# Patient Record
Sex: MALE | Race: WHITE | NOT HISPANIC OR LATINO | Employment: OTHER | URBAN - METROPOLITAN AREA
[De-identification: names, ages, dates, MRNs, and addresses within clinical notes are randomized per-mention and may not be internally consistent; named-entity substitution may affect disease eponyms.]

---

## 2017-11-28 ENCOUNTER — COMMUNICATION - HEALTHEAST (OUTPATIENT)
Dept: SCHEDULING | Facility: CLINIC | Age: 77
End: 2017-11-28

## 2018-05-11 ENCOUNTER — RECORDS - HEALTHEAST (OUTPATIENT)
Dept: LAB | Facility: CLINIC | Age: 78
End: 2018-05-11

## 2018-05-11 LAB
ALBUMIN SERPL-MCNC: 3.5 G/DL (ref 3.5–5)
ALP SERPL-CCNC: 115 U/L (ref 45–120)
ALT SERPL W P-5'-P-CCNC: 16 U/L (ref 0–45)
ANION GAP SERPL CALCULATED.3IONS-SCNC: 12 MMOL/L (ref 5–18)
AST SERPL W P-5'-P-CCNC: 24 U/L (ref 0–40)
BILIRUB SERPL-MCNC: 1 MG/DL (ref 0–1)
BUN SERPL-MCNC: 18 MG/DL (ref 8–28)
CALCIUM SERPL-MCNC: 9.2 MG/DL (ref 8.5–10.5)
CHLORIDE BLD-SCNC: 108 MMOL/L (ref 98–107)
CO2 SERPL-SCNC: 22 MMOL/L (ref 22–31)
CREAT SERPL-MCNC: 0.85 MG/DL (ref 0.7–1.3)
GFR SERPL CREATININE-BSD FRML MDRD: >60 ML/MIN/1.73M2
GLUCOSE BLD-MCNC: 109 MG/DL (ref 70–125)
POTASSIUM BLD-SCNC: 4.3 MMOL/L (ref 3.5–5)
PROT SERPL-MCNC: 6.5 G/DL (ref 6–8)
SODIUM SERPL-SCNC: 142 MMOL/L (ref 136–145)

## 2018-05-18 ASSESSMENT — MIFFLIN-ST. JEOR: SCORE: 1655.66

## 2018-06-12 ENCOUNTER — ANESTHESIA - HEALTHEAST (OUTPATIENT)
Dept: SURGERY | Facility: HOSPITAL | Age: 78
End: 2018-06-12

## 2018-06-12 ENCOUNTER — SURGERY - HEALTHEAST (OUTPATIENT)
Dept: SURGERY | Facility: HOSPITAL | Age: 78
End: 2018-06-12

## 2019-02-25 ENCOUNTER — RECORDS - HEALTHEAST (OUTPATIENT)
Dept: LAB | Facility: CLINIC | Age: 79
End: 2019-02-25

## 2019-02-26 LAB
CHOLEST SERPL-MCNC: 168 MG/DL
FASTING STATUS PATIENT QL REPORTED: ABNORMAL
HDLC SERPL-MCNC: 64 MG/DL
LDLC SERPL CALC-MCNC: 55 MG/DL
TRIGL SERPL-MCNC: 244 MG/DL
TSH SERPL DL<=0.005 MIU/L-ACNC: 0.94 UIU/ML (ref 0.3–5)

## 2019-08-09 ENCOUNTER — ALLIED HEALTH/NURSE VISIT (OUTPATIENT)
Dept: PHARMACY | Facility: PHYSICIAN GROUP | Age: 79
End: 2019-08-09
Payer: COMMERCIAL

## 2019-08-09 DIAGNOSIS — I63.9 CEREBROVASCULAR ACCIDENT (CVA), UNSPECIFIED MECHANISM (H): Primary | ICD-10-CM

## 2019-08-09 DIAGNOSIS — I10 BENIGN ESSENTIAL HYPERTENSION: ICD-10-CM

## 2019-08-09 DIAGNOSIS — H40.003 GLAUCOMA SUSPECT, BILATERAL: ICD-10-CM

## 2019-08-09 DIAGNOSIS — N40.0 BENIGN PROSTATIC HYPERPLASIA, UNSPECIFIED WHETHER LOWER URINARY TRACT SYMPTOMS PRESENT: ICD-10-CM

## 2019-08-09 DIAGNOSIS — E78.5 HYPERLIPIDEMIA LDL GOAL <70: ICD-10-CM

## 2019-08-09 DIAGNOSIS — E03.9 HYPOTHYROIDISM, UNSPECIFIED TYPE: ICD-10-CM

## 2019-08-09 DIAGNOSIS — F32.A DEPRESSION, UNSPECIFIED DEPRESSION TYPE: ICD-10-CM

## 2019-08-09 PROCEDURE — 99605 MTMS BY PHARM NP 15 MIN: CPT | Performed by: PHARMACIST

## 2019-08-09 PROCEDURE — 99607 MTMS BY PHARM ADDL 15 MIN: CPT | Performed by: PHARMACIST

## 2019-08-09 RX ORDER — LATANOPROST 50 UG/ML
1 SOLUTION/ DROPS OPHTHALMIC AT BEDTIME
COMMUNITY

## 2019-08-09 RX ORDER — LEVOTHYROXINE SODIUM 125 UG/1
125 TABLET ORAL DAILY
COMMUNITY
End: 2021-07-21

## 2019-08-09 RX ORDER — TAMSULOSIN HYDROCHLORIDE 0.4 MG/1
0.4 CAPSULE ORAL DAILY
COMMUNITY
End: 2020-07-20

## 2019-08-09 RX ORDER — SERTRALINE HYDROCHLORIDE 100 MG/1
100 TABLET, FILM COATED ORAL DAILY
COMMUNITY
End: 2021-07-21

## 2019-08-09 RX ORDER — SIMVASTATIN 20 MG
20 TABLET ORAL AT BEDTIME
COMMUNITY
End: 2019-08-15

## 2019-08-09 NOTE — PROGRESS NOTES
SUBJECTIVE/OBJECTIVE:                Dominik Rojas is a 78 year old male called for a transitions of care visit.  He was discharged from [facility] on  for acute ischemic right ANDREI stroke.  Spoke with wife Jovana, verbal permission given from patient.  Referral for MTM came from his hospital discharge.      Chief Complaint: Medication review.      Allergies/ADRs: Reviewed in ECW  Tobacco: History of tobacco dependence - quit    Alcohol: not currently using  Caffeine: no caffeine  PMH: Reviewed in Epic and ECW    Medication Adherence/Access:  no issues reported  Patient uses pill box(es). Keeping close track of the doses, and when he will be stopping aspirin.    HPI:    Patient presented to the ED ischemic stroke and found to have carotid artery stenosis.   Per patient, the only symptoms of a stroke he had was confusion.  Head MRI shows acute to subacute infarction right frontal lobe and moderate to advanced chronic microvascular ischemic change. He does have known vascular disease with a AAA repair in  and history of a left carotid artery aneurysm seen on MRA of the left ICA.  TTE shows normal EF and is otherwise normal. LDL is 71. Right ICA stenosis 75%. Vascular surgery consulted recommend continue antiplatelet therapy and plan for outpatient right   Pt's urine culture was also positive for enterococcus species, and was treated with fosfomycin while admitted.      CVA/HTN:   Patient is currently taking Clopidogrel 75mg daily, and aspirin 325mg daily for antiplatelet therapy. Patient reports no current concerns of bruising or bleeding. Patient does not have a hx of GI bleed.  Per discharge summary, pt is supposed to be on Aspirin 325mg daily for 21 days then stop.  Plan from neurology is ASA and plavix for 3 weeks, then Plavix only.  Patient is also taking Lisinopril 10mg daily. Pt denies orthostasis, headaches.   Patient does not self-monitor BP.      Hyperlipidemia:   Current therapy includes  Simvastatin 20mg once daily.  Pt reports no significant myalgias or other side effects.      BPH:   Currently taking Tamsulosin 0.4mg daily.  Pt reports this therapy is effective.  Pt denies dizziness, headaches.      Depression:    Current medications include: Sertraline 100mg once daily. Pt reports that depression symptoms are well controlled.  PHQ-9 score:    PHQ-9 SCORE 2/25/2019   PHQ-9 Total Score 1       Hypothyroidism:   Patient is taking levothyroxine 125 mcg daily. Patient is having the following symptoms: none.  Has been on a very stable dose.      TSH- 02/25/2019       NAME VALUE REFERENCE RANGE    THYROID STIMULATING HORMONE 0.94        Glaucoma:    Currently taking latanoprost 0.005% 1 drop both eyes daily.  Didn't discuss in detail today.  Pt states he follows up regularly with his eye doctor.      Results for LARRY BREWSTER (MRN [id number]) as of 8/12/2019 13:20   8/6/2019 07:01   AST 27   ALT 22   ALBUMIN 3.4 (L)   Protein, Total 6.8   Cholesterol 140   Alkaline Phosphatase 117   Bilirubin, Total 1.4 (H)   Bilirubin, Direct 0.6 (H)   Triglycerides 72   HDL Cholesterol 55   LDL Calculated 71   Hemoglobin A1c 6.0       Today's Vitals: There were no vitals taken for this visit. - telephone encounter    ASSESSMENT:                 Current medications were reviewed today.      Medication Adherence: excellent, no issues identified      CVA/HTN:   Stable.  Has follow up plan with cardiology.  He would benefit from home monitoring of BP since starting lisinopril to ensure he is on an effective dose.    Hyperlipidemia:   Needs Improvement.   Pt is not on high intensity statin which is indicated based on 2019 ACC/AHA guidelines for lipid management given his recent CVA.  Guidelines indicate a moderate intensity statin is appropriate for him given his age.  With his recent CVA, a higher intensity statin may be beneficial.  Pt is high risk, and he also has right ICA stenosis 75%.  His LDL on 8/6/19 was 71, but  with significant ischemic history he may benefit from more intensive treatment with rosuvastatin 20-40mg or atorvastatin 40-80mg.    BPH:   Stable.    Depression:    Stable.    Hypothyroidism:   Stable. Last TSH is within normal limits.     Glaucoma:    Stable, follows with ophthalmologist.     PLAN:                  Post Discharge Medication Reconciliation Status: discharge medications reconciled and changed, per note/orders (see AVS).    1) Recommend consider changing simvastatin to rosuvastatin 20 mg daily.    2)  Recommended patient monitor home BP to ensure they are not above 140/90 mmHg consistently.  They have a BP monitor at home, but aren't sure how to use it.  If they need help with it, I told them they could bring it to the clinic on Tuesday 8/13 and I could help them with this.      Spoke to Dr. Meza on 8/13/19 after his apt with Dominik.  During his apt they discussed my recommendation to change simvastatin to rosuvastatin, and he agreed with plan.  New rx was sent in that day.    I spent 60 minutes with this patient today. I offer these suggestions for consideration by Dr. Meza. A copy of the visit note was provided to the patient's primary care provider.    Will follow up PRN.  No follow up with MTM need at this time, but let pt know they can call me with questions.    The patient declined a summary of these recommendations as an after visit summary.    Wilma Lang, Jameel  Medication Therapy Management Pharmacist  Pager: [phone]

## 2019-08-12 RX ORDER — LISINOPRIL 10 MG/1
10 TABLET ORAL DAILY
COMMUNITY
End: 2020-07-20

## 2019-08-12 RX ORDER — CLOPIDOGREL BISULFATE 75 MG/1
75 TABLET ORAL DAILY
COMMUNITY
End: 2020-07-20

## 2019-08-12 RX ORDER — CALCIPOTRIENE 50 UG/G
OINTMENT TOPICAL DAILY PRN
COMMUNITY
End: 2020-07-20

## 2019-08-12 ASSESSMENT — PATIENT HEALTH QUESTIONNAIRE - PHQ9: SUM OF ALL RESPONSES TO PHQ QUESTIONS 1-9: 1

## 2019-08-12 NOTE — PATIENT INSTRUCTIONS
Recommendations from today's MTM visit:                                                    MTM (medication therapy management) is a service provided by a clinical pharmacist designed to help you get the most of out of your medicines.   Today we reviewed what your medicines are for, how to know if they are working, that your medicines are safe and how to make your medicine regimen as easy as possible.     1)  Recommend talking to your doctor about changing Simvastatin to a more effective cholesterol medication, like Rosuvastatin.  Your cholesterol numbers are good, but a more potent medication like rosuvastatin helps reduce the risk of stroke and heart attack.  I also asked Dr. Meza about this as well..    2) Recommend monitoring your blood pressure at home once a day or a few times per week.  This will help us determine if the Lisinopril dose you are taking is effective, or if it should be increased.  We also want to make sure your blood pressure isn't going too low.  If you blood pressure is frequently above 140/90 mmHg, you would want to call the clinic to let Dr. Meza know.      It was great to speak with you today.  I value your experience and would be very thankful for your time with providing good feedback on clinic surveys.  Kindly let me know personally if your experience today was not exceptional so that we can continue to improve your care delivery experience.    Next MTM visit: as needed, none scheduled at this time.    To schedule another MTM appointment, please call the clinic directly or you may call the MTM scheduling line at [phone] or toll-free at [phone].     My Clinical Pharmacist's contact information:                                                      It was a pleasure talking with you today!  Please feel free to contact me with any questions or concerns you have.      Wilma Lang, Jameel  Medication Therapy Management Pharmacist  Pager: [phone]

## 2019-08-13 ENCOUNTER — OFFICE VISIT (OUTPATIENT)
Dept: PHARMACY | Facility: PHYSICIAN GROUP | Age: 79
End: 2019-08-13
Payer: COMMERCIAL

## 2019-08-13 ENCOUNTER — RECORDS - HEALTHEAST (OUTPATIENT)
Dept: LAB | Facility: CLINIC | Age: 79
End: 2019-08-13

## 2019-08-13 DIAGNOSIS — I10 BENIGN ESSENTIAL HYPERTENSION: ICD-10-CM

## 2019-08-13 DIAGNOSIS — E78.5 HYPERLIPIDEMIA LDL GOAL <70: Primary | ICD-10-CM

## 2019-08-13 LAB
ANION GAP SERPL CALCULATED.3IONS-SCNC: 11 MMOL/L (ref 5–18)
BUN SERPL-MCNC: 21 MG/DL (ref 8–28)
CALCIUM SERPL-MCNC: 9.8 MG/DL (ref 8.5–10.5)
CHLORIDE BLD-SCNC: 107 MMOL/L (ref 98–107)
CO2 SERPL-SCNC: 24 MMOL/L (ref 22–31)
CREAT SERPL-MCNC: 1.17 MG/DL (ref 0.7–1.3)
GFR SERPL CREATININE-BSD FRML MDRD: 60 ML/MIN/1.73M2
GLUCOSE BLD-MCNC: 101 MG/DL (ref 70–125)
POTASSIUM BLD-SCNC: 4.3 MMOL/L (ref 3.5–5)
SODIUM SERPL-SCNC: 142 MMOL/L (ref 136–145)

## 2019-08-13 PROCEDURE — 99207 ZZC NO CHARGE LOS: CPT | Performed by: PHARMACIST

## 2019-08-15 RX ORDER — ROSUVASTATIN CALCIUM 10 MG/1
10 TABLET, COATED ORAL AT BEDTIME
COMMUNITY

## 2019-08-15 NOTE — PROGRESS NOTES
Clinical Pharmacy Consult:                                                    Dominik Rojas is a 78 year old male coming in for a clinical pharmacist consult.  He was referred to me from his [facility] discharge.  I met with him in clinic after his apt with Dr. Meza.     Reason for Consult: To discuss low sodium diet.  Pt recently had a stroke, and is trying to improve diet.  BP has been elevated as well.    Discussion:   Diet-  Discussed a general goal of under 2000 mg of sodium per day.    We discussed diet, specifically ways to reduce salt in diet and alternative options to think about. Pt typically eats a lot of frozen meals for dinner, soups mostly from cans.  Discussed trying to find lower sodium alternatives for frozen dinners, using the nutritional label to check sodium amount per serving.  Discussed trying to increase vegetables in diet, avoiding canned veggies with lots of salt.  They will look into canned veggies with out added salt, or frozen veggies.  They would like easy options, and don't often cook at home.  Jovana mentioned she plans to look up recipes for some foods that would be easy to cook, such as beef stew.    We also discussed portion sizes of snacks or unhealthy foods.  He said he feels like everything is going to get taken away from him.  We discussed looking at the nutrition label for salt content, and trying to eat smaller portions or measuring out 1-2 servings. He really like crackers and salted pretzels.      BP -   Today they brought in their BP that wasn't working.  Maggie-RN was able to fix it in clinic.  She met with them after me and showed them how to use the BP monitor (omron).  I recommended the try to measure Dominik's BP at home once daily or a few times per week.  Pt was just started on Lisinopril 10mg, and we want to ensure that his BP stays well controlled.  Discussed that lisinopril could be increased if his BP was still elevated.  Advised them to let Dr. Meza know if  home BP are frequently over 140/90 mmHg.      Plan:  1. Decrease salt intake, and try to stay below 2000mg sodium per day.  2. Watch portion sizes of foods that are high in sodium, such as pretzels and crackers.

## 2019-08-29 ENCOUNTER — HOME CARE/HOSPICE - HEALTHEAST (OUTPATIENT)
Dept: HOME HEALTH SERVICES | Facility: HOME HEALTH | Age: 79
End: 2019-08-29

## 2019-09-08 ENCOUNTER — ANESTHESIA - HEALTHEAST (OUTPATIENT)
Dept: SURGERY | Facility: CLINIC | Age: 79
End: 2019-09-08

## 2019-09-09 ENCOUNTER — SURGERY - HEALTHEAST (OUTPATIENT)
Dept: SURGERY | Facility: CLINIC | Age: 79
End: 2019-09-09

## 2019-09-09 ENCOUNTER — RECORDS - HEALTHEAST (OUTPATIENT)
Dept: ADMINISTRATIVE | Facility: OTHER | Age: 79
End: 2019-09-09

## 2019-09-09 ASSESSMENT — MIFFLIN-ST. JEOR
SCORE: 1627.54
SCORE: 1580.37

## 2019-09-10 ASSESSMENT — MIFFLIN-ST. JEOR
SCORE: 1600.33
SCORE: 1624.37

## 2019-09-11 ASSESSMENT — MIFFLIN-ST. JEOR: SCORE: 1630.72

## 2019-09-12 ENCOUNTER — RECORDS - HEALTHEAST (OUTPATIENT)
Dept: ADMINISTRATIVE | Facility: OTHER | Age: 79
End: 2019-09-12

## 2019-09-12 ASSESSMENT — MIFFLIN-ST. JEOR: SCORE: 1510.06

## 2019-09-13 ENCOUNTER — AMBULATORY - HEALTHEAST (OUTPATIENT)
Dept: OTHER | Facility: CLINIC | Age: 79
End: 2019-09-13

## 2019-12-16 ENCOUNTER — RECORDS - HEALTHEAST (OUTPATIENT)
Dept: LAB | Facility: CLINIC | Age: 79
End: 2019-12-16

## 2019-12-16 LAB
ANION GAP SERPL CALCULATED.3IONS-SCNC: 9 MMOL/L (ref 5–18)
BUN SERPL-MCNC: 21 MG/DL (ref 8–28)
CALCIUM SERPL-MCNC: 9.1 MG/DL (ref 8.5–10.5)
CHLORIDE BLD-SCNC: 105 MMOL/L (ref 98–107)
CHOLEST SERPL-MCNC: 163 MG/DL
CO2 SERPL-SCNC: 26 MMOL/L (ref 22–31)
CREAT SERPL-MCNC: 1.06 MG/DL (ref 0.7–1.3)
FASTING STATUS PATIENT QL REPORTED: NORMAL
GFR SERPL CREATININE-BSD FRML MDRD: >60 ML/MIN/1.73M2
GLUCOSE BLD-MCNC: 89 MG/DL (ref 70–125)
HDLC SERPL-MCNC: 54 MG/DL
LDLC SERPL CALC-MCNC: 81 MG/DL
POTASSIUM BLD-SCNC: 4.5 MMOL/L (ref 3.5–5)
SODIUM SERPL-SCNC: 140 MMOL/L (ref 136–145)
TRIGL SERPL-MCNC: 138 MG/DL

## 2020-02-11 ENCOUNTER — RECORDS - HEALTHEAST (OUTPATIENT)
Dept: LAB | Facility: CLINIC | Age: 80
End: 2020-02-11

## 2020-02-11 LAB
ANION GAP SERPL CALCULATED.3IONS-SCNC: 11 MMOL/L (ref 5–18)
BUN SERPL-MCNC: 19 MG/DL (ref 8–28)
CALCIUM SERPL-MCNC: 9 MG/DL (ref 8.5–10.5)
CHLORIDE BLD-SCNC: 106 MMOL/L (ref 98–107)
CO2 SERPL-SCNC: 25 MMOL/L (ref 22–31)
CREAT SERPL-MCNC: 1.02 MG/DL (ref 0.7–1.3)
GFR SERPL CREATININE-BSD FRML MDRD: >60 ML/MIN/1.73M2
GLUCOSE BLD-MCNC: 88 MG/DL (ref 70–125)
POTASSIUM BLD-SCNC: 4.3 MMOL/L (ref 3.5–5)
SODIUM SERPL-SCNC: 142 MMOL/L (ref 136–145)

## 2020-02-13 ASSESSMENT — MIFFLIN-ST. JEOR: SCORE: 1634.35

## 2020-02-18 ENCOUNTER — SURGERY - HEALTHEAST (OUTPATIENT)
Dept: SURGERY | Facility: HOSPITAL | Age: 80
End: 2020-02-18

## 2020-02-18 ENCOUNTER — ANESTHESIA - HEALTHEAST (OUTPATIENT)
Dept: SURGERY | Facility: HOSPITAL | Age: 80
End: 2020-02-18

## 2020-02-18 ASSESSMENT — MIFFLIN-ST. JEOR: SCORE: 1626.18

## 2020-02-19 ASSESSMENT — MIFFLIN-ST. JEOR
SCORE: 1609.4
SCORE: 1614.39

## 2020-02-27 ENCOUNTER — RECORDS - HEALTHEAST (OUTPATIENT)
Dept: LAB | Facility: CLINIC | Age: 80
End: 2020-02-27

## 2020-02-27 LAB
ANION GAP SERPL CALCULATED.3IONS-SCNC: 12 MMOL/L (ref 5–18)
BUN SERPL-MCNC: 22 MG/DL (ref 8–28)
CALCIUM SERPL-MCNC: 9.2 MG/DL (ref 8.5–10.5)
CHLORIDE BLD-SCNC: 104 MMOL/L (ref 98–107)
CO2 SERPL-SCNC: 25 MMOL/L (ref 22–31)
CREAT SERPL-MCNC: 0.88 MG/DL (ref 0.7–1.3)
GFR SERPL CREATININE-BSD FRML MDRD: >60 ML/MIN/1.73M2
GLUCOSE BLD-MCNC: 97 MG/DL (ref 70–125)
POTASSIUM BLD-SCNC: 4.1 MMOL/L (ref 3.5–5)
SODIUM SERPL-SCNC: 141 MMOL/L (ref 136–145)
TSH SERPL DL<=0.005 MIU/L-ACNC: 3.34 UIU/ML (ref 0.3–5)

## 2020-04-30 ENCOUNTER — AMBULATORY - HEALTHEAST (OUTPATIENT)
Dept: SURGERY | Facility: CLINIC | Age: 80
End: 2020-04-30

## 2020-04-30 DIAGNOSIS — Z11.59 ENCOUNTER FOR SCREENING FOR OTHER VIRAL DISEASES: ICD-10-CM

## 2020-05-08 ENCOUNTER — RECORDS - HEALTHEAST (OUTPATIENT)
Dept: LAB | Facility: CLINIC | Age: 80
End: 2020-05-08

## 2020-05-08 LAB
ANION GAP SERPL CALCULATED.3IONS-SCNC: 11 MMOL/L (ref 5–18)
BUN SERPL-MCNC: 23 MG/DL (ref 8–28)
CALCIUM SERPL-MCNC: 8.8 MG/DL (ref 8.5–10.5)
CHLORIDE BLD-SCNC: 107 MMOL/L (ref 98–107)
CO2 SERPL-SCNC: 24 MMOL/L (ref 22–31)
CREAT SERPL-MCNC: 0.85 MG/DL (ref 0.7–1.3)
GFR SERPL CREATININE-BSD FRML MDRD: >60 ML/MIN/1.73M2
GLUCOSE BLD-MCNC: 97 MG/DL (ref 70–125)
HGB BLD-MCNC: 13 G/DL (ref 14–18)
POTASSIUM BLD-SCNC: 4.3 MMOL/L (ref 3.5–5)
SODIUM SERPL-SCNC: 142 MMOL/L (ref 136–145)

## 2020-05-16 ENCOUNTER — OFFICE VISIT - HEALTHEAST (OUTPATIENT)
Dept: FAMILY MEDICINE | Facility: CLINIC | Age: 80
End: 2020-05-16

## 2020-05-16 DIAGNOSIS — Z11.59 ENCOUNTER FOR SCREENING FOR OTHER VIRAL DISEASES: ICD-10-CM

## 2020-05-18 ENCOUNTER — ANESTHESIA - HEALTHEAST (OUTPATIENT)
Dept: SURGERY | Facility: CLINIC | Age: 80
End: 2020-05-18

## 2020-05-19 ENCOUNTER — RECORDS - HEALTHEAST (OUTPATIENT)
Dept: ADMINISTRATIVE | Facility: OTHER | Age: 80
End: 2020-05-19

## 2020-05-19 ENCOUNTER — SURGERY - HEALTHEAST (OUTPATIENT)
Dept: SURGERY | Facility: CLINIC | Age: 80
End: 2020-05-19

## 2020-05-19 ASSESSMENT — MIFFLIN-ST. JEOR: SCORE: 1607.59

## 2020-07-20 ENCOUNTER — OFFICE VISIT (OUTPATIENT)
Dept: NEUROLOGY | Facility: CLINIC | Age: 80
End: 2020-07-20
Payer: COMMERCIAL

## 2020-07-20 VITALS — HEIGHT: 68 IN | BODY MASS INDEX: 33.34 KG/M2 | WEIGHT: 220 LBS

## 2020-07-20 DIAGNOSIS — R41.0 CONFUSION: ICD-10-CM

## 2020-07-20 DIAGNOSIS — Z86.73 HISTORY OF CEREBROVASCULAR ACCIDENT: Primary | ICD-10-CM

## 2020-07-20 PROBLEM — L40.9 PSORIASIS: Status: ACTIVE | Noted: 2020-07-20

## 2020-07-20 PROBLEM — I10 ACCELERATED HYPERTENSION: Status: ACTIVE | Noted: 2020-07-20

## 2020-07-20 PROBLEM — I63.521: Status: ACTIVE | Noted: 2019-08-05

## 2020-07-20 PROBLEM — E78.00 HYPERCHOLESTEREMIA: Status: ACTIVE | Noted: 2020-02-23

## 2020-07-20 PROBLEM — R73.9 HYPERGLYCEMIA: Status: ACTIVE | Noted: 2020-07-20

## 2020-07-20 PROBLEM — R11.10 VOMITING AND DIARRHEA: Status: ACTIVE | Noted: 2017-11-24

## 2020-07-20 PROBLEM — R29.90 STROKE-LIKE SYMPTOMS: Status: ACTIVE | Noted: 2019-08-27

## 2020-07-20 PROBLEM — I72.0 CAROTID ANEURYSM, LEFT (H): Status: ACTIVE | Noted: 2019-09-09

## 2020-07-20 PROBLEM — N40.0 BENIGN PROSTATIC HYPERPLASIA: Status: ACTIVE | Noted: 2020-07-20

## 2020-07-20 PROBLEM — J30.9 ALLERGIC RHINITIS: Status: ACTIVE | Noted: 2020-07-20

## 2020-07-20 PROBLEM — I10 HTN (HYPERTENSION): Status: ACTIVE | Noted: 2019-08-05

## 2020-07-20 PROBLEM — R19.7 VOMITING AND DIARRHEA: Status: ACTIVE | Noted: 2017-11-24

## 2020-07-20 PROBLEM — J47.9 BRONCHIECTASIS WITHOUT ACUTE EXACERBATION (H): Status: ACTIVE | Noted: 2019-09-13

## 2020-07-20 PROBLEM — K76.0 HEPATIC STEATOSIS: Status: ACTIVE | Noted: 2017-11-25

## 2020-07-20 PROBLEM — M19.90 DJD (DEGENERATIVE JOINT DISEASE): Status: ACTIVE | Noted: 2020-07-20

## 2020-07-20 PROBLEM — N43.3 HYDROCELE: Status: ACTIVE | Noted: 2020-07-20

## 2020-07-20 PROBLEM — N39.0 RECURRENT UTI: Status: ACTIVE | Noted: 2020-07-20

## 2020-07-20 PROBLEM — F32.A DEPRESSION: Status: ACTIVE | Noted: 2020-02-23

## 2020-07-20 PROBLEM — E66.9 OBESITY: Status: ACTIVE | Noted: 2020-07-20

## 2020-07-20 PROBLEM — I67.1 ANEURYSM, CAROTID ARTERY, INTERNAL: Status: ACTIVE | Noted: 2017-04-14

## 2020-07-20 PROBLEM — N48.89 PENILE PAIN: Status: ACTIVE | Noted: 2020-02-23

## 2020-07-20 PROBLEM — N39.46 MIXED INCONTINENCE URGE AND STRESS (MALE)(FEMALE): Status: ACTIVE | Noted: 2020-02-24

## 2020-07-20 PROBLEM — R33.9 RETENTION OF URINE: Status: ACTIVE | Noted: 2020-02-24

## 2020-07-20 PROBLEM — E87.20 LACTIC ACIDOSIS: Status: ACTIVE | Noted: 2020-07-20

## 2020-07-20 PROBLEM — M17.9 DJD (DEGENERATIVE JOINT DISEASE) OF KNEE: Status: ACTIVE | Noted: 2020-07-20

## 2020-07-20 PROBLEM — I65.21 ASYMPTOMATIC STENOSIS OF RIGHT CAROTID ARTERY: Status: ACTIVE | Noted: 2019-08-06

## 2020-07-20 PROBLEM — E86.0 DEHYDRATION: Status: ACTIVE | Noted: 2020-07-20

## 2020-07-20 PROBLEM — F10.10 ETOH ABUSE: Status: ACTIVE | Noted: 2020-07-20

## 2020-07-20 PROCEDURE — 99214 OFFICE O/P EST MOD 30 MIN: CPT | Mod: 95 | Performed by: PSYCHIATRY & NEUROLOGY

## 2020-07-20 RX ORDER — NIFEDIPINE 30 MG/1
30 TABLET, EXTENDED RELEASE ORAL DAILY
Status: ON HOLD | COMMUNITY
Start: 2020-02-25 | End: 2021-08-13

## 2020-07-20 RX ORDER — ASPIRIN 81 MG/1
81 TABLET, CHEWABLE ORAL DAILY
Status: ON HOLD | COMMUNITY
End: 2024-06-29

## 2020-07-20 RX ORDER — MULTIPLE VITAMINS W/ MINERALS TAB 9MG-400MCG
1 TAB ORAL DAILY
COMMUNITY

## 2020-07-20 RX ORDER — ACETAMINOPHEN 500 MG
1000 TABLET ORAL PRN
Status: ON HOLD | COMMUNITY
Start: 2020-03-17 | End: 2023-05-07

## 2020-07-20 RX ORDER — OXYBUTYNIN CHLORIDE 5 MG/1
5 TABLET ORAL 3 TIMES DAILY
COMMUNITY
Start: 2020-03-23 | End: 2021-07-21

## 2020-07-20 ASSESSMENT — MIFFLIN-ST. JEOR: SCORE: 1692.41

## 2020-07-20 NOTE — PATIENT INSTRUCTIONS
Patient Education     Confusion  Confusion or delirium is a change in a person s ability to think clearly. There may be trouble recognizing familiar people and places or knowing what day it is. Memory, judgment, and decision-making may also be affected. In severe cases, the person may have limited or no response to being spoken to. Confusion usually appears over a few days and can vary throughout the day. It can last weeks to months.  Confusion is usually a sign of an underlying problem. It may occur suddenly. Or it may develop gradually over time. Causes of confusion include brain injury, medicines, alcohol, withdrawal from certain medicines or illegal drugs, and infection. Heart attack and stroke may cause it. Confusion can also be a sign of dementia or a mental illness.  Treatment will depend on the cause of the problem. If the issue is a medicine, stopping the medicine may help. Thiamine supplement may help with very little risk of side effects. Haloperidol is useful but people with Parkinson disease should not use it. Benzodiazepines are only used in people undergoing alcohol withdrawal.  Home care    Be sure someone is with the confused person at all times. He or she should not be left alone or unsupervised.    Tell the healthcare provider about all medicines that the person takes. These include prescription, over-the-counter, herbs, and supplements.    Dehydration can increase confusion. Ask the healthcare provider how much fluid the person should be drinking. Offer liquids and ensure that they are taken.    Keep all medicines in a secure place under the caregiver s control. To prevent overdose, a confused person should take medicines only under the supervision of a caregiver.    To help a person with confusion:  ? Establish a daily routine. Change can be a source of stress for someone with confusion. Make and keep a time schedule for common tasks such as bathing, dressing, taking medicines, meals, going  for walks, shopping, naps and bed time. Make sure that the person has glasses and hearing aids if needed.  ? Don't use physical restraints.  ? Speak slowly and clearly with a gentle tone of voice. Use short simple words and sentences. Ask one question at a time. Don'tt interrupt, criticize or argue. Be calm and supportive. Use friendly facial expressions. Use pointing and touching to help communicate. If there has been loss of long-term memory, don't ask questions about past events. This would only cause frustration for the person.  ? Use lists, signs, family photos, clocks and calendars as memory aids. Label cabinets and drawers. Try to distract, not confront, the person. When he or she becomes frustrated or upset, redirect attention to eating or some other activity of interest.  ? If this proves to be due to a permanent condition, talk to the healthcare provider or a  about getting a Power of  for healthcare and for financial decisions. It is best to do this while the person can still sign legal documents and make his or her own decisions. Otherwise, a court order will be required.  Follow-up care  Follow up with the person's healthcare provider or as advised for further testing or changes in medical care.  When to seek medical advice  Call the healthcare provider for any of the following:    Frequent falling    Refusal to eat or drink    Increased drowsiness    Nausea or vomiting    Unexplained fever over 100.4  F (38.0  C) or as directed by the healthcare provider  Call 911  Call 911 or emergency services right away if any of the following occur:    Violent behavior or behavior too hard to manage at home    New hallucinations or delusions    complains of severe headache or numbness or weakness of the face, arm, or leg    Slurred speech or trouble speaking, walking, or seeing    Fainting spell, dizziness, or seizure  Date Last Reviewed: 3/1/2018    4751-9721 [employer]. [address] [place] [address]  [place] [address]. All rights reserved. This information is not intended as a substitute for professional medical care. Always follow your healthcare professional's instructions.

## 2020-07-20 NOTE — LETTER
"    7/20/2020         RE: Dominik Rojas  [address]  [facility] [address]        Dear Colleague,    Thank you for referring your patient, Dominik Rojas, to the [facility] [place]. Please see a copy of my visit note below.    [facility]  [place]    Dominik Rojas MRN# [id number]   Age: 79 year old YOB: 1940               Assessment and Plan:   Assessment:   Confusion        Plan:   Orders Placed This Encounter   Procedures     MR Brain w/o Contrast     CBC with platelets differential     Comprehensive metabolic panel     TSH     Vitamin B12     Vitamin B1 whole blood     We will let Dominik and his wife know the results of the above work-up.  If need be, formal neuropsych testing could be considered.  I do wonder about poor sleep hygiene contributing to cognitive inefficiencies.  We will mail a list of sleep hygiene recommendations to Dominik and his wife.             Chief Complaint/HPI:     I spoke to Dominik and his wife with their permission for a telephone visit today.  This is a 79-year-old man who I met about a year ago when he presented to [facility] with stroke.  His wife has concerns.  She says on several occasions, \"I do not know if he is going to come out of it.\"  When I try to ask specifically about that she says that.he had a stroke on August 3, 2019 and on the fifth he saw his regular doctor and she had just a gut feeling, and then he was in the hospital and they discovered the stroke.  He has had 4 surgeries in the last few months including an abdominal aortic aneurysm surgery.  He is up all night long and eating.  She complains that he has complete and total confusion.  He also has a bad knee and they are recommending surgery for that but he has not been enthusiastic about pursuing knee surgery.  The patient himself feels that he is doing fine.  He does still have some coordination difficulties with the left hand related " to the stroke a year ago.  For example, if he tries to unlock the mailbox with his left hand, that he shakes a bit and it can be difficult to get it into the keyhole.  He does fine with the right hand.  His wife has my business card from when I saw him in the hospital a year ago and so she called.            Past Medical History:    has a past medical history of Depressive disorder, Diabetes (H), Glaucoma, History of stroke without residual deficits, Hypertension, and Hypothyroidism.         Past Surgical History:    has a past surgical history that includes Abdomen surgery and lithotripsy.          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              Family History:     Family History   Problem Relation Age of Onset     Emphysema Mother      No Known Problems Father                 Allergies:     Allergies   Allergen Reactions     Diclofenac      Other reaction(s): GI Upset     Loratadine      Other reaction(s): Urinary Retention             Medications:     Current Outpatient Medications:      acetaminophen (TYLENOL) 500 MG tablet, Take 1,000 mg by mouth as needed, Disp: , Rfl:      aspirin (ASA) 81 MG chewable tablet, Take 81 mg by mouth daily, Disp: , Rfl:      latanoprost (XALATAN) 0.005 % ophthalmic solution, Place 1 drop into both eyes daily, Disp: , Rfl:      levothyroxine (SYNTHROID/LEVOTHROID) 125 MCG tablet, Take 125 mcg by mouth daily, Disp: , Rfl:      multivitamin w/minerals (MULTI-VITAMIN) tablet, Take 1 tablet by mouth daily, Disp: , Rfl:      NIFEdipine ER OSMOTIC (PROCARDIA XL) 30 MG 24 hr tablet, Take 30 mg by mouth daily, Disp: , Rfl:      oxybutynin (DITROPAN) 5 MG tablet, Take 5 mg by mouth  3 times daily , Disp: , Rfl:      rosuvastatin (CRESTOR) 10 MG tablet, Take 10 mg by mouth daily, Disp: , Rfl:      sertraline (ZOLOFT) 100 MG tablet, Take 100 mg by mouth daily, Disp: , Rfl:      umeclidinium-vilanterol (ANORO ELLIPTA) 62.5-25 MCG/INH oral inhaler, Inhale 1 puff into the lungs At Bedtime, Disp: , Rfl:            Review of Systems:   Other than the above-mentioned symptoms a complete systems review is negative.            Physical Exam:   Awake and alert with no aphasia no dysarthria  Speech is clear and coherent    Initially he has some difficulty registering 3 out of 3 items as his wife continues talking to him while I try to examine him.  He can name the last 2 presidents without difficulty  Is oriented x3  He can calculate nickels in $1 without difficulty  Recall is 2 out of 3 at about 3 minutes      23 minutes were spent on the phone today.    Bharathi Billy MD         Again, thank you for allowing me to participate in the care of your patient.        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  Reconstruction w/ methylmethacrylate vicryl and pec major flap.     - Pain control  - DVT ppx   - f/u drain outputs  - continue care as per SICU

## 2020-07-20 NOTE — NURSING NOTE
Chief Complaint   Patient presents with     Hospital F/U     follow up after being seen for stroke on 5/19/2020 at [facility] and then urgency room visit on 5/23/2020-patient's wife states that patient is doing ok however he has not been sleeping at all. referral from PCP was sent for sleep study. Has difficulty walking as well due to post poned knee surgery. Confusion as well (difficulty telling time)      Phone visit [phone] patient and wife on the line   Tejas Archer on 7/20/2020 at 10:27 AM

## 2020-07-20 NOTE — PROGRESS NOTES
"[facility] Neurology  [place]    Dominik Rojas MRN# [id number]   Age: 79 year old YOB: 1940               Assessment and Plan:   Assessment:   Confusion        Plan:   Orders Placed This Encounter   Procedures     MR Brain w/o Contrast     CBC with platelets differential     Comprehensive metabolic panel     TSH     Vitamin B12     Vitamin B1 whole blood     We will let Dominik and his wife know the results of the above work-up.  If need be, formal neuropsych testing could be considered.  I do wonder about poor sleep hygiene contributing to cognitive inefficiencies.  We will mail a list of sleep hygiene recommendations to Dominik and his wife.             Chief Complaint/HPI:     I spoke to Dominik and his wife with their permission for a telephone visit today.  This is a 79-year-old man who I met about a year ago when he presented to 1 of the [facility] with stroke.  His wife has concerns.  She says on several occasions, \"I do not know if he is going to come out of it.\"  When I try to ask specifically about that she says that.he had a stroke on August 3, 2019 and on the fifth he saw his regular doctor and she had just a gut feeling, and then he was in the hospital and they discovered the stroke.  He has had 4 surgeries in the last few months including an abdominal aortic aneurysm surgery.  He is up all night long and eating.  She complains that he has complete and total confusion.  He also has a bad knee and they are recommending surgery for that but he has not been enthusiastic about pursuing knee surgery.  The patient himself feels that he is doing fine.  He does still have some coordination difficulties with the left hand related to the stroke a year ago.  For example, if he tries to unlock the mailbox with his left hand, that he shakes a bit and it can be difficult to get it into the keyhole.  He does fine with the right hand.  His wife has my business card from when I saw him in the " hospital a year ago and so she called.            Past Medical History:    has a past medical history of Depressive disorder, Diabetes (H), Glaucoma, History of stroke without residual deficits, Hypertension, and Hypothyroidism.         Past Surgical History:    has a past surgical history that includes Abdomen surgery and lithotripsy.          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              Family History:     Family History   Problem Relation Age of Onset     Emphysema Mother      No Known Problems Father                 Allergies:     Allergies   Allergen Reactions     Diclofenac      Other reaction(s): GI Upset     Loratadine      Other reaction(s): Urinary Retention             Medications:     Current Outpatient Medications:      acetaminophen (TYLENOL) 500 MG tablet, Take 1,000 mg by mouth as needed, Disp: , Rfl:      aspirin (ASA) 81 MG chewable tablet, Take 81 mg by mouth daily, Disp: , Rfl:      latanoprost (XALATAN) 0.005 % ophthalmic solution, Place 1 drop into both eyes daily, Disp: , Rfl:      levothyroxine (SYNTHROID/LEVOTHROID) 125 MCG tablet, Take 125 mcg by mouth daily, Disp: , Rfl:      multivitamin w/minerals (MULTI-VITAMIN) tablet, Take 1 tablet by mouth daily, Disp: , Rfl:      NIFEdipine ER OSMOTIC (PROCARDIA XL) 30 MG 24 hr tablet, Take 30 mg by mouth daily, Disp: , Rfl:      oxybutynin (DITROPAN) 5 MG tablet, Take 5 mg by mouth 3 times daily , Disp: , Rfl:      rosuvastatin (CRESTOR) 10 MG tablet, Take 10 mg by mouth daily, Disp: , Rfl:      sertraline (ZOLOFT) 100 MG tablet, Take 100 mg by mouth daily, Disp: , Rfl:      umeclidinium-vilanterol (ANORO ELLIPTA) 62.5-25 MCG/INH oral  inhaler, Inhale 1 puff into the lungs At Bedtime, Disp: , Rfl:            Review of Systems:   Other than the above-mentioned symptoms a complete systems review is negative.            Physical Exam:   Awake and alert with no aphasia no dysarthria  Speech is clear and coherent    Initially he has some difficulty registering 3 out of 3 items as his wife continues talking to him while I try to examine him.  He can name the last 2 presidents without difficulty  Is oriented x3  He can calculate nickels in $1 without difficulty  Recall is 2 out of 3 at about 3 minutes      23 minutes were spent on the phone today.    Bharathi Billy MD

## 2020-07-21 ENCOUNTER — RECORDS - HEALTHEAST (OUTPATIENT)
Dept: LAB | Facility: HOSPITAL | Age: 80
End: 2020-07-21

## 2020-07-21 LAB
ALBUMIN SERPL-MCNC: 3.9 G/DL (ref 3.5–5)
ALP SERPL-CCNC: 114 U/L (ref 45–120)
ALT SERPL W P-5'-P-CCNC: 17 U/L (ref 0–45)
ANION GAP SERPL CALCULATED.3IONS-SCNC: 11 MMOL/L (ref 5–18)
AST SERPL W P-5'-P-CCNC: 26 U/L (ref 0–40)
BASOPHILS # BLD AUTO: 0.1 THOU/UL (ref 0–0.2)
BASOPHILS NFR BLD AUTO: 1 % (ref 0–2)
BILIRUB SERPL-MCNC: 1.2 MG/DL (ref 0–1)
BUN SERPL-MCNC: 13 MG/DL (ref 8–28)
CALCIUM SERPL-MCNC: 9 MG/DL (ref 8.5–10.5)
CHLORIDE BLD-SCNC: 107 MMOL/L (ref 98–107)
CO2 SERPL-SCNC: 24 MMOL/L (ref 22–31)
CREAT SERPL-MCNC: 0.89 MG/DL (ref 0.7–1.3)
EOSINOPHIL # BLD AUTO: 0.5 THOU/UL (ref 0–0.4)
EOSINOPHIL NFR BLD AUTO: 6 % (ref 0–6)
ERYTHROCYTE [DISTWIDTH] IN BLOOD BY AUTOMATED COUNT: 15.4 % (ref 11–14.5)
GFR SERPL CREATININE-BSD FRML MDRD: >60 ML/MIN/1.73M2
GLUCOSE BLD-MCNC: 100 MG/DL (ref 70–125)
HCT VFR BLD AUTO: 43 % (ref 40–54)
HGB BLD-MCNC: 13.3 G/DL (ref 14–18)
LYMPHOCYTES # BLD AUTO: 2.3 THOU/UL (ref 0.8–4.4)
LYMPHOCYTES NFR BLD AUTO: 25 % (ref 20–40)
MCH RBC QN AUTO: 26.8 PG (ref 27–34)
MCHC RBC AUTO-ENTMCNC: 30.9 G/DL (ref 32–36)
MCV RBC AUTO: 87 FL (ref 80–100)
MONOCYTES # BLD AUTO: 0.7 THOU/UL (ref 0–0.9)
MONOCYTES NFR BLD AUTO: 8 % (ref 2–10)
NEUTROPHILS # BLD AUTO: 5.5 THOU/UL (ref 2–7.7)
NEUTROPHILS NFR BLD AUTO: 61 % (ref 50–70)
PLATELET # BLD AUTO: 266 THOU/UL (ref 140–440)
PMV BLD AUTO: 9 FL (ref 8.5–12.5)
POTASSIUM BLD-SCNC: 4.2 MMOL/L (ref 3.5–5)
PROT SERPL-MCNC: 7.5 G/DL (ref 6–8)
RBC # BLD AUTO: 4.97 MILL/UL (ref 4.4–6.2)
SODIUM SERPL-SCNC: 142 MMOL/L (ref 136–145)
TSH SERPL DL<=0.005 MIU/L-ACNC: 0.3 UIU/ML (ref 0.3–5)
VIT B12 SERPL-MCNC: 476 PG/ML (ref 213–816)
WBC: 9.1 THOU/UL (ref 4–11)

## 2020-07-24 ENCOUNTER — TELEPHONE (OUTPATIENT)
Dept: NEUROLOGY | Facility: CLINIC | Age: 80
End: 2020-07-24

## 2020-07-24 LAB — VIT B1 PYROPHOSHATE BLD-SCNC: 97 NMOL/L (ref 70–180)

## 2020-07-24 NOTE — LETTER
August 3, 2020      Dominik Rojas  [address] [facility] [address]        Dear ,    We are writing to inform you of your test results.    Your recent blood tests, including B12, thiamine and thyroid tests were all fine.  Let us know if you've had the MRI scan done.      If you have any questions or concerns, please call the clinic at the number listed above.       Sincerely,        Bharathi Billy MD

## 2020-07-24 NOTE — TELEPHONE ENCOUNTER
Component      Latest Ref Rng & Units 7/21/2020   WBC      4.0 - 11.0 thou/uL 9.1   RBC      4.40 - 6.20 mill/uL 4.97   Hemoglobin      14.0 - 18.0 g/dL 13.3 (L)   Hematocrit      40.0 - 54.0 % 43.0   MCV      80 - 100 fL 87   MCH      27.0 - 34.0 pg 26.8 (L)   MCHC      32.0 - 36.0 g/dL 30.9 (L)   RDW      11.0 - 14.5 % 15.4 (H)   Platelets      140 - 440 thou/uL 266   MPV      8.5 - 12.5 fL 9.0   Neutrophils %      50 - 70 % 61   Lymphocytes %      20 - 40 % 25   Monocytes %      2 - 10 % 8   Eosinophils %      0 - 6 % 6   Basophils %      0 - 2 % 1   Neutrophils Absolute      2.0 - 7.7 thou/uL 5.5   Lymphocytes Absolute      0.8 - 4.4 thou/uL 2.3   Monocytes Absolute      0.0 - 0.9 thou/uL 0.7   Eosinophils Absolute      0.0 - 0.4 thou/uL 0.5 (H)   Basophils Absolute      0.0 - 0.2 thou/uL 0.1   Sodium      136 - 145 mmol/L 142   Potassium      3.5 - 5.0 mmol/L 4.2   Chloride      98 - 107 mmol/L 107   CO2      22 - 31 mmol/L 24   Anion Gap, Calculation      5 - 18 mmol/L 11   Glucose      70 - 125 mg/dL 100   BUN      8 - 28 mg/dL 13   Creatinine      0.70 - 1.30 mg/dL 0.89   GFR MDRD Af Amer      >60 mL/min/1.73m2 >60   GFR MDRD Non Af Amer      >60 mL/min/1.73m2 >60   Bilirubin, Total      0.0 - 1.0 mg/dL 1.2 (H)   Calcium      8.5 - 10.5 mg/dL 9.0   Protein, Total      6.0 - 8.0 g/dL 7.5   ALBUMIN      3.5 - 5.0 g/dL 3.9   Alkaline Phosphatase      45 - 120 U/L 114   AST      0 - 40 U/L 26   ALT      0 - 45 U/L 17   Vitamin B1, Whole Blood      70 - 180 nmol/L 97   TSH      0.30 - 5.00 uIU/mL 0.30   Vitamin B-12      213 - 816 pg/mL 476     MRI at [facility] still pending   Please advise   Tejas Archer on 7/24/2020 at 12:04 PM

## 2020-08-11 ENCOUNTER — RECORDS - HEALTHEAST (OUTPATIENT)
Dept: ADMINISTRATIVE | Facility: OTHER | Age: 80
End: 2020-08-11

## 2020-08-18 ENCOUNTER — COMMUNICATION - HEALTHEAST (OUTPATIENT)
Dept: SCHEDULING | Facility: CLINIC | Age: 80
End: 2020-08-18

## 2020-08-27 ENCOUNTER — AMBULATORY - HEALTHEAST (OUTPATIENT)
Dept: PHYSICAL MEDICINE AND REHAB | Facility: CLINIC | Age: 80
End: 2020-08-27

## 2020-08-27 ENCOUNTER — RECORDS - HEALTHEAST (OUTPATIENT)
Dept: LAB | Facility: CLINIC | Age: 80
End: 2020-08-27

## 2020-08-27 DIAGNOSIS — M51.369 DEGENERATIVE DISC DISEASE, LUMBAR: ICD-10-CM

## 2020-08-29 LAB — BACTERIA SPEC CULT: ABNORMAL

## 2020-09-08 ENCOUNTER — HOSPITAL ENCOUNTER (OUTPATIENT)
Dept: PHYSICAL MEDICINE AND REHAB | Facility: CLINIC | Age: 80
Discharge: HOME | End: 2020-09-08
Attending: FAMILY MEDICINE

## 2020-09-08 DIAGNOSIS — M48.062 SPINAL STENOSIS OF LUMBAR REGION WITH NEUROGENIC CLAUDICATION: ICD-10-CM

## 2020-09-08 DIAGNOSIS — M47.816 LUMBAR FACET ARTHROPATHY: ICD-10-CM

## 2020-09-08 DIAGNOSIS — M51.369 DEGENERATION OF LUMBAR INTERVERTEBRAL DISC: ICD-10-CM

## 2020-09-08 ASSESSMENT — MIFFLIN-ST. JEOR: SCORE: 1671.54

## 2020-09-16 ENCOUNTER — HOSPITAL ENCOUNTER (OUTPATIENT)
Dept: PHYSICAL MEDICINE AND REHAB | Facility: CLINIC | Age: 80
Discharge: HOME | End: 2020-09-16
Attending: PHYSICIAN ASSISTANT

## 2020-09-16 DIAGNOSIS — M47.816 LUMBAR FACET ARTHROPATHY: ICD-10-CM

## 2020-09-16 ASSESSMENT — MIFFLIN-ST. JEOR: SCORE: 1671.54

## 2020-10-02 ENCOUNTER — HOSPITAL ENCOUNTER (OUTPATIENT)
Dept: PHYSICAL MEDICINE AND REHAB | Facility: CLINIC | Age: 80
Discharge: HOME | End: 2020-10-02
Attending: PHYSICIAN ASSISTANT

## 2020-10-02 DIAGNOSIS — M47.816 LUMBAR FACET ARTHROPATHY: ICD-10-CM

## 2020-10-02 DIAGNOSIS — M48.062 SPINAL STENOSIS OF LUMBAR REGION WITH NEUROGENIC CLAUDICATION: ICD-10-CM

## 2020-10-02 ASSESSMENT — MIFFLIN-ST. JEOR: SCORE: 1671.54

## 2020-10-27 ENCOUNTER — HOSPITAL ENCOUNTER (OUTPATIENT)
Dept: PHYSICAL MEDICINE AND REHAB | Facility: CLINIC | Age: 80
Discharge: HOME | End: 2020-10-27
Attending: PHYSICIAN ASSISTANT

## 2020-10-27 DIAGNOSIS — M47.816 LUMBAR FACET ARTHROPATHY: ICD-10-CM

## 2020-10-30 ENCOUNTER — COMMUNICATION - HEALTHEAST (OUTPATIENT)
Dept: PHYSICAL MEDICINE AND REHAB | Facility: CLINIC | Age: 80
End: 2020-10-30

## 2020-11-04 ENCOUNTER — COMMUNICATION - HEALTHEAST (OUTPATIENT)
Dept: PHYSICAL MEDICINE AND REHAB | Facility: CLINIC | Age: 80
End: 2020-11-04

## 2020-11-06 ENCOUNTER — AMBULATORY - HEALTHEAST (OUTPATIENT)
Dept: NEUROSURGERY | Facility: CLINIC | Age: 80
End: 2020-11-06

## 2020-11-06 ENCOUNTER — HOSPITAL ENCOUNTER (OUTPATIENT)
Dept: PHYSICAL MEDICINE AND REHAB | Facility: CLINIC | Age: 80
Discharge: HOME | End: 2020-11-06
Attending: PHYSICIAN ASSISTANT

## 2020-11-06 DIAGNOSIS — M54.9 BACK PAIN: ICD-10-CM

## 2020-11-06 DIAGNOSIS — M47.816 LUMBAR FACET ARTHROPATHY: ICD-10-CM

## 2020-11-06 DIAGNOSIS — M48.062 SPINAL STENOSIS OF LUMBAR REGION WITH NEUROGENIC CLAUDICATION: ICD-10-CM

## 2020-11-06 ASSESSMENT — MIFFLIN-ST. JEOR: SCORE: 1671.54

## 2020-11-18 ENCOUNTER — OFFICE VISIT - HEALTHEAST (OUTPATIENT)
Dept: NEUROSURGERY | Facility: CLINIC | Age: 80
End: 2020-11-18

## 2020-11-18 ENCOUNTER — HOSPITAL ENCOUNTER (OUTPATIENT)
Dept: RADIOLOGY | Facility: HOSPITAL | Age: 80
Discharge: HOME | End: 2020-11-18
Attending: NEUROLOGICAL SURGERY

## 2020-11-18 DIAGNOSIS — M54.9 BACK PAIN: ICD-10-CM

## 2020-11-18 DIAGNOSIS — E66.01 MORBID OBESITY (H): ICD-10-CM

## 2020-11-18 DIAGNOSIS — M48.062 SPINAL STENOSIS OF LUMBAR REGION WITH NEUROGENIC CLAUDICATION: ICD-10-CM

## 2020-11-18 DIAGNOSIS — G83.4 INCOMPLETE CAUDA EQUINA SYNDROME (H): ICD-10-CM

## 2020-11-18 DIAGNOSIS — F10.21 ALCOHOL DEPENDENCE IN REMISSION (H): ICD-10-CM

## 2020-11-18 ASSESSMENT — MIFFLIN-ST. JEOR: SCORE: 1632.76

## 2020-11-20 ENCOUNTER — COMMUNICATION - HEALTHEAST (OUTPATIENT)
Dept: NEUROSURGERY | Facility: CLINIC | Age: 80
End: 2020-11-20

## 2020-12-04 ENCOUNTER — COMMUNICATION - HEALTHEAST (OUTPATIENT)
Dept: NEUROSURGERY | Facility: CLINIC | Age: 80
End: 2020-12-04

## 2020-12-04 DIAGNOSIS — M54.16 LUMBAR RADICULOPATHY: ICD-10-CM

## 2020-12-04 DIAGNOSIS — M48.062 SPINAL STENOSIS OF LUMBAR REGION WITH NEUROGENIC CLAUDICATION: ICD-10-CM

## 2020-12-14 ENCOUNTER — HOSPITAL ENCOUNTER (OUTPATIENT)
Dept: PHYSICAL MEDICINE AND REHAB | Facility: CLINIC | Age: 80
Discharge: HOME | End: 2020-12-14
Attending: NEUROLOGICAL SURGERY

## 2020-12-14 DIAGNOSIS — M54.16 LUMBAR RADICULOPATHY: ICD-10-CM

## 2020-12-29 ENCOUNTER — OFFICE VISIT - HEALTHEAST (OUTPATIENT)
Dept: NEUROSURGERY | Facility: CLINIC | Age: 80
End: 2020-12-29

## 2020-12-29 DIAGNOSIS — M48.062 SPINAL STENOSIS OF LUMBAR REGION WITH NEUROGENIC CLAUDICATION: ICD-10-CM

## 2020-12-29 ASSESSMENT — MIFFLIN-ST. JEOR: SCORE: 1631.85

## 2021-01-14 ENCOUNTER — COMMUNICATION - HEALTHEAST (OUTPATIENT)
Dept: NEUROSURGERY | Facility: CLINIC | Age: 81
End: 2021-01-14

## 2021-01-14 ENCOUNTER — SURGERY - HEALTHEAST (OUTPATIENT)
Dept: NEUROSURGERY | Facility: CLINIC | Age: 81
End: 2021-01-14

## 2021-01-14 ENCOUNTER — COMMUNICATION - HEALTHEAST (OUTPATIENT)
Dept: FAMILY MEDICINE | Facility: CLINIC | Age: 81
End: 2021-01-14

## 2021-01-14 ENCOUNTER — AMBULATORY - HEALTHEAST (OUTPATIENT)
Dept: NEUROSURGERY | Facility: CLINIC | Age: 81
End: 2021-01-14

## 2021-01-14 ENCOUNTER — RECORDS - HEALTHEAST (OUTPATIENT)
Dept: ADMINISTRATIVE | Facility: OTHER | Age: 81
End: 2021-01-14

## 2021-01-14 ENCOUNTER — RECORDS - HEALTHEAST (OUTPATIENT)
Dept: LAB | Facility: CLINIC | Age: 81
End: 2021-01-14

## 2021-01-14 DIAGNOSIS — Z11.59 ENCOUNTER FOR SCREENING FOR OTHER VIRAL DISEASES: ICD-10-CM

## 2021-01-14 DIAGNOSIS — Z01.818 PRE-OP TESTING: ICD-10-CM

## 2021-01-14 DIAGNOSIS — G83.4 INCOMPLETE CAUDA EQUINA SYNDROME (H): ICD-10-CM

## 2021-01-14 DIAGNOSIS — Z01.818 PRE-OP EXAM: ICD-10-CM

## 2021-01-14 DIAGNOSIS — M48.062 SPINAL STENOSIS, LUMBAR REGION WITH NEUROGENIC CLAUDICATION: ICD-10-CM

## 2021-01-14 LAB
ANION GAP SERPL CALCULATED.3IONS-SCNC: 11 MMOL/L (ref 5–18)
APTT PPP: 33 SECONDS (ref 24–37)
BUN SERPL-MCNC: 22 MG/DL (ref 8–28)
CALCIUM SERPL-MCNC: 9 MG/DL (ref 8.5–10.5)
CHLORIDE BLD-SCNC: 107 MMOL/L (ref 98–107)
CO2 SERPL-SCNC: 27 MMOL/L (ref 22–31)
CREAT SERPL-MCNC: 0.95 MG/DL (ref 0.7–1.3)
GFR SERPL CREATININE-BSD FRML MDRD: >60 ML/MIN/1.73M2
GLUCOSE BLD-MCNC: 86 MG/DL (ref 70–125)
INR PPP: 1.13 (ref 0.9–1.1)
POTASSIUM BLD-SCNC: 4.1 MMOL/L (ref 3.5–5)
SODIUM SERPL-SCNC: 145 MMOL/L (ref 136–145)
TSH SERPL DL<=0.005 MIU/L-ACNC: 4.24 UIU/ML (ref 0.3–5)

## 2021-01-18 ENCOUNTER — AMBULATORY - HEALTHEAST (OUTPATIENT)
Dept: CARDIOLOGY | Facility: CLINIC | Age: 81
End: 2021-01-18

## 2021-01-20 ENCOUNTER — AMBULATORY - HEALTHEAST (OUTPATIENT)
Dept: CARDIOLOGY | Facility: CLINIC | Age: 81
End: 2021-01-20

## 2021-01-28 ENCOUNTER — COMMUNICATION - HEALTHEAST (OUTPATIENT)
Dept: CARDIOLOGY | Facility: CLINIC | Age: 81
End: 2021-01-28

## 2021-01-29 ENCOUNTER — OFFICE VISIT - HEALTHEAST (OUTPATIENT)
Dept: CARDIOLOGY | Facility: CLINIC | Age: 81
End: 2021-01-29

## 2021-01-29 DIAGNOSIS — E78.2 MIXED HYPERLIPIDEMIA: ICD-10-CM

## 2021-01-29 DIAGNOSIS — I63.521 ACUTE ISCHEMIC RIGHT ACA STROKE (H): ICD-10-CM

## 2021-01-29 DIAGNOSIS — I10 ACCELERATED HYPERTENSION: ICD-10-CM

## 2021-01-29 DIAGNOSIS — R06.09 DYSPNEA ON EXERTION: ICD-10-CM

## 2021-01-29 DIAGNOSIS — I10 BENIGN ESSENTIAL HYPERTENSION: ICD-10-CM

## 2021-01-29 ASSESSMENT — MIFFLIN-ST. JEOR: SCORE: 1636.39

## 2021-02-02 LAB
ATRIAL RATE - MUSE: 80 BPM
DIASTOLIC BLOOD PRESSURE - MUSE: NORMAL
INTERPRETATION ECG - MUSE: NORMAL
P AXIS - MUSE: 32 DEGREES
PR INTERVAL - MUSE: 182 MS
QRS DURATION - MUSE: 80 MS
QT - MUSE: 436 MS
QTC - MUSE: 502 MS
R AXIS - MUSE: 1 DEGREES
SYSTOLIC BLOOD PRESSURE - MUSE: NORMAL
T AXIS - MUSE: 43 DEGREES
VENTRICULAR RATE- MUSE: 80 BPM

## 2021-02-04 ENCOUNTER — COMMUNICATION - HEALTHEAST (OUTPATIENT)
Dept: PHYSICAL MEDICINE AND REHAB | Facility: CLINIC | Age: 81
End: 2021-02-04

## 2021-02-05 ENCOUNTER — HOSPITAL ENCOUNTER (OUTPATIENT)
Dept: CARDIOLOGY | Facility: HOSPITAL | Age: 81
Discharge: HOME | End: 2021-02-05
Attending: INTERNAL MEDICINE

## 2021-02-05 ENCOUNTER — HOSPITAL ENCOUNTER (OUTPATIENT)
Dept: PHYSICAL MEDICINE AND REHAB | Facility: CLINIC | Age: 81
Discharge: HOME | End: 2021-02-05
Attending: PHYSICIAN ASSISTANT

## 2021-02-05 ENCOUNTER — HOSPITAL ENCOUNTER (OUTPATIENT)
Dept: NUCLEAR MEDICINE | Facility: HOSPITAL | Age: 81
Discharge: HOME | End: 2021-02-05
Attending: INTERNAL MEDICINE

## 2021-02-05 DIAGNOSIS — I10 BENIGN ESSENTIAL HYPERTENSION: ICD-10-CM

## 2021-02-05 DIAGNOSIS — R06.09 DYSPNEA ON EXERTION: ICD-10-CM

## 2021-02-05 DIAGNOSIS — E78.2 MIXED HYPERLIPIDEMIA: ICD-10-CM

## 2021-02-05 DIAGNOSIS — M48.062 SPINAL STENOSIS OF LUMBAR REGION WITH NEUROGENIC CLAUDICATION: ICD-10-CM

## 2021-02-05 DIAGNOSIS — I10 ACCELERATED HYPERTENSION: ICD-10-CM

## 2021-02-05 LAB
CV STRESS CURRENT BP HE: NORMAL
CV STRESS CURRENT HR HE: 61
CV STRESS CURRENT HR HE: 64
CV STRESS CURRENT HR HE: 69
CV STRESS CURRENT HR HE: 73
CV STRESS CURRENT HR HE: 73
CV STRESS CURRENT HR HE: 74
CV STRESS CURRENT HR HE: 75
CV STRESS CURRENT HR HE: 77
CV STRESS CURRENT HR HE: 83
CV STRESS CURRENT HR HE: 89
CV STRESS DEVIATION TIME HE: NORMAL
CV STRESS ECHO PERCENT HR HE: NORMAL
CV STRESS EXERCISE STAGE HE: NORMAL
CV STRESS FINAL RESTING BP HE: NORMAL
CV STRESS FINAL RESTING HR HE: 74
CV STRESS MAX HR HE: 89
CV STRESS MAX TREADMILL GRADE HE: 0
CV STRESS MAX TREADMILL SPEED HE: 0
CV STRESS PEAK DIA BP HE: NORMAL
CV STRESS PEAK SYS BP HE: NORMAL
CV STRESS PHASE HE: NORMAL
CV STRESS PROTOCOL HE: NORMAL
CV STRESS RESTING PT POSITION HE: NORMAL
CV STRESS ST DEVIATION AMOUNT HE: NORMAL
CV STRESS ST DEVIATION ELEVATION HE: NORMAL
CV STRESS ST EVELATION AMOUNT HE: NORMAL
CV STRESS TEST TYPE HE: NORMAL
CV STRESS TOTAL STAGE TIME MIN 1 HE: NORMAL
NUC STRESS EJECTION FRACTION: 65 %
RATE PRESSURE PRODUCT: NORMAL
STRESS ECHO BASELINE DIASTOLIC HE: 88
STRESS ECHO BASELINE HR: 69 BPM
STRESS ECHO BASELINE SYSTOLIC BP: 129
STRESS ECHO CALCULATED PERCENT HR: 63 %
STRESS ECHO LAST STRESS DIASTOLIC BP: 72
STRESS ECHO LAST STRESS HR: 75
STRESS ECHO LAST STRESS SYSTOLIC BP: 121
STRESS ECHO TARGET HR: 141

## 2021-02-05 ASSESSMENT — MIFFLIN-ST. JEOR: SCORE: 1636.39

## 2021-02-16 ENCOUNTER — COMMUNICATION - HEALTHEAST (OUTPATIENT)
Dept: NEUROSURGERY | Facility: CLINIC | Age: 81
End: 2021-02-16

## 2021-02-16 ENCOUNTER — AMBULATORY - HEALTHEAST (OUTPATIENT)
Dept: SURGERY | Facility: HOSPITAL | Age: 81
End: 2021-02-16

## 2021-02-16 DIAGNOSIS — Z11.59 ENCOUNTER FOR SCREENING FOR OTHER VIRAL DISEASES: ICD-10-CM

## 2021-02-25 ENCOUNTER — RECORDS - HEALTHEAST (OUTPATIENT)
Dept: LAB | Facility: CLINIC | Age: 81
End: 2021-02-25

## 2021-02-26 LAB
ANION GAP SERPL CALCULATED.3IONS-SCNC: 7 MMOL/L (ref 5–18)
BUN SERPL-MCNC: 24 MG/DL (ref 8–28)
CALCIUM SERPL-MCNC: 9 MG/DL (ref 8.5–10.5)
CHLORIDE BLD-SCNC: 104 MMOL/L (ref 98–107)
CO2 SERPL-SCNC: 29 MMOL/L (ref 22–31)
CREAT SERPL-MCNC: 1.01 MG/DL (ref 0.7–1.3)
GFR SERPL CREATININE-BSD FRML MDRD: >60 ML/MIN/1.73M2
GLUCOSE BLD-MCNC: 100 MG/DL (ref 70–125)
POTASSIUM BLD-SCNC: 4.4 MMOL/L (ref 3.5–5)
SODIUM SERPL-SCNC: 140 MMOL/L (ref 136–145)
TSH SERPL DL<=0.005 MIU/L-ACNC: 7.98 UIU/ML (ref 0.3–5)

## 2021-02-27 ENCOUNTER — AMBULATORY - HEALTHEAST (OUTPATIENT)
Dept: FAMILY MEDICINE | Facility: CLINIC | Age: 81
End: 2021-02-27

## 2021-02-27 ENCOUNTER — AMBULATORY - HEALTHEAST (OUTPATIENT)
Dept: LAB | Facility: HOSPITAL | Age: 81
End: 2021-02-27

## 2021-02-27 DIAGNOSIS — Z11.59 ENCOUNTER FOR SCREENING FOR OTHER VIRAL DISEASES: ICD-10-CM

## 2021-02-27 DIAGNOSIS — Z01.818 PRE-OP EXAM: ICD-10-CM

## 2021-02-27 DIAGNOSIS — Z01.818 PRE-OP TESTING: ICD-10-CM

## 2021-02-27 LAB
ANION GAP SERPL CALCULATED.3IONS-SCNC: 8 MMOL/L (ref 5–18)
APTT PPP: 34 SECONDS (ref 24–37)
BUN SERPL-MCNC: 18 MG/DL (ref 8–28)
CALCIUM SERPL-MCNC: 8.7 MG/DL (ref 8.5–10.5)
CHLORIDE BLD-SCNC: 108 MMOL/L (ref 98–107)
CO2 SERPL-SCNC: 25 MMOL/L (ref 22–31)
CREAT SERPL-MCNC: 0.96 MG/DL (ref 0.7–1.3)
ERYTHROCYTE [DISTWIDTH] IN BLOOD BY AUTOMATED COUNT: 14.3 % (ref 11–14.5)
GFR SERPL CREATININE-BSD FRML MDRD: >60 ML/MIN/1.73M2
GLUCOSE BLD-MCNC: 100 MG/DL (ref 70–125)
HCT VFR BLD AUTO: 42.7 % (ref 40–54)
HGB BLD-MCNC: 14 G/DL (ref 14–18)
INR PPP: 1.15 (ref 0.9–1.1)
MCH RBC QN AUTO: 29.7 PG (ref 27–34)
MCHC RBC AUTO-ENTMCNC: 32.8 G/DL (ref 32–36)
MCV RBC AUTO: 91 FL (ref 80–100)
PLATELET # BLD AUTO: 201 THOU/UL (ref 140–440)
PMV BLD AUTO: 8.5 FL (ref 8.5–12.5)
POTASSIUM BLD-SCNC: 4.3 MMOL/L (ref 3.5–5)
RBC # BLD AUTO: 4.71 MILL/UL (ref 4.4–6.2)
SODIUM SERPL-SCNC: 141 MMOL/L (ref 136–145)
WBC: 10.3 THOU/UL (ref 4–11)

## 2021-02-28 ENCOUNTER — COMMUNICATION - HEALTHEAST (OUTPATIENT)
Dept: SCHEDULING | Facility: CLINIC | Age: 81
End: 2021-02-28

## 2021-02-28 LAB
SARS-COV-2 PCR COMMENT: NORMAL
SARS-COV-2 RNA SPEC QL NAA+PROBE: NEGATIVE
SARS-COV-2 VIRUS SPECIMEN SOURCE: NORMAL

## 2021-03-01 ENCOUNTER — ANESTHESIA - HEALTHEAST (OUTPATIENT)
Dept: SURGERY | Facility: HOSPITAL | Age: 81
End: 2021-03-01

## 2021-03-02 ENCOUNTER — COMMUNICATION - HEALTHEAST (OUTPATIENT)
Dept: NEUROSURGERY | Facility: CLINIC | Age: 81
End: 2021-03-02

## 2021-03-03 ENCOUNTER — SURGERY - HEALTHEAST (OUTPATIENT)
Dept: SURGERY | Facility: HOSPITAL | Age: 81
End: 2021-03-03

## 2021-03-03 ASSESSMENT — MIFFLIN-ST. JEOR: SCORE: 1689.69

## 2021-03-04 ENCOUNTER — COMMUNICATION - HEALTHEAST (OUTPATIENT)
Dept: NEUROSURGERY | Facility: CLINIC | Age: 81
End: 2021-03-04

## 2021-03-05 ENCOUNTER — COMMUNICATION - HEALTHEAST (OUTPATIENT)
Dept: NEUROLOGY | Facility: CLINIC | Age: 81
End: 2021-03-05

## 2021-03-15 ENCOUNTER — AMBULATORY - HEALTHEAST (OUTPATIENT)
Dept: MULTI SPECIALTY CLINIC | Facility: CLINIC | Age: 81
End: 2021-03-15

## 2021-03-15 DIAGNOSIS — J44.9 COPD (CHRONIC OBSTRUCTIVE PULMONARY DISEASE) (H): ICD-10-CM

## 2021-03-17 ENCOUNTER — AMBULATORY - HEALTHEAST (OUTPATIENT)
Dept: NEUROSURGERY | Facility: CLINIC | Age: 81
End: 2021-03-17

## 2021-03-17 DIAGNOSIS — M54.9 BACK PAIN: ICD-10-CM

## 2021-04-07 ENCOUNTER — RECORDS - HEALTHEAST (OUTPATIENT)
Dept: LAB | Facility: CLINIC | Age: 81
End: 2021-04-07

## 2021-04-07 LAB — TSH SERPL DL<=0.005 MIU/L-ACNC: 0.94 UIU/ML (ref 0.3–5)

## 2021-04-08 LAB — 25(OH)D3 SERPL-MCNC: 35.8 NG/ML (ref 30–80)

## 2021-04-13 ENCOUNTER — OFFICE VISIT - HEALTHEAST (OUTPATIENT)
Dept: NEUROSURGERY | Facility: CLINIC | Age: 81
End: 2021-04-13

## 2021-04-13 DIAGNOSIS — M48.062 SPINAL STENOSIS, LUMBAR REGION WITH NEUROGENIC CLAUDICATION: ICD-10-CM

## 2021-04-20 ENCOUNTER — OFFICE VISIT - HEALTHEAST (OUTPATIENT)
Dept: PHYSICAL THERAPY | Facility: CLINIC | Age: 81
End: 2021-04-20

## 2021-04-20 DIAGNOSIS — R26.9 ALTERED GAIT: ICD-10-CM

## 2021-04-20 DIAGNOSIS — Z98.890 S/P LUMBAR LAMINECTOMY: ICD-10-CM

## 2021-04-20 DIAGNOSIS — R26.81 UNSTEADINESS ON FEET: ICD-10-CM

## 2021-04-20 DIAGNOSIS — M62.81 GENERALIZED MUSCLE WEAKNESS: ICD-10-CM

## 2021-05-03 ENCOUNTER — RECORDS - HEALTHEAST (OUTPATIENT)
Dept: RADIOLOGY | Facility: CLINIC | Age: 81
End: 2021-05-03

## 2021-05-04 ENCOUNTER — OFFICE VISIT - HEALTHEAST (OUTPATIENT)
Dept: PHYSICAL THERAPY | Facility: CLINIC | Age: 81
End: 2021-05-04

## 2021-05-04 DIAGNOSIS — M62.81 GENERALIZED MUSCLE WEAKNESS: ICD-10-CM

## 2021-05-04 DIAGNOSIS — R26.81 UNSTEADINESS ON FEET: ICD-10-CM

## 2021-05-04 DIAGNOSIS — Z98.890 S/P LUMBAR LAMINECTOMY: ICD-10-CM

## 2021-05-04 DIAGNOSIS — R26.9 ALTERED GAIT: ICD-10-CM

## 2021-05-06 ENCOUNTER — RECORDS - HEALTHEAST (OUTPATIENT)
Dept: ADMINISTRATIVE | Facility: OTHER | Age: 81
End: 2021-05-06

## 2021-05-06 ENCOUNTER — RECORDS - HEALTHEAST (OUTPATIENT)
Dept: PULMONOLOGY | Facility: OTHER | Age: 81
End: 2021-05-06

## 2021-05-06 ENCOUNTER — OFFICE VISIT - HEALTHEAST (OUTPATIENT)
Dept: PULMONOLOGY | Facility: OTHER | Age: 81
End: 2021-05-06

## 2021-05-06 DIAGNOSIS — J43.9 PULMONARY EMPHYSEMA, UNSPECIFIED EMPHYSEMA TYPE (H): ICD-10-CM

## 2021-05-06 DIAGNOSIS — J44.9 CHRONIC OBSTRUCTIVE PULMONARY DISEASE, UNSPECIFIED (H): ICD-10-CM

## 2021-05-06 LAB — HGB BLD-MCNC: 14.4 G/DL

## 2021-05-06 ASSESSMENT — MIFFLIN-ST. JEOR: SCORE: 1667.01

## 2021-05-11 ENCOUNTER — COMMUNICATION - HEALTHEAST (OUTPATIENT)
Dept: NEUROSURGERY | Facility: CLINIC | Age: 81
End: 2021-05-11

## 2021-05-11 ENCOUNTER — OFFICE VISIT - HEALTHEAST (OUTPATIENT)
Dept: PHYSICAL THERAPY | Facility: CLINIC | Age: 81
End: 2021-05-11

## 2021-05-11 DIAGNOSIS — M62.81 GENERALIZED MUSCLE WEAKNESS: ICD-10-CM

## 2021-05-11 DIAGNOSIS — Z98.890 S/P LUMBAR LAMINECTOMY: ICD-10-CM

## 2021-05-11 DIAGNOSIS — R26.9 ALTERED GAIT: ICD-10-CM

## 2021-05-11 DIAGNOSIS — M48.062 SPINAL STENOSIS, LUMBAR REGION WITH NEUROGENIC CLAUDICATION: ICD-10-CM

## 2021-05-11 DIAGNOSIS — R26.81 UNSTEADINESS ON FEET: ICD-10-CM

## 2021-05-18 ENCOUNTER — OFFICE VISIT - HEALTHEAST (OUTPATIENT)
Dept: PHYSICAL THERAPY | Facility: CLINIC | Age: 81
End: 2021-05-18

## 2021-05-18 DIAGNOSIS — R26.9 ALTERED GAIT: ICD-10-CM

## 2021-05-18 DIAGNOSIS — M62.81 GENERALIZED MUSCLE WEAKNESS: ICD-10-CM

## 2021-05-18 DIAGNOSIS — R26.81 UNSTEADINESS ON FEET: ICD-10-CM

## 2021-05-18 DIAGNOSIS — Z98.890 S/P LUMBAR LAMINECTOMY: ICD-10-CM

## 2021-05-24 ENCOUNTER — OFFICE VISIT - HEALTHEAST (OUTPATIENT)
Dept: NEUROSURGERY | Facility: CLINIC | Age: 81
End: 2021-05-24

## 2021-05-24 DIAGNOSIS — M48.061 LUMBAR STENOSIS WITHOUT NEUROGENIC CLAUDICATION: ICD-10-CM

## 2021-05-24 DIAGNOSIS — G83.4 INCOMPLETE CAUDA EQUINA SYNDROME (H): ICD-10-CM

## 2021-05-24 ASSESSMENT — MIFFLIN-ST. JEOR: SCORE: 1667.01

## 2021-05-25 ENCOUNTER — OFFICE VISIT - HEALTHEAST (OUTPATIENT)
Dept: PHYSICAL THERAPY | Facility: CLINIC | Age: 81
End: 2021-05-25

## 2021-05-25 DIAGNOSIS — Z98.890 S/P LUMBAR LAMINECTOMY: ICD-10-CM

## 2021-05-25 DIAGNOSIS — R26.9 ALTERED GAIT: ICD-10-CM

## 2021-05-25 DIAGNOSIS — M62.81 GENERALIZED MUSCLE WEAKNESS: ICD-10-CM

## 2021-05-25 DIAGNOSIS — R26.81 UNSTEADINESS ON FEET: ICD-10-CM

## 2021-05-27 VITALS
WEIGHT: 219 LBS | HEART RATE: 73 BPM | OXYGEN SATURATION: 93 % | DIASTOLIC BLOOD PRESSURE: 70 MMHG | SYSTOLIC BLOOD PRESSURE: 114 MMHG | HEIGHT: 67 IN | BODY MASS INDEX: 34.37 KG/M2

## 2021-05-27 VITALS — DIASTOLIC BLOOD PRESSURE: 84 MMHG | SYSTOLIC BLOOD PRESSURE: 132 MMHG | HEART RATE: 76 BPM | RESPIRATION RATE: 18 BRPM

## 2021-05-27 VITALS — HEART RATE: 76 BPM | SYSTOLIC BLOOD PRESSURE: 130 MMHG | DIASTOLIC BLOOD PRESSURE: 82 MMHG | RESPIRATION RATE: 18 BRPM

## 2021-06-01 ENCOUNTER — OFFICE VISIT - HEALTHEAST (OUTPATIENT)
Dept: PHYSICAL THERAPY | Facility: CLINIC | Age: 81
End: 2021-06-01

## 2021-06-01 VITALS — HEIGHT: 66 IN | BODY MASS INDEX: 35.36 KG/M2 | WEIGHT: 220 LBS

## 2021-06-01 DIAGNOSIS — M62.81 GENERALIZED MUSCLE WEAKNESS: ICD-10-CM

## 2021-06-01 DIAGNOSIS — R26.81 UNSTEADINESS ON FEET: ICD-10-CM

## 2021-06-01 DIAGNOSIS — Z98.890 S/P LUMBAR LAMINECTOMY: ICD-10-CM

## 2021-06-01 DIAGNOSIS — R26.9 ALTERED GAIT: ICD-10-CM

## 2021-06-01 NOTE — ANESTHESIA PREPROCEDURE EVALUATION
Anesthesia Evaluation      Patient summary reviewed   No history of anesthetic complications     Airway   Mallampati: II  Neck ROM: full   Pulmonary - normal exam                          Cardiovascular - normal exam  (+) hypertension, ,     ECG reviewed        Neuro/Psych    (+) CVA ,     Endo/Other    (+) hypothyroidism, obesity,      GI/Hepatic/Renal    (+)   chronic renal disease,           Dental    (+) edentulous                       Anesthesia Plan  Planned anesthetic: general endotracheal    ASA 3   Induction: intravenous   Anesthetic plan and risks discussed with: patient  Anesthesia plan special considerations: antiemetics, dexmedetomidine  Post-op plan: routine recovery

## 2021-06-01 NOTE — ANESTHESIA POSTPROCEDURE EVALUATION
Patient: Dominik Rojas  RIGHT CAROTID ENDARTERECTOMY  Anesthesia type: general    Patient location: PACU  Last vitals:   Vitals Value Taken Time   /78 9/10/2019  8:57 AM   Temp 37  C (98.6  F) 9/10/2019  8:00 AM   Pulse 98 9/10/2019  9:14 AM   Resp 32 9/10/2019  9:14 AM   SpO2 91 % 9/10/2019  9:14 AM   Vitals shown include unvalidated device data.  Post vital signs: stable  Level of consciousness: awake and responds to simple questions  Post-anesthesia pain: pain controlled  Post-anesthesia nausea and vomiting: no  Pulmonary: unassisted, return to baseline  Cardiovascular: stable and blood pressure at baseline  Hydration: adequate  Anesthetic events: no    QCDR Measures:  ASA# 11 - Karly-op Cardiac Arrest: ASA11B - Patient did NOT experience unanticipated cardiac arrest  ASA# 12 - Karly-op Mortality Rate: ASA12B - Patient did NOT die  ASA# 13 - PACU Re-Intubation Rate: ASA13B - Patient did NOT require a new airway mgmt  ASA# 10 - Composite Anes Safety: ASA10A - No serious adverse event    Additional Notes:

## 2021-06-01 NOTE — PROGRESS NOTES
[facility] 1:50 RECIEVED INTAKE AND REVIEWED DOCUMENTATION. 2:12 CONTACTED [facility] AND ENCOURAGED D/C NOTE TO INDICATE THE NEED FOR 02. [facility] RECEPTIVE AND REPORTED THAT CAN BE DONE. 2:14 OFFERED CHOICE AND PT. AGREEABLE TO USING [facility]. EDUCATED IN POC AND AGREEABLE. ALSO DISCUSSED SIMPLY GO MINI AND PROCESS OF PT. NEEDING TO BE IN MORE OF A STABLE STATE- EDUCATED ON PROCESS AND RECPETIVE. 2:30 TICKET UP AND PLACED FOR  TO BE SENT FOR DELIVERY.

## 2021-06-01 NOTE — ANESTHESIA CARE TRANSFER NOTE
Last vitals:   Vitals:    09/09/19 1149   BP: 138/75   Pulse: 80   Resp: 16   Temp:    SpO2: 98%     Patient's level of consciousness is drowsy  Spontaneous respirations: yes  Maintains airway independently: yes  Dentition unchanged: yes  Oropharynx: oropharynx clear of all foreign objects    QCDR Measures:  ASA# 20 - Surgical Safety Checklist: WHO surgical safety checklist completed prior to induction    PQRS# 430 - Adult PONV Prevention: 4558F - Pt received => 2 anti-emetic agents (different classes) preop & intraop  ASA# 8 - Peds PONV Prevention: NA - Not pediatric patient, not GA or 2 or more risk factors NOT present  PQRS# 424 - Karly-op Temp Management: 4559F - At least one body temp DOCUMENTED => 35.5C or 95.9F within required timeframe  PQRS# 426 - PACU Transfer Protocol: - Transfer of care checklist used  ASA# 14 - Acute Post-op Pain: ASA14B - Patient did NOT experience pain >= 7 out of 10

## 2021-06-01 NOTE — ANESTHESIA PROCEDURE NOTES
Arterial Line  Reason for Procedure: hemodynamic monitoring  Patient location during procedure: Pre-op  Start time: 9/9/2019 8:55 AM  End time: 9/9/2019 9:05 AM  Staffing:  Performing  Anesthesiologist: Annika Caceres MD  Sterile Precautions:  sterile barriers used during insertion: cap, mask, sterile gloves, large sheet, and hand hygiene used.  Arterial Line:   Immediately prior to procedure a time out was called to verify the correct patient, procedure, equipment, support staff and site/side marked as required  Laterality: left  Location: radial  Prepped with: ChloroPrep    Needle gauge: 20 G  Number of Attempts: 1  Secured with: tape, transparent dressing and pressure dressing  Flushed with: saline  1% lidocaine local anesthesia used for skin prep.   See MAR for additional medications given.

## 2021-06-03 VITALS — BODY MASS INDEX: 28.94 KG/M2 | HEIGHT: 67 IN | WEIGHT: 184.4 LBS

## 2021-06-04 VITALS — WEIGHT: 206.3 LBS | BODY MASS INDEX: 32.38 KG/M2 | HEIGHT: 67 IN

## 2021-06-04 VITALS — BODY MASS INDEX: 32.31 KG/M2 | WEIGHT: 205.9 LBS | HEIGHT: 67 IN

## 2021-06-04 VITALS — BODY MASS INDEX: 34.53 KG/M2 | WEIGHT: 220 LBS | HEIGHT: 67 IN

## 2021-06-05 VITALS — BODY MASS INDEX: 36.82 KG/M2 | HEIGHT: 65 IN | WEIGHT: 221 LBS

## 2021-06-05 VITALS
WEIGHT: 221 LBS | BODY MASS INDEX: 36.82 KG/M2 | HEIGHT: 65 IN | DIASTOLIC BLOOD PRESSURE: 74 MMHG | HEART RATE: 88 BPM | RESPIRATION RATE: 16 BRPM | SYSTOLIC BLOOD PRESSURE: 152 MMHG

## 2021-06-05 VITALS
HEIGHT: 65 IN | SYSTOLIC BLOOD PRESSURE: 104 MMHG | WEIGHT: 220 LBS | HEART RATE: 82 BPM | OXYGEN SATURATION: 91 % | DIASTOLIC BLOOD PRESSURE: 73 MMHG | BODY MASS INDEX: 36.65 KG/M2

## 2021-06-05 VITALS
DIASTOLIC BLOOD PRESSURE: 41 MMHG | WEIGHT: 220.2 LBS | BODY MASS INDEX: 36.69 KG/M2 | OXYGEN SATURATION: 86 % | HEIGHT: 65 IN | SYSTOLIC BLOOD PRESSURE: 109 MMHG | HEART RATE: 115 BPM

## 2021-06-05 VITALS — BODY MASS INDEX: 35.16 KG/M2 | HEIGHT: 67 IN | WEIGHT: 224 LBS

## 2021-06-05 VITALS — WEIGHT: 220 LBS | BODY MASS INDEX: 34.53 KG/M2 | HEIGHT: 67 IN

## 2021-06-05 VITALS — BODY MASS INDEX: 34.53 KG/M2 | HEIGHT: 67 IN | WEIGHT: 220 LBS

## 2021-06-06 NOTE — ANESTHESIA PREPROCEDURE EVALUATION
Anesthesia Evaluation      Patient summary reviewed   No history of anesthetic complications     Airway   Mallampati: III  Neck ROM: full   Pulmonary - normal exam    breath sounds clear to auscultation    ROS comment: bronchiectasis                         Cardiovascular - normal exam  Exercise tolerance: > or = 4 METS  (+) hypertension, , PVD    ECG reviewed  Rhythm: regular  Rate: normal,         Neuro/Psych    (+) CVA (l hand weakness) ,     Endo/Other    (+) hypothyroidism, obesity,      GI/Hepatic/Renal    (+)   chronic renal disease,           Dental    (+) lower dentures and upper dentures              Left ventricle ejection fraction is normal. The calculated left ventricular ejection fraction is 57%.    Normal right ventricular size and systolic function.    No hemodynamically significant valvular heart abnormalities.  No previous study for comparison.           Anesthesia Plan  Planned anesthetic: general endotracheal  -- GETA  -- Esmolol drawn up and ready for administration  -- PONV prophylaxis with Decadron 10 mg and Zofran 4 mg    ASA 4   Induction: intravenous   Anesthetic plan and risks discussed with: patient and spouse  Anesthesia plan special considerations: antiemetics,   Post-op plan: routine recovery

## 2021-06-06 NOTE — ANESTHESIA POSTPROCEDURE EVALUATION
Patient: Dominik Rojas  Procedure(s):  CYSTOSCOPY WITH TRANSURETHRAL INCISION OF THE PROSTATE WITH QUANTA LASER  Anesthesia type: general    Patient location: PACU  Last vitals:   Vitals Value Taken Time   /80 2/18/2020  3:10 PM   Temp 36.9  C (98.4  F) 2/18/2020  2:40 PM   Pulse 82 2/18/2020  3:19 PM   Resp 17 2/18/2020  3:19 PM   SpO2 93 % 2/18/2020  3:19 PM   Vitals shown include unvalidated device data.  Post vital signs: stable  Level of consciousness: alert and conversant  Post-anesthesia pain: pain controlled  Post-anesthesia nausea and vomiting: no  Pulmonary: room air  Cardiovascular: stable and blood pressure at baseline  Hydration: adequate  Anesthetic events: no    QCDR Measures:  ASA# 11 - Karly-op Cardiac Arrest: ASA11B - Patient did NOT experience unanticipated cardiac arrest  ASA# 12 - Karly-op Mortality Rate: ASA12B - Patient did NOT die  ASA# 13 - PACU Re-Intubation Rate: ASA13B - Patient did NOT require a new airway mgmt  ASA# 10 - Composite Anes Safety: ASA10A - No serious adverse event    Additional Notes:

## 2021-06-06 NOTE — ANESTHESIA CARE TRANSFER NOTE
Last vitals:   Vitals:    02/18/20 1349   BP: 133/58   Pulse: 91   Resp: 25   Temp: 37  C (98.6  F)   SpO2: 99%     Patient's level of consciousness is drowsy  Spontaneous respirations: yes  Maintains airway independently: yes  Dentition unchanged: yes  Oropharynx: oropharynx clear of all foreign objects    QCDR Measures:  ASA# 20 - Surgical Safety Checklist: WHO surgical safety checklist completed prior to induction    PQRS# 430 - Adult PONV Prevention: 4558F - Pt received => 2 anti-emetic agents (different classes) preop & intraop  ASA# 8 - Peds PONV Prevention: NA - Not pediatric patient, not GA or 2 or more risk factors NOT present  PQRS# 424 - Karly-op Temp Management: 4559F - At least one body temp DOCUMENTED => 35.5C or 95.9F within required timeframe  PQRS# 426 - PACU Transfer Protocol: - Transfer of care checklist used  ASA# 14 - Acute Post-op Pain: ASA14B - Patient did NOT experience pain >= 7 out of 10

## 2021-06-08 ENCOUNTER — OFFICE VISIT - HEALTHEAST (OUTPATIENT)
Dept: PHYSICAL THERAPY | Facility: CLINIC | Age: 81
End: 2021-06-08

## 2021-06-08 ENCOUNTER — HOSPITAL ENCOUNTER (OUTPATIENT)
Dept: PHYSICAL MEDICINE AND REHAB | Facility: CLINIC | Age: 81
Discharge: HOME | End: 2021-06-08
Attending: PHYSICIAN ASSISTANT
Payer: COMMERCIAL

## 2021-06-08 DIAGNOSIS — M54.50 CHRONIC BILATERAL LOW BACK PAIN WITHOUT SCIATICA: ICD-10-CM

## 2021-06-08 DIAGNOSIS — Z98.890 S/P LUMBAR LAMINECTOMY: ICD-10-CM

## 2021-06-08 DIAGNOSIS — F41.9 ANXIETY: ICD-10-CM

## 2021-06-08 DIAGNOSIS — R26.9 ALTERED GAIT: ICD-10-CM

## 2021-06-08 DIAGNOSIS — R26.81 UNSTEADINESS ON FEET: ICD-10-CM

## 2021-06-08 DIAGNOSIS — G89.29 CHRONIC BILATERAL LOW BACK PAIN WITHOUT SCIATICA: ICD-10-CM

## 2021-06-08 DIAGNOSIS — Z98.890 HISTORY OF LUMBAR SURGERY: ICD-10-CM

## 2021-06-08 DIAGNOSIS — M62.81 GENERALIZED MUSCLE WEAKNESS: ICD-10-CM

## 2021-06-08 ASSESSMENT — MIFFLIN-ST. JEOR: SCORE: 1667.01

## 2021-06-08 NOTE — ANESTHESIA PROCEDURE NOTES
Arterial Line  Reason for Procedure: hemodynamic monitoring and multiple ABGs  Patient location during procedure: Pre-op  Start time: 5/19/2020 7:05 AM  End time: 5/19/2020 7:10 AM  Staffing:  Performing  Anesthesiologist: Lisandro Casey MD  Sterile Precautions:  sterile barriers used during insertion: cap, mask, sterile gloves, large sheet, and hand hygiene used.  Arterial Line:   Immediately prior to procedure a time out was called to verify the correct patient, procedure, equipment, support staff and site/side marked as required  Laterality: left  Location: radial  Prepped with: ChloroPrep    Needle gauge: 20 G  Number of Attempts: 1  Secured with: tape and transparent dressing  Flushed with: saline  1% lidocaine local anesthesia used for skin prep.   See MAR for additional medications given.

## 2021-06-08 NOTE — ANESTHESIA POSTPROCEDURE EVALUATION
Patient: Dominik Rojas  Procedure(s):  ENDOVASCULAR ANEURYSM REPAIR REVISION (Bilateral)  Anesthesia type: general    Patient location: PACU  Last vitals:   Vitals Value Taken Time   /76 5/19/2020  4:02 PM   Temp 36.7  C (98.1  F) 5/19/2020  4:02 PM   Pulse 92 5/19/2020  4:02 PM   Resp 18 5/19/2020  4:02 PM   SpO2 92 % 5/19/2020  4:02 PM     Post vital signs: stable  Level of consciousness: awake and responds to simple questions  Post-anesthesia pain: pain controlled  Post-anesthesia nausea and vomiting: no  Pulmonary: unassisted, return to baseline  Cardiovascular: stable and blood pressure at baseline  Hydration: adequate  Anesthetic events: no    QCDR Measures:  ASA# 11 - Karly-op Cardiac Arrest: ASA11B - Patient did NOT experience unanticipated cardiac arrest  ASA# 12 - Karly-op Mortality Rate: ASA12B - Patient did NOT die  ASA# 13 - PACU Re-Intubation Rate: ASA13B - Patient did NOT require a new airway mgmt  ASA# 10 - Composite Anes Safety: ASA10A - No serious adverse event    Additional Notes:

## 2021-06-08 NOTE — ANESTHESIA PREPROCEDURE EVALUATION
Anesthesia Evaluation      Patient summary reviewed   No history of anesthetic complications     Airway    Pulmonary    (+) COPD,     ROS comment: bronchiectasis                         Cardiovascular   Exercise tolerance: > or = 4 METS  (+) hypertension, , PVD    ECG reviewed     ROS comment: TTE 5/19  Left ventricle ejection fraction is normal. The calculated left ventricular ejection fraction is 57%.  Normal right ventricular size and systolic function.  No hemodynamically significant valvular heart abnormalities.  No previous study for comparison.     Neuro/Psych    (+) CVA (l hand weakness) , depression, anxiety/panic attacks,     Endo/Other    (+) hypothyroidism, obesity,      GI/Hepatic/Renal    (+)   chronic renal disease,      Other findings: Results for LARRY BREWSTER (MRN [id number]) as of 5/18/2020 19:35    5/8/2020 11:49  Sodium: 142  Potassium: 4.3  Chloride: 107  CO2: 24  Anion Gap, Calculation: 11  BUN: 23  Creatinine: 0.85  GFR MDRD Af Amer: >60  GFR MDRD Non Af Amer: >60  Calcium: 8.8  Glucose: 97  Hemoglobin: 13.0 (L)  Results for LARRY BREWSTER (MRN [id number]) as of 5/18/2020 19:35    5/16/2020 12:49  2019-nCOV: Not Detected        Dental                Left ventricle ejection fraction is normal. The calculated left ventricular ejection fraction is 57%.    Normal right ventricular size and systolic function.    No hemodynamically significant valvular heart abnormalities.  No previous study for comparison.             Anesthesia Plan  Planned anesthetic: general endotracheal  GAETT  2IV  Amira  Antiemetics  remove dentures prior to OR  ASA 4   Induction: intravenous   Anesthetic plan and risks discussed with: patient  Anesthesia plan special considerations: antiemetics, IV therapy two IVs,   Post-op plan: routine recovery

## 2021-06-08 NOTE — ANESTHESIA CARE TRANSFER NOTE
Last vitals:   Vitals:    05/19/20 1149   BP: 134/64   Pulse: 81   Resp: 15   Temp: (!) 35.7  C (96.2  F)   SpO2: 97%     Patient's level of consciousness is drowsy  Spontaneous respirations: yes  Maintains airway independently: yes  Dentition unchanged: yes  Oropharynx: oropharynx clear of all foreign objects    QCDR Measures:  ASA# 20 - Surgical Safety Checklist: WHO surgical safety checklist completed prior to induction    PQRS# 430 - Adult PONV Prevention: 4558F - Pt received => 2 anti-emetic agents (different classes) preop & intraop  ASA# 8 - Peds PONV Prevention: NA - Not pediatric patient, not GA or 2 or more risk factors NOT present  PQRS# 424 - Karly-op Temp Management: 4559F - At least one body temp DOCUMENTED => 35.5C or 95.9F within required timeframe  PQRS# 426 - PACU Transfer Protocol: - Transfer of care checklist used  ASA# 14 - Acute Post-op Pain: ASA14B - Patient did NOT experience pain >= 7 out of 10

## 2021-06-11 NOTE — PATIENT INSTRUCTIONS - HE
Follow-up visit in 2 weeks with Maureen HERRON to discuss injection outcome and determine care plan going forward.     DISCHARGE INSTRUCTIONS    During office hours (8:00 a.m.- 4:00 p.m.) questions or concerns may be answered  by calling [facility] Navigation Nurses at  [phone].  Messages received after hours will be returned the following business day.      In the case of an emergency, please dial 911 or seek assistance at the nearest Emergency Room/Urgent Care facility.     All Patients:    ? You may experience an increase in your symptoms for the first 2 days (It may take anywhere between 2 days- 2 weeks for the steroid to have maximum effect).    ? You may use ice on the injection site, as frequently as 20 minutes each hour if needed.    ? You may take your pain medicine.    ? You may continue taking your regular medication after your injection. If you have had a Medial Branch Block you may resume pain medication once your pain diary is completed.    ? You may shower. No swimming, tub bath or hot tub for 48 hours.  You may remove your bandaid/bandage as soon as you are home.    ? You may resume light activities, as tolerated.    ? Resume your usual diet as tolerated.    ? It is strongly advised that you do not drive for 1-3 hours post injection.    ? If you have had oral sedation:  Do not drive for 8 hours post injection.      ? If you have had IV sedation:  Do not drive for 24 hours post injection.  Do not operate hazardous machinery or make important personal/business decisions for 24 hours.      POSSIBLE STEROID SIDE EFFECTS (If steroid/cortisone was used for your procedure)    -If you experience these symptoms, it should only last for a short period      Swelling of the legs                Skin redness (flushing)       Mouth (oral) irritation     Blood sugar (glucose) levels              Sweats                      Mood changes    Headache    Sleeplessness         POSSIBLE PROCEDURE SIDE EFFECTS  -Call  the Spine Center if you are concerned    Increased Pain             Increased numbness/tingling        Nausea/Vomiting            Bruising/bleeding at site        Redness or swelling                                                Difficulty walking        Weakness             Fever greater than 100.5    *In the event of a severe headache after an epidural steroid injection that is relieved by lying down, please call the [facility] to speak with a clinical staff member*

## 2021-06-11 NOTE — PROGRESS NOTES
Assessment:   Dominik Rojas is a 79 y.o. y.o. male with past medical history significant for hypothyroidism, hypertension, AAA, history of CVA, recurrent UTI, BPH, history of nephrolithiasis, hyperlipidemia, depression who presents today for follow-up regarding chronic left greater than right low back pain, worse over the past 2 months.  MRI lumbar spine showed moderate to severe degenerative change with severe spinal stenosis L2-3.  There is also multilevel facet arthropathy.  Patient reports both mechanical back pain as well as limited walking and standing tolerance.  Patient status post a bilateral L2-3 transforaminal epidural steroid injection September 16, 2020 which did not provide any relief of his pain.       Plan:     A shared decision making plan was used.  The patient's values and choices were respected.  The following represents what was discussed and decided upon by the physician assistant and the patient.      1.  DIAGNOSTIC TESTS:  I reviewed the MRI lumbar spine.  No further diagnostic tests were ordered.    2.  PHYSICAL THERAPY: I had entered a referral for physical therapy September 8, 2020.  I do not think patient could currently tolerate physical therapy due to the severity of his pain.  We will hope to begin physical therapy once pain is under better control.    3.  MEDICATIONS:   -Gabapentin 100 mg was prescribed.  He is given the dosage titration chart.  He may increase his dose up to 300 mg 3 times daily.  Risks reviewed.  -Patient takes oxycodone sparingly.  He states that he is 7-8 tabs remaining.  No additional refills given.  Hopefully we will be able to manage pain with nonopiate pain relievers.  -Patient also uses Tylenol 1000 mg every 6 hours as needed.  -Cyclobenzaprine was not helpful so he stopped taking it.    4.  INTERVENTIONS:    -I recommended a left L3, L4, L5 medial branch block as the next to determine if there is facet mediated pain.  Patient does have reproduction of his  pain with lumbar facet and maneuvers on the left and he describes significant mechanical back pain.  Patient indicated he would like to proceed and an order was placed.  -If he has a positive response to the medial branch blocks, would recommend a left L4-5, L5-S1 facet joint injection.  -If he fails the medial branch blocks, I would recommend an epidural steroid injection, likely at L3-4, although I would review with Dr. Cárdenas to make sure she feels it is technically possible to do the injection at the L3-4 level.  If it is not possible at L3-4, we could try L4-5 interlaminar epidural steroid injection.    5.  PATIENT EDUCATION:   -I discussed with the patient that if he fails to improve with conservative treatment, I would recommend a referral to spine surgery.  Patient is very reluctant to consider spine surgery and would like to exhaust all nonsurgical treatments first.    6.  FOLLOW-UP:    Patient will return to the clinic for his medial branch blocks.  If he has questions or concerns, he should not hesitate to call.        SUBJECTIVE:    Dominik Rojas is a 79 y.o. male who presents today for follow-up regarding chronic bilateral low back pain, worse over the past 2 months.  Patient status post a bilateral  L2-3 transforaminal epidural steroid injection September 16, 2020.  Patient reports the injection provided no relief of his pain, even for short period of time.    Patient complains of left greater than right low back pain.  Patient reports pain is significantly worse on the left.  Pain is located in the lower lumbar region and extends to the upper buttock.  He denies any pain down the legs.  He rates his pain today as a 10 out of 10.  Pain is aggravated with transitioning from seated to standing, getting out of bed in the morning.  Pain is also aggravated with standing.  He estimates he can only stand for 2 minutes.  He could not stand long enough to make an egg for his wife.  Pain is also aggravated  with walking.  He states he can barely walk to the mailbox and back before he feels like he might pass out because of the pain.  Pain is alleviated with lying down and sitting in his recliner.  Patient denies any numbness, tingling, or weakness down the legs.  He denies loss of bowel control.  Does have history of urinary incontinence since his stroke last year.  He states that he feels the urge to urinate and cannot make it to the bathroom in time.  He has seen urology and has had 2 procedures which were not successful.  He is seeking a second opinion from urology.  Denies any recent fevers, chills, or sweats.    I had referred the patient to physical therapy September 8, 2020.  Patient reports he was never called to schedule and he does not feel that he could tolerate physical therapy now due to the severity of his pain..  Patient uses oxycodone sparingly for pain.  This is sometimes helpful.  Patient feels that Tylenol is typically as effective.    Past medical history is reviewed and is unchanged.    Review of Systems:  Positive for loss of bladder control, wetting pants, pain much worse at night.  Negative for numbness/tingling, weakness, loss of bowel control, inability to urinate, headache, trip/stumble/falls, difficulty swallowing, difficulty with hand skills, fevers, unintentional weight loss.     Objective:   CONSTITUTIONAL:  Vital signs as above.  No acute distress.  The patient is well nourished and well groomed.    PSYCHIATRIC:  The patient is awake, alert, oriented to person, place and time.  The patient is answering questions appropriately with clear speech.  Normal affect.  HEENT: Normocephalic, atraumatic.  Sclera clear.    SKIN:  Skin over the face, posterior torso, bilateral upper and lower extremities is clean, dry, intact without rashes.  VASCULAR: No significant lower extremity edema.  MUSCULOSKELETAL:  Gait is severely antalgic, favoring the left.  No significant tenderness over the bilateral  lower lumbar paraspinal muscles.    No significant tenderness palpation left sacroiliac joint.  Lumbar extension severely restricted with reproduction of left low back pain.  Lumbar facet and maneuvers reproduce low back pain on the left.  The patient has 5/5 strength for the bilateral hip flexors, knee flexors/extensors, ankle dorsiflexors/plantar flexors, ankle evertors/invertors.    NEUROLOGICAL:  2+ patellar, achilles reflexes which are symmetric bilaterally.  No ankle clonus bilaterally.  Sensation to light touch is intact in the bilateral L4, L5, and S1 dermatomes.       RESULTS:   I reviewed the MRI lumbar spine from [facility] dated August 16, 2020.  This shows a 7.3 cm infrarenal aortic abdominal aneurysm.  CTA chest abdomen pelvis performed the same day notes this is stable.  There are moderate to severe degenerative changes with severe spinal canal stenosis at L2-3, moderate spinal canal stenosis L3-4, and mild spinal canal stenosis L1-L2.  Patient also has multilevel foraminal stenosis which is mild bilateral L1-L2, mild left L2-3, moderate left L3-4, mild to moderate bilateral L4-5, moderate bilateral L5-S1.  Please see report for further details.

## 2021-06-11 NOTE — PROGRESS NOTES
ASSESSMENT: Dominik Rojas is a 79 y.o. male with past medical history significant for hypothyroidism, hypertension, AAA, history of CVA, recurrent UTI, BPH, history of nephrolithiasis, hyperlipidemia, depression who presents today for new patient evaluation of chronic low back pain, worse over the past 5 weeks.  MRI lumbar spine shows moderate to severe degenerative change with severe spinal stenosis L2-3.  Patient was admitted to the hospital August 16 for this pain and has had 2 ED visits after discharge due to ongoing pain.  Pain may be multifactorial.  I am most concerned about his severe lumbar spinal stenosis at L2-3 as he endorses significant limitation with walking and standing tolerance which has declined significantly in the past month.  However, he also describes a mechanical component to his back pain which may be related to the lumbar facet arthropathy.  Patient was neurologically intact on exam.    MARRY:60  Who 5:2    PLAN:  A shared decision making model was used.  The patient's values and choices were respected.  The following represents what was discussed and decided upon by the physician assistant and the patient.      1.  DIAGNOSTIC TESTS:  Reviewed the MRI lumbar spine.  No further diagnostic tests were ordered.    2.  PHYSICAL THERAPY:  Discussed the importance of core strengthening, ROM, stretching exercises with the patient and how each of these entities is important in decreasing pain.  Explained to the patient that the purpose of physical therapy is to teach the patient a home exercise program.  These exercises need to be performed every day in order to decrease pain and prevent future occurrences of pain.  I entered a referral to physical therapy.    3.  MEDICATIONS: No changes are made to the patient's medications.  -Patient uses oxycodone 5 mg daily.  Pain medications are managed by the patient's primary care provider.  -Patient also uses Tylenol 1000 mg every 6 hours as needed.  -We  could consider adding gabapentin, however, patient complained of lower extremity edema today and imbalance.  Gabapentin can worsen lower extremity edema and increased fall risk so I did not prescribe today.      4.  INTERVENTIONS:    -I offered the patient a bilateral L2-3 transforaminal epidural steroid injection.  Patient indicated he would like to proceed and an order was placed.  -If this does not help, I would recommend a bilateral L4-5 and L5-S1 facet joint injection as the next step.    5.  PATIENT EDUCATION: I told the patient that some people do want any lumbar spine surgery for this problem.  We will exhaust conservative treatments first.  Patient is not a great surgical candidate given comorbidities.    6.  FOLLOW-UP:   I will see the patient back in the clinic for a 2-week post procedure follow-up after his bilateral L2-3 transforaminal epidural steroid injection.  If he has questions or concerns in the meantime, he should not hesitate to call.      SUBJECTIVE:  Dominik Rojas  Is a 79 y.o. male who presents today in consultation at request of Dr. Meza for new patient evaluation of low back pain .  Patient was admitted to the hospital for back pain August 16 through August 17.  He has returned to the ED on August 19 and August 22 due to the ongoing pain.  Patient reports that he has had a bad back since he was in high school.  He states that he has had episodes of back pain occasionally over the years.  About 5 weeks ago, pain became more severe.  He states that 2 weeks ago he had to crawl under a desk to unplug a computer.  About an hour after that task his pain was so severe that he could hardly walk.  That was when he first presented to the emergency department on the 16th, 2020.  Patient reports that his pain has persisted since then.    Patient complains of bilateral back pain.  Pain extends from the mid lumbar region to the lumbosacral junction.  He describes the pain as a dull pain.  He  states that it feels like a intense pressure, as if there is a car sitting on his back.  He denies any pain down the legs.  He denies any numbness or tingling down the legs.  He does feel generally weak but he attributes that more to a lack of activity for the past 6 to 8 months.  Pain is aggravated with standing.  He states that he cannot stand longer than 10 minutes.  Walking also aggravates the pain.  He has trouble making it out to his mailbox and back.  Patient states that before his pain became severe he can be out working in the yard for several hours.  Patient also describes increased back pain with bending and twisting and getting out of bed in the morning.  Pain improves with sitting down, but if he sits too long he feels the need to shift/reposition.  He is having difficulty sleeping.  He reports that this has been since his stroke last year, but the pain also interferes with sleep.  Patient has had some issues with urinary incontinence.  He states this has been since his stroke last year.  He was diagnosed with overactive bladder and follows with urology.  He has also had urinary tract infections.  He is currently on antibiotics for urinary tract infection.  He has several days remaining.  He had one episode of bowel incontinence about 3 to 4 weeks ago when he had a loose stool and could not make it to the bathroom in time.  He has not had any episodes of bowel incontinence since.  He denies any recent fevers, chills, or sweats.  He walks with a cane since his stroke and also due to left knee pain.  He states that he needs left knee replacement surgery.    Patient has not had physical therapy for his lower back.  He does not go to the chiropractor.  He has never had any spine surgeries or spine injections.  Patient uses oxycodone 1 tab daily.  This is helpful.  He also uses Tylenol as needed.  He tried Flexeril but it did not help so he stopped taking it.    Current Outpatient Medications on File Prior  to Visit   Medication Sig Dispense Refill     acetaminophen (TYLENOL) 500 MG tablet Take 1,000 mg by mouth every 6 (six) hours as needed for pain.       albuterol (PROAIR HFA;PROVENTIL HFA;VENTOLIN HFA) 90 mcg/actuation inhaler Inhale 2 puffs every 6 (six) hours as needed for wheezing. 1 Inhaler prn     aspirin 81 mg chewable tablet Chew 1 tablet (81 mg total) daily. 30 tablet 0     cyclobenzaprine (FLEXERIL) 10 MG tablet Take 1 tablet (10 mg total) by mouth 3 (three) times a day as needed for muscle spasms. 20 tablet 0     latanoprost (XALATAN) 0.005 % ophthalmic solution Administer 1 drop to both eyes at bedtime.       levothyroxine (SYNTHROID, LEVOTHROID) 125 MCG tablet Take 125 mcg by mouth Daily at 6:00 am.       NIFEdipine (PROCARDIA XL) 30 MG 24 hr tablet Take 1 tablet (30 mg total) by mouth daily. 30 tablet 0     oxybutynin (DITROPAN) 5 MG tablet Take 5 mg by mouth 3 (three) times a day.        oxyCODONE (ROXICODONE) 5 MG immediate release tablet Take 1 tablet (5 mg total) by mouth every 6 (six) hours as needed for pain. 12 tablet 0     rosuvastatin (CRESTOR) 10 MG tablet Take 10 mg by mouth at bedtime.       sertraline (ZOLOFT) 100 MG tablet Take 150 mg by mouth daily.        umeclidinium-vilanterol (ANORO ELLIPTA) 62.5-25 mcg/actuation inhaler Inhale 1 puff at bedtime.            Allergies   Allergen Reactions     Diclofenac Other (See Comments)     Other reaction(s): GI Upset       Loratadine Other (See Comments)     Other reaction(s): Urinary Retention         Past Medical History:   Diagnosis Date     AAA (abdominal aortic aneurysm) (H)     s/p stenting     Alcohol dependence in remission (H)      Anxiety      Benign prostatic hyperplasia with lower urinary tract symptoms      CVA (cerebral vascular accident) (H) 2019     DDD (degenerative disc disease), lumbar      Depression      DJD (degenerative joint disease) of knee     left     Essential hypertension      Glaucoma      Hyperlipidemia       Hypothyroidism      Kidney stones      Major depression      Nocturia      Obesity      Primary osteoarthritis of left knee      Psoriasis      Seborrheic keratoses      Stenosis of right carotid artery     history of TIA's        Past Surgical History:   Procedure Laterality Date     ABDOMINAL AORTIC ANEURYSM REPAIR  2013    stent     CAROTID ENDARTERECTOMY Right 9/9/2019    Procedure: RIGHT CAROTID ENDARTERECTOMY;  Surgeon: Miguel Muller MD;  Location: [facility];  Service: General     CIRCUMCISION       CYSTOSCOPY       CYSTOSCOPY PROSTATE W/ LASER N/A 6/12/2018    Procedure:  TRANSURETHRAL RESECTION OF THE PROSTATE WITH QUANTA LASER;  Surgeon: Eze Levi MD;  Location: [facility];  Service:      CYSTOSCOPY PROSTATE W/ LASER N/A 2/18/2020    Procedure: CYSTOSCOPY WITH TRANSURETHRAL INCISION OF THE PROSTATE WITH QUANTA LASER;  Surgeon: Eze Levi MD;  Location: [facility];  Service: Urology     CYSTOSCOPY W/ LITHOLAPAXY / [facility] N/A 6/12/2018    Procedure: CYSTOSCOPY AND LITHOLAPAXY;  Surgeon: Eze Levi MD;  Location: [facility];  Service:      IR ABDOMINAL AORTAGRAM  5/19/2020     IR ABDOMINAL AORTIC ANEURYSM ENDOGRAFT  5/19/2020     Family history: None.  Patient reports family is healthy.    Social history: Patient is .  He is retired.  He denies tobacco, alcohol, or illicit drug use.    ROS: Feet/leg swelling, shortness of breath, cough, wheezing, diarrhea, loss of bowel control, pain with urinating, difficulty urinating, loss of bladder control, blood in urine, joint pain, muscle fatigue,  sciatica, imbalance, falls, dizziness, insomnia, excessive tiredness, anxiety, depression.  Specifically negative for, fevers,chills, appetite changes, unexplained weight loss.   Otherwise 13 systems reviewed are negative.  Please see the patient's intake questionnaire from today for details.      OBJECTIVE:  PHYSICAL EXAMINATION:    CONSTITUTIONAL:  Vital  signs as above.  No acute distress.  The patient is well nourished and well groomed.  PSYCHIATRIC:  The patient is awake, alert, oriented to person, place, time and answering questions appropriately with clear speech.    HEENT: Normocephalic, atraumatic.  Sclera clear.  Neck is supple.  SKIN:  Skin over the face, bilateral lower extremities, and posterior torso is clean, dry, intact without rashes.    GAIT:  Gait is antalgic, favoring the left.  He uses a cane for assistance..  The patient is able to rise onto toes and heels bilaterally with support.  STANDING EXAMINATION: Lumbar flexion severely restricted with increased pain.  Lumbar extension severely restricted with increased pain.  Tender to palpation bilateral lower lumbar paraspinous muscles.  MUSCLE STRENGTH:  The patient has 5/5 strength for the bilateral hip flexors, knee flexors/extensors, ankle dorsiflexors/plantar flexors, great toe extensors, ankle evertors/invertors.  NEUROLOGICAL:  2/4 patellar, and achilles reflexes bilaterally.  Negative Babinski's bilaterally.  No ankle clonus bilaterally. Sensation to light touch is intact in the bilateral L4, L5, and S1 dermatomes.  VASCULAR:  1/4 posterior tibialis pulses bilaterally.  Bilateral lower extremities are warm.  There is 1+ pitting edema of the bilateral lower extremities.  ABDOMINAL:  Soft, non-distended, non-tender throughout all quadrants.  No pulsatile mass palpated in the left lower quadrant.    RESULTS: I reviewed the MRI lumbar spine from [facility] dated August 16, 2020.  This shows a 7.3 cm infrarenal aortic abdominal aneurysm.  CTA chest abdomen pelvis performed the same day notes this is stable.  There are moderate to severe degenerative changes with severe spinal canal stenosis at L2-3, moderate spinal canal stenosis L3-4, and mild spinal canal stenosis L1-L2.  Patient also has multilevel foraminal stenosis which is mild bilateral L1-L2, mild left L2-3, moderate left L3-4, mild to  moderate bilateral L4-5, moderate bilateral L5-S1.  Please see report for further details.

## 2021-06-12 NOTE — PATIENT INSTRUCTIONS - HE
Gabapentin 300mg Dosing Chart    DATE  MORNING AFTERNOON BEDTIME    Day 1 0 0 1    Day 2 0 0 1    Day 3 0 0 1    Day 4 1 0 1    Day 5 1 0 1    Day 6 1 0 1    Day 7 1 1 1    Day 8 1 1 1    Day 9 1 1 1    Day 10 1 1 2    Day 11 1 1 2    Day 12 1 1 2    Day 13 2 1 2    Day 14 2 1 2    Day 15 2 1 2    Day 16 2 2 2    Day 17 2 2 2    Day 18 2 2 2    Day 19 2 2 3    Day 20 2 2 3    Day 21 2 2 3    Day 22 3 2 3    Day 23 3 2 3    Day 24 3 2 3    Day 25 3 3 3    Day 26 3 3 3    Day 27 3 3 3     Continue medication, taking 3 capsules three times daily    Please call if you have any questions regarding how to take your medication  Clinic Phone # [phone]

## 2021-06-12 NOTE — PATIENT INSTRUCTIONS - HE
Please complete your pain diary and return the diary to the [facility] at your earliest convenience.  The [facility] will contact you to schedule your next appointment after your pain diary is reviewed by your doctor.  Thank you.    DISCHARGE INSTRUCTIONS    During office hours (8:00 a.m.- 4:00 p.m.) questions or concerns may be answered  by calling [facility] Navigation Nurses at  [phone].  Messages received after hours will be returned the following business day.      In the case of an emergency, please dial 911 or seek assistance at the nearest Emergency Room/Urgent Care facility.     All Patients:  ? You may experience an increase in your symptoms for the first 2 days, once the numbing medication wears off.    ? You may resume your regular medication, no pain medication until you have completed your diary    ? You may shower. No swimming, tub bath or hot tub for 24 hours; remove bandage after 4 hours    ? Continue your activities that can cause you pain to test the blocks.                         ? You should not drive for the next 3 to 5 hours (have someone drive you)           POSSIBLE PROCEDURE SIDE EFFECTS  -Call Spine Center if concerned-    Increased Pain  Increased numbness/tingling     Nausea/Vomiting  Bruising/bleeding at site (hematoma)             Swelling at site (edema) Headache  Difficulty walking  Infection        Fever greater than 100.5

## 2021-06-12 NOTE — PATIENT INSTRUCTIONS - HE
Gabapentin 100mg Dosing Chart    DATE  MORNING AFTERNOON BEDTIME    Day 1 0 0 1    Day 2 0 0 1    Day 3 0 0 1    Day 4 1 0 1    Day 5 1 0 1    Day 6 1 0 1    Day 7 1 1 1    Day 8 1 1 1    Day 9 1 1 1    Day 10 1 1 2    Day 11 1 1 2    Day 12 1 1 2    Day 13 2 1 2    Day 14 2 1 2    Day 15 2 1 2    Day 16 2 2 2    Day 17 2 2 2    Day 18 2 2 2    Day 19 2 2 3    Day 20 2 2 3    Day 21 2 2 3    Day 22 3 2 3    Day 23 3 2 3    Day 24 3 2 3    Day 25 3 3 3    Day 26 3 3 3    Day 27 3 3 3     Continue medication, taking 3 capsules three times daily    Please call if you have any questions regarding how to take your medication  Clinic Phone # [phone]

## 2021-06-12 NOTE — TELEPHONE ENCOUNTER
Attempted to contact patient to clarify pain diary results from the left L3, L4, L5 medial branch block procedure he had done by Dr. Cárdenas on 10/27/20, but he was unreachable at this time.  Message left for the patient encouraging him to call the [facility] to further discuss.

## 2021-06-12 NOTE — PROGRESS NOTES
Assessment:   Dominik Rojas is a 80 y.o. y.o. male with past medical history significant for hypothyroidism, hypertension, AAA, history of CVA, recurrent UTI, BPH, history of nephrolithiasis, hyperlipidemia, depression who presents today for follow-up regarding chronic left greater than right low back pain, worse over the past 3 months.  MRI lumbar spine showed moderate to severe degenerative change with severe spinal stenosis L2-3.  There is also multilevel facet arthropathy.  Patient reports both mechanical back pain as well as limited walking and standing tolerance.  Patient status post a bilateral L2-3 transforaminal epidural steroid injection September 16, 2020 which did not provide any relief of his pain.  He then underwent a left L3, L4, L5 medial branch block October 27, 2020 (initial pain 9/10, immediate postprocedure pain 5/10).  He did not feel this was meaningful relief.  Endorses worsening urinary incontinence.       Plan:     A shared decision making plan was used.  The patient's values and choices were respected.  The following represents what was discussed and decided upon by the physician assistant and the patient.      1.  DIAGNOSTIC TESTS:  I reviewed the MRI lumbar spine.  No further diagnostic tests were ordered.    2.  PHYSICAL THERAPY: I had entered a referral for physical therapy September 8, 2020.  I do not think patient could currently tolerate physical therapy due to the severity of his pain.  We will hope to begin physical therapy once pain is under better control.    3.  MEDICATIONS:   -Patient is taking gabapentin 300 mg 3 times daily.  He does not feel that this is helping his pain at all.  He is going to wean off the medication.  -I refilled the patient's oxycodone 5 mg 1 tab every 8 hours as needed, #30 with no refills.  I checked the [place] prescription monitoring database.  Most recent opiate prescription was August 27, 2020 for 30 tabs of oxycodone 5 mg.  I reviewed the risk  of this medication.  I will not provide this medication long-term.  If he is requiring this medication for several months, I will refer to the pain clinic.  I will not provide telephone refills.  -Patient also uses Tylenol 1000 mg every 6 hours as needed.  -Cyclobenzaprine was not helpful so he stopped taking it.    4.  INTERVENTIONS: No additional interventions were ordered.  He did not have relief with the bilateral L2-3 transforaminal epidural steroid injections.  He did not feel that the medial branch blocks provided meaningful relief of his pain.  He is having progressive pain.  He also notes worsening urinary incontinence.  Treatments with urology have not been successful.  Due to his progressive symptoms failing to improve with conservative treatment, I think it is most appropriate that the patient is evaluated by neurosurgery at this point.      5.  REFERRALS: Entered a referral to neurosurgery.    6.  FOLLOW-UP:    Patient will follow up with me as needed.  We will await recommendations from neurosurgery.  If he has questions or concerns, he should not hesitate to call.        SUBJECTIVE:    Dominik Rojas is a 80 y.o. male who presents today for follow-up regarding chronic bilateral low back pain, worse over the past 3 months.  Patient is status post a left L3, L4, L5 medial branch block October 27, 2020 which provided moderate improvement in his pain (initial pain score 9/10, immediat   post procedure pain score 5/10).  Patient did not feel that this was meaningful relief.    Patient complains of low back pain, now in the midline and left side.  He states he is not having significant right-sided pain at this point.  Pain extends to the upper buttock.  He denies any pain further down the legs.  He rates his pain today as an 8 out of 10.  At its worst it is a 9 out of 10.  At its best it is a 4-10.  Pain is aggravated with walking.  He states that he can only walk about a half of a block before his pain  becomes so severe that he has to sit down.  He notes that now even with sitting he sometimes gets severe pain.  He denies any alleviating factors to the pain.  Patient feels that his pain is getting progressively worse.  He is understandably frustrated that treatments are not helping.  He denies any numbness or tingling down the legs.  He does endorse a tired sensation in his legs with walking.  Patient has chronic loss of bladder control since his stroke last year.  He has seen urology but treatments are not helping.  He feels that the urinary incontinence is worsening.  Denies loss of bowel control.    Patient states that he does not feel like he could tolerate any kind of physical therapy at this time due to his severe pain.  Patient uses oxycodone sparingly for pain.  This is helpful and he request a refill.  He is taking gabapentin 300 g 3 times daily.  He does not think it is helping at all.  Tylenol is sometimes helpful.    Past medical history is reviewed and is unchanged.    Review of Systems:  Positive for loss of bladder control.  Negative for numbness/tingling, weakness, loss of bowel control, inability to urinate, headache, pain much worse at night, trip/stumble/falls, difficulty swallowing, difficulty with hand skills, fevers, unintentional weight loss.     Objective:   CONSTITUTIONAL:  Vital signs as above.  No acute distress.  The patient is well nourished and well groomed.    PSYCHIATRIC:  The patient is awake, alert, oriented to person, place and time.  The patient is answering questions appropriately with clear speech.  Normal affect.  HEENT: Normocephalic, atraumatic.  Sclera clear.    SKIN:  Skin over the face, posterior torso, bilateral upper and lower extremities is clean, dry, intact without rashes.  VASCULAR: No significant lower extremity edema.  MUSCULOSKELETAL:  Gait is severely antalgic, favoring the left.  No significant tenderness over the left lower lumbar paraspinal muscles.    No  significant tenderness palpation left sacroiliac joint.   The patient has 5/5 strength for the bilateral hip flexors, knee flexors/extensors, ankle dorsiflexors/plantar flexors, ankle evertors/invertors.    NEUROLOGICAL:  2+ patellar reflexes which are symmetric bilaterally.  No ankle clonus bilaterally.  Sensation to light touch is intact in the bilateral L4, L5, and S1 dermatomes.       RESULTS:   I reviewed the MRI lumbar spine from [facility] dated August 16, 2020.  This shows a 7.3 cm infrarenal aortic abdominal aneurysm.  CTA chest abdomen pelvis performed the same day notes this is stable.  There are moderate to severe degenerative changes with severe spinal canal stenosis at L2-3, moderate spinal canal stenosis L3-4, and mild spinal canal stenosis L1-L2.  Patient also has multilevel foraminal stenosis which is mild bilateral L1-L2, mild left L2-3, moderate left L3-4, mild to moderate bilateral L4-5, moderate bilateral L5-S1.  Please see report for further details.

## 2021-06-12 NOTE — TELEPHONE ENCOUNTER
"PSP: Maureen HERRON  Last clinic visit:  10/2/20 follow up; 10/27 MBBs   Reason for call: Wondering what his next steps are after injection of last week.   Clinical information:  Pain diary located in chart. States \"It took a couple days for it to kick in. Actually didn't see relief until the next day. Now even sitting is unbearable at times. I have to use a cane to shuffle around the house. Tylenol isn't even helping now. Can she give me something for the pain? She offered me some when I was in there before.\" Taking Gabapentin 300 mg three times a day without side effects. \"I had Oxycodone here from previous health issues. I took what I had and they helped a lot.\"   Advice given to patient: PSP will be updated on his response to injection as well as request for pain medication. Did offer an appointment with PSP to discuss medication management as well as she can further discuss response to MBBS and next injection option. Stated understanding.  Provider to address: DUDLEYI; Patient is schedule to see you this Friday.   "

## 2021-06-13 NOTE — TELEPHONE ENCOUNTER
"Returned call to patient to discuss back pain. Pt reported he had \"pleasant relief\" from the MDP and that this therapy is the only thing that has worked so far in his treatment. However, he is now back to having pain like before. He has resumed oxycodone prn 5 mg tablets, and tylenol \"but it didn't do any good\".     Discussed the option of interlaminar epidural steroid injection per Dr. King's last office note. Pt was in agreement with pursuing that treatment option.     Orders placed for interlaminar EVELYN.     Inessa Campbell RN         "

## 2021-06-13 NOTE — PROGRESS NOTES
NEUROSURGERY CONSULTATION NOTE    11/18/2020       CHIEF COMPLAINT: Low back pain    HPI:    Dominik Rojas is a 80 y.o. male with a PMH sig for stroke (Aug 2019) who is sent to us in consultation by Maureen Bacon for further evaluation of low back pain.      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 It is there constantly. Stays in the lower middle part of his back, notes it is off to the left side. Does not radiate into the buttocks or into the legs. Notes that the more he walks, the more he will get a sensation that he cannot go further- like someone has a hand on him preventing him from moving further. 3 weeks ago he notes that he used a walker, he found he had a much easier time ambulating. In clinic today, he is using a cane which he started using when the back pain began. Feels it helps him stabilize himself with his arms in order to relieve the strain on the back.     Pain on average: 8/10  Pain max: 10/10    Numbness/tingling: None  Weakness: Notes generalized fatigue as he has not been very active since this onset.    Aggravating factors: Prolonged standing and walking makes things worse- makes him feel worn out- like he cant go any further  Relieving factors: Sitting can help relieve his pain if he has been walking and the pain is escalating    Pain management: Oxycodone without benefit, for a while had relief with tylenol but that doesn't help anymore. Underwent bilateral L2-3 TF EVELYN without meaningful benefit. They also performed left L3, L4, L5 MBB without any relief at all.    Past Medical History:   Diagnosis Date     AAA (abdominal aortic aneurysm) (H)     s/p stenting     Alcohol dependence in remission (H)      Anxiety      Benign prostatic hyperplasia with lower urinary tract symptoms      CVA (cerebral vascular accident) (H) 2019     DDD  (degenerative disc disease), lumbar      Depression      DJD (degenerative joint disease) of knee     left     Essential hypertension      Glaucoma      Hyperlipidemia      Hypothyroidism      Kidney stones      Major depression      Nocturia      Obesity      Primary osteoarthritis of left knee      Psoriasis      Seborrheic keratoses      Stenosis of right carotid artery     history of TIA's     Past Surgical History:   Procedure Laterality Date     ABDOMINAL AORTIC ANEURYSM REPAIR  2013    stent     CAROTID ENDARTERECTOMY Right 9/9/2019    Procedure: RIGHT CAROTID ENDARTERECTOMY;  Surgeon: Miguel Muller MD;  Location: [facility];  Service: General     CIRCUMCISION       CYSTOSCOPY       CYSTOSCOPY PROSTATE W/ LASER N/A 6/12/2018    Procedure:  TRANSURETHRAL RESECTION OF THE PROSTATE WITH QUANTA LASER;  Surgeon: Eze Levi MD;  Location: [facility] [place];  Service:      CYSTOSCOPY PROSTATE W/ LASER N/A 2/18/2020    Procedure: CYSTOSCOPY WITH TRANSURETHRAL INCISION OF THE PROSTATE WITH QUANTA LASER;  Surgeon: Eze Levi MD;  Location: [facility] [place];  Service: Urology     CYSTOSCOPY W/ LITHOLAPAXY / EHL N/A 6/12/2018    Procedure: CYSTOSCOPY AND LITHOLAPAXY;  Surgeon: Eze Levi MD;  Location: [facility] [place];  Service:      IR ABDOMINAL AORTAGRAM  5/19/2020     IR ABDOMINAL AORTIC ANEURYSM ENDOGRAFT  5/19/2020     REVIEW OF SYSTEMS:  Feels he cannot hold his urine, cannot get to the bathroom fast enough. Has had repeat urinary tract infections. A full 14 point review of systems was otherwise completed and is negative aside from that mentioned above in the HPI    MEDICATIONS:    Current Outpatient Medications   Medication Sig Dispense Refill     acetaminophen (TYLENOL) 500 MG tablet Take 1,000 mg by mouth every 6 (six) hours as needed for pain.       aspirin 81 mg chewable tablet Chew 1 tablet (81 mg total) daily. 30 tablet 0     gabapentin (NEURONTIN) 100 MG capsule  Take 100 mg by mouth daily. Increase dose as directed by chart.  May increase to 3 tabs 3 times daily (Patient taking differently: Take 100 mg by mouth daily. 3x3x3 as of 2020) 270 capsule 2     latanoprost (XALATAN) 0.005 % ophthalmic solution Administer 1 drop to both eyes at bedtime.       levothyroxine (SYNTHROID, LEVOTHROID) 125 MCG tablet Take 125 mcg by mouth Daily at 6:00 am.       NIFEdipine (PROCARDIA XL) 30 MG 24 hr tablet Take 1 tablet (30 mg total) by mouth daily. 30 tablet 0     oxyCODONE (ROXICODONE) 5 MG immediate release tablet Take 1 tablet (5 mg total) by mouth every 8 (eight) hours as needed for pain. 30 tablet 0     rosuvastatin (CRESTOR) 10 MG tablet Take 10 mg by mouth at bedtime.       sertraline (ZOLOFT) 100 MG tablet Take 150 mg by mouth daily.        umeclidinium-vilanterol (ANORO ELLIPTA) 62.5-25 mcg/actuation inhaler Inhale 1 puff at bedtime.        albuterol (PROAIR HFA;PROVENTIL HFA;VENTOLIN HFA) 90 mcg/actuation inhaler Inhale 2 puffs every 6 (six) hours as needed for wheezing. 1 Inhaler prn     methylPREDNISolone (MEDROL DOSEPACK) 4 mg tablet Follow package directions 21 tablet 0     No current facility-administered medications for this visit.          ALLERGIES/SENSITIVITIES:     Allergies   Allergen Reactions     Diclofenac Other (See Comments)     Other reaction(s): GI Upset       Loratadine Other (See Comments)     Other reaction(s): Urinary Retention         PERTINENT SOCIAL HISTORY:   Social History     Socioeconomic History     Marital status:      Spouse name: None     Number of children: None     Years of education: None     Highest education level: None   Occupational History     None   Social Needs     Financial resource strain: None     Food insecurity     Worry: None     Inability: None     Transportation needs     Medical: None     Non-medical: None   Tobacco Use     Smoking status: Former Smoker     Quit date:      Years since quittin.      "Smokeless tobacco: Never Used   Substance and Sexual Activity     Alcohol use: No     Comment: quit 1974     Drug use: No     Sexual activity: None   Lifestyle     Physical activity     Days per week: None     Minutes per session: None     Stress: None   Relationships     Social connections     Talks on phone: None     Gets together: None     Attends Oriental orthodox service: None     Active member of club or organization: None     Attends meetings of clubs or organizations: None     Relationship status: None     Intimate partner violence     Fear of current or ex partner: None     Emotionally abused: None     Physically abused: None     Forced sexual activity: None   Other Topics Concern     None   Social History Narrative    04/09/17: Patient's wife is in chemo since November 2016 and he is stressed.          FAMILY HISTORY:  Family History   Problem Relation Age of Onset     Emphysema Mother      Cirrhosis Father      No Medical Problems Sister      No Medical Problems Brother      No Medical Problems Maternal Aunt      No Medical Problems Maternal Uncle      No Medical Problems Paternal Aunt      No Medical Problems Paternal Uncle      No Medical Problems Maternal Grandmother      No Medical Problems Maternal Grandfather      No Medical Problems Paternal Grandmother      No Medical Problems Paternal Grandfather      No Medical Problems Other         PHYSICAL EXAM:     Constitution: /41   Pulse (!) 115   Ht 5' 4.5\" (1.638 m)   Wt 220 lb 3.2 oz (99.9 kg)   SpO2 (!) 86%   BMI 37.21 kg/m  .   Awake, alert and in NAD  Eyes: Conjugate gaze. Conjunctiva benign without icterus or injection  Heart: RRR  Lungs: Non-labored respiration without accessory muscle use  Skin: No obvious rash or lesion  Psych: Appropriate mood and affect, alert and oriented x 3  Mental Status:  Speech is fluent.  Recent and remote memory are intact.  Attention span and concentration are normal.     Motor: Normal bulk and tone all muscle " groups of upper and lower extremities.     Right Left  Right Left   Deltoid 5 5 Hip flexion 5 5   Biceps 5 5 Hip extension 5 5   Triceps 5 5 Knee flexion 5 5   Wrist ex 5 5 Knee ex 5 5   Wrist flex 5 5 Dorsiflex 5 5   Finger ex 5 5 Plantar flex 5 5   Hand intrinsic 5 5 EHL 5 5    Full Mild to moderately decreased         Sensory: Sensation intact bilaterally to light touch and temperature throughout. Vibratory sense is intact in the bl great toe.     Coordination:  Gait is WNL.  BANDAR in the UE is WNL with the exception of more appreciable slowing on the left     Reflexes; 2+ supinator, biceps, triceps on the right, 3+ on the left. 2+ patellar and achilles. 2 beats of clonus on the right, 3 on the left. + left babinski    Musculoskeletal: Negative straight leg raise bilaterally. Negative DENISE testing. Negative Sierra finger test    IMAGING: I personally reviewed all radiographic images  MRI lumbar spine 8/16/2020: Patient has multilevel lumbar spondylosis, degenerative lumbar disc disease.  Patient has a congenitally narrowed central canal.  At L1-2, there is a broad-based disc osteophyte complex, bilateral facet arthropathy with facet joint effusions and some ligamentum flavum hypertrophy.  There may be a small synovial cyst on the left all of which results in moderate central canal stenosis.  At L2-3, there is a broad-based paracentral disc bulge, bilateral facet arthropathy with ligamentum flavum hypertrophy all of which results in severe central canal stenosis.  Patient has severe foraminal stenosis on the left at L2-3 and L3-4.  At L3-4, there is a broad-based paracentral disc bulge, bilateral facet arthropathy and ligamentum flavum hypertrophy all of which results in moderate to severe central canal stenosis     Lumbar spine flexion-extension x-rays 11/18/2020: No evidence of dynamic spondylolisthesis.  Patient has multilevel degenerative disc disease and osteophytic spurring at all disc levels.  There is  severe disc height loss noted throughout the lumbar spine.    CONSULTATION ASSESSMENT AND PLAN:    Dominik Rojas is a 80 y.o. male who has multilevel lumbar spondylosis, degenerative lumbar disc disease, facet arthropathy who has lumbar spinal stenosis and symptoms of 4 months of persistent back pain which has a claudicatory component as well as urinary incontinence which is concerning for underlying neurogenic claudication +/- cauda equina syndrome     I reviewed Mr. Rojas's imaging with him and his wife today in clinic, noting his severe spinal stenosis and compression of the cauda equina. I noted that his symptoms are not entirely classic for neurogenic claudication and/or cauda equina syndrome. However, given his urinary symptoms, I believe that the risks of decompressive lumbar laminectomy are worth it. We reviewed the risks, benefits and alternative to a L2-4 decompressive lumbar laminectomy. He would like to try a medrol dose pack first and is considering an interlaminar epidural steroid injection, I noted that the latter of which could be diagnostic as well as therapeutic. However, I noted that if his urinary symptoms persist or certainly if they worsen, he should reconsider options for surgery.     I spent more than 60 minutes in this apt, examining the pt, reviewing the scans, reviewing notes from chart, discussing treatment options with risks and benefits and coordinating care. >50 % clinic time was spent in face to face counseling and coordinating care    Renée King MD      Cc:   Lisandro Meza MD

## 2021-06-13 NOTE — TELEPHONE ENCOUNTER
Called and spoke Dominik's spouse, provided clarifications regarding a Medrol Dosepak prescription, answered all questions to her satisfaction.  Mriiam Blackman RN, CNRN

## 2021-06-13 NOTE — PATIENT INSTRUCTIONS - HE
Please follow up with Dr King two weeks post procedure to evaluate your plan of care.       DISCHARGE INSTRUCTIONS    During office hours (8:00 a.m.- 4:00 p.m.) questions or concerns may be answered  by calling [facility] Navigation Nurses at  [phone].  Messages received after hours will be returned the following business day.      In the case of an emergency, please dial 911 or seek assistance at the nearest Emergency Room/Urgent Care facility.     All Patients:    ? You may experience an increase in your symptoms for the first 2 days (It may take anywhere between 2 days- 2 weeks for the steroid to have maximum effect).    ? You may use ice on the injection site, as frequently as 20 minutes each hour if needed.    ? You may take your pain medicine.    ? You may continue taking your regular medication after your injection. If you have had a Medial Branch Block you may resume pain medication once your pain diary is completed.    ? You may shower. No swimming, tub bath or hot tub for 48 hours.  You may remove your bandaid/bandage as soon as you are home.    ? You may resume light activities, as tolerated.    ? Resume your usual diet as tolerated.    ? It is strongly advised that you do not drive for 1-3 hours post injection.    ? If you have had oral sedation:  Do not drive for 8 hours post injection.      ? If you have had IV sedation:  Do not drive for 24 hours post injection.  Do not operate hazardous machinery or make important personal/business decisions for 24 hours.      POSSIBLE STEROID SIDE EFFECTS (If steroid/cortisone was used for your procedure)    -If you experience these symptoms, it should only last for a short period      Swelling of the legs                Skin redness (flushing)       Mouth (oral) irritation     Blood sugar (glucose) levels              Sweats                      Mood changes    Headache    Sleeplessness    Weakened immune system for up to 14 days, which could increase the  risk of clay the COVID-19 virus and/or experiencing more severe symptoms of the disease, if exposed.    Decreased effectiveness of the flu vaccine if given within 2 weeks of the steroid.         POSSIBLE PROCEDURE SIDE EFFECTS  -Call the Spine Center if you are concerned    Increased Pain             Increased numbness/tingling        Nausea/Vomiting            Bruising/bleeding at site        Redness or swelling                                                Difficulty walking        Weakness             Fever greater than 100.5    *In the event of a severe headache after an epidural steroid injection that is relieved by lying down, please call the [facility] to speak with a clinical staff member*

## 2021-06-13 NOTE — PROGRESS NOTES
Neurosurgery consultation was requested by: GOPAL Cooper   Pain: Low back pain   Radicular Pain is present: Denies radicular pain   Lhermitte sign: no   Motor complaints: Occasional leg weakness in both legs   Sensory complaints: Denies numbness and tingling   Gait and balance issues: yes  Bowel or bladder issues: no   Duration of SX is: few months   The symptoms are worse with: getting up from bed and walking   The symptoms are better with: resting   Injury:none    Severity is:mild - moderate  Patient has tried the following conservative measures:Bilateral L23 TFESI and a Medical branch block Left L3,L4,L5 and neither helped.   SANDRA Mathews    Modified Oswestry Low back Pain Disability Questionnaire    1. PAIN INTENSITY  4- Pain medication provides me with little relief from pain  2. PERSONAL CARE  3- I need help, but I am able to manage most of my personal care  3. LIFTING  3- Pain prevents me from lifting heavy weights off the floor but I can manage light to medium weights if they are conveniently placed  4. WALKING  4- I can walk only with crutches or a cane  5. SITTING  2- Pain prevents me from sitting more than 1 hour.  6. STANDING  4- Pain prevents me from standing for more than 10 minutes.   7. SLEEPING  N/A  8. SOCIAL LIFE  N/A  9. TRAVELING  N/A  10. EMPLOYMENT/HOMEMAKING  N/A    SCORE:_20___ x2=_40/6=67%____

## 2021-06-13 NOTE — TELEPHONE ENCOUNTER
Patient is having increased back pain. He would like a call back to discuss.    Best number to reach him: [phone]

## 2021-06-13 NOTE — PATIENT INSTRUCTIONS - HE
Dr. King offered a L2-4 DECOMPRESSIVE LUMBAR LAMINECTOMY and stated if you would like to proceed with surgery to call to get scheduled.   Otherwise if you would like an injection please call us and we can order a L45 interlaminar EVELYN.

## 2021-06-13 NOTE — TELEPHONE ENCOUNTER
Patients wife Jovana is calling needing clarification on a medication that we prescribed him. Please call back at [phone]

## 2021-06-14 NOTE — TELEPHONE ENCOUNTER

## 2021-06-14 NOTE — PATIENT INSTRUCTIONS - HE
"1. Stop Eliquis  2. Resume aspirin 81 mg daily  3. Resume nifedipine XL 30 mg daily  4. Continue metoprolol succinate 50 mg daily  5. Work to limit the sodium content using the diet information were provided with today.  This may help with blood pressure control, and improve your breathing as well.  6. We will schedule a \"chemical stress test\" to make sure you do not have significant heart artery blockages that would significantly increase your risk of surgery  "

## 2021-06-14 NOTE — PROGRESS NOTES
NEUROSURGERY FOLLOW UP  NOTE    12/29/2020     CONSULTATION ASSESSMENT AND PLAN:    Dominik Rojas is a 80 y.o. male who has multilevel lumbar spondylosis, degenerative lumbar disc disease, facet arthropathy who has lumbar spinal stenosis and symptoms of 4 months of persistent back pain which has a claudicatory component as well as urinary incontinence which is concerning for underlying neurogenic claudication +/- cauda equina syndrome. Since last visit Dominik underwent interlaminar injection at L2-3 that provided one week of total relief.     We discussed imaging again. Dominik is not yet ready to schedule his procedure. We discussed surgery risks and benefits, post-op expectations. Dominik will call us when he is ready to schedule surgery (L2-4 decompressive lumbar laminectomy with Dr. King).         Addendum: Spoke with patient's wife, given phone number for the schedulers. Dominik and his wife will call when they want to schedule.     I spent more than 60 minutes in this apt, examining the pt, reviewing the scans, reviewing notes from chart, discussing treatment options with risks and benefits and coordinating care. >50 % clinic time was spent in face to face counseling and coordinating care    Ene Geller, CNP      CHIEF COMPLAINT: Low back pain    HPI:  From him previous consultation with Dr. King.   Dominik Rojas is a 80 y.o. male with a PMH sig for stroke (Aug 2019) who is sent to us in consultation by Lisandro Meza for further evaluation of low back pain.    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 It is there constantly. Stays in the lower middle part of his back, notes it is off to the left side. Does not radiate into the buttocks or into the legs. Notes that the more he walks, the more he will get a sensation that he cannot go further- like someone  has a hand on him preventing him from moving further. 3 weeks ago he notes that he used a walker, he found he had a much easier time ambulating. In clinic today, he is using a cane which he started using when the back pain began. Feels it helps him stabilize himself with his arms in order to relieve the strain on the back.   Pain on average: 8/10  Pain max: 10/10  Numbness/tingling: None  Weakness: Notes generalized fatigue as he has not been very active since this onset.  Aggravating factors: Prolonged standing and walking makes things worse- makes him feel worn out- like he cant go any further  Relieving factors: Sitting can help relieve his pain if he has been walking and the pain is escalating  Pain management: Oxycodone without benefit, for a while had relief with tylenol but that doesn't help anymore. Underwent bilateral L2-3 TF EVELYN without meaningful benefit. They also performed left L3, L4, L5 MBB without any relief at all.    Since his previous visit: He underwent an L2-3 interlaminer injection which provided 100% relief for one week, pain is back to baseline and actually worsening a bit. He now has anterior thigh numbness. He also had steroids (not medrol dose pack) for a week that also provided relief. Continues to have urinary issues that are really disheartening.     Past Medical History:   Diagnosis Date     AAA (abdominal aortic aneurysm) (H)     s/p stenting     Alcohol dependence in remission (H)      Anxiety      Benign prostatic hyperplasia with lower urinary tract symptoms      CVA (cerebral vascular accident) (H) 2019     DDD (degenerative disc disease), lumbar      Depression      DJD (degenerative joint disease) of knee     left     Essential hypertension      Glaucoma      Hyperlipidemia      Hypothyroidism      Kidney stones      Major depression      Nocturia      Obesity      Primary osteoarthritis of left knee      Psoriasis      Seborrheic keratoses      Stenosis of right carotid artery      history of TIA's     Past Surgical History:   Procedure Laterality Date     ABDOMINAL AORTIC ANEURYSM REPAIR  2013    stent     CAROTID ENDARTERECTOMY Right 9/9/2019    Procedure: RIGHT CAROTID ENDARTERECTOMY;  Surgeon: Miguel Muller MD;  Location: [facility] OR;  Service: General     CIRCUMCISION       CYSTOSCOPY       CYSTOSCOPY PROSTATE W/ LASER N/A 6/12/2018    Procedure:  TRANSURETHRAL RESECTION OF THE PROSTATE WITH QUANTA LASER;  Surgeon: Eze Levi MD;  Location: [facility] OR;  Service:      CYSTOSCOPY PROSTATE W/ LASER N/A 2/18/2020    Procedure: CYSTOSCOPY WITH TRANSURETHRAL INCISION OF THE PROSTATE WITH QUANTA LASER;  Surgeon: Eze Levi MD;  Location: [facility] OR;  Service: Urology     CYSTOSCOPY W/ LITHOLAPAXY / EHL N/A 6/12/2018    Procedure: CYSTOSCOPY AND LITHOLAPAXY;  Surgeon: Eze Levi MD;  Location: [facility];  Service:      IR ABDOMINAL AORTAGRAM  5/19/2020     IR ABDOMINAL AORTIC ANEURYSM ENDOGRAFT  5/19/2020     REVIEW OF SYSTEMS:. A full 14 point review of systems was otherwise completed and is negative aside from that mentioned above in the HPI    MEDICATIONS:    Current Outpatient Medications   Medication Sig Dispense Refill     acetaminophen (TYLENOL) 500 MG tablet Take 1,000 mg by mouth every 6 (six) hours as needed for pain.       albuterol (PROAIR HFA;PROVENTIL HFA;VENTOLIN HFA) 90 mcg/actuation inhaler Inhale 2 puffs every 6 (six) hours as needed for wheezing. 1 Inhaler prn     aspirin 81 mg chewable tablet Chew 1 tablet (81 mg total) daily. 30 tablet 0     gabapentin (NEURONTIN) 100 MG capsule Take 100 mg by mouth daily. Increase dose as directed by chart.  May increase to 3 tabs 3 times daily (Patient taking differently: Take 100 mg by mouth daily. 3x3x3 as of 11/06/2020) 270 capsule 2     latanoprost (XALATAN) 0.005 % ophthalmic solution Administer 1 drop to both eyes at bedtime.       levothyroxine (SYNTHROID, LEVOTHROID)  "125 MCG tablet Take 125 mcg by mouth Daily at 6:00 am.       NIFEdipine (PROCARDIA XL) 30 MG 24 hr tablet Take 1 tablet (30 mg total) by mouth daily. 30 tablet 0     oxyCODONE (ROXICODONE) 5 MG immediate release tablet Take 1 tablet (5 mg total) by mouth every 8 (eight) hours as needed for pain. 30 tablet 0     rosuvastatin (CRESTOR) 10 MG tablet Take 10 mg by mouth at bedtime.       sertraline (ZOLOFT) 100 MG tablet Take 150 mg by mouth daily.        umeclidinium-vilanterol (ANORO ELLIPTA) 62.5-25 mcg/actuation inhaler Inhale 1 puff at bedtime.        No current facility-administered medications for this visit.          ALLERGIES/SENSITIVITIES:     Allergies   Allergen Reactions     Diclofenac Other (See Comments)     Other reaction(s): GI Upset       Loratadine Other (See Comments)     Other reaction(s): Urinary Retention       FH and SH reviewed and unchanged.     PHYSICAL EXAM:     Constitution: /73   Pulse 82   Ht 5' 4.5\" (1.638 m)   Wt 220 lb (99.8 kg)   SpO2 91%   BMI 37.18 kg/m  .   Awake, alert and in NAD  Eyes: Conjugate gaze. Conjunctiva benign without icterus or injection  Heart: RRR  Lungs: Non-labored respiration without accessory muscle use  Skin: No obvious rash or lesion  Psych: Appropriate mood and affect, alert and oriented x 3  Mental Status:  Speech is fluent.  Recent and remote memory are intact.  Attention span and concentration are normal.     Motor: Normal bulk and tone all muscle groups of upper and lower extremities.     Right Left  Right Left   Deltoid 5 5 Hip flexion 5 5   Biceps 5 5 Hip extension 5 5   Triceps 5 5 Knee flexion 5 5   Wrist ex 5 5 Knee ex 5 5   Wrist flex 5 5 Dorsiflex 5 5   Finger ex 5 5 Plantar flex 5 5   Hand intrinsic 5 5 EHL 5 5    Full Mild to moderately decreased         Sensory: Sensation intact bilaterally to light touch and temperature throughout. Vibratory sense is intact in the bl great toe.  Coordination:  Gait is WNL.  BANDAR in the UE is WNL " with the exception of more appreciable slowing on the left  Reflexes; 2+ supinator, biceps, triceps on the right, 3+ on the left. 2+ patellar and achilles. 2 beats of clonus on the right, 3 on the left. + left babinski  Musculoskeletal: Negative straight leg raise bilaterally. Negative DENISE testing. Negative Sierra finger test    IMAGING: I personally reviewed all radiographic images and impressions:   MRI lumbar spine 8/16/2020: Patient has multilevel lumbar spondylosis, degenerative lumbar disc disease.  Patient has a congenitally narrowed central canal.  At L1-2, there is a broad-based disc osteophyte complex, bilateral facet arthropathy with facet joint effusions and some ligamentum flavum hypertrophy.  There may be a small synovial cyst on the left all of which results in moderate central canal stenosis.  At L2-3, there is a broad-based paracentral disc bulge, bilateral facet arthropathy with ligamentum flavum hypertrophy all of which results in severe central canal stenosis.  Patient has severe foraminal stenosis on the left at L2-3 and L3-4.  At L3-4, there is a broad-based paracentral disc bulge, bilateral facet arthropathy and ligamentum flavum hypertrophy all of which results in moderate to severe central canal stenosis   Lumbar spine flexion-extension x-rays 11/18/2020: No evidence of dynamic spondylolisthesis.  Patient has multilevel degenerative disc disease and osteophytic spurring at all disc levels.  There is severe disc height loss noted throughout the lumbar spine.

## 2021-06-14 NOTE — TELEPHONE ENCOUNTER
This message is being sent on behalf of the COVID testing team:   Patient is scheduled 1/17/21 for a pre-procedure COVID test in preparation for his surgery (Bilateral L2-L4 decompressive lumbar laminectomy with medial facetetomies) on 1/21/21 with Dr. Renée King--we do not have an order in place for this.     Order for COVID test pended, please sign if appropriate?   Thank you!

## 2021-06-14 NOTE — PATIENT INSTRUCTIONS - HE
You can try increasing your gabapentin and we can try and treat your symptoms but you have a structural problem that will not go away (lumbar stenosis). Because you have good relief with an injection and steroids, I think surgery will give you good relief. When you are ready for surgery, you can call us. You will likely spend 1-3 days in the hospital, incisional pain will be present for two weeks. By six weeks, we would hope that you would be feeling pretty close to normal.    You can take up to 300mg three times a day of gabapentin.

## 2021-06-14 NOTE — TELEPHONE ENCOUNTER
ORDER FROM: Dr. King    PRE AUTHORIZATION: No PA required; Ok to schedule    METHOD OF PATIENT CONTACT: Spoke with patient over the phone; Best number to reach him: [phone]    PROCEDURE: Bilateral L2-L4 decompressive lumbar laminectomy with medial facetetomies    SURGICAL DATE: 01.21.21 @ 7:15 a.m. *[facility]*    READINESS VISIT: Please call    PCP, CLINIC, PHONE#: Dr. Meza; [facility]; [phone]; Pre-op physical: 01.14.21 @ 1:30 p.m.    COVID-19 PRE-PROCEDURE TEST: 01.17.21 @ 11:30 a.m., [facility]    FILM INFO: Lumbar MRI: 08.16.20 @ [facility] Lumbar flex/ext: 11.18.20 @ [facility]    SURGICAL LETTER: e-mailed 01.14.21

## 2021-06-15 NOTE — ANESTHESIA PREPROCEDURE EVALUATION
Anesthesia Evaluation      Patient summary reviewed   No history of anesthetic complications     Airway   Mallampati: III  Neck ROM: full   Pulmonary     breath sounds clear to auscultation  (+) COPD, sleep apnea on no CPAP, , a smoker  (-) asthma, shortness of breath, recent URI                         Cardiovascular   Exercise tolerance: < 4 METS  (+) hypertension, dysrhythmias, , hypercholesterolemia, PVD    (-) angina  ECG reviewed  Rhythm: regular  Rate: normal,         Neuro/Psych    (+) neuromuscular disease,  CVA , dementia,   (-) no seizures    Endo/Other    (+) hypothyroidism,   (-) no diabetes     GI/Hepatic/Renal    (+)   chronic renal disease,      Other findings: 1/21 negative stress test, EF 65%  S/p right CEA, AAA stent graft      Dental    (+) lower dentures and upper dentures                       Anesthesia Plan  Planned anesthetic: general endotracheal  precedex & magnesium gtt for pain, ketamine 50mg, decadron 10mg, zofran   High likelihood for overnight observation.  TERENCE, no CPAP, chronic opioid use at home   ASA 3   Induction: intravenous   Anesthetic plan and risks discussed with: patient and spouse  Anesthesia plan special considerations: antiemetics,

## 2021-06-15 NOTE — TELEPHONE ENCOUNTER
Called patient, discussed surgery, post-op course, expectations, follow up plan.    Reviewed H&P from 02/23/2021 - cleared for surgery  Labs, EKG - WNL (repeat INR in AM of surgery)    MRI done on 08/16/2020 - in [place]    To OR as planned.     Check in - 0500    Nothing to eat or drink after midnight the night before surgery.     Reviewed with patient: Arrive 2.5 -3 hours prior to scheduled surgery.    Bring all pertinent films to hospital the day of surgery.     Continue to refrain from NSAIDS (Ibuprofen, Aleve, Naprosyn), ASA, Over the counter herbal medications or supplements, anti-coagulants and blood thinners.     Patient confirmed they have help/assistance in place at home upon discharge    Instructions: using a washcloth and a bottle of provided Hibiclens, wash your body, avoiding your face and genitals. Preferably, shower the night before surgery and the morning of surgery using a half a bottle each time for your whole body shower.        Patient was informed that we will provide up to 1 week prescription of pain medication for post-operative pain.      Instructed patient about the following: After your surgery, if you will be staying in-patient, a nursing provider team will be monitoring you closely throughout your stay and communicate your health status to your surgeon and other providers.  You will be seen by Advanced Practice Providers (e.g., nurse practitioners, clinic nurse specialist, and physician assistants) who will check on you regularly to assess the status of your surgery.     Miriam Blackman RN, CNRN

## 2021-06-15 NOTE — TELEPHONE ENCOUNTER
PSP: Maureen HERRON  Last clinic visit:  11/6/20  Reason for call: Pain medication management pre-surgery  Clinical information:  Patient takes 1 Oxycodone tablet daily or every other day while awaiting back surgery. He has to undergo a cardiac stress test before he can have the surgery.   Advice given to patient: Per clinic practice, will need in clinic or video visit with PSP to discuss. Transferred to scheduling to make appointment.

## 2021-06-15 NOTE — TELEPHONE ENCOUNTER
Returned call to pt's wife Jovana. She is reporting that the pt is wanting to discharge prior to getting medical approval from the surgeon or the hospitalist. Jovana is concerned that the pt is not understanding what he has to do due to a history of stroke. Pt has cognitive and memory issues as a side effect of the stroke.     Encouraged Jovana to voice her concerns to the care team at [facility] as they will help make the discharge decisions. Jovana cannot be there as she is working. Pt's daughter may be visiting the hospital this afternoon.     Jovana requested that a message be sent to someone at the hospital so that the pt is not discharged too soon. Message sent to [facility] as an FYI.     Inessa Campbell RN

## 2021-06-15 NOTE — PATIENT INSTRUCTIONS - HE
Oxycodone was prescribed today. Please lock this medication up when you are not taking it. Do not share this medication with other people. Do not increase the dose without permission from your physician. Do not drink alcohol while you take this medication as this can lead to death. Do not take other pain medications without approval from your physician or this can also lead to death. If you need a refill of this medication, you must come in to clinic by appointment. Please call if you have any questions on how to take this medication.

## 2021-06-15 NOTE — TELEPHONE ENCOUNTER
Jovana called on behalf of her , Dominik. Dominik is post op lumbar laminectomy with Dr. King yesterday, 3/3/21. She is concerned about discharge and if Dominik could be kept for another night to make sure he is completely on board with recovery. She was concerned about the drain. Please call Jovana at [phone] with questions.

## 2021-06-15 NOTE — PROGRESS NOTES
Assessment:   Dominik Rojas is a 80 y.o. y.o. male with past medical history significant for hypothyroidism, hypertension, AAA, history of CVA, recurrent UTI, BPH, history of nephrolithiasis, hyperlipidemia, depression who presents today for follow-up regarding chronic bilateral low back pain.  My review of the MRI lumbar spine showed moderate to severe degenerative change with severe spinal stenosis L2-3.  There is also multilevel facet arthropathy.  Plan is for a bilateral L2-L4 decompressive lumbar laminectomy with medial facetectomies with Dr. King.  Surgery is yet to be scheduled because he needs get medical clearance..  He has a stress test today.  He returns today for refill of oxycodone 5 mg.  He takes 1 tab approximately every other day.  He has 3 tabs remaining from the prescription I gave him in November for 30 tabs.  - Patient status post a bilateral L2-3 transforaminal epidural steroid injection September 16, 2020 which did not provide any relief of his pain.  He then failed left L3, L4, L5 medial branch blocks October 27, 2020.        Plan:     A shared decision making plan was used.  The patient's values and choices were respected.  The following represents what was discussed and decided upon by the physician assistant and the patient.      1.  DIAGNOSTIC TESTS:  I reviewed the MRI lumbar spine.  No further diagnostic tests were ordered.    2.  PHYSICAL THERAPY: No physical therapy was ordered.  Patient is awaiting surgery scheduling.    3.  MEDICATIONS:   -I refilled the patient's oxycodone 5 mg 1 tab every 8 hours as needed, #30 with no refills.  I checked the [place] prescription monitoring database.  Most recent opiate prescription was November 6, 2020.  Risks reviewed.  I told the patient I would not provide this medication long-term.  I will continue to manage this medication until he has his lumbar spine surgery.  Postoperative pain management will be with neurosurgery.  If surgery is  delayed, I may refer to the pain clinic.  -Patient also uses Tylenol 1000 mg every 6 hours as needed.  -Cyclobenzaprine was not helpful so he stopped taking it.    4.  INTERVENTIONS: No additional interventions were ordered.     5.  PATIENT EDUCATION: Patient is in agreement the above plan.  All questions were answered.    6.  FOLLOW-UP:    Patient will follow up with me as needed.  If he needs another refill before lumbar spine surgery he will need to do a video or in person visit.  If he has questions or concerns, he should not hesitate to call.        SUBJECTIVE:    Dominik Rojas is a 80 y.o. male who presents today for follow-up regarding chronic bilateral low back pain.  Plan is for bilateral L2-L4 decompressive lumbar laminectomy and medial facetectomies with Dr. King.  Surgery is yet to be scheduled.  He needs medical clearance.  He has a stress test today..  He returns today for refill of oxycodone 5 mg.  He takes 1 tab approximately every other day.  He has 3 tabs remaining.    Patient complains of bilateral low back pain.  Patient states that the pain switches sides.  He says both sides are equally affected.  Pain was very severe a couple of days ago and it spanned across the entire back.  Patient denies any pain down the legs but he has recently noticed numbness and tingling down the anterior thighs to the knees.  He feels weak in his back.  His walking is limited by the pain.  When pain is severe he can only walk across the house.  His standing is limited by the pain.  He cannot stand long enough to hard-boiled eggs for his wife.  He has chronic loss of bladder control which is stable.  He rates his pain today as a 7 out of 10.  Pain is alleviated with rest.  He denies any new symptoms since he was last seen.     Patient uses oxycodone 1 tab approximately every other day.  It is very helpful when he takes it but he tries to use it sparingly.  He is very cautious due to risk for addiction.  He has a  history of alcohol abuse.  He also takes Tylenol as needed.    Past medical history is reviewed and is unchanged.    Review of Systems:  Positive for loss of numbness/tingling, weakness, loss of bladder control, wetting pants, pain much worse at night, trip/double/falls, difficulty with hand skills.  Negative for loss of bowel control, inability to urinate, headache, difficulty swallowing, fevers, unintentional weight loss.     Objective:   CONSTITUTIONAL:  Vital signs as above.  No acute distress.  The patient is well nourished and well groomed.    PSYCHIATRIC:  The patient is awake, alert, oriented to person, place and time.  The patient is answering questions appropriately with clear speech.  Normal affect.  HEENT: Normocephalic, atraumatic.  Sclera clear.    SKIN:  Skin over the face, posterior torso, bilateral upper and lower extremities is clean, dry, intact without rashes.  VASCULAR: No significant lower extremity edema.  MUSCULOSKELETAL:  Gait is severely antalgic, favoring the left.  He ambulates with a cane for assistance.  No significant tenderness over the left lower lumbar paraspinal muscles.   The patient has 5/5 strength for the bilateral hip flexors, knee flexors/extensors, ankle dorsiflexors/plantar flexors, ankle evertors/invertors.    NEUROLOGICAL: Subjective diminished sensation bilateral L3 dermatomes.     RESULTS:   I reviewed the MRI lumbar spine from [facility] dated August 16, 2020.  This shows a 7.3 cm infrarenal aortic abdominal aneurysm.  CTA chest abdomen pelvis performed the same day notes this is stable.  There are moderate to severe degenerative changes with severe spinal canal stenosis at L2-3, moderate spinal canal stenosis L3-4, and mild spinal canal stenosis L1-L2.  Patient also has multilevel foraminal stenosis which is mild bilateral L1-L2, mild left L2-3, moderate left L3-4, mild to moderate bilateral L4-5, moderate bilateral L5-S1.  Please see report for further details.

## 2021-06-15 NOTE — ANESTHESIA POSTPROCEDURE EVALUATION
Patient: Dominik Rojas  Procedure(s):  Bilateral lumbar 2 - Lumbar 4 decompressive lumbar laminectomy with medial facetectomies (Bilateral)  Anesthesia type: general    Patient location: PACU  Last vitals:   Vitals Value Taken Time   BP 99/61 03/03/21 1300   Temp 36.2  C (97.1  F) 03/03/21 1300   Pulse 59 03/03/21 1309   Resp 29 03/03/21 1307   SpO2 92 % 03/03/21 1309   Vitals shown include unvalidated device data.  Post vital signs: stable  Level of consciousness: awake and responds to simple questions  Post-anesthesia pain: pain controlled  Post-anesthesia nausea and vomiting: no  Pulmonary: unassisted, return to baseline  Cardiovascular: stable and blood pressure at baseline  Hydration: adequate  Anesthetic events: no    QCDR Measures:  ASA# 11 - Karly-op Cardiac Arrest: ASA11B - Patient did NOT experience unanticipated cardiac arrest  ASA# 12 - Karly-op Mortality Rate: ASA12B - Patient did NOT die  ASA# 13 - PACU Re-Intubation Rate: ASA13B - Patient did NOT require a new airway mgmt  ASA# 10 - Composite Anes Safety: ASA10A - No serious adverse event    Additional Notes:

## 2021-06-15 NOTE — TELEPHONE ENCOUNTER
ORDER FROM: Dr. King    PRE AUTHORIZATION: No PA required; Ok to schedule    METHOD OF PATIENT CONTACT: Spoke with patient and wife Jovana; Best number to reach them: [phone]    PROCEDURE: Bilateral L2-L4 decompressive lumbar laminectomy with medial facetectomies    SURGICAL DATE: 03.03.21 @ 7:15 a.m. *[facility]*    READINESS VISIT: Please call     PCP, CLINIC, PHONE#: Dr. Meza; [facility]; [phone]; Pre-op physical: 02.23.21 @ 11:45 a.m. Cleared by cardiology on 02.05.21.    FILM INFO: Lumbar MRI: 08.16.20 @ [facility]; XR Lumbar flex/ext: 11.18.20 @ [facility]    SURGICAL LETTER: e-mailed 02.16.21

## 2021-06-15 NOTE — TELEPHONE ENCOUNTER
PATIENT NAME:  Dominik Rojas  YOB: 1940  MRN: [id number]  SURGEON: [facility]  DATE of SURGERY: 3/3/2021  PROCEDURE: L2-4 decompressive lumbar laminectomies with medial facetectomies    FOLLOW-UP:  Staples Out : 2 wks postop   Follow up  Visit: 6wks postop on NP schedule on [facility] clinic day  DIAGNOSTICS:none  DISPOSITION:  Home, declined services    Tereza COREA-C  [facility] Neurosurgery  O. [phone]

## 2021-06-15 NOTE — ANESTHESIA CARE TRANSFER NOTE
Last vitals:   Vitals:    03/03/21 1110   BP: 113/64   Pulse: (!) 58   Resp: 20   Temp: 36.7  C (98.1  F)   SpO2: 98%     Patient's level of consciousness is drowsy  Spontaneous respirations: yes  Maintains airway independently: yes  Dentition unchanged: yes  Oropharynx: oropharynx clear of all foreign objects    QCDR Measures:  ASA# 20 - Surgical Safety Checklist: WHO surgical safety checklist completed prior to induction    PQRS# 430 - Adult PONV Prevention: 4558F - Pt received => 2 anti-emetic agents (different classes) preop & intraop  ASA# 8 - Peds PONV Prevention: NA - Not pediatric patient, not GA or 2 or more risk factors NOT present  PQRS# 424 - Karly-op Temp Management: 4559F - At least one body temp DOCUMENTED => 35.5C or 95.9F within required timeframe  PQRS# 426 - PACU Transfer Protocol: - Transfer of care checklist used  ASA# 14 - Acute Post-op Pain: ASA14B - Patient did NOT experience pain >= 7 out of 10

## 2021-06-16 NOTE — PATIENT INSTRUCTIONS - HE
Ask your primary about taking advil. If it is okay, I recommend taking 600mg every six hours as needed for pain. I will send tramadol for pain. If this is helpful, great - put away the oxycodone. If it is not helpful, you can keep taking oxycodone and let me know. If you are able to take advil, you may not need the tramadol or oxycodone as much.     Reach out to your primary for continued support on your anxiety, this is an adjustment period - it may take some time and changes to see improvement.     I will send you to physical therapy, you need to give this a solid try to see if it can help with your pain and confidence in your movement.     If the hydroxyzine is not helpful - I would stop taking it.      Okay to gradually increase activity as tolerated, if your leg pain or tingling worsens please let us know. In the meantime, what you need most is to get moving to help get your stamina back. We will see you back in 4-6 weeks to check on your progress.

## 2021-06-16 NOTE — PROGRESS NOTES
Neurosurgery Progress Note  04/13/21     A/P: Dominik Rojas is a 80 y.o. male  S/p L2-4 decompressive laminectomy for neurogenic claudication symptoms, urinary urgency possible partial cauda equina with Dr. King on 3/3/2021. Dominik's post-op progress has been limited by anxiety - contributing to activity intolerance. No improvement in pre-op urinary issues, leg strength intact, anterior thigh tingling - this was present pre-op but Dominik cannot say if it is improved or worsening. Baseline left knee issues. He is overall not sure if he has had improvement though on exam he is able to stand up straighter, ambulate a bit better.    Dominik's issues are significantly impacted by his anxiety, he is worried about completing a task and therefore does not attempt the task. His wife is anxious as well about his activity and does not wish for him to do much. I think he will benefit greatly from physical therapy and confidence building.     Plan:  1. Refer to physical therapy, I told Dominik he needs to give this a solid four weeks of effort (tends to quit if not seeing improvements)  2. Switch oxy to tramadol, if no benefit can continue to prescribe oxycodone until he completes physical therapy  3. Continue to work with your primary as you have been for your anxiety, take the medications as your doctor prescribed  4. Okay to take advil if you are able, you may find that you need less oxycodone if you can take advil  5. If no improvement in anterior thigh tingling, left leg pain (though I think the left knee is the issue) can consider lumbar MRI and lumbar flex/ex - follow up in 4-6 weeks.  6. If no benefit from hydroxyzine I would discontinue this    S:  Back pain that limits activity, soreness across the whole low back. Left knee pain, pain is not in the thigh or in the calf, just centered around the knee which he was told needed to be replaced 15 years ago. Anterior thigh tingling that is still present, no improvement in urinary  urgency.     PMH: No interval change  PSH: No interval change    ROS: Baseline urinary issues. A full 14 point review of systems was otherwise completed and is negative aside from that mentioned above    O:  Vitals:    04/13/21 1337   BP: 130/82   Pulse: 76   Resp: 18     General: Awake, alert, NAD  HEENT: AT/NC, EOMI, face symmetric, oral mucosa moist and pink  Heart: RRR  Lungs: Nonlabored respirations without accessory muscle use.  Neuro: Awake, alert, speech is clear and content is appropriate. MAEW x 4 with full strength in b/l UE and LE. Sensation is intact to temperature and LT. Vibratory sense is intact in the bl great toe  Coordination: Gait is slightly antalgic, using cane to stablize.  Psych: Appropriate mood and affect, pleasant, cooperative, alert and oriented x 3  Skin: Incision C/D/I - well healed.    I personally reviewed and visualized the following radiographic images: None new. Pre-op MRI reviewed.     Ene Geller CNP  [facility] Neurosurgery  O: [phone]  P: [phone]

## 2021-06-16 NOTE — PROGRESS NOTES
"Dominik Rojas is status post L2-4 decompressive lumbar laminectomies with medial facetectomies on 03/03/2021 with Dr. King.  Preoperatively presented with 8 months of claudicatory back pain which stays in the lower middle part of his back.  Today he returns in follow up for staples removal. He is accompanied by his SO. He is doing reasonably well - reports much improved back pain, denies pain in his legs. Uses a cane for safety, feel his legs are \"getting stronger\". Denies numbness or tingling in LE. Gait and balance are stable. Takes Oxycodone prn and Robaxin as prescribed.      Surgical wound WNL - CDI, no signs of infection or skin breakdown.  Incision well-healed: good skin approximation, no redness or visible/palpable edema, no tenderness to palpation.  PT. AF, denies fever, chills or sweats.  Pt. reports that the symptoms are improved from pre-op.    Staples - intact removed without difficulty. Wound prepped with Betadine before and after removal.  Surrounding skin has no signs of breakdown.  Verbal instructions regarding incision care are given.  Pt. advised to call us if any s/s of infection noted - all discussed in details.      Miriam Blackman, RN, CNRN    "

## 2021-06-16 NOTE — PATIENT INSTRUCTIONS - HE
A dressing is not required.    Keep the wound clean.    Wash your hands before touching the wound.  Ensure that anyone assisting you in the care of your wound washes her/his hands before touching the wound. Good handwashing can decrease the risk of serious infection.    If you are unable to see your wound, have someone check the wound daily for redness, swelling,or drainage. A small amount of drainage is normal.    You may shower.  Pat the wound dry. Do not rub.    No tub baths until the wound is well healed.  Usually 5-6 weeks.     If you develop redness, swelling, drainage, or temp 101 or greater, call our office at [phone].        * No lifting, pushing or pulling greater than 5-10 pound (this is about a gallon of milk) for the first 6 weeks after surgery .  *No repetitive bending, twisting, or jarring activities for 6 weeks.  *No overhead work  *No aerobic or strenuous activity  *No activities with increased risk of falls  *You may move about your home as tolerated  *You may walk up and down stairs as tolerated  *You may increase your activity slowly over the next 6 weeks    WALKING PROGRAM: As you can tolerate, walk daily-start with 5-10 minutes of continuous walking. This is in addition to the walking that you do as part of your daily activities. Increase the time that you walk by 5 minutes every couple of days. Do not exceed 30-45 minutes of continuous walking until seen in follow-up. Walking is the best exercise after surgery.  **Listen to your body, if you find that you are more painful or fatigued, you may need to proceed more slowly.    **Do not smoke or expose yourself to second hand smoke. Cigarette smoke can delay healing and cause complications.     DRIVING:  We recommend that you do not drive while taking medications for pain or muscle spasms. Always read and follow the advice on your prescription bottle. If you have questions, speak with your pharmacist.  We recommend that you do not drive  while wearing a brace, as it could limit your range of motion.    WORK: If you plan to return to work before you 4-6 weeks appointment, call and discuss with one of the nurses in the neurosurgery office.

## 2021-06-17 NOTE — PROGRESS NOTES
[facility] Rehabilitation Daily Progress     Patient Name: Dominik Rojas  Date: 5/18/2021  Visit #: 4/10  POC Dates: 4/20/21-7/18/21  Referral Diagnosis: Spinal stenosis, lumbar region with neurogenic claudication  Referring provider: Ene Geller CNP  Visit Diagnosis:     ICD-10-CM    1. S/P lumbar laminectomy  Z98.890    2. Altered gait  R26.9    3. Generalized muscle weakness  M62.81    4. Unsteadiness on feet  R26.81        From Evaluation:   Pt is S/p L2-4 decompressive laminectomy for neurogenic claudication symptoms, urinary urgency possible partial cauda equina with Dr. King on 3/3/2021. Dominik's post-op progress has been limited by anxiety - contributing to activity intolerance.  He demonstrates decreased LE strength, decreased flexibility, balance issues , now needs to use a SEC to walk and decreased trunk AROM.  He also has decreased APTA and 1 leg stance scores, poor mechanics with supine<>sit.   Assessment:     Patient returns and is in significantly more pain today.  He over did his activities yesterday gardening and now struggles to walk or move.  He is walking slower, leaning on his SEC more and quality of all movement is decreased.  Due to pain levels, tried a TENS unit today for pain control.  He did feel this was helpful and did decrease his pain levels (no number given).  He walked with the TENS unit and the quality was better, but still guarded.    Edu pt on a TENS unit re benefits and precautions and felt it was helpful enough that he may want one.  Showed him a unit or two that he could look into purchasing but suggested he not use it on his own until he was educated with that unit.      Distracting him, he did 5 minutes at a slower pace on the Nu-step continuously today, which is an improvement.      Due to his increasing pain levels overall and the intensity of his pain today, he did want to see if he could get into neurosurgery sooner than next week.  He is frustrated with his  lack of progress and that is isn't quicker.  He continues to feel depressed about this and is anxious that more surgery will be recommended.  He will benefit from continued encouragement and strengthening exercises to help with stabilization and support.  Patient is appropriate to continue with skilled physical therapy intervention, as indicated by initial plan of care.    Goal Status:  Pt. will demonstrate/verbalize independence in self-management of condition in : 6 weeks  Pt. will be independent with home exercise program in : 6 weeks  Pt. will show improved balance for safer : standing;ambulation    Pt will: Able to stand for 10 minutes, like to prep a meal,  within 10 visits  Pt will: Able to walk to the mailbox and back with or without assistive device with more ease as strength improves within 10 visits      Plan / Patient Education:       Encourage walking and shorter activity times with more activities  Balance exercises  Core strength    Due to increase in pain levels since the start of therapy, brought pt to  so he could see if he could change his follow-up with neurosurgery to this week instead of next week    ? If any pool programs are available at this time.  Might be a better option for pt to help improve his ability for movement while in water.    Subjective:     Pain Rating: 10      I am in a lot of pain.  I did a lot of yard work yesterday so I know I over did it.  I am not sure I can do therapy.  I am always sore and don't think this is working.  I am getting nervous and frustrated that the surgeon will want to do more surgery and I don't want that.  I feel old and feeble-like I can't do anything.      Objective:     Patient Active Problem List   Diagnosis     Vomiting and diarrhea     Hyperglycemia     Lactic acidosis     Dehydration     Hypothyroidism     Diverticulosis     HTN (hypertension)     AAA (abdominal aortic aneurysm) (H)     Acute ischemic right ANDREI stroke (H)      Asymptomatic stenosis of right carotid artery     Acute cerebrovascular accident (CVA) (H)     Stroke-like symptoms     Recurrent UTI     Accelerated hypertension     Carotid aneurysm, left (H)     Bronchiectasis without acute exacerbation (H)     Benign prostatic hyperplasia with lower urinary tract symptoms     Endoleak post (EVAR) endovascular aneurysm repair, initial encounter (H)     Nephrolithiasis     Penile pain     HLD (hyperlipidemia)     Depression     Benign essential hypertension     Urinary retention     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)     Intractable low back pain     Back pain     Low back pain     Alcohol dependence in remission (H)     Obesity (BMI 35.0-39.9) with comorbidity (H)     Spinal stenosis, lumbar region with neurogenic claudication     Incomplete cauda equina syndrome (H)     Lumbar stenosis without neurogenic claudication     Respiratory failure, post-operative (H)     COPD (chronic obstructive pulmonary disease) (H)     Abnormal urinalysis     Metabolic encephalopathy     Generalized anxiety disorder     Normocytic anemia     Hyperbilirubinemia     Hyperammonemia (H)     Abnormal PSA     Allergic rhinitis     Anxiety     Atrial fibrillation (H)     Cholelithiasis     Daytime somnolence     Degeneration of lumbar intervertebral disc     Ocular hypertension, bilateral     Hematuria     Hepatic steatosis     History of carotid endarterectomy     History of endovascular stent graft for abdominal aortic aneurysm (AAA)     History of stroke without residual deficits     Hydrocele     Lower urinary tract symptoms due to benign prostatic hyperplasia     Memory problem     Mild major depression (H)     Osteoarthritis of knee     Other seborrheic keratosis     Overactive bladder     Psoriasis with arthropathy (H)     QT prolongation     Redundant prepuce and phimosis     Vitamin D deficiency       Treatment Today     Exercises:  Exercise #1: sit<>stand   3 reps, 3  chairs-review  Comment #1: supine<>sit  5 reps-review  Exercise #2: stretches:  supine:  SKC (with strap behind knee), LTR, sitting hamstring   hold 30 seconds  Comment #2: Nu-step, 5 minutes  Exercise #3: standing: hip flexion, extension, abduction, marching, toe raises reverse toe raises  x 10-20  Comment #3: sitting hip isometric adduction squeezing pillow  hold 10 seconds X 6 reps      TREATMENT MINUTES COMMENTS   Evaluation     Self-care/ Home management     Manual therapy     Neuromuscular Re-education     Therapeutic Activity     Therapeutic Exercises 25 See flow sheet or above  [id number]  Many questions  Walking  100 feet X 2 with supervision  Discussed walking, shorter bouts more times, encouraged movement throughout the day  Edu where to purchase TENS unit on [employer] or    Gait training     Modality__________________     TENS 10+5 set up, precautions, explanation Pt wore through session except while on Nu-step         Total 40    Blank areas are intentional and mean the treatment did not include these items.       Tatum Lozoya, PT  5/18/2021

## 2021-06-17 NOTE — PROGRESS NOTES
"[facility] Rehabilitation Daily Progress     Patient Name: Dominik Rojas  Date: 5/11/2021  Visit #: 3/10  POC Dates: 4/20/21-7/18/21  Referral Diagnosis: Spinal stenosis, lumbar region with neurogenic claudication  Referring provider: Ene Geller CNP  Visit Diagnosis:     ICD-10-CM    1. S/P lumbar laminectomy  Z98.890    2. Altered gait  R26.9    3. Generalized muscle weakness  M62.81    4. Unsteadiness on feet  R26.81        From Evaluation:   Pt is S/p L2-4 decompressive laminectomy for neurogenic claudication symptoms, urinary urgency possible partial cauda equina with Dr. King on 3/3/2021. Dominik's post-op progress has been limited by anxiety - contributing to activity intolerance.  He demonstrates decreased LE strength, decreased flexibility, balance issues , now needs to use a SEC to walk and decreased trunk AROM.  He also has decreased APTA and 1 leg stance scores, poor mechanics with supine<>sit.   Assessment:     Patient returns and has shown great improvements in his leg strength demonstrated with sit<>stands.  He no longer struggles to stand up as he has consistently been doing these at home.  He continues to have back pain but wonders if it is from his altered gait due to his knee being \"worn out\".  Worked on endurance with walking and the Nu-step and tried to help pt pace himself slower so he could go further walking and a longer time on the Nu-step.  He has a tendency to go quick and he fatigues more quickly.    He felt the standing exercises would be good and will try to do one here and there throughout the day until he does them all.  Feels this will be a better way of completing them versus all at once.  Will also give his knee a break and less chance of it giving out.    He will benefit from continued encouragement and strengthening exercises to help with stabilization and support.  Patient is appropriate to continue with skilled physical therapy intervention, as indicated by initial " plan of care.    Goal Status:  Pt. will demonstrate/verbalize independence in self-management of condition in : 6 weeks  Pt. will be independent with home exercise program in : 6 weeks  Pt. will show improved balance for safer : standing;ambulation    Pt will: Able to stand for 10 minutes, like to prep a meal,  within 10 visits  Pt will: Able to walk to the mailbox and back with or without assistive device with more ease as strength improves within 10 visits      Plan / Patient Education:       Encourage walking and shorter activity times with more activities  Balance exercises  Core strength    Subjective:     Pain Ratin     I have not been doing well since I was here last. My low back hurts worse than when I started  I try not to do tasks that wear me out quickly  I have tried the sit<>stands and think this is getting a little easier  I think doing them in my bed works the best  I get worn out too quickly.      Objective:     Patient Active Problem List   Diagnosis     Vomiting and diarrhea     Hyperglycemia     Lactic acidosis     Dehydration     Hypothyroidism     Diverticulosis     HTN (hypertension)     AAA (abdominal aortic aneurysm) (H)     Acute ischemic right ANDREI stroke (H)     Asymptomatic stenosis of right carotid artery     Acute cerebrovascular accident (CVA) (H)     Stroke-like symptoms     Recurrent UTI     Accelerated hypertension     Carotid aneurysm, left (H)     Bronchiectasis without acute exacerbation (H)     Benign prostatic hyperplasia with lower urinary tract symptoms     Endoleak post (EVAR) endovascular aneurysm repair, initial encounter (H)     Nephrolithiasis     Penile pain     HLD (hyperlipidemia)     Depression     Benign essential hypertension     Urinary retention     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)     Intractable low back pain     Back pain     Low back pain     Alcohol dependence in remission (H)     Obesity (BMI 35.0-39.9) with comorbidity (H)     Spinal  stenosis, lumbar region with neurogenic claudication     Incomplete cauda equina syndrome (H)     Lumbar stenosis without neurogenic claudication     Respiratory failure, post-operative (H)     COPD (chronic obstructive pulmonary disease) (H)     Abnormal urinalysis     Metabolic encephalopathy     Generalized anxiety disorder     Normocytic anemia     Hyperbilirubinemia     Hyperammonemia (H)     Abnormal PSA     Allergic rhinitis     Anxiety     Atrial fibrillation (H)     Cholelithiasis     Daytime somnolence     Degeneration of lumbar intervertebral disc     Ocular hypertension, bilateral     Hematuria     Hepatic steatosis     History of carotid endarterectomy     History of endovascular stent graft for abdominal aortic aneurysm (AAA)     History of stroke without residual deficits     Hydrocele     Lower urinary tract symptoms due to benign prostatic hyperplasia     Memory problem     Mild major depression (H)     Osteoarthritis of knee     Other seborrheic keratosis     Overactive bladder     Psoriasis with arthropathy (H)     QT prolongation     Redundant prepuce and phimosis     Vitamin D deficiency       Treatment Today     Exercises:  Exercise #1: sit<>stand   3 reps, 3 chairs-review  Comment #1: supine<>sit  5 reps-review  Exercise #2: stretches:  supine:  SKC (with strap behind knee), LTR, sitting hamstring   hold 30 seconds  Comment #2: Nu-step, 3 minutes  for warm up   at 20-30 steps per minutes, pt very out of breath  Exercise #3: standing: hip flexion, extension, abduction, marching, toe raises reverse toe raises  x 10-20  Comment #3: sitting hip isometric adduction squeezing pillow  hold 10 seconds X 6 reps      TREATMENT MINUTES COMMENTS   Evaluation     Self-care/ Home management     Manual therapy     Neuromuscular Re-education     Therapeutic Activity     Therapeutic Exercises 40 See flow sheet or above  [id number]  Walking  155 feet X 2 with supervision   Gait training      Modality__________________                Total 40    Blank areas are intentional and mean the treatment did not include these items.       Tatum Lozoya, PT  5/11/2021

## 2021-06-17 NOTE — PATIENT INSTRUCTIONS - HE
COPD is short for chronic obstructive pulmonary disease and is a condition in which air becomes stuck in your lungs.   There are 2 different types of abnormalities that are described. You can have one or the other, OR both.   1. Abnormal pictures--->Emphysema (lung tissue is destroyed which causes the air sacs to stretch out and appears as holes on your CT scan).  2. Abnormal symptoms--->Chronic cough (which is referred to as chronic bronchitis).    The medications used to treat this condition help to relax your airways and help air escape your lungs.  1. Please use your albuterol inhaler 2 puffs up to 6 times a day for rescue. If you are not short of breath, you do not need to use this.

## 2021-06-17 NOTE — TELEPHONE ENCOUNTER
Pt reports that he uses Tramadol once per day, usually at night.    Pt reports his LBP has not greatly improved, however it is easier to get up out of a chair. Pt is still having difficulty walking and sleeping due to pain. His LLE is still painful.    [place] query completed, tramadol last filled 4/16 (30#, 7 day supply) we are the only providers.     Inessa Campbell RN

## 2021-06-17 NOTE — PROGRESS NOTES
Neurosurgery Progress Note  05/24/21     A/P: Dominik Rojas is a 80 y.o. male  S/p L2-4 decompressive laminectomy for neurogenic claudication symptoms, urinary urgency possible partial cauda equina with Dr. King on 3/3/2021.     Last visit was sent for physical therapy where he has made some progress but had worsening back pain after a day in the yard that he feels has been a set back. No leg symptoms described today, intermittent thigh tingling at best, continues with left knee issues.     Good pain relief from tens unit.     Again recommended dominik treat his anxiety and pain as he needs to continue to make progress in therapies. As his claudicating symptoms have resolved, I think he is a good candidate to establish care with spine center for continued management of his low back pain.     Plan:  1. Establish care with spine center for conservative back pain management  2. Sent a refill of oxycodone, stop tramadol as not helpful. I recommended he take pain medication prior activity for the day so he is able to make progress   3. Continue to work with your primary as you have been for your anxiety, take the medications as your doctor prescribed    S:  Anterior thigh tingling barely noticeable today. Continues to have left knee pain. Yesterday, he took a vistaril and he felt overall much happier and much less pain than previous days. Continues to struggle with anxiety and with his wife. Feels discouraged about therapy and does not want to continue.     PMH: No interval change  PSH: No interval change    ROS: Baseline urinary issues. A full 14 point review of systems was otherwise completed and is negative aside from that mentioned above    O:  Vitals:    05/24/21 1313   BP: 117/72   Pulse: 100   Resp: 20   SpO2: 90%     General: Awake, alert, NAD  HEENT: AT/NC, EOMI, face symmetric, oral mucosa moist and pink  Heart: RRR  Lungs: Nonlabored respirations without accessory muscle use.  Neuro: Awake, alert, speech is  clear and content is appropriate. MAEW x 4 with full strength in b/l LE. Sensation is intact to temperature and LT.  Coordination: Gait is slightly antalgic, using cane to stablize.  Psych: Appropriate mood and affect, pleasant, cooperative, alert and oriented x 3  Skin: C/D/I - well healed.    I personally reviewed and visualized the following radiographic images: None galindo Geller CNP  [facility] Neurosurgery  O: [phone]  P: [phone]

## 2021-06-17 NOTE — PROGRESS NOTES
[facility] Rehabilitation Daily Progress     Patient Name: Dominik Rojas  Date: 5/4/2021  Visit #: 2/10  POC Dates: 4/20/21-7/18/21  Referral Diagnosis: Spinal stenosis, lumbar region with neurogenic claudication  Referring provider: Ene Geller CNP  Visit Diagnosis:     ICD-10-CM    1. S/P lumbar laminectomy  Z98.890    2. Altered gait  R26.9    3. Generalized muscle weakness  M62.81    4. Unsteadiness on feet  R26.81        From Evaluation:   Pt is S/p L2-4 decompressive laminectomy for neurogenic claudication symptoms, urinary urgency possible partial cauda equina with Dr. King on 3/3/2021. Dominik's post-op progress has been limited by anxiety - contributing to activity intolerance.  He demonstrates decreased LE strength, decreased flexibility, balance issues , now needs to use a SEC to walk and decreased trunk AROM.  He also has decreased APTA and 1 leg stance scores, poor mechanics with supine<>sit.   Assessment:     Patient returns for their first physical therapy follow-up visit.  He needed a lot of encouragement throughout the session to keep moving, stretching and how he would benefit from stretches.  He is discouraged due to pain levels. Difficult at times re-directlng to perform  the task being reviewed or added versus discussing his pain levels.   He will benefit from continued encouragement and strengthening exercises to help with stabilization and support.  Patient is appropriate to continue with skilled physical therapy intervention, as indicated by initial plan of care.    Goal Status:  Pt. will demonstrate/verbalize independence in self-management of condition in : 6 weeks  Pt. will be independent with home exercise program in : 6 weeks  Pt. will show improved balance for safer : standing;ambulation    Pt will: Able to stand for 10 minutes, like to prep a meal,  within 10 visits  Pt will: Able to walk to the mailbox and back with or without assistive device with more ease as strength  improves within 10 visits      Plan / Patient Education:     review lumbar and LE stretches  Encourage walking and shorter activity times with more activities  2Balance exercises  Core engagement    Subjective:     Pain Ratin    I have tried to do the exercises I got here last visit but my back hurts too much.  Advil doesn't help  I really struggle getting up and down and moving/walking in to the other room.  I get very frustrated.   My knees really hurt too      Objective:     Patient Active Problem List   Diagnosis     Vomiting and diarrhea     Hyperglycemia     Lactic acidosis     Dehydration     Hypothyroidism     Diverticulosis     HTN (hypertension)     AAA (abdominal aortic aneurysm) (H)     Acute ischemic right ANDREI stroke (H)     Asymptomatic stenosis of right carotid artery     Acute cerebrovascular accident (CVA) (H)     Stroke-like symptoms     Recurrent UTI     Accelerated hypertension     Carotid aneurysm, left (H)     Bronchiectasis without acute exacerbation (H)     Benign prostatic hyperplasia with lower urinary tract symptoms     Endoleak post (EVAR) endovascular aneurysm repair, initial encounter (H)     Nephrolithiasis     Penile pain     HLD (hyperlipidemia)     Depression     Benign essential hypertension     Urinary retention     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)     Intractable low back pain     Back pain     Low back pain     Alcohol dependence in remission (H)     Obesity (BMI 35.0-39.9) with comorbidity (H)     Spinal stenosis, lumbar region with neurogenic claudication     Incomplete cauda equina syndrome (H)     Lumbar stenosis without neurogenic claudication     Respiratory failure, post-operative (H)     COPD (chronic obstructive pulmonary disease) (H)     Abnormal urinalysis     Metabolic encephalopathy     Generalized anxiety disorder     Normocytic anemia     Hyperbilirubinemia     Hyperammonemia (H)     Abnormal PSA     Allergic rhinitis     Anxiety     Atrial  fibrillation (H)     Cholelithiasis     Daytime somnolence     Degeneration of lumbar intervertebral disc     Ocular hypertension, bilateral     Hematuria     Hepatic steatosis     History of carotid endarterectomy     History of endovascular stent graft for abdominal aortic aneurysm (AAA)     History of stroke without residual deficits     Hydrocele     Lower urinary tract symptoms due to benign prostatic hyperplasia     Memory problem     Mild major depression (H)     Osteoarthritis of knee     Other seborrheic keratosis     Overactive bladder     Psoriasis with arthropathy (H)     QT prolongation     Redundant prepuce and phimosis     Vitamin D deficiency       Treatment Today     Exercises:  Exercise #1: sit<>stand   3 reps, 3 chairs-review  Comment #1: supine<>sit  5 reps-review  Exercise #2: stretches:  supine:  SKC (with strap behind knee), LTR, sitting hamstring   hold 30 seconds  Comment #2: Nu-step, 2 minutes X 2 for warm up      TREATMENT MINUTES COMMENTS   Evaluation     Self-care/ Home management 10 edu on benefits of movement, shorter distances for walks maybe more frequent, sitting in a more upright chair versus recliner, why supine <>sit properly would benefit his back   Manual therapy     Neuromuscular Re-education     Therapeutic Activity     Therapeutic Exercises 22 See flow sheet or above  [id number]  Review and added stretches   Gait training     Modality__________________                Total 32    Blank areas are intentional and mean the treatment did not include these items.       Tatum Lozoya, PT  5/4/2021

## 2021-06-17 NOTE — PROGRESS NOTES
CCx:Establishment of care for COPD    HPI:Mr. Rojas is a 80 y.o. with a past medical history significant for AAA status post stenting, alcohol dependence in remission, anxiety, atrial fibrillation, BPH, COPD, CVA, degenerative lumbar disc disease, depression, diastolic dysfunction joint disease of the knee, HTN, glaucoma, nephrolithiasis, psoriasis, seborrheic keratosis and right carotid artery stenosis amongst other medical problems who presents to clinic for the aforementioned chief complaint.  He was recently hospitalized in March after undergoing a lumbar decompression surgery and on the day of discharge coronary presented to the emergency room with an acute confusional state.  Was thought that his symptoms are secondary to a combination of his pain medications and muscle relaxants in addition to post anesthetic effect.  He was noted to be hypoxic with saturations in the low 90s and his chest imaging demonstrated some mild emphysema.  It is noted that the patient has previously been prescribed Anoro Ellipta which he used for 2-3 months but discontinued because of a significant cough.  He endorses dyspnea on exertion with minimal activity and admits to the fact that he is minimally active because of his shortness of breath.  He has no nighttime awakenings with shortness of breath but does wake up with back pain and has been using 2 pillows under his head at this is more comfortable for his lower back.  He has developed some lower extremity edema but denies chest pain, chest tightness.  He endorses some upper airway breathing with exertion.  COPD Assessment Test  How often do you cough?: 4  How much phlegm (mucous) do you have in your chest?: 0  How tight does your chest feel?: 4  How breathless are you after climbing a hill or flight of stairs?: 5  How limited are your activities at home?: 5  How confident are you leaving home despite your lung condition?: 5  How soundly do you sleep?: 5  How much energy do  you have?: 5  Total Score: 33    ROS:  Pertinent positives alluded to in the HPI. Remainder of 10 point ROS is negative.     PMH:  1. AAA status post stenting  2. Alcohol dependence in remission  3. Anxiety  4. Atrial fibrillation  5. BPH   6. COPD  7. CVA  8. 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 Depression  10. Diastolic dysfunction  11. HTN  12. Glaucoma  13. Nephrolithiasis  14. Psoriasis  15. Seborrheic keratosis  16. Right carotid artery stenosis status post stenting    Allergies:  Reviewed in epic    Family HX:  Reviewed with the patient    Social Hx:  Marital status: Yes  Number of children: 2  Resides in a house built in 1980, concern for mold.  Occupational history:  at a car dealership   service: No  Pets: 1 cat  Smoking history: 30 pack year history, quit in 1989  Alcohol use: No   Recreational drug use: No  Hobbies: No  Recent Travel: No    Current Meds:  Current Outpatient Medications   Medication Sig Dispense Refill     acetaminophen (TYLENOL) 325 MG tablet Take 650 mg by mouth every 6 (six) hours as needed for pain.       aspirin 81 mg chewable tablet Chew 1 tablet (81 mg total) daily.  0     benzocaine-menthoL (CEPACOL) 15-3.6 mg Take 1 lozenge by mouth every hour as needed.  0     ergocalciferol (ERGOCALCIFEROL) 1,250 mcg (50,000 unit) capsule Take 1 capsule (50,000 Units total) by mouth once a week. 10 capsule 0     hydrOXYzine pamoate (VISTARIL) 50 MG capsule Take 1 capsule (50 mg total) by mouth every 6 (six) hours as needed for anxiety. 30 capsule 0     latanoprost (XALATAN) 0.005 % ophthalmic solution Administer 1 drop to both eyes at bedtime.       oxyCODONE (ROXICODONE) 5 MG immediate release tablet Take 0.5-1 tablets (2.5-5 mg total) by mouth every 4 (four) hours as needed for pain. 50 tablet 0     rosuvastatin (CRESTOR) 10 MG tablet Take 10 mg by mouth at bedtime.       traMADoL (ULTRAM) 50 mg tablet Take 1 tablet (50 mg total) by mouth every 6 (six) hours as needed  "for pain. 30 tablet 0     albuterol (PROAIR HFA;PROVENTIL HFA;VENTOLIN HFA) 90 mcg/actuation inhaler Inhale 2 puffs every 6 (six) hours as needed for wheezing or shortness of breath. 1 each 11     albuterol (PROVENTIL) 2.5 mg /3 mL (0.083 %) nebulizer solution Take 3 mL (2.5 mg total) by nebulization every 6 (six) hours as needed for wheezing. 30 vial 1     multivitamin (ONE A DAY) per tablet Take 1 tablet by mouth daily.       NIFEdipine (PROCARDIA XL) 30 MG 24 hr tablet Take 1 tablet (30 mg total) by mouth daily. 30 tablet 0     polyethylene glycol (MIRALAX) 17 gram packet Take 1 packet (17 g total) by mouth 2 (two) times a day.  0     No current facility-administered medications for this visit.      Labs:  Reviewed from recent hospitalization.    Imaging studies:  CTA Chest Abdomen and Pelvis 8/16/20:  1.  Post aortobiiliac endograft repair.  2.  Stable excluded infrarenal abdominal aortic aneurysm and right common iliac artery aneurysm.  3.  Emphysema.  4.  Cholelithiasis.  5.  Bilateral nonobstructive nephrolithiasis.  6.  Colonic diverticulosis.    PFT's 5/6/21:  FEV1/FVC is 68% and is reduced.  FEV1 is 1.6L (60%) predicted and is reduced.  FVC is 2.35L (66%) predicted and reduced.  There was no improvement in spirometry after a single inhaled dose of bronchodilator.  DLCO is 12.14ml/min/hg (54%) predicted and is reduced when it is corrected for hemoglobin.  Flow volume loops indicate scooping of the expiratory limb.    Impression:  Full Pulmonary Function Test is abnormal.  PFTs are consistent with moderate obstructive disease.  Spirometry is not consistent with reversibility.  Diffusion capacity when corrected for hemoglobin is moderately reduced.    Physical Exam:  /70   Pulse 73   Ht 5' 7\" (1.702 m)   Wt 219 lb (99.3 kg)   SpO2 93%   BMI 34.30 kg/m    Heart Rate: 73  BP: 114/70  SpO2: 93 %  Height: 5' 7\" (1.702 m)  Weight: 219 lb (99.3 kg)  Physical Exam   Constitutional: He is oriented to " person, place, and time. He appears well-developed and well-nourished.   Appears slightly labored.   HENT:   Head: Normocephalic and atraumatic.   Eyes: Pupils are equal, round, and reactive to light.   Neck: Normal range of motion.   Cardiovascular: Normal rate and regular rhythm.   Pulmonary/Chest: Effort normal and breath sounds normal.   Abdominal: Soft.   Musculoskeletal: Normal range of motion.         General: Edema present.   Neurological: He is alert and oriented to person, place, and time.   Skin: Skin is warm.   Psychiatric: He has a normal mood and affect.       Assessment and Plan:Dominik Rojas is a 80 y.o. with a past medical history significant for AAA status post stenting, alcohol dependence in remission, anxiety, atrial fibrillation, BPH, COPD, CVA, degenerative lumbar disc disease, depression, diastolic dysfunction joint disease of the knee, HTN, glaucoma, nephrolithiasis, psoriasis, seborrheic keratosis and right carotid artery stenosis amongst other medical problems who presents to clinic for establishment of care for COPD. His PFT's demonstrate moderate obstructive lung disease with moderate reduction in his diffusion capacity and he was also noted to be hypoxic  At night while in the hospital. For the present we would recommend;    1. Moderate obstructive COPD: Noted on pulmonary function testing and demonstrates some emphysema on his chest imaging.  He has previously trialed on long-acting bronchodilator including Trelegy and Anoro and has had significant coughing from this because of which he has discontinued both of these.    We will reinitiate a bronchodilator namely albuterol as needed for shortness of breath and cough.    May consider reinitiating a long acting inhaler.    Should consider Pulmonary rehab once PT is complete.  2. Overnight hypoxia: Is due to be evaluated by the sleep service.  3. HCM: Uptodate with Covid-19 vaccine.  4. Follow-up: 4 weeks.      Yolanda Tucker  Pulmonary  and Critical Care  [id number]

## 2021-06-17 NOTE — PROGRESS NOTES
[facility] Daily Progress     Patient Name: Dominik Rojas  Date: 5/25/2021  Visit #: 5/10  POC Dates: 4/20/21-7/18/21  Referral Diagnosis: Spinal stenosis, lumbar region with neurogenic claudication  Referring provider: Ene Geller CNP  Visit Diagnosis:     ICD-10-CM    1. S/P lumbar laminectomy  Z98.890    2. Altered gait  R26.9    3. Generalized muscle weakness  M62.81    4. Unsteadiness on feet  R26.81        From Evaluation:   Pt is S/p L2-4 decompressive laminectomy for neurogenic claudication symptoms, urinary urgency possible partial cauda equina with Dr. King on 3/3/2021. Dominik's post-op progress has been limited by anxiety - contributing to activity intolerance.  He demonstrates decreased LE strength, decreased flexibility, balance issues , now needs to use a SEC to walk and decreased trunk AROM.  He also has decreased APTA and 1 leg stance scores, poor mechanics with supine<>sit.   Assessment:     Patient returns and continues to have high pain levels.  He received his TENS unit so spent time edu pt and his wife on how to use the unit, pad placement and precautions.  They had many question.  Eze pt's wife a diagram on pad placement so she felt comfortable with that and the lead wires and felt she could replicate it at home.    Pt told neurosurgery he did not want to continue therapy but he feels he may want to continue as long as he can keep his pain under control.  He will benefit from continued encouragement and strengthening exercises to help with stabilization and support.  Patient is appropriate to continue with skilled physical therapy intervention, as indicated by initial plan of care.    Goal Status:  Pt. will demonstrate/verbalize independence in self-management of condition in : 6 weeks  Pt. will be independent with home exercise program in : 6 weeks  Pt. will show improved balance for safer : standing;ambulation    Pt will: Able to stand for 10 minutes, like to prep a  meal,  within 10 visits  Pt will: Able to walk to the mailbox and back with or without assistive device with more ease as strength improves within 10 visits      Plan / Patient Education:     Encourage walking and shorter activity times with more activities  Balance exercises  Core strength      Subjective:     Pain Rating: 10     I have been in more pain the past few days.  I am very tired of the pain  I saw Ene Geller yesterday.  She told me I am taking the medication wrong.  I am not taking it often enough.   Pt brought in his TENS unit today and wants to make sure he is using it correctly    Objective:     Patient Active Problem List   Diagnosis     Vomiting and diarrhea     Hyperglycemia     Lactic acidosis     Dehydration     Hypothyroidism     Diverticulosis     HTN (hypertension)     AAA (abdominal aortic aneurysm) (H)     Acute ischemic right ANDREI stroke (H)     Asymptomatic stenosis of right carotid artery     Acute cerebrovascular accident (CVA) (H)     Stroke-like symptoms     Recurrent UTI     Accelerated hypertension     Carotid aneurysm, left (H)     Bronchiectasis without acute exacerbation (H)     Benign prostatic hyperplasia with lower urinary tract symptoms     Endoleak post (EVAR) endovascular aneurysm repair, initial encounter (H)     Nephrolithiasis     Penile pain     HLD (hyperlipidemia)     Depression     Benign essential hypertension     Urinary retention     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)     Intractable low back pain     Back pain     Low back pain     Alcohol dependence in remission (H)     Obesity (BMI 35.0-39.9) with comorbidity (H)     Spinal stenosis, lumbar region with neurogenic claudication     Incomplete cauda equina syndrome (H)     Lumbar stenosis without neurogenic claudication     Respiratory failure, post-operative (H)     COPD (chronic obstructive pulmonary disease) (H)     Abnormal urinalysis     Metabolic encephalopathy     Generalized anxiety  disorder     Normocytic anemia     Hyperbilirubinemia     Hyperammonemia (H)     Abnormal PSA     Allergic rhinitis     Anxiety     Atrial fibrillation (H)     Cholelithiasis     Daytime somnolence     Degeneration of lumbar intervertebral disc     Ocular hypertension, bilateral     Hematuria     Hepatic steatosis     History of carotid endarterectomy     History of endovascular stent graft for abdominal aortic aneurysm (AAA)     History of stroke without residual deficits     Hydrocele     Lower urinary tract symptoms due to benign prostatic hyperplasia     Memory problem     Mild major depression (H)     Osteoarthritis of knee     Other seborrheic keratosis     Overactive bladder     Psoriasis with arthropathy (H)     QT prolongation     Redundant prepuce and phimosis     Vitamin D deficiency       Treatment Today     Exercises:  Exercise #1: sit<>stand   3 reps, 3 chairs-review  Comment #1: supine<>sit  5 reps-review  Exercise #2: stretches:  supine:  SKC (with strap behind knee), LTR, sitting hamstring   hold 30 seconds  Comment #2: Nu-step, 5 minutes  Exercise #3: standing: hip flexion, extension, abduction, marching, toe raises reverse toe raises  x 10-20  Comment #3: sitting hip isometric adduction squeezing pillow  hold 10 seconds X 6 reps      TREATMENT MINUTES COMMENTS   Evaluation     Self-care/ Home management 15 edu wife on how to set up unit, briefly on different settings, wear time, pad placement, lead wires, precautions  Discussed her calling if she had questions.   Manual therapy     Neuromuscular Re-education     Therapeutic Activity     Therapeutic Exercises 10 See flow sheet or above  [id number]  Many questions   brief discussion on exercises   Gait training     Modality__________________     TENS 15 Pt wore through session except while on Nu-step         Total 40    Blank areas are intentional and mean the treatment did not include these items.       Tatum Lozoya, PT  5/25/2021

## 2021-06-17 NOTE — TELEPHONE ENCOUNTER
Patient stopped by the  after his PT appointment. He requested a refill tramadol. Preferred pharmacy is [facility] in [place].    Please call patient at [phone] if you have any questions.

## 2021-06-17 NOTE — PATIENT INSTRUCTIONS - HE
You can try taking a half tablet of oxycodone for your pain. I think you should take it in the day time if it does not make you sleepy so that you can make progress in therapy. There is no harm in taking the medication if you are taking it as prescribed.     You can continue to take the green medication for your anxiety. I again recommend you discuss anxiety with your primary doctor.     I think a referral to the spine center so they can continue to offer support for your back pain is also a good idea.     I think a TENS unit is a good idea, a non-pill option to treat your pain.

## 2021-06-18 NOTE — ANESTHESIA POSTPROCEDURE EVALUATION
Patient: Dominik Rojas  CYSTOSCOPY AND LITHOLAPAXY,  TRANSURETHRAL RESECTION OF THE PROSTATE WITH QUANTA LASER  Anesthesia type: general    Patient location: PACU  Last vitals:   Vitals:    06/12/18 1830   BP: (!) 155/104   Pulse: 90   Resp:    Temp:    SpO2: 91%     Post vital signs: stable  Level of consciousness: awake and responds to simple questions  Post-anesthesia pain: pain controlled  Post-anesthesia nausea and vomiting: no  Pulmonary: unassisted, return to baseline  Cardiovascular: stable and blood pressure at baseline  Hydration: adequate  Anesthetic events: no    QCDR Measures:  ASA# 11 - Karly-op Cardiac Arrest: ASA11B - Patient did NOT experience unanticipated cardiac arrest  ASA# 12 - Karly-op Mortality Rate: ASA12B - Patient did NOT die  ASA# 13 - PACU Re-Intubation Rate: ASA13B - Patient did NOT require a new airway mgmt  ASA# 10 - Composite Anes Safety: ASA10A - No serious adverse event    Additional Notes:

## 2021-06-18 NOTE — ANESTHESIA CARE TRANSFER NOTE
Last vitals:   Vitals:    06/12/18 1720   BP: 159/87   Pulse: 85   Resp: 18   Temp: 36.8  C (98.3  F)   SpO2: 97%     Patient's level of consciousness is drowsy  Spontaneous respirations: yes  Maintains airway independently: yes  Dentition unchanged: yes  Oropharynx: oropharynx clear of all foreign objects    QCDR Measures:  ASA# 20 - Surgical Safety Checklist: WHO surgical safety checklist completed prior to induction  PQRS# 430 - Adult PONV Prevention: 4558F - Pt received => 2 anti-emetic agents (different classes) preop & intraop  ASA# 8 - Peds PONV Prevention: NA - Not pediatric patient, not GA or 2 or more risk factors NOT present  PQRS# 424 - Karly-op Temp Management: 4559F - At least one body temp DOCUMENTED => 35.5C or 95.9F within required timeframe  PQRS# 426 - PACU Transfer Protocol: - Transfer of care checklist used  ASA# 14 - Acute Post-op Pain: ASA14B - Patient did NOT experience pain >= 7 out of 10

## 2021-06-18 NOTE — ANESTHESIA PREPROCEDURE EVALUATION
Anesthesia Evaluation      Patient summary reviewed     Airway   Mallampati: III  Neck ROM: limited   Pulmonary - normal exam                          Cardiovascular - normal exam  Rhythm: regular  Rate: normal,         Neuro/Psych    (+) depression, anxiety/panic attacks,     Endo/Other    (+) hypothyroidism, obesity,      GI/Hepatic/Renal            Dental - normal exam                        Anesthesia Plan  Planned anesthetic: general LMA    ASA 3   Induction: intravenous   Anesthetic plan and risks discussed with: patient    Post-op plan: routine recovery

## 2021-06-21 NOTE — LETTER
Letter by Renée King MD at      Author: Renée King MD Service: -- Author Type: --    Filed:  Encounter Date: 1/14/2021 Status: (Other)       Dear Mr. Rojas,    This letter will help in preparation for your upcoming surgery. Please contact us with any additional questions you may have regarding your surgery. Contact information for your surgery scheduler:   Karol Pereyra and Naveed: [phone] ~ Anna Hernandez and Christine: [phone] ~ Noah    You are scheduled for: Lumbar Decompressive Laminectomy  With: Renée King M.D.  Date/Time: Thursday, January 21, 2021 at 7:15 a.m. (time subject to change)  Location: [place] [address]    Check in at the Welcome Desk inside the main doors of the hospital. An escort will take you to the surgery waiting area. A nurse from [facility] (surgery admit unit) at the hospital will call you with your exact arrival time to the hospital - typically one-and-a-half to two-and-a-half hours prior to your scheduled surgery time.     In the event of an emergency surgery case, there may be an adjustment to your start time for surgery.     PREPARING FOR YOUR SURGERY    *Pre-op Physical: 01/14/2021 at 1:30 p.m., with Dr. Meza at [facility].      *Please discuss the necessity of receiving a pneumococcal vaccine prior to surgery at your pre-op physical. Recommended for all patients over the age of 65, or based on certain medical conditions.     *After the pre-op physical is complete, please have your clinic fax the visit note to your surgery scheduler at [phone].    *Pre-op Lab Work: Please have labs drawn, per Dr. King, at your pre-op physical this afternoon. I faxed the lab orders to your clinic.     *Covid-19 Pre-procedure Test: 01/17/2021 at 11:30 a.m., at the [place] location. [address] [place] [address]. Once in the parking lot, follow the signs to curbside testing.      *Readiness Visit: Dr. King's nurse will call you prior to the day of surgery to go over the results of your pre-op physical/lab results/Covid result, along with information you will need for the morning of surgery.     *Ensure that you have completed your pre-op physical, along with any other necessary tests/appointments (listed above), prior to your Readiness Visit.         ADDITIONAL INFORMATION REGARDING YOUR SURGERY    Medications    Bring a list ALL of your medications, including any over-the-counter vitamins and herbal supplements to your Readiness Visit, and on the day of surgery.    DO NOT bring your medications with you the day of surgery.    Please see attached third sheet for more details on medications/vitamins/herbal supplements that should be discontinued prior to your surgery date.     If you are unsure if you should discontinue a medication/ vitamin/herbal supplement, please call our office and discuss with a nurse.    Continue taking your medications/vitamins/herbal supplements unless they are on the attached list.     Failure to follow the instructions regarding medications/vitamins/herbal supplements will result in cancellation of your surgery.    Day BEFORE Surgery    DO NOT shave near your surgical site. This can cause irritation of the skin    Using a washcloth and provided bottle of Hibiclens, shower the night BEFORE surgery, using a half bottle of Hibiclens to wash your body, avoiding face and genitals. The morning OF surgery, shower and use the second half of the bottle to wash your body, avoiding face and genitals. If you are unable to take a shower the morning of surgery, please discuss your options with the nurse at your readiness visit.     NOTHING  to eat after 11:00 p.m. the night prior to your procedure    CLEAR LIQUIDS: May have the following liquids up to two (2) hours before your arrival time at the hospital: water, plain black coffee (no cream or milk), plain black tea or  plain green tea (no cream or milk), Gatorade or Propel Water.    SMOKING: Stop smoking as far before surgery as possible, or as directed by your surgeon. NO tobacco products of any kind (cigarettes, e-cigarettes, chewing tobacco) beginning at 11:00 p.m. the night prior to your procedure.     ALCOHOL: You should stop drinking alcohol beginning at 11:00 p.m. the night prior to your procedure    Contact our office if you have symptoms of illness such as a fever of 101 or greater, chills, cough, sore throat, or if you develop a rash or any open sore    Day OF Surgery    If youve been instructed to take a medication(s) on the morning of surgery, please take with a very small sip of water.    Wear loose & comfortable clothing and flat shoes, Leave jewelry/valuables at home. If you wear contact lenses, remove them at home and wear glasses. Remove any body piercings. Remove nail polish.     Planning for Discharge    Start planning for your care after discharge as soon as you receive this letter.    If you have not made arrangements to have someone take you home and stay with you for the first 48 hours after discharge, your surgery will be cancelled.        PRE-OPERATIVE MEDICATION INSTRUCTIONS  Review this information with your primary care physician prior to discontinuing any of the medications listed below.  Notify your primary care physician that you have been instructed to discontinue these medications.    TEN (10) Days Prior to Surgery, STOP the Following Medications   [place]  Anacin  Aspirin  Excedrin  Pepto Bismol    **Before taking ANY over-the-counter medications, check the label for Aspirin   Non-steroidal   Anti-inflammatory Medications (NSAIDS)    Celebrex  Diclofenac (Cataflam)  Etodolac (Iodine)  Fenoprofen (Nalfon)  Ibuprofen (Advil, Motrin, Nuprin)  Indomethacin (Indocin)  Ketoprofen  Ketorolac (Toradol)  Melaxicam (Mobic)  Naproxen (AnaProx, Aleve, Naprosyn)  Relafen (Nabumetone)   Herbal Supplements  (this is a partial list of herbals to be discontinued)    Danie Blanchard Quabiodun  Ephedra  Feverfew  Fish Oil  Flaxseed Oil  Garlic  Melissa  Gingko  Ginseng  Goldenseal  Imitrex (Sumatriptan)  Kava  Krill Oil  Licorice  Multi Vitamins  [place] Wort  Valerian  Vitamin E  Yohimbe   CHECK WITH YOUR PRESCRIBING DOCTOR BEFORE STOPPING ANY BLOOD THINNERS (approximately 7 days prior to surgery)  (Coumadin, Plavix, Platel, Aggrenox, Effient (Prasugrel), Ticlid), Xarelto, and Pradaxa      ALWAYS CHECK WITH YOUR PRESCRIBING DOCTOR REGARDING THE MEDICATIONS LISTED BELOW; RECOMMENDED STOP TIME IS ALSO LISTED      If you are taking Lovenox, discontinue 24 HOURS prior to surgery    If you are taking weight loss medication, discontinue 7 days prior to surgery    If you are taking Metformin or Simvastatin, check with your primary care physician (or whoever has prescribed you this medication) regarding when to discontinue prior to surgery

## 2021-06-21 NOTE — LETTER
Letter by Renée King MD at      Author: Renée King MD Service: -- Author Type: --    Filed:  Encounter Date: 2/16/2021 Status: (Other)       Dear Mr. Rojas,    This letter will help in preparation for your upcoming surgery. Please contact us with any additional questions you may have regarding your surgery. Contact information for your surgery scheduler:   Karol Pereyra and Naveed: [phone] ~ Anna Hernandez and Christine: [phone] ~ Noah    You are scheduled for: Lumbar Laminectomy  With: Renée King M.D.  Date/Time: Wednesday, March 3, 2021 at 7:15 a.m. (time subject to change)  Location: [place] [address]    Check in at the Welcome Desk inside the main doors of the hospital. An escort will take you to the surgery waiting area. A nurse from [facility] (surgery admit unit) at the hospital will call you with your exact arrival time to the hospital - typically one-and-a-half to two-and-a-half hours prior to your scheduled surgery time.     In the event of an emergency surgery case, there may be an adjustment to your start time for surgery.     PREPARING FOR YOUR SURGERY    *Pre-op Physical: 02/23/2021 at 11:45 a.m., with Dr. Meza at [facility].     *Please discuss the necessity of receiving a pneumococcal vaccine prior to surgery at your pre-op physical. Recommended for all patients over the age of 65, or based on certain medical conditions.     *After the pre-op physical is complete, please have your clinic fax the visit note to your surgery scheduler at [phone].    *Covid-19 Pre-procedure Test: 02/27/2021 at 10:30 a.m., at the [place] location. [address] [place] [address]. Once in the parking lot, follow the signs to curbside testing.     *Pre-op Lab Work: Please have labs drawn at [facility] on 02/27/2021. No appointment necessary. Please arrive after you've completed  your Covid test.     *Readiness Visit: Dr. King's nurse will call you prior to the day of surgery to go over the results of your pre-op physical/lab results/Covid result, along with additional information you will need for the day of surgery.     *Ensure that you have completed your pre-op physical, along with any other necessary tests/appointments (listed above), prior to your Readiness Visit.       ADDITIONAL INFORMATION REGARDING YOUR SURGERY    Medications    Bring a list ALL of your medications, including any over-the-counter vitamins and herbal supplements to your Readiness Visit, and on the day of surgery.    DO NOT bring your medications with you the day of surgery.    Please see attached third sheet for more details on medications/vitamins/herbal supplements that should be discontinued prior to your surgery date.     If you are unsure if you should discontinue a medication/ vitamin/herbal supplement, please call our office and discuss with a nurse.    Continue taking your medications/vitamins/herbal supplements unless they are on the attached list.     Failure to follow the instructions regarding medications/vitamins/herbal supplements will result in cancellation of your surgery.    Day BEFORE Surgery    DO NOT shave near your surgical site. This can cause irritation of the skin    Using a washcloth and provided bottle of Hibiclens, shower the night BEFORE surgery, using a half bottle of Hibiclens to wash your body, avoiding face and genitals. The morning OF surgery, shower and use the second half of the bottle to wash your body, avoiding face and genitals. If you are unable to take a shower the morning of surgery, please discuss your options with the nurse at your readiness visit.     NOTHING  to eat after 11:00 p.m. the night prior to your procedure    CLEAR LIQUIDS: May have the following liquids up to two (2) hours before your arrival time at the hospital: water, plain black coffee (no cream or milk),  plain black tea or plain green tea (no cream or milk), Gatorade or Propel Water.    SMOKING: Stop smoking as far before surgery as possible, or as directed by your surgeon. NO tobacco products of any kind (cigarettes, e-cigarettes, chewing tobacco) beginning at 11:00 p.m. the night prior to your procedure.     ALCOHOL: You should stop drinking alcohol beginning at 11:00 p.m. the night prior to your procedure    Contact our office if you have symptoms of illness such as a fever of 101 or greater, chills, cough, sore throat, or if you develop a rash or any open sore    Day OF Surgery    If youve been instructed to take a medication(s) on the morning of surgery, please take with a very small sip of water.    Wear loose & comfortable clothing and flat shoes, Leave jewelry/valuables at home. If you wear contact lenses, remove them at home and wear glasses. Remove any body piercings. Remove nail polish.     Planning for Discharge    Start planning for your care after discharge as soon as you receive this letter.    If you have not made arrangements to have someone take you home and stay with you for the first 48 hours after discharge, your surgery will be cancelled.        PRE-OPERATIVE MEDICATION INSTRUCTIONS  Review this information with your primary care physician prior to discontinuing any of the medications listed below.  Notify your primary care physician that you have been instructed to discontinue these medications.    TEN (10) Days Prior to Surgery, STOP the Following Medications   [place]  Anacin  Aspirin  Excedrin  Pepto Bismol    **Before taking ANY over-the-counter medications, check the label for Aspirin   Non-steroidal   Anti-inflammatory Medications (NSAIDS)    Celebrex  Diclofenac (Cataflam)  Etodolac (Iodine)  Fenoprofen (Nalfon)  Ibuprofen (Advil, Motrin, Nuprin)  Indomethacin (Indocin)  Ketoprofen  Ketorolac (Toradol)  Melaxicam (Mobic)  Naproxen (AnaProx, Aleve, Naprosyn)  Relafen (Nabumetone)    Herbal Supplements (this is a partial list of herbals to be discontinued)    Danie Junior  Ephedra  Feverfew  Fish Oil  Flaxseed Oil  Garlic  Melissa  Gingko  Ginseng  Goldenseal  Imitrex (Sumatriptan)  Kava  Krill Oil  Licorice  Multi Vitamins  [place] Wort  Valerian  Vitamin E  Yohimbe   CHECK WITH YOUR PRESCRIBING DOCTOR BEFORE STOPPING ANY BLOOD THINNERS (approximately 7 days prior to surgery)  (Coumadin, Plavix, Platel, Aggrenox, Effient (Prasugrel), Ticlid), Xarelto, and Pradaxa      ALWAYS CHECK WITH YOUR PRESCRIBING DOCTOR REGARDING THE MEDICATIONS LISTED BELOW; RECOMMENDED STOP TIME IS ALSO LISTED      If you are taking Lovenox, discontinue 24 HOURS prior to surgery    If you are taking weight loss medication, discontinue 7 days prior to surgery    If you are taking Metformin or Simvastatin, check with your primary care physician (or whoever has prescribed you this medication) regarding when to discontinue prior to surgery

## 2021-06-22 ENCOUNTER — HOSPITAL ENCOUNTER (OUTPATIENT)
Dept: MRI IMAGING | Facility: HOSPITAL | Age: 81
Discharge: HOME | End: 2021-06-22
Attending: PHYSICIAN ASSISTANT
Payer: COMMERCIAL

## 2021-06-22 DIAGNOSIS — Z98.890 HISTORY OF LUMBAR SURGERY: ICD-10-CM

## 2021-06-22 DIAGNOSIS — G89.29 CHRONIC BILATERAL LOW BACK PAIN WITHOUT SCIATICA: ICD-10-CM

## 2021-06-22 DIAGNOSIS — M54.50 CHRONIC BILATERAL LOW BACK PAIN WITHOUT SCIATICA: ICD-10-CM

## 2021-06-22 LAB
CREAT BLD-MCNC: 1 MG/DL (ref 0.7–1.3)
GFR SERPL CREATININE-BSD FRML MDRD: >60 ML/MIN/1.73M2

## 2021-06-23 ENCOUNTER — COMMUNICATION - HEALTHEAST (OUTPATIENT)
Dept: PHYSICAL MEDICINE AND REHAB | Facility: CLINIC | Age: 81
End: 2021-06-23

## 2021-06-23 DIAGNOSIS — S22.080A COMPRESSION FRACTURE OF T12 VERTEBRA, INITIAL ENCOUNTER (H): ICD-10-CM

## 2021-06-25 NOTE — PROGRESS NOTES
[facility] Rehabilitation Daily Progress     Patient Name: Dominik Rojas  Date: 2021  Visit #: 6/10  POC Dates: 21-21  Referral Diagnosis: Spinal stenosis, lumbar region with neurogenic claudication  Referring provider: Ene Geller CNP  Visit Diagnosis:     ICD-10-CM    1. S/P lumbar laminectomy  Z98.890    2. Altered gait  R26.9    3. Generalized muscle weakness  M62.81    4. Unsteadiness on feet  R26.81        From Evaluation:   Pt is S/p L2-4 decompressive laminectomy for neurogenic claudication symptoms, urinary urgency possible partial cauda equina with Dr. King on 3/3/2021. Dominik's post-op progress has been limited by anxiety - contributing to activity intolerance.  He demonstrates decreased LE strength, decreased flexibility, balance issues , now needs to use a SEC to walk and decreased trunk AROM.  He also has decreased APTA and 1 leg stance scores, poor mechanics with supine<>sit.   Assessment:     Patient returns and continues to have high pain levels.  He worked outside again all day yesterday and did not take breaks.  [facility] pt on importance of taking breaks every 30 minutes or so.  Suggested he set a timer on his phone to remind him to take a break and he felt this was a good idea.  Him and his wife were very confused on the TENS set up.  We went through the set up, having his wife place the pads on his back.   She followed the diagram that was made last visit and was still slightly confused.  Reviewed it a couple times and she felt more comfortable that she could do it at home.  The battery was dead on the unit so was not able to turn it on and try to adjust the intensity.  [facility] pt on how to turn it on and off.  He had left the unit on so that is how the battery .  They both felt more confident in how to use it after the review.  He was able to perform the standing exercises in the clinic today and did not have any pain while doing them.  He needed only minor corrections for  technique to gain the max benefit for strengthening.  He will benefit from continued encouragement and strengthening exercises to help with stabilization and support.  Patient is appropriate to continue with skilled physical therapy intervention, as indicated by initial plan of care.    Goal Status:  Pt. will demonstrate/verbalize independence in self-management of condition in : 6 weeks  Pt. will be independent with home exercise program in : 6 weeks  Pt. will show improved balance for safer : standing;ambulation    Pt will: Able to stand for 10 minutes, like to prep a meal,  within 10 visits  Pt will: Able to walk to the mailbox and back with or without assistive device with more ease as strength improves within 10 visits      Plan / Patient Education:     Encourage walking and shorter activity times with more activities  Balance exercises  Core strength      Subjective:     Pain Ratin     I wasn't able to get the unit (TENS) to work.  Think I got it on but the pads slipped and stuck to the tshirt.  I did a lot of yard work again yesterday so I am sore.      Objective:     Patient Active Problem List   Diagnosis     Vomiting and diarrhea     Hyperglycemia     Lactic acidosis     Dehydration     Hypothyroidism     Diverticulosis     HTN (hypertension)     AAA (abdominal aortic aneurysm) (H)     Acute ischemic right ANDREI stroke (H)     Asymptomatic stenosis of right carotid artery     Acute cerebrovascular accident (CVA) (H)     Stroke-like symptoms     Recurrent UTI     Accelerated hypertension     Carotid aneurysm, left (H)     Bronchiectasis without acute exacerbation (H)     Benign prostatic hyperplasia with lower urinary tract symptoms     Endoleak post (EVAR) endovascular aneurysm repair, initial encounter (H)     Nephrolithiasis     Penile pain     HLD (hyperlipidemia)     Depression     Benign essential hypertension     Urinary retention     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)      Intractable low back pain     Back pain     Low back pain     Alcohol dependence in remission (H)     Obesity (BMI 35.0-39.9) with comorbidity (H)     Spinal stenosis, lumbar region with neurogenic claudication     Incomplete cauda equina syndrome (H)     Lumbar stenosis without neurogenic claudication     Respiratory failure, post-operative (H)     COPD (chronic obstructive pulmonary disease) (H)     Abnormal urinalysis     Metabolic encephalopathy     Generalized anxiety disorder     Normocytic anemia     Hyperbilirubinemia     Hyperammonemia (H)     Abnormal PSA     Allergic rhinitis     Anxiety     Atrial fibrillation (H)     Cholelithiasis     Daytime somnolence     Degeneration of lumbar intervertebral disc     Ocular hypertension, bilateral     Hematuria     Hepatic steatosis     History of carotid endarterectomy     History of endovascular stent graft for abdominal aortic aneurysm (AAA)     History of stroke without residual deficits     Hydrocele     Lower urinary tract symptoms due to benign prostatic hyperplasia     Memory problem     Mild major depression (H)     Osteoarthritis of knee     Other seborrheic keratosis     Overactive bladder     Psoriasis with arthropathy (H)     QT prolongation     Redundant prepuce and phimosis     Vitamin D deficiency       Treatment Today     Exercises:  Exercise #1: sit<>stand   3 reps, 3 chairs-review  Comment #1: supine<>sit  5 reps-review  Exercise #2: stretches:  supine:  SKC (with strap behind knee), LTR, sitting hamstring   hold 30 seconds  Comment #2: Nu-step, 5 minutes  Exercise #3: standing: hip flexion, extension, abduction, marching, toe raises reverse toe raises  x 10-20  Comment #3: sitting hip isometric adduction squeezing pillow  hold 10 seconds X 6 reps      TREATMENT MINUTES COMMENTS   Evaluation     Self-care/ Home management 20 edu with review for wife and pt on how to set up unit, pad placement, lead wires, how to turn unit on and off   Manual  therapy     Neuromuscular Re-education     Therapeutic Activity     Therapeutic Exercises 10 See flow sheet or above  [id number]     Gait training     Modality__________________     TENS  Pt wore through session except while on Nu-step         Total 30    Blank areas are intentional and mean the treatment did not include these items.       Tatum Lozoya, PT  6/1/2021

## 2021-06-26 NOTE — TELEPHONE ENCOUNTER
----- Message from Maureen Bacon PA-C sent at 6/23/2021  9:43 AM CDT -----  Please call this patient and let him know that his MRI shows a recent fracture at T12 which explains his ongoing back pain.  I recommend a referral to orthotics for a TLSO brace.  He needs to wear the brace when he is up out of bed.  He does not need to wear it when sleeping or showering.  He needs to limit his lifting to no more than 5 to 10 pounds.  He should avoid bending and twisting.  I also recommend a DEXA scan to evaluate bone density.  I can prescribe calcitonin nasal spray to help with pain.  He should pause physical therapy.  I would like him to follow-up with Dr. King since the fracture does cause some mild narrowing around the nerves at that segment.  We also need to get an upright AP and lateral lumbar spine x-ray to evaluate for any changes in alignment related to the fracture.  I am happy to see the patient back in the clinic to review these results.  Typically compression fractures heal after about 3 months.  Bracing helps with pain.  We typically monitor the  fracture with x-rays every 4 weeks to ensure stability.

## 2021-06-26 NOTE — PATIENT INSTRUCTIONS - HE
[facility] Scheduling    Please call [phone] to schedule your image(s) (select option #1). There are 2 different locations, see below. You can do walk-in visits for xray only images if you want.     [address] [address]      I referral was entered today for behavioral health. Someone will call you to schedule an appointment.  If you have not heard from them within 72 hours, please call [phone].    Please call [phone] and let us know the medication that helped with stress.

## 2021-06-26 NOTE — TELEPHONE ENCOUNTER
Phone call to patient to review results and provider's recommendations. Results given and explained.     All recommendations and treatment plan reviewed in full with patient. He does want to have appointment with [facility] so that his wife can get the information from her as well.     Explained orders will be placed for the recommendations so that he may get the ordered medication, make an appointment for the brace and schedule for the x-rays and DEXA scan. Stated understanding. Contact information provided.      *Patient was scheduled in the opening for 6/24, as next availability was not until nest week.

## 2021-06-26 NOTE — PROGRESS NOTES
Assessment:   Dominik Rojas is a 80 y.o. y.o. male with past medical history significant for hypothyroidism, hypertension, AAA, history of CVA, recurrent UTI, BPH, history of nephrolithiasis, hyperlipidemia, depression who presents today for follow-up regarding chronic bilateral low back pain.patient is status post bilateral L2-L4 decompressive lumbar laminectomy with medial facetectomies with Dr. King March 3, 2021.  Patient reports that preoperative leg pain/paresthesias have resolved, but he states that low back pain feels worse since surgery.  Patient has not had any postoperative imaging of the lumbar spine.  He is neurologically intact.       Plan:     A shared decision making plan was used.  The patient's values and choices were respected.  The following represents what was discussed and decided upon by the physician assistant and the patient.      1.  DIAGNOSTIC TESTS:  I reviewed the preoperative MRI lumbar spine.  I ordered a MRI lumbar spine with and without contrast for further evaluation of ongoing pain.    2.  PHYSICAL THERAPY: Patient is currently in physical therapy.  He has had 6 sessions of far.  Encouraged him to continue with physical therapy and the home exercises.    3.  MEDICATIONS: No changes are made to the patient's medications.  -Patient reports that there was a medication that he was taking before surgery to help with stress/anxiety as well as pain.  He does not recall the name of the medication but he would like to try it again.  He states it was very helpful for managing his anxiety.  I asked the patient to look up the name of the medication.  He is confident his wife would remember the name of the medication.  He will call our nurse line back with the medication information.  If it is appropriate I could refill this medication.  -If that medication is not appropriate to be on long-term we could try Cymbalta 30 mg daily x1 week then 60 mg daily to help with pain/mood.  -Patient has  POLST formed obtained by CIARAN Jeffries. MD signed and placed in patients chart   been taking oxycodone 2.5 to 5 mg every 4 hours as needed.  He does not think this is really helping him.  I would not recommend additional opiates.  -Patient takes Tylenol 1000 mg every 6 hours as needed.  As she tried ibuprofen and it was not helpful.  I would not recommend NSAIDs long-term given comorbidities.    4.  INTERVENTIONS: No interventions were ordered today.  We will await the results of his updated MRI.  We can potentially try medial branch blocks to determine if there is ongoing facet mediated pain.    5.  REFERRALS: I entered a referral to behavioral health.  Patient admits that he has been anxious and stressed.  He feels frustrated by his lack of improvement.  I explained the relationship between stress and pain.    6.  FOLLOW-UP:    A nurse will call the patient with the results of his MRI.  At that time I will likely recommend that the patient return to the clinic so that we can review the results and discuss his options.  If he has questions or concerns in the meantime, he should not hesitate to call..        SUBJECTIVE:    Dominik Rojas is a 80 y.o. male who presents today for follow-up regarding chronic bilateral low back pain.  Patient is status post bilateral L2-L4 decompressive lumbar laminectomy and medial facetectomies with Dr. King March 3, 2021.  Patient reports that since surgery his leg pain and paresthesias have resolved completely, but he continues to have low back pain.  He states that the low back pain is different than it was before surgery.  It is more specific, localized pain.  He feels it is more severe than it was before his surgery.    Patient complains of low back pain at the lumbosacral junction.  Pain is located in the midline but then spreads laterally on both sides.  Both sides are equally affected.  He denies any pain radiating in the buttocks or down the legs.  He rates his pain today as a 7 out of 10.  At its worst it is a 10-10.  At its best it is a 7 out of 10.   Pain is aggravated with stairs, twisting, walking more than a few minutes and standing more than a few minutes.  He has to sit in the shower now because of his back pain.  He has difficulty standing at the counter long enough to cook.  He has to take breaks walking the 100 yards to and from his shed.  He denies any alleviating factors to the pain.  He has chronic urinary incontinence.  This did not improve at all after surgery which is disappointing to him.  He feels weakness in his back.  He denies numbness or tingling.  Denies any recent fevers.  Denies loss of bowel control.     Patient is currently in physical therapy.  He has had 6 sessions so far.  He does not feel he is making progress but admits that he feels impatient about it.  He has a TENS unit but he has not figured out exactly how to use it.  He is taking Tylenol as needed for pain.  This is somewhat helpful.  He tried ibuprofen and it did not help.  He does not feel like oxycodone is helping him.  He takes 2.5 to 5 mg every 4 hours as needed.    Past medical history is reviewed and is pertinent for having lumbar spine surgery.    Review of Systems:  Positive for weakness, loss of bladder control.  Negative for numbness/tingling, loss of bowel control, inability to urinate, headache, pain much worse at night, trip/stumble/falls, difficulty swallowing, difficulty with hand skills, fevers, unintentional weight loss.     Objective:   CONSTITUTIONAL:  Vital signs as above.  No acute distress.  The patient is well nourished and well groomed.  Patient is obese.  PSYCHIATRIC:  The patient is awake, alert, oriented to person, place and time.  The patient is answering questions appropriately with clear speech.  Normal affect.  HEENT: Normocephalic, atraumatic.  Sclera clear.    SKIN:  Skin over the face, posterior torso, bilateral upper and lower extremities is clean, dry, intact without rashes.  VASCULAR: No significant lower extremity edema.  MUSCULOSKELETAL:   Gait is antalgic.  He ambulates with a cane for assistance.  Tender to palpation bilateral lower lumbar paraspinous muscles L5-S1.   The patient has 5/5 strength for the bilateral hip flexors, knee flexors/extensors, ankle dorsiflexors/plantar flexors, ankle evertors/invertors.    NEUROLOGICAL: 1-2+ patellar and Achilles reflexes.  Sensation light touch intact bilateral lower extremities throughout.     RESULTS:   I reviewed the MRI lumbar spine from [facility] dated August 16, 2020 (preoperative).  This shows a 7.3 cm infrarenal aortic abdominal aneurysm.  CTA chest abdomen pelvis performed the same day notes this is stable.  There are moderate to severe degenerative changes with severe spinal canal stenosis at L2-3, moderate spinal canal stenosis L3-4, and mild spinal canal stenosis L1-L2.  Patient also has multilevel foraminal stenosis which is mild bilateral L1-L2, mild left L2-3, moderate left L3-4, mild to moderate bilateral L4-5, moderate bilateral L5-S1.  Please see report for further details.

## 2021-06-26 NOTE — PROGRESS NOTES
Progress Notes by Gay Lozoya at 6/8/2021 11:15 AM     Author: Gay Lozoya Service: -- Author Type: Physical Therapist    Filed: 7/5/2021  6:23 PM Encounter Date: 6/8/2021 Status: Addendum    : Gay Lozoya (Physical Therapist)    Related Notes: Original Note by Gay Lozoya (Physical Therapist) filed at 6/8/2021 12:10 PM       [facility] Discharge Summary  Patient Name: Dominik Rojas  Date: 7/5/2021  Referral Diagnosis: S/P Laminectomy  Referring provider: Ene Geller CNP  Visit Diagnosis:   1. S/P lumbar laminectomy     2. Altered gait     3. Generalized muscle weakness     4. Unsteadiness on feet         Goals:  Pt. will demonstrate/verbalize independence in self-management of condition in : 6 weeks  Pt. will be independent with home exercise program in : 6 weeks  Pt. will show improved balance for safer : standing;ambulation    Pt will: Able to stand for 10 minutes, like to prep a meal,  within 10 visits  Pt will: Able to walk to the mailbox and back with or without assistive device with more ease as strength improves within 10 visits      Patient was seen for 7 visits from 4/20/21 to 6/8/21 with 1 missed appointments.  Patient canceled his last scheduled visit but does continue to see MD for this issue.    Therapy will be discontinued at this time.  The patient will need a new referral to resume.    Thank you for your referral.  Tatum RIOS Devora  7/5/2021  6:21 PM    [facility] Rehabilitation Daily Progress     Patient Name: Dominik Rojas  Date: 6/8/2021  Visit #: 7/10  POC Dates: 4/20/21-7/18/21  Referral Diagnosis: Spinal stenosis, lumbar region with neurogenic claudication  Referring provider: Ene Geller CNP  Visit Diagnosis:     ICD-10-CM    1. S/P lumbar laminectomy  Z98.890    2. Altered gait  R26.9    3. Generalized muscle weakness  M62.81    4. Unsteadiness on feet  R26.81        From Evaluation:   Pt is S/p L2-4 decompressive laminectomy  for neurogenic claudication symptoms, urinary urgency possible partial cauda equina with Dr. King on 3/3/2021. Dominik's post-op progress has been limited by anxiety - contributing to activity intolerance.  He demonstrates decreased LE strength, decreased flexibility, balance issues , now needs to use a SEC to walk and decreased trunk AROM.  He also has decreased APTA and 1 leg stance scores, poor mechanics with supine<>sit.   Assessment:     Patient returns and continues to have high pain levels.  He saw Maureen Bacon PA-C today.  Due to his continued pain levels he is going for a repeat MRI, getting a medication change and will see someone for behavior health.  He and his wife have not tried the TENS unit since he was here last and still feels very confused.  Reviewed how to use the unit and had his wife place the pads and electrodes on twice and had pt turn it on and off to set up twice.  They both felt more comfortable after the review and felt they should be able to put it on by themselves at home.    Had pt wear the TENS through some exercise and walking to see if he noticed any change in the pain levels.  He felt the TENS helped and thinks it will be helpful at home to do more activities with less pain-as long as they continue to remember how to put it on.    He will benefit from continued encouragement and strengthening exercises to help with stabilization and support.  Patient is appropriate to continue with skilled physical therapy intervention, as indicated by initial plan of care.    Goal Status:  Pt. will demonstrate/verbalize independence in self-management of condition in : 6 weeks  Pt. will be independent with home exercise program in : 6 weeks  Pt. will show improved balance for safer : standing;ambulation    Pt will: Able to stand for 10 minutes, like to prep a meal,  within 10 visits  Pt will: Able to walk to the mailbox and back with or without assistive device with more ease as strength improves  within 10 visits      Plan / Patient Education:     Encourage walking and shorter activity times with more activities  Balance exercises  Core strength  ? POC    Subjective:     Pain Ratin     I saw Maureen and she is going to make some medication changes, get a repeat MRI and I may talk to someone for my other issues.  We still don't understand the TENS unit.  (he has not used the TENS since he was here last session and has not attempted to try it)  It has been tough doing anything due to the weather and hard to breathe.    Per his wife-he has been very active with walking and steps.    Objective:     Patient Active Problem List   Diagnosis   ? Vomiting and diarrhea   ? Hyperglycemia   ? Lactic acidosis   ? Dehydration   ? Hypothyroidism   ? Diverticulosis   ? HTN (hypertension)   ? AAA (abdominal aortic aneurysm) (H)   ? Acute ischemic right ANDREI stroke (H)   ? Asymptomatic stenosis of right carotid artery   ? Acute cerebrovascular accident (CVA) (H)   ? Stroke-like symptoms   ? Recurrent UTI   ? Accelerated hypertension   ? Carotid aneurysm, left (H)   ? Bronchiectasis without acute exacerbation (H)   ? Benign prostatic hyperplasia with lower urinary tract symptoms   ? Endoleak post (EVAR) endovascular aneurysm repair, initial encounter (H)   ? Nephrolithiasis   ? Penile pain   ? HLD (hyperlipidemia)   ? Depression   ? Benign essential hypertension   ? Urinary retention   ? 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)   ? Intractable low back pain   ? Back pain   ? Low back pain   ? Alcohol dependence in remission (H)   ? Obesity (BMI 35.0-39.9) with comorbidity (H)   ? Spinal stenosis, lumbar region with neurogenic claudication   ? Incomplete cauda equina syndrome (H)   ? Lumbar stenosis without neurogenic claudication   ? Respiratory failure, post-operative (H)   ? COPD (chronic obstructive pulmonary disease) (H)   ? Abnormal urinalysis   ? Metabolic encephalopathy   ? Generalized anxiety disorder   ?  Normocytic anemia   ? Hyperbilirubinemia   ? Hyperammonemia (H)   ? Abnormal PSA   ? Allergic rhinitis   ? Anxiety   ? Atrial fibrillation (H)   ? Cholelithiasis   ? Daytime somnolence   ? Degeneration of lumbar intervertebral disc   ? Ocular hypertension, bilateral   ? Hematuria   ? Hepatic steatosis   ? History of carotid endarterectomy   ? History of endovascular stent graft for abdominal aortic aneurysm (AAA)   ? History of stroke without residual deficits   ? Hydrocele   ? Lower urinary tract symptoms due to benign prostatic hyperplasia   ? Memory problem   ? Mild major depression (H)   ? Osteoarthritis of knee   ? Other seborrheic keratosis   ? Overactive bladder   ? Psoriasis with arthropathy (H)   ? QT prolongation   ? Redundant prepuce and phimosis   ? Vitamin D deficiency       Treatment Today     Exercises:  Exercise #1: sit<>stand   3 reps, 3 chairs-review  Comment #1: supine<>sit  5 reps-review  Exercise #2: stretches:  supine:  SKC (with strap behind knee), LTR, sitting hamstring   hold 30 seconds  Comment #2: Nu-step, 5 minutes-many breaks and struggled to breathe  Exercise #3: standing: hip flexion, extension, abduction, marching, toe raises reverse toe raises  x 10-20  Comment #3: sitting hip isometric adduction squeezing pillow  hold 10 seconds X 6 reps  Exercise #4: sitting:  hip flexion, knee extension, heel and toe raises  X 10 each (while wearing TENS)    Gait:    Supervision,  100' X 2 with SEC while wearing TENS.  Needed to stop and turn TENS up        Slow, decreased step length    TREATMENT MINUTES COMMENTS   Evaluation     Self-care/ Home management 20 edu with review for wife and pt on how to set up unit, pad placement, lead wires, how to turn unit on and off.  Had her set it up 2X and pt turn the unit on each time   Manual therapy     Neuromuscular Re-education     Therapeutic Activity     Therapeutic Exercises 10 See flow sheet or above  [id number]     Gait training 15 See above, while  wearing TENS   Modality__________________     TENS  Pt wore through session except while on Nu-step         Total 45    Blank areas are intentional and mean the treatment did not include these items.       Tatum Lozoya, PT  6/8/2021

## 2021-06-30 NOTE — PROGRESS NOTES
Progress Notes by Gay Lozoya at 4/20/2021 12:15 PM     Author: Gay Lozoya Service: -- Author Type: Physical Therapist    Filed: 4/20/2021  1:31 PM Encounter Date: 4/20/2021 Status: Attested    : Gay Lozoya (Physical Therapist) Cosigner: Ene Geller CNP at 4/20/2021  1:45 PM    Attestation signed by Ene Geller CNP at 4/20/2021  1:45 PM    Agree with therapist's recommendations                 [facility]  [place]-Pelvic Initial Evaluation        [facility] Certification Request    April 20, 2021      Patient: Dominik Rojas  MR Number: [id number]  YOB: 1940  Date of Visit: 4/20/2021      Dear Ene Geller CNP    Thank you for this referral.   We are seeing Dominik Rojas for Physical Therapy of S/P lumbar decompression laminectomy.    Medicare and/or Medicaid requires physician review and approval of the treatment plan. Please review the plan of care and verify that you agree with the therapy plan of care by co-signing this note.      If you have any questions or concerns, please don't hesitate to call.    Sincerely,      Tatum Lozoya        Physician recommendation:     ___ Follow therapist's recommendation        ___ Modify therapy      *Physician co-signature indicates they certify the need for these services furnished within this plan and while under their care.      Patient Name: Dominik Rojas  Date of evaluation: 4/20/2021  Referral Diagnosis: Spinal stenosis, lumbar region with neurogenic claudication  Referring provider: Ene Geller CNP  Visit Diagnosis:     ICD-10-CM    1. S/P lumbar laminectomy  Z98.890    2. Altered gait  R26.9    3. Generalized muscle weakness  M62.81    4. Unsteadiness on feet  R26.81        Assessment:       Pt is S/p L2-4 decompressive laminectomy for neurogenic claudication symptoms, urinary urgency possible partial cauda equina with Dr. King on 3/3/2021. Dominik's post-op  progress has been limited by anxiety - contributing to activity intolerance.  He demonstrates decreased LE strength, decreased flexibility, balance issues , now needs to use a SEC to walk and decreased trunk AROM.  He also has decreased APTA and 1 leg stance scores, poor mechanics with supine<>sit.  Functional limitations are described as occurring with: getting in and out of a car, sit<>stand, walking, standing, sleeping.  Pt. is appropriate and a good candidate for skilled PT intervention as outlined in the Plan of Care (POC) to improve pain levels and function as well as falls prevention.  Time was spent edu pt on the pain cycle, his loss of strength and mobility and his decline in overall function.  He feels he will try to follow-through with physical therapy as he knows it will help him gain strength, gain better balance and help improve his function.      Goals:  Pt. will demonstrate/verbalize independence in self-management of condition in : 6 weeks  Pt. will be independent with home exercise program in : 6 weeks  Pt. will show improved balance for safer : standing;ambulation    Pt will: Able to stand for 10 minutes, like to prep a meal,  within 10 visits  Pt will: Able to walk to the mailbox and back with or without assistive device with more ease as strength improves within 10 visits      Patient's expectations/goals are realistic.    Barriers to Learning or Achieving Goals:  Patient Active Problem List   Diagnosis   ? Vomiting and diarrhea   ? Hyperglycemia   ? Lactic acidosis   ? Dehydration   ? Hypothyroidism   ? Diverticulosis   ? HTN (hypertension)   ? AAA (abdominal aortic aneurysm) (H)   ? Acute ischemic right ANDREI stroke (H)   ? Asymptomatic stenosis of right carotid artery   ? Acute cerebrovascular accident (CVA) (H)   ? Stroke-like symptoms   ? Recurrent UTI   ? Accelerated hypertension   ? Carotid aneurysm, left (H)   ? Bronchiectasis without acute exacerbation (H)   ? Benign prostatic hyperplasia  with lower urinary tract symptoms   ? Endoleak post (EVAR) endovascular aneurysm repair, initial encounter (H)   ? Nephrolithiasis   ? Penile pain   ? HLD (hyperlipidemia)   ? Depression   ? Benign essential hypertension   ? Urinary retention   ? 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)   ? Intractable low back pain   ? Back pain   ? Low back pain   ? Alcohol dependence in remission (H)   ? Obesity (BMI 35.0-39.9) with comorbidity (H)   ? Spinal stenosis, lumbar region with neurogenic claudication   ? Incomplete cauda equina syndrome (H)   ? Lumbar stenosis without neurogenic claudication   ? Respiratory failure, post-operative (H)   ? COPD (chronic obstructive pulmonary disease) (H)   ? Abnormal urinalysis   ? Metabolic encephalopathy   ? Generalized anxiety disorder   ? Normocytic anemia   ? Hyperbilirubinemia   ? Hyperammonemia (H)       POC, goals and pathology of condition were reviewed with the patient.  Patient is in agreement with the POC.       Plan / Patient Instructions:        Plan of Care:   Authorization / Certification Start Date: 04/20/21  Authorization / Certification End Date: 07/18/21  Authorization / Certification Number of Visits: 10  Communication with: Referral Source  Patient Related Instruction: Nature of Condition;Treatment plan and rationale;Self Care instruction;Basis of treatment;Body mechanics;Posture  Times per Week: 1-2  Number of Weeks: 12  Number of Visits: 10  Therapeutic Exercise: ROM;Stretching;Strengthening  Neuromuscular Reeducation: kinesio tape;posture;balance/proprioception;core  Manual Therapy: soft tissue mobilization;myofascial release;joint mobilization;muscle energy;strain counterstrain  Modalities: TENS      Plan for next visit:   Review sit<>stands and supine<>sits  Intro lumbar and LE stretches  Encourage walking and shorter activity times with more activities  Balance exercises  Core engagement       Subjective:       History of Present Illness:    Dominik terrazas  a 80 y.o. male who presents to therapy today S/P L2-4 decompressive laminectomy for neurogenic claudication symptoms, urinary urgency possible partial cauda equina with Dr. King on 3/3/2021.  He now walks with a SEC and is depressed because of this.  Dominik's post-op progress has been limited by anxiety - contributing to activity intolerance.  He has not felt good in a couple years so reports he won't do anything that will increase his symptoms.  He needs a total knee replacement, but refused 15-20 years ago.  He has back pain across the whole belt line, and tingling in bilateral anterior thighs.  Symptoms are constant and not improving. Sitting in his recliner can help.    Pain Ratin  Pain rating at best: 9  Pain rating at worst: 9  Pain description: dull, pain and tingling    Functional limitations are described as occurring with:   getting in and out of a car, sit<>stand, walking, standing, sleeping,     Patient reports benefit from:  sitting in recliner at times can help         Objective:      Note: Items left blank indicates the item was not performed or not indicated at the time of the evaluation.    Patient Outcome Measures :    Modified Oswestry Low Back Pain Disablity Questionnaire  in %: 78     Scores range from 0-100%, where a score of 0% represents minimal pain and maximal function. The minimal clinically important difference is a score reduction of 12%.  APTA score: 0   but able to perform 10 using both arms    Examination  1. S/P lumbar laminectomy     2. Altered gait     3. Generalized muscle weakness     4. Unsteadiness on feet       Involved side: Bilateral  Posture Observation:      General standing posture is fair, decreased lumbar lordosis.    Lumbar ROM:         Within Normal Limits unless noted  Date: 21     *Indicate scale AROM AROM AROM   Lumbar Flexion Hands to shins     Lumbar Extension 50% decreased      Right Left Right Left Right Left   Lumbar Sidebending         Lumbar Rotation          Thoracic Flexion      Thoracic Extension      Thoracic Sidebending         Thoracic Rotation           Lower Extremity Strength:   Grossly 4+ to 5-/5 throughout  Date: 04/20/21     LE strength/5 Right Left Right Left Right Left   Hip Flexion (L1-3)         Hip Extension (L5-S1)         Hip Abduction (L4-5)         Hip Adduction (L2-3)         Hip External Rotation         Hip Internal Rotation         Knee Extension (L3-4)         Knee Flexion         Ankle Dorsiflexion (L4-5)         Great Toe Extension (L5)         Ankle Plantar flexion (S1)         Abdominals        Sensation           Reflex Testing  Lumbar Dermatomes Right Left UE Reflexes Right Left   Iliac Crest and Groin (L1)   Biceps (C5-6)     Anterior Medial Thigh (L2)   Brachioradialis (C5-6)     Anterior Thigh, Medial Epicondyle Femur (L3)   Triceps (C7-8)     Lateral Thigh, Anterior Knee, Medial Leg/Malleolus (L4)   Hoffmanns test     Lateral Leg, Dorsal Foot (L5)   LE Reflexes     Lateral Foot (S1)   Patellar (L3-4)     Posterior Leg (S2)   Achilles (S1-2)     Other:   Babinski Response       Palpation: pain over bilateral upper lumbar paraspinals  Bridging: pain  1 leg stance:  R=2 seconds, L=1 seconds  Able to toe and heel walk  Supine<>sit:  Painful, lays straight back    Lumbar Special Tests:     Lumbar Special Tests Right Left SI Tests Right  Left   Quadrant test   SI Compression     Straight leg raise - - SI Distraction     Crossover response   POSH Test     Slump - - Sacral Thrust     Sit-up test  FADIR     Trunk extensor endurance test  Resisted Abduction     Prone instability test  Other:     Pubic shotgun  Other:         LE Screen:  moderately tight hamstings and hip IR    Treatment Today     Exercises:  Exercise #1: sit<>stand   3 reps, 3 chairs  Comment #1: supine<>sit  5 reps      TREATMENT MINUTES COMMENTS   Evaluation 20 lumbar   Self-care/ Home management 15 edu on pain cycle   Manual therapy     Neuromuscular Re-education 10  Supine<>sit, edu on how to perform properly   Therapeutic Activity     Therapeutic Exercises 10 See flow sheet or above  [id number]   Gait training     Modality__________________                Total 55    Blank areas are intentional and mean the treatment did not include these items.       PT Evaluation Code: (Please list factors)  Patient History/Comorbidities:   Patient Active Problem List   Diagnosis   ? Vomiting and diarrhea   ? Hyperglycemia   ? Lactic acidosis   ? Dehydration   ? Hypothyroidism   ? Diverticulosis   ? HTN (hypertension)   ? AAA (abdominal aortic aneurysm) (H)   ? Acute ischemic right ANDREI stroke (H)   ? Asymptomatic stenosis of right carotid artery   ? Acute cerebrovascular accident (CVA) (H)   ? Stroke-like symptoms   ? Recurrent UTI   ? Accelerated hypertension   ? Carotid aneurysm, left (H)   ? Bronchiectasis without acute exacerbation (H)   ? Benign prostatic hyperplasia with lower urinary tract symptoms   ? Endoleak post (EVAR) endovascular aneurysm repair, initial encounter (H)   ? Nephrolithiasis   ? Penile pain   ? HLD (hyperlipidemia)   ? Depression   ? Benign essential hypertension   ? Urinary retention   ? 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)   ? Intractable low back pain   ? Back pain   ? Low back pain   ? Alcohol dependence in remission (H)   ? Obesity (BMI 35.0-39.9) with comorbidity (H)   ? Spinal stenosis, lumbar region with neurogenic claudication   ? Incomplete cauda equina syndrome (H)   ? Lumbar stenosis without neurogenic claudication   ? Respiratory failure, post-operative (H)   ? COPD (chronic obstructive pulmonary disease) (H)   ? Abnormal urinalysis   ? Metabolic encephalopathy   ? Generalized anxiety disorder   ? Normocytic anemia   ? Hyperbilirubinemia   ? Hyperammonemia (H)     Examination: see above  Clinical Presentation: stable  Clinical Decision Making: low    Patient History/  Comorbidities Examination  (body structures and functions, activity  limitations, and/or participation restrictions) Clinical Presentation Clinical Decision Making (Complexity)   No documented Comorbidities or personal factors 1-2 Elements Stable and/or uncomplicated Low   1-2 documented comorbidities or personal factor 3 Elements Evolving clinical presentation with changing characteristics Moderate   3-4 documented comorbidities or personal factors 4 or more Unstable and unpredictable High              Tatum Lozoya, PT  4/20/2021  6:56 AM

## 2021-07-06 VITALS
BODY MASS INDEX: 34.37 KG/M2 | WEIGHT: 219 LBS | OXYGEN SATURATION: 90 % | HEIGHT: 67 IN | SYSTOLIC BLOOD PRESSURE: 117 MMHG | RESPIRATION RATE: 20 BRPM | DIASTOLIC BLOOD PRESSURE: 72 MMHG | HEART RATE: 100 BPM

## 2021-07-06 VITALS — WEIGHT: 219 LBS | HEIGHT: 67 IN | BODY MASS INDEX: 34.37 KG/M2

## 2021-07-15 PROBLEM — Z98.890 HISTORY OF CAROTID ENDARTERECTOMY: Status: ACTIVE | Noted: 2021-05-03

## 2021-07-15 PROBLEM — J95.821 RESPIRATORY FAILURE, POST-OPERATIVE (H): Status: ACTIVE | Noted: 2021-03-04

## 2021-07-15 PROBLEM — N40.1 LOWER URINARY TRACT SYMPTOMS DUE TO BENIGN PROSTATIC HYPERPLASIA: Status: ACTIVE | Noted: 2021-05-03

## 2021-07-15 PROBLEM — M54.59 INTRACTABLE LOW BACK PAIN: Status: ACTIVE | Noted: 2020-08-16

## 2021-07-15 PROBLEM — N32.81 OVERACTIVE BLADDER: Status: ACTIVE | Noted: 2021-05-03

## 2021-07-15 PROBLEM — R82.90 ABNORMAL URINALYSIS: Status: ACTIVE | Noted: 2021-03-07

## 2021-07-15 PROBLEM — I48.91 ATRIAL FIBRILLATION (H): Status: ACTIVE | Noted: 2021-05-03

## 2021-07-15 PROBLEM — E55.9 VITAMIN D DEFICIENCY: Status: ACTIVE | Noted: 2021-05-03

## 2021-07-15 PROBLEM — D64.9 NORMOCYTIC ANEMIA: Status: ACTIVE | Noted: 2021-03-07

## 2021-07-15 PROBLEM — R40.0 DAYTIME SOMNOLENCE: Status: ACTIVE | Noted: 2021-05-03

## 2021-07-15 PROBLEM — M51.369 DEGENERATION OF LUMBAR INTERVERTEBRAL DISC: Status: ACTIVE | Noted: 2021-05-03

## 2021-07-15 PROBLEM — M48.062 SPINAL STENOSIS, LUMBAR REGION WITH NEUROGENIC CLAUDICATION: Status: ACTIVE | Noted: 2021-01-15

## 2021-07-15 PROBLEM — Z95.828 HISTORY OF ENDOVASCULAR STENT GRAFT FOR ABDOMINAL AORTIC ANEURYSM (AAA): Status: ACTIVE | Noted: 2021-05-03

## 2021-07-15 PROBLEM — R41.3 MEMORY PROBLEM: Status: ACTIVE | Noted: 2021-05-03

## 2021-07-15 PROBLEM — G83.4: Status: ACTIVE | Noted: 2021-01-15

## 2021-07-15 PROBLEM — E72.20 HYPERAMMONEMIA (H): Status: ACTIVE | Noted: 2021-03-08

## 2021-07-15 PROBLEM — E80.6 HYPERBILIRUBINEMIA: Status: ACTIVE | Noted: 2021-03-08

## 2021-07-15 PROBLEM — F41.9 ANXIETY: Status: ACTIVE | Noted: 2021-05-03

## 2021-07-15 PROBLEM — L82.1 OTHER SEBORRHEIC KERATOSIS: Status: ACTIVE | Noted: 2021-05-03

## 2021-07-20 RX ORDER — POLYETHYLENE GLYCOL 3350 17 G/17G
17 POWDER, FOR SOLUTION ORAL
COMMUNITY
Start: 2021-03-05 | End: 2021-07-21

## 2021-07-20 RX ORDER — HYDROXYZINE PAMOATE 50 MG/1
50 CAPSULE ORAL
COMMUNITY
Start: 2021-03-09 | End: 2021-08-09

## 2021-07-20 RX ORDER — TRAMADOL HYDROCHLORIDE 50 MG/1
50 TABLET ORAL
COMMUNITY
Start: 2021-05-11 | End: 2021-07-21

## 2021-07-20 RX ORDER — ALBUTEROL SULFATE 0.83 MG/ML
2.5 SOLUTION RESPIRATORY (INHALATION) EVERY 6 HOURS PRN
COMMUNITY
Start: 2021-03-06 | End: 2023-04-25

## 2021-07-20 RX ORDER — CALCITONIN SALMON 200 [IU]/.09ML
1 SPRAY, METERED NASAL DAILY
COMMUNITY
Start: 2021-06-23 | End: 2021-08-14

## 2021-07-20 RX ORDER — ERGOCALCIFEROL 1.25 MG/1
50000 CAPSULE ORAL WEEKLY
COMMUNITY
Start: 2021-03-15 | End: 2021-07-21

## 2021-07-20 RX ORDER — ALBUTEROL SULFATE 90 UG/1
2 AEROSOL, METERED RESPIRATORY (INHALATION) EVERY 6 HOURS PRN
COMMUNITY
Start: 2021-05-06 | End: 2023-04-25

## 2021-07-21 ENCOUNTER — OFFICE VISIT (OUTPATIENT)
Dept: PULMONOLOGY | Facility: OTHER | Age: 81
End: 2021-07-21
Payer: COMMERCIAL

## 2021-07-21 VITALS
OXYGEN SATURATION: 94 % | WEIGHT: 228 LBS | DIASTOLIC BLOOD PRESSURE: 64 MMHG | HEART RATE: 87 BPM | SYSTOLIC BLOOD PRESSURE: 112 MMHG | BODY MASS INDEX: 35.71 KG/M2

## 2021-07-21 DIAGNOSIS — J44.9 CHRONIC OBSTRUCTIVE PULMONARY DISEASE, UNSPECIFIED COPD TYPE (H): Primary | ICD-10-CM

## 2021-07-21 PROCEDURE — 99214 OFFICE O/P EST MOD 30 MIN: CPT | Performed by: INTERNAL MEDICINE

## 2021-07-21 RX ORDER — LEVOTHYROXINE SODIUM 150 UG/1
1 TABLET ORAL DAILY
COMMUNITY
End: 2023-04-12

## 2021-07-21 RX ORDER — AMITRIPTYLINE HYDROCHLORIDE 50 MG/1
12.5 TABLET ORAL AT BEDTIME
COMMUNITY
Start: 2021-04-07 | End: 2021-08-14

## 2021-07-21 RX ORDER — ACETAMINOPHEN 160 MG
1 TABLET,DISINTEGRATING ORAL DAILY
COMMUNITY

## 2021-07-21 NOTE — PROGRESS NOTES
Patient instructed in use of nebulizer machine from [facility].  Patient states good understanding of how to use the nebulizer machine.  Nebulizer machine given to patient from [facility].  All paperwork signed and copy scanned to chart. Phone numbers given to patient to call if any questions.    Patient also instructed in use of a spacer device with his albuterol inhaler.  Printed instructions also sent home with patient.   (patient having issues with coughing when using inhalers, will try spacer)

## 2021-07-21 NOTE — PROGRESS NOTES
CCx: Follow up COPD    HPI: Mr. Rojas is a 80 y.o. with a past medical history significant for AAA status post stenting, alcohol dependence in remission, anxiety, atrial fibrillation, BPH, COPD, CVA, degenerative lumbar disc disease, depression, diastolic dysfunction joint disease of the knee, HTN, glaucoma, nephrolithiasis, psoriasis, seborrheic keratosis and right carotid artery stenosis amongst other medical problems who presents to clinic for follow-up of COPD.    Currently, patient is on albuterol inhaler as needed but he notes that he coughs with it. He also coughs with Trelegy and Anoro Ellipta.  He denies any exacerbations requiring abx and steroids since he was last seen.  He did not get his nebulizer because he was worried about cough. He endorses dyspnea on exertion with minimal activity and admits to the fact that he is minimally active because of his shortness of breath.   He continues to have issues with his back even after surgery. He does not walk much because he is limited by back pain. He will be having a back brace due to back issues.     ROS:  Pertinent positives alluded to in the HPI. Remainder of 10 point ROS is negative.     PMH:  1. AAA status post stenting  2. Alcohol dependence in remission  3. Anxiety  4. Atrial fibrillation  5. BPH   6. COPD  7. CVA  8. 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 Depression  10. Diastolic dysfunction  11. HTN  12. Glaucoma  13. Nephrolithiasis  14. Psoriasis  15. Seborrheic keratosis  16. Right carotid artery stenosis status post stenting    Allergies:  Reviewed in epic    Family HX:  Reviewed with the patient    Social Hx:  Marital status: Yes  Number of children: 2  Resides in a house built in 1980, concern for mold.  Occupational history:  at a car dealership   service: No  Pets: 1 cat  Smoking history: 30 pack year history, quit in 1989  Alcohol use: No   Recreational drug use: No  Hobbies: No  Recent Travel: No    Current  Meds:  Current Outpatient Medications   Medication Sig Dispense Refill     acetaminophen (TYLENOL) 500 MG tablet Take 1,000 mg by mouth as needed       amitriptyline (ELAVIL) 50 MG tablet 1 tablet daily       aspirin (ASA) 81 MG chewable tablet Take 81 mg by mouth daily       benzocaine-menthol (CEPACOL) 15-3.6 MG lozenge Take 1 lozenge by mouth       Cholecalciferol (VITAMIN D3) 50 MCG (2000 UT) CAPS 1 tablet daily       hydrOXYzine (VISTARIL) 50 MG capsule Take 50 mg by mouth       latanoprost (XALATAN) 0.005 % ophthalmic solution Place 1 drop into both eyes daily       levothyroxine (SYNTHROID/LEVOTHROID) 150 MCG tablet 1 tablet daily       multivitamin w/minerals (MULTI-VITAMIN) tablet Take 1 tablet by mouth daily       NIFEdipine ER OSMOTIC (PROCARDIA XL) 30 MG 24 hr tablet Take 30 mg by mouth daily       rosuvastatin (CRESTOR) 10 MG tablet Take 10 mg by mouth daily       albuterol (PROAIR HFA/PROVENTIL HFA/VENTOLIN HFA) 108 (90 Base) MCG/ACT inhaler Inhale 2 puffs into the lungs (Patient not taking: Reported on 7/21/2021)       albuterol (PROVENTIL) (2.5 MG/3ML) 0.083% neb solution Inhale 2.5 mg into the lungs (Patient not taking: Reported on 7/21/2021)       calcitonin, salmon, (MIACALCIN) 200 UNIT/ACT nasal spray 1 spray (Patient not taking: Reported on 7/21/2021)       umeclidinium-vilanterol (ANORO ELLIPTA) 62.5-25 MCG/INH oral inhaler Inhale 1 puff into the lungs At Bedtime (Patient not taking: Reported on 7/21/2021)       Labs:  Reviewed from recent hospitalization.    Imaging studies:  CTA Chest Abdomen and Pelvis 8/16/20:  1.  Post aortobiiliac endograft repair.  2.  Stable excluded infrarenal abdominal aortic aneurysm and right common iliac artery aneurysm.  3.  Emphysema.  4.  Cholelithiasis.  5.  Bilateral nonobstructive nephrolithiasis.  6.  Colonic diverticulosis.    PFT's 5/6/21:  FEV1/FVC is 68% and is reduced.  FEV1 is 1.6L (60%) predicted and is reduced.  FVC is 2.35L (66%) predicted and  reduced.  There was no improvement in spirometry after a single inhaled dose of bronchodilator.  DLCO is 12.14ml/min/hg (54%) predicted and is reduced when it is corrected for hemoglobin.  Flow volume loops indicate scooping of the expiratory limb.    Impression:  Full Pulmonary Function Test is abnormal.  PFTs are consistent with moderate obstructive disease.  Spirometry is not consistent with reversibility.  Diffusion capacity when corrected for hemoglobin is moderately reduced.    Physical Exam:    /64   Pulse 87   Wt 103.4 kg (228 lb)   SpO2 94%   BMI 35.71 kg/m        Physical Exam   Constitutional: He is oriented to person, place, and time. He appears well-developed and well-nourished.   HENT:   Head: Normocephalic and atraumatic.   Eyes: Pupils are equal, round, and reactive to light.   Neck: Normal range of motion.   Cardiovascular: Normal rate and regular rhythm.   Pulmonary/Chest: Effort normal and breath sounds normal.   Abdominal: Soft.   Musculoskeletal: Normal range of motion.         General: Edema present.   Neurological: He is alert and oriented to person, place, and time.   Skin: Skin is warm.   Psychiatric: He has a normal mood and affect.       Assessment and Plan:Dominik Rojas is a 80 y.o. with a past medical history significant for AAA status post stenting, alcohol dependence in remission, anxiety, atrial fibrillation, BPH, COPD, CVA, degenerative lumbar disc disease, depression, diastolic dysfunction joint disease of the knee, HTN, glaucoma, nephrolithiasis, psoriasis, seborrheic keratosis and right carotid artery stenosis amongst other medical problems who presents to clinic for establishment of care for COPD. His PFT's demonstrate moderate obstructive lung disease with moderate reduction in his diffusion capacity and he was also noted to be hypoxic  At night while in the hospital. For the present we would recommend;    1. Moderate obstructive COPD: Noted on pulmonary function  testing and demonstrates some emphysema on his chest imaging.  He has previously trialed on long-acting bronchodilator including Trelegy and Anoro and has had significant coughing from this because of which he has discontinued both of these.    Provided space and teaching to use inhalers. Asked to use either nebs or inhaler and let us know next visit if he kept coughing.     Should consider Pulmonary rehab once PT is complete.  2. Overnight hypoxia: Is due to be evaluated by the sleep service. He has not gone there yet.   3. HCM: Uptodate with Covid-19 vaccine.  4. Follow-up: 4 weeks.      Lamar Tovar MD

## 2021-07-26 ENCOUNTER — TELEPHONE (OUTPATIENT)
Dept: PHYSICAL MEDICINE AND REHAB | Facility: CLINIC | Age: 81
End: 2021-07-26

## 2021-07-26 ENCOUNTER — OFFICE VISIT (OUTPATIENT)
Dept: PHYSICAL MEDICINE AND REHAB | Facility: CLINIC | Age: 81
End: 2021-07-26
Payer: COMMERCIAL

## 2021-07-26 ENCOUNTER — HOSPITAL ENCOUNTER (OUTPATIENT)
Dept: RADIOLOGY | Facility: HOSPITAL | Age: 81
Discharge: HOME | End: 2021-07-26
Attending: PHYSICIAN ASSISTANT | Admitting: PHYSICIAN ASSISTANT
Payer: COMMERCIAL

## 2021-07-26 VITALS
HEIGHT: 67 IN | BODY MASS INDEX: 35.79 KG/M2 | OXYGEN SATURATION: 93 % | WEIGHT: 228 LBS | SYSTOLIC BLOOD PRESSURE: 127 MMHG | HEART RATE: 109 BPM | DIASTOLIC BLOOD PRESSURE: 83 MMHG

## 2021-07-26 DIAGNOSIS — S22.080A COMPRESSION FRACTURE OF T12 VERTEBRA, INITIAL ENCOUNTER (H): Primary | ICD-10-CM

## 2021-07-26 DIAGNOSIS — S22.080A COMPRESSION FRACTURE OF T12 VERTEBRA, INITIAL ENCOUNTER (H): ICD-10-CM

## 2021-07-26 DIAGNOSIS — Z98.890 HISTORY OF LUMBAR SURGERY: ICD-10-CM

## 2021-07-26 PROCEDURE — 72100 X-RAY EXAM L-S SPINE 2/3 VWS: CPT

## 2021-07-26 PROCEDURE — 99214 OFFICE O/P EST MOD 30 MIN: CPT | Performed by: PHYSICIAN ASSISTANT

## 2021-07-26 RX ORDER — CYCLOBENZAPRINE HCL 5 MG
5 TABLET ORAL 3 TIMES DAILY PRN
Qty: 30 TABLET | Refills: 1 | Status: ON HOLD | OUTPATIENT
Start: 2021-07-26 | End: 2021-08-13

## 2021-07-26 RX ORDER — OXYCODONE HYDROCHLORIDE 5 MG/1
5 TABLET ORAL 3 TIMES DAILY PRN
Qty: 30 TABLET | Refills: 0 | Status: SHIPPED | OUTPATIENT
Start: 2021-07-26 | End: 2021-08-09

## 2021-07-26 ASSESSMENT — PAIN SCALES - GENERAL: PAINLEVEL: EXTREME PAIN (9)

## 2021-07-26 ASSESSMENT — MIFFLIN-ST. JEOR: SCORE: 1707.83

## 2021-07-26 NOTE — PROGRESS NOTES
Assessment:   Dominik Rojas is a 80 y.o. y.o. male with past medical history significant for hypothyroidism, hypertension, AAA, history of CVA, recurrent UTI, BPH, history of nephrolithiasis, hyperlipidemia, depression, anxiety, atrial fibrillation, COPD who presents today for follow-up regarding chronic bilateral low back pain. Patient is status post bilateral L2-L4 decompressive lumbar laminectomy with medial facetectomies with Dr. King March 3, 2021.  Patient reported resolution of preoperative leg symptoms, but worsening low back pain after surgery.  An MRI lumbar spine June 22, 2021 revealed a recent T12 compression fracture with 50% loss of vertebral body height.  There was no trauma.  Plan will be to treat the fracture conservatively.  Patient does not tolerate his TLSO brace due to COPD.  On exam patient had a sensory deficit bilateral anterior thighs.  Strength is intact.       Plan:     A shared decision making plan was used.  The patient's values and choices were respected.  The following represents what was discussed and decided upon by the physician assistant and the patient.      1.  DIAGNOSTIC TESTS:  I reviewed the MRI lumbar spine.    -I ordered AP and lateral lumbar spine x-rays to monitor the fracture.  Plan will be to check x-rays every 4 weeks to monitor the fracture x3 months.  -I ordered a DEXA scan.    2.  PHYSICAL THERAPY: Patient went to 7 sessions a physical therapy postoperatively.  Physical therapy was put on hold once fracture was discovered.  He may benefit from returning to physical therapy once fracture pain has improved.    3.  MEDICATIONS:  -Patient should use calcitonin nasal spray daily.  He has not tried this yet.  -Cyclobenzaprine 5 mg 3 times daily as needed was prescribed.  -Oxycodone 5 mg 3 times daily as needed, #30 with no refills was prescribed.  I checked the [place] prescription monitoring database.  Most recent opiate prescription was May 29, 2021 for 30 tabs of  oxycodone 5 mg.  I told the patient we will not use this medication long-term.  I will not provide telephone refills.  Risks reviewed.  -Patient will continue Tylenol 1000 mg every 6 hours as needed.  -Patient takes amitriptyline 50 mg daily.  -Patient tried over-the-counter topical pain relieving patches and they were not helpful.    4.  INTERVENTIONS: No interventions were ordered today.      5.  PATIENT EDUCATION:  -Unfortunately, patient is not tolerating his custom TLSO brace due to COPD.  He feels like he cannot breathe when he wears a brace.  He is seeing pulmonology.  I told patient he could try wearing an off-the-shelf lumbar corset style brace for pain management instead.  -Patient admits that he has done some yard work.  I told the patient he cannot be doing any yard work or housework.  He cannot lift more than 5 pounds.  He should not bend or twist at the waist.  -Patient can walk as tolerated.    6.  FOLLOW-UP:   A nurse will call the patient with results of his x-ray.  I will see the patient back in clinic in 2 weeks.  If he has questions or concerns in the meantime, he should not hesitate to call.        SUBJECTIVE:    Dominik Rojas is a 80 y.o. male who presents today for follow-up regarding chronic bilateral low back pain.  Patient is status post bilateral L2-L4 decompressive lumbar laminectomy and medial facetectomies with Dr. King March 3, 2021.  Patient reports that after surgery his leg symptoms improved but he actually had worsening back pain.  There was no trauma.  He was found to have a T12 compression fracture.  He states his pain continues to worsen.    Patient complains of midline low back pain.  Pain is located the mid lumbar region.  Pain does sometimes extend across the low back in a broadband distribution.  He denies any pain radiating in the buttocks or down the legs.  He occasionally feels some numbness and tingling on the bilateral anterior thighs which is noticeable only when he  is applying cream to his knees.  He feels a sensation of weakness in his back.  He has chronic urinary incontinence.  Denies loss of bowel control.  Pain is aggravated with standing and getting up from sitting.  He sometimes feels a spasm-like sensation in his lower back when he switches positions.  He denies any alleviating factors to the pain.  He is having difficulty sleeping because of his pain.     Patient completed 7 sessions of physical therapy June 8, 2021.  He takes Tylenol as needed for pain.  He takes amitriptyline 50 mg at bedtime.  He does not feel that his pain is under control.  She picked up the calcitonin nasal spray but has not yet tried it.  He got a custom TLSO brace.  He states that he cannot wear this because he feels like he cannot breathe when he wears it.  He is following with pulmonology for COPD.    Past medical history is reviewed and is pertinent for seeing pulmonology for COPD.    Review of Systems:  Positive for numbness/tingling, weakness, loss of bladder control, pain worse at night, difficulty with hand skills.  Negative for loss of bowel control, inability to urinate, headache, trip/stumble/falls, difficulty swallowing, fevers, unintentional weight loss.     Objective:   CONSTITUTIONAL:  Vital signs as above.  No acute distress.  The patient is well nourished and well groomed.  Patient is obese.  PSYCHIATRIC:  The patient is awake, alert, oriented to person, place and time.  The patient is answering questions appropriately with clear speech.  Normal affect.  HEENT: Normocephalic, atraumatic.  Sclera clear.    SKIN:  Skin over the face, posterior torso, bilateral upper and lower extremities is clean, dry, intact without rashes.  VASCULAR: 1+ bilateral lower extremity edema.  MUSCULOSKELETAL:  Gait is antalgic.  He ambulates with a cane for assistance.  Tender to palpation midline upper lumbar region.  No significant tenderness palpation bilateral lower lumbar paraspinous muscles.   The patient has 5/5 strength for the bilateral hip flexors, knee flexors/extensors, ankle dorsiflexors/plantar flexors, ankle evertors/invertors.    NEUROLOGICAL: 1+ patellar and Achilles reflexes.  Subjective diminished/altered sensation bilateral distal anterior thighs.  Negative Babinski's bilaterally.  No ankle clonus..     RESULTS:    I reviewed the MRI lumbar spine from [facility] dated June 22, 2021.  This shows a recent fracture involving the inferior endplate of T12 with 50% vertebral body height loss and anterior wedging.  There is retropulsion contributing to mild spinal canal stenosis at T12-L1.  There is also some mild marrow edema in the superior endplate of L1 asymmetric to the right without endplate height loss which may represent edema secondary to subchondral endplate fracture or stress reaction.  There are postoperative changes of wide decompressive laminectomy L1-L2 through L4-L5.  There is no high-grade spinal canal or neuroforaminal stenosis.

## 2021-07-26 NOTE — TELEPHONE ENCOUNTER
Call placed to patient with provider's results and recommendations. Busy signal. Unable to leave message.

## 2021-07-26 NOTE — LETTER
7/26/2021         RE: Dominik Rojas  [address]  [place] [address]        Dear Colleague,    Thank you for referring your patient, Dominik Rojas, to the [facility] [place]. Please see a copy of my visit note below.    Assessment:   Dominik Rojas is a 80 y.o. y.o. male with past medical history significant for hypothyroidism, hypertension, AAA, history of CVA, recurrent UTI, BPH, history of nephrolithiasis, hyperlipidemia, depression, anxiety, atrial fibrillation, COPD who presents today for follow-up regarding chronic bilateral low back pain. Patient is status post bilateral L2-L4 decompressive lumbar laminectomy with medial facetectomies with Dr. King March 3, 2021.  Patient reported resolution of preoperative leg symptoms, but worsening low back pain after surgery.  An MRI lumbar spine June 22, 2021 revealed a recent T12 compression fracture with 50% loss of vertebral body height.  There was no trauma.  Plan will be to treat the fracture conservatively.  Patient does not tolerate his TLSO brace due to COPD.  On exam patient had a sensory deficit bilateral anterior thighs.  Strength is intact.       Plan:     A shared decision making plan was used.  The patient's values and choices were respected.  The following represents what was discussed and decided upon by the physician assistant and the patient.      1.  DIAGNOSTIC TESTS:  I reviewed the MRI lumbar spine.    -I ordered AP and lateral lumbar spine x-rays to monitor the fracture.  Plan will be to check x-rays every 4 weeks to monitor the fracture x3 months.  -I ordered a DEXA scan.    2.  PHYSICAL THERAPY: Patient went to 7 sessions a physical therapy postoperatively.  Physical therapy was put on hold once fracture was discovered.  He may benefit from returning to physical therapy once fracture pain has improved.    3.  MEDICATIONS:  -Patient should use calcitonin nasal spray daily.  He has not tried this  yet.  -Cyclobenzaprine 5 mg 3 times daily as needed was prescribed.  -Oxycodone 5 mg 3 times daily as needed, #30 with no refills was prescribed.  I checked the [place] prescription monitoring database.  Most recent opiate prescription was May 29, 2021 for 30 tabs of oxycodone 5 mg.  I told the patient we will not use this medication long-term.  I will not provide telephone refills.  Risks reviewed.  -Patient will continue Tylenol 1000 mg every 6 hours as needed.  -Patient takes amitriptyline 50 mg daily.  -Patient tried over-the-counter topical pain relieving patches and they were not helpful.    4.  INTERVENTIONS: No interventions were ordered today.      5.  PATIENT EDUCATION:  -Unfortunately, patient is not tolerating his custom TLSO brace due to COPD.  He feels like he cannot breathe when he wears a brace.  He is seeing pulmonology.  I told patient he could try wearing an off-the-shelf lumbar corset style brace for pain management instead.  -Patient admits that he has done some yard work.  I told the patient he cannot be doing any yard work or housework.  He cannot lift more than 5 pounds.  He should not bend or twist at the waist.  -Patient can walk as tolerated.    6.  FOLLOW-UP:   A nurse will call the patient with results of his x-ray.  I will see the patient back in clinic in 2 weeks.  If he has questions or concerns in the meantime, he should not hesitate to call.        SUBJECTIVE:    Dominik Rojas is a 80 y.o. male who presents today for follow-up regarding chronic bilateral low back pain.  Patient is status post bilateral L2-L4 decompressive lumbar laminectomy and medial facetectomies with Dr. King March 3, 2021.  Patient reports that after surgery his leg symptoms improved but he actually had worsening back pain.  There was no trauma.  He was found to have a T12 compression fracture.  He states his pain continues to worsen.    Patient complains of midline low back pain.  Pain is located the mid  lumbar region.  Pain does sometimes extend across the low back in a broadband distribution.  He denies any pain radiating in the buttocks or down the legs.  He occasionally feels some numbness and tingling on the bilateral anterior thighs which is noticeable only when he is applying cream to his knees.  He feels a sensation of weakness in his back.  He has chronic urinary incontinence.  Denies loss of bowel control.  Pain is aggravated with standing and getting up from sitting.  He sometimes feels a spasm-like sensation in his lower back when he switches positions.  He denies any alleviating factors to the pain.  He is having difficulty sleeping because of his pain.     Patient completed 7 sessions of physical therapy June 8, 2021.  He takes Tylenol as needed for pain.  He takes amitriptyline 50 mg at bedtime.  He does not feel that his pain is under control.  She picked up the calcitonin nasal spray but has not yet tried it.  He got a custom TLSO brace.  He states that he cannot wear this because he feels like he cannot breathe when he wears it.  He is following with pulmonology for COPD.    Past medical history is reviewed and is pertinent for seeing pulmonology for COPD.    Review of Systems:  Positive for numbness/tingling, weakness, loss of bladder control, pain worse at night, difficulty with hand skills.  Negative for loss of bowel control, inability to urinate, headache, trip/stumble/falls, difficulty swallowing, fevers, unintentional weight loss.     Objective:   CONSTITUTIONAL:  Vital signs as above.  No acute distress.  The patient is well nourished and well groomed.  Patient is obese.  PSYCHIATRIC:  The patient is awake, alert, oriented to person, place and time.  The patient is answering questions appropriately with clear speech.  Normal affect.  HEENT: Normocephalic, atraumatic.  Sclera clear.    SKIN:  Skin over the face, posterior torso, bilateral upper and lower extremities is clean, dry, intact  without rashes.  VASCULAR: 1+ bilateral lower extremity edema.  MUSCULOSKELETAL:  Gait is antalgic.  He ambulates with a cane for assistance.  Tender to palpation midline upper lumbar region.  No significant tenderness palpation bilateral lower lumbar paraspinous muscles.  The patient has 5/5 strength for the bilateral hip flexors, knee flexors/extensors, ankle dorsiflexors/plantar flexors, ankle evertors/invertors.    NEUROLOGICAL: 1+ patellar and Achilles reflexes.  Subjective diminished/altered sensation bilateral distal anterior thighs.  Negative Babinski's bilaterally.  No ankle clonus..     RESULTS:    I reviewed the MRI lumbar spine from [facility] dated June 22, 2021.  This shows a recent fracture involving the inferior endplate of T12 with 50% vertebral body height loss and anterior wedging.  There is retropulsion contributing to mild spinal canal stenosis at T12-L1.  There is also some mild marrow edema in the superior endplate of L1 asymmetric to the right without endplate height loss which may represent edema secondary to subchondral endplate fracture or stress reaction.  There are postoperative changes of wide decompressive laminectomy L1-L2 through L4-L5.  There is no high-grade spinal canal or neuroforaminal stenosis.            Again, thank you for allowing me to participate in the care of your patient.        Sincerely,        Maureen Bacon PA-C

## 2021-07-26 NOTE — TELEPHONE ENCOUNTER
----- Message from Maureen Bacon PA-C sent at 7/26/2021  3:55 PM CDT -----  Please call patient and let him know the T12 compression fracture is stable.  No new or worsening fracture.

## 2021-07-26 NOTE — PATIENT INSTRUCTIONS
Patient Education     Oxycodone HCl Oral Tablet 5 mg  Uses  For pain.  Instructions  Swallow the medicine without crushing or chewing it.  This medicine may be taken with or without food.  Swallow with a full glass (8 oz) of water unless your doctor gives you different instructions.  Keep the medicine at room temperature. Avoid heat and direct light.  To reduce constipation, eat high fiber foods, drink plenty of water and exercise.  Avoid grapefruit juice while on this medicine.  If you are using this medicine regularly, it is important to take each dose of medicine on time. Keep taking the medicine even if you feel well.  If you forget to take a dose on time, take it as soon as you remember. If it is almost time for the next dose, do not take the missed dose. Return to your normal dosing schedule. Do not take 2 doses of this medicine at one time.  Please tell your doctor and pharmacist about all the medicines you take. Include both prescription and over-the-counter medicines. Also tell them about any vitamins, herbal medicines, or anything else you take for your health.  You may notice parts of this medicine come out in your stool. This is normal and nothing to be concerned about.  Cautions  This medicine contains an opioid. Though it helps many people, this medicine may sometimes cause addiction, especially if it is used for a long time. This risk for addiction may be higher if you have a substance use disorder - such as overuse of or addiction to drugs or alcohol. Speak with your doctor about the benefits and risks of using this medicine.  Ask your doctor or pharmacist if you should have naloxone on hand to treat opioid overdose. Teach your family or household members about the signs of an opioid overdose and how to treat it.  Tell your doctor and pharmacist if you ever had an allergic reaction to a medicine. Symptoms of an allergic reaction can include trouble breathing, skin rash, itching, swelling, or severe  dizziness.  Do not use the medication any more than instructed.  If possible, avoid using with marijuana or other medicines that can cause dizziness or drowsiness. These include allergy/cold products, muscle relaxers, sleep aids, and pain relievers.  Your ability to stay alert or to react quickly may be impaired by this medicine. Do not operate machinery or drive while on this medicine.  Do not drink beverages with alcohol while on this medicine.  Tell the doctor or pharmacist if you are pregnant, planning to be pregnant, or breastfeeding.  This medicine can hurt a new baby in the womb. If you become pregnant while on this medicine, tell your doctor immediately. Your doctor may switch you to a different medicine.  Ask your pharmacist if this medicine can interact with any of your other medicines. Be sure to tell them about all the medicines you take.  Do not start or stop any other medicines without first speaking to your doctor or pharmacist.  Call your doctor right away if you notice slow or shallow breathing.  Do not share this medicine with anyone who has not been prescribed this medicine.  This medicine can cause serious side effects in some patients. Important information from the [place] Food and Drug Administration (FDA) is available from your pharmacist. Please review it carefully with your pharmacist to understand the risks associated with this medicine.  Side Effects  The following is a list of some common side effects from this medicine. Please speak with your doctor about what you should do if you experience these or other side effects.    decreased appetite    constipation    dizziness    drowsiness or sedation    dry mouth    lack of energy and tiredness    itching    nausea    skin irritation such as redness, itching, rash, or burning    stomach upset or abdominal pain    sweating    vomiting  If you have any of the following side effects, you may be getting too much medicine. Please contact your doctor  to let them know about these side effects.    changes in memory, mood, or thinking    difficulty concentrating    confusion    fainting    slow heartbeat    low blood pressure    muscle weakness    cold, moist skin    unusual or unexplained tiredness or weakness    difficulty or discomfort urinating    blurring or changes of vision  Call your doctor or get medical help right away if you notice any of these more serious side effects:    decreased awareness or responsiveness    breathing interruption during sleep    shallow, irregular breathing    chest pain    hallucinations (unusual thoughts, seeing or hearing things that are not real)    pale or blue skin, lips or fingernails    seizures    shortness of breath    weight loss  A few people may have an allergic reactions to this medicine. Symptoms can include difficulty breathing, skin rash, itching, swelling, or severe dizziness. If you notice any of these symptoms, seek medical help quickly.  Extra  Please speak with your doctor, nurse, or pharmacist if you have any questions about this medicine.  [email or web address]  IMPORTANT NOTE: This document tells you briefly how to take your medicine, but it does not tell you all there is to know about it.Your doctor or pharmacist may give you other documents about your medicine. Please talk to them if you have any questions.Always follow their advice. There is a more complete description of this medicine available in English.Scan this code on your smartphone or tablet or use the web address below. You can also ask your pharmacist for a printout. If you have any questions, please ask your pharmacist.     2021 [employer].    Patient Education     Cyclobenzaprine HCl Oral Tablet 5 mg  Uses  This medicine is used for the following purposes:    inflammation    muscle spasms  Instructions  This medicine may be taken with or without food.  Keep the medicine at room temperature. Avoid heat and  direct light.  If you forget to take a dose on time, take it as soon as you remember. If it is almost time for the next dose, do not take the missed dose. Return to your normal dosing schedule. Do not take 2 doses of this medicine at one time.  Please tell your doctor and pharmacist about all the medicines you take. Include both prescription and over-the-counter medicines. Also tell them about any vitamins, herbal medicines, or anything else you take for your health.  Cautions  Tell your doctor and pharmacist if you ever had an allergic reaction to a medicine. Symptoms of an allergic reaction can include trouble breathing, skin rash, itching, swelling, or severe dizziness.  Do not use the medication any more than instructed.  Your ability to stay alert or to react quickly may be impaired by this medicine. Do not drive or operate machinery until you know how this medicine will affect you.  Do not drink beverages with alcohol while on this medicine.  Avoid becoming overheated during exercise or other activities. Try to stay cool in hot weather.  Tell the doctor or pharmacist if you are pregnant, planning to be pregnant, or breastfeeding.  Ask your pharmacist if this medicine can interact with any of your other medicines. Be sure to tell them about all the medicines you take.  Do not start or stop any other medicines without first speaking to your doctor or pharmacist.  Do not share this medicine with anyone who has not been prescribed this medicine.  Side Effects  The following is a list of some common side effects from this medicine. Please speak with your doctor about what you should do if you experience these or other side effects.    constipation    dizziness    drowsiness or sedation    dry mouth    lack of energy and tiredness  A few people may have an allergic reactions to this medicine. Symptoms can include difficulty breathing, skin rash, itching, swelling, or severe dizziness. If you notice any of these  symptoms, seek medical help quickly.  Extra  Please speak with your doctor, nurse, or pharmacist if you have any questions about this medicine.  [email or web address]  IMPORTANT NOTE: This document tells you briefly how to take your medicine, but it does not tell you all there is to know about it.Your doctor or pharmacist may give you other documents about your medicine. Please talk to them if you have any questions.Always follow their advice. There is a more complete description of this medicine available in English.Scan this code on your smartphone or tablet or use the web address below. You can also ask your pharmacist for a printout. If you have any questions, please ask your pharmacist.     2021 [employer].

## 2021-07-26 NOTE — TELEPHONE ENCOUNTER
"PSP:  Maureen Bacon PA-C  Last clinic visit:  6/8/21 OV  Reason for call back pain; wants PSP to call him  Clinical information:  Chart reviewed. Patient had been called 6/23/21 with MRI results and treatment plan. Set up for a follow up with PSP for next day (6/24/21), but patient did not arrive. Reports he continues to have a lot of pain with \"this break in a vertebra. I have a hard time breathing in the brace.\"    Advice given to patient: Explained he should be seen in follow up by PSP as he was last seen 6/8. Should have follow up x-rays to access the fracture, but can do that after seen today as he is to be added on to Am schedule. Stated understanding.       "

## 2021-07-30 NOTE — TELEPHONE ENCOUNTER
Wife returned call. Results given and explained. Discussed continuing with current treatment plan. Stated understanding.

## 2021-08-06 ENCOUNTER — LAB REQUISITION (OUTPATIENT)
Dept: LAB | Facility: CLINIC | Age: 81
End: 2021-08-06

## 2021-08-06 DIAGNOSIS — M79.89 OTHER SPECIFIED SOFT TISSUE DISORDERS: ICD-10-CM

## 2021-08-06 LAB
ANION GAP SERPL CALCULATED.3IONS-SCNC: 12 MMOL/L (ref 5–18)
BUN SERPL-MCNC: 16 MG/DL (ref 8–28)
CALCIUM SERPL-MCNC: 9.7 MG/DL (ref 8.5–10.5)
CHLORIDE BLD-SCNC: 101 MMOL/L (ref 98–107)
CO2 SERPL-SCNC: 30 MMOL/L (ref 22–31)
CREAT SERPL-MCNC: 0.87 MG/DL (ref 0.7–1.3)
GFR SERPL CREATININE-BSD FRML MDRD: 82 ML/MIN/1.73M2
GLUCOSE BLD-MCNC: 90 MG/DL (ref 70–125)
POTASSIUM BLD-SCNC: 4.3 MMOL/L (ref 3.5–5)
SODIUM SERPL-SCNC: 143 MMOL/L (ref 136–145)

## 2021-08-06 PROCEDURE — 80048 BASIC METABOLIC PNL TOTAL CA: CPT | Performed by: FAMILY MEDICINE

## 2021-08-06 PROCEDURE — 36415 COLL VENOUS BLD VENIPUNCTURE: CPT | Performed by: FAMILY MEDICINE

## 2021-08-09 ENCOUNTER — HOSPITAL ENCOUNTER (INPATIENT)
Facility: HOSPITAL | Age: 81
LOS: 4 days | Discharge: HOME | DRG: 202 | End: 2021-08-13
Attending: EMERGENCY MEDICINE | Admitting: INTERNAL MEDICINE
Payer: COMMERCIAL

## 2021-08-09 ENCOUNTER — APPOINTMENT (OUTPATIENT)
Dept: CT IMAGING | Facility: HOSPITAL | Age: 81
DRG: 202 | End: 2021-08-09
Attending: EMERGENCY MEDICINE
Payer: COMMERCIAL

## 2021-08-09 ENCOUNTER — OFFICE VISIT (OUTPATIENT)
Dept: PHYSICAL MEDICINE AND REHAB | Facility: CLINIC | Age: 81
End: 2021-08-09
Payer: COMMERCIAL

## 2021-08-09 ENCOUNTER — APPOINTMENT (OUTPATIENT)
Dept: RADIOLOGY | Facility: HOSPITAL | Age: 81
DRG: 202 | End: 2021-08-09
Payer: COMMERCIAL

## 2021-08-09 VITALS
HEIGHT: 67 IN | OXYGEN SATURATION: 94 % | HEART RATE: 114 BPM | SYSTOLIC BLOOD PRESSURE: 125 MMHG | BODY MASS INDEX: 35.79 KG/M2 | WEIGHT: 228 LBS | DIASTOLIC BLOOD PRESSURE: 74 MMHG

## 2021-08-09 DIAGNOSIS — R09.02 HYPOXIA: ICD-10-CM

## 2021-08-09 DIAGNOSIS — K21.00 GASTROESOPHAGEAL REFLUX DISEASE WITH ESOPHAGITIS WITHOUT HEMORRHAGE: ICD-10-CM

## 2021-08-09 DIAGNOSIS — J96.01 ACUTE RESPIRATORY FAILURE WITH HYPOXIA (H): Primary | ICD-10-CM

## 2021-08-09 DIAGNOSIS — S22.080A COMPRESSION FRACTURE OF T12 VERTEBRA, INITIAL ENCOUNTER (H): Primary | ICD-10-CM

## 2021-08-09 DIAGNOSIS — R60.0 BILATERAL LOWER EXTREMITY EDEMA: ICD-10-CM

## 2021-08-09 DIAGNOSIS — R06.02 SHORTNESS OF BREATH: ICD-10-CM

## 2021-08-09 DIAGNOSIS — I50.9 CONGESTIVE HEART FAILURE, UNSPECIFIED HF CHRONICITY, UNSPECIFIED HEART FAILURE TYPE (H): ICD-10-CM

## 2021-08-09 LAB
ANION GAP SERPL CALCULATED.3IONS-SCNC: 10 MMOL/L (ref 5–18)
BNP SERPL-MCNC: 51 PG/ML (ref 0–84)
BUN SERPL-MCNC: 17 MG/DL (ref 8–28)
CALCIUM SERPL-MCNC: 9.1 MG/DL (ref 8.5–10.5)
CHLORIDE BLD-SCNC: 104 MMOL/L (ref 98–107)
CO2 SERPL-SCNC: 29 MMOL/L (ref 22–31)
CREAT SERPL-MCNC: 0.99 MG/DL (ref 0.7–1.3)
ERYTHROCYTE [DISTWIDTH] IN BLOOD BY AUTOMATED COUNT: 14.7 % (ref 10–15)
GFR SERPL CREATININE-BSD FRML MDRD: 72 ML/MIN/1.73M2
GLUCOSE BLD-MCNC: 103 MG/DL (ref 70–125)
HCT VFR BLD AUTO: 42.8 % (ref 40–53)
HGB BLD-MCNC: 13.6 G/DL (ref 13.3–17.7)
MAGNESIUM SERPL-MCNC: 2 MG/DL (ref 1.8–2.6)
MCH RBC QN AUTO: 28.5 PG (ref 26.5–33)
MCHC RBC AUTO-ENTMCNC: 31.8 G/DL (ref 31.5–36.5)
MCV RBC AUTO: 90 FL (ref 78–100)
PLATELET # BLD AUTO: 246 10E3/UL (ref 150–450)
POTASSIUM BLD-SCNC: 3.7 MMOL/L (ref 3.5–5)
RBC # BLD AUTO: 4.78 10E6/UL (ref 4.4–5.9)
SARS-COV-2 RNA RESP QL NAA+PROBE: NEGATIVE
SODIUM SERPL-SCNC: 143 MMOL/L (ref 136–145)
TROPONIN I SERPL-MCNC: 0.02 NG/ML (ref 0–0.29)
WBC # BLD AUTO: 9.2 10E3/UL (ref 4–11)

## 2021-08-09 PROCEDURE — 71045 X-RAY EXAM CHEST 1 VIEW: CPT

## 2021-08-09 PROCEDURE — 80048 BASIC METABOLIC PNL TOTAL CA: CPT | Performed by: EMERGENCY MEDICINE

## 2021-08-09 PROCEDURE — 99222 1ST HOSP IP/OBS MODERATE 55: CPT | Performed by: INTERNAL MEDICINE

## 2021-08-09 PROCEDURE — 71275 CT ANGIOGRAPHY CHEST: CPT

## 2021-08-09 PROCEDURE — 87635 SARS-COV-2 COVID-19 AMP PRB: CPT | Performed by: EMERGENCY MEDICINE

## 2021-08-09 PROCEDURE — 99285 EMERGENCY DEPT VISIT HI MDM: CPT | Mod: 25

## 2021-08-09 PROCEDURE — 36415 COLL VENOUS BLD VENIPUNCTURE: CPT | Performed by: EMERGENCY MEDICINE

## 2021-08-09 PROCEDURE — 250N000011 HC RX IP 250 OP 636: Performed by: STUDENT IN AN ORGANIZED HEALTH CARE EDUCATION/TRAINING PROGRAM

## 2021-08-09 PROCEDURE — 84484 ASSAY OF TROPONIN QUANT: CPT | Performed by: STUDENT IN AN ORGANIZED HEALTH CARE EDUCATION/TRAINING PROGRAM

## 2021-08-09 PROCEDURE — 83880 ASSAY OF NATRIURETIC PEPTIDE: CPT | Performed by: EMERGENCY MEDICINE

## 2021-08-09 PROCEDURE — 83735 ASSAY OF MAGNESIUM: CPT | Performed by: EMERGENCY MEDICINE

## 2021-08-09 PROCEDURE — 96374 THER/PROPH/DIAG INJ IV PUSH: CPT | Mod: 59

## 2021-08-09 PROCEDURE — 99214 OFFICE O/P EST MOD 30 MIN: CPT | Performed by: PHYSICIAN ASSISTANT

## 2021-08-09 PROCEDURE — 250N000011 HC RX IP 250 OP 636: Performed by: EMERGENCY MEDICINE

## 2021-08-09 PROCEDURE — C9803 HOPD COVID-19 SPEC COLLECT: HCPCS

## 2021-08-09 PROCEDURE — 210N000001 HC R&B IMCU HEART CARE

## 2021-08-09 PROCEDURE — 85014 HEMATOCRIT: CPT | Performed by: EMERGENCY MEDICINE

## 2021-08-09 RX ORDER — FUROSEMIDE 10 MG/ML
60 INJECTION INTRAMUSCULAR; INTRAVENOUS ONCE
Status: COMPLETED | OUTPATIENT
Start: 2021-08-09 | End: 2021-08-09

## 2021-08-09 RX ORDER — ASPIRIN 81 MG/1
81 TABLET, CHEWABLE ORAL DAILY
Status: DISCONTINUED | OUTPATIENT
Start: 2021-08-10 | End: 2021-08-13 | Stop reason: HOSPADM

## 2021-08-09 RX ORDER — FUROSEMIDE 10 MG/ML
60 INJECTION INTRAMUSCULAR; INTRAVENOUS EVERY 8 HOURS
Status: DISCONTINUED | OUTPATIENT
Start: 2021-08-09 | End: 2021-08-09

## 2021-08-09 RX ORDER — IOPAMIDOL 755 MG/ML
100 INJECTION, SOLUTION INTRAVASCULAR ONCE
Status: COMPLETED | OUTPATIENT
Start: 2021-08-09 | End: 2021-08-09

## 2021-08-09 RX ORDER — OXYCODONE HYDROCHLORIDE 5 MG/1
5 TABLET ORAL 3 TIMES DAILY PRN
Qty: 30 TABLET | Refills: 0 | Status: ON HOLD | OUTPATIENT
Start: 2021-08-09 | End: 2021-08-16

## 2021-08-09 RX ORDER — FUROSEMIDE 10 MG/ML
40 INJECTION INTRAMUSCULAR; INTRAVENOUS EVERY 12 HOURS
Status: DISCONTINUED | OUTPATIENT
Start: 2021-08-10 | End: 2021-08-10

## 2021-08-09 RX ORDER — ALBUTEROL SULFATE 90 UG/1
2 AEROSOL, METERED RESPIRATORY (INHALATION) EVERY 6 HOURS PRN
Status: CANCELLED | OUTPATIENT
Start: 2021-08-09

## 2021-08-09 RX ORDER — FUROSEMIDE 20 MG
20 TABLET ORAL DAILY
Status: ON HOLD | COMMUNITY
End: 2021-08-23

## 2021-08-09 RX ADMIN — FUROSEMIDE 60 MG: 10 INJECTION, SOLUTION INTRAVENOUS at 21:03

## 2021-08-09 RX ADMIN — IOPAMIDOL 100 ML: 755 INJECTION, SOLUTION INTRAVENOUS at 22:21

## 2021-08-09 ASSESSMENT — MIFFLIN-ST. JEOR
SCORE: 1707.83
SCORE: 1669.27

## 2021-08-09 ASSESSMENT — ENCOUNTER SYMPTOMS
COUGH: 1
ABDOMINAL DISTENTION: 0
FEVER: 0
ABDOMINAL PAIN: 0
CHEST TIGHTNESS: 0
POLYDIPSIA: 0
COLOR CHANGE: 0
SHORTNESS OF BREATH: 1
BACK PAIN: 1
UNEXPECTED WEIGHT CHANGE: 1
ACTIVITY CHANGE: 1
PALPITATIONS: 0
DIFFICULTY URINATING: 1
FATIGUE: 1
DIAPHORESIS: 0

## 2021-08-09 ASSESSMENT — PAIN SCALES - GENERAL: PAINLEVEL: WORST PAIN (10)

## 2021-08-09 NOTE — ED TRIAGE NOTES
Patient c/o increased swelling in bilateral lower extremities/increased SOB.  Sats 88-92% while in triage, took extra Lasix this morning.  Placed on 2l/m per NC, Sats increased to 95%

## 2021-08-09 NOTE — PROGRESS NOTES
Assessment:   Dominik Rojas is a 80 y.o. y.o. male with past medical history significant for hypothyroidism, hypertension, AAA, history of CVA, recurrent UTI, BPH, history of nephrolithiasis, hyperlipidemia, depression, anxiety, atrial fibrillation, COPD who presents today for follow-up regarding chronic bilateral low back pain. Patient is status post bilateral L2-L4 decompressive lumbar laminectomy with medial facetectomies with Dr. King March 3, 2021.  Patient reported resolution of preoperative leg symptoms, but worsening low back pain after surgery.  An MRI lumbar spine June 22, 2021 revealed a recent T12 compression fracture with 50% loss of vertebral body height.  There was no trauma.  Plan will be to treat the fracture conservatively.  Patient does not tolerate his TLSO brace due to COPD.  Patient continues have significant back pain.  -Patient was found to be hypoxic in the clinic today.  O2 sats were 82% initially and 88% after resting.  Patient states he feels very short of breath over the past 1 week.  He has also had significant lower extremity edema over the past 1 week.       Plan:     A shared decision making plan was used.  The patient's values and choices were respected.  The following represents what was discussed and decided upon by the physician assistant and the patient.      1.  DIAGNOSTIC TESTS:   - I reviewed the MRI lumbar spine.    -I reviewed follow-up lumbar spine x-rays from 2 weeks ago.  -I ordered AP and lateral lumbar spine x-rays to monitor the fracture in another 2 weeks.  Plan will be to check x-rays every 4 weeks to monitor the fracture x3 months.  -Order is in for DEXA scan.  Patient will call to schedule.    2.  PHYSICAL THERAPY: Patient went to 7 sessions a physical therapy postoperatively.  Physical therapy was put on hold once fracture was discovered.  He may benefit from returning to physical therapy once fracture pain has improved.    3.  MEDICATIONS:  -Patient should  continue using calcitonin nasal spray daily.  -Patient can continue using cyclobenzaprine 5 mg 3 times daily as needed.  -I refilled the patient's oxycodone 5 mg 3 times daily as needed, #30 with no refills was prescribed.  I checked the [place] prescription monitoring database.  Most recent opiate prescription was July 28 , 2021 for 30 tabs of oxycodone 5 mg.  I told the patient we will not use this medication long-term.  I will not provide telephone refills.  Risks reviewed.  -Patient will continue Tylenol 1000 mg every 6 hours as needed.  -Patient tried over-the-counter topical pain relieving patches and they were not helpful.    4.  INTERVENTIONS: No interventions were ordered today.      5.  PATIENT EDUCATION:  -I recommended that the patient present to the emergency department for his hypoxia, shortness of breath, lower extremity edema.  Patient's wife will drive him in their personal vehicle immediately to [facility].  -Patient did not tolerate his custom TLSO brace due to COPD.    6.  FOLLOW-UP:   I will see the patient back in the clinic in 2 weeks with repeat AP and lateral lumbar spine x-rays.  If he has questions or concerns in the meantime, he should not hesitate to call.        SUBJECTIVE:    Dominik Rojas is a 80 y.o. male who presents today for follow-up regarding chronic bilateral low back pain.  Patient is status post bilateral L2-L4 decompressive lumbar laminectomy and medial facetectomies with Dr. King March 3, 2021.  Patient reports that after surgery his leg symptoms improved but he actually had worsening back pain.  There was no trauma.  He was found to have a T12 compression fracture.  I last saw the patient July 26, 2021.  Patient denies any change in his pain since he was last seen.  He states he continues have severe low back pain.    Patient complains of centralized low back pain.  He denies any pain radiating the buttocks or down the legs.  He has numbness and tingling in the  bilateral anterior thighs.  He feels generally weak.  He has chronic loss of bladder control which is stable.  He rates his pain today as a 10 out of 10.  Pain is aggravated with certain movements and trying to sleep at night.  He has not been able to sleep in his bed due to the severity of his back pain.  He is sleeping in a recliner chair.  Pain is alleviated with oxycodone.    Patient reports over the past 1 week he has had worsening shortness of breath.  He does have known COPD.  He was also started a nebulizer but has not done so.  He is scheduled to follow-up with pulmonology at the end of the week.  He states he is also had significant worsening leg swelling over the past 1 week.     Patient completed 7 sessions of physical therapy June 8, 2021.  He is taking oxycodone 5 mg up to 3 times daily which is helpful.  He also uses cyclobenzaprine 5 mg 3 times daily as needed.  He takes Tylenol as needed.    Past medical history is reviewed and is unchanged.    Review of Systems:  Positive for numbness/tingling, weakness, loss of bladder control, pain worse at night, difficulty with hand skills.  Negative for loss of bowel control, inability to urinate, headache, trip/stumble/falls, difficulty swallowing, fevers, unintentional weight loss.     Objective:   CONSTITUTIONAL:  Vital signs as above.  Patient appears anxious.  The patient is well nourished and well groomed.  Patient is obese.  Patient was placed on supplemental oxygen at the clinic.  He is short of breath.  PSYCHIATRIC:  The patient is awake, alert, oriented to person, place and time.  The patient is answering questions appropriately with clear speech.  Normal affect.  HEENT: Normocephalic, atraumatic.  Sclera clear.    SKIN:  Skin over the face, posterior torso, bilateral upper and lower extremities is clean, dry, intact without rashes.  VASCULAR: Significant bilateral lower extremity edema.  There is some erythema bilateral lower  extremities.  MUSCULOSKELETAL:  Gait is antalgic.  He ambulates with a cane for assistance.         RESULTS:    I reviewed the MRI lumbar spine from [facility] dated June 22, 2021.  This shows a recent fracture involving the inferior endplate of T12 with 50% vertebral body height loss and anterior wedging.  There is retropulsion contributing to mild spinal canal stenosis at T12-L1.  There is also some mild marrow edema in the superior endplate of L1 asymmetric to the right without endplate height loss which may represent edema secondary to subchondral endplate fracture or stress reaction.  There are postoperative changes of wide decompressive laminectomy L1-L2 through L4-L5.  There is no high-grade spinal canal or neuroforaminal stenosis.      EXAM: XR LUMBAR SPINE 2/3 VIEWS  LOCATION: [facility]  DATE/TIME: 7/26/2021 11:52 AM     INDICATION: T12 compression fracture  COMPARISON: Lumbar spine MRI 2/22 2021  TECHNIQUE: CR Lumbar Spine.                                                                      IMPRESSION:   5 lumbar-type vertebral bodies. Inferior plate T12 compression fracture, unchanged. The vertebral bodies of the lumbar spine otherwise have normal stature and alignment without evidence of compression fracture. Degenerative endplate change and anterior   marginal osteophytes at T12/L1-L4/L5. Multilevel degenerative facet arthropathy, most pronounced at L4/L5 and L5/S1. Multilevel degenerative disc disease of the lumbar spine with disc height loss, most pronounced at L1/L2-L4/L5. Aortobiiliac stent. Soft   tissues unremarkable.

## 2021-08-09 NOTE — LETTER
8/9/2021         RE: Dominik Rojas  [address]  [facility] [address]        Dear Colleague,    Thank you for referring your patient, Dominik Rojas, to the [facility] [place]. Please see a copy of my visit note below.    Assessment:   Dominik Rojas is a 80 y.o. y.o. male with past medical history significant for hypothyroidism, hypertension, AAA, history of CVA, recurrent UTI, BPH, history of nephrolithiasis, hyperlipidemia, depression, anxiety, atrial fibrillation, COPD who presents today for follow-up regarding chronic bilateral low back pain. Patient is status post bilateral L2-L4 decompressive lumbar laminectomy with medial facetectomies with Dr. King March 3, 2021.  Patient reported resolution of preoperative leg symptoms, but worsening low back pain after surgery.  An MRI lumbar spine June 22, 2021 revealed a recent T12 compression fracture with 50% loss of vertebral body height.  There was no trauma.  Plan will be to treat the fracture conservatively.  Patient does not tolerate his TLSO brace due to COPD.  Patient continues have significant back pain.  -Patient was found to be hypoxic in the clinic today.  O2 sats were 82% initially and 88% after resting.  Patient states he feels very short of breath over the past 1 week.  He has also had significant lower extremity edema over the past 1 week.       Plan:     A shared decision making plan was used.  The patient's values and choices were respected.  The following represents what was discussed and decided upon by the physician assistant and the patient.      1.  DIAGNOSTIC TESTS:   - I reviewed the MRI lumbar spine.    -I reviewed follow-up lumbar spine x-rays from 2 weeks ago.  -I ordered AP and lateral lumbar spine x-rays to monitor the fracture in another 2 weeks.  Plan will be to check x-rays every 4 weeks to monitor the fracture x3 months.  -Order is in for DEXA scan.  Patient will call to schedule.    2.  PHYSICAL  THERAPY: Patient went to 7 sessions a physical therapy postoperatively.  Physical therapy was put on hold once fracture was discovered.  He may benefit from returning to physical therapy once fracture pain has improved.    3.  MEDICATIONS:  -Patient should continue using calcitonin nasal spray daily.  -Patient can continue using cyclobenzaprine 5 mg 3 times daily as needed.  -I refilled the patient's oxycodone 5 mg 3 times daily as needed, #30 with no refills was prescribed.  I checked the [place] prescription monitoring database.  Most recent opiate prescription was July 28 , 2021 for 30 tabs of oxycodone 5 mg.  I told the patient we will not use this medication long-term.  I will not provide telephone refills.  Risks reviewed.  -Patient will continue Tylenol 1000 mg every 6 hours as needed.  -Patient tried over-the-counter topical pain relieving patches and they were not helpful.    4.  INTERVENTIONS: No interventions were ordered today.      5.  PATIENT EDUCATION:  -I recommended that the patient present to the emergency department for his hypoxia, shortness of breath, lower extremity edema.  Patient's wife will drive him in their personal vehicle immediately to [facility].  -Patient did not tolerate his custom TLSO brace due to COPD.    6.  FOLLOW-UP:   I will see the patient back in the clinic in 2 weeks with repeat AP and lateral lumbar spine x-rays.  If he has questions or concerns in the meantime, he should not hesitate to call.        SUBJECTIVE:    Dominik Rojas is a 80 y.o. male who presents today for follow-up regarding chronic bilateral low back pain.  Patient is status post bilateral L2-L4 decompressive lumbar laminectomy and medial facetectomies with Dr. King March 3, 2021.  Patient reports that after surgery his leg symptoms improved but he actually had worsening back pain.  There was no trauma.  He was found to have a T12 compression fracture.  I last saw the patient July 26, 2021.  Patient  denies any change in his pain since he was last seen.  He states he continues have severe low back pain.    Patient complains of centralized low back pain.  He denies any pain radiating the buttocks or down the legs.  He has numbness and tingling in the bilateral anterior thighs.  He feels generally weak.  He has chronic loss of bladder control which is stable.  He rates his pain today as a 10 out of 10.  Pain is aggravated with certain movements and trying to sleep at night.  He has not been able to sleep in his bed due to the severity of his back pain.  He is sleeping in a recliner chair.  Pain is alleviated with oxycodone.    Patient reports over the past 1 week he has had worsening shortness of breath.  He does have known COPD.  He was also started a nebulizer but has not done so.  He is scheduled to follow-up with pulmonology at the end of the week.  He states he is also had significant worsening leg swelling over the past 1 week.     Patient completed 7 sessions of physical therapy June 8, 2021.  He is taking oxycodone 5 mg up to 3 times daily which is helpful.  He also uses cyclobenzaprine 5 mg 3 times daily as needed.  He takes Tylenol as needed.    Past medical history is reviewed and is unchanged.    Review of Systems:  Positive for numbness/tingling, weakness, loss of bladder control, pain worse at night, difficulty with hand skills.  Negative for loss of bowel control, inability to urinate, headache, trip/stumble/falls, difficulty swallowing, fevers, unintentional weight loss.     Objective:   CONSTITUTIONAL:  Vital signs as above.  Patient appears anxious.  The patient is well nourished and well groomed.  Patient is obese.  Patient was placed on supplemental oxygen at the clinic.  He is short of breath.  PSYCHIATRIC:  The patient is awake, alert, oriented to person, place and time.  The patient is answering questions appropriately with clear speech.  Normal affect.  HEENT: Normocephalic, atraumatic.   Sclera clear.    SKIN:  Skin over the face, posterior torso, bilateral upper and lower extremities is clean, dry, intact without rashes.  VASCULAR: Significant bilateral lower extremity edema.  There is some erythema bilateral lower extremities.  MUSCULOSKELETAL:  Gait is antalgic.  He ambulates with a cane for assistance.         RESULTS:    I reviewed the MRI lumbar spine from [facility] dated June 22, 2021.  This shows a recent fracture involving the inferior endplate of T12 with 50% vertebral body height loss and anterior wedging.  There is retropulsion contributing to mild spinal canal stenosis at T12-L1.  There is also some mild marrow edema in the superior endplate of L1 asymmetric to the right without endplate height loss which may represent edema secondary to subchondral endplate fracture or stress reaction.  There are postoperative changes of wide decompressive laminectomy L1-L2 through L4-L5.  There is no high-grade spinal canal or neuroforaminal stenosis.      EXAM: XR LUMBAR SPINE 2/3 VIEWS  LOCATION: [facility]  DATE/TIME: 7/26/2021 11:52 AM     INDICATION: T12 compression fracture  COMPARISON: Lumbar spine MRI 2/22 2021  TECHNIQUE: CR Lumbar Spine.                                                                      IMPRESSION:   5 lumbar-type vertebral bodies. Inferior plate T12 compression fracture, unchanged. The vertebral bodies of the lumbar spine otherwise have normal stature and alignment without evidence of compression fracture. Degenerative endplate change and anterior   marginal osteophytes at T12/L1-L4/L5. Multilevel degenerative facet arthropathy, most pronounced at L4/L5 and L5/S1. Multilevel degenerative disc disease of the lumbar spine with disc height loss, most pronounced at L1/L2-L4/L5. Aortobiiliac stent. Soft   tissues unremarkable.      Again, thank you for allowing me to participate in the care of your patient.        Sincerely,        Maureen Bacon,  BALDO

## 2021-08-10 ENCOUNTER — APPOINTMENT (OUTPATIENT)
Dept: OCCUPATIONAL THERAPY | Facility: HOSPITAL | Age: 81
DRG: 202 | End: 2021-08-10
Attending: INTERNAL MEDICINE
Payer: COMMERCIAL

## 2021-08-10 ENCOUNTER — APPOINTMENT (OUTPATIENT)
Dept: CARDIOLOGY | Facility: HOSPITAL | Age: 81
DRG: 202 | End: 2021-08-10
Attending: INTERNAL MEDICINE
Payer: COMMERCIAL

## 2021-08-10 ENCOUNTER — APPOINTMENT (OUTPATIENT)
Dept: ULTRASOUND IMAGING | Facility: HOSPITAL | Age: 81
DRG: 202 | End: 2021-08-10
Attending: INTERNAL MEDICINE
Payer: COMMERCIAL

## 2021-08-10 LAB
ANION GAP SERPL CALCULATED.3IONS-SCNC: 8 MMOL/L (ref 5–18)
BUN SERPL-MCNC: 14 MG/DL (ref 8–28)
CALCIUM SERPL-MCNC: 8.7 MG/DL (ref 8.5–10.5)
CHLORIDE BLD-SCNC: 102 MMOL/L (ref 98–107)
CO2 SERPL-SCNC: 32 MMOL/L (ref 22–31)
CREAT SERPL-MCNC: 0.88 MG/DL (ref 0.7–1.3)
GFR SERPL CREATININE-BSD FRML MDRD: 82 ML/MIN/1.73M2
GLUCOSE BLD-MCNC: 126 MG/DL (ref 70–125)
HOLD SPECIMEN: NORMAL
LVEF ECHO: NORMAL
MAGNESIUM SERPL-MCNC: 2 MG/DL (ref 1.8–2.6)
POTASSIUM BLD-SCNC: 3.6 MMOL/L (ref 3.5–5)
SODIUM SERPL-SCNC: 142 MMOL/L (ref 136–145)
T3 SERPL-MCNC: 92 NG/DL (ref 60–181)
T4 FREE SERPL-MCNC: 1.14 NG/DL (ref 0.7–1.8)
TROPONIN I SERPL-MCNC: 0.02 NG/ML (ref 0–0.29)
TROPONIN I SERPL-MCNC: 0.02 NG/ML (ref 0–0.29)
TSH SERPL DL<=0.005 MIU/L-ACNC: 0.14 UIU/ML (ref 0.3–5)

## 2021-08-10 PROCEDURE — 250N000011 HC RX IP 250 OP 636: Performed by: INTERNAL MEDICINE

## 2021-08-10 PROCEDURE — 99232 SBSQ HOSP IP/OBS MODERATE 35: CPT | Performed by: INTERNAL MEDICINE

## 2021-08-10 PROCEDURE — 36415 COLL VENOUS BLD VENIPUNCTURE: CPT | Performed by: INTERNAL MEDICINE

## 2021-08-10 PROCEDURE — 250N000013 HC RX MED GY IP 250 OP 250 PS 637: Performed by: INTERNAL MEDICINE

## 2021-08-10 PROCEDURE — 84443 ASSAY THYROID STIM HORMONE: CPT | Performed by: INTERNAL MEDICINE

## 2021-08-10 PROCEDURE — 93005 ELECTROCARDIOGRAM TRACING: CPT | Performed by: INTERNAL MEDICINE

## 2021-08-10 PROCEDURE — 93970 EXTREMITY STUDY: CPT

## 2021-08-10 PROCEDURE — 80048 BASIC METABOLIC PNL TOTAL CA: CPT | Performed by: INTERNAL MEDICINE

## 2021-08-10 PROCEDURE — 84480 ASSAY TRIIODOTHYRONINE (T3): CPT | Performed by: INTERNAL MEDICINE

## 2021-08-10 PROCEDURE — 210N000001 HC R&B IMCU HEART CARE

## 2021-08-10 PROCEDURE — 97535 SELF CARE MNGMENT TRAINING: CPT | Mod: GO

## 2021-08-10 PROCEDURE — 255N000002 HC RX 255 OP 636: Performed by: EMERGENCY MEDICINE

## 2021-08-10 PROCEDURE — 97165 OT EVAL LOW COMPLEX 30 MIN: CPT | Mod: GO

## 2021-08-10 PROCEDURE — 84484 ASSAY OF TROPONIN QUANT: CPT | Performed by: INTERNAL MEDICINE

## 2021-08-10 PROCEDURE — C8929 TTE W OR WO FOL WCON,DOPPLER: HCPCS

## 2021-08-10 PROCEDURE — 84439 ASSAY OF FREE THYROXINE: CPT | Performed by: INTERNAL MEDICINE

## 2021-08-10 PROCEDURE — 999N000208 ECHOCARDIOGRAM COMPLETE

## 2021-08-10 PROCEDURE — 83735 ASSAY OF MAGNESIUM: CPT | Performed by: INTERNAL MEDICINE

## 2021-08-10 PROCEDURE — 93306 TTE W/DOPPLER COMPLETE: CPT | Mod: 26 | Performed by: INTERNAL MEDICINE

## 2021-08-10 RX ORDER — NIFEDIPINE 30 MG
30 TABLET, EXTENDED RELEASE ORAL DAILY
Status: DISCONTINUED | OUTPATIENT
Start: 2021-08-10 | End: 2021-08-10

## 2021-08-10 RX ORDER — OXYCODONE HYDROCHLORIDE 5 MG/1
5 TABLET ORAL 3 TIMES DAILY PRN
Status: DISCONTINUED | OUTPATIENT
Start: 2021-08-10 | End: 2021-08-13 | Stop reason: HOSPADM

## 2021-08-10 RX ORDER — OXYCODONE HYDROCHLORIDE 5 MG/1
5 TABLET ORAL 3 TIMES DAILY PRN
Status: DISCONTINUED | OUTPATIENT
Start: 2021-08-10 | End: 2021-08-10

## 2021-08-10 RX ORDER — PANTOPRAZOLE SODIUM 20 MG/1
40 TABLET, DELAYED RELEASE ORAL
Status: DISCONTINUED | OUTPATIENT
Start: 2021-08-10 | End: 2021-08-13 | Stop reason: HOSPADM

## 2021-08-10 RX ORDER — NALOXONE HYDROCHLORIDE 0.4 MG/ML
0.2 INJECTION, SOLUTION INTRAMUSCULAR; INTRAVENOUS; SUBCUTANEOUS
Status: DISCONTINUED | OUTPATIENT
Start: 2021-08-10 | End: 2021-08-13 | Stop reason: HOSPADM

## 2021-08-10 RX ORDER — NALOXONE HYDROCHLORIDE 0.4 MG/ML
0.4 INJECTION, SOLUTION INTRAMUSCULAR; INTRAVENOUS; SUBCUTANEOUS
Status: DISCONTINUED | OUTPATIENT
Start: 2021-08-10 | End: 2021-08-13 | Stop reason: HOSPADM

## 2021-08-10 RX ORDER — LATANOPROST 50 UG/ML
1 SOLUTION/ DROPS OPHTHALMIC AT BEDTIME
Status: DISCONTINUED | OUTPATIENT
Start: 2021-08-10 | End: 2021-08-13 | Stop reason: HOSPADM

## 2021-08-10 RX ORDER — CALCITONIN SALMON 200 [IU]/.09ML
1 SPRAY, METERED NASAL DAILY
Status: DISCONTINUED | OUTPATIENT
Start: 2021-08-10 | End: 2021-08-13 | Stop reason: HOSPADM

## 2021-08-10 RX ORDER — FUROSEMIDE 10 MG/ML
20 INJECTION INTRAMUSCULAR; INTRAVENOUS EVERY 12 HOURS
Status: COMPLETED | OUTPATIENT
Start: 2021-08-10 | End: 2021-08-11

## 2021-08-10 RX ORDER — LOSARTAN POTASSIUM 25 MG/1
25 TABLET ORAL DAILY
Status: DISCONTINUED | OUTPATIENT
Start: 2021-08-10 | End: 2021-08-13 | Stop reason: HOSPADM

## 2021-08-10 RX ORDER — METOPROLOL TARTRATE 25 MG/1
25 TABLET, FILM COATED ORAL 2 TIMES DAILY
Status: DISCONTINUED | OUTPATIENT
Start: 2021-08-10 | End: 2021-08-13 | Stop reason: HOSPADM

## 2021-08-10 RX ORDER — ALBUTEROL SULFATE 0.83 MG/ML
2.5 SOLUTION RESPIRATORY (INHALATION) EVERY 6 HOURS PRN
Status: DISCONTINUED | OUTPATIENT
Start: 2021-08-10 | End: 2021-08-13 | Stop reason: HOSPADM

## 2021-08-10 RX ORDER — CYCLOBENZAPRINE HCL 5 MG
5 TABLET ORAL 3 TIMES DAILY PRN
Status: DISCONTINUED | OUTPATIENT
Start: 2021-08-10 | End: 2021-08-13 | Stop reason: HOSPADM

## 2021-08-10 RX ORDER — ROSUVASTATIN CALCIUM 10 MG/1
10 TABLET, COATED ORAL AT BEDTIME
Status: DISCONTINUED | OUTPATIENT
Start: 2021-08-10 | End: 2021-08-13 | Stop reason: HOSPADM

## 2021-08-10 RX ADMIN — METOPROLOL TARTRATE 25 MG: 25 TABLET, FILM COATED ORAL at 04:22

## 2021-08-10 RX ADMIN — LOSARTAN POTASSIUM 25 MG: 25 TABLET, FILM COATED ORAL at 20:52

## 2021-08-10 RX ADMIN — OXYCODONE HYDROCHLORIDE 5 MG: 5 TABLET ORAL at 17:01

## 2021-08-10 RX ADMIN — ROSUVASTATIN CALCIUM 10 MG: 10 TABLET, FILM COATED ORAL at 20:52

## 2021-08-10 RX ADMIN — ASPIRIN 81 MG: 81 TABLET, CHEWABLE ORAL at 09:58

## 2021-08-10 RX ADMIN — METOPROLOL TARTRATE 25 MG: 25 TABLET, FILM COATED ORAL at 09:58

## 2021-08-10 RX ADMIN — CYCLOBENZAPRINE 5 MG: 5 TABLET, FILM COATED ORAL at 22:23

## 2021-08-10 RX ADMIN — METOPROLOL TARTRATE 25 MG: 25 TABLET, FILM COATED ORAL at 20:53

## 2021-08-10 RX ADMIN — ENOXAPARIN SODIUM 40 MG: 40 INJECTION SUBCUTANEOUS at 10:55

## 2021-08-10 RX ADMIN — FUROSEMIDE 40 MG: 10 INJECTION, SOLUTION INTRAMUSCULAR; INTRAVENOUS at 06:18

## 2021-08-10 RX ADMIN — LATANOPROST 1 DROP: 50 SOLUTION OPHTHALMIC at 20:53

## 2021-08-10 RX ADMIN — OXYCODONE HYDROCHLORIDE 5 MG: 5 TABLET ORAL at 01:45

## 2021-08-10 RX ADMIN — FUROSEMIDE 20 MG: 10 INJECTION, SOLUTION INTRAVENOUS at 18:52

## 2021-08-10 RX ADMIN — PERFLUTREN 3 ML: 6.52 INJECTION, SUSPENSION INTRAVENOUS at 10:45

## 2021-08-10 RX ADMIN — OXYCODONE HYDROCHLORIDE 5 MG: 5 TABLET ORAL at 09:58

## 2021-08-10 RX ADMIN — CALCITONIN SALMON 1 SPRAY: 200 SPRAY, METERED NASAL at 18:53

## 2021-08-10 RX ADMIN — PANTOPRAZOLE SODIUM 40 MG: 20 TABLET, DELAYED RELEASE ORAL at 18:53

## 2021-08-10 ASSESSMENT — MIFFLIN-ST. JEOR: SCORE: 1687.42

## 2021-08-10 ASSESSMENT — ACTIVITIES OF DAILY LIVING (ADL): DEPENDENT_IADLS:: INDEPENDENT

## 2021-08-10 NOTE — ED PROVIDER NOTES
I, Samantha Heller MD have reviewed the documentation, personally taken the patient's history, performed an exam and agree with the physical finds, diagnosis and management plan.  I saw this patient with Jamie Silva, resident [facility].  ED Course as of Aug 09 2233   Mon Aug 09, 2021   1956 I discussed the case with Dr. Shira MD.      2100 Patient is a 80-year-old male that comes in today with bilateral lower extremity edema.  He also complains of some shortness of breath.  He is little bit tachycardic and was found to be hypoxic on arrival.  He is not normally on oxygen but is requiring some oxygen here.  He is a suspected new heart failure.  He does not have a formal diagnosis of this in the past.  He does have some bilateral pitting edema.  Lung sounds are actually fairly clear.  I think with the hypoxia he will end up needing admission to the hospital.  We will check some blood work and chest x-ray and then work on getting him admitted.  I talked to him about transfer somewhere else and he refuses to go anywhere else.  We will plan to just board him here.      2148 Patient's BNP and chest x-ray are not very remarkable.  His heart is little bit enlarged but there is no significant edema.  I think with him being so tachycardic we will plan to get CTA imaging to make sure not missing anything else.  He was already given some Lasix.  We will then get him admitted to the hospital after that.      2205 I spoke with Dr. Cherri Strauss with the hospitalist service.  Patient will come in as a cardiac telemetry inpatient bed.          EKG  Date and time: August 9, 2021 at 2013  Rate: 104 bpm  Rhythm: Sinus tachycardia with premature ventricular complexes  NC interval: 184 ms  QRS interval: 84 ms  QT/QTc: 398/523 ms  ST changes or T wave changes: No acute ST or T wave abnormalities  Change from prior ECG: No significant change from prior  I have independently reviewed and interpreted this EKG.        Samantha Heller,  MD  08/09/21 1929

## 2021-08-10 NOTE — PROGRESS NOTES
A/P    80 year old old male presenting with SOB and leg swelling      Acute mild hypoxic respiratory insufficiency:  sPO2 88% in ED, placed on 2L. 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 TTE showed EF 40-50% but poor study.  BNP normal in setting of morbid obesity and peripheral edema.  - titrate O2 to keep sats > 90%   -pulm consulted.  Defer cpap/bipap to pulm  -start ppi    BiV systolic dysfunction:  TTE showed EF 40-50%. RV systolic dysfunction.  No valve disease. Suboptimal study.  -lasix 20mg q12  -cont metoprolol.      COPD:     PFT's 5/6/21:  FEV1/FVC is 68% and is reduced.  FEV1 is 1.6L (60%) predicted and is reduced.  FVC is 2.35L (66%) predicted and reduced.  There was no improvement in spirometry after a single inhaled dose of bronchodilator.  DLCO is 12.14ml/min/hg (54%) predicted and is reduced when it is corrected for hemoglobin.  Flow volume loops indicate scooping of the expiratory limb.  -Previous trial of Trelegy and Anoro resulted in significant coughing and discontinuation.  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   -check total T3  -PTA synthroid for now     HTN   - IV lasix, metoprolol, stop nifedipine xr given edema issues.  With LV dysfunction change to losartan.    Status post bilateral L2-4 decompressive lumbar laminectomy with medial facetectomies on 3/3/2021. MRI L-spine 6/22/2021 showed recent T12 compression fracture with 50% loss of vertebral height. Fidel brace not tolerated due to COPD per report. Started on calcitonin nasal spray, Flexeril and oxycodone per outpatient provider on 7/26/2021.  Also started on amitriptyline as well.    Morbid obesity: BMI 41     COVID STATUS: Negative Date:8/9      VTE prophylaxis:  Enoxaparin (Lovenox) subcutaneous    discharge 1-2 d          S:  Afebrile. Events overnight noted    O:  Temp: 97.9  F (36.6  C) Temp src: Oral BP: 119/69 Pulse: 72   Resp: 16 SpO2: 92 % O2 Device: None (Room air) Oxygen Delivery: 3 LPM  gen nad  cv rrr, ble edema  Lungs diffusely decreased, nml rate, no whhezes  abd bs+, nttp  Neuro a&o    Lab/imaging reviewed

## 2021-08-10 NOTE — PROGRESS NOTES
[facility]      OUTPATIENT OCCUPATIONAL THERAPY EVALUATION  PLAN OF TREATMENT FOR OUTPATIENT REHABILITATION  (COMPLETE FOR INITIAL CLAIMS ONLY)  Patient's Last Name, First Name, M.I.  YOB: 1940  Dominik Rojas                        Provider's Name  [facility] Medical Record No.  [id number]                               Onset Date:      Start of Care Date:         Type:   Medical Diagnosis:                           Treatment Diagnosis:      Visits from SOC:  1   _________________________________________________________________________________  Plan of Treatment/Functional Goals    Planned Interventions:  See Occupational therapy goals on Care plan in Epic   ;      Goals: See   Goals on Care Plan in [facility] electronic health record.    Therapy Frequency:  Daily  Predicted Duration of Therapy Intervention:  4 days  _________________________________________________________________________________    I CERTIFY THE NEED FOR THESE SERVICES FURNISHED UNDER        THIS PLAN OF TREATMENT AND WHILE UNDER MY CARE     (Physician co-signature of this document indicates review and certification of the therapy plan).                 ,                   Initial Assessment        See   Evaluation dated   in Epic electronic health record.

## 2021-08-10 NOTE — CONSULTS
Care Management Initial Consult    General Information  Assessment completed with: Patient, pt  Type of CM/SW Visit: Initial Assessment    Primary Care Provider verified and updated as needed: Yes   Readmission within the last 30 days: no previous admission in last 30 days   Return Category: Progression of disease  Reason for Consult: discharge planning  Advance Care Planning: Advance Care Planning Reviewed: verified with patient          Communication Assessment  Patient's communication style: spoken language (English or Bilingual)             Cognitive  Cognitive/Neuro/Behavioral: WDL                      Living Environment:   People in home:       Current living Arrangements:        Able to return to prior arrangements:         Family/Social Support:  Care provided by:    Provides care for:                  Description of Support System:           Current Resources:   Patient receiving home care services: No     Community Resources: DME  Equipment currently used at home: shower chair, cane, straight  Supplies currently used at home: None    Employment/Financial:  Employment Status:          Financial Concerns: No concerns identified   Referral to Financial Counselor: No       Lifestyle & Psychosocial Needs:  Social Determinants of Health     Tobacco Use: Medium Risk     Smoking Tobacco Use: Former Smoker     Smokeless Tobacco Use: Never Used   Alcohol Use:      Frequency of Alcohol Consumption:      Average Number of Drinks:      Frequency of Binge Drinking:    Financial Resource Strain:      Difficulty of Paying Living Expenses:    Food Insecurity:      Worried About Running Out of Food in the Last Year:      Ran Out of Food in the Last Year:    Transportation Needs:      Lack of Transportation (Medical):      Lack of Transportation (Non-Medical):    Physical Activity:      Days of Exercise per Week:      Minutes of Exercise per Session:    Stress:      Feeling of Stress :    Social Connections:      Frequency  of Communication with Friends and Family:      Frequency of Social Gatherings with Friends and Family:      Attends Nondenominational Services:      Active Member of Clubs or Organizations:      Attends Club or Organization Meetings:      Marital Status:    Intimate Partner Violence:      Fear of Current or Ex-Partner:      Emotionally Abused:      Physically Abused:      Sexually Abused:    Depression:      PHQ-2 Score:    Housing Stability:      Unable to Pay for Housing in the Last Year:      Number of Places Lived in the Last Year:      Unstable Housing in the Last Year:        Functional Status:  Prior to admission patient needed assistance:   Dependent ADLs:: Ambulation-cane  Dependent IADLs:: Independent  Assesssment of Functional Status: At functional baseline    Mental Health Status:  Mental Health Status: No Current Concerns       Chemical Dependency Status:                Values/Beliefs:  Spiritual, Cultural Beliefs, Nondenominational Practices, Values that affect care:                 Additional Information:  Assessed, lives w/wife and no svcs, wife to transport at discharge, independent and does use shower chair. Declined HCD.      Mauricio Ross RN

## 2021-08-10 NOTE — ED PROVIDER NOTES
Emergency Department Encounter     Evaluation Date & Time:   2021  7:26 PM    CHIEF COMPLAINT:  Leg Swelling and Shortness of Breath      Triage Note:Patient c/o increased swelling in bilateral lower extremities/increased SOB.  Sats 88-92% while in triage, took extra Lasix this morning.  Placed on 2l/m per NC, Sats increased to 95%        Impression and Plan     ED COURSE & MEDICAL DECISION MAKIN:55 PM  Assessed patient.  Pertinent history of COPD, diastolic HF, and A. fib.  Presents with progressive bilateral lower extremity swelling over the last few weeks, also with worsening baseline shortness of breath since yesterday.  No infectious symptoms or concern for recent cardiac event.  Satting in the high 80s on presentation, increased to mid 90s on 2L NC.  Physical exam pertinent for JVD>5cm in hepatojugular reflux, wheezing heard throughout lungs, 2+ pitting edema in bilateral lower extremities. Wells PE score 1.5, low risk. Has been fully covid vaccinated.  Ddx: HF exacerbation vs COPD exacerbation vs pneumonia vs covid vs unlikely PE or CAD event.  Based on history and physical exam, I suspect heart failure exacerbation.  There is some documentation in chart regarding diastolic dysfunction however no out right diagnosis of HFpEF and patient overall unclear regarding hx; last echo from 2021 significant for grade 1 diastolic dysfunction and EF of 55 to 60%.    Ordered BNP, BMP, CBC, CXR, Covid swab, magnesium    8:47 PM    BMP, CBC, mag unremarkable. Covid negative. EKG showing sinus tachy with PVCs, no ST changes. CXR shows mild cardiomegaly without evidence of lung edema, no pleural fluid or pneumothorax.  Ordered 60mg IV lasix; added on troponin.   Dr Heller discussed care with patient; informed him he'll have to be admitted for suspected HF exacerbation    9:51 PM  BNP came back 51 in setting of obesity; Continues to be tachycardic, now to 129. Ordered CT PE to rule out PE.     10:05 PM    Pina signed-out patient with hospitalist who will be resuming care of patient. Patient going back for CT PE now.    At the conclusion of the encounter I discussed the results of all the tests and the disposition. The questions were answered. The patient or family acknowledged understanding and was agreeable with the care plan.    FINAL IMPRESSION:  No diagnosis found.    N/A minutes of critical care time    Reassessments & Consults  ED Course as of Aug 09 2259   Mon Aug 09, 2021   1956 I discussed the case with Dr. Shira MD.      2100 Patient is a 80-year-old male that comes in today with bilateral lower extremity edema.  He also complains of some shortness of breath.  He is little bit tachycardic and was found to be hypoxic on arrival.  He is not normally on oxygen but is requiring some oxygen here.  He is a suspected new heart failure.  He does not have a formal diagnosis of this in the past.  He does have some bilateral pitting edema.  Lung sounds are actually fairly clear.  I think with the hypoxia he will end up needing admission to the hospital.  We will check some blood work and chest x-ray and then work on getting him admitted.  I talked to him about transfer somewhere else and he refuses to go anywhere else.  We will plan to just board him here.      2148 Patient's BNP and chest x-ray are not very remarkable.  His heart is little bit enlarged but there is no significant edema.  I think with him being so tachycardic we will plan to get CTA imaging to make sure not missing anything else.  He was already given some Lasix.  We will then get him admitted to the hospital after that.      2205 I spoke with Dr. Cherri Strauss with the hospitalist service.  Patient will come in as a cardiac telemetry inpatient bed.          MEDICATIONS GIVEN IN THE EMERGENCY DEPARTMENT:  Medications   furosemide (LASIX) injection 60 mg (has no administration in time range)       NEW PRESCRIPTIONS STARTED AT TODAY'S ED VISIT:  New  Prescriptions    No medications on file       HPI     HPI     Dominik Rojas is a 80 year old male with a pertinent history of COPD, a-fib, diastolic HF, CVA, AAA, and recent L2-L4 laminectomy and T12 compression fracture who presents to this ED for evaluation of progressive bilateral LE swelling and worsening SOB.    States that lower extremity swelling has been progressive over the last few weeks but that he did not think much of it until today when he saw the physician assistant at the spine clinic who told him he should get it checked out  at the ED. Also has been endorsing shortness of breath for the last day or 2; says he has baseline cough however no increased sputum production.  Denies sick contacts.  Has albuterol inhaler at home but has only been using daily because it causes him to cough after he uses it.  Not on home oxygen.  Patient unclear regarding possible heart failure history but chart review shows diastolic heart dysfunction. Patient says he is taking Lasix 20 mg daily and that this morning he took an additional 20 mg per his primary care provider's recommendation.  Endorses orthopnea and progres worsening LEDESMA; thinks he may has have had some weight gain.  Unclear whether he is been having PND.  Denies any recent chest pain or chest fluttering sensations.  Additionally endorses history of alcohol abuse but states he is better remission since the 80s and denies any cirrhosis.    REVIEW OF SYSTEMS:  Review of Systems   Constitutional: Positive for activity change, fatigue and unexpected weight change. Negative for diaphoresis and fever.   HENT: Negative for congestion and sneezing.    Respiratory: Positive for cough and shortness of breath. Negative for chest tightness.    Cardiovascular: Positive for leg swelling. Negative for chest pain and palpitations.   Gastrointestinal: Negative for abdominal distention and abdominal pain.   Endocrine: Negative for polydipsia.   Genitourinary: Positive for  difficulty urinating.   Musculoskeletal: Positive for back pain.   Skin: Negative for color change and rash.         Medical History     Past Medical History:   Diagnosis Date     AAA (abdominal aortic aneurysm) (H)      Alcohol dependence in remission (H)      Anxiety      Atrial fibrillation with normal ventricular rate (H)      Benign prostatic hyperplasia with lower urinary tract symptoms      Bronchiectasis without complication (H)      COPD (chronic obstructive pulmonary disease) (H)      CVA (cerebral vascular accident) (H) 2019     DDD (degenerative disc disease), lumbar      Depression      Depressive disorder      Diabetes (H)      Diastolic dysfunction 01/22/2021     DJD (degenerative joint disease) of knee      Essential hypertension      Glaucoma      Glaucoma      History of stroke without residual deficits      Hyperlipidemia      Hypertension      Hypothyroidism      Hypothyroidism      Kidney stones      Major depression      Memory problem      Nocturia      Obesity      Overactive bladder 02/25/2021     Primary osteoarthritis of left knee      Psoriasis      Psoriasis      Seborrheic keratoses      Somnolence, daytime      Stenosis of right carotid artery        Past Surgical History:   Procedure Laterality Date     ABDOMEN SURGERY       ABDOMINAL AORTIC ANEURYSM REPAIR  2013    stent     C HUANG W/O FACETEC FORAMOT/[facility]KC 1/2 VRT SEG, LUMBAR Bilateral 3/3/2021    Procedure: Bilateral lumbar 2 - Lumbar 4 decompressive lumbar laminectomy with medial facetectomies;  Surgeon: Renée King MD;  Location: [facility] [place];  Service: Spine     CAROTID ENDARTERECTOMY Right 9/9/2019    Procedure: RIGHT CAROTID ENDARTERECTOMY;  Surgeon: Miguel Muller MD;  Location: [facility] OR;  Service: General     CIRCUMCISION       CYSTOSCOPY       CYSTOSCOPY PROSTATE W/ LASER N/A 6/12/2018    Procedure:  TRANSURETHRAL RESECTION OF THE PROSTATE WITH QUANTA LASER;  Surgeon: Eze Levi MD;   Location: [facility] OR;  Service:      CYSTOSCOPY PROSTATE W/ LASER N/A 2020    Procedure: CYSTOSCOPY WITH TRANSURETHRAL INCISION OF THE PROSTATE WITH QUANTA LASER;  Surgeon: Eze Levi MD;  Location: [facility] OR;  Service: Urology     CYSTOSCOPY W/ LITHOLAPAXY / [facility] N/A 2018    Procedure: CYSTOSCOPY AND LITHOLAPAXY;  Surgeon: Eze Levi MD;  Location: [facility] OR;  Service:      IR ABDOMINAL AORTAGRAM  2020     IR ABDOMINAL AORTIC ANEURYSM ENDOGRAFT  2020     IR ABDOMINAL AORTOGRAM  2020     IR ABDOMINAL ENDOVASCULAR STENT GRAFT  2020     LITHOTRIPSY       PERCUTANEOUS NEPHROSTOLITHOTOMY Right 03/10/2020       Family History   Problem Relation Age of Onset     Emphysema Mother      No Known Problems Father      Cirrhosis Father      No Known Problems Sister      No Known Problems Brother      No Known Problems Maternal Aunt      No Known Problems Maternal Uncle      No Known Problems Paternal Aunt      No Known Problems Paternal Uncle      No Known Problems Maternal Grandmother      No Known Problems Maternal Grandfather      No Known Problems Paternal Grandmother      No Known Problems Paternal Grandfather      No Known Problems Other        Social History     Tobacco Use     Smoking status: Former Smoker     Packs/day: 0.50     Years: 35.00     Pack years: 17.50     Types: Cigarettes     Quit date: 1990     Years since quittin.6     Smokeless tobacco: Never Used     Tobacco comment: quit    Substance Use Topics     Alcohol use: No     Comment: Alcoholic Drinks/day: quit      Drug use: No       acetaminophen (TYLENOL) 500 MG tablet  albuterol (PROAIR HFA/PROVENTIL HFA/VENTOLIN HFA) 108 (90 Base) MCG/ACT inhaler  albuterol (PROVENTIL) (2.5 MG/3ML) 0.083% neb solution  amitriptyline (ELAVIL) 50 MG tablet  aspirin (ASA) 81 MG chewable tablet  benzocaine-menthol (CEPACOL) 15-3.6 MG lozenge  calcitonin, salmon, (MIACALCIN) 200 UNIT/ACT  "nasal spray  Cholecalciferol (VITAMIN D3) 50 MCG (2000 UT) CAPS  cyclobenzaprine (FLEXERIL) 5 MG tablet  hydrOXYzine (VISTARIL) 50 MG capsule  latanoprost (XALATAN) 0.005 % ophthalmic solution  levothyroxine (SYNTHROID/LEVOTHROID) 150 MCG tablet  multivitamin w/minerals (MULTI-VITAMIN) tablet  NIFEdipine ER OSMOTIC (PROCARDIA XL) 30 MG 24 hr tablet  oxyCODONE (ROXICODONE) 5 MG tablet  rosuvastatin (CRESTOR) 10 MG tablet  umeclidinium-vilanterol (ANORO ELLIPTA) 62.5-25 MCG/INH oral inhaler        Physical Exam     First Vitals:  Patient Vitals for the past 24 hrs:   BP Temp Temp src Pulse Resp SpO2 Height Weight   08/09/21 2015 127/66 -- -- 112 20 -- -- --   08/09/21 1656 129/74 98.5  F (36.9  C) Temporal 104 24 (!) 88 % 1.626 m (5' 4\") 104.3 kg (230 lb)       PHYSICAL EXAM:   General: Awake, alert and oriented. No acute distress.  Lying supine 45 degree comfortably in bed.   HEENT: Conjunctivae are clear, nonicteric.   Neck: Trachea midline.   Respiratory: Able to speak in full sentences.  2L nasal cannula in place.  Mild wheezing heard throughout.  Cardiac: Occasional extra beats.  Minimal systolic murmur.  Radial pulses 2+ bilaterally.  Warm and well-perfused.  JVD>5cm with positive hepatojugular reflux.  Abdominal: Abdomen is obese, soft and non-tender without distention.  No hepatomegaly.  No fluid wave appreciated  Extremities: Lower extremities with 2+ pitting edema up to the knees.   Skin: Skin in warm without rashes.  No jaundice.  Neurological: Moves all extremities well.  Psychiatric: Some tangential thinking. Overall pleasant and appropriate affect.    Results     LAB:  All pertinent labs reviewed and interpreted  Labs Ordered and Resulted from Time of ED Arrival Up to the Time of Departure from the ED   CBC WITH PLATELETS - Normal   BASIC METABOLIC PANEL - Normal   MAGNESIUM - Normal   SARS-COV2 (COVID-19) VIRUS RT-PCR - Normal    Narrative:     Testing was performed using the [facility]  SARS-CoV-2 & " Influenza A/B Assay on the [employer]  [employer]  System.  This test should be ordered for the detection of SARS-COV-2 in individuals who meet SARS-CoV-2 clinical and/or epidemiological criteria. Test performance is unknown in asymptomatic patients.  This test is for in vitro diagnostic use under the FDA EUA for laboratories certified under CLIA to perform moderate and/or high complexity testing. This test has not been FDA cleared or approved.  A negative test does not rule out the presence of PCR inhibitors in the specimen or target RNA in concentration below the limit of detection for the assay. The possibility of a false negative should be considered if the patient's recent exposure or clinical presentation suggests COVID-19.  [facility] are certified under the Clinical Laboratory Improvement Amendments of 1988 (CLIA-88) as qualified to perform moderate and/or high complexity laboratory testing.   B-TYPE NATRIURETIC PEPTIDE ([facility] ONLY)   TROPONIN I   PERIPHERAL IV CATHETER   COVID-19 VIRUS (CORONAVIRUS) BY PCR    Narrative:     The following orders were created for panel order Symptomatic COVID-19 Virus (Coronavirus) by PCR Nasopharyngeal.  Procedure                               Abnormality         Status                     ---------                               -----------         ------                     SARS-COV2 (COVID-19) Vir...[[id number]]  Normal              Final result                 Please view results for these tests on the individual orders.       RADIOLOGY:  XR Chest Port 1 View   Final Result   IMPRESSION:       The initial image excludes the apices and subsequent image was taken with extreme apical lordotic technique.      Mildly enlarged cardiac silhouette likely unchanged given differences in projection. Mediastinal borders are well-defined. No peribronchial fluid cuffs or septal lines to suggest interstitial lung edema.      Angular opacities are present in the medial lower  lobes likely representing foci of atelectasis. The mid and upper lungs are clear.      There is no visible pleural fluid. No pneumothorax.               ECG:  Performed at: 20:13 August 9th, 2021    Impression: Sinus tachycardia with occasional PVCs    Rate: 104  Rhythm: Sinus tachy with PVCs  Axis: Regular  UT Interval: 184  QRS Interval: 84  QTc Interval: 523  ST Changes: None  Comparison: 1/29/21. PVCs now present but overall otherwise unchanged.    I have independently reviewed and interpreted the EKS(s) documented above    PROCEDURES:  Procedures:  None    [facility] System Documentation     CMS Diagnoses: N/A       Jamie Silva MD  Emergency Medicine  [facility] EMERGENCY DEPARTMENT    Precepted with Jamie Cabrera MD  Resident  08/09/21 3702

## 2021-08-10 NOTE — PHARMACY-ADMISSION MEDICATION HISTORY
Pharmacy Note - Admission Medication History    Pertinent Provider Information: None     ______________________________________________________________________    Prior To Admission (PTA) med list completed and updated in EMR.       PTA Med List   Medication Sig Last Dose     acetaminophen (TYLENOL) 500 MG tablet Take 1,000 mg by mouth as needed      albuterol (PROAIR HFA/PROVENTIL HFA/VENTOLIN HFA) 108 (90 Base) MCG/ACT inhaler Inhale 2 puffs into the lungs every 6 hours as needed       albuterol (PROVENTIL) (2.5 MG/3ML) 0.083% neb solution Inhale 2.5 mg into the lungs every 6 hours as needed       amitriptyline (ELAVIL) 50 MG tablet 12.5 mg At Bedtime  8/8/2021 at Unknown time     aspirin (ASA) 81 MG chewable tablet Take 81 mg by mouth daily 8/9/2021 at Unknown time     calcitonin, salmon, (MIACALCIN) 200 UNIT/ACT nasal spray Spray 1 spray into one nostril alternating nostrils daily  8/9/2021 at Unknown time     Cholecalciferol (VITAMIN D3) 50 MCG (2000 UT) CAPS 1 tablet daily 8/9/2021 at Unknown time     cyclobenzaprine (FLEXERIL) 5 MG tablet Take 1 tablet (5 mg) by mouth 3 times daily as needed for muscle spasms 8/9/2021 at Unknown time     furosemide (LASIX) 20 MG tablet Take 20 mg by mouth daily 8/9/2021 at 40mg today     latanoprost (XALATAN) 0.005 % ophthalmic solution Place 1 drop into both eyes At Bedtime  8/8/2021 at Unknown time     levothyroxine (SYNTHROID/LEVOTHROID) 150 MCG tablet 1 tablet daily 8/9/2021 at Unknown time     multivitamin w/minerals (MULTI-VITAMIN) tablet Take 1 tablet by mouth daily 8/9/2021 at Unknown time     NIFEdipine ER OSMOTIC (PROCARDIA XL) 30 MG 24 hr tablet Take 30 mg by mouth daily 8/9/2021 at Unknown time     oxyCODONE (ROXICODONE) 5 MG tablet Take 1 tablet (5 mg) by mouth 3 times daily as needed for severe pain 8/9/2021 at Unknown time     rosuvastatin (CRESTOR) 10 MG tablet Take 10 mg by mouth At Bedtime  8/8/2021 at Unknown time       Information [facility](s[facility]  Method of interview communication: phone    Summary of Changes to PTA Med List  New: Furosemide  Discontinued: Cepacol, hydroxyzine, Anoro  Changed: None    Patient was asked about OTC/herbal products specifically.  PTA med list reflects this.    In the past week, patient estimated taking medication this percent of the time:  greater than 90%.    Allergies were reviewed, assessed, and updated with the patient.      Patient did not bring any medications to the hospital and can't retrieve from home. No multi-dose medications are available for use during hospital stay.     The information provided in this note is only as accurate as the sources available at the time of the update(s).    Thank you for the opportunity to participate in the care of this patient.    Hilary Richard [facility]  8/9/2021 11:26 PM

## 2021-08-10 NOTE — H&P
Admission History and Physical   Dominik Rojas, 1940, [id number]  [facility]  Bilateral lower extremity edema [R60.0]  PCP:Lisandro Meza, [phone]   Admitting provider: Michelle Yuan MD.    Code status:  Full Code          Extended Emergency Contact Information  Primary Emergency Contact: MARVIN ROJAS  Home Phone: [phone]  Relation: Spouse  Secondary Emergency Contact: KWAME KAUR  Home Phone: [phone]  Relation: Daughter       Assessment and Plan  Active Problems:    Benign essential hypertension    Hypothyroidism    Shortness of breath    Hypoxia    Bilateral lower extremity edema    Acute respiratory failure with hypoxia (H)    Dominik Rojas is a 80 year old old male presenting with SOB and leg swelling     Acute respiratory failure sating 88% on RA placed on Supp O2 CT findings Severe central airway narrowing compatible with tracheal bronchomalacia excessive dynamic airway collapse. Follow-up pulmonary consultation recommended.  - titrate O2 to keep sats > 90%   - initially was suspected for CHF and giving IV lasix with edema and orthopnea symptoms will continue and adjust dosing for now until Echo.  - Pulm consult in am with CT findings   - cardiac tele for now     BLE edema   - diuresing as above  -Lymphedema care     H/O hyothyroid  -med rec not completed  - check TSH in AM    HTN   - IV lasix for now   - home meds once verified     COVID STATUS: Negative Date:8/9     VTE prophylaxis:  Enoxaparin (Lovenox) SQ  DIET: Orders Placed This Encounter      Combination Diet Low Saturated Fat Diet; No Caffeine for 24 hours (once tests completed, may have caffeine)    Drains/Lines: none  Weight bearing status: WBAT  Disposition/Barriers to discharge: pending further work-up   Code Status:Full Code    HPI: Dominik Rojas is a 80 year old old maleCOPD, a-fib, diastolic HF, CVA, AAA, and recent L2-L4 laminectomy and T12 compression fracture who presents to this ED  for evaluation of progressive bilateral LE swelling and worsening SOB.     States that lower extremity swelling has been progressive over the last few weeks but that he did not think much of it until today when he saw the physician assistant at the spine clinic who told him he should get it checked out  at the ED. Also has been endorsing shortness of breath for the last day or 2; says he has baseline cough however no increased sputum production.  Denies sick contacts.  Has albuterol inhaler at home but has only been using daily because it causes him to cough after he uses it.  Not on home oxygen.  Patient says he is taking Lasix 20 mg daily and that this morning he took an additional 20 mg per his primary care provider's recommendation.  Endorses orthopnea and progres worsening LEDESMA; thinks he may has have had some weight gain.  Denies any recent chest pain or chest fluttering sensations.  Additionally endorses history of alcohol abuse but states he is better remission since the 80s and denies any cirrhosis.  Not on home Oxygen.     Past Medical History:   Diagnosis Date     AAA (abdominal aortic aneurysm) (H)     s/p stenting     Alcohol dependence in remission (H)      Anxiety      Atrial fibrillation with normal ventricular rate (H)      Benign prostatic hyperplasia with lower urinary tract symptoms      Bronchiectasis without complication (H)     2/27/2020     COPD (chronic obstructive pulmonary disease) (H)      CVA (cerebral vascular accident) (H) 2019     DDD (degenerative disc disease), lumbar      Depression      Depressive disorder      Diabetes (H)      Diastolic dysfunction 01/22/2021     DJD (degenerative joint disease) of knee     left     Essential hypertension      Glaucoma      Glaucoma      History of stroke without residual deficits      Hyperlipidemia      Hypertension      Hypothyroidism      Hypothyroidism      Kidney stones      Major depression      Memory problem      Nocturia      Obesity       Overactive bladder 02/25/2021     Primary osteoarthritis of left knee      Psoriasis      Psoriasis      Seborrheic keratoses      Somnolence, daytime      Stenosis of right carotid artery     history of TIA's     Past Surgical History:   Procedure Laterality Date     ABDOMEN SURGERY       ABDOMINAL AORTIC ANEURYSM REPAIR  2013    stent     C HUANG W/O FACETEC FORAMOT/DSKC 1/2 VRT SEG, LUMBAR Bilateral 3/3/2021    Procedure: Bilateral lumbar 2 - Lumbar 4 decompressive lumbar laminectomy with medial facetectomies;  Surgeon: Renée King MD;  Location: [facility];  Service: Spine     CAROTID ENDARTERECTOMY Right 9/9/2019    Procedure: RIGHT CAROTID ENDARTERECTOMY;  Surgeon: Miguel Muller MD;  Location: [facility] OR;  Service: General     CIRCUMCISION       CYSTOSCOPY       CYSTOSCOPY PROSTATE W/ LASER N/A 6/12/2018    Procedure:  TRANSURETHRAL RESECTION OF THE PROSTATE WITH QUANTA LASER;  Surgeon: Eze Levi MD;  Location: [facility];  Service:      CYSTOSCOPY PROSTATE W/ LASER N/A 2/18/2020    Procedure: CYSTOSCOPY WITH TRANSURETHRAL INCISION OF THE PROSTATE WITH QUANTA LASER;  Surgeon: Eze Levi MD;  Location: [facility] OR;  Service: Urology     CYSTOSCOPY W/ LITHOLAPAXY / EHL N/A 6/12/2018    Procedure: CYSTOSCOPY AND LITHOLAPAXY;  Surgeon: Eze Levi MD;  Location: [facility];  Service:      IR ABDOMINAL AORTAGRAM  5/19/2020     IR ABDOMINAL AORTIC ANEURYSM ENDOGRAFT  5/19/2020     IR ABDOMINAL AORTOGRAM  5/19/2020     IR ABDOMINAL ENDOVASCULAR STENT GRAFT  5/19/2020     LITHOTRIPSY       PERCUTANEOUS NEPHROSTOLITHOTOMY Right 03/10/2020     Family History   Problem Relation Age of Onset     Emphysema Mother      No Known Problems Father      Cirrhosis Father      No Known Problems Sister      No Known Problems Brother      No Known Problems Maternal Aunt      No Known Problems Maternal Uncle      No Known Problems Paternal Aunt      No  Known Problems Paternal Uncle      No Known Problems Maternal Grandmother      No Known Problems Maternal Grandfather      No Known Problems Paternal Grandmother      No Known Problems Paternal Grandfather      No Known Problems Other      Social History     Socioeconomic History     Marital status:      Spouse name: Not on file     Number of children: Not on file     Years of education: Not on file     Highest education level: Not on file   Occupational History     Not on file   Tobacco Use     Smoking status: Former Smoker     Packs/day: 0.50     Years: 35.00     Pack years: 17.50     Types: Cigarettes     Quit date: 1990     Years since quittin.6     Smokeless tobacco: Never Used     Tobacco comment: quit    Substance and Sexual Activity     Alcohol use: No     Comment: Alcoholic Drinks/day: quit      Drug use: No     Sexual activity: Yes     Partners: Female     Birth control/protection: Post-menopausal   Other Topics Concern     Parent/sibling w/ CABG, MI or angioplasty before 65F 55M? No   Social History Narrative     Not on file     Social Determinants of Health     Financial Resource Strain:      Difficulty of Paying Living Expenses:    Food Insecurity:      Worried About Running Out of Food in the Last Year:      Ran Out of Food in the Last Year:    Transportation Needs:      Lack of Transportation (Medical):      Lack of Transportation (Non-Medical):    Physical Activity:      Days of Exercise per Week:      Minutes of Exercise per Session:    Stress:      Feeling of Stress :    Social Connections:      Frequency of Communication with Friends and Family:      Frequency of Social Gatherings with Friends and Family:      Attends Hindu Services:      Active Member of Clubs or Organizations:      Attends Club or Organization Meetings:      Marital Status:    Intimate Partner Violence:      Fear of Current or Ex-Partner:      Emotionally Abused:      Physically Abused:      Sexually  Abused:      Allergies   Allergen Reactions     Diclofenac      Other reaction(s): GI Upset     Loratadine      Other reaction(s): Urinary Retention       PRIOR TO ADMISSION MEDICATIONS   (Not in a hospital admission)       REVIEW OF SYSTEMS:  12 point reviewed pertinent negatives and positives in HPI all others negative     PHYSICAL EXAM  B/P:128/93 T:98.5 P:129 R: 20     Constitutional: awake, alert, cooperative, no apparent distress, and appears stated age  Eyes: extra-ocular muscles intact  ENT: normocepalic, without obvious abnormality, atramatic  Respiratory: No increased work of breathing, good air exchange, clear to auscultation bilaterally, no crackles or wheezing  Cardiovascular: tachycardic reg rhythm no M/R/G  GI: No scars, normal bowel sounds, soft, non-distended, non-tender, no masses palpated, no hepatosplenomegally  Skin: scattered SKs   Musculoskeletal: BLE edema   Neurologic: Awake, alert, oriented to name, place and time.  Cranial nerves II-XII are grossly intact.  Motor is 5 out of 5 bilaterally.  Cerebellar finger to nose, heel to shin intact.  Sensory is intact.  Babinski down going, Romberg negative, and gait is normal.  Neuropsychiatric: General: normal, calm and normal eye contact  PERTINENT LABS and RADIOLOGY   Recent Labs   Lab 08/09/21 2008 08/06/21  1536   WBC 9.2  --    HGB 13.6  --    MCV 90  --      --     143   POTASSIUM 3.7 4.3   CHLORIDE 104 101   CO2 29 30   BUN 17 16   CR 0.99 0.87   ANIONGAP 10 12   CHELI 9.1 9.7    90     Recent Results (from the past 24 hour(s))   XR Chest Port 1 View    Narrative    EXAM: XR CHEST PORT 1 VIEW  LOCATION: [facility]  DATE/TIME: 8/9/2021 7:50 PM    INDICATION: fluid overload  COMPARISON: Portable AP view of the chest 03/07/2021      Impression    IMPRESSION:     The initial image excludes the apices and subsequent image was taken with extreme apical lordotic technique.    Mildly enlarged cardiac  silhouette likely unchanged given differences in projection. Mediastinal borders are well-defined. No peribronchial fluid cuffs or septal lines to suggest interstitial lung edema.    Angular opacities are present in the medial lower lobes likely representing foci of atelectasis. The mid and upper lungs are clear.    There is no visible pleural fluid. No pneumothorax.   CT Chest Pulmonary Embolism w Contrast    Narrative    EXAM: CT CHEST PULMONARY EMBOLISM W CONTRAST  LOCATION: [facility]  DATE/TIME: 8/9/2021 10:19 PM    INDICATION: Hypoxia  COMPARISON: 08/16/2020.  TECHNIQUE: CT chest pulmonary angiogram during arterial phase injection of IV contrast. Multiplanar reformats and MIP reconstructions were performed. Dose reduction techniques were used.   CONTRAST: 100 mL Isovue-370    FINDINGS:  ANGIOGRAM CHEST: No evidence for pulmonary embolism. Pulmonary arteries normal in caliber. Moderate atherosclerotic calcification of the thoracic aorta. No aortic dissection or aneurysm..    HEART: Cardiac chambers within normal limits. No pericardial effusion. Mild coronary artery calcification.    LUNGS AND PLEURA: Mild emphysema. No pulmonary mass, consolidation, or suspicious pulmonary nodule. There is severe central airway narrowing on expiration, including the lower trachea and proximal mainstem bronchi bilaterally. There is moderate mucous   plugging in the basal airways. No pleural effusion or pneumothorax.    MEDIASTINUM: No adenopathy or mass.    LIMITED UPPER ABDOMEN: Numerous calcified stones filling the gallbladder.    MUSCULOSKELETAL: Negative.      Impression    IMPRESSION:  1.  No evidence for pulmonary embolism.   2.  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 Follow-up pulmonary consultation recommended.     EKG:sinus tachycardia with PVS, [id number] Yuan MD  [facility]  Service  [phone]

## 2021-08-10 NOTE — ED NOTES
Pt stated he had to take of his lymphedema wraps. There were making me feel claustrophobic. Pt did state once the wraps were applied that he didn't know about these and if he will be able to keep them on.

## 2021-08-10 NOTE — CONSULTS
[facility] Pulmonary Consult Note    Dominik Rojas,  1940, MRN [id number]    HPI: 80 y.o. with a past medical history significant for AAA status post stenting, alcohol dependence in remission, anxiety, atrial fibrillation, BPH, COPD, CVA, degenerative lumbar disc disease, depression, diastolic dysfunction joint disease of the knee, HTN, glaucoma, nephrolithiasis, psoriasis, seborrheic keratosis and right carotid artery stenosis with increasing leg swelling and dyspnea.     He reports he went to the spine center because he was having significant back pain. They noticed his swollen legs and increased dyspnea and sent him to the ER. He first noticed his legs were getting more swollen on the  at a family gathering. He has a hard time decifering if he is more short of breath because of lack of mobility due to back and leg pain or not. He has a trace cough that is non-productive. No sick contacts, no recent travel. He has albuterol inhaler at home, however he has only used it once. They have a nebulizer machine as well, but do not know how to use it. He denies fevers. He denies coughing or chocking when he eats.     Review of Systems:   A 14 point comprehensive review of systems form was negative except as noted.    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   - pt sees pulmonary as outpt  - no significant wheezing or stridor  - would monitor and follow up in clinic at [facility]     Suzan Bernal DO  Pulmonary and Critical Care Attending  pgr [phone]      Past Medical History:   Diagnosis Date     AAA (abdominal aortic aneurysm) (H)     s/p stenting     Alcohol dependence in remission (H)      Anxiety      Atrial fibrillation with normal ventricular rate (H)       Benign prostatic hyperplasia with lower urinary tract symptoms      Bronchiectasis without complication (H)     2/27/2020     COPD (chronic obstructive pulmonary disease) (H)      CVA (cerebral vascular accident) (H) 2019     DDD (degenerative disc disease), lumbar      Depression      Depressive disorder      Diabetes (H)      Diastolic dysfunction 01/22/2021     DJD (degenerative joint disease) of knee     left     Essential hypertension      Glaucoma      Glaucoma      History of stroke without residual deficits      Hyperlipidemia      Hypertension      Hypothyroidism      Hypothyroidism      Kidney stones      Major depression      Memory problem      Nocturia      Obesity      Overactive bladder 02/25/2021     Primary osteoarthritis of left knee      Psoriasis      Psoriasis      Seborrheic keratoses      Somnolence, daytime      Stenosis of right carotid artery     history of TIA's     Past Surgical History:   Procedure Laterality Date     ABDOMEN SURGERY       ABDOMINAL AORTIC ANEURYSM REPAIR  2013    stent     C HUANG W/O FACETEC FORAMOT/DSKC 1/2 VRT SEG, LUMBAR Bilateral 3/3/2021    Procedure: Bilateral lumbar 2 - Lumbar 4 decompressive lumbar laminectomy with medial facetectomies;  Surgeon: Renée King MD;  Location: [facility];  Service: Spine     CAROTID ENDARTERECTOMY Right 9/9/2019    Procedure: RIGHT CAROTID ENDARTERECTOMY;  Surgeon: Miguel Muller MD;  Location: [facility];  Service: General     CIRCUMCISION       CYSTOSCOPY       CYSTOSCOPY PROSTATE W/ LASER N/A 6/12/2018    Procedure:  TRANSURETHRAL RESECTION OF THE PROSTATE WITH QUANTA LASER;  Surgeon: Eze Levi MD;  Location: [facility];  Service:      CYSTOSCOPY PROSTATE W/ LASER N/A 2/18/2020    Procedure: CYSTOSCOPY WITH TRANSURETHRAL INCISION OF THE PROSTATE WITH QUANTA LASER;  Surgeon: Eze Levi MD;  Location: [facility];  Service: Urology     CYSTOSCOPY W/ LITHOLAPAXY / EHL  "N/A 6/12/2018    Procedure: CYSTOSCOPY AND LITHOLAPAXY;  Surgeon: Eze Levi MD;  Location: [facility] OR;  Service:      IR ABDOMINAL AORTAGRAM  5/19/2020     IR ABDOMINAL AORTIC ANEURYSM ENDOGRAFT  5/19/2020     IR ABDOMINAL AORTOGRAM  5/19/2020     IR ABDOMINAL ENDOVASCULAR STENT GRAFT  5/19/2020     LITHOTRIPSY       PERCUTANEOUS NEPHROSTOLITHOTOMY Right 03/10/2020     Family History   Problem Relation Age of Onset     Emphysema Mother      No Known Problems Father      Cirrhosis Father      No Known Problems Sister      No Known Problems Brother      No Known Problems Maternal Aunt      No Known Problems Maternal Uncle      No Known Problems Paternal Aunt      No Known Problems Paternal Uncle      No Known Problems Maternal Grandmother      No Known Problems Maternal Grandfather      No Known Problems Paternal Grandmother      No Known Problems Paternal Grandfather      No Known Problems Other      he  reports that he quit smoking about 31 years ago. His smoking use included cigarettes. He has a 17.50 pack-year smoking history. He has never used smokeless tobacco. He reports that he does not drink alcohol and does not use drugs.  Allergies   Allergen Reactions     Diclofenac      Other reaction(s): GI Upset     Loratadine      Other reaction(s): Urinary Retention     Meds: See EHR for the updated medication list on 08/10/21.    Physical Exam:  /69 (BP Location: Left arm)   Pulse 72   Temp 97.9  F (36.6  C) (Oral)   Resp 16   Ht 1.626 m (5' 4\")   Wt 106.1 kg (234 lb)   SpO2 92%   BMI 40.17 kg/m    GEN: NAD, sitting up in a chair  MS/ Back: no kyphosis  Mouth: MMM  CVS: regular rhythm, no murmurs, tight edema of LE with erythema  RESP: CTA BL, no wheezing  ABD: Soft, No abdominal pain with palpation   SKIN: Warm extremities   Vasc: Radial pulses intact flavio  NEURO:  CN2-12 grossly intact   PSYCH: Normal affect    Pertinent Studies:    Radiology: Personally reviewed the below " images:    EXAM: CT CHEST PULMONARY EMBOLISM W CONTRAST  LOCATION: [facility]  DATE/TIME: 8/9/2021 10:19 PM     INDICATION: Hypoxia  COMPARISON: 08/16/2020.  TECHNIQUE: CT chest pulmonary angiogram during arterial phase injection of IV contrast. Multiplanar reformats and MIP reconstructions were performed. Dose reduction techniques were used.   CONTRAST: 100 mL Isovue-370     FINDINGS:  ANGIOGRAM CHEST: No evidence for pulmonary embolism. Pulmonary arteries normal in caliber. Moderate atherosclerotic calcification of the thoracic aorta. No aortic dissection or aneurysm..     HEART: Cardiac chambers within normal limits. No pericardial effusion. Mild coronary artery calcification.     LUNGS AND PLEURA: Mild emphysema. No pulmonary mass, consolidation, or suspicious pulmonary nodule. There is severe central airway narrowing on expiration, including the lower trachea and proximal mainstem bronchi bilaterally. There is moderate mucous   plugging in the basal airways. No pleural effusion or pneumothorax.     MEDIASTINUM: No adenopathy or mass.     LIMITED UPPER ABDOMEN: Numerous calcified stones filling the gallbladder.     MUSCULOSKELETAL: Negative.                                                                      IMPRESSION:  1.  No evidence for pulmonary embolism.   2.  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 Follow-up pulmonary consultation recommended.    Suzan Bernal DO  Pulmonary and Critical Care Attending  pgr [phone]

## 2021-08-11 ENCOUNTER — APPOINTMENT (OUTPATIENT)
Dept: OCCUPATIONAL THERAPY | Facility: HOSPITAL | Age: 81
DRG: 202 | End: 2021-08-11
Attending: FAMILY MEDICINE
Payer: COMMERCIAL

## 2021-08-11 ENCOUNTER — APPOINTMENT (OUTPATIENT)
Dept: OCCUPATIONAL THERAPY | Facility: HOSPITAL | Age: 81
DRG: 202 | End: 2021-08-11
Payer: COMMERCIAL

## 2021-08-11 LAB
ALBUMIN SERPL-MCNC: 3.5 G/DL (ref 3.5–5)
ALP SERPL-CCNC: 113 U/L (ref 45–120)
ALT SERPL W P-5'-P-CCNC: 17 U/L (ref 0–45)
ANION GAP SERPL CALCULATED.3IONS-SCNC: 9 MMOL/L (ref 5–18)
AST SERPL W P-5'-P-CCNC: 30 U/L (ref 0–40)
BILIRUB SERPL-MCNC: 1.4 MG/DL (ref 0–1)
BUN SERPL-MCNC: 18 MG/DL (ref 8–28)
CALCIUM SERPL-MCNC: 8.9 MG/DL (ref 8.5–10.5)
CHLORIDE BLD-SCNC: 104 MMOL/L (ref 98–107)
CO2 SERPL-SCNC: 27 MMOL/L (ref 22–31)
CREAT SERPL-MCNC: 0.85 MG/DL (ref 0.7–1.3)
ERYTHROCYTE [DISTWIDTH] IN BLOOD BY AUTOMATED COUNT: 14.6 % (ref 10–15)
GFR SERPL CREATININE-BSD FRML MDRD: 83 ML/MIN/1.73M2
GLUCOSE BLD-MCNC: 87 MG/DL (ref 70–125)
HCT VFR BLD AUTO: 44.8 % (ref 40–53)
HGB BLD-MCNC: 13.7 G/DL (ref 13.3–17.7)
MCH RBC QN AUTO: 28.6 PG (ref 26.5–33)
MCHC RBC AUTO-ENTMCNC: 30.6 G/DL (ref 31.5–36.5)
MCV RBC AUTO: 94 FL (ref 78–100)
PLATELET # BLD AUTO: 217 10E3/UL (ref 150–450)
POTASSIUM BLD-SCNC: 4 MMOL/L (ref 3.5–5)
PROT SERPL-MCNC: 7 G/DL (ref 6–8)
RBC # BLD AUTO: 4.79 10E6/UL (ref 4.4–5.9)
SODIUM SERPL-SCNC: 140 MMOL/L (ref 136–145)
WBC # BLD AUTO: 9.2 10E3/UL (ref 4–11)

## 2021-08-11 PROCEDURE — 97129 THER IVNTJ 1ST 15 MIN: CPT | Mod: GO | Performed by: OCCUPATIONAL THERAPIST

## 2021-08-11 PROCEDURE — 36415 COLL VENOUS BLD VENIPUNCTURE: CPT | Performed by: INTERNAL MEDICINE

## 2021-08-11 PROCEDURE — 97535 SELF CARE MNGMENT TRAINING: CPT | Mod: GO | Performed by: OCCUPATIONAL THERAPIST

## 2021-08-11 PROCEDURE — 250N000013 HC RX MED GY IP 250 OP 250 PS 637: Performed by: INTERNAL MEDICINE

## 2021-08-11 PROCEDURE — 99207 PR CONSULT E&M CHANGED TO INITIAL LEVEL: CPT | Performed by: INTERNAL MEDICINE

## 2021-08-11 PROCEDURE — 210N000001 HC R&B IMCU HEART CARE

## 2021-08-11 PROCEDURE — 250N000013 HC RX MED GY IP 250 OP 250 PS 637: Performed by: FAMILY MEDICINE

## 2021-08-11 PROCEDURE — 250N000011 HC RX IP 250 OP 636: Performed by: INTERNAL MEDICINE

## 2021-08-11 PROCEDURE — 99221 1ST HOSP IP/OBS SF/LOW 40: CPT | Performed by: INTERNAL MEDICINE

## 2021-08-11 PROCEDURE — 97530 THERAPEUTIC ACTIVITIES: CPT | Mod: GO | Performed by: OCCUPATIONAL THERAPIST

## 2021-08-11 PROCEDURE — 82040 ASSAY OF SERUM ALBUMIN: CPT | Performed by: INTERNAL MEDICINE

## 2021-08-11 PROCEDURE — 250N000011 HC RX IP 250 OP 636: Performed by: FAMILY MEDICINE

## 2021-08-11 PROCEDURE — 85027 COMPLETE CBC AUTOMATED: CPT | Performed by: INTERNAL MEDICINE

## 2021-08-11 PROCEDURE — 99233 SBSQ HOSP IP/OBS HIGH 50: CPT | Performed by: FAMILY MEDICINE

## 2021-08-11 RX ORDER — FUROSEMIDE 10 MG/ML
20 INJECTION INTRAMUSCULAR; INTRAVENOUS EVERY 12 HOURS
Status: DISCONTINUED | OUTPATIENT
Start: 2021-08-11 | End: 2021-08-13 | Stop reason: HOSPADM

## 2021-08-11 RX ORDER — FUROSEMIDE 10 MG/ML
20 INJECTION INTRAMUSCULAR; INTRAVENOUS EVERY 12 HOURS
Status: DISCONTINUED | OUTPATIENT
Start: 2021-08-11 | End: 2021-08-11

## 2021-08-11 RX ADMIN — LOSARTAN POTASSIUM 25 MG: 25 TABLET, FILM COATED ORAL at 08:34

## 2021-08-11 RX ADMIN — LATANOPROST 1 DROP: 50 SOLUTION OPHTHALMIC at 20:37

## 2021-08-11 RX ADMIN — FUROSEMIDE 20 MG: 10 INJECTION, SOLUTION INTRAMUSCULAR; INTRAVENOUS at 19:04

## 2021-08-11 RX ADMIN — ROSUVASTATIN CALCIUM 10 MG: 10 TABLET, FILM COATED ORAL at 20:36

## 2021-08-11 RX ADMIN — OXYCODONE HYDROCHLORIDE 5 MG: 5 TABLET ORAL at 00:05

## 2021-08-11 RX ADMIN — LEVOTHYROXINE SODIUM 150 MCG: 0.03 TABLET ORAL at 06:34

## 2021-08-11 RX ADMIN — OXYCODONE HYDROCHLORIDE 5 MG: 5 TABLET ORAL at 08:34

## 2021-08-11 RX ADMIN — CALCITONIN SALMON 1 SPRAY: 200 SPRAY, METERED NASAL at 08:33

## 2021-08-11 RX ADMIN — ENOXAPARIN SODIUM 40 MG: 40 INJECTION SUBCUTANEOUS at 08:36

## 2021-08-11 RX ADMIN — AMITRIPTYLINE HYDROCHLORIDE 12.5 MG: 25 TABLET, FILM COATED ORAL at 20:36

## 2021-08-11 RX ADMIN — ASPIRIN 81 MG: 81 TABLET, CHEWABLE ORAL at 08:35

## 2021-08-11 RX ADMIN — METOPROLOL TARTRATE 25 MG: 25 TABLET, FILM COATED ORAL at 20:36

## 2021-08-11 RX ADMIN — METOPROLOL TARTRATE 25 MG: 25 TABLET, FILM COATED ORAL at 08:34

## 2021-08-11 RX ADMIN — OXYCODONE HYDROCHLORIDE 5 MG: 5 TABLET ORAL at 16:47

## 2021-08-11 RX ADMIN — PANTOPRAZOLE SODIUM 40 MG: 20 TABLET, DELAYED RELEASE ORAL at 06:34

## 2021-08-11 RX ADMIN — FUROSEMIDE 20 MG: 10 INJECTION, SOLUTION INTRAVENOUS at 06:12

## 2021-08-11 RX ADMIN — CYCLOBENZAPRINE 5 MG: 5 TABLET, FILM COATED ORAL at 22:47

## 2021-08-11 ASSESSMENT — MIFFLIN-ST. JEOR: SCORE: 1676.98

## 2021-08-11 NOTE — PLAN OF CARE
Lymphedema Discharge Summary    Reason for therapy discharge:    Patient/family request discontinuation of services.    Progress towards therapy goal(s). See goals on Care Plan in [facility] electronic health record for goal details.  Goals not met.  Barriers to achieving goals:   limited tolerance for therapy.    Therapy recommendation(s):    Continued therapy is recommended.  Rationale/Recommendations:  Pt has not been able to participate in Lymphedema Therapy for outcomes to be produced. Pt would benefit from further Lymphedema Therapy to reduce swelling in BLE and increase independence with mobility and ADLs. Please re-order Lymphedema Therapy if Pt becomes more willing to participate.     Ibeth Gant OTR/L on 8/11/2021

## 2021-08-11 NOTE — PROGRESS NOTES
Care Management Discharge Note    Discharge Date:  8/11/2021 (pending pulmonology clearance for discharge).     Discharge Disposition:  Home.     Discharge Services:  No skilled in-home services anticipated.     Discharge DME:  No new DME from care management standpoint.    Discharge Transportation:  Spouse.    Private pay costs discussed: Not applicable    PAS Confirmation Code:  NA  Patient/family educated on Medicare website which has current facility and service quality ratings:  NA at this time.     Education Provided on the Discharge Plan:  Per team.  Persons Notified of Discharge Plans: patient hoping to discharge to home today.   Patient/Family in Agreement with the Plan:  yes    Handoff Referral Completed: NA at this time (no home care anticipated, no BPA fired for care coordination).     Additional Information:  Patient is  and independent with activities of daily living at baseline.  His goal is to return home at discharge and his wife plans to provide transportation.  No care management needs identified at this time but CM will continue to monitor progression of care, review team recommendations and provide discharge planning assist as needed.      Alia Kennedy RN

## 2021-08-11 NOTE — PROGRESS NOTES
[facility] [place] PROGRESS NOTE      ADMIT DATE: 8/9/2021     FACILITY: [facility]    PCP: Lisandro Meza, [phone]    ASSESSMENT AND PLAN:   80 year old old male presenting with SOB and leg swelling and patient was admitted for management of these symptoms.     Active Problems:    Benign essential hypertension    Hypothyroidism    Shortness of breath    Hypoxia    Bilateral lower extremity edema    Acute respiratory failure with hypoxia (H)      Acute mild hypoxic respiratory insufficiency:    -sPO2 88% in ED, placed on supplemental O2 (not on home oxygen).   -CTA chest PE study showed severe central airway narrowing compatible with tracheal bronchomalacia with excessive dynamic airway collapse and some mucous plugging.   -TTE showed EF 40-50% but poor study.    -BNP normal in setting of morbid obesity and peripheral edema.  -wean FiO2 to keep sats 90-92%  -pulm consulted.-->recommending to c/w diuresis. Patient to follow-up with pulm on 8/28 at clinic concerning for central airway narrowing compatible with tracheal bronchomalacia excessive dynamic airway collapse  -started protonix 40 mg PO QD       BiV systolic dysfunction:    -TTE showed EF 40-50%. RV systolic dysfunction.  No valve disease. Suboptimal study.  -has had about 2.2 L output so far  -c/w lasix 20mg q12  -cont metoprolol 25 mg PO BID         COPD:     PFT's 5/6/21:  FEV1/FVC is 68% and is reduced.  FEV1 is 1.6L (60%) predicted and is reduced.  FVC is 2.35L (66%) predicted and reduced.  There was no improvement in spirometry after a single inhaled dose of bronchodilator.  DLCO is 12.14ml/min/hg (54%) predicted and is reduced when it is corrected for hemoglobin.  Flow volume loops indicate scooping of the expiratory limb.--restrictive pattern (DDx: Asbestosis, berylliosis, hypersensitivity pneumonitis, idiopathic pulmonary fibrosis, Langerhans cell histiocytosis (histiocytosis X), lymphangitic spread of tumor, miliary  tuberculosis, sarcoidosis, silicosis (late))  -Previous trial of Trelegy and Anoro resulted in significant coughing and discontinuation.  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  -sleep study ordered on discharge         BLE edema   -has had about 2.2 L output so far  -b/l doppler US negative for DVT   - c/w IV lasix 20 mg BID   -Lymphedema wraps as tolerated            Hyothyroidism:   -low TSH w/ normal fT4 and normal T3  -c/w home synthroid for now     HTN   -BP stable  - c/w IV lasix 20 mg BID  -c/w losartan which was started because of LV dysfunction    -stopped nifedipine xr given edema issues-->started metoprolol which will c/w       Status post bilateral L2-4 decompressive lumbar laminectomy with medial facetectomies on 3/3/2021. MRI L-spine 6/22/2021 showed recent T12 compression fracture with 50% loss of vertebral height. Fidel brace not tolerated due to COPD per report. Started on calcitonin nasal spray, Flexeril and oxycodone per outpatient provider on 7/26/2021. Also started on amitriptyline as well. Will c/w all these meds during this admission.        Morbid obesity: BMI 41  -PCP to address lifestyle changes with patient             FEN/GI: low fat diet  DVT proph: lovenox  Code status: Full Code      Discharge barriers:  -IV diuresis, wean off of supplemental O2 if possible   -ADOD: 1-2 days       SUBJECTIVE:    Patient is anxious about wearing the lymphedema wrappings. I told patient I restarted his anti-anxiety medication which should help with his anxiety. Patient denies any other complaints right now.     ROS:  12 Points review of systems reviewed and is negative except for what has already been mentioned above    OBJECTIVE:  Patient Vitals for the past 24 hrs:   BP Temp Temp src Pulse Resp SpO2 Height Weight   08/11/21 0718 131/84  "98.3  F (36.8  C) Oral 81 18 96 % -- --   08/11/21 0625 -- -- -- -- -- 95 % -- --   08/11/21 0620 -- -- -- -- -- (!) 84 % -- --   08/11/21 0333 (!) 149/87 98.2  F (36.8  C) Oral 68 18 97 % -- 105.1 kg (231 lb 11.2 oz)   08/10/21 2327 128/84 98  F (36.7  C) Oral 62 18 98 % -- --   08/10/21 1911 125/79 97.4  F (36.3  C) Oral 66 18 90 % -- --   08/10/21 1522 119/69 97.9  F (36.6  C) Oral 72 16 92 % -- --   08/10/21 1325 (!) 144/72 98  F (36.7  C) Oral 91 16 94 % 1.626 m (5' 4\") 106.1 kg (234 lb)   08/10/21 1223 135/70 -- -- 86 14 94 % -- --   08/10/21 1000 137/77 -- -- 104 -- 97 % -- --   08/10/21 0928 -- 98.7  F (37.1  C) Oral -- -- -- -- --          Intake/Output Summary (Last 24 hours) at 8/11/2021 0801  Last data filed at 8/11/2021 0653  Gross per 24 hour   Intake 240 ml   Output 1150 ml   Net -910 ml     GENRL: Alert and oriented X 3. Not in acute distress. Satting at 94% on 3 LPM  HEENT: NC/AT      Neck- supple      Sclera- anicteric      Mucous membrane- moist and pink  CHEST: crackles at b/l bases  HEART: S1S2 regular. No murmurs, rubs or gallops  ABDMN: Soft. Non-tender. No guarding or rigidity. Bowel sounds- active  EXTRM: lymphedema of BLEs present   NEURO:  No involuntary movements  INTGM: please see nursing assessment for full skin assessment  PULSES: 2+ and symmetric all extremities  PSYCH: normal affect, normal speech     DIAGNOSTIC DATA:          Recent Results (from the past 24 hour(s))   Echocardiogram Complete    Collection Time: 08/10/21 10:55 AM   Result Value Ref Range    LVEF  40-50%    Troponin I    Collection Time: 08/10/21  4:27 PM   Result Value Ref Range    Troponin I 0.02 0.00 - 0.29 ng/mL   Extra Green Top (Lithium Heparin) Tube    Collection Time: 08/10/21  4:27 PM   Result Value Ref Range    Hold Specimen JIC    Troponin I    Collection Time: 08/10/21  6:52 PM   Result Value Ref Range    Troponin I 0.02 0.00 - 0.29 ng/mL   Extra Red Top Tube    Collection Time: 08/10/21  6:52 PM   Result " Value Ref Range    Hold Specimen JIC    Extra Purple Top Tube    Collection Time: 08/10/21  6:52 PM   Result Value Ref Range    Hold Specimen JIC    CBC with platelets    Collection Time: 08/11/21  5:52 AM   Result Value Ref Range    WBC Count 9.2 4.0 - 11.0 10e3/uL    RBC Count 4.79 4.40 - 5.90 10e6/uL    Hemoglobin 13.7 13.3 - 17.7 g/dL    Hematocrit 44.8 40.0 - 53.0 %    MCV 94 78 - 100 fL    MCH 28.6 26.5 - 33.0 pg    MCHC 30.6 (L) 31.5 - 36.5 g/dL    RDW 14.6 10.0 - 15.0 %    Platelet Count 217 150 - 450 10e3/uL   Comprehensive metabolic panel    Collection Time: 08/11/21  5:52 AM   Result Value Ref Range    Sodium 140 136 - 145 mmol/L    Potassium 4.0 3.5 - 5.0 mmol/L    Chloride 104 98 - 107 mmol/L    Carbon Dioxide (CO2) 27 22 - 31 mmol/L    Anion Gap 9 5 - 18 mmol/L    Urea Nitrogen 18 8 - 28 mg/dL    Creatinine 0.85 0.70 - 1.30 mg/dL    Calcium 8.9 8.5 - 10.5 mg/dL    Glucose 87 70 - 125 mg/dL    Alkaline Phosphatase 113 45 - 120 U/L    AST 30 0 - 40 U/L    ALT 17 0 - 45 U/L    Protein Total 7.0 6.0 - 8.0 g/dL    Albumin 3.5 3.5 - 5.0 g/dL    Bilirubin Total 1.4 (H) 0.0 - 1.0 mg/dL    GFR Estimate 83 >60 mL/min/1.73m2        Results for orders placed or performed during the hospital encounter of 08/09/21   XR Chest Port 1 View    Impression    IMPRESSION:     The initial image excludes the apices and subsequent image was taken with extreme apical lordotic technique.    Mildly enlarged cardiac silhouette likely unchanged given differences in projection. Mediastinal borders are well-defined. No peribronchial fluid cuffs or septal lines to suggest interstitial lung edema.    Angular opacities are present in the medial lower lobes likely representing foci of atelectasis. The mid and upper lungs are clear.    There is no visible pleural fluid. No pneumothorax.   CT Chest Pulmonary Embolism w Contrast    Impression    IMPRESSION:  1.  No evidence for pulmonary embolism.   2.  Severe central airway narrowing  compatible with tracheal bronchomalacia excessive dynamic airway collapse. Follow-up pulmonary consultation recommended.   US Lower Extremity Venous Duplex Bilateral    Impression    IMPRESSION:  1.  No deep venous thrombosis in the bilateral lower extremities although exam of the right upper leg and left common femoral vein is compromised as patient could not tolerate full compression.  2.  Mild subcutaneous edema distally bilaterally.   Echocardiogram Complete   Result Value Ref Range    LVEF  40-50%           All recent labs reviewed personally  Radiology report reviewed.   Radiology Results: imaging impressions reviewed      The total time spent in preparing this progress note is about 35 minutes, >50% time spent in care co-ordination that includes reviewing labs, images, discussing the plan of care with patient/family, consultants, and .      Lucille Pires MD.   [facility] Medicine Service   [phone]   Pager [phone]

## 2021-08-11 NOTE — PLAN OF CARE
7am to 730pm  Problem: Adult Inpatient Plan of Care  Goal: Plan of Care Review  Outcome: No Change   Pt arrived on floor at 1330 from ER.  Pt went down for US of LE. Results still pending.  Trop neg.  Started on IV Lasix. Continue to monitor I&O.

## 2021-08-11 NOTE — PLAN OF CARE
Lung sounds clear and diminished throughout with oxygen sats low to upper 90's on 3 liters NC. Pt stated he doesn't need the oxygen but on room air oxygen sats 84%. Pt denies dyspnea with activity. States he can already feel that the edema in his BLE has improved. AM weight is down 2 lbs 5 ounces. Pt want discharge today.

## 2021-08-11 NOTE — CONSULTS
PULMONARY MEDICINE CONSULT  8/11/2021    CODE: Full Code      Assessment/Plan:   80 year old man with most pertinent history of COPD, atrial fibrillation, diastolic HF, CVA, anxiety, admitted 8/9/2021 acutely decompensated diastolic heart failure requiring initiation of IV diuresis. Pulmonary service was consulted for evaluation of central airway narrowing w/ concern for tracheobronchomalacia.     Reviewed Mr. Rojas's CT chest.  This is an expiratory study that over exaggerates central airway collapse, and would exaggerate dynamic airway collapse that can be seen in patients with COPD and asthma.  While patient does have exertional dyspnea that is likely multifactorial (diastolic heart failure, deconditioning, COPD), he does not endorse clinical symptoms consistent with symptomatic tracheomalacia.  If future evaluation for TM is desired, he would need dynamic CT chest.  I would defer the decision regarding this evaluation to his primary pulmonologist.    Patient does not have any clinical evidence of an acute COPD exacerbation that would require nebulizers or steroids.  If possible, patient would benefit from inpatient RT instruction on how to use his nebulizer machine at home.  If this is done, it would be helpful if patient's spouse was present.  His clinical presentation is consistent with acutely decompensated diastolic heart failure.    Recommendations:    Goal SpO2 >/= 90-92%, wean off supplemental oxygen as able, avoid hyperoxia    [place] failure [place] [facility] [place]    [place] up w/ Dr. Tucker as previously planned -- visit scheduled for 8/23/21 at 0900-hrs    Thank you for this consult. Pulmonary will sign off at this time.    Charlotte Geronimo MD  Pulmonary and Critical Care     HPI:   HISTORY OF PRESENTING ILLNESS:  Dominik Rojas is a 80 year old man with history of COPD, anxiety, atrial fibrillation, diastolic cardiac dysfunction, HTN, CVA, admitted to [facility] on 8/9/2021 with dyspnea  and progressively worsening peripheral edema consistent with acutely decompensated diastolic heart failure.  Patient was started on IV diuresis and supplemental oxygen on admission.    Pulmonary service was consulted for evaluation of incidentally noted central airway narrowing with concern for tracheobronchomalacia.  Normally patient follows with Dr. Tucker in the [facility] in [place]; he was last seen by her in May, and was most recently seen by Dr. Tovar in July.  He has diagnosis of COPD, but states that he has not been able to tolerate any of the inhalers as they make him cough.  He was provided with a nebulizer machine recently, but has not been shown how to use it; his understanding is that he was going to be seen in clinic to discuss how to use the machine (it seems that he was supposed to start as needed albuterol for his intermittent dyspnea).  Patient attributes majority of his exertional dyspnea to his deconditioning, which is related to his back pain.    At this time patient's main concern is his anxiety.  He feels that he is not being treated for it in the hospital and states that he does not understand why he is not being provided with his PTA anxiety medication.  Unfortunately he cannot remember the name of the medication.  Feels that he is yet to see a doctor, and is frustrated because he does not understand why he needs to remain in the hospital and what is being done to help him.    I provided empathetic bedside conversation with the patient to address his concerns regarding his dyspnea, back pain, and significant anxiety associated with hospitalization.  I used the time to explain in detail why he needed to be admitted and what has been done for him in the last 2 days.  Due to patient's anxiety I had to repeat the after mentioned information several times and I wrote out this information on patient on the white board in his room.    REVIEW OF SYSTEMS: 12-point ROS was negative with exceptions  as detailed in the HPI.    MEDICAL HISTORY:  has a past medical history of AAA (abdominal aortic aneurysm) (H), Alcohol dependence in remission (H), Anxiety, Atrial fibrillation with normal ventricular rate (H), Benign prostatic hyperplasia with lower urinary tract symptoms, Bronchiectasis without complication (H), COPD (chronic obstructive pulmonary disease) (H), CVA (cerebral vascular accident) (H) (2019), DDD (degenerative disc disease), lumbar, Depression, Depressive disorder, Diabetes (H), Diastolic dysfunction (01/22/2021), DJD (degenerative joint disease) of knee, Essential hypertension, Glaucoma, Glaucoma, History of stroke without residual deficits, Hyperlipidemia, Hypertension, Hypothyroidism, Hypothyroidism, Kidney stones, Major depression, Memory problem, Nocturia, Obesity, Overactive bladder (02/25/2021), Primary osteoarthritis of left knee, Psoriasis, Psoriasis, Seborrheic keratoses, Somnolence, daytime, and Stenosis of right carotid artery.  SURGICAL HISTORY:  has a past surgical history that includes Abdomen surgery; lithotripsy; IR Abdominal Endovascular Stent Graft (5/19/2020); IR Abdominal Aortogram (5/19/2020); Abdominal Aortic Aneurysm Repair (2013); Cystoscopy; Cystoscopy W/ Litholapaxy / Ehl (N/A, 6/12/2018); Cystoscopy Prostate W/ Laser (N/A, 6/12/2018); Circumcision; carotid endarterectomy (Right, 9/9/2019); Cystoscopy Prostate W/ Laser (N/A, 2/18/2020); Ir Abdominal Aortic Aneurysm Endograft (5/19/2020); Ir Abdominal Aortagram (5/19/2020); Percutaneous Nephrostolithotomy (Right, 03/10/2020); and HUANG W/O FACETEC FORAMOT/[facility] 1/2 VRT SEG, LUMBAR (Bilateral, 3/3/2021).  SOCIAL HISTORY:  reports that he quit smoking about 31 years ago. His smoking use included cigarettes. He has a 17.50 pack-year smoking history. He has never used smokeless tobacco. He reports that he does not drink alcohol and does not use drugs.  FAMILY HISTORY: family history includes Cirrhosis in his father; Emphysema in  his mother; No Known Problems in his brother, father, maternal aunt, maternal grandfather, maternal grandmother, maternal uncle, paternal aunt, paternal grandfather, paternal grandmother, paternal uncle, sister, and another family member.  MEDICATIONS: personally reviewed, including EMR/Care Everywhere. Pertinent information noted & updated.     ALLERGIES:   Allergies   Allergen Reactions     Diclofenac      Other reaction(s): GI Upset     Loratadine      Other reaction(s): Urinary Retention       Vitals: Temp:  [97.4  F (36.3  C)-98.3  F (36.8  C)] 97.9  F (36.6  C)  Pulse:  [62-81] 67  Resp:  [16-18] 18  BP: (110-149)/(68-87) 110/68  SpO2:  [84 %-98 %] 94 %  Physical Exam:   General: elderly obese  man, sitting up in a chair  HEENT: EOMI, MMM  CV: RRR; extremities well perfused  Pulm: equal b/l breath sounds, no wheezing, no rhonchi, bibasilar inspiratory crackles, no cough; no visible accessory muscle use  Abd: soft, obese, normoactive bowel sounds  Msk: 2+ pitting BLE edema  Derm: no acute lesions/rashes on limited exam  Neuro: awake, alert, attentive; moves extremities w/o focal deficit  Psych: anxious, at times tangential    Labs: personally reviewed & interpreted in EMR.   Imaging: personally reviewed & interpreted, including formal Radiology report in the EMR.  CTA chest, 8/9/21:  ANGIOGRAM CHEST: No evidence for pulmonary embolism. Pulmonary arteries normal in caliber. Moderate atherosclerotic calcification of the thoracic aorta. No aortic dissection or aneurysm..  HEART: Cardiac chambers within normal limits. No pericardial effusion. Mild coronary artery calcification.  LUNGS AND PLEURA: Mild emphysema. No pulmonary mass, consolidation, or suspicious pulmonary nodule. There is severe central airway narrowing on expiration, including the lower trachea and proximal mainstem bronchi bilaterally. There is moderate mucous   plugging in the basal airways. No pleural effusion or  pneumothorax.  MEDIASTINUM: No adenopathy or mass.  LIMITED UPPER ABDOMEN: Numerous calcified stones filling the gallbladder.  MUSCULOSKELETAL: Negative.      PFT, 5/6/21: moderate obstruction w/o BD response, moderately reduced diffusion capacity.        Total time of 110 minutes was devoted to the consult with at least 50% of the time spent in direct patient interaction, education, and coordination of patient's care.    This report was prepared using speech recognition software.  Any typographical errors are unintentional.

## 2021-08-12 ENCOUNTER — APPOINTMENT (OUTPATIENT)
Dept: OCCUPATIONAL THERAPY | Facility: HOSPITAL | Age: 81
DRG: 202 | End: 2021-08-12
Payer: COMMERCIAL

## 2021-08-12 LAB
ANION GAP SERPL CALCULATED.3IONS-SCNC: 10 MMOL/L (ref 5–18)
BUN SERPL-MCNC: 18 MG/DL (ref 8–28)
CALCIUM SERPL-MCNC: 8.9 MG/DL (ref 8.5–10.5)
CHLORIDE BLD-SCNC: 104 MMOL/L (ref 98–107)
CO2 SERPL-SCNC: 28 MMOL/L (ref 22–31)
CREAT SERPL-MCNC: 0.9 MG/DL (ref 0.7–1.3)
GFR SERPL CREATININE-BSD FRML MDRD: 81 ML/MIN/1.73M2
GLUCOSE BLD-MCNC: 119 MG/DL (ref 70–125)
POTASSIUM BLD-SCNC: 3.8 MMOL/L (ref 3.5–5)
SODIUM SERPL-SCNC: 142 MMOL/L (ref 136–145)

## 2021-08-12 PROCEDURE — 250N000013 HC RX MED GY IP 250 OP 250 PS 637: Performed by: FAMILY MEDICINE

## 2021-08-12 PROCEDURE — 250N000011 HC RX IP 250 OP 636: Performed by: FAMILY MEDICINE

## 2021-08-12 PROCEDURE — 99233 SBSQ HOSP IP/OBS HIGH 50: CPT | Performed by: INTERNAL MEDICINE

## 2021-08-12 PROCEDURE — 250N000011 HC RX IP 250 OP 636: Performed by: INTERNAL MEDICINE

## 2021-08-12 PROCEDURE — 80048 BASIC METABOLIC PNL TOTAL CA: CPT | Performed by: INTERNAL MEDICINE

## 2021-08-12 PROCEDURE — 36415 COLL VENOUS BLD VENIPUNCTURE: CPT | Performed by: INTERNAL MEDICINE

## 2021-08-12 PROCEDURE — 250N000013 HC RX MED GY IP 250 OP 250 PS 637: Performed by: INTERNAL MEDICINE

## 2021-08-12 PROCEDURE — 97130 THER IVNTJ EA ADDL 15 MIN: CPT | Mod: GO | Performed by: OCCUPATIONAL THERAPIST

## 2021-08-12 PROCEDURE — 97129 THER IVNTJ 1ST 15 MIN: CPT | Mod: GO | Performed by: OCCUPATIONAL THERAPIST

## 2021-08-12 PROCEDURE — 120N000004 HC R&B MS OVERFLOW

## 2021-08-12 RX ORDER — LORAZEPAM 0.5 MG/1
0.25 TABLET ORAL
Status: COMPLETED | OUTPATIENT
Start: 2021-08-12 | End: 2021-08-12

## 2021-08-12 RX ORDER — LORAZEPAM 0.5 MG/1
0.25 TABLET ORAL 2 TIMES DAILY PRN
Status: DISCONTINUED | OUTPATIENT
Start: 2021-08-12 | End: 2021-08-13 | Stop reason: HOSPADM

## 2021-08-12 RX ADMIN — OXYCODONE HYDROCHLORIDE 5 MG: 5 TABLET ORAL at 00:04

## 2021-08-12 RX ADMIN — LORAZEPAM 0.25 MG: 0.5 TABLET ORAL at 06:27

## 2021-08-12 RX ADMIN — ROSUVASTATIN CALCIUM 10 MG: 10 TABLET, FILM COATED ORAL at 20:01

## 2021-08-12 RX ADMIN — OXYCODONE HYDROCHLORIDE 5 MG: 5 TABLET ORAL at 08:05

## 2021-08-12 RX ADMIN — FUROSEMIDE 20 MG: 10 INJECTION, SOLUTION INTRAMUSCULAR; INTRAVENOUS at 17:02

## 2021-08-12 RX ADMIN — LORAZEPAM 0.25 MG: 0.5 TABLET ORAL at 13:33

## 2021-08-12 RX ADMIN — CYCLOBENZAPRINE 5 MG: 5 TABLET, FILM COATED ORAL at 11:00

## 2021-08-12 RX ADMIN — METOPROLOL TARTRATE 25 MG: 25 TABLET, FILM COATED ORAL at 19:48

## 2021-08-12 RX ADMIN — ENOXAPARIN SODIUM 40 MG: 40 INJECTION SUBCUTANEOUS at 08:05

## 2021-08-12 RX ADMIN — LORAZEPAM 0.25 MG: 0.5 TABLET ORAL at 19:05

## 2021-08-12 RX ADMIN — AMITRIPTYLINE HYDROCHLORIDE 12.5 MG: 25 TABLET, FILM COATED ORAL at 20:01

## 2021-08-12 RX ADMIN — LEVOTHYROXINE SODIUM 150 MCG: 0.03 TABLET ORAL at 06:32

## 2021-08-12 RX ADMIN — LOSARTAN POTASSIUM 25 MG: 25 TABLET, FILM COATED ORAL at 08:05

## 2021-08-12 RX ADMIN — FUROSEMIDE 20 MG: 10 INJECTION, SOLUTION INTRAMUSCULAR; INTRAVENOUS at 06:28

## 2021-08-12 RX ADMIN — ASPIRIN 81 MG: 81 TABLET, CHEWABLE ORAL at 08:05

## 2021-08-12 RX ADMIN — LATANOPROST 1 DROP: 50 SOLUTION OPHTHALMIC at 20:01

## 2021-08-12 RX ADMIN — CALCITONIN SALMON 1 SPRAY: 200 SPRAY, METERED NASAL at 08:05

## 2021-08-12 RX ADMIN — PANTOPRAZOLE SODIUM 40 MG: 20 TABLET, DELAYED RELEASE ORAL at 06:31

## 2021-08-12 RX ADMIN — METOPROLOL TARTRATE 25 MG: 25 TABLET, FILM COATED ORAL at 08:05

## 2021-08-12 ASSESSMENT — MIFFLIN-ST. JEOR: SCORE: 1673.36

## 2021-08-12 NOTE — PROVIDER NOTIFICATION
Text page to Dr. Celis:  Patient complaining of back pain and anxiety. No more pain mediations left to give him for this shift but he says its the anxiety more than the pain that is bothering him. He is requesting a PRN anxiety medication be ordered.

## 2021-08-12 NOTE — PROGRESS NOTES
Care Management Follow Up    Length of Stay (days): 3    Expected Discharge Date: 08/13/2021          Concerns to be Addressed:   IV Lasix every 12 hours.  Patient plan of care discussed at interdisciplinary rounds: Yes    Anticipated Discharge Disposition:  Home.     Anticipated Discharge Services:  No skilled in-home services anticipated.  Anticipated Discharge DME:  Per therapy (if indicated).    Patient/family educated on Medicare website which has current facility and service quality ratings:  NA at this time.  Education Provided on the Discharge Plan:  Per team  Patient/Family in Agreement with the Plan:  yes    Referrals Placed by CM/SW:  None  Private pay costs discussed: Not applicable at this time.     Additional Information:  Patient is  and independent with activities of daily living at baseline.  His goal is to return home at discharge and his wife plans to provide transportation.  No care management needs identified at this time but CM will continue to monitor progression of care, review team recommendations and provide discharge planning assist as needed.     Alia Kennedy RN

## 2021-08-12 NOTE — PLAN OF CARE
Problem: Adult Inpatient Plan of Care  Goal: Optimal Comfort and Wellbeing  Outcome: Improving     He continues to complain of significant back pain, often times aching and then sharp at times. Also tried orthopedic essential oil, not helpful. He requested PRN anxiety medication for breakthrough anxiety. MD ordered PRN ativan, which was effective.

## 2021-08-12 NOTE — PROVIDER NOTIFICATION
Patient having anxiety attack. Patient keeps calling family asking for someone to help. Pt has not slept. Spoke to family and told them I would page out to the hospitalist and leave a note for the day team.     Patients daughter called again 0645 AM, voicing concerns and frustration that he does not sleep. Wants to know that her dad is being taken care here. Voiced to her that I left notes to the doctors to address this. Daughter stated that she will be in around 2PM to visit him. Will pass on to day nurse.

## 2021-08-12 NOTE — PROGRESS NOTES
A/P    Acute mild hypoxic respiratory insufficiency:    -sPO2 88% in ED, placed on supplemental O2 (not on home oxygen).   -CTA chest PE study showed severe central airway narrowing compatible with tracheal bronchomalacia with excessive dynamic airway collapse and some mucous plugging.   -TTE showed EF 40-50% but poor study.  On IV diuretics. Uncertain total net negative fluid balance.  -BNP normal in setting of morbid obesity and peripheral edema.  -wean FiO2 to keep sats 90-92%. Home O2 screen on discharge tomorrow  -pulm consulted.-->recommendED to c/w diuresis. Patient to follow-up with pulm on 8/28 at clinic concerning for central airway narrowing compatible with tracheal bronchomalacia excessive dynamic airway collapse  -protonix 40 mg PO QD     BiV systolic dysfunction:    -TTE showed EF 40-50%. RV systolic dysfunction.  No valve disease. Suboptimal study.  Probable untreated TERENEC contributory.  -IV lasix 20mg q12  -cont metoprolol 25 mg PO BID  -LosARTAN     COPD w/o acute exacerbation:     PFT's 5/6/21:  FEV1/FVC is 68% and is reduced.  FEV1 is 1.6L (60%) predicted and is reduced.  FVC is 2.35L (66%) predicted and reduced.  There was no improvement in spirometry after a single inhaled dose of bronchodilator.  DLCO is 12.14ml/min/hg (54%) predicted and is reduced when it is corrected for hemoglobin.  Flow volume loops indicate scooping of the expiratory limb.--restrictive pattern (DDx: Asbestosis, berylliosis, hypersensitivity pneumonitis, idiopathic pulmonary fibrosis, Langerhans cell histiocytosis (histiocytosis X), lymphangitic spread of tumor, miliary tuberculosis, sarcoidosis, silicosis (late))  -Previous trial of Trelegy and Anoro resulted in significant coughing and discontinuation.  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  -sleep study ordered on discharge     Chronic BLE edema   -b/l doppler US  negative for DVT   - IV lasix 20 mg BID   -Lymphedema wraps if tolerated     Hyothyroidism:   -low TSH w/ normal fT4 and normal total T3  -c/w home synthroid for now     HTN   - stable  - c/w IV lasix 20 mg BID  -c/w losartan which was started because of LV dysfunction    -stopped nifedipine xr given edema issues  -metoprolol      Status post bilateral L2-4 decompressive lumbar laminectomy with medial facetectomies on 3/3/2021. MRI L-spine 6/22/2021 showed recent T12 compression fracture with 50% loss of vertebral height. Brace not tolerated due to COPD per report. Started on calcitonin nasal spray, Flexeril and oxycodone per outpatient provider on 7/26/2021. Also started on amitriptyline as well. Will c/w all these meds during this admission.      Morbid obesity: BMI 41  -PCP to address lifestyle changes with patient     JOJO:  Elavil 12.5mg at bedtime, ativan 0.25mg po bid prn. outpt f/u. pcp to consider ssri vs snri in place of elavil, cognitive behavioral therapy    FEN/GI: low fat diet  DVT proph: lovenox  Code status: Full Code    Discharge tomorrow        S:  Afebrile. Anxious. No cp. Sob and edema better. No n/v/d    O:  Temp: 97.6  F (36.4  C) Temp src: Oral BP: 116/82 Pulse: 80   Resp: 20 SpO2: 92 % O2 Device: None (Room air) Oxygen Delivery: 3 LPM  Physical Examination: General appearance - alert, and in no distress  Eyes - pupils equal and reactive, extraocular eye movements intact, sclera anicteric  Mouth - mucous membranes moist, pharynx normal without lesions  Lungs - clear to auscultation, no wheezes, rales or rhonchi, symmetric air entry  Heart - normal rate.  1+ peripheral edema.  Abdomen - soft, nontender, nondistended, no masses or organomegaly, BS+  Neurological - alert, oriented, normal speech    Lab/imaging reviewed.  Tele reviewed nsr

## 2021-08-12 NOTE — PLAN OF CARE
"Patient is alert and oriented x3-4 fluctuates with time. Pt needs frequent reminders of plan of care and when he last took medications.     He is on 3L with O2 sats at 90-92%. When not wearing O2 he is sating at 85-86%. Explained to patient plan of care for getting his edema under control. He needs reminders of his plan of care.   Problem: Gas Exchange Impaired  Goal: Optimal Gas Exchange  Outcome: Improving     Patient complains of 8-9/10 back pain, medicated with Oxycodone and ice applied to back. Pt stated he forgot if the Ice was helpful.     Patient states he is frustrated because quote \"nobody tells me what exactly my problem is and how we are going to treat it\". I explained to the patient his plan of care for his stay here.   "

## 2021-08-13 ENCOUNTER — APPOINTMENT (OUTPATIENT)
Dept: OCCUPATIONAL THERAPY | Facility: HOSPITAL | Age: 81
DRG: 202 | End: 2021-08-13
Payer: COMMERCIAL

## 2021-08-13 ENCOUNTER — PATIENT OUTREACH (OUTPATIENT)
Dept: CARE COORDINATION | Facility: CLINIC | Age: 81
End: 2021-08-13

## 2021-08-13 VITALS
HEIGHT: 64 IN | OXYGEN SATURATION: 93 % | HEART RATE: 77 BPM | TEMPERATURE: 97.8 F | SYSTOLIC BLOOD PRESSURE: 148 MMHG | WEIGHT: 226.9 LBS | DIASTOLIC BLOOD PRESSURE: 83 MMHG | BODY MASS INDEX: 38.74 KG/M2 | RESPIRATION RATE: 18 BRPM

## 2021-08-13 LAB
CREAT SERPL-MCNC: 0.86 MG/DL (ref 0.7–1.3)
GFR SERPL CREATININE-BSD FRML MDRD: 82 ML/MIN/1.73M2
MAGNESIUM SERPL-MCNC: 2.1 MG/DL (ref 1.8–2.6)
POTASSIUM BLD-SCNC: 3.9 MMOL/L (ref 3.5–5)

## 2021-08-13 PROCEDURE — 83735 ASSAY OF MAGNESIUM: CPT | Performed by: INTERNAL MEDICINE

## 2021-08-13 PROCEDURE — 250N000011 HC RX IP 250 OP 636: Performed by: FAMILY MEDICINE

## 2021-08-13 PROCEDURE — 84132 ASSAY OF SERUM POTASSIUM: CPT | Performed by: INTERNAL MEDICINE

## 2021-08-13 PROCEDURE — 250N000013 HC RX MED GY IP 250 OP 250 PS 637: Performed by: STUDENT IN AN ORGANIZED HEALTH CARE EDUCATION/TRAINING PROGRAM

## 2021-08-13 PROCEDURE — 250N000013 HC RX MED GY IP 250 OP 250 PS 637: Performed by: INTERNAL MEDICINE

## 2021-08-13 PROCEDURE — 250N000011 HC RX IP 250 OP 636: Performed by: INTERNAL MEDICINE

## 2021-08-13 PROCEDURE — 99239 HOSP IP/OBS DSCHRG MGMT >30: CPT | Performed by: INTERNAL MEDICINE

## 2021-08-13 PROCEDURE — 97535 SELF CARE MNGMENT TRAINING: CPT | Mod: GO | Performed by: OCCUPATIONAL THERAPIST

## 2021-08-13 PROCEDURE — 82565 ASSAY OF CREATININE: CPT | Performed by: INTERNAL MEDICINE

## 2021-08-13 PROCEDURE — 36415 COLL VENOUS BLD VENIPUNCTURE: CPT | Performed by: INTERNAL MEDICINE

## 2021-08-13 RX ORDER — BENZONATATE 100 MG/1
100 CAPSULE ORAL 3 TIMES DAILY PRN
Status: DISCONTINUED | OUTPATIENT
Start: 2021-08-13 | End: 2021-08-13 | Stop reason: HOSPADM

## 2021-08-13 RX ORDER — METOPROLOL SUCCINATE 50 MG/1
25 TABLET, EXTENDED RELEASE ORAL DAILY
Qty: 30 TABLET | Refills: 0 | Status: ON HOLD | OUTPATIENT
Start: 2021-08-13 | End: 2021-08-23

## 2021-08-13 RX ORDER — PANTOPRAZOLE SODIUM 40 MG/1
40 TABLET, DELAYED RELEASE ORAL
Qty: 30 TABLET | Refills: 0 | Status: SHIPPED | OUTPATIENT
Start: 2021-08-14

## 2021-08-13 RX ORDER — LOSARTAN POTASSIUM 25 MG/1
25 TABLET ORAL DAILY
Qty: 30 TABLET | Refills: 0 | Status: SHIPPED | OUTPATIENT
Start: 2021-08-14

## 2021-08-13 RX ADMIN — ASPIRIN 81 MG: 81 TABLET, CHEWABLE ORAL at 09:22

## 2021-08-13 RX ADMIN — LEVOTHYROXINE SODIUM 150 MCG: 0.03 TABLET ORAL at 06:51

## 2021-08-13 RX ADMIN — ENOXAPARIN SODIUM 40 MG: 40 INJECTION SUBCUTANEOUS at 09:22

## 2021-08-13 RX ADMIN — METOPROLOL TARTRATE 25 MG: 25 TABLET, FILM COATED ORAL at 09:23

## 2021-08-13 RX ADMIN — BENZONATATE 100 MG: 100 CAPSULE ORAL at 03:26

## 2021-08-13 RX ADMIN — LOSARTAN POTASSIUM 25 MG: 25 TABLET, FILM COATED ORAL at 09:22

## 2021-08-13 RX ADMIN — PANTOPRAZOLE SODIUM 40 MG: 20 TABLET, DELAYED RELEASE ORAL at 06:51

## 2021-08-13 RX ADMIN — CALCITONIN SALMON 1 SPRAY: 200 SPRAY, METERED NASAL at 09:23

## 2021-08-13 RX ADMIN — LORAZEPAM 0.25 MG: 0.5 TABLET ORAL at 09:22

## 2021-08-13 RX ADMIN — OXYCODONE HYDROCHLORIDE 5 MG: 5 TABLET ORAL at 13:33

## 2021-08-13 RX ADMIN — FUROSEMIDE 20 MG: 10 INJECTION, SOLUTION INTRAMUSCULAR; INTRAVENOUS at 06:51

## 2021-08-13 ASSESSMENT — MIFFLIN-ST. JEOR: SCORE: 1655.21

## 2021-08-13 NOTE — PLAN OF CARE
Problem: Risk for Delirium  Goal: Improved Behavioral Control  Outcome: No Change     Patient is alert to self, place, and some what situation.   RN explained to patient multiple times that as he needed more IV lasix, he needed continued monitoring in hospital.  Patient was forgetful, and at end of shift still claimed he didn't know what lasix was.  Patient sat in recliner overnight, slept on and off.  Snacks were given.  Patient had alarm on as he would be impulsive and not call for help when getting up out of bed.      Problem: Risk for Delirium  Goal: Optimal Coping  Outcome: No Change     Patient says he wants to discharge from hospital, staff reminded him why he was in hospital, but sticky note left for MD about patient's request.  Day shift updated.

## 2021-08-13 NOTE — DISCHARGE SUMMARY
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  Chronic bilateral lower extremity edema  Hypothyroidism  Hypertension  Status post bilateral L2-4 decompressive lumbar laminectomy with medial facetectomy's on 3/3/2021  T12 compression fracture  Morbid obesity  Generalized anxiety disorder          Procedures/Imaging:   Echocardiogram Complete    Result Date: 8/10/2021  [id number]^WHITE-TOBIAS^DIANN^[place] [address]  Name: LARRY ROJAS MRN: [id number] : 1940 Study Date: 08/10/2021 10:19 AM Age: 80 yrs Gender: Male Patient Location: [facility] Reason For Study: Heart Failure Ordering Physician: DIANN YUAN Performed By: SAMEER  BSA: 2.1 m2 Height: 64 in Weight: 230 lb BP: 139/94 mmHg ______________________________________________________________________________ Procedure Definity (NDC #36658-181) given intravenously. The study was technically difficult. ______________________________________________________________________________ Interpretation Summary  Tech Difficult study Wall motion Difficult to evaluate, Suspect more prominent hypokinesis in the apical region. Consider cardiac MRI. When compared to echo from 19 the LVEF appears similar. ______________________________________________________________________________ Left Ventricle The left ventricle is normal in size. There is mild concentric left ventricular  hypertrophy. The visual ejection fraction is 40-50%. Wall motion Difficult to evaluate, Suspect more prominent hypokinesis in the apical region.  Right Ventricle The right ventricle is not well visualized. Mildly decreased right ventricular systolic function.  Atria Normal left atrial size. Right atrium not well visualized.  Mitral Valve There is mild mitral annular calcification. The mitral valve leaflets are mildly thickened. There is no mitral regurgitation noted. There is no mitral valve stenosis.  Tricuspid Valve The tricuspid valve is not well visualized.  Aortic Valve The aortic valve is trileaflet. Thickened aortic valve leaflets. No aortic regurgitation is present. No aortic stenosis is present.  Pulmonic Valve The pulmonic valve is not well visualized. There is no pulmonic valvular stenosis.  Vessels IVC diameter and respiratory changes fall into an intermediate range suggesting an RA pressure of 8 mmHg.  Rhythm Sinus rhythm was noted. The patient exhibited frequent PACs.  ______________________________________________________________________________ MMode/2D Measurements & Calculations RVDd: 4.0 cm IVSd: 1.2 cm LVIDd: 4.6 cm LVIDs: 3.3 cm LVPWd: 1.2 cm  FS: 28.0 % LV mass(C)d: 205.9 grams LV mass(C)dI: 99.2 grams/m2 Ao root diam: 3.5 cm LA dimension: 4.0 cm LA/Ao: 1.1 LVOT diam: 2.1 cm LVOT area: 3.6 cm2 LA Volume (BP): 41.2 ml LA Volume Index (BP): 19.8 ml/m2  LA Volume Indexed (AL/bp): 21.2 ml/m2 RWT: 0.53  Doppler Measurements & Calculations MV E max parth: 6.2 cm/sec Ao V2 max: 139.6 cm/sec Ao max P.0 mmHg Ao V2 mean: 83.3 cm/sec Ao mean PG: 3.7 mmHg Ao V2 VTI: 27.6 cm ANASTASIA(I,D): 2.2 cm2 ANASTASIA(V,D): 2.2 cm2 LV V1 max P.9 mmHg LV V1 max: 85.7 cm/sec LV V1 VTI: 16.7 cm SV(LVOT): 59.9 ml SI(LVOT): 28.9 ml/m2 PA acc time: 0.10 sec AV Parth Ratio (DI): 0.61 ANASTASIA Index (cm2/m2): 1.0  ______________________________________________________________________________ Report approved by: Ricardo Schneider  08/10/2021 11:55 AM       US Lower Extremity Venous Duplex Bilateral    Result Date: 8/10/2021  EXAM: US LOWER EXTREMITY VENOUS DUPLEX BILATERAL LOCATION: [facility] DATE/TIME: 8/10/2021 5:49 PM INDICATION: Bilateral leg swelling and pain with erythema COMPARISON: None. TECHNIQUE: Venous Duplex ultrasound of bilateral lower extremities with and without compression, augmentation and duplex. Color flow and spectral Doppler with waveform analysis performed. FINDINGS: Exam includes the common femoral, femoral, popliteal veins as well as segmentally visualized deep calf veins and greater saphenous vein. RIGHT: No definite deep vein thrombosis. No superficial thrombophlebitis. No popliteal cyst. LEFT: No deep vein thrombosis. No superficial thrombophlebitis. No popliteal cyst.     IMPRESSION: 1.  No deep venous thrombosis in the bilateral lower extremities although exam of the right upper leg and left common femoral vein is compromised as patient could not tolerate full compression. 2.  Mild subcutaneous edema distally bilaterally.    XR Chest Port 1 View    Result Date: 8/9/2021  EXAM: XR CHEST PORT 1 VIEW LOCATION: [facility] DATE/TIME: 8/9/2021 7:50 PM INDICATION: fluid overload COMPARISON: Portable AP view of the chest 03/07/2021     IMPRESSION: The initial image excludes the apices and subsequent image was taken with extreme apical lordotic technique. Mildly enlarged cardiac silhouette likely unchanged given differences in projection. Mediastinal borders are well-defined. No peribronchial fluid cuffs or septal lines to suggest interstitial lung edema. Angular opacities are present in the medial lower lobes likely representing foci of atelectasis. The mid and upper lungs are clear. There is no visible pleural fluid. No pneumothorax.    XR Lumbar Spine 2/3 Views    Result Date: 7/26/2021  EXAM: XR LUMBAR SPINE 2/3 VIEWS LOCATION: [facility]  DATE/TIME: 7/26/2021 11:52 AM INDICATION: T12 compression fracture COMPARISON: Lumbar spine MRI 2/22 2021 TECHNIQUE: CR Lumbar Spine.     IMPRESSION: 5 lumbar-type vertebral bodies. Inferior plate T12 compression fracture, unchanged. The vertebral bodies of the lumbar spine otherwise have normal stature and alignment without evidence of compression fracture. Degenerative endplate change and anterior marginal osteophytes at T12/L1-L4/L5. Multilevel degenerative facet arthropathy, most pronounced at L4/L5 and L5/S1. Multilevel degenerative disc disease of the lumbar spine with disc height loss, most pronounced at L1/L2-L4/L5. Aortobiiliac stent. Soft tissues unremarkable.    CT Chest Pulmonary Embolism w Contrast    Result Date: 8/9/2021  EXAM: CT CHEST PULMONARY EMBOLISM W CONTRAST LOCATION: [facility] DATE/TIME: 8/9/2021 10:19 PM INDICATION: Hypoxia COMPARISON: 08/16/2020. TECHNIQUE: CT chest pulmonary angiogram during arterial phase injection of IV contrast. Multiplanar reformats and MIP reconstructions were performed. Dose reduction techniques were used. CONTRAST: 100 mL Isovue-370 FINDINGS: ANGIOGRAM CHEST: No evidence for pulmonary embolism. Pulmonary arteries normal in caliber. Moderate atherosclerotic calcification of the thoracic aorta. No aortic dissection or aneurysm.. HEART: Cardiac chambers within normal limits. No pericardial effusion. Mild coronary artery calcification. LUNGS AND PLEURA: Mild emphysema. No pulmonary mass, consolidation, or suspicious pulmonary nodule. There is severe central airway narrowing on expiration, including the lower trachea and proximal mainstem bronchi bilaterally. There is moderate mucous plugging in the basal airways. No pleural effusion or pneumothorax. MEDIASTINUM: No adenopathy or mass. LIMITED UPPER ABDOMEN: Numerous calcified stones filling the gallbladder. MUSCULOSKELETAL: Negative.     IMPRESSION: 1.  No evidence for pulmonary embolism. 2.   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 Follow-up pulmonary consultation recommended.           Allergies:      Allergies   Allergen Reactions     Diclofenac      Other reaction(s): GI Upset     Loratadine      Other reaction(s): Urinary Retention                Discharge Medications:      Crystal Dominik E   Home Medication Instructions DUSTY:[id number]    Printed on:08/13/21 1162   Medication Information                      acetaminophen (TYLENOL) 500 MG tablet  Take 1,000 mg by mouth as needed             albuterol (PROAIR HFA/PROVENTIL HFA/VENTOLIN HFA) 108 (90 Base) MCG/ACT inhaler  Inhale 2 puffs into the lungs every 6 hours as needed              albuterol (PROVENTIL) (2.5 MG/3ML) 0.083% neb solution  Inhale 2.5 mg into the lungs every 6 hours as needed              amitriptyline (ELAVIL) 50 MG tablet  12.5 mg At Bedtime              aspirin (ASA) 81 MG chewable tablet  Take 81 mg by mouth daily             calcitonin, salmon, (MIACALCIN) 200 UNIT/ACT nasal spray  Spray 1 spray into one nostril alternating nostrils daily              Cholecalciferol (VITAMIN D3) 50 MCG (2000 UT) CAPS  1 tablet daily             furosemide (LASIX) 20 MG tablet  Take 20 mg by mouth daily             latanoprost (XALATAN) 0.005 % ophthalmic solution  Place 1 drop into both eyes At Bedtime              levothyroxine (SYNTHROID/LEVOTHROID) 150 MCG tablet  1 tablet daily             losartan (COZAAR) 25 MG tablet  Take 1 tablet (25 mg) by mouth daily             metoprolol succinate ER (TOPROL-XL) 50 MG 24 hr tablet  Take 0.5 tablets (25 mg) by mouth daily             multivitamin w/minerals (MULTI-VITAMIN) tablet  Take 1 tablet by mouth daily             oxyCODONE (ROXICODONE) 5 MG tablet  Take 1 tablet (5 mg) by mouth 3 times daily as needed for severe pain             pantoprazole (PROTONIX) 40 MG EC tablet  Take 1 tablet (40 mg) by mouth every morning (before  breakfast)             rosuvastatin (CRESTOR) 10 MG tablet  Take 10 mg by mouth At Bedtime                        Consultations:   Pulmonology             Hospital Course:   Acute mild hypoxic respiratory insufficiency:    -sPO2 88% in ED, placed on supplemental O2 (not on home oxygen).   -CTA chest PE study showed severe central airway narrowing compatible with tracheal bronchomalacia with excessive dynamic airway collapse and some mucous plugging.   -TTE showed EF 40-50% but poor study.  On IV diuretics. Uncertain total net negative fluid balance.  -BNP normal in setting of morbid obesity and peripheral edema.  -wean FiO2 to keep sats 90-92%. Home O2 screen on discharge tomorrow  -pulm consulted.-->recommendED to c/w diuresis. Patient to follow-up with pulm on 8/28 at clinic concerning for central airway narrowing compatible with tracheal bronchomalacia excessive dynamic airway collapse  -protonix 40 mg PO every day  -Outpatient pulmonology follow-up as advised/ordered     BiV systolic dysfunction:    -TTE showed EF 40-50%. RV systolic dysfunction.  No valve disease. Suboptimal study.  Probable untreated TERENCE contributory.  -IV lasix 20mg q12 changed to oral Lasix on discharge.  -Metoprolol  -LosARTAN     COPD w/o acute exacerbation:     PFT's 5/6/21:  FEV1/FVC is 68% and is reduced.  FEV1 is 1.6L (60%) predicted and is reduced.  FVC is 2.35L (66%) predicted and reduced.  There was no improvement in spirometry after a single inhaled dose of bronchodilator.  DLCO is 12.14ml/min/hg (54%) predicted and is reduced when it is corrected for hemoglobin.  Flow volume loops indicate scooping of the expiratory limb.--restrictive pattern (DDx: Asbestosis, berylliosis, hypersensitivity pneumonitis, idiopathic pulmonary fibrosis, Langerhans cell histiocytosis (histiocytosis X), lymphangitic spread of tumor, miliary tuberculosis, sarcoidosis, silicosis (late))  -Previous trial of Trelegy and  Anoro resulted in significant  "coughing and discontinuation.  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  -sleep study ordered on discharge     Chronic BLE edema   -b/l doppler US negative for DVT   - IV lasix 20 mg BID changed to oral Lasix on discharge  -Lymphedema wraps if tolerated     Hyothyroidism:   -low TSH w/ normal fT4 and normal total T3  -c/w home synthroid for now     HTN   - stable  -c/w losartan which was started because of LV dysfunction    -stopped nifedipine xr given edema issues  -metoprolol      Status post bilateral L2-4 decompressive lumbar laminectomy with medial facetectomies on 3/3/2021. MRI L-spine 6/22/2021 showed recent T12 compression fracture with 50% loss of vertebral height. Brace not tolerated due to COPD per report. Started on calcitonin nasal spray, Flexeril and oxycodone per outpatient provider on 7/26/2021. Also started on amitriptyline as well. Will c/w all these meds during this admission.      Morbid obesity: BMI 41  -PCP to address lifestyle changes with patient     JOJO:  Elavil 12.5mg at bedtime, ativan 0.25mg po bid prn. outpt f/u. pcp to consider ssri vs snri in place of elavil, cognitive behavioral therapy             Pending Tests at Discharge:   none         Discharge Instructions and Follow-Up:   Discharge diet: Per discharge orders   Discharge activity: Per discharge orders   Discharge follow-up: Per discharge orders           Discharge Disposition:   home      Vital signs:  Temp: 97.8  F (36.6  C) Temp src: Oral BP: (!) 148/83 Pulse: 77   Resp: 18 SpO2: 93 % O2 Device: None (Room air) Oxygen Delivery: 3 LPM Height: 162.6 cm (5' 4\") Weight: 102.9 kg (226 lb 14.4 oz)  Estimated body mass index is 38.95 kg/m  as calculated from the following:    Height as of this encounter: 1.626 m (5' 4\").    Weight as of this encounter: 102.9 kg (226 lb 14.4 oz).    General appearance - alert, and in no " distress  Eyes - pupils equal and reactive, extraocular eye movements intact, sclera anicteric  Mouth - mucous membranes moist, pharynx normal without lesions  Lungs - clear to auscultation, no wheezes, rales or rhonchi, symmetric air entry  Heart - normal rate.  1+ peripheral edema.  Abdomen - soft, nontender, nondistended, no masses or organomegaly, BS+  Neurological - alert, oriented, normal speech    Total unit/floor time 31minutes.  Time consisted of examination of patient, reviewing the record, lab results, imaging results, completing documentation.  Coordination of care 20minutes discussing  with nursing, care management teams, and Physicians involved directly in the care of this patient.  Counseling time 11minutes consisted of discharge planning.        Miguel Celis, , DO

## 2021-08-13 NOTE — PROGRESS NOTES
Care Management Follow Up    Length of Stay (days): 4    Expected Discharge Date: 08/13/2021 or 8/14/21          Concerns to be Addressed:   IV Lasix every 12 hours.  Patient plan of care discussed at interdisciplinary rounds: Yes    Anticipated Discharge Disposition:  Home.     Anticipated Discharge Services:  No skilled in-home services anticipated. Patient does not want home care or TCU.   Anticipated Discharge DME:  Per therapy (if indicated).    Patient/family educated on Medicare website which has current facility and service quality ratings:  NA at this time.  Education Provided on the Discharge Plan:  Per team  Patient/Family in Agreement with the Plan:  yes    Referrals Placed by CM/SW:  None  Private pay costs discussed: NA     Additional Information:  Patient is  and independent with activities of daily living at baseline.  His goal is to return home at discharge and his wife plans to provide transportation.  No care management needs identified at this time but CM will continue to monitor progression of care, review team recommendations and provide discharge planning assist as needed.   10:52 AM:  Writer met with patient to discuss discharge planning goals and preferences. He is adamant that he will return home and does not want home care. He also is adamant that he will not go to TCU.  He agreed that writer could call his wife Jovana. Writer spoke with Jovana who agrees that patient will come home at discharge. She states that he has had home care in the past but neither of them felt that it was helpful.   Patient has a history of a lumbar spine fracture which has made his mobility difficult. He has had difficulty sleeping due to the pain. Jovana is interested in patient stopping oxycodone as she feels that makes his emotions and behavior worse. She would be interested in medical mariana which she will review with patient's primary care provider.     Alia Kennedy RN     Care Management  Discharge Note    Discharge Date: 08/13/2021       Discharge Disposition:  Home.    Discharge Services:  Clinic care coordination.     Discharge DME:  Per therapy (if indicated).    Discharge Transportation: Spouse.    Private pay costs discussed: Not applicable    PAS Confirmation Code:  NA  Patient/family educated on Medicare website which has current facility and service quality ratings:  NA (declines home care or TCU).    Education Provided on the Discharge Plan:  yes  Persons Notified of Discharge Plans: Nursing, spouse.  Patient/Family in Agreement with the Plan:  yes    Handoff Referral Completed: Yes    Additional Information:  Discharging to home today. Patient declined home care and TCU but agreed to clinic care coordination phone follow up.    Alia Kennedy RN

## 2021-08-13 NOTE — PROGRESS NOTES
Clinic Care Coordination Contact  Care Team Conversations    CC GALLO performed chart review and noted the clinic is aware of the hospital stay and has an appointment scheduled with the pt. Will wait for new referral.     Kathy Ordonez OCTAVIO  Clinic Care Cordinator  [facility]   [phone]

## 2021-08-13 NOTE — PLAN OF CARE
Problem: Gas Exchange Impaired  Goal: Optimal Gas Exchange  Outcome: Improving   Patient denied short of breath on room air sating low 90's.  Problem: Anxiety  Goal: Anxiety Reduction or Resolution  Outcome: Improving   Patient has been very anxious staff in room constantly to check.  Patient wanted to go home he stated he feels he is confined here. Plan of care explained to pt. Patient spoke with wife via phone.  ativan was given per PRN order. Pt in bed sleeping at present time.  Problem: Adult Inpatient Plan of Care  Goal: Absence of Hospital-Acquired Illness or Injury  Intervention: Identify and Manage Fall Risk  Recent Flowsheet Documentation  Taken 8/12/2021 1603 by Alphonse Arellano, RN  Safety Promotion/Fall Prevention:    activity supervised    assistive device/personal items within reach  Intervention: Prevent Skin Injury  Recent Flowsheet Documentation  Taken 8/12/2021 1603 by Alphonse Arellano, RN  Body Position: position changed independently

## 2021-08-13 NOTE — PLAN OF CARE
Occupational Therapy Discharge Summary    Reason for therapy discharge:    Discharged to home.    Progress towards therapy goal(s). See goals on Care Plan in [facility] electronic health record for goal details.  Goals partially met.  Barriers to achieving goals:   limited tolerance for therapy and discharge from facility.    Therapy recommendation(s):    Continued therapy is recommended.  Rationale/Recommendations:  TCU vs home with HH OT d/t decreased balance, strength and cognition.

## 2021-08-13 NOTE — DISCHARGE INSTRUCTIONS
The nurse for Dr. Meza's office will be reaching out to you once you get home to ensure that you are doing well.

## 2021-08-14 ENCOUNTER — APPOINTMENT (OUTPATIENT)
Dept: OCCUPATIONAL THERAPY | Facility: HOSPITAL | Age: 81
DRG: 896 | End: 2021-08-14
Attending: PAIN MEDICINE
Payer: COMMERCIAL

## 2021-08-14 ENCOUNTER — HOSPITAL ENCOUNTER (INPATIENT)
Facility: HOSPITAL | Age: 81
LOS: 5 days | Discharge: HOME | DRG: 896 | End: 2021-08-23
Attending: EMERGENCY MEDICINE | Admitting: INTERNAL MEDICINE
Payer: COMMERCIAL

## 2021-08-14 ENCOUNTER — APPOINTMENT (OUTPATIENT)
Dept: RADIOLOGY | Facility: HOSPITAL | Age: 81
DRG: 896 | End: 2021-08-14
Attending: EMERGENCY MEDICINE
Payer: COMMERCIAL

## 2021-08-14 DIAGNOSIS — J44.9 CHRONIC OBSTRUCTIVE PULMONARY DISEASE, UNSPECIFIED COPD TYPE (H): ICD-10-CM

## 2021-08-14 DIAGNOSIS — F10.982 ALCOHOL-INDUCED INSOMNIA (H): ICD-10-CM

## 2021-08-14 DIAGNOSIS — F11.90 OPIOID USE DISORDER: Primary | ICD-10-CM

## 2021-08-14 DIAGNOSIS — R06.02 SHORTNESS OF BREATH: ICD-10-CM

## 2021-08-14 DIAGNOSIS — R09.02 HYPOXIA: ICD-10-CM

## 2021-08-14 DIAGNOSIS — R06.00 DYSPNEA, UNSPECIFIED TYPE: ICD-10-CM

## 2021-08-14 DIAGNOSIS — E66.01 CLASS 2 SEVERE OBESITY WITH SERIOUS COMORBIDITY AND BODY MASS INDEX (BMI) OF 37.0 TO 37.9 IN ADULT, UNSPECIFIED OBESITY TYPE (H): ICD-10-CM

## 2021-08-14 DIAGNOSIS — M25.562 ACUTE PAIN OF LEFT KNEE: ICD-10-CM

## 2021-08-14 DIAGNOSIS — I10 ESSENTIAL HYPERTENSION: ICD-10-CM

## 2021-08-14 DIAGNOSIS — J47.9 BRONCHIECTASIS WITHOUT ACUTE EXACERBATION (H): ICD-10-CM

## 2021-08-14 DIAGNOSIS — M19.91 PRIMARY OSTEOARTHRITIS, UNSPECIFIED SITE: ICD-10-CM

## 2021-08-14 DIAGNOSIS — I50.22 CHRONIC SYSTOLIC CONGESTIVE HEART FAILURE (H): ICD-10-CM

## 2021-08-14 DIAGNOSIS — E66.812 CLASS 2 SEVERE OBESITY WITH SERIOUS COMORBIDITY AND BODY MASS INDEX (BMI) OF 37.0 TO 37.9 IN ADULT, UNSPECIFIED OBESITY TYPE (H): ICD-10-CM

## 2021-08-14 PROBLEM — J98.09 BRONCHOMALACIA: Status: ACTIVE | Noted: 2021-08-14

## 2021-08-14 LAB
AMPHETAMINES UR QL SCN: ABNORMAL
ANION GAP SERPL CALCULATED.3IONS-SCNC: 8 MMOL/L (ref 5–18)
BARBITURATES UR QL: ABNORMAL
BENZODIAZ UR QL: ABNORMAL
BNP SERPL-MCNC: 61 PG/ML (ref 0–84)
BUN SERPL-MCNC: 17 MG/DL (ref 8–28)
CALCIUM SERPL-MCNC: 9.6 MG/DL (ref 8.5–10.5)
CANNABINOIDS UR QL SCN: ABNORMAL
CHLORIDE BLD-SCNC: 105 MMOL/L (ref 98–107)
CO2 SERPL-SCNC: 28 MMOL/L (ref 22–31)
COCAINE UR QL: ABNORMAL
CREAT SERPL-MCNC: 0.86 MG/DL (ref 0.7–1.3)
CREAT UR-MCNC: 81 MG/DL
ERYTHROCYTE [DISTWIDTH] IN BLOOD BY AUTOMATED COUNT: 14.6 % (ref 10–15)
ETHANOL SERPL-MCNC: <10 MG/DL
GFR SERPL CREATININE-BSD FRML MDRD: 82 ML/MIN/1.73M2
GLUCOSE BLD-MCNC: 111 MG/DL (ref 70–125)
HCT VFR BLD AUTO: 42.9 % (ref 40–53)
HGB BLD-MCNC: 13.9 G/DL (ref 13.3–17.7)
MCH RBC QN AUTO: 28.8 PG (ref 26.5–33)
MCHC RBC AUTO-ENTMCNC: 32.4 G/DL (ref 31.5–36.5)
MCV RBC AUTO: 89 FL (ref 78–100)
OPIATES UR QL SCN: ABNORMAL
OXYCODONE UR QL: ABNORMAL
PCP UR QL SCN: ABNORMAL
PLATELET # BLD AUTO: 232 10E3/UL (ref 150–450)
POTASSIUM BLD-SCNC: 4.2 MMOL/L (ref 3.5–5)
RBC # BLD AUTO: 4.82 10E6/UL (ref 4.4–5.9)
SARS-COV-2 RNA RESP QL NAA+PROBE: NEGATIVE
SODIUM SERPL-SCNC: 141 MMOL/L (ref 136–145)
TROPONIN I SERPL-MCNC: 0.01 NG/ML (ref 0–0.29)
WBC # BLD AUTO: 9.5 10E3/UL (ref 4–11)

## 2021-08-14 PROCEDURE — 36415 COLL VENOUS BLD VENIPUNCTURE: CPT | Performed by: EMERGENCY MEDICINE

## 2021-08-14 PROCEDURE — 99285 EMERGENCY DEPT VISIT HI MDM: CPT | Mod: 25

## 2021-08-14 PROCEDURE — 80048 BASIC METABOLIC PNL TOTAL CA: CPT | Performed by: EMERGENCY MEDICINE

## 2021-08-14 PROCEDURE — 36415 COLL VENOUS BLD VENIPUNCTURE: CPT | Performed by: PAIN MEDICINE

## 2021-08-14 PROCEDURE — 250N000011 HC RX IP 250 OP 636: Performed by: EMERGENCY MEDICINE

## 2021-08-14 PROCEDURE — 94640 AIRWAY INHALATION TREATMENT: CPT

## 2021-08-14 PROCEDURE — 83880 ASSAY OF NATRIURETIC PEPTIDE: CPT | Performed by: EMERGENCY MEDICINE

## 2021-08-14 PROCEDURE — 250N000013 HC RX MED GY IP 250 OP 250 PS 637: Performed by: INTERNAL MEDICINE

## 2021-08-14 PROCEDURE — 96374 THER/PROPH/DIAG INJ IV PUSH: CPT

## 2021-08-14 PROCEDURE — 82077 ASSAY SPEC XCP UR&BREATH IA: CPT | Performed by: PAIN MEDICINE

## 2021-08-14 PROCEDURE — 250N000013 HC RX MED GY IP 250 OP 250 PS 637: Performed by: PAIN MEDICINE

## 2021-08-14 PROCEDURE — 97535 SELF CARE MNGMENT TRAINING: CPT | Mod: GO

## 2021-08-14 PROCEDURE — 97129 THER IVNTJ 1ST 15 MIN: CPT | Mod: GO

## 2021-08-14 PROCEDURE — 71045 X-RAY EXAM CHEST 1 VIEW: CPT

## 2021-08-14 PROCEDURE — G0378 HOSPITAL OBSERVATION PER HR: HCPCS

## 2021-08-14 PROCEDURE — 99220 PR INITIAL OBSERVATION CARE,LEVEL III: CPT | Performed by: INTERNAL MEDICINE

## 2021-08-14 PROCEDURE — 87635 SARS-COV-2 COVID-19 AMP PRB: CPT | Performed by: EMERGENCY MEDICINE

## 2021-08-14 PROCEDURE — 250N000009 HC RX 250: Performed by: EMERGENCY MEDICINE

## 2021-08-14 PROCEDURE — 84484 ASSAY OF TROPONIN QUANT: CPT | Performed by: EMERGENCY MEDICINE

## 2021-08-14 PROCEDURE — 93005 ELECTROCARDIOGRAM TRACING: CPT | Performed by: EMERGENCY MEDICINE

## 2021-08-14 PROCEDURE — 80307 DRUG TEST PRSMV CHEM ANLYZR: CPT | Performed by: PAIN MEDICINE

## 2021-08-14 PROCEDURE — 99223 1ST HOSP IP/OBS HIGH 75: CPT | Performed by: PAIN MEDICINE

## 2021-08-14 PROCEDURE — C9803 HOPD COVID-19 SPEC COLLECT: HCPCS

## 2021-08-14 PROCEDURE — 250N000013 HC RX MED GY IP 250 OP 250 PS 637: Performed by: NURSE PRACTITIONER

## 2021-08-14 PROCEDURE — 99204 OFFICE O/P NEW MOD 45 MIN: CPT | Performed by: NURSE PRACTITIONER

## 2021-08-14 PROCEDURE — 85027 COMPLETE CBC AUTOMATED: CPT | Performed by: EMERGENCY MEDICINE

## 2021-08-14 PROCEDURE — 99207 PR CONSULT E&M CHANGED TO INITIAL LEVEL: CPT | Performed by: PAIN MEDICINE

## 2021-08-14 PROCEDURE — 97166 OT EVAL MOD COMPLEX 45 MIN: CPT | Mod: GO

## 2021-08-14 PROCEDURE — 250N000013 HC RX MED GY IP 250 OP 250 PS 637: Performed by: EMERGENCY MEDICINE

## 2021-08-14 RX ORDER — POLYETHYLENE GLYCOL 3350 17 G/17G
17 POWDER, FOR SOLUTION ORAL DAILY PRN
Status: DISCONTINUED | OUTPATIENT
Start: 2021-08-14 | End: 2021-08-23 | Stop reason: HOSPADM

## 2021-08-14 RX ORDER — ACETAMINOPHEN 650 MG/1
650 SUPPOSITORY RECTAL EVERY 6 HOURS PRN
Status: DISCONTINUED | OUTPATIENT
Start: 2021-08-14 | End: 2021-08-15

## 2021-08-14 RX ORDER — PANTOPRAZOLE SODIUM 20 MG/1
40 TABLET, DELAYED RELEASE ORAL
Status: DISCONTINUED | OUTPATIENT
Start: 2021-08-14 | End: 2021-08-23 | Stop reason: HOSPADM

## 2021-08-14 RX ORDER — OXYCODONE HYDROCHLORIDE 5 MG/1
5 TABLET ORAL EVERY 6 HOURS PRN
Status: DISCONTINUED | OUTPATIENT
Start: 2021-08-14 | End: 2021-08-14

## 2021-08-14 RX ORDER — ACETAMINOPHEN 325 MG/1
650 TABLET ORAL EVERY 6 HOURS PRN
Status: DISCONTINUED | OUTPATIENT
Start: 2021-08-14 | End: 2021-08-15

## 2021-08-14 RX ORDER — LATANOPROST 50 UG/ML
1 SOLUTION/ DROPS OPHTHALMIC AT BEDTIME
Status: DISCONTINUED | OUTPATIENT
Start: 2021-08-14 | End: 2021-08-23 | Stop reason: HOSPADM

## 2021-08-14 RX ORDER — METOPROLOL SUCCINATE 25 MG/1
25 TABLET, EXTENDED RELEASE ORAL DAILY
Status: DISCONTINUED | OUTPATIENT
Start: 2021-08-14 | End: 2021-08-23 | Stop reason: HOSPADM

## 2021-08-14 RX ORDER — LIDOCAINE 50 MG/G
OINTMENT TOPICAL 3 TIMES DAILY
Status: DISCONTINUED | OUTPATIENT
Start: 2021-08-14 | End: 2021-08-23 | Stop reason: HOSPADM

## 2021-08-14 RX ORDER — EMOLLIENT BASE
CREAM (GRAM) TOPICAL 3 TIMES DAILY
Status: DISCONTINUED | OUTPATIENT
Start: 2021-08-14 | End: 2021-08-23 | Stop reason: HOSPADM

## 2021-08-14 RX ORDER — LORAZEPAM 0.5 MG/1
0.5 TABLET ORAL EVERY 4 HOURS PRN
Status: DISCONTINUED | OUTPATIENT
Start: 2021-08-14 | End: 2021-08-14

## 2021-08-14 RX ORDER — METHYLPREDNISOLONE SODIUM SUCCINATE 125 MG/2ML
125 INJECTION, POWDER, LYOPHILIZED, FOR SOLUTION INTRAMUSCULAR; INTRAVENOUS ONCE
Status: COMPLETED | OUTPATIENT
Start: 2021-08-14 | End: 2021-08-14

## 2021-08-14 RX ORDER — ONDANSETRON 2 MG/ML
4 INJECTION INTRAMUSCULAR; INTRAVENOUS EVERY 6 HOURS PRN
Status: DISCONTINUED | OUTPATIENT
Start: 2021-08-14 | End: 2021-08-15

## 2021-08-14 RX ORDER — ALBUTEROL SULFATE 5 MG/ML
2.5 SOLUTION RESPIRATORY (INHALATION) EVERY 6 HOURS PRN
Status: DISCONTINUED | OUTPATIENT
Start: 2021-08-14 | End: 2021-08-23 | Stop reason: HOSPADM

## 2021-08-14 RX ORDER — HYDRALAZINE HYDROCHLORIDE 20 MG/ML
10 INJECTION INTRAMUSCULAR; INTRAVENOUS EVERY 4 HOURS PRN
Status: DISCONTINUED | OUTPATIENT
Start: 2021-08-14 | End: 2021-08-23 | Stop reason: HOSPADM

## 2021-08-14 RX ORDER — IPRATROPIUM BROMIDE AND ALBUTEROL SULFATE 2.5; .5 MG/3ML; MG/3ML
3 SOLUTION RESPIRATORY (INHALATION)
Status: DISCONTINUED | OUTPATIENT
Start: 2021-08-14 | End: 2021-08-15

## 2021-08-14 RX ORDER — ASPIRIN 81 MG/1
81 TABLET, CHEWABLE ORAL DAILY
Status: DISCONTINUED | OUTPATIENT
Start: 2021-08-14 | End: 2021-08-23 | Stop reason: HOSPADM

## 2021-08-14 RX ORDER — IPRATROPIUM BROMIDE AND ALBUTEROL SULFATE 2.5; .5 MG/3ML; MG/3ML
3 SOLUTION RESPIRATORY (INHALATION) ONCE
Status: COMPLETED | OUTPATIENT
Start: 2021-08-14 | End: 2021-08-14

## 2021-08-14 RX ORDER — CALCITONIN SALMON 200 [IU]/.09ML
1 SPRAY, METERED NASAL DAILY
Status: DISCONTINUED | OUTPATIENT
Start: 2021-08-14 | End: 2021-08-14

## 2021-08-14 RX ORDER — CALCITONIN SALMON 200 [IU]/.09ML
1 SPRAY, METERED NASAL DAILY
COMMUNITY
End: 2021-10-26

## 2021-08-14 RX ORDER — BUPRENORPHINE 2 MG/1
2 TABLET SUBLINGUAL DAILY PRN
Status: DISCONTINUED | OUTPATIENT
Start: 2021-08-14 | End: 2021-08-15

## 2021-08-14 RX ORDER — LOSARTAN POTASSIUM 25 MG/1
25 TABLET ORAL DAILY
Status: DISCONTINUED | OUTPATIENT
Start: 2021-08-14 | End: 2021-08-23 | Stop reason: HOSPADM

## 2021-08-14 RX ORDER — LORAZEPAM 0.5 MG/1
0.5 TABLET ORAL ONCE
Status: COMPLETED | OUTPATIENT
Start: 2021-08-14 | End: 2021-08-14

## 2021-08-14 RX ORDER — ASPIRIN 81 MG/1
162 TABLET, CHEWABLE ORAL ONCE
Status: COMPLETED | OUTPATIENT
Start: 2021-08-14 | End: 2021-08-14

## 2021-08-14 RX ORDER — ACETAMINOPHEN 325 MG/1
975 TABLET ORAL EVERY 6 HOURS PRN
Status: DISCONTINUED | OUTPATIENT
Start: 2021-08-14 | End: 2021-08-23 | Stop reason: HOSPADM

## 2021-08-14 RX ORDER — VITAMIN B COMPLEX
50 TABLET ORAL DAILY
Status: DISCONTINUED | OUTPATIENT
Start: 2021-08-14 | End: 2021-08-23 | Stop reason: HOSPADM

## 2021-08-14 RX ORDER — GABAPENTIN 100 MG/1
100 CAPSULE ORAL 3 TIMES DAILY
Status: DISCONTINUED | OUTPATIENT
Start: 2021-08-14 | End: 2021-08-17

## 2021-08-14 RX ORDER — PREDNISONE 20 MG/1
40 TABLET ORAL DAILY
Status: DISCONTINUED | OUTPATIENT
Start: 2021-08-14 | End: 2021-08-14

## 2021-08-14 RX ORDER — CALCIUM CARBONATE 500 MG/1
1000 TABLET, CHEWABLE ORAL 4 TIMES DAILY PRN
Status: DISCONTINUED | OUTPATIENT
Start: 2021-08-14 | End: 2021-08-23 | Stop reason: HOSPADM

## 2021-08-14 RX ORDER — ROSUVASTATIN CALCIUM 10 MG/1
10 TABLET, COATED ORAL AT BEDTIME
Status: DISCONTINUED | OUTPATIENT
Start: 2021-08-14 | End: 2021-08-23 | Stop reason: HOSPADM

## 2021-08-14 RX ORDER — RAMELTEON 8 MG/1
8 TABLET ORAL AT BEDTIME
Status: DISCONTINUED | OUTPATIENT
Start: 2021-08-14 | End: 2021-08-17

## 2021-08-14 RX ORDER — LORAZEPAM 0.5 MG/1
.5-1 TABLET ORAL EVERY 4 HOURS PRN
Status: DISCONTINUED | OUTPATIENT
Start: 2021-08-14 | End: 2021-08-14

## 2021-08-14 RX ORDER — FUROSEMIDE 20 MG
20 TABLET ORAL
Status: DISCONTINUED | OUTPATIENT
Start: 2021-08-14 | End: 2021-08-23 | Stop reason: HOSPADM

## 2021-08-14 RX ORDER — OXYCODONE HYDROCHLORIDE 5 MG/1
5 TABLET ORAL EVERY 12 HOURS PRN
Status: DISCONTINUED | OUTPATIENT
Start: 2021-08-14 | End: 2021-08-14

## 2021-08-14 RX ORDER — HYDROXYZINE HYDROCHLORIDE 25 MG/1
25 TABLET, FILM COATED ORAL EVERY 4 HOURS PRN
Status: DISCONTINUED | OUTPATIENT
Start: 2021-08-14 | End: 2021-08-17

## 2021-08-14 RX ORDER — LANOLIN ALCOHOL/MO/W.PET/CERES
3 CREAM (GRAM) TOPICAL
Status: DISCONTINUED | OUTPATIENT
Start: 2021-08-14 | End: 2021-08-23 | Stop reason: HOSPADM

## 2021-08-14 RX ORDER — ONDANSETRON 4 MG/1
4 TABLET, ORALLY DISINTEGRATING ORAL EVERY 6 HOURS PRN
Status: DISCONTINUED | OUTPATIENT
Start: 2021-08-14 | End: 2021-08-16

## 2021-08-14 RX ADMIN — LORAZEPAM 0.5 MG: 0.5 TABLET ORAL at 11:31

## 2021-08-14 RX ADMIN — ASPIRIN 162 MG: 81 TABLET, CHEWABLE ORAL at 02:18

## 2021-08-14 RX ADMIN — LORAZEPAM 0.5 MG: 0.5 TABLET ORAL at 02:18

## 2021-08-14 RX ADMIN — ASPIRIN 81 MG: 81 TABLET, CHEWABLE ORAL at 10:46

## 2021-08-14 RX ADMIN — OXYCODONE HYDROCHLORIDE 5 MG: 5 TABLET ORAL at 07:49

## 2021-08-14 RX ADMIN — ROSUVASTATIN CALCIUM 10 MG: 10 TABLET, FILM COATED ORAL at 21:06

## 2021-08-14 RX ADMIN — GABAPENTIN 100 MG: 100 CAPSULE ORAL at 10:49

## 2021-08-14 RX ADMIN — GABAPENTIN 100 MG: 100 CAPSULE ORAL at 21:06

## 2021-08-14 RX ADMIN — GABAPENTIN 100 MG: 100 CAPSULE ORAL at 16:02

## 2021-08-14 RX ADMIN — IPRATROPIUM BROMIDE AND ALBUTEROL SULFATE 3 ML: .5; 3 SOLUTION RESPIRATORY (INHALATION) at 02:18

## 2021-08-14 RX ADMIN — DICLOFENAC SODIUM 4 G: 10 GEL TOPICAL at 17:05

## 2021-08-14 RX ADMIN — RAMELTEON 8 MG: 8 TABLET, FILM COATED ORAL at 21:06

## 2021-08-14 RX ADMIN — FUROSEMIDE 20 MG: 20 TABLET ORAL at 16:01

## 2021-08-14 RX ADMIN — Medication 50 MCG: at 12:11

## 2021-08-14 RX ADMIN — METOPROLOL SUCCINATE 25 MG: 25 TABLET, EXTENDED RELEASE ORAL at 10:43

## 2021-08-14 RX ADMIN — PANTOPRAZOLE SODIUM 40 MG: 20 TABLET, DELAYED RELEASE ORAL at 10:46

## 2021-08-14 RX ADMIN — LEVOTHYROXINE SODIUM 150 MCG: 0.03 TABLET ORAL at 10:42

## 2021-08-14 RX ADMIN — FUROSEMIDE 20 MG: 20 TABLET ORAL at 10:44

## 2021-08-14 RX ADMIN — LOSARTAN POTASSIUM 25 MG: 25 TABLET, FILM COATED ORAL at 11:31

## 2021-08-14 RX ADMIN — METHYLPREDNISOLONE SODIUM SUCCINATE 125 MG: 125 INJECTION, POWDER, FOR SOLUTION INTRAMUSCULAR; INTRAVENOUS at 02:17

## 2021-08-14 RX ADMIN — HYDROXYZINE HYDROCHLORIDE 25 MG: 25 TABLET, FILM COATED ORAL at 21:06

## 2021-08-14 ASSESSMENT — MIFFLIN-ST. JEOR
SCORE: 1660.2
SCORE: 1623.91

## 2021-08-14 ASSESSMENT — ACTIVITIES OF DAILY LIVING (ADL): DEPENDENT_IADLS:: LAUNDRY;TRANSPORTATION

## 2021-08-14 NOTE — CONSULTS
Care Management Initial Consult    General Information  Assessment completed with: Patient, Family,  (patient and daughter)  Type of CM/SW Visit: Initial Assessment    Primary Care Provider verified and updated as needed: Yes   Readmission within the last 30 days: previous discharge plan unsuccessful      Reason for Consult: discharge planning  Advance Care Planning:            Communication Assessment  Patient's communication style: spoken language (English or Bilingual)             Cognitive  Cognitive/Neuro/Behavioral: WDL                      Living Environment:   People in home: spouse   (Jovana)  Current living Arrangements: house      Able to return to prior arrangements:         Family/Social Support:  Care provided by: self  Provides care for: no one  Marital Status:   Wife, Children          Description of Support System:           Current Resources:   Patient receiving home care services: No     Community Resources: None  Equipment currently used at home: cane, straight, other (see comments)  Supplies currently used at home:      Employment/Financial:  Employment Status:          Financial Concerns:             Lifestyle & Psychosocial Needs:  Social Determinants of Health     Tobacco Use: Medium Risk     Smoking Tobacco Use: Former Smoker     Smokeless Tobacco Use: Never Used   Alcohol Use:      Frequency of Alcohol Consumption:      Average Number of Drinks:      Frequency of Binge Drinking:    Financial Resource Strain:      Difficulty of Paying Living Expenses:    Food Insecurity:      Worried About Running Out of Food in the Last Year:      Ran Out of Food in the Last Year:    Transportation Needs:      Lack of Transportation (Medical):      Lack of Transportation (Non-Medical):    Physical Activity:      Days of Exercise per Week:      Minutes of Exercise per Session:    Stress:      Feeling of Stress :    Social Connections:      Frequency of Communication with Friends and Family:       Frequency of Social Gatherings with Friends and Family:      Attends [employer] Services:      Active Member of Clubs or Organizations:      Attends Club or Organization Meetings:      Marital Status:    Intimate Partner Violence:      Fear of Current or Ex-Partner:      Emotionally Abused:      Physically Abused:      Sexually Abused:    Depression:      PHQ-2 Score:    Housing Stability:      Unable to Pay for Housing in the Last Year:      Number of Places Lived in the Last Year:      Unstable Housing in the Last Year:        Functional Status:  Prior to admission patient needed assistance:   Dependent ADLs:: Ambulation-cane  Dependent IADLs:: Laundry, Transportation  Assesssment of Functional Status: Needs assistive devices (DME)    Mental Health Status:  Mental Health Status: Current Concern  Mental Health Management: Medication    Chemical Dependency Status:  Chemical Dependency Status: No Current Concerns             Values/Beliefs:  Spiritual, Cultural Beliefs, Roman [employer] Practices, Values that affect care:                 Additional Information:  AIDET completed. Writer met with Pt and Pts daughter Chana: [phone]. Pt lives with spouse Jovana in a private residence. He was recently admitted 8/9- 8/12/2021. Per notes, patient had refused home care and TCU options. Discussed briefly today and patient too anxious, telling writer he was  Unhappy with a PT experience previously. Patient may benefit more from OT, nurse visits. The pain team was in discussing options with patient and he will stay in hospital while starting a new medication. Patient coming off oxycodone and they suspect some withdrawal symptoms. Patient anxious, frustrated with waiting. Allowed patient to voice concerns and let him know we would follow him and assist if needed at discharge depending on progression of care. Daughter present.     Patient uses a cane and has shower chair at home. He does not drive and said uses metro  mobility.    Marie Faulkner, RN, CM, REILLY

## 2021-08-14 NOTE — CONSULTS
INITIAL PSYCHIATRIC CONSULTATION  This note was created with the help of Dragon dictation system. Grammatical and typing errors are not intentional.                REASON FOR REQUEST: Anxiety      ASSESSMENT/RECOMMENDATIONS/PLAN :   Anxiety reaction in the setting of medical condition, chronic pain.  Mood disorder due to medical condition, in the setting of acute and chronic pain.  Adjustment disorder with depression and anxiety likely in the context of multiple psychosocial stressors, medical condition.  Sleep difficulties.    Recommendations:  Occupational Therapy evaluation: Please obtain slums and ACLS.  Discontinue amitriptyline 12.5 mg.  Rozerem 8 mg at bedtime.  Gabapentin 100 mg 3 times a day.  Judicious use of quetiapine or any other QT prolonging medications.   and referral to [facility] for psychiatry and psychology follow-up outpatient basis.  Case discussed with the Dr. Jaffe, discussed home care to include PT, OT to do cognitive assessment, social service to assess his living environment and RN for assessment and management medication.    MENTAL STATUS EXAMINATION:   General Appearance: Not in acute distress, resting comfortably in bed.    Behavior: Good eye contact, no bizarre ideations  Speech: Slow, perseverative.  Thought Process: Circumstantial, in regards to his pain medications and anxiety.  Thought content: No evidence of hallucinations, delusions or paranoia.    Thought Formation: Associations are connected  Judgment: Fair  Insight : Fair  Attention : Distracted, at times patient is quite fidgety.  Memory: Sufficient  Fund Of Knowledge: Average  Affect: Neutral  Mood: Congruent  Suicidality: Absent  Homicidal ideation: Absent  Alert : Awake  Suicidal ideation: Absent  Homicidal ideation: Absent  Orientation: X 3-4  Comprehension: Sufficient pertaining to current medical needs  Generative thought content: Adequate.   Difficulty with cognitive set  "shifting.  Language: Intact  Gait and Ambulation: Gait and ambulation at baseline.    Musculoskeletal: No tonal abnormalities    BP (!) 145/90   Pulse (!) 144   Temp 97.9  F (36.6  C) (Oral)   Resp 24   Ht 1.626 m (5' 4\")   Wt 99.8 kg (220 lb)   SpO2 93%   BMI 37.76 kg/m          HISTORY OF PRESENT ILLNESS:   Presenting history to include: Per HMS/Specialists:   HPI: Dominik Rojas is a 80 year old old maleCOPD, a-fib, diastolic HF, CVA, AAA, and recent L2-L4 laminectomy and T12 compression fracture who presents to this ED for evaluation of progressive bilateral LE swelling and worsening SOB.States that lower extremity swelling has been progressive over the last few weeks but that he did not think much of it until today when he saw the physician assistant at the spine clinic who told him he should get it checked out  at the ED. Also has been endorsing shortness of breath for the last day or 2; says he has baseline cough however no increased sputum production.  Denies sick contacts.  Has albuterol inhaler at home but has only been using daily because it causes him to cough after he uses it.  Not on home oxygen.  Patient says he is taking Lasix 20 mg daily and that this morning he took an additional 20 mg per his primary care provider's recommendation.  Endorses orthopnea and progres worsening LEDESMA; thinks he may has have had some weight gain.  Denies any recent chest pain or chest fluttering sensations.Additionally endorses history of alcohol abuse but states he is better remission since the 80s and denies any cirrhosis.  Not on home Oxygen.     Upon assessment, patient was noted to be seated upright in chair, mildly anxious and nervous.  He did not feel the need to be admitted to hospital, requested to be discharged to home.  He endorsed of feeling anxious and attributing his behaviors \"restlessness, nervousness, pain\" to anxiety.  \"It all starts with anxiety when I get anxious I do not think straight\".  He " "did not report of any hallucinations, delusions or paranoia.  Did not report of any babey or abbey.  Denied any thoughts of self-harm or suicidality.  Patient was seen with the CAT Brock, and patient's daughter in attendance.  Patient's wife was on cell phone providing collateral information.  She expressed concerns for patient's increasing need for pain medications and poorly managed pain as well as anxiety.  Patient's daughter was concerned about anxiety and depression.  Patient has a longstanding history of opioid dependence, iatrogenic in nature.  Distant history of alcohol use.  Patient has attain sobriety on his own for more than 10 years.  No history of psychiatric inpatient stabilization.  No history of mental health, chemical dependency commitment.  Reviewed home medications, no indication of patient taking any SSRIs or any other psychotropics.  Patient was started on amitriptyline by his primary provider to 12.5 mg at bedtime.  Unclear how effective or beneficial this medication has been so far.  Patient is unable to provide much information in regards to effectiveness amitriptyline.  Review of Systems:As per HPI. Remainders of 12 point review of systems negative.  Psychiatric ROS:  Change of Interest/Anhedonia:  No  Appetite/Weight Changes: No  Concentration Changes:No  Impaired Energy:No  Impaired Sleep:No  Anxiety/Panic:Yes  Tearfulness:No  Depression:No  Psychosis: No  Irritability:No  SI/VI/HI: No,No,No  Abbey: No            PFSH reviewed  and not pertinent to chief complaint/reason for visit  BP (!) 145/90   Pulse (!) 144   Temp 97.9  F (36.6  C) (Oral)   Resp 24   Ht 1.626 m (5' 4\")   Wt 99.8 kg (220 lb)   SpO2 93%   BMI 37.76 kg/m    No results found for: AMPHET, PCP, BARBIT, OXYCODONE, THC, ETOH  @24HOURRESULTS@  Recent Results (from the past 72 hour(s))   Basic metabolic panel    Collection Time: 08/12/21  1:46 PM   Result Value Ref Range    Sodium 142 136 - 145 mmol/L    Potassium 3.8 " 3.5 - 5.0 mmol/L    Chloride 104 98 - 107 mmol/L    Carbon Dioxide (CO2) 28 22 - 31 mmol/L    Anion Gap 10 5 - 18 mmol/L    Urea Nitrogen 18 8 - 28 mg/dL    Creatinine 0.90 0.70 - 1.30 mg/dL    Calcium 8.9 8.5 - 10.5 mg/dL    Glucose 119 70 - 125 mg/dL    GFR Estimate 81 >60 mL/min/1.73m2   Creatinine    Collection Time: 08/13/21  5:03 AM   Result Value Ref Range    Creatinine 0.86 0.70 - 1.30 mg/dL    GFR Estimate 82 >60 mL/min/1.73m2   Potassium    Collection Time: 08/13/21  5:03 AM   Result Value Ref Range    Potassium 3.9 3.5 - 5.0 mmol/L   Magnesium    Collection Time: 08/13/21  5:03 AM   Result Value Ref Range    Magnesium 2.1 1.8 - 2.6 mg/dL   CBC (+ platelets, no diff)    Collection Time: 08/14/21  2:16 AM   Result Value Ref Range    WBC Count 9.5 4.0 - 11.0 10e3/uL    RBC Count 4.82 4.40 - 5.90 10e6/uL    Hemoglobin 13.9 13.3 - 17.7 g/dL    Hematocrit 42.9 40.0 - 53.0 %    MCV 89 78 - 100 fL    MCH 28.8 26.5 - 33.0 pg    MCHC 32.4 31.5 - 36.5 g/dL    RDW 14.6 10.0 - 15.0 %    Platelet Count 232 150 - 450 10e3/uL   Basic metabolic panel    Collection Time: 08/14/21  2:16 AM   Result Value Ref Range    Sodium 141 136 - 145 mmol/L    Potassium 4.2 3.5 - 5.0 mmol/L    Chloride 105 98 - 107 mmol/L    Carbon Dioxide (CO2) 28 22 - 31 mmol/L    Anion Gap 8 5 - 18 mmol/L    Urea Nitrogen 17 8 - 28 mg/dL    Creatinine 0.86 0.70 - 1.30 mg/dL    Calcium 9.6 8.5 - 10.5 mg/dL    Glucose 111 70 - 125 mg/dL    GFR Estimate 82 >60 mL/min/1.73m2   B-Type Natriuretic Peptide ([facility] Only)    Collection Time: 08/14/21  2:16 AM   Result Value Ref Range    BNP 61 0 - 84 pg/mL   Troponin I (now)    Collection Time: 08/14/21  2:16 AM   Result Value Ref Range    Troponin I 0.01 0.00 - 0.29 ng/mL   Asymptomatic COVID-19 Virus (Coronavirus) by PCR Nasopharyngeal    Collection Time: 08/14/21  2:16 AM    Specimen: Nasopharyngeal; Swab   Result Value Ref Range    SARS CoV2 PCR Negative Negative       PMH:   Past Medical History:    Diagnosis Date     AAA (abdominal aortic aneurysm) (H)     s/p stenting     Alcohol dependence in remission (H)      Anxiety      Atrial fibrillation with normal ventricular rate (H)      Benign prostatic hyperplasia with lower urinary tract symptoms      Bronchiectasis without complication (H)     2/27/2020     COPD (chronic obstructive pulmonary disease) (H)      CVA (cerebral vascular accident) (H) 2019     DDD (degenerative disc disease), lumbar      Depression      Depressive disorder      Diabetes (H)      Diastolic dysfunction 01/22/2021     DJD (degenerative joint disease) of knee     left     Essential hypertension      Glaucoma      Glaucoma      History of stroke without residual deficits      Hyperlipidemia      Hypertension      Hypothyroidism      Hypothyroidism      Kidney stones      Major depression      Memory problem      Nocturia      Obesity      Overactive bladder 02/25/2021     Primary osteoarthritis of left knee      Psoriasis      Psoriasis      Seborrheic keratoses      Somnolence, daytime      Stenosis of right carotid artery     history of TIA's           Current Medications:Scheduled Meds:    aspirin  81 mg Oral Daily     calcitonin (salmon)  1 spray Alternating Nostrils Daily     cholecalciferol  50 mcg Oral Daily     furosemide  20 mg Oral BID     gabapentin  100 mg Oral TID     [Held by provider] ipratropium - albuterol 0.5 mg/2.5 mg/3 mL  3 mL Nebulization 4x daily     latanoprost  1 drop Both Eyes At Bedtime     levothyroxine  150 mcg Oral Daily     losartan  25 mg Oral Daily     metoprolol succinate ER  25 mg Oral Daily     pantoprazole  40 mg Oral QAM AC     ramelteon  8 mg Oral At Bedtime     rosuvastatin  10 mg Oral At Bedtime     Continuous Infusions:  PRN Meds:.acetaminophen, albuterol, hydrALAZINE, LORazepam, oxyCODONE                Family History: PERSONALLY REVIEWED.  Family History   Problem Relation Age of Onset     Emphysema Mother      No Known Problems Father       Cirrhosis Father      No Known Problems Sister      No Known Problems Brother      No Known Problems Maternal Aunt      No Known Problems Maternal Uncle      No Known Problems Paternal Aunt      No Known Problems Paternal Uncle      No Known Problems Maternal Grandmother      No Known Problems Maternal Grandfather      No Known Problems Paternal Grandmother      No Known Problems Paternal Grandfather      No Known Problems Other      Pertinent Family hx not pertinent to Chief Complaint or reason for visit.     Social History:  PERSONALLY REVIEWED.  Social History     Socioeconomic History     Marital status:      Spouse name: Not on file     Number of children: Not on file     Years of education: Not on file     Highest education level: Not on file   Occupational History     Not on file   Tobacco Use     Smoking status: Former Smoker     Packs/day: 0.50     Years: 35.00     Pack years: 17.50     Types: Cigarettes     Quit date: 1990     Years since quittin.6     Smokeless tobacco: Never Used     Tobacco comment: quit    Substance and Sexual Activity     Alcohol use: No     Comment: Alcoholic Drinks/day: quit      Drug use: No     Sexual activity: Yes     Partners: Female     Birth control/protection: Post-menopausal   Other Topics Concern     Parent/sibling w/ CABG, MI or angioplasty before 65F 55M? No   Social History Narrative     Not on file     Social Determinants of Health     Financial Resource Strain:      Difficulty of Paying Living Expenses:    Food Insecurity:      Worried About Running Out of Food in the Last Year:      Ran Out of Food in the Last Year:    Transportation Needs:      Lack of Transportation (Medical):      Lack of Transportation (Non-Medical):    Physical Activity:      Days of Exercise per Week:      Minutes of Exercise per Session:    Stress:      Feeling of Stress :    Social Connections:      Frequency of Communication with Friends and Family:      Frequency of  Social Gatherings with Friends and Family:      Attends Ten[employer] Services:      Active Member of Clubs or Organizations:      Attends Club or Organization Meetings:      Marital Status:    Intimate Partner Violence:      Fear of Current or Ex-Partner:      Emotionally Abused:      Physically Abused:      Sexually Abused:     not pertinent to Chief Complaint or reason for visit.             Allergies as of 06/01/2014 Reviewed     Review of Systems:As per HPI. Remainders of 12 point review of systems negative.    Review of Pertinent Laboratory:      PERSONALLY REVIEWED.    Physical Exam: Temp:  [97.9  F (36.6  C)-98.8  F (37.1  C)] 97.9  F (36.6  C)  Pulse:  [] 144  Resp:  [24] 24  BP: (145-181)/() 145/90  SpO2:  [90 %-97 %] 93 %   Vitals: reviewed in chart     Physical exam as per medical team: reviewed in chart      diagnoses, risk and benefits of medications discussed with staff. Care coordination with care management team.   Thank you for this consultation.       Ivelisse Juárez; NP  [facility]        This note was created with the help of Dragon dictation system. Grammatical and typing errors are not intentional.

## 2021-08-14 NOTE — PROGRESS NOTES
[facility]      OUTPATIENT OCCUPATIONAL THERAPY  EVALUATION  PLAN OF TREATMENT FOR OUTPATIENT REHABILITATION  (COMPLETE FOR INITIAL CLAIMS ONLY)  Patient's Last Name, First Name, M.I.  YOB: 1940  Dominik Rojas                          Provider's Name  [facility] Medical Record No.  [id number]                               Onset Date:  08/14/21   Start of Care Date:  08/14/21     Type:     ___PT   _X_OT   ___SLP Medical Diagnosis:  Bronchomalacia                         OT Diagnosis:  Bronchomalacia, increased fatigue d/t CHF/COPD    Visits from SOC:  1   _________________________________________________________________________________  Plan of Treatment/Functional Goals    Planned Interventions: ADL retraining, IADL retraining, balance training, bed mobility training, cognition, strengthening, ROM   Goals: See Occupational Therapy Goals on Care Plan in [employer] electronic health record.    Therapy Frequency: Daily  Predicted Duration of Therapy Intervention: 3-5 days   _________________________________________________________________________________    I CERTIFY THE NEED FOR THESE SERVICES FURNISHED UNDER        THIS PLAN OF TREATMENT AND WHILE UNDER MY CARE     (Physician co-signature of this document indicates review and certification of the therapy plan).                Certification date from: 08/14/21, Certification date to: 08/21/21    Referring Physician: Alannah Anand DO             Initial Assessment        See Occupational Therapy evaluation dated 08/14/21 in Epic electronic health record.

## 2021-08-14 NOTE — H&P
[facility] Admission History and Physical    Dominik Wilsont  [address]  [place] 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      Dyspnea: Suspect related to severe anxiety and underlying bronchomalacia.  No evidence of ACS.  Chest x-ray shows improvement from previous.  Patient had recent hospitalization with extensive work-up including negative CTA of the chest as well as TTE showing EF of 40 to 50%.  Previous PFTs show restrictive pattern  Currently not hypoxic  Previous trial of Trelegy Ellipta resulted in significant coughing and discontinuation.  COPD/Restrictive lung disease exacerbation not supported  --Address anxiety as below, continue as needed albuterol  --Patient has already planned outpatient pulmonary clinic follow-up  --Increase home Lasix to 20 mg twice daily while inpatient  --Continue home PPI      Anxiety: Patient with increased anxiety over craving oxycodone for chronic low back pain  --Greatly appreciate psychiatry involvement.  They recommend stopping amitriptyline, start gabapentin 100 mg 3 times daily and start Rozerem 8 mg nightly.  If patient discharges home will need outpatient OT eval for cognition and .  --Oral oxycodone okay for pain for now, pain team consulted  --Ativan as needed  --Of note recent thyroid studies checked and are  acceptable  -- need to consider outpatient initiation of ssri vs snri, cognitive behavioral therapy and avoid elavil       Chronic pain: With likely opioid abuse disorder  --Pain team consulted      Chronic systolic CHF with chronic bilateral lower extremity edema: Patient with known chronic biventricular CHF and chronic lower extremity edema.  Work-up on previous hospitalization shows lower extremities negative for DVT, TTE shows EF of 40 to 50%  --Increase home diuretic slightly to 20 mg twice daily to avoid any net positive volume balance, continue home metoprolol and losartan        Status post bilateral L2-4 decompressive lumbar laminectomy with medial facetectomies on 3/3/2021. MRI L-spine 6/22/2021 showed recent T12 compression fracture with 50% loss of vertebral height. Brace not tolerated due to COPD per report.   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  Patient endorses increasing anxiety over craving oxycodone for back pain.  He states when his anxiety ramps up he becomes subjectively more short of breath.  He believers to the point about his wife is exhausted trying to care for him at home for the past several decades.  He is perseverating on distant historical details of social issues.  Remainder of ROS negative. Denies any other exacerbating or improving factors.        ALLERGIES/SENSITIVITIES:   Allergies   Allergen Reactions      Diclofenac      Other reaction(s): GI Upset     Loratadine      Other reaction(s): Urinary Retention       (Not in a hospital admission)      Past Medical History:   Diagnosis Date     AAA (abdominal aortic aneurysm) (H)     s/p stenting     Alcohol dependence in remission (H)      Anxiety      Atrial fibrillation with normal ventricular rate (H)      Benign prostatic hyperplasia with lower urinary tract symptoms      Bronchiectasis without complication (H)     2/27/2020     COPD (chronic obstructive pulmonary disease) (H)      CVA (cerebral vascular accident) (H) 2019     DDD (degenerative disc disease), lumbar      Depression      Depressive disorder      Diabetes (H)      Diastolic dysfunction 01/22/2021     DJD (degenerative joint disease) of knee     left     Essential hypertension      Glaucoma      Glaucoma      History of stroke without residual deficits      Hyperlipidemia      Hypertension      Hypothyroidism      Hypothyroidism      Kidney stones      Major depression      Memory problem      Nocturia      Obesity      Overactive bladder 02/25/2021     Primary osteoarthritis of left knee      Psoriasis      Psoriasis      Seborrheic keratoses      Somnolence, daytime      Stenosis of right carotid artery     history of TIA's       Past Surgical History:   Procedure Laterality Date     ABDOMEN SURGERY       ABDOMINAL AORTIC ANEURYSM REPAIR  2013    stent     C HUANG W/O FACETEC FORAMOT/DSKC 1/2 VRT SEG, LUMBAR Bilateral 3/3/2021    Procedure: Bilateral lumbar 2 - Lumbar 4 decompressive lumbar laminectomy with medial facetectomies;  Surgeon: Renée King MD;  Location: [facility] OR;  Service: Spine     CAROTID ENDARTERECTOMY Right 9/9/2019    Procedure: RIGHT CAROTID ENDARTERECTOMY;  Surgeon: Miguel Muller MD;  Location: [facility] OR;  Service: General     CIRCUMCISION       CYSTOSCOPY       CYSTOSCOPY PROSTATE W/ LASER N/A 6/12/2018    Procedure:  TRANSURETHRAL RESECTION OF THE  PROSTATE WITH QUANTA LASER;  Surgeon: Eze Levi MD;  Location: [facility];  Service:      CYSTOSCOPY PROSTATE W/ LASER N/A 2/18/2020    Procedure: CYSTOSCOPY WITH TRANSURETHRAL INCISION OF THE PROSTATE WITH QUANTA LASER;  Surgeon: Eze Levi MD;  Location: [facility];  Service: Urology     CYSTOSCOPY W/ LITHOLAPAXY / E[facility] N/A 6/12/2018    Procedure: CYSTOSCOPY AND LITHOLAPAXY;  Surgeon: Eze Levi MD;  Location: [facility];  Service:      IR ABDOMINAL AORTAGRAM  5/19/2020     IR ABDOMINAL AORTIC ANEURYSM ENDOGRAFT  5/19/2020     IR ABDOMINAL AORTOGRAM  5/19/2020     IR ABDOMINAL ENDOVASCULAR STENT GRAFT  5/19/2020     LITHOTRIPSY       PERCUTANEOUS NEPHROSTOLITHOTOMY Right 03/10/2020       REVIEW OF SYSTEMS:   Constitutional: no fever, chills, or sweats  Eyes: No visual disturbance or irritation  ENT: No nose or throat congestion or pain  Respiratory: No wheezes, cough, or pain with breathing  Cardiovascular: No chest pain or palpitations  Gastrointestinal: No nausea, vomiting, diarrhea, dyspepsia, or pain  Genitourinary: No urgency, frequency, or dysuria  Integument/breast: No rash, pruritis, or lesion  Hematologic/lymphatic: No bleeding or unusual bruising  Musculoskeletal: Chronic low back pain as above  Neurological: No headache, arm or leg numbness or weakness, dizziness, or gait disturbance  Psychiatric: No hallucinations  Endocrine: No unusual fatigue, appetite disturbance, sleep disturbance, or unusual weight loss or gain  Allergic/Immunologic: No hives, allergic swelling or wheeze or rhinitis  All other systems on reveiw are negative.    Social History     Socioeconomic History     Marital status:      Spouse name: Not on file     Number of children: Not on file     Years of education: Not on file     Highest education level: Not on file   Occupational History     Not on file   Tobacco Use     Smoking status: Former Smoker     Packs/day: 0.50      Years: 35.00     Pack years: 17.50     Types: Cigarettes     Quit date: 1990     Years since quittin.6     Smokeless tobacco: Never Used     Tobacco comment: quit    Substance and Sexual Activity     Alcohol use: No     Comment: Alcoholic Drinks/day: quit      Drug use: No     Sexual activity: Yes     Partners: Female     Birth control/protection: Post-menopausal   Other Topics Concern     Parent/sibling w/ CABG, MI or angioplasty before 65F 55M? No   Social History Narrative     Not on file     Social Determinants of Health     Financial Resource Strain:      Difficulty of Paying Living Expenses:    Food Insecurity:      Worried About Running Out of Food in the Last Year:      Ran Out of Food in the Last Year:    Transportation Needs:      Lack of Transportation (Medical):      Lack of Transportation (Non-Medical):    Physical Activity:      Days of Exercise per Week:      Minutes of Exercise per Session:    Stress:      Feeling of Stress :    Social Connections:      Frequency of Communication with Friends and Family:      Frequency of Social Gatherings with Friends and Family:      Attends Islam Services:      Active Member of Clubs or Organizations:      Attends Club or Organization Meetings:      Marital Status:    Intimate Partner Violence:      Fear of Current or Ex-Partner:      Emotionally Abused:      Physically Abused:      Sexually Abused:           Family History   Problem Relation Age of Onset     Emphysema Mother      No Known Problems Father      Cirrhosis Father      No Known Problems Sister      No Known Problems Brother      No Known Problems Maternal Aunt      No Known Problems Maternal Uncle      No Known Problems Paternal Aunt      No Known Problems Paternal Uncle      No Known Problems Maternal Grandmother      No Known Problems Maternal Grandfather      No Known Problems Paternal Grandmother      No Known Problems Paternal Grandfather      No Known Problems Other   "      PHYSICAL EXAM:  General Appearance: In no acute distress  BP (!) 181/90   Pulse 103   Temp 98.8  F (37.1  C) (Oral)   Resp 24   Ht 1.626 m (5' 4\")   Wt 99.8 kg (220 lb)   SpO2 92%   BMI 37.76 kg/m    EYES: Clear, without inflammation   HEENT: Without congestion or inflammation  RESPIRATORY: Respirations nonlabored  CARDIOVASCULAR: Trace le edema bilat.  ABDOMEN: soft and non-tender, obese  RECTAL: deferred  GENITOURINARY: deferred  NEUROLOGIC: No focal arm or leg  weakness, speech is clear  PSYCHIATRIC: Oriented X 3, tearful, anxious    Labs Reviewed:   Recent Results (from the past 24 hour(s))   CBC (+ platelets, no diff)    Collection Time: 08/14/21  2:16 AM   Result Value Ref Range    WBC Count 9.5 4.0 - 11.0 10e3/uL    RBC Count 4.82 4.40 - 5.90 10e6/uL    Hemoglobin 13.9 13.3 - 17.7 g/dL    Hematocrit 42.9 40.0 - 53.0 %    MCV 89 78 - 100 fL    MCH 28.8 26.5 - 33.0 pg    MCHC 32.4 31.5 - 36.5 g/dL    RDW 14.6 10.0 - 15.0 %    Platelet Count 232 150 - 450 10e3/uL   Basic metabolic panel    Collection Time: 08/14/21  2:16 AM   Result Value Ref Range    Sodium 141 136 - 145 mmol/L    Potassium 4.2 3.5 - 5.0 mmol/L    Chloride 105 98 - 107 mmol/L    Carbon Dioxide (CO2) 28 22 - 31 mmol/L    Anion Gap 8 5 - 18 mmol/L    Urea Nitrogen 17 8 - 28 mg/dL    Creatinine 0.86 0.70 - 1.30 mg/dL    Calcium 9.6 8.5 - 10.5 mg/dL    Glucose 111 70 - 125 mg/dL    GFR Estimate 82 >60 mL/min/1.73m2   B-Type Natriuretic Peptide ([facility] Only)    Collection Time: 08/14/21  2:16 AM   Result Value Ref Range    BNP 61 0 - 84 pg/mL   Troponin I (now)    Collection Time: 08/14/21  2:16 AM   Result Value Ref Range    Troponin I 0.01 0.00 - 0.29 ng/mL   Asymptomatic COVID-19 Virus (Coronavirus) by PCR Nasopharyngeal    Collection Time: 08/14/21  2:16 AM    Specimen: Nasopharyngeal; Swab   Result Value Ref Range    SARS CoV2 PCR Negative Negative               "

## 2021-08-14 NOTE — ED NOTES
Daughter, Chana Montero, would like to be updated when necessary regarding her father Dominik Orange. Her cell # is, [phone].

## 2021-08-14 NOTE — PROGRESS NOTES
Occupational Therapy      08/14/21 1440   Quick Adds   Type of Visit Initial Occupational Therapy Evaluation   Living Environment   People in home spouse  (Jovana  )   Current Living Arrangements house   Home Accessibility stairs to enter home;stairs within home  (Rambler style home )   Number of Stairs, Main Entrance 5   Stair Railings, Main Entrance railings on both sides of stairs   Number of Stairs, Within Home, Primary greater than 10 stairs  (Pt does not use stairs regularily )   Stair Railings, Within Home, Primary railings safe and in good condition   Transportation Anticipated car, drives self;family or friend will provide   Living Environment Comments bed/bath/kitchen one level, laundry in basement spouse does laundry. Pt was dressing self,  Pt has tub/shower unit w/ shower chair, w/ GB. Standard toilet w/ vanity near to push up from.  Spouse cooks/cleans pt able to assist, pt is a licensed  but d/t meds wasnt driving. Pt spouse sets up medications for week, pt discussed they do finances.  Spouse grocery shops.    Self-Care   Usual Activity Tolerance moderate   Current Activity Tolerance fair   Regular Exercise No   Equipment Currently Used at Home cane, straight;walker, standard   Disability/Function   Hearing Difficulty or Deaf no   Difficulty Eating/Swallowing no   Walking or Climbing Stairs Difficulty no   Dressing/Bathing Difficulty no   Toileting issues no   Fall history within last six months yes   General Information   Onset of Illness/Injury or Date of Surgery 08/14/21   Referring Physician Alannah Anand DO    Patient/Family Therapy Goal Statement (OT) Pt wishes to return home and feel healthier, daughter present and discussed she wants him to be safer as well.    Existing Precautions/Restrictions no known precautions/restrictions   Limitations/Impairments safety/cognitive   Left Upper Extremity (Weight-bearing Status) full weight-bearing (FWB)   Right Upper Extremity  (Weight-bearing Status) full weight-bearing (FWB)   Cognitive Status Examination   Orientation Status person;place   Affect/Mental Status (Cognitive) WNL   Follows Commands WFL   Visual Perception   Visual Impairment/Limitations WFL   Integumentary/Edema   Integumentary/Edema other (describe)   Integumentary/Edema Comments Edema BLE - moderate    Edema General Information   Affected Body Part(s) Left LE;Right LE   Edema Precautions Cardiac Edema/CHF   Range of Motion Comprehensive   General Range of Motion bilateral upper extremity ROM WNL;no range of motion deficits identified   Strength Comprehensive (MMT)   General Manual Muscle Testing (MMT) Assessment no strength deficits identified   Coordination   Upper Extremity Coordination No deficits were identified   Transfers   Transfers sit-stand transfer;toilet transfer   Sit-Stand Transfer   Sit-Stand Randall (Transfers) supervision;verbal cues  (SBA- sup for sit<>Stand transfer from recliner)   Assistive Device (Sit-Stand Transfers) cane, straight   Sit/Stand Transfer Comments sit<>stand from recliner w/ SBA-sup w/ vc for safe hand placement    Toilet Transfer   Type (Toilet Transfer) sit-stand;stand-sit   Randall Level (Toilet Transfer) verbal cues;supervision;contact guard   Assistive Device (Toilet Transfer) cane, straight   Toilet Transfer Comments sit<>stand w/ standard toilet CGA-SBA    Activities of Daily Living   BADL Assessment lower body dressing;toileting   Lower Body Dressing Assessment   Randall Level (Lower Body Dressing) minimum assist (75% patient effort);supervision;verbal cues   Position (Lower Body Dressing) supported sitting   Comment (Lower Body Dressing) Pt able to kick off socks while sitting upright in recliner, pt required Min.A to niurka, OT educated pt on use of SA/reacher for further assist w/ LB dressing   Toileting   Randall Level (Toileting) contact guard assist;supervision;verbal cues   Position (Toileting)  unsupported sitting   Comment (Toileting) Sit<>stand toilet transfer CGA-SBA to ensure balance while pulling down/up pants no LOB noted     Clinical Impression   Criteria for Skilled Therapeutic Interventions Met (OT) yes;skilled treatment is necessary   OT Diagnosis Bronchomalacia, increased fatigue d/t CHF/COPD    OT Problem List-Impairments impacting ADL activity tolerance impaired;balance;cognition;fear & anxiety;problems related to   Assessment of Occupational Performance 3-5 Performance Deficits   Identified Performance Deficits Decreased balance, endurance and cognition required for safety with ADL    Planned Therapy Interventions (OT) ADL retraining;IADL retraining;balance training;bed mobility training;cognition;strengthening;ROM   Clinical Decision Making Complexity (OT) moderate complexity   Therapy Frequency (OT) Daily   Predicted Duration of Therapy 3-5 days    Anticipated Equipment Needs Upon Discharge (OT) reacher  (Sock aid )   Risk & Benefits of therapy have been explained evaluation/treatment results reviewed;current/potential barriers reviewed   OT Discharge Planning    OT Discharge Recommendation (DC Rec) Transitional Care Facility;Home with assist   OT Rationale for DC Rec monitor overall cognition for safety for d/c    Therapy Certification   Start of Care Date 08/14/21   Certification date from 08/14/21   Certification date to 08/21/21   Medical Diagnosis Bronchomalacia    Total Evaluation Time (Minutes)   Total Evaluation Time (Minutes) 15

## 2021-08-14 NOTE — PLAN OF CARE
Patient eager to discharge and was calling family to get them to come get him even before the MD rounded and stated that he could go home.   Needed to discuss discharge with  patient's daughter and his spouse.   Patient made several attempts to go before wife arrived.  Patient does not take control of his his medications or health choices. Leaves that to wife and then gets upset when wife advocates for following doctors recommendations.  Discharge instructions reviewed with both patient and his wife.  Patient discharged home and transported to home by wife.      Skye Fisher RN

## 2021-08-14 NOTE — PHARMACY-ADMISSION MEDICATION HISTORY
Pharmacy Note - Admission Medication History    Pertinent Provider Information:     Patient and family would like to start Suboxone and consolidate medications to lessen pill burden.      ______________________________________________________________________    Prior To Admission (PTA) med list completed and updated in EMR.       PTA Med List   Medication Sig Note Last Dose     acetaminophen (TYLENOL) 500 MG tablet Take 1,000 mg by mouth as needed       albuterol (PROAIR HFA/PROVENTIL HFA/VENTOLIN HFA) 108 (90 Base) MCG/ACT inhaler Inhale 2 puffs into the lungs every 6 hours as needed        albuterol (PROVENTIL) (2.5 MG/3ML) 0.083% neb solution Inhale 2.5 mg into the lungs every 6 hours as needed        amitriptyline (ELAVIL) 25 MG tablet Take 12.5 mg by mouth At Bedtime  8/13/2021 at Unknown time     aspirin (ASA) 81 MG chewable tablet Take 81 mg by mouth daily  8/13/2021 at Unknown time     calcitonin, salmon, (MIACALCIN) 200 UNIT/ACT nasal spray Spray 1 spray into one nostril alternating nostrils daily Alternate nostril each day.  8/13/2021 at Unknown time     Cholecalciferol (VITAMIN D3) 50 MCG (2000 UT) CAPS Take 1 tablet by mouth daily        furosemide (LASIX) 20 MG tablet Take 20 mg by mouth daily  8/13/2021 at Unknown time     latanoprost (XALATAN) 0.005 % ophthalmic solution Place 1 drop into both eyes At Bedtime   8/13/2021 at Unknown time     levothyroxine (SYNTHROID/LEVOTHROID) 150 MCG tablet Take 1 tablet by mouth daily   8/13/2021 at Unknown time     losartan (COZAAR) 25 MG tablet Take 1 tablet (25 mg) by mouth daily 8/14/2021: Not yet started       metoprolol succinate ER (TOPROL-XL) 50 MG 24 hr tablet Take 0.5 tablets (25 mg) by mouth daily 8/14/2021: Not yet started      multivitamin w/minerals (MULTI-VITAMIN) tablet Take 1 tablet by mouth daily       oxyCODONE (ROXICODONE) 5 MG tablet Take 1 tablet (5 mg) by mouth 3 times daily as needed for severe pain  8/13/2021 at 2130     pantoprazole  (PROTONIX) 40 MG EC tablet Take 1 tablet (40 mg) by mouth every morning (before breakfast) 8/14/2021: Not yet started      rosuvastatin (CRESTOR) 10 MG tablet Take 10 mg by mouth At Bedtime   8/13/2021 at Unknown time       Information source(s): Patient, Family member, [facility] records and [facility]  Method of interview communication: in-person    Summary of Changes to PTA Med List  New: none  Discontinued: none  Changed: none    Patient was asked about OTC/herbal products specifically.  PTA med list reflects this.    In the past week, patient estimated taking medication this percent of the time:  greater than 90%.    Allergies were reviewed, assessed, and updated with the patient.      Patient did not bring any medications to the hospital and can't retrieve from home. No multi-dose medications are available for use during hospital stay.     The information provided in this note is only as accurate as the sources available at the time of the update(s).    Thank you for the opportunity to participate in the care of this patient.    Brenda Jovel  8/14/2021 11:36 AM

## 2021-08-14 NOTE — ED TRIAGE NOTES
"Patient had back surgery several months ago, prescribed oxycodone, wife states he \"craves oxycodone and is anxious\". Patient reports anxiety. The main problem is described as pain control, and wife worried about husbands oxycodone use/abuse. He denies pain righ now, but \"feels furious\". Patient recently seen for LE edema.  "

## 2021-08-14 NOTE — ED PROVIDER NOTES
"EMERGENCY DEPARTMENT ENCOUNTER      FINAL IMPRESSION    1. Chronic obstructive pulmonary disease, unspecified COPD type (H)    2. Dyspnea, unspecified type        MEDICAL DECISION MAKING    80-year-old gentleman with extensive medical history including A. fib, COPD, CHF, AAA, alcohol dependence in remission, depression, recent hospitalization for fluid overload who presents to the emergency department today with unclear initial complaints of \"anxiousness\".  Patient also appears profoundly dyspneic especially with ambulation and vitals to triage with tachycardia 106 and hypoxia 91% relatively speaking.  No fevers noted.  Patient significant other concerned about mental health state and overuse of opiates.  Patient however does report anxiousness and depressed state but denies SI or HI to this provider.  Patient is very irritable with regards to interactions with his significant other.  Patient feels that he was forced to be evaluated today.     EKG and arrival is noted without acute findings.     Chest x-ray no proved compared to prior with bibasilar atelectasis versus infiltrate and patient without symptoms of acute infectious process did not treat presumptively..     Lab studies noted reassuring with negative troponin negative BNP.  Suspect this is COPD and bronchiectasis.      Patient.  Given aspirin, Ativan, Solu-Medrol, nebulizers with some improvement.  He persists with significant labored respirations thusly with his high risk and chronic illness will hospitalize.    There are no beds available and she will require transfer versus boarding.     Pt was updated with all results and discussed plan and amenable as above. Pt discussed with hospitalist.     Ultimately given the significant labored respirations especially with ambulation did recommend hospitalization.  Due to contagious pathogen concern full protective equipment was utilized through the duration of the interaction including N95  mask, eye shield, " hair cover, gown and gloves.     MEDICATIONS GIVEN IN THE EMERGENCY:  Medications   albuterol (PROVENTIL) neb solution 2.5 mg (has no administration in time range)   aspirin (ASA) chewable tablet 162 mg (162 mg Oral Given 8/14/21 0218)   LORazepam (ATIVAN) tablet 0.5 mg (0.5 mg Oral Given 8/14/21 0218)   ipratropium - albuterol 0.5 mg/2.5 mg/3 mL (DUONEB) neb solution 3 mL (3 mLs Nebulization Given 8/14/21 0218)   methylPREDNISolone sodium succinate (solu-MEDROL) injection 125 mg (125 mg Intravenous Given 8/14/21 0217)       NEW PRESCRIPTIONS STARTED AT TODAY'S ER VISIT     Review of your medicines      UNREVIEWED medicines. Ask your doctor about these medicines      Dose / Directions   acetaminophen 500 MG tablet  Commonly known as: TYLENOL      Dose: 1,000 mg  Take 1,000 mg by mouth as needed  Refills: 0     * albuterol (2.5 MG/3ML) 0.083% neb solution  Commonly known as: PROVENTIL      Dose: 2.5 mg  Inhale 2.5 mg into the lungs every 6 hours as needed  Refills: 0     * albuterol 108 (90 Base) MCG/ACT inhaler  Commonly known as: PROAIR HFA/PROVENTIL HFA/VENTOLIN HFA      Dose: 2 puff  Inhale 2 puffs into the lungs every 6 hours as needed  Refills: 0     amitriptyline 50 MG tablet  Commonly known as: ELAVIL      Dose: 12.5 mg  12.5 mg At Bedtime  Refills: 0     aspirin 81 MG chewable tablet  Commonly known as: ASA      Dose: 81 mg  Take 81 mg by mouth daily  Refills: 0     calcitonin (salmon) 200 UNIT/ACT nasal spray  Commonly known as: MIACALCIN      Dose: 1 spray  Spray 1 spray into one nostril alternating nostrils daily  Refills: 0     furosemide 20 MG tablet  Commonly known as: LASIX      Dose: 20 mg  Take 20 mg by mouth daily  Refills: 0     latanoprost 0.005 % ophthalmic solution  Commonly known as: XALATAN      Dose: 1 drop  Place 1 drop into both eyes At Bedtime  Refills: 0     levothyroxine 150 MCG tablet  Commonly known as: SYNTHROID/LEVOTHROID      Dose: 1 tablet  1 tablet daily  Refills: 0     losartan  25 MG tablet  Commonly known as: COZAAR  Used for: Congestive heart failure, unspecified HF chronicity, unspecified heart failure type (H)      Dose: 25 mg  Take 1 tablet (25 mg) by mouth daily  Quantity: 30 tablet  Refills: 0     metoprolol succinate ER 50 MG 24 hr tablet  Commonly known as: TOPROL-XL  Used for: Congestive heart failure, unspecified HF chronicity, unspecified heart failure type (H)      Dose: 25 mg  Take 0.5 tablets (25 mg) by mouth daily  Quantity: 30 tablet  Refills: 0     Multi-vitamin tablet      Dose: 1 tablet  Take 1 tablet by mouth daily  Refills: 0     oxyCODONE 5 MG tablet  Commonly known as: ROXICODONE  Used for: Compression fracture of T12 vertebra, initial encounter (H)      Dose: 5 mg  Take 1 tablet (5 mg) by mouth 3 times daily as needed for severe pain  Quantity: 30 tablet  Refills: 0     pantoprazole 40 MG EC tablet  Commonly known as: PROTONIX  Used for: Gastroesophageal reflux disease with esophagitis without hemorrhage      Dose: 40 mg  Take 1 tablet (40 mg) by mouth every morning (before breakfast)  Quantity: 30 tablet  Refills: 0     rosuvastatin 10 MG tablet  Commonly known as: CRESTOR      Dose: 10 mg  Take 10 mg by mouth At Bedtime  Refills: 0     vitamin D3 50 MCG (2000 UT) Caps      Dose: 1 tablet  1 tablet daily  Refills: 0         * This list has 2 medication(s) that are the same as other medications prescribed for you. Read the directions carefully, and ask your doctor or other care provider to review them with you.                CHIEF COMPLAINT    Chief Complaint   Patient presents with     Anxiety       HPI    Dominik Rojas is a 80 year old male with pertinent past medical history for CVA, hypertension, heart disease, COPD, atrial fibrillation, alcohol abuse, and AAA who presents to this Emergency Department for evaluation of anxiety. Yesterday patient returned home from admission to the hospital. Since then, he endorses anxiety and shortness of breath. He has  "bilateral lower extremity edema that he reports is improving. He states that he would like to resolve his anxiety and return home. At home, his wife helps with administering the patients medications and she is concerned his is becoming addicted to opioids. He had a stroke 2 years ago and has since had to consult urology and a spine clinic. He is fully vaccinated for COVID-19. Patient denies chest pain, abdominal pain, nausea, vomiting, diarrhea, fever, cough, suicidal ideations, thoughts of self harm, or any other additional symptoms at this time.      This document serves as a record of services performed by Dr. Renaldo Oconnell. It was created on his behalf by Jimbo Raymond, trained medical scribe. The creation of this record is based on the scribes personal observations and the providers statements to him/her. This document has been checked and approved by the attending provider.    RELEVANT HISTORY, MEDICATIONS, & ALLERGIES   Past medical history, surgical history, family history, medications, and allergies reviewed and pertinent noted in HPI. See end of note for comprehensive list.    REVIEW OF SYSTEMS    Constitutional:  No fever or chills, no weakness  ENT: No sore throat. No congestion  Eyes: No vision changes  Respiratory: Endorses shortness of breath, no wheeze, no cough  Cardiovascular:  No chest pain, no palpitations, no syncope.  GI:  No abdominal pain, no nausea, no vomiting, no diarrhea.   : No dysuria, No hematuria, No frequency.  Musculoskeletal:  No new muscle/joint pain, no swelling, no loss of function.   Neurologic:  No headache, no focal weakness , no sensory changes  Psych: Endorses anxiety, no thoughts of self-harm, no suicidal ideations.    All other systems reviewed and are negative.    PHYSICAL EXAM    VITALS SIGNS: BP (!) 147/84   Pulse 95   Temp 98.8  F (37.1  C) (Oral)   Resp 24   Ht 1.626 m (5' 4\")   Wt 99.8 kg (220 lb)   SpO2 97%   BMI 37.76 kg/m    Constitutional:  Awake, Alert, " Cooperative.  HENT: Normocephalic, Atraumatic, Bilateral external ears normal, Oropharynx moist, Nose normal.   Neck: Normal range of motion, No tenderness, Supple, No meningismus, No stridor.   Eyes: PERRL, EOMI, Conjunctiva normal, No discharge.   Respiratory: Decreased breath sounds, diffuse wheezing, coarse bases, No chest tenderness.   Cardiovascular: Tachycardic, Normal rhythm, No murmurs, No rubs, No gallops. 2+ peripheral edema.   GI: Soft, No tenderness, No guarding, No CVA tenderness.   Musculoskeletal: Neurovascularly intact distally, No edema, No tenderness  Integument: Warm, Dry, No erythema, No rash.   Neurologic: Alert & oriented x 3, Normal motor function, Normal sensory function, No focal deficits noted.   Psych: No active suicidal ideations.     LABS  Labs Ordered and Resulted from Time of ED Arrival Up to the Time of Departure from the ED   CBC WITH PLATELETS - Normal   BASIC METABOLIC PANEL - Normal   B-TYPE NATRIURETIC PEPTIDE ( EAST ONLY) - Normal   TROPONIN I - Normal   COVID-19 VIRUS (CORONAVIRUS) BY PCR - Normal    Narrative:     Testing was performed using the [facility]  SARS-CoV-2 & Influenza A/B Assay on the [employer]  [employer]  System.  This test should be ordered for the detection of SARS-COV-2 in individuals who meet SARS-CoV-2 clinical and/or epidemiological criteria. Test performance is unknown in asymptomatic patients.  This test is for in vitro diagnostic use under the FDA EUA for laboratories certified under CLIA to perform moderate and/or high complexity testing. This test has not been FDA cleared or approved.  A negative test does not rule out the presence of PCR inhibitors in the specimen or target RNA in concentration below the limit of detection for the assay. The possibility of a false negative should be considered if the patient's recent exposure or clinical presentation suggests COVID-19.  [facility] are certified under the Clinical Laboratory Improvement  Amendments of 1988 (CLIA-88) as qualified to perform moderate and/or high complexity laboratory testing.         EKG    Sinus tachycardia rate 105, cures 86, QTc 496 per nonspecific findings no active acute ischemic evidence.  Compared EKG from August 10 tachycardia persists.    RADIOLOGY    Please see official radiology report.  XR Chest Port 1 View   Final Result   IMPRESSION: Cardiac enlargement and shallow inspiration. Bibasilar atelectasis or infiltrate decreased compared to prior. Atherosclerotic aorta. Surgical clips right neck.            All laboratories, imaging and EKG's were personally reviewed and interpreted by myself prior to disposition decision.     PROCEDURES    None    Comprehensive outline of EPIC chart Hx  PAST MEDICAL HISTORY    Past Medical History:   Diagnosis Date     AAA (abdominal aortic aneurysm) (H)     s/p stenting     Alcohol dependence in remission (H)      Anxiety      Atrial fibrillation with normal ventricular rate (H)      Benign prostatic hyperplasia with lower urinary tract symptoms      Bronchiectasis without complication (H)     2/27/2020     COPD (chronic obstructive pulmonary disease) (H)      CVA (cerebral vascular accident) (H) 2019     DDD (degenerative disc disease), lumbar      Depression      Depressive disorder      Diabetes (H)      Diastolic dysfunction 01/22/2021     DJD (degenerative joint disease) of knee     left     Essential hypertension      Glaucoma      Glaucoma      History of stroke without residual deficits      Hyperlipidemia      Hypertension      Hypothyroidism      Hypothyroidism      Kidney stones      Major depression      Memory problem      Nocturia      Obesity      Overactive bladder 02/25/2021     Primary osteoarthritis of left knee      Psoriasis      Psoriasis      Seborrheic keratoses      Somnolence, daytime      Stenosis of right carotid artery     history of TIA's     Past Surgical History:   Procedure Laterality Date     ABDOMEN SURGERY        ABDOMINAL AORTIC ANEURYSM REPAIR  2013    stent     C HUANG W/O FACETEC FORAMOT/DSKC 1/2 VRT SEG, LUMBAR Bilateral 3/3/2021    Procedure: Bilateral lumbar 2 - Lumbar 4 decompressive lumbar laminectomy with medial facetectomies;  Surgeon: Renée King MD;  Location: [facility] Main OR;  Service: Spine     CAROTID ENDARTERECTOMY Right 9/9/2019    Procedure: RIGHT CAROTID ENDARTERECTOMY;  Surgeon: Miguel Muller MD;  Location: [facility] Main OR;  Service: General     CIRCUMCISION       CYSTOSCOPY       CYSTOSCOPY PROSTATE W/ LASER N/A 6/12/2018    Procedure:  TRANSURETHRAL RESECTION OF THE PROSTATE WITH QUANTA LASER;  Surgeon: Eze Levi MD;  Location: [facility] Main OR;  Service:      CYSTOSCOPY PROSTATE W/ LASER N/A 2/18/2020    Procedure: CYSTOSCOPY WITH TRANSURETHRAL INCISION OF THE PROSTATE WITH QUANTA LASER;  Surgeon: Eze Levi MD;  Location: [facility] Main OR;  Service: Urology     CYSTOSCOPY W/ LITHOLAPAXY / EHL N/A 6/12/2018    Procedure: CYSTOSCOPY AND LITHOLAPAXY;  Surgeon: Eze Levi MD;  Location: [facility] Main OR;  Service:      IR ABDOMINAL AORTAGRAM  5/19/2020     IR ABDOMINAL AORTIC ANEURYSM ENDOGRAFT  5/19/2020     IR ABDOMINAL AORTOGRAM  5/19/2020     IR ABDOMINAL ENDOVASCULAR STENT GRAFT  5/19/2020     LITHOTRIPSY       PERCUTANEOUS NEPHROSTOLITHOTOMY Right 03/10/2020       CURRENT MEDICATIONS      Current Facility-Administered Medications:      albuterol (PROVENTIL) neb solution 2.5 mg, 2.5 mg, Nebulization, Q6H PRN, Renaldo Oconnell MD    Current Outpatient Medications:      acetaminophen (TYLENOL) 500 MG tablet, Take 1,000 mg by mouth as needed, Disp: , Rfl:      albuterol (PROAIR HFA/PROVENTIL HFA/VENTOLIN HFA) 108 (90 Base) MCG/ACT inhaler, Inhale 2 puffs into the lungs every 6 hours as needed , Disp: , Rfl:      albuterol (PROVENTIL) (2.5 MG/3ML) 0.083% neb solution, Inhale 2.5 mg into the lungs every 6 hours as needed , Disp: , Rfl:       amitriptyline (ELAVIL) 50 MG tablet, 12.5 mg At Bedtime , Disp: , Rfl:      aspirin (ASA) 81 MG chewable tablet, Take 81 mg by mouth daily, Disp: , Rfl:      calcitonin, salmon, (MIACALCIN) 200 UNIT/ACT nasal spray, Spray 1 spray into one nostril alternating nostrils daily , Disp: , Rfl:      Cholecalciferol (VITAMIN D3) 50 MCG (2000 UT) CAPS, 1 tablet daily, Disp: , Rfl:      furosemide (LASIX) 20 MG tablet, Take 20 mg by mouth daily, Disp: , Rfl:      latanoprost (XALATAN) 0.005 % ophthalmic solution, Place 1 drop into both eyes At Bedtime , Disp: , Rfl:      levothyroxine (SYNTHROID/LEVOTHROID) 150 MCG tablet, 1 tablet daily, Disp: , Rfl:      losartan (COZAAR) 25 MG tablet, Take 1 tablet (25 mg) by mouth daily, Disp: 30 tablet, Rfl: 0     metoprolol succinate ER (TOPROL-XL) 50 MG 24 hr tablet, Take 0.5 tablets (25 mg) by mouth daily, Disp: 30 tablet, Rfl: 0     multivitamin w/minerals (MULTI-VITAMIN) tablet, Take 1 tablet by mouth daily, Disp: , Rfl:      oxyCODONE (ROXICODONE) 5 MG tablet, Take 1 tablet (5 mg) by mouth 3 times daily as needed for severe pain, Disp: 30 tablet, Rfl: 0     pantoprazole (PROTONIX) 40 MG EC tablet, Take 1 tablet (40 mg) by mouth every morning (before breakfast), Disp: 30 tablet, Rfl: 0     rosuvastatin (CRESTOR) 10 MG tablet, Take 10 mg by mouth At Bedtime , Disp: , Rfl:     ALLERGIES    Allergies   Allergen Reactions     Diclofenac      Other reaction(s): GI Upset     Loratadine      Other reaction(s): Urinary Retention       FAMILY HISTORY    Family History   Problem Relation Age of Onset     Emphysema Mother      No Known Problems Father      Cirrhosis Father      No Known Problems Sister      No Known Problems Brother      No Known Problems Maternal Aunt      No Known Problems Maternal Uncle      No Known Problems Paternal Aunt      No Known Problems Paternal Uncle      No Known Problems Maternal Grandmother      No Known Problems Maternal Grandfather      No Known Problems  Paternal Grandmother      No Known Problems Paternal Grandfather      No Known Problems Other        SOCIAL HISTORY    Social History     Socioeconomic History     Marital status:      Spouse name: Not on file     Number of children: Not on file     Years of education: Not on file     Highest education level: Not on file   Occupational History     Not on file   Tobacco Use     Smoking status: Former Smoker     Packs/day: 0.50     Years: 35.00     Pack years: 17.50     Types: Cigarettes     Quit date: 1990     Years since quittin.6     Smokeless tobacco: Never Used     Tobacco comment: quit    Substance and Sexual Activity     Alcohol use: No     Comment: Alcoholic Drinks/day: quit      Drug use: No     Sexual activity: Yes     Partners: Female     Birth control/protection: Post-menopausal   Other Topics Concern     Parent/sibling w/ CABG, MI or angioplasty before 65F 55M? No   Social History Narrative     Not on file     Social Determinants of Health     Financial Resource Strain:      Difficulty of Paying Living Expenses:    Food Insecurity:      Worried About Running Out of Food in the Last Year:      Ran Out of Food in the Last Year:    Transportation Needs:      Lack of Transportation (Medical):      Lack of Transportation (Non-Medical):    Physical Activity:      Days of Exercise per Week:      Minutes of Exercise per Session:    Stress:      Feeling of Stress :    Social Connections:      Frequency of Communication with Friends and Family:      Frequency of Social Gatherings with Friends and Family:      Attends Mosque Services:      Active Member of Clubs or Organizations:      Attends Club or Organization Meetings:      Marital Status:    Intimate Partner Violence:      Fear of Current or Ex-Partner:      Emotionally Abused:      Physically Abused:      Sexually Abused:        This document serves as a record of services performed by Dr. Renaldo Oconnell. It was created on his behalf  by Jimbo Raymond, trained medical scribe. The creation of this record is based on the scribes personal observations and the providers statements to him/her.     I Dr. Renaldo Oconnell, personally performed the services described in this documentation as scribed by the above named individual in my presence, and it is both accurate and complete.      Renaldo Oconnell MD  08/14/21 [id number]

## 2021-08-14 NOTE — CONSULTS
"[facility] ACUTE PAIN SERVICE CONSULTATION ([facility], [facility], [facility])     Date of Admission:  8/14/2021  Date of Consult (When I saw the patient): 08/14/21  Physician requesting consult: Dr. Jaffe    Reason for consult: chornic pain, anxiety     Assessment/Plan:     Dominik Rojas is a 80 year old male who was admitted on 8/14/2021. I was asked to see the patient for chornic pain, anxiety. Admitted for dyspnea. History of A. fib, COPD, CHF, AAA, alcohol dependence in remission, depression, depression, anxiety. Patient significant other concerned about mental health state and overuse of opiates, arguing with family about wanting more oxycodone at home. Patient is status post bilateral L2-L4 decompressive lumbar laminectomy with medial facetectomies with Dr. King March 3, 2021.  Patient reported resolution of preoperative leg symptoms, but worsening low back pain after surgery. New compression fracture at T12 found 6/22/2021. Rates chronic pain 9/10 in the low back and knee. Anxiety contributing. The patient does not smoke and heavy etoh chemical dependency history.     Addendum: I received a message from the PharmD that the patient would like to start suboxone. Discussed again with the patient who confirms this and states \"I thought it was all ready to go.\" He did seem confused about which medications he is on and which ones we are starting. Discussed with nurse who confirms that the patient, care manager, and wife have asked to start suboxone. Will discontinue oxycodone and can start Subutex 2mg after 10pm tonight (document COWS score >10 prior to initiation of buprenorphine). It is not safe to mix buprenorphine and benzodiazepines (the class of drugs that includes Valium, Klonopin, Xanax, Ativan, etc.). Added vistaril for comfort and agree with rozerem for sleep.  Continue to assess for sweating, resting tachycardia, anxiety, runny nose, lacrimation (tearing eyes), yawning, nausea, " possible vomiting, loose stools, generalized muscle and joint aches, dilated pupils, gooseflesh skin. Would suggest escalation as follows (can be done outpatient):   day 1: subutex 2mg at HS PRN   day 2 subutex 2mg at bid   day 3 - subutex 2mg TID   day 4 - transition to suboxone 2/0.5 mg films: 2 mg (2/0.5 film) TID   Day 5: 4 mg BID (4/1 mg film)     PLAN:   1) Chronic low back pain in the setting of opioid use disorder. He typically receives about 30 tablets of opioids per month.  In April he was on tramadol, and may and then July he did receive oxycodone from spine. Would suggest rotation to suboxone, pt does not want to be on any more medications. Will hold starting suboxone for now, gave information for walk in Clinic on Monday. Consider chem dep consult. Add lidocaine to low back, PT, weight loss. I agree with gabapentin.   2)Multimodal Medication Therapy  Topical:  Lidocaine ointment 5% and Diclofenac ointment (to the knee)  Muscle Relaxants: none   Adjuvants:  Tyenol 1G TID, Vistaril 25-50mg Q6h PRN and Gabapentin per Mental Health NP   Antidepressants/anxiolytics: per mental health NP   Opioids:  Oxycodonetaper bid for 1 day, then daily for 1 day, then stop  3)Non-medication interventions- PT, acupuncture, integrative services, weight loss   4)Constipation Prophylaxis- none in anticipation of diarrhea with opioid taper   5) Follow up   -Opioid prescriber has been spine  -Discharge Recommendations - We recommend prescribing the following at the time of discharge: suboxone if desired, weight loss clinic, PT, home care, and addiction med follow up       [facility] [place] [id number], [address]   [place], [place], [address]   Phone: [phone]   Fax: [phone]   Open Mon, Wed, Fridays   9:00am-4:00pm   Walk in hours: 9am-3pm   Open Tues and Thursdays   Walk-in only 1:30-4pm     Another option would be [facility]      History of Present Illness (HPI):       Dominik ROSA  "Crystal is a 80 year old old male who presented with dyspnea.  He has chronic pain in the low back and had surgery in March.  The surgery resolved his lower extremity pain although he has been left with persistent low back pain.  He also has chronic knee pain.  He rates his pain high 9/10 although is up walking around looking for his dentures.  I met with the patient, his daughter and his wife.  We talked about his ongoing pain and use of oxycodone.  Both the patient and his wife report many stressors at home.  Apparently last night the patient took an oxycodone and then was arguing and begging his wife for more about an hour or 2 later.  He has been using them more frequently (oxycodone) and has been unsuccessful in cutting back or quitting.  Is spending a lot of time arguing with his wife about using oxycodone.  He acknowledges cravings.  He is not fulfilling any roles with friendships at home.  His wife states, \"he has no friends\".  He states he feels like a failure and often reminisces throughout the conversation about how he was able to cut his major alcohol addiction.  He tells me that throughout the conversation many times that the anxiety is a major component for him lately.  Both the patient and his wife acknowledge concerns of addiction but the patient also has a hard time acknowledging that the pain has improved.  They are very frustrated with the total number of medications that he is taking.  We went through each medication.  We talked about how lifestyle including weight loss and adhering to dietary restrictions will improve blood pressure as well as cholesterol.  We talked about adding Suboxone.  We talked about how it works to treat addiction and pain.  However, the patient and his family are very reluctant to start new medications given that he already takes more than 10 medications.  Throughout the conversation we talked about many topics including pain, weight loss, mobility, anxiety, meds, " social support, follow-up options, and other hurdles in care.  The patient and his wife have a hard time getting around.  We talked about using ride share with you care.  We talked about doing E visits rather than in person visits.  Talked about the interplay between sleep and lack of sleep pain.  The patient's wife tells me he is up all night eating..  The wife asked me to check the urine screen for alcohol and she wonders if he is still drinking.  The patient assures that he is not drinking.  She states she even checked the garage to see if he is hiding alcohol there.  He brings up multiple details about his care including his inability to understand how to administer nebulizers at home.  His daughter jumps in and says that she has a son with asthma and knows that albuterol can make anxiety worse.    Add - Discussed with patients the risks and benefits of buprenorphine.. The patient has elected to start buprenorphine and would like to do so soon. Gave referral information.     [place] - pulled from system on 08/14/21. Last refill this month. This indicated ongoing  opioid use. >2* total number of prescribing providers noted.     Discussed multimodal interventions, interventions other than systemic pharmacologic treatments for chronic pain including: behavioralapproaches such as: Cognitive behavioral therapy, Biofeedback, and Relaxation therapy. Psychotherapy and individual or group counseling, Aerobic exercise, Acupuncture, Physical and occupational therapy, Chiropractics and osteopathic manipulation, Ultrasonic stimulation, Electrical neuromodulation, Transcutaneouselectrical nerve stimulation (TENS), Spinal cord stimulation, Thermal applications (heat/cold),or Interventional approaches such as Ablative techniques, Botulinum toxin injections, Nerveblocks, Trigger point injections, or Epidural steroid injections.   Medical cannabis has been approved for chronic intractable pain in [place]. The implementation date August  2016.     We talked about medical cannabis. The state [facility] has approved medical cannabis for Qualifying Conditions. A person would need to be Certified as having a Qualifying Condition. Vicente daughter has a referral to [facility]. I recommend against this given concern for addiction currently. Although this could be explored with another expert.     They do not check blood pressures at home. They have switched PCPs recently as well.       07/26/2021  2   07/26/2021  Oxycodone Hcl 5 MG Tablet  30.00    05/24/2021  2   05/24/2021  Oxycodone Hcl 5 MG Tablet  30.00    05/11/2021  2   05/11/2021  Tramadol Hcl 50 MG Tablet  30.00    04/13/2021  3   04/13/2021  Tramadol Hcl 50 MG Tablet  30.00    04/07/2021  2   04/07/2021  Oxycodone Hcl 5 MG Tablet  14.00    03/05/2021  2   03/05/2021  Oxycodone Hcl 5 MG Tablet  50.00        Medical History  [unfilled]  [unfilled]  Patient Active Problem List    Diagnosis Date Noted     Dyspnea, unspecified type 08/14/2021     Priority: Medium     Bronchomalacia 08/14/2021     Priority: Medium     Chronic systolic congestive heart failure (H) 08/14/2021     Priority: Medium     Shortness of breath 08/09/2021     Priority: Medium     Hypoxia 08/09/2021     Priority: Medium     Bilateral lower extremity edema 08/09/2021     Priority: Medium     Acute respiratory failure with hypoxia (H) 08/09/2021     Priority: Medium     Anxiety 05/03/2021     Priority: Medium     Atrial fibrillation (H) 05/03/2021     Priority: Medium     Daytime somnolence 05/03/2021     Priority: Medium     Degeneration of lumbar intervertebral disc 05/03/2021     Priority: Medium     History of carotid endarterectomy 05/03/2021     Priority: Medium     History of endovascular stent graft for abdominal aortic aneurysm (AAA) 05/03/2021     Priority: Medium     Lower urinary tract symptoms due to benign prostatic hyperplasia 05/03/2021     Priority: Medium     Memory problem 05/03/2021     Priority: Medium      Other seborrheic keratosis 05/03/2021     Priority: Medium     Overactive bladder 05/03/2021     Priority: Medium     Vitamin D deficiency 05/03/2021     Priority: Medium     Hyperammonemia (H) 03/08/2021     Priority: Medium     Hyperbilirubinemia 03/08/2021     Priority: Medium     Abnormal urinalysis 03/07/2021     Priority: Medium     Generalized anxiety disorder 03/07/2021     Priority: Medium     Metabolic encephalopathy 03/07/2021     Priority: Medium     Normocytic anemia 03/07/2021     Priority: Medium     COPD (chronic obstructive pulmonary disease) (H) 03/04/2021     Priority: Medium     Respiratory failure, post-operative (H) 03/04/2021     Priority: Medium     Incomplete cauda equina syndrome (H) 01/15/2021     Priority: Medium     Formatting of this note might be different from the original.  Added automatically from request for surgery [id number]       Spinal stenosis, lumbar region with neurogenic claudication 01/15/2021     Priority: Medium     Formatting of this note might be different from the original.  Added automatically from request for surgery [id number]       Intractable low back pain 08/16/2020     Priority: Medium     Accelerated hypertension 07/20/2020     Priority: Medium     Allergic rhinitis 07/20/2020     Priority: Medium     CURRENT MEDICATION(S)   2005       Benign prostatic hyperplasia 07/20/2020     Priority: Medium     CURRENT MEDICATION(S) flomax  CURB-65 Criteria  Confusion (defined as an AMT of 8 or less)?: (not recorded)  Urea greater than 7 mmol/l (Blood Urea Nitrogen > 19)?: (not recorded)  Respiratory rate of 30 breaths per minute or greater?: (not recorded)  Blood pressure less than 90 mmHg systolic or 60 mmHg or less diastolic?: (not recorded)  Age 65 or older?: (not recorded)  Total Score: (not recorded)  % Risk of Death: (not recorded)  Saw dr norman  1-15 felt prostate benign  Asymmetrical.       Dehydration 07/20/2020     Priority: Medium     DJD (degenerative joint disease)  of knee 07/20/2020     Priority: Medium     MILD; NSIADS DID NOT HELP KNEE PAIN;  Medial compartment collapse on x ray 5-12;    glucosamine did not help worse 2011 sent to dr corrigan 6-11; 3 injections 7-11 doing well 9-11; reinjected 5-12;   Better but still 6/10 and re-injected 9-12;; does not want a knee replacement 8-13   Same 10-14; mild to moderate 2016;  L: knee steroid inj. 3-17 + 8-17; helps a lot. For 3-6 mo.       DJD (degenerative joint disease) 07/20/2020     Priority: Medium     LEFT THUMB       Endoleak post (EVAR) endovascular aneurysm repair, initial encounter (H) 07/20/2020     Priority: Medium     ETOH abuse 07/20/2020     Priority: Medium     NONE SINCE 1989       History of cerebrovascular accident 07/20/2020     Priority: Medium     Hydrocele 07/20/2020     Priority: Medium     RIGHT hydrocele  Large 2016 saw Angel 6-16 no Sx no Rx 8 cm;  saw Amita 7-17 re wanting  surgery       Hyperglycemia 07/20/2020     Priority: Medium     Lactic acidosis 07/20/2020     Priority: Medium     Obesity 07/20/2020     Priority: Medium     Psoriasis 07/20/2020     Priority: Medium     2007 scalp uses kenolog cream  Mild 2011;  11/15 - seb derm? Trial of ketoconazole shampoo.    ELBOWS AND FEET worse 7-13 arms legs and chest  Uses topical steroids;       Recurrent UTI 07/20/2020     Priority: Medium     Benign essential hypertension 02/24/2020     Priority: Medium     Retention of urine 02/24/2020     Priority: Medium     Bladder stone 15 mm   11-12; removed 12-12;       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) 02/24/2020     Priority: Medium     Depression 02/23/2020     Priority: Medium     Hypercholesteremia 02/23/2020     Priority: Medium     CURRENT MEDICATION(S) simvastatin 20 mg 11-14    LDL  135  9-10; AHA risk 22%   10-14;       Penile pain 02/23/2020     Priority: Medium     Bronchiectasis without acute exacerbation (H) 09/13/2019     Priority: Medium     Carotid aneurysm, left (H) 09/09/2019      Priority: Medium     Stroke-like symptoms 08/27/2019     Priority: Medium     Asymptomatic stenosis of right carotid artery 08/06/2019     Priority: Medium     Acute ischemic right ANDREI stroke (H) 08/05/2019     Priority: Medium     HTN (hypertension) 08/05/2019     Priority: Medium     Formatting of this note might be different from the original.  CURRENT MEDICATION(S) none  HX white coat  170/92  9-11 on no med ;  145 sys;    Patient was in for BP check today. Vitals today /92 P-88. As we discussed patient was discharged.  1-8-14  Formatting of this note might be different from the original.  CURRENT MEDICATION(S) none  HX white coat  170/92  9-11 on no med ;  145 sys;    Patient was in for BP check today. Vitals today /92 P-88. As we discussed patient was discharged.  1-8-14       Hepatic steatosis 11/25/2017     Priority: Medium     Vomiting and diarrhea 11/24/2017     Priority: Medium     Obesity, Class II, BMI 35-39.9, with comorbidity 06/23/2017     Priority: Medium     Aneurysm, carotid artery, internal 04/14/2017     Priority: Medium     [facility] ER 4-9-17 dizziness stephanie with head movement. MRA aneurism of left  ICA  Was told to see a neurosurgeon but he does not want to 4-17;       Ocular hypertension, bilateral 07/02/2014     Priority: Medium     CURRENT MEDICATION(S) drops       QT prolongation 11/07/2013     Priority: Medium     QTc= 502  M sec  12-12;       Cholelithiasis 02/06/2013     Priority: Medium     Cholelithiasis, asymptomatic (CT  2-13)       Redundant prepuce and phimosis 12/03/2012     Priority: Medium     AAA (abdominal aortic aneurysm) (H) 11/15/2012     Priority: Medium     AAA (abdominal aortic aneurysm) CT 11-12  6.4 cm. Saw Dr Muller 1-13;  S/P endovascular repair 3-13;  s/p stenting       Calculus of kidney 11/15/2012     Priority: Medium     11-12  13 mm L renal pelvis  With L sided pain and mild hydro;  4 mm right renal pelvis stone; Bladder stone 15 mm   11-12   Left  sided stone could not be  Removed due to AAA 12-12; AAA has been repaired;   Removed surgically 11-13; more stones 2016 he refused surgery; no Sx; stones seen on right on CT 11-17; (NOSx)       Diverticulosis 11/15/2012     Priority: Medium     CT  11-12;  CT  11-12;       Colon cancer screening 09/24/2012     Priority: Medium     ACP does not have'  Gave today 9-11  ACP does not want one;  Kids and wife know;       Hematuria 02/26/2012     Priority: Medium     Hematuria;  25-30 RBCs 2-12 Sx UTI UC neg. Rx cipro for 3 days.   300 rbcS  11-12; note 4-20-17: Hematuria has resolved and is probably from bladder stones. I cannot completely rule out bladder cancer, but the patient does not want to see urologist;       Abnormal PSA 09/23/2010     Priority: Medium     PSA 2007  2.6     2009  4.62      9-10  4.25   4.0  3-11; asymmetric prostate exam  Sent to dr norman 12-14       Routine general medical examination at a [facility] care facility 09/11/2009     Priority: Medium     MEDICARE WELLNESS VISIT  DONE 12/15/2017;  DUE FOR SAME  IN ONE YEAR;       Special screening for malignant neoplasm of prostate 09/11/2009     Priority: Medium     4.25  2007;  2.6  2009  4.6  9-10;  4.00 3-11; PSA is the same as a few years ago so no action is needed.(3-1-11) PSA 3.9  9-11 stable; The natural history of prostate cancer and ongoing controversy regarding screening and potential treatment outcomes of prostate cancer has been discussed with the patient. The meaning of a false positive PSA and a false negative PSA has been discussed. He indicates understanding of the limitations of this screening test and wishes NOT to proceed with screening PSA testing. 9-12;       Depression, major, in remission (H) 03/20/2006     Priority: Medium     CURRENT MEDICATION(S) Sertraline 100 mg a day since 1989 ;   PHQ 9 = 3  9-11; = 1   5-12; = 2   9-12;  = 1   10-13  = 1   4-14;       Hypothyroidism 03/20/2006     Priority: Medium     CURRENT MEDICATION(S)   L thyroxine 125  Mcg   11/24/15 - TSH low. Decrease to 125mcg   Dx 2006;  TSH  1.1  9-10; TSH 0.97  9-11; = 0.37  (9-12); = 1.4   10-13; TSH = 0.80  12-16; TSH = 1.67  12-17;       Psoriatic arthropathy (H) 03/20/2006     Priority: Medium     2005 L knee; 2006 NSIADS DID NOT HELP KNEE PAIN;     L knee responded to injection 2011;          Surgical History  He  has a past surgical history that includes Abdomen surgery; lithotripsy; IR Abdominal Endovascular Stent Graft (5/19/2020); IR Abdominal Aortogram (5/19/2020); Abdominal Aortic Aneurysm Repair (2013); Cystoscopy; Cystoscopy W/ Litholapaxy / Ehl (N/A, 6/12/2018); Cystoscopy Prostate W/ Laser (N/A, 6/12/2018); Circumcision; carotid endarterectomy (Right, 9/9/2019); Cystoscopy Prostate W/ Laser (N/A, 2/18/2020); Ir Abdominal Aortic Aneurysm Endograft (5/19/2020); Ir Abdominal Aortagram (5/19/2020); Percutaneous Nephrostolithotomy (Right, 03/10/2020); and HUANG W/O FACETEC FORAMOT/[facility] 1/2 VRT SEG, LUMBAR (Bilateral, 3/3/2021).     Past Surgical History:   Procedure Laterality Date     ABDOMEN SURGERY       ABDOMINAL AORTIC ANEURYSM REPAIR  2013    stent     C HUANG W/O FACETEC FORAMOT/[facility] 1/2 VRT SEG, LUMBAR Bilateral 3/3/2021    Procedure: Bilateral lumbar 2 - Lumbar 4 decompressive lumbar laminectomy with medial facetectomies;  Surgeon: Renée King MD;  Location: [facility];  Service: Spine     CAROTID ENDARTERECTOMY Right 9/9/2019    Procedure: RIGHT CAROTID ENDARTERECTOMY;  Surgeon: Miguel Muller MD;  Location: [facility] OR;  Service: General     CIRCUMCISION       CYSTOSCOPY       CYSTOSCOPY PROSTATE W/ LASER N/A 6/12/2018    Procedure:  TRANSURETHRAL RESECTION OF THE PROSTATE WITH QUANTA LASER;  Surgeon: Eze Levi MD;  Location: [facility] OR;  Service:      CYSTOSCOPY PROSTATE W/ LASER N/A 2/18/2020    Procedure: CYSTOSCOPY WITH TRANSURETHRAL INCISION OF THE PROSTATE WITH QUANTA LASER;  Surgeon: Eze Levi MD;   Location: [facility];  Service: Urology     CYSTOSCOPY W/ LITHOLAPAXY / EHL N/A 2018    Procedure: CYSTOSCOPY AND LITHOLAPAXY;  Surgeon: Eze Levi MD;  Location: [facility];  Service:      IR ABDOMINAL AORTAGRAM  2020     IR ABDOMINAL AORTIC ANEURYSM ENDOGRAFT  2020     IR ABDOMINAL AORTOGRAM  2020     IR ABDOMINAL ENDOVASCULAR STENT GRAFT  2020     LITHOTRIPSY       PERCUTANEOUS NEPHROSTOLITHOTOMY Right 03/10/2020       Allergies  Allergies   Allergen Reactions     Diclofenac      Other reaction(s): GI Upset     Loratadine      Other reaction(s): Urinary Retention       Prior to Admission Medications   (Not in a hospital admission)      Social History  Reviewed, and he  reports that he quit smoking about 31 years ago. His smoking use included cigarettes. He has a 17.50 pack-year smoking history. He has never used smokeless tobacco. He reports that he does not drink alcohol and does not use drugs.  Social History     Tobacco Use     Smoking status: Former Smoker     Packs/day: 0.50     Years: 35.00     Pack years: 17.50     Types: Cigarettes     Quit date: 1990     Years since quittin.6     Smokeless tobacco: Never Used     Tobacco comment: quit    Substance Use Topics     Alcohol use: No     Comment: Alcoholic Drinks/day: quit        Family History  Reviewed care everywhere to find family history  Nothing relevant to pain consult    Reviewed, and family history includes Cirrhosis in his father; Emphysema in his mother; No Known Problems in his brother, father, maternal aunt, maternal grandfather, maternal grandmother, maternal uncle, paternal aunt, paternal grandfather, paternal grandmother, paternal uncle, sister, and another family member.    Review of Systems  Complete ROS reviewed, unless noted  , all other systems reviewed (with patient) and all others found to be negative.         Objective:     Physical Exam:  [unfilled]  BP (!)  "145/90   Pulse 113   Temp 97.9  F (36.6  C) (Oral)   Resp 24   Ht 1.626 m (5' 4\")   Wt 99.8 kg (220 lb)   SpO2 93%   BMI 37.76 kg/m    Weight:   @THISENCWEIGHTS(1)@  Weight change:   Body mass index is 37.76 kg/m .      General Appearance:  Alert, cooperative, mild distress, appears stated age   Patient is anxious, standing looking for his teeth    Head:  Normocephalic, without obvious abnormality, atraumatic   Eyes:  PERRL, conjunctiva/corneas clear, EOM's intact   ENT/Throat: No teeth    Lymph/Neck: Supple, symmetrical, trachea midline, no adenopathy, thyroid: not enlarged, symmetric    Lungs:   Clear to auscultation bilaterally, respirations unlabored   Chest Wall:  No tenderness or deformity   Cardiovascular/Heart:  Regular rate and rhythm, S1, S2 normal,no murmur, rub or gallop.     Abdomen:   Soft, non-tender, bowel sounds active all four quadrants,  no masses, no organomegaly   Musculoskeletal: Extremities red and edema noted   Skin: Skin color pale, lesions not noted   Neurologic: Alert and oriented X 3, Moves all 4 extremities        Psych: Tangential   Grooming is poor        Imaging Reviewed Personally By Myself    Echocardiogram Complete    Result Date: 8/10/2021  [id number]^WHITE-TOBIAS^[place] [address]  Name: LARRY BREWSTER MRN: [id number] : 1940 Study Date: 08/10/2021 10:19 AM Age: 80 yrs Gender: Male Patient [facility]: [facility] Reason For Study: Heart Failure Ordering Physician: DIANN LOPEZ Performed By: SAMEER  BSA: 2.1 m2 Height: 64 in Weight: 230 lb BP: 139/94 mmHg ______________________________________________________________________________ Procedure Definity (NDC #09842-172) given intravenously. The study was technically difficult. ______________________________________________________________________________ Interpretation Summary  Tech Difficult study Wall motion Difficult to evaluate, Suspect more " prominent hypokinesis in the apical region. Consider cardiac MRI. When compared to echo from 19 the LVEF appears similar. ______________________________________________________________________________ Left Ventricle The left ventricle is normal in size. There is mild concentric left ventricular hypertrophy. The visual ejection fraction is 40-50%. Wall motion Difficult to evaluate, Suspect more prominent hypokinesis in the apical region.  Right Ventricle The right ventricle is not well visualized. Mildly decreased right ventricular systolic function.  Atria Normal left atrial size. Right atrium not well visualized.  Mitral Valve There is mild mitral annular calcification. The mitral valve leaflets are mildly thickened. There is no mitral regurgitation noted. There is no mitral valve stenosis.  Tricuspid Valve The tricuspid valve is not well visualized.  Aortic Valve The aortic valve is trileaflet. Thickened aortic valve leaflets. No aortic regurgitation is present. No aortic stenosis is present.  Pulmonic Valve The pulmonic valve is not well visualized. There is no pulmonic valvular stenosis.  Vessels IVC diameter and respiratory changes fall into an intermediate range suggesting an RA pressure of 8 mmHg.  Rhythm Sinus rhythm was noted. The patient exhibited frequent PACs.  ______________________________________________________________________________ MMode/2D Measurements & Calculations RVDd: 4.0 cm IVSd: 1.2 cm LVIDd: 4.6 cm LVIDs: 3.3 cm LVPWd: 1.2 cm  FS: 28.0 % LV mass(C)d: 205.9 grams LV mass(C)dI: 99.2 grams/m2 Ao root diam: 3.5 cm LA dimension: 4.0 cm LA/Ao: 1.1 LVOT diam: 2.1 cm LVOT area: 3.6 cm2 LA Volume (BP): 41.2 ml LA Volume Index (BP): 19.8 ml/m2  LA Volume Indexed (AL/bp): 21.2 ml/m2 RWT: 0.53  Doppler Measurements & Calculations MV E max rell: 6.2 cm/sec Ao V2 max: 139.6 cm/sec Ao max P.0 mmHg Ao V2 mean: 83.3 cm/sec Ao mean PG: 3.7 mmHg Ao V2 VTI: 27.6 cm ANASTASIA(I,D): 2.2 cm2 ANASTASIA(V,D): 2.2 cm2  LV V1 max P.9 mmHg LV V1 max: 85.7 cm/sec LV V1 VTI: 16.7 cm SV(LVOT): 59.9 ml SI(LVOT): 28.9 ml/m2 PA acc time: 0.10 sec AV Parth Ratio (DI): 0.61 ANASTASIA Index (cm2/m2): 1.0  ______________________________________________________________________________ Report approved by: Ricardo Schneider 08/10/2021 11:55 AM       US Lower Extremity Venous Duplex Bilateral    Result Date: 8/10/2021  EXAM: US LOWER EXTREMITY VENOUS DUPLEX BILATERAL LOCATION: [facility] DATE/TIME: 8/10/2021 5:49 PM INDICATION: Bilateral leg swelling and pain with erythema COMPARISON: None. TECHNIQUE: Venous Duplex ultrasound of bilateral lower extremities with and without compression, augmentation and duplex. Color flow and spectral Doppler with waveform analysis performed. FINDINGS: Exam includes the common femoral, femoral, popliteal veins as well as segmentally visualized deep calf veins and greater saphenous vein. RIGHT: No definite deep vein thrombosis. No superficial thrombophlebitis. No popliteal cyst. LEFT: No deep vein thrombosis. No superficial thrombophlebitis. No popliteal cyst.     IMPRESSION: 1.  No deep venous thrombosis in the bilateral lower extremities although exam of the right upper leg and left common femoral vein is compromised as patient could not tolerate full compression. 2.  Mild subcutaneous edema distally bilaterally.    XR Chest Port 1 View    Result Date: 2021  EXAM: XR CHEST PORT 1 VIEW LOCATION: [facility] DATE/TIME: 2021 2:58 AM INDICATION: sob COMPARISON: 2021     IMPRESSION: Cardiac enlargement and shallow inspiration. Bibasilar atelectasis or infiltrate decreased compared to prior. Atherosclerotic aorta. Surgical clips right neck.    XR Chest Port 1 View    Result Date: 2021  EXAM: XR CHEST PORT 1 VIEW LOCATION: [facility] DATE/TIME: 2021 7:50 PM INDICATION: fluid overload COMPARISON: Portable AP view of the  chest 03/07/2021     IMPRESSION: The initial image excludes the apices and subsequent image was taken with extreme apical lordotic technique. Mildly enlarged cardiac silhouette likely unchanged given differences in projection. Mediastinal borders are well-defined. No peribronchial fluid cuffs or septal lines to suggest interstitial lung edema. Angular opacities are present in the medial lower lobes likely representing foci of atelectasis. The mid and upper lungs are clear. There is no visible pleural fluid. No pneumothorax.    XR Lumbar Spine 2/3 Views    Result Date: 7/26/2021  EXAM: XR LUMBAR SPINE 2/3 VIEWS LOCATION: [facility] DATE/TIME: 7/26/2021 11:52 AM INDICATION: T12 compression fracture COMPARISON: Lumbar spine MRI 2/22 2021 TECHNIQUE: CR Lumbar Spine.     IMPRESSION: 5 lumbar-type vertebral bodies. Inferior plate T12 compression fracture, unchanged. The vertebral bodies of the lumbar spine otherwise have normal stature and alignment without evidence of compression fracture. Degenerative endplate change and anterior marginal osteophytes at T12/L1-L4/L5. Multilevel degenerative facet arthropathy, most pronounced at L4/L5 and L5/S1. Multilevel degenerative disc disease of the lumbar spine with disc height loss, most pronounced at L1/L2-L4/L5. Aortobiiliac stent. Soft tissues unremarkable.    CT Chest Pulmonary Embolism w Contrast    Result Date: 8/9/2021  EXAM: CT CHEST PULMONARY EMBOLISM W CONTRAST LOCATION: [facility] DATE/TIME: 8/9/2021 10:19 PM INDICATION: Hypoxia COMPARISON: 08/16/2020. TECHNIQUE: CT chest pulmonary angiogram during arterial phase injection of IV contrast. Multiplanar reformats and MIP reconstructions were performed. Dose reduction techniques were used. CONTRAST: 100 mL Isovue-370 FINDINGS: ANGIOGRAM CHEST: No evidence for pulmonary embolism. Pulmonary arteries normal in caliber. Moderate atherosclerotic calcification of the thoracic  aorta. No aortic dissection or aneurysm.. HEART: Cardiac chambers within normal limits. No pericardial effusion. Mild coronary artery calcification. LUNGS AND PLEURA: Mild emphysema. No pulmonary mass, consolidation, or suspicious pulmonary nodule. There is severe central airway narrowing on expiration, including the lower trachea and proximal mainstem bronchi bilaterally. There is moderate mucous plugging in the basal airways. No pleural effusion or pneumothorax. MEDIASTINUM: No adenopathy or mass. LIMITED UPPER ABDOMEN: Numerous calcified stones filling the gallbladder. MUSCULOSKELETAL: Negative.     IMPRESSION: 1.  No evidence for pulmonary embolism. 2.  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 Follow-up pulmonary consultation recommended.      Labs Reviewed Personally By Myself  Results for orders placed or performed during the hospital encounter of 08/14/21   XR Chest Port 1 View     Status: None    Narrative    EXAM: XR CHEST PORT 1 VIEW  LOCATION: [facility]  DATE/TIME: 8/14/2021 2:58 AM    INDICATION: sob  COMPARISON: 08/09/2021      Impression    IMPRESSION: Cardiac enlargement and shallow inspiration. Bibasilar atelectasis or infiltrate decreased compared to prior. Atherosclerotic aorta. Surgical clips right neck.   CBC (+ platelets, no diff)     Status: Normal   Result Value Ref Range    WBC Count 9.5 4.0 - 11.0 10e3/uL    RBC Count 4.82 4.40 - 5.90 10e6/uL    Hemoglobin 13.9 13.3 - 17.7 g/dL    Hematocrit 42.9 40.0 - 53.0 %    MCV 89 78 - 100 fL    MCH 28.8 26.5 - 33.0 pg    MCHC 32.4 31.5 - 36.5 g/dL    RDW 14.6 10.0 - 15.0 %    Platelet Count 232 150 - 450 10e3/uL   Basic metabolic panel     Status: Normal   Result Value Ref Range    Sodium 141 136 - 145 mmol/L    Potassium 4.2 3.5 - 5.0 mmol/L    Chloride 105 98 - 107 mmol/L    Carbon Dioxide (CO2) 28 22 - 31 mmol/L    Anion Gap 8 5 - 18 mmol/L    Urea Nitrogen  17 8 - 28 mg/dL    Creatinine 0.86 0.70 - 1.30 mg/dL    Calcium 9.6 8.5 - 10.5 mg/dL    Glucose 111 70 - 125 mg/dL    GFR Estimate 82 >60 mL/min/1.73m2   B-Type Natriuretic Peptide ( [place] Only)     Status: Normal   Result Value Ref Range    BNP 61 0 - 84 pg/mL   Troponin I (now)     Status: Normal   Result Value Ref Range    Troponin I 0.01 0.00 - 0.29 ng/mL   Asymptomatic COVID-19 Virus (Coronavirus) by PCR Nasopharyngeal     Status: Normal    Specimen: Nasopharyngeal; Swab   Result Value Ref Range    SARS CoV2 PCR Negative Negative    Narrative    Testing was performed using the [employer]  SARS-CoV-2 & Influenza A/B Assay on the [employer]  [employer]  System.  This test should be ordered for the detection of SARS-COV-2 in individuals who meet SARS-CoV-2 clinical and/or epidemiological criteria. Test performance is unknown in asymptomatic patients.  This test is for in vitro diagnostic use under the FDA EUA for laboratories certified under CLIA to perform moderate and/or high complexity testing. This test has not been FDA cleared or approved.  A negative test does not rule out the presence of PCR inhibitors in the specimen or target RNA in concentration below the limit of detection for the assay. The possibility of a false negative should be considered if the patient's recent exposure or clinical presentation suggests COVID-19.  [facility] are certified under the Clinical Laboratory Improvement Amendments of 1988 (CLIA-88) as qualified to perform moderate and/or high complexity laboratory testing.           Total time spent 120 minutes with greater than 50% in consultation, education and coordination of care.     Also discussed with RN, MD Dr. Jaffe, and ED pharmacist.     Thank you for this consultation.          Claribel Rocha APRN, CNS-BC, CNP, ACHPN  Acute Care Pain Management Program Hour 7a-1700  [facility] ([facility], Joes, Noel)   Page via [employer]- Click HERE to page Claribel or call  [phone]

## 2021-08-15 ENCOUNTER — APPOINTMENT (OUTPATIENT)
Dept: OCCUPATIONAL THERAPY | Facility: HOSPITAL | Age: 81
DRG: 896 | End: 2021-08-15
Payer: COMMERCIAL

## 2021-08-15 ENCOUNTER — APPOINTMENT (OUTPATIENT)
Dept: PHYSICAL THERAPY | Facility: HOSPITAL | Age: 81
DRG: 896 | End: 2021-08-15
Attending: PAIN MEDICINE
Payer: COMMERCIAL

## 2021-08-15 PROCEDURE — 97530 THERAPEUTIC ACTIVITIES: CPT | Mod: GO

## 2021-08-15 PROCEDURE — 99233 SBSQ HOSP IP/OBS HIGH 50: CPT | Performed by: PAIN MEDICINE

## 2021-08-15 PROCEDURE — 97110 THERAPEUTIC EXERCISES: CPT | Mod: GO

## 2021-08-15 PROCEDURE — 93005 ELECTROCARDIOGRAM TRACING: CPT | Performed by: PAIN MEDICINE

## 2021-08-15 PROCEDURE — 250N000009 HC RX 250: Performed by: PAIN MEDICINE

## 2021-08-15 PROCEDURE — 97161 PT EVAL LOW COMPLEX 20 MIN: CPT | Mod: GP

## 2021-08-15 PROCEDURE — 250N000013 HC RX MED GY IP 250 OP 250 PS 637: Performed by: INTERNAL MEDICINE

## 2021-08-15 PROCEDURE — 99232 SBSQ HOSP IP/OBS MODERATE 35: CPT | Performed by: INTERNAL MEDICINE

## 2021-08-15 PROCEDURE — 93005 ELECTROCARDIOGRAM TRACING: CPT

## 2021-08-15 PROCEDURE — 250N000013 HC RX MED GY IP 250 OP 250 PS 637: Performed by: PAIN MEDICINE

## 2021-08-15 PROCEDURE — 97116 GAIT TRAINING THERAPY: CPT | Mod: GP

## 2021-08-15 PROCEDURE — G0378 HOSPITAL OBSERVATION PER HR: HCPCS

## 2021-08-15 PROCEDURE — 99223 1ST HOSP IP/OBS HIGH 75: CPT | Mod: 95 | Performed by: FAMILY MEDICINE

## 2021-08-15 PROCEDURE — 99225 PR SUBSEQUENT OBSERVATION CARE,LEVEL II: CPT | Performed by: NURSE PRACTITIONER

## 2021-08-15 PROCEDURE — 93010 ELECTROCARDIOGRAM REPORT: CPT | Performed by: INTERNAL MEDICINE

## 2021-08-15 PROCEDURE — 250N000013 HC RX MED GY IP 250 OP 250 PS 637: Performed by: NURSE PRACTITIONER

## 2021-08-15 RX ORDER — CLONIDINE HYDROCHLORIDE 0.1 MG/1
0.1 TABLET ORAL EVERY 6 HOURS PRN
Status: DISCONTINUED | OUTPATIENT
Start: 2021-08-15 | End: 2021-08-16

## 2021-08-15 RX ORDER — LOPERAMIDE HCL 2 MG
2 CAPSULE ORAL EVERY 4 HOURS PRN
Status: DISCONTINUED | OUTPATIENT
Start: 2021-08-15 | End: 2021-08-23 | Stop reason: HOSPADM

## 2021-08-15 RX ORDER — BUPRENORPHINE 2 MG/1
2 TABLET SUBLINGUAL
Status: DISCONTINUED | OUTPATIENT
Start: 2021-08-15 | End: 2021-08-16

## 2021-08-15 RX ORDER — ONDANSETRON 4 MG/1
4 TABLET, ORALLY DISINTEGRATING ORAL EVERY 6 HOURS PRN
Status: DISCONTINUED | OUTPATIENT
Start: 2021-08-15 | End: 2021-08-23 | Stop reason: HOSPADM

## 2021-08-15 RX ADMIN — MELATONIN TAB 3 MG 3 MG: 3 TAB at 01:22

## 2021-08-15 RX ADMIN — Medication 1 G: at 09:01

## 2021-08-15 RX ADMIN — MELATONIN TAB 3 MG 3 MG: 3 TAB at 21:30

## 2021-08-15 RX ADMIN — GABAPENTIN 100 MG: 100 CAPSULE ORAL at 08:55

## 2021-08-15 RX ADMIN — LEVOTHYROXINE SODIUM 150 MCG: 0.03 TABLET ORAL at 08:55

## 2021-08-15 RX ADMIN — ACETAMINOPHEN 650 MG: 325 TABLET ORAL at 01:22

## 2021-08-15 RX ADMIN — RAMELTEON 8 MG: 8 TABLET, FILM COATED ORAL at 21:30

## 2021-08-15 RX ADMIN — GABAPENTIN 100 MG: 100 CAPSULE ORAL at 13:43

## 2021-08-15 RX ADMIN — DICLOFENAC SODIUM 4 G: 10 GEL TOPICAL at 09:00

## 2021-08-15 RX ADMIN — ROSUVASTATIN CALCIUM 10 MG: 10 TABLET, FILM COATED ORAL at 21:30

## 2021-08-15 RX ADMIN — LOSARTAN POTASSIUM 25 MG: 25 TABLET, FILM COATED ORAL at 08:55

## 2021-08-15 RX ADMIN — LIDOCAINE 1 G: 50 OINTMENT TOPICAL at 19:34

## 2021-08-15 RX ADMIN — ASPIRIN 81 MG: 81 TABLET, CHEWABLE ORAL at 08:55

## 2021-08-15 RX ADMIN — GABAPENTIN 100 MG: 100 CAPSULE ORAL at 19:30

## 2021-08-15 RX ADMIN — HYDROXYZINE HYDROCHLORIDE 25 MG: 25 TABLET, FILM COATED ORAL at 16:05

## 2021-08-15 RX ADMIN — Medication 1 G: at 13:46

## 2021-08-15 RX ADMIN — DICLOFENAC SODIUM 4 G: 10 GEL TOPICAL at 19:32

## 2021-08-15 RX ADMIN — DICLOFENAC SODIUM 4 G: 10 GEL TOPICAL at 16:06

## 2021-08-15 RX ADMIN — FUROSEMIDE 20 MG: 20 TABLET ORAL at 16:05

## 2021-08-15 RX ADMIN — ACETAMINOPHEN 975 MG: 325 TABLET ORAL at 19:30

## 2021-08-15 RX ADMIN — FUROSEMIDE 20 MG: 20 TABLET ORAL at 08:55

## 2021-08-15 RX ADMIN — LIDOCAINE 1 G: 50 OINTMENT TOPICAL at 08:58

## 2021-08-15 RX ADMIN — HYDROXYZINE HYDROCHLORIDE 25 MG: 25 TABLET, FILM COATED ORAL at 21:30

## 2021-08-15 RX ADMIN — Medication 50 MCG: at 08:55

## 2021-08-15 RX ADMIN — LIDOCAINE 1 G: 50 OINTMENT TOPICAL at 13:45

## 2021-08-15 RX ADMIN — Medication 1 G: at 19:34

## 2021-08-15 RX ADMIN — METOPROLOL SUCCINATE 25 MG: 25 TABLET, EXTENDED RELEASE ORAL at 08:55

## 2021-08-15 RX ADMIN — PANTOPRAZOLE SODIUM 40 MG: 20 TABLET, DELAYED RELEASE ORAL at 06:45

## 2021-08-15 RX ADMIN — DICLOFENAC SODIUM 4 G: 10 GEL TOPICAL at 13:44

## 2021-08-15 ASSESSMENT — MIFFLIN-ST. JEOR: SCORE: 1658.39

## 2021-08-15 NOTE — PROGRESS NOTES
[facility]      OUTPATIENT PHYSICAL THERAPY EVALUATION  PLAN OF TREATMENT FOR OUTPATIENT REHABILITATION  (COMPLETE FOR INITIAL CLAIMS ONLY)  Patient's Last Name, First Name, M.I.  YOB: 1940  Dominik Rojas                        Provider's Name  [facility] Medical Record No.  [id number]                               Onset Date:  08/14/21   Start of Care Date:  08/15/21      Type:     _X_PT   ___OT   ___SLP Medical Diagnosis:  Bronchomalacia                        PT Diagnosis:  impaired functional mobility, abnormal gait   Visits from SOC:  1   _________________________________________________________________________________  Plan of Treatment/Functional Goals    Planned Interventions: balance training, bed mobility training, gait training, home exercise program, strengthening, transfer training     Goals: See Physical Therapy Goals on Care Plan in [employer] electronic health record.    Therapy Frequency: Daily  Predicted Duration of Therapy Intervention: 7 days  _________________________________________________________________________________    I CERTIFY THE NEED FOR THESE SERVICES FURNISHED UNDER        THIS PLAN OF TREATMENT AND WHILE UNDER MY CARE     (Physician co-signature of this document indicates review and certification of the therapy plan).                Certification date from: 08/15/21, Certification date to: 08/22/21    Referring Physician: Ayaan Jaffe,             Initial Assessment        See Physical Therapy evaluation dated 08/15/21 in Epic electronic health record.

## 2021-08-15 NOTE — PROGRESS NOTES
"PRIMARY DIAGNOSIS: \"GENERIC\" NURSING  OUTPATIENT/OBSERVATION GOALS TO BE MET BEFORE DISCHARGE:  1. ADLs back to baseline: Yes    2. Activity and level of assistance: Ambulating independently.    3. Pain status: Improved-controlled with oral pain medications.    4. Return to near baseline physical activity: Yes     Discharge Planner Nurse   Safe discharge environment identified: Yes  Barriers to discharge: Yes       Entered by: Josefina Farias 08/15/2021 5:22 AM     Please review provider order for any additional goals.   Nurse to notify provider when observation goals have been met and patient is ready for discharge.  "

## 2021-08-15 NOTE — PROGRESS NOTES
"PRIMARY DIAGNOSIS: \"GENERIC\" NURSING  OUTPATIENT/OBSERVATION GOALS TO BE MET BEFORE DISCHARGE:  1. ADLs back to baseline: Yes    2. Activity and level of assistance: Ambulating independently.    3. Pain status: Pain free.    4. Return to near baseline physical activity: Yes     Discharge Planner Nurse   Safe discharge environment identified: Yes  Barriers to discharge: No       Entered by: Inessa Heller 08/14/2021 7:56 PM     Please review provider order for any additional goals.   Nurse to notify provider when observation goals have been met and patient is ready for discharge.    "

## 2021-08-15 NOTE — CONSULTS
"Addiction Medicine Inpatient Consultation      Dominik Brewster,  1940, MRN [id number]    [facility]  Chronic obstructive pulmonary disease, unspecified COPD type (H) [J44.9]  Dyspnea, unspecified type [R06.00]  Opioid use disorder (H) [F11.99]  Class 2 severe obesity with serious comorbidity and body mass index (BMI) of 37.0 to 37.9 in adult, unspecified obesity type (H) [E66.01, Z68.37]    PCP: Lisandro Meza, [phone]   Code status:  Full Code       Extended Emergency Contact Information  Primary Emergency Contact: MARVIN BREWSTER  Home Phone: [phone]  Relation: Spouse  Secondary Emergency Contact: KWAME KAUR  Home Phone: [phone]  Relation: Daughter       Date of Admission: 21  Date of Consult: 8/15/2021 but completed on 2021.    Tele-Visit Details  Type of service:  Video Visit  Total time: 28min  Time Service Ended (time completely finished with pt): 1317  Originating Location (pt. Location): patient's room  Distant Location (provider location): [facility]  Reason for Televisit: COVID 19  Mode of Communication:  Video Conference via [employer]  Physician has received verbal consent for a video visit from the patient? Yes    Reason for Consultation: \"Assist with Suboxone\"         ASSESSMENT and RECOMMENDATIONS:     Opioid use disorder of unclear severity although, suspect a moderate or severe use disorder-unable to completely assess today since patient is feeling quite unwell with dizziness and severe anxiety.  Unfortunately the buprenorphine dose (2 mg) that was administered this morning was given orally rather than sublingually.  He was also hypertensive and subsequently given hydralazine.  It is possible that the hypertension, anxiety, restlessness and \"dizziness\" is secondary to opioid withdrawal.  Discussed the patient's care with his RN and plan to give buprenorphine 2 mg sublingually now, followed by a set of vitals in 30-60 " "minutes.  Because of his advanced age, I do recommend that he be monitored in the hospital least until tomorrow while starting buprenorphine, especially given his hypertension and dizziness today.  Will hold on giving additional doses of buprenorphine this evening to avoid problems with too high of a starting dose.    Subjective \"dizziness\" of unclear etiology although suspect anxiety as per patient's report and nursing assessment-hold gabapentin and hydroxyzine while starting buprenorphine to avoid oversedation given his advanced age.    Severe alcohol use disorder in sustained remission        SUBJECTIVE    Admission Status:  Voluntary    Code Status:  Full code    HPI:    Dominik Rojas is a 80 year old old male with COPD, A. Fib, JOJO, systolic CHF, hypertension, hx of alcohol use disorder in remission, and chronic back pain was admitted from [facility] ED on 8/14/21.  He has been taking oxycodone 5 mg tid regularly for at least the past year, except in April-May when he was tried on tramadol.   Last filled 8/9/21 #30 for 10 days. Per previous note, wife and family have been concerned about him taking too many opatie pain pills.       Patient,  and wife are hoping to get him started on Suboxone.   The patient has a hx of alcohol use disorder, severe, but has been sober since 1974.    Difficult to obtain a full history from patient because he is currently feeling quite unwell with dizziness, anxiety.  He said that it started about an hour ago after urination and he has been feeling alarmingly unwell since then.  He is lying down with a washcloth on his head because nursing asked him to not be up in the room but to be lying down because of the dizziness.  He denies any falls.  No other associated symptoms heart from restlessness and irritability.  He becomes sleepy, closing his eyes several times during the interview which he attributes to \"grogginess.\"    ETOH:  Hx of alcohol use disorder.   Abstinent " since 1974 after going to a 30-day treatment program followed by [facility]    Opioids:  Type and method of use: oxycodone     Last use: got 5 mg at 0800 on 8/14/21  Amount/frequency:   5 mg tid - often seeking more  IV Drug use:  No  Previous methadone therapy:  No  Previous buprenorphine therapy:  No  Previous naltrexone therapy: No     Stimulants/Cocaine/Amphetamines:  Denies    Benzodiazepines:  Has been receiving prn lorazepam since being in ED. this is been discontinued     Cannabis:  Denies    Tobacco:  Quit 30+ yrs ago    Treatment Hx: Alcohol treatment in 1974    Unable to fully assess the DSM-V criteria given patient's current symptoms  DSM 5 Criteria for Substance Use Disorder     Opiates    Tolerance         X    Withdrawal     Larger/longer         X    Cut down/control        X    Time        unknown    Interfering with activities        unknown    Use despite harm unknown    Role obligations         unknown    Hazardous situations unknown    Cravings         X    Social or Interpersonal problems         X            Past Medical History:    - Lumbar laminectomy and facetomies L2 - L 4 - Mar. 2021  - COPD with hx of acute respiratory failure  -  Atrial fibrillation  - bronchomalacia  - hx of R CVA 8/2019 and carotid endarterectomy  = hx of graft for AAA  -  BPH  - Spinal stenosis  - Accelerated hypertension   - DJD - knee spine  - Elevated ocular pressure      Psychiatric History:  Depression  Generalized anxiety disorder    Family History:   Family History   Problem Relation Age of Onset     Emphysema Mother      No Known Problems Father      Cirrhosis Father      No Known Problems Sister      No Known Problems Brother      No Known Problems Maternal Aunt      No Known Problems Maternal Uncle      No Known Problems Paternal Aunt      No Known Problems Paternal Uncle      No Known Problems Maternal Grandmother      No Known Problems Maternal Grandfather      No Known Problems Paternal Grandmother      No Known  Problems Paternal Grandfather      No Known Problems Other        Social History:  Lives with his wife  Quit tobacco    PTA Meds:  Medications Prior to Admission   Medication Sig Dispense Refill Last Dose     acetaminophen (TYLENOL) 500 MG tablet Take 1,000 mg by mouth as needed        albuterol (PROAIR HFA/PROVENTIL HFA/VENTOLIN HFA) 108 (90 Base) MCG/ACT inhaler Inhale 2 puffs into the lungs every 6 hours as needed         albuterol (PROVENTIL) (2.5 MG/3ML) 0.083% neb solution Inhale 2.5 mg into the lungs every 6 hours as needed         amitriptyline (ELAVIL) 25 MG tablet Take 12.5 mg by mouth At Bedtime   8/13/2021 at Unknown time     aspirin (ASA) 81 MG chewable tablet Take 81 mg by mouth daily   8/13/2021 at Unknown time     calcitonin, salmon, (MIACALCIN) 200 UNIT/ACT nasal spray Spray 1 spray into one nostril alternating nostrils daily Alternate nostril each day.   8/13/2021 at Unknown time     Cholecalciferol (VITAMIN D3) 50 MCG (2000 UT) CAPS Take 1 tablet by mouth daily         furosemide (LASIX) 20 MG tablet Take 20 mg by mouth daily   8/13/2021 at Unknown time     latanoprost (XALATAN) 0.005 % ophthalmic solution Place 1 drop into both eyes At Bedtime    8/13/2021 at Unknown time     levothyroxine (SYNTHROID/LEVOTHROID) 150 MCG tablet Take 1 tablet by mouth daily    8/13/2021 at Unknown time     losartan (COZAAR) 25 MG tablet Take 1 tablet (25 mg) by mouth daily 30 tablet 0      metoprolol succinate ER (TOPROL-XL) 50 MG 24 hr tablet Take 0.5 tablets (25 mg) by mouth daily 30 tablet 0      multivitamin w/minerals (MULTI-VITAMIN) tablet Take 1 tablet by mouth daily        oxyCODONE (ROXICODONE) 5 MG tablet Take 1 tablet (5 mg) by mouth 3 times daily as needed for severe pain 30 tablet 0 8/13/2021 at 2130     pantoprazole (PROTONIX) 40 MG EC tablet Take 1 tablet (40 mg) by mouth every morning (before breakfast) 30 tablet 0      rosuvastatin (CRESTOR) 10 MG tablet Take 10 mg by mouth At Bedtime    8/13/2021  "at Unknown time               Allergies:  Allergies   Allergen Reactions     Diclofenac      Other reaction(s): GI Upset     Loratadine      Other reaction(s): Urinary Retention       [place] Prescription Monitoring Program:  Oxycodone prescriptions noted    Review of Systems:    Constitutional               No fever   Vision and Hearing:     Within normal limits   Respiratory:              No cough or shortness or breath   Cardiovascular   No chest pain at rest or with exertion.      Gastrointestinal    No nausea, vomiting, diarrhea, or constipation.    Urologic   Denies dysuria or change in frequency although had onset of dizziness during urination  Neurologic   Denies headache, tremor, hx of seizure or focal weakness.     Psychiatric   Denies suicidal ideation, plan or intent.    Rheumatologic   No arthralgias or joint swelling  Hematologic   Denies epistaxis or easy bruising.   Dermatalogic   No piloerection or diaphoresis.  No rash or itching.  Describes restlessness            Physical Exam:  BP (!) 166/92 (BP Location: Right arm)   Pulse 66   Temp 97.9  F (36.6  C) (Oral)   Resp 20   Ht 1.626 m (5' 4\")   Wt 103.2 kg (227 lb 9.6 oz)   SpO2 94%   BMI 39.07 kg/m      Standard physical exam not performed today since this encounter is via video during the COVID-19 pandemic and the video exam is limited by quality and limited exposure on camera.  The exams performed by previous providers are personally reviewed in the chart.    General appearance   Gen: awake, alert, and oriented. Appropriately dressed. Lying down with a washcloth on his forehead.  Overall appears unwell and anxious.  Pulmonary   No respiratory distress. No cough noted.  Neurologic   Oriented to person, place, time and situation. No tremor  Dermatologic   No obvious rash on exposed skin, no diaphoresis, no jaundice noted    Behavior/Demeanor: cooperative and pleasant with good eye contact but occasionally closes his eyes and appears to be " falling asleep.  Psychomotor: No abnormal movements however patient appears mildly restless in between periods of closing his eyes  Speech: regular rate and rhythm  Mood: Anxious  Affect: Anxious  Thought Process/Associations: unremarkable   Thought Content: devoid of  suicidal ideation  Perception: devoid of hallucinations  Insight: Good  Judgment: Good  Attention/Concentration: grossly normal  Language: intact and no problems  Fund of Knowledge: normal    Memory: intact      Cognitive functions grossly appear as described, but were not formally tested.        Inessa Mercedes MD

## 2021-08-15 NOTE — PROGRESS NOTES
"PRIMARY DIAGNOSIS: \"GENERIC\" NURSING  OUTPATIENT/OBSERVATION GOALS TO BE MET BEFORE DISCHARGE:  1. ADLs back to baseline: Yes    2. Activity and level of assistance: Ambulating independently.    3. Pain status: Improved-controlled with oral pain medications.    4. Return to near baseline physical activity: Yes     Discharge Planner Nurse   Safe discharge environment identified: Yes  Barriers to discharge: Yes       Entered by: Josefina Farias 08/15/2021 5:25 AM     Please review provider order for any additional goals.   Nurse to notify provider when observation goals have been met and patient is ready for discharge.  "

## 2021-08-15 NOTE — PROGRESS NOTES
"Psychiatric Progress Note  Anxiety reaction in the setting of medical condition, chronic pain.  Mood disorder due to medical condition, in the setting of acute and chronic pain.  Adjustment disorder with depression and anxiety likely in the context of multiple psychosocial stressors, medical condition.  Sleep difficulties.     Recommendations:  Occupational Therapy evaluation: Please obtain slums and ACLS.at home  Discontinue amitriptyline 12.5 mg.  Rozerem 8 mg at bedtime.  Gabapentin 100 mg 3 times a day.  Spoke with Dr Blanchard from addiction medicine regarding his case. She will be addressing his chronic opioid use, iatrogenic in nature.  Stable to discharge from psychiatric standpoint.    SUBJECTIVE:  Pleasant, cooperative, sad that his wife wants him to stay in hospital, responds well to support of the unit.  Reporting no hallucinations, psychosis or delusions.  Anxiety is \"okay\" no thoughts of dying.    MENTAL STATUS EXAMINATION:   Speech is clear.  Thought processes clear, organized.  Thought content does not show hallucinations, delusions or paranoia.  No evangelina or evangelina.  Judgment, insight, memory, attention, comprehension, fund of knowledge are adequate.  Gait and ambulation with assist of a cane, psychomotor slow.  Affect is neutral mood congruent.  Awake, orientation x3-4.  Easily redirectable to topic.  Vital signs in last 24 hours  Temp:  [97.5  F (36.4  C)-98.3  F (36.8  C)] 97.9  F (36.6  C)  Pulse:  [] 66  Resp:  [20-24] 20  BP: (127-166)/(76-92) 166/92  SpO2:  [90 %-94 %] 94 %              "

## 2021-08-15 NOTE — PROGRESS NOTES
"PRIMARY DIAGNOSIS: \"GENERIC\" NURSING  OUTPATIENT/OBSERVATION GOALS TO BE MET BEFORE DISCHARGE:  1. ADLs back to baseline: Yes    2. Activity and level of assistance: Up with standby assistance.    3. Pain status: Improved with use of alternative comfort measures i.e.: positioning and distraction using TV, phone, therapy, breakfast.    4. Return to near baseline physical activity: Yes     Discharge Planner Nurse   Safe discharge environment identified: Yes  Barriers to discharge: No       Entered by: Inessa Heller 08/15/2021 5:35 PM     Please review provider order for any additional goals.   Nurse to notify provider when observation goals have been met and patient is ready for discharge.    "

## 2021-08-15 NOTE — PROGRESS NOTES
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, bronchomalacia, COPD, chronic systolic CHF, hypertension, hypothyroidism, anxiety, obesity and chronic back pain presents with dyspnea   Pain Assessment   Patient Currently in Pain   (knee pain limits inc mobility)   Range of Motion (ROM)   ROM Quick Adds ROM WFL   Strength   Manual Muscle Testing Quick Adds Strength WFL   Strength Comments generalized weakness but functional   Bed Mobility   Comment (Bed Mobility) up in chair before and after PT   Transfers   Transfers sit-stand transfer   Sit-Stand Transfer   Sit-Stand [place] (Transfers) supervision   Assistive Device (Sit-Stand Transfers) cane, straight   Sit/Stand Transfer Comments cues for safety   Gait/Stairs (Locomotion)   [place] Level (Gait) contact guard;supervision   Assistive Device (Gait) cane, straight   Distance in Feet (Required for LE Total Joints) 175'   Pattern (Gait) step-through   Deviations/Abnormal Patterns (Gait) antalgic;base of support, wide;gloria decreased;weight shifting decreased   Comment (Gait/Stairs) declines trial of stairs today. Wide BABAK, inc pain LLE d/t bone on bone knee. Limited mobility d/t SOB and knee pain   Balance   Balance other (describe)   Balance Comments one minor misstep, able to self correct   Clinical Impression   Criteria for Skilled Therapeutic Intervention yes, treatment indicated   PT Diagnosis (PT)  impaired functional mobility, abnormal gait   Influenced by the following impairments decreased endurance, weakness, pain   Functional limitations due to impairments bed mobility, transfers, ambulation, stairs   Clinical Presentation Stable/Uncomplicated   Clinical Presentation Rationale pt presents as medically diagnosed   Clinical Decision Making (Complexity) low complexity   Therapy Frequency (PT) Daily   Predicted Duration of Therapy Intervention (days/wks) 7 days   Planned Therapy Interventions (PT) balance training;bed mobility training;gait training;home exercise program;strengthening;transfer training   Anticipated Equipment Needs at Discharge (PT) cane, straight   Risk & Benefits of therapy have been explained evaluation/treatment results reviewed;participants included;patient   PT Discharge Planning    PT Discharge Recommendation (DC Rec) home with assist;home with home care physical therapy   PT Rationale for DC Rec needing inc work for endurance/strengthening   Therapy Certification   Start of care date 08/15/21   Certification date from 08/15/21   Certification date to 08/22/21   Medical Diagnosis Bronchomalacia   Total Evaluation Time   Total Evaluation Time (Minutes) 8

## 2021-08-15 NOTE — PROGRESS NOTES
"PRIMARY DIAGNOSIS: \"GENERIC\" NURSING  OUTPATIENT/OBSERVATION GOALS TO BE MET BEFORE DISCHARGE:  1. ADLs back to baseline: Yes    2. Activity and level of assistance: Up with standby assistance.    3. Pain status: Improved with use of alternative comfort measures i.e.: distraction using TV, phone, dinner.    4. Return to near baseline physical activity: No     Discharge Planner Nurse   Safe discharge environment identified: No  Barriers to discharge: Yes       Entered by: Inessa Heller 08/15/2021 5:41 PM     Please review provider order for any additional goals.   Nurse to notify provider when observation goals have been met and patient is ready for discharge.    "

## 2021-08-15 NOTE — PROGRESS NOTES
"PRIMARY DIAGNOSIS: \"GENERIC\" NURSING  OUTPATIENT/OBSERVATION GOALS TO BE MET BEFORE DISCHARGE:  1. ADLs back to baseline: Yes    2. Activity and level of assistance: Up with standby assistance.    3. Pain status: Improved with use of alternative comfort measures i.e.: distraction using TV, lunch, phone, and therapy.    4. Return to near baseline physical activity: No     Discharge Planner Nurse   Safe discharge environment identified: No  Barriers to discharge: Yes per therapy recommendation.       Entered by: Inessa Heller 08/15/2021 5:39 PM     Please review provider order for any additional goals.   Nurse to notify provider when observation goals have been met and patient is ready for discharge.    "

## 2021-08-15 NOTE — PROGRESS NOTES
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    It seems the main reason for his hospitalization was family concerns for opiate addiction as well as severe anxiety.    Disposition: OT recommending TCU placement, PT feels patient is safe to go home.  Patient tells me he and his family are hopeful for TCU placement pending insurance authorization and care management assessment.  I have asked care manager to look into TCU placement for the patient, plan TCU versus home with home care 8/16/2021.        Dyspnea: Suspect related to severe anxiety and underlying bronchomalacia.  No evidence of ACS.  Chest x-ray shows improvement from previous.  Patient had recent hospitalization with extensive work-up including negative CTA of the chest as well as TTE showing EF of 40 to 50%.  Previous PFTs show restrictive pattern  Currently not hypoxic  Previous trial of Trelegy Ellipta resulted in significant coughing and discontinuation.  COPD/Restrictive lung disease exacerbation not supported  --Respiratory status remained stable, continue as needed albuterol  --Patient has already planned outpatient pulmonary clinic follow-up  --Increase home Lasix to 20 mg twice daily while inpatient given ongoing lower extremity edema, check GFR in the a.m.  --Continue home PPI        Anxiety: Patient with increased anxiety over craving oxycodone for chronic low back pain  --Greatly appreciate psychiatry  involvement.  They recommended stopping amitriptyline, start gabapentin 100 mg 3 times daily and start Rozerem 8 mg nightly.  If patient discharges home will need outpatient OT eval for cognition and .  --Plan discharge home with gabapentin and Rozerem  --Of note recent thyroid studies checked and are acceptable  -- need to consider outpatient initiation of ssri vs snri, cognitive behavioral therapy and avoid elavil         Chronic pain: With likely opioid abuse disorder  --Pain team consulted, greatly appreciate their recommendations, please see pain team note for details        Chronic systolic CHF with chronic bilateral lower extremity edema: Patient with known chronic biventricular CHF and chronic lower extremity edema.  Work-up on previous hospitalization shows lower extremities negative for DVT, TTE shows EF of 40 to 50%  --Increase home diuretic slightly to 20 mg twice daily  given ongoing lower extremity edema, continue home metoprolol and losartan           Status post bilateral L2-4 decompressive lumbar laminectomy with medial facetectomies on 3/3/2021. MRI L-spine 6/22/2021 showed recent T12 compression fracture with 50% loss of vertebral height. Brace not tolerated due to COPD per report.   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              -------------------------------------------------------------------------------------------------------------  SUBJECTIVE: NAD. Denies any nausea, vomiting, abdominal pain, chest pain, SOB, new swelling, fevers, chills, confusion or headache.     Exam:  BP (!) 166/92 (BP  "Location: Right arm)   Pulse 66   Temp 97.9  F (36.6  C) (Oral)   Resp 20   Ht 1.626 m (5' 4\")   Wt 103.2 kg (227 lb 9.6 oz)   SpO2 94%   BMI 39.07 kg/m    General: NAD  RESPIRATORY: Breathing nonlabored  CARDIOVASCULAR: No le edema bilat.   ABDOMEN: Obese.  NEUROLOGIC: Motor grossly intact, speech clear  PSYCHIATRIC: Oriented X 3, without confusion or delirium, behavior appropriate, affect normal    Diagnostics Reviewed:      Recent Results (from the past 24 hour(s))   Drugs of Abuse 1 Panel, Urine ([facility] Only)    Collection Time: 08/14/21  4:09 PM   Result Value Ref Range    Amphetamines Urine Screen Negative Screen Negative    Benzodiazepines Urine Screen Negative Screen Negative    Opiates Urine Screen Negative Screen Negative    PCP Urine Screen Negative Screen Negative    Cannabinoids Urine Screen Negative Screen Negative    Barbiturates Urine Screen Negative Screen Negative    Cocaine Urine Screen Negative Screen Negative    Oxycodone Urine Screen Positive (A) Screen Negative    Creatinine Urine mg/dL 81 mg/dL   ECG 12-LEAD WITH MUSE ([facility])    Collection Time: 08/15/21  9:37 AM   Result Value Ref Range    Systolic Blood Pressure  mmHg    Diastolic Blood Pressure  mmHg    Ventricular Rate 80 BPM    Atrial Rate 80 BPM    MN Interval 196 ms    QRS Duration 92 ms     ms    QTc 498 ms    P Axis 27 degrees    R AXIS 18 degrees    T Axis 37 degrees    Interpretation ECG       Sinus rhythm with sinus arrhythmia  Low voltage QRS  Prolonged QT  Abnormal ECG  When compared with ECG of 14-AUG-2021 02:15,  No significant change was found         "

## 2021-08-15 NOTE — PROGRESS NOTES
[facility] ACUTE PAIN SERVICE    ([facility], [facility], [facility])  Daily PAIN Progress Note    Assessment/Plan:  Dominik Rojas is a 80 year old male who was admitted on 8/14/2021.  I was asked to see the patient for chronic pain and anxiety. Admitted for dyspnea. History of A. fib, COPD, CHF, AAA, alcohol dependence in remission, depression, depression, anxiety. Patient significant other concerned about mental health state and overuse of opiates, arguing with family about wanting more oxycodone at home. Patient is status post bilateral L2-L4 decompressive lumbar laminectomy with medial facetectomies with Dr. King March 3, 2021.  Patient reported resolution of preoperative leg symptoms, but worsening low back pain after surgery. New compression fracture at T12 found 6/22/2021. Pain is minimal. Has not tried subutex.     PLAN:   1) Chronic low back pain in the setting of opioid use disorder. Consult addiction medicine (this can be done outpatient).   2)Multimodal Medication Therapy  Topical:  Lidocaine ointment 5% and Diclofenac ointment (to the knee)  Muscle Relaxants: none   Adjuvants:  Tyenol 1G TID, Vistaril 25-50mg Q6h PRN and Gabapentin per Mental Health NP   Antidepressants/anxiolytics: per mental health NP   Opioids: HOLD on starting suboxone - qtc today is 498  3)Non-medication interventions- PT, acupuncture, integrative services, weight loss   4)Constipation Prophylaxis- none in anticipation of diarrhea with opioid taper   5) Follow up   -Opioid prescriber has been spine  -Discharge Recommendations - We recommend prescribing the following at the time of discharge: suboxone if desired, weight loss clinic, PT, home care, and addiction med follow up (they prefer chem dep in [place])       Prefers [place] [facility]: [phone]  [facility] outpatient - [facility] s: Monday, Tuesday, Wednesday, and Friday, 9:00 a.m. - noon  [facility]: Monday, Tuesday,  "Wednesday, and Friday, 9:30 a.m. - 12:30 p.m.  The program is facilitated by dual-licensed counselors and has three phases of treatment (schedule can vary for each patient).  Phase 1 - 20 sessions, Monday-Wednesday, Friday, 9:00 a.m. - noon  Phase 2 - Eight sessions, two days per week, 9:00 a.m. - noon  Phase 3 - Recovery maintenance program, one day per week, 12 weeks        [facility]   [address], [address]   [place], [place], [address]   Phone: [phone]   Fax: [phone]   Open Mon, Wed, Fridays   9:00am-4:00pm   Walk in hours: 9am-3pm   Open Tues and Thursdays   Walk-in only 1:30-4pm         Subjective:  Met with patient and called wife.  Initiated phone call with wife at 7:50 AM.  Left patient's room 9 AM.  Minimal pain today  Reinforced need for pain and pain psychology treatment   We talked about transition onto Suboxone as well as treatment of pain.  We talked about Tylenol and lidocaine and other comfort measures.  The patient and his wife again verbalized frustration with the patient being on multiple medications.  This concern was brought up several times yesterday as well.  Again we went through every medication and why the patient is on them.  I encouraged healthy lifestyle and weight loss in order to wean off of some of these medications.  The patient's wife is frustrated that every time he comes to the hospital he is put on more medications.  We talked about treating the conditions for which he is coming in for.  She states she has a hard time dealing with his behaviors at home and his aggressive seeking for more opioids.  At 1 point the patient denies that he is seeking more opioids and the wife clarifies that he in fact is aggressively seeking more oxycodone.  She has a prescription for oxycodone at home.  We talked about how to dispose of this.  I suggested she bring it to her local pharmacy which likely has a drug takeback program.  She states, \"they will not " "take it back\".  I asked how she knows this and she states she is not quite sure but does not think they will take the oxycodone back.  We talked about other ways of disposing it.  She remains very reluctant to dispose of the oxycodone which she just picked up.  Discussed with nurse Inessa.     1000-10:25- called wife to discuss qtc and future recheck. We discussed again all medications, healthy lifestyle, DASH diet, and follow up plans. Reinforced conversation from this am and yesterday.     Principal Problem:    Bronchomalacia  Active Problems:    Accelerated hypertension    COPD (chronic obstructive pulmonary disease) (H)    Generalized anxiety disorder    Dyspnea, unspecified type    Chronic systolic congestive heart failure (H)     LOS: 0 days       aspirin  81 mg Oral Daily     diclofenac  4 g Topical 4x Daily     emollient   Topical TID     furosemide  20 mg Oral BID     gabapentin  100 mg Oral TID     [Held by provider] ipratropium - albuterol 0.5 mg/2.5 mg/3 mL  3 mL Nebulization 4x daily     latanoprost  1 drop Both Eyes At Bedtime     levothyroxine  150 mcg Oral Daily     lidocaine   Topical TID     losartan  25 mg Oral Daily     metoprolol succinate ER  25 mg Oral Daily     pantoprazole  40 mg Oral QAM AC     ramelteon  8 mg Oral At Bedtime     rosuvastatin  10 mg Oral At Bedtime     Vitamin D3  50 mcg Oral Daily       Objective:  Vital signs in last 24 hours:  Temp:  [97.5  F (36.4  C)-98.3  F (36.8  C)] 97.9  F (36.6  C)  Pulse:  [] 66  Resp:  [20-24] 20  BP: (127-166)/(76-92) 166/92  SpO2:  [90 %-94 %] 94 %  Weight:   Weight:   @THISENCWEIGHTS(1)@  Weight change: 3.629 kg (8 lb)  Body mass index is 39.07 kg/m .    Intake/Output last 3 shifts:  I/O last 3 completed shifts:  In: 240 [P.O.:240]  Out: 150 [Urine:150]  Intake/Output this shift:  No intake/output data recorded.    Review of Systems:   As per subjective, all others negative.    Physical Exam:    General Appearance:  Alert, cooperative, no " distress, appears stated age   Sitting up in the chair    Head:  Normocephalic, without obvious abnormality, atraumatic   Eyes:  PERRL, conjunctiva/corneas clear, EOM's intact   Nose: Nares normal, septum midline, mucosa normal, no drainage   Throat:  teeth are dentures    Neck: Supple, symmetrical, trachea midline, no adenopathy, thyroid: not enlarged, symmetric    Back:   Symmetric, no curvature, ROM normal   Lungs:     respirations unlabored   Chest Wall:  No tenderness or deformity   Heart:  No sob today    Abdomen:   Soft, but distended    Extremities: Extremities red  With edema    Skin: Skin color pale   Neurologic: Alert and oriented X 3, Moves all 4 extremities    Lab Results:  Personally Reviewed.   Recent Labs   Lab 08/14/21  0216 08/11/21  0552 08/09/21 2008   WBC 9.5 9.2 9.2   HGB 13.9 13.7 13.6   HCT 42.9 44.8 42.8    217 246     Recent Labs   Lab 08/14/21  0216 08/12/21  1346 08/11/21  0552    142 140   CO2 28 28 27   BUN 17 18 18   ALBUMIN  --   --  3.5   ALKPHOS  --   --  113   ALT  --   --  17   AST  --   --  30     No results for input(s): INR in the last 168 hours.      Total unit/floor time 35  minutes, time consisted of the following, examination of patient, reviewing the record and lab results, and completing documentation.         Claribel KULKARNI, CNS-BC, CNP, ACHPN  Acute Care Pain Management Program Hour 7a-1700  [facility] ([facility])   Page via [facility]- Click HERE to page Claribel or call [phone]

## 2021-08-16 ENCOUNTER — APPOINTMENT (OUTPATIENT)
Dept: PHYSICAL THERAPY | Facility: HOSPITAL | Age: 81
DRG: 896 | End: 2021-08-16
Payer: COMMERCIAL

## 2021-08-16 ENCOUNTER — APPOINTMENT (OUTPATIENT)
Dept: OCCUPATIONAL THERAPY | Facility: HOSPITAL | Age: 81
DRG: 896 | End: 2021-08-16
Payer: COMMERCIAL

## 2021-08-16 ENCOUNTER — APPOINTMENT (OUTPATIENT)
Dept: RADIOLOGY | Facility: HOSPITAL | Age: 81
DRG: 896 | End: 2021-08-16
Attending: FAMILY MEDICINE
Payer: COMMERCIAL

## 2021-08-16 PROBLEM — F10.21 ALCOHOL USE DISORDER, SEVERE, IN SUSTAINED REMISSION, DEPENDENCE (H): Status: ACTIVE | Noted: 2021-08-16

## 2021-08-16 LAB
ANION GAP SERPL CALCULATED.3IONS-SCNC: 8 MMOL/L (ref 5–18)
ATRIAL RATE - MUSE: 80 BPM
BASE EXCESS BLDA CALC-SCNC: 5 MMOL/L
BUN SERPL-MCNC: 21 MG/DL (ref 8–28)
CALCIUM SERPL-MCNC: 9.5 MG/DL (ref 8.5–10.5)
CHLORIDE BLD-SCNC: 106 MMOL/L (ref 98–107)
CO2 SERPL-SCNC: 27 MMOL/L (ref 22–31)
COHGB MFR BLD: 97.8 % (ref 95–96)
CREAT SERPL-MCNC: 0.88 MG/DL (ref 0.7–1.3)
DIASTOLIC BLOOD PRESSURE - MUSE: NORMAL MMHG
FLOW: 4 LPM
GFR SERPL CREATININE-BSD FRML MDRD: 82 ML/MIN/1.73M2
GLUCOSE BLD-MCNC: 108 MG/DL (ref 70–125)
HCO3 BLD-SCNC: 28 MMOL/L (ref 23–29)
HOLD SPECIMEN: NORMAL
INTERPRETATION ECG - MUSE: NORMAL
OXYHGB MFR BLD: 97.1 % (ref 95–96)
P AXIS - MUSE: 27 DEGREES
PCO2 BLD: 52 MM HG (ref 35–45)
PH BLD: 7.38 [PH] (ref 7.37–7.44)
PO2 BLD: 94 MM HG (ref 75–85)
POTASSIUM BLD-SCNC: 4.1 MMOL/L (ref 3.5–5)
PR INTERVAL - MUSE: 196 MS
QRS DURATION - MUSE: 92 MS
QT - MUSE: 432 MS
QTC - MUSE: 498 MS
R AXIS - MUSE: 18 DEGREES
SODIUM SERPL-SCNC: 141 MMOL/L (ref 136–145)
SYSTOLIC BLOOD PRESSURE - MUSE: NORMAL MMHG
T AXIS - MUSE: 37 DEGREES
TEMPERATURE: 37 DEGREES C
TROPONIN I SERPL-MCNC: 0.01 NG/ML (ref 0–0.29)
TROPONIN I SERPL-MCNC: 0.02 NG/ML (ref 0–0.29)
VENTRICULAR RATE- MUSE: 80 BPM

## 2021-08-16 PROCEDURE — 93010 ELECTROCARDIOGRAM REPORT: CPT | Performed by: GENERAL ACUTE CARE HOSPITAL

## 2021-08-16 PROCEDURE — 250N000013 HC RX MED GY IP 250 OP 250 PS 637: Performed by: INTERNAL MEDICINE

## 2021-08-16 PROCEDURE — 250N000013 HC RX MED GY IP 250 OP 250 PS 637: Performed by: PAIN MEDICINE

## 2021-08-16 PROCEDURE — 96372 THER/PROPH/DIAG INJ SC/IM: CPT | Performed by: FAMILY MEDICINE

## 2021-08-16 PROCEDURE — 96376 TX/PRO/DX INJ SAME DRUG ADON: CPT

## 2021-08-16 PROCEDURE — 93005 ELECTROCARDIOGRAM TRACING: CPT | Performed by: FAMILY MEDICINE

## 2021-08-16 PROCEDURE — 97530 THERAPEUTIC ACTIVITIES: CPT | Mod: GO

## 2021-08-16 PROCEDURE — 99233 SBSQ HOSP IP/OBS HIGH 50: CPT | Performed by: FAMILY MEDICINE

## 2021-08-16 PROCEDURE — 36415 COLL VENOUS BLD VENIPUNCTURE: CPT | Performed by: INTERNAL MEDICINE

## 2021-08-16 PROCEDURE — 96375 TX/PRO/DX INJ NEW DRUG ADDON: CPT

## 2021-08-16 PROCEDURE — 97116 GAIT TRAINING THERAPY: CPT | Mod: GP

## 2021-08-16 PROCEDURE — 99232 SBSQ HOSP IP/OBS MODERATE 35: CPT | Performed by: PAIN MEDICINE

## 2021-08-16 PROCEDURE — 250N000013 HC RX MED GY IP 250 OP 250 PS 637: Performed by: FAMILY MEDICINE

## 2021-08-16 PROCEDURE — 250N000011 HC RX IP 250 OP 636: Performed by: INTERNAL MEDICINE

## 2021-08-16 PROCEDURE — 250N000013 HC RX MED GY IP 250 OP 250 PS 637: Performed by: NURSE PRACTITIONER

## 2021-08-16 PROCEDURE — 84484 ASSAY OF TROPONIN QUANT: CPT | Performed by: FAMILY MEDICINE

## 2021-08-16 PROCEDURE — 999N000157 HC STATISTIC RCP TIME EA 10 MIN

## 2021-08-16 PROCEDURE — 80048 BASIC METABOLIC PNL TOTAL CA: CPT | Performed by: INTERNAL MEDICINE

## 2021-08-16 PROCEDURE — 97530 THERAPEUTIC ACTIVITIES: CPT | Mod: GP

## 2021-08-16 PROCEDURE — 82805 BLOOD GASES W/O2 SATURATION: CPT | Performed by: FAMILY MEDICINE

## 2021-08-16 PROCEDURE — 71045 X-RAY EXAM CHEST 1 VIEW: CPT

## 2021-08-16 PROCEDURE — 36600 WITHDRAWAL OF ARTERIAL BLOOD: CPT

## 2021-08-16 PROCEDURE — 93005 ELECTROCARDIOGRAM TRACING: CPT

## 2021-08-16 PROCEDURE — 250N000011 HC RX IP 250 OP 636: Performed by: FAMILY MEDICINE

## 2021-08-16 PROCEDURE — 93005 ELECTROCARDIOGRAM TRACING: CPT | Performed by: STUDENT IN AN ORGANIZED HEALTH CARE EDUCATION/TRAINING PROGRAM

## 2021-08-16 PROCEDURE — G0378 HOSPITAL OBSERVATION PER HR: HCPCS

## 2021-08-16 PROCEDURE — 36415 COLL VENOUS BLD VENIPUNCTURE: CPT | Performed by: FAMILY MEDICINE

## 2021-08-16 RX ORDER — BUPRENORPHINE 2 MG/1
2 TABLET SUBLINGUAL DAILY
Status: DISCONTINUED | OUTPATIENT
Start: 2021-08-17 | End: 2021-08-17

## 2021-08-16 RX ORDER — BUPRENORPHINE 2 MG/1
2 TABLET SUBLINGUAL 2 TIMES DAILY
Status: DISCONTINUED | OUTPATIENT
Start: 2021-08-16 | End: 2021-08-16

## 2021-08-16 RX ORDER — NALOXONE HYDROCHLORIDE 0.4 MG/ML
0.4 INJECTION, SOLUTION INTRAMUSCULAR; INTRAVENOUS; SUBCUTANEOUS
Status: DISCONTINUED | OUTPATIENT
Start: 2021-08-16 | End: 2021-08-23 | Stop reason: HOSPADM

## 2021-08-16 RX ORDER — BUPRENORPHINE 2 MG/1
2 TABLET SUBLINGUAL ONCE
Status: COMPLETED | OUTPATIENT
Start: 2021-08-16 | End: 2021-08-16

## 2021-08-16 RX ORDER — NALOXONE HYDROCHLORIDE 0.4 MG/ML
0.2 INJECTION, SOLUTION INTRAMUSCULAR; INTRAVENOUS; SUBCUTANEOUS
Status: DISCONTINUED | OUTPATIENT
Start: 2021-08-16 | End: 2021-08-23 | Stop reason: HOSPADM

## 2021-08-16 RX ORDER — BUPRENORPHINE 2 MG/1
2 TABLET SUBLINGUAL 3 TIMES DAILY
Status: DISCONTINUED | OUTPATIENT
Start: 2021-08-16 | End: 2021-08-16

## 2021-08-16 RX ORDER — AMLODIPINE BESYLATE 2.5 MG/1
2.5 TABLET ORAL DAILY
Status: DISCONTINUED | OUTPATIENT
Start: 2021-08-16 | End: 2021-08-23 | Stop reason: HOSPADM

## 2021-08-16 RX ADMIN — Medication 50 MCG: at 09:43

## 2021-08-16 RX ADMIN — HYDRALAZINE HYDROCHLORIDE 10 MG: 20 INJECTION INTRAMUSCULAR; INTRAVENOUS at 11:59

## 2021-08-16 RX ADMIN — FUROSEMIDE 20 MG: 20 TABLET ORAL at 09:44

## 2021-08-16 RX ADMIN — DICLOFENAC SODIUM 4 G: 10 GEL TOPICAL at 12:03

## 2021-08-16 RX ADMIN — RAMELTEON 8 MG: 8 TABLET, FILM COATED ORAL at 21:26

## 2021-08-16 RX ADMIN — DICLOFENAC SODIUM 4 G: 10 GEL TOPICAL at 17:25

## 2021-08-16 RX ADMIN — LEVOTHYROXINE SODIUM 150 MCG: 0.03 TABLET ORAL at 09:46

## 2021-08-16 RX ADMIN — GABAPENTIN 100 MG: 100 CAPSULE ORAL at 09:43

## 2021-08-16 RX ADMIN — FUROSEMIDE 20 MG: 20 TABLET ORAL at 17:24

## 2021-08-16 RX ADMIN — PANTOPRAZOLE SODIUM 40 MG: 20 TABLET, DELAYED RELEASE ORAL at 06:30

## 2021-08-16 RX ADMIN — METOPROLOL SUCCINATE 25 MG: 25 TABLET, EXTENDED RELEASE ORAL at 09:44

## 2021-08-16 RX ADMIN — LIDOCAINE: 50 OINTMENT TOPICAL at 09:46

## 2021-08-16 RX ADMIN — NALOXONE HYDROCHLORIDE 0.2 MG: 0.4 INJECTION, SOLUTION INTRAMUSCULAR; INTRAVENOUS; SUBCUTANEOUS at 16:59

## 2021-08-16 RX ADMIN — Medication: at 09:45

## 2021-08-16 RX ADMIN — LOSARTAN POTASSIUM 25 MG: 25 TABLET, FILM COATED ORAL at 09:46

## 2021-08-16 RX ADMIN — Medication: at 13:09

## 2021-08-16 RX ADMIN — Medication: at 21:32

## 2021-08-16 RX ADMIN — ENOXAPARIN SODIUM 40 MG: 100 INJECTION SUBCUTANEOUS at 21:26

## 2021-08-16 RX ADMIN — BUPRENORPHINE 2 MG: 2 TABLET SUBLINGUAL at 13:20

## 2021-08-16 RX ADMIN — BUPRENORPHINE 2 MG: 2 TABLET SUBLINGUAL at 09:43

## 2021-08-16 RX ADMIN — HYDROXYZINE HYDROCHLORIDE 25 MG: 25 TABLET, FILM COATED ORAL at 06:30

## 2021-08-16 RX ADMIN — LIDOCAINE: 50 OINTMENT TOPICAL at 21:32

## 2021-08-16 RX ADMIN — ASPIRIN 81 MG: 81 TABLET, CHEWABLE ORAL at 09:43

## 2021-08-16 RX ADMIN — NALOXONE HYDROCHLORIDE 0.2 MG: 0.4 INJECTION, SOLUTION INTRAMUSCULAR; INTRAVENOUS; SUBCUTANEOUS at 18:34

## 2021-08-16 RX ADMIN — LIDOCAINE: 50 OINTMENT TOPICAL at 13:20

## 2021-08-16 RX ADMIN — AMLODIPINE BESYLATE 2.5 MG: 2.5 TABLET ORAL at 17:30

## 2021-08-16 RX ADMIN — ROSUVASTATIN CALCIUM 10 MG: 10 TABLET, FILM COATED ORAL at 21:26

## 2021-08-16 RX ADMIN — DICLOFENAC SODIUM 4 G: 10 GEL TOPICAL at 21:33

## 2021-08-16 RX ADMIN — DICLOFENAC SODIUM 4 G: 10 GEL TOPICAL at 09:45

## 2021-08-16 ASSESSMENT — MIFFLIN-ST. JEOR: SCORE: 1646.59

## 2021-08-16 NOTE — PROGRESS NOTES
"PRIMARY DIAGNOSIS: \"GENERIC\" NURSING  OUTPATIENT/OBSERVATION GOALS TO BE MET BEFORE DISCHARGE:  1. ADLs back to baseline: Yes    2. Activity and level of assistance: Up with standby assistance.    3. Pain status: Improved-controlled with oral pain medications.    4. Return to near baseline physical activity: No     Discharge Planner Nurse   Safe discharge environment identified: No  Barriers to discharge: Yes       Entered by: Josefina Farias 08/16/2021 4:17 AM     Please review provider order for any additional goals.   Nurse to notify provider when observation goals have been met and patient is ready for discharge.  "

## 2021-08-16 NOTE — PROGRESS NOTES
"PRIMARY DIAGNOSIS: \"GENERIC\" NURSING  OUTPATIENT/OBSERVATION GOALS TO BE MET BEFORE DISCHARGE:  1. ADLs back to baseline: Yes    2. Activity and level of assistance: Up with standby assistance.    3. Pain status: Improved-controlled with oral pain medications.    4. Return to near baseline physical activity: No     Discharge Planner Nurse   Safe discharge environment identified: No  Barriers to discharge: Yes       Entered by: Josefina Farias 08/16/2021 4:19 AM     Please review provider order for any additional goals.   Nurse to notify provider when observation goals have been met and patient is ready for discharge.  "

## 2021-08-16 NOTE — PROGRESS NOTES
Care Management Follow Up    Length of Stay (days): 0    Expected Discharge Date: 08/17/2021          Concerns to be Addressed:   Transitioning from Oxycodone to Subaxone: Needs monitoring of neuro status, pain levels and oxymetry.   Patient plan of care discussed at interdisciplinary rounds: Yes    Anticipated Discharge Disposition:  Home.      Anticipated Discharge Services:  Consider home nursing and OT.  Anticipated Discharge DME:  Per therapy (if indicated).    Patient/family educated on Medicare website which has current facility and service quality ratings:  Will review options with patient today.  Education Provided on the Discharge Plan:  Per team.  Patient/Family in Agreement with the Plan:  yes    Referrals Placed by CM/SW:  To be determined.   Private pay costs discussed: Not applicable at this time.     Additional Information:  Patient is  and largely independent at baseline. He was recently discharged to home and declined to have a home care referral. He also was adamant about not going to TCU. Writer will re-approach patient today regarding possible home care.   1:21 PM:  Met with patient at the bedside.  He is still adamant that he will not consider TCU and equally adamant that he does not want home care. He also declined to let writer speak with his wife or daughter regarding the discharge plan.     Alia Kennedy RN

## 2021-08-16 NOTE — PROGRESS NOTES
[facility] ACUTE PAIN SERVICE    ([facility], [facility], [facility])  Daily PAIN Progress Note    Assessment/Plan:  Dominik Rojas is a 80 year old male who was admitted on 8/14/2021.  I was asked to see the patient for chronic pain and anxiety. Admitted for dyspnea. History of A. fib, COPD, CHF, AAA, alcohol dependence in remission, depression, depression, anxiety. Patient significant other concerned about mental health state and substance use disorder. Pain controlled on tylenol, lidocaine, and diclofenac. Appreciate chem dep physician consult.     PLAN:   1) Chronic low back pain in the setting of opioid use disorder.    2)Multimodal Medication Therapy  Topical:  Lidocaine ointment 5% and Diclofenac ointment (to the knee)  Muscle Relaxants: none   Adjuvants:  Tyenol 1G TID, Vistaril 25-mg Q6h PRN and Gabapentin per Mental Health NP (agree with 100mg)   Antidepressants/anxiolytics: per mental health NP   Opioids: buprenorphine as per chem dep   3)Non-medication interventions- PT, acupuncture, integrative services, weight loss   4)Constipation Prophylaxis- none in anticipation of diarrhea with opioid taper   5) Follow up   -Opioid prescriber has been spine  -Discharge Recommendations - We recommend prescribing the following at the time of discharge: suboxone if desired, weight loss clinic, PT, home care, and addiction med follow up (they prefer chem dep in [place]) - and diclofenac       Prefers [place] [facility]: [phone]  [facility]: Monday, Tuesday, Wednesday, and Friday, 9:00 a.m. - noon  [facility]: Monday, Tuesday, Wednesday, and Friday, 9:30 a.m. - 12:30 p.m.  The program is facilitated by dual-licensed counselors and has three phases of treatment (schedule can vary for each patient).  Phase 1 - 20 sessions, Monday-Wednesday, Friday, 9:00 a.m. - noon  Phase 2 - Eight sessions, two days per week, 9:00 a.m. - noon  Phase 3 - Recovery  maintenance program, one day per week, 12 weeks        [facility]   [address], [address]   [place], [place], [address]   Phone: [phone]   Fax: [phone]   Open Mon, Wed, Fridays   9:00am-4:00pm   Walk in hours: 9am-3pm   Open Tues and Thursdays   Walk-in only 1:30-4pm         Subjective:  Pain is controlled. Diclofenac has been helpful   Some improvement in anxiety report   Met with pt who is eager to go home  He would like to do video visits for physical therapy at home  He cannot recall who he has met with during his stay here.   Lost his dentures during my visit and cannot focus     Principal Problem:    Bronchomalacia  Active Problems:    Accelerated hypertension    COPD (chronic obstructive pulmonary disease) (H)    Generalized anxiety disorder    Dyspnea, unspecified type    Chronic systolic congestive heart failure (H)     LOS: 0 days       aspirin  81 mg Oral Daily     buprenorphine  2 mg Sublingual TID     diclofenac  4 g Topical 4x Daily     emollient   Topical TID     furosemide  20 mg Oral BID     gabapentin  100 mg Oral TID     latanoprost  1 drop Both Eyes At Bedtime     levothyroxine  150 mcg Oral Daily     lidocaine   Topical TID     losartan  25 mg Oral Daily     metoprolol succinate ER  25 mg Oral Daily     pantoprazole  40 mg Oral QAM AC     ramelteon  8 mg Oral At Bedtime     rosuvastatin  10 mg Oral At Bedtime     Vitamin D3  50 mcg Oral Daily       Objective:  Vital signs in last 24 hours:  Temp:  [97.9  F (36.6  C)-98.4  F (36.9  C)] 97.9  F (36.6  C)  Pulse:  [73-75] 73  Resp:  [20] 20  BP: (133-150)/(72-91) 133/89  SpO2:  [92 %-95 %] 93 %  Weight:   Weight:   @THISENCWEIGHTS(1)@  Weight change: -1.361 kg (-3 lb)  Body mass index is 38.62 kg/m .    Intake/Output last 3 shifts:  No intake/output data recorded.  Intake/Output this shift:  No intake/output data recorded.    Review of Systems:   As per subjective, all others negative.    Physical  Exam:    General Appearance:  Alert, cooperative, no distress, appears stated age   Sitting up in the chair    Head:  Normocephalic, without obvious abnormality, atraumatic   Eyes:  PERRL, conjunctiva/corneas clear, EOM's intact   Nose: Nares normal, septum midline, mucosa normal, no drainage   Throat:  teeth are dentures    Neck: Supple, symmetrical, trachea midline, no adenopathy, thyroid: not enlarged, symmetric    Back:   Symmetric, no curvature, ROM normal   Lungs:     respirations unlabored   Chest Wall:  No tenderness or deformity   Heart:  No sob today    Abdomen:   Soft, but distended    Extremities: Extremities red  With edema    Skin: Skin color pale   Neurologic: Alert and oriented X 3, Moves all 4 extremities    Lab Results:  Personally Reviewed.   Recent Labs   Lab 08/14/21  0216 08/11/21  0552 08/09/21 2008   WBC 9.5 9.2 9.2   HGB 13.9 13.7 13.6   HCT 42.9 44.8 42.8    217 246     Recent Labs   Lab 08/16/21  0424 08/14/21  0216 08/12/21  1346 08/11/21  0552    141 142 140   CO2 27 28 28 27   BUN 21 17 18 18   ALBUMIN  --   --   --  3.5   ALKPHOS  --   --   --  113   ALT  --   --   --  17   AST  --   --   --  30     No results for input(s): INR in the last 168 hours.      Total unit/floor time 25  minutes, time consisted of the following, examination of patient, reviewing the record and lab results, and completing documentation.         Claribel Rocha APRN, CNS-BC, CNP, ACHPN  Acute Care Pain Management Program Hour 7a-1700  [employer] ([place], [place], [place])   Page via [employer]- Click HERE to page Claribel or call [phone]

## 2021-08-16 NOTE — PLAN OF CARE
Patient reports moderate anxiety related to hospitalization and separation from wife. Would like assistance with finding long term care for both he and his wife.  Feels like he is being held hostage here in the hospital. Explained the need to monitor him because he just started a new medication, Subutex. Will continue to provide therapeutic cares.

## 2021-08-16 NOTE — PROGRESS NOTES
"PRIMARY DIAGNOSIS: \"GENERIC\" NURSING  OUTPATIENT/OBSERVATION GOALS TO BE MET BEFORE DISCHARGE:  1. ADLs back to baseline: Yes    2. Activity and level of assistance: Up with standby assistance.    3. Pain status: Improved-controlled with oral pain medications.    4. Return to near baseline physical activity: No     Discharge Planner Nurse   Safe discharge environment identified: No  Barriers to discharge: Yes       Entered by: Inessa Heller 08/15/2021 8:19 PM     Please review provider order for any additional goals.   Nurse to notify provider when observation goals have been met and patient is ready for discharge.    "

## 2021-08-16 NOTE — PROGRESS NOTES
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    It seems the main reason for his hospitalization was family concerns for opiate addiction as well as severe anxiety.       Principal Problem:    Bronchomalacia  Active Problems:    Accelerated hypertension    COPD (chronic obstructive pulmonary disease) (H)    Generalized anxiety disorder    Dyspnea, unspecified type    Chronic systolic congestive heart failure (H)      Dyspnea:   -Suspect related to severe anxiety and underlying bronchomalacia.  No evidence of ACS.  Chest x-ray shows improvement from previous.  Patient had recent hospitalization with extensive work-up including negative CTA of the chest as well as TTE showing EF of 40 to 50%.  -Previous PFTs show restrictive pattern  -Currently not hypoxic  -Previous trial of Trelegy Ellipta resulted in significant coughing and discontinuation.  -COPD/Restrictive lung disease exacerbation not supported  --Respiratory status remained stable, continue as needed albuterol  --Patient has already planned outpatient pulmonary clinic follow-up  --c/w home Lasix to 20 mg twice daily   --Continue home PPI        Anxiety:   -Patient with increased anxiety over craving oxycodone for chronic low back pain  --Greatly appreciate psychiatry involvement.  They recommended stopping amitriptyline, start gabapentin 100 mg 3 times daily and start Rozerem 8 mg nightly.  If patient discharges home will need outpatient OT eval for cognition and .  --Plan discharge with Rozerem. Gabapentin and hydroxyzine started being on hold on  8/16 by addiction medicine to avoid oversedation given his advanced age while on suboxone.   --Of note recent thyroid studies checked and are acceptable  -- need to consider outpatient initiation of ssri vs snri, cognitive behavioral therapy and avoid elavil         Chronic pain: With likely opioid abuse disorder  --Pain team consulted, greatly appreciate their recommendations, please see pain team note for details-->recommending on discharge: suboxone, weight loss clinic, PT, home care, and addiction med follow up (they prefer [facility] dep in [place]) - and diclofenac            Chronic systolic CHF with chronic bilateral lower extremity edema:   -Patient with known chronic biventricular CHF and chronic lower extremity edema.  Work-up on previous hospitalization shows lower extremities negative for DVT, TTE shows EF of 40 to 50%  -BP elevated but could be because of anxiety   --c/w home lasix 20 mg BID  - continue home metoprolol and losartan           Status post bilateral L2-4 decompressive lumbar laminectomy with medial facetectomies on 3/3/2021. MRI L-spine 6/22/2021 showed recent T12 compression fracture with 50% loss of vertebral height. Brace not tolerated due to COPD per report.   -- continue calcitonin nasal spray  -pain regimen per pain team        Hypothyroidism: Continue home Synthroid for now. Obtain TSH.            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 Did not  phone. Left message.     Discharge barriers:  -monitor patient on suboxone  -ADOD: tomorrow      SUBJECTIVE:    Patient wants to go home today. He is unhappy that he has to stay another day to monitor him on suboxone. Patient denies any other complaints today except for that.      ROS:  12 Points review of systems reviewed and is negative except for what has already been mentioned above    OBJECTIVE:  Patient Vitals for the past 24 hrs:   BP Temp Temp src Pulse Resp SpO2 Weight   08/16/21 0332 133/89 97.9  F (36.6  C) Oral 73 20 93 % 102.1 kg (225 lb)   08/15/21 2344 (!) 150/91 98.4  F (36.9  C) Oral 75 20 92 % --   08/15/21 1517 (!) 144/72 98  F (36.7  C) Oral 74 20 95 % --      No intake or output data in the 24 hours ending 08/16/21 0746  GENRL: Alert and oriented X 3. Not in acute distress. Satting at 94% on room air.   HEENT: NC/AT      Neck- supple      Sclera- anicteric      Mucous membrane- moist and pink  CHEST: Clear to auscultation bilaterally  HEART: S1S2 regular. No murmurs, rubs or gallops  ABDMN: Soft. Non-tender.No guarding or rigidity. Bowel sounds- active  NEURO:  No involuntary movements  INTGM: please see nursing assessment for full skin assessment  PULSES: 2+ and symmetric all extremities  PSYCH: normal affect, normal speech     DIAGNOSTIC DATA:          Recent Results (from the past 24 hour(s))   ECG 12-LEAD WITH MUSE (LHE)    Collection Time: 08/15/21  9:37 AM   Result Value Ref Range    Systolic Blood Pressure  mmHg    Diastolic Blood Pressure  mmHg    Ventricular Rate 80 BPM    Atrial Rate 80 BPM    WI Interval 196 ms    QRS Duration 92 ms     ms    QTc 498 ms    P Axis 27 degrees    R AXIS 18 degrees    T Axis 37 degrees    Interpretation ECG       Sinus rhythm with sinus arrhythmia  Low voltage QRS  Prolonged QT  Abnormal ECG  When compared with ECG of 14-AUG-2021 02:15,  No significant change was found     Basic metabolic panel    Collection Time: 08/16/21  4:24 AM   Result Value Ref Range    Sodium 141 136 - 145 mmol/L    Potassium 4.1 3.5 - 5.0 mmol/L    Chloride 106 98 - 107 mmol/L    Carbon Dioxide (CO2) 27 22 - 31 mmol/L    Anion Gap 8 5 - 18 mmol/L    Urea Nitrogen 21 8 - 28 mg/dL    Creatinine 0.88 0.70 - 1.30 mg/dL    Calcium 9.5 8.5 - 10.5 mg/dL     Glucose 108 70 - 125 mg/dL    GFR Estimate 82 >60 mL/min/1.73m2        Results for orders placed or performed during the hospital encounter of 08/14/21   XR Chest Port 1 View    Impression    IMPRESSION: Cardiac enlargement and shallow inspiration. Bibasilar atelectasis or infiltrate decreased compared to prior. Atherosclerotic aorta. Surgical clips right neck.          All recent labs reviewed personally  Radiology report reviewed.       The total time spent in preparing this progress note is about 35 minutes, >50% time spent in care co-ordination that includes reviewing labs, images, discussing the plan of care with patient/family, consultants, and .      Lucille Pires MD.   [facility] Medicine Service   [phone]   Pager [phone]

## 2021-08-16 NOTE — SIGNIFICANT EVENT
Significant Event Note    Time of event: 4:51 PM August 16, 2021    Description of event:  Was paged by bedside nurse that patient's RR was 10 breaths/min, patient was sleepy, and was now requiring 4 L O2 via NC. Went to see patient, patient woke up and sat up for writer. While talking to writer, he kept falling asleep. He denied any symptoms. Both pupils were constricted. Rest of physical exam was unremarkable. Last dose of suboxone was around 1330 pm today.     Plan:  1.) give dose of narcan 0.2 mg  2.) hold suboxone  3.) CXR, EKG, ABG, and troponin     Discussed with: bedside nurse, called wife Jovana to update but did not  phone so left message    Lucille Pires MD

## 2021-08-17 ENCOUNTER — APPOINTMENT (OUTPATIENT)
Dept: PHYSICAL THERAPY | Facility: HOSPITAL | Age: 81
DRG: 896 | End: 2021-08-17
Payer: COMMERCIAL

## 2021-08-17 ENCOUNTER — TELEPHONE (OUTPATIENT)
Dept: PHYSICAL MEDICINE AND REHAB | Facility: CLINIC | Age: 81
End: 2021-08-17

## 2021-08-17 LAB
ALBUMIN SERPL-MCNC: 4.1 G/DL (ref 3.5–5)
ALP SERPL-CCNC: 113 U/L (ref 45–120)
ALT SERPL W P-5'-P-CCNC: 30 U/L (ref 0–45)
ANION GAP SERPL CALCULATED.3IONS-SCNC: 14 MMOL/L (ref 5–18)
AST SERPL W P-5'-P-CCNC: 42 U/L (ref 0–40)
ATRIAL RATE - MUSE: 137 BPM
ATRIAL RATE - MUSE: 53 BPM
BILIRUB SERPL-MCNC: 1.2 MG/DL (ref 0–1)
BUN SERPL-MCNC: 24 MG/DL (ref 8–28)
CALCIUM SERPL-MCNC: 10 MG/DL (ref 8.5–10.5)
CHLORIDE BLD-SCNC: 103 MMOL/L (ref 98–107)
CO2 SERPL-SCNC: 24 MMOL/L (ref 22–31)
CREAT SERPL-MCNC: 1.16 MG/DL (ref 0.7–1.3)
DIASTOLIC BLOOD PRESSURE - MUSE: NORMAL MMHG
DIASTOLIC BLOOD PRESSURE - MUSE: NORMAL MMHG
ERYTHROCYTE [DISTWIDTH] IN BLOOD BY AUTOMATED COUNT: 14.5 % (ref 10–15)
GFR SERPL CREATININE-BSD FRML MDRD: 60 ML/MIN/1.73M2
GLUCOSE BLD-MCNC: 106 MG/DL (ref 70–125)
HCT VFR BLD AUTO: 46.9 % (ref 40–53)
HGB BLD-MCNC: 14.8 G/DL (ref 13.3–17.7)
INTERPRETATION ECG - MUSE: NORMAL
INTERPRETATION ECG - MUSE: NORMAL
MCH RBC QN AUTO: 28.4 PG (ref 26.5–33)
MCHC RBC AUTO-ENTMCNC: 31.6 G/DL (ref 31.5–36.5)
MCV RBC AUTO: 90 FL (ref 78–100)
P AXIS - MUSE: 42 DEGREES
P AXIS - MUSE: 8 DEGREES
PLATELET # BLD AUTO: 290 10E3/UL (ref 150–450)
POTASSIUM BLD-SCNC: 4.3 MMOL/L (ref 3.5–5)
PR INTERVAL - MUSE: 138 MS
PR INTERVAL - MUSE: 154 MS
PROT SERPL-MCNC: 7.8 G/DL (ref 6–8)
QRS DURATION - MUSE: 80 MS
QRS DURATION - MUSE: 86 MS
QT - MUSE: 324 MS
QT - MUSE: 390 MS
QTC - MUSE: 489 MS
QTC - MUSE: 505 MS
R AXIS - MUSE: 2 DEGREES
R AXIS - MUSE: 7 DEGREES
RBC # BLD AUTO: 5.22 10E6/UL (ref 4.4–5.9)
SODIUM SERPL-SCNC: 141 MMOL/L (ref 136–145)
SYSTOLIC BLOOD PRESSURE - MUSE: NORMAL MMHG
SYSTOLIC BLOOD PRESSURE - MUSE: NORMAL MMHG
T AXIS - MUSE: 31 DEGREES
T AXIS - MUSE: 35 DEGREES
TROPONIN I SERPL-MCNC: 0.02 NG/ML (ref 0–0.29)
TSH SERPL DL<=0.005 MIU/L-ACNC: 1.02 UIU/ML (ref 0.3–5)
VENTRICULAR RATE- MUSE: 101 BPM
VENTRICULAR RATE- MUSE: 137 BPM
WBC # BLD AUTO: 11.5 10E3/UL (ref 4–11)

## 2021-08-17 PROCEDURE — 96372 THER/PROPH/DIAG INJ SC/IM: CPT | Performed by: NURSE PRACTITIONER

## 2021-08-17 PROCEDURE — 99233 SBSQ HOSP IP/OBS HIGH 50: CPT | Performed by: FAMILY MEDICINE

## 2021-08-17 PROCEDURE — 85027 COMPLETE CBC AUTOMATED: CPT | Performed by: FAMILY MEDICINE

## 2021-08-17 PROCEDURE — 84443 ASSAY THYROID STIM HORMONE: CPT | Performed by: FAMILY MEDICINE

## 2021-08-17 PROCEDURE — 80051 ELECTROLYTE PANEL: CPT | Performed by: FAMILY MEDICINE

## 2021-08-17 PROCEDURE — 99232 SBSQ HOSP IP/OBS MODERATE 35: CPT | Mod: 95 | Performed by: FAMILY MEDICINE

## 2021-08-17 PROCEDURE — 36415 COLL VENOUS BLD VENIPUNCTURE: CPT | Performed by: FAMILY MEDICINE

## 2021-08-17 PROCEDURE — 250N000011 HC RX IP 250 OP 636: Performed by: INTERNAL MEDICINE

## 2021-08-17 PROCEDURE — 80053 COMPREHEN METABOLIC PANEL: CPT | Performed by: FAMILY MEDICINE

## 2021-08-17 PROCEDURE — 93005 ELECTROCARDIOGRAM TRACING: CPT

## 2021-08-17 PROCEDURE — 99233 SBSQ HOSP IP/OBS HIGH 50: CPT | Performed by: NURSE PRACTITIONER

## 2021-08-17 PROCEDURE — 250N000011 HC RX IP 250 OP 636: Performed by: NURSE PRACTITIONER

## 2021-08-17 PROCEDURE — 250N000013 HC RX MED GY IP 250 OP 250 PS 637: Performed by: NURSE PRACTITIONER

## 2021-08-17 PROCEDURE — 84484 ASSAY OF TROPONIN QUANT: CPT | Performed by: FAMILY MEDICINE

## 2021-08-17 PROCEDURE — 250N000013 HC RX MED GY IP 250 OP 250 PS 637: Performed by: FAMILY MEDICINE

## 2021-08-17 PROCEDURE — G0378 HOSPITAL OBSERVATION PER HR: HCPCS

## 2021-08-17 PROCEDURE — 96372 THER/PROPH/DIAG INJ SC/IM: CPT | Performed by: FAMILY MEDICINE

## 2021-08-17 PROCEDURE — 93005 ELECTROCARDIOGRAM TRACING: CPT | Performed by: FAMILY MEDICINE

## 2021-08-17 PROCEDURE — 250N000009 HC RX 250: Performed by: EMERGENCY MEDICINE

## 2021-08-17 PROCEDURE — 97116 GAIT TRAINING THERAPY: CPT | Mod: GP

## 2021-08-17 PROCEDURE — 250N000013 HC RX MED GY IP 250 OP 250 PS 637: Performed by: INTERNAL MEDICINE

## 2021-08-17 PROCEDURE — 93010 ELECTROCARDIOGRAM REPORT: CPT | Performed by: INTERNAL MEDICINE

## 2021-08-17 PROCEDURE — 250N000011 HC RX IP 250 OP 636: Performed by: FAMILY MEDICINE

## 2021-08-17 PROCEDURE — 999N000250 HC STATISTIC ECG ASSIST

## 2021-08-17 RX ORDER — OLANZAPINE 2.5 MG/1
2.5 TABLET, FILM COATED ORAL 3 TIMES DAILY
Status: DISCONTINUED | OUTPATIENT
Start: 2021-08-17 | End: 2021-08-19

## 2021-08-17 RX ORDER — OLANZAPINE 10 MG/2ML
2.5 INJECTION, POWDER, FOR SOLUTION INTRAMUSCULAR
Status: COMPLETED | OUTPATIENT
Start: 2021-08-17 | End: 2021-08-17

## 2021-08-17 RX ORDER — OLANZAPINE 10 MG/2ML
5 INJECTION, POWDER, FOR SOLUTION INTRAMUSCULAR 3 TIMES DAILY PRN
Status: DISCONTINUED | OUTPATIENT
Start: 2021-08-17 | End: 2021-08-18

## 2021-08-17 RX ORDER — OLANZAPINE 5 MG/1
5 TABLET, ORALLY DISINTEGRATING ORAL 3 TIMES DAILY PRN
Status: DISCONTINUED | OUTPATIENT
Start: 2021-08-17 | End: 2021-08-18

## 2021-08-17 RX ORDER — TRAZODONE HYDROCHLORIDE 50 MG/1
50 TABLET, FILM COATED ORAL AT BEDTIME
Status: DISCONTINUED | OUTPATIENT
Start: 2021-08-17 | End: 2021-08-23 | Stop reason: HOSPADM

## 2021-08-17 RX ADMIN — FUROSEMIDE 20 MG: 20 TABLET ORAL at 08:26

## 2021-08-17 RX ADMIN — DICLOFENAC SODIUM 4 G: 10 GEL TOPICAL at 12:06

## 2021-08-17 RX ADMIN — Medication: at 08:36

## 2021-08-17 RX ADMIN — ALBUTEROL SULFATE 2.5 MG: 2.5 SOLUTION RESPIRATORY (INHALATION) at 05:47

## 2021-08-17 RX ADMIN — METOPROLOL SUCCINATE 25 MG: 25 TABLET, EXTENDED RELEASE ORAL at 08:26

## 2021-08-17 RX ADMIN — LIDOCAINE: 50 OINTMENT TOPICAL at 08:36

## 2021-08-17 RX ADMIN — ASPIRIN 81 MG: 81 TABLET, CHEWABLE ORAL at 08:31

## 2021-08-17 RX ADMIN — OLANZAPINE 5 MG: 10 INJECTION, POWDER, LYOPHILIZED, FOR SOLUTION INTRAMUSCULAR at 14:46

## 2021-08-17 RX ADMIN — PANTOPRAZOLE SODIUM 40 MG: 20 TABLET, DELAYED RELEASE ORAL at 08:31

## 2021-08-17 RX ADMIN — OLANZAPINE 2.5 MG: 2.5 TABLET ORAL at 12:17

## 2021-08-17 RX ADMIN — LATANOPROST 1 DROP: 50 SOLUTION OPHTHALMIC at 21:37

## 2021-08-17 RX ADMIN — DICLOFENAC SODIUM 4 G: 10 GEL TOPICAL at 21:37

## 2021-08-17 RX ADMIN — DICLOFENAC SODIUM 4 G: 10 GEL TOPICAL at 16:38

## 2021-08-17 RX ADMIN — LIDOCAINE: 50 OINTMENT TOPICAL at 13:35

## 2021-08-17 RX ADMIN — MELATONIN TAB 3 MG 3 MG: 3 TAB at 00:17

## 2021-08-17 RX ADMIN — LIDOCAINE: 50 OINTMENT TOPICAL at 21:37

## 2021-08-17 RX ADMIN — ONDANSETRON 4 MG: 4 TABLET, ORALLY DISINTEGRATING ORAL at 00:53

## 2021-08-17 RX ADMIN — AMLODIPINE BESYLATE 2.5 MG: 2.5 TABLET ORAL at 08:26

## 2021-08-17 RX ADMIN — OLANZAPINE 2.5 MG: 10 INJECTION, POWDER, LYOPHILIZED, FOR SOLUTION INTRAMUSCULAR at 08:11

## 2021-08-17 RX ADMIN — ENOXAPARIN SODIUM 40 MG: 100 INJECTION SUBCUTANEOUS at 21:36

## 2021-08-17 RX ADMIN — DICLOFENAC SODIUM 4 G: 10 GEL TOPICAL at 08:36

## 2021-08-17 RX ADMIN — Medication: at 13:35

## 2021-08-17 RX ADMIN — Medication 50 MCG: at 08:36

## 2021-08-17 RX ADMIN — FUROSEMIDE 20 MG: 20 TABLET ORAL at 16:36

## 2021-08-17 RX ADMIN — ROSUVASTATIN CALCIUM 10 MG: 10 TABLET, FILM COATED ORAL at 21:36

## 2021-08-17 RX ADMIN — LEVOTHYROXINE SODIUM 150 MCG: 0.03 TABLET ORAL at 08:31

## 2021-08-17 RX ADMIN — Medication: at 21:37

## 2021-08-17 RX ADMIN — OLANZAPINE 2.5 MG: 2.5 TABLET ORAL at 10:06

## 2021-08-17 RX ADMIN — LOSARTAN POTASSIUM 25 MG: 25 TABLET, FILM COATED ORAL at 08:26

## 2021-08-17 NOTE — TELEPHONE ENCOUNTER
I called patient's wife who wanted to give me an update on patient's status.  He is currently admitted to the hospital.  She is concerned about cost of TLSO brace.  Patient did not tolerate the custom TLSO due to COPD so she does not want to have to pay for it.  She has been in contact with orthotics and they recommended she call me too.  I will try to contact orthotics and discuss this situation.

## 2021-08-17 NOTE — PROGRESS NOTES
"PRIMARY DIAGNOSIS: \"GENERIC\" NURSING  OUTPATIENT/OBSERVATION GOALS TO BE MET BEFORE DISCHARGE:  1. ADLs back to baseline: No    2. Activity and level of assistance: Up with standby assistance. Pt ambulates with a cane, which was taken out of his room d/t agitation. Pt had used the cane to break the window when he was agitated. Pt ambulated to the restroom with standby assistance.    3. Pain status: Pt denies pain, says he has a history of back pain and usually sit in a recliner at home. Pt up in the chair, did not want to be reclined.     4. Return to near baseline physical activity: No, pt is weak and unsteady on his feet. Needs assistance to ambulate.     Discharge Planner Nurse   Safe discharge environment identified: No  Barriers to discharge: Yes, pt is restless and agitated, was not redirectable. He requires a 1:1 sitter at bedside.       Entered by: Fiordaliza Rockwell 08/17/2021 3:56 AM     Please review provider order for any additional goals.   Nurse to notify provider when observation goals have been met and patient is ready for discharge.  "

## 2021-08-17 NOTE — SIGNIFICANT EVENT
Pt was very agitated this morning and again this afternoon.pt walked out of room and wanted to leave.Md was notified. Security was called.  Pt received IM Zyprexa x2 in this shift per Md order. Pt stayed clam and cooperative after receiving the shot. Pt's v/s stable. Continue to monitor pt.

## 2021-08-17 NOTE — PROGRESS NOTES
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    It seems the main reason for his hospitalization was family concerns for opiate addiction as well as severe anxiety.       Principal Problem:    Bronchomalacia  Active Problems:    Accelerated hypertension    COPD (chronic obstructive pulmonary disease) (H)    Generalized anxiety disorder    Dyspnea, unspecified type    Chronic systolic congestive heart failure (H)    Opioid use disorder, severe, dependence (H)    Alcohol use disorder, severe, in sustained remission, dependence (H)    suboxone use/opioid overdose-->AMS/Metabolic Encephalopathy  -patient did not tolerate suboxone after taking it yesterday 8/16. Please refer to significant event notes from 8/16. He started becoming confused and agitated. He tried to leave the hospital early this morning at 6 am and HO was called.   -discussed case with psych who recommended giving dose of IM zyprexa and starting patient on PO scheduled zyprexa which was done.   -psych and addiction medicine following and appreciate recommendations  -holding suboxone at this time.       Dyspnea:   -Suspect related to severe anxiety and underlying bronchomalacia.  No evidence of ACS.  Chest x-ray shows improvement from previous.  Patient had recent hospitalization with extensive work-up including negative CTA of the chest as well as TTE showing EF of 40 to 50%.  -Previous PFTs show restrictive pattern  -Previous trial of Trelegy Ellipta resulted in significant coughing and discontinuation.  -COPD/Restrictive lung disease exacerbation not  supported  -patient requiring 4 L O2 via NC this morning. Not sure if this due still to side effect from suboxone use from yesterday 8/16.   --continue as needed albuterol  --c/w home Lasix to 20 mg twice daily   --Continue home PPI  -wean off of supplemental O2 as tolerated   -if patient still on 4 L O2 via NC tomorrow 8/18, will order CXR  --Patient has already planned outpatient pulmonary clinic follow-up          Anxiety:   -Patient with increased anxiety over craving oxycodone for chronic low back pain  --Greatly appreciate psychiatry involvement.  They recommended stopping amitriptyline, start gabapentin 100 mg 3 times daily and start Rozerem 8 mg nightly.  If patient discharges home will need outpatient OT eval for cognition and .  --Plan discharge with Rozerem. Gabapentin and hydroxyzine started being on hold on 8/16 by addiction medicine to avoid oversedation given his advanced age while on suboxone.   --Of note recent thyroid studies checked and are acceptable  -- need to consider outpatient initiation of ssri vs snri, cognitive behavioral therapy and avoid elavil         Chronic pain: With likely opioid abuse disorder  --Pain team consulted, greatly appreciate their recommendations, please see pain team note for details-->recommending on discharge:  weight loss clinic, PT, home care, and addiction med follow up (they prefer chem dep in [place]) - and diclofenac. Suboxone on hold because patient did not tolerate suboxone after taking it yesterday 8/16.            Chronic systolic CHF with chronic bilateral lower extremity edema:   -Patient with known chronic biventricular CHF and chronic lower extremity edema.  Work-up on previous hospitalization shows lower extremities negative for DVT, TTE shows EF of 40 to 50%  -BP elevated but could be because of anxiety   --c/w home lasix 20 mg BID  - continue home metoprolol and losartan  -monitor daily weights   -if patient still on 4 L O2 via NC  tomorrow 8/18, will order CXR and consider increasing lasix dosage         Status post bilateral L2-4 decompressive lumbar laminectomy with medial facetectomies on 3/3/2021. MRI L-spine 6/22/2021 showed recent T12 compression fracture with 50% loss of vertebral height. Brace not tolerated due to COPD per report.   -- continue calcitonin nasal spray  -pain regimen per pain team        Hypothyroidism: TSH 8/17/2021 WNL. Continue home Synthroid for now.            Morbid obesity: BMI 41  -- outpatient management           Probable TERENCE: Sleep study already ordered for outpatient after discharge      Hx of DM  - obtain HgbA1c      FEN/GI: low salt, low fat, diabetic diet  DVT proph: SCDs, ambulation  Code status: Full Code      Updated wife Jovana on current plan.     Discharge barriers:  -AMS, psych and addiction medicine following patient  -ADOD: 1-2       SUBJECTIVE:    Patient wants to go home today. Patient denies any complaints at this time.     ROS:  12 Points review of systems reviewed and is negative except for what has already been mentioned above    OBJECTIVE:  Patient Vitals for the past 24 hrs:   BP Temp Temp src Pulse Resp SpO2   08/17/21 0751 (!) 181/101 98.2  F (36.8  C) Oral 98 18 94 %   08/17/21 0318 (!) 150/98 98.4  F (36.9  C) Oral 97 10 90 %   08/16/21 2335 (!) 148/105 98.7  F (37.1  C) Oral 114 12 92 %   08/16/21 2200 -- -- -- -- -- 93 %   08/16/21 1830 (!) 141/90 -- -- 98 -- --   08/16/21 1800 (!) 146/112 -- -- 102 8 --   08/16/21 1757 -- -- -- -- -- 93 %   08/16/21 1730 (!) 138/106 -- -- 90 10 --   08/16/21 1715 (!) 138/106 -- -- 105 8 --   08/16/21 1541 (!) 189/101 -- -- 88 10 93 %   08/16/21 1423 -- -- -- -- -- (!) 88 %   08/16/21 1243 136/74 -- -- -- -- --   08/16/21 1122 (!) 177/91 98.2  F (36.8  C) Oral 88 22 90 %   08/16/21 0900 115/75 -- -- -- -- --   08/16/21 0759 119/78 97.5  F (36.4  C) Oral 97 18 94 %      No intake or output data in the 24 hours ending 08/16/21 0746  GENRL: alert to  person only. Not in acute distress. Satting at 94% on 4 LPM via NC.    HEENT: NC/AT      Neck- supple      Sclera- anicteric      Mucous membrane- moist and pink  CHEST: Clear to auscultation bilaterally  HEART: S1S2 regular. No murmurs, rubs or gallops  ABDMN: Soft. Non-tender.No guarding or rigidity. Bowel sounds- active  NEURO:  No involuntary movements  INTGM: please see nursing assessment for full skin assessment  PULSES: 2+ and symmetric all extremities  PSYCH: normal affect, normal speech     DIAGNOSTIC DATA:          Recent Results (from the past 24 hour(s))   ECG 12-LEAD WITH MUSE ([facility])    Collection Time: 08/16/21  4:58 PM   Result Value Ref Range    Systolic Blood Pressure  mmHg    Diastolic Blood Pressure  mmHg    Ventricular Rate 101 BPM    Atrial Rate 53 BPM    IA Interval 154 ms    QRS Duration 80 ms     ms    QTc 505 ms    P Axis 42 degrees    R AXIS 7 degrees    T Axis 35 degrees    Interpretation ECG       Sinus bradycardia with occasional Premature ventricular complexes and Fusion complexes  Low voltage QRS  Cannot rule out Inferior infarct , age undetermined  Cannot rule out Anterior infarct , age undetermined  Abnormal ECG  When compared with ECG of 15-AUG-2021 09:37,  Fusion complexes are now Present  Premature ventricular complexes are now Present     Blood gas arterial    Collection Time: 08/16/21  5:18 PM   Result Value Ref Range    pH Arterial 7.38 7.37 - 7.44    pCO2 Arterial 52 (H) 35 - 45 mm Hg    pO2 Arterial 94 (H) 75 - 85 mm Hg    Bicarbonate Arterial 28 23 - 29 mmol/L    O2 Sat, Arterial 97.8 (H) 95.0 - 96.0 %    Oxyhemoglobin 97.1 (H) 95.0 - 96.0 %    Base Excess/Deficit (+/-) 5.0   mmol/L    Flow 4.0 LPM    Sample Stabilized Temperature 37.0 degrees C   Troponin I    Collection Time: 08/16/21  6:34 PM   Result Value Ref Range    Troponin I 0.01 0.00 - 0.29 ng/mL   Troponin I    Collection Time: 08/16/21 10:37 PM   Result Value Ref Range    Troponin I 0.02 0.00 - 0.29 ng/mL    ECG 12-LEAD WITH MUSE ([facility])    Collection Time: 08/16/21 11:32 PM   Result Value Ref Range    Systolic Blood Pressure  mmHg    Diastolic Blood Pressure  mmHg    Ventricular Rate 137 BPM    Atrial Rate 137 BPM    AK Interval 138 ms    QRS Duration 86 ms     ms    QTc 489 ms    P Axis 8 degrees    R AXIS 2 degrees    T Axis 31 degrees    Interpretation ECG       Sinus tachycardia with occasional Premature ventricular complexes and Fusion complexes  Cannot rule out Anterior infarct (cited on or before 16-AUG-2021)  Abnormal ECG  When compared with ECG of 16-AUG-2021 16:58,  No significant change was found     Troponin I    Collection Time: 08/17/21  2:06 AM   Result Value Ref Range    Troponin I 0.02 0.00 - 0.29 ng/mL   TSH with free T4 reflex    Collection Time: 08/17/21  4:47 AM   Result Value Ref Range    TSH 1.02 0.30 - 5.00 uIU/mL   Comprehensive metabolic panel    Collection Time: 08/17/21  4:47 AM   Result Value Ref Range    Sodium 141 136 - 145 mmol/L    Potassium 4.3 3.5 - 5.0 mmol/L    Chloride 103 98 - 107 mmol/L    Carbon Dioxide (CO2) 24 22 - 31 mmol/L    Anion Gap 14 5 - 18 mmol/L    Urea Nitrogen 24 8 - 28 mg/dL    Creatinine 1.16 0.70 - 1.30 mg/dL    Calcium 10.0 8.5 - 10.5 mg/dL    Glucose 106 70 - 125 mg/dL    Alkaline Phosphatase 113 45 - 120 U/L    AST 42 (H) 0 - 40 U/L    ALT 30 0 - 45 U/L    Protein Total 7.8 6.0 - 8.0 g/dL    Albumin 4.1 3.5 - 5.0 g/dL    Bilirubin Total 1.2 (H) 0.0 - 1.0 mg/dL    GFR Estimate 60 (L) >60 mL/min/1.73m2   CBC with platelets    Collection Time: 08/17/21  4:47 AM   Result Value Ref Range    WBC Count 11.5 (H) 4.0 - 11.0 10e3/uL    RBC Count 5.22 4.40 - 5.90 10e6/uL    Hemoglobin 14.8 13.3 - 17.7 g/dL    Hematocrit 46.9 40.0 - 53.0 %    MCV 90 78 - 100 fL    MCH 28.4 26.5 - 33.0 pg    MCHC 31.6 31.5 - 36.5 g/dL    RDW 14.5 10.0 - 15.0 %    Platelet Count 290 150 - 450 10e3/uL        Results for orders placed or performed during the hospital encounter of  08/14/21   XR Chest Port 1 View    Impression    IMPRESSION: Cardiac enlargement and shallow inspiration. Bibasilar atelectasis or infiltrate decreased compared to prior. Atherosclerotic aorta. Surgical clips right neck.          All recent labs reviewed personally  Radiology report reviewed.       The total time spent in preparing this progress note is about 35 minutes, >50% time spent in care co-ordination that includes reviewing labs, images, discussing the plan of care with patient/family, consultants, and .      Lucille Pires MD.   [facility] Medicine Service   [phone]   Pager [phone]

## 2021-08-17 NOTE — SIGNIFICANT EVENT
Significant Event Note    Time of event: 6:03 AM August 17, 2021    Description of event:  HO called to a code green. Patient reportedly tried to leave the unit. Is admitted for dyspnea and opioid withdrawal. On my interview patient is back in his room in his chair. He repeatedly asks what is going on and for me to be honest with him. Sounds like he hasn't slept all night d/t his anxiety. I explained to him that he is withdrawing from opioids and not thinking straight. He agrees to that. Also agrees that the withdrawing brain can get easily paranoid, which he is experiencing. I would like to help him sleep, but he is refusing any medications to help him with that. He states there must be some other reason why he is here, and that we are conspiring against him.    He is oriented to place and time, but doesn't know the president or why he's here. He is definitely experiencing delusional and paranoid thoughts at this time. Thus, does not have the capacity to make his own medical decisions.    I am hesitant to force IM zyprexa on him, as he is not combative and seems redirectable, and I also don't want to sedate him more, as he was having a difficult time protecting his airway when I saw him last night (and per nursing report throughout the night).    Plan:  I paged his primary, Dr. Pires, to come to the bedside to help with the situation.    Discussed with: bedside nurse    Raj Aceves MD

## 2021-08-17 NOTE — PROGRESS NOTES
[facility] ACUTE PAIN SERVICE    ([facility], [facility], [facility])  Daily PAIN Chart Check     Assessment/Plan:  Dominik Rojas is a 80 year old male who was admitted on 8/14/2021.  I was asked to see the patient for chronic pain and anxiety. Admitted for dyspnea. History of A. fib, COPD, CHF, AAA, alcohol dependence in remission, depression, depression, anxiety. Patient significant other concerned about mental health state and substance use disorder. Pain controlled on tylenol, lidocaine, and diclofenac. Appreciate chem dep physician consult.     Suboxone started by Addiction medicine . Multiple significant events overnight for patient, noted and reviewed. Discontinued today per Dr. Mercedes note.     PLAN:   1) Chronic low back pain in the setting of opioid use disorder.    2)Multimodal Medication Therapy  Topical:  Lidocaine ointment 5% and Diclofenac ointment (to the knee)  Muscle Relaxants: none   Adjuvants:  Tyenol 1G TID, Vistaril 25-mg Q6h PRN and Gabapentin per Mental Health NP (agree with 100mg)   Antidepressants/anxiolytics: per mental health NP   Opioids: buprenorphine as per chem dep : discontinued today.   3)Non-medication interventions- PT, acupuncture, integrative services, weight loss   4)Constipation Prophylaxis- none in anticipation of diarrhea with opioid taper   5) Follow up   -Opioid prescriber has been spine  -Discharge Recommendations - We recommend prescribing the following at the time of discharge: suboxone if desired, weight loss clinic, PT, home care, and addiction med follow up (they prefer chem dep in [place]) - and diclofenac       Prefers [place] [facility]: [phone]  [facility] outpatient - [facility]: Monday, Tuesday, Wednesday, and Friday, 9:00 a.m. - noon  [facility]: Monday, Tuesday, Wednesday, and Friday, 9:30 a.m. - 12:30 p.m.  The program is facilitated by dual-licensed counselors and has three phases of treatment  (schedule can vary for each patient).  Phase 1 - 20 sessions, Monday-Wednesday, Friday, 9:00 a.m. - noon  Phase 2 - Eight sessions, two days per week, 9:00 a.m. - noon  Phase 3 - Recovery maintenance program, one day per week, 12 weeks        [facility]   [facility] [address], [address]   [place], [place], [address]   Phone: [phone]   Fax: [phone]   Open Mon, Wed, Fridays   9:00am-4:00pm   Walk in hours: 9am-3pm   Open Tues and Thursdays   Walk-in only 1:30-4pm         Reviewed Addiction medicine and Psych consult and progress notes. Agree with discontinuing buprenorphine as well as any opioid agonists. Will continue topical adjunctives. PMT will see patient in person again if necessary. Appropriate for chart check today per charting.     Suki Luna PharmD  Acute Care Pain Management Program Hour 7a-1700  [facility] ([facility] [place] [place])   Page via [employer]-  call [phone]

## 2021-08-17 NOTE — PROGRESS NOTES
"Psychiatric Progress Note  Mild intermittent metabolic encephalopathy likely exacerbated in the context of sleep dysregulation, hospitalization, opioid withdrawal.  Sleep deprivation likely contributing to above.  Anxiety reaction in the setting of medical condition, chronic pain.  Mood disorder due to medical condition, in the setting of acute and chronic pain.  Adjustment disorder with depression and anxiety likely in the context of multiple psychosocial stressors, medical condition.    Recommendations:  Discontinue Rozerem.  Not effective  Olanzapine 2.5 mg 3 times a day x2 days: To target anxiety, nervousness and panic symptoms.  Olanzapine 5 mg 3 times a day as needed for agitation and psychosis.  Trazodone 50-75 mg for sleep.  Patient has not slept in several nights.  Addiction medicine following as well   Case discussed with bedside staff.      SUBJECTIVE:  Seated in chair, redirectable.  Responded well to one-time dose of Zyprexa.  He knows that he is in hospital however wants to go home.  He believes that his wife is abandoning him.  He had called his wife earlier and had told her that he was kept hostage in the hospital.  I reminded patient that it was his wife who wanted him admitted to hospital due to concerns for excessive opioid use for his pain.  His wife and daughter had mentioned few days ago that the patient had at least 17 vials of medications at home and no one knew which one was doing what.  He did not report of any visual or auditory hallucinations, psychosis or thoughts of dying.  He is eager to go home.  He has no recollection of him trying to break the glass window with his cane in his hospital room 417.  He was fearful of that he was held hostage \"here against his wishes\".  Staff reported that patient did not sleep for the last 2 nights.  Multiple code greens  had been called.  See below nursing notes:    Pt has not had any sleep all night. Pt c/o feeling anxious and cannot sleep. Pt asked " "what he needs to do and what is going on. Told him that he needs to stay in the hospital for oxygen need. Pt disagrees and says something else is going on. Pt became agitated and attempted to leave into the hallway, pulled off the nasal cannula and pulse oximeter. Pt was not redirectable, says he wants to leave because we are playing games with him. Yasmin Chand called at 0625. Pt agreed to return to his room. House officer at bedside, talked to pt and explained why he needs to be in the hospital. Pt is more calm but unable to understand situation, need constant reminder to convince him to stay in his room. Will continue to monitor for safety.    Pt was very agitated and became combative. He used his cane to try and break the window in his room in an attempt to \"get out.\" Cane was removed from room and explained that he cannot use it to break the window. Pt was restless and anxious, was not able to sleep all night. Pt was given melatonin with no effect. He says his \"nerves\" are getting to him and he is fighting it. He refused to lay down and try to rest. Reassurance provided and held his hand. Pt eventually became more calm and redirectable.       MENTAL STATUS EXAMINATION:   Patient is seated in recliner chair, not in acute distress.  Speech is slow, responding to short direct questions in yes or no.  No recollection of conversation with me from the ER.  Thought content shows intermittent suspiciousness, confabulation.  No acute visual or auditory hallucinations noted.  No paranoia.  Judgment, insight, memory, attention, comprehension, fund of knowledge are depressed.  Language is intact.  Gait and ambulation with assist of cane and 1.  Affect is neutral mood congruent.  Awake, orientation x2-3.  Vital signs in last 24 hours  Temp:  [98.2  F (36.8  C)-98.7  F (37.1  C)] 98.2  F (36.8  C)  Pulse:  [] 98  Resp:  [8-22] 18  BP: (115-189)/() 181/101  SpO2:  [88 %-94 %] 94 %              "

## 2021-08-17 NOTE — PLAN OF CARE
"  Problem: Thought Process Alteration  Goal: Optimal Thought Clarity  Outcome: No Change  Pt was very agitated and became combative. He used his cane to try and break the window in his room in an attempt to \"get out.\" Cane was removed from room and explained that he cannot use it to break the window. Pt was restless and anxious, was not able to sleep all night. Pt was given melatonin with no effect. He says his \"nerves\" are getting to him and he is fighting it. He refused to lay down and try to rest. Reassurance provided and held his hand. Pt eventually became more calm and redirectable.    Problem: Adult Inpatient Plan of Care  Goal: Readiness for Transition of Care  Outcome: No Change  Pt on oxygen at 4 L via nasal cannula. Pt's oxygen desats below 90% when on room air. Pt needs constant reminder to keep oxygen on. Respiration rates fluctuate between 10-12 breaths per minute. Pt c/o shortness of breath, PRN albuterol neb given with improvement.   "

## 2021-08-17 NOTE — PLAN OF CARE
PRIMARY DIAGNOSIS: ACUTE PAIN  OUTPATIENT/OBSERVATION GOALS TO BE MET BEFORE DISCHARGE:  1. Pain Status: Pain free.    2. Return to near baseline physical activity: Yes    3. Cleared for discharge by consultants (if involved): No    Discharge Planner Nurse   Safe discharge environment identified: Yes  Barriers to discharge: Yes       Entered by: Parish Mcdowell 08/17/2021 10:02 AM     Please review provider order for any additional goals.   Nurse to notify provider when observation goals have been met and patient is ready for discharge.

## 2021-08-17 NOTE — PROGRESS NOTES
"Care Management Follow Up    Length of Stay (days): 0    Expected Discharge Date: 08/18/2021          Concerns to be Addressed:   Transitioning from Oxycodone to Subaxone: Needs monitoring of neuro status, pain levels and oxymetry. 1:1 sitter for subaxone transition resulting in confusion.  Patient plan of care discussed at interdisciplinary rounds: Yes     Follow up from rounds/notes:   Per MD order, \"Please send a referral to [place] mental health clinic for psychotherapy and management of depression and anxiety  outpatient basis.\" Will update GALLO BAKER.     Anticipated Discharge Disposition:  Home.      Anticipated Discharge Services:  Consider home nursing and OT.  Anticipated Discharge DME:  Per therapy (if indicated).    Patient/family educated on Medicare website which has current facility and service quality ratings:  Will review options with patient today.  Education Provided on the Discharge Plan:  Per team.  Patient/Family in Agreement with the Plan:  yes    Referrals Placed by SAMUEL/GALLO:  To be determined.   Private pay costs discussed: Not applicable at this time.     Additional Information:  Patient is  and largely independent at baseline. He was recently discharged to home and declined to have a home care referral. He also was adamant about not going to TCU. On 8/17/21, writer met with patient at the bedside.  He was still adamant that he will not consider TCU and equally adamant that he does not want home care. He also declined to let writer speak with his wife or daughter regarding the discharge plan at the time. Writer will continue to follow.   12:37 PM:  Spoke with GALLO CM will review with addiction medicine MD. She will follow up with patient tomorrow (if alert and oriented) regarding the referral to a mental health clinic for continued psychotherapy on an outpatient basis.   3:01 PM:  Note that a code green was called due to patient attempt to elope. Security at the bedside.     Alia Kennedy RN      "

## 2021-08-17 NOTE — SIGNIFICANT EVENT
Significant Event Note    Time of event: 7:57    Description of event:  Called by nurse with concern that patient is confused and lethargic. Patient is admitted for dyspnea and opioid use disorder, followed by the pain team. Is now receiving suboxone, no opioids on board. Patient had multiple episodes earlier in the day where he could not maintain alertness and had respiratory rate of 10. He was given two doses of narcan 0.2 mg.     Upon visiting the patient, he appears drowsy but is able to engage in conversation. Pupils are constricted, respiratory rate is normal, but patient is able to protect is airway. Lungs are clear to ausculation.     Plan:  #Confusion  Patient is in for opioid management, is managed by the pain team who has determined that suboxone will be his treatment going forward. Patient has constricted pupils and had low respiratory rate before we saw him, but is oxygenating well, had appropriate respiratory rate upon our examination, and was able to protect his airway. Patient stayed awake for our conversation and was appropriate. No need for narcan at this time in an attempt to avoid withdrawal.     Discussed with: bedside nurse    Sabra Castelan MD

## 2021-08-17 NOTE — PLAN OF CARE
PRIMARY DIAGNOSIS: ACUTE PAIN  OUTPATIENT/OBSERVATION GOALS TO BE MET BEFORE DISCHARGE:  1. Pain Status: Pain free.    2. Return to near baseline physical activity: Yes    3. Cleared for discharge by consultants (if involved): No    Discharge Planner Nurse   Safe discharge environment identified: Yes  Barriers to discharge: Yes       Entered by: Parish Mcdowell 08/17/2021 12:22 PM     Please review provider order for any additional goals.   Nurse to notify provider when observation goals have been met and patient is ready for discharge.

## 2021-08-17 NOTE — PROGRESS NOTES
Addiction Medicine Progress Note  8/17/2021    Tele-Visit Details     Type of service:  Video Visit  Video Start Time (time video started):  1158  Video End Time (time video stopped):  1212  Originating Location (pt. Location): [facility] patient's room  Distant Location (provider location): writer onsite at [facility]   Reason for Televisit: COVID 19   Mode of Communication:  Video Conference via polycom  Physician has received verbal consent for a video visit from the patient? Yes      Assessment/Plan    Principal Problem:    Bronchomalacia  Active Problems:    Accelerated hypertension    COPD (chronic obstructive pulmonary disease) (H)    Generalized anxiety disorder    Dyspnea, unspecified type    Chronic systolic congestive heart failure (H)    Opioid use disorder, severe, dependence (H)    Alcohol use disorder, severe, in sustained remission, dependence (H)    Opioid use disorder of unclear severity although, suspect a moderate or severe use disorder- Patient did not tolerate 2mg of buprenorphine yesterday and received Narcan.  He additionally has delirium type symptoms with ongoing confusion. He has had a number of sedating medications added in a short amount of time including numerous doses of hydroxyzine, lorazepam, gabapentin which likely contributed to his unresponsiveness.  On exam today, he does not appear to be in opioid withdrawal and he denies cravings.     Recommendations:  1. Discontinue buprenorphine  2. Continue avoiding sedating medications and anticholinergics including benzodiazepines and hydroxyzine.  3. Could consider scheduling melatonin at 6-8 PM rather than as needed to avoid administration in the middle of the night.  4. Addiction medicine will continue to follow patient       Severe alcohol use disorder in sustained remission        Subjective    Chart reviewed as there were numerous significant events since patient was last seen.  He did receive several doses of Narcan. Is  "now on O2 via NC. He complains of low back pain but, states that it is not as severe as when he takes oxycodone at home.  The interview is difficult today as he continues to be mildly confused although he is able to say that it is 2021 and that he is at [facility].  When asked how he is doing he states \"you got about half the page.\"  Then he states \"I did not know if anything was going on.\"    Anxiety: \"Manageable\" and he thinks he is doing better than he was yesterday.    ROS:    No shakes or chills  No nausea or diarrhea  No generalized body aches    Objective    Vital signs in last 24 hours  Temp:  [98.2  F (36.8  C)-98.7  F (37.1  C)] 98.2  F (36.8  C)  Pulse:  [] 98  Resp:  [8-18] 18  BP: (136-189)/() 181/101  SpO2:  [88 %-94 %] 94 %        Physical Exam    Notes of previous providers and nursing staff reviewed for the purpose of the this telehealth visit.    Gen: Awake and alert. In no acute distress. Pleasant and cooperative but is confused  Neuro: speech is normal in rate and volume without slurred speech.  No tremor  Eyes: EOMI. There is no scleral icterus.  Skin: no jaundice, diaphoresis, goose bumps  Respiratory: Nasal cannula present, occasional cough but no acute shortness of breath and he is speaking in full sentences.    Psychiatric Mental Status Examination:  Orientation: person, place and year but not full date.  He is slow with some responses and several times closes his eyes midsentence and stop speaking.  When asked about this he states that he has some kind of eye problem.  Appearance: The patient appears stated age, appropriately dressed.   Reliability:  Poor  Behavior: makes good eye contact, cooperative and engaged in the interview  Suspicious  Speech: spontaneous and coherent, with a normal rate, rhythm and tone.  Associations: Tangential and unable to answer all questions  Language:There are no difficulties with expressive or receptive language as observed throughout " "the interview.    Mood: Described as \" fine\"    Affect:  Appears less anxious   Judgement: Poor vision  Insight: Poor  Thought process: Illogical  Thought content: evidence of delusions or paranoia.    Fund of knowledge: Average, intact.   Attention / Concentration: Able to remain focused during the interview with minimal distractibility or need for redirection.  Short Term Memory: Unable to assess  Long Term Memory: Intact  Cognitive Function: Intact      Pertinent Labs   Lab Results: I have personally reviewed the labs.        Inessa Mercedes MD  Addiction Medicine            "

## 2021-08-17 NOTE — PLAN OF CARE
Problem: Thought Process Alteration  Goal: Optimal Thought Clarity  Outcome: Declining     Upon assessment at 1530 patient extremely sleepy. Pupils 1mm, sluggish. RR 10, responsive to shaking his arm not to voice. O2 at 70-80's. Placed Nasal Cannula on 4L sating at 89 to 93%. /101 and was too soon to give PRN IV Hydralazine 10 mg. Text paged Dr Pires 1600. She ordered Narcan IV 0.2 mg, Chest Xray, EKG.  PRN Subutex given at 1320 by day nurse. Reduced Nasal Cannula to 3L.    1659 gave Naloxone IV 0.2 mg, patient woke up slightly for 1 minute, then back to sleep.    1834 gave Naloxone IV 0.2 mg, patient woke up again slightly for 1 minute, then back to sleep.    Patient became upset about 2100, thinks we are keeping him against his will, wants to go home. Restless, confused and became combative around 220. Used his cane to hit the window. RN who is watching him 1:1 took his cane away for safety. He has become restless, angry, confused, nauseated. Patient not on telemetry but HR changed around 2200 ranging from 130-150's. 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     COW score  1545 score 6  2200 score 13

## 2021-08-17 NOTE — SIGNIFICANT EVENT
Pt has not had any sleep all night. Pt c/o feeling anxious and cannot sleep. Pt asked what he needs to do and what is going on. Told him that he needs to stay in the hospital for oxygen need. Pt disagrees and says something else is going on. Pt became agitated and attempted to leave into the hallway, pulled off the nasal cannula and pulse oximeter. Pt was not redirectable, says he wants to leave because we are playing games with him. Yasmin Chand called at 0625. Pt agreed to return to his room. House officer at bedside, talked to pt and explained why he needs to be in the hospital. Pt is more calm but unable to understand situation, need constant reminder to convince him to stay in his room. Will continue to monitor for safety.

## 2021-08-17 NOTE — PROGRESS NOTES
"PRIMARY DIAGNOSIS: \"GENERIC\" NURSING  OUTPATIENT/OBSERVATION GOALS TO BE MET BEFORE DISCHARGE:  1. ADLs back to baseline: No    2. Activity and level of assistance: Up with standby assistance.    3. Pain status: Pt c/o back discomfort, did not rate it. Pt has PRN tylenol, which he declined treatment. Pt says moving helps and has been moving from chair to bed to decrease discomfort.    4. Return to near baseline physical activity: No, pt is unsteady and ambulates with a cane.     Discharge Planner Nurse   Safe discharge environment identified: No, pt has a 1:1 sitter at bedside. He was agitated and restless. Has not sleep all night, says his anxiety is making it hard for him to sleep. PRN melatonin given with no effect.   Barriers to discharge: Yes, pt continues to require oxygen at 4 L via nasal cannula.       Entered by: Fiordaliza Rockwell 08/17/2021 3:43 AM     Please review provider order for any additional goals.   Nurse to notify provider when observation goals have been met and patient is ready for discharge.  "

## 2021-08-18 ENCOUNTER — APPOINTMENT (OUTPATIENT)
Dept: RADIOLOGY | Facility: HOSPITAL | Age: 81
DRG: 896 | End: 2021-08-18
Attending: FAMILY MEDICINE
Payer: COMMERCIAL

## 2021-08-18 ENCOUNTER — APPOINTMENT (OUTPATIENT)
Dept: PHYSICAL THERAPY | Facility: HOSPITAL | Age: 81
DRG: 896 | End: 2021-08-18
Payer: COMMERCIAL

## 2021-08-18 PROBLEM — M25.562 ACUTE PAIN OF LEFT KNEE: Status: ACTIVE | Noted: 2021-08-18

## 2021-08-18 PROBLEM — F11.90 OPIOID USE DISORDER: Status: ACTIVE | Noted: 2021-08-18

## 2021-08-18 LAB
ALBUMIN SERPL-MCNC: 3.9 G/DL (ref 3.5–5)
ALP SERPL-CCNC: 125 U/L (ref 45–120)
ALT SERPL W P-5'-P-CCNC: 26 U/L (ref 0–45)
ANION GAP SERPL CALCULATED.3IONS-SCNC: 14 MMOL/L (ref 5–18)
AST SERPL W P-5'-P-CCNC: 46 U/L (ref 0–40)
ATRIAL RATE - MUSE: 110 BPM
ATRIAL RATE - MUSE: 50 BPM
BILIRUB SERPL-MCNC: 1.5 MG/DL (ref 0–1)
BUN SERPL-MCNC: 30 MG/DL (ref 8–28)
CALCIUM SERPL-MCNC: 9.4 MG/DL (ref 8.5–10.5)
CHLORIDE BLD-SCNC: 103 MMOL/L (ref 98–107)
CO2 SERPL-SCNC: 23 MMOL/L (ref 22–31)
CREAT SERPL-MCNC: 1.41 MG/DL (ref 0.7–1.3)
DIASTOLIC BLOOD PRESSURE - MUSE: 68 MMHG
DIASTOLIC BLOOD PRESSURE - MUSE: NORMAL MMHG
ERYTHROCYTE [DISTWIDTH] IN BLOOD BY AUTOMATED COUNT: 14.6 % (ref 10–15)
GFR SERPL CREATININE-BSD FRML MDRD: 47 ML/MIN/1.73M2
GLUCOSE BLD-MCNC: 94 MG/DL (ref 70–125)
HBA1C MFR BLD: 5.8 %
HCT VFR BLD AUTO: 47.5 % (ref 40–53)
HGB BLD-MCNC: 14.6 G/DL (ref 13.3–17.7)
INTERPRETATION ECG - MUSE: NORMAL
INTERPRETATION ECG - MUSE: NORMAL
MCH RBC QN AUTO: 28.7 PG (ref 26.5–33)
MCHC RBC AUTO-ENTMCNC: 30.7 G/DL (ref 31.5–36.5)
MCV RBC AUTO: 93 FL (ref 78–100)
P AXIS - MUSE: 34 DEGREES
P AXIS - MUSE: 38 DEGREES
PLATELET # BLD AUTO: 258 10E3/UL (ref 150–450)
POTASSIUM BLD-SCNC: 4.8 MMOL/L (ref 3.5–5)
PR INTERVAL - MUSE: 184 MS
PR INTERVAL - MUSE: 190 MS
PROCALCITONIN SERPL-MCNC: 0.03 NG/ML (ref 0–0.49)
PROT SERPL-MCNC: 7.7 G/DL (ref 6–8)
QRS DURATION - MUSE: 84 MS
QRS DURATION - MUSE: 86 MS
QT - MUSE: 372 MS
QT - MUSE: 546 MS
QTC - MUSE: 497 MS
QTC - MUSE: 503 MS
R AXIS - MUSE: 13 DEGREES
R AXIS - MUSE: 35 DEGREES
RBC # BLD AUTO: 5.09 10E6/UL (ref 4.4–5.9)
SODIUM SERPL-SCNC: 140 MMOL/L (ref 136–145)
SYSTOLIC BLOOD PRESSURE - MUSE: 137 MMHG
SYSTOLIC BLOOD PRESSURE - MUSE: NORMAL MMHG
T AXIS - MUSE: 38 DEGREES
T AXIS - MUSE: 60 DEGREES
VENTRICULAR RATE- MUSE: 110 BPM
VENTRICULAR RATE- MUSE: 50 BPM
WBC # BLD AUTO: 12.1 10E3/UL (ref 4–11)

## 2021-08-18 PROCEDURE — 80053 COMPREHEN METABOLIC PANEL: CPT | Performed by: FAMILY MEDICINE

## 2021-08-18 PROCEDURE — 85027 COMPLETE CBC AUTOMATED: CPT | Performed by: FAMILY MEDICINE

## 2021-08-18 PROCEDURE — 250N000013 HC RX MED GY IP 250 OP 250 PS 637: Performed by: PAIN MEDICINE

## 2021-08-18 PROCEDURE — 99233 SBSQ HOSP IP/OBS HIGH 50: CPT | Performed by: FAMILY MEDICINE

## 2021-08-18 PROCEDURE — 120N000004 HC R&B MS OVERFLOW

## 2021-08-18 PROCEDURE — 250N000013 HC RX MED GY IP 250 OP 250 PS 637: Performed by: FAMILY MEDICINE

## 2021-08-18 PROCEDURE — 96372 THER/PROPH/DIAG INJ SC/IM: CPT | Performed by: NURSE PRACTITIONER

## 2021-08-18 PROCEDURE — 97116 GAIT TRAINING THERAPY: CPT | Mod: GP

## 2021-08-18 PROCEDURE — 250N000013 HC RX MED GY IP 250 OP 250 PS 637: Performed by: INTERNAL MEDICINE

## 2021-08-18 PROCEDURE — 250N000009 HC RX 250: Performed by: PAIN MEDICINE

## 2021-08-18 PROCEDURE — 250N000011 HC RX IP 250 OP 636: Performed by: FAMILY MEDICINE

## 2021-08-18 PROCEDURE — 83036 HEMOGLOBIN GLYCOSYLATED A1C: CPT | Performed by: FAMILY MEDICINE

## 2021-08-18 PROCEDURE — 84145 PROCALCITONIN (PCT): CPT | Performed by: FAMILY MEDICINE

## 2021-08-18 PROCEDURE — 250N000013 HC RX MED GY IP 250 OP 250 PS 637: Performed by: NURSE PRACTITIONER

## 2021-08-18 PROCEDURE — 250N000011 HC RX IP 250 OP 636: Performed by: NURSE PRACTITIONER

## 2021-08-18 PROCEDURE — 71045 X-RAY EXAM CHEST 1 VIEW: CPT

## 2021-08-18 PROCEDURE — 36415 COLL VENOUS BLD VENIPUNCTURE: CPT | Performed by: FAMILY MEDICINE

## 2021-08-18 PROCEDURE — G0378 HOSPITAL OBSERVATION PER HR: HCPCS

## 2021-08-18 PROCEDURE — 97530 THERAPEUTIC ACTIVITIES: CPT | Mod: GP

## 2021-08-18 RX ORDER — OLANZAPINE 10 MG/2ML
10 INJECTION, POWDER, FOR SOLUTION INTRAMUSCULAR 3 TIMES DAILY PRN
Status: DISCONTINUED | OUTPATIENT
Start: 2021-08-18 | End: 2021-08-23 | Stop reason: HOSPADM

## 2021-08-18 RX ORDER — FUROSEMIDE 10 MG/ML
10 INJECTION INTRAMUSCULAR; INTRAVENOUS ONCE
Status: COMPLETED | OUTPATIENT
Start: 2021-08-18 | End: 2021-08-18

## 2021-08-18 RX ORDER — OLANZAPINE 2.5 MG/1
2.5 TABLET, FILM COATED ORAL 2 TIMES DAILY
Status: DISCONTINUED | OUTPATIENT
Start: 2021-08-18 | End: 2021-08-19

## 2021-08-18 RX ORDER — AMOXICILLIN 250 MG
2 CAPSULE ORAL DAILY PRN
Status: DISCONTINUED | OUTPATIENT
Start: 2021-08-18 | End: 2021-08-23 | Stop reason: HOSPADM

## 2021-08-18 RX ORDER — OLANZAPINE 5 MG/1
10 TABLET, ORALLY DISINTEGRATING ORAL 3 TIMES DAILY PRN
Status: DISCONTINUED | OUTPATIENT
Start: 2021-08-18 | End: 2021-08-23 | Stop reason: HOSPADM

## 2021-08-18 RX ADMIN — Medication 75 MG: at 21:07

## 2021-08-18 RX ADMIN — LOSARTAN POTASSIUM 25 MG: 25 TABLET, FILM COATED ORAL at 08:53

## 2021-08-18 RX ADMIN — PANTOPRAZOLE SODIUM 40 MG: 20 TABLET, DELAYED RELEASE ORAL at 08:49

## 2021-08-18 RX ADMIN — ENOXAPARIN SODIUM 40 MG: 100 INJECTION SUBCUTANEOUS at 20:54

## 2021-08-18 RX ADMIN — OLANZAPINE 2.5 MG: 2.5 TABLET ORAL at 12:42

## 2021-08-18 RX ADMIN — Medication: at 20:35

## 2021-08-18 RX ADMIN — ROSUVASTATIN CALCIUM 10 MG: 10 TABLET, FILM COATED ORAL at 20:51

## 2021-08-18 RX ADMIN — OLANZAPINE 5 MG: 10 INJECTION, POWDER, LYOPHILIZED, FOR SOLUTION INTRAMUSCULAR at 03:23

## 2021-08-18 RX ADMIN — OLANZAPINE 5 MG: 10 INJECTION, POWDER, LYOPHILIZED, FOR SOLUTION INTRAMUSCULAR at 16:47

## 2021-08-18 RX ADMIN — METOPROLOL SUCCINATE 25 MG: 25 TABLET, EXTENDED RELEASE ORAL at 08:53

## 2021-08-18 RX ADMIN — OLANZAPINE 2.5 MG: 2.5 TABLET ORAL at 20:57

## 2021-08-18 RX ADMIN — LEVOTHYROXINE SODIUM 150 MCG: 0.03 TABLET ORAL at 08:50

## 2021-08-18 RX ADMIN — AMLODIPINE BESYLATE 2.5 MG: 2.5 TABLET ORAL at 08:50

## 2021-08-18 RX ADMIN — LIDOCAINE: 50 OINTMENT TOPICAL at 20:35

## 2021-08-18 RX ADMIN — ASPIRIN 81 MG: 81 TABLET, CHEWABLE ORAL at 08:54

## 2021-08-18 RX ADMIN — FUROSEMIDE 20 MG: 20 TABLET ORAL at 08:54

## 2021-08-18 RX ADMIN — FUROSEMIDE 10 MG: 10 INJECTION, SOLUTION INTRAMUSCULAR; INTRAVENOUS at 18:00

## 2021-08-18 RX ADMIN — DICLOFENAC SODIUM 4 G: 10 GEL TOPICAL at 21:09

## 2021-08-18 NOTE — PLAN OF CARE
"PRIMARY DIAGNOSIS: \"GENERIC\" NURSING  OUTPATIENT/OBSERVATION GOALS TO BE MET BEFORE DISCHARGE:  1. ADLs back to baseline: No    2. Activity and level of assistance: Up with maximum assistance. Consider SW and/or PT evaluation.     3. Pain status: Improved with use of alternative comfort measures i.e.: positioning- patient noded No when asked if was in pain.     4. Return to near baseline physical activity: No     Discharge Planner Nurse   Safe discharge environment identified: No  Barriers to discharge: Yes       Entered by: Zita Lugo 08/18/2021 2:36 AM   Pt drowsy but able to arouse to voice and follows commands. Alert and oriented to self. No signs of withdrawal. On 4L oxygen through nasal canula, needs reminders to keep nasal canula on. 1:1 sitter present at bedside for safety.   "

## 2021-08-18 NOTE — PLAN OF CARE
"  Problem: Anxiety  Goal: Anxiety Reduction or Resolution  Outcome: No Change  Intervention: Promote Anxiety Reduction  Recent Flowsheet Documentation  Taken 8/17/2021 1600 by Omayra Champion RN  Family/Support System Care:   caregiver stress acknowledged   support provided     Problem: Adult Inpatient Plan of Care  Goal: Optimal Comfort and Wellbeing  Outcome: Improving  Intervention: Provide Person-Centered Care  Recent Flowsheet Documentation  Taken 8/17/2021 1600 by Omayra Champion RN  Trust Relationship/Rapport:   care explained   choices provided   emotional support provided   empathic listening provided   questions answered   questions encouraged   reassurance provided   thoughts/feelings acknowledged    Patient continues to be hyperactive, shift movements quick and impulsive. Napping throughout the day. Daughter visited and told me he doesn't sleep much at all ever because he cant stay asleep. RR 8 and 10, bradypnea. Upon giving patient medications he asked if I brought him beer. Also said, \"let the cat in, I want him in the house.\" Hallucinations continue.      "

## 2021-08-18 NOTE — PLAN OF CARE
"PRIMARY DIAGNOSIS: \"GENERIC\" NURSING  OUTPATIENT/OBSERVATION GOALS TO BE MET BEFORE DISCHARGE:  1. ADLs back to baseline: No    2. Activity and level of assistance: Up with maximum assistance. Consider SW and/or PT evaluation.     3. Pain status: Improved with use of alternative comfort measures i.e.: distraction using lidocaine ointments, gels and lotions    4. Return to near baseline physical activity: No     Discharge Planner Nurse   Safe discharge environment identified: Yes  Barriers to discharge: Yes, needs to be done withdrawing, mental status back to baseline (currently disoriented to time, place situation), has hallucinations (thought he saw his cat), sedated.        Entered by: Omayra Champion 08/17/2021 10:59 PM     Please review provider order for any additional goals.   Nurse to notify provider when observation goals have been met and patient is ready for discharge.    Did NOT give his trazodone PO 50 mg or 75 mg and scheduled olanzapine PO 2.5 mg r/t sedated, sleeping.  "

## 2021-08-18 NOTE — PROGRESS NOTES
Will not see today:  PAIN MANAGEMENT SERVICE CHART CHECK    This patient's chart has been reviewed by the Pain Service. Chart reviewed.  Patient with encephalopathy has 1:1 support.  Addiction Medicine following, buprenorphine has been stopped.  Agree with plan to avoid sedating medications and opioids at this time.    Pain Service will continue to follow along and see patient as needed.      Thank you!    Rosario KULKARNI, CNS-BC, DNP  Acute Care Pain Management Program  [facility] ([place], [place], [place])   With questions call [phone]  Preference if for Kaden Tolbert  Click HERE to page [place]

## 2021-08-18 NOTE — PROGRESS NOTES
Care Management Follow Up    Length of Stay (days): 0    Expected Discharge Date: 08/20/2021          Concerns to be Addressed:   Transitioning from Oxycodone to Subaxone: Needs monitoring of neuro status, pain levels and oxymetry. IM Zyprexa. 1:1 sitter for subaxone transition resulting in confusion.  Patient plan of care discussed at interdisciplinary rounds: Yes     Anticipated Discharge Disposition:  Home.      Anticipated Discharge Services:  Consider home nursing and OT.  Anticipated Discharge DME:  Per therapy (if indicated).    Patient/family educated on Medicare website which has current facility and service quality ratings:  Will review options with patient today.  Education Provided on the Discharge Plan:  Per team.  Patient/Family in Agreement with the Plan:  yes    Referrals Placed by SAMUEL/GALLO:  To be determined.   Private pay costs discussed: Not applicable at this time.     Additional Information:  Patient is  and largely independent at baseline. He was recently discharged to home and declined to have a home care referral. He also was adamant about not going to TCU. On 8/17/21, writer met with patient at the bedside.  He was still adamant that he will not consider TCU and equally adamant that he does not want home care. He also had declined to let writer speak with his wife or daughter regarding the discharge plan at the time. Writer will continue to follow. Spoke with GALLO CM will review with addiction medicine MD. She will follow up with patient, when he is alert and oriented, regarding the referral to a mental health clinic for continued psychotherapy on an outpatient basis.       Alia Kennedy RN
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
## 2021-08-18 NOTE — PLAN OF CARE
Rec'd phone call from pt's wife, Jovana, asking for an update.  She did not want to talk with Dominik, stated that would only make him want to come home and agitate him.  Jovana indicated she was working today, from 11a - 8pm.
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
## 2021-08-18 NOTE — PROGRESS NOTES
"Pt's daughter, Chana visited briefly.  She voiced frustration that she is the only coming to see her father.  Chana indicated she wanted a new medication list when pt was discharged.  She said that pt has had a very poor quality of life for the last couple years, multiple surgeries, and a stroke.  Pt states that his baseline is nodding on and off all day, sitting in his recliner, interacting with his cat, Khai.   Chana indicated that during the night, he often is awake all night, does often \"freak out\" with her mom, becomes belligerent and demanding.  Chana states the episode that precipitated this admission was that the pt was having back pain, her mom gave him and oxycodone around 9:30pm, and then a few hours later he wanted another.  When she refused, he became \"very scary\", Chana reported her mother feels burnt out, feels \"afraid of him during his episodes\".    Chana said she understands her mother needs a break, but Chana expects her mother to pick him up when it's time to discharge.  Chana said she also has to work, but wants to hopefully be available at the time of discharge.    "
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
## 2021-08-18 NOTE — PLAN OF CARE
"PRIMARY DIAGNOSIS: \"GENERIC\" NURSING  OUTPATIENT/OBSERVATION GOALS TO BE MET BEFORE DISCHARGE:  ADLs back to baseline: No    Activity and level of assistance: Up with standby assistance.    Pain status: Improved with use of alternative comfort measures i.e.: positioning, topicals.     Return to near baseline physical activity: No     Discharge Planner Nurse   Safe discharge environment identified: No  Barriers to discharge: medically unstable. Confusion, not directable. Needing 1:1 sitter.        Entered by: Zita Lugo 08/18/2021 7:51 AM   Patient woke up around 4 am, confused, wanting to leave room. Reminded that he is in the hospital, attempted to reorient patient but unsuccessful.  Began to get agitated, Patient refused to take po zyprexa, IM given. Pt more calm afterwards. 1:1 sitter present at bedside.   "

## 2021-08-19 LAB
ALBUMIN SERPL-MCNC: 3.5 G/DL (ref 3.5–5)
ALP SERPL-CCNC: 113 U/L (ref 45–120)
ALT SERPL W P-5'-P-CCNC: 27 U/L (ref 0–45)
AMMONIA PLAS-SCNC: 37 UMOL/L (ref 11–35)
ANION GAP SERPL CALCULATED.3IONS-SCNC: 9 MMOL/L (ref 5–18)
AST SERPL W P-5'-P-CCNC: 41 U/L (ref 0–40)
ATRIAL RATE - MUSE: 110 BPM
BILIRUB SERPL-MCNC: 1.8 MG/DL (ref 0–1)
BUN SERPL-MCNC: 26 MG/DL (ref 8–28)
CALCIUM SERPL-MCNC: 9 MG/DL (ref 8.5–10.5)
CHLORIDE BLD-SCNC: 105 MMOL/L (ref 98–107)
CO2 SERPL-SCNC: 30 MMOL/L (ref 22–31)
CREAT SERPL-MCNC: 1.02 MG/DL (ref 0.7–1.3)
DIASTOLIC BLOOD PRESSURE - MUSE: NORMAL MMHG
ERYTHROCYTE [DISTWIDTH] IN BLOOD BY AUTOMATED COUNT: 14.2 % (ref 10–15)
GFR SERPL CREATININE-BSD FRML MDRD: 70 ML/MIN/1.73M2
GLUCOSE BLD-MCNC: 90 MG/DL (ref 70–125)
HCT VFR BLD AUTO: 43.3 % (ref 40–53)
HGB BLD-MCNC: 13.8 G/DL (ref 13.3–17.7)
INTERPRETATION ECG - MUSE: NORMAL
MAGNESIUM SERPL-MCNC: 1.9 MG/DL (ref 1.8–2.6)
MCH RBC QN AUTO: 29.1 PG (ref 26.5–33)
MCHC RBC AUTO-ENTMCNC: 31.9 G/DL (ref 31.5–36.5)
MCV RBC AUTO: 91 FL (ref 78–100)
P AXIS - MUSE: 34 DEGREES
PLATELET # BLD AUTO: 216 10E3/UL (ref 150–450)
POTASSIUM BLD-SCNC: 4.2 MMOL/L (ref 3.5–5)
PR INTERVAL - MUSE: 174 MS
PROT SERPL-MCNC: 6.5 G/DL (ref 6–8)
QRS DURATION - MUSE: 86 MS
QT - MUSE: 376 MS
QTC - MUSE: 496 MS
R AXIS - MUSE: 26 DEGREES
RBC # BLD AUTO: 4.74 10E6/UL (ref 4.4–5.9)
SODIUM SERPL-SCNC: 144 MMOL/L (ref 136–145)
SYSTOLIC BLOOD PRESSURE - MUSE: NORMAL MMHG
T AXIS - MUSE: 15 DEGREES
VENTRICULAR RATE- MUSE: 105 BPM
VIT B12 SERPL-MCNC: 981 PG/ML (ref 213–816)
WBC # BLD AUTO: 11.1 10E3/UL (ref 4–11)

## 2021-08-19 PROCEDURE — 36415 COLL VENOUS BLD VENIPUNCTURE: CPT | Performed by: FAMILY MEDICINE

## 2021-08-19 PROCEDURE — 250N000011 HC RX IP 250 OP 636: Performed by: FAMILY MEDICINE

## 2021-08-19 PROCEDURE — 94762 N-INVAS EAR/PLS OXIMTRY CONT: CPT

## 2021-08-19 PROCEDURE — 250N000013 HC RX MED GY IP 250 OP 250 PS 637: Performed by: NURSE PRACTITIONER

## 2021-08-19 PROCEDURE — 250N000013 HC RX MED GY IP 250 OP 250 PS 637: Performed by: FAMILY MEDICINE

## 2021-08-19 PROCEDURE — 83735 ASSAY OF MAGNESIUM: CPT | Performed by: STUDENT IN AN ORGANIZED HEALTH CARE EDUCATION/TRAINING PROGRAM

## 2021-08-19 PROCEDURE — 250N000013 HC RX MED GY IP 250 OP 250 PS 637: Performed by: INTERNAL MEDICINE

## 2021-08-19 PROCEDURE — 82607 VITAMIN B-12: CPT | Performed by: NURSE PRACTITIONER

## 2021-08-19 PROCEDURE — 82140 ASSAY OF AMMONIA: CPT | Performed by: FAMILY MEDICINE

## 2021-08-19 PROCEDURE — 82040 ASSAY OF SERUM ALBUMIN: CPT | Performed by: FAMILY MEDICINE

## 2021-08-19 PROCEDURE — 120N000001 HC R&B MED SURG/OB

## 2021-08-19 PROCEDURE — 99232 SBSQ HOSP IP/OBS MODERATE 35: CPT | Performed by: NURSE PRACTITIONER

## 2021-08-19 PROCEDURE — 250N000011 HC RX IP 250 OP 636: Performed by: STUDENT IN AN ORGANIZED HEALTH CARE EDUCATION/TRAINING PROGRAM

## 2021-08-19 PROCEDURE — 99233 SBSQ HOSP IP/OBS HIGH 50: CPT | Performed by: FAMILY MEDICINE

## 2021-08-19 PROCEDURE — 85014 HEMATOCRIT: CPT | Performed by: FAMILY MEDICINE

## 2021-08-19 RX ORDER — DIVALPROEX SODIUM 125 MG/1
125 TABLET, DELAYED RELEASE ORAL EVERY 8 HOURS SCHEDULED
Status: DISCONTINUED | OUTPATIENT
Start: 2021-08-19 | End: 2021-08-20

## 2021-08-19 RX ORDER — DIVALPROEX SODIUM 250 MG/1
250 TABLET, DELAYED RELEASE ORAL EVERY 8 HOURS SCHEDULED
Status: DISCONTINUED | OUTPATIENT
Start: 2021-08-19 | End: 2021-08-19

## 2021-08-19 RX ORDER — OLANZAPINE 10 MG/2ML
5 INJECTION, POWDER, FOR SOLUTION INTRAMUSCULAR 3 TIMES DAILY
Status: DISCONTINUED | OUTPATIENT
Start: 2021-08-19 | End: 2021-08-19

## 2021-08-19 RX ORDER — OLANZAPINE 10 MG/2ML
2.5 INJECTION, POWDER, FOR SOLUTION INTRAMUSCULAR 2 TIMES DAILY
Status: DISCONTINUED | OUTPATIENT
Start: 2021-08-19 | End: 2021-08-19

## 2021-08-19 RX ADMIN — DICLOFENAC SODIUM 4 G: 10 GEL TOPICAL at 15:47

## 2021-08-19 RX ADMIN — Medication: at 20:42

## 2021-08-19 RX ADMIN — Medication 50 MCG: at 08:47

## 2021-08-19 RX ADMIN — OLANZAPINE 2.5 MG: 10 INJECTION, POWDER, LYOPHILIZED, FOR SOLUTION INTRAMUSCULAR at 08:47

## 2021-08-19 RX ADMIN — LIDOCAINE: 50 OINTMENT TOPICAL at 08:47

## 2021-08-19 RX ADMIN — FUROSEMIDE 20 MG: 20 TABLET ORAL at 08:47

## 2021-08-19 RX ADMIN — DICLOFENAC SODIUM 4 G: 10 GEL TOPICAL at 08:47

## 2021-08-19 RX ADMIN — DIVALPROEX SODIUM 125 MG: 125 TABLET, DELAYED RELEASE ORAL at 15:47

## 2021-08-19 RX ADMIN — OLANZAPINE 10 MG: 10 INJECTION, POWDER, LYOPHILIZED, FOR SOLUTION INTRAMUSCULAR at 00:25

## 2021-08-19 RX ADMIN — DICLOFENAC SODIUM 4 G: 10 GEL TOPICAL at 12:07

## 2021-08-19 RX ADMIN — ASPIRIN 81 MG: 81 TABLET, CHEWABLE ORAL at 08:46

## 2021-08-19 RX ADMIN — LOSARTAN POTASSIUM 25 MG: 25 TABLET, FILM COATED ORAL at 08:47

## 2021-08-19 RX ADMIN — Medication: at 15:26

## 2021-08-19 RX ADMIN — METOPROLOL SUCCINATE 25 MG: 25 TABLET, EXTENDED RELEASE ORAL at 08:47

## 2021-08-19 RX ADMIN — AMLODIPINE BESYLATE 2.5 MG: 2.5 TABLET ORAL at 08:46

## 2021-08-19 RX ADMIN — DICLOFENAC SODIUM 4 G: 10 GEL TOPICAL at 20:42

## 2021-08-19 RX ADMIN — PANTOPRAZOLE SODIUM 40 MG: 20 TABLET, DELAYED RELEASE ORAL at 06:45

## 2021-08-19 RX ADMIN — LEVOTHYROXINE SODIUM 150 MCG: 0.03 TABLET ORAL at 08:46

## 2021-08-19 RX ADMIN — ENOXAPARIN SODIUM 40 MG: 100 INJECTION SUBCUTANEOUS at 21:14

## 2021-08-19 RX ADMIN — Medication 75 MG: at 21:13

## 2021-08-19 RX ADMIN — DIVALPROEX SODIUM 125 MG: 125 TABLET, DELAYED RELEASE ORAL at 21:14

## 2021-08-19 RX ADMIN — LIDOCAINE: 50 OINTMENT TOPICAL at 15:26

## 2021-08-19 RX ADMIN — Medication: at 08:47

## 2021-08-19 RX ADMIN — LIDOCAINE: 50 OINTMENT TOPICAL at 20:42

## 2021-08-19 RX ADMIN — FUROSEMIDE 20 MG: 20 TABLET ORAL at 15:47

## 2021-08-19 RX ADMIN — LATANOPROST 1 DROP: 50 SOLUTION OPHTHALMIC at 22:03

## 2021-08-19 NOTE — PROGRESS NOTES
Will not see today:  PAIN MANAGEMENT SERVICE CHART CHECK    This patient's chart has been reviewed by the Pain Service. Patient continues to be agitated at times, encephalopathic.  Holding on opioids.  Pain Service with no additional recommendations at this time.  We will continue to follow along and assist with ongoing pain issues as needed.      Thank you!    Rosario KULKARNI, CNS-BC, DNP  Acute Care Pain Management Program  [facility] ([place], [place], [place])   With questions call [phone]  Preference if for [facility] Reji Tolbert  Click HERE to page Gail
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
## 2021-08-19 NOTE — SIGNIFICANT EVENT
Significant Event Note    Time of event: 8:15 AM August 19, 2021    Description of event:  Face-to-face evaluation performed. 4 point violent restraints still required.       Lucille Pires MD

## 2021-08-19 NOTE — PROGRESS NOTES
Care Management Follow Up    Length of Stay (days): 1    Expected Discharge Date: 08/20/2021          Concerns to be Addressed:   Transitioning from Oxycodone to Subaxone: Needs monitoring of neuro status, pain levels and oxymetry. IM Zyprexa. 1:1 sitter for subaxone transition resulting in confusion. Now in 4-point restraints.   Patient plan of care discussed at interdisciplinary rounds: Yes     Anticipated Discharge Disposition:  Home.      Anticipated Discharge Services:  TCU recommended but patient did not want this (his wife had agreed). Consider home nursing, PT and OT (pending mobility).  Anticipated Discharge DME:  Per therapy (if indicated).    Patient/family educated on Medicare website which has current facility and service quality ratings:  Will review options with patient today.  Education Provided on the Discharge Plan:  Per team.  Patient/Family in Agreement with the Plan:  yes    Referrals Placed by CM/GALLO:  To be determined.   Private pay costs discussed: Not applicable at this time.     Additional Information:  Patient is  and largely independent at baseline. He was recently discharged to home and declined to have a home care referral. He also was adamant about not going to TCU. On 8/17/21, writer met with patient at the bedside.  He was still adamant that he will not consider TCU and equally adamant that he does not want home care. He also had declined to let writer speak with his wife or daughter regarding the discharge plan at the time. Writer will continue to follow. Spoke with GALLO CM will review with addiction medicine MD. She will follow up with patient, when he is alert and oriented, regarding the referral to a mental health clinic for continued psychotherapy on an outpatient basis. Therapy is now recommending TCU or long term care but he has not yet cleared mentally. CM will continue to monitor progression of care, review team recommendations and provide discharge planning assist as  indicated.    12:37 PM:  Reviewed case with nurse practitioner for psychiatry. Per her recommendation, care management should hold off on making any referrals to transitional care or long term care until patient clears mentally. Patient will need time to complete a transition from oxycodone to subaxone.  Care management will continue to follow along.     Alia Kennedy RN

## 2021-08-19 NOTE — PROGRESS NOTES
Psychiatric Progress Note  Mild intermittent metabolic encephalopathy likely exacerbated in the context of sleep dysregulation, hospitalization, opioid withdrawal.  Sleep deprivation likely contributing to above.  Anxiety reaction in the setting of medical condition, chronic pain.  Mood disorder due to medical condition, in the setting of acute and chronic pain.  Adjustment disorder with depression and anxiety likely in the context of multiple psychosocial stressors, medical condition.    Recommendations:  Discontinue Rozerem.  Not effective.  Judicious use of medications especially QTC prolonging medications.  Discontinue Olanzapine 5 mg 3 times a day IM.  Prolonged QTC.  Trazodone 50-75 mg at bedtime.  Depakote 125 mg sprinkles 3 times a day.  Monitor LFT.  Monitor ammonia level.  Vitamin B12 lab.       SUBJECTIVE:  Patient was resting this morning.  Arousable.  He knew that he was in hospital.  Staff reported that patient had not been cooperative, agitated, needing restraints, attempting to strike at staff.  He continues to have fluctuating levels of consciousness as well as paranoia.  He continues to become more disoriented and agitated as well as paranoid as the day progresses indicative of sundowning symptoms.  He has difficulty retaining information, often does not understand his current condition nor can articulate the consequences of refusal of care.  I had seen Mr. Rojas in the ER when he first arrived, his mentation was quite different from how he has been at this time.  Hospital induced delirium along with medications likely contributing to his mentation changes..    MENTAL STATUS EXAMINATION:   Not in acute distress.  Speech is slow.  Thought process with increased latency.  Thought content does not show active visual or auditory hallucinations however it cannot be excluded.  Paranoia present.  He is redirectable this morning.  Judgment, insight, memory, attention, comprehension, fund of knowledge  "are grossly depressed.  Patient has no recollection of event precipitating this hospitalization.  He has no recollection of how long he has been in hospital.  Gait and ambulation with him minimal assist.  Psychomotor appropriate this morning.  Patient has had psychomotor agitation and aggression at times.  Affect is neutral mood congruent.  Awake, orientation x1-2.  Patient requires redirection to task and topic at times due to his fidgetiness, distraction.    /76 (BP Location: Left arm)   Pulse (!) 125   Temp 100.4  F (38  C) (Oral)   Resp 14   Ht 1.626 m (5' 4\")   Wt 102.1 kg (225 lb)   SpO2 95%   BMI 38.62 kg/m          "

## 2021-08-19 NOTE — PROGRESS NOTES
ADDICTION MEDICINE BRIEF FOLLOW UP NOTE    Chart reviewed. Patient had one dose of SL buprenorphine. It is possible that this contributed to the new hypoxia but, it is not likely the full picture given he is not opioid naive. Did not appear to be opiate withdrawal on last exam. Continue avoiding narcotics and ensure that pcp is aware of this recommendation.    Addiction medicine will sign off at this time.  If there are any furtherconcerns or questions, please do not hesitate to contact the addiction medicine team through [facility].      Inessa Mercedes MD  Addiction Medicine

## 2021-08-19 NOTE — PLAN OF CARE
Problem: Anxiety  Goal: Anxiety Reduction or Resolution  Outcome: No Change     Problem: Thought Process Alteration  Goal: Optimal Thought Clarity  Outcome: No Change     Problem: Violence Risk or Actual  Goal: Anger and Impulse Control  Outcome: No Change     Problem: Cognitive Impairment  Goal: Optimal Functional [place]  Outcome: No Change     Problem: Risk for Delirium  Goal: Optimal Coping  Outcome: No Change  Goal: Improved Behavioral Control  Outcome: No Change  Goal: Improved Attention and Thought Clarity  Outcome: Improving  Goal: Improved Sleep  Outcome: Improving     Problem: Restraint/Seclusion for Violent Self-Destructive Behavior  Goal: Prevent/manage potential problems during restraint/seclusion  Description: Maintain safety of patient and others during period of restraint/seclusion.  Promote psychological and physical wellbeing.  Prevent injury to skin and involved body parts.  Promote nutrition, hydration, and elimination.  Outcome: No Change  Goal: Prevent future episodes of restraint or seclusion  Description: Identify nonphysical alternatives to restraint or seclusion.  Identify additional de-escalation supportive measures to use as alternatives to restraint or seclusion.  Outcome: No Change  Although demonstrates efforts of appropriate behavior and willingness to follow instructions, including taking HS scheduled medications, continues to struggle with his restraints by kicking and grabbing.  This needs multiple prompts to stop the aggressive actions.    Remains in ST with a rate of 100-110's.  Otherwise VSS.    On supplemental O2 at 6 L NC.  Providing humidity to supplemental O2 and a bedside fan to increase comfort and reduce stress.  Maintaining 1:1 attendant care at this time, with locked restraints.
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
## 2021-08-19 NOTE — PROGRESS NOTES
Patient was transferred form P 3 this afternoon. He is alert and confused and yelling at times. Patient is on lock restrains. Patient lungs are diminished:- he is on oxygen 5 LPM nasal canula. Patient is on 1:1 sitter. Continue to monitor.
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
## 2021-08-19 NOTE — SIGNIFICANT EVENT
Significant Event Note    Time of event: 3:58 PM August 19, 2021    Description of event:  Face-to-face evaluation performed. 4 point violent restraints still required.       Lucille Pires MD

## 2021-08-19 NOTE — PROGRESS NOTES
Bilateral wrist restraints removed at 1615, patient resting comfortably in bed. 1:1 sitter continues. MD updated.  Alia Tobias RN
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
## 2021-08-19 NOTE — PLAN OF CARE
Problem: Violence Risk or Actual  Goal: Anger and Impulse Control  Outcome: No Change    Pt in bed with 4 limb restraints.  Pt very restless and  removing his gown and trying to remove telemetry box.  Keeps asking staff to help him.  Pt is told to  relax and try to sleep.  Offered food and drink.  Pt spit out the water or he would blow into the straw.

## 2021-08-19 NOTE — PLAN OF CARE
Problem: Anxiety  Goal: Anxiety Reduction or Resolution  8/19/2021 0647 by Vipul Garrison RN  Outcome: No Change  8/18/2021 2233 by Vipul Garrison RN  Outcome: No Change  Intervention: Promote Anxiety Reduction  Recent Flowsheet Documentation  Taken 8/19/2021 0430 by Vipul Garrison RN  Complementary Therapy:   aromatherapy utilized   essential oils utilized  Taken 8/19/2021 0415 by Vipul Garrison RN  Complementary Therapy:   aromatherapy utilized   essential oils utilized  Taken 8/19/2021 0400 by Vipul Garrison RN  Complementary Therapy:   aromatherapy utilized   essential oils utilized  Taken 8/19/2021 0000 by Vipul Garrison RN  Complementary Therapy:   aromatherapy utilized   essential oils utilized     Problem: Thought Process Alteration  Goal: Optimal Thought Clarity  8/19/2021 0647 by Vipul Garrison RN  Outcome: No Change  8/18/2021 2233 by Vipul Garrison RN  Outcome: No Change     Problem: Cognitive Impairment  Goal: Optimal Functional [place]  8/19/2021 0647 by Vipul Garrison RN  Outcome: No Change  8/18/2021 2233 by Vipul Garrison RN  Outcome: No Change     Problem: Risk for Delirium  Goal: Optimal Coping  8/19/2021 0647 by Vipul Garrison RN  Outcome: No Change  8/18/2021 2233 by Vipul Garrison RN  Outcome: No Change  Goal: Improved Behavioral Control  8/19/2021 0647 by Vipul Garrison RN  Outcome: No Change  8/18/2021 2233 by Vipul Garrison RN  Outcome: No Change  Intervention: Prevent and Manage Agitation  Recent Flowsheet Documentation  Taken 8/19/2021 0430 by Vipul Garrison RN  Complementary Therapy:   aromatherapy utilized   essential oils utilized  Taken 8/19/2021 0415 by Vipul Garrison RN  Complementary Therapy:   aromatherapy utilized   essential oils utilized  Taken 8/19/2021 0400 by Vipul Garrison RN  Complementary Therapy:   aromatherapy  utilized   essential oils utilized  Taken 8/19/2021 0000 by Vipul Garrison RN  Complementary Therapy:   aromatherapy utilized   essential oils utilized  Goal: Improved Attention and Thought Clarity  8/19/2021 0647 by Vipul Garrison RN  Outcome: No Change  8/18/2021 2233 by Vipul Garrison RN  Outcome: Improving  Goal: Improved Sleep  8/19/2021 0647 by Vipul Garrison RN  Outcome: Improving  8/18/2021 2233 by Vipul Garrison RN  Outcome: Improving     Problem: Risk for Delirium  Goal: Improved Behavioral Control  8/19/2021 0647 by Vipul Garrison RN  Outcome: No Change  8/18/2021 2233 by Vipul Garrison RN  Outcome: No Change  Intervention: Prevent and Manage Agitation  Recent Flowsheet Documentation  Taken 8/19/2021 0430 by Vipul Garrison RN  Complementary Therapy:   aromatherapy utilized   essential oils utilized  Taken 8/19/2021 0415 by Vipul Garrison RN  Complementary Therapy:   aromatherapy utilized   essential oils utilized  Taken 8/19/2021 0400 by Vipul Garrison RN  Complementary Therapy:   aromatherapy utilized   essential oils utilized  Taken 8/19/2021 0000 by Vipul Garrison RN  Complementary Therapy:   aromatherapy utilized   essential oils utilized     Problem: Risk for Delirium  Goal: Improved Behavioral Control  8/19/2021 0647 by Vipul Garrison RN  Outcome: No Change  8/18/2021 2233 by Vipul Garrison RN  Outcome: No Change  Intervention: Prevent and Manage Agitation  Recent Flowsheet Documentation  Taken 8/19/2021 0430 by Vipul Garrison RN  Complementary Therapy:   aromatherapy utilized   essential oils utilized  Taken 8/19/2021 0415 by Vipul Garrison RN  Complementary Therapy:   aromatherapy utilized   essential oils utilized  Taken 8/19/2021 0400 by Vipul Garrison RN  Complementary Therapy:   aromatherapy utilized   essential oils utilized  Taken 8/19/2021 0000 by  "Vipul Garrison RN  Complementary Therapy:   aromatherapy utilized   essential oils utilized     Problem: Risk for Delirium  Goal: Improved Sleep  8/19/2021 0647 by Vipul Garrison RN  Outcome: Improving  8/18/2021 2233 by Vipul Garrison RN  Outcome: Improving     Problem: Risk for Delirium  Goal: Improved Attention and Thought Clarity  8/19/2021 0647 by Vipul Garrison RN  Outcome: No Change  8/18/2021 2233 by Vipul Garrison RN  Outcome: Improving     Problem: Restraint/Seclusion for Violent Self-Destructive Behavior  Goal: Prevent/manage potential problems during restraint/seclusion  Description: Maintain safety of patient and others during period of restraint/seclusion.  Promote psychological and physical wellbeing.  Prevent injury to skin and involved body parts.  Promote nutrition, hydration, and elimination.  8/19/2021 0647 by Vipul Garrison RN  Outcome: No Change  8/18/2021 2233 by Vipul Garrison RN  Outcome: No Change  Goal: Prevent future episodes of restraint or seclusion  Description: Identify nonphysical alternatives to restraint or seclusion.  Identify additional de-escalation supportive measures to use as alternatives to restraint or seclusion.  8/19/2021 0647 by Vipul Garrison RN  Outcome: No Change  8/18/2021 2233 by Vipul Garrison RN  Outcome: No Change  Intermittent kicking and yelling during duration of NOC.  Demonstrated ability to follow instruction on a \"limited basis\" and lower extremity locked restraints taken off.  However, continue to pull and grab at staff and near by objects.  Intermittently makes aggressive movements with his upper extremities.  Thus, upper locked restraints maintained.  See note regarding restrain removal and IV Zyprexa admin for more information.  Mildly hypertensive, see flowsheet.  Good UOP, (see I/O).  Has male external urethral catheter in place with diaper.  Continue with 1:1 attendant " care due to unsafe and unpredictable behavior/actions.

## 2021-08-19 NOTE — SIGNIFICANT EVENT
Significant Event Note    Time of event: 11:59 AM August 19, 2021    Description of event:  Face-to-face evaluation performed. 4 point violent restraints still required.       Lucille Pires MD

## 2021-08-19 NOTE — PROGRESS NOTES
"Pt removed his male external catheter.  He then urinated on the bed, prompting a change of bed linen.  The pt agreed to cooperate with bed changing by not resisting nursing staff if one arm restraint would be removed.  He did not put up resistance.  Because of this good show of cooperation, the X2 lower locked restraints were removed.    About 5 minutes later, the pt began kicking his leg on the side of the bed, yelling to get him \"out of here.\"  Despite repeated instructions to stop, the pt refused to cooperate.  A 10 mg IV Zyprexa was then administered.  Approximately 10 minutes post med admin, the pt was asleep.  The leg restraints remained off.      "

## 2021-08-20 LAB
ALBUMIN SERPL-MCNC: 3.3 G/DL (ref 3.5–5)
ALP SERPL-CCNC: 110 U/L (ref 45–120)
ALT SERPL W P-5'-P-CCNC: 25 U/L (ref 0–45)
AMMONIA PLAS-SCNC: 27 UMOL/L (ref 11–35)
ANION GAP SERPL CALCULATED.3IONS-SCNC: 10 MMOL/L (ref 5–18)
AST SERPL W P-5'-P-CCNC: 47 U/L (ref 0–40)
BILIRUB SERPL-MCNC: 1.9 MG/DL (ref 0–1)
BUN SERPL-MCNC: 21 MG/DL (ref 8–28)
CALCIUM SERPL-MCNC: 8.9 MG/DL (ref 8.5–10.5)
CHLORIDE BLD-SCNC: 102 MMOL/L (ref 98–107)
CO2 SERPL-SCNC: 32 MMOL/L (ref 22–31)
CREAT SERPL-MCNC: 0.96 MG/DL (ref 0.7–1.3)
ERYTHROCYTE [DISTWIDTH] IN BLOOD BY AUTOMATED COUNT: 13.9 % (ref 10–15)
GFR SERPL CREATININE-BSD FRML MDRD: 75 ML/MIN/1.73M2
GLUCOSE BLD-MCNC: 86 MG/DL (ref 70–125)
HCT VFR BLD AUTO: 46.4 % (ref 40–53)
HGB BLD-MCNC: 14.6 G/DL (ref 13.3–17.7)
MCH RBC QN AUTO: 28.7 PG (ref 26.5–33)
MCHC RBC AUTO-ENTMCNC: 31.5 G/DL (ref 31.5–36.5)
MCV RBC AUTO: 91 FL (ref 78–100)
PLATELET # BLD AUTO: 225 10E3/UL (ref 150–450)
POTASSIUM BLD-SCNC: 4.2 MMOL/L (ref 3.5–5)
PROT SERPL-MCNC: 6.5 G/DL (ref 6–8)
RBC # BLD AUTO: 5.08 10E6/UL (ref 4.4–5.9)
SODIUM SERPL-SCNC: 144 MMOL/L (ref 136–145)
WBC # BLD AUTO: 9.7 10E3/UL (ref 4–11)

## 2021-08-20 PROCEDURE — 250N000013 HC RX MED GY IP 250 OP 250 PS 637: Performed by: NURSE PRACTITIONER

## 2021-08-20 PROCEDURE — 250N000011 HC RX IP 250 OP 636: Performed by: FAMILY MEDICINE

## 2021-08-20 PROCEDURE — 120N000001 HC R&B MED SURG/OB

## 2021-08-20 PROCEDURE — 80053 COMPREHEN METABOLIC PANEL: CPT | Performed by: FAMILY MEDICINE

## 2021-08-20 PROCEDURE — 36415 COLL VENOUS BLD VENIPUNCTURE: CPT | Performed by: FAMILY MEDICINE

## 2021-08-20 PROCEDURE — 99232 SBSQ HOSP IP/OBS MODERATE 35: CPT | Performed by: NURSE PRACTITIONER

## 2021-08-20 PROCEDURE — 250N000013 HC RX MED GY IP 250 OP 250 PS 637: Performed by: FAMILY MEDICINE

## 2021-08-20 PROCEDURE — 85014 HEMATOCRIT: CPT | Performed by: FAMILY MEDICINE

## 2021-08-20 PROCEDURE — 250N000013 HC RX MED GY IP 250 OP 250 PS 637: Performed by: INTERNAL MEDICINE

## 2021-08-20 PROCEDURE — 999N000157 HC STATISTIC RCP TIME EA 10 MIN

## 2021-08-20 PROCEDURE — 95801 SLP STDY UNATND W/ANAL: CPT

## 2021-08-20 PROCEDURE — 250N000013 HC RX MED GY IP 250 OP 250 PS 637: Performed by: STUDENT IN AN ORGANIZED HEALTH CARE EDUCATION/TRAINING PROGRAM

## 2021-08-20 PROCEDURE — 82140 ASSAY OF AMMONIA: CPT | Performed by: FAMILY MEDICINE

## 2021-08-20 PROCEDURE — 99233 SBSQ HOSP IP/OBS HIGH 50: CPT | Performed by: FAMILY MEDICINE

## 2021-08-20 RX ADMIN — AMLODIPINE BESYLATE 2.5 MG: 2.5 TABLET ORAL at 09:45

## 2021-08-20 RX ADMIN — LIDOCAINE: 50 OINTMENT TOPICAL at 20:21

## 2021-08-20 RX ADMIN — Medication 75 MG: at 21:11

## 2021-08-20 RX ADMIN — LATANOPROST 1 DROP: 50 SOLUTION OPHTHALMIC at 21:11

## 2021-08-20 RX ADMIN — LIDOCAINE: 50 OINTMENT TOPICAL at 09:51

## 2021-08-20 RX ADMIN — METOPROLOL SUCCINATE 25 MG: 25 TABLET, EXTENDED RELEASE ORAL at 09:45

## 2021-08-20 RX ADMIN — Medication 50 MCG: at 09:45

## 2021-08-20 RX ADMIN — ENOXAPARIN SODIUM 40 MG: 100 INJECTION SUBCUTANEOUS at 20:20

## 2021-08-20 RX ADMIN — ASPIRIN 81 MG: 81 TABLET, CHEWABLE ORAL at 09:46

## 2021-08-20 RX ADMIN — DICLOFENAC SODIUM 4 G: 10 GEL TOPICAL at 09:47

## 2021-08-20 RX ADMIN — ACETAMINOPHEN 975 MG: 325 TABLET ORAL at 18:37

## 2021-08-20 RX ADMIN — DICLOFENAC SODIUM 4 G: 10 GEL TOPICAL at 20:18

## 2021-08-20 RX ADMIN — Medication: at 09:51

## 2021-08-20 RX ADMIN — Medication: at 20:19

## 2021-08-20 RX ADMIN — LEVOTHYROXINE SODIUM 150 MCG: 0.03 TABLET ORAL at 09:46

## 2021-08-20 RX ADMIN — OLANZAPINE 10 MG: 5 TABLET, ORALLY DISINTEGRATING ORAL at 02:21

## 2021-08-20 RX ADMIN — LOSARTAN POTASSIUM 25 MG: 25 TABLET, FILM COATED ORAL at 09:46

## 2021-08-20 RX ADMIN — FUROSEMIDE 20 MG: 20 TABLET ORAL at 09:45

## 2021-08-20 RX ADMIN — DICLOFENAC SODIUM 4 G: 10 GEL TOPICAL at 16:26

## 2021-08-20 RX ADMIN — LIDOCAINE: 50 OINTMENT TOPICAL at 14:03

## 2021-08-20 RX ADMIN — OLANZAPINE 10 MG: 5 TABLET, ORALLY DISINTEGRATING ORAL at 09:47

## 2021-08-20 RX ADMIN — PANTOPRAZOLE SODIUM 40 MG: 20 TABLET, DELAYED RELEASE ORAL at 09:44

## 2021-08-20 RX ADMIN — ROSUVASTATIN CALCIUM 10 MG: 10 TABLET, FILM COATED ORAL at 21:11

## 2021-08-20 RX ADMIN — FUROSEMIDE 20 MG: 20 TABLET ORAL at 16:28

## 2021-08-20 RX ADMIN — Medication: at 14:03

## 2021-08-20 RX ADMIN — DICLOFENAC SODIUM 4 G: 10 GEL TOPICAL at 11:41

## 2021-08-20 ASSESSMENT — MIFFLIN-ST. JEOR: SCORE: 1601.23

## 2021-08-20 NOTE — PLAN OF CARE
Patient is drowsy and sometimes fidgety in bed, occasionally shifts his weight in bed and at one point removed his gown. Bed alarm on for safety. On sleep study tonight. On 5 L supplemental Oxygen, O2 sats 94%. Lung sounds diminished. No complaints of pain so far this shift.    Problem: Pain Chronic (Persistent)  Goal: Acceptable Pain Control and Functional Ability  Outcome: Improving     Problem: Cognitive Impairment  Goal: Optimal Functional [place]  Outcome: Improving     Problem: Restraint/Seclusion for Violent Self-Destructive Behavior  Goal: Prevent future episodes of restraint or seclusion  Description: Identify nonphysical alternatives to restraint or seclusion.  Identify additional de-escalation supportive measures to use as alternatives to restraint or seclusion.  Outcome: Improving     Problem: Adult Inpatient Plan of Care  Goal: Absence of Hospital-Acquired Illness or Injury  Intervention: Identify and Manage Fall Risk  Recent Flowsheet Documentation  Taken 8/19/2021 8250 by Maldonado Krueger, RN  Safety Promotion/Fall Prevention:   activity supervised   bed alarm on

## 2021-08-20 NOTE — PROGRESS NOTES
Patient set up on overnight pulse oximetry study on 5L NC per order for TERENCE screening. Sats mid 90s. Will continue to monitor.    Miriam Hwang, RT

## 2021-08-20 NOTE — PROGRESS NOTES
Will not see today:  PAIN MANAGEMENT SERVICE CHART CHECK    This patient's chart has been reviewed by the Pain Service. Patient continues to be on 1:1 due to encephalopathy.  He has minimal complaints of pain.  Pain Service with no additional recommendations at this time.  We will continue to follow chart peripherally and see patient as needed.      Thank you!    Rosario KULKARNI, CNS-BC, DNP  Acute Care Pain Management Program  [facility] ([place], [place], [place])   With questions call [phone]  Preference if for [facility] Reji Tolbert  Click HERE to page [place]

## 2021-08-20 NOTE — PLAN OF CARE
Problem: Adult Inpatient Plan of Care  Goal: Plan of Care Review  Outcome: Improving  Flowsheets (Taken 8/20/2021 1040)  Plan of Care Reviewed With: patient  Goal: Patient-Specific Goal (Individualized)  Outcome: Improving  Goal: Absence of Hospital-Acquired Illness or Injury  Outcome: Improving  Intervention: Identify and Manage Fall Risk  Recent Flowsheet Documentation  Taken 8/20/2021 0800 by Parish Mcdowell RN  Safety Promotion/Fall Prevention:    activity supervised    bed alarm on    chair alarm on  Goal: Optimal Comfort and Wellbeing  Outcome: Improving  Goal: Readiness for Transition of Care  Outcome: Improving     Problem: Anxiety  Goal: Anxiety Reduction or Resolution  Outcome: Improving     Problem: Thought Process Alteration  Goal: Optimal Thought Clarity  Outcome: Improving     Problem: Violence Risk or Actual  Goal: Anger and Impulse Control  Outcome: Improving     Problem: Pain Chronic (Persistent)  Goal: Acceptable Pain Control and Functional Ability  Outcome: Improving     Problem: Cognitive Impairment  Goal: Optimal Functional [place]  Outcome: Improving     Problem: Risk for Delirium  Goal: Optimal Coping  Outcome: Improving  Goal: Improved Behavioral Control  Outcome: Improving  Goal: Improved Attention and Thought Clarity  Outcome: Improving  Goal: Improved Sleep  Outcome: Improving     Problem: Restraint/Seclusion for Violent Self-Destructive Behavior  Goal: Prevent/manage potential problems during restraint/seclusion  Description: Maintain safety of patient and others during period of restraint/seclusion.  Promote psychological and physical wellbeing.  Prevent injury to skin and involved body parts.  Promote nutrition, hydration, and elimination.  Outcome: Improving  Goal: Prevent future episodes of restraint or seclusion  Description: Identify nonphysical alternatives to restraint or seclusion.  Identify additional de-escalation supportive measures to use as alternatives to restraint  or seclusion.  Outcome: Improving     Problem: Seclusion/Restraint, Behavioral  Goal: Seclusion/Behavioral Restraint Goal: Discontinuation Criteria Achieved  Outcome: Improving   pt's v/s stable.pt denies pain.pt is alert to self. Pt was agitated and pulling at the tubes and heart monitor this morning. Pt was trying to get out of the bed. Md was notified. Prn med given for anxiety. patient was put on 1:1 sitter at around 1030 am per Md's order.continue to monitor pt.
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
## 2021-08-20 NOTE — PROGRESS NOTES
"Psychiatric Progress Note  Mild intermittent metabolic encephalopathy likely exacerbated in the context of sleep dysregulation, hospitalization, opioid withdrawal.  Sleep deprivation likely contributing to above.  Anxiety reaction in the setting of medical condition, chronic pain.  Mood disorder due to medical condition, in the setting of acute and chronic pain.  Adjustment disorder with depression and anxiety likely in the context of multiple psychosocial stressors, medical condition.    Recommendations:  Discontinue Rozerem.  Not effective.  Judicious use of medications especially QTC prolonging medications.  Discontinue Olanzapine 5 mg 3 times a day IM.  Prolonged QTC.  Trazodone 50-75 mg at bedtime.  Discontinue Depakote 125 mg sprinkles 3 times a day.  Monitor LFT.  Monitor ammonia level.  Vitamin B12  981  Ammonia 37       SUBJECTIVE:  Patient was sleeping, arousable, open his eyes, said hi and went back to sleep.  No evidence of psychosis or hallucinations.  Staff reported of no behavioral agitation or behavioral dysregulation since transferring from P 3 to P1.  He is not a one-to-one supervision right now.  Labs reviewed.  Ammonia is slightly elevated, will discontinue Depakote.  MENTAL STATUS EXAMINATION:   Resting comfortably, arousable.  Orientation x1.  Speech is slow.  Thought process with increased latency.  Thought content does not show active visual or auditory hallucinations however it cannot be excluded.  No paranoia paranoia present.  He is redirectable this morning.  Judgment, insight, memory, attention, comprehension, fund of knowledge are grossly depressed.   Gait and ambulation with assist.    Affect is neutral mood congruent.   Patient requires redirection to task and topic at times due to his fidgetiness, distraction.  /85 (BP Location: Left arm)   Pulse 93   Temp 98.3  F (36.8  C) (Oral)   Resp 20   Ht 1.626 m (5' 4\")   Wt 102.1 kg (225 lb)   SpO2 93%   BMI 38.62 kg/m          "

## 2021-08-20 NOTE — PLAN OF CARE
Problem: Adult Inpatient Plan of Care  Goal: Absence of Hospital-Acquired Illness or Injury  Outcome: Improving  Intervention: Identify and Manage Fall Risk  Recent Flowsheet Documentation  Taken 8/19/2021 2000 by Paczkowski, Alia J, RN  Safety Promotion/Fall Prevention:   activity supervised   bedside attendant  Taken 8/19/2021 1600 by Paczkowski, Alia J, RN  Safety Promotion/Fall Prevention:   activity supervised   bedside attendant     Problem: Anxiety  Goal: Anxiety Reduction or Resolution  Outcome: Improving     Problem: Violence Risk or Actual  Goal: Anger and Impulse Control  Outcome: Improving     Problem: Seclusion/Restraint, Behavioral  Goal: Seclusion/Behavioral Restraint Goal: Discontinuation Criteria Achieved  Outcome: Improving  Intervention: Protect Dignity, Rights and Personal Wellbeing  Recent Flowsheet Documentation  Taken 8/19/2021 2000 by Alia Tobias RN  Trust Relationship/Rapport: care explained  Taken 8/19/2021 1600 by Alia Tobias RN  Trust Relationship/Rapport: care explained   Patient removed from restraints at 1615 today and 1:1 removed from room at 1900 tonight and patient sleeping and resting comfortably. Patient does thrash in bed to change position, however pleasant and calm when awoken. Patient tolerating diet, denies pain. IV-SL. VSS. Oximetry study in progress tonight and patient on 02 at 5L. Daughter Chana called and updated.    Alia Tobias RN

## 2021-08-21 ENCOUNTER — APPOINTMENT (OUTPATIENT)
Dept: ULTRASOUND IMAGING | Facility: HOSPITAL | Age: 81
DRG: 896 | End: 2021-08-21
Attending: FAMILY MEDICINE
Payer: COMMERCIAL

## 2021-08-21 LAB
ALBUMIN SERPL-MCNC: 3.1 G/DL (ref 3.5–5)
ALP SERPL-CCNC: 105 U/L (ref 45–120)
ALT SERPL W P-5'-P-CCNC: 26 U/L (ref 0–45)
AMMONIA PLAS-SCNC: 39 UMOL/L (ref 11–35)
ANION GAP SERPL CALCULATED.3IONS-SCNC: 10 MMOL/L (ref 5–18)
AST SERPL W P-5'-P-CCNC: 39 U/L (ref 0–40)
BILIRUB SERPL-MCNC: 1.6 MG/DL (ref 0–1)
BUN SERPL-MCNC: 28 MG/DL (ref 8–28)
CALCIUM SERPL-MCNC: 9.2 MG/DL (ref 8.5–10.5)
CHLORIDE BLD-SCNC: 103 MMOL/L (ref 98–107)
CO2 SERPL-SCNC: 28 MMOL/L (ref 22–31)
CREAT SERPL-MCNC: 1.17 MG/DL (ref 0.7–1.3)
ERYTHROCYTE [DISTWIDTH] IN BLOOD BY AUTOMATED COUNT: 14 % (ref 10–15)
GFR SERPL CREATININE-BSD FRML MDRD: 59 ML/MIN/1.73M2
GLUCOSE BLD-MCNC: 163 MG/DL (ref 70–125)
HCT VFR BLD AUTO: 45.2 % (ref 40–53)
HGB BLD-MCNC: 14.3 G/DL (ref 13.3–17.7)
MCH RBC QN AUTO: 28.6 PG (ref 26.5–33)
MCHC RBC AUTO-ENTMCNC: 31.6 G/DL (ref 31.5–36.5)
MCV RBC AUTO: 90 FL (ref 78–100)
PLATELET # BLD AUTO: 231 10E3/UL (ref 150–450)
POTASSIUM BLD-SCNC: 4.2 MMOL/L (ref 3.5–5)
PROT SERPL-MCNC: 6.4 G/DL (ref 6–8)
RBC # BLD AUTO: 5 10E6/UL (ref 4.4–5.9)
SARS-COV-2 RNA RESP QL NAA+PROBE: NEGATIVE
SODIUM SERPL-SCNC: 141 MMOL/L (ref 136–145)
WBC # BLD AUTO: 9.3 10E3/UL (ref 4–11)

## 2021-08-21 PROCEDURE — 85027 COMPLETE CBC AUTOMATED: CPT | Performed by: FAMILY MEDICINE

## 2021-08-21 PROCEDURE — 76882 US LMTD JT/FCL EVL NVASC XTR: CPT | Mod: RT

## 2021-08-21 PROCEDURE — 80053 COMPREHEN METABOLIC PANEL: CPT | Performed by: FAMILY MEDICINE

## 2021-08-21 PROCEDURE — 120N000001 HC R&B MED SURG/OB

## 2021-08-21 PROCEDURE — 99233 SBSQ HOSP IP/OBS HIGH 50: CPT | Performed by: FAMILY MEDICINE

## 2021-08-21 PROCEDURE — 36415 COLL VENOUS BLD VENIPUNCTURE: CPT | Performed by: FAMILY MEDICINE

## 2021-08-21 PROCEDURE — 250N000013 HC RX MED GY IP 250 OP 250 PS 637: Performed by: INTERNAL MEDICINE

## 2021-08-21 PROCEDURE — 250N000013 HC RX MED GY IP 250 OP 250 PS 637: Performed by: NURSE PRACTITIONER

## 2021-08-21 PROCEDURE — 250N000013 HC RX MED GY IP 250 OP 250 PS 637: Performed by: FAMILY MEDICINE

## 2021-08-21 PROCEDURE — 82140 ASSAY OF AMMONIA: CPT | Performed by: FAMILY MEDICINE

## 2021-08-21 PROCEDURE — 250N000011 HC RX IP 250 OP 636: Performed by: FAMILY MEDICINE

## 2021-08-21 PROCEDURE — 87635 SARS-COV-2 COVID-19 AMP PRB: CPT | Performed by: FAMILY MEDICINE

## 2021-08-21 RX ORDER — HYDROCORTISONE VALERATE CREAM 2 MG/G
CREAM TOPICAL 2 TIMES DAILY
Status: DISCONTINUED | OUTPATIENT
Start: 2021-08-21 | End: 2021-08-21 | Stop reason: CLARIF

## 2021-08-21 RX ORDER — TRIAMCINOLONE ACETONIDE 1 MG/G
CREAM TOPICAL 2 TIMES DAILY
Status: DISCONTINUED | OUTPATIENT
Start: 2021-08-21 | End: 2021-08-23 | Stop reason: HOSPADM

## 2021-08-21 RX ADMIN — LIDOCAINE: 50 OINTMENT TOPICAL at 20:35

## 2021-08-21 RX ADMIN — DICLOFENAC SODIUM 4 G: 10 GEL TOPICAL at 11:33

## 2021-08-21 RX ADMIN — AMLODIPINE BESYLATE 2.5 MG: 2.5 TABLET ORAL at 08:00

## 2021-08-21 RX ADMIN — LIDOCAINE: 50 OINTMENT TOPICAL at 07:54

## 2021-08-21 RX ADMIN — ROSUVASTATIN CALCIUM 10 MG: 10 TABLET, FILM COATED ORAL at 20:34

## 2021-08-21 RX ADMIN — LOSARTAN POTASSIUM 25 MG: 25 TABLET, FILM COATED ORAL at 07:55

## 2021-08-21 RX ADMIN — FUROSEMIDE 20 MG: 20 TABLET ORAL at 16:23

## 2021-08-21 RX ADMIN — DICLOFENAC SODIUM 4 G: 10 GEL TOPICAL at 07:48

## 2021-08-21 RX ADMIN — LEVOTHYROXINE SODIUM 150 MCG: 0.03 TABLET ORAL at 07:55

## 2021-08-21 RX ADMIN — Medication 50 MCG: at 07:55

## 2021-08-21 RX ADMIN — ENOXAPARIN SODIUM 40 MG: 100 INJECTION SUBCUTANEOUS at 20:34

## 2021-08-21 RX ADMIN — PANTOPRAZOLE SODIUM 40 MG: 20 TABLET, DELAYED RELEASE ORAL at 06:51

## 2021-08-21 RX ADMIN — Medication: at 13:20

## 2021-08-21 RX ADMIN — FUROSEMIDE 20 MG: 20 TABLET ORAL at 07:54

## 2021-08-21 RX ADMIN — Medication: at 20:37

## 2021-08-21 RX ADMIN — TRIAMCINOLONE ACETONIDE: 1 CREAM TOPICAL at 20:35

## 2021-08-21 RX ADMIN — DICLOFENAC SODIUM 4 G: 10 GEL TOPICAL at 20:35

## 2021-08-21 RX ADMIN — Medication 75 MG: at 20:33

## 2021-08-21 RX ADMIN — LIDOCAINE: 50 OINTMENT TOPICAL at 13:20

## 2021-08-21 RX ADMIN — METOPROLOL SUCCINATE 25 MG: 25 TABLET, EXTENDED RELEASE ORAL at 07:55

## 2021-08-21 RX ADMIN — ASPIRIN 81 MG: 81 TABLET, CHEWABLE ORAL at 07:54

## 2021-08-21 RX ADMIN — Medication: at 07:48

## 2021-08-21 RX ADMIN — DICLOFENAC SODIUM 4 G: 10 GEL TOPICAL at 16:23

## 2021-08-21 ASSESSMENT — MIFFLIN-ST. JEOR: SCORE: 1590.35
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
## 2021-08-21 NOTE — PLAN OF CARE
Problem: Adult Inpatient Plan of Care  Goal: Plan of Care Review  Outcome: Improving  Pt remains on 1:1 for safety.     Calm, cooperative, redirectable. Alert, Disoriented to situation, orientation fluctuates but overall carrying on normal conversation and asking appropriate questions. He occasionally asks repetative questions and seems anxious regarding plan going forward. He talks a lot about his wife, and cat. He would really like a visit from his wife.     Up with assist of 1 and cane. Gait slightly unsteady.     VSS    Ate 100% of breakfast: Thai toast, breakfast potatoes and banana.     Voiding in urinal and bathroom. Had BM this AM.    Remains on 2L O2 with frequent loose cough. Lungs diminished with crackles at bases. Sats only 82% upon return from bathroom to bed, remained on O2 during this period. Was able to quickly return to 92%. At rest sats 92-94%.     Covid swab sent, negative.

## 2021-08-21 NOTE — PLAN OF CARE
Problem: Adult Inpatient Plan of Care  Goal: Plan of Care Review  Outcome: Improving   Pt continues on 1:1 for safety.  No restraints needed this shift. Pt is redirectable and asking appropriate questions.  He slept most of evening shift but is easy to arouse.  Zyprexa PRN was also not needed.    Currently at 2 L NC.     Wife and daughter both called and updated.

## 2021-08-21 NOTE — PLAN OF CARE
Problem: Thought Process Alteration  Goal: Optimal Thought Clarity  Outcome: No Change     Problem: Cognitive Impairment  Goal: Optimal Functional [place]  Outcome: No Change     Problem: Anxiety  Goal: Anxiety Reduction or Resolution  Outcome: Improving     Problem: Violence Risk or Actual  Goal: Anger and Impulse Control  Outcome: Improving     Problem: Pain Chronic (Persistent)  Goal: Acceptable Pain Control and Functional Ability  Outcome: Improving     Problem: Risk for Delirium  Goal: Improved Behavioral Control  Outcome: Improving  Goal: Improved Attention and Thought Clarity  Outcome: Improving  Goal: Improved Sleep  Outcome: Improving     Problem: Restraint/Seclusion for Violent Self-Destructive Behavior  Goal: Prevent/manage potential problems during restraint/seclusion  Description: Maintain safety of patient and others during period of restraint/seclusion.  Promote psychological and physical wellbeing.  Prevent injury to skin and involved body parts.  Promote nutrition, hydration, and elimination.  Outcome: Improving  Goal: Prevent future episodes of restraint or seclusion  Description: Identify nonphysical alternatives to restraint or seclusion.  Identify additional de-escalation supportive measures to use as alternatives to restraint or seclusion.  Outcome: Improving     Problem: Seclusion/Restraint, Behavioral  Goal: Seclusion/Behavioral Restraint Goal: Discontinuation Criteria Achieved  Outcome: Improving     Vitally stable, remains on 2L O2 via nasal cannula, crackles RLL, wet cough, unable to bring up phlegm.  Oriented to self only, continues on 1:1 for safety.  Denies pain.  Frequent orientation of situation needed.

## 2021-08-21 NOTE — PLAN OF CARE
Problem: Adult Inpatient Plan of Care  Goal: Plan of Care Review  8/21/2021 1611 by Tasha Chand RN  Outcome: Improving  Flowsheets  Taken 8/21/2021 1611 by Tasha Chand RN  Plan of Care Reviewed With: patient  Taken 8/16/2021 1027 by Lisa Shea RN  Progress: improving   Pt less confused today. Orientation and confusion still fluctuates but much improved. Bed alarm on and call light in reach. Remains off 1:1 since 1330.    Up with SBA and cane to bathroom.    Remains on 2L O2 sats 91-94% Lungs diminished with crackles in bases. Frequent loose cough. Sats down to mid/upper 80s after activity with O2 on as well as at rest on RA.     Right arm very bruised/red/edematous, has increased since this AM. MD aware and ordered ultrasound.

## 2021-08-21 NOTE — PLAN OF CARE
After multiple requests to see his family, I helped him to call his wife who did not answer. We then called his daughter Chana who stated her mom was at work. Pt was very tearful after conversation and so thankful that he was able to speak with his daughter.

## 2021-08-21 NOTE — PROGRESS NOTES
Will not see today:  PAIN MANAGEMENT SERVICE CHART CHECK  This patient's chart has been reviewed by the Pain Service. It has been determined that no change is necessary to the pain management regimen at this time. The chart will be reviewed regularly and the patient will be seen if necessary. If you would like the patient to be seen, please contact the service at [phone] and ask to have the patient seen.    Thank you!   Suki Luna, PharmD  [facility] or call [phone] if needed.

## 2021-08-21 NOTE — PLAN OF CARE
Came off of 1:1 at 1330 per MD request. Bed alarm set, call light next to pt. Explained to pt that he needs to call for help if he wants to get out of bed.

## 2021-08-22 ENCOUNTER — APPOINTMENT (OUTPATIENT)
Dept: OCCUPATIONAL THERAPY | Facility: HOSPITAL | Age: 81
DRG: 896 | End: 2021-08-22
Payer: COMMERCIAL

## 2021-08-22 ENCOUNTER — APPOINTMENT (OUTPATIENT)
Dept: ULTRASOUND IMAGING | Facility: HOSPITAL | Age: 81
DRG: 896 | End: 2021-08-22
Attending: FAMILY MEDICINE
Payer: COMMERCIAL

## 2021-08-22 ENCOUNTER — APPOINTMENT (OUTPATIENT)
Dept: PHYSICAL THERAPY | Facility: HOSPITAL | Age: 81
DRG: 896 | End: 2021-08-22
Payer: COMMERCIAL

## 2021-08-22 LAB
ALBUMIN SERPL-MCNC: 3.3 G/DL (ref 3.5–5)
ALP SERPL-CCNC: 113 U/L (ref 45–120)
ALT SERPL W P-5'-P-CCNC: 26 U/L (ref 0–45)
AMMONIA PLAS-SCNC: 41 UMOL/L (ref 11–35)
ANION GAP SERPL CALCULATED.3IONS-SCNC: 9 MMOL/L (ref 5–18)
AST SERPL W P-5'-P-CCNC: 39 U/L (ref 0–40)
BILIRUB SERPL-MCNC: 1.8 MG/DL (ref 0–1)
BUN SERPL-MCNC: 25 MG/DL (ref 8–28)
CALCIUM SERPL-MCNC: 9.1 MG/DL (ref 8.5–10.5)
CHLORIDE BLD-SCNC: 102 MMOL/L (ref 98–107)
CO2 SERPL-SCNC: 30 MMOL/L (ref 22–31)
CREAT SERPL-MCNC: 1.02 MG/DL (ref 0.7–1.3)
ERYTHROCYTE [DISTWIDTH] IN BLOOD BY AUTOMATED COUNT: 14 % (ref 10–15)
GFR SERPL CREATININE-BSD FRML MDRD: 70 ML/MIN/1.73M2
GLUCOSE BLD-MCNC: 122 MG/DL (ref 70–125)
HCT VFR BLD AUTO: 47.2 % (ref 40–53)
HGB BLD-MCNC: 14.9 G/DL (ref 13.3–17.7)
MCH RBC QN AUTO: 28.6 PG (ref 26.5–33)
MCHC RBC AUTO-ENTMCNC: 31.6 G/DL (ref 31.5–36.5)
MCV RBC AUTO: 91 FL (ref 78–100)
PLATELET # BLD AUTO: 245 10E3/UL (ref 150–450)
POTASSIUM BLD-SCNC: 3.7 MMOL/L (ref 3.5–5)
PROT SERPL-MCNC: 6.7 G/DL (ref 6–8)
RBC # BLD AUTO: 5.21 10E6/UL (ref 4.4–5.9)
SODIUM SERPL-SCNC: 141 MMOL/L (ref 136–145)
TROPONIN I SERPL-MCNC: 0.02 NG/ML (ref 0–0.29)
WBC # BLD AUTO: 10.6 10E3/UL (ref 4–11)

## 2021-08-22 PROCEDURE — 93010 ELECTROCARDIOGRAM REPORT: CPT | Performed by: INTERNAL MEDICINE

## 2021-08-22 PROCEDURE — 97116 GAIT TRAINING THERAPY: CPT | Mod: GP

## 2021-08-22 PROCEDURE — 82040 ASSAY OF SERUM ALBUMIN: CPT | Performed by: FAMILY MEDICINE

## 2021-08-22 PROCEDURE — 99233 SBSQ HOSP IP/OBS HIGH 50: CPT | Performed by: FAMILY MEDICINE

## 2021-08-22 PROCEDURE — 97110 THERAPEUTIC EXERCISES: CPT | Mod: GP

## 2021-08-22 PROCEDURE — 97535 SELF CARE MNGMENT TRAINING: CPT | Mod: GO

## 2021-08-22 PROCEDURE — 93971 EXTREMITY STUDY: CPT | Mod: RT

## 2021-08-22 PROCEDURE — 85027 COMPLETE CBC AUTOMATED: CPT | Performed by: FAMILY MEDICINE

## 2021-08-22 PROCEDURE — 250N000013 HC RX MED GY IP 250 OP 250 PS 637: Performed by: NURSE PRACTITIONER

## 2021-08-22 PROCEDURE — 36415 COLL VENOUS BLD VENIPUNCTURE: CPT | Performed by: FAMILY MEDICINE

## 2021-08-22 PROCEDURE — 250N000013 HC RX MED GY IP 250 OP 250 PS 637: Performed by: INTERNAL MEDICINE

## 2021-08-22 PROCEDURE — 250N000013 HC RX MED GY IP 250 OP 250 PS 637: Performed by: STUDENT IN AN ORGANIZED HEALTH CARE EDUCATION/TRAINING PROGRAM

## 2021-08-22 PROCEDURE — 82140 ASSAY OF AMMONIA: CPT | Performed by: FAMILY MEDICINE

## 2021-08-22 PROCEDURE — 93005 ELECTROCARDIOGRAM TRACING: CPT

## 2021-08-22 PROCEDURE — 250N000011 HC RX IP 250 OP 636: Performed by: FAMILY MEDICINE

## 2021-08-22 PROCEDURE — 84484 ASSAY OF TROPONIN QUANT: CPT | Performed by: FAMILY MEDICINE

## 2021-08-22 PROCEDURE — 82247 BILIRUBIN TOTAL: CPT | Performed by: FAMILY MEDICINE

## 2021-08-22 PROCEDURE — 999N000157 HC STATISTIC RCP TIME EA 10 MIN

## 2021-08-22 PROCEDURE — 120N000001 HC R&B MED SURG/OB

## 2021-08-22 PROCEDURE — 250N000013 HC RX MED GY IP 250 OP 250 PS 637: Performed by: FAMILY MEDICINE

## 2021-08-22 RX ADMIN — METOPROLOL SUCCINATE 25 MG: 25 TABLET, EXTENDED RELEASE ORAL at 08:16

## 2021-08-22 RX ADMIN — DICLOFENAC SODIUM 4 G: 10 GEL TOPICAL at 11:43

## 2021-08-22 RX ADMIN — Medication 50 MCG: at 08:24

## 2021-08-22 RX ADMIN — TRIAMCINOLONE ACETONIDE: 1 CREAM TOPICAL at 21:04

## 2021-08-22 RX ADMIN — LOSARTAN POTASSIUM 25 MG: 25 TABLET, FILM COATED ORAL at 08:15

## 2021-08-22 RX ADMIN — LIDOCAINE: 50 OINTMENT TOPICAL at 14:20

## 2021-08-22 RX ADMIN — DICLOFENAC SODIUM 4 G: 10 GEL TOPICAL at 16:12

## 2021-08-22 RX ADMIN — LEVOTHYROXINE SODIUM 150 MCG: 0.03 TABLET ORAL at 08:14

## 2021-08-22 RX ADMIN — Medication 75 MG: at 21:01

## 2021-08-22 RX ADMIN — ENOXAPARIN SODIUM 40 MG: 100 INJECTION SUBCUTANEOUS at 21:00

## 2021-08-22 RX ADMIN — ACETAMINOPHEN 975 MG: 325 TABLET ORAL at 08:25

## 2021-08-22 RX ADMIN — ASPIRIN 81 MG: 81 TABLET, CHEWABLE ORAL at 08:12

## 2021-08-22 RX ADMIN — LIDOCAINE 1 G: 50 OINTMENT TOPICAL at 21:00

## 2021-08-22 RX ADMIN — Medication: at 08:19

## 2021-08-22 RX ADMIN — ROSUVASTATIN CALCIUM 10 MG: 10 TABLET, FILM COATED ORAL at 21:01

## 2021-08-22 RX ADMIN — DICLOFENAC SODIUM 4 G: 10 GEL TOPICAL at 21:00

## 2021-08-22 RX ADMIN — FUROSEMIDE 20 MG: 20 TABLET ORAL at 16:12

## 2021-08-22 RX ADMIN — Medication: at 14:20

## 2021-08-22 RX ADMIN — TRIAMCINOLONE ACETONIDE: 1 CREAM TOPICAL at 08:20

## 2021-08-22 RX ADMIN — FUROSEMIDE 20 MG: 20 TABLET ORAL at 08:13

## 2021-08-22 RX ADMIN — PANTOPRAZOLE SODIUM 40 MG: 20 TABLET, DELAYED RELEASE ORAL at 06:59

## 2021-08-22 RX ADMIN — LIDOCAINE 1 INCH: 50 OINTMENT TOPICAL at 08:19

## 2021-08-22 RX ADMIN — DICLOFENAC SODIUM 4 G: 10 GEL TOPICAL at 08:20

## 2021-08-22 RX ADMIN — Medication 1 G: at 21:00

## 2021-08-22 RX ADMIN — AMLODIPINE BESYLATE 2.5 MG: 2.5 TABLET ORAL at 08:12

## 2021-08-22 RX ADMIN — OLANZAPINE 10 MG: 5 TABLET, ORALLY DISINTEGRATING ORAL at 01:30

## 2021-08-22 ASSESSMENT — MIFFLIN-ST. JEOR: SCORE: 1608.04

## 2021-08-22 NOTE — PLAN OF CARE
Problem: Adult Inpatient Plan of Care  Goal: Optimal Comfort and Wellbeing  Outcome: No Change  Intervention: Provide Person-Centered Care  Recent Flowsheet Documentation  Taken 8/22/2021 1145 by Janet Lloyd RN  Trust Relationship/Rapport:   care explained   choices provided   emotional support provided   empathic listening provided   questions answered   questions encouraged   reassurance provided   thoughts/feelings acknowledged  Taken 8/22/2021 0800 by Janet Lloyd RN  Trust Relationship/Rapport:   care explained   choices provided   emotional support provided   empathic listening provided   questions answered   questions encouraged   reassurance provided   thoughts/feelings acknowledged   Patient rating right arm pain #9, Tylenol given with good relief.      Problem: Adult Inpatient Plan of Care  Goal: Readiness for Transition of Care  Outcome: No Change   US done of RUE, saline lock removed.  Home O2 eval ordered.  Plan is for patient to return home at time of discharge.    Janet Lloyd RN

## 2021-08-22 NOTE — PLAN OF CARE
Problem: Anxiety  Goal: Anxiety Reduction or Resolution  Outcome: No Change     Problem: Cognitive Impairment  Goal: Optimal Functional [place]  Outcome: No Change       Continues on 2-3L O2, unable to wean overnight.  Pt visibly anxious, restless, irritable, PRN given for agitation with good result.  Alert and oriented x4 this morning, apologizes for irritability and frustration.
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
## 2021-08-22 NOTE — PROGRESS NOTES
Will not see today:  PAIN MANAGEMENT SERVICE CHART CHECK    This patient's chart has been reviewed by the Pain Service. Chart reviewed patient continues with some agitation although appears to be improving slowly.  Currently off 1:1 and appears to be more redirectable.    Minimal complaints of pain.     Agree with non pharmacological measures for pain management, agree with Addiction medicine and buprenorphine stopped.  Psychiatry assisting with mood and encephalopathy.    Pain Service with no additional recommendations at this time.    We will sign off.  Please re consult if we can be of additional assistance.     Thank you!    Rosario Tolbert APRN, CNS-BC, DNP  Acute Care Pain Management Program  [facility] ([place], [place], [place])   With questions call [phone]  Preference if for [facility]  Click HERE to page Gail

## 2021-08-23 ENCOUNTER — DOCUMENTATION ONLY (OUTPATIENT)
Dept: MEDSURG UNIT | Facility: HOSPITAL | Age: 81
End: 2021-08-23

## 2021-08-23 ENCOUNTER — APPOINTMENT (OUTPATIENT)
Dept: PHYSICAL THERAPY | Facility: HOSPITAL | Age: 81
DRG: 896 | End: 2021-08-23
Payer: COMMERCIAL

## 2021-08-23 ENCOUNTER — MEDICAL CORRESPONDENCE (OUTPATIENT)
Dept: HEALTH INFORMATION MANAGEMENT | Facility: CLINIC | Age: 81
End: 2021-08-23

## 2021-08-23 VITALS
OXYGEN SATURATION: 94 % | RESPIRATION RATE: 18 BRPM | HEART RATE: 78 BPM | BODY MASS INDEX: 36.96 KG/M2 | SYSTOLIC BLOOD PRESSURE: 127 MMHG | DIASTOLIC BLOOD PRESSURE: 74 MMHG | WEIGHT: 216.5 LBS | TEMPERATURE: 97.8 F | HEIGHT: 64 IN

## 2021-08-23 LAB
ALBUMIN SERPL-MCNC: 3 G/DL (ref 3.5–5)
ALP SERPL-CCNC: 100 U/L (ref 45–120)
ALT SERPL W P-5'-P-CCNC: 24 U/L (ref 0–45)
AMMONIA PLAS-SCNC: 51 UMOL/L (ref 11–35)
ANION GAP SERPL CALCULATED.3IONS-SCNC: 10 MMOL/L (ref 5–18)
AST SERPL W P-5'-P-CCNC: 35 U/L (ref 0–40)
ATRIAL RATE - MUSE: 79 BPM
BILIRUB SERPL-MCNC: 1.6 MG/DL (ref 0–1)
BUN SERPL-MCNC: 23 MG/DL (ref 8–28)
CALCIUM SERPL-MCNC: 9.1 MG/DL (ref 8.5–10.5)
CHLORIDE BLD-SCNC: 102 MMOL/L (ref 98–107)
CO2 SERPL-SCNC: 28 MMOL/L (ref 22–31)
CREAT SERPL-MCNC: 0.9 MG/DL (ref 0.7–1.3)
DIASTOLIC BLOOD PRESSURE - MUSE: NORMAL MMHG
ERYTHROCYTE [DISTWIDTH] IN BLOOD BY AUTOMATED COUNT: 14.1 % (ref 10–15)
GFR SERPL CREATININE-BSD FRML MDRD: 81 ML/MIN/1.73M2
GLUCOSE BLD-MCNC: 159 MG/DL (ref 70–125)
HCT VFR BLD AUTO: 43.8 % (ref 40–53)
HGB BLD-MCNC: 13.9 G/DL (ref 13.3–17.7)
INTERPRETATION ECG - MUSE: NORMAL
MCH RBC QN AUTO: 28.5 PG (ref 26.5–33)
MCHC RBC AUTO-ENTMCNC: 31.7 G/DL (ref 31.5–36.5)
MCV RBC AUTO: 90 FL (ref 78–100)
P AXIS - MUSE: NORMAL DEGREES
PLATELET # BLD AUTO: 215 10E3/UL (ref 150–450)
POTASSIUM BLD-SCNC: 3.7 MMOL/L (ref 3.5–5)
PR INTERVAL - MUSE: 184 MS
PROT SERPL-MCNC: 6.2 G/DL (ref 6–8)
QRS DURATION - MUSE: 88 MS
QT - MUSE: 408 MS
QTC - MUSE: 467 MS
R AXIS - MUSE: 0 DEGREES
RBC # BLD AUTO: 4.88 10E6/UL (ref 4.4–5.9)
SODIUM SERPL-SCNC: 140 MMOL/L (ref 136–145)
SYSTOLIC BLOOD PRESSURE - MUSE: NORMAL MMHG
T AXIS - MUSE: 56 DEGREES
VENTRICULAR RATE- MUSE: 79 BPM
WBC # BLD AUTO: 8.9 10E3/UL (ref 4–11)

## 2021-08-23 PROCEDURE — 85027 COMPLETE CBC AUTOMATED: CPT | Performed by: FAMILY MEDICINE

## 2021-08-23 PROCEDURE — 97116 GAIT TRAINING THERAPY: CPT | Mod: GP

## 2021-08-23 PROCEDURE — 36415 COLL VENOUS BLD VENIPUNCTURE: CPT | Performed by: FAMILY MEDICINE

## 2021-08-23 PROCEDURE — 82040 ASSAY OF SERUM ALBUMIN: CPT | Performed by: FAMILY MEDICINE

## 2021-08-23 PROCEDURE — 999N000157 HC STATISTIC RCP TIME EA 10 MIN

## 2021-08-23 PROCEDURE — 99239 HOSP IP/OBS DSCHRG MGMT >30: CPT | Mod: GC | Performed by: INTERNAL MEDICINE

## 2021-08-23 PROCEDURE — 82140 ASSAY OF AMMONIA: CPT | Performed by: FAMILY MEDICINE

## 2021-08-23 PROCEDURE — 250N000013 HC RX MED GY IP 250 OP 250 PS 637: Performed by: FAMILY MEDICINE

## 2021-08-23 PROCEDURE — 250N000013 HC RX MED GY IP 250 OP 250 PS 637: Performed by: INTERNAL MEDICINE

## 2021-08-23 RX ORDER — LIDOCAINE 50 MG/G
OINTMENT TOPICAL 3 TIMES DAILY
Qty: 150 G | Refills: 0 | Status: SHIPPED | OUTPATIENT
Start: 2021-08-23 | End: 2021-08-23

## 2021-08-23 RX ORDER — AMLODIPINE BESYLATE 2.5 MG/1
2.5 TABLET ORAL DAILY
Qty: 30 TABLET | Refills: 0 | Status: SHIPPED | OUTPATIENT
Start: 2021-08-24 | End: 2023-04-12

## 2021-08-23 RX ORDER — LIDOCAINE 50 MG/G
OINTMENT TOPICAL 3 TIMES DAILY
Qty: 150 G | Refills: 0 | Status: ON HOLD | OUTPATIENT
Start: 2021-08-23 | End: 2023-04-16

## 2021-08-23 RX ORDER — FUROSEMIDE 20 MG
20 TABLET ORAL
Qty: 60 TABLET | Refills: 0 | Status: SHIPPED | OUTPATIENT
Start: 2021-08-23 | End: 2023-04-12

## 2021-08-23 RX ORDER — METOPROLOL SUCCINATE 25 MG/1
25 TABLET, EXTENDED RELEASE ORAL DAILY
Qty: 30 TABLET | Refills: 0 | Status: SHIPPED | OUTPATIENT
Start: 2021-08-24 | End: 2021-10-01

## 2021-08-23 RX ORDER — TRAZODONE HYDROCHLORIDE 50 MG/1
50 TABLET, FILM COATED ORAL AT BEDTIME
Qty: 30 TABLET | Refills: 0 | Status: SHIPPED | OUTPATIENT
Start: 2021-08-23 | End: 2021-09-07

## 2021-08-23 RX ADMIN — DICLOFENAC SODIUM 4 G: 10 GEL TOPICAL at 16:55

## 2021-08-23 RX ADMIN — DICLOFENAC SODIUM 4 G: 10 GEL TOPICAL at 08:54

## 2021-08-23 RX ADMIN — ASPIRIN 81 MG: 81 TABLET, CHEWABLE ORAL at 08:48

## 2021-08-23 RX ADMIN — PANTOPRAZOLE SODIUM 40 MG: 20 TABLET, DELAYED RELEASE ORAL at 06:47

## 2021-08-23 RX ADMIN — LIDOCAINE: 50 OINTMENT TOPICAL at 08:54

## 2021-08-23 RX ADMIN — AMLODIPINE BESYLATE 2.5 MG: 2.5 TABLET ORAL at 08:48

## 2021-08-23 RX ADMIN — LOSARTAN POTASSIUM 25 MG: 25 TABLET, FILM COATED ORAL at 08:48

## 2021-08-23 RX ADMIN — DICLOFENAC SODIUM 4 G: 10 GEL TOPICAL at 12:37

## 2021-08-23 RX ADMIN — Medication: at 08:54

## 2021-08-23 RX ADMIN — METOPROLOL SUCCINATE 25 MG: 25 TABLET, EXTENDED RELEASE ORAL at 08:48

## 2021-08-23 RX ADMIN — FUROSEMIDE 20 MG: 20 TABLET ORAL at 08:48

## 2021-08-23 RX ADMIN — Medication 50 MCG: at 08:48

## 2021-08-23 RX ADMIN — TRIAMCINOLONE ACETONIDE: 1 CREAM TOPICAL at 08:54

## 2021-08-23 RX ADMIN — LEVOTHYROXINE SODIUM 150 MCG: 0.03 TABLET ORAL at 08:48

## 2021-08-23 RX ADMIN — FUROSEMIDE 20 MG: 20 TABLET ORAL at 16:52

## 2021-08-23 NOTE — PLAN OF CARE
Occupational Therapy Discharge Summary    Reason for therapy discharge:    Discharged to home.    Progress towards therapy goal(s). See goals on Care Plan in [facility] electronic health record for goal details.  Goals partially met.  Barriers to achieving goals:   discharge from facility.    Therapy recommendation(s):    Pt would benefit from home therapy

## 2021-08-23 NOTE — PROGRESS NOTES
Received oxygen intake, reviewed chart; patient has had oxygen with us twice in the past, most currently march and April of 2021.  All documents are in patients epic account, he qualifies under medicare guidelines.  - I left voicemail on patients cell phone instructing him to contact [facility] when gets home from the hospital so that we may bring out the rest of the equipment. Oxygen will be delivered to bedside by 12:45pm.

## 2021-08-23 NOTE — PLAN OF CARE
Discharge instructions reviewed with patient and spouse. Questions addressed. Filled prescriptions for lidocaine, amlodipine, metoprolol, lasix, and trazadone given to the patient. Patient still waiting for voltaren gel. MD notified for new prescription. Home oxygen delivered to patient's room. Patient to leave with wife at time of discharge.

## 2021-08-23 NOTE — PLAN OF CARE
Denies pain. Tele discontinued, PIV out. EKG done and Home O2 eval ordered before discharge home. Able to wean down to 0.5 L O2 during day but when asleep pt needed 2 L. Wife will  tomorrow.

## 2021-08-23 NOTE — DISCHARGE INSTRUCTIONS
[facility]: [phone]  [facility]   Bowman s: Monday, Tuesday, Wednesday, and Friday, 9:00 a.m. - noon  Jay s: Monday, Tuesday, Wednesday, and Friday, 9:30 a.m. - 12:30 p.m.  The program is facilitated by dual-licensed counselors and has three phases of treatment (schedule can vary for each patient).  Phase 1 - 20 sessions, Monday-Wednesday, Friday, 9:00 a.m. - noon  Phase 2 - Eight sessions, two days per week, 9:00 a.m. - noon  Phase 3 - Recovery maintenance program, one day per week, 12 weeks    Oxygen Provider:  Arranged through [employer], contact number [phone].  If you have any questions or concerns please call the oxygen company directly.      Counseling appointment-teleheath:    [facility]  Shannon Lino  8/27/21 9:30 AM

## 2021-08-23 NOTE — PLAN OF CARE
Physical Therapy Discharge Summary    Reason for therapy discharge:    Discharged to home.    Progress towards therapy goal(s). See goals on Care Plan in [facility] electronic health record for goal details.  Goals partially met.  Barriers to achieving goals:   discharge from facility.    Therapy recommendation(s):    Continued therapy is recommended.  Rationale/Recommendations:  Pt is progressing towards goals and needs assist/supervision for safety. Recommend TCU or home with assist and home PT.

## 2021-08-23 NOTE — PROGRESS NOTES
I certify that this patient, Dominik Rojas has been under my care (or a nurse practitioner or physican's assistant working with me). This is the face-to-face encounter for oxygen medical necessity.      Dominik Rojas is now in a chronic stable state and continues to require supplemental oxygen. Patient has continued oxygen desaturation due to Chronic Heart Failure I50  COPD J44.9.    Alternative treatment(s) tried or considered and deemed clinically infective for treatment of Chronic Heart Failure I50  COPD J44.9 include nebulizers, inhalers and pulmonary toileting.  If portability is ordered, is the patient mobile within the home? yes    **Patients who qualify for home O2 coverage under the CMS guidelines require ABG tests or O2 sat readings obtained closest to, but no earlier than 2 days prior to the discharge, as evidence of the need for home oxygen therapy. Testing must be performed while patient is in the chronic stable state. See notes for O2 sats.**    Ivonne Partida MD   Hospital Medicine Service   [phone]   Pager [phone]   [email or web address]

## 2021-08-23 NOTE — CONSULTS
Care Management Discharge Note    Discharge Date: 08/23/2021       Discharge Disposition: Home    Discharge Services: Mental Health Resources    Discharge DME: Oxygen    Discharge Transportation: family or friend will provide    Private pay costs discussed: Not applicable    PAS Confirmation Code:  N/A    Patient/family educated on Medicare website which has current facility and service quality ratings:  N/A    Education Provided on the Discharge Plan:  Yes    Persons Notified of Discharge Plans: patient and wife Jovana      Patient/Family in Agreement with the Plan: yes    Handoff Referral Completed: yes    Additional Information: GALLO spoke with pt and wife to inquire as to what days and times worked best for them in regards to SW setting up mental health counseling appointment for pt.  GALLO was informed that Mondays, Tuesday mornings, and Friday mornings worked best.  GALLO spoke with [facility] and set up mental health counseling telehealth appointment for pt on 8/27/21 at 9:30 AM with Shannon Lino.  They will talk with pt and wife regarding scheduling any additional appointments, including psychiatry if indicated.  GALLO met with pt and wife Jovana and provided them with written information about scheduled appointment for pt this Friday.  Pt and wife thanked GALLO.  GALLO noted that there was a referral to addiction medicine in discharge orders.  GALLO spoke with Dr Marguerite Brewster in addiction medicine to inquire if this was still needed for pt upon discharge.  Dr Brewster confirmed that referral was not needed, as pt had been tried on suboxone and had negative reaction to it and they had signed off last week regarding pt.  GALLO text paged Dr Partida and asked her to take this referral out of the orders.  Pt discharging with home care services of RN PT OT through [facility], as well as home oxygen.  Wife to transport.      MISAEL Fall, CORRINE 08/23/21 5:24 PM

## 2021-08-23 NOTE — DISCHARGE SUMMARY
[facility]  Hospitalist Discharge Summary      Date of Admission:  8/14/2021  Date of Discharge:  8/23/2021  5:15 PM  Discharging Provider: Ivonne Partida MD      Discharge Diagnoses     Toxic metabolic acute encephalopathy    Accelerated hypertension    Obesity, Body mass index is 37.16 kg/m .    COPD, stable    Bronchomalacia    Generalized anxiety disorder  Acute respiratory failure with hypoxia    Bronchomalacia    Chronic systolic biventricular congestive heart failure  Chronic pain syndrome    Opioid use disorder, severe, dependence    Alcohol use disorder, severe, in sustained remission, dependence     Acute pain of left knee  Hypothyroidism  Morbid obesity, Body mass index is 37.16 kg/m .   Probable obstructive sleep apnea    Follow-ups Needed After Discharge   Follow-up Appointments     Follow-up and recommended labs and tests       Follow up with primary care provider, Lisandro Meza, within 7 days   for hospital follow- up.  The following labs/tests are recommended: CBC,   BMP, Mg.         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  Patient endorses increasing anxiety over craving oxycodone for back pain.  He states when his anxiety ramps up he becomes subjectively more short of breath.     Acute Respiratory Failure with hypoxia.  Multifactorial due to anxiety, bronchomalacia, congestive heart failure, restrictive lung disease and probable obstructive sleep apnea.  No COPD/restrictive lung disease exacerbation during  this hospitalization.  Patient was evaluated by RT for home O2, discharged on supplemental oxygen. Treatment:  2-5 LPM NC hospitalization and needing to go home on O2.    Generalized anxiety disorder, with anxiety over craving oxycodone for chronic low back pain.  Patient seen by psychiatry, recommended stopping amitriptyline, starting gabapentin and Rozerem.  Gabapentin and hydroxyzine were placed on hold by addiction medicine, to avoid oversedation, given patient's advanced age and therapy with Suboxone.    Pain syndrome, with likely.  Use disorder.  Patient was seen by pain team, recommending on discharge:  weight loss clinic, PT, home care, and addiction med follow up (they prefer [facility] in [place]) - and diclofenac. Suboxone on hold because patient did not tolerate suboxone after taking it on 8/16.     Suboxone use/opioid overdose. Patient did not tolerate suboxone after taking it on 8/16.    Status post bilateral L2-4 decompressive lumbar laminectomy with medial facetectomies on 3/3/2021. MRI L-spine 6/22/2021 showed recent T12 compression fracture with 50% loss of vertebral height. Brace not tolerated due to COPD per report.  Continue calcitonin nasal spray and pain regimen per pain team.     Hypothyroidism: TSH 8/17/2021 WNL. Continued home Synthroid.    Morbid obesity: BMI 41; outpatient management.    Obstructive sleep apnea.  Sleep study already ordered for outpatient after discharge.    History of diabetes mellitus.  Not on any medications at home.  A1c 5.8 on August 18.     Right upper extremity hematoma.  Unclear onset.  Hemoglobin and platelets within normal limits.  Tissue ultrasound of right upper extremity-showed nonocclusive DVT near IV site.  Doppler ultrasound negative for other DVTs.  Treated with RUE elevation.    Consultations This Hospital Stay   PAIN MANAGEMENT ADULT IP CONSULT  PSYCHIATRY IP CONSULT  SOCIAL WORK IP CONSULT  SOCIAL WORK IP CONSULT  PHYSICAL THERAPY ADULT IP  CONSULT  SOCIAL WORK IP CONSULT  OCCUPATIONAL THERAPY ADULT IP CONSULT  PHYSICAL THERAPY ADULT IP CONSULT  OCCUPATIONAL THERAPY ADULT IP CONSULT  ADDICTION MEDICINE INPATIENT CONSULT  PSYCHIATRY IP CONSULT    Code Status   Full Code    Time Spent on this Encounter   I, Ivonne Partida MD, personally saw the patient today and spent greater than 30 minutes discharging this patient.       Ivonne Partida MD  [address] [address]  Phone: [phone]  Fax: [phone]  ______________________________________________________________________    Physical Exam   Vital Signs: Temp: 97.8  F (36.6  C) Temp src: Oral BP: 127/74 Pulse: 78   Resp: 18 SpO2: 94 % O2 Device: Nasal cannula Oxygen Delivery: 5 LPM (5 LPM ACTIVITY)  Weight: 216 lbs 8 oz    GENRL: alert to person, place, and time. Not in acute distress. Satting at 92% on 2 LPM via NC.    HEENT: NC/AT                 Neck- supple                 Sclera- anicteric                 Mucous membrane- moist and pink  CHEST: lungs CTA b/l  HEART: S1S2 regular. No murmurs, rubs or gallops  ABDMN: Soft. Non-tender.No guarding or rigidity. Bowel sounds- active  NEURO:  No involuntary movements  INTGM: please see nursing assessment for full skin assessment. Facial rash present that is improving.   PULSES: 2+ and symmetric all extremities  PSYCH: normal affect, normal speech        Primary Care Physician   Lisandro Meza    Discharge Orders      Comprehensive Weight Management      SLEEP EVALUATION & MANAGEMENT REFERRAL - ADULT -      Medication Therapy Management Referral      Reason for your hospital stay    Acute encephalopathy     Follow-up and recommended labs and tests     Follow up with primary care provider, Lisandro Meza, within 7 days for hospital follow- up.  The following labs/tests are recommended: CBC, BMP, Mg.     Activity    Your activity upon discharge: activity as tolerated, ambulate with  cane/walker, as recommended by physical therapy     MD face to face encounter    Documentation of Face to Face and Certification for Home Health Services    I certify that patient: Dominik Rojas is under my care and that I, or a nurse practitioner or physician's assistant working with me, had a face-to-face encounter that meets the physician face-to-face encounter requirements with this patient on: August 23, 2021.    This encounter with the patient was in whole, or in part, for the following medical condition, which is the primary reason for home health care: physical deconditioning, chronic pain, unsteady gait, new oxygen, needs education on using nebulizing machine.    I certify that, based on my findings, the following services are medically necessary home health services: Nursing, Occupational Therapy, and Physical Therapy.    My clinical findings support the need for the above services because: Nurse is needed: To provide assessment and oversight required in the home to assure adherence to the medical plan due to: cognitive impairement.., Occupational Therapy Services are needed to assess and treat cognitive ability and address ADL safety due to impairment in cognition and mobility., and Physical Therapy Services are needed to assess and treat the following functional impairments:unsteady gait, new oxygen    Further, I certify that my clinical findings support that this patient is homebound (i.e. absences from home require considerable and taxing effort and are for medical reasons or [employer] services or infrequently or of short duration when for other reasons) because: Leaving home is medically contraindicated for the following reason(s): Dyspnea on exertion that makes it so they cannot leave their home for needed services without clinical deterioration...    Based on the above findings. I certify that this patient is confined to the home and needs intermittent skilled nursing care, physical therapy and/or  speech therapy.  The patient is under my care, and I have initiated the establishment of the plan of care.  This patient will be followed by a physician who will periodically review the plan of care.  Physician/Provider to provide follow up care: Lisandro Meza    Attending hospital physician (the Medicare certified [facility] provider): Ivonne Partida MD  Physician Signature: See electronic signature associated with these discharge orders.  Date: 8/23/2021     Home Oxygen Order for DME - ONLY FOR DME    REMINDER: Complete and sign the discharge summary or copy and paste this information into a progress note    Oxygen Documentation:   I certify that this patient, Dominik Rojas has been under my care (or a nurse practitioner or physican's assistant working with me). This is the face-to-face encounter for oxygen medical necessity.      Dominik Rojas is now in a chronic stable state and continues to require supplemental oxygen. Patient has continued oxygen desaturation due to COPD J44.9.    Alternative treatment(s) tried or considered and deemed clinically infective for treatment of COPD J44.9 include inhalers.  If portability is ordered, is the patient mobile within the home? yes    Patients who qualify for home O2 coverage under the CMS guidelines require ABG tests or O2 sat readings obtained closest to, but no earlier than 2 days prior to the discharge, as evidence of the need for home oxygen therapy. Testing must be performed while patient is in the chronic stable state. See notes for O2 sats        HOME OXYGEN     Diet    Follow this diet upon discharge: Orders Placed This Encounter      Combination Diet Consistent Carb 75 Grams CHO per Meal Diet; 2 gm NA Diet; Low Fat Diet       Significant Results and Procedures   Results for orders placed or performed during the hospital encounter of 08/14/21   XR Chest Port 1 View    Narrative    EXAM: XR CHEST PORT 1 VIEW  LOCATION: [facility]  HOSPITAL  DATE/TIME: 8/14/2021 2:58 AM    INDICATION: sob  COMPARISON: 08/09/2021      Impression    IMPRESSION: Cardiac enlargement and shallow inspiration. Bibasilar atelectasis or infiltrate decreased compared to prior. Atherosclerotic aorta. Surgical clips right neck.   XR Chest Port 1 View    Narrative    EXAM: XR CHEST PORT 1 VIEW  LOCATION: [facility]  DATE/TIME: 8/16/2021 4:52 PM    INDICATION: Suddenly worsening hypoxia  COMPARISON: 08/14/2021      Impression    IMPRESSION: Poorer ventilatory effort with increasing compressive and congestive changes. Increasing left basilar atelectasis versus infiltrate. Remainder stable.   XR Chest Port 1 View    Narrative    EXAM: XR CHEST PORT 1 VIEW  LOCATION: [facility]  DATE/TIME: 8/18/2021 8:53 AM    INDICATION: Hypoxia.  COMPARISON: 8/16/2021      Impression    IMPRESSION: Heart size and vascularity are normal. Shallow inspiration with bibasilar subsegmental atelectasis. No focal consolidation, pneumothorax nor pleural effusion.   US Upper Extremity Non Vascular Right    Narrative    EXAM: US UPPER EXTREMITY NON VASCULAR RIGHT  LOCATION: [facility]  DATE/TIME: 8/21/2021 4:54 PM    INDICATION: Worsening bruising in RUE, pain  COMPARISON: None.    TECHNIQUE: Routine.    FINDINGS: Nonocclusive superficial thrombus in cephalic vein at site of patient's IV. Diffuse subcutaneous edema. No focal fluid collection.      Impression    IMPRESSION:  1.  Superficial nonocclusive thrombus within cephalic vein. Full DVT study not performed.   US Upper Extremity Venous Duplex Right    Narrative    EXAM: US UPPER EXTREMITY VENOUS DUPLEX RIGHT  LOCATION: [facility]  DATE/TIME: 8/22/2021 10:26 AM    INDICATION: please perform Full DVT study. non-vascular imaging showed superficial non-occlusive thrombus but full DVT study not performed.  COMPARISON: Ultrasound 08/21/2021 at 1641 hours    TECHNIQUE: Venous Duplex ultrasound of the right upper extremity with (when possible) and without compression, augmentation, and duplex. Color flow and spectral Doppler with waveform analysis performed.    FINDINGS: Ultrasound includes evaluation of the internal jugular vein, innominate vein, subclavian vein, axillary vein, and brachial vein. The superficial cephalic and basilic veins were also evaluated where seen.     RIGHT: No deep venous thrombosis. There is a short segment nonocclusive thrombus in the right cephalic vein near the IV access site..      Impression    IMPRESSION:  1.  No DVT.   2.  Short segment nonocclusive thrombus in the right cephalic vein near IV access site.       Discharge Medications   Current Discharge Medication List      START taking these medications    Details   amLODIPine (NORVASC) 2.5 MG tablet Take 1 tablet (2.5 mg) by mouth daily  Qty: 30 tablet, Refills: 0    Associated Diagnoses: Essential hypertension      diclofenac (VOLTAREN) 1 % topical gel Apply 4 g topically 4 times daily  Qty: 50 g, Refills: 3    Associated Diagnoses: Acute pain of left knee      lidocaine (XYLOCAINE) 5 % external ointment Apply topically 3 times daily  Qty: 150 g, Refills: 0    Associated Diagnoses: Primary osteoarthritis, unspecified site      traZODone (DESYREL) 50 MG tablet Take 1 tablet (50 mg) by mouth At Bedtime  Qty: 30 tablet, Refills: 0    Associated Diagnoses: Alcohol-induced insomnia (H)         CONTINUE these medications which have CHANGED    Details   furosemide (LASIX) 20 MG tablet Take 1 tablet (20 mg) by mouth 2 times daily  Qty: 60 tablet, Refills: 0    Associated Diagnoses: Essential hypertension      metoprolol succinate ER (TOPROL-XL) 25 MG 24 hr tablet Take 1 tablet (25 mg) by mouth daily  Qty: 30 tablet, Refills: 0    Associated Diagnoses: Essential hypertension         CONTINUE these medications which have NOT CHANGED    Details   acetaminophen (TYLENOL) 500 MG tablet Take 1,000  mg by mouth as needed      albuterol (PROAIR HFA/PROVENTIL HFA/VENTOLIN HFA) 108 (90 Base) MCG/ACT inhaler Inhale 2 puffs into the lungs every 6 hours as needed       albuterol (PROVENTIL) (2.5 MG/3ML) 0.083% neb solution Inhale 2.5 mg into the lungs every 6 hours as needed       aspirin (ASA) 81 MG chewable tablet Take 81 mg by mouth daily      calcitonin, salmon, (MIACALCIN) 200 UNIT/ACT nasal spray Spray 1 spray into one nostril alternating nostrils daily Alternate nostril each day.      Cholecalciferol (VITAMIN D3) 50 MCG (2000 UT) CAPS Take 1 tablet by mouth daily       latanoprost (XALATAN) 0.005 % ophthalmic solution Place 1 drop into both eyes At Bedtime       levothyroxine (SYNTHROID/LEVOTHROID) 150 MCG tablet Take 1 tablet by mouth daily       losartan (COZAAR) 25 MG tablet Take 1 tablet (25 mg) by mouth daily  Qty: 30 tablet, Refills: 0    Associated Diagnoses: Congestive heart failure, unspecified HF chronicity, unspecified heart failure type (H)      multivitamin w/minerals (MULTI-VITAMIN) tablet Take 1 tablet by mouth daily      pantoprazole (PROTONIX) 40 MG EC tablet Take 1 tablet (40 mg) by mouth every morning (before breakfast)  Qty: 30 tablet, Refills: 0    Associated Diagnoses: Gastroesophageal reflux disease with esophagitis without hemorrhage      rosuvastatin (CRESTOR) 10 MG tablet Take 10 mg by mouth At Bedtime          STOP taking these medications       amitriptyline (ELAVIL) 25 MG tablet Comments:   Reason for Stopping:         oxyCODONE (ROXICODONE) 5 MG tablet Comments:   Reason for Stopping:         sertraline (ZOLOFT) 100 MG tablet Comments:   Reason for Stopping:             Allergies   Allergies   Allergen Reactions     Diclofenac      Other reaction(s): GI Upset     Loratadine      Other reaction(s): Urinary Retention

## 2021-08-24 ENCOUNTER — PATIENT OUTREACH (OUTPATIENT)
Dept: CARE COORDINATION | Facility: CLINIC | Age: 81
End: 2021-08-24

## 2021-08-24 DIAGNOSIS — Z71.89 OTHER SPECIFIED COUNSELING: ICD-10-CM

## 2021-08-24 NOTE — PROGRESS NOTES
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    ASSESSMENT           Discharge Assessment  How are you doing now that you are home?: \"Doing good/better\"  How are your symptoms? (Red Flag symptoms escalate to triage hotline per guidelines): Improved  Do you feel your condition is stable enough to be safe at home until your provider visit?: Yes  Does the patient have their discharge instructions? : Yes  Does the patient have questions regarding their discharge instructions? : No  Were you started on any new medications or were there changes to any of your previous medications? : Yes  Does the patient have all of their medications?: Yes  Do you have questions regarding any of your medications? : No  Do you have all of your needed medical supplies or equipment (DME)?  (i.e. oxygen tank, CPAP, cane, etc.): Yes  Discharge follow-up appointment scheduled within 14 calendar days? : No  Is patient agreeable to assistance with scheduling? : No (The patient will work with their daughter to schedule an appointment)    Post-op ([facility] Only)  If the patient had a surgery or procedure, do they have any questions for a nurse?: No         PLAN                                                      Outpatient Plan:    Follow-up and recommended labs and tests      Follow up with primary care provider, Lisandro Meza, within 7 days   for hospital follow- up.  The following labs/tests are recommended: CBC,   BMP, Mg.     No future " appointments.      For any urgent concerns, please contact our 24 hour nurse triage line: [phone] ([phone])         ELOY Spencer  [phone]  [facility]

## 2021-08-24 NOTE — PROGRESS NOTES
Clinic Care Coordination Contact    Patient and his wife, Jovana, had additional questions regarding his right arm where PIVs had been attempted.  RN made outreach call to help to answer any questions and provide education within scope of practice.    Patient noted the hospital team made several IV attempts in his right forearm and it's very bruised today.  Per Jovana, hospital team had performed ultrasound of area prior to discharge to rule out acute concern including DVT.  They were told the bruising and soreness was just from the attempted IV sites and it would heal.  Patient denies any redness, warmth or increased swelling to extremity.  He also denies any loss of sensation or movement.  Primary concern is his arm feels very sore.  We discussed trying alternating of cold and warm therapy (cold compress and then warm wash cloth) throughout the day to provide symptom relief.  Patient and his wife verbalized understanding and appreciated this information.  RN also reviewed importance of monitoring for any worsening of symptoms and to contact his PCP clinic or nurse advisor line.      Jovana and patient had told [facility] they had questions about medications as well, but now report they found the answer within the AVS and have no further questions.    Lore Echevarria, MILAGRON, RN   [facility]  - Clinic Care Coordinator

## 2021-08-30 ENCOUNTER — LAB REQUISITION (OUTPATIENT)
Dept: LAB | Facility: CLINIC | Age: 81
End: 2021-08-30

## 2021-08-30 DIAGNOSIS — I10 ESSENTIAL (PRIMARY) HYPERTENSION: ICD-10-CM

## 2021-08-30 LAB
ANION GAP SERPL CALCULATED.3IONS-SCNC: 11 MMOL/L (ref 5–18)
BUN SERPL-MCNC: 16 MG/DL (ref 8–28)
CALCIUM SERPL-MCNC: 9 MG/DL (ref 8.5–10.5)
CHLORIDE BLD-SCNC: 105 MMOL/L (ref 98–107)
CO2 SERPL-SCNC: 26 MMOL/L (ref 22–31)
CREAT SERPL-MCNC: 0.98 MG/DL (ref 0.7–1.3)
GFR SERPL CREATININE-BSD FRML MDRD: 73 ML/MIN/1.73M2
GLUCOSE BLD-MCNC: 124 MG/DL (ref 70–125)
MAGNESIUM SERPL-MCNC: 1.9 MG/DL (ref 1.8–2.6)
POTASSIUM BLD-SCNC: 4.3 MMOL/L (ref 3.5–5)
SODIUM SERPL-SCNC: 142 MMOL/L (ref 136–145)

## 2021-08-30 PROCEDURE — 80048 BASIC METABOLIC PNL TOTAL CA: CPT | Performed by: PHYSICIAN ASSISTANT

## 2021-08-30 PROCEDURE — 83735 ASSAY OF MAGNESIUM: CPT | Performed by: PHYSICIAN ASSISTANT

## 2021-09-07 ENCOUNTER — OFFICE VISIT (OUTPATIENT)
Dept: PHARMACY | Facility: PHYSICIAN GROUP | Age: 81
End: 2021-09-07
Payer: COMMERCIAL

## 2021-09-07 VITALS — BODY MASS INDEX: 36.77 KG/M2 | WEIGHT: 214.2 LBS

## 2021-09-07 DIAGNOSIS — T14.8XXA CLOSED FRACTURE OF BONE: ICD-10-CM

## 2021-09-07 DIAGNOSIS — E03.9 HYPOTHYROIDISM, UNSPECIFIED TYPE: ICD-10-CM

## 2021-09-07 DIAGNOSIS — E78.5 HYPERLIPIDEMIA LDL GOAL <70: ICD-10-CM

## 2021-09-07 DIAGNOSIS — J45.909 ASTHMA, UNSPECIFIED ASTHMA SEVERITY, UNSPECIFIED WHETHER COMPLICATED, UNSPECIFIED WHETHER PERSISTENT: ICD-10-CM

## 2021-09-07 DIAGNOSIS — G89.29 OTHER CHRONIC PAIN: ICD-10-CM

## 2021-09-07 DIAGNOSIS — I10 BENIGN ESSENTIAL HYPERTENSION: ICD-10-CM

## 2021-09-07 DIAGNOSIS — G47.00 INSOMNIA, UNSPECIFIED TYPE: ICD-10-CM

## 2021-09-07 DIAGNOSIS — H40.003 GLAUCOMA SUSPECT, BILATERAL: ICD-10-CM

## 2021-09-07 DIAGNOSIS — Z78.9 TAKES DIETARY SUPPLEMENTS: ICD-10-CM

## 2021-09-07 DIAGNOSIS — F32.A DEPRESSION, UNSPECIFIED DEPRESSION TYPE: Primary | ICD-10-CM

## 2021-09-07 DIAGNOSIS — F41.9 ANXIETY: ICD-10-CM

## 2021-09-07 DIAGNOSIS — K21.00 GASTROESOPHAGEAL REFLUX DISEASE WITH ESOPHAGITIS, UNSPECIFIED WHETHER HEMORRHAGE: ICD-10-CM

## 2021-09-07 PROCEDURE — 99605 MTMS BY PHARM NP 15 MIN: CPT | Performed by: PHARMACIST

## 2021-09-07 PROCEDURE — 99607 MTMS BY PHARM ADDL 15 MIN: CPT | Performed by: PHARMACIST

## 2021-09-07 RX ORDER — OLANZAPINE 2.5 MG/1
5 TABLET, FILM COATED ORAL AT BEDTIME
COMMUNITY
End: 2021-09-14 | Stop reason: DRUGHIGH

## 2021-09-07 NOTE — PROGRESS NOTES
Medication Therapy Management (MTM) Encounter    ASSESSMENT:                            Medication Adherence/Access: No issues identified    Depression/Insomnia/Anxiety: Patient may benefit from trying a higher dose of olanzapine 5 mg at bedtime.  Per TE from Misael, she also suggested this.  We discussed trying to increase the dose and they would like to try this.  It likely would be beneficial to also restart sertraline for anxiety as it seems to have helped previously.    Hypertension: Stable. Patient is meeting blood pressure goal of < 140/90mmHg.    Hyperlipidemia: Patient is not on high intensity statin which is indicated based on 2019 ACC/AHA guidelines for lipid management.  Due for recheck of lipid panel.  Could recheck his labs and consider increasing to rosuvastatin 20 mg.  Will defer to a later follow up, and to cardiology team    GERD: Stable. Current treatment is effective. Chronic PPI use places patient at an increased risk of C. Diff, hypomagnesemia and lower bone mineral density, these risks were not reviewed with the patient today due to other main concerns.     Hypothyroidism: Stable. Last TSH is within normal limits.     Asthma: His shortness of breath seems to be triggered by his anxiety.  Discussed correct way to use the nebulizer albuterol, and how to use an HFA inhaler with a spacer.  He could try using this when he feels short of breath, but it could exacerbate his anxiety.    Chronic pain:  Would benefit form continuing to use lidocaine cream applied to his back as needed.  Could consider gabapentin for back pain.  May also help with anxiety.  Would be best to focus on psych meds though and not add in right now.  There is possible risk of worsening anxiety, mood changes with gabapentin.    Glaucoma: Stable.    Bone fracture healing:  Stable.      Supplements/OTCs: Stable.    PLAN:                            1.  Increase olanzapine to 5 mg once daily at bedtime.  2.  You can use the Albuterol  nebulizer machine or the Albuterol handheld inhaler with the spacer when you feel shortness of breath.    3.  Recommend restarting sertraline 25 mg once daily.    Follow-up: Return in about 1 week (around 9/14/2021) for MTM Follow Up. - Patient to call and schedule follow up appointment with me the week of 9/14.  Can call [phone] to schedule phone follow up      SUBJECTIVE/OBJECTIVE:                          Dominik Rojas is a 80 year old male coming in for a follow-up visit. He was referred to me from Misael Moe. Patient was accompanied by by his wife. Today's visit is a follow-up MTM visit from 8/19/2019     Reason for visit: Comprehensive medication review for 2021.  Patient was referred for help with anxiety.  He is also having concerns with  Insomnia and the sleeping medication not working well.    Allergies/ADRs: Reviewed in chart  Past Medical History: Reviewed in chart  Tobacco: He reports that he quit smoking about 31 years ago. His smoking use included cigarettes. He has a 17.50 pack-year smoking history. He has never used smokeless tobacco.  Alcohol: not currently using      Medication Adherence/Access: no issues reported, his wife helps him with setting up his medications.    Depression/Insomnia/Anxiety: Current medications include: Olanzapine 2.5 mg daily at bedtime. Patient reports trouble staying asleep and trouble falling asleep.   Pt is getting a lot of anxiety at night and in the evening.  He feels more shortness of breath, and like his breathing gets more rapid.  Ever since he was in the hospital and they stopped several of his meds he gets very anxious, confused, agitated in the evening.  He often doesn't remember what he is doing.  They are wondering if he is having 'sundowning'.  They are also worried he may be starting to get alzheimer's or a form of dementia.  He was on oxycodone previously but they tapered him off this.  He also tried suboxone for opioid withdrawal but didn't  tolerate.  Then when in the hospital his sertraline was stopped along with trazodone. They stoped the trazodone and then he started olanzapine.  I reviewed his hospital noted and couldn't find any specific reason it was DC'd  Hx on Sertraline, per chart says not effective.  Pt thinks it was effective and thinks he was less anxious when on it.    CHF/Hypertension: Current medications include Losartan 25 mg daily, furosemide 20 mg twice daily, Amlodipine 2.5 mg daily, and Metoprolol ER 25 mg daily.  Isn't wearing the compression socks, they are too tight and uncomfortable.  He has the lower pressure 15-20 mmHg ones.  He is also taking aspirin 81 mg daily for secondary prevention.  Patient does self-monitor blood pressure.  Didn't have the BP log with them.  Patient reports no current medication side effects.  BP Readings from Last 3 Encounters:   09/09/21 129/85   08/23/21 127/74   08/13/21 (!) 148/83     Hyperlipidemia: Current therapy includes Rosuvastatin 10 mg daily.  Patient reports no significant myalgias or other side effects.    Recent Labs   Lab Test 12/16/19  1203 08/06/19  0701   CHOL 163 140   HDL 54 55   LDL 81 71   TRIG 138 72       GERD: Current medications include: Protonix (pantoprazole) 20 mg once daily. Pt reports no current symptoms.   The patient does notice symptoms if they miss a dose.  Patient has not tried a trial off of therapy and is not interested in doing so. Patient feels that current regimen is effective.    Hypothyroidism: Patient is taking levothyroxine 150 mcg daily. Patient is having the following symptoms: none.   TSH   Date Value Ref Range Status   08/17/2021 1.02 0.30 - 5.00 uIU/mL Final     Asthma: Current medications: Short-Acting Bronchodilator: Albuterol MDI, Nebs and pt reports using the nebulizer a couple times per week.  Primarily feeling short of breath at night and when feeling more anxious   Discussed HFA and nebulizer technique.  Triggers include: emotions.  Patient  reports the following symptoms: increased shortness of breath associated with anxiety      Chronic pain:  Current medications include: Using Diclofenac gel and Lidocaine PRN. Finds the lidocaine very helpful, but the patches didn't work very well.  They don't stay on.  In the hospital they psychiatrist recommended gabapentin for pain.  They had stopped the amitriptyline.  He is no longer on opioids since the surgery.  Is still having back pain.    Galucoma: Taking Latanoprost 0.005% drops both eyes once daily.  Denies issues.      Bone fracture healing:  Takes calcitonin 200 unit nasal spray once daily (alternates nares).  Denies issues.    Supplements/OTCs: Taking Vitamin D 2000   units daily and a multivitamin daily.  Denies issues.      Today's Vitals: Wt 214 lb 3.2 oz (97.2 kg)   BMI 36.77 kg/m    ----------------      I spent 60 minutes with this patient today (an extra 15 minutes was spent creating the Medication Action Plan). All changes were made via verbal approval with Lisandro Meza MD. A copy of the visit note was provided to the patient's primary care provider.    The patient was mailed a summary of these recommendations.     Wilma Lang, PharmD  Medication Therapy Management Pharmacist  Pager: [phone]     Medication Therapy Recommendations  Anxiety    Current Medication: OLANZapine (ZYPREXA) 2.5 MG tablet   Rationale: Dose too low - Dosage too low - Effectiveness   Recommendation: Increase Dose   Status: Accepted per Provider          Current Medication: OLANZapine (ZYPREXA) 2.5 MG tablet   Rationale: Synergistic therapy - Needs additional medication therapy - Indication   Recommendation: Start Medication   Status: Contact Provider - Awaiting Response   Note: sertraline         Asthma, unspecified asthma severity, unspecified whether complicated, unspecified whether persistent    Current Medication: albuterol (PROAIR HFA/PROVENTIL HFA/VENTOLIN HFA) 108 (90 Base) MCG/ACT inhaler    Rationale: Does not understand instructions - Adherence - Adherence   Recommendation: Provide Education   Status: Patient Agreed - Adherence/Education                  Post Discharge Medication Reconciliation Status: discharge medications reconciled and changed, per note/orders.

## 2021-09-07 NOTE — LETTER
"        Date: 2021    Dominik Rojas  [address] [facility] [address]    Dear Mr. Rojas,    Thank you for talking with me on 2021 about your health and medications. Medicare s MTM (Medication Therapy Management) program helps you understand your medications and use them safely.      This letter includes an action plan (Medication Action Plan) and medication list (Personal Medication List). The action plan has steps you should take to help you get the best results from your medications. The medication list will help you keep track of your medications and how to use them the right way.       Have your action plan and medication list with you when you talk with your doctors, pharmacists, and other healthcare providers in your care team.     Ask your doctors, pharmacists, and other healthcare providers to update the action plan and medication list at every visit.     Take your medication list with you if you go to the hospital or emergency room.     Give a copy of the action plan and medication list to your family or caregivers.     If you want to talk about this letter or any of the papers with it, please call   [phone].We look forward to working with you, your doctors, and other healthcare providers to help you stay healthy through the [facility] MTM program.    Sincerely,  Wilma Lang RP    Enclosed: Medication Action Plan and Personal Medication List    MEDICATION ACTION PLAN FOR Dominik Rojas,  1940     This action plan will help you get the best results from your medications if you:   1. Read \"What we talked about.\"   2. Take the steps listed in the \"What I need to do\" boxes.   3. Fill in \"What I did and when I did it.\"   4. Fill in \"My follow-up plan\" and \"Questions I want to ask.\"     Have this action plan with you when you talk with your doctors, pharmacists, and other healthcare providers in your care team. Share this with your family or caregivers too.  DATE PREPARED: " 2021  What we talked about: Options to help with anxiety and insomnia.                                                  What I need to do:  Increase olanzapine to 5 mg once daily at bedtime.       What I did and when I did it:                                              What we talked about: How to use your albuterol inhaler and the nebulizer solution.                                                  What I need to do: Please review the instructions I mailed you for how to use these inhalers when you are at home.        What I did and when I did it:                                               What we talked about: Possibly restarting one of your past medications to help with your anxiety.                                                  What I need to do: I will talk to Grete about possibly restarting the Sertraline       What I did and when I did it:                                                   My follow-up plan:                 Questions I want to ask:              If you have any questions about your action plan, call Wilma Lang RPH, Phone: [phone] , Monday-Friday 8-4:30pm.           PERSONAL MEDICATION LIST FOR Dominik oRjas, CANDELAR[place] 1940     This medication list was made for you after we talked. We also used information from your doctor's chart.      Use blank rows to add new medications. Then fill in the dates you started using them.    Cross out medications when you no longer use them. Then write the date and why you stopped using them.    Ask your doctors, pharmacists, and other healthcare providers to update this list at every visit. Keep this list up-to-date with:       Prescription medications    Over the counter drugs     Herbals    Vitamins    Minerals      If you go to the hospital or emergency room, take this list with you. Share this with your family or caregivers too.     DATE PREPARED: 2021  Allergies or side effects: Diclofenac and Loratadine     Medication:   ACETAMINOPHEN 500 MG PO TABS      How I use it:  Take 1,000 mg by mouth as needed      Why I use it:  Pain    Prescriber:  Misael Moe      Date I started using it:       Date I stopped using it:         Why I stopped using it:            Medication:  ALBUTEROL SULFATE (2.5 MG/3ML) 0.083% IN NEBU      How I use it:  Inhale 2.5 mg into the lungs every 6 hours as needed       Why I use it:  Shortness of breath    Prescriber:  Misael Moe      Date I started using it:       Date I stopped using it:         Why I stopped using it:            Medication:  ALBUTEROL SULFATE  (90 BASE) MCG/ACT IN AERS      How I use it:  Inhale 2 puffs into the lungs every 6 hours as needed       Why I use it:  Shortness of breath    Prescriber:  Misael Moe      Date I started using it:       Date I stopped using it:         Why I stopped using it:            Medication:  AMLODIPINE BESYLATE 2.5 MG PO TABS      How I use it:  Take 1 tablet (2.5 mg) by mouth daily      Why I use it: Essential hypertension    Prescriber:  Ivonne Partida MD      Date I started using it:       Date I stopped using it:         Why I stopped using it:            Medication:  ASPIRIN 81 MG PO CHEW      How I use it:  Take 81 mg by mouth daily      Why I use it:  To prevent heart attack and stroke    Prescriber:  Misael Moe      Date I started using it:       Date I stopped using it:         Why I stopped using it:            Medication:  CALCITONIN (SALMON) 200 UNIT/ACT NA SOLN      How I use it:  Spray 1 spray into one nostril alternating nostrils daily Alternate nostril each day.      Why I use it:  Bone fracture healing    Prescriber:  Misael Moe      Date I started using it:       Date I stopped using it:         Why I stopped using it:            Medication:  DICLOFENAC SODIUM 1 % EX GEL      How I use it:  Apply 4 g topically 4 times daily      Why I use it: Acute pain of left knee    Prescriber:  Ivonne Partida MD      Date I  started using it:       Date I stopped using it:         Why I stopped using it:            Medication:  FUROSEMIDE 20 MG PO TABS      How I use it:  Take 1 tablet (20 mg) by mouth 2 times daily      Why I use it: Essential hypertension    Prescriber:  Ivonne Partida MD      Date I started using it:       Date I stopped using it:         Why I stopped using it:            Medication:  LATANOPROST 0.005 % OP SOLN      How I use it:  Place 1 drop into both eyes At Bedtime       Why I use it:  Glaucoma    Prescriber:  Misael Moe      Date I started using it:       Date I stopped using it:         Why I stopped using it:            Medication:  LEVOTHYROXINE SODIUM 150 MCG PO TABS      How I use it:  Take 1 tablet by mouth daily       Why I use it:  Low thyroid    Prescriber:  Misael Moe      Date I started using it:       Date I stopped using it:         Why I stopped using it:            Medication:  LIDOCAINE 5 % EX OINT      How I use it:  Apply topically 3 times daily      Why I use it: Primary osteoarthritis, unspecified site    Prescriber:  Ivonne Partida MD      Date I started using it:       Date I stopped using it:         Why I stopped using it:            Medication:  LOSARTAN POTASSIUM 25 MG PO TABS      How I use it:  Take 1 tablet (25 mg) by mouth daily      Why I use it: Congestive heart failure, unspecified HF chronicity, unspecified heart failure type (H)    Prescriber:  Miguel Celis,       Date I started using it:       Date I stopped using it:         Why I stopped using it:            Medication:  METOPROLOL SUCCINATE ER 25 MG PO TB24      How I use it:  Take 1 tablet (25 mg) by mouth daily      Why I use it: Essential hypertension    Prescriber:  Ivonne Partida MD      Date I started using it:       Date I stopped using it:         Why I stopped using it:            Medication:  OLANZAPINE 2.5 MG PO TABS      How I use it:  Take 5 mg by mouth At Bedtime      Why I use it:   Anxiety, Insomnia    Prescriber:  Misael Moe      Date I started using it:       Date I stopped using it:         Why I stopped using it:            Medication:  PANTOPRAZOLE SODIUM 40 MG PO TBEC      How I use it:  Take 1 tablet (40 mg) by mouth every morning (before breakfast)      Why I use it: Gastroesophageal reflux disease with esophagitis without hemorrhage    Prescriber:  Miguel Celis, DO      Date I started using it:       Date I stopped using it:         Why I stopped using it:            Medication:  ROSUVASTATIN CALCIUM 10 MG PO TABS      How I use it:  Take 10 mg by mouth At Bedtime       Why I use it:  High cholesterol    Prescriber:  Misael Moe      Date I started using it:       Date I stopped using it:         Why I stopped using it:            Medication:  THERA M PLUS PO TABS      How I use it:  Take 1 tablet by mouth daily      Why I use it:  General health    Prescriber:  Patient Reported      Date I started using it:       Date I stopped using it:         Why I stopped using it:            Medication:  VITAMIN D3 50 MCG (2000 UT) PO CAPS      How I use it:  Take 1 tablet by mouth daily       Why I use it:  General health    Prescriber:  Patient Reported      Date I started using it:       Date I stopped using it:         Why I stopped using it:            Medication:         How I use it:         Why I use it:      Prescriber:         Date I started using it:       Date I stopped using it:         Why I stopped using it:            Medication:         How I use it:         Why I use it:      Prescriber:         Date I started using it:       Date I stopped using it:         Why I stopped using it:            Medication:         How I use it:         Why I use it:      Prescriber:         Date I started using it:       Date I stopped using it:         Why I stopped using it:              Other Information:     If you have any questions about your medication list, call Wilma  Rickey JUAN, Phone: [phone] , Monday-Friday 8-4:30pm.    According to the Paperwork Reduction Act of 1995, no persons are required to respond to a collection of information unless it displays a valid OMB control number. The valid OMB number for this information collection is 4512-5904. The time required to complete this information collection is estimated to average 40 minutes per response, including the time to review instructions, searching existing data resources, gather the data needed, and complete and review the information collection. If you have any comments concerning the accuracy of the time estimate(s) or suggestions for improving this form, please write to: CMS, Attn: SEBASTIÁN Reports Clearance Officer, [address] [place] [id number].

## 2021-09-09 ENCOUNTER — HOSPITAL ENCOUNTER (EMERGENCY)
Facility: HOSPITAL | Age: 81
Discharge: HOME | End: 2021-09-09
Attending: EMERGENCY MEDICINE | Admitting: EMERGENCY MEDICINE
Payer: COMMERCIAL

## 2021-09-09 ENCOUNTER — APPOINTMENT (OUTPATIENT)
Dept: CT IMAGING | Facility: HOSPITAL | Age: 81
End: 2021-09-09
Attending: EMERGENCY MEDICINE
Payer: COMMERCIAL

## 2021-09-09 VITALS
BODY MASS INDEX: 36.54 KG/M2 | HEART RATE: 86 BPM | SYSTOLIC BLOOD PRESSURE: 129 MMHG | HEIGHT: 64 IN | DIASTOLIC BLOOD PRESSURE: 85 MMHG | WEIGHT: 214 LBS | OXYGEN SATURATION: 92 % | TEMPERATURE: 97.6 F | RESPIRATION RATE: 20 BRPM

## 2021-09-09 DIAGNOSIS — G89.29 CHRONIC MIDLINE THORACIC BACK PAIN: ICD-10-CM

## 2021-09-09 DIAGNOSIS — N30.01 ACUTE CYSTITIS WITH HEMATURIA: ICD-10-CM

## 2021-09-09 DIAGNOSIS — M54.6 CHRONIC MIDLINE THORACIC BACK PAIN: ICD-10-CM

## 2021-09-09 LAB
ALBUMIN UR-MCNC: 20 MG/DL
ANION GAP SERPL CALCULATED.3IONS-SCNC: 9 MMOL/L (ref 5–18)
APPEARANCE UR: ABNORMAL
BACTERIA #/AREA URNS HPF: ABNORMAL /HPF
BASOPHILS # BLD AUTO: 0.1 10E3/UL (ref 0–0.2)
BASOPHILS NFR BLD AUTO: 1 %
BILIRUB UR QL STRIP: NEGATIVE
BUN SERPL-MCNC: 23 MG/DL (ref 8–28)
C REACTIVE PROTEIN LHE: 0.4 MG/DL (ref 0–0.8)
CALCIUM SERPL-MCNC: 9.5 MG/DL (ref 8.5–10.5)
CHLORIDE BLD-SCNC: 107 MMOL/L (ref 98–107)
CO2 SERPL-SCNC: 26 MMOL/L (ref 22–31)
COLOR UR AUTO: YELLOW
CREAT SERPL-MCNC: 0.95 MG/DL (ref 0.7–1.3)
EOSINOPHIL # BLD AUTO: 0.6 10E3/UL (ref 0–0.7)
EOSINOPHIL NFR BLD AUTO: 7 %
ERYTHROCYTE [DISTWIDTH] IN BLOOD BY AUTOMATED COUNT: 14.6 % (ref 10–15)
GFR SERPL CREATININE-BSD FRML MDRD: 76 ML/MIN/1.73M2
GLUCOSE BLD-MCNC: 89 MG/DL (ref 70–125)
GLUCOSE UR STRIP-MCNC: NEGATIVE MG/DL
HCT VFR BLD AUTO: 43.1 % (ref 40–53)
HGB BLD-MCNC: 13.6 G/DL (ref 13.3–17.7)
HGB UR QL STRIP: ABNORMAL
HOLD SPECIMEN: NORMAL
IMM GRANULOCYTES # BLD: 0 10E3/UL
IMM GRANULOCYTES NFR BLD: 0 %
KETONES UR STRIP-MCNC: NEGATIVE MG/DL
LEUKOCYTE ESTERASE UR QL STRIP: ABNORMAL
LYMPHOCYTES # BLD AUTO: 3 10E3/UL (ref 0.8–5.3)
LYMPHOCYTES NFR BLD AUTO: 33 %
MCH RBC QN AUTO: 29 PG (ref 26.5–33)
MCHC RBC AUTO-ENTMCNC: 31.6 G/DL (ref 31.5–36.5)
MCV RBC AUTO: 92 FL (ref 78–100)
MONOCYTES # BLD AUTO: 0.9 10E3/UL (ref 0–1.3)
MONOCYTES NFR BLD AUTO: 9 %
NEUTROPHILS # BLD AUTO: 4.5 10E3/UL (ref 1.6–8.3)
NEUTROPHILS NFR BLD AUTO: 50 %
NITRATE UR QL: NEGATIVE
NRBC # BLD AUTO: 0 10E3/UL
NRBC BLD AUTO-RTO: 0 /100
PH UR STRIP: 6 [PH] (ref 5–7)
PLATELET # BLD AUTO: 246 10E3/UL (ref 150–450)
POTASSIUM BLD-SCNC: 4 MMOL/L (ref 3.5–5)
RBC # BLD AUTO: 4.69 10E6/UL (ref 4.4–5.9)
RBC URINE: >182 /HPF
SODIUM SERPL-SCNC: 142 MMOL/L (ref 136–145)
SP GR UR STRIP: 1.02 (ref 1–1.03)
UROBILINOGEN UR STRIP-MCNC: <2 MG/DL
WBC # BLD AUTO: 9.1 10E3/UL (ref 4–11)
WBC URINE: 138 /HPF

## 2021-09-09 PROCEDURE — 96374 THER/PROPH/DIAG INJ IV PUSH: CPT

## 2021-09-09 PROCEDURE — 99285 EMERGENCY DEPT VISIT HI MDM: CPT | Mod: 25

## 2021-09-09 PROCEDURE — 81001 URINALYSIS AUTO W/SCOPE: CPT | Performed by: EMERGENCY MEDICINE

## 2021-09-09 PROCEDURE — 85025 COMPLETE CBC W/AUTO DIFF WBC: CPT | Performed by: EMERGENCY MEDICINE

## 2021-09-09 PROCEDURE — 86141 C-REACTIVE PROTEIN HS: CPT | Performed by: EMERGENCY MEDICINE

## 2021-09-09 PROCEDURE — 74176 CT ABD & PELVIS W/O CONTRAST: CPT

## 2021-09-09 PROCEDURE — 80048 BASIC METABOLIC PNL TOTAL CA: CPT | Performed by: EMERGENCY MEDICINE

## 2021-09-09 PROCEDURE — 36415 COLL VENOUS BLD VENIPUNCTURE: CPT | Performed by: EMERGENCY MEDICINE

## 2021-09-09 PROCEDURE — 250N000011 HC RX IP 250 OP 636: Performed by: EMERGENCY MEDICINE

## 2021-09-09 PROCEDURE — 87086 URINE CULTURE/COLONY COUNT: CPT | Performed by: EMERGENCY MEDICINE

## 2021-09-09 RX ORDER — KETOROLAC TROMETHAMINE 15 MG/ML
15 INJECTION, SOLUTION INTRAMUSCULAR; INTRAVENOUS ONCE
Status: COMPLETED | OUTPATIENT
Start: 2021-09-09 | End: 2021-09-09

## 2021-09-09 RX ORDER — CEPHALEXIN 500 MG/1
500 CAPSULE ORAL 3 TIMES DAILY
Qty: 21 CAPSULE | Refills: 0 | Status: SHIPPED | OUTPATIENT
Start: 2021-09-09 | End: 2021-09-21

## 2021-09-09 RX ADMIN — KETOROLAC TROMETHAMINE 15 MG: 15 INJECTION, SOLUTION INTRAMUSCULAR; INTRAVENOUS at 10:13

## 2021-09-09 ASSESSMENT — MIFFLIN-ST. JEOR: SCORE: 1596.7

## 2021-09-09 NOTE — ED TRIAGE NOTES
"Elderly male presents to ED with mid back pain for the past 1 week.  Patient has had back issues in the past.  Took tylenol before bed with no relief.  Patient's spouse states he is \"real funny at night\".  "

## 2021-09-09 NOTE — PATIENT INSTRUCTIONS
Recommendations from today's MTM visit:                                                    MTM (medication therapy management) is a service provided by a clinical pharmacist designed to help you get the most of out of your medicines.   Today we reviewed what your medicines are for, how to know if they are working, that your medicines are safe and how to make your medicine regimen as easy as possible.      1.  Increase olanzapine to 5 mg once daily at bedtime.  2.  You can use the Albuterol nebulizer machine or the Albuterol handheld inhaler with the spacer when you feel shortness of breath.    3.  Recommend restarting sertraline 25 mg once daily.    Follow-up: Return in about 1 week (around 9/14/2021) for MTM Follow Up. - Please call and schedule a follow up appointment with me the week of 9/14. You can call [phone] to schedule phone follow up      It was great to speak with you today.  I value your experience and would be very thankful for your time with providing feedback on our clinic survey. You may receive a survey via email or text message in the next few days.     To schedule another MTM appointment, please call the clinic directly or you may call the MTM scheduling line at [phone] or toll-free at [phone].     My Clinical Pharmacist's contact information:                                                      Please feel free to contact me with any questions or concerns you have.      Wilma Lang, PharmD  Medication Therapy Management Pharmacist  Pager: [phone]       Medication List          Accurate as of September 7, 2021 11:59 PM. If you have any questions, ask your nurse or doctor.            CONTINUE taking these medications      Morning Afternoon Evening Bedtime   acetaminophen 500 MG tablet  Also known as: TYLENOL  Take 1,000 mg by mouth as needed            * albuterol (2.5 MG/3ML) 0.083% neb solution  Also known as: PROVENTIL  Inhale 2.5 mg into the lungs every 6 hours as  needed            * albuterol 108 (90 Base) MCG/ACT inhaler  Also known as: PROAIR HFA/PROVENTIL HFA/VENTOLIN HFA  Inhale 2 puffs into the lungs every 6 hours as needed            amLODIPine 2.5 MG tablet  Also known as: NORVASC  Take 1 tablet (2.5 mg) by mouth daily              aspirin 81 MG chewable tablet  Also known as: ASA  Take 81 mg by mouth daily              calcitonin (salmon) 200 UNIT/ACT nasal spray  Also known as: MIACALCIN  Spray 1 spray into one nostril alternating nostrils daily Alternate nostril each day.              diclofenac 1 % topical gel  Also known as: VOLTAREN  Apply 4 g topically 4 times daily            furosemide 20 MG tablet  Also known as: LASIX  Take 1 tablet (20 mg) by mouth 2 times daily                latanoprost 0.005 % ophthalmic solution  Also known as: XALATAN  Place 1 drop into both eyes At Bedtime              levothyroxine 150 MCG tablet  Also known as: SYNTHROID/LEVOTHROID  Take 1 tablet by mouth daily              lidocaine 5 % external ointment  Also known as: XYLOCAINE  Apply topically 3 times daily  Notes to patient: For back pain            losartan 25 MG tablet  Also known as: COZAAR  Take 1 tablet (25 mg) by mouth daily              metoprolol succinate ER 25 MG 24 hr tablet  Also known as: TOPROL-XL  Take 1 tablet (25 mg) by mouth daily              Multi-vitamin tablet  Take 1 tablet by mouth daily              OLANZapine 2.5 MG tablet  Also known as: zyPREXA  Take 5 mg by mouth At Bedtime           2 tablets     pantoprazole 40 MG EC tablet  Also known as: PROTONIX  Take 1 tablet (40 mg) by mouth every morning (before breakfast)              rosuvastatin 10 MG tablet  Also known as: CRESTOR  Take 10 mg by mouth At Bedtime              vitamin D3 50 MCG (2000 UT) Caps  Take 1 tablet by mouth daily                  * This list has 2 medication(s) that are the same as other medications prescribed for you. Read the directions carefully, and ask your doctor or other  care provider to review them with you.

## 2021-09-09 NOTE — ED NOTES
"Pt present with c/o mid back pain.  States he had surgery in March for this and the pain has been worse since then with last night being the worst. Supposed to wear a brace but states it is too hard to breathe with it on with COPD.   Wife states pt acts \"funny\" at night and is not himself.  Has worked with clinic pharmacist to change his meds.   "

## 2021-09-09 NOTE — ED PROVIDER NOTES
"EMERGENCY DEPARTMENT ENCOUNTER      NAME: Dominik Rojas  AGE: 80 year old male  YOB: 1940  MRN: [id number]  EVALUATION DATE & TIME: 9/9/2021  7:44 AM    PCP: Lisandro Meza    ED PROVIDER: Antoine Garcia D.O.      Chief Complaint   Patient presents with     Back Pain       HPI    Patient information was obtained from: Patient and patient's wife    Use of : N/A     Dominik Rojas is a 80 year old male with a PMH of COPD, chronic systolic CHF, hypertension, hypothyroidism, anxiety, and chronic back pain who presents to this ED for evaluation of back pain.    Per chart review,  Patient was seen in the ED 8/14/21 for evaluation of anxiety and shortness of breath. Chest x-ray showed bibasilar atelectasis versus infiltrate. Patient was given Aspirin, Ativan, Solu-Medrol, and nebulizers with improvement.  Patient admitted with dyspnea. Patient reported increased anxiety due to \"craving oxycodone.\" Psychiatry evaluated patient and recommended stopping amitriptyline, starting Gabapentin and Rozerem. Gabapentin and hydroxyzine were placed on hold by addication medicine. Suboxone held due to patient not tolerating it. Patient seen by pain team who recommended upon discharge 8/23/21: weight loss clinic, PT, home care, addiction follow up, and diclofenac. Patient is status post bilateral L2-4 decompressive lumbar laminectomy with medial facetectomies on 3/3/2021. MRI L-spine 6/22/2021 showed recent T12 compression fracture with 50% loss of vertebral height. Brace not tolerated due to COPD. Of note, patient saw a pharmacist for medication review 9/7/21.     Patient reports \"terrible\" back pain that began last night and is worse than he has ever experienced before. He has tried Tylenol which was working, but no longer provides relief. Patient has also tried Lidocaine patches and gel without significant relief. He denies any urinary or bowel incontinence. Of note, following the patient's back " "surgery in March he has not tried further back injections. He did not tolerate physical therapy or a back brace.    Per patient's wife,  The patient has been waking up in the middle of the night for the past 3 nights stating \"I need help.\" He has not been sleeping much during the night either. Patient's wife is not sure if this sleep disturbance is due to back pain or cognitive issues. She is concerned for possible dementia and \"Sundowner's syndrome.\" She would like resources to get the patient evaluated for dementia.       REVIEW OF SYSTEMS  Constitutional:  Denies fever, chills  Respiratory: No SOB, wheeze or cough  Cardiovascular:  No CP, palpitations  GI:  Denies abdominal pain, nausea, vomiting, diarrhea, or bowel incontinence.  : Denies dysuria, hematuria, or urinary incontinence  Musculoskeletal:  Denies any new muscle/joint pain, swelling or loss of function. Endorses back pain (chronic, increased).  Skin:  Denies rash, pallor  Neurologic:  Denies headache, focal weakness or sensory changes. Endorses confusion (per wife).    All other systems negative unless noted in HPI.    PAST MEDICAL HISTORY:  Past Medical History:   Diagnosis Date     AAA (abdominal aortic aneurysm) (H)     s/p stenting     Alcohol dependence in remission (H)      Alcohol use disorder, severe, in sustained remission, dependence (H) 8/16/2021     Anxiety      Atrial fibrillation with normal ventricular rate (H)      Benign prostatic hyperplasia with lower urinary tract symptoms      Bronchiectasis without complication (H)     2/27/2020     COPD (chronic obstructive pulmonary disease) (H)      CVA (cerebral vascular accident) (H) 2019     DDD (degenerative disc disease), lumbar      Depression      Depressive disorder      Diabetes (H)      Diastolic dysfunction 01/22/2021     DJD (degenerative joint disease) of knee     left     Essential hypertension      Glaucoma      Glaucoma      History of stroke without residual deficits      " Hyperlipidemia      Hypertension      Hypothyroidism      Hypothyroidism      Kidney stones      Major depression      Memory problem      Nocturia      Obesity      Opioid use disorder, severe, dependence (H) 8/16/2021     Overactive bladder 02/25/2021     Primary osteoarthritis of left knee      Psoriasis      Psoriasis      Seborrheic keratoses      Somnolence, daytime      Stenosis of right carotid artery     history of TIA's       PAST SURGICAL HISTORY:  Past Surgical History:   Procedure Laterality Date     ABDOMEN SURGERY       ABDOMINAL AORTIC ANEURYSM REPAIR  2013    stent     C HUANG W/O FACETEC FORAMOT/DSKC 1/2 VRT SEG, LUMBAR Bilateral 3/3/2021    Procedure: Bilateral lumbar 2 - Lumbar 4 decompressive lumbar laminectomy with medial facetectomies;  Surgeon: Renée King MD;  Location: [facility];  Service: Spine     CAROTID ENDARTERECTOMY Right 9/9/2019    Procedure: RIGHT CAROTID ENDARTERECTOMY;  Surgeon: Miguel Muller MD;  Location: [facility];  Service: General     CIRCUMCISION       CYSTOSCOPY       CYSTOSCOPY PROSTATE W/ LASER N/A 6/12/2018    Procedure:  TRANSURETHRAL RESECTION OF THE PROSTATE WITH QUANTA LASER;  Surgeon: Eze Levi MD;  Location: [facility] OR;  Service:      CYSTOSCOPY PROSTATE W/ LASER N/A 2/18/2020    Procedure: CYSTOSCOPY WITH TRANSURETHRAL INCISION OF THE PROSTATE WITH QUANTA LASER;  Surgeon: Eze Levi MD;  Location: [facility] OR;  Service: Urology     CYSTOSCOPY W/ LITHOLAPAXY / EHL N/A 6/12/2018    Procedure: CYSTOSCOPY AND LITHOLAPAXY;  Surgeon: Eze Levi MD;  Location: [facility];  Service:      IR ABDOMINAL AORTAGRAM  5/19/2020     IR ABDOMINAL AORTIC ANEURYSM ENDOGRAFT  5/19/2020     IR ABDOMINAL AORTOGRAM  5/19/2020     IR ABDOMINAL ENDOVASCULAR STENT GRAFT  5/19/2020     LITHOTRIPSY       PERCUTANEOUS NEPHROSTOLITHOTOMY Right 03/10/2020         CURRENT MEDICATIONS:    No current  facility-administered medications for this encounter.     Current Outpatient Medications   Medication     cephALEXin (KEFLEX) 500 MG capsule     acetaminophen (TYLENOL) 500 MG tablet     albuterol (PROAIR HFA/PROVENTIL HFA/VENTOLIN HFA) 108 (90 Base) MCG/ACT inhaler     albuterol (PROVENTIL) (2.5 MG/3ML) 0.083% neb solution     amLODIPine (NORVASC) 2.5 MG tablet     aspirin (ASA) 81 MG chewable tablet     calcitonin, salmon, (MIACALCIN) 200 UNIT/ACT nasal spray     Cholecalciferol (VITAMIN D3) 50 MCG (2000 UT) CAPS     diclofenac (VOLTAREN) 1 % topical gel     furosemide (LASIX) 20 MG tablet     latanoprost (XALATAN) 0.005 % ophthalmic solution     levothyroxine (SYNTHROID/LEVOTHROID) 150 MCG tablet     lidocaine (XYLOCAINE) 5 % external ointment     losartan (COZAAR) 25 MG tablet     metoprolol succinate ER (TOPROL-XL) 25 MG 24 hr tablet     multivitamin w/minerals (MULTI-VITAMIN) tablet     OLANZapine (ZYPREXA) 2.5 MG tablet     pantoprazole (PROTONIX) 40 MG EC tablet     rosuvastatin (CRESTOR) 10 MG tablet         ALLERGIES:  Allergies   Allergen Reactions     Diclofenac      Other reaction(s): GI Upset     Loratadine      Other reaction(s): Urinary Retention       FAMILY HISTORY:  Family History   Problem Relation Age of Onset     Emphysema Mother      No Known Problems Father      Cirrhosis Father      No Known Problems Sister      No Known Problems Brother      No Known Problems Maternal Aunt      No Known Problems Maternal Uncle      No Known Problems Paternal Aunt      No Known Problems Paternal Uncle      No Known Problems Maternal Grandmother      No Known Problems Maternal Grandfather      No Known Problems Paternal Grandmother      No Known Problems Paternal Grandfather      No Known Problems Other        SOCIAL HISTORY:  Social History     Socioeconomic History     Marital status:      Spouse name: Not on file     Number of children: Not on file     Years of education: Not on file     Highest  "education level: Not on file   Occupational History     Not on file   Tobacco Use     Smoking status: Former Smoker     Packs/day: 0.50     Years: 35.00     Pack years: 17.50     Types: Cigarettes     Quit date: 1990     Years since quittin.7     Smokeless tobacco: Never Used     Tobacco comment: quit    Substance and Sexual Activity     Alcohol use: No     Comment: Alcoholic Drinks/day: quit      Drug use: No     Sexual activity: Yes     Partners: Female     Birth control/protection: Post-menopausal   Other Topics Concern     Parent/sibling w/ CABG, MI or angioplasty before 65F 55M? No   Social History Narrative     Not on file     Social Determinants of Health     Financial Resource Strain:      Difficulty of Paying Living Expenses:    Food Insecurity:      Worried About Running Out of Food in the Last Year:      Ran Out of Food in the Last Year:    Transportation Needs:      Lack of Transportation (Medical):      Lack of Transportation (Non-Medical):    Physical Activity:      Days of Exercise per Week:      Minutes of Exercise per Session:    Stress:      Feeling of Stress :    Social Connections:      Frequency of Communication with Friends and Family:      Frequency of Social Gatherings with Friends and Family:      Attends Restorationist Services:      Active Member of Clubs or Organizations:      Attends Club or Organization Meetings:      Marital Status:    Intimate Partner Violence:      Fear of Current or Ex-Partner:      Emotionally Abused:      Physically Abused:      Sexually Abused:        VITALS:  Patient Vitals for the past 24 hrs:   BP Temp Temp src Pulse Resp SpO2 Height Weight   21 1004 129/85 -- -- -- 20 92 % -- --   21 0453 137/84 97.6  F (36.4  C) Temporal 86 20 91 % 1.626 m (5' 4\") 97.1 kg (214 lb)       PHYSICAL EXAM    VITAL SIGNS: /85   Pulse 86   Temp 97.6  F (36.4  C) (Temporal)   Resp 20   Ht 1.626 m (5' 4\")   Wt 97.1 kg (214 lb)   SpO2 92%   BMI " 36.73 kg/m      General Appearance: Well-appearing, well-nourished, no acute distress  Head:  Normocephalic, without obvious abnormality, atraumatic  Eyes:  PERRL, conjunctiva/corneas clear, EOM's intact,  ENT:  Lips, mucosa, and tongue normal, membranes are moist without pallor  Neck:  Normal ROM, symmetrical, trachea midline    Cardio:  Regular rate and rhythm, no murmur, rub or gallop, 2+ pulses symmetric in all extremities  Pulm:  Clear to auscultation bilaterally, respirations unlabored,  Back:  ROM normal, no CVA tenderness, no paraspinal tenderness. Midline spinal tenderness over T12 vertebrae.  Abdomen:  Soft, non-tender, no rebound or guarding.  Musculoskeletal: Full ROM, no edema, no cyanosis, good ROM of major joints  Integument:  Warm, Dry, No erythema, No rash.    Neurologic:  Alert & oriented.  No focal deficits appreciated.  Ambulatory.  Psychiatric:  Affect normal, Judgment normal, Mood normal.      LABS  Results for orders placed or performed during the hospital encounter of 09/09/21 (from the past 24 hour(s))   [place] Draw    Narrative    The following orders were created for panel order [place] Draw.  Procedure                               Abnormality         Status                     ---------                               -----------         ------                     Extra Blue Top Tube[[id number]]                              Final result               Extra Red Top Tube[[id number]]                               Final result               Extra Green Top (Lithium...[[id number]]                      Final result               Extra Purple Top Tube[[id number]]                            Final result                 Please view results for these tests on the individual orders.   Extra Blue Top Tube   Result Value Ref Range    Hold Specimen JIC    Extra Red Top Tube   Result Value Ref Range    Hold Specimen JIC    Extra Green Top (Lithium Heparin) Tube   Result Value Ref Range    Hold Specimen JIC     Extra Purple Top Tube   Result Value Ref Range    Hold Specimen [facility]    CBC with platelets + differential    Narrative    The following orders were created for panel order CBC with platelets + differential.  Procedure                               Abnormality         Status                     ---------                               -----------         ------                     CBC with platelets and d...[[id number]]                      Final result                 Please view results for these tests on the individual orders.   Basic metabolic panel   Result Value Ref Range    Sodium 142 136 - 145 mmol/L    Potassium 4.0 3.5 - 5.0 mmol/L    Chloride 107 98 - 107 mmol/L    Carbon Dioxide (CO2) 26 22 - 31 mmol/L    Anion Gap 9 5 - 18 mmol/L    Urea Nitrogen 23 8 - 28 mg/dL    Creatinine 0.95 0.70 - 1.30 mg/dL    Calcium 9.5 8.5 - 10.5 mg/dL    Glucose 89 70 - 125 mg/dL    GFR Estimate 76 >60 mL/min/1.73m2   CRP inflammation   Result Value Ref Range    CRP 0.4 0.0-<0.8 mg/dL   CBC with platelets and differential   Result Value Ref Range    WBC Count 9.1 4.0 - 11.0 10e3/uL    RBC Count 4.69 4.40 - 5.90 10e6/uL    Hemoglobin 13.6 13.3 - 17.7 g/dL    Hematocrit 43.1 40.0 - 53.0 %    MCV 92 78 - 100 fL    MCH 29.0 26.5 - 33.0 pg    MCHC 31.6 31.5 - 36.5 g/dL    RDW 14.6 10.0 - 15.0 %    Platelet Count 246 150 - 450 10e3/uL    % Neutrophils 50 %    % Lymphocytes 33 %    % Monocytes 9 %    % Eosinophils 7 %    % Basophils 1 %    % Immature Granulocytes 0 %    NRBCs per 100 WBC 0 <1 /100    Absolute Neutrophils 4.5 1.6 - 8.3 10e3/uL    Absolute Lymphocytes 3.0 0.8 - 5.3 10e3/uL    Absolute Monocytes 0.9 0.0 - 1.3 10e3/uL    Absolute Eosinophils 0.6 0.0 - 0.7 10e3/uL    Absolute Basophils 0.1 0.0 - 0.2 10e3/uL    Absolute Immature Granulocytes 0.0 <=0.0 10e3/uL    Absolute NRBCs 0.0 10e3/uL   UA with Microscopic reflex to Culture    Specimen: Urine, Midstream   Result Value Ref Range    Color Urine Yellow Colorless, Straw,  Light Yellow, Yellow    Appearance Urine Turbid (A) Clear    Glucose Urine Negative Negative mg/dL    Bilirubin Urine Negative Negative    Ketones Urine Negative Negative mg/dL    Specific Gravity Urine 1.020 1.001 - 1.030    Blood Urine >1.0 mg/dL (A) Negative    pH Urine 6.0 5.0 - 7.0    Protein Albumin Urine 20  (A) Negative mg/dL    Urobilinogen Urine <2.0 <2.0 mg/dL    Nitrite Urine Negative Negative    Leukocyte Esterase Urine 500 Juan/uL (A) Negative    Bacteria Urine Few (A) None Seen /HPF    RBC Urine >182 (H) <=2 /HPF    WBC Urine 138 (H) <=5 /HPF    Narrative    Urine Culture ordered based on laboratory criteria   CT Abdomen Pelvis w/o Contrast    Narrative    EXAM: CT ABDOMEN PELVIS W/O CONTRAST  LOCATION: [facility]  DATE/TIME: 9/9/2021 9:44 AM    INDICATION: Flank pain, kidney stone suspected  COMPARISON: CT chest, abdomen, and pelvis from 8/16/2020.  TECHNIQUE: CT scan of the abdomen and pelvis was performed without IV contrast. Multiplanar reformats were obtained. Dose reduction techniques were used.  CONTRAST: None.    FINDINGS:   LOWER CHEST: Mild emphysema. Minimal bronchiectasis. Mild bibasilar atelectasis/scarring. Coronary atherosclerosis.    HEPATOBILIARY: Hepatic steatosis. Cholelithiasis.    PANCREAS: Normal.    SPLEEN: Normal.    ADRENAL GLANDS: Normal.    KIDNEYS/BLADDER: Bilateral nonobstructing nephrolithiasis with the largest in the left lower pole measuring 6 x 4 mm. No hydronephrosis. Diffuse circumferential wall thickening of the bladder likely related to chronic bladder outlet obstruction from   prostatomegaly.    BOWEL: Diverticulosis of the colon. No acute inflammatory change. No obstruction. Normal appendix.    LYMPH NODES: Normal.    VASCULATURE: Aortobiiliac endovascular stent. The infrarenal abdominal aortic aneurysm measures 7 x 7.3 cm, similar to prior exam. No surrounding fat stranding or periaortic fluid. Extensive atherosclerosis.    PELVIC ORGANS:  Prostatomegaly.    MUSCULOSKELETAL: Degenerative changes of the spine. Similar chronic appearing T12 compression deformity. Surgical changes of the bilateral inguinal fossa.      Impression    IMPRESSION:   1.  Bilateral nonobstructing renal calculi measuring up to 6 mm. No hydronephrosis.  2.  Similar infrarenal abdominal aortic aneurysm and aortobiiliac endovascular stent. These are incompletely evaluated without contrast.  3.  Cholelithiasis.  4.  Hepatic steatosis.  5.  Emphysema.           RADIOLOGY  CT Abdomen Pelvis w/o Contrast   Final Result   IMPRESSION:    1.  Bilateral nonobstructing renal calculi measuring up to 6 mm. No hydronephrosis.   2.  Similar infrarenal abdominal aortic aneurysm and aortobiiliac endovascular stent. These are incompletely evaluated without contrast.   3.  Cholelithiasis.   4.  Hepatic steatosis.   5.  Emphysema.                FINAL IMPRESSION:  1. Chronic midline thoracic back pain    2. Acute cystitis with hematuria          ED COURSE & MEDICAL DECISION MAKIN:58 AM I met with the patient to gather history and to perform my initial exam. I discussed the plan for care while in the Emergency Department. I was wearing PPE including gloves and N95 mask.  10:10 AM I rechecked on the patient and updated him and his wife on lab and imaging results. We discussed the plan for discharge and the patient is agreeable. Reviewed supportive cares, symptomatic treatment, outpatient follow up, and reasons to return to the Emergency Department. Patient to be discharged by ED RN.     Pertinent Labs & Imaging studies reviewed. (See chart for details)  80 year old male presents to the Emergency Department for evaluation of chronic mid back pain.  The patient's wife does mention also that he has been having some dementia symptoms that she is concerned about, but has not addressed with his primary care at this point.  Patient's pain is at the location of his previous back surgery, and is  remained unchanged with distal symptoms and negative CRP.  I do not believe MRI imaging is indicated.  UA did show some red blood cells, and although I am most concerned for acute cystitis I did decide to perform a CT scan and ensure there is no kidney stones.  CT for kidney stone was negative.  Additionally there is no other obvious pathology on the CT scan.  Labs otherwise unremarkable and this patient.  I did give him Toradol for the pain.  Patient is refusing narcotic medication at this time.  I do not see indication for additional intervention or evaluation in the emergency department this time, but I have referred him to urology for the hematuria, and recommended to the patient supposed to call his back surgeon and his primary care for further evaluation about the back pain and the concern for the possible dementia.  Patient's wife and the patient were comfortable with this plan.  Return precautions were discussed.    At the conclusion of the encounter I discussed the results of all of the tests and the disposition. The questions were answered. The patient or family acknowledged understanding and was agreeable with the care plan.      MEDICATIONS GIVEN IN THE EMERGENCY:  Medications   ketorolac (TORADOL) injection 15 mg (15 mg Intravenous Given 9/9/21 1013)       NEW PRESCRIPTIONS STARTED AT TODAY'S ER VISIT  New Prescriptions    CEPHALEXIN (KEFLEX) 500 MG CAPSULE    Take 1 capsule (500 mg) by mouth 3 times daily for 7 days        I, Gregoria Mon, am serving as a scribe to document services personally performed by Dr. Antoine Garcia, based on my observation and the provider's statements to me. IAntoine DO attest that Gregoria Mon is acting in a scribe capacity, has observed my performance of the services and has documented them in accordance with my direction.      Antoine Garcia D.O.  Emergency Medicine  [facility] EMERGENCY DEPARTMENT  [facility], [place]  [id number]  [phone]  Dept: [phone]       Antoine Garcia,   09/09/21 1247

## 2021-09-11 LAB — BACTERIA UR CULT: ABNORMAL

## 2021-09-13 ENCOUNTER — TELEPHONE (OUTPATIENT)
Dept: PHYSICAL MEDICINE AND REHAB | Facility: CLINIC | Age: 81
End: 2021-09-13

## 2021-09-13 NOTE — TELEPHONE ENCOUNTER
Ok to use hold spot in afternoon.  Please make slot currently available in AM a hold spot instead.

## 2021-09-13 NOTE — TELEPHONE ENCOUNTER
PSP:  Maureen HERRON  Last clinic visit:  8/9/2021  Reason for call: Appointment  Clinical information:  Call received from pt's wife, Jovana, inquiring if Maureen would have availability to do an appointment this week. Maureen has one available appointment left tomorrow AM. This does not work as pt has an appointment during this time with a pharmacist to discuss his medications.   She inquires if Maureen would be able to see the patient tomorrow afternoon to check in.   Advice given to patient: Informed pt that will check with Maureen and will call her back.   Provider to address: Ok to put patient in your hold spot tomorrow afternoon? Please advise

## 2021-09-13 NOTE — TELEPHONE ENCOUNTER
Call placed to Jovana with provider's response. Appt made for tomorrow afternoon. Informed schedulers that AM slot should be held.

## 2021-09-14 ENCOUNTER — OFFICE VISIT (OUTPATIENT)
Dept: PHYSICAL MEDICINE AND REHAB | Facility: CLINIC | Age: 81
End: 2021-09-14
Payer: COMMERCIAL

## 2021-09-14 ENCOUNTER — VIRTUAL VISIT (OUTPATIENT)
Dept: PHARMACY | Facility: PHYSICIAN GROUP | Age: 81
End: 2021-09-14
Payer: COMMERCIAL

## 2021-09-14 VITALS
DIASTOLIC BLOOD PRESSURE: 70 MMHG | BODY MASS INDEX: 35.58 KG/M2 | HEIGHT: 64 IN | OXYGEN SATURATION: 92 % | WEIGHT: 208.4 LBS | SYSTOLIC BLOOD PRESSURE: 136 MMHG | HEART RATE: 93 BPM

## 2021-09-14 DIAGNOSIS — R31.9 URINARY TRACT INFECTION WITH HEMATURIA, SITE UNSPECIFIED: ICD-10-CM

## 2021-09-14 DIAGNOSIS — G89.29 CHRONIC BILATERAL LOW BACK PAIN WITHOUT SCIATICA: ICD-10-CM

## 2021-09-14 DIAGNOSIS — F32.A DEPRESSION, UNSPECIFIED DEPRESSION TYPE: Primary | ICD-10-CM

## 2021-09-14 DIAGNOSIS — M54.50 CHRONIC BILATERAL LOW BACK PAIN WITHOUT SCIATICA: ICD-10-CM

## 2021-09-14 DIAGNOSIS — J45.909 ASTHMA, UNSPECIFIED ASTHMA SEVERITY, UNSPECIFIED WHETHER COMPLICATED, UNSPECIFIED WHETHER PERSISTENT: ICD-10-CM

## 2021-09-14 DIAGNOSIS — G89.29 OTHER CHRONIC PAIN: ICD-10-CM

## 2021-09-14 DIAGNOSIS — G47.00 INSOMNIA, UNSPECIFIED TYPE: ICD-10-CM

## 2021-09-14 DIAGNOSIS — M47.816 LUMBAR FACET ARTHROPATHY: Primary | ICD-10-CM

## 2021-09-14 DIAGNOSIS — F41.9 ANXIETY: ICD-10-CM

## 2021-09-14 DIAGNOSIS — N39.0 URINARY TRACT INFECTION WITH HEMATURIA, SITE UNSPECIFIED: ICD-10-CM

## 2021-09-14 PROCEDURE — 99606 MTMS BY PHARM EST 15 MIN: CPT | Performed by: PHARMACIST

## 2021-09-14 PROCEDURE — 99607 MTMS BY PHARM ADDL 15 MIN: CPT | Performed by: PHARMACIST

## 2021-09-14 PROCEDURE — 99214 OFFICE O/P EST MOD 30 MIN: CPT | Performed by: PHYSICIAN ASSISTANT

## 2021-09-14 RX ORDER — OLANZAPINE 5 MG/1
7.5 TABLET ORAL AT BEDTIME
COMMUNITY
Start: 2021-09-14 | End: 2024-09-12

## 2021-09-14 ASSESSMENT — MIFFLIN-ST. JEOR: SCORE: 1571.3

## 2021-09-14 ASSESSMENT — PAIN SCALES - GENERAL: PAINLEVEL: WORST PAIN (10)

## 2021-09-14 NOTE — PROGRESS NOTES
Medication Therapy Management (MTM) Encounter    ASSESSMENT:                            Medication Adherence/Access: No issues identified    UTI:  Plan in place, completing antibiotic course.  UTI could be contributing to confusion, although this was happening for several weeks prior to UTI so not likely main cause.    Asthma:  Continue using albuterol HFA or the nebulizer solution as needed when feeling shortness of breath or difficulty breathing.    Depression/Insomnia/Anxiety: Patient may benefit from trying a higher dose of olanzapine 5 mg at bedtime. Olanzapine can contribute to QT prolongation.  After titrating up to 10 mg at bedtime we canrecheck EKG after 2-4 weeks of being on that dose.  Not a common side effect on lower doses, most often seen with high dose injections of olanzapine.  His last QTc was slightly prolonged.  Discussed monitoring for hypotension, uncontrollable muscle movements, or sedation the next morning.  I initially was thinking restarting sertraline would be possible option, but after reviewing further back in his medical charts confirmed that it wasn't effective after 1 month on the 200 mg dose. An SNRI may be a good option to try for his anxiety and depression.  It may also help with chronic back pain.  Could consider starting 20-30 mg daily AM and titrate up if tolerated.  Will discuss with Misael.    Spoke with Misael and she would like to do a psychiatry referral.       Chronic pain:  Would benefit form continuing to use lidocaine cream applied to his back as needed.  Could consider gabapentin for back pain.  May also help with anxiety.  There is possible risk of worsening anxiety, mood changes with gabapentin.  Is seeing provider today at the spine clinic.  Will wait an review their notes.  Don't want to make multiple medication changes at one time.    PLAN:                            Will discuss with Misael Moe and let patient know:  1. Recommend increasing olanzapine to 7.5 mg  at bedtime (1 and 1/2 tablets at bedtime).    2.  Will set up referral to psychiatry to discuss possible anxiety/depression Medications.       Follow-up: Return in 1 week (on 9/21/2021) for MTM Follow Up - telephone follow up.    SUBJECTIVE/OBJECTIVE:                          Dominik Rojas is a 80 year old male called for a transitions of care visit. He was discharged from [facility] ED on 9/9 for cystitis with hematuria and increased back pain. Patient was accompanied by hiw wife Jovana.    Reason for visit: Follow up on anxiety and insomnia since increasing the dose of olanzapine.    Allergies/ADRs: Reviewed in chart  Past Medical History: Reviewed in chart  Tobacco: He reports that he quit smoking about 31 years ago. His smoking use included cigarettes. He has a 17.50 pack-year smoking history. He has never used smokeless tobacco.  Alcohol: not currently using        Medication Adherence/Access: no issues reported, his wife helps him with setting up his medications.  She follows the medication table print out from [employer] to help with organizing his medications.    UTI:  Taking Cephalexin 500 mg three times daily, will be on this for 7 days total for UTI.  He was in the ED on 9/9/21.  They went to ED due to hematuria, back pain.  He does have chronic back pain, but it was increased at the time.  Also having confusion, but about the same as prior to UTI.      Asthma: Current medications: Short-Acting Bronchodilator: Albuterol MDI, Nebs and pt reports using the nebulizer a couple times per week.  Primarily feeling short of breath at night and when feeling more anxious.  He previously wasn't using the albuterol at night when he was feeling out of breath.  We discussed trying it when he feels short of breath at night, and he tried this a few times the last week.  Pt reports it did help with shortness of breath.     Discussed HFA and nebulizer technique.  Triggers include: emotions.  Patient reports the following  symptoms: increased shortness of breath associated with anxiety    Depression/Insomnia/Anxiety: Current medications include: Olanzapine 5 mg daily at bedtime.  Last week we increased the Olanzapine dose 2.5 mg but hasn't seen any improvement in his anxiety or sleep.  Pt was on sertraline up to 200 mg and still had significant anxiety.  He had been on the higher dose for at least a month and it didn't seem to help.   At this time his back pain was also much worse, and the heightened pain seemed to affect anxiety as well.  After tapering off sertraline they started amitriptyline, and titrated up to 50 mg daily.   Was on this for sufficient amount of time but had little effect.  Pt is getting a lot of anxiety at night and in the evening.  He feels more shortness of breath, and like his breathing gets more rapid.  Ever since he was in the hospital and they stopped several of his meds he gets very anxious, confused, agitated in the evening.  He often doesn't remember what he is doing.  They are wondering if he is having 'sundowning'.  They are also worried he may be starting to get alzheimer's or a form of dementia.  We sent a referral for this yesterday to [facility].  They should be reaching out to him in the next few days for further assessment of his memory/confusion.  Past medications:  Hx on Sertraline, not effective.  Was on 200 mg for 4 weeks and little effect on anxiety.  Amitriptyline 50 mg, not effective for anxiety,     Chronic pain:  Current medications include: Using Diclofenac gel and Lidocaine PRN. Finds the lidocaine very helpful, but the patches didn't work very well/didn't stay on.  He was on oxycodone previously but they tapered him off this.  He also tried suboxone for opioid withdrawal but didn't tolerate.    In the hospital they psychiatrist recommended gabapentin for pain. They had stopped the amitriptyline.  He is no longer on opioids since the surgery.  Is still having  back pain.  He was also on suboxone but this was stopped due to side effects.  He is seeing provider at the Spine clinic later today.    Today's Vitals: There were no vitals taken for this visit. - phone appointment  ----------------  Post Discharge Medication Reconciliation Status: discharge medications reconciled and changed, per note/orders.    I spent 45minutes with this patient today. I offer these suggestions for consideration by Misael Moe PA-C. A copy of the visit note was provided to the patient's primary care provider.    The patient was mailed a summary of these recommendations.     Wilma Lang, PharmD  Medication Therapy Management Pharmacist  Pager: [phone]      Telemedicine Visit Details  Type of service:  Telephone visit  Start Time: 9:45 AM  End Time: 10:30 AM  Originating Location (patient location): [place]  Distant Location (provider location):  [facility]     Medication Therapy Recommendations  Anxiety    Current Medication: OLANZapine (ZYPREXA) 2.5 MG tablet   Rationale: Synergistic therapy - Needs additional medication therapy - Indication   Recommendation: Start Medication   Status: No Longer Relevant   Note: sertraline          Current Medication: OLANZapine (ZYPREXA) 2.5 MG tablet   Rationale: Synergistic therapy - Needs additional medication therapy - Indication   Status: Declined per Provider   Note: duloxetine  - Discussed with Misael and we will do psychiatry referral          Current Medication: OLANZapine (ZYPREXA) 2.5 MG tablet   Rationale: Dose too low - Dosage too low - Effectiveness   Recommendation: Increase Dose   Status: Accepted per Provider

## 2021-09-14 NOTE — LETTER
9/14/2021         RE: Dominik Rojas  [address]  [place] [address]        Dear Colleague,    Thank you for referring your patient, Dominik Rojas, to the [facility] [place]. Please see a copy of my visit note below.    Assessment:   Dominik Rojas is a 80 y.o. y.o. male with past medical history significant for hypothyroidism, hypertension, AAA, history of CVA, recurrent UTI, BPH, history of nephrolithiasis, hyperlipidemia, depression, anxiety, atrial fibrillation, COPD, history of EtOH abuse, opioid use disorder, chronic systolic CHF who presents today for follow-up regarding chronic bilateral low back pain. Patient is status post bilateral L2-L4 decompressive lumbar laminectomy with medial facetectomies with Dr. King March 3, 2021.  Patient reported worsening low back pain after surgery.  An MRI lumbar spine June 22, 2021 revealed a recent T12 compression fracture with 50% loss of vertebral body height.  There was no trauma. Patient did not tolerate TLSO brace due to COPD.  Patient was admitted to the hospital August 14 through August 23 with anxiety of shortness of breath.  He also reported craving oxycodone. He was seen by the pain team/addiction medicine while he was admitted. They recommended weight loss clinic, PT, home care, addiction follow-up, diclofenac gel. Patient did not tolerate Suboxone. Gabapentin and hydroxyzine were held by addiction medicine to minimize oversedation.  -Patient was seen at the ED September 9, 2021. A CT abdomen pelvis shows a stable T12 compression fracture. No new or worsening compression fracture.  -At this point I think he may be more symptomatic from lower lumbar facet arthropathy.  He was tender to palpation over the bilateral L4-5 and L5-S1 facets.  He likely secondary myofascial pain.  He does not describe any radicular symptoms.  He was neurologically intact.         Plan:     A shared decision making plan was used.  The  patient's values and choices were respected.  The following represents what was discussed and decided upon by the physician assistant and the patient.      1.  DIAGNOSTIC TESTS:   - I reviewed the MRI lumbar spine.  -I reviewed the lumbar spine x-rays from July.  -I reviewed the recent CT abdomen and pelvis from September 9, 2021.  -No further diagnostic tests were ordered.    2.  PHYSICAL THERAPY:   -Patient needs to begin physical therapy.  He is going to pool therapy at [facility] in [place].  -I also entered a referral for the patient to work with Rosey Wood with occupational therapy for chronic pain.    3.  MEDICATIONS:  -I did not make any changes to the patient's medications.  -According to his pharmacist note from this morning they are going to consider starting duloxetine 30 mg daily.  I think this is a great choice.  -We could consider adding low-dose gabapentin, but I do not want to make multiple medication changes at once.  -Would not recommend opiates in this patient with opioid use disorder. He is currently working with addiction medicine for opioid use disorder.  -Patient can continue calcitonin nasal spray.  At this point I think his fracture has healed.    4.  INTERVENTIONS:   -I offer the patient bilateral L3, L4, L5 medial branch blocks targeting the bilateral L4-5 and L5-S1 facets  The work-up toward radiofrequency ablation.  Patient indicated he would like to proceed and an order was placed.    5.  PATIENT EDUCATION:  -Patient is in agreement the above plan. All questions were answered.    6.  FOLLOW-UP:   Patient will return to the clinic for his bilateral L3, L4, L5 medial branch blocks.  If he has questions or concerns in the meantime, he should not hesitate to call.        SUBJECTIVE:    Dominik Rojas is a 80 y.o. male who presents today for follow-up regarding chronic bilateral low back pain. Patient has a T12 compression fracture. He did not tolerate his TLSO brace due to COPD.  Patient was admitted to the hospital August 14 due to anxiety and hypoxia. He also reported craving oxycodone. Patient was seen by pain medicine while he was admitted to the hospital and they recommended diclofenac gel, PT, weight loss, and working with addiction medicine. He was again in the emergency department September 9, 2021 for ongoing back pain. A CT abdomen pelvis did not show any acute pathology causing pain. There is no new or worsening fracture.  Patient reports that his pain continues to get worse and worse, especially over the past 2 weeks.    Patient complains of bilateral low back pain.  Pain spans across low back in a broadband distribution at the belt line.  Both sides are equally affected.  He denies any pain radiating into the buttocks or down the legs.  He rates his pain today is a 10 of the 10.  Increase activity makes his pain worse.  He denies any alleviating factors to the pain.  He denies any new symptoms since he was last seen.  He denies any numbness or tingling down the legs.  He feels generally weak.  He has chronic urinary incontinence.  Denies bowel incontinence.       Patient completed 7 sessions of physical therapy June 8, 2021.  He is taking Tylenol as needed for pain.  He has been using calcitonin nasal spray.  He also applies Tiger balm.  Medications are not helpful for pain.      Review of Systems:  Positive for weakness, loss of bladder control, pain much worse at night, trips.  Negative for numbness/tingling, loss of bowel control, inability to urinate, headache, difficulty swallowing, difficulty with hand skills, fevers, unintentional weight loss.     Objective:   CONSTITUTIONAL:  Vital signs as above.  Patient appears to be in pain.  The patient is well nourished and well groomed.  Patient is obese.    PSYCHIATRIC:  The patient is awake, alert, oriented to person, place and time.  The patient is answering questions appropriately with clear speech.  Normal affect.  HEENT:  Normocephalic, atraumatic.  Sclera clear.    SKIN:  Skin over the face, posterior torso, bilateral upper and lower extremities is clean, dry, intact without rashes.  VASCULAR: Significant bilateral lower extremity edema.  There is some erythema bilateral lower extremities.  MUSCULOSKELETAL:  Gait is wide-based and unsteady.  Uses a cane for assistance.  Tender to palpation bilateral L4-5 and L5-S1 facets.  Lumbar facet loading maneuvers increased low back pain bilaterally.  5/5 strength bilateral hip flexors, knee flexors/extensors, ankle dorsi/plantar flexors.  NEUROLOGIC: Sensation intact to light touch bilateral lower extremities throughout.       RESULTS:    I reviewed the MRI lumbar spine from [facility] dated June 22, 2021.  This shows a recent fracture involving the inferior endplate of T12 with 50% vertebral body height loss and anterior wedging.  There is retropulsion contributing to mild spinal canal stenosis at T12-L1.  There is also some mild marrow edema in the superior endplate of L1 asymmetric to the right without endplate height loss which may represent edema secondary to subchondral endplate fracture or stress reaction.  There are postoperative changes of wide decompressive laminectomy L1-L2 through L4-L5.  There is no high-grade spinal canal or neuroforaminal stenosis.      EXAM: XR LUMBAR SPINE 2/3 VIEWS  LOCATION: [facility]  DATE/TIME: 7/26/2021 11:52 AM     INDICATION: T12 compression fracture  COMPARISON: Lumbar spine MRI 2/22 2021  TECHNIQUE: CR Lumbar Spine.                                                                      IMPRESSION:   5 lumbar-type vertebral bodies. Inferior plate T12 compression fracture, unchanged. The vertebral bodies of the lumbar spine otherwise have normal stature and alignment without evidence of compression fracture. Degenerative endplate change and anterior   marginal osteophytes at T12/L1-L4/L5. Multilevel degenerative facet  arthropathy, most pronounced at L4/L5 and L5/S1. Multilevel degenerative disc disease of the lumbar spine with disc height loss, most pronounced at L1/L2-L4/L5. Aortobiiliac stent. Soft   tissues unremarkable.    I reviewed the CT abdomen and pelvis from [facility] dated September 9, 2021. This shows chronic T12 compression deformity which is stable.      Again, thank you for allowing me to participate in the care of your patient.        Sincerely,        Maureen Bacon PA-C

## 2021-09-14 NOTE — PROGRESS NOTES
Assessment:   Dominik Rojas is a 80 y.o. y.o. male with past medical history significant for hypothyroidism, hypertension, AAA, history of CVA, recurrent UTI, BPH, history of nephrolithiasis, hyperlipidemia, depression, anxiety, atrial fibrillation, COPD, history of EtOH abuse, opioid use disorder, chronic systolic CHF who presents today for follow-up regarding chronic bilateral low back pain. Patient is status post bilateral L2-L4 decompressive lumbar laminectomy with medial facetectomies with Dr. King March 3, 2021.  Patient reported worsening low back pain after surgery.  An MRI lumbar spine June 22, 2021 revealed a recent T12 compression fracture with 50% loss of vertebral body height.  There was no trauma. Patient did not tolerate TLSO brace due to COPD.  Patient was admitted to the hospital August 14 through August 23 with anxiety of shortness of breath.  He also reported craving oxycodone. He was seen by the pain team/addiction medicine while he was admitted. They recommended weight loss clinic, PT, home care, addiction follow-up, diclofenac gel. Patient did not tolerate Suboxone. Gabapentin and hydroxyzine were held by addiction medicine to minimize oversedation.  -Patient was seen at the ED September 9, 2021. A CT abdomen pelvis shows a stable T12 compression fracture. No new or worsening compression fracture.  -At this point I think he may be more symptomatic from lower lumbar facet arthropathy.  He was tender to palpation over the bilateral L4-5 and L5-S1 facets.  He likely secondary myofascial pain.  He does not describe any radicular symptoms.  He was neurologically intact.         Plan:     A shared decision making plan was used.  The patient's values and choices were respected.  The following represents what was discussed and decided upon by the physician assistant and the patient.      1.  DIAGNOSTIC TESTS:   - I reviewed the MRI lumbar spine.  -I reviewed the lumbar spine x-rays from July.  -I  reviewed the recent CT abdomen and pelvis from September 9, 2021.  -No further diagnostic tests were ordered.    2.  PHYSICAL THERAPY:   -Patient needs to begin physical therapy.  He is going to pool therapy at [facility] in [place].  -I also entered a referral for the patient to work with Rosey Wood with occupational therapy for chronic pain.    3.  MEDICATIONS:  -I did not make any changes to the patient's medications.  -According to his pharmacist note from this morning they are going to consider starting duloxetine 30 mg daily.  I think this is a great choice.  -We could consider adding low-dose gabapentin, but I do not want to make multiple medication changes at once.  -Would not recommend opiates in this patient with opioid use disorder. He is currently working with addiction medicine for opioid use disorder.  -Patient can continue calcitonin nasal spray.  At this point I think his fracture has healed.    4.  INTERVENTIONS:   -I offer the patient bilateral L3, L4, L5 medial branch blocks targeting the bilateral L4-5 and L5-S1 facets  The work-up toward radiofrequency ablation.  Patient indicated he would like to proceed and an order was placed.    5.  PATIENT EDUCATION:  -Patient is in agreement the above plan. All questions were answered.    6.  FOLLOW-UP:   Patient will return to the clinic for his bilateral L3, L4, L5 medial branch blocks.  If he has questions or concerns in the meantime, he should not hesitate to call.        SUBJECTIVE:    Dominik Rojas is a 80 y.o. male who presents today for follow-up regarding chronic bilateral low back pain. Patient has a T12 compression fracture. He did not tolerate his TLSO brace due to COPD. Patient was admitted to the hospital August 14 due to anxiety and hypoxia. He also reported craving oxycodone. Patient was seen by pain medicine while he was admitted to the hospital and they recommended diclofenac gel, PT, weight loss, and working with addiction  medicine. He was again in the emergency department September 9, 2021 for ongoing back pain. A CT abdomen pelvis did not show any acute pathology causing pain. There is no new or worsening fracture.  Patient reports that his pain continues to get worse and worse, especially over the past 2 weeks.    Patient complains of bilateral low back pain.  Pain spans across low back in a broadband distribution at the belt line.  Both sides are equally affected.  He denies any pain radiating into the buttocks or down the legs.  He rates his pain today is a 10 of the 10.  Increase activity makes his pain worse.  He denies any alleviating factors to the pain.  He denies any new symptoms since he was last seen.  He denies any numbness or tingling down the legs.  He feels generally weak.  He has chronic urinary incontinence.  Denies bowel incontinence.       Patient completed 7 sessions of physical therapy June 8, 2021.  He is taking Tylenol as needed for pain.  He has been using calcitonin nasal spray.  He also applies Tiger balm.  Medications are not helpful for pain.      Review of Systems:  Positive for weakness, loss of bladder control, pain much worse at night, trips.  Negative for numbness/tingling, loss of bowel control, inability to urinate, headache, difficulty swallowing, difficulty with hand skills, fevers, unintentional weight loss.     Objective:   CONSTITUTIONAL:  Vital signs as above.  Patient appears to be in pain.  The patient is well nourished and well groomed.  Patient is obese.    PSYCHIATRIC:  The patient is awake, alert, oriented to person, place and time.  The patient is answering questions appropriately with clear speech.  Normal affect.  HEENT: Normocephalic, atraumatic.  Sclera clear.    SKIN:  Skin over the face, posterior torso, bilateral upper and lower extremities is clean, dry, intact without rashes.  VASCULAR: Significant bilateral lower extremity edema.  There is some erythema bilateral lower  extremities.  MUSCULOSKELETAL:  Gait is wide-based and unsteady.  Uses a cane for assistance.  Tender to palpation bilateral L4-5 and L5-S1 facets.  Lumbar facet loading maneuvers increased low back pain bilaterally.  5/5 strength bilateral hip flexors, knee flexors/extensors, ankle dorsi/plantar flexors.  NEUROLOGIC: Sensation intact to light touch bilateral lower extremities throughout.       RESULTS:    I reviewed the MRI lumbar spine from [facility] dated June 22, 2021.  This shows a recent fracture involving the inferior endplate of T12 with 50% vertebral body height loss and anterior wedging.  There is retropulsion contributing to mild spinal canal stenosis at T12-L1.  There is also some mild marrow edema in the superior endplate of L1 asymmetric to the right without endplate height loss which may represent edema secondary to subchondral endplate fracture or stress reaction.  There are postoperative changes of wide decompressive laminectomy L1-L2 through L4-L5.  There is no high-grade spinal canal or neuroforaminal stenosis.      EXAM: XR LUMBAR SPINE 2/3 VIEWS  LOCATION: [facility]  DATE/TIME: 7/26/2021 11:52 AM     INDICATION: T12 compression fracture  COMPARISON: Lumbar spine MRI 2/22 2021  TECHNIQUE: CR Lumbar Spine.                                                                      IMPRESSION:   5 lumbar-type vertebral bodies. Inferior plate T12 compression fracture, unchanged. The vertebral bodies of the lumbar spine otherwise have normal stature and alignment without evidence of compression fracture. Degenerative endplate change and anterior   marginal osteophytes at T12/L1-L4/L5. Multilevel degenerative facet arthropathy, most pronounced at L4/L5 and L5/S1. Multilevel degenerative disc disease of the lumbar spine with disc height loss, most pronounced at L1/L2-L4/L5. Aortobiiliac stent. Soft   tissues unremarkable.    I reviewed the CT abdomen and pelvis from [facility] dated  September 9, 2021. This shows chronic T12 compression deformity which is stable.

## 2021-09-14 NOTE — PATIENT INSTRUCTIONS
Recommendations from today's MTM visit:                                                    MTM (medication therapy management) is a service provided by a clinical pharmacist designed to help you get the most of out of your medicines.   Today we reviewed what your medicines are for, how to know if they are working, that your medicines are safe and how to make your medicine regimen as easy as possible.      1.  Increase olanzapine to 7.5 mg at bedtime.  2.  I spoke with Grete about the anxiety and depression medications, and we would like to get you set up with a psychiatrist to discuss possible options.  We will get a referral sent out for you.       Follow-up: Return in 1 week (on 9/21/2021) for MTM Follow Up - telephone follow up.    It was great to speak with you today.  I value your experience and would be very thankful for your time with providing feedback on our clinic survey. You may receive a survey via email or text message in the next few days.     To schedule another MTM appointment, please call the clinic directly or you may call the MTM scheduling line at [phone] or toll-free at [phone].     My Clinical Pharmacist's contact information:                                                      Please feel free to contact me with any questions or concerns you have.      Wilma Lang PharmD  Medication Therapy Management Pharmacist  Pager: [phone]       Medication List          Accurate as of September 14, 2021  4:52 PM. If you have any questions, ask your nurse or doctor.            CHANGE how you take these medications        Morning Afternoon Evening Bedtime    OLANZapine 5 MG tablet  Also known as: zyPREXA  Take 1.5 tablets (7.5 mg) by mouth At Bedtime  What changed: Another medication with the same name was removed. Continue taking this medication, and follow the directions you see here.  Changed by: Wilma Lang RPH           1 and 1/2 tablets        CONTINUE taking these  medications        Morning Afternoon Evening Bedtime    acetaminophen 500 MG tablet  Also known as: TYLENOL  Take 1,000 mg by mouth as needed             * albuterol (2.5 MG/3ML) 0.083% neb solution  Also known as: PROVENTIL  Inhale 2.5 mg into the lungs every 6 hours as needed             * albuterol 108 (90 Base) MCG/ACT inhaler  Also known as: PROAIR HFA/PROVENTIL HFA/VENTOLIN HFA  Inhale 2 puffs into the lungs every 6 hours as needed             amLODIPine 2.5 MG tablet  Also known as: NORVASC  Take 1 tablet (2.5 mg) by mouth daily               aspirin 81 MG chewable tablet  Also known as: ASA  Take 81 mg by mouth daily               calcitonin (salmon) 200 UNIT/ACT nasal spray  Also known as: MIACALCIN  Spray 1 spray into one nostril alternating nostrils daily Alternate nostril each day.             cephALEXin 500 MG capsule  Also known as: KEFLEX  Take 1 capsule (500 mg) by mouth 3 times daily for 7 days                   diclofenac 1 % topical gel  Also known as: VOLTAREN  Apply 4 g topically 4 times daily             furosemide 20 MG tablet  Also known as: LASIX  Take 1 tablet (20 mg) by mouth 2 times daily                 latanoprost 0.005 % ophthalmic solution  Also known as: XALATAN  Place 1 drop into both eyes At Bedtime             levothyroxine 150 MCG tablet  Also known as: SYNTHROID/LEVOTHROID  Take 1 tablet by mouth daily               lidocaine 5 % external ointment  Also known as: XYLOCAINE  Apply topically 3 times daily             losartan 25 MG tablet  Also known as: COZAAR  Take 1 tablet (25 mg) by mouth daily               metoprolol succinate ER 25 MG 24 hr tablet  Also known as: TOPROL-XL  Take 1 tablet (25 mg) by mouth daily               Multi-vitamin tablet  Take 1 tablet by mouth daily               pantoprazole 40 MG EC tablet  Also known as: PROTONIX  Take 1 tablet (40 mg) by mouth every morning (before breakfast)               rosuvastatin 10 MG tablet  Also known as: CRESTOR  Take  10 mg by mouth At Bedtime               vitamin D3 50 MCG (2000 UT) Caps  Take 1 tablet by mouth daily                  * This list has 2 medication(s) that are the same as other medications prescribed for you. Read the directions carefully, and ask your doctor or other care provider to review them with you.

## 2021-09-14 NOTE — PATIENT INSTRUCTIONS
Medial Branch Block (MBB) TEST    This is a TEST injection to find out what is causing your pain.  Specifically, this test is trying to identify whichjoint in your back or neck is causing pain.   This injection will NOT provide any long term pain relief because it is just a TEST.  Steroid is NOT used for this injection.   Steroid is what gives longterm pain relief.  Since there is no steroid used, there is no long term pain relief.    Because we want to determine what is causing your pain, we need you to do a few things on the day of your Medial Branch Block TEST:    1.  Do not take any pain medications on the day of your Medial Branch Block/Test.  2. Try to do activities that make our pain increase.  We want you tohave a high level of pain on the day of the test.  This makes it easier to know the results of the test.  Other information:    - You may eatand drink on the day of the test.  - You should take your other medications (just not pain medications) at the times you usually take them  - You will be asked to fill out a pain diary for 6 hours after thetest.    AFTER THE TEST:    1. Please do not take any pain medications for 6 hours after the TEST.  After the pain diary is filled out, youmay resume taking your pain medications.  2. Please return the pain diary to the [facility] the next day or as soon as you are able.  3. You will be contacted with what will happen next after the physicianreviews your pain diary.  a. You may be asked to come in for a follow-up appointment to discuss other treatment options  b. It may be recommended that you have an injection to help treatyour pain.  c. You may need to come in for a second set of Medial Branch Blocks (TESTS).        Please call the spine center at [phone] if you have any questions.

## 2021-09-21 ENCOUNTER — VIRTUAL VISIT (OUTPATIENT)
Dept: PHARMACY | Facility: PHYSICIAN GROUP | Age: 81
End: 2021-09-21
Payer: COMMERCIAL

## 2021-09-21 DIAGNOSIS — F41.9 ANXIETY: ICD-10-CM

## 2021-09-21 DIAGNOSIS — F32.A DEPRESSION, UNSPECIFIED DEPRESSION TYPE: Primary | ICD-10-CM

## 2021-09-21 DIAGNOSIS — G89.29 OTHER CHRONIC PAIN: ICD-10-CM

## 2021-09-21 DIAGNOSIS — G47.00 INSOMNIA, UNSPECIFIED TYPE: ICD-10-CM

## 2021-09-21 PROCEDURE — 99606 MTMS BY PHARM EST 15 MIN: CPT | Performed by: PHARMACIST

## 2021-09-21 NOTE — PATIENT INSTRUCTIONS
Recommendations from today's MTM visit:                                                    MTM (medication therapy management) is a service provided by a clinical pharmacist designed to help you get the most of out of your medicines.   Today we reviewed what your medicines are for, how to know if they are working, that your medicines are safe and how to make your medicine regimen as easy as possible.      1.  Continue current medications.  2.  Please call [facility] in [facility] to schedule Neuropsychological testing.  Their phone number is [phone].    Follow-up: Return in 6 days (on 9/27/2021) for MTM Follow Up.   - Telephone follow up.  Please have both Dominik and Jovana present at this phone appointment.     It was great to speak with you today.  I value your experience and would be very thankful for your time with providing feedback on our clinic survey. You may receive a survey via email or text message in the next few days.     To schedule another MTM appointment, please call the clinic directly or you may call the MTM scheduling line at [phone] or toll-free at [phone].     My Clinical Pharmacist's contact information:                                                      Please feel free to contact me with any questions or concerns you have.      Wilma Lang, Jameel  Medication Therapy Management Pharmacist  Pager: [phone]

## 2021-09-21 NOTE — Clinical Note
Michael Reddy,    This is Wilma, the pharmacist that works with Dominik's PCP.  We were going to have him get set up with psychiatry to manage meds but he declined the referral.  We are going to try duloxetine for anxiety/depression and hopefully it will help with pain to.  I'll keep you update how it's going, I'm hoping we can titrate the dose up.    Thanks,  Wilma Lang, PharmD  Medication Therapy Management Pharmacist  Pager: [phone]

## 2021-09-21 NOTE — PROGRESS NOTES
Medication Therapy Management (MTM) Encounter    ASSESSMENT:                            Medication Adherence/Access: No issues identified    Depression/Insomnia/Anxiety: Wouldn't recommend further increases in olanzapine due to not helping much.  Trying an alternative for anxiety would likely be best.  Discussed again that PCP would like patient to set up care with a psychiatrist.  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  Not a common side effect on lower doses, most often seen with high dose injections of olanzapine.  His last QTc was slightly prolonged.  Discussed monitoring for hypotension, uncontrollable muscle movements, or sedation the next morning.  Encouraged patient to call the [facility] memory clinic to schedule appointment for memory testing.  He was mailed information about this, will mail him info again w/ their phone number.    After my appointment with them I discussed starting duloxetine with Roderick patient would prefer not to set up care with a psychiatrist.  Misael was okay with trying duloxetine and also would like him to consider a second seeing a psychiatrist as well.    Chronic pain:  Will discuss trying duloxetine for back pain and for anxiety with PCP.  Patient didn't want to set up care with psychiatry, but will discuss with him again next week (he had left during phone apt and was just speaking with Jovana about this)    PLAN:                            1.  Start duloxetine 30 mg once daily in the morning  2.  Please call [facility] in [facility] to schedule Neuropsychological testing.  Their phone number is [phone].    Follow-up: Return in 6 days (on 9/27/2021).      SUBJECTIVE/OBJECTIVE:                          Dominik Rojas is a 80 year old male called for a follow-up visit. He was referred to me from Misael Moe. Patient was accompanied by Jovana. Today's visit is a follow-up MTM visit from 9/14/2021     Reason for visit:  Follow up on anxiety and insomnia since dose increase of olanzapine.    Allergies/ADRs: Reviewed in chart  Past Medical History: Reviewed in chart  Tobacco: He reports that he quit smoking about 31 years ago. His smoking use included cigarettes. He has a 17.50 pack-year smoking history. He has never used smokeless tobacco.  Alcohol: not currently using       Medication Adherence/Access: no issues reported, his wife helps him with setting up his medications.  She follows the medication table print out from [place] to help with organizing his medications.     Depression/Insomnia/Anxiety: Current medications include: Olanzapine 7.5 mg daily at bedtime.  Last week we increased the Olanzapine dose by 2.5 mg but hasn't seen any improvement in his anxiety or sleep still.  Misael wanted him to set up care with psychiatrist for medication management.  I asked for help setting up a referral.  Looking at referral, it looks like our care coordindator discussed psychotherapy with him and not psychiatry.  Will ask her to help with psychiatry referral.   Pt had left during this part of conversation with them, but Jovana said he doesn't want to see a psychiatrist and doesn't think he needs one.  Will discuss with him next Monday at follow up.  Pt is getting a lot of anxiety at night and in the evening.  He feels more shortness of breath, and like his breathing gets more rapid.  Ever since he was in the hospital and they stopped several of his meds he gets very anxious, confused, agitated in the evening.  He often doesn't remember what he is doing.  They are wondering if he is having 'sundowning'.  They are also worried he may be starting to get alzheimer's or a form of dementia.  We sent a referral for this yesterday to [facility].  They should be reaching out to him in the next few days for further assessment of his memory/confusion.  Past medications:  Hx on Sertraline, not effective.  Was on 200 mg for  4 weeks and little effect on anxiety.  Amitriptyline 50 mg, not effective for anxiety,      Chronic pain:  Current medications include: Using Diclofenac gel and Lidocaine PRN. Finds the lidocaine very helpful, but the patches didn't work very well/didn't stay on.  He was on oxycodone previously but they tapered him off this.  He also tried suboxone for opioid withdrawal but didn't tolerate.    In the hospital they psychiatrist recommended gabapentin for pain, but this was not started due to concern for confusion/sedation. They had stopped the amitriptyline as well due in not-effective.  He is no longer on opioids since the surgery, had opioid dependence.  Is still having back pain.  He was also on suboxone but this was stopped due to side effects.  At his apt with spine provider they also discussed duloxetine and Maureen Bacon's note mentioned duloxetine would be a good option to try.  They may also consider gabapentin, but didn't want to make too many medication changes at one time.        Today's Vitals: There were no vitals taken for this visit.  ----------------      I spent 45 minutes with this patient today. I offer these suggestions for consideration by Misael Moe. A copy of the visit note was provided to the patient's primary care provider.    The patient was mailed a summary of these recommendations.     Wilma Lang PharmD  Medication Therapy Management Pharmacist  Pager: [phone]      Telemedicine Visit Details  Type of service:  Telephone visit  Start Time: 10:15 AM  End Time: 11:00 AM  Originating Location (patient location): Home  Distant Location (provider location):  [facility]     Medication Therapy Recommendations  No medication therapy recommendations to display

## 2021-09-23 RX ORDER — DULOXETIN HYDROCHLORIDE 30 MG/1
60 CAPSULE, DELAYED RELEASE ORAL EVERY MORNING
COMMUNITY
Start: 2021-09-23 | End: 2023-04-25

## 2021-09-29 ENCOUNTER — ANCILLARY PROCEDURE (OUTPATIENT)
Dept: PHYSICAL MEDICINE AND REHAB | Facility: CLINIC | Age: 81
End: 2021-09-29
Attending: PHYSICIAN ASSISTANT
Payer: COMMERCIAL

## 2021-09-29 VITALS
HEART RATE: 74 BPM | OXYGEN SATURATION: 93 % | TEMPERATURE: 98.6 F | DIASTOLIC BLOOD PRESSURE: 70 MMHG | SYSTOLIC BLOOD PRESSURE: 112 MMHG | RESPIRATION RATE: 18 BRPM

## 2021-09-29 DIAGNOSIS — M47.816 LUMBAR FACET ARTHROPATHY: ICD-10-CM

## 2021-09-29 PROCEDURE — 64493 INJ PARAVERT F JNT L/S 1 LEV: CPT | Mod: 50 | Performed by: PHYSICAL MEDICINE & REHABILITATION

## 2021-09-29 PROCEDURE — 64494 INJ PARAVERT F JNT L/S 2 LEV: CPT | Mod: 50 | Performed by: PHYSICAL MEDICINE & REHABILITATION

## 2021-09-29 RX ORDER — BUPIVACAINE HYDROCHLORIDE 7.5 MG/ML
INJECTION, SOLUTION EPIDURAL; RETROBULBAR
Status: COMPLETED | OUTPATIENT
Start: 2021-09-29 | End: 2021-09-29

## 2021-09-29 RX ADMIN — BUPIVACAINE HYDROCHLORIDE 3 ML: 7.5 INJECTION, SOLUTION EPIDURAL; RETROBULBAR at 09:43

## 2021-09-29 ASSESSMENT — PAIN SCALES - GENERAL
PAINLEVEL: EXTREME PAIN (8)
PAINLEVEL: EXTREME PAIN (9)

## 2021-09-29 NOTE — PATIENT INSTRUCTIONS
Please complete your pain diary and return the diary to the [facility] at your earliest convenience.  The [facility] will contact you to schedule your next appointment after your pain diary is reviewed by your doctor.  Thank you.    DISCHARGE INSTRUCTIONS    During office hours (8:00 a.m.- 4:00 p.m.) questions or concerns may be answered  by calling [facility] Navigation Nurses at  [phone].  Messages received after hours will be returned the following business day.      In the case of an emergency, please dial 911 or seek assistance at the nearest Emergency Room/Urgent Care facility.     All Patients:  ? You may experience an increase in your symptoms for the first 2 days, once the numbing medication wears off.    ? You may resume your regular medication, no pain medication until you have completed your diary    ? You may shower. No swimming, tub bath or hot tub for 24 hours; remove bandage after 4 hours    ? Continue your activities that can cause you pain to test the blocks.                         ? You should not drive for the next 3 to 5 hours (have someone drive you)           POSSIBLE PROCEDURE SIDE EFFECTS  -Call Spine Center if concerned-    Increased Pain  Increased numbness/tingling     Nausea/Vomiting  Bruising/bleeding at site (hematoma)             Swelling at site (edema) Headache  Difficulty walking  Infection        Fever greater than 100.5

## 2021-10-01 ENCOUNTER — VIRTUAL VISIT (OUTPATIENT)
Dept: PHARMACY | Facility: PHYSICIAN GROUP | Age: 81
End: 2021-10-01
Payer: COMMERCIAL

## 2021-10-01 DIAGNOSIS — F41.9 ANXIETY: ICD-10-CM

## 2021-10-01 DIAGNOSIS — F32.A DEPRESSION, UNSPECIFIED DEPRESSION TYPE: Primary | ICD-10-CM

## 2021-10-01 DIAGNOSIS — G47.00 INSOMNIA, UNSPECIFIED TYPE: ICD-10-CM

## 2021-10-01 DIAGNOSIS — I10 ESSENTIAL HYPERTENSION: ICD-10-CM

## 2021-10-01 PROCEDURE — 99607 MTMS BY PHARM ADDL 15 MIN: CPT | Performed by: PHARMACIST

## 2021-10-01 PROCEDURE — 99606 MTMS BY PHARM EST 15 MIN: CPT | Performed by: PHARMACIST

## 2021-10-01 RX ORDER — METOPROLOL SUCCINATE 25 MG/1
12.5 TABLET, EXTENDED RELEASE ORAL DAILY
COMMUNITY
Start: 2021-10-01 | End: 2024-08-01

## 2021-10-01 NOTE — PROGRESS NOTES
Medication Therapy Management (MTM) Encounter    ASSESSMENT:                            Medication Adherence/Access: No issues identified    Depression/Insomnia/Anxiety: Duloxetine seems to be helping with anxiety and mood.  Olanzapine may also be helping some because his insomnia/anxiety starting improving a little before he started the duloxetine.  Can hold off psychiatry referral for now, and see how the duloxetine works.  Can consider increasing the dose of needed.    Would be beneficial to recheck his EKG since starting olanzapine.  Is on a low dose but given age and hx of QT prolongation would recommend rechecking.     PLAN:                            1.  Continue current medications.    2.  Please call [facility] to schedule the neuropsychiatric testing for memory.  Their phone number is [phone]  3.  Recommend we recheck EKG at next clinic apt due to his age and history of QT prolongation.    Follow-up: Return in 25 days (on 10/26/2021).      SUBJECTIVE/OBJECTIVE:                          Dominik Rojas is a 80 year old male called for a follow-up visit. He was referred to me from Misael Moe. Patient was accompanied by his wife Jovana. Today's visit is a follow-up MTM visit from 9/21/21     Reason for visit: Follow up on chronic pain, anxiety, and confusion.    Allergies/ADRs: Reviewed in chart  Past Medical History: Reviewed in chart  Tobacco: He reports that he quit smoking about 31 years ago. His smoking use included cigarettes. He has a 17.50 pack-year smoking history. He has never used smokeless tobacco.  Alcohol: not currently using     Medication Adherence/Access: no issues reported, his wife helps him with setting up his medications.  She follows the medication table print out from [place] to help with organizing his medications.  He would like a refill of the lidocaine 5% ointment.  Med rec:  Pt is actually taking metoprolol ER 12.5 mg (1/2 tab of 25  mg)     Depression/Insomnia/Anxiety: Current medications include: Duloxetine 30 mg in the morning (started 5-6 days), and  Olanzapine 7.5 mg daily at bedtime.    He has been sleep much better since starting the duloxetine in the last week or so.  Said even a few days before being on the duloxetine started to have better sleep, so thinks the olanzapine may be helping with insomnia.  His anxiety has been a bit better, hasn't been as anxious/agitated.  They have been trying to get out more often, go on walks or just running errands.  Has been noticing a significant improvement in anxiety.  He hasn't been very agitated/frustrated at night, has been able to fall asleep OK and staying asleep for at least 7-8 hours.    Patient doesn't want to do the psychiatry referral, would like to see how the duloxetine works.  Recommend he still do the neuropsych testing.  He does plan to do pool therapy for his back pain.  Past medications:  Hx on Sertraline, not effective.  Was on 200 mg for 4 weeks and little effect on anxiety.  Amitriptyline 50 mg, not effective for anxiety,     Today's Vitals: There were no vitals taken for this visit. - phone apt  ----------------      I spent 20 minutes with this patient today. All changes were made via collaborative practice agreement with Misael Moe PA-C. A copy of the visit note was provided to the patient's primary care provider.    The patient was mailed a summary of these recommendations.     Ines FrancisD  Medication Therapy Management Pharmacist  Pager: [phone]      Telemedicine Visit Details  Type of service:  Telephone visit  Start Time: 10:30 AM  End Time: 10:50 AM  Originating Location (patient location): Home  Distant Location (provider location):  [facility]     Medication Therapy Recommendations  No medication therapy recommendations to display

## 2021-10-01 NOTE — PATIENT INSTRUCTIONS
Recommendations from today's MTM visit:                                                    MTM (medication therapy management) is a service provided by a clinical pharmacist designed to help you get the most of out of your medicines.   Today we reviewed what your medicines are for, how to know if they are working, that your medicines are safe and how to make your medicine regimen as easy as possible.      1.  Continue current medications.    2.  Please call [facility] to schedule the neuropsychiatric testing for memory.  Their phone number is [phone]  3.  Recommend we recheck EKG at next clinic apt due to his age and history of QT prolongation.      Follow-up: Return in 25 days (on 10/26/2021).  Appointment with Misael Moe at 9:00 AM, and then will see Wilma after.    It was great to speak with you today.  I value your experience and would be very thankful for your time with providing feedback on our clinic survey. You may receive a survey via email or text message in the next few days.     To schedule another MTM appointment, please call the clinic directly or you may call the MTM scheduling line at [phone] or toll-free at [phone].     My Clinical Pharmacist's contact information:                                                      Please feel free to contact me with any questions or concerns you have.      Wilma Lang, Jameel  Medication Therapy Management Pharmacist  Pager: [phone]

## 2021-10-06 ENCOUNTER — TELEPHONE (OUTPATIENT)
Dept: PHYSICAL MEDICINE AND REHAB | Facility: CLINIC | Age: 81
End: 2021-10-06

## 2021-10-06 NOTE — TELEPHONE ENCOUNTER
Attempted to contact patient, but he was unreachable at this time.  A message was left for the patient encouraging him to continue with pool physical therapy and follow-up with GOPAL Cooper if he was interested in further discussing care plan recommendations.  The phone number to the [facility] scheduling team was left as part of the message.

## 2021-10-20 NOTE — PROGRESS NOTES
Medication Therapy Management (MTM) Encounter    ASSESSMENT:                            Medication Adherence/Access: No issues identified     Depression/Insomnia/Anxiety: May be able to try gradually tapering off olanzapine, it didn't seem to be very effective initially.  Discussed with patient and he would like to continue taking Olanzapine and Duloxetine.  Still having some anxiety/depression symptoms but more manageable.  Discussed non-pharmacological treatments like psychotherapy and he is willing to consider this.  ECG was rechecked today.  Had follow up with Misael in 3 months.    PLAN:                            1. Given information again for [facility] to call and schedule appointment for therapy.  2.  Continue current medications.    Follow-up: Return in about 6 months (around 4/26/2022) for MTM Follow Up.      SUBJECTIVE/OBJECTIVE:                          Dominik Rojas is a 81 year old male coming in for a follow-up visit. He was referred to me from Misael Moe. Patient was accompanied by his wife Jovana. Today's visit is a follow-up MTM visit from 10/1/21     Reason for visit: Follow up on anxiety and depression.    Allergies/ADRs: Reviewed in chart  Past Medical History: Reviewed in chart  Tobacco: He reports that he quit smoking about 31 years ago. His smoking use included cigarettes. He has a 17.50 pack-year smoking history. He has never used smokeless tobacco.  Alcohol: not currently using     Medication Adherence/Access: : no issues reported, his wife helps him with setting up his medications.  She follows the medication table print out from [place] to help with organizing his medications.    Depression/Insomnia/Anxiety: Current medications include: Duloxetine 30 mg in the morning, and Olanzapine 7.5 mg daily at bedtime.  Pt reports his anxiety and sleep have been significantly better.  He feels he is able to fall asleep, and his anxiety isn't overwhelming him anymore.  He reports getting  "frustrated easily, some difficulty getting along with others.  He feels he is too critical of himself, and feels frustrated that he can't do things he used to be able to. Misael met with him before me and they did an ECG, which was normal.  Discussed possibly seeing a psychotherapist, and he will think about it.  He thinks he would like to try it and would prefer phone visits.  He doesn't want to try decreasing the olanzapine.  He is feeling very good on his current medications and would like to keep everything the same.  Past medications:  Hx on Sertraline, not effective.  Was on 200 mg for 4 weeks and little effect on anxiety.  Amitriptyline 50 mg, not effective for anxiety    Today's Vitals: /70   Pulse 68   Ht 5' 7\" (1.702 m)   Wt 215 lb 11.2 oz (97.8 kg)   BMI 33.78 kg/m    ----------------      I spent 60 minutes with this patient today. I offer these suggestions for consideration by Misael Moe. A copy of the visit note was provided to the patient's primary care provider.    The patient was mailed a summary of these recommendations.     Wilma Lang PharmD  Medication Therapy Management Pharmacist  Pager: [phone]       Medication Therapy Recommendations  No medication therapy recommendations to display       "

## 2021-10-26 ENCOUNTER — OFFICE VISIT (OUTPATIENT)
Dept: PHARMACY | Facility: PHYSICIAN GROUP | Age: 81
End: 2021-10-26
Payer: COMMERCIAL

## 2021-10-26 ENCOUNTER — LAB REQUISITION (OUTPATIENT)
Dept: LAB | Facility: CLINIC | Age: 81
End: 2021-10-26

## 2021-10-26 VITALS
DIASTOLIC BLOOD PRESSURE: 70 MMHG | HEART RATE: 68 BPM | WEIGHT: 215.7 LBS | HEIGHT: 67 IN | SYSTOLIC BLOOD PRESSURE: 130 MMHG | BODY MASS INDEX: 33.85 KG/M2

## 2021-10-26 DIAGNOSIS — F41.9 ANXIETY: ICD-10-CM

## 2021-10-26 DIAGNOSIS — F32.A DEPRESSION, UNSPECIFIED DEPRESSION TYPE: Primary | ICD-10-CM

## 2021-10-26 DIAGNOSIS — E78.2 MIXED HYPERLIPIDEMIA: ICD-10-CM

## 2021-10-26 DIAGNOSIS — G47.00 INSOMNIA, UNSPECIFIED TYPE: ICD-10-CM

## 2021-10-26 LAB
ALBUMIN SERPL-MCNC: 3.5 G/DL (ref 3.5–5)
ALP SERPL-CCNC: 104 U/L (ref 45–120)
ALT SERPL W P-5'-P-CCNC: 11 U/L (ref 0–45)
ANION GAP SERPL CALCULATED.3IONS-SCNC: 9 MMOL/L (ref 5–18)
AST SERPL W P-5'-P-CCNC: 21 U/L (ref 0–40)
BILIRUB SERPL-MCNC: 1.1 MG/DL (ref 0–1)
BUN SERPL-MCNC: 22 MG/DL (ref 8–28)
CALCIUM SERPL-MCNC: 9.2 MG/DL (ref 8.5–10.5)
CHLORIDE BLD-SCNC: 106 MMOL/L (ref 98–107)
CHOLEST SERPL-MCNC: 124 MG/DL
CO2 SERPL-SCNC: 25 MMOL/L (ref 22–31)
CREAT SERPL-MCNC: 0.89 MG/DL (ref 0.7–1.3)
GFR SERPL CREATININE-BSD FRML MDRD: 81 ML/MIN/1.73M2
GLUCOSE BLD-MCNC: 107 MG/DL (ref 70–125)
HDLC SERPL-MCNC: 47 MG/DL
LDLC SERPL CALC-MCNC: 62 MG/DL
POTASSIUM BLD-SCNC: 4.4 MMOL/L (ref 3.5–5)
PROT SERPL-MCNC: 6.1 G/DL (ref 6–8)
SODIUM SERPL-SCNC: 140 MMOL/L (ref 136–145)
TRIGL SERPL-MCNC: 75 MG/DL

## 2021-10-26 PROCEDURE — 82040 ASSAY OF SERUM ALBUMIN: CPT | Performed by: PHYSICIAN ASSISTANT

## 2021-10-26 PROCEDURE — 80061 LIPID PANEL: CPT | Performed by: PHYSICIAN ASSISTANT

## 2021-10-26 PROCEDURE — 99606 MTMS BY PHARM EST 15 MIN: CPT | Performed by: PHARMACIST

## 2021-10-26 ASSESSMENT — MIFFLIN-ST. JEOR: SCORE: 1647.04

## 2021-10-26 NOTE — PATIENT INSTRUCTIONS
Recommendations from today's MTM visit:                                                    MTM (medication therapy management) is a service provided by a clinical pharmacist designed to help you get the most of out of your medicines.   Today we reviewed what your medicines are for, how to know if they are working, that your medicines are safe and how to make your medicine regimen as easy as possible.      I recommend calling [facility] to schedule an appointment for therapy.  Their phone number is [phone].  Working with a a therapist can be very effective for anxiety and depression.      Please call to schedule an appointment sooner if you have concerns with your medications you'd like to discuss!    Follow-up: Return in about 6 months (around 4/26/2022) for MTM Follow Up.    It was great to speak with you today.  I value your experience and would be very thankful for your time with providing feedback on our clinic survey. You may receive a survey via email or text message in the next few days.     To schedule another MTM appointment, please call the clinic directly or you may call the MTM scheduling line at [phone] or toll-free at [phone].     My Clinical Pharmacist's contact information:                                                      Please feel free to contact me with any questions or concerns you have.      Wilma Lang, Jameel  Medication Therapy Management Pharmacist  Pager: [phone]           Medication List          Accurate as of October 26, 2021 12:45 PM. If you have any questions, ask your nurse or doctor.            CONTINUE taking these medications      Morning Afternoon Evening Bedtime   acetaminophen 500 MG tablet  Also known as: TYLENOL  Take 1,000 mg by mouth as needed            * albuterol (2.5 MG/3ML) 0.083% neb solution  Also known as: PROVENTIL  Inhale 2.5 mg into the lungs every 6 hours as needed            * albuterol 108 (90 Base) MCG/ACT inhaler  Also known  as: PROAIR HFA/PROVENTIL HFA/VENTOLIN HFA  Inhale 2 puffs into the lungs every 6 hours as needed            amLODIPine 2.5 MG tablet  Also known as: NORVASC  Take 1 tablet (2.5 mg) by mouth daily              aspirin 81 MG chewable tablet  Also known as: ASA  Take 81 mg by mouth daily              diclofenac 1 % topical gel  Also known as: VOLTAREN  Apply 4 g topically 4 times daily            DULoxetine 30 MG capsule  Also known as: CYMBALTA  Take 1 capsule (30 mg) by mouth every morning              furosemide 20 MG tablet  Also known as: LASIX  Take 1 tablet (20 mg) by mouth 2 times daily                latanoprost 0.005 % ophthalmic solution  Also known as: XALATAN  Place 1 drop into both eyes At Bedtime              levothyroxine 150 MCG tablet  Also known as: SYNTHROID/LEVOTHROID  Take 1 tablet by mouth daily 30 minutes before breakfast              lidocaine 5 % external ointment  Also known as: XYLOCAINE  Apply topically 3 times daily            losartan 25 MG tablet  Also known as: COZAAR  Take 1 tablet (25 mg) by mouth daily              metoprolol succinate ER 25 MG 24 hr tablet  Also known as: TOPROL-XL  Take 0.5 tablets (12.5 mg) by mouth daily        1/2 tablet        Multi-vitamin tablet  Take 1 tablet by mouth daily              OLANZapine 5 MG tablet  Also known as: zyPREXA  Take 1.5 tablets (7.5 mg) by mouth At Bedtime           1 and 1/2 tablets     pantoprazole 40 MG EC tablet  Also known as: PROTONIX  Take 1 tablet (40 mg) by mouth every morning (before breakfast)        30 minutes before breakfast        rosuvastatin 10 MG tablet  Also known as: CRESTOR  Take 10 mg by mouth At Bedtime              vitamin D3 50 MCG (2000 UT) Caps  Take 1 tablet by mouth daily                  * This list has 2 medication(s) that are the same as other medications prescribed for you. Read the directions carefully, and ask your doctor or other care provider to review them with you.

## 2022-02-17 PROBLEM — T82.330A LEAKAGE OF AORTIC (BIFURCATION) GRAFT (REPLACEMENT), INITIAL ENCOUNTER (H): Status: ACTIVE | Noted: 2020-07-20

## 2022-03-02 ENCOUNTER — LAB REQUISITION (OUTPATIENT)
Dept: LAB | Facility: CLINIC | Age: 82
End: 2022-03-02

## 2022-03-02 DIAGNOSIS — R22.30 LOCALIZED SWELLING, MASS AND LUMP, UNSPECIFIED UPPER LIMB: ICD-10-CM

## 2022-03-02 LAB
ALBUMIN SERPL-MCNC: 3.6 G/DL (ref 3.5–5)
ALP SERPL-CCNC: 123 U/L (ref 45–120)
ALT SERPL W P-5'-P-CCNC: <9 U/L (ref 0–45)
ANION GAP SERPL CALCULATED.3IONS-SCNC: 11 MMOL/L (ref 5–18)
AST SERPL W P-5'-P-CCNC: 21 U/L (ref 0–40)
BILIRUB SERPL-MCNC: 0.9 MG/DL (ref 0–1)
BUN SERPL-MCNC: 16 MG/DL (ref 8–28)
CALCIUM SERPL-MCNC: 9.2 MG/DL (ref 8.5–10.5)
CHLORIDE BLD-SCNC: 105 MMOL/L (ref 98–107)
CO2 SERPL-SCNC: 27 MMOL/L (ref 22–31)
CREAT SERPL-MCNC: 0.94 MG/DL (ref 0.7–1.3)
GFR SERPL CREATININE-BSD FRML MDRD: 82 ML/MIN/1.73M2
GLUCOSE BLD-MCNC: 95 MG/DL (ref 70–125)
POTASSIUM BLD-SCNC: 4.5 MMOL/L (ref 3.5–5)
PROT SERPL-MCNC: 6.6 G/DL (ref 6–8)
SODIUM SERPL-SCNC: 143 MMOL/L (ref 136–145)
URATE SERPL-MCNC: 5.4 MG/DL (ref 3–8)

## 2022-03-02 PROCEDURE — 84550 ASSAY OF BLOOD/URIC ACID: CPT | Performed by: PHYSICIAN ASSISTANT

## 2022-03-02 PROCEDURE — 80053 COMPREHEN METABOLIC PANEL: CPT | Performed by: PHYSICIAN ASSISTANT

## 2022-04-26 ENCOUNTER — LAB REQUISITION (OUTPATIENT)
Dept: LAB | Facility: CLINIC | Age: 82
End: 2022-04-26

## 2022-04-26 DIAGNOSIS — E03.9 HYPOTHYROIDISM, UNSPECIFIED: ICD-10-CM

## 2022-04-26 DIAGNOSIS — E55.9 VITAMIN D DEFICIENCY, UNSPECIFIED: ICD-10-CM

## 2022-04-26 LAB
T4 FREE SERPL-MCNC: 1.37 NG/DL (ref 0.7–1.8)
TSH SERPL DL<=0.005 MIU/L-ACNC: 0.01 UIU/ML (ref 0.3–5)

## 2022-04-26 PROCEDURE — 84439 ASSAY OF FREE THYROXINE: CPT | Performed by: PHYSICIAN ASSISTANT

## 2022-04-26 PROCEDURE — 84443 ASSAY THYROID STIM HORMONE: CPT | Performed by: PHYSICIAN ASSISTANT

## 2022-04-26 PROCEDURE — 82306 VITAMIN D 25 HYDROXY: CPT | Performed by: PHYSICIAN ASSISTANT

## 2022-04-27 LAB — DEPRECATED CALCIDIOL+CALCIFEROL SERPL-MC: 56 UG/L (ref 20–75)

## 2022-06-06 ENCOUNTER — LAB REQUISITION (OUTPATIENT)
Dept: LAB | Facility: CLINIC | Age: 82
End: 2022-06-06

## 2022-06-06 LAB — TSH SERPL DL<=0.005 MIU/L-ACNC: 0.01 UIU/ML (ref 0.3–5)

## 2022-06-06 PROCEDURE — 84443 ASSAY THYROID STIM HORMONE: CPT | Performed by: PHYSICIAN ASSISTANT

## 2022-06-06 PROCEDURE — 84439 ASSAY OF FREE THYROXINE: CPT | Performed by: PHYSICIAN ASSISTANT

## 2022-06-07 LAB — T4 FREE SERPL-MCNC: 1.34 NG/DL (ref 0.7–1.8)

## 2022-06-22 ENCOUNTER — TRANSCRIBE ORDERS (OUTPATIENT)
Dept: VASCULAR SURGERY | Facility: CLINIC | Age: 82
End: 2022-06-22

## 2022-06-22 DIAGNOSIS — I71.40 ABDOMINAL AORTIC ANEURYSM, WITHOUT RUPTURE: Primary | ICD-10-CM

## 2022-06-23 DIAGNOSIS — I71.40 ABDOMINAL AORTIC ANEURYSM, WITHOUT RUPTURE: Primary | ICD-10-CM

## 2022-07-12 NOTE — PROGRESS NOTES
Addiction Medicine    Chart was thoroughly reviewed.     79 yo with chronic back pain on oxycodone 5 mg daily.   ? Of misuse, cravings, concerns from family.   Also has significant respiratory probems and was hypoxic on admission.      Last oxycodone was morning of 8/14.   Patient wants to start Suboxone.   Risks and benefits were reviewed with patient by pain team.      Ordered prn Suboxone based on COWS, but scale was not high enough to receive any.   Therefore, will start scheduled buprenorphine today at 2 mg bid.   Continuous Oximetry also ordered to monitor for any respiratory compromise.       Full consult to follow.      Karen Brantley MD  Addiction Medicine Service  [facility]   Page me (click here for Marguerite Brantley)            Not applicable

## 2022-07-13 PROBLEM — H35.363 MACULAR DRUSEN, BILATERAL: Status: ACTIVE | Noted: 2021-10-25

## 2022-07-13 PROBLEM — H25.9 AGE-RELATED CATARACT OF BOTH EYES: Status: ACTIVE | Noted: 2021-10-25

## 2022-07-25 ENCOUNTER — HOSPITAL ENCOUNTER (OUTPATIENT)
Dept: CT IMAGING | Facility: HOSPITAL | Age: 82
Discharge: HOME | End: 2022-07-25
Attending: SURGERY
Payer: COMMERCIAL

## 2022-07-25 ENCOUNTER — OFFICE VISIT (OUTPATIENT)
Dept: VASCULAR SURGERY | Facility: CLINIC | Age: 82
End: 2022-07-25
Attending: PHYSICIAN ASSISTANT
Payer: COMMERCIAL

## 2022-07-25 VITALS — DIASTOLIC BLOOD PRESSURE: 66 MMHG | HEART RATE: 100 BPM | SYSTOLIC BLOOD PRESSURE: 104 MMHG

## 2022-07-25 DIAGNOSIS — I71.40 ABDOMINAL AORTIC ANEURYSM, WITHOUT RUPTURE: ICD-10-CM

## 2022-07-25 LAB
CREAT BLD-MCNC: 1.1 MG/DL (ref 0.7–1.3)
GFR SERPL CREATININE-BSD FRML MDRD: >60 ML/MIN/1.73M2

## 2022-07-25 PROCEDURE — 74174 CTA ABD&PLVS W/CONTRAST: CPT

## 2022-07-25 PROCEDURE — 99204 OFFICE O/P NEW MOD 45 MIN: CPT | Performed by: SURGERY

## 2022-07-25 PROCEDURE — G0463 HOSPITAL OUTPT CLINIC VISIT: HCPCS | Mod: 25

## 2022-07-25 PROCEDURE — 255N000002 HC RX 255 OP 636: Performed by: SURGERY

## 2022-07-25 PROCEDURE — 82565 ASSAY OF CREATININE: CPT

## 2022-07-25 RX ORDER — SOLIFENACIN SUCCINATE 10 MG/1
10 TABLET, FILM COATED ORAL DAILY
Status: ON HOLD | COMMUNITY
Start: 2022-07-06 | End: 2023-04-16

## 2022-07-25 RX ORDER — LEVOTHYROXINE SODIUM 125 UG/1
88 TABLET ORAL
COMMUNITY
Start: 2022-07-08 | End: 2023-04-12

## 2022-07-25 RX ADMIN — IOHEXOL 100 ML: 350 INJECTION, SOLUTION INTRAVENOUS at 14:21

## 2022-07-25 ASSESSMENT — PAIN SCALES - GENERAL: PAINLEVEL: NO PAIN (0)

## 2022-07-25 NOTE — PROGRESS NOTES
VASCULAR SURGERY CLINIC CONSULTATION    VASCULAR SURGEON: Kindra Pack MD, RPVI     LOCATION:  [facility]     Dominik Rojas   Medical Record #:  [id number]  YOB: 1940  Age:  81 year old     Date of Service: 7/25/2022    PRIMARY CARE PROVIDER: Misael Moe      Reason for visit: History of infrarenal abdominal arctic aneurysm with repair    IMPRESSION: 81-year-old male who comes to vascular surgery clinic for follow-up.  Patient has a history of infrarenal abdominal arctic aneurysm repair with bilateral iliac branch devices for bilateral common iliac artery aneurysms.  This operation was done at [facility] several years ago.  Patient mentions that he had a history of type II endoleak which was treated with coiling validation.  His vascular surgeon had retired and he is here to establish new care.  Patient currently denies any symptoms.  CT scan did show right lower lobe pleural-based nodule mass, concern for possible malignancy.  Patient would probably benefit from either PET scan or CT-guided biopsy.  We will inform his primary care physician.    RECOMMENDATION/RISKS: CT scan is reviewed by me.  There is a moderate ulcer present in the proximal descending aorta.  An endograft is in good position with small type II endoleak.  The aneurysmal sac is measured 7 cm which is relatively unchanged compared to CT scan performed in 2021.  Based on size of aneurysmal sac and the quality of the endoleak I would not recommend any interventions.  Follow-up in 6 months with repeat CT scan.    HPI:  Dominik Rojas is a 81 year old male who was seen today in consultation for infrarenal abdominal aortic aneurysm that had been repaired in the past.  Patient is currently asymptomatic.  Denies any complaints    REVIEW OF SYSTEMS:    A 12 point ROS was reviewed and is negative    PHH:    Past Medical History:   Diagnosis Date     AAA (abdominal aortic aneurysm) (H)     s/p stenting      Alcohol dependence in remission (H)      Alcohol use disorder, severe, in sustained remission, dependence (H) 8/16/2021     Anxiety      Atrial fibrillation with normal ventricular rate (H)      Benign prostatic hyperplasia with lower urinary tract symptoms      Bronchiectasis without complication (H)     2/27/2020     COPD (chronic obstructive pulmonary disease) (H)      CVA (cerebral vascular accident) (H) 2019     DDD (degenerative disc disease), lumbar      Depression      Depressive disorder      Diabetes (H)      Diastolic dysfunction 01/22/2021     DJD (degenerative joint disease) of knee     left     Essential hypertension      Glaucoma      Glaucoma      History of stroke without residual deficits      Hyperlipidemia      Hypertension      Hypothyroidism      Hypothyroidism      Kidney stones      Major depression      Memory problem      Nocturia      Obesity      Opioid use disorder, severe, dependence (H) 8/16/2021     Overactive bladder 02/25/2021     Primary osteoarthritis of left knee      Psoriasis      Psoriasis      Seborrheic keratoses      Somnolence, daytime      Stenosis of right carotid artery     history of TIA's          Past Surgical History:   Procedure Laterality Date     ABDOMEN SURGERY       ABDOMINAL AORTIC ANEURYSM REPAIR  2013    stent     CAROTID ENDARTERECTOMY Right 9/9/2019    Procedure: RIGHT CAROTID ENDARTERECTOMY;  Surgeon: Miguel Muller MD;  Location: [facility];  Service: General     CIRCUMCISION       CYSTOSCOPY       CYSTOSCOPY PROSTATE W/ LASER N/A 6/12/2018    Procedure:  TRANSURETHRAL RESECTION OF THE PROSTATE WITH QUANTA LASER;  Surgeon: Eze Levi MD;  Location: [facility];  Service:      CYSTOSCOPY PROSTATE W/ LASER N/A 2/18/2020    Procedure: CYSTOSCOPY WITH TRANSURETHRAL INCISION OF THE PROSTATE WITH QUANTA LASER;  Surgeon: Eze Levi MD;  Location: [facility];  Service: Urology     CYSTOSCOPY W/ LITHOLAPAXY / EHL N/A  6/12/2018    Procedure: CYSTOSCOPY AND LITHOLAPAXY;  Surgeon: Eze Levi MD;  Location: [facility] OR;  Service:      IR ABDOMINAL AORTAGRAM  5/19/2020     IR ABDOMINAL AORTIC ANEURYSM ENDOGRAFT  5/19/2020     IR ABDOMINAL AORTOGRAM  5/19/2020     IR ABDOMINAL ENDOVASCULAR STENT GRAFT  5/19/2020     LITHOTRIPSY       PERCUTANEOUS NEPHROSTOLITHOTOMY Right 03/10/2020     ZZC HUANG W/O FACETEC FORAMOT/DSKC 1/2 VRT SEG, LUMBAR Bilateral 3/3/2021    Procedure: Bilateral lumbar 2 - Lumbar 4 decompressive lumbar laminectomy with medial facetectomies;  Surgeon: Renée King MD;  Location: [facility] OR;  Service: Spine       ALLERGIES:  Diclofenac, Loratadine, and Sertraline    MEDS:    Current Outpatient Medications:      acetaminophen (TYLENOL) 500 MG tablet, Take 1,000 mg by mouth as needed, Disp: , Rfl:      albuterol (PROAIR HFA/PROVENTIL HFA/VENTOLIN HFA) 108 (90 Base) MCG/ACT inhaler, Inhale 2 puffs into the lungs every 6 hours as needed , Disp: , Rfl:      albuterol (PROVENTIL) (2.5 MG/3ML) 0.083% neb solution, Inhale 2.5 mg into the lungs every 6 hours as needed , Disp: , Rfl:      aspirin (ASA) 81 MG chewable tablet, Take 81 mg by mouth daily, Disp: , Rfl:      Cholecalciferol (VITAMIN D3) 50 MCG (2000 UT) CAPS, Take 1 tablet by mouth daily , Disp: , Rfl:      diclofenac (VOLTAREN) 1 % topical gel, Apply 4 g topically 4 times daily, Disp: 50 g, Rfl: 3     levothyroxine (SYNTHROID/LEVOTHROID) 125 MCG tablet, TAKE 1 TABLET BY MOUTH EVERY DAY IN THE MORNING ON EMPTY STOMACH FOR 30 DAYS, Disp: , Rfl:      losartan (COZAAR) 25 MG tablet, Take 1 tablet (25 mg) by mouth daily, Disp: 30 tablet, Rfl: 0     metoprolol succinate ER (TOPROL-XL) 25 MG 24 hr tablet, Take 0.5 tablets (12.5 mg) by mouth daily, Disp: , Rfl:      multivitamin w/minerals (THERA-VIT-M) tablet, Take 1 tablet by mouth daily, Disp: , Rfl:      pantoprazole (PROTONIX) 40 MG EC tablet, Take 1 tablet (40 mg) by mouth every morning  (before breakfast), Disp: 30 tablet, Rfl: 0     rosuvastatin (CRESTOR) 10 MG tablet, Take 10 mg by mouth At Bedtime , Disp: , Rfl:      solifenacin (VESICARE) 10 MG tablet, Take 10 mg by mouth daily, Disp: , Rfl:      amLODIPine (NORVASC) 2.5 MG tablet, Take 1 tablet (2.5 mg) by mouth daily, Disp: 30 tablet, Rfl: 0     DULoxetine (CYMBALTA) 30 MG capsule, Take 1 capsule (30 mg) by mouth every morning (Patient not taking: Reported on 7/25/2022), Disp: , Rfl:      furosemide (LASIX) 20 MG tablet, Take 1 tablet (20 mg) by mouth 2 times daily (Patient not taking: Reported on 7/25/2022), Disp: 60 tablet, Rfl: 0     latanoprost (XALATAN) 0.005 % ophthalmic solution, Place 1 drop into both eyes At Bedtime  (Patient not taking: Reported on 7/25/2022), Disp: , Rfl:      levothyroxine (SYNTHROID/LEVOTHROID) 150 MCG tablet, Take 1 tablet by mouth daily 30 minutes before breakfast (Patient not taking: Reported on 7/25/2022), Disp: , Rfl:      lidocaine (XYLOCAINE) 5 % external ointment, Apply topically 3 times daily (Patient not taking: Reported on 7/25/2022), Disp: 150 g, Rfl: 0     OLANZapine (ZYPREXA) 5 MG tablet, Take 1.5 tablets (7.5 mg) by mouth At Bedtime (Patient not taking: Reported on 7/25/2022), Disp: , Rfl:   No current facility-administered medications for this visit.    SOCIAL HABITS:    History   Smoking Status     Former Smoker     Packs/day: 0.50     Years: 35.00     Types: Cigarettes     Quit date: 1/1/1990   Smokeless Tobacco     Never Used     Comment: quit 1987     Social History    Substance and Sexual Activity      Alcohol use: No        Comment: Alcoholic Drinks/day: quit 1974      History   Drug Use No       FAMILY HISTORY:    Family History   Problem Relation Age of Onset     Emphysema Mother      No Known Problems Father      Cirrhosis Father      No Known Problems Sister      No Known Problems Brother      No Known Problems Maternal Aunt      No Known Problems Maternal Uncle      No Known Problems  Paternal Aunt      No Known Problems Paternal Uncle      No Known Problems Maternal Grandmother      No Known Problems Maternal Grandfather      No Known Problems Paternal Grandmother      No Known Problems Paternal Grandfather      No Known Problems Other        PE:  /66   Pulse 100   Wt Readings from Last 1 Encounters:   10/26/21 97.8 kg (215 lb 11.2 oz)     There is no height or weight on file to calculate BMI.    EXAM:  GENERAL: This is a well-developed 81 year old male who appears his stated age  EYES: Grossly normal.  MOUTH: Buccal mucosa normal   CARDIAC: Normal   CHEST/LUNG: Clear to auscultation bilaterally  GASTROINTESINAL soft nontender nondistended  MUSCULOSKELETAL: Grossly normal and both lower extremities are intact.  HEME/LYMPH: No lymphedema  NEUROLOGIC: Focally intact, Alert and oriented x 3.   PSYCH: appropriate affect  INTEGUMENT: No open lesions or ulcers            DIAGNOSTIC STUDIES:     Images:  No results found.    I personally reviewed the images and my interpretation is stable appearance of infrarenal abdominal risk aneurysm with endograft in place.    LABS:      Sodium   Date Value Ref Range Status   03/02/2022 143 136 - 145 mmol/L Final   10/26/2021 140 136 - 145 mmol/L Final   09/09/2021 142 136 - 145 mmol/L Final     Urea Nitrogen   Date Value Ref Range Status   03/02/2022 16 8 - 28 mg/dL Final   10/26/2021 22 8 - 28 mg/dL Final   09/09/2021 23 8 - 28 mg/dL Final     Hemoglobin   Date Value Ref Range Status   09/09/2021 13.6 13.3 - 17.7 g/dL Final   08/23/2021 13.9 13.3 - 17.7 g/dL Final   08/22/2021 14.9 13.3 - 17.7 g/dL Final     Platelet Count   Date Value Ref Range Status   09/09/2021 246 150 - 450 10e3/uL Final   08/23/2021 215 150 - 450 10e3/uL Final   08/22/2021 245 150 - 450 10e3/uL Final     BNP   Date Value Ref Range Status   08/14/2021 61 0 - 84 pg/mL Final   08/09/2021 51 0 - 84 pg/mL Final   03/07/2021 50 0 - 84 pg/mL Final     INR   Date Value Ref Range Status    02/27/2021 1.15 (H) 0.90 - 1.10 Final   01/14/2021 1.13 (H) 0.90 - 1.10 Final   08/27/2019 1.14 (H) 0.90 - 1.10 Final       40 minutes spent on the day of encounter doing chart review, history and exam, documentation, and further activities as noted.         Kindra Pack MD, [facility]  VASCULAR SURGERY    written material

## 2022-07-25 NOTE — NURSING NOTE
[facility]        Patient is here for a consult to discuss Abdominal aortic aneurysm (AAA).    Pt is currently taking Aspirin and Statin.    /66   Pulse 100     The provider has been notified that the patient has no concerns.     Questions patient would like addressed today are: N/A.    Refills are needed: No    Has homecare services and agency name:  Elana Mckee LPN

## 2022-07-25 NOTE — PATIENT INSTRUCTIONS
Understanding Abdominal Aortic Aneurysm  You may have been told that you have an aneurysm . This is when a weakened part of a blood vessel expands like a balloon. An aneurysm in the main blood vessel in your stomach area is called an abdominal aortic aneurysm (AAA).    What is AAA?  Front view of abdominal aorta with aneurysms. Dotted line shows normal width of aorta.  An aneurysm happens when a weakened part of the aorta wall stretches and expands.  The aorta is the large artery that carries blood from the heart to the rest of the body. With AAA, part of the aorta weakens and expands. If an aneurysm gets large enough, it may burst. This is very serious, and usually fatal.        How is an aneurysm found?  AAA usually causes no symptoms. It's often found when tests (such as an X-ray, MRI, or CT scan) are done for an unrelated problem. Or your healthcare provider may find it while feeling your stomach during a routine exam.      Who develops AAA?  These things increase your chances of having AAA:    AAA runs in your family  Your age. AAA is more likely as you get older.  Men are more likely than women to have AAA  Smoking  High blood pressure  High cholesterol level. This is a buildup of fat and other materials in the blood.  Injury, such as a car accident    What can be done?  Surgery can be done to remove an aneurysm. Your healthcare provider will weigh the chances that the aneurysm will burst against the risks of treatment. Because a small and slow growing aneurysm is not likely to burst, it may be watched for a while. When it reaches a certain size, you may have surgery to replace that section of your aorta.        0600-4761 [employer]. [address] [place] [address], [place] [address]. All rights reserved. This information is not intended as a substitute for professional medical care. Always follow your healthcare professional's instructions.

## 2022-07-26 ENCOUNTER — LAB REQUISITION (OUTPATIENT)
Dept: LAB | Facility: CLINIC | Age: 82
End: 2022-07-26

## 2022-07-26 DIAGNOSIS — E03.9 HYPOTHYROIDISM, UNSPECIFIED: ICD-10-CM

## 2022-07-26 DIAGNOSIS — I10 ESSENTIAL (PRIMARY) HYPERTENSION: ICD-10-CM

## 2022-07-26 LAB
ANION GAP SERPL CALCULATED.3IONS-SCNC: 12 MMOL/L (ref 7–15)
BUN SERPL-MCNC: 22.5 MG/DL (ref 8–23)
CALCIUM SERPL-MCNC: 9.2 MG/DL (ref 8.8–10.2)
CHLORIDE SERPL-SCNC: 103 MMOL/L (ref 98–107)
CREAT SERPL-MCNC: 0.98 MG/DL (ref 0.67–1.17)
DEPRECATED HCO3 PLAS-SCNC: 25 MMOL/L (ref 22–29)
GFR SERPL CREATININE-BSD FRML MDRD: 78 ML/MIN/1.73M2
GLUCOSE SERPL-MCNC: 121 MG/DL (ref 70–99)
POTASSIUM SERPL-SCNC: 4.4 MMOL/L (ref 3.4–5.3)
SODIUM SERPL-SCNC: 140 MMOL/L (ref 136–145)
T4 FREE SERPL-MCNC: 1.39 NG/DL (ref 0.9–1.7)
TSH SERPL DL<=0.005 MIU/L-ACNC: 0.04 UIU/ML (ref 0.3–4.2)

## 2022-07-26 PROCEDURE — 84439 ASSAY OF FREE THYROXINE: CPT | Performed by: PHYSICIAN ASSISTANT

## 2022-07-26 PROCEDURE — 80048 BASIC METABOLIC PNL TOTAL CA: CPT | Performed by: PHYSICIAN ASSISTANT

## 2022-07-26 PROCEDURE — 84443 ASSAY THYROID STIM HORMONE: CPT | Performed by: PHYSICIAN ASSISTANT

## 2022-08-01 ENCOUNTER — TELEPHONE (OUTPATIENT)
Dept: VASCULAR SURGERY | Facility: CLINIC | Age: 82
End: 2022-08-01

## 2022-08-01 NOTE — TELEPHONE ENCOUNTER
Patient and wife calling in to discuss further about patient's CT results and what to do next. Dr. Pack had relayed CTA results to patient on Friday that he had a lung mass found. They are very anxious and wondering what needs to be done next. Writer explained that we have forwarded the CTA results to his primary and they should be reaching out to him to provide next steps. Writer did forward the result to pcp last Friday. Also called pcp's office [place] and left message for her to reach out to patient.

## 2022-09-30 ENCOUNTER — HOSPITAL ENCOUNTER (OUTPATIENT)
Dept: PET IMAGING | Facility: HOSPITAL | Age: 82
Discharge: HOME | End: 2022-09-30
Attending: PHYSICIAN ASSISTANT | Admitting: PHYSICIAN ASSISTANT
Payer: COMMERCIAL

## 2022-09-30 DIAGNOSIS — R91.8 LUNG MASS: ICD-10-CM

## 2022-09-30 LAB — GLUCOSE BLDC GLUCOMTR-MCNC: 104 MG/DL (ref 70–99)

## 2022-09-30 PROCEDURE — A9552 F18 FDG: HCPCS | Performed by: PHYSICIAN ASSISTANT

## 2022-09-30 PROCEDURE — 78816 PET IMAGE W/CT FULL BODY: CPT | Mod: PI

## 2022-09-30 PROCEDURE — 82962 GLUCOSE BLOOD TEST: CPT | Mod: GZ

## 2022-09-30 PROCEDURE — 343N000001 HC RX 343: Performed by: PHYSICIAN ASSISTANT

## 2022-09-30 RX ADMIN — FLUDEOXYGLUCOSE F-18 12.81 MCI.: 500 INJECTION, SOLUTION INTRAVENOUS at 08:35

## 2022-10-04 ENCOUNTER — MEDICAL CORRESPONDENCE (OUTPATIENT)
Dept: HEALTH INFORMATION MANAGEMENT | Facility: CLINIC | Age: 82
End: 2022-10-04

## 2022-10-05 NOTE — CONSULTS
Outpatient IR Referral  10/05/22     Referring Provider: Misael Moe PA-C  Procedure Requested:  Right lung nodule biopsy   Recommendations:  To be discussed at pulmonary nodule conference 10/7/2022 to discuss IR biopsy vs consideration of wedge resection.      Patient is a 81 year old with history of 17.50 pack year smoking history now with  CT on 7/25/2022 with 18 x 13 mm right lower lobe pleural based nodular mass and on PET CT 9/30/2022 with 1.3 x 1.8 cm FDG avid right lower lobe pulmonary nodule.  High risk for PTX.  Discussed with Dr. Christian Hernandez IR attending.      Significant PMH:  Past Medical History:   Diagnosis Date     AAA (abdominal aortic aneurysm) (H)     s/p stenting     Alcohol dependence in remission (H)      Alcohol use disorder, severe, in sustained remission, dependence (H) 8/16/2021     Anxiety      Atrial fibrillation with normal ventricular rate (H)      Benign prostatic hyperplasia with lower urinary tract symptoms      Bronchiectasis without complication (H)     2/27/2020     COPD (chronic obstructive pulmonary disease) (H)      CVA (cerebral vascular accident) (H) 2019     DDD (degenerative disc disease), lumbar      Depression      Depressive disorder      Diabetes (H)      Diastolic dysfunction 01/22/2021     DJD (degenerative joint disease) of knee     left     Essential hypertension      Glaucoma      Glaucoma      History of stroke without residual deficits      Hyperlipidemia      Hypertension      Hypothyroidism      Hypothyroidism      Kidney stones      Major depression      Memory problem      Nocturia      Obesity      Opioid use disorder, severe, dependence (H) 8/16/2021     Overactive bladder 02/25/2021     Primary osteoarthritis of left knee      Psoriasis      Psoriasis      Seborrheic keratoses      Somnolence, daytime      Stenosis of right carotid artery     history of TIA's     Pertinent Medications Reviewed:  Baby ASA    Primary team Misael Moe PA-C  made aware of IR recommendations via [employer] and call to [facility].    Armida Taveras PA-C  Interventional Radiology   IR on-call pager: [phone]

## 2022-10-14 ENCOUNTER — DOCUMENTATION ONLY (OUTPATIENT)
Dept: RADIOLOGY | Facility: CLINIC | Age: 82
End: 2022-10-14

## 2022-10-14 DIAGNOSIS — R91.1 LUNG NODULE: Primary | ICD-10-CM

## 2022-10-14 NOTE — PROGRESS NOTES
Patient Dominik Rojas was reviewed at pulmonary nodule conference on 10/14/2022.  Patient was referred to interventional radiology initially for biopsy evaluation due to a PET avid right lower lobe nodule.  IR recommended review at pulmonary nodule conference due to the size and nature of the nodule and high risk for IR biopsy complications including pneumothorax.    After review with multidisciplinary team the thoracic surgery team agreed to see the patient to discuss surgical options.    Called patient and his wife Jovana to discuss the plan moving forward from pulmonary nodule conference.  They will expect a phone call from the thoracic surgery coordinators to schedule a clinic appointment.  Interventional radiology will defer management to thoracic surgery team moving forward.      Noman Mccallum PA-C  Interventional Radiology  10/14/2022

## 2022-10-25 ENCOUNTER — LAB REQUISITION (OUTPATIENT)
Dept: LAB | Facility: CLINIC | Age: 82
End: 2022-10-25

## 2022-10-25 DIAGNOSIS — E03.9 HYPOTHYROIDISM, UNSPECIFIED: ICD-10-CM

## 2022-10-25 DIAGNOSIS — I10 ESSENTIAL (PRIMARY) HYPERTENSION: ICD-10-CM

## 2022-10-25 LAB
ANION GAP SERPL CALCULATED.3IONS-SCNC: 13 MMOL/L (ref 7–15)
BUN SERPL-MCNC: 28.1 MG/DL (ref 8–23)
CALCIUM SERPL-MCNC: 9.2 MG/DL (ref 8.8–10.2)
CHLORIDE SERPL-SCNC: 104 MMOL/L (ref 98–107)
CREAT SERPL-MCNC: 1.06 MG/DL (ref 0.67–1.17)
DEPRECATED HCO3 PLAS-SCNC: 23 MMOL/L (ref 22–29)
GFR SERPL CREATININE-BSD FRML MDRD: 71 ML/MIN/1.73M2
GLUCOSE SERPL-MCNC: 110 MG/DL (ref 70–99)
POTASSIUM SERPL-SCNC: 4.3 MMOL/L (ref 3.4–5.3)
SODIUM SERPL-SCNC: 140 MMOL/L (ref 136–145)
TSH SERPL DL<=0.005 MIU/L-ACNC: 0.39 UIU/ML (ref 0.3–4.2)

## 2022-10-25 PROCEDURE — 80048 BASIC METABOLIC PNL TOTAL CA: CPT | Performed by: PHYSICIAN ASSISTANT

## 2022-10-25 PROCEDURE — 84443 ASSAY THYROID STIM HORMONE: CPT | Performed by: PHYSICIAN ASSISTANT

## 2023-01-12 ENCOUNTER — HOSPITAL ENCOUNTER (OUTPATIENT)
Dept: CT IMAGING | Facility: HOSPITAL | Age: 83
Discharge: HOME | End: 2023-01-12
Attending: SURGERY
Payer: COMMERCIAL

## 2023-01-12 ENCOUNTER — OFFICE VISIT (OUTPATIENT)
Dept: VASCULAR SURGERY | Facility: CLINIC | Age: 83
End: 2023-01-12
Attending: SURGERY
Payer: COMMERCIAL

## 2023-01-12 VITALS
DIASTOLIC BLOOD PRESSURE: 62 MMHG | RESPIRATION RATE: 12 BRPM | OXYGEN SATURATION: 90 % | HEART RATE: 87 BPM | BODY MASS INDEX: 33.05 KG/M2 | WEIGHT: 211 LBS | SYSTOLIC BLOOD PRESSURE: 110 MMHG

## 2023-01-12 DIAGNOSIS — I71.40 ABDOMINAL AORTIC ANEURYSM WITHOUT RUPTURE (H): ICD-10-CM

## 2023-01-12 DIAGNOSIS — I65.29 STENOSIS OF CAROTID ARTERY, UNSPECIFIED LATERALITY: ICD-10-CM

## 2023-01-12 DIAGNOSIS — I71.43 INFRARENAL ABDOMINAL AORTIC ANEURYSM (AAA) WITHOUT RUPTURE (H): Primary | ICD-10-CM

## 2023-01-12 LAB
CREAT BLD-MCNC: 1.2 MG/DL (ref 0.7–1.3)
GFR SERPL CREATININE-BSD FRML MDRD: >60 ML/MIN/1.73M2

## 2023-01-12 PROCEDURE — 82565 ASSAY OF CREATININE: CPT

## 2023-01-12 PROCEDURE — 250N000011 HC RX IP 250 OP 636: Performed by: SURGERY

## 2023-01-12 PROCEDURE — 74174 CTA ABD&PLVS W/CONTRAST: CPT

## 2023-01-12 PROCEDURE — G0463 HOSPITAL OUTPT CLINIC VISIT: HCPCS | Mod: 25 | Performed by: SURGERY

## 2023-01-12 PROCEDURE — 99213 OFFICE O/P EST LOW 20 MIN: CPT | Performed by: SURGERY

## 2023-01-12 RX ORDER — IOPAMIDOL 755 MG/ML
75 INJECTION, SOLUTION INTRAVASCULAR ONCE
Status: COMPLETED | OUTPATIENT
Start: 2023-01-12 | End: 2023-01-12

## 2023-01-12 RX ORDER — ONABOTULINUMTOXINA 100 [USP'U]/1
INJECTION, POWDER, LYOPHILIZED, FOR SOLUTION INTRADERMAL; INTRAMUSCULAR
COMMUNITY
End: 2023-03-08

## 2023-01-12 RX ORDER — CEPHALEXIN 500 MG/1
CAPSULE ORAL
COMMUNITY
End: 2023-03-08

## 2023-01-12 RX ORDER — OXYCODONE AND ACETAMINOPHEN 5; 325 MG/1; MG/1
TABLET ORAL
COMMUNITY
End: 2023-03-08

## 2023-01-12 RX ORDER — TRAMADOL HYDROCHLORIDE 50 MG/1
TABLET ORAL
COMMUNITY
End: 2023-03-08

## 2023-01-12 RX ORDER — CYCLOBENZAPRINE HCL 5 MG
TABLET ORAL
COMMUNITY
End: 2023-03-08

## 2023-01-12 RX ORDER — OXYCODONE HYDROCHLORIDE 5 MG/1
TABLET ORAL
COMMUNITY
End: 2023-03-08

## 2023-01-12 RX ORDER — NIFEDIPINE 30 MG
TABLET, EXTENDED RELEASE ORAL
Status: ON HOLD | COMMUNITY
End: 2023-04-16

## 2023-01-12 RX ORDER — CALCITONIN SALMON 200 [IU]/.09ML
SPRAY, METERED NASAL
Status: ON HOLD | COMMUNITY
End: 2023-04-16

## 2023-01-12 RX ADMIN — IOPAMIDOL 75 ML: 755 INJECTION, SOLUTION INTRAVENOUS at 10:20

## 2023-01-12 ASSESSMENT — PAIN SCALES - GENERAL: PAINLEVEL: NO PAIN (0)

## 2023-01-12 NOTE — PROGRESS NOTES
VASCULAR SURGERY PROGRESS NOTE   VASCULAR SURGEON: Kindra Pack MD, RPVI     LOCATION:  [facility]     Dominik Rojas   Medical Record #:  [id number]  YOB: 1940  Age:  82 year old     Date of Service: 1/12/2023    PRIMARY CARE PROVIDER: Misael Moe      Reason for visit: Follow-up    IMPRESSION: 82-year-old male who comes to vascular surgery clinic for follow-up.  Most recent CT scan did show stable position of the endograft.  The aneurysmal sac is relatively unchanged.    RECOMMENDATION/RISKS: Patient is to see pulmonology for evaluation of the lung mass.  Follow-up in 1 year with repeat CT scan.  At the same time we also will order carotid ultrasound for surveillance    HPI:  Dominik Rojas is a 82 year old male who was seen today for follow-up.  Patient doing well in all regards anxiety complaints    REVIEW OF SYSTEMS:    A 12 point ROS was reviewed and is negative     PHH:    Past Medical History:   Diagnosis Date     AAA (abdominal aortic aneurysm)     s/p stenting     Alcohol dependence in remission (H)      Alcohol use disorder, severe, in sustained remission, dependence (H) 8/16/2021     Anxiety      Atrial fibrillation with normal ventricular rate (H)      Benign prostatic hyperplasia with lower urinary tract symptoms      Bronchiectasis without complication (H)     2/27/2020     COPD (chronic obstructive pulmonary disease) (H)      CVA (cerebral vascular accident) (H) 2019     DDD (degenerative disc disease), lumbar      Depression      Depressive disorder      Diabetes (H)      Diastolic dysfunction 01/22/2021     DJD (degenerative joint disease) of knee     left     Essential hypertension      Glaucoma      Glaucoma      History of stroke without residual deficits      Hyperlipidemia      Hypertension      Hypothyroidism      Hypothyroidism      Kidney stones      Major depression      Memory problem      Nocturia      Obesity      Opioid use disorder,  severe, dependence (H) 8/16/2021     Overactive bladder 02/25/2021     Primary osteoarthritis of left knee      Psoriasis      Psoriasis      Seborrheic keratoses      Somnolence, daytime      Stenosis of right carotid artery     history of TIA's          Past Surgical History:   Procedure Laterality Date     ABDOMEN SURGERY       ABDOMINAL AORTIC ANEURYSM REPAIR  2013    stent     CAROTID ENDARTERECTOMY Right 9/9/2019    Procedure: RIGHT CAROTID ENDARTERECTOMY;  Surgeon: iMguel Muller MD;  Location: [facility];  Service: General     CIRCUMCISION       CYSTOSCOPY       CYSTOSCOPY PROSTATE W/ LASER N/A 6/12/2018    Procedure:  TRANSURETHRAL RESECTION OF THE PROSTATE WITH QUANTA LASER;  Surgeon: Eze Levi MD;  [place] [facility] [place]:      CYSTOSCOPY PROSTATE W/ LASER N/A 2/18/2020    Procedure: CYSTOSCOPY WITH TRANSURETHRAL INCISION OF THE PROSTATE WITH QUANTA LASER;  Surgeon: Eze Levi MD;  [place] [facility] [place]  Service: [place]rology     CYSTOSCOPY W/ LITHOLAPAXY / EHL N/A 6/12/2018    Procedure: CYSTOSCOPY AND LITHOLAPAXY;  Surgeon: Eze Levi MD;  [place] [facility] [place]:      IR ABDOMINAL AORTAGRAM  5/19/2020     IR ABDOMINAL AORTIC ANEURYSM ENDOGRAFT  5/19/2020     IR ABDOMINAL AORTOGRAM  5/19/2020     IR ABDOMINAL ENDOVASCULAR STENT GRAFT  5/19/2020     LITHOTRIPSY       PERCUTANEOUS NEPHROSTOLITHOTOMY Right 03/10/2020     ZZC HUANG W/O FACETEC FORAMOT/DSKC 1/2 VRT SEG, LUMBAR Bilateral 3/3/2021    Procedure: Bilateral lumbar 2 - Lumbar 4 decompressive lumbar laminectomy with medial facetectomies;  Surgeon: Renée King MD;  [place] [facility] [place]  Service: Spine       ALLERGIES:  Diclofenac, Loratadine, and Sertraline    MEDS:    Current Outpatient Medications:      acetaminophen (TYLENOL) 500 MG tablet, Take 1,000 mg by mouth as needed, Disp: , Rfl:      albuterol (PROAIR HFA/PROVENTIL HFA/VENTOLIN HFA) 108 (90 Base) MCG/ACT  inhaler, Inhale 2 puffs into the lungs every 6 hours as needed , Disp: , Rfl:      albuterol (PROVENTIL) (2.5 MG/3ML) 0.083% neb solution, Inhale 2.5 mg into the lungs every 6 hours as needed , Disp: , Rfl:      aspirin (ASA) 81 MG chewable tablet, Take 81 mg by mouth daily, Disp: , Rfl:      botulinum toxin type A (BOTOX) 100 units injection, Botox 100 unit injection  Take 100 units by injection route., Disp: , Rfl:      calcitonin, salmon, (MIACALCIN) 200 UNIT/ACT nasal spray, calcitonin (salmon) 200 unit/actuation nasal spray, Disp: , Rfl:      Cholecalciferol (VITAMIN D3) 50 MCG (2000 UT) CAPS, Take 1 tablet by mouth daily , Disp: , Rfl:      cyclobenzaprine (FLEXERIL) 5 MG tablet, cyclobenzaprine 5 mg tablet  TAKE 1 TABLET BY MOUTH 3 TIMES DAILY AS NEEDED FOR MUSCLE SPASMS, Disp: , Rfl:      diclofenac (VOLTAREN) 1 % topical gel, Apply 4 g topically 4 times daily, Disp: 50 g, Rfl: 3     DULoxetine (CYMBALTA) 30 MG capsule, Take 30 mg by mouth every morning, Disp: , Rfl:      latanoprost (XALATAN) 0.005 % ophthalmic solution, Place 1 drop into both eyes At Bedtime, Disp: , Rfl:      levothyroxine (SYNTHROID/LEVOTHROID) 125 MCG tablet, 100 mcg, Disp: , Rfl:      levothyroxine (SYNTHROID/LEVOTHROID) 150 MCG tablet, Take 1 tablet by mouth daily 30 minutes before breakfast, Disp: , Rfl:      lidocaine (XYLOCAINE) 5 % external ointment, Apply topically 3 times daily, Disp: 150 g, Rfl: 0     losartan (COZAAR) 25 MG tablet, Take 1 tablet (25 mg) by mouth daily, Disp: 30 tablet, Rfl: 0     metoprolol succinate ER (TOPROL-XL) 25 MG 24 hr tablet, Take 0.5 tablets (12.5 mg) by mouth daily, Disp: , Rfl:      multivitamin w/minerals (THERA-VIT-M) tablet, Take 1 tablet by mouth daily, Disp: , Rfl:      NIFEdipine ER (ADALAT CC) 30 MG 24 hr tablet, nifedipine ER 30 mg tablet,extended release  TAKE 1 TABLET ON AN EMPTY STOMACH ONCE A DAY, Disp: , Rfl:      OLANZapine (ZYPREXA) 5 MG tablet, Take 7.5 mg by mouth At Bedtime, Disp:  , Rfl:      oxyCODONE (ROXICODONE) 5 MG tablet, oxycodone 5 mg tablet  TAKE 1 TABLET (5 MG) BY MOUTH 3 TIMES DAILY AS NEEDED FOR SEVERE PAIN, Disp: , Rfl:      oxyCODONE-acetaminophen (PERCOCET) 5-325 MG tablet, oxycodone-acetaminophen 5 mg-325 mg tablet, Disp: , Rfl:      pantoprazole (PROTONIX) 40 MG EC tablet, Take 1 tablet (40 mg) by mouth every morning (before breakfast), Disp: 30 tablet, Rfl: 0     rosuvastatin (CRESTOR) 10 MG tablet, Take 10 mg by mouth At Bedtime , Disp: , Rfl:      solifenacin (VESICARE) 10 MG tablet, Take 10 mg by mouth daily, Disp: , Rfl:      traMADol (ULTRAM) 50 MG tablet, tramadol 50 mg tablet  TAKE 1 TABLET BY MOUTH EVERY 6 HOURS AS NEEDED FOR PAIN, Disp: , Rfl:      amLODIPine (NORVASC) 2.5 MG tablet, Take 1 tablet (2.5 mg) by mouth daily, Disp: 30 tablet, Rfl: 0     cephALEXin (KEFLEX) 500 MG capsule, cephalexin 500 mg capsule  TAKE 1 CAPSULE EVERY 8 HOURS BY MOUTH FOR 3 DAYS. (Patient not taking: Reported on 1/12/2023), Disp: , Rfl:      furosemide (LASIX) 20 MG tablet, Take 1 tablet (20 mg) by mouth 2 times daily (Patient not taking: Reported on 7/25/2022), Disp: 60 tablet, Rfl: 0  No current facility-administered medications for this visit.    SOCIAL HABITS:    History   Smoking Status     Former     Packs/day: 0.50     Years: 35.00     Types: Cigarettes     Quit date: 1/1/1990   Smokeless Tobacco     Never     Social History    Substance and Sexual Activity      Alcohol use: No        Comment: Alcoholic Drinks/day: quit 1974      History   Drug Use No       FAMILY HISTORY:    Family History   Problem Relation Age of Onset     Emphysema Mother      No Known Problems Father      Cirrhosis Father      No Known Problems Sister      No Known Problems Brother      No Known Problems Maternal Aunt      No Known Problems Maternal Uncle      No Known Problems Paternal Aunt      No Known Problems Paternal Uncle      No Known Problems Maternal Grandmother      No Known Problems Maternal  Grandfather      No Known Problems Paternal Grandmother      No Known Problems Paternal Grandfather      No Known Problems Other        PE:  /62   Pulse 87   Resp 12   Wt 211 lb (95.7 kg)   SpO2 90%   BMI 33.05 kg/m    Wt Readings from Last 1 Encounters:   01/12/23 211 lb (95.7 kg)     Body mass index is 33.05 kg/m .    EXAM:  GENERAL: This is a well-developed 82 year old male who appears his stated age  EYES: Grossly normal.  MOUTH: Buccal mucosa normal   CARDIAC: Normal   CHEST/LUNG: Clear to auscultation bilaterally  GASTROINTESINAL soft nontender nondistended  MUSCULOSKELETAL: Grossly normal and both lower extremities are intact.  HEME/LYMPH: No lymphedema  NEUROLOGIC: Focally intact, Alert and oriented x 3.   PSYCH: appropriate affect  INTEGUMENT: No open lesions or ulcers            DIAGNOSTIC STUDIES:     Images:  No results found.        LABS:      Sodium   Date Value Ref Range Status   10/25/2022 140 136 - 145 mmol/L Final   07/26/2022 140 136 - 145 mmol/L Final   03/02/2022 143 136 - 145 mmol/L Final     Urea Nitrogen   Date Value Ref Range Status   10/25/2022 28.1 (H) 8.0 - 23.0 mg/dL Final   07/26/2022 22.5 8.0 - 23.0 mg/dL Final   03/02/2022 16 8 - 28 mg/dL Final   10/26/2021 22 8 - 28 mg/dL Final   09/09/2021 23 8 - 28 mg/dL Final     Hemoglobin   Date Value Ref Range Status   09/09/2021 13.6 13.3 - 17.7 g/dL Final   08/23/2021 13.9 13.3 - 17.7 g/dL Final   08/22/2021 14.9 13.3 - 17.7 g/dL Final     Platelet Count   Date Value Ref Range Status   09/09/2021 246 150 - 450 10e3/uL Final   08/23/2021 215 150 - 450 10e3/uL Final   08/22/2021 245 150 - 450 10e3/uL Final     BNP   Date Value Ref Range Status   08/14/2021 61 0 - 84 pg/mL Final   08/09/2021 51 0 - 84 pg/mL Final   03/07/2021 50 0 - 84 pg/mL Final     INR   Date Value Ref Range Status   02/27/2021 1.15 (H) 0.90 - 1.10 Final   01/14/2021 1.13 (H) 0.90 - 1.10 Final   08/27/2019 1.14 (H) 0.90 - 1.10 Final       30 minutes spent on the  day of encounter doing chart review, history and exam, documentation, and further activities as noted.         Kindra Pack MD, [facility]  VASCULAR SURGERY

## 2023-01-12 NOTE — NURSING NOTE
[facility]        Patient is here for a 6 month follow up  to discuss Abdominal aortic aneurysm (AAA).    Pt is currently taking Aspirin, Statin and Antibiotics.    /62   Pulse 87   Resp 12   Wt 211 lb (95.7 kg)   SpO2 90%   BMI 33.05 kg/m      The provider has been notified that the patient has no concerns.     Questions patient would like addressed today are: N/A.    Refills are needed: No    Has homecare services and agency name:  Elana Dill MA

## 2023-01-23 ENCOUNTER — LAB REQUISITION (OUTPATIENT)
Dept: LAB | Facility: CLINIC | Age: 83
End: 2023-01-23

## 2023-01-23 DIAGNOSIS — E78.2 MIXED HYPERLIPIDEMIA: ICD-10-CM

## 2023-01-23 DIAGNOSIS — E03.9 HYPOTHYROIDISM, UNSPECIFIED: ICD-10-CM

## 2023-01-23 DIAGNOSIS — R73.09 OTHER ABNORMAL GLUCOSE: ICD-10-CM

## 2023-01-23 LAB
CHOLEST SERPL-MCNC: 140 MG/DL
HBA1C MFR BLD: 5.7 %
HDLC SERPL-MCNC: 51 MG/DL
LDLC SERPL CALC-MCNC: 71 MG/DL
NONHDLC SERPL-MCNC: 89 MG/DL
T4 FREE SERPL-MCNC: 1.32 NG/DL (ref 0.9–1.7)
TRIGL SERPL-MCNC: 90 MG/DL
TSH SERPL DL<=0.005 MIU/L-ACNC: 0.29 UIU/ML (ref 0.3–4.2)

## 2023-01-23 PROCEDURE — 80061 LIPID PANEL: CPT | Performed by: PHYSICIAN ASSISTANT

## 2023-01-23 PROCEDURE — 84439 ASSAY OF FREE THYROXINE: CPT | Performed by: PHYSICIAN ASSISTANT

## 2023-01-23 PROCEDURE — 83036 HEMOGLOBIN GLYCOSYLATED A1C: CPT | Performed by: PHYSICIAN ASSISTANT

## 2023-01-23 PROCEDURE — 84443 ASSAY THYROID STIM HORMONE: CPT | Performed by: PHYSICIAN ASSISTANT

## 2023-02-06 ENCOUNTER — OFFICE VISIT (OUTPATIENT)
Dept: PULMONOLOGY | Facility: CLINIC | Age: 83
End: 2023-02-06
Payer: COMMERCIAL

## 2023-02-06 VITALS — HEART RATE: 91 BPM | SYSTOLIC BLOOD PRESSURE: 132 MMHG | DIASTOLIC BLOOD PRESSURE: 72 MMHG | OXYGEN SATURATION: 93 %

## 2023-02-06 DIAGNOSIS — J41.0 SIMPLE CHRONIC BRONCHITIS (H): ICD-10-CM

## 2023-02-06 DIAGNOSIS — R91.1 LUNG NODULE: Primary | ICD-10-CM

## 2023-02-06 PROCEDURE — 99214 OFFICE O/P EST MOD 30 MIN: CPT | Performed by: INTERNAL MEDICINE

## 2023-02-06 NOTE — H&P (VIEW-ONLY)
LUNG NODULE & INTERVENTIONAL PULMONARY CLINIC  CLINICS & SURGERY [facility]ENTER, [facility]     Dominik Rojas MRN# [id number]   Age: 82 year old YOB: 1940       Requesting Physician: No referring provider defined for this encounter.       Assessment and Plan:    1. Established solitary pulmonary lung nodule(s). Given the characteristics on current/previous imaging and risk factors; I would classify this to be High (>65%) risk for cancer.  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  No evidence for metastases on PET scan.  Previous evaluation was delayed due to difficulties establishing communication for referral.    IR at the [facility] had said that this was not possible to biopsy.  I think that CT-guided biopsy would be very reasonable and low risk and I will refer to my colleagues at [facility].    2.  Chronic bronchitis.  Not having problems with exacerbations.  Continue with as needed medications only.             History:     Dominik Rojas is a 82 year old male with sig h/o for diagnosis of chronic bronchitis who is here for evaluation/followup of lung nodule(s).  Not having any exacerbations or new pulmonary symptoms.  He had a nodule diagnosed and evaluated by PET in the fall and is here for follow-up of that.  No pain.      - My interpretation of the images relevant for this visit includes: PET avid nodule RLL, stable at 5 months              Past Medical History:      Past Medical History:   Diagnosis Date     AAA (abdominal aortic aneurysm)     s/p stenting     Alcohol dependence in remission (H)      Alcohol use disorder, severe, in sustained remission, dependence (H) 8/16/2021     Anxiety      Atrial fibrillation with normal ventricular rate (H)      Benign prostatic hyperplasia with lower urinary tract symptoms      Bronchiectasis without complication (H)     2/27/2020     COPD (chronic obstructive pulmonary disease) (H)      CVA  (cerebral vascular accident) (H) 2019     DDD (degenerative disc disease), lumbar      Depression      Depressive disorder      Diabetes (H)      Diastolic dysfunction 01/22/2021     DJD (degenerative joint disease) of knee     left     Essential hypertension      Glaucoma      Glaucoma      History of stroke without residual deficits      Hyperlipidemia      Hypertension      Hypothyroidism      Hypothyroidism      Kidney stones      Major depression      Memory problem      Nocturia      Obesity      Opioid use disorder, severe, dependence (H) 8/16/2021     Overactive bladder 02/25/2021     Primary osteoarthritis of left knee      Psoriasis      Psoriasis      Seborrheic keratoses      Somnolence, daytime      Stenosis of right carotid artery     history of TIA's           Past Surgical History:      Past Surgical History:   Procedure Laterality Date     ABDOMEN SURGERY       ABDOMINAL AORTIC ANEURYSM REPAIR  2013    stent     CAROTID ENDARTERECTOMY Right 9/9/2019    Procedure: RIGHT CAROTID ENDARTERECTOMY;  Surgeon: Miguel Muller MD;  [place] [facility];  Service: General     CIRCUMCISION       CYSTOSCOPY       CYSTOSCOPY PROSTATE W/ LASER N/A 6/12/2018    Procedure:  TRANSURETHRAL RESECTION OF THE PROSTATE WITH QUANTA LASER;  Surgeon: Eze Levi MD;  [place] [facility] [place];  Service:      CYSTOSCOPY PROSTATE W/ LASER N/A 2/18/2020    Procedure: CYSTOSCOPY WITH TRANSURETHRAL INCISION OF THE PROSTATE WITH QUANTA LASER;  Surgeon: Eze Levi MD;  Location: [facility] [place];  Service: Urology     CYSTOSCOPY W/ LITHOLAPAXY / EHL N/A 6/12/2018    Procedure: CYSTOSCOPY AND LITHOLAPAXY;  Surgeon: Eze Levi MD;  Location: [facility] [place];  Service:      IR ABDOMINAL AORTAGRAM  5/19/2020     IR ABDOMINAL AORTIC ANEURYSM ENDOGRAFT  5/19/2020     IR ABDOMINAL AORTOGRAM  5/19/2020     IR ABDOMINAL ENDOVASCULAR STENT GRAFT  5/19/2020     LITHOTRIPSY       PERCUTANEOUS  NEPHROSTOLITHOTOMY Right 03/10/2020     ZZC HUANG W/O FACETEC FORAMOT/DSKC  VRT SEG, LUMBAR Bilateral 3/3/2021    Procedure: Bilateral lumbar 2 - Lumbar 4 decompressive lumbar laminectomy with medial facetectomies;  Surgeon: Renée King MD;  Location: [facility] [place];  Service: Spine          Social History:     Social History     Tobacco Use     Smoking status: Former     Packs/day: 0.50     Years: 35.00     Pack years: 17.50     Types: Cigarettes     Quit date: 1990     Years since quittin.1     Smokeless tobacco: Never     Tobacco comments:     quit    Substance Use Topics     Alcohol use: No     Comment: Alcoholic Drinks/day: quit           Family History:     Family History   Problem Relation Age of Onset     Emphysema Mother      No Known Problems Father      Cirrhosis Father      No Known Problems Sister      No Known Problems Brother      No Known Problems Maternal Aunt      No Known Problems Maternal Uncle      No Known Problems Paternal Aunt      No Known Problems Paternal Uncle      No Known Problems Maternal Grandmother      No Known Problems Maternal Grandfather      No Known Problems Paternal Grandmother      No Known Problems Paternal Grandfather      No Known Problems Other            Allergies:      Allergies   Allergen Reactions     Diclofenac      Other reaction(s): GI Upset     Loratadine      Other reaction(s): Urinary Retention     Sertraline Unknown     Other reaction(s): ineffective          Medications:     Current Outpatient Medications   Medication Sig     acetaminophen (TYLENOL) 500 MG tablet Take 1,000 mg by mouth as needed     albuterol (PROAIR HFA/PROVENTIL HFA/VENTOLIN HFA) 108 (90 Base) MCG/ACT inhaler Inhale 2 puffs into the lungs every 6 hours as needed      albuterol (PROVENTIL) (2.5 MG/3ML) 0.083% neb solution Inhale 2.5 mg into the lungs every 6 hours as needed      aspirin (ASA) 81 MG chewable tablet Take 81 mg by mouth daily     botulinum toxin type  A (BOTOX) 100 units injection Botox 100 unit injection   Take 100 units by injection route.     calcitonin, salmon, (MIACALCIN) 200 UNIT/ACT nasal spray calcitonin (salmon) 200 unit/actuation nasal spray     cephALEXin (KEFLEX) 500 MG capsule      Cholecalciferol (VITAMIN D3) 50 MCG (2000 UT) CAPS Take 1 tablet by mouth daily      cyclobenzaprine (FLEXERIL) 5 MG tablet cyclobenzaprine 5 mg tablet   TAKE 1 TABLET BY MOUTH 3 TIMES DAILY AS NEEDED FOR MUSCLE SPASMS     diclofenac (VOLTAREN) 1 % topical gel Apply 4 g topically 4 times daily     DULoxetine (CYMBALTA) 30 MG capsule Take 30 mg by mouth every morning     latanoprost (XALATAN) 0.005 % ophthalmic solution Place 1 drop into both eyes At Bedtime     levothyroxine (SYNTHROID/LEVOTHROID) 125 MCG tablet 100 mcg     levothyroxine (SYNTHROID/LEVOTHROID) 150 MCG tablet Take 1 tablet by mouth daily 30 minutes before breakfast     lidocaine (XYLOCAINE) 5 % external ointment Apply topically 3 times daily     losartan (COZAAR) 25 MG tablet Take 1 tablet (25 mg) by mouth daily     metoprolol succinate ER (TOPROL-XL) 25 MG 24 hr tablet Take 0.5 tablets (12.5 mg) by mouth daily     multivitamin w/minerals (THERA-VIT-M) tablet Take 1 tablet by mouth daily     NIFEdipine ER (ADALAT CC) 30 MG 24 hr tablet nifedipine ER 30 mg tablet,extended release   TAKE 1 TABLET ON AN EMPTY STOMACH ONCE A DAY     OLANZapine (ZYPREXA) 5 MG tablet Take 7.5 mg by mouth At Bedtime     oxyCODONE (ROXICODONE) 5 MG tablet oxycodone 5 mg tablet   TAKE 1 TABLET (5 MG) BY MOUTH 3 TIMES DAILY AS NEEDED FOR SEVERE PAIN     oxyCODONE-acetaminophen (PERCOCET) 5-325 MG tablet oxycodone-acetaminophen 5 mg-325 mg tablet     pantoprazole (PROTONIX) 40 MG EC tablet Take 1 tablet (40 mg) by mouth every morning (before breakfast)     rosuvastatin (CRESTOR) 10 MG tablet Take 10 mg by mouth At Bedtime      solifenacin (VESICARE) 10 MG tablet Take 10 mg by mouth daily     traMADol (ULTRAM) 50 MG tablet tramadol 50  mg tablet   TAKE 1 TABLET BY MOUTH EVERY 6 HOURS AS NEEDED FOR PAIN     amLODIPine (NORVASC) 2.5 MG tablet Take 1 tablet (2.5 mg) by mouth daily     furosemide (LASIX) 20 MG tablet Take 1 tablet (20 mg) by mouth 2 times daily (Patient not taking: Reported on 7/25/2022)     No current facility-administered medications for this visit.          Review of Systems:     See HPI         Physical Exam:   /72 (BP Location: Right arm)   Pulse 91   SpO2 93%     Constitutional - looks well, in no apparent distress, transported in clinic in a wheelchair  Eyes - no redness or discharge  Respiratory -breathing appears comfortable. No wheeze or rhonchi.   Cardiac -- Normal rate, rhythm.   Skin - No appreciable discoloration or lesions (very limited exam)  Neurological - No apparent tremors. Speech fluent and articlate  Psychiatric - no signs of delirium or anxiety          Current Laboratory Data:   All laboratory and imaging data reviewed.    No results found for this or any previous visit (from the past 24 hour(s)).

## 2023-02-22 NOTE — PROGRESS NOTES
Preoperative History & Physical    CC: pelvic pain, suspected endometriosis    HPI: Cori Byrd is a 55 year old No obstetric history on file. who presents for pelvic pain, suspected endometriosis    ROS: As above. Denies nausea, vomiting, chest pain, shortness of breath, headache, vision symptoms, changes in bowel or bladder, or unintended changes in weight.     Past Medical History:   Diagnosis Date   • Anxiety    • Chronic pain    • Constipation    • Fibromyalgia    • Inflammatory bowel disease    • PONV (postoperative nausea and vomiting)        Past Surgical History:   Procedure Laterality Date   • Appendectomy     • Bunionectomy Left    • Foot surgery      removal of cysts on heels   • Ganglion cyst excision     • Hysterectomy     • Nasal septum surgery         Social History     Socioeconomic History   • Marital status: /Civil Union     Spouse name: Not on file   • Number of children: Not on file   • Years of education: Not on file   • Highest education level: Not on file   Occupational History   • Not on file   Tobacco Use   • Smoking status: Former     Packs/day: 1.00     Years: 15.00     Pack years: 15.00     Types: Cigarettes     Quit date: 10/28/2013     Years since quittin.3   • Smokeless tobacco: Never   Vaping Use   • Vaping Use: Not on file   Substance and Sexual Activity   • Alcohol use: Yes     Alcohol/week: 1.0 - 2.0 standard drink     Types: 1 - 2 Glasses of wine per week   • Drug use: Never   • Sexual activity: Not on file   Other Topics Concern   • Not on file   Social History Narrative   • Not on file     Social Determinants of Health     Financial Resource Strain: Not on file   Food Insecurity: Not on file   Transportation Needs: Not on file   Physical Activity: Not on file   Stress: Not on file   Social Connections: Not on file   Intimate Partner Violence: Not At Risk   • Social Determinants: Intimate Partner Violence Past Fear: No   • Social Determinants: Intimate Partner  Progress Notes by Nicanor Lugo MD at 1/29/2021  2:10 PM     Author: Nicanor Lugo MD Service: -- Author Type: Physician    Filed: 1/29/2021  3:17 PM Encounter Date: 1/29/2021 Status: Signed    : Nicanor Lugo MD (Physician)         CARDIOLOGY CLINIC CONSULT NOTE     Assessment/Plan:   1.  Possible atrial fibrillation.  With no documentation of atrial fibrillation, would discontinue Eliquis, resume aspirin 81 mg daily.  2.  Hypertension.  Inadequate control on metoprolol succinate alone.  Advised to resume nifedipine XL 30 mg daily as this was apparently previously controlling his blood pressure.  This could be contributing to his heart failure with preserved ejection fraction symptoms.  3.  Preoperative risk.  With his diffuse atherosclerotic disease, limited activities, dyspnea on exertion, I would favor pharmacologic nuclear stress testing prior to clearing him for proposed spinal surgery.  We will schedule this test for preoperative clearance.  4.  Probable obstructive sleep apnea    Follow-up for abnormal findings.     History of Present Illness:     It is my pleasure to see Dominik Rojas at the [facility] clinic for evaluation of possible atrial fibrillation.  He presents today accompanied by his wife of 56 years, who frequently presents complementary information.    Dominik Rojas is a 80 y.o. male with a past medical history of atherosclerotic cardiovascular disease including ischemic right NADREI stroke, abdominal aortic aneurysm status post stent graft repair, right carotid endarterectomy, but no history of symptomatic coronary artery disease.  In recent months he has severe low back pain and was seen in preparation for spinal surgery.  His preop ECG 1/14/2021 was reported to show atrial fibrillation.  He was initiated on Eliquis at that time, instead of his maintenance aspirin therapy.  Upon personal review this ECG appears to show sinus rhythm with PACs.  In  Violence Current Fear: No       No family history on file.    No current facility-administered medications for this encounter.     Current Outpatient Medications   Medication Sig Dispense Refill   • cyclobenzaprine (FLEXERIL) 5 MG tablet      • Linzess 290 MCG      • Multiple Vitamins-Minerals (MULTIVITAMIN ADULTS PO) Take 1 tablet by mouth daily.     • linaCLOtide (LINZESS PO) Take 1 tablet by mouth daily.     • cyclobenzaprine (FLEXERIL) 5 MG tablet Take 5 mg by mouth at bedtime.     • gabapentin (NEURONTIN) 100 MG capsule Take 100 mg by mouth at bedtime.         ALLERGIES:   Allergen Reactions   • Bee Sting ANAPHYLAXIS   • Adhesive   (Environmental) RASH   • Latex   (Environmental) RASH   • Sulfa Antibiotics RASH       Physical Exam:  There were no vitals taken for this visit.    General: Well-developed, well-nourished female in NAD  HEENT: Normocephalic, atraumatic  Heart: RRR  Lungs: unlabored respirations CTAB  Abdomen: Soft, NT, ND  Pelvic: deferred to OR  Extremities: No clubbing, cyanosis, or edema    ASSESSMENT: 55 year old female with pelvic pain, suspected endometriosis    PLAN:   -LAPAROSCOPY, POSSIBLE EXCISION OF ENDOMETRIOSIS, POSSIBLE LYSIS OF ADHESIONS        The risks, benefits, and alternatives of surgery were discussed with the patient by the attending, including the risk of bleeding, infection, injury to bowel, bladder, or other pelvic organs, risk of anesthesia, risk of DVT or PE. The patient has expressed understanding and wishes to proceed with surgery.    Anita Beltre PGY-5 [employer] Fellow  Obstetrics and Gynecology      addition, his nifedipine was discontinued and replaced with metoprolol succinate 50 mg daily.    He has no previous history of atrial fibrillation.  He has no awareness of palpitations.  He has had no chest pains.  His activities have been quite limited at home secondary to his back pain for the last 6 months but he does occasionally climb the basement steps; he is short of breath doing so.  His sleep is poor, frequently interrupted, and he has been told he snores.  A sleep study has been postponed until his back is less painful.  His weight is up a few pounds over the last year.  His blood pressure has been trending higher.  He also reports severe knee pain limits his activity, he hopes to have that operated on in the near future as well.    Past Medical History:     Patient Active Problem List   Diagnosis   ? Vomiting and diarrhea   ? Hyperglycemia   ? Lactic acidosis   ? Dehydration   ? Hypothyroidism   ? Diverticulosis   ? HTN (hypertension)   ? AAA (abdominal aortic aneurysm) (H)   ? Acute ischemic right ANDREI stroke (H)   ? Asymptomatic stenosis of right carotid artery   ? Acute cerebrovascular accident (CVA) (H)   ? Stroke-like symptoms   ? Recurrent UTI   ? Accelerated hypertension   ? Carotid aneurysm, left (H)   ? Bronchiectasis without acute exacerbation (H)   ? Benign prostatic hyperplasia with lower urinary tract symptoms   ? Endoleak post (EVAR) endovascular aneurysm repair, initial encounter (H)   ? Nephrolithiasis   ? Penile pain   ? HLD (hyperlipidemia)   ? Depression   ? Benign essential hypertension   ? Urinary retention   ? 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)   ? Intractable low back pain   ? Back pain   ? Low back pain   ? Alcohol dependence in remission (H)   ? Obesity (BMI 35.0-39.9) with comorbidity (H)   ? Spinal stenosis, lumbar region with neurogenic claudication   ? Incomplete cauda equina syndrome (H)       Past Surgical History:     Past Surgical History:   Procedure  Laterality Date   ? ABDOMINAL AORTIC ANEURYSM REPAIR  2013    stent   ? CAROTID ENDARTERECTOMY Right 9/9/2019    Procedure: RIGHT CAROTID ENDARTERECTOMY;  Surgeon: Miguel Muller MD;  Location: [facility];  Service: General   ? CIRCUMCISION     ? CYSTOSCOPY     ? CYSTOSCOPY PROSTATE W/ LASER N/A 6/12/2018    Procedure:  TRANSURETHRAL RESECTION OF THE PROSTATE WITH QUANTA LASER;  Surgeon: Eze Levi MD;  Location: [facility] OR;  Service:    ? CYSTOSCOPY PROSTATE W/ LASER N/A 2/18/2020    Procedure: CYSTOSCOPY WITH TRANSURETHRAL INCISION OF THE PROSTATE WITH QUANTA LASER;  Surgeon: Eze Levi MD;  Location: [facility] OR;  Service: Urology   ? CYSTOSCOPY W/ LITHOLAPAXY / [facility] N/A 6/12/2018    Procedure: CYSTOSCOPY AND LITHOLAPAXY;  Surgeon: Eze Levi MD;  Location: [facility];  Service:    ? IR ABDOMINAL AORTAGRAM  5/19/2020   ? IR ABDOMINAL AORTIC ANEURYSM ENDOGRAFT  5/19/2020       Family History:     Family History   Problem Relation Age of Onset   ? Emphysema Mother    ? Cirrhosis Father    ? No Medical Problems Sister    ? No Medical Problems Brother    ? No Medical Problems Maternal Aunt    ? No Medical Problems Maternal Uncle    ? No Medical Problems Paternal Aunt    ? No Medical Problems Paternal Uncle    ? No Medical Problems Maternal Grandmother    ? No Medical Problems Maternal Grandfather    ? No Medical Problems Paternal Grandmother    ? No Medical Problems Paternal Grandfather    ? No Medical Problems Other      Family history reviewed and is not pertinent to the chief complaint or presenting problem    Social History:    reports that he quit smoking about 31 years ago. He has never used smokeless tobacco. He reports that he does not drink alcohol or use drugs.    Exercise: Very limited secondary to back pain    Sleep: Poorly restorative with snoring.  Sleep apnea evaluation pending control of back pain    Meds:     Current Outpatient Medications    Medication Sig Dispense Refill   ? acetaminophen (TYLENOL) 500 MG tablet Take 1,000 mg by mouth every 6 (six) hours as needed for pain.     ? ELIQUIS 5 mg Tab tablet Take 5 mg by mouth 2 (two) times a day.     ? latanoprost (XALATAN) 0.005 % ophthalmic solution Administer 1 drop to both eyes at bedtime.     ? levothyroxine (SYNTHROID, LEVOTHROID) 125 MCG tablet Take 125 mcg by mouth Daily at 6:00 am.     ? metoprolol succinate (TOPROL-XL) 50 MG 24 hr tablet Take 50 mg by mouth daily.     ? multivitamin (ONE A DAY) per tablet Take 1 tablet by mouth daily.     ? oxyCODONE (ROXICODONE) 5 MG immediate release tablet Take 1 tablet (5 mg total) by mouth every 8 (eight) hours as needed for pain. 30 tablet 0   ? rosuvastatin (CRESTOR) 10 MG tablet Take 10 mg by mouth at bedtime.     ? sertraline (ZOLOFT) 100 MG tablet Take 150 mg by mouth daily.      ? umeclidinium-vilanterol (ANORO ELLIPTA) 62.5-25 mcg/actuation inhaler Inhale 1 puff at bedtime.      ? albuterol (PROAIR HFA;PROVENTIL HFA;VENTOLIN HFA) 90 mcg/actuation inhaler Inhale 2 puffs every 6 (six) hours as needed for wheezing. 1 Inhaler prn   ? aspirin 81 mg chewable tablet Chew 1 tablet (81 mg total) daily. 30 tablet 0   ? gabapentin (NEURONTIN) 100 MG capsule Take 100 mg by mouth daily. Increase dose as directed by chart.  May increase to 3 tabs 3 times daily (Patient taking differently: Take 100 mg by mouth daily. 3x3x3 as of 11/06/2020) 270 capsule 2   ? NIFEdipine (PROCARDIA XL) 30 MG 24 hr tablet Take 1 tablet (30 mg total) by mouth daily. 30 tablet 0     No current facility-administered medications for this visit.        Allergies:   Diclofenac and Loratadine    Review of Systems:     General: WNL  Eyes: WNL  Ears/Nose/Throat: WNL  Lungs: Cough, Shortness of Breath, Snoring, Wheezing  Heart: Irregular Heartbeat  Stomach: Diarrhea  Bladder: Frequent Urination at Night  Muscle/Joints: WNL  Skin: WNL  Nervous System: Falls  Mental Health: Confusion, Anxiety,  "Depression     Blood: WNL        Objective:      Physical Exam  221 lb (100.2 kg)  5' 4.5\" (1.638 m)  Body mass index is 37.35 kg/m .  /74 (Patient Site: Left Arm, Patient Position: Sitting, Cuff Size: Adult Large)   Pulse 88   Resp 16   Ht 5' 4.5\" (1.638 m)   Wt 221 lb (100.2 kg)   BMI 37.35 kg/m    Repeat blood pressure 150/84      General Appearance : Awake, Alert, No acute distress  HEENT: No Scleral icterus; the mucous membranes were pink and moist.  Conjunctivae bilaterally injected  Neck:  No cervical bruits, jugular venous distention, or thyromegaly .  Well-healed right carotid endarterectomy scar  Chest: The spine was straight. Chest wall symmetric  Lungs: Respirations unlabored. bibasilar crackles, the lungs are otherwise clear to auscultation.  Diffuse wheezing   Cardiovascular: Nonpalpable point of maximal impulse.  Auscultation reveals regular first and second heart sounds with no murmurs, rubs, or gallops.  Carotid, radial, and dorsalis pedal pulses are intact and symmetric.  Abdomen: Obese.  No organomegaly, masses, bruits, or tenderness. Bowels sounds are present  Extremities: Trace pretibial and pedal edema  Skin: No xanthelasma. Warm, Dry.  Flaky  Musculoskeletal: No tenderness.  Neurologic: Alert and oriented ×3. Speech is fluent.      EKG (personally reviewed):  Faxed tracing from [facility] 1/14/2021: Poor quality but appears to show sinus rhythm with PACs, cannot exclude septal infarct, cannot exclude inferior infarct.  No atrial fibrillation detected  ECG today: Sinus rhythm with sinus arrhythmia at 80 bpm, prolonged QTc at 502 ms.    Cardiac Imaging Studies:  Echocardiogram at [facility] 1/18/2021: Reported to show normal left ventricular function, ejection fraction 55 to 60%, and no significant valvular abnormalities.  Images not available for review    Lab Review   Lab Results   Component Value Date     01/14/2021    K 4.1 01/14/2021     01/14/2021    CO2 27 01/14/2021    BUN " 22 01/14/2021    CREATININE 0.95 01/14/2021    CALCIUM 9.0 01/14/2021     Lab Results   Component Value Date    WBC 8.7 08/22/2020    HGB 13.2 (L) 08/22/2020    HCT 42.6 08/22/2020    MCV 85 08/22/2020     08/22/2020     Lab Results   Component Value Date    CHOL 163 12/16/2019    TRIG 138 12/16/2019    HDL 54 12/16/2019    LDLCALC 81 12/16/2019     Lab Results   Component Value Date    TROPONINI 0.01 08/16/2020     Lab Results   Component Value Date    BNP 14 04/09/2017     Lab Results   Component Value Date    TSH 4.24 01/14/2021       Nicanor Lugo MD [facility]      This note created using Dragon voice recognition software. Sound alike errors may have escaped editing.

## 2023-02-28 ENCOUNTER — TELEPHONE (OUTPATIENT)
Dept: INTERVENTIONAL RADIOLOGY/VASCULAR | Facility: HOSPITAL | Age: 83
End: 2023-02-28

## 2023-02-28 NOTE — TELEPHONE ENCOUNTER
INTERVENTIONAL RADIOLOGY INSTRUCTIONS     You are scheduled for an upcoming procedure in the   Interventional Radiology Department at [facility].     Date:  Wednesday March 8     Procedure: lung biopsy     Address: [facility] [address] [place], [place] [address]     Free parking is available for patients & visitors in Lot A or B.  Lot A is closest to the main entrance of hospital.    Check in at main entrance WELCOME DESK.        Check into the Radiology Department at: 09:30 am    Do not eat any food or drink any fluids after: 01:30 am         Two visitors may accompany you to your procedure.      You will need to have someone available to drive you home.      We recommend that you have a responsible adult stay with you for 24 hours after you get home.      Please bring list of current medications to your appointment.      Please take all of your medications as prescribed with a small sip of water, unless you are contacted by a nurse from our department to hold them:      PLEASE HOLD ASPIRIN FOR 5 DAYS BEFORE YOUR PROCEDURE.      You are required to have a documented up to date pre-operative exam    (H&P) by your primary physician in the 30 day period prior to your scheduled procedure.    Please contact your primary care team for this appointment.            If you have any questions, please call the IR nurses at [phone].     Thank you!    Gale BURRELL  Interventional Radiology Intake Nurse Coordinator  [phone]

## 2023-03-01 NOTE — TELEPHONE ENCOUNTER
Detailed Voicemail message left with request for patient to contact Interventional Radiology Department and acknowledge understanding of pre-procedure instructions that were sent in anticipation of procedure scheduled 3/8/2023.      IR contact number provided in [facility].      -  Patient has a documented H&P note from pulmonology provider from 2/6/2023.  This was done exactly 30 days prior to his scheduled appointment in IR.    Gale BURRELL  Interventional Radiology RN   [phone]

## 2023-03-03 NOTE — PROVIDER NOTIFICATION
HOME OXYGEN EVALUATION    PATIENT WILL NEED HOME OXYGEN 5LPM WITH ACTIVITY AND RA AT REST     08/23/21 1012 08/23/21 1014 08/23/21 1016   Oxygen Therapy   SpO2 92 % (!) 89 % (!) 87 %   O2 Device None (Room air)  (ROOM AIR REST) None (Room air)  (ROOM AIR ACTIVITY) None (Room air)  (ROOM AIR ACTIVITY)   Oxygen Delivery  --   --   --       08/23/21 1018   Oxygen Therapy   SpO2 90 %   O2 Device Nasal cannula   Oxygen Delivery 5 LPM  (5 LPM ACTIVITY)     
HOME OXYGEN EVAULATION    PATIENT QUALIFIES FOR HOME OXYGEN 5LPM WITH ACTIVITY       08/23/21 1012 08/23/21 1014 08/23/21 1016   Oxygen Therapy   SpO2 92 % (!) 89 % (!) 87 %   O2 Device None (Room air)  (ROOM AIR REST) None (Room air)  (ROOM AIR ACTIVITY) None (Room air)  (ROOM AIR ACTIVITY)   Oxygen Delivery  --   --   --       08/23/21 1017 08/23/21 1018   Oxygen Therapy   SpO2 (!) 88 % 90 %   O2 Device Nasal cannula  (4lpm NASAL CANNULA) Nasal cannula   Oxygen Delivery 4 LPM 5 LPM  (5 LPM ACTIVITY)     
Walk in

## 2023-03-06 NOTE — TELEPHONE ENCOUNTER
Writer has spoken with Jovana (Dominik's spouse) regarding planned procedure with IR via telephone.      Jovana acknowledges understanding of pre-procedure instructions.    She confirms that last ASA was taken    on 3/5/2023.    I have provided Jovana with IR number ([phone]) for questions or concerns.    A documented H&P exam is noted in patient EMR with the date 2/6/2023.    Gale BURRELL  Interventional Radiology RN   [phone]

## 2023-03-08 ENCOUNTER — HOSPITAL ENCOUNTER (OUTPATIENT)
Dept: CT IMAGING | Facility: HOSPITAL | Age: 83
Discharge: HOME | End: 2023-03-08
Attending: INTERNAL MEDICINE
Payer: COMMERCIAL

## 2023-03-08 ENCOUNTER — HOSPITAL ENCOUNTER (OUTPATIENT)
Dept: RADIOLOGY | Facility: HOSPITAL | Age: 83
Discharge: HOME | End: 2023-03-08
Attending: RADIOLOGY
Payer: COMMERCIAL

## 2023-03-08 VITALS
HEIGHT: 67 IN | HEART RATE: 90 BPM | WEIGHT: 211 LBS | TEMPERATURE: 98.5 F | DIASTOLIC BLOOD PRESSURE: 64 MMHG | BODY MASS INDEX: 33.12 KG/M2 | OXYGEN SATURATION: 92 % | RESPIRATION RATE: 18 BRPM | SYSTOLIC BLOOD PRESSURE: 121 MMHG

## 2023-03-08 DIAGNOSIS — R91.1 LUNG NODULE: ICD-10-CM

## 2023-03-08 DIAGNOSIS — R91.8 PULMONARY NODULES: Primary | ICD-10-CM

## 2023-03-08 LAB
HGB BLD-MCNC: 15 G/DL (ref 13.3–17.7)
INR PPP: 1.16 (ref 0.85–1.15)
PLATELET # BLD AUTO: 212 10E3/UL (ref 150–450)

## 2023-03-08 PROCEDURE — 85049 AUTOMATED PLATELET COUNT: CPT | Performed by: RADIOLOGY

## 2023-03-08 PROCEDURE — 272N000431 CT LUNG MEDIASTINUM BIOPSY

## 2023-03-08 PROCEDURE — 85018 HEMOGLOBIN: CPT | Performed by: RADIOLOGY

## 2023-03-08 PROCEDURE — 999N000065 XR CHEST 1 VIEW

## 2023-03-08 PROCEDURE — 272N000221 CT LUNG MEDIASTINUM BIOPSY

## 2023-03-08 PROCEDURE — 36415 COLL VENOUS BLD VENIPUNCTURE: CPT | Performed by: RADIOLOGY

## 2023-03-08 PROCEDURE — 88305 TISSUE EXAM BY PATHOLOGIST: CPT | Mod: TC | Performed by: INTERNAL MEDICINE

## 2023-03-08 PROCEDURE — 85610 PROTHROMBIN TIME: CPT | Performed by: RADIOLOGY

## 2023-03-08 RX ORDER — FLUMAZENIL 0.1 MG/ML
0.2 INJECTION, SOLUTION INTRAVENOUS
Status: DISCONTINUED | OUTPATIENT
Start: 2023-03-08 | End: 2023-03-09 | Stop reason: HOSPADM

## 2023-03-08 RX ORDER — NALOXONE HYDROCHLORIDE 0.4 MG/ML
0.2 INJECTION, SOLUTION INTRAMUSCULAR; INTRAVENOUS; SUBCUTANEOUS
Status: DISCONTINUED | OUTPATIENT
Start: 2023-03-08 | End: 2023-03-09 | Stop reason: HOSPADM

## 2023-03-08 RX ORDER — FENTANYL CITRATE 50 UG/ML
25-50 INJECTION, SOLUTION INTRAMUSCULAR; INTRAVENOUS EVERY 5 MIN PRN
Status: DISCONTINUED | OUTPATIENT
Start: 2023-03-08 | End: 2023-03-09 | Stop reason: HOSPADM

## 2023-03-08 RX ORDER — ACETAMINOPHEN 325 MG/1
650 TABLET ORAL EVERY 4 HOURS PRN
Status: DISCONTINUED | OUTPATIENT
Start: 2023-03-08 | End: 2023-03-09 | Stop reason: HOSPADM

## 2023-03-08 RX ORDER — NALOXONE HYDROCHLORIDE 0.4 MG/ML
0.4 INJECTION, SOLUTION INTRAMUSCULAR; INTRAVENOUS; SUBCUTANEOUS
Status: DISCONTINUED | OUTPATIENT
Start: 2023-03-08 | End: 2023-03-09 | Stop reason: HOSPADM

## 2023-03-08 NOTE — IP AVS SNAPSHOT
[facility] [place]  [address] [address]  Phone: [phone]  Fax: [phone]                                    After Visit Summary   3/8/2023    Dominik Rojas   MRN: [id number]           After Visit Summary Signature Page    I have received my discharge instructions, and my questions have been answered. I have discussed any challenges I see with this plan with the nurse or doctor.    ..........................................................................................................................................  Patient/Patient Representative Signature      ..........................................................................................................................................  Patient Representative Print Name and Relationship to Patient    ..................................................               ................................................  Date                                   Time    ..........................................................................................................................................  Reviewed by Signature/Title    ...................................................              ..............................................  Date                                               Time          22EPIC Rev 08/18

## 2023-03-08 NOTE — PRE-PROCEDURE
GENERAL PRE-PROCEDURE:   Procedure:  CT guided right lung nodule biopsy. Moderate sedation as needed. Chest tube only if needed.   Date/Time:  3/8/2023 10:34 AM    Written consent obtained?: Yes    Risks and benefits: Risks, benefits and alternatives were discussed    Consent given by:  Patient  Patient states understanding of procedure being performed: Yes    Patient's understanding of procedure matches consent: Yes    Procedure consent matches procedure scheduled: Yes    Expected level of sedation:  Moderate  Appropriately NPO:  Yes  ASA Class:  3  Mallampati  :  Grade 2- soft palate, base of uvula, tonsillar pillars, and portion of posterior pharyngeal wall visible  Lungs:  Lungs clear with good breath sounds bilaterally  Heart:  Normal heart sounds and rate  History & Physical reviewed:  History and physical reviewed and no updates needed  Statement of review:  I have reviewed the lab findings, diagnostic data, medications, and the plan for sedation

## 2023-03-08 NOTE — INTERVAL H&P NOTE
"I have reviewed the surgical (or preoperative) H&P that is linked to this encounter, and examined the patient. There are no significant changes    Clinical Conditions Present on Arrival:  Clinically Significant Risk Factors Present on Admission                  # Coagulation Defect: INR = 1.16 (Ref range: 0.85 - 1.15) and/or PTT = N/A, will monitor for bleeding   # Obesity: Estimated body mass index is 33.05 kg/m  as calculated from the following:    Height as of this encounter: 1.702 m (5' 7\").    Weight as of this encounter: 95.7 kg (211 lb).       "

## 2023-03-08 NOTE — DISCHARGE INSTRUCTIONS
LUNG BIOPSY    1. You are required to have someone accompany you home. Do not drive or operate machinery today as the medication may cause sleepiness.    2. Rest today and avoid strenuous activity or heavy lifting for 48 hours. Over-activity may produce dizziness and or nausea.    3. You should follow your normal diet. Drink plenty of fluids. No alcoholic beverages for 24 hours. *(Alcohol may interact with the medications you received today)    4. Leave bandage on today, you may remove tomorrow.    5. You may shower tomorrow. Do not soak in a bath tub, hot tub, or swim until the site is completely healed and the skin glue is off. Keep the site clean and dry.    6. Watch your biopsy site for signs of infection, increase pain, redness, swelling, or any drainage and or fever or chills.    7. If you experience sudden weakness, dizziness, shortness of breath, a temperature above 100.0 degree F for more than 24 hours call your doctor or go to the emergency room.        Discharge Instructions Needle Biopsy: Lung    You had a needle biopsy of one of your lungs. In this procedure, a hollow needle is used to take one or more samples of your lung tissue. The tissue is then examined under a microscope. There are several different types of needle biopsies. Two types are:   Fine needle aspiration. A small amount of tissue is withdrawn (aspirated) using a very fine needle.  Core biopsy. A larger tissue sample is removed for examination.  A biopsy needle is inserted through your skin into your chest and lung. This is called a transthoracic approach, which means across or through the chest (thorax). Scans are done at the same time so that your provider can find the area where he or she would like to sample tissue. Needle biopsies don't require cuts or incisions into the body like open biopsies.     Your healthcare provider will use the results of your biopsy to help diagnose your condition.     Home care  The site of the biopsy may  feel numb for a while if you got numbing medicine.  You might have a little soreness after the needle biopsy.  Follow your healthcare provider's instructions about removing bandages and showering or bathing.  You may be sleepy after the biopsy if you got medicine to help you relax (sedation). Don't drive until the next day or as instructed by your healthcare provider.  Don't do heavy lifting, a lot of stair climbing, vigorous exercise, or take part in sports the day of your biopsy. You can get back to your regular activities as instructed by your healthcare provider.     Follow-up care  Follow up with your healthcare provider, or as advised. Be sure you make an appointment with your healthcare provider to discuss the biopsy results.     When to call your healthcare provider  Call your healthcare provider right away if any of these occur:   Infection. You might have redness, pain, swelling, or drainage at the site of your biopsies.  Bleeding. You might also have bleeding at the site of your biopsies.  Coughing up blood. This may only be a small amount.    Call 911  Call 911 right away if any of these occur:   Collapsed lung (pneumothorax). This means that air from your lungs leaks out into the spaces between your lungs and chest wall. It can lead to feeling short of breath, pain with breathing, trouble breathing, and a collapsed lung.  Fast pulse  Sharp pains in your chest or shoulder  Fingernails, lips, or skin turn blue, purple, or gray  Trouble walking or talking  Feeling faint or dizzy    Linda last reviewed this educational content on 10/1/2019    [id number] [employer]. All rights reserved. This information is not intended as a substitute for professional medical care. Always follow your healthcare professional's instructions.

## 2023-03-14 LAB
PATH REPORT.COMMENTS IMP SPEC: ABNORMAL
PATH REPORT.COMMENTS IMP SPEC: YES
PATH REPORT.FINAL DX SPEC: ABNORMAL
PATH REPORT.GROSS SPEC: ABNORMAL
PATH REPORT.MICROSCOPIC SPEC OTHER STN: ABNORMAL
PATH REPORT.RELEVANT HX SPEC: ABNORMAL
PHOTO IMAGE: ABNORMAL

## 2023-03-14 PROCEDURE — 88342 IMHCHEM/IMCYTCHM 1ST ANTB: CPT | Mod: 26 | Performed by: PATHOLOGY

## 2023-03-14 PROCEDURE — 88305 TISSUE EXAM BY PATHOLOGIST: CPT | Mod: 26 | Performed by: PATHOLOGY

## 2023-03-14 PROCEDURE — 88333 PATH CONSLTJ SURG CYTO XM 1: CPT | Mod: 26 | Performed by: PATHOLOGY

## 2023-03-14 PROCEDURE — 88341 IMHCHEM/IMCYTCHM EA ADD ANTB: CPT | Mod: 26 | Performed by: PATHOLOGY

## 2023-03-16 DIAGNOSIS — C34.31 MALIGNANT NEOPLASM OF LOWER LOBE OF RIGHT LUNG (H): Primary | ICD-10-CM

## 2023-03-16 NOTE — PROGRESS NOTES
I told Dominik and his wife his diagnosis.    Very slow growing nodule - present since 2021 but increasing in size.    They will meet with rad onc to discuss risks/benefits.    Fidel Sun MD

## 2023-03-17 ENCOUNTER — PATIENT OUTREACH (OUTPATIENT)
Dept: ONCOLOGY | Facility: CLINIC | Age: 83
End: 2023-03-17

## 2023-03-17 NOTE — PROGRESS NOTES
Labor Progress Note  Patient seen, fetal heart rate and contraction pattern evaluated. Pt feeling some back pains. VSS, pitocin @ 2  Fetal Heart Rate: moderate variability  Accelerations: yes  Decelerations: none  Uterine Activity: Irregular  Cervical Exam: deferred  Membranes:  Intact    Assessment/Plan:  IOL for post-dates. Cont pitocin.   GBS neg  LGA - EFW 9lb New Patient Oncology Nurse Navigator Note     Referring provider: Dr. Fidel Sun    Referring Clinic/Organization: [facility]     Referred to: Radiation Oncology    Requested provider (if applicable): First available - did not specify     Referral Received: 03/17/23       Evaluation for :   Diagnosis   C34.31 (ICD-10-CM) - Malignant neoplasm of lower lobe of right lung (H)     Clinical History (per Nurse review of records provided):      09/30/2022 PET (bookmarked) showed:   IMPRESSION:  1. A 1.3 x 1.8 cm FDG avid right lower lobe pulmonary nodule is suspicious for malignancy such as primary lung cancer. This is amenable to percutaneous CT-guided biopsy.  2. No evidence of metastasis.  3. Migration of a left renal stone and single left renal current hydronephrosis.    02/06/2023 OV with Dr. Sun (referring provider) bookmarked    03/08/2023 Surgical Pathology (bookmarked) showed:   Final Diagnosis   RIGHT LUNG NODULE, CT-GUIDED CORE BIOPSIES:        1.  POSITIVE FOR MALIGNANT CELLS IN THE IMPRINT SMEARS; NON-SMALL CELL CARCINOMA, CONSISTENT              WITH ADENOCARCINOMA        2.  RARE ATYPICAL CELLS PRESENT IN CORE BIOPSIES; NOT DIAGNOSTIC OF MALIGNANCY (SEE COMMENT)     Clinical Assessment / Barriers to Care (Per Nurse):      Records Location: [facility]     Writer called pt to discuss the referral and spoke with his spouse, Jovana. She confirmed they are ready to schedule with Rad onc and accepted an appt with Dr. Bales at [facility] on 3/21. Clinic address and phone number given to pt as well as NPS phone number. All questions answered.     VINCE Castro, MILAGRON, RN, LAc, OCN  [facility] Oncology Nurse Navigator  [phone] / [phone]

## 2023-03-20 NOTE — PROGRESS NOTES
MEDICAL RECORDS REQUEST   Radiation Oncology  [address] [place] [address]  PHONE: [phone]  Fax: [phone]        FUTURE VISIT INFORMATION                                                   Dominik Rojas, : 1940 scheduled for future visit at [facility] Radiation Oncology    APPOINTMENT INFORMATION:    Date: 3/21/2023     Provider:  Dr. Bales    Reason for Visit/Diagnosis: Malignant neoplasm of lower lobe of right lung     REFERRAL INFORMATION:  Referring provider:      RECORDS REQUESTED FOR VISIT                                                     Action    Action Taken 3/20/2023 8:48AM KEB     I called pt Dominik- his wife answer Jovana. No hx of rad onc. I pulled HE scans into PACS    I called [facility] [phone] - they will push chest imaging to PACS    3/20/2023 3:55pm YESSY   I resolved imaging from [facility] in PACS     LUNG/CHEST WALL    CHEST CT AND REPORTS yes- imaging is in PACS   PRE & POST-OP SCANS AND REPORT yes   PET/CT AND REPORT yes   PULMONARY FUNCTION TEST yes 2021 PFT    ANY RECENT LABS yes   SURGICAL REPORT yes     3/8/2023   RIGHT LUNG NODULE, CT-GUIDED CORE BIOPSIES:        1.  POSITIVE FOR MALIGNANT CELLS IN THE IMPRINT SMEARS; NON-SMALL CELL CARCINOMA, CONSISTENT              WITH ADENOCARCINOMA        2.  RARE ATYPICAL CELLS PRESENT IN CORE BIOPSIES; NOT DIAGNOSTIC OF MALIGNANCY (SEE COMMENT)   PATHOLOGY REPORT yes   PULMONONOLOGIST CONSULT NOTE yes   *Send all radiation Prior radiation records for both [facility] and [facility] Radiation Oncology patients to [facility] with  ATTN: NANCY/Reji Dosi/Physics

## 2023-03-21 ENCOUNTER — OFFICE VISIT (OUTPATIENT)
Dept: RADIATION ONCOLOGY | Facility: HOSPITAL | Age: 83
End: 2023-03-21
Attending: INTERNAL MEDICINE
Payer: COMMERCIAL

## 2023-03-21 ENCOUNTER — PRE VISIT (OUTPATIENT)
Dept: RADIATION ONCOLOGY | Facility: HOSPITAL | Age: 83
End: 2023-03-21

## 2023-03-21 VITALS
SYSTOLIC BLOOD PRESSURE: 115 MMHG | RESPIRATION RATE: 18 BRPM | OXYGEN SATURATION: 89 % | DIASTOLIC BLOOD PRESSURE: 60 MMHG | HEART RATE: 105 BPM

## 2023-03-21 DIAGNOSIS — C34.31 MALIGNANT NEOPLASM OF LOWER LOBE OF RIGHT LUNG (H): ICD-10-CM

## 2023-03-21 PROCEDURE — 99205 OFFICE O/P NEW HI 60 MIN: CPT | Mod: 25 | Performed by: RADIOLOGY

## 2023-03-21 PROCEDURE — G0463 HOSPITAL OUTPT CLINIC VISIT: HCPCS | Mod: 25 | Performed by: RADIOLOGY

## 2023-03-21 PROCEDURE — 77470 SPECIAL RADIATION TREATMENT: CPT | Performed by: RADIOLOGY

## 2023-03-21 PROCEDURE — 77470 SPECIAL RADIATION TREATMENT: CPT | Mod: 26 | Performed by: RADIOLOGY

## 2023-03-21 PROCEDURE — 77263 THER RADIOLOGY TX PLNG CPLX: CPT | Performed by: RADIOLOGY

## 2023-03-21 RX ORDER — LEVOTHYROXINE SODIUM 88 UG/1
88 TABLET ORAL
Status: ON HOLD | COMMUNITY
Start: 2023-02-26

## 2023-03-21 ASSESSMENT — PAIN SCALES - GENERAL: PAINLEVEL: MODERATE PAIN (4)

## 2023-03-21 NOTE — PROGRESS NOTES
[facility] Radiation Oncology Consult Note     Patient: Dominik Rojas  MRN: [id number]  Date of Service: 03/21/2023          Fidel Sun MD  [address] [place] [id number]       Dear Dr. Sun:    Thank you very much for referring this patient for consideration of radiotherapy. As you know Mr. Rojas is a 82 year old male with a diagnosis of adenocarcinoma of right lower lobe lung, clinical stage T1 N0 M0.  Patient is not a good candidate for surgery given his advanced age and medical status.  He is referred to radiation oncology for evaluation and consideration of possible definitive stereotactic radiosurgery.    HISTORY OF PRESENT ILLNESS:   Mr. Rojas is a 82 year old male who was a former smoker half to 1 pack a day for 35 years and quit smoking since 1990.  Patient has a multiple underlying medical condition including COPD, coronary artery disease, stroke, and depression.  Patient has been followed by pulmonology for lung nodule since 2021.  CT scan on 7/25/2022 showed an increased 18 x 13 mm right lower lobe pleural-based nodule suspicious for malignancy.  PET scan on 9/30/2022 also reviewed 1.3 x 1.8 cm FDG avid right lower lobe pulmonary nodules highly suspicious for malignancy.  There is no evidence of lymph nodes and systemic metastasis.  Patient underwent CT-guided needle biopsy on 3/8/2023 with pathology confirmed non-small cell carcinoma consistent with adenocarcinoma.  You have discussed with the patient about various treatment options.  Patient is not a good candidate for surgery given his advanced age and medical status.  He is referred to radiation oncology for evaluation and consideration of possible stereotactic radiosurgery.    CHEMOTHERAPY HISTORY: Concurrent Chemotherapy: No    RADIATION THERAPY HISTORY: Prior Radiation: No    IMPLANTED CARDIAC DEVICE: none     Current Outpatient Medications   Medication Sig Dispense Refill     acetaminophen (TYLENOL) 500 MG tablet  Take 1,000 mg by mouth as needed       aspirin (ASA) 81 MG chewable tablet Take 81 mg by mouth daily       Cholecalciferol (VITAMIN D3) 50 MCG (2000 UT) CAPS Take 1 tablet by mouth daily        DULoxetine (CYMBALTA) 30 MG capsule Take 60 mg by mouth every morning       latanoprost (XALATAN) 0.005 % ophthalmic solution Place 1 drop into both eyes At Bedtime       levothyroxine (SYNTHROID/LEVOTHROID) 88 MCG tablet TAKE 1 TABLET BY MOUTH EVERY DAY IN THE MORNING ON AN EMPTY STOMACH FOR 30 DAYS       losartan (COZAAR) 25 MG tablet Take 1 tablet (25 mg) by mouth daily 30 tablet 0     metoprolol succinate ER (TOPROL-XL) 25 MG 24 hr tablet Take 0.5 tablets (12.5 mg) by mouth daily       multivitamin w/minerals (THERA-VIT-M) tablet Take 1 tablet by mouth daily       OLANZapine (ZYPREXA) 5 MG tablet Take 7.5 mg by mouth At Bedtime       pantoprazole (PROTONIX) 40 MG EC tablet Take 1 tablet (40 mg) by mouth every morning (before breakfast) 30 tablet 0     rosuvastatin (CRESTOR) 10 MG tablet Take 10 mg by mouth At Bedtime        albuterol (PROAIR HFA/PROVENTIL HFA/VENTOLIN HFA) 108 (90 Base) MCG/ACT inhaler Inhale 2 puffs into the lungs every 6 hours as needed  (Patient not taking: Reported on 3/21/2023)       albuterol (PROVENTIL) (2.5 MG/3ML) 0.083% neb solution Inhale 2.5 mg into the lungs every 6 hours as needed  (Patient not taking: Reported on 3/21/2023)       amLODIPine (NORVASC) 2.5 MG tablet Take 1 tablet (2.5 mg) by mouth daily 30 tablet 0     calcitonin, salmon, (MIACALCIN) 200 UNIT/ACT nasal spray calcitonin (salmon) 200 unit/actuation nasal spray (Patient not taking: Reported on 3/21/2023)       diclofenac (VOLTAREN) 1 % topical gel Apply 4 g topically 4 times daily (Patient not taking: Reported on 3/21/2023) 50 g 3     furosemide (LASIX) 20 MG tablet Take 1 tablet (20 mg) by mouth 2 times daily 60 tablet 0     levothyroxine (SYNTHROID/LEVOTHROID) 125 MCG tablet 88 mcg (Patient not taking: Reported on 3/21/2023)        levothyroxine (SYNTHROID/LEVOTHROID) 150 MCG tablet Take 1 tablet by mouth daily 30 minutes before breakfast (Patient not taking: Reported on 3/21/2023)       lidocaine (XYLOCAINE) 5 % external ointment Apply topically 3 times daily (Patient not taking: Reported on 3/21/2023) 150 g 0     NIFEdipine ER (ADALAT CC) 30 MG 24 hr tablet nifedipine ER 30 mg tablet,extended release   TAKE 1 TABLET ON AN EMPTY STOMACH ONCE A DAY (Patient not taking: Reported on 3/21/2023)       solifenacin (VESICARE) 10 MG tablet Take 10 mg by mouth daily (Patient not taking: Reported on 3/21/2023)       Past Medical History:   Diagnosis Date     AAA (abdominal aortic aneurysm)     s/p stenting     Alcohol dependence in remission (H)      Alcohol use disorder, severe, in sustained remission, dependence (H) 8/16/2021     Anxiety      Atrial fibrillation with normal ventricular rate (H)      Benign prostatic hyperplasia with lower urinary tract symptoms      Bronchiectasis without complication (H)     2/27/2020     COPD (chronic obstructive pulmonary disease) (H)      CVA (cerebral vascular accident) (H) 2019     DDD (degenerative disc disease), lumbar      Depression      Depressive disorder      Diabetes (H)      Diastolic dysfunction 01/22/2021     DJD (degenerative joint disease) of knee     left     Essential hypertension      Glaucoma      Glaucoma      History of stroke without residual deficits      Hyperlipidemia      Hypertension      Hypothyroidism      Hypothyroidism      Kidney stones      Major depression      Memory problem      Nocturia      Obesity      Opioid use disorder, severe, dependence (H) 8/16/2021     Overactive bladder 02/25/2021     Primary osteoarthritis of left knee      Psoriasis      Psoriasis      Seborrheic keratoses      Somnolence, daytime      Stenosis of right carotid artery     history of TIA's     Past Surgical History:   Procedure Laterality Date     ABDOMEN SURGERY       ABDOMINAL AORTIC ANEURYSM  REPAIR  2013    stent     CAROTID ENDARTERECTOMY Right 9/9/2019    Procedure: RIGHT CAROTID ENDARTERECTOMY;  Surgeon: Miguel Muller MD;  Location: [facility] OR;  Service: General     CIRCUMCISION       CYSTOSCOPY       CYSTOSCOPY PROSTATE W/ LASER N/A 6/12/2018    Procedure:  TRANSURETHRAL RESECTION OF THE PROSTATE WITH QUANTA LASER;  Surgeon: Eze Levi MD;  Location: [facility] OR;  Service:      CYSTOSCOPY PROSTATE W/ LASER N/A 2/18/2020    Procedure: CYSTOSCOPY WITH TRANSURETHRAL INCISION OF THE PROSTATE WITH QUANTA LASER;  Surgeon: Eze Levi MD;  Location: [facility] OR;  Service: Urology     CYSTOSCOPY W/ LITHOLAPAXY / EHL N/A 6/12/2018    Procedure: CYSTOSCOPY AND LITHOLAPAXY;  Surgeon: Eze Levi MD;  Location: [facility];  Service:      IR ABDOMINAL AORTAGRAM  5/19/2020     IR ABDOMINAL AORTIC ANEURYSM ENDOGRAFT  5/19/2020     IR ABDOMINAL AORTOGRAM  5/19/2020     IR ABDOMINAL ENDOVASCULAR STENT GRAFT  5/19/2020     LITHOTRIPSY       PERCUTANEOUS NEPHROSTOLITHOTOMY Right 03/10/2020     ZZC HUANG W/O FACETEC FORAMOT/DSKC 1/2 VRT SEG, LUMBAR Bilateral 3/3/2021    Procedure: Bilateral lumbar 2 - Lumbar 4 decompressive lumbar laminectomy with medial facetectomies;  Surgeon: Renée King MD;  Location: [facility];  Service: Spine     Diclofenac, Loratadine, and Sertraline  Family History   Problem Relation Age of Onset     Emphysema Mother      No Known Problems Father      Cirrhosis Father      No Known Problems Sister      No Known Problems Brother      No Known Problems Maternal Aunt      No Known Problems Maternal Uncle      No Known Problems Paternal Aunt      No Known Problems Paternal Uncle      No Known Problems Maternal Grandmother      No Known Problems Maternal Grandfather      No Known Problems Paternal Grandmother      No Known Problems Paternal Grandfather      No Known Problems Other      Social History     Socioeconomic History      Marital status:      Spouse name: Not on file     Number of children: Not on file     Years of education: Not on file     Highest education level: Not on file   Occupational History     Not on file   Tobacco Use     Smoking status: Former     Packs/day: 0.50     Years: 35.00     Pack years: 17.50     Types: Cigarettes     Quit date: 1990     Years since quittin.2     Smokeless tobacco: Never     Tobacco comments:     quit    Substance and Sexual Activity     Alcohol use: No     Comment: Alcoholic Drinks/day: quit      Drug use: No     Sexual activity: Yes     Partners: Female     Birth control/protection: Post-menopausal   Other Topics Concern     Parent/sibling w/ CABG, MI or angioplasty before 65F 55M? No   Social History Narrative     Not on file     Social Determinants of Health     Financial Resource Strain: Not on file   Food Insecurity: Not on file   Transportation Needs: Not on file   Physical Activity: Not on file   Stress: Not on file   Social Connections: Not on file   Intimate Partner Violence: Not on file   Housing Stability: Not on file        REVIEW OF SYMPTOMS:  A full 14-point review of systems was performed. Pertinent findings are noted in the HPI.    General  Constitutional  Constitutional (WDL): Exceptions to WDL  Fatigue: Fatigue relieved by rest  EENT  Eye Disorders  Eye Disorder (WDL): All eye disorder elements are within defined limits  Ear Disorders  Ear Disorder (WDL): All ear disorder elements are within defined limits  Respiratory  Respiratory  Respiratory (WDL): Exceptions to WDL  Cough: Mild symptoms OR nonprescription intervention indicated  Dyspnea: Shortness of breath with minimal exertion OR limiting instrumental ADL  Hypoxia: Decreased oxygen saturation with exercise (e.g., pulse oximeter less than 88%) OR intermittent supplemental oxygen  Cardiovascular  Cardiovascular  Cardiovascular (WDL): Exceptions to WDL (tachycardic today)  Edema: Yes  Edema  Limbs: 5 - 10% inter-limb discrepancy in volume or circumference at point of greatest visible difference OR swelling or obscuration of anatomic architecture on close inspection (takes furosemide)  Gastrointestinal  Gastrointestinal  Gastrointestinal (WDL): All gastrointestinal elements are within defined limits  Musculoskeletal  Musculoskeletal and Connective Tissue Disorders  Musculoskeletal & Connective (WDL): Exceptions to WDL  Arthralgia: Moderate pain OR limiting instrumental ADL  Generalized Muscle Weakness: Symptomatic OR perceived by patient but not evident on physical exam  Integumentary  Integumentary  Integumentary (WDL): All integumentary elements are within defined limits  Neurological  Neurosensory  Neurosensory (WDL): Exceptions to WDL  Ataxia: Asymptomatic OR clinical or diagnostic observations only OR intervention not indicated (d/t knee and back pain per pt, uses cane, and w/c in clinic)  Genitourinary/Reproductive  Genitourinary  Genitourinary (WDL): All genitourinary elements are within defined limits  Lymphatic  Lymph System Disorders  Lymph (WDL): All lymph elements are within defined limits  Pain  Pain Score: Moderate Pain (4)  AUA Assessment                                                              Accompanied by  Accompanied By: spouse;family (spouse and daughter)    ECOG Status: 2    Imaging: Reviewed    Pathology: Reviewed    Objective:      PHYSICAL EXAMINATION:    /60   Pulse 105   Resp 18   SpO2 (!) 89%     Gen: Alert, in NAD  Eyes: PERRL, EOMI, sclera anicteric  Neck: Supple, full ROM, no LAD  Pulm: No wheezing, stridor or respiratory distress  CV: Well-perfused, no cyanosis, no pedal edema  Back: No step-offs or pain to palpation along the thoracolumbar spine  Rectal: Deferred  : Deferred  Musculoskeletal: Normal muscle bulk and tone  Skin: Normal color and turgor  Neurologic: A/Ox3, CN II-XII intact, normal gait and station  Psychiatric: Appropriate mood and  affect    Intent of Therapy: Curative  Side effects that may occur during or within weeks after Radiation Therapy      Fatigue and general weakness     Darkening, irritation, itchiness, redness, dryness and peeling of the skin of the chest    Loss of chest and armpit hair    Painful swallowing limiting solid and liquid intake and causing dehydration    Nausea, vomiting and decrease in appetite    Side effects that may occur months or years after Radiation Therapy       Development of another tumor or cancer    Lung inflammation or fibrosis causing cough, fever, and shortness of breath     Fracture of ribs    Permanent narrowing or obstruction of the esophagus    Fluid compressing the lung (pleural effusion)    Coronary artery blockage causing angina pain or a heart attack    Thickening, telangiectasias (development of spider like blood vessels in the skin) and ulceration of the skin of the chest    Poor healing after a trauma or surgery in the irradiated areas    Nerve damage resulting in loss of strength or sensation    The risks, benefits and alternatives to radiation therapy were outlined with the patient. All questions were answered and a consent was signed.     Impression     Adenocarcinoma of right lower lobe lung, clinical stage T1 N0 M0.  Patient is not a good candidate for surgery given his advanced age and medical status.    Assessment & Plan:     I have personally reviewed his upcoming medical record today.  I have also reviewed his most recent radiology study including PET CT scan.  This is an 82-year-old gentleman with a new diagnosis of early stage non-small cell lung cancer involving the right lower lobe lung.  The possible treatment options including surgery, systemic therapy, and radiation therapy has been discussed with the patient in detail and at great lengths.  The possible risks and side effects of radiation therapy has also been explained to the patient.  Questions are answered to patient's  satisfaction.  Patient is not good candidate for surgery given his advanced age and medical status.  I agree the patient is a good candidate for stereotactic radiosurgery.  I believe the patient can be potentially benefited by radiation therapy for local control and possibly better survival.  Therefore radiation therapy is offered to the patient and he is willing to proceed being aware of potential risks and side effect involved.  The possibility of placement of fiducials for radiation therapy has also been discussed with the patient.  Patient is not excited about another procedure at this time.  Given the location of the lung cancer, I think it is okay to proceed with SBRT without markers placement.  The patient is therefore scheduled to return to radiation oncology later on this week for simulation.  I plan to give him radiation therapy to a total dose of 5000 cGy in 5 treatments using SBRT.    Again, thank you very much for the referral and allowing me to participate in the care of this patient.  If you have any questions or concerns about this consultation, please do not hesitate to call.  I spent approximately 60 minutes today with the patient and 80% time was used for counseling.      Sincerely,          Rachael Bales MD, PhD  Department of Radiation Oncology   [facility] Radiation Oncology  Tel: [phone]  Page: [phone]    [facility]  [address] [place] [address]     [address] [place] [address]    CC:  Patient Care Team:  Misael Moe PA-C as PCP - General (Physician Assistant)  Wilma Lang RPH as Pharmacist (Pharmacist Ambulatory Care)  Kindra Pack MD as Assigned Heart and Vascular Provider  Wilma Lang RPH as Assigned MTM Pharmacist  Fidel Sun MD as Assigned Pulmonology Provider  Rachael Bales MD as MD (Radiation Oncology)

## 2023-03-21 NOTE — LETTER
3/21/2023         RE: Dominik Rojas  [address] [facility] [address]        Dear Colleague,    Thank you for referring your patient, Dominik Rojas, to the [facility] RADIATION ONCOLOGY [place]. Please see a copy of my visit note below.    [facility] Radiation Oncology Consult Note     Patient: Dominik Rojas  MRN: [id number]  Date of Service: 03/21/2023          Fidel Sun MD  [address] [place] [id number]       Dear Dr. Sun:    Thank you very much for referring this patient for consideration of radiotherapy. As you know Mr. Rojas is a 82 year old male with a diagnosis of adenocarcinoma of right lower lobe lung, clinical stage T1 N0 M0.  Patient is now a good candidate for surgery given his advanced age and medical status.  He is referred to radiation oncology for evaluation and consideration of possible definitive stereotactic radiosurgery.    HISTORY OF PRESENT ILLNESS:   Mr. Rojas is a 82 year old male who was a former smoker half to 1 pack a day for 35 years and quit smoking since 1990.  Patient has a multiple underlying medical condition including COPD, coronary artery disease, stroke, and depression.  Patient has been followed by pulmonology for lung nodule since 2021.  CT scan on 7/25/2022 showed an increased 18 x 13 mm right lower lobe pleural-based nodule suspicious for malignancy.  PET scan on 9/30/2022 also reviewed 1.3 x 1.8 cm FDG avid right lower lobe pulmonary nodules highly suspicious for malignancy.  There is no evidence of lymph nodes and systemic metastasis.  Patient underwent CT-guided needle biopsy on 3/8/2023 with pathology confirmed non-small cell carcinoma consistent with adenocarcinoma.  You have discussed with the patient about various treatment options.  Patient is not a good candidate for surgery given his advanced age and medical status.  He is referred to radiation oncology for evaluation and consideration of possible  stereotactic radiosurgery.    CHEMOTHERAPY HISTORY: Concurrent Chemotherapy: No    RADIATION THERAPY HISTORY: Prior Radiation: No    IMPLANTED CARDIAC DEVICE: none     Current Outpatient Medications   Medication Sig Dispense Refill     acetaminophen (TYLENOL) 500 MG tablet Take 1,000 mg by mouth as needed       aspirin (ASA) 81 MG chewable tablet Take 81 mg by mouth daily       Cholecalciferol (VITAMIN D3) 50 MCG (2000 UT) CAPS Take 1 tablet by mouth daily        DULoxetine (CYMBALTA) 30 MG capsule Take 60 mg by mouth every morning       latanoprost (XALATAN) 0.005 % ophthalmic solution Place 1 drop into both eyes At Bedtime       levothyroxine (SYNTHROID/LEVOTHROID) 88 MCG tablet TAKE 1 TABLET BY MOUTH EVERY DAY IN THE MORNING ON AN EMPTY STOMACH FOR 30 DAYS       losartan (COZAAR) 25 MG tablet Take 1 tablet (25 mg) by mouth daily 30 tablet 0     metoprolol succinate ER (TOPROL-XL) 25 MG 24 hr tablet Take 0.5 tablets (12.5 mg) by mouth daily       multivitamin w/minerals (THERA-VIT-M) tablet Take 1 tablet by mouth daily       OLANZapine (ZYPREXA) 5 MG tablet Take 7.5 mg by mouth At Bedtime       pantoprazole (PROTONIX) 40 MG EC tablet Take 1 tablet (40 mg) by mouth every morning (before breakfast) 30 tablet 0     rosuvastatin (CRESTOR) 10 MG tablet Take 10 mg by mouth At Bedtime        albuterol (PROAIR HFA/PROVENTIL HFA/VENTOLIN HFA) 108 (90 Base) MCG/ACT inhaler Inhale 2 puffs into the lungs every 6 hours as needed  (Patient not taking: Reported on 3/21/2023)       albuterol (PROVENTIL) (2.5 MG/3ML) 0.083% neb solution Inhale 2.5 mg into the lungs every 6 hours as needed  (Patient not taking: Reported on 3/21/2023)       amLODIPine (NORVASC) 2.5 MG tablet Take 1 tablet (2.5 mg) by mouth daily 30 tablet 0     calcitonin, salmon, (MIACALCIN) 200 UNIT/ACT nasal spray calcitonin (salmon) 200 unit/actuation nasal spray (Patient not taking: Reported on 3/21/2023)       diclofenac (VOLTAREN) 1 % topical gel Apply 4 g  topically 4 times daily (Patient not taking: Reported on 3/21/2023) 50 g 3     furosemide (LASIX) 20 MG tablet Take 1 tablet (20 mg) by mouth 2 times daily 60 tablet 0     levothyroxine (SYNTHROID/LEVOTHROID) 125 MCG tablet 88 mcg (Patient not taking: Reported on 3/21/2023)       levothyroxine (SYNTHROID/LEVOTHROID) 150 MCG tablet Take 1 tablet by mouth daily 30 minutes before breakfast (Patient not taking: Reported on 3/21/2023)       lidocaine (XYLOCAINE) 5 % external ointment Apply topically 3 times daily (Patient not taking: Reported on 3/21/2023) 150 g 0     NIFEdipine ER (ADALAT CC) 30 MG 24 hr tablet nifedipine ER 30 mg tablet,extended release   TAKE 1 TABLET ON AN EMPTY STOMACH ONCE A DAY (Patient not taking: Reported on 3/21/2023)       solifenacin (VESICARE) 10 MG tablet Take 10 mg by mouth daily (Patient not taking: Reported on 3/21/2023)       Past Medical History:   Diagnosis Date     AAA (abdominal aortic aneurysm)     s/p stenting     Alcohol dependence in remission (H)      Alcohol use disorder, severe, in sustained remission, dependence (H) 8/16/2021     Anxiety      Atrial fibrillation with normal ventricular rate (H)      Benign prostatic hyperplasia with lower urinary tract symptoms      Bronchiectasis without complication (H)     2/27/2020     COPD (chronic obstructive pulmonary disease) (H)      CVA (cerebral vascular accident) (H) 2019     DDD (degenerative disc disease), lumbar      Depression      Depressive disorder      Diabetes (H)      Diastolic dysfunction 01/22/2021     DJD (degenerative joint disease) of knee     left     Essential hypertension      Glaucoma      Glaucoma      History of stroke without residual deficits      Hyperlipidemia      Hypertension      Hypothyroidism      Hypothyroidism      Kidney stones      Major depression      Memory problem      Nocturia      Obesity      Opioid use disorder, severe, dependence (H) 8/16/2021     Overactive bladder 02/25/2021      Primary osteoarthritis of left knee      Psoriasis      Psoriasis      Seborrheic keratoses      Somnolence, daytime      Stenosis of right carotid artery     history of TIA's     Past Surgical History:   Procedure Laterality Date     ABDOMEN SURGERY       ABDOMINAL AORTIC ANEURYSM REPAIR  2013    stent     CAROTID ENDARTERECTOMY Right 9/9/2019    Procedure: RIGHT CAROTID ENDARTERECTOMY;  Surgeon: Miguel Muller MD;  Location: [facility];  Service: General     CIRCUMCISION       CYSTOSCOPY       CYSTOSCOPY PROSTATE W/ LASER N/A 6/12/2018    Procedure:  TRANSURETHRAL RESECTION OF THE PROSTATE WITH QUANTA LASER;  Surgeon: Eze Levi MD;  Location: [facility];  Service:      CYSTOSCOPY PROSTATE W/ LASER N/A 2/18/2020    Procedure: CYSTOSCOPY WITH TRANSURETHRAL INCISION OF THE PROSTATE WITH QUANTA LASER;  Surgeon: Eze Levi MD;  Location: [facility];  Service: Urology     CYSTOSCOPY W/ LITHOLAPAXY / EHL N/A 6/12/2018    Procedure: CYSTOSCOPY AND LITHOLAPAXY;  Surgeon: Eze Levi MD;  Location: [facility];  Service:      IR ABDOMINAL AORTAGRAM  5/19/2020     IR ABDOMINAL AORTIC ANEURYSM ENDOGRAFT  5/19/2020     IR ABDOMINAL AORTOGRAM  5/19/2020     IR ABDOMINAL ENDOVASCULAR STENT GRAFT  5/19/2020     LITHOTRIPSY       PERCUTANEOUS NEPHROSTOLITHOTOMY Right 03/10/2020     ZZC HUANG W/O FACETEC FORAMOT/DSKC 1/2 VRT SEG, LUMBAR Bilateral 3/3/2021    Procedure: Bilateral lumbar 2 - Lumbar 4 decompressive lumbar laminectomy with medial facetectomies;  Surgeon: Renée King MD;  Location: [facility];  Service: Spine     Diclofenac, Loratadine, and Sertraline  Family History   Problem Relation Age of Onset     Emphysema Mother      No Known Problems Father      Cirrhosis Father      No Known Problems Sister      No Known Problems Brother      No Known Problems Maternal Aunt      No Known Problems Maternal Uncle      No Known Problems Paternal Aunt      No  Known Problems Paternal Uncle      No Known Problems Maternal Grandmother      No Known Problems Maternal Grandfather      No Known Problems Paternal Grandmother      No Known Problems Paternal Grandfather      No Known Problems Other      Social History     Socioeconomic History     Marital status:      Spouse name: Not on file     Number of children: Not on file     Years of education: Not on file     Highest education level: Not on file   Occupational History     Not on file   Tobacco Use     Smoking status: Former     Packs/day: 0.50     Years: 35.00     Pack years: 17.50     Types: Cigarettes     Quit date: 1990     Years since quittin.2     Smokeless tobacco: Never     Tobacco comments:     quit    Substance and Sexual Activity     Alcohol use: No     Comment: Alcoholic Drinks/day: quit      Drug use: No     Sexual activity: Yes     Partners: Female     Birth control/protection: Post-menopausal   Other Topics Concern     Parent/sibling w/ CABG, MI or angioplasty before 65F 55M? No   Social History Narrative     Not on file     Social Determinants of Health     Financial Resource Strain: Not on file   Food Insecurity: Not on file   Transportation Needs: Not on file   Physical Activity: Not on file   Stress: Not on file   Social Connections: Not on file   Intimate Partner Violence: Not on file   Housing Stability: Not on file        REVIEW OF SYMPTOMS:  A full 14-point review of systems was performed. Pertinent findings are noted in the HPI.    General  Constitutional  Constitutional (WDL): Exceptions to WDL  Fatigue: Fatigue relieved by rest  EENT  Eye Disorders  Eye Disorder (WDL): All eye disorder elements are within defined limits  Ear Disorders  Ear Disorder (WDL): All ear disorder elements are within defined limits  Respiratory  Respiratory  Respiratory (WDL): Exceptions to WDL  Cough: Mild symptoms OR nonprescription intervention indicated  Dyspnea: Shortness of breath with minimal  exertion OR limiting instrumental ADL  Hypoxia: Decreased oxygen saturation with exercise (e.g., pulse oximeter less than 88%) OR intermittent supplemental oxygen  Cardiovascular  Cardiovascular  Cardiovascular (WDL): Exceptions to WDL (tachycardic today)  Edema: Yes  Edema Limbs: 5 - 10% inter-limb discrepancy in volume or circumference at point of greatest visible difference OR swelling or obscuration of anatomic architecture on close inspection (takes furosemide)  Gastrointestinal  Gastrointestinal  Gastrointestinal (WDL): All gastrointestinal elements are within defined limits  Musculoskeletal  Musculoskeletal and Connective Tissue Disorders  Musculoskeletal & Connective (WDL): Exceptions to WDL  Arthralgia: Moderate pain OR limiting instrumental ADL  Generalized Muscle Weakness: Symptomatic OR perceived by patient but not evident on physical exam  Integumentary  Integumentary  Integumentary (WDL): All integumentary elements are within defined limits  Neurological  Neurosensory  Neurosensory (WDL): Exceptions to WDL  Ataxia: Asymptomatic OR clinical or diagnostic observations only OR intervention not indicated (d/t knee and back pain per pt, uses cane, and w/c in clinic)  Genitourinary/Reproductive  Genitourinary  Genitourinary (WDL): All genitourinary elements are within defined limits  Lymphatic  Lymph System Disorders  Lymph (WDL): All lymph elements are within defined limits  Pain  Pain Score: Moderate Pain (4)  AUA Assessment                                                              Accompanied by  Accompanied By: spouse;family (spouse and daughter)    ECOG Status: 2    Imaging: Reviewed    Pathology: Reviewed    Objective:      PHYSICAL EXAMINATION:    /60   Pulse 105   Resp 18   SpO2 (!) 89%     Gen: Alert, in NAD  Eyes: PERRL, EOMI, sclera anicteric  Neck: Supple, full ROM, no LAD  Pulm: No wheezing, stridor or respiratory distress  CV: Well-perfused, no cyanosis, no pedal edema  Back: No  step-offs or pain to palpation along the thoracolumbar spine  Rectal: Deferred  : Deferred  Musculoskeletal: Normal muscle bulk and tone  Skin: Normal color and turgor  Neurologic: A/Ox3, CN II-XII intact, normal gait and station  Psychiatric: Appropriate mood and affect    Intent of Therapy: Curative  Side effects that may occur during or within weeks after Radiation Therapy      Fatigue and general weakness     Darkening, irritation, itchiness, redness, dryness and peeling of the skin of the chest    Loss of chest and armpit hair    Painful swallowing limiting solid and liquid intake and causing dehydration    Nausea, vomiting and decrease in appetite    Side effects that may occur months or years after Radiation Therapy       Development of another tumor or cancer    Lung inflammation or fibrosis causing cough, fever, and shortness of breath     Fracture of ribs    Permanent narrowing or obstruction of the esophagus    Fluid compressing the lung (pleural effusion)    Coronary artery blockage causing angina pain or a heart attack    Thickening, telangiectasias (development of spider like blood vessels in the skin) and ulceration of the skin of the chest    Poor healing after a trauma or surgery in the irradiated areas    Nerve damage resulting in loss of strength or sensation    The risks, benefits and alternatives to radiation therapy were outlined with the patient. All questions were answered and a consent was signed.     Impression     Adenocarcinoma of right lower lobe lung, clinical stage T1 N0 M0.  Patient is now a good candidate for surgery given his advanced age and medical status.    Assessment & Plan:     I have personally reviewed his upcoming medical record today.  I have also reviewed his most recent radiology study including PET CT scan.  This is an 82-year-old gentleman with a new diagnosis of early stage non-small cell lung cancer involving the right lower lobe lung.  The possible treatment  options including surgery, systemic therapy, and radiation therapy has been discussed with the patient in detail and at great lengths.  The possible risks and side effects of radiation therapy has also been explained to the patient.  Questions are answered to patient's satisfaction.  Patient is not good candidate for surgery given his advanced age and medical status.  I agree the patient is a good candidate for stereotactic radiosurgery.  I believe the patient can be potentially benefited by radiation therapy for local control and possibly better survival.  Therefore radiation therapy is offered to the patient and he is willing to proceed being aware of potential risks and side effect involved.  The possibility of placement of fiducials for radiation therapy has also been discussed with the patient.  Patient is not excited about another procedure at this time.  Given the location of the lung cancer, I think it is okay to proceed with SBRT without markers placement.  The patient is therefore scheduled to return to radiation oncology later on this week for simulation.  I plan to give him radiation therapy to a total dose of 5000 cGy in 5 treatments using SBRT.    Again, thank you very much for the referral and allowing me to participate in the care of this patient.  If you have any questions or concerns about this consultation, please do not hesitate to call.  I spent approximately 60 minutes today with the patient and 80% time was used for counseling.      Sincerely,          Rachael Bales MD, PhD  Department of Radiation Oncology   [facility] Radiation Oncology  Tel: [phone]  Page: [phone]    [facility]  [address] [place] [address]     [place] [id number] [place] [address]    CC:  Patient Care Team:  Misael Moe PA-C as PCP - General (Physician Assistant)  Wilma Lang RPH as Pharmacist (Pharmacist Ambulatory Care)  Kindra Pack MD as  "Assigned Heart and Vascular Provider  Wilma Lang RPH as Assigned MTM Pharmacist  Fidel Sun MD as Assigned Pulmonology Provider  Rachael Bales MD as MD (Radiation Oncology)        Oncology Rooming Note    March 21, 2023 11:49 AM   Dominik Rojas is a 82 year old male who presents for:    Chief Complaint   Patient presents with     Oncology Clinic Visit     Lung Cancer     Consult with [facility] Onc     Initial Vitals: /60   Pulse 105   Resp 18   SpO2 (!) 89%  Estimated body mass index is 33.05 kg/m  as calculated from the following:    Height as of 3/8/23: 1.702 m (5' 7\").    Weight as of 3/8/23: 95.7 kg (211 lb). There is no height or weight on file to calculate BSA.  Moderate Pain (4) Comment: Data Unavailable   No LMP for male patient.  Allergies reviewed: Yes  Medications reviewed: Yes, several PCP prescribed meds that pt doesn't take (left on list)    Medications: Medication refills not needed today.  Pharmacy name entered into [employer]:    [id number] - [address]  [facility] [place], [place] - [address] [place] [address]    Clinical concerns: SOB with any activity. States he's in a w/c today d/t joint pain, not SOB.   No prior RT  No ICD  Dr. Bales was notified.    Radiation Therapy Patient Education    Person involved with teaching: Patient, Wife and Daughter    Patient educational needs for self management of treatment-related side effects assessment completed.  EPIC Patient Ed tab contains Patient Learning Assessment    Education Materials Given  Radiation Therapy and You, Understanding Radiation Therapy, Radiation Therapy Team, Radiation Therapy Treatment, Radiation Therapy: Managing Short-Term Side Effects, Skin Care During Radiation Therapy, Radiation Therapy: Your Daily Life, Radiosurgery Pathway, Radiation Therapy to the Chest Guidelines, Welcome Letter and Insurance PA Information    Educational Topics Discussed  Side effects expected " and Activity    Response To Teaching  Verbalizes understanding    GYN Only  Vaginal Dilator-given and educated: N/A    Referrals sent: None    Chemotherapy?  No          Alia Oliver, DIONI                  Again, thank you for allowing me to participate in the care of your patient.        Sincerely,        Rachael Bales MD

## 2023-03-21 NOTE — PROGRESS NOTES
"Oncology Rooming Note    March 21, 2023 11:49 AM   Dominik Rojas is a 82 year old male who presents for:    Chief Complaint   Patient presents with     Oncology Clinic Visit     Lung Cancer     Consult with [facility] Chaves Onc     Initial Vitals: /60   Pulse 105   Resp 18   SpO2 (!) 89%  Estimated body mass index is 33.05 kg/m  as calculated from the following:    Height as of 3/8/23: 1.702 m (5' 7\").    Weight as of 3/8/23: 95.7 kg (211 lb). There is no height or weight on file to calculate BSA.  Moderate Pain (4) Comment: Data Unavailable   No LMP for male patient.  Allergies reviewed: Yes  Medications reviewed: Yes, several PCP prescribed meds that pt doesn't take (left on list)    Medications: Medication refills not needed today.  Pharmacy name entered into [employer]:    [id number] - [address]  [facility] [place], [place] - [address] [place] [address]    Clinical concerns: SOB with any activity. States he's in a w/c today d/t joint pain, not SOB.   No prior RT  No ICD  Dr. Bales was notified.    Radiation Therapy Patient Education    Person involved with teaching: Patient, Wife and Daughter    Patient educational needs for self management of treatment-related side effects assessment completed.  [facility] Patient Ed tab contains Patient Learning Assessment    Education Materials Given  Radiation Therapy and You, Understanding Radiation Therapy, Radiation Therapy Team, Radiation Therapy Treatment, Radiation Therapy: Managing Short-Term Side Effects, Skin Care During Radiation Therapy, Radiation Therapy: Your Daily Life, Radiosurgery Pathway, Radiation Therapy to the Chest Guidelines, Welcome Letter and Insurance PA Information    Educational Topics Discussed  Side effects expected and Activity    Response To Teaching  Verbalizes understanding    GYN Only  Vaginal Dilator-given and educated: N/A    Referrals sent: None    Chemotherapy?  No          Alia Oliver, " RN

## 2023-03-23 ENCOUNTER — ALLIED HEALTH/NURSE VISIT (OUTPATIENT)
Dept: RADIATION ONCOLOGY | Facility: HOSPITAL | Age: 83
End: 2023-03-23
Attending: INTERNAL MEDICINE
Payer: COMMERCIAL

## 2023-03-23 PROCEDURE — 77334 RADIATION TREATMENT AID(S): CPT | Mod: 26 | Performed by: RADIOLOGY

## 2023-03-23 PROCEDURE — 77334 RADIATION TREATMENT AID(S): CPT | Performed by: RADIOLOGY

## 2023-03-23 NOTE — PROCEDURES
SBRT Special Treatment Procedure Note  Date of Service: 3/23/2023    Diagnosis:  Adenocarcinoma of right lower lobe lung, clinical stage T1 N0 M0.  Patient is not a good candidate for surgery given his advanced age and medical status.    Medical Indication:  To escalate the radiotherapy dose to a curative dose (5000 cGy) using stereotactic body radiotherapy technique and to spare surrounding lung and normal tissues from excessive toxicity.  SBRT planning was completed today to prepare for lung cancer treatment.  SBRT planning is chosen to avoid the critical structures, which cannot be done adequately with conventional planning due to the dose constraints of the normal surrounding critical organs.  In addition, the treatment will be high dose per fraction with complete course to be done in 5 sessions. The patient previously had CT scan acquisition for planning and special treatment aids used include.  The patient was simulated in a supine position. After the contouring of the GTV, ITV (MIPS 90%) and MIPS Averages, a clinical tumor volume (CTV), expanded volume of planned treatment volume (PTV) was carried out on the treatment planning system.  Critical structures outlined included both right and left lung, brachial plexus, spinal cord, esophagus, and airway. Extra efforts were required to immobilize the patient using the custom designed stereotactic frame as well as planning.  Extra efforts during the planning process included contouring of critical structures, GTV, ITV, CTV, PTV and evaluating the DVH and coverage of the PTV.    The patient is going to have SBRT technique for his lung cancer. I have explained to the patient this is a very complicated process which will require an extensive amount of time for planning, delivering and monitoring the patient. The patient understands well and wished to proceed.       Rachael Bales MD, PhD  Department of Radiation Oncology   [facility] Radiation Oncology  Tel:  [phone]  Page: [phone]    [facility]  [address] [place] [address]     [place] [id number] [facility]   [place] [address]

## 2023-03-23 NOTE — PROCEDURES
SIMULATION NOTE:     DIAGNOSIS: Adenocarcinoma of right lower lobe lung, clinical stage T1 N0 M0.  Patient is not a good candidate for surgery given his advanced age and medical status.    INDICATION: After discussion with patient about various treatment options, the patient elected to proceed with definitive radiation therapy using SBRT technique for his lung cancer. The patient is here for simulation.     CONSENT: The possible risks and side effects of radiation therapy have been discussed with the patient in detail and at a great length. The patient's questions are answered to patient's satisfaction. Written consent was obtained.     SIMULATION: Patient is in supine position with SBRT frame and VacLok to help keep the same position during the radiation therapy. Tentative isocenter is set in the center of thoracic region. We will acquire 4D scan to help us better locate target and design radiation therapy field. We are also going to use the respiratory gating technique to help us to better locate the movement of the tumor.     BLOCKS: Custom blocks will be drawn to minimize radiation to normal tissues and to protect normal organs including, but not limited to, spinal cord, brachial plexus, lungs, bronchial tree, esophagus, liver, heart/large vessels, spleen, stomach, small bowel, diaphragm, bone and soft tissue.     DOSAGE: I plan to give him radiation therapy at 1000 cGy each fraction to a total of 5000 cGy in 5 fractions over 2 weeks. I will consider using SBRT/IGRT technique to help us to better locate the target and to protect normal tissue.       Rachael Bales MD, PhD  Department of Radiation Oncology   [facility] Radiation Oncology  Tel: [phone]  Page: [phone]    [facility]  [address] [place] [address]     [address]  [place] [address]

## 2023-03-23 NOTE — PROCEDURES
Clinical Treatment Planning Note    The complex radiotherapy planning will be completed for his lung cancer. The patient had a planning CT earlier today for planning. The treatment aids were used for planning, including headrest and SBRT Board to help keep the same position during the daily radiation therapy. The therapy planning is necessary to reduce radiotherapy dose to the normal critical organs which are not possible with simple treatment. In addition, dose to the target and the critical structures requires three-dimensional analysis of the isodose distribution. The planning will be done to reduce dose to the spinal cord, lungs, bronchial tree, esophagus, liver, heart/large vessels, spleen, stomach, small bowel, diaphragm, bone and soft tissue.     I will contour the clinical tumor volume  CTV , with expanded volume of planning treatment volume  PTV  on the treatment planning system. The critical structures will be outlined, including spinal cord, brachial plexus, lungs, bronchial tree, esophagus, liver, heart/large vessels, spleen, stomach, small bowel, diaphragm, bone and soft tissue.     Treatment planning will be done on the computer treatment planning system. The multiple fields will be used to achieve optimal coverage of the target volume. Dose distribution to the above critical structures will be reviewed. Isodose distribution along with the X, Y, Z plan will also be reviewed. Custom blocking will be used to shield normal structures. The beam s eye views will be reviewed and the digital reconstructed image will be reviewed on the planning software. The patient will receive 5000 cGy in 5 treatments targeted to the lung tumors using 6 MV photons with SBRT/IMRT/IGRT.      Rachael Bales MD, PhD  Department of Radiation Oncology   [facility]  Tel: [phone]  Page: [phone]    [address] [place], [place] [id number] [place] [address]  [place] [address]

## 2023-04-04 ENCOUNTER — APPOINTMENT (OUTPATIENT)
Dept: RADIATION ONCOLOGY | Facility: HOSPITAL | Age: 83
End: 2023-04-04
Attending: RADIOLOGY
Payer: COMMERCIAL

## 2023-04-04 PROCEDURE — 77300 RADIATION THERAPY DOSE PLAN: CPT | Mod: 26 | Performed by: RADIOLOGY

## 2023-04-04 PROCEDURE — 77338 DESIGN MLC DEVICE FOR IMRT: CPT | Performed by: RADIOLOGY

## 2023-04-04 PROCEDURE — 77293 RESPIRATOR MOTION MGMT SIMUL: CPT | Performed by: RADIOLOGY

## 2023-04-04 PROCEDURE — 77338 DESIGN MLC DEVICE FOR IMRT: CPT | Mod: 26 | Performed by: RADIOLOGY

## 2023-04-04 PROCEDURE — 77301 RADIOTHERAPY DOSE PLAN IMRT: CPT | Performed by: RADIOLOGY

## 2023-04-04 PROCEDURE — 77301 RADIOTHERAPY DOSE PLAN IMRT: CPT | Mod: 26 | Performed by: RADIOLOGY

## 2023-04-04 PROCEDURE — 77293 RESPIRATOR MOTION MGMT SIMUL: CPT | Mod: 26 | Performed by: RADIOLOGY

## 2023-04-04 PROCEDURE — 77300 RADIATION THERAPY DOSE PLAN: CPT | Performed by: RADIOLOGY

## 2023-04-10 ENCOUNTER — APPOINTMENT (OUTPATIENT)
Dept: RADIATION ONCOLOGY | Facility: HOSPITAL | Age: 83
End: 2023-04-10
Attending: RADIOLOGY
Payer: COMMERCIAL

## 2023-04-10 PROCEDURE — 77373 STRTCTC BDY RAD THER TX DLVR: CPT | Performed by: RADIOLOGY

## 2023-04-10 PROCEDURE — 77280 THER RAD SIMULAJ FIELD SMPL: CPT | Mod: 26 | Performed by: RADIOLOGY

## 2023-04-11 ENCOUNTER — APPOINTMENT (OUTPATIENT)
Dept: RADIATION ONCOLOGY | Facility: HOSPITAL | Age: 83
End: 2023-04-11
Attending: RADIOLOGY
Payer: COMMERCIAL

## 2023-04-11 PROCEDURE — 77370 RADIATION PHYSICS CONSULT: CPT | Performed by: RADIOLOGY

## 2023-04-12 ENCOUNTER — APPOINTMENT (OUTPATIENT)
Dept: CT IMAGING | Facility: HOSPITAL | Age: 83
DRG: 193 | End: 2023-04-12
Attending: EMERGENCY MEDICINE
Payer: COMMERCIAL

## 2023-04-12 ENCOUNTER — APPOINTMENT (OUTPATIENT)
Dept: RADIOLOGY | Facility: HOSPITAL | Age: 83
DRG: 193 | End: 2023-04-12
Attending: EMERGENCY MEDICINE
Payer: COMMERCIAL

## 2023-04-12 ENCOUNTER — APPOINTMENT (OUTPATIENT)
Dept: RADIATION ONCOLOGY | Facility: HOSPITAL | Age: 83
End: 2023-04-12
Attending: RADIOLOGY
Payer: COMMERCIAL

## 2023-04-12 ENCOUNTER — HOSPITAL ENCOUNTER (INPATIENT)
Facility: HOSPITAL | Age: 83
LOS: 4 days | Discharge: HOME | DRG: 193 | End: 2023-04-16
Attending: EMERGENCY MEDICINE | Admitting: INTERNAL MEDICINE
Payer: COMMERCIAL

## 2023-04-12 DIAGNOSIS — J44.0 CHRONIC OBSTRUCTIVE PULMONARY DISEASE WITH ACUTE LOWER RESPIRATORY INFECTION (H): ICD-10-CM

## 2023-04-12 DIAGNOSIS — J18.9 COMMUNITY ACQUIRED PNEUMONIA, UNSPECIFIED LATERALITY: ICD-10-CM

## 2023-04-12 DIAGNOSIS — J47.9 BRONCHIECTASIS WITHOUT ACUTE EXACERBATION (H): ICD-10-CM

## 2023-04-12 DIAGNOSIS — R09.02 HYPOXIA: ICD-10-CM

## 2023-04-12 DIAGNOSIS — I50.22 CHRONIC SYSTOLIC CONGESTIVE HEART FAILURE (H): Primary | ICD-10-CM

## 2023-04-12 DIAGNOSIS — C34.31 MALIGNANT NEOPLASM OF LOWER LOBE OF RIGHT LUNG (H): ICD-10-CM

## 2023-04-12 DIAGNOSIS — J18.9 PNEUMONIA OF RIGHT LOWER LOBE DUE TO INFECTIOUS ORGANISM: ICD-10-CM

## 2023-04-12 PROBLEM — Z86.73 HISTORY OF CEREBROVASCULAR ACCIDENT: Status: ACTIVE | Noted: 2020-07-20

## 2023-04-12 PROBLEM — F41.1 GENERALIZED ANXIETY DISORDER: Status: ACTIVE | Noted: 2021-03-07

## 2023-04-12 PROBLEM — J44.9 COPD (CHRONIC OBSTRUCTIVE PULMONARY DISEASE) (H): Status: ACTIVE | Noted: 2021-03-04

## 2023-04-12 PROBLEM — I10 BENIGN ESSENTIAL HYPERTENSION: Status: ACTIVE | Noted: 2020-02-24

## 2023-04-12 PROBLEM — J96.01 ACUTE RESPIRATORY FAILURE WITH HYPOXIA (H): Status: ACTIVE | Noted: 2021-08-09

## 2023-04-12 PROBLEM — F11.20 OPIOID USE DISORDER, SEVERE, DEPENDENCE (H): Status: ACTIVE | Noted: 2021-08-16

## 2023-04-12 LAB
ANION GAP SERPL CALCULATED.3IONS-SCNC: 10 MMOL/L (ref 7–15)
BASOPHILS # BLD AUTO: 0 10E3/UL (ref 0–0.2)
BASOPHILS NFR BLD AUTO: 0 %
BUN SERPL-MCNC: 27.4 MG/DL (ref 8–23)
CALCIUM SERPL-MCNC: 9.1 MG/DL (ref 8.8–10.2)
CHLORIDE SERPL-SCNC: 102 MMOL/L (ref 98–107)
CREAT SERPL-MCNC: 1.11 MG/DL (ref 0.67–1.17)
DEPRECATED HCO3 PLAS-SCNC: 25 MMOL/L (ref 22–29)
EOSINOPHIL # BLD AUTO: 0.1 10E3/UL (ref 0–0.7)
EOSINOPHIL NFR BLD AUTO: 0 %
ERYTHROCYTE [DISTWIDTH] IN BLOOD BY AUTOMATED COUNT: 13.2 % (ref 10–15)
FLUAV RNA SPEC QL NAA+PROBE: NEGATIVE
FLUBV RNA RESP QL NAA+PROBE: NEGATIVE
GFR SERPL CREATININE-BSD FRML MDRD: 67 ML/MIN/1.73M2
GLUCOSE SERPL-MCNC: 116 MG/DL (ref 70–99)
HCT VFR BLD AUTO: 42.1 % (ref 40–53)
HGB BLD-MCNC: 14.1 G/DL (ref 13.3–17.7)
IMM GRANULOCYTES # BLD: 0 10E3/UL
IMM GRANULOCYTES NFR BLD: 0 %
LACTATE SERPL-SCNC: 1.5 MMOL/L (ref 0.7–2)
LYMPHOCYTES # BLD AUTO: 0.6 10E3/UL (ref 0.8–5.3)
LYMPHOCYTES NFR BLD AUTO: 4 %
MCH RBC QN AUTO: 30.7 PG (ref 26.5–33)
MCHC RBC AUTO-ENTMCNC: 33.5 G/DL (ref 31.5–36.5)
MCV RBC AUTO: 92 FL (ref 78–100)
MONOCYTES # BLD AUTO: 0.9 10E3/UL (ref 0–1.3)
MONOCYTES NFR BLD AUTO: 6 %
NEUTROPHILS # BLD AUTO: 12.3 10E3/UL (ref 1.6–8.3)
NEUTROPHILS NFR BLD AUTO: 90 %
NRBC # BLD AUTO: 0 10E3/UL
NRBC BLD AUTO-RTO: 0 /100
PLATELET # BLD AUTO: 186 10E3/UL (ref 150–450)
POTASSIUM SERPL-SCNC: 4.1 MMOL/L (ref 3.4–5.3)
PROCALCITONIN SERPL IA-MCNC: 0.8 NG/ML
RBC # BLD AUTO: 4.59 10E6/UL (ref 4.4–5.9)
RSV RNA SPEC NAA+PROBE: NEGATIVE
SARS-COV-2 RNA RESP QL NAA+PROBE: NEGATIVE
SODIUM SERPL-SCNC: 137 MMOL/L (ref 136–145)
WBC # BLD AUTO: 13.8 10E3/UL (ref 4–11)

## 2023-04-12 PROCEDURE — 85025 COMPLETE CBC W/AUTO DIFF WBC: CPT | Performed by: EMERGENCY MEDICINE

## 2023-04-12 PROCEDURE — 84145 PROCALCITONIN (PCT): CPT | Performed by: INTERNAL MEDICINE

## 2023-04-12 PROCEDURE — 120N000001 HC R&B MED SURG/OB

## 2023-04-12 PROCEDURE — 250N000011 HC RX IP 250 OP 636: Performed by: EMERGENCY MEDICINE

## 2023-04-12 PROCEDURE — 96365 THER/PROPH/DIAG IV INF INIT: CPT | Mod: 59

## 2023-04-12 PROCEDURE — 71275 CT ANGIOGRAPHY CHEST: CPT

## 2023-04-12 PROCEDURE — 82310 ASSAY OF CALCIUM: CPT | Performed by: EMERGENCY MEDICINE

## 2023-04-12 PROCEDURE — 36415 COLL VENOUS BLD VENIPUNCTURE: CPT | Performed by: EMERGENCY MEDICINE

## 2023-04-12 PROCEDURE — 87040 BLOOD CULTURE FOR BACTERIA: CPT | Performed by: EMERGENCY MEDICINE

## 2023-04-12 PROCEDURE — 84145 PROCALCITONIN (PCT): CPT | Performed by: EMERGENCY MEDICINE

## 2023-04-12 PROCEDURE — 93005 ELECTROCARDIOGRAM TRACING: CPT | Performed by: STUDENT IN AN ORGANIZED HEALTH CARE EDUCATION/TRAINING PROGRAM

## 2023-04-12 PROCEDURE — 82805 BLOOD GASES W/O2 SATURATION: CPT | Performed by: INTERNAL MEDICINE

## 2023-04-12 PROCEDURE — 258N000003 HC RX IP 258 OP 636: Performed by: EMERGENCY MEDICINE

## 2023-04-12 PROCEDURE — 83605 ASSAY OF LACTIC ACID: CPT | Performed by: EMERGENCY MEDICINE

## 2023-04-12 PROCEDURE — 77373 STRTCTC BDY RAD THER TX DLVR: CPT | Performed by: STUDENT IN AN ORGANIZED HEALTH CARE EDUCATION/TRAINING PROGRAM

## 2023-04-12 PROCEDURE — 87637 SARSCOV2&INF A&B&RSV AMP PRB: CPT | Performed by: EMERGENCY MEDICINE

## 2023-04-12 PROCEDURE — 250N000013 HC RX MED GY IP 250 OP 250 PS 637: Performed by: EMERGENCY MEDICINE

## 2023-04-12 PROCEDURE — 99285 EMERGENCY DEPT VISIT HI MDM: CPT | Mod: 25,CS

## 2023-04-12 PROCEDURE — 83880 ASSAY OF NATRIURETIC PEPTIDE: CPT | Performed by: INTERNAL MEDICINE

## 2023-04-12 PROCEDURE — 99223 1ST HOSP IP/OBS HIGH 75: CPT | Mod: AI | Performed by: INTERNAL MEDICINE

## 2023-04-12 PROCEDURE — 71045 X-RAY EXAM CHEST 1 VIEW: CPT

## 2023-04-12 PROCEDURE — C9803 HOPD COVID-19 SPEC COLLECT: HCPCS

## 2023-04-12 PROCEDURE — 36415 COLL VENOUS BLD VENIPUNCTURE: CPT | Performed by: INTERNAL MEDICINE

## 2023-04-12 RX ORDER — AMLODIPINE BESYLATE 2.5 MG/1
2.5 TABLET ORAL DAILY
Status: DISCONTINUED | OUTPATIENT
Start: 2023-04-13 | End: 2023-04-16 | Stop reason: HOSPADM

## 2023-04-12 RX ORDER — ASPIRIN 81 MG/1
81 TABLET, CHEWABLE ORAL DAILY
Status: DISCONTINUED | OUTPATIENT
Start: 2023-04-13 | End: 2023-04-16 | Stop reason: HOSPADM

## 2023-04-12 RX ORDER — ONDANSETRON 4 MG/1
4 TABLET, ORALLY DISINTEGRATING ORAL EVERY 6 HOURS PRN
Status: DISCONTINUED | OUTPATIENT
Start: 2023-04-12 | End: 2023-04-16 | Stop reason: HOSPADM

## 2023-04-12 RX ORDER — DOXYCYCLINE 100 MG/10ML
100 INJECTION, POWDER, LYOPHILIZED, FOR SOLUTION INTRAVENOUS EVERY 12 HOURS
Status: DISCONTINUED | OUTPATIENT
Start: 2023-04-12 | End: 2023-04-14

## 2023-04-12 RX ORDER — DOCUSATE SODIUM 100 MG/1
100 CAPSULE, LIQUID FILLED ORAL 2 TIMES DAILY
Status: DISCONTINUED | OUTPATIENT
Start: 2023-04-12 | End: 2023-04-16 | Stop reason: HOSPADM

## 2023-04-12 RX ORDER — LATANOPROST 50 UG/ML
1 SOLUTION/ DROPS OPHTHALMIC AT BEDTIME
Status: DISCONTINUED | OUTPATIENT
Start: 2023-04-12 | End: 2023-04-16 | Stop reason: HOSPADM

## 2023-04-12 RX ORDER — BISACODYL 10 MG
10 SUPPOSITORY, RECTAL RECTAL DAILY PRN
Status: DISCONTINUED | OUTPATIENT
Start: 2023-04-12 | End: 2023-04-16 | Stop reason: HOSPADM

## 2023-04-12 RX ORDER — IOPAMIDOL 755 MG/ML
90 INJECTION, SOLUTION INTRAVASCULAR ONCE
Status: COMPLETED | OUTPATIENT
Start: 2023-04-12 | End: 2023-04-12

## 2023-04-12 RX ORDER — DULOXETIN HYDROCHLORIDE 60 MG/1
60 CAPSULE, DELAYED RELEASE ORAL EVERY MORNING
Status: DISCONTINUED | OUTPATIENT
Start: 2023-04-13 | End: 2023-04-16 | Stop reason: HOSPADM

## 2023-04-12 RX ORDER — HALOPERIDOL 5 MG/ML
2 INJECTION INTRAMUSCULAR EVERY 6 HOURS PRN
Status: DISCONTINUED | OUTPATIENT
Start: 2023-04-12 | End: 2023-04-16 | Stop reason: HOSPADM

## 2023-04-12 RX ORDER — ONDANSETRON 2 MG/ML
4 INJECTION INTRAMUSCULAR; INTRAVENOUS EVERY 6 HOURS PRN
Status: DISCONTINUED | OUTPATIENT
Start: 2023-04-12 | End: 2023-04-16 | Stop reason: HOSPADM

## 2023-04-12 RX ORDER — AMOXICILLIN 250 MG
2 CAPSULE ORAL 2 TIMES DAILY PRN
Status: DISCONTINUED | OUTPATIENT
Start: 2023-04-12 | End: 2023-04-16 | Stop reason: HOSPADM

## 2023-04-12 RX ORDER — PIPERACILLIN SODIUM, TAZOBACTAM SODIUM 3; .375 G/15ML; G/15ML
3.38 INJECTION, POWDER, LYOPHILIZED, FOR SOLUTION INTRAVENOUS EVERY 8 HOURS
Status: DISCONTINUED | OUTPATIENT
Start: 2023-04-13 | End: 2023-04-16 | Stop reason: HOSPADM

## 2023-04-12 RX ORDER — ROSUVASTATIN CALCIUM 10 MG/1
10 TABLET, COATED ORAL AT BEDTIME
Status: DISCONTINUED | OUTPATIENT
Start: 2023-04-12 | End: 2023-04-16 | Stop reason: HOSPADM

## 2023-04-12 RX ORDER — LEVALBUTEROL INHALATION SOLUTION 0.31 MG/3ML
0.31 SOLUTION RESPIRATORY (INHALATION) EVERY 6 HOURS PRN
Status: DISCONTINUED | OUTPATIENT
Start: 2023-04-12 | End: 2023-04-16 | Stop reason: HOSPADM

## 2023-04-12 RX ORDER — PANTOPRAZOLE SODIUM 40 MG/1
40 TABLET, DELAYED RELEASE ORAL DAILY
Status: DISCONTINUED | OUTPATIENT
Start: 2023-04-13 | End: 2023-04-16 | Stop reason: HOSPADM

## 2023-04-12 RX ORDER — ACETAMINOPHEN 650 MG/1
650 SUPPOSITORY RECTAL EVERY 6 HOURS PRN
Status: DISCONTINUED | OUTPATIENT
Start: 2023-04-12 | End: 2023-04-16 | Stop reason: HOSPADM

## 2023-04-12 RX ORDER — AMLODIPINE BESYLATE 2.5 MG/1
2.5 TABLET ORAL DAILY
Status: ON HOLD | COMMUNITY
End: 2024-07-01

## 2023-04-12 RX ORDER — ACETAMINOPHEN 325 MG/1
650 TABLET ORAL EVERY 6 HOURS PRN
Status: DISCONTINUED | OUTPATIENT
Start: 2023-04-12 | End: 2023-04-16 | Stop reason: HOSPADM

## 2023-04-12 RX ORDER — MULTIPLE VITAMINS W/ MINERALS TAB 9MG-400MCG
1 TAB ORAL DAILY
Status: DISCONTINUED | OUTPATIENT
Start: 2023-04-13 | End: 2023-04-16 | Stop reason: HOSPADM

## 2023-04-12 RX ORDER — ACETAMINOPHEN 325 MG/1
975 TABLET ORAL ONCE
Status: COMPLETED | OUTPATIENT
Start: 2023-04-12 | End: 2023-04-12

## 2023-04-12 RX ORDER — LORAZEPAM 2 MG/ML
0.5 INJECTION INTRAMUSCULAR ONCE
Status: COMPLETED | OUTPATIENT
Start: 2023-04-12 | End: 2023-04-12

## 2023-04-12 RX ORDER — OLANZAPINE 2.5 MG/1
7.5 TABLET, FILM COATED ORAL AT BEDTIME
Status: DISCONTINUED | OUTPATIENT
Start: 2023-04-12 | End: 2023-04-16 | Stop reason: HOSPADM

## 2023-04-12 RX ORDER — QUETIAPINE FUMARATE 25 MG/1
25 TABLET, FILM COATED ORAL 2 TIMES DAILY
Status: DISCONTINUED | OUTPATIENT
Start: 2023-04-12 | End: 2023-04-16 | Stop reason: HOSPADM

## 2023-04-12 RX ORDER — FUROSEMIDE 20 MG
20 TABLET ORAL DAILY
Status: ON HOLD | COMMUNITY
End: 2024-07-01

## 2023-04-12 RX ORDER — LIDOCAINE 40 MG/G
CREAM TOPICAL
Status: DISCONTINUED | OUTPATIENT
Start: 2023-04-12 | End: 2023-04-16 | Stop reason: HOSPADM

## 2023-04-12 RX ORDER — ENOXAPARIN SODIUM 100 MG/ML
40 INJECTION SUBCUTANEOUS EVERY 24 HOURS
Status: DISCONTINUED | OUTPATIENT
Start: 2023-04-12 | End: 2023-04-16 | Stop reason: HOSPADM

## 2023-04-12 RX ORDER — TOLTERODINE 2 MG/1
4 CAPSULE, EXTENDED RELEASE ORAL DAILY
Status: DISCONTINUED | OUTPATIENT
Start: 2023-04-13 | End: 2023-04-16 | Stop reason: HOSPADM

## 2023-04-12 RX ORDER — AMOXICILLIN 250 MG
1 CAPSULE ORAL 2 TIMES DAILY PRN
Status: DISCONTINUED | OUTPATIENT
Start: 2023-04-12 | End: 2023-04-16 | Stop reason: HOSPADM

## 2023-04-12 RX ORDER — LEVOTHYROXINE SODIUM 88 UG/1
88 TABLET ORAL
Status: DISCONTINUED | OUTPATIENT
Start: 2023-04-13 | End: 2023-04-16 | Stop reason: HOSPADM

## 2023-04-12 RX ORDER — LOSARTAN POTASSIUM 25 MG/1
25 TABLET ORAL DAILY
Status: DISCONTINUED | OUTPATIENT
Start: 2023-04-13 | End: 2023-04-16 | Stop reason: HOSPADM

## 2023-04-12 RX ORDER — PIPERACILLIN SODIUM, TAZOBACTAM SODIUM 3; .375 G/15ML; G/15ML
3.38 INJECTION, POWDER, LYOPHILIZED, FOR SOLUTION INTRAVENOUS ONCE
Status: COMPLETED | OUTPATIENT
Start: 2023-04-12 | End: 2023-04-12

## 2023-04-12 RX ADMIN — SODIUM CHLORIDE 1000 ML: 9 INJECTION, SOLUTION INTRAVENOUS at 18:07

## 2023-04-12 RX ADMIN — LATANOPROST 1 DROP: 50 SOLUTION/ DROPS OPHTHALMIC at 23:48

## 2023-04-12 RX ADMIN — PIPERACILLIN AND TAZOBACTAM 3.38 G: 3; .375 INJECTION, POWDER, LYOPHILIZED, FOR SOLUTION INTRAVENOUS at 18:08

## 2023-04-12 RX ADMIN — ACETAMINOPHEN 975 MG: 325 TABLET ORAL at 21:12

## 2023-04-12 RX ADMIN — LORAZEPAM 0.5 MG: 2 INJECTION INTRAMUSCULAR; INTRAVENOUS at 20:57

## 2023-04-12 RX ADMIN — IOPAMIDOL 90 ML: 755 INJECTION, SOLUTION INTRAVENOUS at 21:35

## 2023-04-12 ASSESSMENT — ACTIVITIES OF DAILY LIVING (ADL)
ADLS_ACUITY_SCORE: 35

## 2023-04-12 NOTE — ED NOTES
Bed: JNED-  Expected date: 4/12/23  Expected time:   Means of arrival: Ambulance  Comments:  Fever, weakness

## 2023-04-12 NOTE — H&P
"[facility]    History and Physical - Hospitalist Service       Date of Admission:  4/12/2023    Assessment & Plan    Summary: 82 year old male with multiple medical problems including right lower lobe lung cancer currently receiving radiation presents with progressive shortness of breath and fever.    Principal Problem:    Fever and shortness of breath with possible pneumonia of right lower lobe related to cancer and XRT: No clear infiltrate seen on chest CT. blood cultures ordered, sputum Gram stain ordered.  Procalcitonin elevated, white count elevated.  Ordered urinalysis.  Will get abdominal pelvic CT as well.    Acute metabolic encephalopathy: due to fever, infection, previous hx CVA. Scheduled seroquel, prn haldol    Acute respiratory failure with hypoxia (H): oxygen, nebs, abx  Active Problems:    Benign essential hypertension    History of cerebrovascular accident    Hypothyroidism    Ocular hypertension, bilateral    QT prolongation    COPD (chronic obstructive pulmonary disease) (H)    Generalized anxiety disorder    Malignant neoplasm of lower lobe of right lung (H)       Diet: Combination Diet Regular Diet Adult  DVT Prophylaxis: Enoxaparin (Lovenox) SQ  Cabral Catheter: Not present  Lines: None     Cardiac Monitoring: None  Code Status: Full Code    Clinically Significant Risk Factors Present on Admission                  # Hypertension: home medication list includes antihypertensive(s)      # Obesity: Estimated body mass index is 32.89 kg/m  as calculated from the following:    Height as of this encounter: 1.702 m (5' 7\").    Weight as of this encounter: 95.3 kg (210 lb).           Disposition Plan      Expected Discharge Date: 04/14/2023                  Tyler Cleray MD  Hospitalist Service  [facility]  Securely message with [employer] (more info)  Text page via [employer] Paging/Directory "     ______________________________________________________________________    Chief Complaint   Shortness of breath and weakness    History is obtained from the patient, electronic health record, emergency department physician and patient's spouse    History of Present Illness   82 year old male with a pertinent history of  poor short-term memory, atrial fibrillation, CVA, COPD, alcohol dependence, hyperlipidemia, and hypertension who presents to this ED via EMS for evaluation of shortness of breath and generalized weakness.     Per EMS, patient was 86% on room air. He was put on 3 L with oxygen in the low 90's. Patient presented with a fever of 102.8 F. Wife concerned for infection.     Past Medical History    Past Medical History:   Diagnosis Date     AAA (abdominal aortic aneurysm) (H)     s/p stenting     Alcohol dependence in remission (H)      Alcohol use disorder, severe, in sustained remission, dependence (H) 8/16/2021     Anxiety      Atrial fibrillation with normal ventricular rate (H)      Benign prostatic hyperplasia with lower urinary tract symptoms      Bronchiectasis without complication (H)     2/27/2020     COPD (chronic obstructive pulmonary disease) (H)      CVA (cerebral vascular accident) (H) 2019     DDD (degenerative disc disease), lumbar      Depression      Depressive disorder      Diabetes (H)      Diastolic dysfunction 01/22/2021     DJD (degenerative joint disease) of knee     left     Essential hypertension      Glaucoma      Glaucoma      History of stroke without residual deficits      Hyperlipidemia      Hypertension      Hypothyroidism      Hypothyroidism      Kidney stones      Major depression      Memory problem      Nocturia      Obesity      Opioid use disorder, severe, dependence (H) 8/16/2021     Overactive bladder 02/25/2021     Primary osteoarthritis of left knee      Psoriasis      Psoriasis      Seborrheic keratoses      Somnolence, daytime      Stenosis of right carotid  artery     history of TIA's       Past Surgical History   Past Surgical History:   Procedure Laterality Date     ABDOMEN SURGERY       ABDOMINAL AORTIC ANEURYSM REPAIR  2013    stent     CAROTID ENDARTERECTOMY Right 9/9/2019    Procedure: RIGHT CAROTID ENDARTERECTOMY;  Surgeon: Miguel Muller MD;  Location: [facility];  Service: General     CIRCUMCISION       CYSTOSCOPY       CYSTOSCOPY PROSTATE W/ LASER N/A 6/12/2018    Procedure:  TRANSURETHRAL RESECTION OF THE PROSTATE WITH QUANTA LASER;  Surgeon: Eze Levi MD;  Location: [facility] OR;  Service:      CYSTOSCOPY PROSTATE W/ LASER N/A 2/18/2020    Procedure: CYSTOSCOPY WITH TRANSURETHRAL INCISION OF THE PROSTATE WITH QUANTA LASER;  Surgeon: Eze Levi MD;  Location: [facility] OR;  Service: Urology     CYSTOSCOPY W/ LITHOLAPAXY / EHL N/A 6/12/2018    Procedure: CYSTOSCOPY AND LITHOLAPAXY;  Surgeon: Eze Levi MD;  Location: [facility];  Service:      IR ABDOMINAL AORTAGRAM  5/19/2020     IR ABDOMINAL AORTIC ANEURYSM ENDOGRAFT  5/19/2020     IR ABDOMINAL AORTOGRAM  5/19/2020     IR ABDOMINAL ENDOVASCULAR STENT GRAFT  5/19/2020     LITHOTRIPSY       PERCUTANEOUS NEPHROSTOLITHOTOMY Right 03/10/2020     ZZC HUANG W/O FACETEC FORAMOT/DSKC 1/2 VRT SEG, LUMBAR Bilateral 3/3/2021    Procedure: Bilateral lumbar 2 - Lumbar 4 decompressive lumbar laminectomy with medial facetectomies;  Surgeon: Renée King MD;  Location: [facility];  Service: Spine       Prior to Admission Medications   Prior to Admission Medications   Prescriptions Last Dose Informant Patient Reported? Taking?   Cholecalciferol (VITAMIN D3) 50 MCG (2000 UT) CAPS 4/12/2023  Yes Yes   Sig: Take 1 tablet by mouth daily    DULoxetine (CYMBALTA) 30 MG capsule 4/12/2023  Yes Yes   Sig: Take 60 mg by mouth every morning   NIFEdipine ER (ADALAT CC) 30 MG 24 hr tablet   Yes No   Sig: nifedipine ER 30 mg tablet,extended release   TAKE 1 TABLET ON AN  EMPTY STOMACH ONCE A DAY   Patient not taking: Reported on 3/21/2023   OLANZapine (ZYPREXA) 5 MG tablet 4/11/2023  Yes Yes   Sig: Take 7.5 mg by mouth At Bedtime   acetaminophen (TYLENOL) 500 MG tablet   Yes Yes   Sig: Take 1,000 mg by mouth as needed   albuterol (PROAIR HFA/PROVENTIL HFA/VENTOLIN HFA) 108 (90 Base) MCG/ACT inhaler   Yes No   Sig: Inhale 2 puffs into the lungs every 6 hours as needed    Patient not taking: Reported on 3/21/2023   albuterol (PROVENTIL) (2.5 MG/3ML) 0.083% neb solution   Yes No   Sig: Inhale 2.5 mg into the lungs every 6 hours as needed    Patient not taking: Reported on 3/21/2023   amLODIPine (NORVASC) 2.5 MG tablet 4/12/2023  Yes Yes   Sig: Take 2.5 mg by mouth daily   aspirin (ASA) 81 MG chewable tablet 4/12/2023  Yes Yes   Sig: Take 81 mg by mouth daily   calcitonin, salmon, (MIACALCIN) 200 UNIT/ACT nasal spray   Yes No   Sig: calcitonin (salmon) 200 unit/actuation nasal spray   Patient not taking: Reported on 3/21/2023   diclofenac (VOLTAREN) 1 % topical gel   No No   Sig: Apply 4 g topically 4 times daily   Patient not taking: Reported on 3/21/2023   furosemide (LASIX) 20 MG tablet 4/12/2023  Yes Yes   Sig: Take 20 mg by mouth daily   latanoprost (XALATAN) 0.005 % ophthalmic solution 4/11/2023  Yes Yes   Sig: Place 1 drop into both eyes At Bedtime   levothyroxine (SYNTHROID/LEVOTHROID) 88 MCG tablet 4/12/2023  Yes Yes   Sig: TAKE 1 TABLET BY MOUTH EVERY DAY IN THE MORNING ON AN EMPTY STOMACH FOR 30 DAYS   lidocaine (XYLOCAINE) 5 % external ointment   No No   Sig: Apply topically 3 times daily   Patient not taking: Reported on 3/21/2023   losartan (COZAAR) 25 MG tablet 4/12/2023  No Yes   Sig: Take 1 tablet (25 mg) by mouth daily   metoprolol succinate ER (TOPROL-XL) 25 MG 24 hr tablet 4/12/2023  Yes Yes   Sig: Take 0.5 tablets (12.5 mg) by mouth daily   multivitamin w/minerals (THERA-VIT-M) tablet 4/12/2023  Yes Yes   Sig: Take 1 tablet by mouth daily   pantoprazole (PROTONIX)  40 MG EC tablet 4/12/2023  No Yes   Sig: Take 1 tablet (40 mg) by mouth every morning (before breakfast)   rosuvastatin (CRESTOR) 10 MG tablet 4/11/2023  Yes Yes   Sig: Take 10 mg by mouth At Bedtime    solifenacin (VESICARE) 10 MG tablet   Yes No   Sig: Take 10 mg by mouth daily   Patient not taking: Reported on 3/21/2023      Facility-Administered Medications: None        Review of Systems    Review of systems not obtained due to patient factors - confusion     Physical Exam   Vital Signs: Temp: 98.6  F (37  C) Temp src: Oral BP: 103/59 Pulse: 86   Resp: 28 SpO2: 95 % O2 Device: Nasal cannula Oxygen Delivery: 4 LPM  Weight: 210 lbs 0 oz    General Appearance:  Alert, confused, no distress, appears stated age   Head:  Normocephalic, without obvious abnormality, atraumatic   Eyes:  PERRL, conjunctiva/corneas clear, EOM's intact   Nose: Nares normal, septum midline, mucosa normal, no drainage   Throat: Lips, mucosa, and tongue normal;teeth and gums normal   Lungs:   Clear to auscultation bilaterally, respirations unlabored   Heart:  Regular rate and rhythm, S1, S2 normal, no murmur, rub or gallop   Abdomen:   Soft, non-tender, bowel sounds active all four quadrants,  no masses, no organomegaly   Extremities: Extremities normal, atraumatic, no cyanosis or edema   Skin: Skin color, texture, turgor normal, no rashes or lesions   Neurologic: Normal       Medical Decision Making       75 MINUTES SPENT BY ME on the date of service doing chart review, history, exam, documentation & further activities per the note.      Data     I have personally reviewed the following data over the past 24 hrs:    13.8 (H)  \   14.1   / 186     137 102 27.4 (H) /  116 (H)   4.1 25 1.11 \       Procal: 3.56 (H) CRP: N/A Lactic Acid: 1.5         Imaging results reviewed over the past 24 hrs:   Recent Results (from the past 24 hour(s))   XR Chest Port 1 View    Narrative    EXAM: XR CHEST PORT 1 VIEW  LOCATION: [facility]  HOSPITAL  DATE/TIME: 4/12/2023 6:08 PM CDT    INDICATION: Hypoxia, fever, weakness  COMPARISON: 03/08/2023      Impression    IMPRESSION: Very shallow inspiration with some atelectasis in the lung bases. Heart size within normal limits and stable. No significant new findings.   CT Chest Pulmonary Embolism w Contrast    Narrative    EXAM: CT CHEST PULMONARY EMBOLISM W CONTRAST  LOCATION: [facility]  DATE/TIME: 4/12/2023 9:35 PM CDT    INDICATION: Cancer patient with hypoxia and cough, hypoventilation on x ray which otherwise looked clear  COMPARISON: 01/12/2023, 09/30/2022  TECHNIQUE: CT chest pulmonary angiogram during arterial phase injection of IV contrast. Multiplanar reformats and MIP reconstructions were performed. Dose reduction techniques were used.   CONTRAST: isovue 370 90ml    FINDINGS:  ANGIOGRAM CHEST: Pulmonary arteries are normal caliber and negative for pulmonary emboli. Moderate calcified plaque in the thoracic aorta. No evidence of dissection.    LUNGS AND PLEURA: A posterior right lower lobe mass measuring 2.6 x 1.5 cm corresponding with the patient's known neoplasm measures mildly increased in size from 01/12/2023 when it measured 2.0 x 1.1 cm. However, motion artifact on today's exam may be   some mildly accentuating the size of the mass. Moderate interstitial opacities in both lungs likely due to scarring or fibrosis. Mild emphysematous changes in the upper lungs.    MEDIASTINUM/AXILLAE: Normal heart size. No pericardial effusions. A few prominent right paratracheal nodes measuring up to 1.0 cm in diameter are unchanged and were not hypermetabolic on the PET exam of 09/30/2022.    CORONARY ARTERY CALCIFICATION: Moderate.    UPPER ABDOMEN: No adrenal nodules. Cholelithiasis.    MUSCULOSKELETAL: Degenerative hypertrophic changes in the thoracic spine.      Impression    IMPRESSION:  1.  No evidence of acute pulmonary embolism or other acute pathology in the chest.  2.  A  2.6 x 1.5 cm mass in the posterior right lower lobe corresponding with the patient's known neoplasm measures mildly increased in size from 01/12/2023. However, motion artifact on today's exam may be mildly accentuating the size of the mass.  3.  Mild emphysematous changes in the upper lungs.  4.  Moderate interstitial opacities in both lungs likely due to scarring or fibrosis. This is not significant changed. No significant new infiltrates.

## 2023-04-12 NOTE — ED TRIAGE NOTES
Pt here via [facility] from home for c/o shortness of breath and generalized weakness after having the 2nd dose of radiation for his lung cancer. When EMS arrived, pt was 86% on room air, pt put on 3L, o2 low 90s. Pt had a fever 102.8F, wife was concern for infection. Pt is somewhat confused when arrived.      Triage Assessment     Row Name 04/12/23 [id number]       Triage Assessment (Adult)    Airway WDL WDL       Respiratory WDL    Respiratory WDL X       Cardiac WDL    Cardiac WDL X

## 2023-04-12 NOTE — ED NOTES
Pt is a/o x3 to person, place, situation, not to time, wife at bedside, pt is frequently reminded that he is unable to drink fluids until results are in. Tachycardiac but VSS and no fever, pt on 5L n/c satting low 90s, lung sounds diminished.

## 2023-04-12 NOTE — ED PROVIDER NOTES
EMERGENCY DEPARTMENT ENCOUNTER      NAME: Dominik Rojas  AGE: 82 year old male  YOB: 1940  MRN: [id number]  EVALUATION DATE & TIME: 2023  4:31 PM    PCP: Misael Moe    ED PROVIDER: Mehdi Carr M.D.      Chief Complaint   Patient presents with     Shortness of Breath     Generalized Weakness         FINAL IMPRESSION:  1. Community acquired pneumonia, unspecified laterality    2. Hypoxia          ED COURSE & MEDICAL DECISION MAKIN year old male presents to the Emergency Department for evaluation of weakness and shortness of breath.  Patient is mildly tachycardic and hypoxic requiring 4 L of supplemental oxygen when he arrives to the emergency department.  He has a history of a lung mass and is undergoing radiation.  He was reportedly febrile at home.  He underwent lab evaluation as below.  This was notable for elevated procalcitonin and white blood cell count.  2 blood cultures were obtained.  Patient was given an IV fluid bolus however his lactate is reassuringly within normal limits.  Chest x-ray here shows shallow inspiration but within this limitation there was no new severe pulmonary infiltrates.  He was started on Zosyn for broad coverage of community-acquired pneumonia in this patient with frequent contact with healthcare system.  He tested negative for COVID and influenza.  Since he has a history of cancer and with an otherwise clear chest x-ray, I am going to complete his work-up with a CT pulmonary angiogram as he is admitted for hypoxia and pneumonia.  Discussed this plan with the patient and his wife who are in agreement.  Discussed case with hospitalist.    4:53 PM I met with the patient to gather history and to perform my initial exam. I discussed the plan for care while in the Emergency Department. PPE (gloves and N95 mask) was worn during patient encounters.   7:09 PM I spoke with Dr. Cleary, hospitalist.    9:09 PM called back to room to reevaluate the patient  to now is a bit more confused and tachycardic.  He is having rigors on exam, recheck of temperature revealed it is now 103.  Ordered an additional dose of Tylenol for him.  He is requiring a bit more oxygen at this time he is on an oxy mask however he seems to be shallow breathing with the rigors so we will try antipyretics first prior to any additional interventions.  Confirmed plan with ED RN.          Medical Decision Making    History:    Supplemental history from: EMS    External Record(s) reviewed: Documented in chart, if applicable.    Work Up:    Chart documentation includes differential considered and any EKGs or imaging independently interpreted by provider, where specified.    In additional to work up documented, I considered the following work up: Documented in chart, if applicable.    External consultation:    Discussion of management with another provider: Documented in chart, if applicable    Complicating factors:    Care impacted by chronic illness: N/A, Chronic Lung Disease and Hyperlipidemia    Care affected by social determinants of health: N/A    Disposition considerations: Admit.            MEDICATIONS GIVEN IN THE EMERGENCY:  Medications   0.9% sodium chloride BOLUS (1,000 mLs Intravenous $New Bag 4/12/23 1807)   piperacillin-tazobactam (ZOSYN) 3.375 g vial to attach to  mL bag (3.375 g Intravenous $New Bag 4/12/23 1808)       NEW PRESCRIPTIONS STARTED AT TODAY'S ER VISIT  New Prescriptions    No medications on file          =================================================================    HPI    Patient information was obtained from: Patient & EMS    Use of : N/A        Dominik Rojas is a 82 year old male with a pertinent history of lung mass undergoing radiation therapymemory problem, atrial fibrillation, CVA, COPD, alcohol dependence, hyperlipidemia, and hypertension who presents to this ED via EMS for evaluation of shortness of breath and generalized weakness.    Per  EMS, patient was 86% on room air. He was put on 3 L with oxygen in the low 90's. Patient presented with a fever of 102.8 F. Wife concerned for infection and notes that patient seems confused.    Per patient, he has had ~3 days of progressively worsening shortness of breath, cough, and weakness. He called EMS today because his symptoms have been progressively worsening. Denies chest pain and leg swelling.    REVIEW OF SYSTEMS   All systems reviewed and negative except as noted in HPI.    PAST MEDICAL HISTORY:  Past Medical History:   Diagnosis Date     AAA (abdominal aortic aneurysm) (H)     s/p stenting     Alcohol dependence in remission (H)      Alcohol use disorder, severe, in sustained remission, dependence (H) 8/16/2021     Anxiety      Atrial fibrillation with normal ventricular rate (H)      Benign prostatic hyperplasia with lower urinary tract symptoms      Bronchiectasis without complication (H)     2/27/2020     COPD (chronic obstructive pulmonary disease) (H)      CVA (cerebral vascular accident) (H) 2019     DDD (degenerative disc disease), lumbar      Depression      Depressive disorder      Diabetes (H)      Diastolic dysfunction 01/22/2021     DJD (degenerative joint disease) of knee     left     Essential hypertension      Glaucoma      Glaucoma      History of stroke without residual deficits      Hyperlipidemia      Hypertension      Hypothyroidism      Hypothyroidism      Kidney stones      Major depression      Memory problem      Nocturia      Obesity      Opioid use disorder, severe, dependence (H) 8/16/2021     Overactive bladder 02/25/2021     Primary osteoarthritis of left knee      Psoriasis      Psoriasis      Seborrheic keratoses      Somnolence, daytime      Stenosis of right carotid artery     history of TIA's       PAST SURGICAL HISTORY:  Past Surgical History:   Procedure Laterality Date     ABDOMEN SURGERY       ABDOMINAL AORTIC ANEURYSM REPAIR  2013    stent     CAROTID  ENDARTERECTOMY Right 9/9/2019    Procedure: RIGHT CAROTID ENDARTERECTOMY;  Surgeon: Miguel Muller MD;  Location: [facility] OR;  Service: General     CIRCUMCISION       CYSTOSCOPY       CYSTOSCOPY PROSTATE W/ LASER N/A 6/12/2018    Procedure:  TRANSURETHRAL RESECTION OF THE PROSTATE WITH QUANTA LASER;  Surgeon: Eze Levi MD;  Location: [facility];  Service:      CYSTOSCOPY PROSTATE W/ LASER N/A 2/18/2020    Procedure: CYSTOSCOPY WITH TRANSURETHRAL INCISION OF THE PROSTATE WITH QUANTA LASER;  Surgeon: Eze Levi MD;  Location: [facility] OR;  Service: Urology     CYSTOSCOPY W/ LITHOLAPAXY / EHL N/A 6/12/2018    Procedure: CYSTOSCOPY AND LITHOLAPAXY;  Surgeon: Eze Levi MD;  Location: [facility];  Service:      IR ABDOMINAL AORTAGRAM  5/19/2020     IR ABDOMINAL AORTIC ANEURYSM ENDOGRAFT  5/19/2020     IR ABDOMINAL AORTOGRAM  5/19/2020     IR ABDOMINAL ENDOVASCULAR STENT GRAFT  5/19/2020     LITHOTRIPSY       PERCUTANEOUS NEPHROSTOLITHOTOMY Right 03/10/2020     ZZC HUANG W/O FACETEC FORAMOT/DSKC 1/2 VRT SEG, LUMBAR Bilateral 3/3/2021    Procedure: Bilateral lumbar 2 - Lumbar 4 decompressive lumbar laminectomy with medial facetectomies;  Surgeon: Renée King MD;  Location: [facility];  Service: Spine           CURRENT MEDICATIONS:    No current facility-administered medications for this encounter.     Current Outpatient Medications   Medication     acetaminophen (TYLENOL) 500 MG tablet     amLODIPine (NORVASC) 2.5 MG tablet     aspirin (ASA) 81 MG chewable tablet     Cholecalciferol (VITAMIN D3) 50 MCG (2000 UT) CAPS     DULoxetine (CYMBALTA) 30 MG capsule     furosemide (LASIX) 20 MG tablet     latanoprost (XALATAN) 0.005 % ophthalmic solution     levothyroxine (SYNTHROID/LEVOTHROID) 88 MCG tablet     losartan (COZAAR) 25 MG tablet     metoprolol succinate ER (TOPROL-XL) 25 MG 24 hr tablet     multivitamin w/minerals (THERA-VIT-M) tablet      OLANZapine (ZYPREXA) 5 MG tablet     pantoprazole (PROTONIX) 40 MG EC tablet     rosuvastatin (CRESTOR) 10 MG tablet     albuterol (PROAIR HFA/PROVENTIL HFA/VENTOLIN HFA) 108 (90 Base) MCG/ACT inhaler     albuterol (PROVENTIL) (2.5 MG/3ML) 0.083% neb solution     calcitonin, salmon, (MIACALCIN) 200 UNIT/ACT nasal spray     diclofenac (VOLTAREN) 1 % topical gel     lidocaine (XYLOCAINE) 5 % external ointment     NIFEdipine ER (ADALAT CC) 30 MG 24 hr tablet     solifenacin (VESICARE) 10 MG tablet         ALLERGIES:  Allergies   Allergen Reactions     Diclofenac      Other reaction(s): GI Upset     Loratadine      Other reaction(s): Urinary Retention     Sertraline Unknown     Other reaction(s): ineffective       FAMILY HISTORY:  Family History   Problem Relation Age of Onset     Emphysema Mother      No Known Problems Father      Cirrhosis Father      No Known Problems Sister      No Known Problems Brother      No Known Problems Maternal Aunt      No Known Problems Maternal Uncle      No Known Problems Paternal Aunt      No Known Problems Paternal Uncle      No Known Problems Maternal Grandmother      No Known Problems Maternal Grandfather      No Known Problems Paternal Grandmother      No Known Problems Paternal Grandfather      No Known Problems Other        SOCIAL HISTORY:   Social History     Socioeconomic History     Marital status:    Tobacco Use     Smoking status: Former     Packs/day: 0.50     Years: 35.00     Pack years: 17.50     Types: Cigarettes     Quit date: 1990     Years since quittin.2     Smokeless tobacco: Never     Tobacco comments:     quit    Substance and Sexual Activity     Alcohol use: No     Comment: Alcoholic Drinks/day: quit      Drug use: No     Sexual activity: Yes     Partners: Female     Birth control/protection: Post-menopausal   Other Topics Concern     Parent/sibling w/ CABG, MI or angioplasty before 65F 55M? No       VITALS:  BP 94/59   Pulse 113   Temp  "98.3  F (36.8  C) (Oral)   Resp 15   Ht 1.702 m (5' 7\")   Wt 95.3 kg (210 lb)   SpO2 92%   BMI 32.89 kg/m      PHYSICAL EXAM    Constitutional: Chronically ill-appearing elderly male patient, sitting up in bed, no acute distress  HENT: Normocephalic, Atraumatic. Neck Supple.  Eyes: EOMI, Conjunctiva normal.  Respiratory: Minimally tachypneic.  Able to speak full sentences on nasal cannula oxygen.  Mild end expiratory wheezing in all lung fields.  No other abnormal lung sounds appreciated.  Cardiovascular: Normal heart rate, Regular rhythm. No peripheral edema.  Abdomen: Soft, nontender  Musculoskeletal: Good range of motion in all major joints. No major deformities noted.  Integument: Warm, Dry.  Neurologic: Alert & awake, Normal motor function, Normal sensory function, No focal deficits noted.   Psychiatric: Cooperative. Affect appropriate.     LAB:  All pertinent labs reviewed and interpreted.  Labs Ordered and Resulted from Time of ED Arrival to Time of ED Departure   BASIC METABOLIC PANEL - Abnormal       Result Value    Sodium 137      Potassium 4.1      Chloride 102      Carbon Dioxide (CO2) 25      Anion Gap 10      Urea Nitrogen 27.4 (*)     Creatinine 1.11      Calcium 9.1      Glucose 116 (*)     GFR Estimate 67     PROCALCITONIN - Abnormal    Procalcitonin 0.80 (*)    CBC WITH PLATELETS AND DIFFERENTIAL - Abnormal    WBC Count 13.8 (*)     RBC Count 4.59      Hemoglobin 14.1      Hematocrit 42.1      MCV 92      MCH 30.7      MCHC 33.5      RDW 13.2      Platelet Count 186      % Neutrophils 90      % Lymphocytes 4      % Monocytes 6      % Eosinophils 0      % Basophils 0      % Immature Granulocytes 0      NRBCs per 100 WBC 0      Absolute Neutrophils 12.3 (*)     Absolute Lymphocytes 0.6 (*)     Absolute Monocytes 0.9      Absolute Eosinophils 0.1      Absolute Basophils 0.0      Absolute Immature Granulocytes 0.0      Absolute NRBCs 0.0     INFLUENZA A/B, RSV, & SARS-COV2 PCR - Normal    " Influenza A PCR Negative      Influenza B PCR Negative      RSV PCR Negative      SARS CoV2 PCR Negative     LACTIC ACID WHOLE BLOOD - Normal    Lactic Acid 1.5     BLOOD CULTURE   BLOOD CULTURE       RADIOLOGY:  Reviewed all pertinent imaging. Please see official radiology report.  XR Chest Port 1 View   Final Result   IMPRESSION: Very shallow inspiration with some atelectasis in the lung bases. Heart size within normal limits and stable. No significant new findings.      CT Chest Pulmonary Embolism w Contrast    (Results Pending)       EKG:    Performed at: 1709    Impression: sinus tachycardia, low voltage QRS    Rate: 117  Rhythm: sinus tachycardia  Axis: 14  LA Interval: 118  QRS Interval: 82  QTc Interval: 502  ST Changes: none  Comparison: When compared with August 22, 2021 EKG, no significant change    I have independently reviewed and interpreted the EKG(s) documented above.          I, Tea Lara, am serving as a scribe to document services personally performed by Dr. Mehdi Carr, based on my observation and the provider's statements to me. I, Mehdi Carr MD attest that Tea Lara is acting in a scribe capacity, has observed my performance of the services and has documented them in accordance with my direction.    Mehdi Carr M.D.  Emergency Medicine  [facility] EMERGENCY DEPARTMENT  [facility] [facility] [id number] [phone]  Dept: [phone]     Mehdi Carr MD  04/12/23 2023       Mehdi Carr MD  04/12/23 2110

## 2023-04-13 ENCOUNTER — TELEPHONE (OUTPATIENT)
Dept: RADIATION ONCOLOGY | Facility: HOSPITAL | Age: 83
End: 2023-04-13

## 2023-04-13 ENCOUNTER — APPOINTMENT (OUTPATIENT)
Dept: CT IMAGING | Facility: HOSPITAL | Age: 83
DRG: 193 | End: 2023-04-13
Attending: INTERNAL MEDICINE
Payer: COMMERCIAL

## 2023-04-13 PROBLEM — G93.41 METABOLIC ENCEPHALOPATHY: Status: ACTIVE | Noted: 2021-03-07

## 2023-04-13 LAB
ALBUMIN UR-MCNC: NEGATIVE MG/DL
ANION GAP SERPL CALCULATED.3IONS-SCNC: 9 MMOL/L (ref 7–15)
APPEARANCE UR: CLEAR
BACTERIA #/AREA URNS HPF: ABNORMAL /HPF
BASE EXCESS BLDV CALC-SCNC: 1.4 MMOL/L
BASOPHILS # BLD AUTO: 0 10E3/UL (ref 0–0.2)
BASOPHILS NFR BLD AUTO: 0 %
BILIRUB UR QL STRIP: NEGATIVE
BUN SERPL-MCNC: 25.5 MG/DL (ref 8–23)
CALCIUM SERPL-MCNC: 8.5 MG/DL (ref 8.8–10.2)
CHLORIDE SERPL-SCNC: 102 MMOL/L (ref 98–107)
COLOR UR AUTO: ABNORMAL
CREAT SERPL-MCNC: 1.19 MG/DL (ref 0.67–1.17)
DEPRECATED HCO3 PLAS-SCNC: 25 MMOL/L (ref 22–29)
EOSINOPHIL # BLD AUTO: 0 10E3/UL (ref 0–0.7)
EOSINOPHIL NFR BLD AUTO: 0 %
ERYTHROCYTE [DISTWIDTH] IN BLOOD BY AUTOMATED COUNT: 13.6 % (ref 10–15)
GFR SERPL CREATININE-BSD FRML MDRD: 61 ML/MIN/1.73M2
GLUCOSE SERPL-MCNC: 131 MG/DL (ref 70–99)
GLUCOSE UR STRIP-MCNC: NEGATIVE MG/DL
HCO3 BLDV-SCNC: 26 MMOL/L (ref 24–30)
HCT VFR BLD AUTO: 39.9 % (ref 40–53)
HGB BLD-MCNC: 12.9 G/DL (ref 13.3–17.7)
HGB UR QL STRIP: ABNORMAL
IMM GRANULOCYTES # BLD: 0 10E3/UL
IMM GRANULOCYTES NFR BLD: 0 %
KETONES UR STRIP-MCNC: NEGATIVE MG/DL
LACTATE SERPL-SCNC: 1.1 MMOL/L (ref 0.7–2)
LEUKOCYTE ESTERASE UR QL STRIP: ABNORMAL
LYMPHOCYTES # BLD AUTO: 1.4 10E3/UL (ref 0.8–5.3)
LYMPHOCYTES NFR BLD AUTO: 14 %
MCH RBC QN AUTO: 30.1 PG (ref 26.5–33)
MCHC RBC AUTO-ENTMCNC: 32.3 G/DL (ref 31.5–36.5)
MCV RBC AUTO: 93 FL (ref 78–100)
MONOCYTES # BLD AUTO: 0.5 10E3/UL (ref 0–1.3)
MONOCYTES NFR BLD AUTO: 5 %
MUCOUS THREADS #/AREA URNS LPF: PRESENT /LPF
NEUTROPHILS # BLD AUTO: 8.5 10E3/UL (ref 1.6–8.3)
NEUTROPHILS NFR BLD AUTO: 81 %
NITRATE UR QL: NEGATIVE
NRBC # BLD AUTO: 0 10E3/UL
NRBC BLD AUTO-RTO: 0 /100
NT-PROBNP SERPL-MCNC: 154 PG/ML (ref 0–1800)
OXYHGB MFR BLDV: 96.5 % (ref 70–75)
PCO2 BLDV: 40 MM HG (ref 35–50)
PH BLDV: 7.42 [PH] (ref 7.35–7.45)
PH UR STRIP: 6 [PH] (ref 5–7)
PLATELET # BLD AUTO: 152 10E3/UL (ref 150–450)
PO2 BLDV: 89 MM HG (ref 25–47)
POTASSIUM SERPL-SCNC: 3.7 MMOL/L (ref 3.4–5.3)
PROCALCITONIN SERPL IA-MCNC: 3.56 NG/ML
RBC # BLD AUTO: 4.28 10E6/UL (ref 4.4–5.9)
RBC URINE: 89 /HPF
S PNEUM AG SPEC QL: NEGATIVE
SAO2 % BLDV: 97.8 % (ref 70–75)
SODIUM SERPL-SCNC: 136 MMOL/L (ref 136–145)
SP GR UR STRIP: 1.03 (ref 1–1.03)
UROBILINOGEN UR STRIP-MCNC: <2 MG/DL
WBC # BLD AUTO: 10.6 10E3/UL (ref 4–11)
WBC URINE: 76 /HPF

## 2023-04-13 PROCEDURE — 99232 SBSQ HOSP IP/OBS MODERATE 35: CPT | Performed by: INTERNAL MEDICINE

## 2023-04-13 PROCEDURE — 120N000001 HC R&B MED SURG/OB

## 2023-04-13 PROCEDURE — 87899 AGENT NOS ASSAY W/OPTIC: CPT | Performed by: INTERNAL MEDICINE

## 2023-04-13 PROCEDURE — 250N000013 HC RX MED GY IP 250 OP 250 PS 637: Performed by: INTERNAL MEDICINE

## 2023-04-13 PROCEDURE — 83605 ASSAY OF LACTIC ACID: CPT | Performed by: INTERNAL MEDICINE

## 2023-04-13 PROCEDURE — 85018 HEMOGLOBIN: CPT | Performed by: INTERNAL MEDICINE

## 2023-04-13 PROCEDURE — 250N000011 HC RX IP 250 OP 636: Performed by: INTERNAL MEDICINE

## 2023-04-13 PROCEDURE — 81001 URINALYSIS AUTO W/SCOPE: CPT | Performed by: INTERNAL MEDICINE

## 2023-04-13 PROCEDURE — 80048 BASIC METABOLIC PNL TOTAL CA: CPT | Performed by: INTERNAL MEDICINE

## 2023-04-13 PROCEDURE — 93005 ELECTROCARDIOGRAM TRACING: CPT | Performed by: INTERNAL MEDICINE

## 2023-04-13 PROCEDURE — 87086 URINE CULTURE/COLONY COUNT: CPT | Performed by: INTERNAL MEDICINE

## 2023-04-13 PROCEDURE — 36415 COLL VENOUS BLD VENIPUNCTURE: CPT | Performed by: INTERNAL MEDICINE

## 2023-04-13 PROCEDURE — 74176 CT ABD & PELVIS W/O CONTRAST: CPT

## 2023-04-13 RX ADMIN — OLANZAPINE 7.5 MG: 5 TABLET, FILM COATED ORAL at 21:40

## 2023-04-13 RX ADMIN — QUETIAPINE FUMARATE 25 MG: 25 TABLET ORAL at 00:30

## 2023-04-13 RX ADMIN — AMLODIPINE BESYLATE 2.5 MG: 2.5 TABLET ORAL at 08:06

## 2023-04-13 RX ADMIN — ROSUVASTATIN CALCIUM 10 MG: 10 TABLET, FILM COATED ORAL at 00:30

## 2023-04-13 RX ADMIN — ASPIRIN 81 MG 81 MG: 81 TABLET ORAL at 08:06

## 2023-04-13 RX ADMIN — ENOXAPARIN SODIUM 40 MG: 40 INJECTION SUBCUTANEOUS at 22:39

## 2023-04-13 RX ADMIN — LOSARTAN POTASSIUM 25 MG: 25 TABLET, FILM COATED ORAL at 08:05

## 2023-04-13 RX ADMIN — Medication 1 TABLET: at 08:05

## 2023-04-13 RX ADMIN — ROSUVASTATIN CALCIUM 10 MG: 10 TABLET, FILM COATED ORAL at 21:40

## 2023-04-13 RX ADMIN — DOCUSATE SODIUM 100 MG: 100 CAPSULE, LIQUID FILLED ORAL at 00:30

## 2023-04-13 RX ADMIN — PIPERACILLIN AND TAZOBACTAM 3.38 G: 3; .375 INJECTION, POWDER, LYOPHILIZED, FOR SOLUTION INTRAVENOUS at 02:21

## 2023-04-13 RX ADMIN — OLANZAPINE 7.5 MG: 5 TABLET, FILM COATED ORAL at 00:30

## 2023-04-13 RX ADMIN — LEVOTHYROXINE SODIUM 88 MCG: 88 TABLET ORAL at 08:13

## 2023-04-13 RX ADMIN — PIPERACILLIN AND TAZOBACTAM 3.38 G: 3; .375 INJECTION, POWDER, LYOPHILIZED, FOR SOLUTION INTRAVENOUS at 16:56

## 2023-04-13 RX ADMIN — QUETIAPINE FUMARATE 25 MG: 25 TABLET ORAL at 21:40

## 2023-04-13 RX ADMIN — DULOXETINE HYDROCHLORIDE 60 MG: 60 CAPSULE, DELAYED RELEASE PELLETS ORAL at 08:13

## 2023-04-13 RX ADMIN — ENOXAPARIN SODIUM 40 MG: 40 INJECTION SUBCUTANEOUS at 00:31

## 2023-04-13 RX ADMIN — DOXYCYCLINE 100 MG: 100 INJECTION, POWDER, LYOPHILIZED, FOR SOLUTION INTRAVENOUS at 22:37

## 2023-04-13 RX ADMIN — TOLTERODINE 4 MG: 2 CAPSULE, EXTENDED RELEASE ORAL at 08:13

## 2023-04-13 RX ADMIN — DOXYCYCLINE 100 MG: 100 INJECTION, POWDER, LYOPHILIZED, FOR SOLUTION INTRAVENOUS at 00:30

## 2023-04-13 RX ADMIN — PIPERACILLIN AND TAZOBACTAM 3.38 G: 3; .375 INJECTION, POWDER, LYOPHILIZED, FOR SOLUTION INTRAVENOUS at 08:13

## 2023-04-13 RX ADMIN — DOXYCYCLINE 100 MG: 100 INJECTION, POWDER, LYOPHILIZED, FOR SOLUTION INTRAVENOUS at 12:48

## 2023-04-13 RX ADMIN — DOCUSATE SODIUM 100 MG: 100 CAPSULE, LIQUID FILLED ORAL at 08:05

## 2023-04-13 RX ADMIN — PANTOPRAZOLE SODIUM 40 MG: 40 TABLET, DELAYED RELEASE ORAL at 08:05

## 2023-04-13 RX ADMIN — PIPERACILLIN AND TAZOBACTAM 3.38 G: 3; .375 INJECTION, POWDER, LYOPHILIZED, FOR SOLUTION INTRAVENOUS at 23:50

## 2023-04-13 RX ADMIN — QUETIAPINE FUMARATE 25 MG: 25 TABLET ORAL at 08:05

## 2023-04-13 RX ADMIN — METOPROLOL SUCCINATE 12.5 MG: 25 TABLET, EXTENDED RELEASE ORAL at 08:13

## 2023-04-13 ASSESSMENT — ACTIVITIES OF DAILY LIVING (ADL)
ADLS_ACUITY_SCORE: 35
ADLS_ACUITY_SCORE: 39
ADLS_ACUITY_SCORE: 39
DEPENDENT_IADLS:: TRANSPORTATION
ADLS_ACUITY_SCORE: 39
ADLS_ACUITY_SCORE: 35
ADLS_ACUITY_SCORE: 39
ADLS_ACUITY_SCORE: 42
ADLS_ACUITY_SCORE: 42

## 2023-04-13 NOTE — CONSULTS
Care Management Initial Consult    General Information  Assessment completed with: Family, wife  Type of CM/SW Visit: Initial Assessment    Primary Care Provider verified and updated as needed: Yes   Readmission within the last 30 days:        Reason for Consult: discharge planning  Advance Care Planning:            Communication Assessment  Patient's communication style: spoken language (English or Bilingual)             Cognitive  Cognitive/Neuro/Behavioral: .WDL except, all  Level of Consciousness: alert  Arousal Level: opens eyes spontaneously  Orientation: disoriented to, time  Mood/Behavior: calm, cooperative, behavior appropriate to situation  Best Language: 0 - No aphasia  Speech: clear    Living Environment:   People in home: spouse  Jovana  Current living Arrangements: house      Able to return to prior arrangements: yes       Family/Social Support:  Care provided by: self  Provides care for: no one  Marital Status:   Wife, Children  Jovana       Description of Support System: Supportive, Involved         Current Resources:   Patient receiving home care services: No     Community Resources: None  Equipment currently used at home:    Supplies currently used at home:      Employment/Financial:  Employment Status: retired        Financial Concerns:             Lifestyle & Psychosocial Needs:  Social Determinants of Health     Tobacco Use: Medium Risk (4/12/2023)    Patient History      Smoking Tobacco Use: Former      Smokeless Tobacco Use: Never      Passive Exposure: Not on file   Alcohol Use: Not on file   Financial Resource Strain: Not on file   Food Insecurity: Not on file   Transportation Needs: Not on file   Physical Activity: Not on file   Stress: Not on file   Social Connections: Not on file   Intimate Partner Violence: Not on file   Depression: Not on file   Housing Stability: Not on file       Functional Status:  Prior to admission patient needed assistance:   Dependent ADLs::  Ambulation-cane  Dependent IADLs:: Transportation       Mental Health Status:          Chemical Dependency Status:                Values/Beliefs:  Spiritual, Cultural Beliefs, Caodaism Practices, Values that affect care:                 Additional Information:  Spoke to wife via telephone to complete assessment. Says they live together in a house and patient is mostly independent at baseline. Had a stroke in 2019 so is a little shaky per wife. Wife does not let patient drive. Says he is very stubborn. Uses a cane for ambulation and does not have any home care services. 2 daughters live near by. Wife still works 10-12 hours per week at [employer] in [place]. Wife notes she is planning on going to work today but then is off for a few days . Does not have a cell phone but providers can leave a message with updates on the home phone, or can call [employer] in case of emergency. Wife says patient will likely refuse any services offered or recommended upon discharge     CM will follow     Alia Barry RN

## 2023-04-13 NOTE — TELEPHONE ENCOUNTER
Wife calling to inform us that pt is inpatient for infection and pt concerned because he has RT tx tomorrow. Informed wife that we are appreciative of call and will inform Dr. Bales and look into chart. Informed we may be able to tx inpatient but maybe not, it will depend on several things and may not know until tomorrow morning. Informed that if we don't treat we can tx after discharge. Wife appreciative of conversation.

## 2023-04-13 NOTE — ED NOTES
Non-violent restraints applied. Patient non-redirectable at all. Pulling lines, climbing out of bed. Cannot redirect. Awaiting provider.

## 2023-04-13 NOTE — PROGRESS NOTES
Daily Progress Note        CODE STATUS:  Full Code    04/13/23  Assessment/Plan:  82 year old male with multiple medical problems including right lower lobe lung cancer currently receiving radiation presents with progressive shortness of breath and fever.     Fever  Possible pneumonia of right lower lobe related to cancer and XRT:   -- No clear infiltrate seen on chest CT. Blood cultures ordered, sputum Gram stain ordered.  Procalcitonin elevated, white count elevated.   -- Started on IV Zosyn and IV Doxy on admission. Ok to continue these antibiotics until the source of infection is clear  -- Given no clear infiltrates on the CT chest, the fever is likely from UTI    UTI:  -- Abnormal U/A noted. Culture pending  -- Cont with IV antibiotics    Acute metabolic encephalopathy:   -- Due to fever, infection, previous hx CVA. Scheduled seroquel, prn haldol  -- Needed 1:1 and soft wrist restraints last night. Off restraints since 2am, and off 1:1 since 6 am    Acute respiratory failure with hypoxia (H): oxygen, nebs, abx    Lung cancer, RLL  -- Clinical stage T1N0M0 per chart review  -- Not a good candidate for surgery given age and medical status  -- Follows up with Dr Sun from pulm, and Dr Bales from radiation oncology.   -- Plan per Dr Bales was to give him radiation therapy to a total dose of 5000 cGy in 5 treatments using SBRT.  -- Consult radiation oncology per family's request    Active Problems:    Benign essential hypertension    History of cerebrovascular accident    Hypothyroidism    Ocular hypertension, bilateral    QT prolongation    COPD (chronic obstructive pulmonary disease) (H)    Generalized anxiety disorder    Malignant neoplasm of lower lobe of right lung (H)           Diet: Combination Diet Regular Diet Adult  DVT Prophylaxis: Enoxaparin (Lovenox) SQ  Cabral Catheter: Not present  Lines: None     Cardiac Monitoring: None  Code Status: Full Code      Disposition; 1-2 days  Barrier to discharge; IV  antibiotics, U/C report      Subjective:  Interval History: Patient seen and examined this morning. Notes, labs, imaging reports personally reviewed. Patient new to me today. Patient doing better now. Apparently was confused needing 1:1 and wrist restraints overnight. Had a fever of 103F last night.    Review of Systems:   As mentioned in subjective.    Patient Active Problem List   Diagnosis     AAA (abdominal aortic aneurysm) (H)     Abnormal PSA     Accelerated hypertension     Colon cancer screening     Routine general medical examination at a health care facility     Special screening for malignant neoplasm of prostate     Acute ischemic right ANDREI stroke (H)     Allergic rhinitis     Asymptomatic stenosis of right carotid artery     Benign essential hypertension     Benign prostatic hyperplasia     Retention of urine     Bronchiectasis without acute exacerbation (H)     Calculus of kidney     Aneurysm, carotid artery, internal     Carotid aneurysm, left (H)     Cholelithiasis     Dehydration     Depression     Depression, major, in remission (H)     Diverticulosis     DJD (degenerative joint disease) of knee     DJD (degenerative joint disease)     Endoleak post (EVAR) endovascular aneurysm repair, initial encounter     ETOH abuse     Hematuria     Hepatic steatosis     History of cerebrovascular accident     HTN (hypertension)     Hydrocele     Hypercholesteremia     Hyperglycemia     Hypothyroidism     Lactic acidosis     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)     Obesity     Obesity, Class II, BMI 35-39.9, with comorbidity     Ocular hypertension, bilateral     Penile pain     Psoriasis     Psoriatic arthropathy (H)     QT prolongation     Recurrent UTI     Redundant prepuce and phimosis     Stroke-like symptoms     Vomiting and diarrhea     Abnormal urinalysis     Anxiety     Atrial fibrillation (H)     COPD (chronic obstructive pulmonary disease) (H)     Daytime somnolence     Degeneration of  lumbar intervertebral disc     Generalized anxiety disorder     History of carotid endarterectomy     History of endovascular stent graft for abdominal aortic aneurysm (AAA)     Hyperammonemia (H)     Hyperbilirubinemia     Incomplete cauda equina syndrome (H)     Intractable low back pain     Lower urinary tract symptoms due to benign prostatic hyperplasia     Memory problem     Metabolic encephalopathy     Normocytic anemia     Other seborrheic keratosis     Overactive bladder     Respiratory failure, post-operative (H)     Spinal stenosis, lumbar region with neurogenic claudication     Vitamin D deficiency     Shortness of breath     Hypoxia     Bilateral lower extremity edema     Acute respiratory failure with hypoxia (H)     Dyspnea, unspecified type     Bronchomalacia     Chronic systolic congestive heart failure (H)     Opioid use disorder, severe, dependence (H)     Alcohol use disorder, severe, in sustained remission, dependence (H)     Acute pain of left knee     Opioid use disorder     Altered mental status     Macular drusen, bilateral     Age-related cataract of both eyes     Malignant neoplasm of lower lobe of right lung (H)     Pneumonia of right lower lobe due to infectious organism       Scheduled Meds:    amLODIPine  2.5 mg Oral Daily     aspirin  81 mg Oral Daily     docusate sodium  100 mg Oral BID     doxycycline  100 mg Intravenous Q12H     DULoxetine  60 mg Oral QAM     enoxaparin ANTICOAGULANT  40 mg Subcutaneous Q24H     latanoprost  1 drop Both Eyes At Bedtime     levothyroxine  88 mcg Oral QAM AC     losartan  25 mg Oral Daily     metoprolol succinate ER  12.5 mg Oral Daily     multivitamin w/minerals  1 tablet Oral Daily     OLANZapine  7.5 mg Oral At Bedtime     pantoprazole  40 mg Oral Daily     piperacillin-tazobactam  3.375 g Intravenous Q8H     QUEtiapine  25 mg Oral BID     rosuvastatin  10 mg Oral At Bedtime     sodium chloride (PF)  3 mL Intracatheter Q8H     tolterodine ER  4 mg  Oral Daily     Continuous Infusions:  PRN Meds:.acetaminophen **OR** acetaminophen, bisacodyl, haloperidol lactate, levalbuterol, lidocaine 4%, lidocaine (buffered or not buffered), melatonin, ondansetron **OR** ondansetron, senna-docusate **OR** senna-docusate, sodium chloride (PF)    Objective:  Vital signs in last 24 hours:  Temp:  [98.1  F (36.7  C)-103  F (39.4  C)] 98.1  F (36.7  C)  Pulse:  [] 74  Resp:  [15-28] 16  BP: ()/(59-67) 110/67  SpO2:  [91 %-96 %] 93 %        Intake/Output Summary (Last 24 hours) at 4/13/2023 1510  Last data filed at 4/13/2023 0645  Gross per 24 hour   Intake 1018 ml   Output 500 ml   Net 518 ml       Physical Exam:    General: Not in obvious distress.  HEENT: NC, AT   Chest: Clear to auscultation bilaterally  Heart: S1S2 normal, regular. No M/R/G  Abdomen: Soft. NT, ND. Bowel sounds- active.  Extremities: No legs swelling  Neuro: Awake, grossly non-focal      Lab Results:(I have personally reviewed the results)    Recent Results (from the past 24 hour(s))   Basic metabolic panel    Collection Time: 04/12/23  5:38 PM   Result Value Ref Range    Sodium 137 136 - 145 mmol/L    Potassium 4.1 3.4 - 5.3 mmol/L    Chloride 102 98 - 107 mmol/L    Carbon Dioxide (CO2) 25 22 - 29 mmol/L    Anion Gap 10 7 - 15 mmol/L    Urea Nitrogen 27.4 (H) 8.0 - 23.0 mg/dL    Creatinine 1.11 0.67 - 1.17 mg/dL    Calcium 9.1 8.8 - 10.2 mg/dL    Glucose 116 (H) 70 - 99 mg/dL    GFR Estimate 67 >60 mL/min/1.73m2   Procalcitonin    Collection Time: 04/12/23  5:38 PM   Result Value Ref Range    Procalcitonin 0.80 (H) <0.05 ng/mL   Symptomatic Influenza A/B, RSV, & SARS-CoV2 PCR (COVID-19) Nose    Collection Time: 04/12/23  5:38 PM    Specimen: Nose; Swab   Result Value Ref Range    Influenza A PCR Negative Negative    Influenza B PCR Negative Negative    RSV PCR Negative Negative    SARS CoV2 PCR Negative Negative   Lactic acid whole blood    Collection Time: 04/12/23  5:38 PM   Result Value Ref  Range    Lactic Acid 1.5 0.7 - 2.0 mmol/L   Blood Culture Peripheral Blood    Collection Time: 04/12/23  5:38 PM    Specimen: Peripheral Blood   Result Value Ref Range    Culture No growth after 12 hours    CBC with platelets and differential    Collection Time: 04/12/23  5:38 PM   Result Value Ref Range    WBC Count 13.8 (H) 4.0 - 11.0 10e3/uL    RBC Count 4.59 4.40 - 5.90 10e6/uL    Hemoglobin 14.1 13.3 - 17.7 g/dL    Hematocrit 42.1 40.0 - 53.0 %    MCV 92 78 - 100 fL    MCH 30.7 26.5 - 33.0 pg    MCHC 33.5 31.5 - 36.5 g/dL    RDW 13.2 10.0 - 15.0 %    Platelet Count 186 150 - 450 10e3/uL    % Neutrophils 90 %    % Lymphocytes 4 %    % Monocytes 6 %    % Eosinophils 0 %    % Basophils 0 %    % Immature Granulocytes 0 %    NRBCs per 100 WBC 0 <1 /100    Absolute Neutrophils 12.3 (H) 1.6 - 8.3 10e3/uL    Absolute Lymphocytes 0.6 (L) 0.8 - 5.3 10e3/uL    Absolute Monocytes 0.9 0.0 - 1.3 10e3/uL    Absolute Eosinophils 0.1 0.0 - 0.7 10e3/uL    Absolute Basophils 0.0 0.0 - 0.2 10e3/uL    Absolute Immature Granulocytes 0.0 <=0.4 10e3/uL    Absolute NRBCs 0.0 10e3/uL   Blood Culture Hand, Left    Collection Time: 04/12/23  5:52 PM    Specimen: Hand, Left; Blood   Result Value Ref Range    Culture No growth after 12 hours    Procalcitonin    Collection Time: 04/12/23 11:42 PM   Result Value Ref Range    Procalcitonin 3.56 (H) <0.05 ng/mL   Nt probnp inpatient    Collection Time: 04/12/23 11:42 PM   Result Value Ref Range    N terminal Pro BNP Inpatient 154 0 - 1,800 pg/mL   Blood gas venous    Collection Time: 04/12/23 11:43 PM   Result Value Ref Range    pH Venous 7.42 7.35 - 7.45    pCO2 Venous 40 35 - 50 mm Hg    pO2 Venous 89 (H) 25 - 47 mm Hg    Bicarbonate Venous 26 24 - 30 mmol/L    Base Excess/Deficit (+/-) 1.4   mmol/L    Oxyhemoglobin Venous 96.5 (H) 70.0 - 75.0 %    O2 Sat, Venous 97.8 (H) 70.0 - 75.0 %   Strep pneumo Agn Ur greater or equal to 13yrs or CSF any age    Collection Time: 04/13/23  6:36 AM     Specimen: Urine, Clean Catch   Result Value Ref Range    Streptococcus pneumoniae antigen Negative Negative   UA reflex to Microscopic and Culture    Collection Time: 04/13/23  6:36 AM    Specimen: Urine, Clean Catch   Result Value Ref Range    Color Urine Light Yellow Colorless, Straw, Light Yellow, Yellow    Appearance Urine Clear Clear    Glucose Urine Negative Negative mg/dL    Bilirubin Urine Negative Negative    Ketones Urine Negative Negative mg/dL    Specific Gravity Urine 1.033 (H) 1.001 - 1.030    Blood Urine >1.0 mg/dL (A) Negative    pH Urine 6.0 5.0 - 7.0    Protein Albumin Urine Negative Negative mg/dL    Urobilinogen Urine <2.0 <2.0 mg/dL    Nitrite Urine Negative Negative    Leukocyte Esterase Urine 500 Juan/uL (A) Negative    Bacteria Urine Few (A) None Seen /HPF    Mucus Urine Present (A) None Seen /LPF    RBC Urine 89 (H) <=2 /HPF    WBC Urine 76 (H) <=5 /HPF   Basic metabolic panel    Collection Time: 04/13/23  8:44 AM   Result Value Ref Range    Sodium 136 136 - 145 mmol/L    Potassium 3.7 3.4 - 5.3 mmol/L    Chloride 102 98 - 107 mmol/L    Carbon Dioxide (CO2) 25 22 - 29 mmol/L    Anion Gap 9 7 - 15 mmol/L    Urea Nitrogen 25.5 (H) 8.0 - 23.0 mg/dL    Creatinine 1.19 (H) 0.67 - 1.17 mg/dL    Calcium 8.5 (L) 8.8 - 10.2 mg/dL    Glucose 131 (H) 70 - 99 mg/dL    GFR Estimate 61 >60 mL/min/1.73m2   CBC with platelets and differential    Collection Time: 04/13/23  8:44 AM   Result Value Ref Range    WBC Count 10.6 4.0 - 11.0 10e3/uL    RBC Count 4.28 (L) 4.40 - 5.90 10e6/uL    Hemoglobin 12.9 (L) 13.3 - 17.7 g/dL    Hematocrit 39.9 (L) 40.0 - 53.0 %    MCV 93 78 - 100 fL    MCH 30.1 26.5 - 33.0 pg    MCHC 32.3 31.5 - 36.5 g/dL    RDW 13.6 10.0 - 15.0 %    Platelet Count 152 150 - 450 10e3/uL    % Neutrophils 81 %    % Lymphocytes 14 %    % Monocytes 5 %    % Eosinophils 0 %    % Basophils 0 %    % Immature Granulocytes 0 %    NRBCs per 100 WBC 0 <1 /100    Absolute Neutrophils 8.5 (H) 1.6 - 8.3  10e3/uL    Absolute Lymphocytes 1.4 0.8 - 5.3 10e3/uL    Absolute Monocytes 0.5 0.0 - 1.3 10e3/uL    Absolute Eosinophils 0.0 0.0 - 0.7 10e3/uL    Absolute Basophils 0.0 0.0 - 0.2 10e3/uL    Absolute Immature Granulocytes 0.0 <=0.4 10e3/uL    Absolute NRBCs 0.0 10e3/uL   Lactic Acid STAT    Collection Time: 04/13/23 10:28 AM   Result Value Ref Range    Lactic Acid 1.1 0.7 - 2.0 mmol/L       All laboratory and imaging data in the past 24 hours reviewed  Serum Glucose range:   Recent Labs   Lab 04/13/23  0844 04/12/23  1738   * 116*     ABG: No lab results found in last 7 days.  CBC:   Recent Labs   Lab 04/13/23  0844 04/12/23  1738   WBC 10.6 13.8*   HGB 12.9* 14.1   HCT 39.9* 42.1   MCV 93 92    186   NEUTROPHIL 81 90   LYMPH 14 4   MONOCYTE 5 6   EOSINOPHIL 0 0     Chemistry:   Recent Labs   Lab 04/13/23  0844 04/12/23  1738    137   POTASSIUM 3.7 4.1   CHLORIDE 102 102   CO2 25 25   BUN 25.5* 27.4*   CR 1.19* 1.11   GFRESTIMATED 61 67   CHELI 8.5* 9.1     Coags:  No results for input(s): INR, PROTIME, PTT in the last 168 hours.    Invalid input(s): APTT  Cardiac Markers:  No results for input(s): CKTOTAL, TROPONINI in the last 168 hours.       CT Abdomen Pelvis w/o Contrast    Result Date: 4/13/2023  EXAM: CT ABDOMEN PELVIS W/O CONTRAST LOCATION: [facility] DATE/TIME: 4/13/2023 1:34 PM CDT INDICATION: Fever, high white count. Source of infection unclear. Lung cancer on chemotherapy. COMPARISON: CTA chest 04/12/2023, CTA CAP 01/12/2023 and older studies. TECHNIQUE: CT scan of the abdomen and pelvis was performed without IV contrast. Multiplanar reformats were obtained. Dose reduction techniques were used. CONTRAST: None. FINDINGS: LOWER CHEST: Partial visualization of the pleural-based approximately 2 cm right lower lobe nodule with trace adjacent effusion. No new pulmonary disease. Extensive coronary artery calcifications. HEPATOBILIARY: Multiple gallstones filling the  gallbladder again noted. No surrounding inflammatory changes. No liver lesions. PANCREAS: Normal. SPLEEN: Normal. ADRENAL GLANDS: Normal. KIDNEYS/BLADDER: Contrast in both collecting systems and bladder from recent exam. No perinephric inflammatory changes. BOWEL: No inflammatory changes. Redundant colon with a moderate stool burden. Moderate distal colonic diverticulosis. Bowel is of normal caliber. LYMPH NODES: Normal. 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 Stable sac size. No inflammatory changes in the retroperitoneum. PELVIC ORGANS: Prostate is enlarged. MUSCULOSKELETAL: Postsurgical changes in both groins with clips and scarring in the subcutaneous fat. No fluid collections. Chronic vertebral body compression fracture of T12 with at least 50% loss of vertebral body height. Multilevel degenerative changes in the discs. Postsurgical changes from prior mid lumbar laminectomies. Old healed right posterior 11th rib fracture.     IMPRESSION: 1.  No evidence of occult infection in the abdomen or pelvis. 2.  Cholelithiasis with a gallbladder full of stones again noted. No CT findings of cholecystitis. 3.  Colonic diverticulosis. 4.  Known right lower lobe lung cancer is partially imaged. 5.  Other noncritical findings as noted above.     CT Chest Pulmonary Embolism w Contrast    Result Date: 4/12/2023  EXAM: CT CHEST PULMONARY EMBOLISM W CONTRAST LOCATION: [facility] DATE/TIME: 4/12/2023 9:35 PM CDT INDICATION: Cancer patient with hypoxia and cough, hypoventilation on x ray which otherwise looked clear COMPARISON: 01/12/2023, 09/30/2022 TECHNIQUE: CT chest pulmonary angiogram during arterial phase injection of IV contrast. Multiplanar reformats and MIP reconstructions were performed. Dose reduction techniques were used. CONTRAST: isovue 370 90ml FINDINGS: ANGIOGRAM CHEST: Pulmonary arteries  are normal caliber and negative for pulmonary emboli. Moderate calcified plaque in the thoracic aorta. No evidence of dissection. LUNGS AND PLEURA: A posterior right lower lobe mass measuring 2.6 x 1.5 cm corresponding with the patient's known neoplasm measures mildly increased in size from 01/12/2023 when it measured 2.0 x 1.1 cm. However, motion artifact on today's exam may be some mildly accentuating the size of the mass. Moderate interstitial opacities in both lungs likely due to scarring or fibrosis. Mild emphysematous changes in the upper lungs. MEDIASTINUM/AXILLAE: Normal heart size. No pericardial effusions. A few prominent right paratracheal nodes measuring up to 1.0 cm in diameter are unchanged and were not hypermetabolic on the PET exam of 09/30/2022. CORONARY ARTERY CALCIFICATION: Moderate. UPPER ABDOMEN: No adrenal nodules. Cholelithiasis. MUSCULOSKELETAL: Degenerative hypertrophic changes in the thoracic spine.     IMPRESSION: 1.  No evidence of acute pulmonary embolism or other acute pathology in the chest. 2.  A 2.6 x 1.5 cm mass in the posterior right lower lobe corresponding with the patient's known neoplasm measures mildly increased in size from 01/12/2023. However, motion artifact on today's exam may be mildly accentuating the size of the mass. 3.  Mild emphysematous changes in the upper lungs. 4.  Moderate interstitial opacities in both lungs likely due to scarring or fibrosis. This is not significant changed. No significant new infiltrates.    XR Chest Port 1 View    Result Date: 4/12/2023  EXAM: XR CHEST PORT 1 VIEW LOCATION: [facility] DATE/TIME: 4/12/2023 6:08 PM CDT INDICATION: Hypoxia, fever, weakness COMPARISON: 03/08/2023     IMPRESSION: Very shallow inspiration with some atelectasis in the lung bases. Heart size within normal limits and stable. No significant new findings.      Latest radiology report personally reviewed.    Note created using dragon voice  recognition software so sounds alike errors may have escaped editing.      04/13/2023   Girish Flores MD  Hospitalist, [facility]  Pager: [phone]

## 2023-04-13 NOTE — PHARMACY-ADMISSION MEDICATION HISTORY
Pharmacist Admission Medication History    Admission medication history is complete. The information provided in this note is only as accurate as the sources available at the time of the update.    Medication reconciliation/reorder completed by provider prior to medication history? No    Information Source(s): Family member and CareEverywhere/SureScripts via in-person    Pertinent Information: None    Changes made to PTA medication list:    Added: None    Deleted: Old levothyroxine doses. Marked as not taking albuterol hfa, albuterol nebs, calcitonin nasal spray, diclofenac gel, lidocaine ointment, nifedipine, solifenacin    Changed: Furosemide BID to every day     Medication Affordability:  Not including over the counter (OTC) medications, was there a time in the past 12 months when you did not take your medications as prescribed because of cost?: No    Allergies reviewed with patient and updates made in EHR: yes    Medication History Completed By: Hilary Richard [facility] 4/12/2023 7:34 PM    PTA Med List   Medication Sig Last Dose     acetaminophen (TYLENOL) 500 MG tablet Take 1,000 mg by mouth as needed      amLODIPine (NORVASC) 2.5 MG tablet Take 2.5 mg by mouth daily 4/12/2023     aspirin (ASA) 81 MG chewable tablet Take 81 mg by mouth daily 4/12/2023     Cholecalciferol (VITAMIN D3) 50 MCG (2000 UT) CAPS Take 1 tablet by mouth daily  4/12/2023     DULoxetine (CYMBALTA) 30 MG capsule Take 60 mg by mouth every morning 4/12/2023     furosemide (LASIX) 20 MG tablet Take 20 mg by mouth daily 4/12/2023     latanoprost (XALATAN) 0.005 % ophthalmic solution Place 1 drop into both eyes At Bedtime 4/11/2023     levothyroxine (SYNTHROID/LEVOTHROID) 88 MCG tablet TAKE 1 TABLET BY MOUTH EVERY DAY IN THE MORNING ON AN EMPTY STOMACH FOR 30 DAYS 4/12/2023     losartan (COZAAR) 25 MG tablet Take 1 tablet (25 mg) by mouth daily 4/12/2023     metoprolol succinate ER (TOPROL-XL) 25 MG 24 hr tablet Take 0.5 tablets (12.5 mg) by mouth  daily 4/12/2023     multivitamin w/minerals (THERA-VIT-M) tablet Take 1 tablet by mouth daily 4/12/2023     OLANZapine (ZYPREXA) 5 MG tablet Take 7.5 mg by mouth At Bedtime 4/11/2023     pantoprazole (PROTONIX) 40 MG EC tablet Take 1 tablet (40 mg) by mouth every morning (before breakfast) 4/12/2023     rosuvastatin (CRESTOR) 10 MG tablet Take 10 mg by mouth At Bedtime  4/11/2023

## 2023-04-13 NOTE — PLAN OF CARE
Pt has been up in the recliner chair majority of the day. Up with A1 and cane. Pt shuffles while walking, stating this is his baseline. Pt A&O, pleasant and calling appropriately. Pt weaned down to 1L O2. Occasionally tolerating RA, but then dips down into the high 80s, continuing to work on weaning.     NAYELY GARCIA RN

## 2023-04-14 LAB
ANION GAP SERPL CALCULATED.3IONS-SCNC: 7 MMOL/L (ref 7–15)
ATRIAL RATE - MUSE: 117 BPM
ATRIAL RATE - MUSE: 72 BPM
BACTERIA UR CULT: NORMAL
BUN SERPL-MCNC: 25.4 MG/DL (ref 8–23)
CALCIUM SERPL-MCNC: 8.5 MG/DL (ref 8.8–10.2)
CHLORIDE SERPL-SCNC: 107 MMOL/L (ref 98–107)
CREAT SERPL-MCNC: 1.16 MG/DL (ref 0.67–1.17)
DEPRECATED HCO3 PLAS-SCNC: 26 MMOL/L (ref 22–29)
DIASTOLIC BLOOD PRESSURE - MUSE: NORMAL MMHG
DIASTOLIC BLOOD PRESSURE - MUSE: NORMAL MMHG
ERYTHROCYTE [DISTWIDTH] IN BLOOD BY AUTOMATED COUNT: 13.7 % (ref 10–15)
GFR SERPL CREATININE-BSD FRML MDRD: 63 ML/MIN/1.73M2
GLUCOSE SERPL-MCNC: 83 MG/DL (ref 70–99)
HCT VFR BLD AUTO: 41.3 % (ref 40–53)
HGB BLD-MCNC: 13.2 G/DL (ref 13.3–17.7)
INTERPRETATION ECG - MUSE: NORMAL
INTERPRETATION ECG - MUSE: NORMAL
MCH RBC QN AUTO: 29.9 PG (ref 26.5–33)
MCHC RBC AUTO-ENTMCNC: 32 G/DL (ref 31.5–36.5)
MCV RBC AUTO: 94 FL (ref 78–100)
P AXIS - MUSE: 56 DEGREES
P AXIS - MUSE: NORMAL DEGREES
PLATELET # BLD AUTO: 148 10E3/UL (ref 150–450)
POTASSIUM SERPL-SCNC: 3.9 MMOL/L (ref 3.4–5.3)
PR INTERVAL - MUSE: 118 MS
PR INTERVAL - MUSE: 196 MS
QRS DURATION - MUSE: 82 MS
QRS DURATION - MUSE: 86 MS
QT - MUSE: 360 MS
QT - MUSE: 490 MS
QTC - MUSE: 502 MS
QTC - MUSE: 536 MS
R AXIS - MUSE: 14 DEGREES
R AXIS - MUSE: 48 DEGREES
RBC # BLD AUTO: 4.41 10E6/UL (ref 4.4–5.9)
SODIUM SERPL-SCNC: 140 MMOL/L (ref 136–145)
SYSTOLIC BLOOD PRESSURE - MUSE: NORMAL MMHG
SYSTOLIC BLOOD PRESSURE - MUSE: NORMAL MMHG
T AXIS - MUSE: 52 DEGREES
T AXIS - MUSE: 64 DEGREES
VENTRICULAR RATE- MUSE: 117 BPM
VENTRICULAR RATE- MUSE: 72 BPM
WBC # BLD AUTO: 7.1 10E3/UL (ref 4–11)

## 2023-04-14 PROCEDURE — 82310 ASSAY OF CALCIUM: CPT | Performed by: INTERNAL MEDICINE

## 2023-04-14 PROCEDURE — 250N000013 HC RX MED GY IP 250 OP 250 PS 637: Performed by: INTERNAL MEDICINE

## 2023-04-14 PROCEDURE — 99232 SBSQ HOSP IP/OBS MODERATE 35: CPT | Performed by: INTERNAL MEDICINE

## 2023-04-14 PROCEDURE — 120N000001 HC R&B MED SURG/OB

## 2023-04-14 PROCEDURE — 250N000011 HC RX IP 250 OP 636: Performed by: INTERNAL MEDICINE

## 2023-04-14 PROCEDURE — 85027 COMPLETE CBC AUTOMATED: CPT | Performed by: INTERNAL MEDICINE

## 2023-04-14 PROCEDURE — 36415 COLL VENOUS BLD VENIPUNCTURE: CPT | Performed by: INTERNAL MEDICINE

## 2023-04-14 RX ORDER — DOXYCYCLINE 100 MG/1
100 CAPSULE ORAL EVERY 12 HOURS SCHEDULED
Status: DISCONTINUED | OUTPATIENT
Start: 2023-04-14 | End: 2023-04-16 | Stop reason: HOSPADM

## 2023-04-14 RX ADMIN — LEVOTHYROXINE SODIUM 88 MCG: 88 TABLET ORAL at 09:08

## 2023-04-14 RX ADMIN — PANTOPRAZOLE SODIUM 40 MG: 40 TABLET, DELAYED RELEASE ORAL at 09:21

## 2023-04-14 RX ADMIN — ASPIRIN 81 MG 81 MG: 81 TABLET ORAL at 09:06

## 2023-04-14 RX ADMIN — ROSUVASTATIN CALCIUM 10 MG: 10 TABLET, FILM COATED ORAL at 21:21

## 2023-04-14 RX ADMIN — OLANZAPINE 7.5 MG: 5 TABLET, FILM COATED ORAL at 21:20

## 2023-04-14 RX ADMIN — QUETIAPINE FUMARATE 25 MG: 25 TABLET ORAL at 09:32

## 2023-04-14 RX ADMIN — DOCUSATE SODIUM 100 MG: 100 CAPSULE, LIQUID FILLED ORAL at 19:55

## 2023-04-14 RX ADMIN — PIPERACILLIN AND TAZOBACTAM 3.38 G: 3; .375 INJECTION, POWDER, LYOPHILIZED, FOR SOLUTION INTRAVENOUS at 15:58

## 2023-04-14 RX ADMIN — Medication 1 TABLET: at 09:06

## 2023-04-14 RX ADMIN — PIPERACILLIN AND TAZOBACTAM 3.38 G: 3; .375 INJECTION, POWDER, LYOPHILIZED, FOR SOLUTION INTRAVENOUS at 09:02

## 2023-04-14 RX ADMIN — DOXYCYCLINE HYCLATE 100 MG: 100 CAPSULE ORAL at 19:55

## 2023-04-14 RX ADMIN — DOXYCYCLINE HYCLATE 100 MG: 100 CAPSULE ORAL at 12:40

## 2023-04-14 RX ADMIN — TOLTERODINE 4 MG: 2 CAPSULE, EXTENDED RELEASE ORAL at 09:07

## 2023-04-14 RX ADMIN — QUETIAPINE FUMARATE 25 MG: 25 TABLET ORAL at 19:55

## 2023-04-14 RX ADMIN — DULOXETINE HYDROCHLORIDE 60 MG: 60 CAPSULE, DELAYED RELEASE PELLETS ORAL at 09:06

## 2023-04-14 RX ADMIN — ENOXAPARIN SODIUM 40 MG: 40 INJECTION SUBCUTANEOUS at 21:20

## 2023-04-14 ASSESSMENT — ACTIVITIES OF DAILY LIVING (ADL)
ADLS_ACUITY_SCORE: 39
CONCENTRATING,_REMEMBERING_OR_MAKING_DECISIONS_DIFFICULTY: NO
TRANSFERRING: 0-->ASSISTANCE NEEDED (DEVELOPMENTALLY APPROPRIATE)
BATHING: 1-->ASSISTANCE NEEDED
ADLS_ACUITY_SCORE: 44
TOILETING_ISSUES: YES
NUMBER_OF_TIMES_PATIENT_HAS_FALLEN_WITHIN_LAST_SIX_MONTHS: 1
FALL_HISTORY_WITHIN_LAST_SIX_MONTHS: YES
WEAR_GLASSES_OR_BLIND: NO
TOILETING: 0-->INDEPENDENT
ADLS_ACUITY_SCORE: 34
DRESSING/BATHING: BATHING DIFFICULTY, ASSISTANCE 1 PERSON
CHANGE_IN_FUNCTIONAL_STATUS_SINCE_ONSET_OF_CURRENT_ILLNESS/INJURY: YES
DIFFICULTY_EATING/SWALLOWING: NO
EQUIPMENT_CURRENTLY_USED_AT_HOME: CANE, STRAIGHT
HEARING_DIFFICULTY_OR_DEAF: NO
ADLS_ACUITY_SCORE: 39
TRANSFERRING: 1-->ASSISTANCE (EQUIPMENT/PERSON) NEEDED
TOILETING_ISSUES: YES
CONCENTRATING,_REMEMBERING_OR_MAKING_DECISIONS_DIFFICULTY: NO
ADLS_ACUITY_SCORE: 39
ADLS_ACUITY_SCORE: 30
ADLS_ACUITY_SCORE: 44
WALKING_OR_CLIMBING_STAIRS: TRANSFERRING DIFFICULTY, REQUIRES EQUIPMENT
ADLS_ACUITY_SCORE: 39
TOILETING_ASSISTANCE: TOILETING DIFFICULTY, ASSISTANCE 1 PERSON
DRESS: 1-->ASSISTANCE (EQUIPMENT/PERSON) NEEDED
DRESSING/BATHING_DIFFICULTY: YES
DRESS: 0-->ASSISTANCE NEEDED (DEVELOPMENTALLY APPROPRIATE)
WEAR_GLASSES_OR_BLIND: YES
ADLS_ACUITY_SCORE: 44
TOILETING: 1-->ASSISTANCE (EQUIPMENT/PERSON) NEEDED
ADLS_ACUITY_SCORE: 39
DRESSING/BATHING_DIFFICULTY: YES
DIFFICULTY_COMMUNICATING: NO
ADLS_ACUITY_SCORE: 39
WALKING_OR_CLIMBING_STAIRS: STAIR CLIMBING DIFFICULTY, REQUIRES EQUIPMENT
DIFFICULTY_EATING/SWALLOWING: NO
WALKING_OR_CLIMBING_STAIRS_DIFFICULTY: YES
ADLS_ACUITY_SCORE: 39
WALKING_OR_CLIMBING_STAIRS_DIFFICULTY: YES
DOING_ERRANDS_INDEPENDENTLY_DIFFICULTY: YES

## 2023-04-14 NOTE — PLAN OF CARE
Goal Outcome Evaluation:    Problem: Plan of Care - These are the overarching goals to be used throughout the patient stay.    Goal: Optimal Comfort and Wellbeing  Outcome: Progressing  Pt A&Ox4, intermittent forgetfulness while being able to be reoriented. Pt able to sleep uninterrupted most of shift w/ even unlabored respirations, pt denies chest pain and SOB, VSS, remained on 1L NC NOC. IV abx as ordered, pt remained safe and comfortable NOC.

## 2023-04-14 NOTE — PROGRESS NOTES
Care Management Follow Up    Length of Stay (days): 2    Expected Discharge Date: 04/14/2023 or 4/15/2023    Concerns to be Addressed:   IV zosyn;   Patient plan of care discussed at interdisciplinary rounds: Yes    Anticipated Discharge Disposition:  Home     Anticipated Discharge Services:  None anticipated  Anticipated Discharge DME:  To be determined.     Patient/family educated on Medicare website which has current facility and service quality ratings:  NA  Education Provided on the Discharge Plan:   Per team  Patient/Family in Agreement with the Plan:   Yes    Referrals Placed by CM/SW:   None  Private pay costs discussed: Not applicable     Additional Information:  Patient lives with his wife Jovana in [place]. He is largely independent with activities of daily living and has no in-home services. Anticipated goal is home with wife to provide transport     Alia Kennedy RN

## 2023-04-14 NOTE — PLAN OF CARE
"Goal Outcome Evaluation:  82 year old male admitted from er at 1300. Pt is alert and oriented x4-sl forgetful. He is admitted with PNA and possible UTI-awaiting cultures. Lungs sounds diminished. Occ nonproductive cough. Pt states he has frequency,\"when I have to go I have to go.\"He gets up with 1 ,using cane and transfer belt.                        "

## 2023-04-15 ENCOUNTER — APPOINTMENT (OUTPATIENT)
Dept: PHYSICAL THERAPY | Facility: HOSPITAL | Age: 83
DRG: 193 | End: 2023-04-15
Attending: INTERNAL MEDICINE
Payer: COMMERCIAL

## 2023-04-15 ENCOUNTER — APPOINTMENT (OUTPATIENT)
Dept: OCCUPATIONAL THERAPY | Facility: HOSPITAL | Age: 83
DRG: 193 | End: 2023-04-15
Attending: INTERNAL MEDICINE
Payer: COMMERCIAL

## 2023-04-15 LAB
ANION GAP SERPL CALCULATED.3IONS-SCNC: 8 MMOL/L (ref 7–15)
BUN SERPL-MCNC: 19.9 MG/DL (ref 8–23)
CALCIUM SERPL-MCNC: 8.6 MG/DL (ref 8.8–10.2)
CHLORIDE SERPL-SCNC: 108 MMOL/L (ref 98–107)
CREAT SERPL-MCNC: 1.06 MG/DL (ref 0.67–1.17)
DEPRECATED HCO3 PLAS-SCNC: 27 MMOL/L (ref 22–29)
GFR SERPL CREATININE-BSD FRML MDRD: 71 ML/MIN/1.73M2
GLUCOSE SERPL-MCNC: 95 MG/DL (ref 70–99)
HOLD SPECIMEN: NORMAL
PLATELET # BLD AUTO: 174 10E3/UL (ref 150–450)
POTASSIUM SERPL-SCNC: 3.9 MMOL/L (ref 3.4–5.3)
SODIUM SERPL-SCNC: 143 MMOL/L (ref 136–145)

## 2023-04-15 PROCEDURE — 80048 BASIC METABOLIC PNL TOTAL CA: CPT | Performed by: HOSPITALIST

## 2023-04-15 PROCEDURE — 85049 AUTOMATED PLATELET COUNT: CPT | Performed by: INTERNAL MEDICINE

## 2023-04-15 PROCEDURE — 36415 COLL VENOUS BLD VENIPUNCTURE: CPT | Performed by: INTERNAL MEDICINE

## 2023-04-15 PROCEDURE — 250N000012 HC RX MED GY IP 250 OP 636 PS 637: Performed by: HOSPITALIST

## 2023-04-15 PROCEDURE — 250N000013 HC RX MED GY IP 250 OP 250 PS 637: Performed by: INTERNAL MEDICINE

## 2023-04-15 PROCEDURE — 94640 AIRWAY INHALATION TREATMENT: CPT

## 2023-04-15 PROCEDURE — 97165 OT EVAL LOW COMPLEX 30 MIN: CPT | Mod: GO

## 2023-04-15 PROCEDURE — 97535 SELF CARE MNGMENT TRAINING: CPT | Mod: GO

## 2023-04-15 PROCEDURE — 120N000001 HC R&B MED SURG/OB

## 2023-04-15 PROCEDURE — 97116 GAIT TRAINING THERAPY: CPT | Mod: GP

## 2023-04-15 PROCEDURE — 250N000011 HC RX IP 250 OP 636: Performed by: INTERNAL MEDICINE

## 2023-04-15 PROCEDURE — 999N000157 HC STATISTIC RCP TIME EA 10 MIN

## 2023-04-15 PROCEDURE — 250N000009 HC RX 250: Performed by: HOSPITALIST

## 2023-04-15 PROCEDURE — 97110 THERAPEUTIC EXERCISES: CPT | Mod: GP

## 2023-04-15 PROCEDURE — 97162 PT EVAL MOD COMPLEX 30 MIN: CPT | Mod: GP

## 2023-04-15 PROCEDURE — 97530 THERAPEUTIC ACTIVITIES: CPT | Mod: GO

## 2023-04-15 PROCEDURE — 99232 SBSQ HOSP IP/OBS MODERATE 35: CPT | Performed by: HOSPITALIST

## 2023-04-15 RX ORDER — LEVALBUTEROL INHALATION SOLUTION 1.25 MG/3ML
1.25 SOLUTION RESPIRATORY (INHALATION)
Status: DISCONTINUED | OUTPATIENT
Start: 2023-04-15 | End: 2023-04-15

## 2023-04-15 RX ORDER — LEVALBUTEROL INHALATION SOLUTION 1.25 MG/3ML
1.25 SOLUTION RESPIRATORY (INHALATION) EVERY 4 HOURS PRN
Status: DISCONTINUED | OUTPATIENT
Start: 2023-04-15 | End: 2023-04-16 | Stop reason: HOSPADM

## 2023-04-15 RX ORDER — IPRATROPIUM BROMIDE AND ALBUTEROL SULFATE 2.5; .5 MG/3ML; MG/3ML
3 SOLUTION RESPIRATORY (INHALATION)
Status: DISCONTINUED | OUTPATIENT
Start: 2023-04-15 | End: 2023-04-15

## 2023-04-15 RX ORDER — LEVALBUTEROL INHALATION SOLUTION 1.25 MG/3ML
1.25 SOLUTION RESPIRATORY (INHALATION) EVERY 4 HOURS PRN
Status: DISCONTINUED | OUTPATIENT
Start: 2023-04-15 | End: 2023-04-15

## 2023-04-15 RX ORDER — PREDNISONE 20 MG/1
20 TABLET ORAL DAILY
Status: DISCONTINUED | OUTPATIENT
Start: 2023-04-15 | End: 2023-04-16 | Stop reason: HOSPADM

## 2023-04-15 RX ADMIN — PIPERACILLIN AND TAZOBACTAM 3.38 G: 3; .375 INJECTION, POWDER, LYOPHILIZED, FOR SOLUTION INTRAVENOUS at 08:01

## 2023-04-15 RX ADMIN — METOPROLOL SUCCINATE 12.5 MG: 25 TABLET, EXTENDED RELEASE ORAL at 08:02

## 2023-04-15 RX ADMIN — DOCUSATE SODIUM 100 MG: 100 CAPSULE, LIQUID FILLED ORAL at 19:51

## 2023-04-15 RX ADMIN — PREDNISONE 20 MG: 20 TABLET ORAL at 10:46

## 2023-04-15 RX ADMIN — ACETAMINOPHEN 650 MG: 325 TABLET ORAL at 15:57

## 2023-04-15 RX ADMIN — DOCUSATE SODIUM 100 MG: 100 CAPSULE, LIQUID FILLED ORAL at 08:02

## 2023-04-15 RX ADMIN — LEVOTHYROXINE SODIUM 88 MCG: 88 TABLET ORAL at 08:02

## 2023-04-15 RX ADMIN — PIPERACILLIN AND TAZOBACTAM 3.38 G: 3; .375 INJECTION, POWDER, LYOPHILIZED, FOR SOLUTION INTRAVENOUS at 00:21

## 2023-04-15 RX ADMIN — DOXYCYCLINE HYCLATE 100 MG: 100 CAPSULE ORAL at 08:02

## 2023-04-15 RX ADMIN — LOSARTAN POTASSIUM 25 MG: 25 TABLET, FILM COATED ORAL at 08:02

## 2023-04-15 RX ADMIN — DULOXETINE HYDROCHLORIDE 60 MG: 60 CAPSULE, DELAYED RELEASE PELLETS ORAL at 08:02

## 2023-04-15 RX ADMIN — Medication 1 TABLET: at 08:02

## 2023-04-15 RX ADMIN — AMLODIPINE BESYLATE 2.5 MG: 2.5 TABLET ORAL at 08:03

## 2023-04-15 RX ADMIN — PANTOPRAZOLE SODIUM 40 MG: 40 TABLET, DELAYED RELEASE ORAL at 08:02

## 2023-04-15 RX ADMIN — OLANZAPINE 7.5 MG: 5 TABLET, FILM COATED ORAL at 21:41

## 2023-04-15 RX ADMIN — ROSUVASTATIN CALCIUM 10 MG: 10 TABLET, FILM COATED ORAL at 21:41

## 2023-04-15 RX ADMIN — LEVALBUTEROL 1.25 MG: 1.25 SOLUTION RESPIRATORY (INHALATION) at 12:35

## 2023-04-15 RX ADMIN — QUETIAPINE FUMARATE 25 MG: 25 TABLET ORAL at 08:03

## 2023-04-15 RX ADMIN — DOXYCYCLINE HYCLATE 100 MG: 100 CAPSULE ORAL at 19:51

## 2023-04-15 RX ADMIN — IPRATROPIUM BROMIDE 0.5 MG: 0.5 SOLUTION RESPIRATORY (INHALATION) at 12:35

## 2023-04-15 RX ADMIN — TOLTERODINE 4 MG: 2 CAPSULE, EXTENDED RELEASE ORAL at 08:02

## 2023-04-15 RX ADMIN — QUETIAPINE FUMARATE 25 MG: 25 TABLET ORAL at 19:51

## 2023-04-15 RX ADMIN — PIPERACILLIN AND TAZOBACTAM 3.38 G: 3; .375 INJECTION, POWDER, LYOPHILIZED, FOR SOLUTION INTRAVENOUS at 15:49

## 2023-04-15 RX ADMIN — ENOXAPARIN SODIUM 40 MG: 40 INJECTION SUBCUTANEOUS at 21:41

## 2023-04-15 RX ADMIN — ASPIRIN 81 MG 81 MG: 81 TABLET ORAL at 08:03

## 2023-04-15 ASSESSMENT — ACTIVITIES OF DAILY LIVING (ADL)
ADLS_ACUITY_SCORE: 44

## 2023-04-15 NOTE — PLAN OF CARE
Problem: Plan of Care - These are the overarching goals to be used throughout the patient stay.    Goal: Plan of Care Review  Description: The Plan of Care Review/Shift note should be completed every shift.  The Outcome Evaluation is a brief statement about your assessment that the patient is improving, declining, or no change.  This information will be displayed automatically on your shift note.  Outcome: Progressing   Goal Outcome Evaluation:  Plan of care reviewed with pt-and he was updated as new orders came in. Pt is oriented but some of the treatments are not understood very well by him ie: did not understand why PT/OT were ordered. Told him they were making sure he was strong enough to go home with his wife.Tolerating regular dietand had BM today. O2 sats seem to be up and down. Predinisone po and nebs started.

## 2023-04-15 NOTE — PROGRESS NOTES
[facility]    Medicine Progress Note - Hospitalist Service    Date of Admission:  4/12/2023    Assessment & Plan    Principal Problem:    Acute respiratory failure with hypoxia (H)  Active Problems:    Benign essential hypertension    History of cerebrovascular accident    Hypothyroidism    Ocular hypertension, bilateral    QT prolongation    COPD (chronic obstructive pulmonary disease) (H)    Generalized anxiety disorder    Hypoxia    Opioid use disorder, severe, dependence (H)    Malignant neoplasm of lower lobe of right lung (H)    Pneumonia of right lower lobe due to infectious organism      82-year-old male with past medical history of tobacco use, COPD, CAD, CVA, depression, RLL adenocarcinoma T1 N0 M0 currently undergoing radiation therapy who presented for evaluation of shortness of breath, generalized weakness and confusion, found to be febrile up to 103, tachycardic and hypoxic requiring up to 6 LPM O2.  Labs significant for WBC 13.8, procalcitonin 3.56.  CT chest on 4/12 with known RLL mass and moderate interstitial opacities in both lungs.  Also has possible UTI, needing oxygen and has COPD exacerbation as well as noted on CT.  Started on IV Zosyn/doxycycline for possible pneumonia    1.  Suspected postobstructive pneumonia associated with right LL mass.   Continue Zosyn and doxycycline for now.  That would also treat any UTI-cultures at this time has been negative from urine and previously had Enterococcus.  Still needing supplemental oxygen- borderline sats.     2.  Acute respiratory failure with hypoxia secondary to above, also suspect component of COPD contributing.  Wean off supplemental O2 to keep O2 sats above 94%  Overall continue efforts to wean oxygen oxygen needs and keep sats above 90%, added steroids and nebs.  Wheezing,  reports being improving gradually.   Likely need home oxygen evaluation, will also add nocturnal oximetry today.    3.  Acute metabolic  "encephalopathy.  Required restraints while in the ER.  Likely secondary to infection.  Seems to be improving    4.  RLL adenocarcinoma T1 N0 M0.  Not a good candidate for surgery.  Received 3 out of 5 radiation treatment.  Will resume treatment on Tuesday    5.  Mild AMAURI.  Likely in the setting of infection, poor oral intake.  Improved.  Continue to hold diuretics for now    6.  Essential hypertension.  Continue PTA meds with holding parameters    7.  Generalized weakness.  Secondary to acute illness.  PT/OT eval       Diet: Combination Diet Regular Diet Adult; 2 gm NA Diet    DVT Prophylaxis: Enoxaparin (Lovenox) SQ  Cabral Catheter: Not present  Lines: None     Cardiac Monitoring: None  Code Status: Full Code      Clinically Significant Risk Factors                        # Obesity: Estimated body mass index is 32.89 kg/m  as calculated from the following:    Height as of this encounter: 1.702 m (5' 7\").    Weight as of this encounter: 95.3 kg (210 lb)., PRESENT ON ADMISSION         Disposition Plan      Expected Discharge Date: 04/17/2023      Destination: home with family  Discharge Comments: Or 4/15/23          Liam Garcia MD  Hospitalist Service  [facility]  Securely message with [employer] (more info)  Text page via [employer] Paging/Directory   ______________________________________________________________________    Interval History   Overall continue efforts to wean oxygen oxygen needs and keep sats above 90%, added steroids and nebs.  Wheezing,  reports being improving gradually.   Short of breath as well  Desats quickly    Physical Exam   Vital Signs: Temp: 98  F (36.7  C) Temp src: Oral BP: 119/72 Pulse: 117   Resp: 17 SpO2: 94 % O2 Device: Nasal cannula Oxygen Delivery: 2 LPM  Weight: 210 lbs 0 oz    Alert awake vitals okay needing supplemental oxygen and desats with ambulation  Vision Baseline  Neck supple  Oral mucosa moist  bilateral air entry heard, rhonchi and wheezing  S1-S2 " normal  Abdomen is soft no tenderness  Extremities are fully mobile and there is no visible swelling noted  No skin cyanosis  Neurologically- no new Gross deficits from baseline-Moving all 4 extremities  Psych-mood okay and appropriate to circumstances      Medical Decision Making       40 MINUTES SPENT BY ME on the date of service doing chart review, history, exam, documentation & further activities per the note.      Data     I have personally reviewed the following data over the past 24 hrs:    N/A  \   N/A   / 174     143 108 (H) 19.9 /  95   3.9 27 1.06 \       Imaging results reviewed over the past 24 hrs:   No results found for this or any previous visit (from the past 24 hour(s)).

## 2023-04-15 NOTE — PLAN OF CARE
Patient is A&Ox4. He is forgetful. Patient is able to ambulate to bathroom with assist of 1 and cane. He takes pills whole with water. Patient denies pain.  Problem: Plan of Care - These are the overarching goals to be used throughout the patient stay.    Goal: Plan of Care Review  Description: The Plan of Care Review/Shift note should be completed every shift.  The Outcome Evaluation is a brief statement about your assessment that the patient is improving, declining, or no change.  This information will be displayed automatically on your shift note.  Outcome: Progressing     Problem: Heart Failure Comorbidity  Goal: Maintenance of Heart Failure Symptom Control  Outcome: Progressing  Intervention: Maintain Heart Failure Management  Recent Flowsheet Documentation  Taken 4/14/2023 1800 by Lisette Luna RN  Medication Review/Management: high-risk medications identified     Problem: Mobility Impairment  Goal: Optimal Mobility  Outcome: Progressing  Intervention: Optimize Mobility  Recent Flowsheet Documentation  Taken 4/14/2023 1800 by Lisette Luna RN  Assistive Device Utilized: cane  Activity Management: activity adjusted per tolerance  Positioning/Transfer Devices:   pillows   in use     Problem: Gas Exchange Impaired  Goal: Optimal Gas Exchange  Outcome: Progressing  Intervention: Optimize Oxygenation and Ventilation  Recent Flowsheet Documentation  Taken 4/14/2023 1800 by Lisette Luna RN  Head of Bed (HOB) Positioning: HOB at 20-30 degrees     Problem: Plan of Care - These are the overarching goals to be used throughout the patient stay.    Goal: Absence of Hospital-Acquired Illness or Injury  Intervention: Identify and Manage Fall Risk  Recent Flowsheet Documentation  Taken 4/14/2023 1800 by Lisette Luna RN  Safety Promotion/Fall Prevention: mobility aid in reach  Intervention: Prevent Skin Injury  Recent Flowsheet Documentation  Taken 4/14/2023 1800 by Lisette Luna RN  Body Position: position changed  independently  Goal: Readiness for Transition of Care  Intervention: Mutually Develop Transition Plan  Recent Flowsheet Documentation  Taken 4/14/2023 1700 by Lisette Luna RN  Equipment Currently Used at Home: cane, straight     Problem: Risk for Delirium  Goal: Improved Behavioral Control  Intervention: Minimize Safety Risk  Recent Flowsheet Documentation  Taken 4/14/2023 1800 by Lisette Luna RN  Enhanced Safety Measures: room near unit station  Goal: Improved Attention and Thought Clarity  Intervention: Maximize Cognitive Function  Recent Flowsheet Documentation  Taken 4/14/2023 1800 by Lisette Luna RN  Reorientation Measures: clock in view     Problem: Restraint, Nonviolent  Goal: Absence of Harm or Injury  Intervention: Protect Skin and Joint Integrity  Recent Flowsheet Documentation  Taken 4/14/2023 1800 by Lisette Luna RN  Body Position: position changed independently     Problem: Pneumonia  Goal: Effective Oxygenation and Ventilation  Intervention: Promote Airway Secretion Clearance  Recent Flowsheet Documentation  Taken 4/14/2023 1800 by Lisette Luna RN  Cough And Deep Breathing: done independently per patient  Intervention: Optimize Oxygenation and Ventilation  Recent Flowsheet Documentation  Taken 4/14/2023 1800 by Lisette Luna RN  Head of Bed (HOB) Positioning: HOB at 20-30 degrees     Problem: Gas Exchange Impaired  Goal: Optimal Gas Exchange  Intervention: Optimize Oxygenation and Ventilation  Recent Flowsheet Documentation  Taken 4/14/2023 1800 by Lisette Luna RN  Head of Bed (HOB) Positioning: HOB at 20-30 degrees     Problem: Fall Injury Risk  Goal: Absence of Fall and Fall-Related Injury  Intervention: Identify and Manage Contributors  Recent Flowsheet Documentation  Taken 4/14/2023 1800 by Lisette Luna RN  Medication Review/Management: high-risk medications identified  Intervention: Promote Injury-Free Environment  Recent Flowsheet Documentation  Taken 4/14/2023 1800 by  Lisette Luna, RN  Safety Promotion/Fall Prevention: mobility aid in reach   Goal Outcome Evaluation:

## 2023-04-15 NOTE — PROGRESS NOTES
"[facility]    Medicine Progress Note - Hospitalist Service    Date of Admission:  4/12/2023    Assessment & Plan   82-year-old male with past medical history of tobacco use, COPD, CAD, CVA, depression, RLL adenocarcinoma T1 N0 M0 currently undergoing radiation therapy who presented for evaluation of shortness of breath, generalized weakness and confusion, found to be febrile up to 103, tachycardic and hypoxic requiring up to 6 LPM O2.  Labs significant for WBC 13.8, procalcitonin 3.56.  CT chest on 4/12 with known RLL mass and moderate interstitial opacities in both lungs.  Started on IV Zosyn/doxycycline for possible pneumonia    1.  Suspected postobstructive pneumonia associated with right LL mass.   Continue Zosyn and doxycycline for now    2.  Acute respiratory failure with hypoxia secondary to above.  Wean off supplemental O2 to keep O2 sats above 94%    3.  Acute metabolic encephalopathy.  Required restraints while in the ER.  Likely secondary to infection.  Seems to be improving    4.  RLL adenocarcinoma T1 N0 M0.  Not a good candidate for surgery.  Received 3 out of 5 radiation treatment.  Will resume treatment on Tuesday    5.  Mild AMAURI.  Likely in the setting of infection, poor oral intake.  Improved.  Continue to hold diuretics for now    6.  Essential hypertension.  Continue PTA meds with holding parameters    7.  Generalized weakness.  Secondary to acute illness.  PT/OT eval     Diet: Combination Diet Regular Diet Adult    DVT Prophylaxis: Pneumatic Compression Devices  Cabral Catheter: Not present  Lines: None     Cardiac Monitoring: None  Code Status: Full Code      Clinically Significant Risk Factors                        # Obesity: Estimated body mass index is 32.89 kg/m  as calculated from the following:    Height as of this encounter: 1.702 m (5' 7\").    Weight as of this encounter: 95.3 kg (210 lb)., PRESENT ON ADMISSION         Disposition Plan      Expected Discharge " Date: 04/15/2023      Destination: home with family  Discharge Comments: Or 4/15/23          Ledy Franklin MD  Hospitalist Service  [facility]  Securely message with [employer] (more info)  Text page via [employer] Paging/Directory   ______________________________________________________________________    Interval History   Patient reports feeling better  Denies having any pain except for his bone-on-bone knee arthritis  Denies feeling short of breath.  Only occasional cough  Has good oral intake  No dysuria.  Has urinary urgency    Physical Exam   Vital Signs: Temp: 98.7  F (37.1  C) Temp src: Oral BP: 105/67 Pulse: 80   Resp: 16 SpO2: 91 % O2 Device: None (Room air) Oxygen Delivery: 1 LPM  Weight: 210 lbs 0 oz    Gen: Obese male in NAD, sitting in a recliner  CV: RRR  Lungs: Clear  Abdomen: Soft and nontender  Extremities: No edema  Neuro: Awake, alert and appropriate       Medical Decision Making       40 MINUTES SPENT BY ME on the date of service doing chart review, history, exam, documentation & further activities per the note.  MANAGEMENT DISCUSSED with the following over the past 24 hours: Patient and nursing staff       Data     I have personally reviewed the following data over the past 24 hrs:    7.1  \   13.2 (L)   / 148 (L)     140 107 25.4 (H) /  83   3.9 26 1.16 \       Imaging results reviewed over the past 24 hrs:   No results found for this or any previous visit (from the past 24 hour(s)).

## 2023-04-15 NOTE — TREATMENT PLAN
[facility]AT Treatment Plan    Patient Score: 7  Patient Acuity: 4    Clinical Indication for Therapy: COPD    Therapy Ordered: PRN Xopenex/Atrovent    Assessment Summary: Pt is on 2L NC, SpO2 90%. Breath sounds are diminished bilaterally. Pt states feeling no difference post-treatment, and states having taken them in the past after surgery and having problems with coughing d/t nebs. Breath sounds unchanged post-treatment. Encouraged deep breathing.     Megha Davidson, RT  4/15/2023

## 2023-04-15 NOTE — PLAN OF CARE
Problem: Plan of Care - These are the overarching goals to be used throughout the patient stay.    Goal: Plan of Care Review  Description: The Plan of Care Review/Shift note should be completed every shift.  The Outcome Evaluation is a brief statement about your assessment that the patient is improving, declining, or no change.  This information will be displayed automatically on your shift note.  Outcome: Progressing     Problem: Gas Exchange Impaired  Goal: Optimal Gas Exchange  Outcome: Progressing  Intervention: Optimize Oxygenation and Ventilation  Recent Flowsheet Documentation  Taken 4/15/2023 0032 by Blanca Quiros, RN  Head of Bed (HOB) Positioning: HOB at 20-30 degrees   Goal Outcome Evaluation:               Pt alert and oriented x 4,denied pain,calm and cooperative with cares,no c/o SOB. Will continue to monitor.

## 2023-04-15 NOTE — PROGRESS NOTES
"   04/15/23 1000   Appointment Info   Signing Clinician's Name / Credentials (PT) Farrah Crawford PT   Living Environment   People in Home spouse   Current Living Arrangements house   Home Accessibility stairs to enter home   Number of Stairs, Main Entrance 3   Transportation Anticipated family or friend will provide   Living Environment Comments one level rambler, can stay on main level   Self-Care   Current Activity Tolerance moderate   Regular Exercise Yes   Activity/Exercise Type other (see comments)  (has a \"pedal machine\"  he uses sveral times a day)   Equipment Currently Used at Home cane, straight   Activity/Exercise/Self-Care Comment pt states he is indep in all self cares, has a walker but prefers the cane, wife is able to assist as needed.   General Information   Onset of Illness/Injury or Date of Surgery 04/12/23   Referring Physician DESIRAE Franklin   Patient/Family Therapy Goals Statement (PT) retrun home   Pertinent History of Current Problem (include personal factors and/or comorbidities that impact the POC) 82-year-old male with past medical history of tobacco use, COPD, CAD, CVA, depression, RLL adenocarcinoma T1 N0 M0 currently undergoing radiation therapy who presented for evaluation of shortness of breath, generalized weakness and confusion, found to be febrile up to 103, tachycardic and hypoxic requiring up to 6 LPM O2.  Labs significant for WBC 13.8, procalcitonin 3.56.  CT chest on 4/12 with known RLL mass and moderate interstitial opacities in both lungs.  Started on IV Zosyn/doxycycline for possible pneumonia   Existing Precautions/Restrictions fall   Cognition   Affect/Mental Status (Cognition) WFL   Orientation Status (Cognition) oriented x 4   Follows Commands (Cognition) WFL   Pain Assessment   Patient Currently in Pain Yes, see Vital Sign flowsheet   Range of Motion (ROM)   ROM Comment LE ROM WFL, mild limits L knee 2/2 pain   Strength (Manual Muscle Testing)   Strength Comments LE strength " 4/5, fatigues easily   Bed Mobility   Bed Mobility supine-sit   Supine-Sit [place] (Bed Mobility) modified independence   Assistive Device (Bed Mobility) bed rails   Transfers   Transfers sit-stand transfer   Sit-Stand Transfer   Sit-Stand [place] (Transfers) contact guard   Assistive Device (Sit-Stand Transfers) cane, straight   Comment, (Sit-Stand Transfer) initial balance is good.   Gait/Stairs (Locomotion)   [place] Level (Gait) contact guard   Assistive Device (Gait) cane, straight   Distance in Feet 25   Distance in Feet (Gait) 125   Pattern (Gait) step-to   Deviations/Abnormal Patterns (Gait) antalgic   Comment, (Gait/Stairs) slower pace, signif limp LLE, improves with time up walking, no LOB   Clinical Impression   PT Diagnosis (PT) impaired functional mobility   Influenced by the following impairments pain, fatigue, SOB   Functional limitations due to impairments gait   Clinical Presentation (PT Evaluation Complexity) Evolving/Changing   Clinical Presentation Rationale presents as diagnosed   Clinical Decision Making (Complexity) moderate complexity   Planned Therapy Interventions (PT) transfer training;gait training;stair training;strengthening;ROM (range of motion)   Anticipated Equipment Needs at Discharge (PT) cane, straight   Risk & Benefits of therapy have been explained care plan/treatment goals reviewed;patient   PT Total Evaluation Time   PT Eval, Moderate Complexity Minutes (32676) 10   Physical Therapy Goals   PT Frequency Daily   PT Predicted Duration/Target Date for Goal Attainment 04/19/23   PT Goals Bed Mobility;Transfers;Gait;Stairs   PT: Bed Mobility Independent;Rolling;Supine to/from sit   PT: Transfers Modified independent;Sit to/from stand;Bed to/from chair;Assistive device   PT: Gait Supervision/stand-by assist;Straight cane;150 feet   PT: Stairs Supervision/stand-by assist;2 stairs;Assistive device  (one rail)   Interventions   Interventions Quick Adds Gait  Training;Therapeutic Procedure   Therapeutic Procedure/Exercise   Ther. Procedure: strength, endurance, ROM, flexibillity Minutes (04547) 12   Treatment Detail/Skilled Intervention instructed in and performed BLE ex in sitting.  10-15 reps each with rest breaks inbetween.   Gait Training   Gait Training Minutes (23133) 12   Symptoms Noted During/After Treatment (Gait Training) shortness of breath   Treatment Detail/Skilled Intervention slower pace but steady, 2 short standing rests along the way.  Pt did not have O2 on at start of session, SATS with walking 88%.  O2 reapplied once back in room.  At end of session pt was in recliner, chair alarm on, call light in reach.   [place] Level (Gait Training) contact guard   Physical Assistance Level (Gait Training) supervision;1 person assist   Weight Bearing (Gait Training) full weight-bearing   Assistive Device (Gait Training) straight cane   Gait Analysis Deviations decreased gloria;decreased step length   Impairments (Gait Analysis/Training) pain  (L knee)   PT Discharge Planning   PT Plan bed mobility, transfers and gt with his SE cane, LE strengthening ex   PT Discharge Recommendation (DC Rec) home with assist;home with home care physical therapy   PT Rationale for DC Rec home PT for  strengthening and maximizing independence with functional mobility   PT Brief overview of current status transfers and gt with SE cane, 125'   Total Session Time   Timed Code Treatment Minutes 24   Total Session Time (sum of timed and untimed services) 34

## 2023-04-15 NOTE — PROGRESS NOTES
"Occupational Therapy     04/15/23 1330   Appointment Info   Signing Clinician's Name / Credentials (OT) Hilary Mary OTR/L   Living Environment   People in Home spouse   Current Living Arrangements house   Home Accessibility stairs to enter home   Number of Stairs, Main Entrance 3   Living Environment Comments tub/shower combo w/shower chair-doesn't tend to use as he states it takes up \"too much room\" in the shower.   Self-Care   Usual Activity Tolerance good   Current Activity Tolerance moderate   Regular Exercise Yes   Equipment Currently Used at Home cane, straight  (has a walker but doesn't use it.)   Activity/Exercise/Self-Care Comment Prior to admit, independent w/ADLs; shared IADLs as able-pt reports L knee arthritis (needs to be replaced) that limits activity tolerance. Spouse avail to assist PRN.   General Information   Onset of Illness/Injury or Date of Surgery 04/12/23   Referring Physician Ledy Franklin   Patient/Family Therapy Goal Statement (OT) none stated   Existing Precautions/Restrictions fall   General Observations and Info Per chart:  \"82-year-old male with past medical history of tobacco use, COPD, CAD, CVA, depression, RLL adenocarcinoma T1 N0 M0 currently undergoing radiation therapy who presented for evaluation of shortness of breath, generalized weakness and confusion, found to be febrile up to 103, tachycardic and hypoxic requiring up to 6 LPM O2.\"   Cognitive Status Examination   Orientation Status orientation to person, place and time   Affect/Mental Status (Cognitive)   (seemed a little forgetful @ times; will monitor)   Follows Commands WNL   Sensory   Sensory Quick Adds sensation intact   Pain Assessment   Patient Currently in Pain Yes, see Vital Sign flowsheet  (L knee pain w/standing & amb-agreeable to therapy)   Posture   Posture not impaired   Range of Motion Comprehensive   Comment, General Range of Motion slightly decreased L shldr flexion endrange-functional; otherwise B " "UE ROM WFL   Strength Comprehensive (MMT)   Comment, General Manual Muscle Testing (MMT) Assessment demo functional UE strength for basic ADLs.   Coordination   Upper Extremity Coordination No deficits were identified   Bed Mobility   Comment (Bed Mobility) mod I   Transfers   Transfer Comments SBA   Balance   Balance Comments pt reports L knee pain is \"bad\" this afternoon-trialed walker to minimize pain; pt agreeable to further activity, but wanting to use his cane.   Activities of Daily Living   BADL Assessment/Intervention lower body dressing   Lower Body Dressing Assessment/Training   Comment, (Lower Body Dressing) min A L sock-unable to demo figure four method d/t knee pain & SOB w/bending.   Clinical Impression   Criteria for Skilled Therapeutic Interventions Met (OT) Yes, treatment indicated   OT Diagnosis decreased ADLs   OT Problem List-Impairments impacting ADL activity tolerance impaired;balance;mobility;strength   Assessment of Occupational Performance 1-3 Performance Deficits   Identified Performance Deficits decreased activity tolerance, LB drsg   Planned Therapy Interventions (OT) ADL retraining;strengthening;progressive activity/exercise;home program guidelines   Clinical Decision Making Complexity (OT) low complexity   Anticipated Equipment Needs Upon Discharge (OT)   (recommend using shower chair; possible LE AE for don/doff sock)   Risk & Benefits of therapy have been explained evaluation/treatment results reviewed;care plan/treatment goals reviewed;risks/benefits reviewed;current/potential barriers reviewed;participants voiced agreement with care plan;participants included;patient   OT Total Evaluation Time   OT Eval, Low Complexity Minutes (53384) 12   OT Goals   Therapy Frequency (OT) Daily   OT Predicted Duration/Target Date for Goal Attainment 04/22/23   OT Goals Hygiene/Grooming;Lower Body Dressing;Transfers;OT Goal 1   OT: Hygiene/Grooming supervision/stand-by assist   OT: Lower Body " Dressing Modified independent   OT: Transfer Modified independent   OT: Goal 1 Pt to demo > 5 minutes standing w/mod I in prep safe completion of ADLs or demo EC/WS in order to accomplish tasks.   Interventions   Interventions Quick Adds Therapeutic Activity   Self-Care/Home Management   Self-Care/Home Mgmt/ADL, Compensatory, Meal Prep Minutes (39173) 10   Symptoms Noted During/After Treatment (Meal Preparation/Planning Training) fatigue   Treatment Detail/Skilled Intervention Pt having difficulty reaching his feet on L foot-incr knee pain (chronic per pt report). SBA bed, chair & toilet trfs-rare cue for safety/use of armrests. Pt slightly unsteady w/standing @ sink-holding onto sink but then stating he needed to sit; briefly intro EC/WS in order to safely complete ADLs.   Therapeutic Activities   Therapeutic Activity Minutes (15374) 8   Symptoms noted during/after treatment increased pain   Treatment Detail/Skilled Intervention Pt agreeable to therapy/activity but did report chronic L knee pain increased this afternoon. Initially trialed walker to minimize pain, but pt slightly unsteady w/it & wanting to try his cane. Pt performed better w/his cane w/o unsteadiness. Static standing 1-2 minute durations. Pt demo SBA/CGA room & hallway ambulation ~100 ft w/SEC. Pt not wanting to wear O2-does desat w/o it. With 2 L 93% & HR in 90's.   OT Discharge Planning   OT Plan GH/standing mike or EC/WS tech, reinforce use of shower chair, trfs, LB drsg ? reacher or sock aid? Monitor cogn.   OT Discharge Recommendation (DC Rec) home with assist   OT Rationale for DC Rec Pt demo SBA to min A basic ADLs, trfs & functional mob w/cane. Recommend A w/ADLs-appears spouse can assist as needed.   OT Brief overview of current status mod I bed mob, SBA trfs, min A socks   Total Session Time   Timed Code Treatment Minutes 18   Total Session Time (sum of timed and untimed services) 30

## 2023-04-16 ENCOUNTER — APPOINTMENT (OUTPATIENT)
Dept: PHYSICAL THERAPY | Facility: HOSPITAL | Age: 83
DRG: 193 | End: 2023-04-16
Payer: COMMERCIAL

## 2023-04-16 VITALS
BODY MASS INDEX: 32.96 KG/M2 | SYSTOLIC BLOOD PRESSURE: 164 MMHG | DIASTOLIC BLOOD PRESSURE: 89 MMHG | HEART RATE: 90 BPM | HEIGHT: 67 IN | WEIGHT: 210 LBS | OXYGEN SATURATION: 91 % | TEMPERATURE: 98.1 F | RESPIRATION RATE: 18 BRPM

## 2023-04-16 PROCEDURE — 99239 HOSP IP/OBS DSCHRG MGMT >30: CPT | Performed by: HOSPITALIST

## 2023-04-16 PROCEDURE — 250N000011 HC RX IP 250 OP 636: Performed by: INTERNAL MEDICINE

## 2023-04-16 PROCEDURE — 999N000157 HC STATISTIC RCP TIME EA 10 MIN

## 2023-04-16 PROCEDURE — 94762 N-INVAS EAR/PLS OXIMTRY CONT: CPT

## 2023-04-16 PROCEDURE — 250N000012 HC RX MED GY IP 250 OP 636 PS 637: Performed by: HOSPITALIST

## 2023-04-16 PROCEDURE — 250N000013 HC RX MED GY IP 250 OP 250 PS 637: Performed by: INTERNAL MEDICINE

## 2023-04-16 PROCEDURE — 97116 GAIT TRAINING THERAPY: CPT | Mod: GP

## 2023-04-16 RX ORDER — CEPHALEXIN 500 MG/1
500 CAPSULE ORAL 2 TIMES DAILY
Qty: 6 CAPSULE | Refills: 0 | Status: SHIPPED | OUTPATIENT
Start: 2023-04-16 | End: 2023-04-19

## 2023-04-16 RX ORDER — PREDNISONE 10 MG/1
10 TABLET ORAL DAILY
Qty: 3 TABLET | Refills: 0 | Status: SHIPPED | OUTPATIENT
Start: 2023-04-17 | End: 2023-04-20

## 2023-04-16 RX ADMIN — DOXYCYCLINE HYCLATE 100 MG: 100 CAPSULE ORAL at 08:21

## 2023-04-16 RX ADMIN — LEVOTHYROXINE SODIUM 88 MCG: 88 TABLET ORAL at 08:26

## 2023-04-16 RX ADMIN — ACETAMINOPHEN 650 MG: 325 TABLET ORAL at 01:46

## 2023-04-16 RX ADMIN — PIPERACILLIN AND TAZOBACTAM 3.38 G: 3; .375 INJECTION, POWDER, LYOPHILIZED, FOR SOLUTION INTRAVENOUS at 08:19

## 2023-04-16 RX ADMIN — DULOXETINE HYDROCHLORIDE 60 MG: 60 CAPSULE, DELAYED RELEASE PELLETS ORAL at 08:21

## 2023-04-16 RX ADMIN — ASPIRIN 81 MG 81 MG: 81 TABLET ORAL at 08:21

## 2023-04-16 RX ADMIN — QUETIAPINE FUMARATE 25 MG: 25 TABLET ORAL at 11:13

## 2023-04-16 RX ADMIN — AMLODIPINE BESYLATE 2.5 MG: 2.5 TABLET ORAL at 08:22

## 2023-04-16 RX ADMIN — Medication 1 TABLET: at 08:22

## 2023-04-16 RX ADMIN — TOLTERODINE 4 MG: 2 CAPSULE, EXTENDED RELEASE ORAL at 08:21

## 2023-04-16 RX ADMIN — PANTOPRAZOLE SODIUM 40 MG: 40 TABLET, DELAYED RELEASE ORAL at 08:26

## 2023-04-16 RX ADMIN — PIPERACILLIN AND TAZOBACTAM 3.38 G: 3; .375 INJECTION, POWDER, LYOPHILIZED, FOR SOLUTION INTRAVENOUS at 00:54

## 2023-04-16 RX ADMIN — METOPROLOL SUCCINATE 12.5 MG: 25 TABLET, EXTENDED RELEASE ORAL at 08:22

## 2023-04-16 RX ADMIN — LOSARTAN POTASSIUM 25 MG: 25 TABLET, FILM COATED ORAL at 08:21

## 2023-04-16 RX ADMIN — PREDNISONE 20 MG: 20 TABLET ORAL at 08:22

## 2023-04-16 ASSESSMENT — ACTIVITIES OF DAILY LIVING (ADL)
ADLS_ACUITY_SCORE: 44

## 2023-04-16 NOTE — PLAN OF CARE
Patient is A&O. He is forgetful at times. Patient denies pain. He is able to walk to the bathroom with assist of 1 and walker. Patient remains on 2L O2 via NC. Overnight Pulse Oximetry study started this evening.  Problem: Mobility Impairment  Goal: Optimal Mobility  Outcome: Progressing  Intervention: Optimize Mobility  Recent Flowsheet Documentation  Taken 4/15/2023 1900 by Lisette Luna RN  Assistive Device Utilized:    gait belt    walker  Activity Management: ambulated to bathroom  Positioning/Transfer Devices:    pillows    in use     Problem: Gas Exchange Impaired  Goal: Optimal Gas Exchange  Outcome: Progressing  Intervention: Optimize Oxygenation and Ventilation  Recent Flowsheet Documentation  Taken 4/15/2023 1900 by Lisette Luna RN  Head of Bed (HOB) Positioning: HOB at 20-30 degrees     Problem: Plan of Care - These are the overarching goals to be used throughout the patient stay.    Goal: Absence of Hospital-Acquired Illness or Injury  Intervention: Identify and Manage Fall Risk  Recent Flowsheet Documentation  Taken 4/15/2023 1900 by Lisette Luna RN  Safety Promotion/Fall Prevention: mobility aid in reach  Intervention: Prevent Skin Injury  Recent Flowsheet Documentation  Taken 4/15/2023 1900 by Lisette Luna RN  Body Position: position changed independently  Goal: Optimal Comfort and Wellbeing  Intervention: Monitor Pain and Promote Comfort  Recent Flowsheet Documentation  Taken 4/15/2023 1900 by Lisette Luna RN  Pain Management Interventions: declines     Problem: Risk for Delirium  Goal: Improved Behavioral Control  Intervention: Minimize Safety Risk  Recent Flowsheet Documentation  Taken 4/15/2023 1900 by Lisette Luna RN  Enhanced Safety Measures: room near unit station  Goal: Improved Attention and Thought Clarity  Intervention: Maximize Cognitive Function  Recent Flowsheet Documentation  Taken 4/15/2023 1845 by Lisette Luna RN  Reorientation Measures: clock in view     Problem:  Restraint, Nonviolent  Goal: Absence of Harm or Injury  Intervention: Protect Skin and Joint Integrity  Recent Flowsheet Documentation  Taken 4/15/2023 1900 by Lisette Luna RN  Body Position: position changed independently     Problem: Pneumonia  Goal: Effective Oxygenation and Ventilation  Intervention: Promote Airway Secretion Clearance  Recent Flowsheet Documentation  Taken 4/15/2023 1900 by Lisette Luna RN  Cough And Deep Breathing: done independently per patient  Intervention: Optimize Oxygenation and Ventilation  Recent Flowsheet Documentation  Taken 4/15/2023 1900 by Lisette Luna RN  Head of Bed ([facility]) Positioning: [facility] at 20-30 degrees     Problem: Gas Exchange Impaired  Goal: Optimal Gas Exchange  Intervention: Optimize Oxygenation and Ventilation  Recent Flowsheet Documentation  Taken 4/15/2023 1900 by Lisette Luna RN  Head of Bed ([facility]) Positioning: [facility] at 20-30 degrees     Problem: Fall Injury Risk  Goal: Absence of Fall and Fall-Related Injury  Intervention: Identify and Manage Contributors  Recent Flowsheet Documentation  Taken 4/15/2023 1900 by Lisette Luna RN  Medication Review/Management: high-risk medications identified  Intervention: Promote Injury-Free Environment  Recent Flowsheet Documentation  Taken 4/15/2023 1900 by Lisette Luna RN  Safety Promotion/Fall Prevention: mobility aid in reach     Problem: Heart Failure Comorbidity  Goal: Maintenance of Heart Failure Symptom Control  Intervention: Maintain Heart Failure Management  Recent Flowsheet Documentation  Taken 4/15/2023 1900 by Lisette Luna RN  Medication Review/Management: high-risk medications identified   Goal Outcome Evaluation:

## 2023-04-16 NOTE — CARE PLAN
I certify that this patient, Dominik Rojas has been under my care (or a nurse practitioner or physican's assistant working with me). This is the face-to-face encounter for oxygen medical necessity.      At the time of this encounter supplemental oxygen is reasonable and necessary and is expected to improve the patient's condition in a home setting.       Patient has continued oxygen desaturation due to COPD J44.9  Lung cancer.    If portability is ordered, is the patient mobile within the home? yes

## 2023-04-16 NOTE — PROGRESS NOTES
Pt had sleep study performed overnight from 2050 until 0525. Pt remained on 2L NC throughout the night. Sats low to mid 90's on observation when awake and asleep.

## 2023-04-16 NOTE — DISCHARGE SUMMARY
[facility]  Hospitalist Discharge Summary      Date of Admission:  4/12/2023  Date of Discharge:  4/16/2023  Discharging Provider: Liam Garcia MD  Discharge Service: Hospitalist Service       Discharge Diagnoses   Principal Problem:    Acute respiratory failure with hypoxia (H)  Active Problems:    Benign essential hypertension    History of cerebrovascular accident    Hypothyroidism    Ocular hypertension, bilateral    QT prolongation    COPD (chronic obstructive pulmonary disease) (H)    Generalized anxiety disorder    Hypoxia    Opioid use disorder, severe, dependence (H)    Malignant neoplasm of lower lobe of right lung (H)    Pneumonia of right lower lobe due to infectious organism    82-year-old male with past medical history of tobacco use, COPD, CAD, CVA, depression, RLL adenocarcinoma T1 N0 M0 currently undergoing radiation therapy who presented for evaluation of shortness of breath, generalized weakness and confusion, found to be febrile up to 103, tachycardic and hypoxic requiring up to 6 LPM O2.  Labs significant for WBC 13.8, procalcitonin 3.56.  CT chest on 4/12 with known RLL mass and moderate interstitial opacities in both lungs.  Also has possible UTI, needing oxygen and has COPD exacerbation as well as noted on CT.  Started on IV Zosyn/doxycycline for possible pneumonia.  Now is improved and will discharge on oral tapering prednisone and Keflex.  He has been noted to have wheezing and BNP is normal with stable heart function.  With lung cancer-Had 3 out of 5 radiation treatments - will resume on Tuesday per radiation oncology.  Patient has borderline saturations and although resting he recuperates and ambulation noted to be desatting.  Nocturnal oximetry also 2 L suggest borderline saturations.  Have requested home oxygen at night and with activity given his advanced lung condition with carcinoma COPD.    - Home oxygen for night and activity for COPD and lung cancer-  "encouraged inhaler use for wheezing and SOB for COPD lung cancer. PCP follow up.      1.  Suspected postobstructive pneumonia associated with right LL mass.     Treated with Zosyn and doxycycline for now.  That would also treat any UTI-cultures at this time has been negative from urine and previously had Enterococcus.  Still needing supplemental oxygen- borderline sats-although not needing any resting room air anymore.     2.  Acute respiratory failure with hypoxia secondary to above, also COPD contributing.    Weaned off supplemental O2 to keep O2 sats above 94%- keep sats above 90%, added steroids and nebs.  Wheezing,  reports being improving gradually.   Likely need home oxygen evaluation, will also add nocturnal oximetry today.     3.  Acute metabolic encephalopathy.  Required restraints while in the ER.  Likely secondary to infection.  Seems to be improving     4.  RLL adenocarcinoma T1 N0 M0.  Not a good candidate for surgery.  Received 3 out of 5 radiation treatment.  Will resume treatment on Tuesday     5.  Mild AMAURI.  Likely in the setting of infection, poor oral intake.  Improved.  Continue to hold diuretics for now     6.  Essential hypertension.  Continue PTA meds with holding parameters     7.  Generalized weakness.  Secondary to acute illness.  PT/OT eval                        # Obesity: Estimated body mass index is 32.89 kg/m  as calculated from the following:    Height as of this encounter: 1.702 m (5' 7\").    Weight as of this encounter: 95.3 kg (210 lb)., PRESENT ON ADMISSION      Follow-ups Needed After Discharge   Follow-up Appointments     Follow-up and recommended labs and tests       Follow up with primary care provider, Misael Moe, within 7 days for   hospital follow- up.  No follow up labs or test are needed.             Unresulted Labs Ordered in the Past 30 Days of this Admission     Date and Time Order Name Status Description    4/12/2023  4:59 PM Blood Culture Peripheral Blood " Preliminary     4/12/2023  4:59 PM Blood Culture Hand, Left Preliminary       These results will be followed up by PCP    Discharge Disposition   Discharged to home  Condition at discharge: Stable      Consultations This Hospital Stay   CARE MANAGEMENT / SOCIAL WORK IP CONSULT  RADIATION ONCOLOGY IP CONSULT  PHYSICAL THERAPY ADULT IP CONSULT  OCCUPATIONAL THERAPY ADULT IP CONSULT    Code Status   Full Code    Time Spent on this Encounter   I, Liam Garcia MD, personally saw the patient today and spent greater than 30 minutes discharging this patient.       Liam Garcia MD  [address] [address]  Phone: [phone]  Fax: [phone]  ______________________________________________________________________    Physical Exam   Vital Signs: Temp: 98.1  F (36.7  C) Temp src: Oral BP: (!) 164/89 Pulse: 90   Resp: 18 SpO2: 91 % O2 Device: Nasal cannula Oxygen Delivery: 2 LPM  Weight: 210 lbs 0 oz  Lungs are diminished but improving irrigation       Primary Care Physician   Misael Moe    Discharge Orders      Medication Therapy Management Referral      Reason for your hospital stay    Pneumobia, COPD     Follow-up and recommended labs and tests     Follow up with primary care provider, Misael Moe, within 7 days for hospital follow- up.  No follow up labs or test are needed.     Activity    Your activity upon discharge: activity as tolerated     Home Oxygen Order for DME - ONLY FOR DME    I, the undersigned, certify that the above prescribed supplies are medically necessary for this patient and is both reasonable and necessary in reference to accepted standards of medical and necessary in reference to accepted standards of medical practice in the treatment of this patient's condition and is not prescribed as a convenience.      Diet    Follow this diet upon discharge: Orders Placed This Encounter      Combination Diet Regular Diet Adult; 2 gm NA Diet        Significant Results and Procedures   Most Recent 3 CBC's:  Recent Labs   Lab Test 04/15/23  0642 04/14/23  0711 04/13/23  0844 04/12/23  1738   WBC  --  7.1 10.6 13.8*   HGB  --  13.2* 12.9* 14.1   MCV  --  94 93 92    148* 152 186     Most Recent 3 BMP's:  Recent Labs   Lab Test 04/15/23  0642 04/14/23  0711 04/13/23  0844    140 136   POTASSIUM 3.9 3.9 3.7   CHLORIDE 108* 107 102   CO2 27 26 25   BUN 19.9 25.4* 25.5*   CR 1.06 1.16 1.19*   ANIONGAP 8 7 9   CHELI 8.6* 8.5* 8.5*   GLC 95 83 131*     Most Recent 2 LFT's:  Recent Labs   Lab Test 03/02/22  1137 10/26/21  0957   AST 21 21   ALT <9 11   ALKPHOS 123* 104   BILITOTAL 0.9 1.1*     Most Recent 3 INR's:  Recent Labs   Lab Test 03/08/23  0943 02/27/21  1040 01/14/21  1501   INR 1.16* 1.15* 1.13*     Most Recent 3 BNP's:  Recent Labs   Lab Test 04/12/23  2342   NTBNPI 154     Most Recent D-dimer:  Recent Labs   Lab Test 09/12/19  0926   DD 3.90*     Most Recent ABG:  Recent Labs   Lab Test 08/16/21  1718   PH 7.38   PO2 94*   PCO2 52*   HCO3 28   MARLIN 5.0   ,   Results for orders placed or performed during the hospital encounter of 04/12/23   XR Chest Port 1 View    Narrative    EXAM: XR CHEST PORT 1 VIEW  LOCATION: [facility]  DATE/TIME: 4/12/2023 6:08 PM CDT    INDICATION: Hypoxia, fever, weakness  COMPARISON: 03/08/2023      Impression    IMPRESSION: Very shallow inspiration with some atelectasis in the lung bases. Heart size within normal limits and stable. No significant new findings.   CT Chest Pulmonary Embolism w Contrast    Narrative    EXAM: CT CHEST PULMONARY EMBOLISM W CONTRAST  LOCATION: [facility]  DATE/TIME: 4/12/2023 9:35 PM CDT    INDICATION: Cancer patient with hypoxia and cough, hypoventilation on x ray which otherwise looked clear  COMPARISON: 01/12/2023, 09/30/2022  TECHNIQUE: CT chest pulmonary angiogram during arterial phase injection of IV contrast. Multiplanar reformats  and MIP reconstructions were performed. Dose reduction techniques were used.   CONTRAST: isovue 370 90ml    FINDINGS:  ANGIOGRAM CHEST: Pulmonary arteries are normal caliber and negative for pulmonary emboli. Moderate calcified plaque in the thoracic aorta. No evidence of dissection.    LUNGS AND PLEURA: A posterior right lower lobe mass measuring 2.6 x 1.5 cm corresponding with the patient's known neoplasm measures mildly increased in size from 01/12/2023 when it measured 2.0 x 1.1 cm. However, motion artifact on today's exam may be   some mildly accentuating the size of the mass. Moderate interstitial opacities in both lungs likely due to scarring or fibrosis. Mild emphysematous changes in the upper lungs.    MEDIASTINUM/AXILLAE: Normal heart size. No pericardial effusions. A few prominent right paratracheal nodes measuring up to 1.0 cm in diameter are unchanged and were not hypermetabolic on the PET exam of 09/30/2022.    CORONARY ARTERY CALCIFICATION: Moderate.    UPPER ABDOMEN: No adrenal nodules. Cholelithiasis.    MUSCULOSKELETAL: Degenerative hypertrophic changes in the thoracic spine.      Impression    IMPRESSION:  1.  No evidence of acute pulmonary embolism or other acute pathology in the chest.  2.  A 2.6 x 1.5 cm mass in the posterior right lower lobe corresponding with the patient's known neoplasm measures mildly increased in size from 01/12/2023. However, motion artifact on today's exam may be mildly accentuating the size of the mass.  3.  Mild emphysematous changes in the upper lungs.  4.  Moderate interstitial opacities in both lungs likely due to scarring or fibrosis. This is not significant changed. No significant new infiltrates.   CT Abdomen Pelvis w/o Contrast    Narrative    EXAM: CT ABDOMEN PELVIS W/O CONTRAST  LOCATION: [facility]  DATE/TIME: 4/13/2023 1:34 PM CDT    INDICATION: Fever, high white count. Source of infection unclear. Lung cancer on  chemotherapy.  COMPARISON: CTA chest 04/12/2023, CTA CAP 01/12/2023 and older studies.  TECHNIQUE: CT scan of the abdomen and pelvis was performed without IV contrast. Multiplanar reformats were obtained. Dose reduction techniques were used.  CONTRAST: None.    FINDINGS:   LOWER CHEST: Partial visualization of the pleural-based approximately 2 cm right lower lobe nodule with trace adjacent effusion. No new pulmonary disease. Extensive coronary artery calcifications.    HEPATOBILIARY: Multiple gallstones filling the gallbladder again noted. No surrounding inflammatory changes. No liver lesions.    PANCREAS: Normal.    SPLEEN: Normal.    ADRENAL GLANDS: Normal.    KIDNEYS/BLADDER: Contrast in both collecting systems and bladder from recent exam. No perinephric inflammatory changes.    BOWEL: No inflammatory changes. Redundant colon with a moderate stool burden. Moderate distal colonic diverticulosis. Bowel is of normal caliber.    LYMPH NODES: Normal.    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 Stable sac size. No inflammatory changes in the retroperitoneum.    PELVIC ORGANS: Prostate is enlarged.    MUSCULOSKELETAL: Postsurgical changes in both groins with clips and scarring in the subcutaneous fat. No fluid collections. Chronic vertebral body compression fracture of T12 with at least 50% loss of vertebral body height. Multilevel degenerative   changes in the discs. Postsurgical changes from prior mid lumbar laminectomies. Old healed right posterior 11th rib fracture.      Impression    IMPRESSION:   1.  No evidence of occult infection in the abdomen or pelvis.  2.  Cholelithiasis with a gallbladder full of stones again noted. No CT findings of cholecystitis.  3.  Colonic diverticulosis.  4.  Known right lower lobe lung cancer is partially imaged.  5.  Other noncritical findings as noted above.           Discharge  Medications   Current Discharge Medication List      START taking these medications    Details   cephALEXin (KEFLEX) 500 MG capsule Take 1 capsule (500 mg) by mouth 2 times daily for 3 days  Qty: 6 capsule, Refills: 0    Associated Diagnoses: Community acquired pneumonia, unspecified laterality; Chronic obstructive pulmonary disease with acute lower respiratory infection (H); Pneumonia of right lower lobe due to infectious organism      predniSONE (DELTASONE) 10 MG tablet Take 1 tablet (10 mg) by mouth daily for 3 days  Qty: 3 tablet, Refills: 0    Associated Diagnoses: Bronchiectasis without acute exacerbation (H); Pneumonia of right lower lobe due to infectious organism         CONTINUE these medications which have NOT CHANGED    Details   acetaminophen (TYLENOL) 500 MG tablet Take 1,000 mg by mouth as needed      amLODIPine (NORVASC) 2.5 MG tablet Take 2.5 mg by mouth daily      aspirin (ASA) 81 MG chewable tablet Take 81 mg by mouth daily      Cholecalciferol (VITAMIN D3) 50 MCG (2000 UT) CAPS Take 1 tablet by mouth daily       DULoxetine (CYMBALTA) 30 MG capsule Take 60 mg by mouth every morning    Associated Diagnoses: Depression, unspecified depression type; Anxiety      furosemide (LASIX) 20 MG tablet Take 20 mg by mouth daily      latanoprost (XALATAN) 0.005 % ophthalmic solution Place 1 drop into both eyes At Bedtime      levothyroxine (SYNTHROID/LEVOTHROID) 88 MCG tablet TAKE 1 TABLET BY MOUTH EVERY DAY IN THE MORNING ON AN EMPTY STOMACH FOR 30 DAYS      losartan (COZAAR) 25 MG tablet Take 1 tablet (25 mg) by mouth daily  Qty: 30 tablet, Refills: 0    Associated Diagnoses: Congestive heart failure, unspecified HF chronicity, unspecified heart failure type (H)      metoprolol succinate ER (TOPROL-XL) 25 MG 24 hr tablet Take 0.5 tablets (12.5 mg) by mouth daily    Associated Diagnoses: Essential hypertension      multivitamin w/minerals (THERA-VIT-M) tablet Take 1 tablet by mouth daily      OLANZapine  (ZYPREXA) 5 MG tablet Take 7.5 mg by mouth At Bedtime      pantoprazole (PROTONIX) 40 MG EC tablet Take 1 tablet (40 mg) by mouth every morning (before breakfast)  Qty: 30 tablet, Refills: 0    Associated Diagnoses: Gastroesophageal reflux disease with esophagitis without hemorrhage      rosuvastatin (CRESTOR) 10 MG tablet Take 10 mg by mouth At Bedtime       albuterol (PROAIR HFA/PROVENTIL HFA/VENTOLIN HFA) 108 (90 Base) MCG/ACT inhaler Inhale 2 puffs into the lungs every 6 hours as needed       albuterol (PROVENTIL) (2.5 MG/3ML) 0.083% neb solution Inhale 2.5 mg into the lungs every 6 hours as needed          STOP taking these medications       calcitonin, salmon, (MIACALCIN) 200 UNIT/ACT nasal spray Comments:   Reason for Stopping:         diclofenac (VOLTAREN) 1 % topical gel Comments:   Reason for Stopping:         lidocaine (XYLOCAINE) 5 % external ointment Comments:   Reason for Stopping:         NIFEdipine ER (ADALAT CC) 30 MG 24 hr tablet Comments:   Reason for Stopping:         solifenacin (VESICARE) 10 MG tablet Comments:   Reason for Stopping:             Allergies   Allergies   Allergen Reactions     Diclofenac      Other reaction(s): GI Upset     Loratadine      Other reaction(s): Urinary Retention     Sertraline Unknown     Other reaction(s): ineffective

## 2023-04-16 NOTE — PLAN OF CARE
Goal Outcome Evaluation:  Patient received all discharge information. Oxygen was delivered and instructions were read. Patient acknowledged understanding. Wife at bedside to get discharge information also.  Patient went down to entrance with w/c and belongings. Wife picked him up at entrance.

## 2023-04-16 NOTE — PROGRESS NOTES
[facility]    Medicine Progress Note - Hospitalist Service    Date of Admission:  4/12/2023    Assessment & Plan    Principal Problem:    Acute respiratory failure with hypoxia (H)  Active Problems:    Benign essential hypertension    History of cerebrovascular accident    Hypothyroidism    Ocular hypertension, bilateral    QT prolongation    COPD (chronic obstructive pulmonary disease) (H)    Generalized anxiety disorder    Hypoxia    Opioid use disorder, severe, dependence (H)    Malignant neoplasm of lower lobe of right lung (H)    Pneumonia of right lower lobe due to infectious organism      82-year-old male with past medical history of tobacco use, COPD, CAD, CVA, depression, RLL adenocarcinoma T1 N0 M0 currently undergoing radiation therapy who presented for evaluation of shortness of breath, generalized weakness and confusion, found to be febrile up to 103, tachycardic and hypoxic requiring up to 6 LPM O2.  Labs significant for WBC 13.8, procalcitonin 3.56.  CT chest on 4/12 with known RLL mass and moderate interstitial opacities in both lungs.  Also has possible UTI, needing oxygen and has COPD exacerbation as well as noted on CT.  Started on IV Zosyn/doxycycline for possible pneumonia  -Plan to discontinue today on PO prednisone and keflex  - Home oxygen for night and activity- encouraged inhaler use for wheezing and SOB for COPD lung cancer. PCP follow up.   See discharge summary.    1.  Suspected postobstructive pneumonia associated with right LL mass.   Continue Zosyn and doxycycline for now.  That would also treat any UTI-cultures at this time has been negative from urine and previously had Enterococcus.  Still needing supplemental oxygen- borderline sats.     2.  Acute respiratory failure with hypoxia secondary to above, also suspect component of COPD contributing.  Wean off supplemental O2 to keep O2 sats above 94%  Overall continue efforts to wean oxygen oxygen needs and keep  "sats above 90%, added steroids and nebs.  Wheezing,  reports being improving gradually.   Likely need home oxygen evaluation, will also add nocturnal oximetry today.    3.  Acute metabolic encephalopathy.  Required restraints while in the ER.  Likely secondary to infection.  Seems to be improving    4.  RLL adenocarcinoma T1 N0 M0.  Not a good candidate for surgery.  Received 3 out of 5 radiation treatment.  Will resume treatment on Tuesday    5.  Mild AMAURI.  Likely in the setting of infection, poor oral intake.  Improved.  Continue to hold diuretics for now    6.  Essential hypertension.  Continue PTA meds with holding parameters    7.  Generalized weakness.  Secondary to acute illness.  PT/OT eval       Diet: Combination Diet Regular Diet Adult; 2 gm NA Diet  Diet    DVT Prophylaxis: Enoxaparin (Lovenox) SQ  Cabral Catheter: Not present  Lines: None     Cardiac Monitoring: None  Code Status: Full Code      Clinically Significant Risk Factors                        # Obesity: Estimated body mass index is 32.89 kg/m  as calculated from the following:    Height as of this encounter: 1.702 m (5' 7\").    Weight as of this encounter: 95.3 kg (210 lb)., PRESENT ON ADMISSION         Disposition Plan      Expected Discharge Date: 04/16/2023,  3:00 PM    Destination: home with family  Discharge Comments: Or 4/15/23          Liam Garcia MD  Hospitalist Service  [facility]  Securely message with [employer] (more info)  Text page via [employer] Paging/Directory   ______________________________________________________________________    Interval History   Overall continue efforts to wean oxygen oxygen needs and keep sats above 90%, added steroids and nebs.  Wheezing,  reports being improving gradually.   Short of breath as well  Desats quickly    Physical Exam   Vital Signs: Temp: 98.1  F (36.7  C) Temp src: Oral BP: (!) 164/89 Pulse: 90   Resp: 18 SpO2: 91 % O2 Device: Nasal cannula Oxygen Delivery: 2 " LPM  Weight: 210 lbs 0 oz    Alert awake vitals okay needing supplemental oxygen and desats with ambulation  Vision Baseline  Neck supple  Oral mucosa moist  bilateral air entry heard, rhonchi and wheezing  S1-S2 normal  Abdomen is soft no tenderness  Extremities are fully mobile and there is no visible swelling noted  No skin cyanosis  Neurologically- no new Gross deficits from baseline-Moving all 4 extremities  Psych-mood okay and appropriate to circumstances      Medical Decision Making       40 MINUTES SPENT BY ME on the date of service doing chart review, history, exam, documentation & further activities per the note.      Data         Imaging results reviewed over the past 24 hrs:   No results found for this or any previous visit (from the past 24 hour(s)).

## 2023-04-16 NOTE — PLAN OF CARE
Physical Therapy Discharge Summary    Reason for therapy discharge:    Discharged to home.    Progress towards therapy goal(s). See goals on Care Plan in [facility] electronic health record for goal details.  Goals partially met.  Barriers to achieving goals:   discharge from facility.    Therapy recommendation(s):    Recommended home PT to maximize safety and functional mobility

## 2023-04-16 NOTE — PLAN OF CARE
Problem: Gas Exchange Impaired  Goal: Optimal Gas Exchange  Intervention: Optimize Oxygenation and Ventilation  Recent Flowsheet Documentation  Taken 4/16/2023 0100 by Anna Grijalva RN  Head of Bed ([facility]) Positioning: HOB at 20-30 degrees     Problem: Pneumonia  Goal: Effective Oxygenation and Ventilation  Intervention: Optimize Oxygenation and Ventilation  Recent Flowsheet Documentation  Taken 4/16/2023 0100 by Anna Grijalva RN  Head of Bed ([facility]) Positioning: HOB at 20-30 degrees     Problem: Mobility Impairment  Goal: Optimal Mobility  Outcome: Progressing  Intervention: Optimize Mobility  Recent Flowsheet Documentation  Taken 4/16/2023 0100 by Anna Grijalva RN  Positioning/Transfer Devices: pillows   Goal Outcome Evaluation:    Patient alert, oriented x 4. Pleasant and co-operative. Complained of left hip pain rating 9/10, aching, sharp, and constant in nature. Administered Tylenol prn, reported relief and was noted sleeping. Needed stand by assistance to ambulate to the bathroom. Noted with some dyspnea with ambulation. Will continue to monitor the patient.

## 2023-04-16 NOTE — PROGRESS NOTES
Patient has been assessed for Home Oxygen needs. Oxygen readings:    *Pulse oximetry (SpO2) =88% on room air at rest while awake.    *SpO2 improved to 94% on 2lliters/minute at rest.    *SpO2 = 85% on room air during activity/with exercise.    *SpO2 improved to 93% on 2liters/minute during activity/with exercise.

## 2023-04-16 NOTE — PROGRESS NOTES
Patient has been assessed for Home Oxygen needs.     Pulse oximetry (SpO2) and Oxygen flow readings:    SpO2 = 89% on room air at rest while awake.    SpO2 improved to  % on   liters/minute at rest.    SpO2 = 92% on room air during activity/with exercise.    *SpO2 improved to  % on   liters/minute during activity/with exercise.

## 2023-04-16 NOTE — PLAN OF CARE
Problem: Plan of Care - These are the overarching goals to be used throughout the patient stay.    Goal: Plan of Care Review  Description: The Plan of Care Review/Shift note should be completed every shift.  The Outcome Evaluation is a brief statement about your assessment that the patient is improving, declining, or no change.  This information will be displayed automatically on your shift note.  Outcome: Adequate for Care Transition   Goal Outcome Evaluation:  Reviewed plan of care with pt.Patient up with 1 assist and walker-sats vary greatly on RA-from 89 at rest to 92 when walking. Oximetry at night indicated pt needed 2L O2 when sleeping.pt tolerating regular diet-had bm today. Will arrange O2 thru [facility] for home use.Redid O2 home eval  As pt seemed so winded -changed finger probe and numbers do indicate pt needsO2 with activity and at night.

## 2023-04-17 LAB
BACTERIA BLD CULT: NO GROWTH
BACTERIA BLD CULT: NO GROWTH

## 2023-04-17 NOTE — PLAN OF CARE
Occupational Therapy Discharge Summary    Reason for therapy discharge:    Discharged to home.    Progress towards therapy goal(s). See goals on Care Plan in [facility] electronic health record for goal details.  Goals partially met.  Barriers to achieving goals:   discharge from facility.    Therapy recommendation(s):    No further therapy is recommended.    Goal Outcome Evaluation:                   Mary Reyes, OTR/DARREL  4/17/2023

## 2023-04-18 ENCOUNTER — APPOINTMENT (OUTPATIENT)
Dept: RADIATION ONCOLOGY | Facility: HOSPITAL | Age: 83
End: 2023-04-18
Attending: RADIOLOGY
Payer: COMMERCIAL

## 2023-04-18 PROCEDURE — 77373 STRTCTC BDY RAD THER TX DLVR: CPT | Performed by: RADIOLOGY

## 2023-04-19 ENCOUNTER — APPOINTMENT (OUTPATIENT)
Dept: RADIATION ONCOLOGY | Facility: HOSPITAL | Age: 83
End: 2023-04-19
Attending: RADIOLOGY
Payer: COMMERCIAL

## 2023-04-19 PROCEDURE — 77373 STRTCTC BDY RAD THER TX DLVR: CPT | Performed by: RADIOLOGY

## 2023-04-20 ENCOUNTER — LAB REQUISITION (OUTPATIENT)
Dept: LAB | Facility: CLINIC | Age: 83
End: 2023-04-20

## 2023-04-20 DIAGNOSIS — I10 ESSENTIAL (PRIMARY) HYPERTENSION: ICD-10-CM

## 2023-04-20 DIAGNOSIS — E03.9 HYPOTHYROIDISM, UNSPECIFIED: ICD-10-CM

## 2023-04-20 PROCEDURE — 80048 BASIC METABOLIC PNL TOTAL CA: CPT | Performed by: PHYSICIAN ASSISTANT

## 2023-04-20 PROCEDURE — 84443 ASSAY THYROID STIM HORMONE: CPT | Performed by: PHYSICIAN ASSISTANT

## 2023-04-21 ENCOUNTER — OFFICE VISIT (OUTPATIENT)
Dept: RADIATION ONCOLOGY | Facility: HOSPITAL | Age: 83
End: 2023-04-21
Attending: RADIOLOGY
Payer: COMMERCIAL

## 2023-04-21 VITALS — HEART RATE: 86 BPM | SYSTOLIC BLOOD PRESSURE: 107 MMHG | DIASTOLIC BLOOD PRESSURE: 68 MMHG | RESPIRATION RATE: 18 BRPM

## 2023-04-21 DIAGNOSIS — C34.31 MALIGNANT NEOPLASM OF LOWER LOBE OF RIGHT LUNG (H): Primary | ICD-10-CM

## 2023-04-21 LAB
ANION GAP SERPL CALCULATED.3IONS-SCNC: 13 MMOL/L (ref 7–15)
BUN SERPL-MCNC: 28.1 MG/DL (ref 8–23)
CALCIUM SERPL-MCNC: 9.7 MG/DL (ref 8.8–10.2)
CHLORIDE SERPL-SCNC: 103 MMOL/L (ref 98–107)
CREAT SERPL-MCNC: 1.37 MG/DL (ref 0.67–1.17)
DEPRECATED HCO3 PLAS-SCNC: 26 MMOL/L (ref 22–29)
GFR SERPL CREATININE-BSD FRML MDRD: 52 ML/MIN/1.73M2
GLUCOSE SERPL-MCNC: 106 MG/DL (ref 70–99)
POTASSIUM SERPL-SCNC: 4.5 MMOL/L (ref 3.4–5.3)
SODIUM SERPL-SCNC: 142 MMOL/L (ref 136–145)
TSH SERPL DL<=0.005 MIU/L-ACNC: 2.02 UIU/ML (ref 0.3–4.2)

## 2023-04-21 PROCEDURE — 77373 STRTCTC BDY RAD THER TX DLVR: CPT | Performed by: RADIOLOGY

## 2023-04-21 PROCEDURE — 77435 SBRT MANAGEMENT: CPT | Performed by: RADIOLOGY

## 2023-04-21 PROCEDURE — 77336 RADIATION PHYSICS CONSULT: CPT | Performed by: RADIOLOGY

## 2023-04-21 ASSESSMENT — PAIN SCALES - GENERAL: PAINLEVEL: NO PAIN (0)

## 2023-04-21 NOTE — PROGRESS NOTES
SBRT/SRS Treatment Summary    Patient: Dominik Rojas   MRN: [id number]  : 1940  Care Provider: Rachael Bales MD    Date of Service: 2023      Fidel Sun MD  [address] [place] [id number]           Dear Dr. Sun:     Your patient Mr. Dominik Rojas completed his radiation therapy on 2023. As you know Mr. Rojas is a 82 year old male with a diagnosis of adenocarcinoma of right lower lobe lung, clinical stage T1 N0 M0.  Patient is not a good candidate for surgery given his advanced age and medical status.  The patient received stereotactic radiosurgery with a total dose of 5000 cGy in 5 treatments given from 4/10/2023 - 2023.  He tolerated radiation therapy very well with minimal side effect.  The patient is scheduled to return to radiation oncology in 2-month for routine office follow-up and CT chest.    Again, thank you very much for the referral and allowing me to participate in the care of this patient.  If you have any questions or concerns about this patient, please do not hesitate to call.          Sincerely,    Rachael Bales MD, PhD  Department of Radiation Oncology   [facility] Radiation Oncology  Tel: [phone]  Page: [phone]    [facility]  [address] [place] [address]     [address] [place] [address]    CC:  Patient Care Team:  Misael Moe PA-C as PCP - General (Physician Assistant)  Wilma Jansen RPH as Pharmacist (Pharmacist Ambulatory Care)  Kindra Pack MD as Assigned Heart and Vascular Provider  Fidel Sun MD as Assigned Pulmonology Provider  Rachael Bales MD as MD (Radiation Oncology)  Wilma Jansen RPH as Assigned MTM Pharmacist  Rachael Bales MD as Assigned Cancer Care Provider

## 2023-04-21 NOTE — PROGRESS NOTES
RADIATION ONCOLOGY WEEKLY TREATMENT VISIT NOTE      Assessment / Impression     Malignant neoplasm of lower lobe of right lung (H) [C34.31]    Tolerating radiation therapy well.  All questions and concerns addressed.    Plan:     Follow-up with radiation oncology in 2 months.    Subjective:      HPI: Dominik Rojas is a 82 year old male with  Malignant neoplasm of lower lobe of right lung (H) [C34.31]    The following portions of the patient's history were reviewed and updated as appropriate: allergies, current medications, past family history, past medical history, past social history, past surgical history and problem list.    Assessment                  Body Site:  Thoracic Site: R lung  Stereotactic Radiosurgery: Yes  Stereotactic Radiosurgery date: 23  Concurrent Therapy: No  Today's Dose: 5000  Total Dose for Thoracic: 5000  Today's Fraction/Total Fraction Thoracic: 5/5  Voice Chances/Stridor/Larynx: 0: Normal  Pharynx and Esphogaus: 0: No change over baseline  Hemoptysis: 0: None  Mucus Color: 2: Yellow  Mucus Quantity: 2: 1 tsp of mucus  Dyspnea: 3: Dyspnea at normal level of activity                                       Sexuality Alteration                    Emotional Alteration    Copin: Effective  Comfort Alteration   KPS: 60% Requires assistance, but can meet most meeds with assistance  Fatigue (ONS scale): 8: Extreme Fatigue  Pain Location: denies   Nutrition Alteration   Anorexia: 0: None  Nausea: 0: None  Vomitin: None  Pharynx and Esphogaus: 0: No change over baseline  Skin Alteration      AUA Assessment                                           Accompanied by       Objective:     Exam: Examination reviewed no significant changes.    Vitals:    23 1539   BP: 107/68   Pulse: 86   Resp: 18   PainSc: No Pain (0)       Wt Readings from Last 8 Encounters:   23 95.3 kg (210 lb)   23 95.7 kg (211 lb)   23 95.7 kg (211 lb)   10/26/21 97.8 kg (215 lb 11.2  oz)   09/14/21 94.5 kg (208 lb 6.4 oz)   09/09/21 97.1 kg (214 lb)   09/07/21 97.2 kg (214 lb 3.2 oz)   08/22/21 98.2 kg (216 lb 8 oz)       General: Alert and oriented, in no acute distress  Dominik has no Erythema.  Aria chart and setup information reviewed    Rachael Bales MD

## 2023-04-21 NOTE — LETTER
2023         RE: Dominik Rojas  [address]  [facility] [address]        Dear Colleague,    Thank you for referring your patient, Dominik Rojas, to the [facility] RADIATION ONCOLOGY [place]. Please see a copy of my visit note below.    SBRT/SRS Treatment Summary    Patient: Dominik Rojas   MRN: [id number]  : 1940  Care Provider: Rachael Bales MD    Date of Service: 2023      Fidel Sun MD  [address] [place] [id number]           Dear Dr. Sun:     Your patient Mr. Dominik Rojas completed his radiation therapy on 2023. As you know Mr. Rojas is a 82 year old male with a diagnosis of adenocarcinoma of right lower lobe lung, clinical stage T1 N0 M0.  Patient is not a good candidate for surgery given his advanced age and medical status.  The patient received stereotactic radiosurgery with a total dose of 5000 cGy in 5 treatments given from 4/10/2023 - 2023.  He tolerated radiation therapy very well with minimal side effect.  The patient is scheduled to return to radiation oncology in 2-month for routine office follow-up and CT chest.    Again, thank you very much for the referral and allowing me to participate in the care of this patient.  If you have any questions or concerns about this patient, please do not hesitate to call.          Sincerely,    Rachael Bales MD, PhD  Department of Radiation Oncology   [facility] Radiation Oncology  Tel: [phone]  Page: [phone]    [facility]  [address] [place] [address]     [address] [place] [address]    CC:  Patient Care Team:  Misael Moe PA-C as PCP - General (Physician Assistant)  Wilma Jansen RPH as Pharmacist (Pharmacist Ambulatory Care)  Kindra Pack MD as Assigned Heart and Vascular Provider  Fidel Sun MD as Assigned Pulmonology Provider  Rachael Bales MD as MD (Radiation Oncology)  Wilma Jansen RPH as Assigned  MTM Pharmacist  Rachael Baels MD as Assigned Cancer Care Provider      RADIATION ONCOLOGY WEEKLY TREATMENT VISIT NOTE      Assessment / Impression     Malignant neoplasm of lower lobe of right lung (H) [C34.31]    Tolerating radiation therapy well.  All questions and concerns addressed.    Plan:     Follow-up with radiation oncology in 2 months.    Subjective:      HPI: Dominik Rojas is a 82 year old male with  Malignant neoplasm of lower lobe of right lung (H) [C34.31]    The following portions of the patient's history were reviewed and updated as appropriate: allergies, current medications, past family history, past medical history, past social history, past surgical history and problem list.    Assessment                  Body Site:  Thoracic Site: R lung  Stereotactic Radiosurgery: Yes  Stereotactic Radiosurgery date: 23  Concurrent Therapy: No  Today's Dose: 5000  Total Dose for Thoracic: 5000  Today's Fraction/Total Fraction Thoracic: 5/5  Voice Chances/Stridor/Larynx: 0: Normal  Pharynx and Esphogaus: 0: No change over baseline  Hemoptysis: 0: None  Mucus Color: 2: Yellow  Mucus Quantity: 2: 1 tsp of mucus  Dyspnea: 3: Dyspnea at normal level of activity                                       Sexuality Alteration                    Emotional Alteration    Copin: Effective  Comfort Alteration   KPS: 60% Requires assistance, but can meet most meeds with assistance  Fatigue (ONS scale): 8: Extreme Fatigue  Pain Location: denies   Nutrition Alteration   Anorexia: 0: None  Nausea: 0: None  Vomitin: None  Pharynx and Esphogaus: 0: No change over baseline  Skin Alteration      AUA Assessment                                           Accompanied by       Objective:     Exam: Examination reviewed no significant changes.    Vitals:    23 1539   BP: 107/68   Pulse: 86   Resp: 18   PainSc: No Pain (0)       Wt Readings from Last 8 Encounters:   23 95.3 kg (210 lb)   23 95.7 kg  (211 lb)   01/12/23 95.7 kg (211 lb)   10/26/21 97.8 kg (215 lb 11.2 oz)   09/14/21 94.5 kg (208 lb 6.4 oz)   09/09/21 97.1 kg (214 lb)   09/07/21 97.2 kg (214 lb 3.2 oz)   08/22/21 98.2 kg (216 lb 8 oz)       General: Alert and oriented, in no acute distress  Dominik has no Erythema.  Aria chart and setup information reviewed    Rachael Bales MD      Again, thank you for allowing me to participate in the care of your patient.        Sincerely,        Rachael Bales MD

## 2023-04-21 NOTE — PROGRESS NOTES
Pt here for their final radiation treatment. DC instructions given verbally and in writing, pt and spouse verbalized their understanding. Encouraged pt to make 2 month f/u apt on their way out today.

## 2023-04-24 ENCOUNTER — APPOINTMENT (OUTPATIENT)
Dept: RADIOLOGY | Facility: HOSPITAL | Age: 83
DRG: 871 | End: 2023-04-24
Attending: EMERGENCY MEDICINE
Payer: COMMERCIAL

## 2023-04-24 ENCOUNTER — HOSPITAL ENCOUNTER (INPATIENT)
Facility: HOSPITAL | Age: 83
LOS: 12 days | Discharge: HOME | DRG: 871 | End: 2023-05-07
Attending: EMERGENCY MEDICINE | Admitting: INTERNAL MEDICINE
Payer: COMMERCIAL

## 2023-04-24 DIAGNOSIS — J18.9 PNEUMONIA OF RIGHT LOWER LOBE DUE TO INFECTIOUS ORGANISM: ICD-10-CM

## 2023-04-24 DIAGNOSIS — J44.1 COPD EXACERBATION (H): ICD-10-CM

## 2023-04-24 DIAGNOSIS — I50.22 CHRONIC SYSTOLIC CONGESTIVE HEART FAILURE (H): ICD-10-CM

## 2023-04-24 DIAGNOSIS — J18.9 HCAP (HEALTHCARE-ASSOCIATED PNEUMONIA): ICD-10-CM

## 2023-04-24 DIAGNOSIS — F03.93 DEMENTIA WITH MOOD DISTURBANCE, UNSPECIFIED DEMENTIA SEVERITY, UNSPECIFIED DEMENTIA TYPE (H): Primary | ICD-10-CM

## 2023-04-24 LAB
ALBUMIN SERPL BCG-MCNC: 3.9 G/DL (ref 3.5–5.2)
ALP SERPL-CCNC: 131 U/L (ref 40–129)
ALT SERPL W P-5'-P-CCNC: 23 U/L (ref 10–50)
ANION GAP SERPL CALCULATED.3IONS-SCNC: 13 MMOL/L (ref 7–15)
AST SERPL W P-5'-P-CCNC: 42 U/L (ref 10–50)
BASE EXCESS BLDV CALC-SCNC: 4.7 MMOL/L
BASOPHILS # BLD AUTO: 0.1 10E3/UL (ref 0–0.2)
BASOPHILS NFR BLD AUTO: 0 %
BILIRUB SERPL-MCNC: 1.2 MG/DL
BUN SERPL-MCNC: 21.8 MG/DL (ref 8–23)
CALCIUM SERPL-MCNC: 9.2 MG/DL (ref 8.8–10.2)
CHLORIDE SERPL-SCNC: 97 MMOL/L (ref 98–107)
CREAT SERPL-MCNC: 1.14 MG/DL (ref 0.67–1.17)
DEPRECATED HCO3 PLAS-SCNC: 24 MMOL/L (ref 22–29)
EOSINOPHIL # BLD AUTO: 0.2 10E3/UL (ref 0–0.7)
EOSINOPHIL NFR BLD AUTO: 1 %
ERYTHROCYTE [DISTWIDTH] IN BLOOD BY AUTOMATED COUNT: 13.7 % (ref 10–15)
FLUAV RNA SPEC QL NAA+PROBE: NEGATIVE
FLUBV RNA RESP QL NAA+PROBE: NEGATIVE
GFR SERPL CREATININE-BSD FRML MDRD: 65 ML/MIN/1.73M2
GLUCOSE SERPL-MCNC: 119 MG/DL (ref 70–99)
HCO3 BLDV-SCNC: 29 MMOL/L (ref 24–30)
HCT VFR BLD AUTO: 47.5 % (ref 40–53)
HGB BLD-MCNC: 15.6 G/DL (ref 13.3–17.7)
HOLD SPECIMEN: NORMAL
IMM GRANULOCYTES # BLD: 0.1 10E3/UL
IMM GRANULOCYTES NFR BLD: 0 %
INR PPP: 1.16 (ref 0.85–1.15)
LACTATE SERPL-SCNC: 2.1 MMOL/L (ref 0.7–2)
LYMPHOCYTES # BLD AUTO: 0.9 10E3/UL (ref 0.8–5.3)
LYMPHOCYTES NFR BLD AUTO: 5 %
MAGNESIUM SERPL-MCNC: 1.9 MG/DL (ref 1.7–2.3)
MCH RBC QN AUTO: 30.2 PG (ref 26.5–33)
MCHC RBC AUTO-ENTMCNC: 32.8 G/DL (ref 31.5–36.5)
MCV RBC AUTO: 92 FL (ref 78–100)
MONOCYTES # BLD AUTO: 0.9 10E3/UL (ref 0–1.3)
MONOCYTES NFR BLD AUTO: 5 %
NEUTROPHILS # BLD AUTO: 15.1 10E3/UL (ref 1.6–8.3)
NEUTROPHILS NFR BLD AUTO: 89 %
NRBC # BLD AUTO: 0 10E3/UL
NRBC BLD AUTO-RTO: 0 /100
OXYHGB MFR BLDV: 65.4 % (ref 70–75)
PCO2 BLDV: 42 MM HG (ref 35–50)
PH BLDV: 7.45 [PH] (ref 7.35–7.45)
PLATELET # BLD AUTO: 250 10E3/UL (ref 150–450)
PO2 BLDV: 34 MM HG (ref 25–47)
POTASSIUM SERPL-SCNC: 5.5 MMOL/L (ref 3.4–5.3)
PROT SERPL-MCNC: 7.2 G/DL (ref 6.4–8.3)
RBC # BLD AUTO: 5.16 10E6/UL (ref 4.4–5.9)
RSV RNA SPEC NAA+PROBE: NEGATIVE
SAO2 % BLDV: 66.6 % (ref 70–75)
SARS-COV-2 RNA RESP QL NAA+PROBE: NEGATIVE
SODIUM SERPL-SCNC: 134 MMOL/L (ref 136–145)
TROPONIN T SERPL HS-MCNC: 18 NG/L
WBC # BLD AUTO: 17.3 10E3/UL (ref 4–11)

## 2023-04-24 PROCEDURE — 96365 THER/PROPH/DIAG IV INF INIT: CPT

## 2023-04-24 PROCEDURE — 250N000011 HC RX IP 250 OP 636: Performed by: EMERGENCY MEDICINE

## 2023-04-24 PROCEDURE — 250N000009 HC RX 250: Performed by: EMERGENCY MEDICINE

## 2023-04-24 PROCEDURE — 94660 CPAP INITIATION&MGMT: CPT

## 2023-04-24 PROCEDURE — 85014 HEMATOCRIT: CPT | Performed by: EMERGENCY MEDICINE

## 2023-04-24 PROCEDURE — 87040 BLOOD CULTURE FOR BACTERIA: CPT | Performed by: EMERGENCY MEDICINE

## 2023-04-24 PROCEDURE — 96375 TX/PRO/DX INJ NEW DRUG ADDON: CPT

## 2023-04-24 PROCEDURE — 83735 ASSAY OF MAGNESIUM: CPT | Performed by: EMERGENCY MEDICINE

## 2023-04-24 PROCEDURE — 93005 ELECTROCARDIOGRAM TRACING: CPT | Performed by: EMERGENCY MEDICINE

## 2023-04-24 PROCEDURE — 250N000013 HC RX MED GY IP 250 OP 250 PS 637: Performed by: EMERGENCY MEDICINE

## 2023-04-24 PROCEDURE — 94640 AIRWAY INHALATION TREATMENT: CPT

## 2023-04-24 PROCEDURE — 84484 ASSAY OF TROPONIN QUANT: CPT | Performed by: EMERGENCY MEDICINE

## 2023-04-24 PROCEDURE — 999N000157 HC STATISTIC RCP TIME EA 10 MIN

## 2023-04-24 PROCEDURE — C9803 HOPD COVID-19 SPEC COLLECT: HCPCS

## 2023-04-24 PROCEDURE — 258N000003 HC RX IP 258 OP 636: Performed by: EMERGENCY MEDICINE

## 2023-04-24 PROCEDURE — 36415 COLL VENOUS BLD VENIPUNCTURE: CPT | Performed by: EMERGENCY MEDICINE

## 2023-04-24 PROCEDURE — 83605 ASSAY OF LACTIC ACID: CPT | Performed by: EMERGENCY MEDICINE

## 2023-04-24 PROCEDURE — 99291 CRITICAL CARE FIRST HOUR: CPT | Mod: CS,25

## 2023-04-24 PROCEDURE — 87637 SARSCOV2&INF A&B&RSV AMP PRB: CPT | Performed by: EMERGENCY MEDICINE

## 2023-04-24 PROCEDURE — 71045 X-RAY EXAM CHEST 1 VIEW: CPT

## 2023-04-24 PROCEDURE — 80053 COMPREHEN METABOLIC PANEL: CPT | Performed by: EMERGENCY MEDICINE

## 2023-04-24 PROCEDURE — 85610 PROTHROMBIN TIME: CPT | Performed by: EMERGENCY MEDICINE

## 2023-04-24 PROCEDURE — 82805 BLOOD GASES W/O2 SATURATION: CPT | Performed by: EMERGENCY MEDICINE

## 2023-04-24 RX ORDER — LORAZEPAM 2 MG/ML
1 INJECTION INTRAMUSCULAR ONCE
Status: COMPLETED | OUTPATIENT
Start: 2023-04-24 | End: 2023-04-24

## 2023-04-24 RX ORDER — ACETAMINOPHEN 325 MG/1
975 TABLET ORAL ONCE
Status: COMPLETED | OUTPATIENT
Start: 2023-04-24 | End: 2023-04-24

## 2023-04-24 RX ORDER — PIPERACILLIN SODIUM, TAZOBACTAM SODIUM 3; .375 G/15ML; G/15ML
3.38 INJECTION, POWDER, LYOPHILIZED, FOR SOLUTION INTRAVENOUS ONCE
Status: COMPLETED | OUTPATIENT
Start: 2023-04-24 | End: 2023-04-24

## 2023-04-24 RX ORDER — METHYLPREDNISOLONE SODIUM SUCCINATE 125 MG/2ML
125 INJECTION, POWDER, LYOPHILIZED, FOR SOLUTION INTRAMUSCULAR; INTRAVENOUS ONCE
Status: COMPLETED | OUTPATIENT
Start: 2023-04-24 | End: 2023-04-24

## 2023-04-24 RX ORDER — MORPHINE SULFATE 4 MG/ML
4 INJECTION, SOLUTION INTRAMUSCULAR; INTRAVENOUS ONCE
Status: COMPLETED | OUTPATIENT
Start: 2023-04-24 | End: 2023-04-24

## 2023-04-24 RX ORDER — LEVALBUTEROL INHALATION SOLUTION 1.25 MG/3ML
1.25 SOLUTION RESPIRATORY (INHALATION) ONCE
Status: COMPLETED | OUTPATIENT
Start: 2023-04-24 | End: 2023-04-24

## 2023-04-24 RX ORDER — CEFAZOLIN SODIUM 1 G/50ML
1750 SOLUTION INTRAVENOUS ONCE
Status: COMPLETED | OUTPATIENT
Start: 2023-04-24 | End: 2023-04-25

## 2023-04-24 RX ADMIN — PIPERACILLIN AND TAZOBACTAM 3.38 G: 3; .375 INJECTION, POWDER, LYOPHILIZED, FOR SOLUTION INTRAVENOUS at 22:22

## 2023-04-24 RX ADMIN — LORAZEPAM 1 MG: 2 INJECTION INTRAMUSCULAR; INTRAVENOUS at 22:40

## 2023-04-24 RX ADMIN — VANCOMYCIN HYDROCHLORIDE 1750 MG: 5 INJECTION, POWDER, LYOPHILIZED, FOR SOLUTION INTRAVENOUS at 22:41

## 2023-04-24 RX ADMIN — MORPHINE SULFATE 4 MG: 4 INJECTION, SOLUTION INTRAMUSCULAR; INTRAVENOUS at 22:08

## 2023-04-24 RX ADMIN — LEVALBUTEROL HYDROCHLORIDE 1.25 MG: 1.25 SOLUTION RESPIRATORY (INHALATION) at 21:45

## 2023-04-24 RX ADMIN — ACETAMINOPHEN 975 MG: 325 TABLET ORAL at 23:00

## 2023-04-24 RX ADMIN — METHYLPREDNISOLONE SODIUM SUCCINATE 125 MG: 125 INJECTION, POWDER, FOR SOLUTION INTRAMUSCULAR; INTRAVENOUS at 21:49

## 2023-04-24 ASSESSMENT — ENCOUNTER SYMPTOMS
COUGH: 1
SHORTNESS OF BREATH: 1
FATIGUE: 1

## 2023-04-24 ASSESSMENT — ACTIVITIES OF DAILY LIVING (ADL): ADLS_ACUITY_SCORE: 35

## 2023-04-25 PROBLEM — J18.9 HCAP (HEALTHCARE-ASSOCIATED PNEUMONIA): Status: ACTIVE | Noted: 2023-04-25

## 2023-04-25 PROBLEM — J44.1 COPD EXACERBATION (H): Status: ACTIVE | Noted: 2023-04-25

## 2023-04-25 LAB
ALBUMIN SERPL BCG-MCNC: 3.3 G/DL (ref 3.5–5.2)
ALP SERPL-CCNC: 110 U/L (ref 40–129)
ALT SERPL W P-5'-P-CCNC: 18 U/L (ref 10–50)
ANION GAP SERPL CALCULATED.3IONS-SCNC: 15 MMOL/L (ref 7–15)
AST SERPL W P-5'-P-CCNC: 30 U/L (ref 10–50)
BASOPHILS # BLD AUTO: 0 10E3/UL (ref 0–0.2)
BASOPHILS NFR BLD AUTO: 0 %
BILIRUB SERPL-MCNC: 1.4 MG/DL
BUN SERPL-MCNC: 24.6 MG/DL (ref 8–23)
CALCIUM SERPL-MCNC: 8.9 MG/DL (ref 8.8–10.2)
CHLORIDE SERPL-SCNC: 101 MMOL/L (ref 98–107)
CREAT SERPL-MCNC: 1.11 MG/DL (ref 0.67–1.17)
DEPRECATED HCO3 PLAS-SCNC: 18 MMOL/L (ref 22–29)
EOSINOPHIL # BLD AUTO: 0 10E3/UL (ref 0–0.7)
EOSINOPHIL NFR BLD AUTO: 0 %
ERYTHROCYTE [DISTWIDTH] IN BLOOD BY AUTOMATED COUNT: 13.6 % (ref 10–15)
GFR SERPL CREATININE-BSD FRML MDRD: 67 ML/MIN/1.73M2
GLUCOSE SERPL-MCNC: 143 MG/DL (ref 70–99)
HCT VFR BLD AUTO: 45.3 % (ref 40–53)
HGB BLD-MCNC: 14.5 G/DL (ref 13.3–17.7)
IMM GRANULOCYTES # BLD: 0.1 10E3/UL
IMM GRANULOCYTES NFR BLD: 1 %
LACTATE SERPL-SCNC: 1.4 MMOL/L (ref 0.7–2)
LYMPHOCYTES # BLD AUTO: 0.8 10E3/UL (ref 0.8–5.3)
LYMPHOCYTES NFR BLD AUTO: 6 %
MCH RBC QN AUTO: 30 PG (ref 26.5–33)
MCHC RBC AUTO-ENTMCNC: 32 G/DL (ref 31.5–36.5)
MCV RBC AUTO: 94 FL (ref 78–100)
MONOCYTES # BLD AUTO: 0.1 10E3/UL (ref 0–1.3)
MONOCYTES NFR BLD AUTO: 1 %
MRSA DNA SPEC QL NAA+PROBE: NEGATIVE
NEUTROPHILS # BLD AUTO: 13.9 10E3/UL (ref 1.6–8.3)
NEUTROPHILS NFR BLD AUTO: 92 %
NRBC # BLD AUTO: 0 10E3/UL
NRBC BLD AUTO-RTO: 0 /100
PLATELET # BLD AUTO: 188 10E3/UL (ref 150–450)
POTASSIUM SERPL-SCNC: 5 MMOL/L (ref 3.4–5.3)
PROT SERPL-MCNC: 6.5 G/DL (ref 6.4–8.3)
RBC # BLD AUTO: 4.83 10E6/UL (ref 4.4–5.9)
SA TARGET DNA: NEGATIVE
SODIUM SERPL-SCNC: 134 MMOL/L (ref 136–145)
WBC # BLD AUTO: 15 10E3/UL (ref 4–11)

## 2023-04-25 PROCEDURE — 94640 AIRWAY INHALATION TREATMENT: CPT

## 2023-04-25 PROCEDURE — 85025 COMPLETE CBC W/AUTO DIFF WBC: CPT | Performed by: INTERNAL MEDICINE

## 2023-04-25 PROCEDURE — 999N000215 HC STATISTIC HFNC ADULT NON-CPAP

## 2023-04-25 PROCEDURE — 999N000157 HC STATISTIC RCP TIME EA 10 MIN

## 2023-04-25 PROCEDURE — 36415 COLL VENOUS BLD VENIPUNCTURE: CPT | Performed by: EMERGENCY MEDICINE

## 2023-04-25 PROCEDURE — 250N000011 HC RX IP 250 OP 636

## 2023-04-25 PROCEDURE — 87641 MR-STAPH DNA AMP PROBE: CPT | Performed by: INTERNAL MEDICINE

## 2023-04-25 PROCEDURE — 83605 ASSAY OF LACTIC ACID: CPT | Performed by: EMERGENCY MEDICINE

## 2023-04-25 PROCEDURE — 36415 COLL VENOUS BLD VENIPUNCTURE: CPT | Performed by: INTERNAL MEDICINE

## 2023-04-25 PROCEDURE — 99233 SBSQ HOSP IP/OBS HIGH 50: CPT | Performed by: INTERNAL MEDICINE

## 2023-04-25 PROCEDURE — G0463 HOSPITAL OUTPT CLINIC VISIT: HCPCS

## 2023-04-25 PROCEDURE — 250N000013 HC RX MED GY IP 250 OP 250 PS 637: Performed by: INTERNAL MEDICINE

## 2023-04-25 PROCEDURE — 250N000011 HC RX IP 250 OP 636: Performed by: INTERNAL MEDICINE

## 2023-04-25 PROCEDURE — 250N000009 HC RX 250: Performed by: INTERNAL MEDICINE

## 2023-04-25 PROCEDURE — 5A0955A ASSISTANCE WITH RESPIRATORY VENTILATION, GREATER THAN 96 CONSECUTIVE HOURS, HIGH NASAL FLOW/VELOCITY: ICD-10-PCS | Performed by: INTERNAL MEDICINE

## 2023-04-25 PROCEDURE — 258N000003 HC RX IP 258 OP 636: Performed by: INTERNAL MEDICINE

## 2023-04-25 PROCEDURE — 80053 COMPREHEN METABOLIC PANEL: CPT | Performed by: INTERNAL MEDICINE

## 2023-04-25 PROCEDURE — 94640 AIRWAY INHALATION TREATMENT: CPT | Mod: 76

## 2023-04-25 PROCEDURE — 120N000001 HC R&B MED SURG/OB

## 2023-04-25 PROCEDURE — 99223 1ST HOSP IP/OBS HIGH 75: CPT | Performed by: INTERNAL MEDICINE

## 2023-04-25 RX ORDER — ONDANSETRON 2 MG/ML
4 INJECTION INTRAMUSCULAR; INTRAVENOUS EVERY 6 HOURS PRN
Status: DISCONTINUED | OUTPATIENT
Start: 2023-04-25 | End: 2023-05-07 | Stop reason: HOSPADM

## 2023-04-25 RX ORDER — LATANOPROST 50 UG/ML
1 SOLUTION/ DROPS OPHTHALMIC AT BEDTIME
Status: DISCONTINUED | OUTPATIENT
Start: 2023-04-25 | End: 2023-05-07 | Stop reason: HOSPADM

## 2023-04-25 RX ORDER — PROCHLORPERAZINE 25 MG
12.5 SUPPOSITORY, RECTAL RECTAL EVERY 12 HOURS PRN
Status: DISCONTINUED | OUTPATIENT
Start: 2023-04-25 | End: 2023-05-07 | Stop reason: HOSPADM

## 2023-04-25 RX ORDER — ONDANSETRON 4 MG/1
4 TABLET, ORALLY DISINTEGRATING ORAL EVERY 6 HOURS PRN
Status: DISCONTINUED | OUTPATIENT
Start: 2023-04-25 | End: 2023-05-07 | Stop reason: HOSPADM

## 2023-04-25 RX ORDER — DULOXETIN HYDROCHLORIDE 60 MG/1
60 CAPSULE, DELAYED RELEASE ORAL EVERY MORNING
Status: ON HOLD | COMMUNITY

## 2023-04-25 RX ORDER — IPRATROPIUM BROMIDE AND ALBUTEROL SULFATE 2.5; .5 MG/3ML; MG/3ML
3 SOLUTION RESPIRATORY (INHALATION)
Status: DISCONTINUED | OUTPATIENT
Start: 2023-04-25 | End: 2023-04-25

## 2023-04-25 RX ORDER — BUDESONIDE 0.5 MG/2ML
0.5 INHALANT ORAL 2 TIMES DAILY
Status: DISCONTINUED | OUTPATIENT
Start: 2023-04-25 | End: 2023-05-07 | Stop reason: HOSPADM

## 2023-04-25 RX ORDER — MULTIPLE VITAMINS W/ MINERALS TAB 9MG-400MCG
1 TAB ORAL DAILY
Status: DISCONTINUED | OUTPATIENT
Start: 2023-04-26 | End: 2023-05-07 | Stop reason: HOSPADM

## 2023-04-25 RX ORDER — ENOXAPARIN SODIUM 100 MG/ML
40 INJECTION SUBCUTANEOUS EVERY 24 HOURS
Status: DISCONTINUED | OUTPATIENT
Start: 2023-04-25 | End: 2023-05-07 | Stop reason: HOSPADM

## 2023-04-25 RX ORDER — PIPERACILLIN SODIUM, TAZOBACTAM SODIUM 3; .375 G/15ML; G/15ML
3.38 INJECTION, POWDER, LYOPHILIZED, FOR SOLUTION INTRAVENOUS EVERY 8 HOURS
Status: DISCONTINUED | OUTPATIENT
Start: 2023-04-25 | End: 2023-05-01

## 2023-04-25 RX ORDER — PROCHLORPERAZINE MALEATE 5 MG
5 TABLET ORAL EVERY 6 HOURS PRN
Status: DISCONTINUED | OUTPATIENT
Start: 2023-04-25 | End: 2023-05-07 | Stop reason: HOSPADM

## 2023-04-25 RX ORDER — HALOPERIDOL 5 MG/ML
2 INJECTION INTRAMUSCULAR EVERY 6 HOURS PRN
Status: DISCONTINUED | OUTPATIENT
Start: 2023-04-26 | End: 2023-04-26

## 2023-04-25 RX ORDER — LEVALBUTEROL INHALATION SOLUTION 1.25 MG/3ML
1.25 SOLUTION RESPIRATORY (INHALATION) EVERY 4 HOURS PRN
Status: DISCONTINUED | OUTPATIENT
Start: 2023-04-25 | End: 2023-05-03

## 2023-04-25 RX ORDER — LOSARTAN POTASSIUM 25 MG/1
25 TABLET ORAL DAILY
Status: DISCONTINUED | OUTPATIENT
Start: 2023-04-26 | End: 2023-05-07 | Stop reason: HOSPADM

## 2023-04-25 RX ORDER — PANTOPRAZOLE SODIUM 40 MG/1
40 TABLET, DELAYED RELEASE ORAL
Status: DISCONTINUED | OUTPATIENT
Start: 2023-04-26 | End: 2023-04-26

## 2023-04-25 RX ORDER — SODIUM CHLORIDE 9 MG/ML
INJECTION, SOLUTION INTRAVENOUS CONTINUOUS
Status: DISCONTINUED | OUTPATIENT
Start: 2023-04-25 | End: 2023-04-27

## 2023-04-25 RX ORDER — OLANZAPINE 2.5 MG/1
5 TABLET, FILM COATED ORAL DAILY PRN
Status: DISCONTINUED | OUTPATIENT
Start: 2023-04-25 | End: 2023-04-26

## 2023-04-25 RX ORDER — BUSPIRONE HYDROCHLORIDE 5 MG/1
5 TABLET ORAL 2 TIMES DAILY
Status: ON HOLD | COMMUNITY

## 2023-04-25 RX ORDER — ASPIRIN 81 MG/1
81 TABLET, CHEWABLE ORAL DAILY
Status: DISCONTINUED | OUTPATIENT
Start: 2023-04-25 | End: 2023-05-07 | Stop reason: HOSPADM

## 2023-04-25 RX ORDER — LIDOCAINE 40 MG/G
CREAM TOPICAL
Status: DISCONTINUED | OUTPATIENT
Start: 2023-04-25 | End: 2023-05-07 | Stop reason: HOSPADM

## 2023-04-25 RX ORDER — OLANZAPINE 2.5 MG/1
7.5 TABLET, FILM COATED ORAL AT BEDTIME
Status: DISCONTINUED | OUTPATIENT
Start: 2023-04-25 | End: 2023-04-27

## 2023-04-25 RX ORDER — LEVOTHYROXINE SODIUM 88 UG/1
88 TABLET ORAL
Status: DISCONTINUED | OUTPATIENT
Start: 2023-04-25 | End: 2023-05-07 | Stop reason: HOSPADM

## 2023-04-25 RX ORDER — ACETAMINOPHEN 325 MG/1
975 TABLET ORAL EVERY 8 HOURS
Status: DISCONTINUED | OUTPATIENT
Start: 2023-04-25 | End: 2023-05-07 | Stop reason: HOSPADM

## 2023-04-25 RX ORDER — ROSUVASTATIN CALCIUM 10 MG/1
10 TABLET, COATED ORAL AT BEDTIME
Status: DISCONTINUED | OUTPATIENT
Start: 2023-04-25 | End: 2023-05-07 | Stop reason: HOSPADM

## 2023-04-25 RX ORDER — HALOPERIDOL 5 MG/ML
1 INJECTION INTRAMUSCULAR
Status: COMPLETED | OUTPATIENT
Start: 2023-04-25 | End: 2023-04-25

## 2023-04-25 RX ORDER — METHYLPREDNISOLONE SODIUM SUCCINATE 40 MG/ML
40 INJECTION, POWDER, LYOPHILIZED, FOR SOLUTION INTRAMUSCULAR; INTRAVENOUS EVERY 8 HOURS
Status: DISCONTINUED | OUTPATIENT
Start: 2023-04-25 | End: 2023-04-27

## 2023-04-25 RX ORDER — VANCOMYCIN HYDROCHLORIDE 1 G/200ML
1000 INJECTION, SOLUTION INTRAVENOUS EVERY 12 HOURS
Status: DISCONTINUED | OUTPATIENT
Start: 2023-04-25 | End: 2023-04-25 | Stop reason: DRUGHIGH

## 2023-04-25 RX ORDER — LEVALBUTEROL INHALATION SOLUTION 1.25 MG/3ML
1.25 SOLUTION RESPIRATORY (INHALATION) 4 TIMES DAILY
Status: DISCONTINUED | OUTPATIENT
Start: 2023-04-25 | End: 2023-04-28

## 2023-04-25 RX ORDER — OLANZAPINE 10 MG/2ML
5 INJECTION, POWDER, FOR SOLUTION INTRAMUSCULAR
Status: COMPLETED | OUTPATIENT
Start: 2023-04-25 | End: 2023-04-25

## 2023-04-25 RX ORDER — CEFAZOLIN SODIUM 1 G/50ML
1250 SOLUTION INTRAVENOUS EVERY 24 HOURS
Status: DISCONTINUED | OUTPATIENT
Start: 2023-04-25 | End: 2023-04-27

## 2023-04-25 RX ORDER — DULOXETIN HYDROCHLORIDE 60 MG/1
60 CAPSULE, DELAYED RELEASE ORAL DAILY
Status: DISCONTINUED | OUTPATIENT
Start: 2023-04-25 | End: 2023-05-07 | Stop reason: HOSPADM

## 2023-04-25 RX ORDER — BUSPIRONE HYDROCHLORIDE 5 MG/1
5 TABLET ORAL 2 TIMES DAILY
Status: DISCONTINUED | OUTPATIENT
Start: 2023-04-25 | End: 2023-05-07 | Stop reason: HOSPADM

## 2023-04-25 RX ORDER — DOCUSATE SODIUM 100 MG/1
100 CAPSULE, LIQUID FILLED ORAL 2 TIMES DAILY
Status: DISCONTINUED | OUTPATIENT
Start: 2023-04-25 | End: 2023-05-07 | Stop reason: HOSPADM

## 2023-04-25 RX ORDER — OLANZAPINE 10 MG/2ML
5 INJECTION, POWDER, FOR SOLUTION INTRAMUSCULAR ONCE
Status: COMPLETED | OUTPATIENT
Start: 2023-04-25 | End: 2023-04-25

## 2023-04-25 RX ORDER — AMLODIPINE BESYLATE 2.5 MG/1
2.5 TABLET ORAL DAILY
Status: DISCONTINUED | OUTPATIENT
Start: 2023-04-26 | End: 2023-05-07 | Stop reason: HOSPADM

## 2023-04-25 RX ORDER — QUETIAPINE FUMARATE 25 MG/1
25 TABLET, FILM COATED ORAL ONCE
Status: DISCONTINUED | OUTPATIENT
Start: 2023-04-25 | End: 2023-04-25

## 2023-04-25 RX ORDER — FUROSEMIDE 20 MG
20 TABLET ORAL DAILY
Status: DISCONTINUED | OUTPATIENT
Start: 2023-04-26 | End: 2023-05-07 | Stop reason: HOSPADM

## 2023-04-25 RX ORDER — HALOPERIDOL 5 MG/ML
2 INJECTION INTRAMUSCULAR
Status: DISCONTINUED | OUTPATIENT
Start: 2023-04-25 | End: 2023-04-25

## 2023-04-25 RX ADMIN — LEVALBUTEROL HYDROCHLORIDE 1.25 MG: 1.25 SOLUTION RESPIRATORY (INHALATION) at 15:28

## 2023-04-25 RX ADMIN — SODIUM CHLORIDE: 9 INJECTION, SOLUTION INTRAVENOUS at 02:12

## 2023-04-25 RX ADMIN — IPRATROPIUM BROMIDE 0.5 MG: 0.5 SOLUTION RESPIRATORY (INHALATION) at 11:17

## 2023-04-25 RX ADMIN — BUDESONIDE 0.5 MG: 0.5 INHALANT RESPIRATORY (INHALATION) at 07:30

## 2023-04-25 RX ADMIN — BUDESONIDE 0.5 MG: 0.5 INHALANT RESPIRATORY (INHALATION) at 20:40

## 2023-04-25 RX ADMIN — SODIUM CHLORIDE: 9 INJECTION, SOLUTION INTRAVENOUS at 22:54

## 2023-04-25 RX ADMIN — DULOXETINE HYDROCHLORIDE 60 MG: 60 CAPSULE, DELAYED RELEASE PELLETS ORAL at 14:57

## 2023-04-25 RX ADMIN — METOPROLOL SUCCINATE 12.5 MG: 25 TABLET, EXTENDED RELEASE ORAL at 14:53

## 2023-04-25 RX ADMIN — PIPERACILLIN AND TAZOBACTAM 3.38 G: 3; .375 INJECTION, POWDER, LYOPHILIZED, FOR SOLUTION INTRAVENOUS at 21:27

## 2023-04-25 RX ADMIN — ENOXAPARIN SODIUM 40 MG: 40 INJECTION SUBCUTANEOUS at 08:38

## 2023-04-25 RX ADMIN — LEVALBUTEROL HYDROCHLORIDE 1.25 MG: 1.25 SOLUTION RESPIRATORY (INHALATION) at 11:17

## 2023-04-25 RX ADMIN — ASPIRIN 81 MG: 81 TABLET, CHEWABLE ORAL at 12:52

## 2023-04-25 RX ADMIN — HALOPERIDOL LACTATE 1 MG: 5 INJECTION, SOLUTION INTRAMUSCULAR at 19:56

## 2023-04-25 RX ADMIN — METHYLPREDNISOLONE SODIUM SUCCINATE 40 MG: 40 INJECTION, POWDER, FOR SOLUTION INTRAMUSCULAR; INTRAVENOUS at 21:42

## 2023-04-25 RX ADMIN — LEVALBUTEROL HYDROCHLORIDE 1.25 MG: 1.25 SOLUTION RESPIRATORY (INHALATION) at 20:39

## 2023-04-25 RX ADMIN — IPRATROPIUM BROMIDE 0.5 MG: 0.5 SOLUTION RESPIRATORY (INHALATION) at 20:39

## 2023-04-25 RX ADMIN — METHYLPREDNISOLONE SODIUM SUCCINATE 40 MG: 40 INJECTION, POWDER, FOR SOLUTION INTRAMUSCULAR; INTRAVENOUS at 13:00

## 2023-04-25 RX ADMIN — ACETAMINOPHEN 975 MG: 325 TABLET ORAL at 08:37

## 2023-04-25 RX ADMIN — LEVOTHYROXINE SODIUM 88 MCG: 88 TABLET ORAL at 14:44

## 2023-04-25 RX ADMIN — PIPERACILLIN AND TAZOBACTAM 3.38 G: 3; .375 INJECTION, POWDER, LYOPHILIZED, FOR SOLUTION INTRAVENOUS at 12:52

## 2023-04-25 RX ADMIN — VANCOMYCIN HYDROCHLORIDE 1250 MG: 5 INJECTION, POWDER, LYOPHILIZED, FOR SOLUTION INTRAVENOUS at 10:33

## 2023-04-25 RX ADMIN — IPRATROPIUM BROMIDE 0.5 MG: 0.5 SOLUTION RESPIRATORY (INHALATION) at 15:29

## 2023-04-25 RX ADMIN — SODIUM CHLORIDE: 9 INJECTION, SOLUTION INTRAVENOUS at 12:36

## 2023-04-25 RX ADMIN — IPRATROPIUM BROMIDE 0.5 MG: 0.5 SOLUTION RESPIRATORY (INHALATION) at 07:29

## 2023-04-25 RX ADMIN — FAMOTIDINE 20 MG: 10 INJECTION, SOLUTION INTRAVENOUS at 02:10

## 2023-04-25 RX ADMIN — OLANZAPINE 5 MG: 10 INJECTION, POWDER, FOR SOLUTION INTRAMUSCULAR at 21:26

## 2023-04-25 RX ADMIN — LEVALBUTEROL HYDROCHLORIDE 1.25 MG: 1.25 SOLUTION RESPIRATORY (INHALATION) at 07:29

## 2023-04-25 RX ADMIN — FAMOTIDINE 20 MG: 10 INJECTION, SOLUTION INTRAVENOUS at 13:00

## 2023-04-25 RX ADMIN — METHYLPREDNISOLONE SODIUM SUCCINATE 40 MG: 40 INJECTION, POWDER, FOR SOLUTION INTRAMUSCULAR; INTRAVENOUS at 05:37

## 2023-04-25 RX ADMIN — OLANZAPINE 5 MG: 10 INJECTION, POWDER, FOR SOLUTION INTRAMUSCULAR at 18:40

## 2023-04-25 RX ADMIN — PIPERACILLIN AND TAZOBACTAM 3.38 G: 3; .375 INJECTION, POWDER, LYOPHILIZED, FOR SOLUTION INTRAVENOUS at 05:37

## 2023-04-25 ASSESSMENT — ACTIVITIES OF DAILY LIVING (ADL)
ADLS_ACUITY_SCORE: 39
ADLS_ACUITY_SCORE: 35
ADLS_ACUITY_SCORE: 35
DRESS: 1-->ASSISTANCE (EQUIPMENT/PERSON) NEEDED
TOILETING: 1-->ASSISTANCE (EQUIPMENT/PERSON) NEEDED
BATHING: 1-->ASSISTANCE NEEDED
TOILETING_ASSISTANCE: TOILETING DIFFICULTY, ASSISTANCE 1 PERSON
DRESSING/BATHING_DIFFICULTY: YES
ADLS_ACUITY_SCORE: 39
WEAR_GLASSES_OR_BLIND: NO
ADLS_ACUITY_SCORE: 35
NUMBER_OF_TIMES_PATIENT_HAS_FALLEN_WITHIN_LAST_SIX_MONTHS: 1
ADLS_ACUITY_SCORE: 43
HEARING_DIFFICULTY_OR_DEAF: NO
ADLS_ACUITY_SCORE: 43
ADLS_ACUITY_SCORE: 37
TOILETING: 1-->ASSISTANCE (EQUIPMENT/PERSON) NEEDED (NOT DEVELOPMENTALLY APPROPRIATE)
ADLS_ACUITY_SCORE: 39
DEPENDENT_IADLS:: CLEANING;COOKING;LAUNDRY;SHOPPING;MEAL PREPARATION;MEDICATION MANAGEMENT;MONEY MANAGEMENT;TRANSPORTATION
TRANSFERRING: 1-->ASSISTANCE (EQUIPMENT/PERSON) NEEDED
EQUIPMENT_CURRENTLY_USED_AT_HOME: CANE, STRAIGHT
CHANGE_IN_FUNCTIONAL_STATUS_SINCE_ONSET_OF_CURRENT_ILLNESS/INJURY: YES
ADLS_ACUITY_SCORE: 35
DIFFICULTY_COMMUNICATING: NO
DRESS: 1-->ASSISTANCE (EQUIPMENT/PERSON) NEEDED (NOT DEVELOPMENTALLY APPROPRIATE)
FALL_HISTORY_WITHIN_LAST_SIX_MONTHS: YES
CONCENTRATING,_REMEMBERING_OR_MAKING_DECISIONS_DIFFICULTY: YES
TOILETING_ISSUES: YES
ADLS_ACUITY_SCORE: 35
WALKING_OR_CLIMBING_STAIRS_DIFFICULTY: YES
WALKING_OR_CLIMBING_STAIRS: TRANSFERRING DIFFICULTY, ASSISTANCE 1 PERSON
ADLS_ACUITY_SCORE: 45
DIFFICULTY_EATING/SWALLOWING: NO
DRESSING/BATHING: BATHING DIFFICULTY, ASSISTANCE 1 PERSON
TRANSFERRING: 1-->ASSISTANCE (EQUIPMENT/PERSON) NEEDED (NOT DEVELOPMENTALLY APPROPRIATE)
DOING_ERRANDS_INDEPENDENTLY_DIFFICULTY: YES
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
## 2023-04-25 NOTE — PROGRESS NOTES
[facility]    Medicine Progress Note - Hospitalist Service    Date of Admission:  4/24/2023    Assessment & Plan    Dominik Rojas is an 82 year old male with medical history of COPD not on home oxygen, lung cancer, vascular dementia, hypothyroidism, hypertension, recent pneumonia requiring hospitalization admitted on 4/24/2023 with worsening shortness of breath at rest, fever, hypoxia and increased confusion requiring restraints in the ED.  ED work-up showed leukocytosis, lactic acidosis, mild hyperkalemia, hypoxemia needing high flow nasal oxygen.    Acute hypoxic respiratory failure  Likely COPD exacerbation  -Chest x-ray 4/24: Right lower lobe mass, hypoventilated, no consolidation or effusion, emphysematous changes  -Blood cultures 4/24:   -Sputum culture 4/25:  -Continue supplementary oxygen to maintain SPO2 greater than 90%  -Xopenex 4 times daily, Atrovent 4 times daily, Pulmicort 2 times daily  -Methylprednisolone 40 mg every 8 hours  -Continue Zosyn every 8 hours and vancomycin pharmacy to dose    Adenocarcinoma of the right lower lobe  Immunocompromised  -Stage T1 N0 M0  -Has completed radiation therapy on 4/21/2023, not a candidate for surgery due to age and medical comorbidities  -Follows with Dr. Bales through [facility] oncology    Mild hyperkalemia, asymptomatic-resolved  -Will continue to monitor, avoid potassium sparing agents    Metabolic encephalopathy  Mood disorder  -Required restraints while in the emergency department, has been off restraints since 4/24  -Continue PTA olanzapine 7.5 mg at bedtime  -Continue PTA BuSpar 5 mg twice daily  -Continue PTA Cymbalta 60 mg daily  -Olanzapine 5 mg as needed daily for agitation and aggression  -Will monitor    Essential hypertension  -Blood pressures are softer today so will restart home blood pressure medications tomorrow except will restart metoprolol 12.5 mg today with holding parameters  -Will start losartan 25 mg daily,  "furosemide 20 mg, and amlodipine 2.5 mg tomorrow    Hyperlipidemia  -Continue PTA rosuvastatin 10 mg at bedtime     Diet: Combination Diet Clear Liquid    DVT Prophylaxis: Enoxaparin (Lovenox) SQ  Cabral Catheter: Not present  Lines: None     Cardiac Monitoring: ACTIVE order. Indication: sepsis, hypoxia  Code Status: Full Code      Clinically Significant Risk Factors Present on Admission        # Hyperkalemia: Highest K = 5.5 mmol/L in last 2 days, will monitor as appropriate       # Hypoalbuminemia: Lowest albumin = 3.3 g/dL at 4/25/2023  6:57 AM, will monitor as appropriate  # Coagulation Defect: INR = 1.16 (Ref range: 0.85 - 1.15) and/or PTT = N/A, will monitor for bleeding    # Hypertension: home medication list includes antihypertensive(s)   # Non-Invasive mechanical ventilation: current O2 Device: High Flow Nasal Cannula (HFNC)  # Acute hypoxic respiratory failure: continue supplemental O2 as needed     # Obesity: Estimated body mass index is 30.38 kg/m  as calculated from the following:    Height as of this encounter: 1.702 m (5' 7\").    Weight as of this encounter: 88 kg (194 lb).           Disposition Plan      Expected Discharge Date: 04/29/2023                  Arline Wise MD  Hospitalist Service  [facility]  Securely message with WishLink (more info)  Text page via [employer] Paging/Directory   ______________________________________________________________________    Interval History   Mr. Rojas is doing well today.  He is feeling better but having back pain from the cot in the ED.  He is saturating well on high flow nasal cannula.  His mentation has improved and he is able to have a conversation at the time of exam.  He had been in restraints overnight which he is no longer.  He is medications for mood were restarted.  There is an additional dose of olanzapine available if needed for agitation and aggression.    Physical Exam   Vital Signs: Temp: 99.3  F (37.4  C) Temp src: " Oral BP: (!) 120/95 Pulse: 89   Resp: 15 SpO2: 96 % O2 Device: High Flow Nasal Cannula (HFNC) Oxygen Delivery: 50 LPM  Weight: 194 lbs 0 oz    General Appearance: Awake, alert, in no acute distress  Respiratory: Crackles and expiratory wheeze bilaterally  Cardiovascular: Regular rate, no murmur noted  GI: soft, nontender, non distended, normal bowel sounds  Skin: no jaundice, no rash      Medical Decision Making       MANAGEMENT DISCUSSED with the following over the past 24 hours: Prior hospitalist   NOTE(S)/MEDICAL RECORDS REVIEWED over the past 24 hours: Prior hospitalist H&P, ED physician, nursing  Respiratory, pharmacy, case management    Data     I have personally reviewed the following data over the past 24 hrs:    15.0 (H)  \   14.5   / 188     134 (L) 101 24.6 (H) /  143 (H)   5.0 18 (L) 1.11 \       ALT: 18 AST: 30 AP: 110 TBILI: 1.4 (H)   ALB: 3.3 (L) TOT PROTEIN: 6.5 LIPASE: N/A       Trop: 18 BNP: N/A       Procal: N/A CRP: N/A Lactic Acid: 1.4       INR:  1.16 (H) PTT:  N/A   D-dimer:  N/A Fibrinogen:  N/A       Imaging results reviewed over the past 24 hrs:   Recent Results (from the past 24 hour(s))   XR Chest Port 1 View    Narrative    EXAM: XR CHEST PORT 1 VIEW  LOCATION: [facility]  DATE/TIME: 4/24/2023 9:55 PM CDT    INDICATION: sob hypoxia  COMPARISON: CT chest 04/12/2023      Impression    IMPRESSION: Known right lower lobe mass. Hypoventilatory lungs with bibasilar atelectasis. No focal pulmonary consolidation or effusion. Underlying emphysematous changes. Normal heart size without pulmonary vascular congestion. No pneumothorax.   Atherosclerotic calcifications of the aortic arch. No acute osseous abnormality.

## 2023-04-25 NOTE — ED NOTES
"[facility] ED Handoff Report    ED Chief Complaint:     ED Diagnosis:  (J44.1) COPD exacerbation (H)  Comment:   Plan:     (J18.9) HCAP (healthcare-associated pneumonia)  Comment:   Plan:        PMH:    Past Medical History:   Diagnosis Date    AAA (abdominal aortic aneurysm) (H)     s/p stenting    Alcohol dependence in remission (H)     Alcohol use disorder, severe, in sustained remission, dependence (H) 8/16/2021    Anxiety     Atrial fibrillation with normal ventricular rate (H)     Benign prostatic hyperplasia with lower urinary tract symptoms     Bronchiectasis without complication (H)     2/27/2020    COPD (chronic obstructive pulmonary disease) (H)     CVA (cerebral vascular accident) (H) 2019    DDD (degenerative disc disease), lumbar     Depression     Depressive disorder     Diabetes (H)     Diastolic dysfunction 01/22/2021    DJD (degenerative joint disease) of knee     left    Essential hypertension     Glaucoma     Glaucoma     History of stroke without residual deficits     Hyperlipidemia     Hypertension     Hypothyroidism     Hypothyroidism     Kidney stones     Major depression     Memory problem     Nocturia     Obesity     Opioid use disorder, severe, dependence (H) 8/16/2021    Overactive bladder 02/25/2021    Primary osteoarthritis of left knee     Psoriasis     Psoriasis     Seborrheic keratoses     Somnolence, daytime     Stenosis of right carotid artery     history of TIA's        Code Status:  Full Code     Falls Risk: Yes Band: Applied    Current Living Situation/Residence: lives with a significant other     Elimination Status: Continent: has purwick in place     Activity Level: SBA w/ walker    Patients Preferred Language:  English     Needed: No    Vital Signs:  /79   Pulse 94   Temp 97.9  F (36.6  C) (Oral)   Resp 23   Ht 1.702 m (5' 7\")   Wt 88 kg (194 lb)   SpO2 96%   BMI 30.38 kg/m       Cardiac Rhythm: normal sinus     Pain Score: 0/10    Is the " Patient Confused:  No    Last Food or Drink: 04/25/23 at 1500    Focused Assessment: Pt alert and oriented dyspnea and hypoxia w/ exertion. On hi-flow    Tests Performed: Done: Labs and Imaging    Treatments Provided:  Hi Flow, See MAR    Family Dynamics/Concerns: no    no    Plan of Care Communicated to Family: Yes    Who Was Updated about Plan of Care: wife    Belongings Checklist Done and Signed by Patient: Yes    Medications sent with patient:     Covid: symptomatic, negative    Additional Information: pt states he just started the cymbalta today was just prescribed yesterday. Wife did not answer but pt spoke w/ her at approx 1400.    RN: anette ULLOA RN 4/25/2023 3:05 PM

## 2023-04-25 NOTE — TREATMENT PLAN
RCAT Treatment Plan    Patient Score: 11  Patient Acuity: 4    Clinical Indication for Therapy: COPD    Therapy Ordered: Atrovent and Xopenex QID. Pulmicort BID    Assessment Summary: Patient has a hx of COPD, lung cancer. Patient currently on HFNC 50L 55%. BS diminished. Continue with therapy ordered by MD. RT will re-eval in 72 hrs.     RT Bob  4/25/2023

## 2023-04-25 NOTE — PHARMACY-VANCOMYCIN DOSING SERVICE
Pharmacy Vancomycin Initial Note  Date of Service 2023  Patient's  1940  82 year old, male    Indication: Sepsis    Current estimated CrCl = Estimated Creatinine Clearance: 44.8 mL/min (A) (based on SCr of 1.37 mg/dL (H)).    Creatinine for last 3 days  No results found for requested labs within last 3 days.    Recent Vancomycin Level(s) for last 3 days  No results found for requested labs within last 3 days.      Vancomycin IV Administrations (past 72 hours)      No vancomycin orders with administrations in past 72 hours.                Nephrotoxins and other renal medications (From now, onward)    Start     Dose/Rate Route Frequency Ordered Stop    23 2230  piperacillin-tazobactam (ZOSYN) 3.375 g vial to attach to  mL bag         3.375 g  over 30 Minutes Intravenous ONCE 23 2204            Contrast Orders - past 72 hours (72h ago, onward)    None            Plan:  1. Start vancomycin  1750 mg IV once.   2. Please re-consult pharmacy if therapy to continue upon admssion.    Winter Conley, [facility]

## 2023-04-25 NOTE — ED TRIAGE NOTES
Pt reports shortness of breath that started this afternoon. He denies any chest pain or any other symptoms. He was found to be febrile, hypoxia at 85%, and slightly tachycardic in triage. Pt was placed on 6 LPM of oxygen by nasal cannula and oxygen saturation improved to 88-89%. Pt has history of COPD and lung cancer, for which he is undergoing radiation. He was also diagnosed with pneumonia earlier this month. Pt is now on 15 LPM by NRB and has oxygen saturation of 91%.      Triage Assessment       Row Name 04/24/23 [id number]       Triage Assessment (Adult)    Airway WDL WDL       Respiratory WDL    Respiratory WDL rhythm/pattern    Rhythm/Pattern, Respiratory shortness of breath       Skin Circulation/Temperature WDL    Skin Circulation/Temperature WDL WDL       Cardiac WDL    Cardiac WDL WDL       Peripheral/Neurovascular WDL    Peripheral Neurovascular WDL WDL       Cognitive/Neuro/Behavioral WDL    Cognitive/Neuro/Behavioral WDL WDL                  
Medical Management

## 2023-04-25 NOTE — ED NOTES
Pt becoming restless on BIPAP, attempting to take off mask and pulling at cords. MD updated. RT placed pt on oxymask. Pt currently on 15L via oxymask at 89%, provider updated.

## 2023-04-25 NOTE — PHARMACY-ADMISSION MEDICATION HISTORY
Pharmacist Admission Medication History    Admission medication history is complete. The information provided in this note is only as accurate as the sources available at the time of the update.    Medication reconciliation/reorder completed by provider prior to medication history? No    Information Source(s): Family member and CareEverywhere/SureScripts via phone    Pertinent Information: Completed med rec on the phone with patient's spouse who reported that the patient took his medications yesterday morning including his evening doses PTA to the ED.     Usual morning routine: Levothyroxine on an empty stomach first thing in the morning, followed by pantoprazole 1 hour later. Other morning medications after breakfast.     Changes made to PTA medication list:    Added: Buspirone    Deleted: Albuterol    Changed: None    Medication Affordability:  Not including over the counter (OTC) medications, was there a time in the past 12 months when you did not take your medications as prescribed because of cost?: No    Allergies reviewed with patient and updates made in EHR: yes    Medication History Completed By: FAREED BLAKE RPH 4/25/2023 11:07 AM    Prior to Admission medications    Medication Sig Last Dose Taking? Auth Provider Long Term End Date   acetaminophen (TYLENOL) 500 MG tablet Take 1,000 mg by mouth as needed Unknown at prn Yes Reported, Patient     amLODIPine (NORVASC) 2.5 MG tablet Take 2.5 mg by mouth daily 4/24/2023 at am Yes Unknown, Entered By History No    aspirin (ASA) 81 MG chewable tablet Take 81 mg by mouth daily 4/24/2023 at am Yes Reported, Patient     busPIRone (BUSPAR) 5 MG tablet Take 5 mg by mouth 2 times daily 4/24/2023 at am Yes Unknown, Entered By History Yes    Cholecalciferol (VITAMIN D3) 50 MCG (2000 UT) CAPS Take 1 tablet by mouth daily  4/24/2023 at am Yes Reported, Patient     DULoxetine (CYMBALTA) 60 MG capsule Take 60 mg by mouth daily 4/25/2023 at am Yes Unknown, Entered By History  No    furosemide (LASIX) 20 MG tablet Take 20 mg by mouth daily 4/24/2023 at am Yes Unknown, Entered By History No    latanoprost (XALATAN) 0.005 % ophthalmic solution Place 1 drop into both eyes At Bedtime Past Week at pm Yes Reported, Patient Yes    levothyroxine (SYNTHROID/LEVOTHROID) 88 MCG tablet TAKE 1 TABLET BY MOUTH EVERY DAY IN THE MORNING ON AN EMPTY STOMACH FOR 30 DAYS 4/24/2023 at am Yes Reported, Patient Yes    losartan (COZAAR) 25 MG tablet Take 1 tablet (25 mg) by mouth daily 4/24/2023 at am Yes Miguel Celis,  Yes    metoprolol succinate ER (TOPROL-XL) 25 MG 24 hr tablet Take 0.5 tablets (12.5 mg) by mouth daily 4/24/2023 at am Yes Misael Moe PA-C Yes    multivitamin w/minerals (THERA-VIT-M) tablet Take 1 tablet by mouth daily 4/24/2023 at am Yes Reported, Patient     OLANZapine (ZYPREXA) 5 MG tablet Take 7.5 mg by mouth At Bedtime 4/24/2023 at took in AM4/24/23 PTA Yes Misael Moe PA-C Yes    pantoprazole (PROTONIX) 40 MG EC tablet Take 1 tablet (40 mg) by mouth every morning (before breakfast) 4/24/2023 at am Yes Miguel Celis DO     rosuvastatin (CRESTOR) 10 MG tablet Take 10 mg by mouth At Bedtime  4/24/2023 at took in AM PTA 4/25/23 Yes Reported, Patient Yes

## 2023-04-25 NOTE — PROGRESS NOTES
RESPIRATORY CARE NOTE     Patient Name: Dominik Rojas  Today's Date: 4/25/2023       Pt continues to receive xopenex and atrovent. BS are coarse. Pt is on 50% 45lpm of oxygen via high flow NC, SpO2 is 96%. Post treatment there is no change.  RT encouraged deep breathing and coughing techniques .  RT will continue to monitor and assess.

## 2023-04-25 NOTE — CONSULTS
Care Management Initial Consult    General Information  Assessment completed with: Spouse or significant other, Jovana  Type of CM/SW Visit: Initial Assessment    Primary Care Provider verified and updated as needed: Yes   Readmission within the last 30 days: unable to assess      Reason for Consult: discharge planning  Advance Care Planning:            Communication Assessment  Patient's communication style: spoken language (English or Bilingual)             Cognitive  Cognitive/Neuro/Behavioral: WDL                      Living Environment:   People in home: spouse     Current living Arrangements: house      Able to return to prior arrangements: yes       Family/Social Support:  Care provided by: self, spouse/significant other  Provides care for: no one, unable/limited ability to care for self  Marital Status:   Wife, Children          Description of Support System: Supportive, Involved         Current Resources:   Patient receiving home care services: No     Community Resources: None  Equipment currently used at home:    Supplies currently used at home: None    Employment/Financial:  Employment Status: retired        Financial Concerns: No concerns identified   Referral to Financial Worker: No    Lifestyle & Psychosocial Needs:  Social Determinants of Health     Tobacco Use: Medium Risk (4/12/2023)    Patient History      Smoking Tobacco Use: Former      Smokeless Tobacco Use: Never      Passive Exposure: Not on file   Alcohol Use: Not on file   Financial Resource Strain: Not on file   Food Insecurity: Not on file   Transportation Needs: Not on file   Physical Activity: Not on file   Stress: Not on file   Social Connections: Not on file   Intimate Partner Violence: Not on file   Depression: Not on file   Housing Stability: Not on file       Functional Status:  Prior to admission patient needed assistance:   Dependent ADLs:: Ambulation-cane  Dependent IADLs:: Cleaning, Cooking, Laundry, Shopping, Meal  Preparation, Medication Management, Money Management, Transportation       Mental Health Status:  Mental Health Status: No Current Concerns       Chemical Dependency Status:  Chemical Dependency Status: No Current Concerns             Values/Beliefs:  Spiritual, Cultural Beliefs, Advent Practices, Values that affect care: no               Additional Information:  SW reviewed chart.  Per chart review, Pt recently hospitalized 4/12/2023-4/16/2023.     GALLO called Pt's spouse, Jovana, to complete initial assessment.  GALLO introduced self and CM role to Jovana.  Pt and spouse live in a house.  Pt's two daughters live near by and provide support as needed.  Jovana reports Pt is independent with most ADLs, uses a cane, and is dependent with all IADLs.  Jovana shares Pt lives with back pain and knee pain.  Jovana states Pt recently completed five radiation treatments for lung cancer.  Jovana will transport at discharge.     PT evaluation pending.      CM following care progression to assist with discharge planning and follow up on team recommendations.  Jovana appreciates updates from the team.      DIRK Palacios

## 2023-04-25 NOTE — PHARMACY-VANCOMYCIN DOSING SERVICE
"Pharmacy Vancomycin Initial Note  Date of Service 2023  Patient's  1940  82 year old, male    Indication: Sepsis    Current estimated CrCl = Estimated Creatinine Clearance: 53.8 mL/min (based on SCr of 1.14 mg/dL).    Creatinine for last 3 days  2023:  9:40 PM Creatinine 1.14 mg/dL    Recent Vancomycin Level(s) for last 3 days  No results found for requested labs within last 3 days.      Vancomycin IV Administrations (past 72 hours)                   vancomycin (VANCOCIN) 1,750 mg in sodium chloride 0.9 % 500 mL intermittent infusion (mg) 1,750 mg Given 23 2241                Nephrotoxins and other renal medications (From now, onward)    Start     Dose/Rate Route Frequency Ordered Stop    23 0500  piperacillin-tazobactam (ZOSYN) 3.375 g vial to attach to  mL bag        Note to Pharmacy: For [facility], [facility] and [facility]: For Zosyn-naive patients, use the \"Zosyn initial dose + extended infusion\" order panel.    3.375 g  over 240 Minutes Intravenous EVERY 8 HOURS 23 0147      23 0200  vancomycin (VANCOCIN) 1000 mg in dextrose 5% 200 mL PREMIX         1,000 mg  200 mL/hr over 1 Hours Intravenous EVERY 12 HOURS 23 0147            Contrast Orders - past 72 hours (72h ago, onward)    None          [employer] Prediction of Planned Initial Vancomycin Regimen  Loading dose: 1750 mg IV on 23 at 22:41  Regimen: 1250 mg IV every 24 hours.  Start time: 10:00 on 2023  Exposure target: AUC24 (range)400-600 mg/L.hr   AUC24,ss: 461 mg/L.hr  Probability of AUC24 > 400: 66 %  Ctrough,ss: 13.9 mg/L  Probability of Ctrough,ss > 20: 20 %  Probability of nephrotoxicity (Lodise NICOLE ): 9 %          Plan:  1. Start vancomycin  1250 mg IV q24h. This regimen is timed to start at 10:00, ~12 hours after the loading dose. This timing will minimize sub-therapeutic intervals.  2. Vancomycin monitoring method: AUC  3. Vancomycin therapeutic monitoring goal: 400-600 mg*h/L  4. Pharmacy will " check vancomycin levels as appropriate in 1-3 Days.    5. Serum creatinine levels will be ordered a minimum of twice weekly.      Janet Jimenez RPH

## 2023-04-25 NOTE — ED NOTES
"Up to chair for most of the day. Requesting to eat \"real food\" paged Dr. Wise about advancing diet, waiting for response. Wife updated on room change.     Report given to DIONI Childers.  "

## 2023-04-25 NOTE — H&P
"[facility]    History and Physical - Hospitalist Service       Date of Admission:  4/24/2023    Assessment & Plan      Dominik Rojas is an 82 year old male with medical history of COPD not on home oxygen, lung cancer, vascular dementia, hypothyroidism, hypertension, recent pneumonia requiring hospitalization admitted on 4/24/2023 with worsening shortness of breath at rest, fever, hypoxia and increased confusion requiring restraints in the ED.  ED work-up showed leukocytosis, lactic acidosis, mild hyperkalemia, hypoxemia needing high flow nasal oxygen.    #COPD exacerbation with lower respiratory infection:  - Bronchodilator treatments.  - Systemic and inhaled steroids.    #Healthcare associated pneumonia with sepsis:  - Blood cultures.  - Broad-spectrum IV antibiotics.    #Acute hypoxic respiratory failure:  - Ventilatory support as needed to keep saturations over 92%.    #Mild hyperkalemia: Asymptomatic.  - Avoid potassium sparing agents.    #Metabolic encephalopathy:  - Supportive care.       Diet: Combination Diet Clear Liquid    DVT Prophylaxis: Enoxaparin (Lovenox) SQ  Cabral Catheter: Not present  Lines: None     Cardiac Monitoring: ACTIVE order. Indication: sepsis, hypoxia  Code Status: Full Code      Clinically Significant Risk Factors Present on Admission        # Hyperkalemia: Highest K = 5.5 mmol/L in last 2 days, will monitor as appropriate        # Coagulation Defect: INR = 1.16 (Ref range: 0.85 - 1.15) and/or PTT = N/A, will monitor for bleeding    # Hypertension: home medication list includes antihypertensive(s)   # Non-Invasive mechanical ventilation: current O2 Device: High Flow Nasal Cannula (HFNC)  # Acute hypoxic respiratory failure: continue supplemental O2 as needed     # Obesity: Estimated body mass index is 30.38 kg/m  as calculated from the following:    Height as of this encounter: 1.702 m (5' 7\").    Weight as of this encounter: 88 kg (194 lb).       "     Disposition Plan Home with home health services     Expected Discharge Date: 04/29/2023                  Dannielle Levy MD  Hospitalist Service  [facility]  Securely message with [employer] (more info)  Text page via [employer] Paging/Directory     ______________________________________________________________________    Chief Complaint   Shortness of breath for 1 day    History is obtained from the patient, electronic health record and emergency department physician    History of Present Illness   Dominik Rojas is an 82 year old male who presented to the ED with worsening shortness of breath at rest, wheezing and increased confusion.  Noted to be very hypoxic and put on BiPAP but became more restless and so was switched to high flow nasal oxygen.  No vomiting, diarrhea, or chest pain.  Full history is unobtainable because of patient's baseline dementia    Past Medical History    Past Medical History:   Diagnosis Date     AAA (abdominal aortic aneurysm) (H)     s/p stenting     Alcohol dependence in remission (H)      Alcohol use disorder, severe, in sustained remission, dependence (H) 8/16/2021     Anxiety      Atrial fibrillation with normal ventricular rate (H)      Benign prostatic hyperplasia with lower urinary tract symptoms      Bronchiectasis without complication (H)     2/27/2020     COPD (chronic obstructive pulmonary disease) (H)      CVA (cerebral vascular accident) (H) 2019     DDD (degenerative disc disease), lumbar      Depression      Depressive disorder      Diabetes (H)      Diastolic dysfunction 01/22/2021     DJD (degenerative joint disease) of knee     left     Essential hypertension      Glaucoma      Glaucoma      History of stroke without residual deficits      Hyperlipidemia      Hypertension      Hypothyroidism      Hypothyroidism      Kidney stones      Major depression      Memory problem      Nocturia      Obesity      Opioid use disorder, severe, dependence (H)  8/16/2021     Overactive bladder 02/25/2021     Primary osteoarthritis of left knee      Psoriasis      Psoriasis      Seborrheic keratoses      Somnolence, daytime      Stenosis of right carotid artery     history of TIA's       Past Surgical History   Past Surgical History:   Procedure Laterality Date     ABDOMEN SURGERY       ABDOMINAL AORTIC ANEURYSM REPAIR  2013    stent     CAROTID ENDARTERECTOMY Right 9/9/2019    Procedure: RIGHT CAROTID ENDARTERECTOMY;  Surgeon: Miguel Muller MD;  Location: [facility];  Service: General     CIRCUMCISION       CYSTOSCOPY       CYSTOSCOPY PROSTATE W/ LASER N/A 6/12/2018    Procedure:  TRANSURETHRAL RESECTION OF THE PROSTATE WITH QUANTA LASER;  Surgeon: Eze Levi MD;  Location: [facility];  Service:      CYSTOSCOPY PROSTATE W/ LASER N/A 2/18/2020    Procedure: CYSTOSCOPY WITH TRANSURETHRAL INCISION OF THE PROSTATE WITH QUANTA LASER;  Surgeon: Eze Levi MD;  Location: [facility];  Service: Urology     CYSTOSCOPY W/ LITHOLAPAXY / EHL N/A 6/12/2018    Procedure: CYSTOSCOPY AND LITHOLAPAXY;  Surgeon: Eze Levi MD;  Location: [facility];  Service:      IR ABDOMINAL AORTAGRAM  5/19/2020     IR ABDOMINAL AORTIC ANEURYSM ENDOGRAFT  5/19/2020     IR ABDOMINAL AORTOGRAM  5/19/2020     IR ABDOMINAL ENDOVASCULAR STENT GRAFT  5/19/2020     LITHOTRIPSY       PERCUTANEOUS NEPHROSTOLITHOTOMY Right 03/10/2020     ZZC HUANG W/O FACETEC FORAMOT/[facility] 1/2 VRT SEG, LUMBAR Bilateral 3/3/2021    Procedure: Bilateral lumbar 2 - Lumbar 4 decompressive lumbar laminectomy with medial facetectomies;  Surgeon: Renée King MD;  Location: [facility];  Service: Spine       Prior to Admission Medications   Prior to Admission Medications   Prescriptions Last Dose Informant Patient Reported? Taking?   Cholecalciferol (VITAMIN D3) 50 MCG (2000 UT) CAPS   Yes No   Sig: Take 1 tablet by mouth daily    DULoxetine (CYMBALTA) 30 MG capsule    Yes No   Sig: Take 60 mg by mouth every morning   OLANZapine (ZYPREXA) 5 MG tablet   Yes No   Sig: Take 7.5 mg by mouth At Bedtime   acetaminophen (TYLENOL) 500 MG tablet   Yes No   Sig: Take 1,000 mg by mouth as needed   albuterol (PROAIR HFA/PROVENTIL HFA/VENTOLIN HFA) 108 (90 Base) MCG/ACT inhaler   Yes No   Sig: Inhale 2 puffs into the lungs every 6 hours as needed    albuterol (PROVENTIL) (2.5 MG/3ML) 0.083% neb solution   Yes No   Sig: Inhale 2.5 mg into the lungs every 6 hours as needed    amLODIPine (NORVASC) 2.5 MG tablet   Yes No   Sig: Take 2.5 mg by mouth daily   aspirin (ASA) 81 MG chewable tablet   Yes No   Sig: Take 81 mg by mouth daily   furosemide (LASIX) 20 MG tablet   Yes No   Sig: Take 20 mg by mouth daily   latanoprost (XALATAN) 0.005 % ophthalmic solution   Yes No   Sig: Place 1 drop into both eyes At Bedtime   levothyroxine (SYNTHROID/LEVOTHROID) 88 MCG tablet   Yes No   Sig: TAKE 1 TABLET BY MOUTH EVERY DAY IN THE MORNING ON AN EMPTY STOMACH FOR 30 DAYS   losartan (COZAAR) 25 MG tablet   No No   Sig: Take 1 tablet (25 mg) by mouth daily   metoprolol succinate ER (TOPROL-XL) 25 MG 24 hr tablet   Yes No   Sig: Take 0.5 tablets (12.5 mg) by mouth daily   multivitamin w/minerals (THERA-VIT-M) tablet   Yes No   Sig: Take 1 tablet by mouth daily   pantoprazole (PROTONIX) 40 MG EC tablet   No No   Sig: Take 1 tablet (40 mg) by mouth every morning (before breakfast)   rosuvastatin (CRESTOR) 10 MG tablet   Yes No   Sig: Take 10 mg by mouth At Bedtime       Facility-Administered Medications: None        Review of Systems    Review of systems not obtained due to patient factors - mental status     Physical Exam   Vital Signs: Temp: 99.3  F (37.4  C) Temp src: Oral BP: 108/76 Pulse: 110   Resp: 16 SpO2: 92 % O2 Device: High Flow Nasal Cannula (HFNC) Oxygen Delivery: 45 LPM  Weight: 194 lbs 0 oz    Constitutional: awake, fatigued and severe distress  Eyes: Lids and lashes normal, pupils equal, round and  reactive to light, extra ocular muscles intact, sclera clear, conjunctiva normal  ENT: On high flow nasal oxygen, normocepalic, without obvious abnormality, atraumatic  Hematologic / Lymphatic: no cervical lymphadenopathy and no supraclavicular lymphadenopathy  Respiratory: tachypneic, Moderate respiratory distress, decreased air exchange and rhonchi diffuse, wheeze diffuse, diminished breath sounds right base and left base  Cardiovascular: Normal apical impulse, regular rate and rhythm, normal S1 and S2, no S3 or S4, and no murmur noted  GI: normal bowel sounds, soft, non-distended, non-tender, no masses palpated and bruits absent  Skin: no bruising or bleeding, normal skin color, texture, turgor and no redness, warmth, or swelling  Musculoskeletal: There is no redness, warmth, or swelling of the joints.  Full range of motion noted.  Motor strength is 5 out of 5 all extremities bilaterally.  Tone is normal.  Neurologic: Mental Status Exam:  Level of Alertness:   somnolent  Orientation:   person, place  Memory:   abnormal -impaired short-term memory  Attention/Concentration:  abnormal -distracted  Language:  normal  Cranial Nerves:  cranial nerves II-XII are grossly intact  Motor Exam:  moves all extremities well and symmetrically  Sensory:  Sensory intact    Medical Decision Making     77 MINUTES SPENT BY ME on the date of service doing chart review, history, exam, documentation & further activities per the note.      Data     I have personally reviewed the following data over the past 24 hrs:    17.3 (H)  \   15.6   / 250     134 (L) 97 (L) 21.8 /  119 (H)   5.5 (H) 24 1.14 \       ALT: 23 AST: 42 AP: 131 (H) TBILI: 1.2   ALB: 3.9 TOT PROTEIN: 7.2 LIPASE: N/A       Trop: 18 BNP: N/A       Procal: N/A CRP: N/A Lactic Acid: 2.1 (H)       INR:  1.16 (H) PTT:  N/A   D-dimer:  N/A Fibrinogen:  N/A       Imaging results reviewed over the past 24 hrs:   Recent Results (from the past 24 hour(s))   XR Chest Port 1 View     Narrative    EXAM: XR CHEST PORT 1 VIEW  LOCATION: [facility]  DATE/TIME: 4/24/2023 9:55 PM CDT    INDICATION: sob hypoxia  COMPARISON: CT chest 04/12/2023      Impression    IMPRESSION: Known right lower lobe mass. Hypoventilatory lungs with bibasilar atelectasis. No focal pulmonary consolidation or effusion. Underlying emphysematous changes. Normal heart size without pulmonary vascular congestion. No pneumothorax.   Atherosclerotic calcifications of the aortic arch. No acute osseous abnormality.

## 2023-04-25 NOTE — ED NOTES
Provider paged due to pt increasing agitation, pt attempting to climb out of bed and putting legs over side rail to climb out of bed.

## 2023-04-25 NOTE — ED PROVIDER NOTES
EMERGENCY DEPARTMENT ENCOUNTER      NAME: Dominik Rojas  AGE: 82 year old male  YOB: 1940  MRN: [id number]  EVALUATION DATE & TIME: 4/24/2023  9:27 PM    PCP: Misael Moe    ED PROVIDER: Salvador Fairchild MD      Chief Complaint   Patient presents with     Shortness of Breath     FINAL IMPRESSION:  1. COPD exacerbation (H)    2. HCAP (healthcare-associated pneumonia)      ED COURSE & MEDICAL DECISION MAKING:    Pertinent Labs & Imaging studies reviewed. (See chart for details)  82 year old male presents to the Emergency Department for evaluation of shortness of breath and hypoxia.  Patient with past medical history of COPD.  Non-O2 dependent.  Also recent hospital admission for known lung cancer and pneumonia.  Treated with antibiotics which she has recently completed.  Wife reporting initial few days after hospital stay patient was doing well then after completion of the antibiotics had steady progressive escalation of symptoms with severe escalation today ultimately prompting repeat trip to the emergency department.  On initial assessment patient was an extremis he was tachypneic he was diffusely wheezing he was requiring 15 L OxyMask to maintain O2 saturations.  Initially we tried the patient on noninvasive ventilation but due to agitation and pulling at the mask we ultimately had to discontinue that and he was transitioned to high flow nasal cannula which she seemed to tolerate much better.  Received some pain medications and anxiolytic to help calm him down while we manage his respiratory failure.  Ultimately I did put the patient in soft restraints as he was difficult to redirect the wife reporting that this is not uncommon for him.  Work-up in the emergency department notable for negative viral studies.  Potassium mildly elevated at 5.5 patient and developed a leukocytosis of 17 and had normalized prior to his discharge from the hospital.  Lactic acid mildly elevated 2.1.  Cultures  obtained antibiotics administered steroids administered nebulization treatment administered chest x-ray demonstrating his known lung mass.  Overall patient did trend in a positive direction in the emergency department I think his symptoms are multifactorial given his now recurrent fever his new leukocytosis and his previous treatment for pneumonia I would favor that this is a recurrence of the pneumonia likely related to his lung mass.  We will continue with antibiotics.  There is also a COPD component with patient needing steroids and serial nebulization treatments.  Overall plan of care given his acute respiratory failure is for admission to the hospital for further care and management Case discussed with hospitalist service.       9:31 PM I met with the patient to gather history and to perform my initial exam. We discussed plans for the ED course, including diagnostic testing and treatment.   10:04 PM Nurse reports the patient is becoming agitated.  12:19 AM I spoke with the hospitalist, Dr. Levy, who accepts the patient for admission.    At the conclusion of the encounter I discussed the results of all of the tests and the disposition. The questions were answered. The patient or family acknowledged understanding and was agreeable with the care plan.     Medical Decision Making    History:    Supplemental history from: Family Member/Significant Other    External Record(s) reviewed: Documented in chart, if applicable.    Work Up:    Chart documentation includes differential considered and any EKGs or imaging independently interpreted by provider, where specified.    In additional to work up documented, I considered the following work up: Documented in chart, if applicable.    External consultation:    Discussion of management with another provider: Documented in chart, if applicable    Complicating factors:    Care impacted by chronic illness: N/A    Care affected by social determinants of health:  N/A    Disposition considerations: Admit.      MEDICATIONS GIVEN IN THE EMERGENCY:  Medications   methylPREDNISolone sodium succinate (solu-MEDROL) injection 125 mg (125 mg Intravenous $Given 4/24/23 2149)   levalbuterol (XOPENEX) neb solution 1.25 mg (1.25 mg Nebulization $Given 4/24/23 2145)   morphine (PF) injection 4 mg (4 mg Intravenous $Given 4/24/23 2208)   piperacillin-tazobactam (ZOSYN) 3.375 g vial to attach to  mL bag (0 g Intravenous Stopped 4/24/23 2319)   vancomycin (VANCOCIN) 1,750 mg in sodium chloride 0.9 % 500 mL intermittent infusion (1,750 mg Intravenous $Given 4/24/23 2241)   LORazepam (ATIVAN) injection 1 mg (1 mg Intravenous $Given 4/24/23 2240)   acetaminophen (TYLENOL) tablet 975 mg (975 mg Oral $Given 4/24/23 2300)       NEW PRESCRIPTIONS STARTED AT TODAY'S ER VISIT  New Prescriptions    No medications on file          =================================================================    HPI    Patient information was obtained from: patient and wife    Use of : N/A        Dominik Rojas is a 82 year old male with a pertinent history of chronic obstructive pulmonary disease, respiratory failure, malignant neoplasm of lower lobe of right lung, pneumonia of right lower lobe, bronchiectasis without acute exacerbation, chronic systolic congestive heart failure, atrial fibrillation, CVA, AAA, opioid use disorder, alcohol dependence (in remission) who presents to this ED via walk in with spouse for evaluation of shortness of breath.     Per chart review, patient presented to [facility] on 4/12/2023 and discharged 4/16/2023 for evaluation of shortness of breath, generalized weakness, and confusion. Patient was found to be febrile up to 103 F, tachycardic, and hypoxic requiring up to 6 LPM O2. Labs significant for WBC 13.8, Procalcitonin 3.56. CT chest on 4/12 showed a known RLL mass and moderate interstitial opacities in both lungs. Also had a possible UTI. Started on IV  Zosyn/doxycycline for possible pneumonia. Improved and discharged on oral tapering prednisone and Keflex. Noted to have wheezing. BNP normal with stable heart function. Requested home O2 at night and with activity given advanced lung condition with carcinoma chronic obstructive pulmonary disease.     Patient presents with shortness of breath that has been present for a little while, but was worse this afternoon. Patient is not routinely on supplemental oxygen at home, but he does have an O2 machine at home. He has not been using it, but tried using it today, with no significant shortness of breath relief. Patient also reports of a cough and fatigue. He just finished an antibiotic for pneumonia. Wife also reports he was recently diagnosed with a UTI.     Wife reports that a few months ago, spots were found on the patient's lungs. A CT biopsy was done and found cancer cells. Patient underwent radiation and finished his last round on 4/21/2023. Patient denies chest pain.     Patient does not report of any other medical concerns or complaints at this time.     REVIEW OF SYSTEMS   Review of Systems   Constitutional: Positive for fatigue.   Respiratory: Positive for cough and shortness of breath.    Cardiovascular: Negative for chest pain.   All other systems reviewed and are negative.    PAST MEDICAL HISTORY:  Past Medical History:   Diagnosis Date     AAA (abdominal aortic aneurysm) (H)     s/p stenting     Alcohol dependence in remission (H)      Alcohol use disorder, severe, in sustained remission, dependence (H) 8/16/2021     Anxiety      Atrial fibrillation with normal ventricular rate (H)      Benign prostatic hyperplasia with lower urinary tract symptoms      Bronchiectasis without complication (H)     2/27/2020     COPD (chronic obstructive pulmonary disease) (H)      CVA (cerebral vascular accident) (H) 2019     DDD (degenerative disc disease), lumbar      Depression      Depressive disorder      Diabetes (H)       Diastolic dysfunction 01/22/2021     DJD (degenerative joint disease) of knee     left     Essential hypertension      Glaucoma      Glaucoma      History of stroke without residual deficits      Hyperlipidemia      Hypertension      Hypothyroidism      Hypothyroidism      Kidney stones      Major depression      Memory problem      Nocturia      Obesity      Opioid use disorder, severe, dependence (H) 8/16/2021     Overactive bladder 02/25/2021     Primary osteoarthritis of left knee      Psoriasis      Psoriasis      Seborrheic keratoses      Somnolence, daytime      Stenosis of right carotid artery     history of TIA's       PAST SURGICAL HISTORY:  Past Surgical History:   Procedure Laterality Date     ABDOMEN SURGERY       ABDOMINAL AORTIC ANEURYSM REPAIR  2013    stent     CAROTID ENDARTERECTOMY Right 9/9/2019    Procedure: RIGHT CAROTID ENDARTERECTOMY;  Surgeon: Miguel Muller MD;  Location: [facility];  Service: General     CIRCUMCISION       CYSTOSCOPY       CYSTOSCOPY PROSTATE W/ LASER N/A 6/12/2018    Procedure:  TRANSURETHRAL RESECTION OF THE PROSTATE WITH QUANTA LASER;  Surgeon: Eze Levi MD;  Location: [facility];  Service:      CYSTOSCOPY PROSTATE W/ LASER N/A 2/18/2020    Procedure: CYSTOSCOPY WITH TRANSURETHRAL INCISION OF THE PROSTATE WITH QUANTA LASER;  Surgeon: Eze Levi MD;  Location: [facility];  Service: Urology     CYSTOSCOPY W/ LITHOLAPAXY / EHL N/A 6/12/2018    Procedure: CYSTOSCOPY AND LITHOLAPAXY;  Surgeon: Eze Levi MD;  Location: [facility];  Service:      IR ABDOMINAL AORTAGRAM  5/19/2020     IR ABDOMINAL AORTIC ANEURYSM ENDOGRAFT  5/19/2020     IR ABDOMINAL AORTOGRAM  5/19/2020     IR ABDOMINAL ENDOVASCULAR STENT GRAFT  5/19/2020     LITHOTRIPSY       PERCUTANEOUS NEPHROSTOLITHOTOMY Right 03/10/2020     ZZC HUANG W/O FACETEC FORAMOT/DSKC 1/2 VRT SEG, LUMBAR Bilateral 3/3/2021    Procedure: Bilateral lumbar 2 - Lumbar 4  decompressive lumbar laminectomy with medial facetectomies;  Surgeon: Renée King MD;  [facility];  Service: Spine           CURRENT MEDICATIONS:    acetaminophen (TYLENOL) 500 MG tablet  albuterol (PROAIR HFA/PROVENTIL HFA/VENTOLIN HFA) 108 (90 Base) MCG/ACT inhaler  albuterol (PROVENTIL) (2.5 MG/3ML) 0.083% neb solution  amLODIPine (NORVASC) 2.5 MG tablet  aspirin (ASA) 81 MG chewable tablet  Cholecalciferol (VITAMIN D3) 50 MCG ( UT) CAPS  DULoxetine (CYMBALTA) 30 MG capsule  furosemide (LASIX) 20 MG tablet  latanoprost (XALATAN) 0.005 % ophthalmic solution  levothyroxine (SYNTHROID/LEVOTHROID) 88 MCG tablet  losartan (COZAAR) 25 MG tablet  metoprolol succinate ER (TOPROL-XL) 25 MG 24 hr tablet  multivitamin w/minerals (THERA-VIT-M) tablet  OLANZapine (ZYPREXA) 5 MG tablet  pantoprazole (PROTONIX) 40 MG EC tablet  rosuvastatin (CRESTOR) 10 MG tablet        ALLERGIES:  Allergies   Allergen Reactions     Diclofenac      Other reaction(s): GI Upset     Loratadine      Other reaction(s): Urinary Retention     Sertraline Unknown     Other reaction(s): ineffective       FAMILY HISTORY:  Family History   Problem Relation Age of Onset     Emphysema Mother      No Known Problems Father      Cirrhosis Father      No Known Problems Sister      No Known Problems Brother      No Known Problems Maternal Aunt      No Known Problems Maternal Uncle      No Known Problems Paternal Aunt      No Known Problems Paternal Uncle      No Known Problems Maternal Grandmother      No Known Problems Maternal Grandfather      No Known Problems Paternal Grandmother      No Known Problems Paternal Grandfather      No Known Problems Other        SOCIAL HISTORY:   Social History     Socioeconomic History     Marital status:    Tobacco Use     Smoking status: Former     Packs/day: 0.50     Years: 35.00     Pack years: 17.50     Types: Cigarettes     Quit date: 1990     Years since quittin.3     Smokeless  "tobacco: Never     Tobacco comments:     quit 1987   Substance and Sexual Activity     Alcohol use: No     Comment: Alcoholic Drinks/day: quit 1974     Drug use: No     Sexual activity: Yes     Partners: Female     Birth control/protection: Post-menopausal   Other Topics Concern     Parent/sibling w/ CABG, MI or angioplasty before 65F 55M? No       VITALS:  /62   Pulse 114   Temp 99.3  F (37.4  C) (Oral)   Resp 18   Ht 1.702 m (5' 7\")   Wt 88 kg (194 lb)   SpO2 95%   BMI 30.38 kg/m      PHYSICAL EXAM    Constitutional: Well developed, Well nourished, NAD  HENT: Normocephalic, Atraumatic, Bilateral external ears normal, Oropharynx normal, mucous membranes moist, Nose normal. Neck-  Normal range of motion, No tenderness, Supple, No stridor.   Eyes: PERRL, EOMI, Conjunctiva normal, No discharge.   Respiratory: Normal breath sounds, No respiratory distress, No wheezing, Speaks full sentences easily. No cough.   Cardiovascular: Normal heart rate, Regular rhythm, No murmurs Chest wall nontender.    GI:  Soft, No tenderness, No masses, No flank tenderness. No rebound or guarding.  : No cva tenderness    Musculoskeletal: 2+ DP pulses. No edema. No cyanosis. Good range of motion in all major joints. No tenderness to palpation. No tenderness of the CTLS spine.   Integument: Warm, Dry, No erythema, No rash. No petechiae.   Neurologic: Alert & oriented x 3, Normal motor function, Normal sensory function, No focal deficits noted.   Psychiatric: Affect normal, Judgment normal, Mood normal. Cooperative.        LAB:  All pertinent labs reviewed and interpreted.  Results for orders placed or performed during the hospital encounter of 04/24/23   XR Chest Port 1 View    Impression    IMPRESSION: Known right lower lobe mass. Hypoventilatory lungs with bibasilar atelectasis. No focal pulmonary consolidation or effusion. Underlying emphysematous changes. Normal heart size without pulmonary vascular congestion. No " pneumothorax.   Atherosclerotic calcifications of the aortic arch. No acute osseous abnormality.   Result Value Ref Range    INR 1.16 (H) 0.85 - 1.15   Comprehensive metabolic panel   Result Value Ref Range    Sodium 134 (L) 136 - 145 mmol/L    Potassium 5.5 (H) 3.4 - 5.3 mmol/L    Chloride 97 (L) 98 - 107 mmol/L    Carbon Dioxide (CO2) 24 22 - 29 mmol/L    Anion Gap 13 7 - 15 mmol/L    Urea Nitrogen 21.8 8.0 - 23.0 mg/dL    Creatinine 1.14 0.67 - 1.17 mg/dL    Calcium 9.2 8.8 - 10.2 mg/dL    Glucose 119 (H) 70 - 99 mg/dL    Alkaline Phosphatase 131 (H) 40 - 129 U/L    AST 42 10 - 50 U/L    ALT 23 10 - 50 U/L    Protein Total 7.2 6.4 - 8.3 g/dL    Albumin 3.9 3.5 - 5.2 g/dL    Bilirubin Total 1.2 <=1.2 mg/dL    GFR Estimate 65 >60 mL/min/1.73m2   Lactic acid whole blood   Result Value Ref Range    Lactic Acid 2.1 (H) 0.7 - 2.0 mmol/L   Result Value Ref Range    Troponin T, High Sensitivity 18 <=22 ng/L   Result Value Ref Range    Magnesium 1.9 1.7 - 2.3 mg/dL   Blood gas venous   Result Value Ref Range    pH Venous 7.45 7.35 - 7.45    pCO2 Venous 42 35 - 50 mm Hg    pO2 Venous 34 25 - 47 mm Hg    Bicarbonate Venous 29 24 - 30 mmol/L    Base Excess/Deficit (+/-) 4.7   mmol/L    Oxyhemoglobin Venous 65.4 (L) 70.0 - 75.0 %    O2 Sat, Venous 66.6 (L) 70.0 - 75.0 %   Symptomatic Influenza A/B, RSV, & SARS-CoV2 PCR (COVID-19) Nasopharyngeal    Specimen: Nasopharyngeal; Swab   Result Value Ref Range    Influenza A PCR Negative Negative    Influenza B PCR Negative Negative    RSV PCR Negative Negative    SARS CoV2 PCR Negative Negative   CBC with platelets and differential   Result Value Ref Range    WBC Count 17.3 (H) 4.0 - 11.0 10e3/uL    RBC Count 5.16 4.40 - 5.90 10e6/uL    Hemoglobin 15.6 13.3 - 17.7 g/dL    Hematocrit 47.5 40.0 - 53.0 %    MCV 92 78 - 100 fL    MCH 30.2 26.5 - 33.0 pg    MCHC 32.8 31.5 - 36.5 g/dL    RDW 13.7 10.0 - 15.0 %    Platelet Count 250 150 - 450 10e3/uL    % Neutrophils 89 %    % Lymphocytes  5 %    % Monocytes 5 %    % Eosinophils 1 %    % Basophils 0 %    % Immature Granulocytes 0 %    NRBCs per 100 WBC 0 <1 /100    Absolute Neutrophils 15.1 (H) 1.6 - 8.3 10e3/uL    Absolute Lymphocytes 0.9 0.8 - 5.3 10e3/uL    Absolute Monocytes 0.9 0.0 - 1.3 10e3/uL    Absolute Eosinophils 0.2 0.0 - 0.7 10e3/uL    Absolute Basophils 0.1 0.0 - 0.2 10e3/uL    Absolute Immature Granulocytes 0.1 <=0.4 10e3/uL    Absolute NRBCs 0.0 10e3/uL   Extra Green Top (Lithium Heparin) Tube   Result Value Ref Range    Hold Specimen JIC    Extra Green Top (Lithium Heparin) ON ICE   Result Value Ref Range    Hold Specimen JIC    Extra Red Top Tube   Result Value Ref Range    Hold Specimen JIC        RADIOLOGY:  Reviewed all pertinent imaging. Please see official radiology report.  XR Chest Port 1 View   Final Result   IMPRESSION: Known right lower lobe mass. Hypoventilatory lungs with bibasilar atelectasis. No focal pulmonary consolidation or effusion. Underlying emphysematous changes. Normal heart size without pulmonary vascular congestion. No pneumothorax.    Atherosclerotic calcifications of the aortic arch. No acute osseous abnormality.          EKG:    Performed at: 24 April, 2023 2143    Impression: Sinus tachycardia. Low voltage QRS. Cannot rule out anterior infarct (cited on or before 13 April, 2023). Abnormal ECG.    Rate: 112 BPM  Rhythm: Sinus tachycardia.  Axis: 58  LA Interval: 170 ms  QRS Interval: 76 ms  QTc Interval: 483 ms  ST Changes: Nonischemic appearing  Comparison: When compared with ECG of 13 April, 2023 0654, premature atrial complexes are no longer present. Vent. Rate has increased by 40 BPM.    I have independently reviewed and interpreted the EKG(s) documented above.    PROCEDURES:     Critical Care     Performed by: Dr. Fairchild  Total critical care time: 40 minutes  Critical care was necessary to treat or prevent imminent or life-threatening deterioration of the following conditions: respiratory failure,  sepsis  Critical care was time spent personally by me on the following activities: development of treatment plan with patient or surrogate, discussions with consultants, examination of patient, evaluation of patient's response to treatment, obtaining history from patient or surrogate, ordering and performing treatments and interventions, ordering and review of laboratory studies, ordering and review of radiographic studies, re-evaluation of patient's condition and monitoring for potential decompensation.  Critical care time was exclusive of separately billable procedures and treating other patients.      I, Semaj Mcmanus, am serving as a scribe to document services personally performed by Salvador Fairchild MD, based on my observation and the provider's statements to me. I, Salvador Fairchild MD, attest that Semaj Mcmanus is acting in a scribe capacity, has observed my performance of the services and has documented them in accordance with my direction.    Salvador Fairchild MD  [facility] EMERGENCY DEPARTMENT  [address] [place] [id number] [phone]     Salvador Fairchild MD  04/25/23 0109

## 2023-04-25 NOTE — PROGRESS NOTES
RT PROGRESS NOTE  CURRENT HIGH FLOW SETTINGS:  LITER FLOW: 45 LPM        FIO2:   50%  PATIENT PARAMETERS:  SAT: 96%  BS: diminished    Respiratory Medications: Atrovent/ Xopenex QID and Pulmicort BID.      NOTE / SHIFT SUMMARY:     Patient remains on HFNC. Patient receive schedule nebulizer. BS diminished with increased aeration post neb. Patient tolerated treatment well. Patient alert and orientated. Continue to titrate FiO2 as patient tolerates.           Sherif Ward, RT

## 2023-04-26 LAB
ATRIAL RATE - MUSE: 95 BPM
DIASTOLIC BLOOD PRESSURE - MUSE: NORMAL MMHG
HOLD SPECIMEN: NORMAL
INTERPRETATION ECG - MUSE: NORMAL
MAGNESIUM SERPL-MCNC: 2.1 MG/DL (ref 1.7–2.3)
P AXIS - MUSE: 94 DEGREES
POTASSIUM SERPL-SCNC: 4.8 MMOL/L (ref 3.4–5.3)
PR INTERVAL - MUSE: 194 MS
QRS DURATION - MUSE: 84 MS
QT - MUSE: 400 MS
QTC - MUSE: 502 MS
R AXIS - MUSE: 32 DEGREES
SYSTOLIC BLOOD PRESSURE - MUSE: NORMAL MMHG
T AXIS - MUSE: 39 DEGREES
VENTRICULAR RATE- MUSE: 95 BPM

## 2023-04-26 PROCEDURE — 94640 AIRWAY INHALATION TREATMENT: CPT

## 2023-04-26 PROCEDURE — 258N000003 HC RX IP 258 OP 636: Performed by: INTERNAL MEDICINE

## 2023-04-26 PROCEDURE — 999N000157 HC STATISTIC RCP TIME EA 10 MIN

## 2023-04-26 PROCEDURE — 99418 PROLNG IP/OBS E/M EA 15 MIN: CPT | Performed by: INTERNAL MEDICINE

## 2023-04-26 PROCEDURE — 84132 ASSAY OF SERUM POTASSIUM: CPT | Performed by: INTERNAL MEDICINE

## 2023-04-26 PROCEDURE — 93005 ELECTROCARDIOGRAM TRACING: CPT

## 2023-04-26 PROCEDURE — 250N000013 HC RX MED GY IP 250 OP 250 PS 637: Performed by: INTERNAL MEDICINE

## 2023-04-26 PROCEDURE — 250N000011 HC RX IP 250 OP 636: Performed by: STUDENT IN AN ORGANIZED HEALTH CARE EDUCATION/TRAINING PROGRAM

## 2023-04-26 PROCEDURE — 94640 AIRWAY INHALATION TREATMENT: CPT | Mod: 76

## 2023-04-26 PROCEDURE — 250N000011 HC RX IP 250 OP 636: Performed by: INTERNAL MEDICINE

## 2023-04-26 PROCEDURE — 93010 ELECTROCARDIOGRAM REPORT: CPT | Performed by: INTERNAL MEDICINE

## 2023-04-26 PROCEDURE — 83735 ASSAY OF MAGNESIUM: CPT | Performed by: INTERNAL MEDICINE

## 2023-04-26 PROCEDURE — 250N000011 HC RX IP 250 OP 636

## 2023-04-26 PROCEDURE — 250N000009 HC RX 250: Performed by: INTERNAL MEDICINE

## 2023-04-26 PROCEDURE — 36415 COLL VENOUS BLD VENIPUNCTURE: CPT | Performed by: INTERNAL MEDICINE

## 2023-04-26 PROCEDURE — 99233 SBSQ HOSP IP/OBS HIGH 50: CPT | Performed by: INTERNAL MEDICINE

## 2023-04-26 PROCEDURE — 120N000001 HC R&B MED SURG/OB

## 2023-04-26 RX ORDER — QUETIAPINE FUMARATE 25 MG/1
25 TABLET, FILM COATED ORAL ONCE
Status: COMPLETED | OUTPATIENT
Start: 2023-04-26 | End: 2023-04-26

## 2023-04-26 RX ORDER — QUETIAPINE FUMARATE 25 MG/1
25 TABLET, FILM COATED ORAL 2 TIMES DAILY PRN
Status: DISCONTINUED | OUTPATIENT
Start: 2023-04-26 | End: 2023-05-07 | Stop reason: HOSPADM

## 2023-04-26 RX ORDER — OLANZAPINE 10 MG/2ML
5 INJECTION, POWDER, FOR SOLUTION INTRAMUSCULAR
Status: COMPLETED | OUTPATIENT
Start: 2023-04-26 | End: 2023-04-26

## 2023-04-26 RX ORDER — OLANZAPINE 2.5 MG/1
10 TABLET, FILM COATED ORAL DAILY PRN
Status: DISCONTINUED | OUTPATIENT
Start: 2023-04-26 | End: 2023-04-26

## 2023-04-26 RX ORDER — OLANZAPINE 2.5 MG/1
7.5 TABLET, FILM COATED ORAL 2 TIMES DAILY PRN
Status: DISCONTINUED | OUTPATIENT
Start: 2023-04-26 | End: 2023-04-29

## 2023-04-26 RX ORDER — OLANZAPINE 10 MG/2ML
2.5 INJECTION, POWDER, FOR SOLUTION INTRAMUSCULAR
Status: DISCONTINUED | OUTPATIENT
Start: 2023-04-26 | End: 2023-04-26

## 2023-04-26 RX ORDER — ZIPRASIDONE MESYLATE 20 MG/ML
20 INJECTION, POWDER, LYOPHILIZED, FOR SOLUTION INTRAMUSCULAR ONCE
Status: COMPLETED | OUTPATIENT
Start: 2023-04-26 | End: 2023-04-26

## 2023-04-26 RX ORDER — HALOPERIDOL 5 MG/ML
5 INJECTION INTRAMUSCULAR EVERY 6 HOURS PRN
Status: DISCONTINUED | OUTPATIENT
Start: 2023-04-26 | End: 2023-04-26

## 2023-04-26 RX ORDER — LORAZEPAM 2 MG/ML
0.5 INJECTION INTRAMUSCULAR ONCE
Status: COMPLETED | OUTPATIENT
Start: 2023-04-26 | End: 2023-04-26

## 2023-04-26 RX ADMIN — VANCOMYCIN HYDROCHLORIDE 1250 MG: 5 INJECTION, POWDER, LYOPHILIZED, FOR SOLUTION INTRAVENOUS at 11:15

## 2023-04-26 RX ADMIN — PIPERACILLIN AND TAZOBACTAM 3.38 G: 3; .375 INJECTION, POWDER, LYOPHILIZED, FOR SOLUTION INTRAVENOUS at 21:18

## 2023-04-26 RX ADMIN — ONDANSETRON 4 MG: 2 INJECTION INTRAMUSCULAR; INTRAVENOUS at 18:38

## 2023-04-26 RX ADMIN — ENOXAPARIN SODIUM 40 MG: 40 INJECTION SUBCUTANEOUS at 09:11

## 2023-04-26 RX ADMIN — OLANZAPINE 5 MG: 10 INJECTION, POWDER, FOR SOLUTION INTRAMUSCULAR at 21:14

## 2023-04-26 RX ADMIN — FUROSEMIDE 20 MG: 20 TABLET ORAL at 09:13

## 2023-04-26 RX ADMIN — IPRATROPIUM BROMIDE 0.5 MG: 0.5 SOLUTION RESPIRATORY (INHALATION) at 19:56

## 2023-04-26 RX ADMIN — BUDESONIDE 0.5 MG: 0.5 INHALANT RESPIRATORY (INHALATION) at 08:09

## 2023-04-26 RX ADMIN — FAMOTIDINE 20 MG: 10 INJECTION, SOLUTION INTRAVENOUS at 01:00

## 2023-04-26 RX ADMIN — HALOPERIDOL LACTATE 2 MG: 5 INJECTION, SOLUTION INTRAMUSCULAR at 06:13

## 2023-04-26 RX ADMIN — LEVALBUTEROL HYDROCHLORIDE 1.25 MG: 1.25 SOLUTION RESPIRATORY (INHALATION) at 19:56

## 2023-04-26 RX ADMIN — METHYLPREDNISOLONE SODIUM SUCCINATE 40 MG: 40 INJECTION, POWDER, FOR SOLUTION INTRAMUSCULAR; INTRAVENOUS at 23:41

## 2023-04-26 RX ADMIN — PIPERACILLIN AND TAZOBACTAM 3.38 G: 3; .375 INJECTION, POWDER, LYOPHILIZED, FOR SOLUTION INTRAVENOUS at 14:16

## 2023-04-26 RX ADMIN — OLANZAPINE 5 MG: 2.5 TABLET, FILM COATED ORAL at 01:40

## 2023-04-26 RX ADMIN — METHYLPREDNISOLONE SODIUM SUCCINATE 40 MG: 40 INJECTION, POWDER, FOR SOLUTION INTRAMUSCULAR; INTRAVENOUS at 14:15

## 2023-04-26 RX ADMIN — OLANZAPINE 7.5 MG: 2.5 TABLET, FILM COATED ORAL at 12:26

## 2023-04-26 RX ADMIN — FAMOTIDINE 20 MG: 10 INJECTION, SOLUTION INTRAVENOUS at 14:11

## 2023-04-26 RX ADMIN — IPRATROPIUM BROMIDE 0.5 MG: 0.5 SOLUTION RESPIRATORY (INHALATION) at 15:32

## 2023-04-26 RX ADMIN — QUETIAPINE FUMARATE 25 MG: 25 TABLET ORAL at 14:19

## 2023-04-26 RX ADMIN — METHYLPREDNISOLONE SODIUM SUCCINATE 40 MG: 40 INJECTION, POWDER, FOR SOLUTION INTRAMUSCULAR; INTRAVENOUS at 05:14

## 2023-04-26 RX ADMIN — ZIPRASIDONE MESYLATE 20 MG: 20 INJECTION, POWDER, LYOPHILIZED, FOR SOLUTION INTRAMUSCULAR at 23:39

## 2023-04-26 RX ADMIN — DULOXETINE HYDROCHLORIDE 60 MG: 60 CAPSULE, DELAYED RELEASE PELLETS ORAL at 09:13

## 2023-04-26 RX ADMIN — LORAZEPAM 0.5 MG: 2 INJECTION INTRAMUSCULAR; INTRAVENOUS at 06:57

## 2023-04-26 RX ADMIN — LEVOTHYROXINE SODIUM 88 MCG: 88 TABLET ORAL at 05:17

## 2023-04-26 RX ADMIN — BUDESONIDE 0.5 MG: 0.5 INHALANT RESPIRATORY (INHALATION) at 19:57

## 2023-04-26 RX ADMIN — ASPIRIN 81 MG: 81 TABLET, CHEWABLE ORAL at 09:13

## 2023-04-26 RX ADMIN — LEVALBUTEROL HYDROCHLORIDE 1.25 MG: 1.25 SOLUTION RESPIRATORY (INHALATION) at 08:10

## 2023-04-26 RX ADMIN — IPRATROPIUM BROMIDE 0.5 MG: 0.5 SOLUTION RESPIRATORY (INHALATION) at 08:09

## 2023-04-26 RX ADMIN — PIPERACILLIN AND TAZOBACTAM 3.38 G: 3; .375 INJECTION, POWDER, LYOPHILIZED, FOR SOLUTION INTRAVENOUS at 04:30

## 2023-04-26 RX ADMIN — AMLODIPINE BESYLATE 2.5 MG: 2.5 TABLET ORAL at 09:14

## 2023-04-26 RX ADMIN — IPRATROPIUM BROMIDE 0.5 MG: 0.5 SOLUTION RESPIRATORY (INHALATION) at 11:17

## 2023-04-26 RX ADMIN — QUETIAPINE FUMARATE 25 MG: 25 TABLET ORAL at 11:03

## 2023-04-26 RX ADMIN — HALOPERIDOL LACTATE 2 MG: 5 INJECTION, SOLUTION INTRAMUSCULAR at 00:13

## 2023-04-26 RX ADMIN — ACETAMINOPHEN 975 MG: 325 TABLET ORAL at 05:50

## 2023-04-26 RX ADMIN — METOPROLOL SUCCINATE 12.5 MG: 25 TABLET, EXTENDED RELEASE ORAL at 09:13

## 2023-04-26 RX ADMIN — LEVALBUTEROL HYDROCHLORIDE 1.25 MG: 1.25 SOLUTION RESPIRATORY (INHALATION) at 11:17

## 2023-04-26 RX ADMIN — BUSPIRONE HYDROCHLORIDE 5 MG: 5 TABLET ORAL at 09:13

## 2023-04-26 RX ADMIN — LOSARTAN POTASSIUM 25 MG: 25 TABLET, FILM COATED ORAL at 09:14

## 2023-04-26 RX ADMIN — LEVALBUTEROL HYDROCHLORIDE 1.25 MG: 1.25 SOLUTION RESPIRATORY (INHALATION) at 15:31

## 2023-04-26 ASSESSMENT — ACTIVITIES OF DAILY LIVING (ADL)
ADLS_ACUITY_SCORE: 53
ADLS_ACUITY_SCORE: 57
ADLS_ACUITY_SCORE: 53
ADLS_ACUITY_SCORE: 53
ADLS_ACUITY_SCORE: 45
ADLS_ACUITY_SCORE: 57
ADLS_ACUITY_SCORE: 45
ADLS_ACUITY_SCORE: 45
ADLS_ACUITY_SCORE: 53
ADLS_ACUITY_SCORE: 57
ADLS_ACUITY_SCORE: 53
ADLS_ACUITY_SCORE: 57

## 2023-04-26 NOTE — PLAN OF CARE
Problem: Restraint, Nonviolent  Goal: Absence of Harm or Injury  Outcome: Progressing     Problem: Pneumonia  Goal: Resolution of Infection Signs and Symptoms  Outcome: Progressing     Problem: Pneumonia  Goal: Effective Oxygenation and Ventilation  Outcome: Progressing     Problem: Sepsis/Septic Shock  Goal: Optimal Coping  Outcome: Progressing     Problem: COPD (Chronic Obstructive Pulmonary Disease) Comorbidity  Goal: Maintenance of COPD Symptom Control  Outcome: Progressing     Problem: Hypertension Comorbidity  Goal: Blood Pressure in Desired Range  Outcome: Progressing     Problem: Pain Chronic (Persistent) (Comorbidity Management)  Goal: Acceptable Pain Control and Functional Ability  Outcome: Progressing   Goal Outcome Evaluation:      Vitally stable on high flow oxygen, 40% Fi02 40 lpm, sats in low to mid 90s. Slept intermittently. Restless even when sleeping. Agitated when medications to manage behavior wears off.  Haldol given x2 for yelling and not redirectable. 4 point restraints on due to pulling lines and attempting to get out of bed. Iv fluids and iv antibiotics running. NSR on cardiac monitor. 1:1 sitter at bedside for patient safety.

## 2023-04-26 NOTE — PROGRESS NOTES
After second zyprexa dose, patient has significantly calmed down in room. Restraints remain on. Patient arouses to voice and remains confused but not as combative and willing to put gown on and new pads under him. Tele monitor on, oxygen high flow in nares. Is still pulling at items that are in his line of sight but is redirectable.  Will continue to closely monitor patient for patient and staff safety.

## 2023-04-26 NOTE — PROGRESS NOTES
Code green called at 1825 after patient became extremely agitated and began pulling off oxygen, heart monitor and was refusing to stay in bed while becoming combative. Patient was placed back in bed by staff and four point soft restraints applied to patient's wrists and ankles. IM zyprexa given. Minimal relief from medication as patient continued to yell and fight staff in bed. Pulling at all monitor lines, external catheter, oxygen, kicking foot of bed and hitting bed rails.1:1 ordered to sit at bedside. Patient developed large hematoma on the top of his right hand. MD made aware and assessed at bedside, pressure dressing applied, no xray at this time.  Will continue to closely monitor patient safety and hand hematoma. Will give PRN medication when appropriate.

## 2023-04-26 NOTE — PROGRESS NOTES
[facility]    Medicine Progress Note - Hospitalist Service    Date of Admission:  4/24/2023    Assessment & Plan    Dominik Rojas is an 82 year old male with medical history of COPD not on home oxygen, lung cancer, vascular dementia, hypothyroidism, hypertension, recent pneumonia requiring hospitalization admitted on 4/24/2023 with worsening shortness of breath at rest, fever, hypoxia and increased confusion requiring restraints in the ED.  ED work-up showed leukocytosis, lactic acidosis, mild hyperkalemia, hypoxemia needing high flow nasal oxygen.    Acute hypoxic respiratory failure  Likely COPD exacerbation  -Chest x-ray 4/24: Right lower lobe mass, hypoventilated, no consolidation or effusion, emphysematous changes  -Blood cultures 4/24: NGTD  -Sputum culture 4/25: NGTD  -Continue supplementary oxygen to maintain SPO2 greater than 90%  -Xopenex 4 times daily, Atrovent 4 times daily, Pulmicort 2 times daily  -Methylprednisolone 40 mg every 8 hours  -Continue Zosyn every 8 hours and vancomycin pharmacy to dose    Adenocarcinoma of the right lower lobe  Immunocompromised  -Stage T1 N0 M0  -Has completed radiation therapy on 4/21/2023, not a candidate for surgery due to age and medical comorbidities  -Follows with Dr. Bales through [facility] oncology    Mild hyperkalemia, asymptomatic-resolved  -Will continue to monitor, avoid potassium sparing agents    Acute Metabolic encephalopathy  Mood disorder  -Required restraints while in the emergency department, has been off restraints since 4/24  -Continue PTA olanzapine 7.5 mg at bedtime  -Continue PTA BuSpar 5 mg twice daily  -Continue PTA Cymbalta 60 mg daily  -Olanzapine 7.5 mg as needed daily for agitation and aggression  -Seroquel 25 mg twice daily prn  -Psychiatry consulted  -Will monitor    Essential hypertension  -Blood pressures are softer today so will restart home blood pressure medications tomorrow except will restart metoprolol 12.5  "mg today with holding parameters  -Will start losartan 25 mg daily, furosemide 20 mg, and amlodipine 2.5 mg tomorrow    Hyperlipidemia  -Continue PTA rosuvastatin 10 mg at bedtime     Diet: Regular Diet Adult    DVT Prophylaxis: Enoxaparin (Lovenox) SQ  Cabral Catheter: Not present  Lines: None     Cardiac Monitoring: ACTIVE order. Indication: sepsis, hypoxia  Code Status: Full Code      Clinically Significant Risk Factors        # Hyperkalemia: Highest K = 5.5 mmol/L in last 2 days, will monitor as appropriate       # Hypoalbuminemia: Lowest albumin = 3.3 g/dL at 4/25/2023  6:57 AM, will monitor as appropriate           # Obesity: Estimated body mass index is 30.38 kg/m  as calculated from the following:    Height as of this encounter: 1.702 m (5' 7\").    Weight as of this encounter: 88 kg (194 lb)., PRESENT ON ADMISSION         Disposition Plan      Expected Discharge Date: 04/29/2023    Discharge Delays: Oxygen Needs - Arrange Home O2  Destination: home with help/services  Discharge Comments: restraints  HFNC          Arline Wise MD  Hospitalist Service  [facility]  Securely message with [employer] (more info)  Text page via [employer] Paging/Directory   ______________________________________________________________________    Interval History   Mr. Rojas was areas throughout the day today.  He required multiple medications including his regular BuSpar, Haldol, olanzapine, and Seroquel.  At some point overnight had been given a dose of lorazepam.  Will avoid further use of benzos as they are contraindicated in the setting of delirium.  Likely exacerbated the agitation today as he was delirious yesterday but not nearly as agitated.  Also consulted psych.  Attempted to update wife who did not answer phone.  Left her message to contact the hospital.  Have not heard back.  He is requiring four-point restraints with a one-to-one sitter.  He does have an injury to the right hand from struggling " against restraints with bruising and possibly a hematoma.  Will be difficult to assess if there is any injury until we can get his behaviors better under control.      Physical Exam   Vital Signs: Temp: 97.8  F (36.6  C) Temp src: Oral BP: 126/78 Pulse: 92   Resp: 20 SpO2: 97 % O2 Device: High Flow Nasal Cannula (HFNC) Oxygen Delivery: 40 LPM  Weight: 194 lbs 0 oz    General Appearance: Moderate distress, confused paranoid  Respiratory: no no aberrant sounds but difficult to assess as he was talking during exam  Cardiovascular: Tachycardia  GI: soft, nontender, non distended, normal bowel sounds  Skin: no jaundice, no rash, bruising along the posterior aspect of the right hand and wrist      Medical Decision Making       70 MINUTES SPENT BY ME on the date of service doing chart review, history, exam, documentation & further activities per the note.      Data     I have personally reviewed the following data over the past 24 hrs:    N/A  \   N/A   / N/A     N/A N/A N/A /  N/A   4.8 N/A N/A \       Imaging results reviewed over the past 24 hrs:   No results found for this or any previous visit (from the past 24 hour(s)).

## 2023-04-26 NOTE — PROVIDER NOTIFICATION
Paged house officer, pt yelling constantly, prn haldol given with no effect. Pt not redirectable at this time. x1 dose of iv ativan ordered.

## 2023-04-26 NOTE — CONSULTS
"Triage and Transition - Consult and Liaison   [phone]  April 26, 2023      Dominik Rojas  1940    Plan:     Continue care coordination with care team.     Maintain current transition plan.     Delirium Precautions  ? Up during the day with lights on, blinds open   ? Lights off at night, avoid interruptions during the night as much as possible   ? Family visits, mementos from home (family photos, favorite blanket, ect) can be helpful   ? Encourage use of sensory aids (hearing aid, eyeglasses)   ? Frequent reorientation   ? Avoid opioids, benzodiazepines, anticholinergics if possible ?   ? Continue to ensure proper nutrition, fluid and electrolyte balance. Monitor for infections, hypoxia, metabolic derangements, or other causes of delirium     DEMENTIA INTERVENTIONS        Non-pharmacological interventions include but are not limited to:   a. Lavender   b. Warm Blankets and/or weighted blankets   c. Activities of interest currently or in the past   d. Calming music   e. 5,4,3,2,1            Dementia Basics  Use a consistent positive physical approach  - gesture & greet by name   - offer your hand & make eye contact   - approach slowly within visual range   - shake hands & maintain hand-under-hand   - move to the side of the patient  - get to eye level & respect intimate space   - wait for acknowledgement     How you help      Sight or Visual cues     Verbal or Auditory cues     Touch or Tactile cues     USE VISUAL combined VERBAL (gesture/point)   -  It s about time for     -  Let s go this way     -  Here are your socks       DON T ask questions you DON T want to hear the answer to  ( Do you want to  ,  Are you ready to  , \"I need you to  )     Acknowledge the response/reaction to your info      USE THEIR WORDS (with a ? OR in agreement)     LIMIT your words - Keep it SIMPLE     WAIT!!!!      ID common interest     Say something nice about the person or their place     Share something about yourself and " encourage the person to share back     Follow their lead - listen actively     Use some of their words to keep the flow going     Remember its the FIRST TIME!    Do s     Go with the FLOW     Use SUPPORTIVE communication techniques   - Use objects and the environment   - Give examples   - Use gestures and pointing   - Acknowledge & accept emotions   - Use empathy & Validation   - Use familiar phrases or known interests   - Respect  values  and  beliefs  - avoid the negative     DON Ts     Try to CONTROL the FLOW   - Give up reality orientation and BIG lies   - Do not correct errors   - Offer info if asked, monitoring the emotional state     Try to STOP the FLOW   - Don t reject topics   - Don t try to distract UNTIL you are well connected   - Keep VISUAL cues positive       A Positive Physical Approach for Someone with Dementia   1. Knock on door or table - to get attention if the person is not looking at you & get permission to enter or approach     2. Open palm near face and smile - look friendly and give the person a visual cue make eye contact     3. Call the person by name OR at least say  Hi!      4. Move your hand out from an open hand near face to a greeting handshake position     5. Approach the person from the front - notice their reaction to your outstretched hand - start approaching or let the person come to you, if s/he likes to be in control     6. Move slowly - one step/second, stand tall, don t crouch down or lean in as you move toward the person     7. Move toward the right side of the person and offer your hand - give the person time to look at your hand and reach for it, if s/he is doing something else - offer, don t force     8. Stand to the side of the person at arm s length - respect personal space & be supportive not confrontational     9. Shake hands with the person - make eye contact while shaking     10. Slide your hand from a  shake  position to hand-under-hand position - for safety,      connection, and function     11. Give your name & greet -  I m (name). It s good to see you!      12. Get to the person s level to talk - sit, squat, or kneel if the person is seated and stand beside the person if s/he is standing     13. NOW, deliver your message        Approaching When The Person is DISTRESSED!     TWO CHANGES -   1. Look concerned not too happy, if the person is upset     2. Let the person move toward you, keeping your body turned  sideways(supportive - not confrontational)     3. After greeting  try one of two options    a.  Sounds like you are (give an emotion or feeling that seems to be true)???    b. Repeat the person s words to you  If s/he said,  Where s my mom?  you   would say  You re looking for your mom (pause)  tell me about your mom     If the person said  I want to go home! , you would say  You want to go home (pause)  Tell me about your home  .     BASIC CARD CUES - WITH Dementia     -Knock - Announce self     -Greet & Smile     -Move Slowly - Hand offered in  handshake  position     -Move from the front to the side     -Greet with a handshake & your name        -Slide into hand-under-hand hold         -Get to the person s level     -Be friendly -make a  nice  comment or smile         -Give your message  simple, short, friendly     First -     ALWAYS use the positive physical approach!     Then -     Pay attention to the THREE ways you communicate     1 .  How you speak   - Tone of voice (friendly not bossy or critical)   - Pitch of voice (deep is better)   - Speed of speech ( slow and easy not pressured or fast)     2 .  What you say   THREE basic reasons to talk to someone   1. To get the person to DO something (5   approaches to try)   1. give a short, direct message about what is happening   2. give simple choices about what the person can do   3. ask the person to help you do something   4. ask if the person will give it a try   5.  break down the task - give it one step at  "a time     ** only ask  Are you ready to   If you are willing to come back later **     2.  Just to have a friendly interaction - to talk to the person   1.  go slow - Go with Flow   2.  acknowledge emotions - \"sounds like , seems like , I can see you are \"   ?  3. use familiar words or phrases (what the person uses)   4. know who the person has been as a person what s/he values   ?  5. use familiar objects, pictures, actions to help & direct   ?   6.be prepared to have the same conversation over & over   ?   7. look interested & friendly   8.be prepared for some emotional outbursts   9.DON'T argue  - BUT don't let the person get into dangerous situations     **REMEMBER - the person is doing the BEST that s/he can**    3. Deal with the person's distress or frustration/anger   1.Try to figure out what the person really NEEDS or WANTS (\"It sounds like \" \"It looks like \" \"It seems like \" \"You're feeling \")   2.Use empathy not forced reality or lying   3.Once the person is listening and responding to you THEN -   4.Redirect his attention and actions to something that is OK OR   5.Distract him with other things or activities you know he likes & values     **Always BE CAREFUL about personal space and touch with the person especially when s/he is distressed or being forceful **    4.  How you respond to the person   1.use positive, friendly approval or praise (short, specific and sincere)   2.offer your thanks and appreciation for his/her efforts   3.laugh with him/her & appreciate attempts at humor & friendliness   4.  shake hands to start and end an interaction    5.  use touch - hugging, hand holding, comforting only IF the person wants it     If what you are doing is NOT working -       STOP!       BACK OFF - give the person some space and time       Decide on what to do differently       Try Again!       Key Points About 'Who' the person Is .   - preferred name   - introvert or extrovert   - a planner or a doer   - a " follower or a leader   - a 'detail' or a 'big picture' person   - work history - favorite and most hated jobs or parts of jobs   - family relationships and history - feelings about   -various family members   - social history - memberships and relationships to friends and groups   - leisure background - favorite activities & beliefs about fun, games, & free time   - previous daily routines and schedules   - personal care habits and preferences   - [employer]al and spiritual needs and beliefs   - values and interests   - favorite topics, foods, places   - favorite music and songs - dislike of music or songs   - hot buttons & stressors   - behavior under stress   - what things help with stress?   - handedness   - level of cognitive impairment   - types of help that are useful       Communication - When Words Don t Work Anymore      Keys to Success:   -Watch movements & actions   -Watch facial expressions and eye movements   -Listen for changes in volume, frequency, and intensity of sounds or words   -Investigate & Check it out   -Meet the need     It s all about Meeting Needs    -Physical needs   -Emotional needs   -Probable Needs:   -Physical     *Tired   *In pain or uncomfortable   *Thirsty or Hungry   *Need to pee or have a BM or already did & need help   *Too hot or too cold     -Emotional   *Afraid   *Lonely   *Bored   *Angry   *Excited     What Can You Do?   -Figure it out Go thru the list   -Meet the need  Offer help that matches need   -Use visual cues more than verbal cues   -Use touch only after  permission  is given   -Connect - Visually, Verbally, Tactilely   -Protect Yourself & the Person - use Hand Under Hand & Supportive Stance techniques   -Reflect - copy expression/tone, repeat some key words, move with the person   -Engage - LISTEN with your head, your heart, and your body  -Respond - try to meet the unmet needs, offer comfort and connection     ** IF IT DOESN T seem to be working - STOP, BACK OFF - and  "then TRY AGAIN - changing something in your efforts (visually, verbally, or through touch/physical contact)**     Types of Help - Using Your Senses   1. Visual   -Written Information - Schedules and Notes   -Key Word Signs - locators & identifiers   -Objects in View - familiar items to stimulate task performance   -Gestures - pointing and movements   -Demonstration - provide someone to imitate     2.  Auditory  -Talking and Telling - give information, ask questions, provide choices   -Breaking it Down - Step-by-Step Task Instructions   -Using Simple Words and Phrases - Verbal Cues   -Name Calling - Auditory Attention   -Positive Feedback - praise, \"yes\", encouragement     3.Tactile - Touch   -Greeting & Comforting - handshakes, hugs, 'hand-holding'   -Touch for Attention during tasks  - Tactile Guidance - lead through 'once' to get the feel   -Hand-Under-Hand Guidance - palm to palm contact   -Hand-Under-Hand Assistance - physical help   -Dependent Care - doing for & to the person       Psychiatric medication provider to comment on 4/26/23.     Presenting problem, including what brought patient to hospital: Dominik Rojas is followed related to concerns for agitated delirium with concerns for safety for patient self and others.    Reason for consult: Requested by  to determine pharmacological and non-pharmacological interventions.    Reason for inability to complete assessment with patient:  Increased agitation and minimal orientation and is note to be an inaccurate historian.     Historical information: Per Case Management's note on 4/25/23 Mr. Rojas is currently residing with his spouse, Jovana. Pt's two daughters live near by and provide support as needed.  Jovana reports Pt is independent with most ADLs, uses a cane, and is dependent with all IADLs.  Jovana shares Pt lives with back pain and knee pain.  Jovana states Pt recently completed five radiation treatments for lung cancer.      Per " chart review Mr. Rojas has a mental health history of depression and anxiety from adulthood.      He is also noted to have struggled with ETOH use.    He has a history of needing medication for behavior intervention of verbal and physical aggression towards others.    Medications:   Current Facility-Administered Medications   Medication     acetaminophen (TYLENOL) tablet 975 mg     amLODIPine (NORVASC) tablet 2.5 mg     aspirin (ASA) chewable tablet 81 mg     budesonide (PULMICORT) neb solution 0.5 mg     busPIRone (BUSPAR) tablet 5 mg     docusate sodium (COLACE) capsule 100 mg     DULoxetine (CYMBALTA) DR capsule 60 mg     enoxaparin ANTICOAGULANT (LOVENOX) injection 40 mg     famotidine (PEPCID) injection 20 mg     furosemide (LASIX) tablet 20 mg     ipratropium (ATROVENT) 0.02 % neb solution 0.5 mg     latanoprost (XALATAN) 0.005 % ophthalmic solution 1 drop     levalbuterol (XOPENEX) neb solution 1.25 mg     levalbuterol (XOPENEX) neb solution 1.25 mg     levothyroxine (SYNTHROID/LEVOTHROID) tablet 88 mcg     lidocaine (LMX4) cream     lidocaine 1 % 0.1-1 mL     losartan (COZAAR) tablet 25 mg     melatonin tablet 1 mg     methylPREDNISolone sodium succinate (solu-MEDROL) injection 40 mg     metoprolol succinate ER (TOPROL-XL) 24 hr half-tab 12.5 mg     multivitamin w/minerals (THERA-VIT-M) tablet 1 tablet     OLANZapine (zyPREXA) tablet 7.5 mg     OLANZapine (zyPREXA) tablet 7.5 mg     ondansetron (ZOFRAN ODT) ODT tab 4 mg    Or     ondansetron (ZOFRAN) injection 4 mg     piperacillin-tazobactam (ZOSYN) 3.375 g vial to attach to  mL bag     prochlorperazine (COMPAZINE) injection 5 mg    Or     prochlorperazine (COMPAZINE) tablet 5 mg    Or     prochlorperazine (COMPAZINE) suppository 12.5 mg     QUEtiapine (SEROquel) tablet 25 mg     rosuvastatin (CRESTOR) tablet 10 mg     sodium chloride (PF) 0.9% PF flush 3 mL     sodium chloride (PF) 0.9% PF flush 3 mL     sodium chloride 0.9% infusion     sodium  phosphate (FLEET ENEMA) 1 enema     vancomycin (VANCOCIN) 1,250 mg in sodium chloride 0.9 % 250 mL intermittent infusion     Medications Prior to Admission   Medication Sig Dispense Refill Last Dose     acetaminophen (TYLENOL) 500 MG tablet Take 1,000 mg by mouth as needed   Unknown at prn     amLODIPine (NORVASC) 2.5 MG tablet Take 2.5 mg by mouth daily   4/24/2023 at am     aspirin (ASA) 81 MG chewable tablet Take 81 mg by mouth daily   4/24/2023 at am     busPIRone (BUSPAR) 5 MG tablet Take 5 mg by mouth 2 times daily   4/24/2023 at am     Cholecalciferol (VITAMIN D3) 50 MCG (2000 UT) CAPS Take 1 tablet by mouth daily    4/24/2023 at am     DULoxetine (CYMBALTA) 60 MG capsule Take 60 mg by mouth daily   4/25/2023 at am     furosemide (LASIX) 20 MG tablet Take 20 mg by mouth daily   4/24/2023 at am     latanoprost (XALATAN) 0.005 % ophthalmic solution Place 1 drop into both eyes At Bedtime   Past Week at pm     levothyroxine (SYNTHROID/LEVOTHROID) 88 MCG tablet TAKE 1 TABLET BY MOUTH EVERY DAY IN THE MORNING ON AN EMPTY STOMACH FOR 30 DAYS   4/24/2023 at am     losartan (COZAAR) 25 MG tablet Take 1 tablet (25 mg) by mouth daily 30 tablet 0 4/24/2023 at am     metoprolol succinate ER (TOPROL-XL) 25 MG 24 hr tablet Take 0.5 tablets (12.5 mg) by mouth daily   4/24/2023 at am     multivitamin w/minerals (THERA-VIT-M) tablet Take 1 tablet by mouth daily   4/24/2023 at am     OLANZapine (ZYPREXA) 5 MG tablet Take 7.5 mg by mouth At Bedtime   4/24/2023 at took in AM4/24/23 PTA     pantoprazole (PROTONIX) 40 MG EC tablet Take 1 tablet (40 mg) by mouth every morning (before breakfast) 30 tablet 0 4/24/2023 at am     rosuvastatin (CRESTOR) 10 MG tablet Take 10 mg by mouth At Bedtime    4/24/2023 at took in AM PTA 4/25/23       Collateral information:   Reviewed chart and coordinated with psychiatric medication provider to comment on 4/27/23.      CHINEDU ZAYAS MSW, LICSW  Triage and Transition - Consult and Liaison    [phone]

## 2023-04-26 NOTE — PROGRESS NOTES
RESPIRATORY CARE NOTE:    Pt remains on HHFNC on 40L 40%. Pt given Pulmicortx1, Atrovent and Xopenex x3. BS are diminished, pt spO2 96% and . RT following.    Esthela Dorado, RT  4/26/2023

## 2023-04-26 NOTE — PROGRESS NOTES
RT PROGRESS NOTE  CURRENT HIGH FLOW SETTINGS:  LITER FLOW: 40 LPM        FIO2:   40%  PATIENT PARAMETERS:  SAT: 96%      NOTE / SHIFT SUMMARY:   Patient remains on HFNC 40 L 40%. No respiratory distress noted at this time . RT will follow.      IZAIAH LÓPEZ, RT

## 2023-04-26 NOTE — PLAN OF CARE
PT remains on 1:1 and 4 point soft restrains for safety. Pt confused, alert to self only. Yelling out, pulling at everything throughout shift. MD aware and meds reviewed. Given Seroquel x2 and olanzapine x1 on shift. Not effective. Pt has been given appropriate skin breaks and checks from restraints. Right hand/wrist red, bruised and edema prior to shift. Elevated on pillow, ice packs placed, moving limb and CMS intact. MD aware. Tele sinus tach. Right AC IV bleeding from Pt pulling but still working. Assessed and reinforced. Attempted 2 times to place new PIV with staff holding Pt but Pt too restless and fighting staff and unable to place new IV. MD aware.

## 2023-04-27 ENCOUNTER — APPOINTMENT (OUTPATIENT)
Dept: CT IMAGING | Facility: HOSPITAL | Age: 83
DRG: 871 | End: 2023-04-27
Payer: COMMERCIAL

## 2023-04-27 LAB
ANION GAP SERPL CALCULATED.3IONS-SCNC: 10 MMOL/L (ref 7–15)
ANION GAP SERPL CALCULATED.3IONS-SCNC: 11 MMOL/L (ref 7–15)
ATRIAL RATE - MUSE: 98 BPM
BASE EXCESS BLDV CALC-SCNC: 0.2 MMOL/L
BUN SERPL-MCNC: 26.4 MG/DL (ref 8–23)
BUN SERPL-MCNC: 28.5 MG/DL (ref 8–23)
CALCIUM SERPL-MCNC: 8.5 MG/DL (ref 8.8–10.2)
CALCIUM SERPL-MCNC: 8.6 MG/DL (ref 8.8–10.2)
CHLORIDE SERPL-SCNC: 107 MMOL/L (ref 98–107)
CHLORIDE SERPL-SCNC: 109 MMOL/L (ref 98–107)
CK SERPL-CCNC: 3670 U/L (ref 39–308)
CK SERPL-CCNC: 4320 U/L (ref 39–308)
CREAT SERPL-MCNC: 0.94 MG/DL (ref 0.67–1.17)
CREAT SERPL-MCNC: 0.94 MG/DL (ref 0.67–1.17)
CREAT SERPL-MCNC: 1.01 MG/DL (ref 0.67–1.17)
D DIMER PPP FEU-MCNC: 3.24 UG/ML FEU (ref 0–0.5)
DEPRECATED HCO3 PLAS-SCNC: 22 MMOL/L (ref 22–29)
DEPRECATED HCO3 PLAS-SCNC: 24 MMOL/L (ref 22–29)
DIASTOLIC BLOOD PRESSURE - MUSE: NORMAL MMHG
ERYTHROCYTE [DISTWIDTH] IN BLOOD BY AUTOMATED COUNT: 14.4 % (ref 10–15)
GFR SERPL CREATININE-BSD FRML MDRD: 75 ML/MIN/1.73M2
GFR SERPL CREATININE-BSD FRML MDRD: 81 ML/MIN/1.73M2
GFR SERPL CREATININE-BSD FRML MDRD: 81 ML/MIN/1.73M2
GLUCOSE SERPL-MCNC: 115 MG/DL (ref 70–99)
GLUCOSE SERPL-MCNC: 124 MG/DL (ref 70–99)
HCO3 BLDV-SCNC: 26 MMOL/L (ref 24–30)
HCT VFR BLD AUTO: 39.5 % (ref 40–53)
HGB BLD-MCNC: 13 G/DL (ref 13.3–17.7)
HOLD SPECIMEN: NORMAL
INTERPRETATION ECG - MUSE: NORMAL
LACTATE SERPL-SCNC: 2.1 MMOL/L (ref 0.7–2)
MAGNESIUM SERPL-MCNC: 2.2 MG/DL (ref 1.7–2.3)
MAGNESIUM SERPL-MCNC: 2.2 MG/DL (ref 1.7–2.3)
MCH RBC QN AUTO: 30.5 PG (ref 26.5–33)
MCHC RBC AUTO-ENTMCNC: 32.9 G/DL (ref 31.5–36.5)
MCV RBC AUTO: 93 FL (ref 78–100)
OXYHGB MFR BLDV: 89.5 % (ref 70–75)
P AXIS - MUSE: 29 DEGREES
PCO2 BLDV: 44 MM HG (ref 35–50)
PH BLDV: 7.37 [PH] (ref 7.35–7.45)
PLATELET # BLD AUTO: 187 10E3/UL (ref 150–450)
PO2 BLDV: 58 MM HG (ref 25–47)
POTASSIUM SERPL-SCNC: 4.3 MMOL/L (ref 3.4–5.3)
POTASSIUM SERPL-SCNC: 4.3 MMOL/L (ref 3.4–5.3)
PR INTERVAL - MUSE: 168 MS
QRS DURATION - MUSE: 86 MS
QT - MUSE: 396 MS
QTC - MUSE: 505 MS
R AXIS - MUSE: 34 DEGREES
RBC # BLD AUTO: 4.26 10E6/UL (ref 4.4–5.9)
SAO2 % BLDV: 90.3 % (ref 70–75)
SODIUM SERPL-SCNC: 140 MMOL/L (ref 136–145)
SODIUM SERPL-SCNC: 143 MMOL/L (ref 136–145)
SYSTOLIC BLOOD PRESSURE - MUSE: NORMAL MMHG
T AXIS - MUSE: 31 DEGREES
VANCOMYCIN SERPL-MCNC: 7.4 UG/ML
VENTRICULAR RATE- MUSE: 98 BPM
WBC # BLD AUTO: 18.1 10E3/UL (ref 4–11)

## 2023-04-27 PROCEDURE — 250N000011 HC RX IP 250 OP 636: Performed by: INTERNAL MEDICINE

## 2023-04-27 PROCEDURE — 250N000009 HC RX 250: Performed by: INTERNAL MEDICINE

## 2023-04-27 PROCEDURE — 83735 ASSAY OF MAGNESIUM: CPT | Performed by: INTERNAL MEDICINE

## 2023-04-27 PROCEDURE — 82550 ASSAY OF CK (CPK): CPT

## 2023-04-27 PROCEDURE — 250N000013 HC RX MED GY IP 250 OP 250 PS 637: Performed by: INTERNAL MEDICINE

## 2023-04-27 PROCEDURE — 36415 COLL VENOUS BLD VENIPUNCTURE: CPT | Performed by: INTERNAL MEDICINE

## 2023-04-27 PROCEDURE — 80048 BASIC METABOLIC PNL TOTAL CA: CPT | Performed by: INTERNAL MEDICINE

## 2023-04-27 PROCEDURE — 258N000003 HC RX IP 258 OP 636

## 2023-04-27 PROCEDURE — 71275 CT ANGIOGRAPHY CHEST: CPT

## 2023-04-27 PROCEDURE — 250N000013 HC RX MED GY IP 250 OP 250 PS 637: Performed by: NURSE PRACTITIONER

## 2023-04-27 PROCEDURE — 85014 HEMATOCRIT: CPT | Performed by: INTERNAL MEDICINE

## 2023-04-27 PROCEDURE — 999N000215 HC STATISTIC HFNC ADULT NON-CPAP

## 2023-04-27 PROCEDURE — 70450 CT HEAD/BRAIN W/O DYE: CPT

## 2023-04-27 PROCEDURE — 87899 AGENT NOS ASSAY W/OPTIC: CPT | Performed by: INTERNAL MEDICINE

## 2023-04-27 PROCEDURE — 36415 COLL VENOUS BLD VENIPUNCTURE: CPT

## 2023-04-27 PROCEDURE — 83735 ASSAY OF MAGNESIUM: CPT

## 2023-04-27 PROCEDURE — 83605 ASSAY OF LACTIC ACID: CPT

## 2023-04-27 PROCEDURE — 94640 AIRWAY INHALATION TREATMENT: CPT

## 2023-04-27 PROCEDURE — 80202 ASSAY OF VANCOMYCIN: CPT | Performed by: INTERNAL MEDICINE

## 2023-04-27 PROCEDURE — 999N000157 HC STATISTIC RCP TIME EA 10 MIN

## 2023-04-27 PROCEDURE — 272N000054 HC CANNULA HIGH FLOW, ADULT

## 2023-04-27 PROCEDURE — 85379 FIBRIN DEGRADATION QUANT: CPT

## 2023-04-27 PROCEDURE — 99232 SBSQ HOSP IP/OBS MODERATE 35: CPT | Performed by: INTERNAL MEDICINE

## 2023-04-27 PROCEDURE — 250N000011 HC RX IP 250 OP 636: Performed by: HOSPITALIST

## 2023-04-27 PROCEDURE — 120N000001 HC R&B MED SURG/OB

## 2023-04-27 PROCEDURE — 94640 AIRWAY INHALATION TREATMENT: CPT | Mod: 76

## 2023-04-27 PROCEDURE — 82565 ASSAY OF CREATININE: CPT | Performed by: INTERNAL MEDICINE

## 2023-04-27 PROCEDURE — 82805 BLOOD GASES W/O2 SATURATION: CPT

## 2023-04-27 PROCEDURE — 99223 1ST HOSP IP/OBS HIGH 75: CPT | Performed by: NURSE PRACTITIONER

## 2023-04-27 PROCEDURE — 258N000003 HC RX IP 258 OP 636: Performed by: INTERNAL MEDICINE

## 2023-04-27 PROCEDURE — 80048 BASIC METABOLIC PNL TOTAL CA: CPT

## 2023-04-27 RX ORDER — IOPAMIDOL 755 MG/ML
90 INJECTION, SOLUTION INTRAVASCULAR ONCE
Status: COMPLETED | OUTPATIENT
Start: 2023-04-27 | End: 2023-04-27

## 2023-04-27 RX ORDER — QUETIAPINE FUMARATE 25 MG/1
25 TABLET, FILM COATED ORAL AT BEDTIME
Status: DISCONTINUED | OUTPATIENT
Start: 2023-04-27 | End: 2023-04-27

## 2023-04-27 RX ORDER — VANCOMYCIN HYDROCHLORIDE 1 G/200ML
1000 INJECTION, SOLUTION INTRAVENOUS EVERY 12 HOURS
Status: DISCONTINUED | OUTPATIENT
Start: 2023-04-27 | End: 2023-04-27

## 2023-04-27 RX ORDER — OLANZAPINE 10 MG/2ML
5 INJECTION, POWDER, FOR SOLUTION INTRAMUSCULAR EVERY 4 HOURS PRN
Status: COMPLETED | OUTPATIENT
Start: 2023-04-27 | End: 2023-04-28

## 2023-04-27 RX ORDER — METHYLPREDNISOLONE SODIUM SUCCINATE 40 MG/ML
40 INJECTION, POWDER, LYOPHILIZED, FOR SOLUTION INTRAMUSCULAR; INTRAVENOUS EVERY 12 HOURS
Status: DISCONTINUED | OUTPATIENT
Start: 2023-04-27 | End: 2023-04-28

## 2023-04-27 RX ADMIN — ACETAMINOPHEN 975 MG: 325 TABLET ORAL at 22:50

## 2023-04-27 RX ADMIN — IPRATROPIUM BROMIDE 0.5 MG: 0.5 SOLUTION RESPIRATORY (INHALATION) at 11:30

## 2023-04-27 RX ADMIN — LEVALBUTEROL HYDROCHLORIDE 1.25 MG: 1.25 SOLUTION RESPIRATORY (INHALATION) at 15:47

## 2023-04-27 RX ADMIN — DOCUSATE SODIUM 100 MG: 100 CAPSULE, LIQUID FILLED ORAL at 20:35

## 2023-04-27 RX ADMIN — ROSUVASTATIN CALCIUM 10 MG: 10 TABLET, FILM COATED ORAL at 22:50

## 2023-04-27 RX ADMIN — METHYLPREDNISOLONE SODIUM SUCCINATE 40 MG: 40 INJECTION INTRAMUSCULAR; INTRAVENOUS at 18:58

## 2023-04-27 RX ADMIN — LEVALBUTEROL HYDROCHLORIDE 1.25 MG: 1.25 SOLUTION RESPIRATORY (INHALATION) at 21:57

## 2023-04-27 RX ADMIN — PIPERACILLIN AND TAZOBACTAM 3.38 G: 3; .375 INJECTION, POWDER, LYOPHILIZED, FOR SOLUTION INTRAVENOUS at 12:17

## 2023-04-27 RX ADMIN — LEVOTHYROXINE SODIUM 88 MCG: 88 TABLET ORAL at 12:16

## 2023-04-27 RX ADMIN — QUETIAPINE FUMARATE 25 MG: 25 TABLET ORAL at 17:13

## 2023-04-27 RX ADMIN — SODIUM CHLORIDE: 9 INJECTION, SOLUTION INTRAVENOUS at 06:05

## 2023-04-27 RX ADMIN — FUROSEMIDE 20 MG: 20 TABLET ORAL at 08:29

## 2023-04-27 RX ADMIN — ENOXAPARIN SODIUM 40 MG: 40 INJECTION SUBCUTANEOUS at 08:38

## 2023-04-27 RX ADMIN — IPRATROPIUM BROMIDE 0.5 MG: 0.5 SOLUTION RESPIRATORY (INHALATION) at 21:57

## 2023-04-27 RX ADMIN — SODIUM CHLORIDE 500 ML: 9 INJECTION, SOLUTION INTRAVENOUS at 02:43

## 2023-04-27 RX ADMIN — OLANZAPINE 5 MG: 10 INJECTION, POWDER, LYOPHILIZED, FOR SOLUTION INTRAMUSCULAR at 14:36

## 2023-04-27 RX ADMIN — OLANZAPINE 5 MG: 10 INJECTION, POWDER, LYOPHILIZED, FOR SOLUTION INTRAMUSCULAR at 18:55

## 2023-04-27 RX ADMIN — METHYLPREDNISOLONE SODIUM SUCCINATE 40 MG: 40 INJECTION, POWDER, FOR SOLUTION INTRAMUSCULAR; INTRAVENOUS at 07:03

## 2023-04-27 RX ADMIN — FAMOTIDINE 20 MG: 10 INJECTION, SOLUTION INTRAVENOUS at 13:28

## 2023-04-27 RX ADMIN — ACETAMINOPHEN 975 MG: 325 TABLET ORAL at 08:28

## 2023-04-27 RX ADMIN — OLANZAPINE 5 MG: 10 INJECTION, POWDER, LYOPHILIZED, FOR SOLUTION INTRAMUSCULAR at 06:30

## 2023-04-27 RX ADMIN — LEVALBUTEROL HYDROCHLORIDE 1.25 MG: 1.25 SOLUTION RESPIRATORY (INHALATION) at 07:39

## 2023-04-27 RX ADMIN — IPRATROPIUM BROMIDE 0.5 MG: 0.5 SOLUTION RESPIRATORY (INHALATION) at 15:47

## 2023-04-27 RX ADMIN — DOCUSATE SODIUM 100 MG: 100 CAPSULE, LIQUID FILLED ORAL at 08:29

## 2023-04-27 RX ADMIN — BUSPIRONE HYDROCHLORIDE 5 MG: 5 TABLET ORAL at 08:28

## 2023-04-27 RX ADMIN — AMLODIPINE BESYLATE 2.5 MG: 2.5 TABLET ORAL at 08:29

## 2023-04-27 RX ADMIN — FAMOTIDINE 20 MG: 10 INJECTION, SOLUTION INTRAVENOUS at 02:34

## 2023-04-27 RX ADMIN — IPRATROPIUM BROMIDE 0.5 MG: 0.5 SOLUTION RESPIRATORY (INHALATION) at 07:39

## 2023-04-27 RX ADMIN — BUDESONIDE 0.5 MG: 0.5 INHALANT RESPIRATORY (INHALATION) at 22:01

## 2023-04-27 RX ADMIN — ASPIRIN 81 MG: 81 TABLET, CHEWABLE ORAL at 08:28

## 2023-04-27 RX ADMIN — Medication 1 TABLET: at 08:29

## 2023-04-27 RX ADMIN — METOPROLOL SUCCINATE 12.5 MG: 25 TABLET, EXTENDED RELEASE ORAL at 08:28

## 2023-04-27 RX ADMIN — DULOXETINE HYDROCHLORIDE 60 MG: 60 CAPSULE, DELAYED RELEASE PELLETS ORAL at 08:29

## 2023-04-27 RX ADMIN — LATANOPROST 1 DROP: 50 SOLUTION OPHTHALMIC at 22:50

## 2023-04-27 RX ADMIN — VANCOMYCIN HYDROCHLORIDE 1000 MG: 1 INJECTION, SOLUTION INTRAVENOUS at 10:30

## 2023-04-27 RX ADMIN — QUETIAPINE FUMARATE 25 MG: 25 TABLET ORAL at 08:29

## 2023-04-27 RX ADMIN — QUETIAPINE FUMARATE 12.5 MG: 25 TABLET ORAL at 10:34

## 2023-04-27 RX ADMIN — LOSARTAN POTASSIUM 25 MG: 25 TABLET, FILM COATED ORAL at 08:29

## 2023-04-27 RX ADMIN — BUDESONIDE 0.5 MG: 0.5 INHALANT RESPIRATORY (INHALATION) at 07:39

## 2023-04-27 RX ADMIN — IOPAMIDOL 90 ML: 755 INJECTION, SOLUTION INTRAVENOUS at 06:39

## 2023-04-27 RX ADMIN — OLANZAPINE 5 MG: 10 INJECTION, POWDER, LYOPHILIZED, FOR SOLUTION INTRAMUSCULAR at 02:35

## 2023-04-27 RX ADMIN — PIPERACILLIN AND TAZOBACTAM 3.38 G: 3; .375 INJECTION, POWDER, LYOPHILIZED, FOR SOLUTION INTRAVENOUS at 20:41

## 2023-04-27 RX ADMIN — BUSPIRONE HYDROCHLORIDE 5 MG: 5 TABLET ORAL at 20:35

## 2023-04-27 RX ADMIN — PIPERACILLIN AND TAZOBACTAM 3.38 G: 3; .375 INJECTION, POWDER, LYOPHILIZED, FOR SOLUTION INTRAVENOUS at 05:57

## 2023-04-27 RX ADMIN — LEVALBUTEROL HYDROCHLORIDE 1.25 MG: 1.25 SOLUTION RESPIRATORY (INHALATION) at 11:30

## 2023-04-27 ASSESSMENT — ACTIVITIES OF DAILY LIVING (ADL)
ADLS_ACUITY_SCORE: 53
ADLS_ACUITY_SCORE: 59
ADLS_ACUITY_SCORE: 63
ADLS_ACUITY_SCORE: 59
ADLS_ACUITY_SCORE: 59
ADLS_ACUITY_SCORE: 63
ADLS_ACUITY_SCORE: 63
ADLS_ACUITY_SCORE: 53

## 2023-04-27 NOTE — PROGRESS NOTES
Patient received nebs as schedule last evening, BS diminished, tolerating HFNC on bellow settings:      04/27/23 0530   Oxygen Therapy   SpO2 96 %   O2 Device High Flow Nasal Cannula (HFNC)   FiO2 (%) 45 %   Oxygen Delivery 40 LPM

## 2023-04-27 NOTE — SIGNIFICANT EVENT
Significant Event Note    Time of event: 10:40 PM April 26, 2023    Description of event:  Paged regarding patients ongoing agitation.   He is in four-point restraints, yelling out, thrashing in bed.    Briefly, patient here with AHRF 2/2 COPD exacerbation, on vanc/zosyn and steroids. He has been stable on 40L HF, other vitals wnl.    Per day note, attributing behaviors to metabolic encephalopathy and mood disorder. At this point, no other etiology has been identified in work-up. Psychiatry consulted, recommended delirium precautions. Patient has received regimen of olanzapine 7.5mg at bedtime, and as needed. Also on Seroquel 25mg BID.    Earlier tonight was given 5mg Zyprexa, no change in behavior.    QTc is prolonged, mag, k, and calcium have been normal. Patient is on telemetry. Repeat EKG showed minimal change in QTc following antipsychotic administrations.    Plan:  - continue delirium precautions; 1:1  - one time 20mg Geodon  - continue four point soft restraints  - telemetry, morning electrolyte labs      Discussed with: hospitalist, bedside nurse    Angélica Arevalo MD    1:00 AM  Went to check on patient, HR does intermittently climb to 150's, but quickly returns to 90's. This occurs when he is agitated--looks narrow and regular.   Patient now intermittently calling out, but rests mostly. He is more redirectable when up.  Will order labs the following labs for ruling out other causes of encephalopathy/monitoring-  - BMP, mag, lactic acid, CK, d-dimer  - will increase frequency of zyprexa    Discussed with hospitalist and nurse    2:13 AM  CK elevated to 4K, d-dimer elevated, lactic acid 2.1  Electrolytes VBG look good.  Will put in for a bolus now, recheck CK in morning, and routine CT PE run

## 2023-04-27 NOTE — PLAN OF CARE
Pt confused continued in 1:1 with 4 point soft restraints for safety. Pt received PRN Seroquel 1420. Pt calm and resting after until around 1700. Ate 25% meal and cooperative for until around 1830. Pt yelling out trying to pull lines and grabbing staff hands and not letting go. Inconsolable and not redirectable. No PRNs available as has use up doses. Called house ans asked to give scheduled olanzapine early. Ok to give dose early.

## 2023-04-27 NOTE — PROVIDER NOTIFICATION
Paged house officer regarding plan of care for patient. Patient is extremely agitated, yelling and combative. In 4-point restraints. IM zyprexa ordered and given. No change in behavior. Patient will not take PO medication. Attempted to give scheduled medications twice but patient spit them out.    Discussed with Dr. Arevalo to continue delirium precautions and give IM Geodon one time. EKG done prior to giving medication due to patient's history of prolonged QT and risk with medication. House officer aware of EKG results, still OK to give IM medication. Will continue to monitor and update.

## 2023-04-27 NOTE — PHARMACY-ADMISSION MEDICATION HISTORY
Pharmacy Vancomycin Note  Date of Service 2023  Patient's  1940   82 year old, male    Indication: Sepsis  Day of Therapy: 4  Current vancomycin regimen:  1250 mg IV q24h  Current vancomycin monitoring method: AUC  Current vancomycin therapeutic monitoring goal: 400-600 mg*h/L    Insight[employer] Prediction of Current Vancomycin Regimen    Regimen: 1250 mg IV every 24 hours.  Exposure target: AUC24 (range)400-600 mg/L.hr   AUC24,ss: 289 mg/L.hr  Probability of AUC24 > 400: 5 %  Ctrough,ss: 6.5 mg/L  Probability of Ctrough,ss > 20: 0 %  Probability of nephrotoxicity (Lodise NICOLE ): 4 %      Current estimated CrCl = Estimated Creatinine Clearance: 65.3 mL/min (based on SCr of 0.94 mg/dL).    Creatinine for last 3 days  2023:  9:40 PM Creatinine 1.14 mg/dL  2023:  6:57 AM Creatinine 1.11 mg/dL  2023:  1:20 AM Creatinine 1.01 mg/dL;  5:33 AM Creatinine 0.94 mg/dL;  5:33 AM Creatinine 0.94 mg/dL    Recent Vancomycin Levels (past 3 days)  2023:  5:33 AM Vancomycin 7.4 ug/mL    Vancomycin IV Administrations (past 72 hours)                   vancomycin (VANCOCIN) 1,250 mg in sodium chloride 0.9 % 250 mL intermittent infusion (mg) 1,250 mg New Bag 23 1115     1,250 mg New Bag 23 1033    vancomycin (VANCOCIN) 1,750 mg in sodium chloride 0.9 % 500 mL intermittent infusion (mg) 1,750 mg Given 23 2241                Nephrotoxins and other renal medications (From now, onward)    Start     Dose/Rate Route Frequency Ordered Stop    23 0800  furosemide (LASIX) tablet 20 mg        Note to Pharmacy: PTA Sig:Take 20 mg by mouth daily      20 mg Oral DAILY 23 1224      23 1000  vancomycin (VANCOCIN) 1,250 mg in sodium chloride 0.9 % 250 mL intermittent infusion         1,250 mg  over 90 Minutes Intravenous EVERY 24 HOURS 23 0201      23 0500  piperacillin-tazobactam (ZOSYN) 3.375 g vial to attach to  mL bag        Note to Pharmacy: For ROSIE CUELLO and  "[facility]: For Zosyn-naive patients, use the \"Zosyn initial dose + extended infusion\" order panel.    3.375 g  over 240 Minutes Intravenous EVERY 8 HOURS 04/25/23 0147               Contrast Orders - past 72 hours (72h ago, onward)    Start     Dose/Rate Route Frequency Stop    04/27/23 0600  iopamidol (ISOVUE-370) solution 90 mL         90 mL Intravenous ONCE 04/27/23 0639          Interpretation of levels and current regimen:  Vancomycin level is reflective of AUC less than 400    Has serum creatinine changed greater than 50% in last 72 hours: No    Urine output:  unable to determine    Renal Function: Stable    InsightRX Prediction of Planned New Vancomycin Regimen    Regimen: 1000 mg IV every 12 hours.  Start time: 09:00 on 04/27/2023  Exposure target: AUC24 (range)400-600 mg/L.hr   AUC24,ss: 456 mg/L.hr  Probability of AUC24 > 400: 76 %  Ctrough,ss: 13.7 mg/L  Probability of Ctrough,ss > 20: 6 %  Probability of nephrotoxicity (Lodise NICOLE 2009): 9 %      Plan:  1. Increase Dose to 1000 mg IV q12h  2. Vancomycin monitoring method: AUC  3. Vancomycin therapeutic monitoring goal: 400-600 mg*h/L  4. Pharmacy will check vancomycin levels as appropriate in 1-3 Days.  5. Serum creatinine levels will be ordered daily for the first week of therapy and at least twice weekly for subsequent weeks.    Kishore Cagle, [facility]    "

## 2023-04-27 NOTE — PLAN OF CARE
Intermittent confusion through out day. Had short periods where pt was able to have a conversation, new he was at the hospital but did not understand why he was here and would quickly feel staff were against him.  Restless and agitated pulling at lines, trying to get out of restraints and bed this morning. Gave prn Seroquel x1. Reassessed and Pt still agitated and unable to redirect or distract. gave scheduled dose Seroquel. Redirectable and able to distract until 1440 when pt had increased agitation and screaming out, Unconsolable, pulling and trying to get up. Gave IM Zyprexa 1440. Pt continues to yell out. Restraints in place, attempting distraction and calming methods. Dr. Roth notified.

## 2023-04-27 NOTE — PROGRESS NOTES
RESPIRATORY CARE NOTE     Patient was on 40lpm 45% and titrated to 30lpm and 40% in the afternoon and had an Sp02 of 95%. They received Atrovent/Xopenex x3.   Breath sounds prior to treatment where diminished and post treatment no change.    Skin assessed and high flow canula changed.     RT will continue to assess and monitor cardiopulmonary status.     Winter Mejia, RT

## 2023-04-27 NOTE — PROVIDER NOTIFICATION
Dr. Arevalo and Dr. Redding at bedside. New orders placed. Checking labs. IM Zyprexa ordered q4h. Will continue to monitor.

## 2023-04-27 NOTE — PROVIDER NOTIFICATION
Patient's heart rate went to the 160s. House officer notified. Patient dropped down to 115 shortly after. Continuing to monitor.

## 2023-04-27 NOTE — PROGRESS NOTES
[facility]    Medicine Progress Note - Hospitalist Service    Date of Admission:  4/24/2023    Assessment & Plan    Dominik Rojas is an 82 year old male with medical history of COPD not on home oxygen, lung cancer, vascular dementia, hypothyroidism, hypertension, recent pneumonia requiring hospitalization admitted on 4/24/2023 with worsening shortness of breath at rest, fever, hypoxia and increased confusion requiring restraints in the ED.  ED work-up showed leukocytosis, lactic acidosis, mild hyperkalemia, hypoxemia needing high flow nasal oxygen.    Acute hypoxic respiratory failure  Acute COPD exacerbation  Secondary bacterial pneumonia  -CT chest with IV contrast: Negative for PE but showed interstitial opacities in the dependent portions of bilateral lower lobes are nonspecific for atelectasis versus mild aspiration.  -Blood and sputum culture ordered, blood cultures pending.  Unable to obtain sputum yet.  -Continue supplementary oxygen to maintain SPO2 greater than 89 to 92%  -Xopenex 4 times daily, Atrovent 4 times daily, Pulmicort 2 times daily  -Decreased methylprednisolone 40 mg BID with plan to transition to oral prednisone tomorrow  -Continue Zosyn every 8 hours.  MRSA negative and will discontinue vancomycin  -Consider consulting speech for dysphagia eval when less agitated    Adenocarcinoma of the right lower lobe  Immunocompromised  -Stage T1 N0 M0  -Has completed radiation therapy on 4/21/2023, not a candidate for surgery due to age and medical comorbidities  -Follows with Dr. Bales through [facility] oncology    Mild hyperkalemia, asymptomatic-resolved  -Will continue to monitor, avoid potassium sparing agents    Metabolic encephalopathy  Mood disorder  -Required restraints on 4/27 due to agitation and interfering with care  -Continue PTA olanzapine 7.5 mg at bedtime  -Continue PTA BuSpar 5 mg twice daily  -Continue PTA Cymbalta 60 mg daily  -Olanzapine 5 mg as needed daily  "for agitation and aggression  -Will monitor    Essential hypertension, stable  -continue losartan 25 mg daily, furosemide 20 mg, and amlodipine 2.5 mg daily    Hyperlipidemia  -Continue PTA rosuvastatin 10 mg at bedtime     Diet: Regular Diet Adult    DVT Prophylaxis: Enoxaparin (Lovenox) SQ  Cabral Catheter: Not present  Lines: None     Cardiac Monitoring: ACTIVE order. Indication: restraints  Code Status: Full Code      Clinically Significant Risk Factors              # Hypoalbuminemia: Lowest albumin = 3.3 g/dL at 4/25/2023  6:57 AM, will monitor as appropriate            # Obesity: Estimated body mass index is 30.38 kg/m  as calculated from the following:    Height as of this encounter: 1.702 m (5' 7\").    Weight as of this encounter: 88 kg (194 lb)., PRESENT ON ADMISSION         Disposition Plan      Expected Discharge Date: 04/29/2023    Discharge Delays: Oxygen Needs - Arrange Home O2  1:1 Sitter still ordered - MD to assess  Destination: home with help/services  Discharge Comments: restraints  HFNC          Melody Roth MD  Hospitalist Service  [facility]  Securely message with [employer] (more info)  Text page via [employer] Paging/Directory   ______________________________________________________________________    Interval History   No acute events overnight.  He remains in four-point restraints.  He remains confused, agitated and interferes with care.  Confusion slightly improved and is ANO x3 today.  Has moments of clarity but mostly confused.  Sitter one-to-one at bedside    Removed his upper extremity restraints this morning, and around noon time patient became extremely agitated and started pulling lines and attempting to get out of bed.  He was placed back on four-point soft restraints.     Physical Exam   Vital Signs: Temp: 98.6  F (37  C) Temp src: Axillary BP: (!) 145/94 Pulse: 99   Resp: 20 SpO2: 96 % O2 Device: High Flow Nasal Cannula (HFNC) Oxygen Delivery: 40 LPM  Weight: 194 lbs " 0 oz    General Appearance: Awake, alert, in no acute distress  Respiratory: Crackles and expiratory wheeze bilaterally  Cardiovascular: Regular rate, no murmur noted  GI: soft, nontender, non distended, normal bowel sounds  Skin: no jaundice, no rash.  On four-point restraints      Medical Decision Making       MANAGEMENT DISCUSSED with the following over the past 24 hours: Prior hospitalist   NOTE(S)/MEDICAL RECORDS REVIEWED over the past 24 hours: Prior hospitalist H&P, ED physician, nursing  Respiratory, pharmacy, case management    Data     I have personally reviewed the following data over the past 24 hrs:    18.1 (H)  \   13.0 (L)   / 187     143 109 (H) 26.4 (H) /  115 (H)   4.3 24 0.94; 0.94 \       Procal: N/A CRP: N/A Lactic Acid: 2.1 (H)       INR:  N/A PTT:  N/A   D-dimer:  3.24 (H) Fibrinogen:  N/A       Imaging results reviewed over the past 24 hrs:   Recent Results (from the past 24 hour(s))   CT Head w/o Contrast    Narrative    EXAM: CT HEAD W/O CONTRAST  LOCATION: [facility]  DATE/TIME: 4/27/2023 6:38 AM CDT    INDICATION: Encephalopathy  COMPARISON: CT head 03/07/2021  TECHNIQUE: Routine CT Head without IV contrast. Multiplanar reformats. Dose reduction techniques were used.    FINDINGS:  INTRACRANIAL CONTENTS: Images are degraded by patient motion. Some repeat imaging was performed. Within these limits no evidence for acute intracranial hemorrhage, extra-axial collection, or mass effect. No CT evidence of acute infarct. Small focus of   chronic cortical encephalomalacia and adjacent gliosis within the right middle frontal gyrus. Moderate to advanced presumed chronic small vessel ischemic changes. Mild to moderate generalized volume loss. No hydrocephalus.     VISUALIZED ORBITS/SINUSES/MASTOIDS: No intraorbital abnormality. No paranasal sinus mucosal disease. No middle ear or mastoid effusion.    BONES/SOFT TISSUES: No acute abnormality.      Impression    IMPRESSION:  1.   No CT evidence for acute intracranial process.  2.  Brain atrophy and presumed chronic microvascular ischemic changes similar to the previous exam.   CT Chest Pulmonary Embolism w Contrast    Narrative    EXAM: CT CHEST PULMONARY EMBOLISM W CONTRAST  LOCATION: [facility]  DATE/TIME: 4/27/2023 6:39 AM CDT    INDICATION: Elevated d-dimer, hypoxia, delirium.  Comparison: 04/12/2023  TECHNIQUE: CT chest pulmonary angiogram during arterial phase injection of IV contrast. Multiplanar reformats and MIP reconstructions were performed. Dose reduction techniques were used.   CONTRAST: 90 mL Isovue-370    FINDINGS:  ANGIOGRAM CHEST: Pulmonary arteries are normal caliber and negative for pulmonary emboli. Thoracic aorta is negative for dissection. No CT evidence of right heart strain.    LUNGS AND PLEURA: A 2.4 x 1.2 cm nodule is again seen in the posterior right lower lobe on series 9 image 165, unchanged. Mild upper lobe predominant emphysema again seen, unchanged. Scattered atelectasis seen in the bilateral lung. Mild interstitial   opacities in the dependent portions of bilateral lower lobes are obscured by motion. No confluent consolidation or pleural effusion. No pneumothorax.    MEDIASTINUM/AXILLAE: Stable mild cardiomegaly. No intrathoracic, axillary or supraclavicular lymphadenopathy.    CORONARY ARTERY CALCIFICATION: Mild atherosclerotic calcification in the left anterior descending and right coronary arteries.    UPPER ABDOMEN: Multiple stones seen within the gallbladder without acute cholecystitis. Endoluminal stent graft within the abdominal aorta is partially imaged.    MUSCULOSKELETAL: Severe compression deformity of T12 vertebral body and multilevel mild and moderate degenerative disc space narrowing again seen, unchanged. No suspicious osseous lesions or acute fractures.        Impression    IMPRESSION:    1.  No pulmonary embolism or confluent consolidations.    2.  Interstitial  opacities in the dependent portions of bilateral lower lobes are nonspecific for atelectasis versus mild aspiration. Clinical correlation is recommended.    3.  2.4 cm nodule in the posterior right lower lobe, unchanged, in keeping with known malignancy.    4.  Redemonstration of mild upper lobe predominant emphysema, mild cardiomegaly and cholelithiasis.

## 2023-04-27 NOTE — PROGRESS NOTES
Care Management Follow Up    Length of Stay (days): 2    Expected Discharge Date: 04/29/2023     Concerns to be Addressed:     Discharge planning  Patient plan of care discussed at interdisciplinary rounds: Yes    Anticipated Discharge Disposition:  TBD - unsafe to return home at this point     Anticipated Discharge Services:  TBD  Anticipated Discharge DME:  n/a    Patient/family educated on Medicare website which has current facility and service quality ratings: n/a    Education Provided on the Discharge Plan:  yes  Patient/Family in Agreement with the Plan:  yes    Referrals Placed by CM/SW: n/a   Private pay costs discussed: Not applicable    Additional Information:  SW spoke with daughter on phone to introduce self and role of care management. Pt from home with spouse; no services in place as pt was reported to be mostly indpt at baseline with cane. Daughter stated that pt has increasing declined since stroke 4 years ago. Daughter stated that she is concerned about pt returning home for her mother's safety but also stated that finances are a concern for pt's spouse. Pt is not able to participate in assessment with SW at this time. SW left message wife and waiting for call to be returned. Discharge disposition unknown at this time; care management following for recommendations.   3:04 PM    SIMONA Flor  4/27/2023

## 2023-04-27 NOTE — CONSULTS
"  INITIAL PSYCHIATRIC CONSULTATION                  REASON FOR REQUEST: delirium      ASSESSMENT/RECOMMENDATIONS/PLAN :   Metabolic encephalopathy exacerbated in the context of hospital environment, medical condition.  Restlessness, agitation: Needing physical restraints  Cognitive and behavioral changes due to above.  Sleep difficulties due to above.    Recommendations:    Patient continues to exhibit symptoms suggestive of delirium thus would recommend close monitoring. Avoid antipsychotics or other QTc prolonging medication. QTc 505.   Judicious use of sedating medications.  Reassure, redirect and reorient frequently. Supportive care for delirium.   Efforts or sleep regulation.  Melatonin 5-10 mg at bedtime.  Consider Depakote sprinkles 250 mg 3 times a day as needed for behavioral agitation, Monitor ammonia level and liver function test.      MENTAL STATUS EXAMINATION:   General Appearance: Not in acute distress.  Resting in bed.  Patient is in four-point restraints.  Aware of hospital, does not know the name of the hospital.  Speech: Devoid of content.  Thought Process: Increased latency.  Thought content: No evidence of acute psychosis, hallucinations or delusions.  No paranoia noted.  Confabulation present  Thought Formation: No loosening of associations  Judgment: Depressed  Insight : Depressed  Attention : Distracted  Memory: Depressed  Fund Of Knowledge: Depressed  Affect: Neutral  Mood: Congruent  Alert : Awake  Orientation: X 1-2  Comprehension: Depressed  Generative thought content: Slow  Language: Intact  Gait and Ambulation: Not tested.  Patient is in restraints  Musculoskeletal: Psychomotor slow  Vital Signs: /80 (BP Location: Right arm)   Pulse 94   Temp 97.8  F (36.6  C) (Oral)   Resp 18   Ht 1.702 m (5' 7\")   Wt 88 kg (194 lb)   SpO2 95%   BMI 30.38 kg/m        HISTORY OF PRESENT ILLNESS:   Presenting history to include: Per [facility] [place]/Specialists:   Dominik Rojas is an 82 year old male " "who presented to the ED with worsening shortness of breath at rest, wheezing and increased confusion.  Noted to be very hypoxic and put on BiPAP but became more restless and so was switched to high flow nasal oxygen.  No vomiting, diarrhea, or chest pain.  Full history is unobtainable because of patient's baseline dementia     Upon assessment patient was noted to be laying in bed in four-point restraints.  He did not know what those restraints were from or how it came about.  He had no recollection of events from this morning, none from last night.  Could not tell me how long he had been in the hospital.  He did not know that he was in a hospital but not able to name [facility].  Denied any visual or auditory hallucinations.  He started to fall asleep in mid sentence but easily woke up to voice.  He wanted to his restraints taken off.  Patient had received Seroquel prior to my visit, he was calm and redirectable at bedtime.  Per nursing notes patient had had significant agitation, restlessness, required multiple staff members to keep him safe in bed.    Patient is known to me from previous consultation and follow-up.  He has a history of opioid dependence iatrogenic in nature.  Distant history of alcohol use.  He has been sober on his own for more than 10 years.  He has suffered from anxiety and depression all his life  Review of Systems:As per HPI. Remainders of 12 point review of systems negative.  Psychiatric ROS:  Patient does not provide any meaningful information, falls asleep in midsentence.      Total time:  80 minutes spent on chart and medication and labs review  pre-charting, face to face assessment, counseling and/or coordination of care.     PFSH reviewed  and not pertinent to chief complaint/reason for visit  BP (!) 145/94 (BP Location: Right arm)   Pulse 99   Temp 98.6  F (37  C) (Axillary)   Resp 20   Ht 1.702 m (5' 7\")   Wt 88 kg (194 lb)   SpO2 96%   BMI 30.38 kg/m    Alcohol, Blood (mg/dL) "   Date Value   08/14/2021 <10     @24HOURRESULTS@  Recent Results (from the past 72 hour(s))   INR    Collection Time: 04/24/23  9:40 PM   Result Value Ref Range    INR 1.16 (H) 0.85 - 1.15   Comprehensive metabolic panel    Collection Time: 04/24/23  9:40 PM   Result Value Ref Range    Sodium 134 (L) 136 - 145 mmol/L    Potassium 5.5 (H) 3.4 - 5.3 mmol/L    Chloride 97 (L) 98 - 107 mmol/L    Carbon Dioxide (CO2) 24 22 - 29 mmol/L    Anion Gap 13 7 - 15 mmol/L    Urea Nitrogen 21.8 8.0 - 23.0 mg/dL    Creatinine 1.14 0.67 - 1.17 mg/dL    Calcium 9.2 8.8 - 10.2 mg/dL    Glucose 119 (H) 70 - 99 mg/dL    Alkaline Phosphatase 131 (H) 40 - 129 U/L    AST 42 10 - 50 U/L    ALT 23 10 - 50 U/L    Protein Total 7.2 6.4 - 8.3 g/dL    Albumin 3.9 3.5 - 5.2 g/dL    Bilirubin Total 1.2 <=1.2 mg/dL    GFR Estimate 65 >60 mL/min/1.73m2   Lactic acid whole blood    Collection Time: 04/24/23  9:40 PM   Result Value Ref Range    Lactic Acid 2.1 (H) 0.7 - 2.0 mmol/L   Troponin T, High Sensitivity    Collection Time: 04/24/23  9:40 PM   Result Value Ref Range    Troponin T, High Sensitivity 18 <=22 ng/L   Magnesium    Collection Time: 04/24/23  9:40 PM   Result Value Ref Range    Magnesium 1.9 1.7 - 2.3 mg/dL   Blood gas venous    Collection Time: 04/24/23  9:40 PM   Result Value Ref Range    pH Venous 7.45 7.35 - 7.45    pCO2 Venous 42 35 - 50 mm Hg    pO2 Venous 34 25 - 47 mm Hg    Bicarbonate Venous 29 24 - 30 mmol/L    Base Excess/Deficit (+/-) 4.7   mmol/L    Oxyhemoglobin Venous 65.4 (L) 70.0 - 75.0 %    O2 Sat, Venous 66.6 (L) 70.0 - 75.0 %   CBC with platelets and differential    Collection Time: 04/24/23  9:40 PM   Result Value Ref Range    WBC Count 17.3 (H) 4.0 - 11.0 10e3/uL    RBC Count 5.16 4.40 - 5.90 10e6/uL    Hemoglobin 15.6 13.3 - 17.7 g/dL    Hematocrit 47.5 40.0 - 53.0 %    MCV 92 78 - 100 fL    MCH 30.2 26.5 - 33.0 pg    MCHC 32.8 31.5 - 36.5 g/dL    RDW 13.7 10.0 - 15.0 %    Platelet Count 250 150 - 450 10e3/uL     % Neutrophils 89 %    % Lymphocytes 5 %    % Monocytes 5 %    % Eosinophils 1 %    % Basophils 0 %    % Immature Granulocytes 0 %    NRBCs per 100 WBC 0 <1 /100    Absolute Neutrophils 15.1 (H) 1.6 - 8.3 10e3/uL    Absolute Lymphocytes 0.9 0.8 - 5.3 10e3/uL    Absolute Monocytes 0.9 0.0 - 1.3 10e3/uL    Absolute Eosinophils 0.2 0.0 - 0.7 10e3/uL    Absolute Basophils 0.1 0.0 - 0.2 10e3/uL    Absolute Immature Granulocytes 0.1 <=0.4 10e3/uL    Absolute NRBCs 0.0 10e3/uL   Blood Culture Peripheral Blood    Collection Time: 04/24/23  9:44 PM    Specimen: Peripheral Blood   Result Value Ref Range    Culture No growth after 1 day    Blood Culture Peripheral Blood    Collection Time: 04/24/23  9:47 PM    Specimen: Peripheral Blood   Result Value Ref Range    Culture No growth after 1 day    Extra Green Top (Lithium Heparin) Tube    Collection Time: 04/24/23  9:59 PM   Result Value Ref Range    Hold Specimen JIC    Symptomatic Influenza A/B, RSV, & SARS-CoV2 PCR (COVID-19) Nasopharyngeal    Collection Time: 04/24/23 10:00 PM    Specimen: Nasopharyngeal; Swab   Result Value Ref Range    Influenza A PCR Negative Negative    Influenza B PCR Negative Negative    RSV PCR Negative Negative    SARS CoV2 PCR Negative Negative   Extra Green Top (Lithium Heparin) ON ICE    Collection Time: 04/24/23 10:00 PM   Result Value Ref Range    Hold Specimen JIC    Extra Red Top Tube    Collection Time: 04/24/23 10:00 PM   Result Value Ref Range    Hold Specimen JIC    Lactic acid whole blood    Collection Time: 04/25/23  1:45 AM   Result Value Ref Range    Lactic Acid 1.4 0.7 - 2.0 mmol/L   Comprehensive metabolic panel    Collection Time: 04/25/23  6:57 AM   Result Value Ref Range    Sodium 134 (L) 136 - 145 mmol/L    Potassium 5.0 3.4 - 5.3 mmol/L    Chloride 101 98 - 107 mmol/L    Carbon Dioxide (CO2) 18 (L) 22 - 29 mmol/L    Anion Gap 15 7 - 15 mmol/L    Urea Nitrogen 24.6 (H) 8.0 - 23.0 mg/dL    Creatinine 1.11 0.67 - 1.17 mg/dL     Calcium 8.9 8.8 - 10.2 mg/dL    Glucose 143 (H) 70 - 99 mg/dL    Alkaline Phosphatase 110 40 - 129 U/L    AST 30 10 - 50 U/L    ALT 18 10 - 50 U/L    Protein Total 6.5 6.4 - 8.3 g/dL    Albumin 3.3 (L) 3.5 - 5.2 g/dL    Bilirubin Total 1.4 (H) <=1.2 mg/dL    GFR Estimate 67 >60 mL/min/1.73m2   CBC with platelets and differential    Collection Time: 04/25/23  6:57 AM   Result Value Ref Range    WBC Count 15.0 (H) 4.0 - 11.0 10e3/uL    RBC Count 4.83 4.40 - 5.90 10e6/uL    Hemoglobin 14.5 13.3 - 17.7 g/dL    Hematocrit 45.3 40.0 - 53.0 %    MCV 94 78 - 100 fL    MCH 30.0 26.5 - 33.0 pg    MCHC 32.0 31.5 - 36.5 g/dL    RDW 13.6 10.0 - 15.0 %    Platelet Count 188 150 - 450 10e3/uL    % Neutrophils 92 %    % Lymphocytes 6 %    % Monocytes 1 %    % Eosinophils 0 %    % Basophils 0 %    % Immature Granulocytes 1 %    NRBCs per 100 WBC 0 <1 /100    Absolute Neutrophils 13.9 (H) 1.6 - 8.3 10e3/uL    Absolute Lymphocytes 0.8 0.8 - 5.3 10e3/uL    Absolute Monocytes 0.1 0.0 - 1.3 10e3/uL    Absolute Eosinophils 0.0 0.0 - 0.7 10e3/uL    Absolute Basophils 0.0 0.0 - 0.2 10e3/uL    Absolute Immature Granulocytes 0.1 <=0.4 10e3/uL    Absolute NRBCs 0.0 10e3/uL   MRSA MSSA PCR, Nasal Swab    Collection Time: 04/25/23 10:50 AM    Specimen: Nares, Bilateral; Swab   Result Value Ref Range    MRSA Target DNA Negative Negative    SA Target DNA Negative    ECG 12-LEAD WITH MUSE (LHE)    Collection Time: 04/26/23  1:35 AM   Result Value Ref Range    Systolic Blood Pressure  mmHg    Diastolic Blood Pressure  mmHg    Ventricular Rate 95 BPM    Atrial Rate 95 BPM    UT Interval 194 ms    QRS Duration 84 ms     ms    QTc 502 ms    P Axis 94 degrees    R AXIS 32 degrees    T Axis 39 degrees    Interpretation ECG       Sinus rhythm with Premature atrial complexes  Low voltage QRS  Prolonged QT  Abnormal ECG  When compared with ECG of 24-APR-2023 21:43,  Premature atrial complexes are now Present  Nonspecific T wave abnormality now evident  in Inferior leads  Confirmed by INGRID FARRAR MD LOC:JN ([id number]) on 4/26/2023 2:40:08 PM     Potassium    Collection Time: 04/26/23  9:18 AM   Result Value Ref Range    Potassium 4.8 3.4 - 5.3 mmol/L   Magnesium    Collection Time: 04/26/23  9:18 AM   Result Value Ref Range    Magnesium 2.1 1.7 - 2.3 mg/dL   Extra Purple Top Tube    Collection Time: 04/26/23  9:33 AM   Result Value Ref Range    Hold Specimen JIC    ECG 12-LEAD WITH MUSE (LHE)    Collection Time: 04/26/23 11:14 PM   Result Value Ref Range    Systolic Blood Pressure  mmHg    Diastolic Blood Pressure  mmHg    Ventricular Rate 98 BPM    Atrial Rate 98 BPM    MO Interval 168 ms    QRS Duration 86 ms     ms    QTc 505 ms    P Axis 29 degrees    R AXIS 34 degrees    T Axis 31 degrees    Interpretation ECG       Sinus rhythm  Low voltage QRS  Prolonged QT  Abnormal ECG  When compared with ECG of 26-APR-2023 01:35,  Premature atrial complexes are no longer Present  Confirmed by PARAMJIT BEST MD LOC:JN ([id number]) on 4/27/2023 7:50:50 AM     CK total    Collection Time: 04/27/23  1:20 AM   Result Value Ref Range    CK 4,320 (HH) 39 - 308 U/L   Basic metabolic panel    Collection Time: 04/27/23  1:20 AM   Result Value Ref Range    Sodium 140 136 - 145 mmol/L    Potassium 4.3 3.4 - 5.3 mmol/L    Chloride 107 98 - 107 mmol/L    Carbon Dioxide (CO2) 22 22 - 29 mmol/L    Anion Gap 11 7 - 15 mmol/L    Urea Nitrogen 28.5 (H) 8.0 - 23.0 mg/dL    Creatinine 1.01 0.67 - 1.17 mg/dL    Calcium 8.6 (L) 8.8 - 10.2 mg/dL    Glucose 124 (H) 70 - 99 mg/dL    GFR Estimate 75 >60 mL/min/1.73m2   Magnesium    Collection Time: 04/27/23  1:20 AM   Result Value Ref Range    Magnesium 2.2 1.7 - 2.3 mg/dL   Blood gas venous    Collection Time: 04/27/23  1:20 AM   Result Value Ref Range    pH Venous 7.37 7.35 - 7.45    pCO2 Venous 44 35 - 50 mm Hg    pO2 Venous 58 (H) 25 - 47 mm Hg    Bicarbonate Venous 26 24 - 30 mmol/L    Base Excess/Deficit (+/-) 0.2   mmol/L    Oxyhemoglobin  Venous 89.5 (H) 70.0 - 75.0 %    O2 Sat, Venous 90.3 (H) 70.0 - 75.0 %   Lactic acid whole blood    Collection Time: 04/27/23  1:20 AM   Result Value Ref Range    Lactic Acid 2.1 (H) 0.7 - 2.0 mmol/L   D dimer quantitative    Collection Time: 04/27/23  1:20 AM   Result Value Ref Range    D-Dimer Quantitative 3.24 (H) 0.00 - 0.50 ug/mL FEU   Extra Purple Top Tube    Collection Time: 04/27/23  1:20 AM   Result Value Ref Range    Hold Specimen JIC    Creatinine    Collection Time: 04/27/23  5:33 AM   Result Value Ref Range    Creatinine 0.94 0.67 - 1.17 mg/dL    GFR Estimate 81 >60 mL/min/1.73m2   Magnesium    Collection Time: 04/27/23  5:33 AM   Result Value Ref Range    Magnesium 2.2 1.7 - 2.3 mg/dL   Vancomycin level    Collection Time: 04/27/23  5:33 AM   Result Value Ref Range    Vancomycin 7.4   ug/mL   Basic metabolic panel    Collection Time: 04/27/23  5:33 AM   Result Value Ref Range    Sodium 143 136 - 145 mmol/L    Potassium 4.3 3.4 - 5.3 mmol/L    Chloride 109 (H) 98 - 107 mmol/L    Carbon Dioxide (CO2) 24 22 - 29 mmol/L    Anion Gap 10 7 - 15 mmol/L    Urea Nitrogen 26.4 (H) 8.0 - 23.0 mg/dL    Creatinine 0.94 0.67 - 1.17 mg/dL    Calcium 8.5 (L) 8.8 - 10.2 mg/dL    Glucose 115 (H) 70 - 99 mg/dL    GFR Estimate 81 >60 mL/min/1.73m2   CBC with platelets    Collection Time: 04/27/23  5:33 AM   Result Value Ref Range    WBC Count 18.1 (H) 4.0 - 11.0 10e3/uL    RBC Count 4.26 (L) 4.40 - 5.90 10e6/uL    Hemoglobin 13.0 (L) 13.3 - 17.7 g/dL    Hematocrit 39.5 (L) 40.0 - 53.0 %    MCV 93 78 - 100 fL    MCH 30.5 26.5 - 33.0 pg    MCHC 32.9 31.5 - 36.5 g/dL    RDW 14.4 10.0 - 15.0 %    Platelet Count 187 150 - 450 10e3/uL   CK total    Collection Time: 04/27/23  5:33 AM   Result Value Ref Range    CK 3,670 () 39 - 308 U/L       PMH:   Past Medical History:   Diagnosis Date     AAA (abdominal aortic aneurysm) (H)     s/p stenting     Alcohol dependence in remission (H)      Alcohol use disorder, severe, in  sustained remission, dependence (H) 8/16/2021     Anxiety      Atrial fibrillation with normal ventricular rate (H)      Benign prostatic hyperplasia with lower urinary tract symptoms      Bronchiectasis without complication (H)     2/27/2020     COPD (chronic obstructive pulmonary disease) (H)      CVA (cerebral vascular accident) (H) 2019     DDD (degenerative disc disease), lumbar      Depression      Depressive disorder      Diabetes (H)      Diastolic dysfunction 01/22/2021     DJD (degenerative joint disease) of knee     left     Essential hypertension      Glaucoma      Glaucoma      History of stroke without residual deficits      Hyperlipidemia      Hypertension      Hypothyroidism      Hypothyroidism      Kidney stones      Major depression      Memory problem      Nocturia      Obesity      Opioid use disorder, severe, dependence (H) 8/16/2021     Overactive bladder 02/25/2021     Primary osteoarthritis of left knee      Psoriasis      Psoriasis      Seborrheic keratoses      Somnolence, daytime      Stenosis of right carotid artery     history of TIA's           Current Medications:Scheduled Meds:    acetaminophen  975 mg Oral Q8H     amLODIPine  2.5 mg Oral Daily     aspirin  81 mg Oral Daily     budesonide  0.5 mg Nebulization BID     busPIRone  5 mg Oral BID     docusate sodium  100 mg Oral BID     DULoxetine  60 mg Oral Daily     enoxaparin ANTICOAGULANT  40 mg Subcutaneous Q24H     famotidine  20 mg Intravenous Q12H     furosemide  20 mg Oral Daily     ipratropium  0.5 mg Nebulization 4x daily     latanoprost  1 drop Both Eyes At Bedtime     levalbuterol  1.25 mg Nebulization 4x Daily     levothyroxine  88 mcg Oral QAM AC     losartan  25 mg Oral Daily     methylPREDNISolone  40 mg Intravenous Q8H     metoprolol succinate ER  12.5 mg Oral Daily     multivitamin w/minerals  1 tablet Oral Daily     OLANZapine  7.5 mg Oral At Bedtime     piperacillin-tazobactam  3.375 g Intravenous Q8H     rosuvastatin   10 mg Oral At Bedtime     sodium chloride (PF)  3 mL Intracatheter Q8H     vancomycin  1,000 mg Intravenous Q12H     Continuous Infusions:    sodium chloride 100 mL/hr at 23 0605     PRN Meds:.levalbuterol, lidocaine 4%, lidocaine (buffered or not buffered), melatonin, OLANZapine, OLANZapine, ondansetron **OR** ondansetron, prochlorperazine **OR** prochlorperazine **OR** prochlorperazine, QUEtiapine, sodium chloride (PF), sodium phosphate                Family History: PERSONALLY REVIEWED.  Family History   Problem Relation Age of Onset     Emphysema Mother      No Known Problems Father      Cirrhosis Father      No Known Problems Sister      No Known Problems Brother      No Known Problems Maternal Aunt      No Known Problems Maternal Uncle      No Known Problems Paternal Aunt      No Known Problems Paternal Uncle      No Known Problems Maternal Grandmother      No Known Problems Maternal Grandfather      No Known Problems Paternal Grandmother      No Known Problems Paternal Grandfather      No Known Problems Other      Pertinent Family hx not pertinent to Chief Complaint or reason for visit.     Social History:  PERSONALLY REVIEWED.  Social History     Socioeconomic History     Marital status:      Spouse name: Not on file     Number of children: Not on file     Years of education: Not on file     Highest education level: Not on file   Occupational History     Not on file   Tobacco Use     Smoking status: Former     Packs/day: 0.50     Years: 35.00     Pack years: 17.50     Types: Cigarettes     Quit date: 1990     Years since quittin.3     Smokeless tobacco: Never     Tobacco comments:     quit    Vaping Use     Vaping status: Not on file   Substance and Sexual Activity     Alcohol use: No     Comment: Alcoholic Drinks/day: quit      Drug use: No     Sexual activity: Yes     Partners: Female     Birth control/protection: Post-menopausal   Other Topics Concern     Parent/sibling w/  CABG, MI or angioplasty before 65F 55M? No   Social History Narrative     Not on file     Social Determinants of Health     Financial Resource Strain: Not on file   Food Insecurity: Not on file   Transportation Needs: Not on file   Physical Activity: Not on file   Stress: Not on file   Social Connections: Not on file   Intimate Partner Violence: Not on file   Housing Stability: Not on file    not pertinent to Chief Complaint or reason for visit.             Allergies as of 06/01/2014 Reviewed     Review of Systems:As per HPI. Remainders of 12 point review of systems negative.    Review of Pertinent Laboratory:      PERSONALLY REVIEWED.    Physical Exam: Temp:  [97.8  F (36.6  C)-98.7  F (37.1  C)] 98.6  F (37  C)  Pulse:  [] 99  Resp:  [20] 20  BP: (109-154)/(67-94) 145/94  FiO2 (%):  [40 %-45 %] 45 %  SpO2:  [95 %-97 %] 96 %   Vitals: reviewed in chart     Physical exam as per medical team: reviewed in chart      diagnoses, risk and benefits of medications discussed with staff. Care coordination with care management team.   Thank you for this consultation.       Ivelisse Juárez; NP  [facility]        This note was created with the help of Dragon dictation system. Grammatical and typing errors are not intentional.

## 2023-04-28 LAB
ANION GAP SERPL CALCULATED.3IONS-SCNC: 11 MMOL/L (ref 7–15)
BASOPHILS # BLD AUTO: 0 10E3/UL (ref 0–0.2)
BASOPHILS NFR BLD AUTO: 0 %
BUN SERPL-MCNC: 22 MG/DL (ref 8–23)
CALCIUM SERPL-MCNC: 8.8 MG/DL (ref 8.8–10.2)
CHLORIDE SERPL-SCNC: 106 MMOL/L (ref 98–107)
CREAT SERPL-MCNC: 1.01 MG/DL (ref 0.67–1.17)
DEPRECATED HCO3 PLAS-SCNC: 24 MMOL/L (ref 22–29)
EOSINOPHIL # BLD AUTO: 0.1 10E3/UL (ref 0–0.7)
EOSINOPHIL NFR BLD AUTO: 0 %
ERYTHROCYTE [DISTWIDTH] IN BLOOD BY AUTOMATED COUNT: 14.3 % (ref 10–15)
GFR SERPL CREATININE-BSD FRML MDRD: 75 ML/MIN/1.73M2
GLUCOSE SERPL-MCNC: 122 MG/DL (ref 70–99)
HCT VFR BLD AUTO: 41.8 % (ref 40–53)
HGB BLD-MCNC: 13.8 G/DL (ref 13.3–17.7)
IMM GRANULOCYTES # BLD: 0.1 10E3/UL
IMM GRANULOCYTES NFR BLD: 1 %
L PNEUMO1 AG UR QL IA: NEGATIVE
LYMPHOCYTES # BLD AUTO: 0.9 10E3/UL (ref 0.8–5.3)
LYMPHOCYTES NFR BLD AUTO: 5 %
MAGNESIUM SERPL-MCNC: 2.3 MG/DL (ref 1.7–2.3)
MCH RBC QN AUTO: 30.7 PG (ref 26.5–33)
MCHC RBC AUTO-ENTMCNC: 33 G/DL (ref 31.5–36.5)
MCV RBC AUTO: 93 FL (ref 78–100)
MONOCYTES # BLD AUTO: 0.9 10E3/UL (ref 0–1.3)
MONOCYTES NFR BLD AUTO: 5 %
NEUTROPHILS # BLD AUTO: 16 10E3/UL (ref 1.6–8.3)
NEUTROPHILS NFR BLD AUTO: 89 %
NRBC # BLD AUTO: 0 10E3/UL
NRBC BLD AUTO-RTO: 0 /100
PLATELET # BLD AUTO: 219 10E3/UL (ref 150–450)
POTASSIUM SERPL-SCNC: 4.4 MMOL/L (ref 3.4–5.3)
RBC # BLD AUTO: 4.49 10E6/UL (ref 4.4–5.9)
S PNEUM AG SPEC QL: NEGATIVE
SODIUM SERPL-SCNC: 141 MMOL/L (ref 136–145)
WBC # BLD AUTO: 18.1 10E3/UL (ref 4–11)

## 2023-04-28 PROCEDURE — 250N000011 HC RX IP 250 OP 636: Performed by: HOSPITALIST

## 2023-04-28 PROCEDURE — 250N000011 HC RX IP 250 OP 636: Performed by: INTERNAL MEDICINE

## 2023-04-28 PROCEDURE — 94640 AIRWAY INHALATION TREATMENT: CPT | Mod: 76

## 2023-04-28 PROCEDURE — 250N000013 HC RX MED GY IP 250 OP 250 PS 637: Performed by: INTERNAL MEDICINE

## 2023-04-28 PROCEDURE — 999N000157 HC STATISTIC RCP TIME EA 10 MIN

## 2023-04-28 PROCEDURE — 99232 SBSQ HOSP IP/OBS MODERATE 35: CPT | Performed by: INTERNAL MEDICINE

## 2023-04-28 PROCEDURE — 120N000001 HC R&B MED SURG/OB

## 2023-04-28 PROCEDURE — 93005 ELECTROCARDIOGRAM TRACING: CPT

## 2023-04-28 PROCEDURE — 80048 BASIC METABOLIC PNL TOTAL CA: CPT | Performed by: INTERNAL MEDICINE

## 2023-04-28 PROCEDURE — 250N000009 HC RX 250: Performed by: INTERNAL MEDICINE

## 2023-04-28 PROCEDURE — 93010 ELECTROCARDIOGRAM REPORT: CPT | Performed by: INTERNAL MEDICINE

## 2023-04-28 PROCEDURE — 85025 COMPLETE CBC W/AUTO DIFF WBC: CPT | Performed by: INTERNAL MEDICINE

## 2023-04-28 PROCEDURE — 83735 ASSAY OF MAGNESIUM: CPT | Performed by: INTERNAL MEDICINE

## 2023-04-28 PROCEDURE — 999N000215 HC STATISTIC HFNC ADULT NON-CPAP

## 2023-04-28 PROCEDURE — 36415 COLL VENOUS BLD VENIPUNCTURE: CPT | Performed by: INTERNAL MEDICINE

## 2023-04-28 RX ORDER — OLANZAPINE 10 MG/2ML
5 INJECTION, POWDER, FOR SOLUTION INTRAMUSCULAR EVERY 4 HOURS PRN
Status: DISCONTINUED | OUTPATIENT
Start: 2023-04-28 | End: 2023-05-07 | Stop reason: HOSPADM

## 2023-04-28 RX ORDER — PREDNISONE 20 MG/1
40 TABLET ORAL DAILY
Status: COMPLETED | OUTPATIENT
Start: 2023-04-29 | End: 2023-05-03

## 2023-04-28 RX ORDER — LEVALBUTEROL INHALATION SOLUTION 1.25 MG/3ML
1.25 SOLUTION RESPIRATORY (INHALATION) EVERY 6 HOURS PRN
Status: DISCONTINUED | OUTPATIENT
Start: 2023-04-28 | End: 2023-05-07 | Stop reason: HOSPADM

## 2023-04-28 RX ADMIN — DOCUSATE SODIUM 100 MG: 100 CAPSULE, LIQUID FILLED ORAL at 09:58

## 2023-04-28 RX ADMIN — ENOXAPARIN SODIUM 40 MG: 40 INJECTION SUBCUTANEOUS at 09:57

## 2023-04-28 RX ADMIN — ACETAMINOPHEN 975 MG: 325 TABLET ORAL at 06:48

## 2023-04-28 RX ADMIN — DOCUSATE SODIUM 100 MG: 100 CAPSULE, LIQUID FILLED ORAL at 20:17

## 2023-04-28 RX ADMIN — LEVALBUTEROL HYDROCHLORIDE 1.25 MG: 1.25 SOLUTION RESPIRATORY (INHALATION) at 07:37

## 2023-04-28 RX ADMIN — PIPERACILLIN AND TAZOBACTAM 3.38 G: 3; .375 INJECTION, POWDER, LYOPHILIZED, FOR SOLUTION INTRAVENOUS at 05:04

## 2023-04-28 RX ADMIN — ACETAMINOPHEN 975 MG: 325 TABLET ORAL at 22:48

## 2023-04-28 RX ADMIN — Medication 1 TABLET: at 09:59

## 2023-04-28 RX ADMIN — LATANOPROST 1 DROP: 50 SOLUTION OPHTHALMIC at 22:49

## 2023-04-28 RX ADMIN — PIPERACILLIN AND TAZOBACTAM 3.38 G: 3; .375 INJECTION, POWDER, LYOPHILIZED, FOR SOLUTION INTRAVENOUS at 20:13

## 2023-04-28 RX ADMIN — METOPROLOL SUCCINATE 12.5 MG: 25 TABLET, EXTENDED RELEASE ORAL at 09:59

## 2023-04-28 RX ADMIN — ONDANSETRON 4 MG: 2 INJECTION INTRAMUSCULAR; INTRAVENOUS at 17:38

## 2023-04-28 RX ADMIN — BUDESONIDE 0.5 MG: 0.5 INHALANT RESPIRATORY (INHALATION) at 07:37

## 2023-04-28 RX ADMIN — QUETIAPINE FUMARATE 25 MG: 25 TABLET ORAL at 13:16

## 2023-04-28 RX ADMIN — AMLODIPINE BESYLATE 2.5 MG: 2.5 TABLET ORAL at 09:59

## 2023-04-28 RX ADMIN — IPRATROPIUM BROMIDE 0.5 MG: 0.5 SOLUTION RESPIRATORY (INHALATION) at 07:37

## 2023-04-28 RX ADMIN — LEVOTHYROXINE SODIUM 88 MCG: 88 TABLET ORAL at 06:29

## 2023-04-28 RX ADMIN — FAMOTIDINE 20 MG: 10 INJECTION, SOLUTION INTRAVENOUS at 02:14

## 2023-04-28 RX ADMIN — BUSPIRONE HYDROCHLORIDE 5 MG: 5 TABLET ORAL at 09:58

## 2023-04-28 RX ADMIN — BUDESONIDE 0.5 MG: 0.5 INHALANT RESPIRATORY (INHALATION) at 20:34

## 2023-04-28 RX ADMIN — DULOXETINE HYDROCHLORIDE 60 MG: 60 CAPSULE, DELAYED RELEASE PELLETS ORAL at 09:58

## 2023-04-28 RX ADMIN — FUROSEMIDE 20 MG: 20 TABLET ORAL at 09:59

## 2023-04-28 RX ADMIN — FAMOTIDINE 20 MG: 10 INJECTION, SOLUTION INTRAVENOUS at 15:48

## 2023-04-28 RX ADMIN — Medication 1 MG: at 22:49

## 2023-04-28 RX ADMIN — PIPERACILLIN AND TAZOBACTAM 3.38 G: 3; .375 INJECTION, POWDER, LYOPHILIZED, FOR SOLUTION INTRAVENOUS at 15:47

## 2023-04-28 RX ADMIN — LOSARTAN POTASSIUM 25 MG: 25 TABLET, FILM COATED ORAL at 09:58

## 2023-04-28 RX ADMIN — Medication 1 MG: at 01:03

## 2023-04-28 RX ADMIN — QUETIAPINE FUMARATE 25 MG: 25 TABLET ORAL at 22:49

## 2023-04-28 RX ADMIN — OLANZAPINE 5 MG: 10 INJECTION, POWDER, LYOPHILIZED, FOR SOLUTION INTRAMUSCULAR at 05:03

## 2023-04-28 RX ADMIN — METHYLPREDNISOLONE SODIUM SUCCINATE 40 MG: 40 INJECTION INTRAMUSCULAR; INTRAVENOUS at 06:32

## 2023-04-28 RX ADMIN — BUSPIRONE HYDROCHLORIDE 5 MG: 5 TABLET ORAL at 20:17

## 2023-04-28 RX ADMIN — ASPIRIN 81 MG: 81 TABLET, CHEWABLE ORAL at 09:58

## 2023-04-28 RX ADMIN — OLANZAPINE 7.5 MG: 2.5 TABLET, FILM COATED ORAL at 13:58

## 2023-04-28 RX ADMIN — ROSUVASTATIN CALCIUM 10 MG: 10 TABLET, FILM COATED ORAL at 20:19

## 2023-04-28 ASSESSMENT — ACTIVITIES OF DAILY LIVING (ADL)
ADLS_ACUITY_SCORE: 50
ADLS_ACUITY_SCORE: 56
ADLS_ACUITY_SCORE: 55
ADLS_ACUITY_SCORE: 50
ADLS_ACUITY_SCORE: 55
ADLS_ACUITY_SCORE: 50
ADLS_ACUITY_SCORE: 55
ADLS_ACUITY_SCORE: 63
ADLS_ACUITY_SCORE: 50

## 2023-04-28 NOTE — PLAN OF CARE
"  Patient is alert and oriented to self and family. Patient slept approximately 10% of the night. The longest sleep stretch was about 10 minutes with patient waking up and restless, and trying to scoot out of bed. Difficult to redirect. Patient was disruptive and yelling at the beginning of the shift, but was able to redirect patient from yelling out. Prn melatonin given at 0103 with little or no effect. Prn I.M Zyprexa given at 0500 with little effect. Patient used the urinal x 2, and voided 400 ml and 350 ml. Patient called and spoke to his wife and daughter early this morning. He told his wife \" Guess what? I have been kidnapped. Then when he was talking to his daughter, he told her \"I have been kidnapped, but your mom does not believe me\". Patient was redirected and reoriented.  Problem: Restraint, Nonviolent  Goal: Absence of Harm or Injury  Outcome: Progressing  Intervention: Protect Skin and Joint Integrity  Recent Flowsheet Documentation  Taken 4/28/2023 0051 by Marlin Bowman, RN  Body Position:   log-rolled   heels elevated     Problem: Pain Chronic (Persistent) (Comorbidity Management)  Goal: Acceptable Pain Control and Functional Ability  Outcome: Progressing  Intervention: Manage Persistent Pain  Recent Flowsheet Documentation  Taken 4/28/2023 0051 by Marlin Bowman, RN  Medication Review/Management: medications reviewed   Goal Outcome Evaluation:                        "

## 2023-04-28 NOTE — PROGRESS NOTES
Patient alternately agitated and calm. Remains a 1:1 with CNA. Medicated with serequol x1 and zyprexa x1 this shift. Patient is on 4 soft limb restraints, was briefly on just one ankle restraint. Updated both daughter and wife x2 this shift. Patient eating and drinking well. Will monitor.

## 2023-04-28 NOTE — PROGRESS NOTES
[facility]    Medicine Progress Note - Hospitalist Service    Date of Admission:  4/24/2023    Assessment & Plan    Dominik Rojas is an 82 year old male with medical history of COPD not on home oxygen, lung cancer, vascular dementia, hypothyroidism, hypertension, recent pneumonia requiring hospitalization admitted on 4/24/2023 with worsening shortness of breath at rest, fever, hypoxia and increased confusion requiring restraints in the ED.  ED work-up showed leukocytosis, lactic acidosis, mild hyperkalemia, hypoxemia needing high flow nasal oxygen.    Acute hypoxic respiratory failure  Acute COPD exacerbation  Secondary bacterial pneumonia  -CT chest with IV contrast: Negative for PE but showed interstitial opacities in the dependent portions of bilateral lower lobes are nonspecific for atelectasis versus mild aspiration.  -Blood and sputum culture ordered, blood cultures pending.  Unable to obtain sputum yet.  -Continue supplementary oxygen to maintain SPO2 greater than 89 to 92%  -Xopenex 4 times daily, Atrovent 4 times daily, Pulmicort 2 times daily  -Decreased methylprednisolone 40 mg daily with plan to transition to oral prednisone tomorrow  -Continue Zosyn every 8 hours.  MRSA negative and will discontinue vancomycin  -Consider consulting speech for dysphagia eval when less agitated  -O2 requirements decreased significantly overnight, on 3 LPM this morning    Adenocarcinoma of the right lower lobe  Immunocompromised  -Stage T1 N0 M0  -Has completed radiation therapy on 4/21/2023, not a candidate for surgery due to age and medical comorbidities  -Follows with Dr. Bales through [facility] oncology    Mild hyperkalemia, asymptomatic-resolved  -Will continue to monitor, avoid potassium sparing agents    Metabolic encephalopathy, still ongoing  Mood disorder  -Required restraints on 4/27 due to agitation and interfering with care  -Continue PTA olanzapine 7.5 mg at bedtime  -Continue PTA  "BuSpar 5 mg twice daily  -Continue PTA Cymbalta 60 mg daily  -Olanzapine 5 mg as needed daily for agitation and aggression  -Will monitor    Essential hypertension, stable  -continue losartan 25 mg daily, furosemide 20 mg, and amlodipine 2.5 mg daily    Hyperlipidemia  -Continue PTA rosuvastatin 10 mg at bedtime     Diet: Regular Diet Adult    DVT Prophylaxis: Enoxaparin (Lovenox) SQ  Cabral Catheter: Not present  Lines: None     Cardiac Monitoring: ACTIVE order. Indication: restraints  Code Status: Full Code      Clinically Significant Risk Factors              # Hypoalbuminemia: Lowest albumin = 3.3 g/dL at 4/25/2023  6:57 AM, will monitor as appropriate            # Obesity: Estimated body mass index is 30.38 kg/m  as calculated from the following:    Height as of this encounter: 1.702 m (5' 7\").    Weight as of this encounter: 88 kg (194 lb)., PRESENT ON ADMISSION         Disposition Plan      Expected Discharge Date: 04/30/2023    Discharge Delays: Oxygen Needs - Arrange Home O2  1:1 Sitter still ordered - MD to assess  Destination: home with help/services  Discharge Comments: restraints  HFNC          Melody Roth MD  Hospitalist Service  [facility]  Securely message with [employer] (more info)  Text page via "[email or web address]" Paging/Directory   ______________________________________________________________________    Interval History   No acute events overnight.  Upper extremity restraints have been removed this morning.  Patient is calm and cooperative today.  Somewhat confused but significantly improved from yesterday.  O2 needs have also decreased, now needing 3 LPM as of this morning from 40 LPM overnight.     Physical Exam   Vital Signs: Temp: 98.8  F (37.1  C) Temp src: Oral BP: (!) 141/80 Pulse: 95   Resp: 18 SpO2: 97 % O2 Device: High Flow Nasal Cannula (HFNC) Oxygen Delivery: 40 LPM  Weight: 194 lbs 0 oz    General Appearance: Awake, alert, in no acute distress  Respiratory: Increased aeration " upper, bibasilar crackles, no wheezing  cardiovascular: Regular rate, no murmur noted  GI: soft, nontender, non distended, normal bowel sounds  Skin: no jaundice, no rash.  On four-point restraints      Medical Decision Making       MANAGEMENT DISCUSSED with the following over the past 24 hours: Prior hospitalist   NOTE(S)/MEDICAL RECORDS REVIEWED over the past 24 hours: Prior hospitalist H&P, ED physician, nursing  Respiratory, pharmacy, case management    Data     I have personally reviewed the following data over the past 24 hrs:    18.1 (H)  \   13.8   / 219     141 106 22.0 /  122 (H)   4.4 24 1.01 \       Imaging results reviewed over the past 24 hrs:   No results found for this or any previous visit (from the past 24 hour(s)).

## 2023-04-28 NOTE — PLAN OF CARE
Goal Outcome Evaluation:    Pt on 1:1 supervision, 4 point restraints ordered. Telemetry in NSR to sinus tachycardia. High flow O2 in place, saturations in the mid 90%. PRN BID Seroquel given appeared helpful pt slept after second dose. IM Zyprexa given after increased agitation and was effective, pt slept more and was more pleasant. Very confused, Disoriented x 3, only aware of self. Some visual hallucinations of bugs on ceiling. Most of the time is calling out different names of women. Primo fit in place, no BM this evening. Takes pill whole fine w/ water or juice.       Problem: Plan of Care - These are the overarching goals to be used throughout the patient stay.    Goal: Plan of Care Review  Description: The Plan of Care Review/Shift note should be completed every shift.  The Outcome Evaluation is a brief statement about your assessment that the patient is improving, declining, or no change.  This information will be displayed automatically on your shift note.  Outcome: Progressing  Flowsheets (Taken 4/27/2023 5907)  Plan of Care Reviewed With: patient  Overall Patient Progress: no change     Problem: Plan of Care - These are the overarching goals to be used throughout the patient stay.    Goal: Absence of Hospital-Acquired Illness or Injury  Intervention: Prevent Skin Injury  Recent Flowsheet Documentation  Taken 4/27/2023 1802 by Amira Cordon, RN  Body Position:    log-rolled    turned    right    left    foot of bed elevated    supine, legs elevated    trendelenburg    weight shifting  Taken 4/27/2023 1545 by Amira Cordon, RN  Body Position: (boosted in bed) other (see comments)     Problem: Plan of Care - These are the overarching goals to be used throughout the patient stay.    Goal: Optimal Comfort and Wellbeing  Outcome: Progressing     Problem: Restraint, Nonviolent  Goal: Absence of Harm or Injury  Outcome: Progressing     Problem: Restraint, Nonviolent  Goal: Absence of Harm or  Injury  Intervention: Protect Skin and Joint Integrity  Recent Flowsheet Documentation  Taken 4/27/2023 1802 by Amira Cordon, RN  Body Position:    log-rolled    turned    right    left    foot of bed elevated    supine, legs elevated    trendelenburg    weight shifting  Taken 4/27/2023 1545 by Amira Cordon, RN  Body Position: (boosted in bed) other (see comments)     Problem: Violence Risk or Actual  Goal: Anger and Impulse Control  Outcome: Progressing     Problem: Pneumonia  Goal: Effective Oxygenation and Ventilation  Outcome: Progressing         Plan of Care Reviewed With: patient    Overall Patient Progress: no changeOverall Patient Progress: no change

## 2023-04-28 NOTE — PROGRESS NOTES
RCAT Treatment Plan    Patient Score: 10  Patient Acuity: 4    Clinical Indication for Therapy: Nebulizer  Therapy Ordered: Q6 PRN Duo neb. BID Pulmincort    Assessment Summary:  Patient is currently on 40L 30% Sat >92%. Lung sound bilaterally diminished. Neb tx change from QID to Q6PRN per protocol. Titrate high flow oxygen to NC as able.     Ros Parson, RT  4/28/2023

## 2023-04-29 LAB
ANION GAP SERPL CALCULATED.3IONS-SCNC: 9 MMOL/L (ref 7–15)
ATRIAL RATE - MUSE: 125 BPM
BASOPHILS # BLD AUTO: 0 10E3/UL (ref 0–0.2)
BASOPHILS NFR BLD AUTO: 0 %
BUN SERPL-MCNC: 28.5 MG/DL (ref 8–23)
CALCIUM SERPL-MCNC: 8.9 MG/DL (ref 8.8–10.2)
CHLORIDE SERPL-SCNC: 105 MMOL/L (ref 98–107)
CREAT SERPL-MCNC: 1.2 MG/DL (ref 0.67–1.17)
DEPRECATED HCO3 PLAS-SCNC: 28 MMOL/L (ref 22–29)
DIASTOLIC BLOOD PRESSURE - MUSE: NORMAL MMHG
EOSINOPHIL # BLD AUTO: 0 10E3/UL (ref 0–0.7)
EOSINOPHIL NFR BLD AUTO: 0 %
ERYTHROCYTE [DISTWIDTH] IN BLOOD BY AUTOMATED COUNT: 14.1 % (ref 10–15)
GFR SERPL CREATININE-BSD FRML MDRD: 61 ML/MIN/1.73M2
GLUCOSE SERPL-MCNC: 91 MG/DL (ref 70–99)
HCT VFR BLD AUTO: 44.1 % (ref 40–53)
HGB BLD-MCNC: 14.1 G/DL (ref 13.3–17.7)
IMM GRANULOCYTES # BLD: 0.1 10E3/UL
IMM GRANULOCYTES NFR BLD: 1 %
INTERPRETATION ECG - MUSE: NORMAL
LYMPHOCYTES # BLD AUTO: 1.6 10E3/UL (ref 0.8–5.3)
LYMPHOCYTES NFR BLD AUTO: 11 %
MAGNESIUM SERPL-MCNC: 2.6 MG/DL (ref 1.7–2.3)
MCH RBC QN AUTO: 29.9 PG (ref 26.5–33)
MCHC RBC AUTO-ENTMCNC: 32 G/DL (ref 31.5–36.5)
MCV RBC AUTO: 93 FL (ref 78–100)
MONOCYTES # BLD AUTO: 0.9 10E3/UL (ref 0–1.3)
MONOCYTES NFR BLD AUTO: 6 %
NEUTROPHILS # BLD AUTO: 12.2 10E3/UL (ref 1.6–8.3)
NEUTROPHILS NFR BLD AUTO: 82 %
NRBC # BLD AUTO: 0 10E3/UL
NRBC BLD AUTO-RTO: 0 /100
P AXIS - MUSE: 47 DEGREES
PLATELET # BLD AUTO: 203 10E3/UL (ref 150–450)
POTASSIUM SERPL-SCNC: 4.2 MMOL/L (ref 3.4–5.3)
PR INTERVAL - MUSE: 144 MS
QRS DURATION - MUSE: 82 MS
QT - MUSE: 376 MS
QTC - MUSE: 542 MS
R AXIS - MUSE: 50 DEGREES
RBC # BLD AUTO: 4.72 10E6/UL (ref 4.4–5.9)
SODIUM SERPL-SCNC: 142 MMOL/L (ref 136–145)
SYSTOLIC BLOOD PRESSURE - MUSE: NORMAL MMHG
T AXIS - MUSE: 48 DEGREES
VENTRICULAR RATE- MUSE: 125 BPM
WBC # BLD AUTO: 14.9 10E3/UL (ref 4–11)

## 2023-04-29 PROCEDURE — 36415 COLL VENOUS BLD VENIPUNCTURE: CPT | Performed by: INTERNAL MEDICINE

## 2023-04-29 PROCEDURE — 99232 SBSQ HOSP IP/OBS MODERATE 35: CPT | Performed by: INTERNAL MEDICINE

## 2023-04-29 PROCEDURE — 250N000013 HC RX MED GY IP 250 OP 250 PS 637: Performed by: INTERNAL MEDICINE

## 2023-04-29 PROCEDURE — 93005 ELECTROCARDIOGRAM TRACING: CPT | Performed by: INTERNAL MEDICINE

## 2023-04-29 PROCEDURE — 250N000011 HC RX IP 250 OP 636: Performed by: INTERNAL MEDICINE

## 2023-04-29 PROCEDURE — 93005 ELECTROCARDIOGRAM TRACING: CPT

## 2023-04-29 PROCEDURE — 93010 ELECTROCARDIOGRAM REPORT: CPT | Performed by: INTERNAL MEDICINE

## 2023-04-29 PROCEDURE — 83735 ASSAY OF MAGNESIUM: CPT | Performed by: INTERNAL MEDICINE

## 2023-04-29 PROCEDURE — 85025 COMPLETE CBC W/AUTO DIFF WBC: CPT | Performed by: INTERNAL MEDICINE

## 2023-04-29 PROCEDURE — 80048 BASIC METABOLIC PNL TOTAL CA: CPT | Performed by: INTERNAL MEDICINE

## 2023-04-29 PROCEDURE — 94640 AIRWAY INHALATION TREATMENT: CPT | Mod: 76

## 2023-04-29 PROCEDURE — 120N000001 HC R&B MED SURG/OB

## 2023-04-29 PROCEDURE — 250N000012 HC RX MED GY IP 250 OP 636 PS 637: Performed by: INTERNAL MEDICINE

## 2023-04-29 PROCEDURE — 250N000009 HC RX 250: Performed by: INTERNAL MEDICINE

## 2023-04-29 PROCEDURE — 999N000157 HC STATISTIC RCP TIME EA 10 MIN

## 2023-04-29 RX ORDER — FAMOTIDINE 20 MG/1
20 TABLET, FILM COATED ORAL 2 TIMES DAILY
Status: DISCONTINUED | OUTPATIENT
Start: 2023-04-29 | End: 2023-05-07 | Stop reason: HOSPADM

## 2023-04-29 RX ORDER — OLANZAPINE 2.5 MG/1
7.5 TABLET, FILM COATED ORAL AT BEDTIME
Status: DISCONTINUED | OUTPATIENT
Start: 2023-04-29 | End: 2023-05-07 | Stop reason: HOSPADM

## 2023-04-29 RX ADMIN — OLANZAPINE 7.5 MG: 2.5 TABLET, FILM COATED ORAL at 20:06

## 2023-04-29 RX ADMIN — Medication 1 TABLET: at 07:35

## 2023-04-29 RX ADMIN — PIPERACILLIN AND TAZOBACTAM 3.38 G: 3; .375 INJECTION, POWDER, LYOPHILIZED, FOR SOLUTION INTRAVENOUS at 13:50

## 2023-04-29 RX ADMIN — FUROSEMIDE 20 MG: 20 TABLET ORAL at 07:36

## 2023-04-29 RX ADMIN — OLANZAPINE 7.5 MG: 2.5 TABLET, FILM COATED ORAL at 00:28

## 2023-04-29 RX ADMIN — DULOXETINE HYDROCHLORIDE 60 MG: 60 CAPSULE, DELAYED RELEASE PELLETS ORAL at 07:36

## 2023-04-29 RX ADMIN — ROSUVASTATIN CALCIUM 10 MG: 10 TABLET, FILM COATED ORAL at 21:35

## 2023-04-29 RX ADMIN — FAMOTIDINE 20 MG: 20 TABLET ORAL at 20:06

## 2023-04-29 RX ADMIN — METOPROLOL SUCCINATE 12.5 MG: 25 TABLET, EXTENDED RELEASE ORAL at 07:35

## 2023-04-29 RX ADMIN — ACETAMINOPHEN 975 MG: 325 TABLET ORAL at 14:31

## 2023-04-29 RX ADMIN — DOCUSATE SODIUM 100 MG: 100 CAPSULE, LIQUID FILLED ORAL at 07:36

## 2023-04-29 RX ADMIN — PREDNISONE 40 MG: 20 TABLET ORAL at 10:16

## 2023-04-29 RX ADMIN — Medication 1 MG: at 20:06

## 2023-04-29 RX ADMIN — BUSPIRONE HYDROCHLORIDE 5 MG: 5 TABLET ORAL at 07:36

## 2023-04-29 RX ADMIN — AMLODIPINE BESYLATE 2.5 MG: 2.5 TABLET ORAL at 07:35

## 2023-04-29 RX ADMIN — ASPIRIN 81 MG: 81 TABLET, CHEWABLE ORAL at 07:36

## 2023-04-29 RX ADMIN — BUSPIRONE HYDROCHLORIDE 5 MG: 5 TABLET ORAL at 20:13

## 2023-04-29 RX ADMIN — BUDESONIDE 0.5 MG: 0.5 INHALANT RESPIRATORY (INHALATION) at 07:55

## 2023-04-29 RX ADMIN — PIPERACILLIN AND TAZOBACTAM 3.38 G: 3; .375 INJECTION, POWDER, LYOPHILIZED, FOR SOLUTION INTRAVENOUS at 20:06

## 2023-04-29 RX ADMIN — DOCUSATE SODIUM 100 MG: 100 CAPSULE, LIQUID FILLED ORAL at 20:06

## 2023-04-29 RX ADMIN — BUDESONIDE 0.5 MG: 0.5 INHALANT RESPIRATORY (INHALATION) at 20:09

## 2023-04-29 RX ADMIN — LEVOTHYROXINE SODIUM 88 MCG: 88 TABLET ORAL at 05:07

## 2023-04-29 RX ADMIN — LOSARTAN POTASSIUM 25 MG: 25 TABLET, FILM COATED ORAL at 07:36

## 2023-04-29 RX ADMIN — ACETAMINOPHEN 975 MG: 325 TABLET ORAL at 07:29

## 2023-04-29 RX ADMIN — PIPERACILLIN AND TAZOBACTAM 3.38 G: 3; .375 INJECTION, POWDER, LYOPHILIZED, FOR SOLUTION INTRAVENOUS at 05:07

## 2023-04-29 RX ADMIN — FAMOTIDINE 20 MG: 10 INJECTION, SOLUTION INTRAVENOUS at 02:00

## 2023-04-29 RX ADMIN — ACETAMINOPHEN 975 MG: 325 TABLET ORAL at 23:53

## 2023-04-29 RX ADMIN — ENOXAPARIN SODIUM 40 MG: 40 INJECTION SUBCUTANEOUS at 07:37

## 2023-04-29 ASSESSMENT — ACTIVITIES OF DAILY LIVING (ADL)
ADLS_ACUITY_SCORE: 48
ADLS_ACUITY_SCORE: 49
ADLS_ACUITY_SCORE: 50
ADLS_ACUITY_SCORE: 49
ADLS_ACUITY_SCORE: 50
ADLS_ACUITY_SCORE: 49
ADLS_ACUITY_SCORE: 50
ADLS_ACUITY_SCORE: 50
ADLS_ACUITY_SCORE: 49
ADLS_ACUITY_SCORE: 50

## 2023-04-29 NOTE — PROGRESS NOTES
Pt alert and oriented with some confusion,calm and cooperative at this time,soft limb restraint x 4 discontinued. 1:1 sitter at bedside for pt safety.

## 2023-04-29 NOTE — PLAN OF CARE
Uneventful shift. Continuous 1:1 bedside attendant for patient safety. Oriented to self and basically place (thinks it is [facility] in [facility])  compliant with scheduled toileting program. Up to chair all morning. In bed after lunch at 1:15, dozed on and off for an hour, now awake and eating cookies.  (Has been eating and drinking very well today)  oxygen weaned to 1.5 liters.  Walks with cane, transfer belt and 1 assist. Has shuffling gait   no visitors today, Dominik spoke with his wife over the phone- appropriate.  High risk falls prevention plan in place. Elizabeth Payton RN    Problem: Confusion Chronic  Goal: Optimal Cognitive Function  4/29/2023 1450 by Elizabeth Payton RN  Outcome: Progressing  4/29/2023 0858 by Elizabeth Payton RN  Outcome: Progressing  Intervention: Minimize Injury Risk and Provide Safety  Recent Flowsheet Documentation  Taken 4/29/2023 0900 by Elizabeth Payton RN  Enhanced Safety Measures:  at bedside  Intervention: Minimize and Manage Confusion  Recent Flowsheet Documentation  Taken 4/29/2023 0900 by Elizabeth Payton RN  Sensory Stimulation Regulation: (daytime light promotion, promote daytime wakefullness)    television on    visual stimulation provided  Reorientation Measures: reorientation provided     Problem: Risk for Delirium  Goal: Improved Attention and Thought Clarity  Intervention: Maximize Cognitive Function  Recent Flowsheet Documentation  Taken 4/29/2023 0900 by Elizabeth Payton RN  Sensory Stimulation Regulation: (daytime light promotion, promote daytime wakefullness)    television on    visual stimulation provided  Reorientation Measures: reorientation provided  Goal: Improved Sleep  Outcome: Progressing     Problem: Pneumonia  Goal: Effective Oxygenation and Ventilation  Intervention: Optimize Oxygenation and Ventilation  Recent Flowsheet Documentation  Taken 4/29/2023 0900 by Elizabeth Payton RN  Head of Bed (HOB) Positioning: (recliner chair up and down at  request) --  Problem: COPD (Chronic Obstructive Pulmonary Disease) Comorbidity  Goal: Maintenance of COPD Symptom Control  Intervention: Maintain COPD-Symptom Control  Recent Flowsheet Documentation  Taken 4/29/2023 0900 by Elizabeth Payton RN  Medication Review/Management: medications reviewed     Problem: Gas Exchange Impaired  Goal: Optimal Gas Exchange  Intervention: Optimize Oxygenation and Ventilation  Recent Flowsheet Documentation  Taken 4/29/2023 0900 by Elizabeth Payton, RN  Head of Bed (HOB) Positioning: (recliner chair up and down at request) --     Problem: Fall Injury Risk  Goal: Absence of Fall and Fall-Related Injury  4/29/2023 1450 by Elizabeth Payton, RN  Outcome: Progressing

## 2023-04-29 NOTE — PLAN OF CARE
Oxygen continues at 3 liters per NC.  Up to chair by 8am. Took meds well. Cooperative. Eating and drinking well- fed part of meal then completed himself. Walking in room with 1 assist and transfer belt. Shuffling gait. Tolerated standing at the sink and brushing his hair, did his own denture care with set-up. Encourage daytime wakefullness, daylight promotion, encourage routine toileting. Redirect and reorient as needed, allow for validation therapy when needed. Elizabeth Payton RN    Problem: Confusion Chronic  Goal: Optimal Cognitive Function  Outcome: Progressing     Problem: Pneumonia  Goal: Effective Oxygenation and Ventilation  Outcome: Progressing     Problem: Gas Exchange Impaired  Goal: Optimal Gas Exchange  Outcome: Progressing     Problem: Fall Injury Risk  Goal: Absence of Fall and Fall-Related Injury  Outcome: Progressing

## 2023-04-29 NOTE — PLAN OF CARE
Problem: Plan of Care - These are the overarching goals to be used throughout the patient stay.    Goal: Plan of Care Review  Description: The Plan of Care Review/Shift note should be completed every shift.  The Outcome Evaluation is a brief statement about your assessment that the patient is improving, declining, or no change.  This information will be displayed automatically on your shift note.  Outcome: Progressing     Problem: Pneumonia  Goal: Fluid Balance  Outcome: Progressing   Goal Outcome Evaluation:               Pt alert and confused, received PRN melatonin, Seroquel and olanzapine and was effective . pt restless  in bed at times but not aggressive this shift,1:1 supervision continued d/t pt's poor safety judgment,up to bedside commode with assist of 2 x 1. Will continue to monitor.

## 2023-04-29 NOTE — PROGRESS NOTES
Patient remains stable on 3L nasal cannula. BS clear and diminished. Pulmicort neb given as scheduled. RT will continue to monitor.

## 2023-04-29 NOTE — PROGRESS NOTES
[facility]    Medicine Progress Note - Hospitalist Service    Date of Admission:  4/24/2023    Assessment & Plan    Dominik Rojas is an 82 year old male with medical history of COPD not on home oxygen, lung cancer, vascular dementia, hypothyroidism, hypertension, recent pneumonia requiring hospitalization admitted on 4/24/2023 with worsening shortness of breath at rest, fever, hypoxia and increased confusion requiring restraints in the ED.  ED work-up showed leukocytosis, lactic acidosis, mild hyperkalemia, hypoxemia needing high flow nasal oxygen.    Acute hypoxic respiratory failure  Acute COPD exacerbation  Secondary bacterial pneumonia  -CT chest with IV contrast: Negative for PE but showed interstitial opacities in the dependent portions of bilateral lower lobes are nonspecific for atelectasis versus mild aspiration.  -Blood and sputum culture ordered, blood cultures pending.  Unable to obtain sputum yet.  -Continue supplementary oxygen to maintain SPO2 greater than 89 to 92%  -Xopenex 4 times daily, Atrovent 4 times daily, Pulmicort 2 times daily  -Transitioned to oral prednisone 40 mg daily x 5 days on 4/29  -Continue Zosyn every 8 hours x 7 days.  MRSA negative   -Consider consulting speech for dysphagia eval when less agitated  -O2 requirements decreased significantly overnight, on 3 LPM this morning    Adenocarcinoma of the right lower lobe  Immunocompromised  -Stage T1 N0 M0  -Has completed radiation therapy on 4/21/2023, not a candidate for surgery due to age and medical comorbidities  -Follows with Dr. Bales through [facility] oncology    Mild hyperkalemia, asymptomatic-resolved  -Will continue to monitor, avoid potassium sparing agents    Metabolic encephalopathy, still ongoing  Mood disorder  -Required restraints on 4/27-28 due to agitation and interfering with care  -Continue PTA olanzapine 7.5 mg at bedtime  -Continue PTA BuSpar 5 mg twice daily  -Continue PTA Cymbalta 60 mg  "daily  -Olanzapine 5 mg as needed daily for agitation and aggression  -Will monitor with 1:1 sitter    Essential hypertension, stable  - hold losartan 25 mg daily, and furosemide 20 mg,   - continue amlodipine 2.5 mg daily    Hyperlipidemia  -Continue PTA rosuvastatin 10 mg at bedtime     Diet: Regular Diet Adult    DVT Prophylaxis: Enoxaparin (Lovenox) SQ  Cabrla Catheter: Not present  Lines: None     Cardiac Monitoring: ACTIVE order. Indication: Acute decompensated heart failure (48 hours)  Code Status: Full Code      Clinically Significant Risk Factors              # Hypoalbuminemia: Lowest albumin = 3.3 g/dL at 4/25/2023  6:57 AM, will monitor as appropriate            # Obesity: Estimated body mass index is 30.38 kg/m  as calculated from the following:    Height as of this encounter: 1.702 m (5' 7\").    Weight as of this encounter: 88 kg (194 lb).          Disposition Plan      Expected Discharge Date: 05/01/2023    Discharge Delays: Oxygen Needs - Arrange Home O2  1:1 Sitter still ordered - MD to assess  Destination: home with help/services  Discharge Comments: 1:1, restraints  HFNC  Needs PT/OT evals when able          Melody Roth MD  Hospitalist Service  [facility]  Securely message with [employer] (more info)  Text page via [employer] Paging/Directory   ______________________________________________________________________    Interval History   No acute events overnight.  This morning more cooperative and calm.  Sitting in the chair.  He ate all of his breakfast.  Confusion slowly improving.  On 3 LPM per NC overnight weaned down to 1.5 lpm this morning with sats in the low 90s.  Denies dyspnea, chest pain, fever/chills or nausea.    Physical Exam   Vital Signs: Temp: 98.2  F (36.8  C) Temp src: Oral BP: (!) 142/91 Pulse: 99   Resp: 20 SpO2: 94 % O2 Device: Nasal cannula Oxygen Delivery: 3 LPM  Weight: 194 lbs 0 oz    General Appearance: Awake, alert, in no acute distress  Respiratory: Clear " to auscultation bilateral, no wheezing  cardiovascular: Regular rate, no murmur noted  GI: soft, nontender, non distended, normal bowel sounds  Skin: no jaundice, no rash.  On four-point restraints      Medical Decision Making       MANAGEMENT DISCUSSED with the following over the past 24 hours: Prior hospitalist   NOTE(S)/MEDICAL RECORDS REVIEWED over the past 24 hours: Prior hospitalist H&P, ED physician, nursing  Respiratory, pharmacy, case management    Data     I have personally reviewed the following data over the past 24 hrs:    14.9 (H)  \   14.1   / 203     142 105 28.5 (H) /  91   4.2 28 1.20 (H) \       Imaging results reviewed over the past 24 hrs:   No results found for this or any previous visit (from the past 24 hour(s)).

## 2023-04-30 LAB
ANION GAP SERPL CALCULATED.3IONS-SCNC: 8 MMOL/L (ref 7–15)
ATRIAL RATE - MUSE: 101 BPM
BACTERIA BLD CULT: NO GROWTH
BACTERIA BLD CULT: NO GROWTH
BASOPHILS # BLD AUTO: 0 10E3/UL (ref 0–0.2)
BASOPHILS NFR BLD AUTO: 0 %
BUN SERPL-MCNC: 37.6 MG/DL (ref 8–23)
CALCIUM SERPL-MCNC: 8.9 MG/DL (ref 8.8–10.2)
CHLORIDE SERPL-SCNC: 107 MMOL/L (ref 98–107)
CREAT SERPL-MCNC: 1.14 MG/DL (ref 0.67–1.17)
DEPRECATED HCO3 PLAS-SCNC: 28 MMOL/L (ref 22–29)
DIASTOLIC BLOOD PRESSURE - MUSE: NORMAL MMHG
EOSINOPHIL # BLD AUTO: 0 10E3/UL (ref 0–0.7)
EOSINOPHIL NFR BLD AUTO: 0 %
ERYTHROCYTE [DISTWIDTH] IN BLOOD BY AUTOMATED COUNT: 14.1 % (ref 10–15)
GFR SERPL CREATININE-BSD FRML MDRD: 65 ML/MIN/1.73M2
GLUCOSE SERPL-MCNC: 111 MG/DL (ref 70–99)
HCT VFR BLD AUTO: 42.1 % (ref 40–53)
HGB BLD-MCNC: 13.8 G/DL (ref 13.3–17.7)
IMM GRANULOCYTES # BLD: 0.1 10E3/UL
IMM GRANULOCYTES NFR BLD: 1 %
INTERPRETATION ECG - MUSE: NORMAL
LYMPHOCYTES # BLD AUTO: 1.5 10E3/UL (ref 0.8–5.3)
LYMPHOCYTES NFR BLD AUTO: 10 %
MAGNESIUM SERPL-MCNC: 2.5 MG/DL (ref 1.7–2.3)
MCH RBC QN AUTO: 30.4 PG (ref 26.5–33)
MCHC RBC AUTO-ENTMCNC: 32.8 G/DL (ref 31.5–36.5)
MCV RBC AUTO: 93 FL (ref 78–100)
MONOCYTES # BLD AUTO: 1 10E3/UL (ref 0–1.3)
MONOCYTES NFR BLD AUTO: 7 %
NEUTROPHILS # BLD AUTO: 12 10E3/UL (ref 1.6–8.3)
NEUTROPHILS NFR BLD AUTO: 82 %
NRBC # BLD AUTO: 0 10E3/UL
NRBC BLD AUTO-RTO: 0 /100
P AXIS - MUSE: 39 DEGREES
PLATELET # BLD AUTO: 183 10E3/UL (ref 150–450)
POTASSIUM SERPL-SCNC: 4.2 MMOL/L (ref 3.4–5.3)
PR INTERVAL - MUSE: 132 MS
QRS DURATION - MUSE: 84 MS
QT - MUSE: 392 MS
QTC - MUSE: 508 MS
R AXIS - MUSE: 1 DEGREES
RBC # BLD AUTO: 4.54 10E6/UL (ref 4.4–5.9)
SODIUM SERPL-SCNC: 143 MMOL/L (ref 136–145)
SYSTOLIC BLOOD PRESSURE - MUSE: NORMAL MMHG
T AXIS - MUSE: 54 DEGREES
VENTRICULAR RATE- MUSE: 101 BPM
WBC # BLD AUTO: 14.5 10E3/UL (ref 4–11)

## 2023-04-30 PROCEDURE — 250N000013 HC RX MED GY IP 250 OP 250 PS 637: Performed by: INTERNAL MEDICINE

## 2023-04-30 PROCEDURE — 999N000157 HC STATISTIC RCP TIME EA 10 MIN

## 2023-04-30 PROCEDURE — 250N000011 HC RX IP 250 OP 636: Performed by: INTERNAL MEDICINE

## 2023-04-30 PROCEDURE — 80048 BASIC METABOLIC PNL TOTAL CA: CPT | Performed by: INTERNAL MEDICINE

## 2023-04-30 PROCEDURE — 120N000001 HC R&B MED SURG/OB

## 2023-04-30 PROCEDURE — 83735 ASSAY OF MAGNESIUM: CPT | Performed by: INTERNAL MEDICINE

## 2023-04-30 PROCEDURE — 250N000009 HC RX 250: Performed by: INTERNAL MEDICINE

## 2023-04-30 PROCEDURE — 250N000012 HC RX MED GY IP 250 OP 636 PS 637: Performed by: INTERNAL MEDICINE

## 2023-04-30 PROCEDURE — 36415 COLL VENOUS BLD VENIPUNCTURE: CPT | Performed by: INTERNAL MEDICINE

## 2023-04-30 PROCEDURE — 94640 AIRWAY INHALATION TREATMENT: CPT

## 2023-04-30 PROCEDURE — 85004 AUTOMATED DIFF WBC COUNT: CPT | Performed by: INTERNAL MEDICINE

## 2023-04-30 PROCEDURE — 99232 SBSQ HOSP IP/OBS MODERATE 35: CPT | Performed by: INTERNAL MEDICINE

## 2023-04-30 PROCEDURE — 94640 AIRWAY INHALATION TREATMENT: CPT | Mod: 76

## 2023-04-30 RX ADMIN — BUDESONIDE 0.5 MG: 0.5 INHALANT RESPIRATORY (INHALATION) at 08:54

## 2023-04-30 RX ADMIN — BUSPIRONE HYDROCHLORIDE 5 MG: 5 TABLET ORAL at 07:34

## 2023-04-30 RX ADMIN — OLANZAPINE 7.5 MG: 2.5 TABLET, FILM COATED ORAL at 21:25

## 2023-04-30 RX ADMIN — FAMOTIDINE 20 MG: 20 TABLET ORAL at 21:40

## 2023-04-30 RX ADMIN — AMLODIPINE BESYLATE 2.5 MG: 2.5 TABLET ORAL at 07:29

## 2023-04-30 RX ADMIN — LEVOTHYROXINE SODIUM 88 MCG: 88 TABLET ORAL at 05:11

## 2023-04-30 RX ADMIN — PIPERACILLIN AND TAZOBACTAM 3.38 G: 3; .375 INJECTION, POWDER, LYOPHILIZED, FOR SOLUTION INTRAVENOUS at 21:33

## 2023-04-30 RX ADMIN — QUETIAPINE FUMARATE 25 MG: 25 TABLET ORAL at 07:29

## 2023-04-30 RX ADMIN — DULOXETINE HYDROCHLORIDE 60 MG: 60 CAPSULE, DELAYED RELEASE PELLETS ORAL at 07:29

## 2023-04-30 RX ADMIN — ACETAMINOPHEN 975 MG: 325 TABLET ORAL at 07:28

## 2023-04-30 RX ADMIN — PREDNISONE 40 MG: 20 TABLET ORAL at 08:18

## 2023-04-30 RX ADMIN — BUSPIRONE HYDROCHLORIDE 5 MG: 5 TABLET ORAL at 21:40

## 2023-04-30 RX ADMIN — FAMOTIDINE 20 MG: 20 TABLET ORAL at 08:18

## 2023-04-30 RX ADMIN — Medication 1 MG: at 21:25

## 2023-04-30 RX ADMIN — METOPROLOL SUCCINATE 12.5 MG: 25 TABLET, EXTENDED RELEASE ORAL at 07:29

## 2023-04-30 RX ADMIN — BUDESONIDE 0.5 MG: 0.5 INHALANT RESPIRATORY (INHALATION) at 19:00

## 2023-04-30 RX ADMIN — Medication 1 TABLET: at 07:29

## 2023-04-30 RX ADMIN — PIPERACILLIN AND TAZOBACTAM 3.38 G: 3; .375 INJECTION, POWDER, LYOPHILIZED, FOR SOLUTION INTRAVENOUS at 12:41

## 2023-04-30 RX ADMIN — DOCUSATE SODIUM 100 MG: 100 CAPSULE, LIQUID FILLED ORAL at 07:29

## 2023-04-30 RX ADMIN — DOCUSATE SODIUM 100 MG: 100 CAPSULE, LIQUID FILLED ORAL at 21:25

## 2023-04-30 RX ADMIN — QUETIAPINE FUMARATE 25 MG: 25 TABLET ORAL at 03:01

## 2023-04-30 RX ADMIN — ROSUVASTATIN CALCIUM 10 MG: 10 TABLET, FILM COATED ORAL at 21:25

## 2023-04-30 RX ADMIN — ENOXAPARIN SODIUM 40 MG: 40 INJECTION SUBCUTANEOUS at 07:29

## 2023-04-30 RX ADMIN — PIPERACILLIN AND TAZOBACTAM 3.38 G: 3; .375 INJECTION, POWDER, LYOPHILIZED, FOR SOLUTION INTRAVENOUS at 05:11

## 2023-04-30 RX ADMIN — ASPIRIN 81 MG: 81 TABLET, CHEWABLE ORAL at 07:29

## 2023-04-30 ASSESSMENT — ACTIVITIES OF DAILY LIVING (ADL)
ADLS_ACUITY_SCORE: 48
ADLS_ACUITY_SCORE: 49
ADLS_ACUITY_SCORE: 48
ADLS_ACUITY_SCORE: 49
ADLS_ACUITY_SCORE: 49
ADLS_ACUITY_SCORE: 48
ADLS_ACUITY_SCORE: 49
ADLS_ACUITY_SCORE: 48
ADLS_ACUITY_SCORE: 48

## 2023-04-30 NOTE — PLAN OF CARE
Goal Outcome Evaluation:      Problem: Risk for Delirium  Goal: Improved Behavioral Control  Intervention: Minimize Safety Risk  Recent Flowsheet Documentation  Taken 4/30/2023 1545 by Raissa Harley RN  Enhanced Safety Measures:  at bedside     Problem: Pneumonia  Goal: Effective Oxygenation and Ventilation  Intervention: Promote Airway Secretion Clearance  Recent Flowsheet Documentation  Taken 4/30/2023 1545 by Raissa Harley RN  Cough And Deep Breathing: done with encouragement     Problem: Pain Chronic (Persistent) (Comorbidity Management)  Goal: Acceptable Pain Control and Functional Ability  Intervention: Manage Persistent Pain  Recent Flowsheet Documentation  Taken 4/30/2023 1545 by Raissa Harley RN  Bowel Elimination Promotion:   ambulation promoted   adequate fluid intake promoted  Medication Review/Management: medications reviewed     Patient is pleasant and cooperative.  Denies pain at this time.  Intermittent, non-productive cough.  Continues on 2 L oxygen via nasal canula.  1:1 sitter in place for safety.

## 2023-04-30 NOTE — PLAN OF CARE
Problem: Plan of Care - These are the overarching goals to be used throughout the patient stay.    Goal: Plan of Care Review  Description: The Plan of Care Review/Shift note should be completed every shift.  The Outcome Evaluation is a brief statement about your assessment that the patient is improving, declining, or no change.  This information will be displayed automatically on your shift note.  Outcome: Progressing     Problem: Pneumonia  Goal: Fluid Balance  Outcome: Progressing   Goal Outcome Evaluation:             Vs stable,denied having pain,PRN Seroquel given x 1 for restless with good effect,slept most of the night 1:1 continued for pt safety,IV ABX infusing.

## 2023-04-30 NOTE — PROGRESS NOTES
Patient remains stable on 2L nasal cannula. BS clear and diminished. Pulmicort neb given as scheduled. RT will continue to monitor.     Leonid Plasencia RT

## 2023-04-30 NOTE — PLAN OF CARE
"  Problem: Plan of Care - These are the overarching goals to be used throughout the patient stay.    Goal: Plan of Care Review  Description: The Plan of Care Review/Shift note should be completed every shift.  The Outcome Evaluation is a brief statement about your assessment that the patient is improving, declining, or no change.  This information will be displayed automatically on your shift note.  Outcome: Progressing  Goal: Patient-Specific Goal (Individualized)  Description: You can add care plan individualizations to a care plan. Examples of Individualization might be:  \"Parent requests to be called daily at 9am for status\", \"I have a hard time hearing out of my right ear\", or \"Do not touch me to wake me up as it startles me\".  Outcome: Progressing  Goal: Absence of Hospital-Acquired Illness or Injury  Outcome: Progressing  Intervention: Identify and Manage Fall Risk  Recent Flowsheet Documentation  Taken 4/29/2023 2000 by Regina Sapp RN  Safety Promotion/Fall Prevention:   activity supervised   assistive device/personal items within reach   bedside attendant   clutter free environment maintained   lighting adjusted   nonskid shoes/slippers when out of bed   room organization consistent   safety round/check completed  Intervention: Prevent Skin Injury  Recent Flowsheet Documentation  Taken 4/29/2023 2126 by Regina Sapp, RN  Body Position: legs elevated  Taken 4/29/2023 2000 by Regina Sapp RN  Body Position: position changed independently  Goal: Optimal Comfort and Wellbeing  Outcome: Progressing  Goal: Readiness for Transition of Care  Outcome: Progressing     Problem: Risk for Delirium  Goal: Optimal Coping  Outcome: Progressing  Goal: Improved Behavioral Control  Outcome: Progressing  Intervention: Minimize Safety Risk  Recent Flowsheet Documentation  Taken 4/29/2023 2000 by Regina Sapp RN  Enhanced Safety Measures:  at bedside  Goal: Improved Attention and " "Thought Clarity  Outcome: Progressing  Intervention: Maximize Cognitive Function  Recent Flowsheet Documentation  Taken 4/29/2023 2000 by Regina Sapp RN  Reorientation Measures:   clock in view   familiar social contact encouraged   reorientation provided  Goal: Improved Sleep  Outcome: Progressing     Problem: Violence Risk or Actual  Goal: Anger and Impulse Control  Outcome: Progressing  Intervention: Minimize Safety Risk  Recent Flowsheet Documentation  Taken 4/29/2023 2000 by Regina Sapp RN  Enhanced Safety Measures:  at bedside     Problem: Pneumonia  Goal: Fluid Balance  Outcome: Progressing  Goal: Resolution of Infection Signs and Symptoms  Outcome: Progressing  Goal: Effective Oxygenation and Ventilation  Outcome: Progressing  Intervention: Promote Airway Secretion Clearance  Recent Flowsheet Documentation  Taken 4/29/2023 2000 by Regina Sapp RN  Cough And Deep Breathing: done with encouragement     Problem: Gas Exchange Impaired  Goal: Optimal Gas Exchange  Outcome: Progressing     Problem: Suicide Risk  Goal: Absence of Self-Harm  Outcome: Progressing     Problem: Confusion Chronic  Goal: Optimal Cognitive Function  Outcome: Progressing  Intervention: Minimize Injury Risk and Provide Safety  Recent Flowsheet Documentation  Taken 4/29/2023 2000 by Regina Sapp RN  Enhanced Safety Measures:  at bedside  Intervention: Minimize and Manage Confusion  Recent Flowsheet Documentation  Taken 4/29/2023 2000 by Regina Sapp RN  Reorientation Measures:   clock in view   familiar social contact encouraged   reorientation provided   Goal Outcome Evaluation:  Pt alert, confused to date, time, situation. Redirectable, talking with this RN . Pt suspicious of staff with statements of \"you younger ones are up to trouble.\" Pt took pills well, ate a snack tonoc. Pt remains on O2 1.5-2L NC. Up to br to void. Ambulated in deleon with one staff assist earlier in " shift. Voided once dark urine, fluids encouraged. Has shuffling gate, up with cane.

## 2023-04-30 NOTE — PROGRESS NOTES
[facility]    Medicine Progress Note - Hospitalist Service    Date of Admission:  4/24/2023    Assessment & Plan    Dominik Rojas is an 82 year old male with medical history of COPD not on home oxygen, lung cancer, vascular dementia, hypothyroidism, hypertension, recent pneumonia requiring hospitalization admitted on 4/24/2023 with worsening shortness of breath at rest, fever, hypoxia and increased confusion requiring restraints in the ED.  ED work-up showed leukocytosis, lactic acidosis, mild hyperkalemia, hypoxemia needing high flow nasal oxygen.    Acute hypoxic respiratory failure  Acute COPD exacerbation  Secondary bacterial pneumonia  -CT chest with IV contrast: Negative for PE but showed interstitial opacities in the dependent portions of bilateral lower lobes are nonspecific for atelectasis versus mild aspiration.  -Blood and sputum culture ordered, blood cultures pending.  Unable to obtain sputum yet.  -Continue supplementary oxygen to maintain SPO2 greater than 89 to 92%  -Xopenex 4 times daily, Atrovent 4 times daily, Pulmicort 2 times daily  -Transitioned to oral prednisone 40 mg daily x 5 days on 4/29  -Continue Zosyn every 8 hours x 7 days.  MRSA negative   -Consider consulting speech for dysphagia eval when less agitated  -O2 requirements decreased significantly overnight, on 1.5-2 LPM since 4/29    Adenocarcinoma of the right lower lobe  Immunocompromised  -Stage T1 N0 M0  -Has completed radiation therapy on 4/21/2023, not a candidate for surgery due to age and medical comorbidities  -Follows with Dr. Bales through [facility] oncology    Mild hyperkalemia, asymptomatic-resolved  -Will continue to monitor, avoid potassium sparing agents    Metabolic encephalopathy, still ongoing  Mood disorder  -Required restraints on 4/27-28 due to agitation and interfering with care  -Continue PTA olanzapine 7.5 mg at bedtime  -Continue PTA BuSpar 5 mg twice daily  -Continue PTA Cymbalta 60  "mg daily  -Olanzapine 5 mg as needed daily for agitation and aggression  -Will monitor with 1:1 sitter    Essential hypertension, stable  - hold losartan 25 mg daily, and furosemide 20 mg,   - continue amlodipine 2.5 mg daily    Hyperlipidemia  -Continue PTA rosuvastatin 10 mg at bedtime     Diet: Regular Diet Adult    DVT Prophylaxis: Enoxaparin (Lovenox) SQ  Cabral Catheter: Not present  Lines: None     Cardiac Monitoring: None  Code Status: Full Code      Clinically Significant Risk Factors              # Hypoalbuminemia: Lowest albumin = 3.3 g/dL at 4/25/2023  6:57 AM, will monitor as appropriate            # Obesity: Estimated body mass index is 30.38 kg/m  as calculated from the following:    Height as of this encounter: 1.702 m (5' 7\").    Weight as of this encounter: 88 kg (194 lb).          Disposition Plan     Expected Discharge Date: 05/01/2023    Discharge Delays: Oxygen Needs - Arrange Home O2  1:1 Sitter still ordered - MD to assess  Destination: home with help/services  Discharge Comments: 1:1, restraints  HFNC  Needs PT/OT evals when able          Melody Roth MD  Hospitalist Service  [facility]  Securely message with [employer] (more info)  Text page via [employer] Paging/Directory   ______________________________________________________________________    Interval History   No acute events overnight.  This morning more cooperative and calm.  Sitting in the chair.  Confusion slowly improving.  On 2 LPM per NC this morning with sats in the low 90s.  Denies dyspnea, chest pain, fever/chills or nausea.    Physical Exam   Vital Signs: Temp: (!) 96.7  F (35.9  C) Temp src: Axillary BP: (!) 142/92 Pulse: 104   Resp: 20 SpO2: 91 % O2 Device: Nasal cannula Oxygen Delivery: 3 LPM  Weight: 194 lbs 0 oz    General Appearance: Awake, alert, in no acute distress  Respiratory: Clear to auscultation bilateral, no wheezing  cardiovascular: Regular rate, no murmur noted  GI: soft, nontender, non " distended, normal bowel sounds  Skin: no jaundice, no rash.  On four-point restraints      Medical Decision Making       MANAGEMENT DISCUSSED with the following over the past 24 hours: Prior hospitalist   NOTE(S)/MEDICAL RECORDS REVIEWED over the past 24 hours: Prior hospitalist H&P, ED physician, nursing  Respiratory, pharmacy, case management    Data     I have personally reviewed the following data over the past 24 hrs:    14.5 (H)  \   13.8   / 183     143 107 37.6 (H) /  111 (H)   4.2 28 1.14 \       Imaging results reviewed over the past 24 hrs:   No results found for this or any previous visit (from the past 24 hour(s)).

## 2023-04-30 NOTE — PLAN OF CARE
Problem: Plan of Care - These are the overarching goals to be used throughout the patient stay.    Goal: Plan of Care Review  Description: The Plan of Care Review/Shift note should be completed every shift.  The Outcome Evaluation is a brief statement about your assessment that the patient is improving, declining, or no change.  This information will be displayed automatically on your shift note.  Outcome: Progressing     Problem: Plan of Care - These are the overarching goals to be used throughout the patient stay.    Goal: Absence of Hospital-Acquired Illness or Injury  Intervention: Identify and Manage Fall Risk  Recent Flowsheet Documentation  Taken 4/30/2023 0743 by Kirby Vigil, RN  Safety Promotion/Fall Prevention: nonskid shoes/slippers when out of bed   Goal Outcome Evaluation:       Patient c/o throat pain when coughing, refused any PRN analgesics,also scheduled tylenol.  up the bathroom x 2, vanessa to verbalize needs, cooperative with cares. PRN Seroquel given which was effective. 1:1 sitter for patient safety, IV Zosyn running.

## 2023-04-30 NOTE — PROGRESS NOTES
Patient remains stable on 2L nasal cannula. BS diminished. Pulmicort neb given as scheduled. RT will continue to monitor.      Leonid Plasencia RT

## 2023-05-01 ENCOUNTER — APPOINTMENT (OUTPATIENT)
Dept: OCCUPATIONAL THERAPY | Facility: HOSPITAL | Age: 83
DRG: 871 | End: 2023-05-01
Attending: INTERNAL MEDICINE
Payer: COMMERCIAL

## 2023-05-01 LAB
ANION GAP SERPL CALCULATED.3IONS-SCNC: 10 MMOL/L (ref 7–15)
BASOPHILS # BLD AUTO: 0 10E3/UL (ref 0–0.2)
BASOPHILS NFR BLD AUTO: 0 %
BUN SERPL-MCNC: 33.2 MG/DL (ref 8–23)
CALCIUM SERPL-MCNC: 8.7 MG/DL (ref 8.8–10.2)
CHLORIDE SERPL-SCNC: 106 MMOL/L (ref 98–107)
CREAT SERPL-MCNC: 1.01 MG/DL (ref 0.67–1.17)
DEPRECATED HCO3 PLAS-SCNC: 26 MMOL/L (ref 22–29)
EOSINOPHIL # BLD AUTO: 0.1 10E3/UL (ref 0–0.7)
EOSINOPHIL NFR BLD AUTO: 1 %
ERYTHROCYTE [DISTWIDTH] IN BLOOD BY AUTOMATED COUNT: 14.3 % (ref 10–15)
GFR SERPL CREATININE-BSD FRML MDRD: 75 ML/MIN/1.73M2
GLUCOSE SERPL-MCNC: 92 MG/DL (ref 70–99)
HCT VFR BLD AUTO: 40.9 % (ref 40–53)
HGB BLD-MCNC: 13.3 G/DL (ref 13.3–17.7)
IMM GRANULOCYTES # BLD: 0.1 10E3/UL
IMM GRANULOCYTES NFR BLD: 0 %
LACTATE SERPL-SCNC: 1 MMOL/L (ref 0.7–2)
LYMPHOCYTES # BLD AUTO: 1.6 10E3/UL (ref 0.8–5.3)
LYMPHOCYTES NFR BLD AUTO: 13 %
MAGNESIUM SERPL-MCNC: 2.2 MG/DL (ref 1.7–2.3)
MCH RBC QN AUTO: 30.4 PG (ref 26.5–33)
MCHC RBC AUTO-ENTMCNC: 32.5 G/DL (ref 31.5–36.5)
MCV RBC AUTO: 94 FL (ref 78–100)
MONOCYTES # BLD AUTO: 0.7 10E3/UL (ref 0–1.3)
MONOCYTES NFR BLD AUTO: 6 %
NEUTROPHILS # BLD AUTO: 10.3 10E3/UL (ref 1.6–8.3)
NEUTROPHILS NFR BLD AUTO: 80 %
NRBC # BLD AUTO: 0 10E3/UL
NRBC BLD AUTO-RTO: 0 /100
PLATELET # BLD AUTO: 204 10E3/UL (ref 150–450)
POTASSIUM SERPL-SCNC: 4 MMOL/L (ref 3.4–5.3)
RBC # BLD AUTO: 4.37 10E6/UL (ref 4.4–5.9)
SODIUM SERPL-SCNC: 142 MMOL/L (ref 136–145)
WBC # BLD AUTO: 12.8 10E3/UL (ref 4–11)

## 2023-05-01 PROCEDURE — 80048 BASIC METABOLIC PNL TOTAL CA: CPT | Performed by: INTERNAL MEDICINE

## 2023-05-01 PROCEDURE — 97166 OT EVAL MOD COMPLEX 45 MIN: CPT | Mod: GO

## 2023-05-01 PROCEDURE — 250N000011 HC RX IP 250 OP 636: Performed by: INTERNAL MEDICINE

## 2023-05-01 PROCEDURE — 94640 AIRWAY INHALATION TREATMENT: CPT

## 2023-05-01 PROCEDURE — 120N000001 HC R&B MED SURG/OB

## 2023-05-01 PROCEDURE — 250N000013 HC RX MED GY IP 250 OP 250 PS 637: Performed by: INTERNAL MEDICINE

## 2023-05-01 PROCEDURE — 85025 COMPLETE CBC W/AUTO DIFF WBC: CPT | Performed by: INTERNAL MEDICINE

## 2023-05-01 PROCEDURE — 36415 COLL VENOUS BLD VENIPUNCTURE: CPT | Performed by: INTERNAL MEDICINE

## 2023-05-01 PROCEDURE — 83605 ASSAY OF LACTIC ACID: CPT | Performed by: INTERNAL MEDICINE

## 2023-05-01 PROCEDURE — 99232 SBSQ HOSP IP/OBS MODERATE 35: CPT | Performed by: INTERNAL MEDICINE

## 2023-05-01 PROCEDURE — 250N000012 HC RX MED GY IP 250 OP 636 PS 637: Performed by: INTERNAL MEDICINE

## 2023-05-01 PROCEDURE — 94640 AIRWAY INHALATION TREATMENT: CPT | Mod: 76

## 2023-05-01 PROCEDURE — 250N000009 HC RX 250: Performed by: INTERNAL MEDICINE

## 2023-05-01 PROCEDURE — 999N000157 HC STATISTIC RCP TIME EA 10 MIN

## 2023-05-01 PROCEDURE — 83735 ASSAY OF MAGNESIUM: CPT | Performed by: INTERNAL MEDICINE

## 2023-05-01 RX ADMIN — ACETAMINOPHEN 975 MG: 325 TABLET ORAL at 22:40

## 2023-05-01 RX ADMIN — QUETIAPINE FUMARATE 25 MG: 25 TABLET ORAL at 09:40

## 2023-05-01 RX ADMIN — AMLODIPINE BESYLATE 2.5 MG: 2.5 TABLET ORAL at 09:31

## 2023-05-01 RX ADMIN — Medication 1 TABLET: at 09:30

## 2023-05-01 RX ADMIN — BUSPIRONE HYDROCHLORIDE 5 MG: 5 TABLET ORAL at 09:35

## 2023-05-01 RX ADMIN — DOCUSATE SODIUM 100 MG: 100 CAPSULE, LIQUID FILLED ORAL at 19:47

## 2023-05-01 RX ADMIN — ACETAMINOPHEN 975 MG: 325 TABLET ORAL at 01:16

## 2023-05-01 RX ADMIN — DULOXETINE HYDROCHLORIDE 60 MG: 60 CAPSULE, DELAYED RELEASE PELLETS ORAL at 09:30

## 2023-05-01 RX ADMIN — BUDESONIDE 0.5 MG: 0.5 INHALANT RESPIRATORY (INHALATION) at 07:10

## 2023-05-01 RX ADMIN — BUDESONIDE 0.5 MG: 0.5 INHALANT RESPIRATORY (INHALATION) at 21:04

## 2023-05-01 RX ADMIN — FAMOTIDINE 20 MG: 20 TABLET ORAL at 20:29

## 2023-05-01 RX ADMIN — OLANZAPINE 5 MG: 10 INJECTION, POWDER, LYOPHILIZED, FOR SOLUTION INTRAMUSCULAR at 17:57

## 2023-05-01 RX ADMIN — OLANZAPINE 7.5 MG: 2.5 TABLET, FILM COATED ORAL at 21:28

## 2023-05-01 RX ADMIN — FAMOTIDINE 20 MG: 20 TABLET ORAL at 09:31

## 2023-05-01 RX ADMIN — PREDNISONE 40 MG: 20 TABLET ORAL at 09:30

## 2023-05-01 RX ADMIN — BUSPIRONE HYDROCHLORIDE 5 MG: 5 TABLET ORAL at 19:46

## 2023-05-01 RX ADMIN — DOCUSATE SODIUM 100 MG: 100 CAPSULE, LIQUID FILLED ORAL at 09:30

## 2023-05-01 RX ADMIN — ENOXAPARIN SODIUM 40 MG: 40 INJECTION SUBCUTANEOUS at 09:32

## 2023-05-01 RX ADMIN — METOPROLOL SUCCINATE 12.5 MG: 25 TABLET, EXTENDED RELEASE ORAL at 09:30

## 2023-05-01 RX ADMIN — LEVOTHYROXINE SODIUM 88 MCG: 88 TABLET ORAL at 05:39

## 2023-05-01 RX ADMIN — PIPERACILLIN AND TAZOBACTAM 3.38 G: 3; .375 INJECTION, POWDER, LYOPHILIZED, FOR SOLUTION INTRAVENOUS at 04:53

## 2023-05-01 RX ADMIN — LATANOPROST 1 DROP: 50 SOLUTION OPHTHALMIC at 21:27

## 2023-05-01 RX ADMIN — Medication 1 MG: at 20:28

## 2023-05-01 RX ADMIN — ACETAMINOPHEN 975 MG: 325 TABLET ORAL at 09:29

## 2023-05-01 RX ADMIN — ASPIRIN 81 MG: 81 TABLET, CHEWABLE ORAL at 09:31

## 2023-05-01 RX ADMIN — ROSUVASTATIN CALCIUM 10 MG: 10 TABLET, FILM COATED ORAL at 21:27

## 2023-05-01 ASSESSMENT — ACTIVITIES OF DAILY LIVING (ADL)
ADLS_ACUITY_SCORE: 49
ADLS_ACUITY_SCORE: 49
ADLS_ACUITY_SCORE: 48
ADLS_ACUITY_SCORE: 48
ADLS_ACUITY_SCORE: 49
ADLS_ACUITY_SCORE: 48
ADLS_ACUITY_SCORE: 48
ADLS_ACUITY_SCORE: 49
ADLS_ACUITY_SCORE: 48
ADLS_ACUITY_SCORE: 47
ADLS_ACUITY_SCORE: 48
ADLS_ACUITY_SCORE: 48

## 2023-05-01 NOTE — PROGRESS NOTES
Occupational Therapy       05/01/23 1500   Appointment Info   Signing Clinician's Name / Credentials (OT) Ene Fernández, OTR/L   Living Environment   People in Home spouse   Current Living Arrangements house   Home Accessibility stairs to enter home   Number of Stairs, Main Entrance 3   Living Environment Comments patient can live on main, fairly independent with ADLs, spouse assists at needed.   Self-Care   Equipment Currently Used at Home cane, straight   General Information   Onset of Illness/Injury or Date of Surgery 04/24/23   Referring Physician Melody Roth MD   Patient/Family Therapy Goal Statement (OT) patient unable to state   Additional Occupational Profile Info/Pertinent History of Current Problem Dominik Rojas is an 82 year old male with medical history of COPD not on home oxygen, lung cancer, vascular dementia, hypothyroidism, hypertension, recent pneumonia requiring hospitalization admitted on 4/24/2023 with worsening shortness of breath at rest, fever, hypoxia and increased confusion requiring restraints in the ED.  ED work-up showed leukocytosis, lactic acidosis, mild hyperkalemia, hypoxemia needing high flow nasal oxygen.   Existing Precautions/Restrictions fall  (1:1, agitation)   Cognitive Status Examination   Orientation Status not oriented to person, place or time   Affect/Mental Status (Cognitive) agitated   Range of Motion Comprehensive   General Range of Motion no range of motion deficits identified   Strength Comprehensive (MMT)   General Manual Muscle Testing (MMT) Assessment no strength deficits identified   Transfers   Transfers sit-stand transfer   Sit-Stand Transfer   Sit-Stand Barker (Transfers) contact guard   Sit/Stand Transfer Comments Patient ambulated in room with CGA and cane, Max A for LE dressing, CGA for toileting task. Patient is slightly unsteady.   Clinical Impression   Criteria for Skilled Therapeutic Interventions Met (OT) Evaluation only   OT Total Evaluation  Time   OT Eval, Moderate Complexity Minutes (80597) 20   OT Goals   Therapy Frequency (OT)   (Eval only)   OT Discharge Planning   OT Plan DC OT   OT Discharge Recommendation (DC Rec) home with assist   OT Rationale for DC Rec Patient appears close to baseline for ADL function. Patient does demonstrate significatn cognitive deficits, if spouse is unable to care for patient due to cognitive status, recommend more supportive living environment such as memory care or LTC.   OT Brief overview of current status Patient CGA for ADLs, patient appears at basline. No skilled OT needs due to baseline cognitive status   Total Session Time   Total Session Time (sum of timed and untimed services) 20

## 2023-05-01 NOTE — PLAN OF CARE
Occupational Therapy Discharge Summary    Reason for therapy discharge:    Discharged to remains in hospital    Progress towards therapy goal(s). See goals on Care Plan in [facility] electronic health record for goal details.  Goals met    Therapy recommendation(s):    No further therapy is recommended. Patient appears at baseline ADL level, patient presents with confusion, which is likely baseline due to dementia. If spouse unable to care for patient due to cognitive deficits, patient will need more supportive living environment. No skilled OT needs as patient has limited ability to participate in skilled session due to cognitive deficits.          Patients daughter calling and would like recommendation for Mental health Please call patient please call with a referral

## 2023-05-01 NOTE — PROGRESS NOTES
RESPIRATORY CARE NOTE     Patient was on 2 lpm and had an Sp02 of 92%. They received Budesonide BID   Breath sounds prior to treatment where diminished bilaterally and post treatment increased aeration and fine crackles at the bases.      RT will continue to assess and monitor cardiopulmonary status.     Winter Mejia, RT

## 2023-05-01 NOTE — PLAN OF CARE
"  Problem: Plan of Care - These are the overarching goals to be used throughout the patient stay.    Goal: Plan of Care Review  Description: The Plan of Care Review/Shift note should be completed every shift.  The Outcome Evaluation is a brief statement about your assessment that the patient is improving, declining, or no change.  This information will be displayed automatically on your shift note.  Outcome: Progressing  Goal: Patient-Specific Goal (Individualized)  Description: You can add care plan individualizations to a care plan. Examples of Individualization might be:  \"Parent requests to be called daily at 9am for status\", \"I have a hard time hearing out of my right ear\", or \"Do not touch me to wake me up as it startles me\".  Outcome: Progressing  Goal: Absence of Hospital-Acquired Illness or Injury  Outcome: Progressing  Intervention: Identify and Manage Fall Risk  Recent Flowsheet Documentation  Taken 5/1/2023 0116 by Lima Levy RN  Safety Promotion/Fall Prevention:   activity supervised   assistive device/personal items within reach   mobility aid in reach   nonskid shoes/slippers when out of bed   patient and family education   safety round/check completed   supervised activity  Intervention: Prevent Skin Injury  Recent Flowsheet Documentation  Taken 5/1/2023 0050 by Lima Levy, RN  Body Position:   position changed independently   supine  Intervention: Prevent and Manage VTE (Venous Thromboembolism) Risk  Recent Flowsheet Documentation  Taken 5/1/2023 0116 by Lima Levy, RN  VTE Prevention/Management: SCDs (sequential compression devices) off  Goal: Optimal Comfort and Wellbeing  Outcome: Progressing  Intervention: Monitor Pain and Promote Comfort  Recent Flowsheet Documentation  Taken 5/1/2023 0116 by Lima Levy RN  Pain Management Interventions: medication (see MAR)  Goal: Readiness for Transition of Care  Outcome: Progressing     Problem: Restraint, Nonviolent  Goal: " Absence of Harm or Injury  Outcome: Progressing  Intervention: Protect Skin and Joint Integrity  Recent Flowsheet Documentation  Taken 5/1/2023 0050 by Lima Levy, RN  Body Position:   position changed independently   supine     Problem: Risk for Delirium  Goal: Optimal Coping  Outcome: Progressing  Goal: Improved Behavioral Control  Outcome: Progressing  Goal: Improved Attention and Thought Clarity  Outcome: Progressing  Goal: Improved Sleep  Outcome: Progressing     Problem: Violence Risk or Actual  Goal: Anger and Impulse Control  Outcome: Progressing     Problem: Pneumonia  Goal: Fluid Balance  Outcome: Progressing  Goal: Resolution of Infection Signs and Symptoms  Outcome: Progressing  Goal: Effective Oxygenation and Ventilation  Outcome: Progressing  Intervention: Promote Airway Secretion Clearance  Recent Flowsheet Documentation  Taken 5/1/2023 0116 by Lima Levy, RN  Cough And Deep Breathing: done with encouragement     Problem: Sepsis/Septic Shock  Goal: Optimal Coping  Outcome: Progressing  Goal: Absence of Bleeding  Outcome: Progressing  Goal: Blood Glucose Level Within Targeted Range  Outcome: Progressing  Goal: Absence of Infection Signs and Symptoms  Outcome: Progressing  Intervention: Promote Recovery  Recent Flowsheet Documentation  Taken 5/1/2023 0116 by Lima Levy, RN  Activity Management:   activity adjusted per tolerance   up in chair  Goal: Optimal Nutrition Intake  Outcome: Progressing     Problem: COPD (Chronic Obstructive Pulmonary Disease) Comorbidity  Goal: Maintenance of COPD Symptom Control  Outcome: Progressing  Intervention: Maintain COPD-Symptom Control  Recent Flowsheet Documentation  Taken 5/1/2023 0116 by Lima Levy, RN  Medication Review/Management: medications reviewed     Problem: Hypertension Comorbidity  Goal: Blood Pressure in Desired Range  Outcome: Progressing  Intervention: Maintain Blood Pressure Management  Recent Flowsheet Documentation  Taken  5/1/2023 0116 by Lima Levy RN  Medication Review/Management: medications reviewed     Problem: Pain Chronic (Persistent) (Comorbidity Management)  Goal: Acceptable Pain Control and Functional Ability  Outcome: Progressing  Intervention: Develop Pain Management Plan  Recent Flowsheet Documentation  Taken 5/1/2023 0116 by Lima Levy RN  Pain Management Interventions: medication (see MAR)  Intervention: Manage Persistent Pain  Recent Flowsheet Documentation  Taken 5/1/2023 0116 by Lima Levy RN  Medication Review/Management: medications reviewed     Problem: Suicide Risk  Goal: Absence of Self-Harm  Outcome: Progressing     Problem: Gas Exchange Impaired  Goal: Optimal Gas Exchange  Outcome: Progressing     Problem: Fall Injury Risk  Goal: Absence of Fall and Fall-Related Injury  Outcome: Progressing  Intervention: Identify and Manage Contributors  Recent Flowsheet Documentation  Taken 5/1/2023 0116 by Lima Levy RN  Medication Review/Management: medications reviewed  Intervention: Promote Injury-Free Environment  Recent Flowsheet Documentation  Taken 5/1/2023 0116 by Lima Levy RN  Safety Promotion/Fall Prevention:   activity supervised   assistive device/personal items within reach   mobility aid in reach   nonskid shoes/slippers when out of bed   patient and family education   safety round/check completed   supervised activity     Problem: Confusion Chronic  Goal: Optimal Cognitive Function  Outcome: Progressing   Goal Outcome Evaluation:         Pt has been a/o , pleasant, and cooperative. Pt using call light appropriately. Pt getting up to the BSC with assist of 1. O2 sats maintaining on 2L nc. Will monitor.

## 2023-05-01 NOTE — PLAN OF CARE
Problem: Risk for Delirium  Goal: Improved Behavioral Control  Outcome: Not Progressing  Intervention: Prevent and Manage Agitation  Recent Flowsheet Documentation  Taken 5/1/2023 0929 by Germán Porter RN  Complementary Therapy: other (see comments)  Environment Familiarity/Consistency:   daily routine followed   familiar objects from home provided  Intervention: Minimize Safety Risk  Recent Flowsheet Documentation  Taken 5/1/2023 0929 by Germán Porter RN  Enhanced Safety Measures:  at bedside     Problem: Risk for Delirium  Goal: Improved Attention and Thought Clarity  Outcome: Not Progressing  Intervention: Maximize Cognitive Function  Recent Flowsheet Documentation  Taken 5/1/2023 0929 by Germán Porter RN  Sensory Stimulation Regulation:   care clustered   quiet environment promoted   lighting decreased  Reorientation Measures:   calendar in view   clock in view   reorientation provided     Patient is alert, but disorientated to time, place and situation. Very restless at the beginning of shift. Administered a PRN dose of seroquel, which was minimally affective. Denies pain during this shift. Remains on a 1:1 for safety. Gait is unsteady. Easily redirectable. Zosyn discontinued. Remains on 2 L of supplemental oxygen via nasal canula.     Germán Porter RN

## 2023-05-01 NOTE — PLAN OF CARE
"Goal Outcome Evaluation:    Problem: Pneumonia  Goal: Fluid Balance  Outcome: Progressing  Goal: Effective Oxygenation and Ventilation  Outcome: Progressing    Patient A&Ox4, vss on 2L oxygen via NC. Scheduled IV zosyn is infusing. Patient denied pain. 1:1 in room. Patient intends to sleep in his recliner tonight.     /85 (BP Location: Right arm)   Pulse 99   Temp 98.3  F (36.8  C) (Oral)   Resp 20   Ht 1.702 m (5' 7\")   Wt 88 kg (194 lb)   SpO2 91%   BMI 30.38 kg/m                           "

## 2023-05-01 NOTE — PROGRESS NOTES
[facility]    Medicine Progress Note - Hospitalist Service    Date of Admission:  4/24/2023    Assessment & Plan    Dominik Rojas is an 82 year old male with medical history of COPD not on home oxygen, lung cancer, vascular dementia, hypothyroidism, hypertension, recent pneumonia requiring hospitalization admitted on 4/24/2023 with worsening shortness of breath at rest, fever, hypoxia and increased confusion requiring restraints in the ED.  ED work-up showed leukocytosis, lactic acidosis, mild hyperkalemia, hypoxemia needing high flow nasal oxygen.      As of this morning, patient was up supplemental O2 completely.  He is on room air at rest.  Still confused and restless at times a sitter 1:1 at bedside.  Plan is to have him work with physical therapy today.  No concerns for swallowing, eating and tolerating p.o. diet well.    Acute hypoxic respiratory failure  Acute COPD exacerbation  Secondary bacterial pneumonia  -CT chest with IV contrast: Negative for PE but showed interstitial opacities in the dependent portions of bilateral lower lobes are nonspecific for atelectasis versus mild aspiration.  -Blood and sputum culture ordered, blood cultures pending.  Unable to obtain sputum yet.  -Continue supplementary oxygen to maintain SPO2 greater than 89 to 92%  -Xopenex 4 times daily, Atrovent 4 times daily, Pulmicort 2 times daily  -Transitioned to oral prednisone 40 mg daily x 5 days on 4/29  -Continue Zosyn every 8 hours x 7 days.  MRSA negative   -O2 requirements decreased significantly from 45 lpm on arrival to 1.5-2 LPM since 4/29  -Patient is on room air at rest as of 5/1/23.    Adenocarcinoma of the right lower lobe  Immunocompromised  -Stage T1 N0 M0  -Has completed radiation therapy on 4/21/2023, not a candidate for surgery due to age and medical comorbidities  -Follows with Dr. Bales through [facility] oncology    Mild hyperkalemia, asymptomatic-resolved  -Will continue to monitor,  "avoid potassium sparing agents    Metabolic encephalopathy, still ongoing  Mood disorder  -Required restraints on 4/27-28 due to agitation and interfering with care  -Continue PTA olanzapine 7.5 mg at bedtime  -Continue PTA BuSpar 5 mg twice daily  -Continue PTA Cymbalta 60 mg daily  -Olanzapine 5 mg as needed daily for agitation and aggression  -Will monitor with 1:1 sitter    Essential hypertension, stable  - hold losartan 25 mg daily, and furosemide 20 mg,   - continue amlodipine 2.5 mg daily    Hyperlipidemia  -Continue PTA rosuvastatin 10 mg at bedtime     Diet: Regular Diet Adult    DVT Prophylaxis: Enoxaparin (Lovenox) SQ  Cabral Catheter: Not present  Lines: None     Cardiac Monitoring: None  Code Status: Full Code      Clinically Significant Risk Factors              # Hypoalbuminemia: Lowest albumin = 3.3 g/dL at 4/25/2023  6:57 AM, will monitor as appropriate            # Obesity: Estimated body mass index is 30.38 kg/m  as calculated from the following:    Height as of this encounter: 1.702 m (5' 7\").    Weight as of this encounter: 88 kg (194 lb).          Disposition Plan Will do home O2 assessment prior to discharge.  Currently on room air at rest.  Will need to work with PT today to determine disposition.  Still needs a sitter one-to-one due to agitation/restlessness and fall risk complicating placement.             Melody Roth MD  Hospitalist Service  [facility]  Securely message with [employer] (more info)  Text page via [facility] Paging/Directory   ______________________________________________________________________    Interval History   No acute events overnight.  This morning more cooperative and calm.   On room air this morning.  Requesting to be sent home. Denies dyspnea, chest pain, fever/chills or nausea.    Physical Exam   Vital Signs: Temp: 97.5  F (36.4  C) Temp src: Oral BP: 122/71 Pulse: 102   Resp: 18 SpO2: 90 % O2 Device: Nasal cannula Oxygen Delivery: 2 LPM  Weight: " 194 lbs 0 oz    General Appearance: Awake, alert, in no acute distress  Respiratory: Clear to auscultation bilateral, no wheezing  cardiovascular: Regular rate, no murmur noted  GI: soft, nontender, non distended, normal bowel sounds  Skin: no jaundice, no rash.  On four-point restraints      Medical Decision Making       MANAGEMENT DISCUSSED with the following over the past 24 hours: Prior hospitalist   NOTE(S)/MEDICAL RECORDS REVIEWED over the past 24 hours: Prior hospitalist H&P, ED physician, nursing  Respiratory, pharmacy, case management    Data     I have personally reviewed the following data over the past 24 hrs:    12.8 (H)  \   13.3   / 204     142 106 33.2 (H) /  92   4.0 26 1.01 \       Procal: N/A CRP: N/A Lactic Acid: 1.0         Imaging results reviewed over the past 24 hrs:   No results found for this or any previous visit (from the past 24 hour(s)).

## 2023-05-02 LAB
ANION GAP SERPL CALCULATED.3IONS-SCNC: 9 MMOL/L (ref 7–15)
BASOPHILS # BLD AUTO: 0 10E3/UL (ref 0–0.2)
BASOPHILS NFR BLD AUTO: 0 %
BUN SERPL-MCNC: 34 MG/DL (ref 8–23)
CALCIUM SERPL-MCNC: 8.7 MG/DL (ref 8.8–10.2)
CHLORIDE SERPL-SCNC: 105 MMOL/L (ref 98–107)
CREAT SERPL-MCNC: 1.05 MG/DL (ref 0.67–1.17)
DEPRECATED HCO3 PLAS-SCNC: 27 MMOL/L (ref 22–29)
EOSINOPHIL # BLD AUTO: 0.1 10E3/UL (ref 0–0.7)
EOSINOPHIL NFR BLD AUTO: 1 %
ERYTHROCYTE [DISTWIDTH] IN BLOOD BY AUTOMATED COUNT: 14.6 % (ref 10–15)
GFR SERPL CREATININE-BSD FRML MDRD: 71 ML/MIN/1.73M2
GLUCOSE SERPL-MCNC: 80 MG/DL (ref 70–99)
HCT VFR BLD AUTO: 40 % (ref 40–53)
HGB BLD-MCNC: 13 G/DL (ref 13.3–17.7)
IMM GRANULOCYTES # BLD: 0.1 10E3/UL
IMM GRANULOCYTES NFR BLD: 1 %
LYMPHOCYTES # BLD AUTO: 2 10E3/UL (ref 0.8–5.3)
LYMPHOCYTES NFR BLD AUTO: 15 %
MAGNESIUM SERPL-MCNC: 2.3 MG/DL (ref 1.7–2.3)
MCH RBC QN AUTO: 30.9 PG (ref 26.5–33)
MCHC RBC AUTO-ENTMCNC: 32.5 G/DL (ref 31.5–36.5)
MCV RBC AUTO: 95 FL (ref 78–100)
MONOCYTES # BLD AUTO: 0.9 10E3/UL (ref 0–1.3)
MONOCYTES NFR BLD AUTO: 6 %
NEUTROPHILS # BLD AUTO: 10.4 10E3/UL (ref 1.6–8.3)
NEUTROPHILS NFR BLD AUTO: 77 %
NRBC # BLD AUTO: 0 10E3/UL
NRBC BLD AUTO-RTO: 0 /100
PLATELET # BLD AUTO: 195 10E3/UL (ref 150–450)
POTASSIUM SERPL-SCNC: 4.4 MMOL/L (ref 3.4–5.3)
RBC # BLD AUTO: 4.21 10E6/UL (ref 4.4–5.9)
SODIUM SERPL-SCNC: 141 MMOL/L (ref 136–145)
WBC # BLD AUTO: 13.4 10E3/UL (ref 4–11)

## 2023-05-02 PROCEDURE — 250N000011 HC RX IP 250 OP 636: Performed by: INTERNAL MEDICINE

## 2023-05-02 PROCEDURE — 83735 ASSAY OF MAGNESIUM: CPT | Performed by: INTERNAL MEDICINE

## 2023-05-02 PROCEDURE — 999N000157 HC STATISTIC RCP TIME EA 10 MIN

## 2023-05-02 PROCEDURE — 120N000001 HC R&B MED SURG/OB

## 2023-05-02 PROCEDURE — 36415 COLL VENOUS BLD VENIPUNCTURE: CPT | Performed by: INTERNAL MEDICINE

## 2023-05-02 PROCEDURE — 250N000009 HC RX 250: Performed by: INTERNAL MEDICINE

## 2023-05-02 PROCEDURE — 80048 BASIC METABOLIC PNL TOTAL CA: CPT | Performed by: INTERNAL MEDICINE

## 2023-05-02 PROCEDURE — 250N000012 HC RX MED GY IP 250 OP 636 PS 637: Performed by: INTERNAL MEDICINE

## 2023-05-02 PROCEDURE — 99232 SBSQ HOSP IP/OBS MODERATE 35: CPT | Performed by: INTERNAL MEDICINE

## 2023-05-02 PROCEDURE — 250N000013 HC RX MED GY IP 250 OP 250 PS 637: Performed by: INTERNAL MEDICINE

## 2023-05-02 PROCEDURE — 85025 COMPLETE CBC W/AUTO DIFF WBC: CPT | Performed by: INTERNAL MEDICINE

## 2023-05-02 PROCEDURE — 94640 AIRWAY INHALATION TREATMENT: CPT | Mod: 76

## 2023-05-02 PROCEDURE — 94640 AIRWAY INHALATION TREATMENT: CPT

## 2023-05-02 RX ADMIN — LATANOPROST 1 DROP: 50 SOLUTION OPHTHALMIC at 21:50

## 2023-05-02 RX ADMIN — Medication 1 TABLET: at 09:33

## 2023-05-02 RX ADMIN — LEVOTHYROXINE SODIUM 88 MCG: 88 TABLET ORAL at 06:43

## 2023-05-02 RX ADMIN — ACETAMINOPHEN 975 MG: 325 TABLET ORAL at 06:43

## 2023-05-02 RX ADMIN — DOCUSATE SODIUM 100 MG: 100 CAPSULE, LIQUID FILLED ORAL at 09:34

## 2023-05-02 RX ADMIN — FAMOTIDINE 20 MG: 20 TABLET ORAL at 09:34

## 2023-05-02 RX ADMIN — BUDESONIDE 0.5 MG: 0.5 INHALANT RESPIRATORY (INHALATION) at 07:04

## 2023-05-02 RX ADMIN — AMLODIPINE BESYLATE 2.5 MG: 2.5 TABLET ORAL at 09:34

## 2023-05-02 RX ADMIN — METOPROLOL SUCCINATE 12.5 MG: 25 TABLET, EXTENDED RELEASE ORAL at 09:33

## 2023-05-02 RX ADMIN — BUDESONIDE 0.5 MG: 0.5 INHALANT RESPIRATORY (INHALATION) at 19:31

## 2023-05-02 RX ADMIN — ENOXAPARIN SODIUM 40 MG: 40 INJECTION SUBCUTANEOUS at 09:34

## 2023-05-02 RX ADMIN — PREDNISONE 40 MG: 20 TABLET ORAL at 09:33

## 2023-05-02 RX ADMIN — Medication 1 MG: at 20:15

## 2023-05-02 RX ADMIN — ACETAMINOPHEN 975 MG: 325 TABLET ORAL at 23:02

## 2023-05-02 RX ADMIN — ROSUVASTATIN CALCIUM 10 MG: 10 TABLET, FILM COATED ORAL at 21:49

## 2023-05-02 RX ADMIN — BUSPIRONE HYDROCHLORIDE 5 MG: 5 TABLET ORAL at 20:15

## 2023-05-02 RX ADMIN — DOCUSATE SODIUM 100 MG: 100 CAPSULE, LIQUID FILLED ORAL at 20:15

## 2023-05-02 RX ADMIN — OLANZAPINE 7.5 MG: 2.5 TABLET, FILM COATED ORAL at 20:15

## 2023-05-02 RX ADMIN — ASPIRIN 81 MG: 81 TABLET, CHEWABLE ORAL at 09:34

## 2023-05-02 RX ADMIN — BUSPIRONE HYDROCHLORIDE 5 MG: 5 TABLET ORAL at 09:33

## 2023-05-02 RX ADMIN — DULOXETINE HYDROCHLORIDE 60 MG: 60 CAPSULE, DELAYED RELEASE PELLETS ORAL at 09:34

## 2023-05-02 RX ADMIN — FAMOTIDINE 20 MG: 20 TABLET ORAL at 20:15

## 2023-05-02 ASSESSMENT — ACTIVITIES OF DAILY LIVING (ADL)
ADLS_ACUITY_SCORE: 52
ADLS_ACUITY_SCORE: 50
ADLS_ACUITY_SCORE: 50
ADLS_ACUITY_SCORE: 52
ADLS_ACUITY_SCORE: 52
ADLS_ACUITY_SCORE: 50
ADLS_ACUITY_SCORE: 52
ADLS_ACUITY_SCORE: 50
ADLS_ACUITY_SCORE: 50
ADLS_ACUITY_SCORE: 52
ADLS_ACUITY_SCORE: 46
ADLS_ACUITY_SCORE: 50

## 2023-05-02 NOTE — PROGRESS NOTES
[facility]    Medicine Progress Note - Hospitalist Service    Date of Admission:  4/24/2023    Assessment & Plan    Dominik Rojas is an 82 year old male with medical history of COPD not on home oxygen, lung cancer, vascular dementia, hypothyroidism, hypertension, recent pneumonia requiring hospitalization admitted on 4/24/2023 with worsening shortness of breath at rest, fever, hypoxia and increased confusion requiring restraints in the ED.  ED work-up showed leukocytosis, lactic acidosis, mild hyperkalemia, hypoxemia needing high flow nasal oxygen.      As of this morning, patient was up supplemental O2 completely.  He is on room air at rest.  Still confused and restless at times a sitter 1:1 at bedside.  Plan is to have him work with physical therapy today.  No concerns for swallowing, eating and tolerating p.o. diet well.    Acute hypoxic respiratory failure  Acute COPD exacerbation  Secondary bacterial pneumonia  -CT chest with IV contrast: Negative for PE but showed interstitial opacities in the dependent portions of bilateral lower lobes are nonspecific for atelectasis versus mild aspiration.  -Blood and sputum culture ordered, blood cultures pending.  Unable to obtain sputum yet.  -Continue supplementary oxygen to maintain SPO2 greater than 89 to 92%  -Xopenex 4 times daily, Atrovent 4 times daily, Pulmicort 2 times daily  -Transitioned to oral prednisone 40 mg daily x 5 days on 4/29  -Continue Zosyn every 8 hours x 7 days.  MRSA negative   -O2 requirements decreased significantly from 45 lpm on arrival to 1.5-2 LPM since 4/29  -Patient is on room air at rest as of 5/1/23.    Adenocarcinoma of the right lower lobe  Immunocompromised  -Stage T1 N0 M0  -Has completed radiation therapy on 4/21/2023, not a candidate for surgery due to age and medical comorbidities  -Follows with Dr. Bales through [facility] oncology    Mild hyperkalemia, asymptomatic-resolved  -Will continue to monitor,  "avoid potassium sparing agents    Metabolic encephalopathy, still ongoing  Mood disorder  -Required restraints on 4/27-28 due to agitation and interfering with care  -Continue PTA olanzapine 7.5 mg at bedtime  -Continue PTA BuSpar 5 mg twice daily  -Continue PTA Cymbalta 60 mg daily  -Olanzapine 5 mg as needed daily for agitation and aggression  -Will monitor with 1:1 sitter    Essential hypertension, stable  - hold losartan 25 mg daily, and furosemide 20 mg,   - continue amlodipine 2.5 mg daily    Hyperlipidemia  -Continue PTA rosuvastatin 10 mg at bedtime     Diet: Regular Diet Adult    DVT Prophylaxis: Enoxaparin (Lovenox) SQ  Cabral Catheter: Not present  Lines: None     Cardiac Monitoring: None  Code Status: Full Code      Clinically Significant Risk Factors              # Hypoalbuminemia: Lowest albumin = 3.3 g/dL at 4/25/2023  6:57 AM, will monitor as appropriate            # Obesity: Estimated body mass index is 30.35 kg/m  as calculated from the following:    Height as of this encounter: 1.702 m (5' 7\").    Weight as of this encounter: 87.9 kg (193 lb 12.6 oz).          Disposition Plan Will do home O2 assessment prior to discharge.  Currently on room air at rest.  PT recommending home with assist.  However wife unable to care for patient due to his advanced dementia.  Still needs a sitter one-to-one due to agitation/restlessness and fall risk complicating placement.  CM assisting with LTC placement.            Melody Roth MD  Hospitalist Service  [facility]  Securely message with [employer] (more info)  Text page via [employer]) Paging/Directory   ______________________________________________________________________    Interval History   No acute events overnight.  This morning more cooperative and calm.   On 2 lpm this morning.  Requesting to be discharged home. Denies dyspnea, chest pain, fever/chills or nausea.    Physical Exam   Vital Signs: Temp: 98.4  F (36.9  C) Temp src: Oral BP: " 119/80 Pulse: 92   Resp: 18 SpO2: 92 % O2 Device: Nasal cannula Oxygen Delivery: 2 LPM  Weight: 193 lbs 12.55 oz    General Appearance: Awake, alert, in no acute distress  Respiratory: Clear to auscultation bilateral, no wheezing  cardiovascular: Regular rate, no murmur noted  GI: soft, nontender, non distended, normal bowel sounds  Skin: no jaundice, no rash.  On four-point restraints      Medical Decision Making       MANAGEMENT DISCUSSED with the following over the past 24 hours: Prior hospitalist   NOTE(S)/MEDICAL RECORDS REVIEWED over the past 24 hours: Prior hospitalist H&P, ED physician, nursing  Respiratory, pharmacy, case management    Data     I have personally reviewed the following data over the past 24 hrs:    13.4 (H)  \   13.0 (L)   / 195     141 105 34.0 (H) /  80   4.4 27 1.05 \       Imaging results reviewed over the past 24 hrs:   No results found for this or any previous visit (from the past 24 hour(s)).

## 2023-05-02 NOTE — PROGRESS NOTES
CLINICAL NUTRITION SERVICES     Reviewed nutrition risk factors due to LOS. Pt is eating 100% of 3 nutritionally adequate meals per day per chart and [employer]. No nutrition issues identified at this time. RD will follow peripherally at this time, unless consulted.    Denise Ulrich RDN, LD

## 2023-05-02 NOTE — PLAN OF CARE
Problem: Plan of Care - These are the overarching goals to be used throughout the patient stay.    Goal: Optimal Comfort and Wellbeing  Outcome: Progressing     Problem: Plan of Care - These are the overarching goals to be used throughout the patient stay.    Goal: Absence of Hospital-Acquired Illness or Injury  Intervention: Identify and Manage Fall Risk  Recent Flowsheet Documentation  Taken 5/2/2023 0930 by Germán Porter RN  Safety Promotion/Fall Prevention:   activity supervised   supervised activity   mobility aid in reach   increased rounding and observation   increase visualization of patient     Problem: Risk for Delirium  Goal: Improved Behavioral Control  Outcome: Progressing  Intervention: Minimize Safety Risk  Recent Flowsheet Documentation  Taken 5/2/2023 0930 by Germán Porter RN  Enhanced Safety Measures:  at bedside     Problem: Confusion Chronic  Goal: Optimal Cognitive Function  Outcome: Progressing  Intervention: Minimize Injury Risk and Provide Safety  Recent Flowsheet Documentation  Taken 5/2/2023 0930 by Germán Porter RN  Enhanced Safety Measures:  at bedside  Intervention: Minimize and Manage Confusion  Recent Flowsheet Documentation  Taken 5/2/2023 0930 by Germán oPrter RN  Sensory Stimulation Regulation:   lighting decreased   care clustered   quiet environment promoted  Reorientation Measures:   calendar in view   clock in view   reorientation provided    Patient is lethargic throughout shift today, spends majority of shift in bed sleeping. Able to wake patient up. Orientated to place, person and day. Denies pain throughout day. Bed side sitter remain at bedside for safety. Remains on supplemental oxygen at 2 L per minute.     Germán Porter RN

## 2023-05-02 NOTE — PLAN OF CARE
"  Problem: Plan of Care - These are the overarching goals to be used throughout the patient stay.    Goal: Plan of Care Review  Description: The Plan of Care Review/Shift note should be completed every shift.  The Outcome Evaluation is a brief statement about your assessment that the patient is improving, declining, or no change.  This information will be displayed automatically on your shift note.  Outcome: Progressing  Goal: Patient-Specific Goal (Individualized)  Description: You can add care plan individualizations to a care plan. Examples of Individualization might be:  \"Parent requests to be called daily at 9am for status\", \"I have a hard time hearing out of my right ear\", or \"Do not touch me to wake me up as it startles me\".  Outcome: Progressing  Goal: Absence of Hospital-Acquired Illness or Injury  Outcome: Progressing  Intervention: Identify and Manage Fall Risk  Recent Flowsheet Documentation  Taken 5/2/2023 0010 by Lima Levy RN  Safety Promotion/Fall Prevention:   activity supervised   bedside attendant   lighting adjusted   safety round/check completed   supervised activity   toileting scheduled  Intervention: Prevent Skin Injury  Recent Flowsheet Documentation  Taken 5/2/2023 0010 by Lima Levy RN  Body Position: position changed independently  Goal: Optimal Comfort and Wellbeing  Outcome: Progressing  Goal: Readiness for Transition of Care  Outcome: Progressing     Problem: Restraint, Nonviolent  Goal: Absence of Harm or Injury  Outcome: Progressing  Intervention: Protect Skin and Joint Integrity  Recent Flowsheet Documentation  Taken 5/2/2023 0010 by Lima Levy RN  Body Position: position changed independently     Problem: Risk for Delirium  Goal: Optimal Coping  Outcome: Progressing  Goal: Improved Behavioral Control  Outcome: Progressing  Intervention: Minimize Safety Risk  Recent Flowsheet Documentation  Taken 5/2/2023 0010 by Lima Levy RN  Enhanced Safety " Measures:  at bedside  Goal: Improved Attention and Thought Clarity  Outcome: Progressing  Intervention: Maximize Cognitive Function  Recent Flowsheet Documentation  Taken 5/2/2023 0010 by Lima Levy RN  Sensory Stimulation Regulation:   care clustered   lighting decreased   quiet environment promoted  Reorientation Measures:   calendar in view   clock in view   reorientation provided  Goal: Improved Sleep  Outcome: Progressing     Problem: Violence Risk or Actual  Goal: Anger and Impulse Control  Outcome: Progressing  Intervention: Minimize Safety Risk  Recent Flowsheet Documentation  Taken 5/2/2023 0010 by Lima Levy RN  Sensory Stimulation Regulation:   care clustered   lighting decreased   quiet environment promoted  Enhanced Safety Measures:  at bedside     Problem: Pneumonia  Goal: Fluid Balance  Outcome: Progressing  Goal: Resolution of Infection Signs and Symptoms  Outcome: Progressing  Goal: Effective Oxygenation and Ventilation  Outcome: Progressing  Intervention: Optimize Oxygenation and Ventilation  Recent Flowsheet Documentation  Taken 5/2/2023 0010 by Lima Levy RN  Head of Bed (HOB) Positioning: HOB flat     Problem: Sepsis/Septic Shock  Goal: Optimal Coping  Outcome: Progressing  Goal: Absence of Bleeding  Outcome: Progressing  Goal: Blood Glucose Level Within Targeted Range  Outcome: Progressing  Goal: Absence of Infection Signs and Symptoms  Outcome: Progressing  Intervention: Promote Recovery  Recent Flowsheet Documentation  Taken 5/2/2023 0010 by Lima Levy, RN  Activity Management: up ad regina  Goal: Optimal Nutrition Intake  Outcome: Progressing     Problem: COPD (Chronic Obstructive Pulmonary Disease) Comorbidity  Goal: Maintenance of COPD Symptom Control  Outcome: Progressing     Problem: Hypertension Comorbidity  Goal: Blood Pressure in Desired Range  Outcome: Progressing     Problem: Pain Chronic (Persistent) (Comorbidity  Management)  Goal: Acceptable Pain Control and Functional Ability  Outcome: Progressing     Problem: Suicide Risk  Goal: Absence of Self-Harm  Outcome: Progressing     Problem: Gas Exchange Impaired  Goal: Optimal Gas Exchange  Outcome: Progressing  Intervention: Optimize Oxygenation and Ventilation  Recent Flowsheet Documentation  Taken 5/2/2023 0010 by Lima Levy, RN  Head of Bed (HOB) Positioning: HOB flat     Problem: Fall Injury Risk  Goal: Absence of Fall and Fall-Related Injury  Outcome: Progressing  Intervention: Promote Injury-Free Environment  Recent Flowsheet Documentation  Taken 5/2/2023 0010 by Lima Levy, RN  Safety Promotion/Fall Prevention:   activity supervised   bedside attendant   lighting adjusted   safety round/check completed   supervised activity   toileting scheduled     Problem: Confusion Chronic  Goal: Optimal Cognitive Function  Outcome: Progressing  Intervention: Minimize Injury Risk and Provide Safety  Recent Flowsheet Documentation  Taken 5/2/2023 0010 by Lima Levy, RN  Enhanced Safety Measures:  at bedside  Intervention: Minimize and Manage Confusion  Recent Flowsheet Documentation  Taken 5/2/2023 0010 by Lima Levy, RN  Sensory Stimulation Regulation:   care clustered   lighting decreased   quiet environment promoted  Reorientation Measures:   calendar in view   clock in view   reorientation provided   Goal Outcome Evaluation:       Pt confused and slow to respond tonight. Pt has been cooperative and easily redirected. 1:1 in place. Will monitor.

## 2023-05-02 NOTE — PROGRESS NOTES
Pt is on 2 lpm 02 sating 93% HR 92 RR 18. Breath sounds diminished both pre and post neb tx. Neb given x 1. Rt continue to monitor.    Juan David Osborne, RT

## 2023-05-02 NOTE — PROGRESS NOTES
"Care Management Follow Up    Length of Stay (days): 7    Expected Discharge Date: 05/02/2023     Concerns to be Addressed: Discharge final plan still pending      Patient plan of care discussed at interdisciplinary rounds: Yes    Anticipated Discharge Disposition:  TBD     Anticipated Discharge Services:  TBD     Education Provided on the Discharge Plan:  Per Care Team   Patient/Family in Agreement with the Plan:  Yes    Referrals Placed by CM/SW:    Private pay costs discussed: Not applicable    Additional Information:  Chart reviewed.    Social Hx:  \"Pt still on 1:1 due to confusion but now off of restraints.  Pt lives with spouse, Jovana.  Uses a cane at baseline, independent with ADLs per spouse.  Wife assists with IADLs. Therapy evals needed when able. CM following. Wife to transport at discharge. \"    CM updates:  Therapy recs home with assist or LTC. Pt is moving good, just cognitively declining.     RNCM spoke with pts wife. She is not sure she can take care of pt at home. She works part time, and could not be with him 24/7. Daughters work full time as well. She is going to discuss with eldest daughter Lore. She gave writer her # as well to call.     Writer called and left VM for daughter. Awaiting call back.    Lore ##[phone]      Cm will continue to follow plan of care, review recommendations, and assist with any discharge needs anticipated.       Estella Chambers RN        " Eucrisa Counseling: Patient may experience a mild burning sensation during topical application. Eucrisa is not approved in children less than 2 years of age.

## 2023-05-02 NOTE — PROGRESS NOTES
RESPIRATORY CARE NOTE    Patient is on 2 LPM NC. Patient received schedule nebulizer. BS diminished pre and post neb. Patient tolerated treatment well.       Sherif Ward, RT

## 2023-05-02 NOTE — PLAN OF CARE
"Goal Outcome Evaluation:                    Problem: Plan of Care - These are the overarching goals to be used throughout the patient stay.    Goal: Plan of Care Review  Description: The Plan of Care Review/Shift note should be completed every shift.  The Outcome Evaluation is a brief statement about your assessment that the patient is improving, declining, or no change.  This information will be displayed automatically on your shift note.  Outcome: Progressing  Goal: Patient-Specific Goal (Individualized)  Description: You can add care plan individualizations to a care plan. Examples of Individualization might be:  \"Parent requests to be called daily at 9am for status\", \"I have a hard time hearing out of my right ear\", or \"Do not touch me to wake me up as it startles me\".  Outcome: Progressing  Goal: Absence of Hospital-Acquired Illness or Injury  Outcome: Progressing  Intervention: Identify and Manage Fall Risk  Recent Flowsheet Documentation  Taken 5/1/2023 1621 by Rosey Davey, RN  Safety Promotion/Fall Prevention:    nonskid shoes/slippers when out of bed    patient and family education    mobility aid in reach    lighting adjusted    increase visualization of patient    increased rounding and observation    clutter free environment maintained  Intervention: Prevent Skin Injury  Recent Flowsheet Documentation  Taken 5/1/2023 1621 by Rosey Davey, RN  Body Position:    position changed independently    position maintained  Goal: Optimal Comfort and Wellbeing  Outcome: Progressing  Goal: Readiness for Transition of Care  Outcome: Progressing   Patient alert, but very confused. Zyprexa PRN given and not effective. Calm down around 2000. Patient is 1:1 with sitter at bedside.  Oxygen 92% on 2L. VSS. Peripheral IV saline locked. Up to  bathroom with 1 assist, a gait belt and walker.      "

## 2023-05-03 LAB
ANION GAP SERPL CALCULATED.3IONS-SCNC: 9 MMOL/L (ref 7–15)
BUN SERPL-MCNC: 24.8 MG/DL (ref 8–23)
CALCIUM SERPL-MCNC: 9.2 MG/DL (ref 8.8–10.2)
CHLORIDE SERPL-SCNC: 104 MMOL/L (ref 98–107)
CREAT SERPL-MCNC: 1.01 MG/DL (ref 0.67–1.17)
DEPRECATED HCO3 PLAS-SCNC: 28 MMOL/L (ref 22–29)
GFR SERPL CREATININE-BSD FRML MDRD: 75 ML/MIN/1.73M2
GLUCOSE SERPL-MCNC: 87 MG/DL (ref 70–99)
HOLD SPECIMEN: NORMAL
MAGNESIUM SERPL-MCNC: 2.4 MG/DL (ref 1.7–2.3)
POTASSIUM SERPL-SCNC: 4 MMOL/L (ref 3.4–5.3)
SODIUM SERPL-SCNC: 141 MMOL/L (ref 136–145)

## 2023-05-03 PROCEDURE — 36415 COLL VENOUS BLD VENIPUNCTURE: CPT | Performed by: INTERNAL MEDICINE

## 2023-05-03 PROCEDURE — 80048 BASIC METABOLIC PNL TOTAL CA: CPT | Performed by: INTERNAL MEDICINE

## 2023-05-03 PROCEDURE — 250N000013 HC RX MED GY IP 250 OP 250 PS 637: Performed by: INTERNAL MEDICINE

## 2023-05-03 PROCEDURE — 99232 SBSQ HOSP IP/OBS MODERATE 35: CPT | Performed by: INTERNAL MEDICINE

## 2023-05-03 PROCEDURE — 999N000157 HC STATISTIC RCP TIME EA 10 MIN

## 2023-05-03 PROCEDURE — 83735 ASSAY OF MAGNESIUM: CPT | Performed by: INTERNAL MEDICINE

## 2023-05-03 PROCEDURE — 250N000012 HC RX MED GY IP 250 OP 636 PS 637: Performed by: INTERNAL MEDICINE

## 2023-05-03 PROCEDURE — 120N000001 HC R&B MED SURG/OB

## 2023-05-03 PROCEDURE — 250N000009 HC RX 250: Performed by: INTERNAL MEDICINE

## 2023-05-03 PROCEDURE — 94640 AIRWAY INHALATION TREATMENT: CPT | Mod: 76

## 2023-05-03 PROCEDURE — 250N000011 HC RX IP 250 OP 636: Performed by: INTERNAL MEDICINE

## 2023-05-03 RX ADMIN — FAMOTIDINE 20 MG: 20 TABLET ORAL at 09:12

## 2023-05-03 RX ADMIN — OLANZAPINE 7.5 MG: 2.5 TABLET, FILM COATED ORAL at 20:00

## 2023-05-03 RX ADMIN — ASPIRIN 81 MG: 81 TABLET, CHEWABLE ORAL at 09:13

## 2023-05-03 RX ADMIN — AMLODIPINE BESYLATE 2.5 MG: 2.5 TABLET ORAL at 09:13

## 2023-05-03 RX ADMIN — BUSPIRONE HYDROCHLORIDE 5 MG: 5 TABLET ORAL at 20:00

## 2023-05-03 RX ADMIN — LOSARTAN POTASSIUM 25 MG: 25 TABLET, FILM COATED ORAL at 09:13

## 2023-05-03 RX ADMIN — DULOXETINE HYDROCHLORIDE 60 MG: 60 CAPSULE, DELAYED RELEASE PELLETS ORAL at 09:12

## 2023-05-03 RX ADMIN — ENOXAPARIN SODIUM 40 MG: 40 INJECTION SUBCUTANEOUS at 09:16

## 2023-05-03 RX ADMIN — ACETAMINOPHEN 975 MG: 325 TABLET ORAL at 06:33

## 2023-05-03 RX ADMIN — BUSPIRONE HYDROCHLORIDE 5 MG: 5 TABLET ORAL at 09:13

## 2023-05-03 RX ADMIN — BUDESONIDE 0.5 MG: 0.5 INHALANT RESPIRATORY (INHALATION) at 19:37

## 2023-05-03 RX ADMIN — LEVOTHYROXINE SODIUM 88 MCG: 88 TABLET ORAL at 06:33

## 2023-05-03 RX ADMIN — Medication 1 MG: at 20:00

## 2023-05-03 RX ADMIN — FUROSEMIDE 20 MG: 20 TABLET ORAL at 09:16

## 2023-05-03 RX ADMIN — PREDNISONE 40 MG: 20 TABLET ORAL at 09:13

## 2023-05-03 RX ADMIN — Medication 1 TABLET: at 09:12

## 2023-05-03 RX ADMIN — LATANOPROST 1 DROP: 50 SOLUTION OPHTHALMIC at 21:40

## 2023-05-03 RX ADMIN — FAMOTIDINE 20 MG: 20 TABLET ORAL at 20:00

## 2023-05-03 RX ADMIN — DOCUSATE SODIUM 100 MG: 100 CAPSULE, LIQUID FILLED ORAL at 20:00

## 2023-05-03 RX ADMIN — METOPROLOL SUCCINATE 12.5 MG: 25 TABLET, EXTENDED RELEASE ORAL at 09:13

## 2023-05-03 RX ADMIN — DOCUSATE SODIUM 100 MG: 100 CAPSULE, LIQUID FILLED ORAL at 09:12

## 2023-05-03 RX ADMIN — ROSUVASTATIN CALCIUM 10 MG: 10 TABLET, FILM COATED ORAL at 21:40

## 2023-05-03 ASSESSMENT — ACTIVITIES OF DAILY LIVING (ADL)
ADLS_ACUITY_SCORE: 46
ADLS_ACUITY_SCORE: 50
ADLS_ACUITY_SCORE: 46
ADLS_ACUITY_SCORE: 46

## 2023-05-03 NOTE — PROGRESS NOTES
Care Management Follow Up    Length of Stay (days): 8    Expected Discharge Date:  Pending     Concerns to be Addressed:  Memory care placement       Patient plan of care discussed at interdisciplinary rounds: Yes    Anticipated Discharge Disposition:  Memory care     Anticipated Discharge Services:  Memory care    Anticipated Discharge DME:  Home O2    Education Provided on the Discharge Plan:  Yes    Patient/Family in Agreement with the Plan:      Referrals Placed by CM/SW:      Private pay costs discussed:  LTC and memory care are private pay-family notified    Additional Information:  Patient with  medical history of COPD not on home oxygen, lung cancer, vascular dementia, hypothyroidism, hypertension, recent pneumonia requiring hospitalization admitted on 4/24/2023 with worsening shortness of breath at rest, fever, hypoxia and increased confusion requiring restraints in the ED.   Psychiatry following, 1:1, medications adjustments, recommendation is for memory care at discharge.  On O2 2L    Social History:  Patient and spouse live in a house.  Patient's two daughters live near by and provide support as needed.  Patient is independent with most ADLs, uses a cane, and requires assist with IADLs.  Spouse is primary contact and is supported by daughter Lore [phone]. [employer].    Spoke with spouse and Lore regarding discharge planning. Both state inability to care for patient in the home. Spouse has had increasing difficulty managing patient's behaviors. She states at time she has to threaten to call the police to get him to take medications etc. She states inability to manage his aggressive behavior. Discussed option for memory care or nursing home placement. She states concern for financially being able to afford cost. Private pay care may not be enough support for managing patient in home environment. Offered consult for Financial SW. Patient and Lore would like to discuss further. Provided  [employer] contact information.      CM to follow up with spouse and Lore.            Kennedi Borrego RN

## 2023-05-03 NOTE — PROGRESS NOTES
Patient has been assessed for Home Oxygen needs.     Pulse oximetry (SpO2) and Oxygen flow readings:    SpO2 = 91% on room air at rest while awake.    SpO2 improved to 94% on 2 liters/minute at rest.    SpO2 = 86% on room air during activity/with exercise.    *SpO2 improved to 89% on 2 liters/minute during activity/with exercise.      Germán Porter RN

## 2023-05-03 NOTE — PLAN OF CARE
Problem: Plan of Care - These are the overarching goals to be used throughout the patient stay.    Goal: Absence of Hospital-Acquired Illness or Injury  Intervention: Identify and Manage Fall Risk  Recent Flowsheet Documentation  Taken 5/3/2023 0915 by Germán Porter RN  Safety Promotion/Fall Prevention:   activity supervised   increased rounding and observation   increase visualization of patient   mobility aid in reach   nonskid shoes/slippers when out of bed   patient and family education     Problem: Risk for Delirium  Goal: Improved Behavioral Control  Outcome: Progressing  Intervention: Prevent and Manage Agitation  Recent Flowsheet Documentation  Taken 5/3/2023 0915 by Germán Porter RN  Environment Familiarity/Consistency:   familiar objects from home provided   personal clothing/items utilized  Intervention: Minimize Safety Risk  Recent Flowsheet Documentation  Taken 5/3/2023 0915 by Germán Porter RN  Enhanced Safety Measures:  at bedside     Problem: Confusion Chronic  Goal: Optimal Cognitive Function  Intervention: Minimize and Manage Confusion  Recent Flowsheet Documentation  Taken 5/3/2023 0915 by Germán Porter RN  Sensory Stimulation Regulation:   care clustered   lighting decreased   quiet environment promoted  Reorientation Measures:   calendar in view   clock in view   reorientation provided  Environment Familiarity/Consistency:   familiar objects from home provided   personal clothing/items utilized     Patient is alert, disorientated to time and situation. Denies pain throughout shift. Calm and easily redirectable. Remains on a 1:1 for safety. Awaiting LTC/Memory Care Unit placement. Continues to utilize supplemental oxygen at 2 L per minute.     Germán Porter RN

## 2023-05-03 NOTE — PLAN OF CARE
"  Problem: Plan of Care - These are the overarching goals to be used throughout the patient stay.    Goal: Plan of Care Review  Description: The Plan of Care Review/Shift note should be completed every shift.  The Outcome Evaluation is a brief statement about your assessment that the patient is improving, declining, or no change.  This information will be displayed automatically on your shift note.  Outcome: Progressing  Goal: Patient-Specific Goal (Individualized)  Description: You can add care plan individualizations to a care plan. Examples of Individualization might be:  \"Parent requests to be called daily at 9am for status\", \"I have a hard time hearing out of my right ear\", or \"Do not touch me to wake me up as it startles me\".  Outcome: Progressing  Goal: Absence of Hospital-Acquired Illness or Injury  Outcome: Progressing  Intervention: Identify and Manage Fall Risk  Recent Flowsheet Documentation  Taken 5/2/2023 1621 by Rosey Davey RN  Safety Promotion/Fall Prevention:    activity supervised    supervised activity    mobility aid in reach    increased rounding and observation    increase visualization of patient  Intervention: Prevent Skin Injury  Recent Flowsheet Documentation  Taken 5/2/2023 1621 by Rosey Davey RN  Body Position:    supine, head elevated    position changed independently  Intervention: Prevent and Manage VTE (Venous Thromboembolism) Risk  Recent Flowsheet Documentation  Taken 5/2/2023 1621 by Rosey Davey RN  VTE Prevention/Management: SCDs (sequential compression devices) off  Goal: Optimal Comfort and Wellbeing  Outcome: Progressing  Goal: Readiness for Transition of Care  Outcome: Progressing   Goal Outcome Evaluation: Patient alert and calm. Able to make needs known. Patient in bed through this shift watching TV.  Sitter at bedside for safety. On 2L nasal cannula. Peripheral IV saline locked.                       "

## 2023-05-03 NOTE — PLAN OF CARE
"  Problem: Plan of Care - These are the overarching goals to be used throughout the patient stay.    Goal: Plan of Care Review  Description: The Plan of Care Review/Shift note should be completed every shift.  The Outcome Evaluation is a brief statement about your assessment that the patient is improving, declining, or no change.  This information will be displayed automatically on your shift note.  Outcome: Progressing  Goal: Patient-Specific Goal (Individualized)  Description: You can add care plan individualizations to a care plan. Examples of Individualization might be:  \"Parent requests to be called daily at 9am for status\", \"I have a hard time hearing out of my right ear\", or \"Do not touch me to wake me up as it startles me\".  Outcome: Progressing  Goal: Absence of Hospital-Acquired Illness or Injury  Outcome: Progressing  Intervention: Identify and Manage Fall Risk  Recent Flowsheet Documentation  Taken 5/2/2023 2340 by Lima Levy RN  Safety Promotion/Fall Prevention:   activity supervised   supervised activity   mobility aid in reach   increase visualization of patient   bedside attendant   room near nurse's station  Intervention: Prevent Skin Injury  Recent Flowsheet Documentation  Taken 5/2/2023 2340 by Lima Levy RN  Body Position: position changed independently  Intervention: Prevent and Manage VTE (Venous Thromboembolism) Risk  Recent Flowsheet Documentation  Taken 5/2/2023 2340 by Lima Levy RN  VTE Prevention/Management: SCDs (sequential compression devices) off  Goal: Optimal Comfort and Wellbeing  Outcome: Progressing  Goal: Readiness for Transition of Care  Outcome: Progressing     Problem: Restraint, Nonviolent  Goal: Absence of Harm or Injury  Outcome: Progressing  Intervention: Protect Skin and Joint Integrity  Recent Flowsheet Documentation  Taken 5/2/2023 2340 by Lima Levy RN  Body Position: position changed independently     Problem: Risk for Delirium  Goal: " Optimal Coping  Outcome: Progressing  Goal: Improved Behavioral Control  Outcome: Progressing  Intervention: Minimize Safety Risk  Recent Flowsheet Documentation  Taken 5/2/2023 2340 by Lima Levy RN  Enhanced Safety Measures:  at bedside  Goal: Improved Attention and Thought Clarity  Outcome: Progressing  Intervention: Maximize Cognitive Function  Recent Flowsheet Documentation  Taken 5/2/2023 2340 by Lima Levy RN  Sensory Stimulation Regulation:   lighting decreased   care clustered   quiet environment promoted  Reorientation Measures:   calendar in view   clock in view   reorientation provided  Goal: Improved Sleep  Outcome: Progressing     Problem: Violence Risk or Actual  Goal: Anger and Impulse Control  Outcome: Progressing  Intervention: Minimize Safety Risk  Recent Flowsheet Documentation  Taken 5/2/2023 2340 by Lima Levy RN  Sensory Stimulation Regulation:   lighting decreased   care clustered   quiet environment promoted  Enhanced Safety Measures:  at bedside     Problem: Pneumonia  Goal: Fluid Balance  Outcome: Progressing  Goal: Resolution of Infection Signs and Symptoms  Outcome: Progressing  Goal: Effective Oxygenation and Ventilation  Outcome: Progressing  Intervention: Optimize Oxygenation and Ventilation  Recent Flowsheet Documentation  Taken 5/2/2023 2340 by Lima Levy RN  Head of Bed (HOB) Positioning: HOB at 20-30 degrees     Problem: Sepsis/Septic Shock  Goal: Optimal Coping  Outcome: Progressing  Goal: Absence of Bleeding  Outcome: Progressing  Goal: Blood Glucose Level Within Targeted Range  Outcome: Progressing  Goal: Absence of Infection Signs and Symptoms  Outcome: Progressing  Intervention: Promote Recovery  Recent Flowsheet Documentation  Taken 5/2/2023 2340 by Lima Levy RN  Activity Management: activity adjusted per tolerance  Goal: Optimal Nutrition Intake  Outcome: Progressing     Problem: COPD (Chronic  Obstructive Pulmonary Disease) Comorbidity  Goal: Maintenance of COPD Symptom Control  Outcome: Progressing  Intervention: Maintain COPD-Symptom Control  Recent Flowsheet Documentation  Taken 5/2/2023 2340 by Lima Levy RN  Medication Review/Management: medications reviewed     Problem: Hypertension Comorbidity  Goal: Blood Pressure in Desired Range  Outcome: Progressing  Intervention: Maintain Blood Pressure Management  Recent Flowsheet Documentation  Taken 5/2/2023 2340 by Lima Levy RN  Medication Review/Management: medications reviewed     Problem: Pain Chronic (Persistent) (Comorbidity Management)  Goal: Acceptable Pain Control and Functional Ability  Outcome: Progressing  Intervention: Manage Persistent Pain  Recent Flowsheet Documentation  Taken 5/2/2023 2340 by Lima Levy RN  Medication Review/Management: medications reviewed     Problem: Suicide Risk  Goal: Absence of Self-Harm  Outcome: Progressing     Problem: Gas Exchange Impaired  Goal: Optimal Gas Exchange  Outcome: Progressing  Intervention: Optimize Oxygenation and Ventilation  Recent Flowsheet Documentation  Taken 5/2/2023 2340 by Lima Levy RN  Head of Bed (HOB) Positioning: HOB at 20-30 degrees     Problem: Fall Injury Risk  Goal: Absence of Fall and Fall-Related Injury  Outcome: Progressing  Intervention: Identify and Manage Contributors  Recent Flowsheet Documentation  Taken 5/2/2023 2340 by Lima Levy RN  Medication Review/Management: medications reviewed  Intervention: Promote Injury-Free Environment  Recent Flowsheet Documentation  Taken 5/2/2023 2340 by Lima Levy RN  Safety Promotion/Fall Prevention:   activity supervised   supervised activity   mobility aid in reach   increase visualization of patient   bedside attendant   room near nurse's station     Problem: Confusion Chronic  Goal: Optimal Cognitive Function  Outcome: Progressing  Intervention: Minimize Injury Risk and Provide  Safety  Recent Flowsheet Documentation  Taken 5/2/2023 2340 by Lima Levy, RN  Enhanced Safety Measures:  at bedside  Intervention: Minimize and Manage Confusion  Recent Flowsheet Documentation  Taken 5/2/2023 2340 by Lima Levy, RN  Sensory Stimulation Regulation:   lighting decreased   care clustered   quiet environment promoted  Reorientation Measures:   calendar in view   clock in view   reorientation provided   Goal Outcome Evaluation:         Pt alert with slight confusion but easily reoriented and following commands. 1:1 in place. Cares clustered and allowed to rest. Will monitor.

## 2023-05-03 NOTE — PROGRESS NOTES
[facility]    Medicine Progress Note - Hospitalist Service    Date of Admission:  4/24/2023    Assessment & Plan    Dominik Rojas is an 82 year old male with medical history of COPD not on home oxygen, lung cancer, vascular dementia, hypothyroidism, hypertension, recent pneumonia requiring hospitalization admitted on 4/24/2023 with worsening shortness of breath at rest, fever, hypoxia and increased confusion requiring restraints in the ED.  ED work-up showed leukocytosis, lactic acidosis, mild hyperkalemia, hypoxemia needing high flow nasal oxygen.    He improved significantly over the last 5 days.  He was on high flow on admission 15 L/min and he is currently off O2 satting low 90s on room air.  He will need O2 evaluation on closer to discharge to assess exertional hypoxia.  Of note patient is medically stable for discharge but pending placement.      Given his advanced dementia with behavioral disturbances, he lives with his spouse at home who is unable to care for him any longer.  Family is looking at different options including long-term care.  PT recommended home with assist since patient is ambulating independently with a walker.  Patient is not in agreement with spouse, and gets very agitated and becomes very impulsive requiring restraints when told that he will not be able to return home.    Acute hypoxic respiratory failure  Acute COPD exacerbation  Secondary bacterial pneumonia  -CT chest with IV contrast: Negative for PE but showed interstitial opacities in the dependent portions of bilateral lower lobes are nonspecific for atelectasis versus mild aspiration.  -Blood and sputum culture ordered, blood cultures pending.  Unable to obtain sputum yet.  -Continue supplementary oxygen to maintain SPO2 greater than 89 to 92%  -Xopenex 4 times daily, Atrovent 4 times daily, Pulmicort 2 times daily  -Completed oral prednisone 40 mg daily x 5 days.  -Completed Zosyn every 8 hours x 7  "days.  MRSA negative   -Patient is on room air at rest as of 5/1/23.    Adenocarcinoma of the right lower lobe  Immunocompromised  -Stage T1 N0 M0  -Has completed radiation therapy on 4/21/2023, not a candidate for surgery due to age and medical comorbidities  -Follows with Dr. Bales through [facility] oncology    Mild hyperkalemia, asymptomatic-resolved  -Will continue to monitor, avoid potassium sparing agents    Metabolic encephalopathy, resolved  Dementia with behavioral disturbance  Mood disorder  -Required restraints on 4/27-28 due to agitation and interfering with care  -Continue PTA olanzapine 7.5 mg at bedtime  -Continue PTA BuSpar 5 mg twice daily  -Continue PTA Cymbalta 60 mg daily  -Olanzapine 5 mg as needed daily for agitation and aggression  -Will monitor with 1:1 sitter    Essential hypertension, stable  - hold losartan 25 mg daily, and furosemide 20 mg,   - continue amlodipine 2.5 mg daily    Hyperlipidemia  -Continue PTA rosuvastatin 10 mg at bedtime     Diet: Regular Diet Adult    DVT Prophylaxis: Enoxaparin (Lovenox) SQ  Cabral Catheter: Not present  Lines: None     Cardiac Monitoring: None  Code Status: Full Code      Clinically Significant Risk Factors              # Hypoalbuminemia: Lowest albumin = 3.3 g/dL at 4/25/2023  6:57 AM, will monitor as appropriate            # Obesity: Estimated body mass index is 30.35 kg/m  as calculated from the following:    Height as of this encounter: 1.702 m (5' 7\").    Weight as of this encounter: 87.9 kg (193 lb 12.6 oz).          Disposition Plan  CM assisting with LTC placement.  Spouse and family unable to accommodate patient due to his advanced dementia with previous disturbances.  Medically stable.  On room air.            Melody Roth MD  Hospitalist Service  [facility]  Securely message with [employer] (more info)  Text page via [employer] Paging/Directory   ______________________________________________________________________    Interval " History   No acute events overnight.  This morning more cooperative and calm.   On 2 lpm this morning unclear why.  Asked nurse to remove oxygen and assess O2 since he does not need it while at rest.  Patient requesting to be discharged home, I did explain to him that his wife is unable to care for him at home since she works part-time and also I told him that I will discuss it with the  to see if there is any new updates. Denies dyspnea, chest pain, fever/chills or nausea.    Physical Exam   Vital Signs: Temp: 97.8  F (36.6  C) Temp src: Oral BP: 132/79 Pulse: 102   Resp: 20 SpO2: 93 % O2 Device: Nasal cannula Oxygen Delivery: 2 LPM  Weight: 193 lbs 12.55 oz    General Appearance: Awake, alert, in no acute distress  Respiratory: Clear to auscultation bilateral, no wheezing  cardiovascular: Regular rate, no murmur noted  GI: soft, nontender, non distended, normal bowel sounds  Skin: no jaundice, no rash.  On four-point restraints      Medical Decision Making       MANAGEMENT DISCUSSED with the following over the past 24 hours: Prior hospitalist   NOTE(S)/MEDICAL RECORDS REVIEWED over the past 24 hours: Prior hospitalist H&P, ED physician, nursing  Respiratory, pharmacy, case management    Data     I have personally reviewed the following data over the past 24 hrs:    N/A  \   N/A   / N/A     141 104 24.8 (H) /  87   4.0 28 1.01 \       Imaging results reviewed over the past 24 hrs:   No results found for this or any previous visit (from the past 24 hour(s)).

## 2023-05-04 PROBLEM — F03.93: Status: ACTIVE | Noted: 2023-05-04

## 2023-05-04 LAB
ANION GAP SERPL CALCULATED.3IONS-SCNC: 8 MMOL/L (ref 7–15)
BUN SERPL-MCNC: 25.3 MG/DL (ref 8–23)
CALCIUM SERPL-MCNC: 8.9 MG/DL (ref 8.8–10.2)
CHLORIDE SERPL-SCNC: 106 MMOL/L (ref 98–107)
CREAT SERPL-MCNC: 1 MG/DL (ref 0.67–1.17)
DEPRECATED HCO3 PLAS-SCNC: 28 MMOL/L (ref 22–29)
GFR SERPL CREATININE-BSD FRML MDRD: 76 ML/MIN/1.73M2
GLUCOSE SERPL-MCNC: 79 MG/DL (ref 70–99)
MAGNESIUM SERPL-MCNC: 2.2 MG/DL (ref 1.7–2.3)
PLATELET # BLD AUTO: 172 10E3/UL (ref 150–450)
POTASSIUM SERPL-SCNC: 4 MMOL/L (ref 3.4–5.3)
SODIUM SERPL-SCNC: 142 MMOL/L (ref 136–145)

## 2023-05-04 PROCEDURE — 80048 BASIC METABOLIC PNL TOTAL CA: CPT | Performed by: INTERNAL MEDICINE

## 2023-05-04 PROCEDURE — 250N000013 HC RX MED GY IP 250 OP 250 PS 637: Performed by: INTERNAL MEDICINE

## 2023-05-04 PROCEDURE — 250N000011 HC RX IP 250 OP 636: Performed by: INTERNAL MEDICINE

## 2023-05-04 PROCEDURE — 250N000009 HC RX 250: Performed by: INTERNAL MEDICINE

## 2023-05-04 PROCEDURE — 120N000001 HC R&B MED SURG/OB

## 2023-05-04 PROCEDURE — 36415 COLL VENOUS BLD VENIPUNCTURE: CPT | Performed by: INTERNAL MEDICINE

## 2023-05-04 PROCEDURE — 83735 ASSAY OF MAGNESIUM: CPT | Performed by: INTERNAL MEDICINE

## 2023-05-04 PROCEDURE — 94640 AIRWAY INHALATION TREATMENT: CPT | Mod: 76

## 2023-05-04 PROCEDURE — 99232 SBSQ HOSP IP/OBS MODERATE 35: CPT | Performed by: HOSPITALIST

## 2023-05-04 PROCEDURE — 999N000157 HC STATISTIC RCP TIME EA 10 MIN

## 2023-05-04 PROCEDURE — 85049 AUTOMATED PLATELET COUNT: CPT | Performed by: INTERNAL MEDICINE

## 2023-05-04 RX ADMIN — DULOXETINE HYDROCHLORIDE 60 MG: 60 CAPSULE, DELAYED RELEASE PELLETS ORAL at 08:08

## 2023-05-04 RX ADMIN — DOCUSATE SODIUM 100 MG: 100 CAPSULE, LIQUID FILLED ORAL at 19:05

## 2023-05-04 RX ADMIN — BUDESONIDE 0.5 MG: 0.5 INHALANT RESPIRATORY (INHALATION) at 19:53

## 2023-05-04 RX ADMIN — ACETAMINOPHEN 975 MG: 325 TABLET ORAL at 06:35

## 2023-05-04 RX ADMIN — LOSARTAN POTASSIUM 25 MG: 25 TABLET, FILM COATED ORAL at 08:09

## 2023-05-04 RX ADMIN — METOPROLOL SUCCINATE 12.5 MG: 25 TABLET, EXTENDED RELEASE ORAL at 08:08

## 2023-05-04 RX ADMIN — ROSUVASTATIN CALCIUM 10 MG: 10 TABLET, FILM COATED ORAL at 20:18

## 2023-05-04 RX ADMIN — Medication 1 MG: at 20:19

## 2023-05-04 RX ADMIN — ENOXAPARIN SODIUM 40 MG: 40 INJECTION SUBCUTANEOUS at 08:09

## 2023-05-04 RX ADMIN — ASPIRIN 81 MG: 81 TABLET, CHEWABLE ORAL at 08:08

## 2023-05-04 RX ADMIN — QUETIAPINE FUMARATE 25 MG: 25 TABLET ORAL at 19:05

## 2023-05-04 RX ADMIN — AMLODIPINE BESYLATE 2.5 MG: 2.5 TABLET ORAL at 08:08

## 2023-05-04 RX ADMIN — LATANOPROST 1 DROP: 50 SOLUTION OPHTHALMIC at 20:19

## 2023-05-04 RX ADMIN — Medication 1 TABLET: at 08:08

## 2023-05-04 RX ADMIN — BUSPIRONE HYDROCHLORIDE 5 MG: 5 TABLET ORAL at 19:05

## 2023-05-04 RX ADMIN — FAMOTIDINE 20 MG: 20 TABLET ORAL at 20:19

## 2023-05-04 RX ADMIN — FUROSEMIDE 20 MG: 20 TABLET ORAL at 08:08

## 2023-05-04 RX ADMIN — BUSPIRONE HYDROCHLORIDE 5 MG: 5 TABLET ORAL at 08:09

## 2023-05-04 RX ADMIN — FAMOTIDINE 20 MG: 20 TABLET ORAL at 08:09

## 2023-05-04 RX ADMIN — ACETAMINOPHEN 975 MG: 325 TABLET ORAL at 14:12

## 2023-05-04 RX ADMIN — OLANZAPINE 7.5 MG: 2.5 TABLET, FILM COATED ORAL at 20:18

## 2023-05-04 RX ADMIN — DOCUSATE SODIUM 100 MG: 100 CAPSULE, LIQUID FILLED ORAL at 08:08

## 2023-05-04 RX ADMIN — LEVOTHYROXINE SODIUM 88 MCG: 88 TABLET ORAL at 06:35

## 2023-05-04 ASSESSMENT — ACTIVITIES OF DAILY LIVING (ADL)
ADLS_ACUITY_SCORE: 45
ADLS_ACUITY_SCORE: 46
ADLS_ACUITY_SCORE: 49
ADLS_ACUITY_SCORE: 49
ADLS_ACUITY_SCORE: 46
ADLS_ACUITY_SCORE: 49
ADLS_ACUITY_SCORE: 45
ADLS_ACUITY_SCORE: 45
ADLS_ACUITY_SCORE: 46
ADLS_ACUITY_SCORE: 45

## 2023-05-04 NOTE — PROGRESS NOTES
Patient seen on 2 L NC. spo2 95%. Patient received Pulmicort BID. BS diminished pre and post neb. RT will continue to follow.

## 2023-05-04 NOTE — PROGRESS NOTES
05/04/23 1143   Appointment Info   Signing Clinician's Name / Credentials (PT) Angela Edmond PT   Appointment Canceled Reason (PT) Patient declined   Appointment Cancel Comments (PT) PT did refuse today.   Rehab Comments (PT) PT did try to see the pt for 3 days and tried 2 times yesterday.  PT will dc at this time.  He can continue to walk with the nurses.

## 2023-05-04 NOTE — PROGRESS NOTES
Discontinued 1:1 at this time. Patient has been calm and cooperative. Alarms on, curtain pulled back with call-light in reach. Will monitor closely.  Gisele Rosario RN

## 2023-05-04 NOTE — PLAN OF CARE
Problem: Risk for Delirium  Goal: Improved Behavioral Control  Outcome: Progressing  Intervention: Minimize Safety Risk  Recent Flowsheet Documentation  Taken 5/3/2023 1700 by Liseth Law, RN  Enhanced Safety Measures:  at bedside  Goal: Improved Attention and Thought Clarity  Outcome: Progressing  Intervention: Maximize Cognitive Function  Recent Flowsheet Documentation  Taken 5/3/2023 1700 by Liseth Law, RN  Reorientation Measures:    calendar in view    clock in view    reorientation provided    Problem: Restraint, Nonviolent  Goal: Absence of Harm or Injury  Outcome: Progressing     Problem: Violence Risk or Actual  Goal: Anger and Impulse Control  Outcome: Progressing  Intervention: Minimize Safety Risk  Recent Flowsheet Documentation  Taken 5/3/2023 1700 by Liseth Law, RN  Enhanced Safety Measures:  at bedside      Goal Outcome Evaluation:  Patient is alert but disoriented to time and situation. Patient remains on 1:1 for safety. Patient was calm and cooperative with all cares. Patient is currently on 2 LPM. Patient is awaiting LTC placement. Patient denied pain.

## 2023-05-04 NOTE — PROGRESS NOTES
Care Management Follow Up    Length of Stay (days): 9    Expected Discharge Date: 05/10/2023     Concerns to be Addressed:  Memory care placement       Patient plan of care discussed at interdisciplinary rounds: Yes     Anticipated Discharge Disposition:  Memory care     Anticipated Discharge Services:  Memory care     Anticipated Discharge DME:  Home O2     Education Provided on the Discharge Plan:  Yes     Patient/Family in Agreement with the Plan:       Referrals Placed by CM/SW:       Private pay costs discussed:  LTC and memory care are private pay-family notified     Additional Information:  Patient with  medical history of COPD not on home oxygen, lung cancer, vascular dementia, hypothyroidism, hypertension, recent pneumonia requiring hospitalization admitted on 4/24/2023 with worsening shortness of breath at rest, fever, hypoxia and increased confusion requiring restraints in the ED.   Psychiatry following, medications adjustments, recommendation is for memory care at discharge.  On O2 1L    Off 1:1 as of 5/4 afternoon       Social History:  Patient and spouse live in a house.  Patient's two daughters live near by and provide support as needed.  Patient is independent with most ADLs, uses a cane, and requires assist with IADLs.  Spouse is primary contact and is supported by daughter Lore [phone]. [employer].     Spoke with spouse and Lore regarding discharge planning 5/3. . Both state inability to care for patient in the home. Spouse has had increasing difficulty managing patient's behaviors. She states at time she has to threaten to call the police to get him to take medications etc. She states inability to manage his aggressive behavior. Discussed option for memory care or nursing home placement. She states concern for financially being able to afford cost. Private pay care may not be enough support for managing patient in home environment. Offered consult for Financial SW. Patient and  Lore would like to discuss further. Provided [employer] contact information.       Voice message left with spouse and daughter Lore. Waiting for return call for discharge planning.       Kennedi Borrego RN

## 2023-05-04 NOTE — PLAN OF CARE
"  Problem: Plan of Care - These are the overarching goals to be used throughout the patient stay.    Goal: Plan of Care Review  Description: The Plan of Care Review/Shift note should be completed every shift.  The Outcome Evaluation is a brief statement about your assessment that the patient is improving, declining, or no change.  This information will be displayed automatically on your shift note.  Outcome: Progressing  Goal: Patient-Specific Goal (Individualized)  Description: You can add care plan individualizations to a care plan. Examples of Individualization might be:  \"Parent requests to be called daily at 9am for status\", \"I have a hard time hearing out of my right ear\", or \"Do not touch me to wake me up as it startles me\".  Outcome: Progressing  Goal: Absence of Hospital-Acquired Illness or Injury  Outcome: Progressing  Intervention: Identify and Manage Fall Risk  Recent Flowsheet Documentation  Taken 5/4/2023 0007 by Lima Levy RN  Safety Promotion/Fall Prevention:   activity supervised   supervised activity   mobility aid in reach   increase visualization of patient   bedside attendant   room near nurse's station  Intervention: Prevent Skin Injury  Recent Flowsheet Documentation  Taken 5/4/2023 0007 by Lima Levy RN  Body Position: position changed independently  Intervention: Prevent and Manage VTE (Venous Thromboembolism) Risk  Recent Flowsheet Documentation  Taken 5/4/2023 0007 by Lima Levy RN  VTE Prevention/Management: SCDs (sequential compression devices) off  Goal: Optimal Comfort and Wellbeing  Outcome: Progressing  Goal: Readiness for Transition of Care  Outcome: Progressing     Problem: Restraint, Nonviolent  Goal: Absence of Harm or Injury  Outcome: Progressing  Intervention: Protect Skin and Joint Integrity  Recent Flowsheet Documentation  Taken 5/4/2023 0007 by Lima Levy RN  Body Position: position changed independently     Problem: Risk for Delirium  Goal: " Optimal Coping  Outcome: Progressing  Goal: Improved Behavioral Control  Outcome: Progressing  Intervention: Minimize Safety Risk  Recent Flowsheet Documentation  Taken 5/4/2023 0007 by Lima Levy RN  Enhanced Safety Measures:  at bedside  Goal: Improved Attention and Thought Clarity  Outcome: Progressing  Intervention: Maximize Cognitive Function  Recent Flowsheet Documentation  Taken 5/4/2023 0007 by Lima Levy RN  Sensory Stimulation Regulation:   lighting decreased   care clustered   quiet environment promoted  Reorientation Measures:   calendar in view   clock in view   reorientation provided  Goal: Improved Sleep  Outcome: Progressing     Problem: Violence Risk or Actual  Goal: Anger and Impulse Control  Outcome: Progressing  Intervention: Minimize Safety Risk  Recent Flowsheet Documentation  Taken 5/4/2023 0007 by Lima Levy RN  Sensory Stimulation Regulation:   lighting decreased   care clustered   quiet environment promoted  Enhanced Safety Measures:  at bedside     Problem: Pneumonia  Goal: Fluid Balance  Outcome: Progressing  Goal: Resolution of Infection Signs and Symptoms  Outcome: Progressing  Goal: Effective Oxygenation and Ventilation  Outcome: Progressing  Intervention: Optimize Oxygenation and Ventilation  Recent Flowsheet Documentation  Taken 5/4/2023 0007 by Lima Levy RN  Head of Bed (HOB) Positioning: HOB at 15 degrees     Problem: Sepsis/Septic Shock  Goal: Optimal Coping  Outcome: Progressing  Goal: Absence of Bleeding  Outcome: Progressing  Goal: Blood Glucose Level Within Targeted Range  Outcome: Progressing  Goal: Absence of Infection Signs and Symptoms  Outcome: Progressing  Intervention: Promote Recovery  Recent Flowsheet Documentation  Taken 5/4/2023 0007 by Lima Levy RN  Activity Management: activity adjusted per tolerance  Goal: Optimal Nutrition Intake  Outcome: Progressing     Problem: COPD (Chronic Obstructive  Pulmonary Disease) Comorbidity  Goal: Maintenance of COPD Symptom Control  Outcome: Progressing  Intervention: Maintain COPD-Symptom Control  Recent Flowsheet Documentation  Taken 5/4/2023 0007 by Lima Levy RN  Medication Review/Management: medications reviewed     Problem: Hypertension Comorbidity  Goal: Blood Pressure in Desired Range  Outcome: Progressing  Intervention: Maintain Blood Pressure Management  Recent Flowsheet Documentation  Taken 5/4/2023 0007 by Lima Levy RN  Medication Review/Management: medications reviewed     Problem: Pain Chronic (Persistent) (Comorbidity Management)  Goal: Acceptable Pain Control and Functional Ability  Outcome: Progressing  Intervention: Manage Persistent Pain  Recent Flowsheet Documentation  Taken 5/4/2023 0007 by Lima Levy RN  Medication Review/Management: medications reviewed     Problem: Suicide Risk  Goal: Absence of Self-Harm  Outcome: Progressing     Problem: Gas Exchange Impaired  Goal: Optimal Gas Exchange  Outcome: Progressing  Intervention: Optimize Oxygenation and Ventilation  Recent Flowsheet Documentation  Taken 5/4/2023 0007 by Lima Levy RN  Head of Bed (HOB) Positioning: HOB at 15 degrees     Problem: Fall Injury Risk  Goal: Absence of Fall and Fall-Related Injury  Outcome: Progressing  Intervention: Identify and Manage Contributors  Recent Flowsheet Documentation  Taken 5/4/2023 0007 by Lima Levy RN  Medication Review/Management: medications reviewed  Intervention: Promote Injury-Free Environment  Recent Flowsheet Documentation  Taken 5/4/2023 0007 by Lima Levy RN  Safety Promotion/Fall Prevention:   activity supervised   supervised activity   mobility aid in reach   increase visualization of patient   bedside attendant   room near nurse's station     Problem: Confusion Chronic  Goal: Optimal Cognitive Function  Outcome: Progressing  Intervention: Minimize Injury Risk and Provide Safety  Recent Flowsheet  Documentation  Taken 5/4/2023 0007 by Lima Levy, RN  Enhanced Safety Measures:  at bedside  Intervention: Minimize and Manage Confusion  Recent Flowsheet Documentation  Taken 5/4/2023 0007 by Lima Levy, RN  Sensory Stimulation Regulation:   lighting decreased   care clustered   quiet environment promoted  Reorientation Measures:   calendar in view   clock in view   reorientation provided   Goal Outcome Evaluation:         Pt's alert / confused but cooperative. Cares clustered and pt slept well between. 1:1 in place. Will monitor.

## 2023-05-04 NOTE — PROGRESS NOTES
[facility]    Medicine Progress Note - Hospitalist Service    Date of Admission:  4/24/2023    Assessment & Plan    Dominik Rojas is an 82 year old male with medical history of COPD not on home oxygen, lung cancer, vascular dementia, hypothyroidism, hypertension, recent pneumonia requiring hospitalization admitted on 4/24/2023 with worsening shortness of breath at rest, fever, hypoxia and increased confusion requiring restraints in the ED.  ED work-up showed leukocytosis, lactic acidosis, mild hyperkalemia, hypoxemia needing high flow nasal oxygen.    He improved significantly over the last several days.  He was on high flow on admission 15 L/min and he is currently off O2 satting low 90s on room air.  He will need home  O2 evaluation  closer to discharge.  Of note patient is medically stable for discharge but pending placement.      Given his advanced dementia with behavioral disturbances, he lives with his spouse at home who is unable to care for him any longer.  Family is looking at different options including long-term care.  PT recommended home with assist since patient is ambulating independently with a walker.  Patient is not in agreement with spouse, and gets very agitated and becomes very impulsive requiring restraints when told that he will not be able to return home.    Acute hypoxic respiratory failure  Acute COPD exacerbation  Secondary bacterial pneumonia  -CT chest with IV contrast: Negative for PE but showed interstitial opacities in the dependent portions of bilateral lower lobes are nonspecific for atelectasis versus mild aspiration.  -Blood cultures no growth   -Continue supplementary oxygen to maintain SPO2 greater than 89 to 92%  -Xopenex 4 times daily, Atrovent 4 times daily, Pulmicort 2 times daily  -Completed oral prednisone 40 mg daily x 5 days.  -Completed Zosyn every 8 hours x 7 days.  MRSA negative   -Wean oxygen as tolerated.    Adenocarcinoma of the right  "lower lobe  Immunocompromised  -Stage T1 N0 M0  -Has completed radiation therapy on 4/21/2023, not a candidate for surgery due to age and medical comorbidities  -Follows with Dr. Bales through [facility] oncology    Mild hyperkalemia  -Resolved  -Will continue to monitor    Metabolic encephalopathy, resolved  Dementia with behavioral disturbance  Mood disorder  -Required restraints on 4/27-28 due to agitation and interfering with care  -Continue PTA olanzapine 7.5 mg at bedtime  -Continue PTA BuSpar 5 mg twice daily  -Continue PTA Cymbalta 60 mg daily  -Olanzapine 5 mg as needed daily for agitation and aggression  -Will monitor with 1:1 sitter    Essential hypertension  - stable and controlled  - hold losartan 25 mg daily, and furosemide 20 mg,   - continue amlodipine 2.5 mg daily    Hyperlipidemia  -Continue PTA rosuvastatin 10 mg at bedtime     Diet: Regular Diet Adult    DVT Prophylaxis: Enoxaparin (Lovenox) SQ  Cabral Catheter: Not present  Lines: None     Cardiac Monitoring: None  Code Status: Full Code      Clinically Significant Risk Factors              # Hypoalbuminemia: Lowest albumin = 3.3 g/dL at 4/25/2023  6:57 AM, will monitor as appropriate            # Obesity: Estimated body mass index is 30.35 kg/m  as calculated from the following:    Height as of this encounter: 1.702 m (5' 7\").    Weight as of this encounter: 87.9 kg (193 lb 12.6 oz).          Disposition Plan  CM assisting with LTC placement.       Edmund Minaya MD  Hospitalist Service  [facility]  Securely message with [employer] (more info)  Text page via [employer] Paging/Directory   ______________________________________________________________________    Interval History     Patient seen and examined with sister at bedside.  He is pleasantly confused but at baseline.  No acute events reported overnight.    Physical Exam   Vital Signs: Temp: 97.9  F (36.6  C) Temp src: Oral BP: 132/85 Pulse: 83   Resp: 18 SpO2: 93 % O2 Device: " Nasal cannula Oxygen Delivery: 1 LPM  Weight: 193 lbs 12.55 oz      Physical Exam  Constitutional:       Appearance: He is obese.   HENT:      Head: Normocephalic.      Nose: Nose normal.      Mouth/Throat:      Mouth: Mucous membranes are moist.   Eyes:      Pupils: Pupils are equal, round, and reactive to light.   Cardiovascular:      Rate and Rhythm: Normal rate.   Pulmonary:      Effort: Pulmonary effort is normal.   Abdominal:      General: Abdomen is flat.   Musculoskeletal:         General: Normal range of motion.   Skin:     General: Skin is warm.      Capillary Refill: Capillary refill takes less than 2 seconds.   Neurological:      Mental Status: He is alert. Mental status is at baseline.             Medical Decision Making       MANAGEMENT DISCUSSED with the following over the past 24 hours: Prior hospitalist   NOTE(S)/MEDICAL RECORDS REVIEWED over the past 24 hours: Prior hospitalist H&P, ED physician, nursing  Respiratory, pharmacy, case management    Data     I have personally reviewed the following data over the past 24 hrs:    N/A  \   N/A   / 172     142 106 25.3 (H) /  79   4.0 28 1.00 \       Imaging results reviewed over the past 24 hrs:   No results found for this or any previous visit (from the past 24 hour(s)).

## 2023-05-05 LAB
ANION GAP SERPL CALCULATED.3IONS-SCNC: 8 MMOL/L (ref 7–15)
BASOPHILS # BLD AUTO: 0 10E3/UL (ref 0–0.2)
BASOPHILS NFR BLD AUTO: 0 %
BUN SERPL-MCNC: 28.6 MG/DL (ref 8–23)
CALCIUM SERPL-MCNC: 8.5 MG/DL (ref 8.8–10.2)
CHLORIDE SERPL-SCNC: 107 MMOL/L (ref 98–107)
CREAT SERPL-MCNC: 1.02 MG/DL (ref 0.67–1.17)
CREAT SERPL-MCNC: 1.02 MG/DL (ref 0.67–1.17)
DEPRECATED HCO3 PLAS-SCNC: 29 MMOL/L (ref 22–29)
EOSINOPHIL # BLD AUTO: 0.4 10E3/UL (ref 0–0.7)
EOSINOPHIL NFR BLD AUTO: 4 %
ERYTHROCYTE [DISTWIDTH] IN BLOOD BY AUTOMATED COUNT: 15.4 % (ref 10–15)
GFR SERPL CREATININE-BSD FRML MDRD: 74 ML/MIN/1.73M2
GFR SERPL CREATININE-BSD FRML MDRD: 74 ML/MIN/1.73M2
GLUCOSE SERPL-MCNC: 91 MG/DL (ref 70–99)
HCT VFR BLD AUTO: 39.7 % (ref 40–53)
HGB BLD-MCNC: 13 G/DL (ref 13.3–17.7)
IMM GRANULOCYTES # BLD: 0.1 10E3/UL
IMM GRANULOCYTES NFR BLD: 1 %
LYMPHOCYTES # BLD AUTO: 1.5 10E3/UL (ref 0.8–5.3)
LYMPHOCYTES NFR BLD AUTO: 16 %
MAGNESIUM SERPL-MCNC: 2.2 MG/DL (ref 1.7–2.3)
MCH RBC QN AUTO: 31.1 PG (ref 26.5–33)
MCHC RBC AUTO-ENTMCNC: 32.7 G/DL (ref 31.5–36.5)
MCV RBC AUTO: 95 FL (ref 78–100)
MONOCYTES # BLD AUTO: 0.8 10E3/UL (ref 0–1.3)
MONOCYTES NFR BLD AUTO: 9 %
NEUTROPHILS # BLD AUTO: 6.3 10E3/UL (ref 1.6–8.3)
NEUTROPHILS NFR BLD AUTO: 70 %
NRBC # BLD AUTO: 0 10E3/UL
NRBC BLD AUTO-RTO: 0 /100
PLATELET # BLD AUTO: 164 10E3/UL (ref 150–450)
POTASSIUM SERPL-SCNC: 3.8 MMOL/L (ref 3.4–5.3)
RBC # BLD AUTO: 4.18 10E6/UL (ref 4.4–5.9)
SODIUM SERPL-SCNC: 144 MMOL/L (ref 136–145)
WBC # BLD AUTO: 9 10E3/UL (ref 4–11)

## 2023-05-05 PROCEDURE — 250N000009 HC RX 250: Performed by: INTERNAL MEDICINE

## 2023-05-05 PROCEDURE — 94640 AIRWAY INHALATION TREATMENT: CPT | Mod: 76

## 2023-05-05 PROCEDURE — 85025 COMPLETE CBC W/AUTO DIFF WBC: CPT | Performed by: HOSPITALIST

## 2023-05-05 PROCEDURE — 120N000001 HC R&B MED SURG/OB

## 2023-05-05 PROCEDURE — 250N000013 HC RX MED GY IP 250 OP 250 PS 637: Performed by: INTERNAL MEDICINE

## 2023-05-05 PROCEDURE — 999N000157 HC STATISTIC RCP TIME EA 10 MIN

## 2023-05-05 PROCEDURE — 80048 BASIC METABOLIC PNL TOTAL CA: CPT | Performed by: HOSPITALIST

## 2023-05-05 PROCEDURE — 250N000011 HC RX IP 250 OP 636: Performed by: INTERNAL MEDICINE

## 2023-05-05 PROCEDURE — 36415 COLL VENOUS BLD VENIPUNCTURE: CPT | Performed by: HOSPITALIST

## 2023-05-05 PROCEDURE — 99232 SBSQ HOSP IP/OBS MODERATE 35: CPT | Performed by: HOSPITALIST

## 2023-05-05 PROCEDURE — 83735 ASSAY OF MAGNESIUM: CPT | Performed by: INTERNAL MEDICINE

## 2023-05-05 PROCEDURE — 94640 AIRWAY INHALATION TREATMENT: CPT

## 2023-05-05 RX ADMIN — BUDESONIDE 0.5 MG: 0.5 INHALANT RESPIRATORY (INHALATION) at 19:58

## 2023-05-05 RX ADMIN — ROSUVASTATIN CALCIUM 10 MG: 10 TABLET, FILM COATED ORAL at 19:52

## 2023-05-05 RX ADMIN — BUDESONIDE 0.5 MG: 0.5 INHALANT RESPIRATORY (INHALATION) at 07:12

## 2023-05-05 RX ADMIN — DOCUSATE SODIUM 100 MG: 100 CAPSULE, LIQUID FILLED ORAL at 08:53

## 2023-05-05 RX ADMIN — LOSARTAN POTASSIUM 25 MG: 25 TABLET, FILM COATED ORAL at 08:53

## 2023-05-05 RX ADMIN — ENOXAPARIN SODIUM 40 MG: 40 INJECTION SUBCUTANEOUS at 08:54

## 2023-05-05 RX ADMIN — DOCUSATE SODIUM 100 MG: 100 CAPSULE, LIQUID FILLED ORAL at 19:52

## 2023-05-05 RX ADMIN — LEVOTHYROXINE SODIUM 88 MCG: 88 TABLET ORAL at 06:06

## 2023-05-05 RX ADMIN — Medication 1 TABLET: at 08:53

## 2023-05-05 RX ADMIN — METOPROLOL SUCCINATE 12.5 MG: 25 TABLET, EXTENDED RELEASE ORAL at 08:53

## 2023-05-05 RX ADMIN — BUSPIRONE HYDROCHLORIDE 5 MG: 5 TABLET ORAL at 19:51

## 2023-05-05 RX ADMIN — OLANZAPINE 7.5 MG: 2.5 TABLET, FILM COATED ORAL at 19:51

## 2023-05-05 RX ADMIN — ASPIRIN 81 MG: 81 TABLET, CHEWABLE ORAL at 08:52

## 2023-05-05 RX ADMIN — LATANOPROST 1 DROP: 50 SOLUTION OPHTHALMIC at 19:55

## 2023-05-05 RX ADMIN — AMLODIPINE BESYLATE 2.5 MG: 2.5 TABLET ORAL at 08:53

## 2023-05-05 RX ADMIN — ACETAMINOPHEN 975 MG: 325 TABLET ORAL at 14:46

## 2023-05-05 RX ADMIN — FUROSEMIDE 20 MG: 20 TABLET ORAL at 08:53

## 2023-05-05 RX ADMIN — DULOXETINE HYDROCHLORIDE 60 MG: 60 CAPSULE, DELAYED RELEASE PELLETS ORAL at 08:53

## 2023-05-05 RX ADMIN — BUSPIRONE HYDROCHLORIDE 5 MG: 5 TABLET ORAL at 08:53

## 2023-05-05 RX ADMIN — Medication 1 MG: at 19:52

## 2023-05-05 RX ADMIN — FAMOTIDINE 20 MG: 20 TABLET ORAL at 08:53

## 2023-05-05 RX ADMIN — ACETAMINOPHEN 975 MG: 325 TABLET ORAL at 06:33

## 2023-05-05 RX ADMIN — FAMOTIDINE 20 MG: 20 TABLET ORAL at 19:52

## 2023-05-05 ASSESSMENT — ACTIVITIES OF DAILY LIVING (ADL)
ADLS_ACUITY_SCORE: 50
ADLS_ACUITY_SCORE: 46
ADLS_ACUITY_SCORE: 49
ADLS_ACUITY_SCORE: 46
ADLS_ACUITY_SCORE: 49
ADLS_ACUITY_SCORE: 49
ADLS_ACUITY_SCORE: 46
ADLS_ACUITY_SCORE: 45
ADLS_ACUITY_SCORE: 49
ADLS_ACUITY_SCORE: 50
ADLS_ACUITY_SCORE: 50
ADLS_ACUITY_SCORE: 49

## 2023-05-05 NOTE — PROGRESS NOTES
Care Management Follow Up    Length of Stay (days): 10    Expected Discharge Date: pending     Concerns to be Addressed:  Memory care/LTC placement       Patient plan of care discussed at interdisciplinary rounds: Yes     Anticipated Discharge Disposition:  Memory care/LTC     Anticipated Discharge Services:  Memory care/LTC     Anticipated Discharge DME:  none     Education Provided on the Discharge Plan:  Yes     Patient/Family in Agreement with the Plan:  yes     Referrals Placed by CM/SW:  Financial SW     Private pay costs discussed:  LTC and memory care are private pay-family notified     Additional Information:  Patient with  medical history of COPD not on home oxygen, lung cancer, vascular dementia, hypothyroidism, hypertension, recent pneumonia requiring hospitalization admitted on 4/24/2023 with worsening shortness of breath at rest, fever, hypoxia and increased confusion requiring restraints in the ED.   Psychiatry following, medications adjustments, recommendation is for memory care at discharge.  On O2 1L     Off 1:1 as of 5/4         Social History:  Patient and spouse live in a house.  Patient's two daughters live near by and provide support as needed.  Patient is independent with most ADLs, uses a cane, and requires assist with IADLs.  Spouse works part-time. She reports she is unable to manage patient's aggression and behaviors in the home. She is unsure financially what she can afford for LTC or memory care. They are on a limited budget. Spouse is primary contact and is supported by daughter Lore [phone]. [employer] [employer].    Care Ann Marie Franco has been in contact with family. There are concerns for ability to afford memory care or LTC. He provided them with waiver information and how to go about applying. He is willing to show them some facilities once financials have been figured out.    Spoke with spouse today 5/5. She confirms she can not take patient home and financials need to be  figured out for placement in memory care or LTC. She requests Financial [facility] speak with Lore regarding LTC MA. She is aware that waiver coverage can take up to 2-4 months and will need to seek other means for facility coverage. Voice message left with Lore regarding above discussion as spouse is deferring to her to assist with planning.    Referral made to Financial HOLDER.       Kennedi Borrego RN

## 2023-05-05 NOTE — PLAN OF CARE
Problem: Risk for Delirium  Goal: Optimal Coping  Outcome: Progressing  Goal: Improved Behavioral Control  Outcome: Progressing  Goal: Improved Attention and Thought Clarity  Intervention: Maximize Cognitive Function  Recent Flowsheet Documentation  Taken 5/4/2023 1900 by Liseth Law, RN  Sensory Stimulation Regulation:    lighting decreased    quiet environment promoted    television on  Reorientation Measures:    calendar in view    clock in view    reorientation provided    Problem: Fall Injury Risk  Goal: Absence of Fall and Fall-Related Injury  Intervention: Promote Injury-Free Environment  Recent Flowsheet Documentation  Taken 5/4/2023 1900 by Liseth Law, RN  Safety Promotion/Fall Prevention: activity supervised     Problem: Pneumonia  Goal: Fluid Balance  Outcome: Progressing    Problem: COPD (Chronic Obstructive Pulmonary Disease) Comorbidity  Goal: Maintenance of COPD Symptom Control  Outcome: Progressing     Goal Outcome Evaluation:  Patient is disoriented to place and situation. Patient became agitated after a phone call with family. PRN Seroquel was given and continued to re-orient and distract patient. Patient settled down, and was calm and cooperative with cares. Patient currently on 1 LPM NC.

## 2023-05-05 NOTE — PROGRESS NOTES
[facility]    Medicine Progress Note - Hospitalist Service    Date of Admission:  4/24/2023    Assessment & Plan    Dominik Rojas is an 82 year old male with medical history of COPD not on home oxygen, lung cancer, vascular dementia, hypothyroidism, hypertension, recent pneumonia requiring hospitalization admitted on 4/24/2023 with worsening shortness of breath at rest, fever, hypoxia and increased confusion requiring restraints in the ED.  ED work-up showed leukocytosis, lactic acidosis, mild hyperkalemia, hypoxemia needing high flow nasal oxygen.    He improved significantly over the last several days.  He was on high flow on admission 15 L/min and he is currently off O2 satting low 90s on room air.  He will need home  O2 evaluation  closer to discharge.  Of note patient is medically stable for discharge but pending placement.      Given his advanced dementia with behavioral disturbances, he lives with his spouse at home who is unable to care for him any longer.  Family is looking at different options including long-term care.  PT recommended home with assist since patient is ambulating independently with a walker.  Patient is not in agreement with spouse, and gets very agitated and becomes very impulsive requiring restraints when told that he will not be able to return home.    Acute hypoxic respiratory failure  Acute COPD exacerbation  Secondary bacterial pneumonia  -CT chest with IV contrast; Negative for PE but showed interstitial opacities in the dependent portions of bilateral lower lobes are nonspecific for atelectasis versus mild aspiration.  -Blood cultures no growth   -Continue supplementary oxygen to maintain SPO2 greater than 89 to 92%  -Xopenex 4 times daily, Atrovent 4 times daily, Pulmicort 2 times daily  -Completed oral prednisone 40 mg daily x 5 days.  -Completed Zosyn every 8 hours x 7 days.  MRSA negative   -Wean oxygen as tolerated.    Adenocarcinoma of the right  "lower lobe  Immunocompromised  -Stage T1 N0 M0  -Has completed radiation therapy on 4/21/2023, not a candidate for surgery due to age and medical comorbidities  -Follows with Dr. Bales through [facility] oncology    Mild hyperkalemia  -Resolved  -Will continue to monitor    Metabolic encephalopathy, resolved  Dementia with behavioral disturbance  Mood disorder  -Required restraints on 4/27-28 due to agitation and interfering with care  -Continue PTA olanzapine 7.5 mg at bedtime  -Continue PTA BuSpar 5 mg twice daily  -Continue PTA Cymbalta 60 mg daily  -Olanzapine 5 mg as needed daily for agitation and aggression  -Will monitor with 1:1 sitter  -Spouse unable to care for patient at home therefore he will be discharged to LTC    Essential hypertension  - Stable and controlled  - Hold losartan 25 mg daily, and furosemide 20 mg,   - Continue amlodipine 2.5 mg daily    Hyperlipidemia  -Continue PTA rosuvastatin 10 mg at bedtime     Diet: Regular Diet Adult    DVT Prophylaxis: Enoxaparin (Lovenox) SQ  Cabral Catheter: Not present  Lines: None     Cardiac Monitoring: None  Code Status: Full Code      Clinically Significant Risk Factors              # Hypoalbuminemia: Lowest albumin = 3.3 g/dL at 4/25/2023  6:57 AM, will monitor as appropriate            # Obesity: Estimated body mass index is 30.35 kg/m  as calculated from the following:    Height as of this encounter: 1.702 m (5' 7\").    Weight as of this encounter: 87.9 kg (193 lb 12.6 oz).          Disposition Plan  CM assisting with LTC placement as spouse unable to care for patient at home      Edmund Minaya MD  Hospitalist Service  [facility]  Securely message with [employer] (more info)  Text page via [employer] Paging/Directory   ______________________________________________________________________    Interval History     Patient is doing well.  Pleasantly confused and at baseline.  Calm and cooperative today no acute events reported " overnight.      Physical Exam   Vital Signs: Temp: 97.7  F (36.5  C) Temp src: Oral BP: (!) 143/88 (RN notified) Pulse: 89   Resp: 17 SpO2: 94 % O2 Device: Nasal cannula Oxygen Delivery: 1 LPM  Weight: 193 lbs 12.55 oz      Physical Exam  Constitutional:       Appearance: He is obese.   HENT:      Head: Normocephalic.      Nose: Nose normal.      Mouth/Throat:      Mouth: Mucous membranes are moist.   Eyes:      Pupils: Pupils are equal, round, and reactive to light.   Cardiovascular:      Rate and Rhythm: Normal rate.   Pulmonary:      Effort: Pulmonary effort is normal.   Abdominal:      General: Abdomen is flat.   Musculoskeletal:         General: Normal range of motion.   Skin:     General: Skin is warm.      Capillary Refill: Capillary refill takes less than 2 seconds.   Neurological:      Mental Status: He is alert. Mental status is at baseline.             Medical Decision Making       MANAGEMENT DISCUSSED with the following over the past 24 hours: Prior hospitalist   NOTE(S)/MEDICAL RECORDS REVIEWED over the past 24 hours: Prior hospitalist H&P, ED physician, nursing  Respiratory, pharmacy, case management    Data     I have personally reviewed the following data over the past 24 hrs:    9.0  \   13.0 (L)   / 164     144 107 28.6 (H) /  91   3.8 29 1.02; 1.02 \       Imaging results reviewed over the past 24 hrs:   No results found for this or any previous visit (from the past 24 hour(s)).

## 2023-05-05 NOTE — PLAN OF CARE
Problem: Plan of Care - These are the overarching goals to be used throughout the patient stay.    Goal: Plan of Care Review  Description: The Plan of Care Review/Shift note should be completed every shift.  The Outcome Evaluation is a brief statement about your assessment that the patient is improving, declining, or no change.  This information will be displayed automatically on your shift note.  Outcome: Progressing     Problem: Risk for Delirium  Goal: Improved Sleep  Outcome: Progressing     Problem: Pneumonia  Goal: Fluid Balance  Outcome: Progressing     Problem: COPD (Chronic Obstructive Pulmonary Disease) Comorbidity  Goal: Maintenance of COPD Symptom Control  Outcome: Progressing   Goal Outcome Evaluation:  Pt is alert and calm. VS WNL at 1L O2 per nasal cannula. Cooperative with cares. Pleasant with caregivers. Slept soundly.

## 2023-05-05 NOTE — PLAN OF CARE
"  Problem: Plan of Care - These are the overarching goals to be used throughout the patient stay.    Goal: Plan of Care Review  Description: The Plan of Care Review/Shift note should be completed every shift.  The Outcome Evaluation is a brief statement about your assessment that the patient is improving, declining, or no change.  This information will be displayed automatically on your shift note.  Outcome: Progressing  Flowsheets (Taken 5/5/2023 1110)  Plan of Care Reviewed With:   patient   family  Goal: Patient-Specific Goal (Individualized)  Description: You can add care plan individualizations to a care plan. Examples of Individualization might be:  \"Parent requests to be called daily at 9am for status\", \"I have a hard time hearing out of my right ear\", or \"Do not touch me to wake me up as it startles me\".  Outcome: Progressing  Goal: Absence of Hospital-Acquired Illness or Injury  Outcome: Progressing  Intervention: Identify and Manage Fall Risk  Recent Flowsheet Documentation  Taken 5/5/2023 0845 by Dominik Donahue RN  Safety Promotion/Fall Prevention: activity supervised  Intervention: Prevent and Manage VTE (Venous Thromboembolism) Risk  Recent Flowsheet Documentation  Taken 5/5/2023 0845 by Dominik Donahue RN  VTE Prevention/Management: SCDs (sequential compression devices) off  Goal: Readiness for Transition of Care  Outcome: Progressing     Problem: Restraint, Nonviolent  Goal: Absence of Harm or Injury  Outcome: Progressing     Problem: Risk for Delirium  Goal: Improved Behavioral Control  Outcome: Progressing  Goal: Improved Attention and Thought Clarity  Outcome: Progressing  Intervention: Maximize Cognitive Function  Recent Flowsheet Documentation  Taken 5/5/2023 0845 by Dominik Donahue RN  Sensory Stimulation Regulation:   lighting decreased   quiet environment promoted  Reorientation Measures: clock in view  Goal: Improved Sleep  Outcome: Progressing     Problem: Violence Risk or Actual  Goal: Anger " and Impulse Control  Outcome: Progressing  Intervention: Minimize Safety Risk  Recent Flowsheet Documentation  Taken 5/5/2023 0845 by Dominik Donahue RN  Sensory Stimulation Regulation:   lighting decreased   quiet environment promoted     Problem: Pneumonia  Goal: Fluid Balance  Outcome: Progressing  Goal: Resolution of Infection Signs and Symptoms  Outcome: Progressing  Goal: Effective Oxygenation and Ventilation  Outcome: Progressing     Problem: Sepsis/Septic Shock  Goal: Optimal Coping  Outcome: Progressing  Goal: Absence of Bleeding  Outcome: Progressing  Goal: Blood Glucose Level Within Targeted Range  Outcome: Progressing  Goal: Absence of Infection Signs and Symptoms  Outcome: Progressing  Goal: Optimal Nutrition Intake  Outcome: Progressing     Problem: Hypertension Comorbidity  Goal: Blood Pressure in Desired Range  Outcome: Progressing     Problem: Gas Exchange Impaired  Goal: Optimal Gas Exchange  Outcome: Progressing     Problem: Fall Injury Risk  Goal: Absence of Fall and Fall-Related Injury  Outcome: Progressing  Intervention: Promote Injury-Free Environment  Recent Flowsheet Documentation  Taken 5/5/2023 0845 by Dominik Donahue RN  Safety Promotion/Fall Prevention: activity supervised     Problem: Confusion Chronic  Goal: Optimal Cognitive Function  Outcome: Progressing  Intervention: Minimize and Manage Confusion  Recent Flowsheet Documentation  Taken 5/5/2023 0845 by Dominik Donahue RN  Sensory Stimulation Regulation:   lighting decreased   quiet environment promoted  Reorientation Measures: clock in view   Goal Outcome Evaluation:      Plan of Care Reviewed With: patient, family

## 2023-05-06 LAB
GLUCOSE BLDC GLUCOMTR-MCNC: 85 MG/DL (ref 70–99)
MAGNESIUM SERPL-MCNC: 2.3 MG/DL (ref 1.7–2.3)
POTASSIUM SERPL-SCNC: 3.9 MMOL/L (ref 3.4–5.3)

## 2023-05-06 PROCEDURE — 84132 ASSAY OF SERUM POTASSIUM: CPT | Performed by: HOSPITALIST

## 2023-05-06 PROCEDURE — 250N000009 HC RX 250: Performed by: INTERNAL MEDICINE

## 2023-05-06 PROCEDURE — 250N000013 HC RX MED GY IP 250 OP 250 PS 637: Performed by: INTERNAL MEDICINE

## 2023-05-06 PROCEDURE — 36415 COLL VENOUS BLD VENIPUNCTURE: CPT | Performed by: HOSPITALIST

## 2023-05-06 PROCEDURE — 250N000011 HC RX IP 250 OP 636: Performed by: INTERNAL MEDICINE

## 2023-05-06 PROCEDURE — 99232 SBSQ HOSP IP/OBS MODERATE 35: CPT | Performed by: HOSPITALIST

## 2023-05-06 PROCEDURE — 120N000001 HC R&B MED SURG/OB

## 2023-05-06 PROCEDURE — 83735 ASSAY OF MAGNESIUM: CPT | Performed by: HOSPITALIST

## 2023-05-06 PROCEDURE — 999N000157 HC STATISTIC RCP TIME EA 10 MIN

## 2023-05-06 PROCEDURE — 94640 AIRWAY INHALATION TREATMENT: CPT

## 2023-05-06 RX ADMIN — ACETAMINOPHEN 975 MG: 325 TABLET ORAL at 23:22

## 2023-05-06 RX ADMIN — METOPROLOL SUCCINATE 12.5 MG: 25 TABLET, EXTENDED RELEASE ORAL at 09:33

## 2023-05-06 RX ADMIN — OLANZAPINE 7.5 MG: 2.5 TABLET, FILM COATED ORAL at 21:27

## 2023-05-06 RX ADMIN — Medication 1 TABLET: at 09:34

## 2023-05-06 RX ADMIN — ROSUVASTATIN CALCIUM 10 MG: 10 TABLET, FILM COATED ORAL at 21:27

## 2023-05-06 RX ADMIN — LATANOPROST 1 DROP: 50 SOLUTION OPHTHALMIC at 21:28

## 2023-05-06 RX ADMIN — ACETAMINOPHEN 975 MG: 325 TABLET ORAL at 06:30

## 2023-05-06 RX ADMIN — DOCUSATE SODIUM 100 MG: 100 CAPSULE, LIQUID FILLED ORAL at 09:33

## 2023-05-06 RX ADMIN — QUETIAPINE FUMARATE 25 MG: 25 TABLET ORAL at 21:28

## 2023-05-06 RX ADMIN — AMLODIPINE BESYLATE 2.5 MG: 2.5 TABLET ORAL at 09:34

## 2023-05-06 RX ADMIN — LEVOTHYROXINE SODIUM 88 MCG: 88 TABLET ORAL at 06:20

## 2023-05-06 RX ADMIN — ASPIRIN 81 MG: 81 TABLET, CHEWABLE ORAL at 09:34

## 2023-05-06 RX ADMIN — BUSPIRONE HYDROCHLORIDE 5 MG: 5 TABLET ORAL at 09:37

## 2023-05-06 RX ADMIN — FAMOTIDINE 20 MG: 20 TABLET ORAL at 21:28

## 2023-05-06 RX ADMIN — FUROSEMIDE 20 MG: 20 TABLET ORAL at 09:34

## 2023-05-06 RX ADMIN — LOSARTAN POTASSIUM 25 MG: 25 TABLET, FILM COATED ORAL at 09:33

## 2023-05-06 RX ADMIN — BUDESONIDE 0.5 MG: 0.5 INHALANT RESPIRATORY (INHALATION) at 21:36

## 2023-05-06 RX ADMIN — ENOXAPARIN SODIUM 40 MG: 40 INJECTION SUBCUTANEOUS at 09:33

## 2023-05-06 RX ADMIN — FAMOTIDINE 20 MG: 20 TABLET ORAL at 09:34

## 2023-05-06 RX ADMIN — DOCUSATE SODIUM 100 MG: 100 CAPSULE, LIQUID FILLED ORAL at 21:27

## 2023-05-06 RX ADMIN — Medication 1 MG: at 21:27

## 2023-05-06 RX ADMIN — ACETAMINOPHEN 975 MG: 325 TABLET ORAL at 14:48

## 2023-05-06 RX ADMIN — BUDESONIDE 0.5 MG: 0.5 INHALANT RESPIRATORY (INHALATION) at 07:35

## 2023-05-06 RX ADMIN — DULOXETINE HYDROCHLORIDE 60 MG: 60 CAPSULE, DELAYED RELEASE PELLETS ORAL at 09:33

## 2023-05-06 RX ADMIN — BUSPIRONE HYDROCHLORIDE 5 MG: 5 TABLET ORAL at 21:28

## 2023-05-06 ASSESSMENT — ACTIVITIES OF DAILY LIVING (ADL)
ADLS_ACUITY_SCORE: 45
ADLS_ACUITY_SCORE: 45
ADLS_ACUITY_SCORE: 46
ADLS_ACUITY_SCORE: 40
ADLS_ACUITY_SCORE: 46
ADLS_ACUITY_SCORE: 46
ADLS_ACUITY_SCORE: 40
ADLS_ACUITY_SCORE: 46
ADLS_ACUITY_SCORE: 45
ADLS_ACUITY_SCORE: 45
ADLS_ACUITY_SCORE: 46
ADLS_ACUITY_SCORE: 39

## 2023-05-06 NOTE — PLAN OF CARE
Problem: Confusion Chronic  Goal: Optimal Cognitive Function  Outcome: Progressing     Problem: Fall Injury Risk  Goal: Absence of Fall and Fall-Related Injury  Outcome: Not Progressing  Intervention: Promote Injury-Free Environment  Recent Flowsheet Documentation  Taken 5/5/2023 1608 by Elvia Alcantara RN  Safety Promotion/Fall Prevention:   activity supervised   mobility aid in reach   nonskid shoes/slippers when out of bed   safety round/check completed   room door open     Problem: Gas Exchange Impaired  Goal: Optimal Gas Exchange  Outcome: Not Progressing     Goal Outcome Evaluation:       :Pt presented to the hospital on 4/24 with shortness of breath, fever, weakness and increased confusion in the setting of dementia with COPD and lung cancer. CT of chest positive for pneumonia. Pt has since completed a course of IV abx. Remains on scheduled nebulizers at this time.Pt does have shortness of breath on exertion. Continues to require 1 liter of oxygen via NC. A&O x2. Pt is forgetful. Inconsistent with call light use. Has set off alarms this shift with self-transfers. Assist of 1 with ambulation using a cane. Pt is pleasant and easily redirectable. No behaviors noted this shift. Receives scheduled Zyprexa. Denies pain. Receives scheduled Tylenol for pain management. He is tolerating a regular diet. Appetite is poor. On Mg and K+ protocols. Recheck in AM. Pt is stable for discharge pending LTC placement as family reports they are unable to safely care for patient at home.

## 2023-05-06 NOTE — PLAN OF CARE
"  Problem: Plan of Care - These are the overarching goals to be used throughout the patient stay.    Goal: Plan of Care Review  Description: The Plan of Care Review/Shift note should be completed every shift.  The Outcome Evaluation is a brief statement about your assessment that the patient is improving, declining, or no change.  This information will be displayed automatically on your shift note.  Outcome: Progressing  Flowsheets (Taken 5/6/2023 8407)  Plan of Care Reviewed With:   patient   family  Goal: Patient-Specific Goal (Individualized)  Description: You can add care plan individualizations to a care plan. Examples of Individualization might be:  \"Parent requests to be called daily at 9am for status\", \"I have a hard time hearing out of my right ear\", or \"Do not touch me to wake me up as it startles me\".  Outcome: Progressing  Goal: Absence of Hospital-Acquired Illness or Injury  Outcome: Progressing  Intervention: Identify and Manage Fall Risk  Recent Flowsheet Documentation  Taken 5/6/2023 2559 by Dominik Donahue RN  Safety Promotion/Fall Prevention:   activity supervised   clutter free environment maintained  Goal: Readiness for Transition of Care  Outcome: Progressing     Problem: Restraint, Nonviolent  Goal: Absence of Harm or Injury  Outcome: Progressing     Problem: Risk for Delirium  Goal: Improved Behavioral Control  Outcome: Progressing  Goal: Improved Attention and Thought Clarity  Outcome: Progressing  Goal: Improved Sleep  Outcome: Progressing     Problem: Violence Risk or Actual  Goal: Anger and Impulse Control  Outcome: Progressing     Problem: Pneumonia  Goal: Fluid Balance  Outcome: Progressing  Goal: Resolution of Infection Signs and Symptoms  Outcome: Progressing  Goal: Effective Oxygenation and Ventilation  Outcome: Progressing     Problem: Sepsis/Septic Shock  Goal: Optimal Coping  Outcome: Progressing  Goal: Absence of Bleeding  Outcome: Progressing  Goal: Blood Glucose Level Within " Targeted Range  Outcome: Progressing  Goal: Absence of Infection Signs and Symptoms  Outcome: Progressing  Goal: Optimal Nutrition Intake  Outcome: Progressing     Problem: Hypertension Comorbidity  Goal: Blood Pressure in Desired Range  Outcome: Progressing     Problem: Gas Exchange Impaired  Goal: Optimal Gas Exchange  Outcome: Progressing     Problem: Fall Injury Risk  Goal: Absence of Fall and Fall-Related Injury  Outcome: Progressing  Intervention: Promote Injury-Free Environment  Recent Flowsheet Documentation  Taken 5/6/2023 0802 by Dominik Donahue RN  Safety Promotion/Fall Prevention:   activity supervised   clutter free environment maintained     Problem: Confusion Chronic  Goal: Optimal Cognitive Function  Outcome: Progressing   Goal Outcome Evaluation:      Plan of Care Reviewed With: patient, family

## 2023-05-06 NOTE — PLAN OF CARE
Problem: Pneumonia  Goal: Effective Oxygenation and Ventilation  Outcome: Progressing     Problem: Fall Injury Risk  Goal: Absence of Fall and Fall-Related Injury  Outcome: Progressing  Intervention: Promote Injury-Free Environment  Recent Flowsheet Documentation  Taken 5/5/2023 2345 by Kath Moraes RN  Safety Promotion/Fall Prevention:    activity supervised    mobility aid in reach    nonskid shoes/slippers when out of bed    safety round/check completed    room door open     Problem: Confusion Chronic  Goal: Optimal Cognitive Function  Outcome: Progressing     Problem: Plan of Care - These are the overarching goals to be used throughout the patient stay.    Goal: Absence of Hospital-Acquired Illness or Injury  Intervention: Identify and Manage Fall Risk  Recent Flowsheet Documentation  Taken 5/5/2023 2345 by Kath Moraes RN  Safety Promotion/Fall Prevention:    activity supervised    mobility aid in reach    nonskid shoes/slippers when out of bed    safety round/check completed    room door open  Intervention: Prevent Skin Injury  Recent Flowsheet Documentation  Taken 5/5/2023 2345 by Kath Moraes RN  Body Position: position changed independently     Problem: Restraint, Nonviolent  Goal: Absence of Harm or Injury  Intervention: Protect Skin and Joint Integrity  Recent Flowsheet Documentation  Taken 5/5/2023 2345 by Kath Moraes RN  Body Position: position changed independently   Goal Outcome Evaluation:  Pt has SOB with exertion. Now on 1L o2 NC sating 95%. Has been using the call light appropriately and pleasant. Denies pain. Scheduled tylenol given as per order. Waiting for K/Mg results this AM. No concerns noted overnight. Nursing will continue to monitor.

## 2023-05-06 NOTE — PROGRESS NOTES
Care Management Follow Up    Length of Stay (days): 11    Expected Discharge Date: 05/08/2023     Concerns to be Addressed:  Memory care/LTC placement       Patient plan of care discussed at interdisciplinary rounds: Yes     Anticipated Discharge Disposition:  Memory care/LTC     Anticipated Discharge Services:  Memory care/LTC     Anticipated Discharge DME:  none     Education Provided on the Discharge Plan:  Yes     Patient/Family in Agreement with the Plan:  yes     Referrals Placed by CM/SW:  Financial SW     Private pay costs discussed:  LTC and memory care are private pay-family notified     Additional Information:  Patient with  medical history of COPD not on home oxygen, lung cancer, vascular dementia, hypothyroidism, hypertension, recent pneumonia requiring hospitalization admitted on 4/24/2023 with worsening shortness of breath at rest, fever, hypoxia and increased confusion requiring restraints in the ED.   Psychiatry following, medications adjustments, recommendation is for memory care at discharge.  On O2 1L     Off 1:1 as of 5/4         Social History:  Patient and spouse live in a house.  Patient's two daughters live near by and provide support as needed.  Patient is independent with most ADLs, uses a cane, and requires assist with IADLs.  Spouse works part-time. She reports she is unable to manage patient's aggression and behaviors in the home. She is unsure financially what she can afford for LTC or memory care. They are on a limited budget. Spouse is primary contact and is supported by daughter Lore [phone]. [employer].     Care Ann Marie Franco has been in contact with family. There are concerns for ability to afford memory care or LTC. He provided them with waiver information and how to go about applying. He is willing to show them some facilities once financials have been figured out.     Spoke with spouse today 5/5. She confirms she can not take patient home and financials need to  be figured out for placement in memory care or LTC. She requests Financial GALL[employer] speak with Lore regarding LTC MA. She is aware that waiver coverage can take up to 2-4 months and will need to seek other means for facility coverage. Voice message left with Lore regarding above discussion as spouse is deferring to her to assist with planning.     Referral made to Financial HOLDER. She will follow up with Lore on Monday 5/8.    Voice message left with Lore and spouse.     Kennedi Borrego RN

## 2023-05-06 NOTE — PROGRESS NOTES
[facility]    Medicine Progress Note - Hospitalist Service    Date of Admission:  4/24/2023    Assessment & Plan       Dominik Rojas is an 82 year old male with medical history of COPD not on home oxygen, lung cancer, vascular dementia, hypothyroidism, hypertension, recent pneumonia requiring hospitalization admitted on 4/24/2023 with worsening shortness of breath at rest, fever, hypoxia and increased confusion requiring restraints in the ED.  ED work-up showed leukocytosis, lactic acidosis, mild hyperkalemia, hypoxemia needing high flow nasal oxygen.    He improved significantly over the last several days.  He was on high flow on admission 15 L/min and he is currently off O2 satting low 90s on room air.  He will need home  O2 evaluation  closer to discharge.  Of note patient is medically stable for discharge but pending placement.      Given his advanced dementia with behavioral disturbances, he lives with his spouse at home who is unable to care for him any longer.  Family is looking at different options including long-term care.  PT recommended home with assist since patient is ambulating independently with a walker.  Patient is not in agreement with spouse, and gets very agitated and becomes very impulsive requiring restraints when told that he will not be able to return home.    Acute hypoxic respiratory failure  Acute COPD exacerbation  Secondary bacterial pneumonia  -CT chest with IV contrast; Negative for PE but showed interstitial opacities in the dependent portions of bilateral lower lobes are nonspecific for atelectasis versus mild aspiration.  -Blood cultures no growth   -Continue supplementary oxygen to maintain SPO2 greater than 89 to 92%  -Xopenex 4 times daily, Atrovent 4 times daily, Pulmicort 2 times daily  -Completed oral prednisone 40 mg daily x 5 days.  -Completed Zosyn every 8 hours x 7 days.  MRSA negative   -Wean oxygen as tolerated.    Adenocarcinoma of the right  "lower lobe  Immunocompromised  -Stage T1 N0 M0  -Has completed radiation therapy on 4/21/2023, not a candidate for surgery due to age and medical comorbidities  -Follows with Dr. Bales through [facility] oncology    Mild hyperkalemia  -Resolved  -Will continue to monitor    Metabolic encephalopathy, resolved  Dementia with behavioral disturbance  Mood disorder  -Required restraints on 4/27-28 due to agitation and interfering with care  -Continue PTA olanzapine 7.5 mg at bedtime  -Continue PTA BuSpar 5 mg twice daily  -Continue PTA Cymbalta 60 mg daily  -Olanzapine 5 mg as needed daily for agitation and aggression  -Will monitor with 1:1 sitter  -Spouse unable to care for patient at home therefore he will be discharged to LTC    Essential hypertension  - Stable and controlled  - Hold losartan 25 mg daily, and furosemide 20 mg,   - Continue amlodipine 2.5 mg daily    Hyperlipidemia  -Continue PTA rosuvastatin 10 mg at bedtime     Diet: Regular Diet Adult    DVT Prophylaxis: Enoxaparin (Lovenox) SQ  Cabral Catheter: Not present  Lines: None     Cardiac Monitoring: None  Code Status: Full Code      Clinically Significant Risk Factors              # Hypoalbuminemia: Lowest albumin = 3.3 g/dL at 4/25/2023  6:57 AM, will monitor as appropriate            # Obesity: Estimated body mass index is 30.35 kg/m  as calculated from the following:    Height as of this encounter: 1.702 m (5' 7\").    Weight as of this encounter: 87.9 kg (193 lb 12.6 oz).          Disposition Plan  CM assisting with LTC placement as spouse unable to care for patient at home      Edmund Minaya MD  Hospitalist Service  [facility]  Securely message with [employer] (more info)  Text page via [employer] Paging/Directory   ______________________________________________________________________    Interval History     Patient is doing well.  No acute events overnight.  Resting comfortably and appears at baseline.  Redirectable and in good " spirits.      Physical Exam   Vital Signs: Temp: 98.4  F (36.9  C) Temp src: Oral BP: (!) 153/93 Pulse: 91   Resp: 19 SpO2: 94 % O2 Device: Nasal cannula Oxygen Delivery: 1 LPM  Weight: 193 lbs 12.55 oz      Physical Exam  Constitutional:       Appearance: He is obese.   HENT:      Head: Normocephalic.      Nose: Nose normal.      Mouth/Throat:      Mouth: Mucous membranes are moist.   Eyes:      Pupils: Pupils are equal, round, and reactive to light.   Cardiovascular:      Rate and Rhythm: Normal rate.   Pulmonary:      Effort: Pulmonary effort is normal.   Abdominal:      General: Abdomen is flat.   Musculoskeletal:         General: Normal range of motion.   Skin:     General: Skin is warm.      Capillary Refill: Capillary refill takes less than 2 seconds.   Neurological:      Mental Status: He is alert. Mental status is at baseline.             Medical Decision Making       MANAGEMENT DISCUSSED with the following over the past 24 hours: Prior hospitalist   NOTE(S)/MEDICAL RECORDS REVIEWED over the past 24 hours: Prior hospitalist H&P, ED physician, nursing  Respiratory, pharmacy, case management    Data     I have personally reviewed the following data over the past 24 hrs:    N/A  \   N/A   / N/A     N/A N/A N/A /  N/A   3.9 N/A N/A \       Imaging results reviewed over the past 24 hrs:   No results found for this or any previous visit (from the past 24 hour(s)).

## 2023-05-06 NOTE — PROGRESS NOTES
Patient seen on 1L NC 95% ,  All nebs given as scheduled per home routine. RT will continue to follow

## 2023-05-07 ENCOUNTER — DOCUMENTATION ONLY (OUTPATIENT)
Dept: HOME HEALTH SERVICES | Facility: CLINIC | Age: 83
End: 2023-05-07

## 2023-05-07 VITALS
HEART RATE: 90 BPM | RESPIRATION RATE: 16 BRPM | WEIGHT: 193.78 LBS | BODY MASS INDEX: 30.42 KG/M2 | TEMPERATURE: 98 F | HEIGHT: 67 IN | DIASTOLIC BLOOD PRESSURE: 69 MMHG | OXYGEN SATURATION: 90 % | SYSTOLIC BLOOD PRESSURE: 105 MMHG

## 2023-05-07 LAB
CREAT SERPL-MCNC: 1 MG/DL (ref 0.67–1.17)
GFR SERPL CREATININE-BSD FRML MDRD: 76 ML/MIN/1.73M2
MAGNESIUM SERPL-MCNC: 2.2 MG/DL (ref 1.7–2.3)
PLATELET # BLD AUTO: 147 10E3/UL (ref 150–450)
POTASSIUM SERPL-SCNC: 3.7 MMOL/L (ref 3.4–5.3)

## 2023-05-07 PROCEDURE — 250N000011 HC RX IP 250 OP 636: Performed by: INTERNAL MEDICINE

## 2023-05-07 PROCEDURE — 84132 ASSAY OF SERUM POTASSIUM: CPT | Performed by: HOSPITALIST

## 2023-05-07 PROCEDURE — 250N000013 HC RX MED GY IP 250 OP 250 PS 637: Performed by: INTERNAL MEDICINE

## 2023-05-07 PROCEDURE — 85049 AUTOMATED PLATELET COUNT: CPT | Performed by: INTERNAL MEDICINE

## 2023-05-07 PROCEDURE — 82565 ASSAY OF CREATININE: CPT | Performed by: INTERNAL MEDICINE

## 2023-05-07 PROCEDURE — 999N000157 HC STATISTIC RCP TIME EA 10 MIN

## 2023-05-07 PROCEDURE — 83735 ASSAY OF MAGNESIUM: CPT | Performed by: HOSPITALIST

## 2023-05-07 PROCEDURE — 36415 COLL VENOUS BLD VENIPUNCTURE: CPT | Performed by: INTERNAL MEDICINE

## 2023-05-07 PROCEDURE — 99239 HOSP IP/OBS DSCHRG MGMT >30: CPT | Performed by: HOSPITALIST

## 2023-05-07 PROCEDURE — 250N000009 HC RX 250: Performed by: INTERNAL MEDICINE

## 2023-05-07 PROCEDURE — 94640 AIRWAY INHALATION TREATMENT: CPT

## 2023-05-07 RX ORDER — FLUTICASONE PROPIONATE AND SALMETEROL 250; 50 UG/1; UG/1
1 POWDER RESPIRATORY (INHALATION) EVERY 12 HOURS
Qty: 14 EACH | Refills: 1 | Status: ON HOLD | OUTPATIENT
Start: 2023-05-07 | End: 2024-06-29

## 2023-05-07 RX ORDER — ALBUTEROL SULFATE 90 UG/1
2 AEROSOL, METERED RESPIRATORY (INHALATION) EVERY 6 HOURS PRN
Qty: 18 G | Refills: 1 | Status: ON HOLD | OUTPATIENT
Start: 2023-05-07 | End: 2023-05-13

## 2023-05-07 RX ORDER — QUETIAPINE FUMARATE 25 MG/1
25 TABLET, FILM COATED ORAL 2 TIMES DAILY PRN
Qty: 30 TABLET | Refills: 0 | Status: SHIPPED | OUTPATIENT
Start: 2023-05-07 | End: 2023-05-07

## 2023-05-07 RX ORDER — QUETIAPINE FUMARATE 25 MG/1
25 TABLET, FILM COATED ORAL 2 TIMES DAILY PRN
Qty: 14 TABLET | Refills: 0 | Status: SHIPPED | OUTPATIENT
Start: 2023-05-07 | End: 2023-09-19

## 2023-05-07 RX ORDER — ACETAMINOPHEN 500 MG
1000 TABLET ORAL EVERY 8 HOURS
Qty: 180 TABLET | Refills: 0 | Status: SHIPPED | OUTPATIENT
Start: 2023-05-07 | End: 2023-06-06

## 2023-05-07 RX ADMIN — DULOXETINE HYDROCHLORIDE 60 MG: 60 CAPSULE, DELAYED RELEASE PELLETS ORAL at 09:20

## 2023-05-07 RX ADMIN — ENOXAPARIN SODIUM 40 MG: 40 INJECTION SUBCUTANEOUS at 09:22

## 2023-05-07 RX ADMIN — DOCUSATE SODIUM 100 MG: 100 CAPSULE, LIQUID FILLED ORAL at 09:20

## 2023-05-07 RX ADMIN — AMLODIPINE BESYLATE 2.5 MG: 2.5 TABLET ORAL at 09:22

## 2023-05-07 RX ADMIN — Medication 1 TABLET: at 09:21

## 2023-05-07 RX ADMIN — BUDESONIDE 0.5 MG: 0.5 INHALANT RESPIRATORY (INHALATION) at 07:51

## 2023-05-07 RX ADMIN — FUROSEMIDE 20 MG: 20 TABLET ORAL at 09:21

## 2023-05-07 RX ADMIN — LOSARTAN POTASSIUM 25 MG: 25 TABLET, FILM COATED ORAL at 09:21

## 2023-05-07 RX ADMIN — BUSPIRONE HYDROCHLORIDE 5 MG: 5 TABLET ORAL at 09:22

## 2023-05-07 RX ADMIN — LEVOTHYROXINE SODIUM 88 MCG: 88 TABLET ORAL at 06:11

## 2023-05-07 RX ADMIN — ACETAMINOPHEN 975 MG: 325 TABLET ORAL at 09:21

## 2023-05-07 RX ADMIN — ASPIRIN 81 MG: 81 TABLET, CHEWABLE ORAL at 09:21

## 2023-05-07 RX ADMIN — METOPROLOL SUCCINATE 12.5 MG: 25 TABLET, EXTENDED RELEASE ORAL at 09:20

## 2023-05-07 RX ADMIN — ACETAMINOPHEN 975 MG: 325 TABLET ORAL at 14:34

## 2023-05-07 RX ADMIN — FAMOTIDINE 20 MG: 20 TABLET ORAL at 09:20

## 2023-05-07 ASSESSMENT — ACTIVITIES OF DAILY LIVING (ADL)
ADLS_ACUITY_SCORE: 45

## 2023-05-07 NOTE — PROGRESS NOTES
Patient has been assessed for Home Oxygen needs.     Pulse oximetry (SpO2) and Oxygen flow readings:    SpO2 = 90% on room air at rest while awake.    SpO2 improved to 94% on 2 liters/minute at rest.    SpO2 = 87% on room air during activity/with exercise.    *SpO2 improved to 89% on 2 liters/minute during activity/with exercise.      Germán Porter RN

## 2023-05-07 NOTE — PROGRESS NOTES
Received intake call for home oxygen at 4:00 pm. Reviewed patient's chart; Patient qualifies under insurance guidelines and all documentation is in the chart including a good order.     4:09 PM- Spoke with DIONI Winn confirmed we received the order, and provided them with ETA of oxygen.     4:15 PM - Upon further review, patient is a current [facility] O2 patient and has a POC that will meet the needs of the new o2 Rx of 2 LPM Continuous. I attempted to reach patient at bedside phone to discuss current equipment and see if someone is able to bring his equipment to him at the hospital to use for discharge. Unable to connect with bedside phone. I called the RN station back again and spoke with a floor nurse who said that I couild try calling patient's Spouse, Jovana to arrange oxygen equipment needs.   4:20 PM - Left message for Jovana at her C# to call the on call phone to discuss O2 transport home. I am hoping she is able to bring the patient's POC from home to him at the hospital for discharge. If not, we could arrange for a transport tank to be deivered by our  to bedside for discharge but ETA is 4 hours. Will await a phone call back from spouse to confirm.  4:28 PM - Spoke with patient at bedside phone and confirmed he has a O2 concentrator at home (POC). I let him know I would have a  deliver a tranport tank of o2 to get him home and he should use his equipment at home at 2lpm Continuous per new rx.

## 2023-05-07 NOTE — PLAN OF CARE
"  Problem: Plan of Care - These are the overarching goals to be used throughout the patient stay.    Goal: Plan of Care Review  Description: The Plan of Care Review/Shift note should be completed every shift.  The Outcome Evaluation is a brief statement about your assessment that the patient is improving, declining, or no change.  This information will be displayed automatically on your shift note.  Outcome: Adequate for Care Transition  Goal: Patient-Specific Goal (Individualized)  Description: You can add care plan individualizations to a care plan. Examples of Individualization might be:  \"Parent requests to be called daily at 9am for status\", \"I have a hard time hearing out of my right ear\", or \"Do not touch me to wake me up as it startles me\".  Outcome: Adequate for Care Transition  Goal: Absence of Hospital-Acquired Illness or Injury  Outcome: Adequate for Care Transition  Intervention: Identify and Manage Fall Risk  Recent Flowsheet Documentation  Taken 5/7/2023 0000 by Vianey Choudhary RN  Safety Promotion/Fall Prevention:   activity supervised   clutter free environment maintained  Taken 5/6/2023 2000 by Vianey Choudhary RN  Safety Promotion/Fall Prevention:   activity supervised   clutter free environment maintained  Intervention: Prevent Skin Injury  Recent Flowsheet Documentation  Taken 5/7/2023 0000 by Vianey Choudhary RN  Body Position: position changed independently  Taken 5/6/2023 2000 by Vianey Choudhary RN  Body Position: position changed independently  Goal: Readiness for Transition of Care  Outcome: Adequate for Care Transition     Problem: Restraint, Nonviolent  Goal: Absence of Harm or Injury  Outcome: Adequate for Care Transition  Intervention: Protect Skin and Joint Integrity  Recent Flowsheet Documentation  Taken 5/7/2023 0000 by Vianey Choudhary RN  Body Position: position changed independently  Taken 5/6/2023 2000 by Vianey Choudhary RN  Body Position: position changed " independently     Problem: Risk for Delirium  Goal: Improved Behavioral Control  Outcome: Adequate for Care Transition  Intervention: Prevent and Manage Agitation  Recent Flowsheet Documentation  Taken 5/7/2023 0000 by Vianey Choudhary RN  Environment Familiarity/Consistency: daily routine followed  Taken 5/6/2023 2000 by Vianey Choudhary RN  Environment Familiarity/Consistency: daily routine followed  Intervention: Minimize Safety Risk  Recent Flowsheet Documentation  Taken 5/7/2023 0000 by Vianey Choudhary RN  Enhanced Safety Measures:  at bedside  Taken 5/6/2023 2000 by Vianey Choudhary RN  Enhanced Safety Measures:  at bedside  Goal: Improved Attention and Thought Clarity  Outcome: Adequate for Care Transition  Intervention: Maximize Cognitive Function  Recent Flowsheet Documentation  Taken 5/7/2023 0000 by Vianey Choudhary RN  Sensory Stimulation Regulation:   lighting decreased   quiet environment promoted  Reorientation Measures: clock in view  Taken 5/6/2023 2000 by Vianey Choudhary RN  Sensory Stimulation Regulation:   lighting decreased   quiet environment promoted  Reorientation Measures: clock in view  Goal: Improved Sleep  Outcome: Adequate for Care Transition     Problem: Violence Risk or Actual  Goal: Anger and Impulse Control  Outcome: Adequate for Care Transition  Intervention: Minimize Safety Risk  Recent Flowsheet Documentation  Taken 5/7/2023 0000 by Vianey Choudhary RN  Sensory Stimulation Regulation:   lighting decreased   quiet environment promoted  Enhanced Safety Measures:  at bedside  Taken 5/6/2023 2000 by Vianey Choudhary RN  Sensory Stimulation Regulation:   lighting decreased   quiet environment promoted  Enhanced Safety Measures:  at bedside     Problem: Pneumonia  Goal: Fluid Balance  Outcome: Adequate for Care Transition  Goal: Resolution of Infection Signs and Symptoms  Outcome: Adequate for Care  Transition  Goal: Effective Oxygenation and Ventilation  Outcome: Adequate for Care Transition  Intervention: Promote Airway Secretion Clearance  Recent Flowsheet Documentation  Taken 5/7/2023 0000 by Vianey Choudhary RN  Cough And Deep Breathing: done with encouragement  Taken 5/6/2023 2000 by Vianey Choudhary RN  Cough And Deep Breathing: done with encouragement  Intervention: Optimize Oxygenation and Ventilation  Recent Flowsheet Documentation  Taken 5/7/2023 0000 by Vianey Choudhary RN  Head of Bed (hospitals) Positioning: HOB at 20-30 degrees  Taken 5/6/2023 2000 by Vianey Choudhary RN  Head of Bed (hospitals) Positioning: HOB at 20-30 degrees     Problem: Sepsis/Septic Shock  Goal: Optimal Coping  Outcome: Adequate for Care Transition  Goal: Absence of Bleeding  Outcome: Adequate for Care Transition  Goal: Blood Glucose Level Within Targeted Range  Outcome: Adequate for Care Transition  Goal: Absence of Infection Signs and Symptoms  Outcome: Adequate for Care Transition  Intervention: Promote Recovery  Recent Flowsheet Documentation  Taken 5/7/2023 0000 by Vianey Choudhary RN  Activity Management: activity adjusted per tolerance  Taken 5/6/2023 2000 by Vianey Choudhary RN  Activity Management: activity adjusted per tolerance  Goal: Optimal Nutrition Intake  Outcome: Adequate for Care Transition     Problem: Hypertension Comorbidity  Goal: Blood Pressure in Desired Range  Outcome: Adequate for Care Transition  Intervention: Maintain Blood Pressure Management  Recent Flowsheet Documentation  Taken 5/7/2023 0000 by Vianey Choudhary RN  Medication Review/Management: medications reviewed  Taken 5/6/2023 2000 by Vianey Choudhary RN  Medication Review/Management: medications reviewed     Problem: Gas Exchange Impaired  Goal: Optimal Gas Exchange  Outcome: Adequate for Care Transition  Intervention: Optimize Oxygenation and Ventilation  Recent Flowsheet Documentation  Taken 5/7/2023 0000 by Vianey Choudhary RN  Head   Bed (HOB) Positioning: HOB at 20-30 degrees  Taken 5/6/2023 2000 by Vianey Choudhary RN  Head of Bed (HOB) Positioning: HOB at 20-30 degrees     Problem: Fall Injury Risk  Goal: Absence of Fall and Fall-Related Injury  Outcome: Adequate for Care Transition  Intervention: Identify and Manage Contributors  Recent Flowsheet Documentation  Taken 5/7/2023 0000 by Vianey Choudhary RN  Medication Review/Management: medications reviewed  Taken 5/6/2023 2000 by Vianey Choudhary RN  Medication Review/Management: medications reviewed  Intervention: Promote Injury-Free Environment  Recent Flowsheet Documentation  Taken 5/7/2023 0000 by Vianey Choudhary RN  Safety Promotion/Fall Prevention:   activity supervised   clutter free environment maintained  Taken 5/6/2023 2000 by Vianey Choudhary RN  Safety Promotion/Fall Prevention:   activity supervised   clutter free environment maintained     Problem: Confusion Chronic  Goal: Optimal Cognitive Function  Outcome: Adequate for Care Transition  Intervention: Minimize Injury Risk and Provide Safety  Recent Flowsheet Documentation  Taken 5/7/2023 0000 by Vianey Choudhary RN  Enhanced Safety Measures:  at bedside  Taken 5/6/2023 2000 by Vianey Choudhary RN  Enhanced Safety Measures:  at bedside  Intervention: Minimize and Manage Confusion  Recent Flowsheet Documentation  Taken 5/7/2023 0000 by Vianey Choudhary RN  Sensory Stimulation Regulation:   lighting decreased   quiet environment promoted  Reorientation Measures: clock in view  Environment Familiarity/Consistency: daily routine followed  Taken 5/6/2023 2000 by Vianey Choudhary RN  Sensory Stimulation Regulation:   lighting decreased   quiet environment promoted  Reorientation Measures: clock in view  Environment Familiarity/Consistency: daily routine followed   Goal Outcome Evaluation:  Dominik remained hemodynamically stable and pain free overnight. He remains mildly confused, but pleasant. He  was able to sleep through the night and cares were clustered

## 2023-05-07 NOTE — PLAN OF CARE
Discharge Instructions     Discharge disposition:  Discharged to home       Diet:  Regular       Activity Activity as tolerated       Follow-up: Follow up with Chest CT on June 17, 2023       Additional instructions: None

## 2023-05-07 NOTE — PROGRESS NOTES
Pt currently using 1-2L NC. Pt given pulmicort x1 given as scheduled per home routine. BS are clear/diminished pre/post tx. RT to continue to follow.      Esthela Dorado, RT

## 2023-05-07 NOTE — DISCHARGE SUMMARY
[facility] MEDICINE  DISCHARGE SUMMARY     Primary Care Physician: Misael Moe  Admission Date: 4/24/2023   Discharge Provider: Edmund Minaya MD Discharge Date: 5/7/2023   Diet:   Active Diet and Nourishment Order   Procedures     Regular Diet Adult     Diet       Code Status: Full Code   Activity: DCACTIVITY: Activity as tolerated        Condition at Discharge: Fair       PRINCIPAL & ACTIVE DISCHARGE DIAGNOSES     Principal Problem:    HCAP (healthcare-associated pneumonia)  Active Problems:    Benign essential hypertension    COPD exacerbation (H)    Dementia with mood disturbance (H)      PENDING LABS     Unresulted Labs Ordered in the Past 30 Days of this Admission     No orders found from 3/25/2023 to 4/25/2023.            PROCEDURES ( this hospitalization only)          RECOMMENDATIONS TO OUTPATIENT PROVIDER FOR F/U VISIT     Follow-up Appointments     Follow-up and recommended labs and tests       Follow up with primary care provider, Misael Moe, within 7 days to   evaluate medication change.  No follow up labs or test are needed.                 DISPOSITION     Home    SUMMARY OF HOSPITAL COURSE:      Initial presentation (Copied from hospitals)    Dominik Rojas is an 82 year old male who presented to the ED with worsening shortness of breath at rest, wheezing and increased confusion.  Noted to be very hypoxic and put on BiPAP but became more restless and so was switched to high flow nasal oxygen.  No vomiting, diarrhea, or chest pain.  Full history is unobtainable because of patient's baseline dementia      Problems addressed during hospital stay      Acute hypoxic respiratory failure  Acute COPD exacerbation  Secondary bacterial pneumonia  -CT chest with IV contrast; Negative for PE but showed interstitial opacities in the dependent portions of bilateral lower lobes are nonspecific for atelectasis versus mild aspiration.  -Blood cultures no growth   -Oxygen has  been weaned off and saturation above 90% on room air  -Xopenex 4 times daily, Atrovent 4 times daily, Pulmicort 2 times daily  -Completed oral prednisone 40 mg daily x 5 days.  -Completed Zosyn every 8 hours x 7 days.    -MRSA negative   -Wean oxygen as tolerated.     Adenocarcinoma of the right lower lobe  Immunocompromised  -Stage T1 N0 M0  -Has completed radiation therapy on 4/21/2023, not a candidate for surgery due to age and medical comorbidities  -Follows with Dr. Bales through [facility] oncology     Mild hyperkalemia  -Resolved  -Will continue to monitor     Metabolic encephalopathy, resolved  Dementia with behavioral disturbance  Mood disorder  -Required restraints on 4/27-28 due to agitation and interfering with care  -Continue PTA olanzapine 7.5 mg at bedtime  -Continue PTA BuSpar 5 mg twice daily  -Continue PTA Cymbalta 60 mg daily  -Olanzapine 5 mg as needed daily for agitation and aggression  -Has been  off one-to-one May 4, 2023    Wife unable to care for patient at home however given the signs of improvement per discussion with case management below the following has been agreed upon.    Per discussion with case management;  Patient has been off 1:1 since 5/4 and behaviors manageable.  He is off oxygen. Spoke with spouse and daughter Lore over the phone. They need time to work on financials before placement. Financial SW will be contacting them. They have been in contact with [facility]. Packet of resources put together for spouse/family. Strong encouragement given to start process of applying for either waiver or LTC MA. Spoke with Lore on regarding spouse needing more support as she will have difficulty managing all of this on her own. She states understanding.      Patient remains a high readmission risk.    Essential hypertension  - Stable and controlled  - Hold losartan 25 mg daily, and furosemide 20 mg,   - Continue amlodipine 2.5 mg daily     Hyperlipidemia  -Continue PTA rosuvastatin  10 mg at bedtime       Discharge Medications with Med changes:     Current Discharge Medication List      START taking these medications    Details   albuterol (PROAIR HFA/PROVENTIL HFA/VENTOLIN HFA) 108 (90 Base) MCG/ACT inhaler Inhale 2 puffs into the lungs every 6 hours as needed for shortness of breath, wheezing or cough  Qty: 18 g, Refills: 1    Comments: Pharmacy may dispense brand covered by insurance (Proair, or proventil or ventolin or generic albuterol inhaler)  Associated Diagnoses: COPD exacerbation (H); Dementia with mood disturbance, unspecified dementia severity, unspecified dementia type (H); Pneumonia of right lower lobe due to infectious organism; Chronic systolic congestive heart failure (H)      fluticasone-salmeterol (ADVAIR) 250-50 MCG/ACT inhaler Inhale 1 puff into the lungs every 12 hours for 30 days  Qty: 14 each, Refills: 1    Associated Diagnoses: COPD exacerbation (H); Dementia with mood disturbance, unspecified dementia severity, unspecified dementia type (H); Pneumonia of right lower lobe due to infectious organism; Chronic systolic congestive heart failure (H)      melatonin 1 MG TABS tablet Take 1 tablet (1 mg) by mouth At Bedtime for 30 days  Qty: 30 tablet, Refills: 0    Associated Diagnoses: COPD exacerbation (H); Dementia with mood disturbance, unspecified dementia severity, unspecified dementia type (H); Pneumonia of right lower lobe due to infectious organism; Chronic systolic congestive heart failure (H)      QUEtiapine (SEROQUEL) 25 MG tablet Take 1 tablet (25 mg) by mouth 2 times daily as needed (Agitation)  Qty: 30 tablet, Refills: 0    Associated Diagnoses: COPD exacerbation (H); Dementia with mood disturbance, unspecified dementia severity, unspecified dementia type (H); Pneumonia of right lower lobe due to infectious organism; Chronic systolic congestive heart failure (H)         CONTINUE these medications which have CHANGED    Details   acetaminophen (TYLENOL) 500 MG  tablet Take 2 tablets (1,000 mg) by mouth every 8 hours for 30 days  Qty: 180 tablet, Refills: 0    Associated Diagnoses: COPD exacerbation (H); Dementia with mood disturbance, unspecified dementia severity, unspecified dementia type (H); Pneumonia of right lower lobe due to infectious organism; Chronic systolic congestive heart failure (H)         CONTINUE these medications which have NOT CHANGED    Details   amLODIPine (NORVASC) 2.5 MG tablet Take 2.5 mg by mouth daily      aspirin (ASA) 81 MG chewable tablet Take 81 mg by mouth daily      busPIRone (BUSPAR) 5 MG tablet Take 5 mg by mouth 2 times daily      Cholecalciferol (VITAMIN D3) 50 MCG (2000 UT) CAPS Take 1 tablet by mouth daily       DULoxetine (CYMBALTA) 60 MG capsule Take 60 mg by mouth daily      furosemide (LASIX) 20 MG tablet Take 20 mg by mouth daily      latanoprost (XALATAN) 0.005 % ophthalmic solution Place 1 drop into both eyes At Bedtime      levothyroxine (SYNTHROID/LEVOTHROID) 88 MCG tablet TAKE 1 TABLET BY MOUTH EVERY DAY IN THE MORNING ON AN EMPTY STOMACH FOR 30 DAYS      losartan (COZAAR) 25 MG tablet Take 1 tablet (25 mg) by mouth daily  Qty: 30 tablet, Refills: 0    Associated Diagnoses: Congestive heart failure, unspecified HF chronicity, unspecified heart failure type (H)      metoprolol succinate ER (TOPROL-XL) 25 MG 24 hr tablet Take 0.5 tablets (12.5 mg) by mouth daily    Associated Diagnoses: Essential hypertension      multivitamin w/minerals (THERA-VIT-M) tablet Take 1 tablet by mouth daily      OLANZapine (ZYPREXA) 5 MG tablet Take 7.5 mg by mouth At Bedtime      pantoprazole (PROTONIX) 40 MG EC tablet Take 1 tablet (40 mg) by mouth every morning (before breakfast)  Qty: 30 tablet, Refills: 0    Associated Diagnoses: Gastroesophageal reflux disease with esophagitis without hemorrhage      rosuvastatin (CRESTOR) 10 MG tablet Take 10 mg by mouth At Bedtime                    Rationale for medication changes:              Consults        PHARMACY TO DOSE VANCO  CARE MANAGEMENT / SOCIAL WORK IP CONSULT  PHYSICAL THERAPY ADULT IP CONSULT  PHARMACY TO DOSE VANCO  PHARMACY IP CONSULT  PSYCHIATRY IP CONSULT  PHYSICAL THERAPY ADULT IP CONSULT  OCCUPATIONAL THERAPY ADULT IP CONSULT  CARE MANAGEMENT / SOCIAL WORK IP CONSULT    Immunizations given this encounter     Most Recent Immunizations   Administered Date(s) Administered     COVID-19 Monovalent 18+ (Moderna) 11/02/2021     Flu, Unspecified 10/13/2017     Influenza Vaccine, 6+MO IM (QUADRIVALENT W/PRESERVATIVES) 10/15/2019           Anticoagulation Information            SIGNIFICANT IMAGING FINDINGS     Results for orders placed or performed during the hospital encounter of 04/24/23   XR Chest Port 1 View    Impression    IMPRESSION: Known right lower lobe mass. Hypoventilatory lungs with bibasilar atelectasis. No focal pulmonary consolidation or effusion. Underlying emphysematous changes. Normal heart size without pulmonary vascular congestion. No pneumothorax.   Atherosclerotic calcifications of the aortic arch. No acute osseous abnormality.   CT Chest Pulmonary Embolism w Contrast    Impression    IMPRESSION:    1.  No pulmonary embolism or confluent consolidations.    2.  Interstitial opacities in the dependent portions of bilateral lower lobes are nonspecific for atelectasis versus mild aspiration. Clinical correlation is recommended.    3.  2.4 cm nodule in the posterior right lower lobe, unchanged, in keeping with known malignancy.    4.  Redemonstration of mild upper lobe predominant emphysema, mild cardiomegaly and cholelithiasis.   CT Head w/o Contrast    Impression    IMPRESSION:  1.  No CT evidence for acute intracranial process.  2.  Brain atrophy and presumed chronic microvascular ischemic changes similar to the previous exam.       SIGNIFICANT LABORATORY FINDINGS     Most Recent 3 CBC's:Recent Labs   Lab Test 05/07/23  0530 05/05/23  0605 05/04/23  0546 05/02/23  0507 05/01/23  0506    WBC  --  9.0  --  13.4* 12.8*   HGB  --  13.0*  --  13.0* 13.3   MCV  --  95  --  95 94   * 164 172 195 204     Most Recent 3 BMP's:Recent Labs   Lab Test 05/07/23  0530 05/06/23  2137 05/06/23  0611 05/05/23  0605 05/04/23  0546 05/03/23  0541   NA  --   --   --  144 142 141   POTASSIUM 3.7  --  3.9 3.8 4.0 4.0   CHLORIDE  --   --   --  107 106 104   CO2  --   --   --  29 28 28   BUN  --   --   --  28.6* 25.3* 24.8*   CR 1.00  --   --  1.02  1.02 1.00 1.01   ANIONGAP  --   --   --  8 8 9   CHELI  --   --   --  8.5* 8.9 9.2   GLC  --  85  --  91 79 87           Discharge Orders        Reason for your hospital stay    COPD     Follow-up and recommended labs and tests     Follow up with primary care provider, Misael Moe, within 7 days to evaluate medication change.  No follow up labs or test are needed.     Activity    Your activity upon discharge: activity as tolerated     Full Code     Diet    Follow this diet upon discharge: Orders Placed This Encounter      Regular Diet Adult       Examination   Physical Exam   Temp:  [97.7  F (36.5  C)-98.3  F (36.8  C)] 98.1  F (36.7  C)  Pulse:  [78-99] 99  Resp:  [13-19] 19  BP: ()/(60-97) 152/97  SpO2:  [91 %-94 %] 92 %  Wt Readings from Last 1 Encounters:   05/02/23 87.9 kg (193 lb 12.6 oz)       Physical Exam  Constitutional:       Appearance: He is obese.   HENT:      Head: Normocephalic.      Nose: Nose normal.      Mouth/Throat:      Mouth: Mucous membranes are moist.   Eyes:      Pupils: Pupils are equal, round, and reactive to light.   Cardiovascular:      Rate and Rhythm: Normal rate.   Pulmonary:      Effort: Pulmonary effort is normal.   Abdominal:      General: Abdomen is flat.   Musculoskeletal:         General: Normal range of motion.   Skin:     General: Skin is warm.      Capillary Refill: Capillary refill takes less than 2 seconds.   Neurological:      Mental Status: He is alert. Mental status is at baseline      Please see EMR for more  detailed significant labs, imaging, consultant notes etc.    I, Edmund Minaya MD, personally saw the patient today and spent greater than 30 minutes discharging this patient.    Edmund Minaya MD  [facility]    CC:Misael Moe

## 2023-05-07 NOTE — PROGRESS NOTES
Care Management Discharge Note    Discharge Date: 05/08/2023       Discharge Disposition: Home    Discharge Services: None    Discharge DME: None    Discharge Transportation: family     Education Provided on the Discharge Plan:  Yes    Persons Notified of Discharge Plans: yes    Patient/Family in Agreement with the Plan: yes    Handoff Referral Completed:  yes    Additional Information:  Patient has been off 1:1 since 5/4 and behaviors manageable.  He is off oxygen. Spoke with spouse and daughter Lore over the phone. They need time to work on financials before placement. Financial SW will be contacting them. They have been in contact with [facility]. Packet of resources put together for spouse/family. Strong encouragement given to start process of applying for either waiver or LTC MA. Spoke with Lore on regarding spouse needing more support as she will have difficulty managing all of this on her own. She states understanding.     Family will arrive around 5pm to bring patient home.    4:04 PM Notified by charge nurse, patient had home O2 eval and requires home O2. Nurse to notify spouse as there will be 4 hour turn around.        Kennedi Borrego RN

## 2023-05-07 NOTE — DISCHARGE SUMMARY
[facility] MEDICINE  DISCHARGE SUMMARY     Primary Care Physician: Misael Moe  Admission Date: 4/24/2023   Discharge Provider: Edmund Minaya MD Discharge Date: 5/7/2023   Diet:   Active Diet and Nourishment Order   Procedures     Regular Diet Adult     Diet       Code Status: Full Code   Activity: DCACTIVITY: Activity as tolerated        Condition at Discharge: Fair       PRINCIPAL & ACTIVE DISCHARGE DIAGNOSES     Principal Problem:    HCAP (healthcare-associated pneumonia)  Active Problems:    Benign essential hypertension    COPD exacerbation (H)    Dementia with mood disturbance (H)      PENDING LABS     Unresulted Labs Ordered in the Past 30 Days of this Admission     No orders found from 3/25/2023 to 4/25/2023.            PROCEDURES ( this hospitalization only)          RECOMMENDATIONS TO OUTPATIENT PROVIDER FOR F/U VISIT     Follow-up Appointments     Follow-up and recommended labs and tests       Follow up with primary care provider, Misael Moe, within 7 days to   evaluate medication change.  No follow up labs or test are needed.                 DISPOSITION     Home    I certify that this patient, Dominik Rojas has been under my care (or a nurse practitioner or physican's assistant working with me). This is the face-to-face encounter for oxygen medical necessity.      At the time of this encounter supplemental oxygen is reasonable and necessary and is expected to improve the patient's condition in a home setting.       Patient has continued oxygen desaturation due to COPD J44.9.    If portability is ordered, is the patient mobile within the home? yes        SUMMARY OF HOSPITAL COURSE:      Initial presentation (Copied from HPI)    Dominik Rojas is an 82 year old male who presented to the ED with worsening shortness of breath at rest, wheezing and increased confusion.  Noted to be very hypoxic and put on BiPAP but became more restless and so was switched to  high flow nasal oxygen.  No vomiting, diarrhea, or chest pain.  Full history is unobtainable because of patient's baseline dementia      Problems addressed during hospital stay      Acute hypoxic respiratory failure  Acute COPD exacerbation  Secondary bacterial pneumonia  Sepsis due to bacteria pneumonia, treated and resolved  HCAP bacterial pneumonia, unable to further clarify  -CT chest with IV contrast; Negative for PE but showed interstitial opacities in the dependent portions of bilateral lower lobes are nonspecific for atelectasis versus mild aspiration.  -Blood cultures no growth   -patient QUALIFIES FOR HOME O2   -Xopenex 4 times daily, Atrovent 4 times daily, Pulmicort 2 times daily  -Completed oral prednisone 40 mg daily x 5 days.  -Completed Zosyn every 8 hours x 7 days.    -MRSA negative   -Wean oxygen as tolerated.     Adenocarcinoma of the right lower lobe  Immunocompromised  -Stage T1 N0 M0  -Has completed radiation therapy on 4/21/2023, not a candidate for surgery due to age and medical comorbidities  -Follows with Dr. Bales through [facility] oncology     Mild hyperkalemia  -Resolved  -Will continue to monitor     Metabolic encephalopathy, resolved  Dementia with behavioral disturbance  Mood disorder  -Required restraints on 4/27-28 due to agitation and interfering with care  -Continue PTA olanzapine 7.5 mg at bedtime  -Continue PTA BuSpar 5 mg twice daily  -Continue PTA Cymbalta 60 mg daily  -Olanzapine 5 mg as needed daily for agitation and aggression  -Has been  off one-to-one May 4, 2023    Wife unable to care for patient at home however given the signs of improvement per discussion with case management below the following has been agreed upon.    Per discussion with case management;  Patient has been off 1:1 since 5/4 and behaviors manageable.  He is off oxygen. Spoke with spouse and daughter Lore over the phone. They need time to work on financials before placement. Financial SW will be  contacting them. They have been in contact with [facility]. Packet of resources put together for spouse/family. Strong encouragement given to start process of applying for either waiver or LTC MA. Spoke with Lore on regarding spouse needing more support as she will have difficulty managing all of this on her own. She states understanding.      Patient remains a high readmission risk.    Essential hypertension  - Stable and controlled  - Hold losartan 25 mg daily, and furosemide 20 mg,   - Continue amlodipine 2.5 mg daily     Hyperlipidemia  -Continue PTA rosuvastatin 10 mg at bedtime       Discharge Medications with Med changes:     Current Discharge Medication List      START taking these medications    Details   albuterol (PROAIR HFA/PROVENTIL HFA/VENTOLIN HFA) 108 (90 Base) MCG/ACT inhaler Inhale 2 puffs into the lungs every 6 hours as needed for shortness of breath, wheezing or cough  Qty: 18 g, Refills: 1    Comments: Pharmacy may dispense brand covered by insurance (Proair, or proventil or ventolin or generic albuterol inhaler)  Associated Diagnoses: COPD exacerbation (H); Dementia with mood disturbance, unspecified dementia severity, unspecified dementia type (H); Pneumonia of right lower lobe due to infectious organism; Chronic systolic congestive heart failure (H)      fluticasone-salmeterol (ADVAIR) 250-50 MCG/ACT inhaler Inhale 1 puff into the lungs every 12 hours for 30 days  Qty: 14 each, Refills: 1    Associated Diagnoses: COPD exacerbation (H); Dementia with mood disturbance, unspecified dementia severity, unspecified dementia type (H); Pneumonia of right lower lobe due to infectious organism; Chronic systolic congestive heart failure (H)      melatonin 1 MG TABS tablet Take 1 tablet (1 mg) by mouth At Bedtime for 30 days  Qty: 30 tablet, Refills: 0    Associated Diagnoses: COPD exacerbation (H); Dementia with mood disturbance, unspecified dementia severity, unspecified dementia type (H);  Pneumonia of right lower lobe due to infectious organism; Chronic systolic congestive heart failure (H)      QUEtiapine (SEROQUEL) 25 MG tablet Take 1 tablet (25 mg) by mouth 2 times daily as needed (Agitation)  Qty: 30 tablet, Refills: 0    Associated Diagnoses: COPD exacerbation (H); Dementia with mood disturbance, unspecified dementia severity, unspecified dementia type (H); Pneumonia of right lower lobe due to infectious organism; Chronic systolic congestive heart failure (H)         CONTINUE these medications which have CHANGED    Details   acetaminophen (TYLENOL) 500 MG tablet Take 2 tablets (1,000 mg) by mouth every 8 hours for 30 days  Qty: 180 tablet, Refills: 0    Associated Diagnoses: COPD exacerbation (H); Dementia with mood disturbance, unspecified dementia severity, unspecified dementia type (H); Pneumonia of right lower lobe due to infectious organism; Chronic systolic congestive heart failure (H)         CONTINUE these medications which have NOT CHANGED    Details   amLODIPine (NORVASC) 2.5 MG tablet Take 2.5 mg by mouth daily      aspirin (ASA) 81 MG chewable tablet Take 81 mg by mouth daily      busPIRone (BUSPAR) 5 MG tablet Take 5 mg by mouth 2 times daily      Cholecalciferol (VITAMIN D3) 50 MCG (2000 UT) CAPS Take 1 tablet by mouth daily       DULoxetine (CYMBALTA) 60 MG capsule Take 60 mg by mouth daily      furosemide (LASIX) 20 MG tablet Take 20 mg by mouth daily      latanoprost (XALATAN) 0.005 % ophthalmic solution Place 1 drop into both eyes At Bedtime      levothyroxine (SYNTHROID/LEVOTHROID) 88 MCG tablet TAKE 1 TABLET BY MOUTH EVERY DAY IN THE MORNING ON AN EMPTY STOMACH FOR 30 DAYS      losartan (COZAAR) 25 MG tablet Take 1 tablet (25 mg) by mouth daily  Qty: 30 tablet, Refills: 0    Associated Diagnoses: Congestive heart failure, unspecified HF chronicity, unspecified heart failure type (H)      metoprolol succinate ER (TOPROL-XL) 25 MG 24 hr tablet Take 0.5 tablets (12.5 mg) by  mouth daily    Associated Diagnoses: Essential hypertension      multivitamin w/minerals (THERA-VIT-M) tablet Take 1 tablet by mouth daily      OLANZapine (ZYPREXA) 5 MG tablet Take 7.5 mg by mouth At Bedtime      pantoprazole (PROTONIX) 40 MG EC tablet Take 1 tablet (40 mg) by mouth every morning (before breakfast)  Qty: 30 tablet, Refills: 0    Associated Diagnoses: Gastroesophageal reflux disease with esophagitis without hemorrhage      rosuvastatin (CRESTOR) 10 MG tablet Take 10 mg by mouth At Bedtime                    Rationale for medication changes:              Consults       PHARMACY TO DOSE [facility]  CARE MANAGEMENT / SOCIAL WORK IP CONSULT  PHYSICAL THERAPY ADULT IP CONSULT  PHARMACY TO DOSE [facility]  PHARMACY IP CONSULT  PSYCHIATRY IP CONSULT  PHYSICAL THERAPY ADULT IP CONSULT  OCCUPATIONAL THERAPY ADULT IP CONSULT  CARE MANAGEMENT / SOCIAL WORK IP CONSULT    Immunizations given this encounter     Most Recent Immunizations   Administered Date(s) Administered     COVID-19 Monovalent 18+ (Moderna) 11/02/2021     Flu, Unspecified 10/13/2017     Influenza Vaccine, 6+MO IM (QUADRIVALENT W/PRESERVATIVES) 10/15/2019           Anticoagulation Information            SIGNIFICANT IMAGING FINDINGS     Results for orders placed or performed during the hospital encounter of 04/24/23   XR Chest Port 1 View    Impression    IMPRESSION: Known right lower lobe mass. Hypoventilatory lungs with bibasilar atelectasis. No focal pulmonary consolidation or effusion. Underlying emphysematous changes. Normal heart size without pulmonary vascular congestion. No pneumothorax.   Atherosclerotic calcifications of the aortic arch. No acute osseous abnormality.   CT Chest Pulmonary Embolism w Contrast    Impression    IMPRESSION:    1.  No pulmonary embolism or confluent consolidations.    2.  Interstitial opacities in the dependent portions of bilateral lower lobes are nonspecific for atelectasis versus mild aspiration. Clinical  correlation is recommended.    3.  2.4 cm nodule in the posterior right lower lobe, unchanged, in keeping with known malignancy.    4.  Redemonstration of mild upper lobe predominant emphysema, mild cardiomegaly and cholelithiasis.   CT Head w/o Contrast    Impression    IMPRESSION:  1.  No CT evidence for acute intracranial process.  2.  Brain atrophy and presumed chronic microvascular ischemic changes similar to the previous exam.       SIGNIFICANT LABORATORY FINDINGS     Most Recent 3 CBC's:  Recent Labs   Lab Test 05/07/23  0530 05/05/23  0605 05/04/23  0546 05/02/23  0507 05/01/23  0506   WBC  --  9.0  --  13.4* 12.8*   HGB  --  13.0*  --  13.0* 13.3   MCV  --  95  --  95 94   * 164 172 195 204     Most Recent 3 BMP's:  Recent Labs   Lab Test 05/07/23  0530 05/06/23  2137 05/06/23  0611 05/05/23  0605 05/04/23  0546 05/03/23  0541   NA  --   --   --  144 142 141   POTASSIUM 3.7  --  3.9 3.8 4.0 4.0   CHLORIDE  --   --   --  107 106 104   CO2  --   --   --  29 28 28   BUN  --   --   --  28.6* 25.3* 24.8*   CR 1.00  --   --  1.02  1.02 1.00 1.01   ANIONGAP  --   --   --  8 8 9   CHELI  --   --   --  8.5* 8.9 9.2   GLC  --  85  --  91 79 87           Discharge Orders        Reason for your hospital stay    COPD     Follow-up and recommended labs and tests     Follow up with primary care provider, Misael Moe, within 7 days to evaluate medication change.  No follow up labs or test are needed.     Activity    Your activity upon discharge: activity as tolerated     Full Code     Oxygen Adult/Peds    Oxygen Documentation  I certify that this patient, Dominik Rojas has been under my care (or a nurse practitioner or physican's assistant working with me). This is the face-to-face encounter for oxygen medical necessity.      At the time of this encounter supplemental oxygen is reasonable and necessary and is expected to improve the patient's condition in a home setting.       Patient has continued oxygen  desaturation due to COPD J44.9.    If portability is ordered, is the patient mobile within the home? yes     Diet    Follow this diet upon discharge: Orders Placed This Encounter      Regular Diet Adult       Examination   Physical Exam   Temp:  [97.7  F (36.5  C)-98.1  F (36.7  C)] 98  F (36.7  C)  Pulse:  [78-99] 90  Resp:  [16-19] 16  BP: (100-152)/(60-97) 105/69  SpO2:  [90 %-92 %] 90 %  Wt Readings from Last 1 Encounters:   05/02/23 87.9 kg (193 lb 12.6 oz)       Physical Exam  Constitutional:       Appearance: He is obese.   HENT:      Head: Normocephalic.      Nose: Nose normal.      Mouth/Throat:      Mouth: Mucous membranes are moist.   Eyes:      Pupils: Pupils are equal, round, and reactive to light.   Cardiovascular:      Rate and Rhythm: Normal rate.   Pulmonary:      Effort: Pulmonary effort is normal.   Abdominal:      General: Abdomen is flat.   Musculoskeletal:         General: Normal range of motion.   Skin:     General: Skin is warm.      Capillary Refill: Capillary refill takes less than 2 seconds.   Neurological:      Mental Status: He is alert. Mental status is at baseline      Please see EMR for more detailed significant labs, imaging, consultant notes etc.    IEdmund MD, personally saw the patient today and spent greater than 30 minutes discharging this patient.    Edmund Minaya MD  [facility]    CC:Misael Moe

## 2023-05-09 ENCOUNTER — HOSPITAL ENCOUNTER (OUTPATIENT)
Facility: HOSPITAL | Age: 83
Setting detail: OBSERVATION
Discharge: HOME | End: 2023-05-13
Attending: EMERGENCY MEDICINE | Admitting: HOSPITALIST
Payer: COMMERCIAL

## 2023-05-09 ENCOUNTER — APPOINTMENT (OUTPATIENT)
Dept: RADIOLOGY | Facility: HOSPITAL | Age: 83
End: 2023-05-09
Attending: EMERGENCY MEDICINE
Payer: COMMERCIAL

## 2023-05-09 ENCOUNTER — NURSE TRIAGE (OUTPATIENT)
Dept: NURSING | Facility: CLINIC | Age: 83
End: 2023-05-09

## 2023-05-09 DIAGNOSIS — F03.93 DEMENTIA WITH MOOD DISTURBANCE, UNSPECIFIED DEMENTIA SEVERITY, UNSPECIFIED DEMENTIA TYPE (H): ICD-10-CM

## 2023-05-09 DIAGNOSIS — J18.9 PNEUMONIA OF RIGHT LOWER LOBE DUE TO INFECTIOUS ORGANISM: ICD-10-CM

## 2023-05-09 DIAGNOSIS — I50.22 CHRONIC SYSTOLIC CONGESTIVE HEART FAILURE (H): ICD-10-CM

## 2023-05-09 DIAGNOSIS — J18.9 HCAP (HEALTHCARE-ASSOCIATED PNEUMONIA): ICD-10-CM

## 2023-05-09 DIAGNOSIS — J44.1 COPD EXACERBATION (H): Primary | ICD-10-CM

## 2023-05-09 LAB
ANION GAP SERPL CALCULATED.3IONS-SCNC: 12 MMOL/L (ref 7–15)
BASOPHILS # BLD AUTO: 0 10E3/UL (ref 0–0.2)
BASOPHILS NFR BLD AUTO: 0 %
BUN SERPL-MCNC: 19.7 MG/DL (ref 8–23)
CALCIUM SERPL-MCNC: 8.5 MG/DL (ref 8.8–10.2)
CHLORIDE SERPL-SCNC: 103 MMOL/L (ref 98–107)
CREAT SERPL-MCNC: 1.16 MG/DL (ref 0.67–1.17)
DEPRECATED HCO3 PLAS-SCNC: 23 MMOL/L (ref 22–29)
EOSINOPHIL # BLD AUTO: 0.1 10E3/UL (ref 0–0.7)
EOSINOPHIL NFR BLD AUTO: 1 %
ERYTHROCYTE [DISTWIDTH] IN BLOOD BY AUTOMATED COUNT: 16.7 % (ref 10–15)
FLUAV RNA SPEC QL NAA+PROBE: NEGATIVE
FLUBV RNA RESP QL NAA+PROBE: NEGATIVE
GFR SERPL CREATININE-BSD FRML MDRD: 63 ML/MIN/1.73M2
GLUCOSE SERPL-MCNC: 114 MG/DL (ref 70–99)
HCT VFR BLD AUTO: 38.1 % (ref 40–53)
HGB BLD-MCNC: 12.6 G/DL (ref 13.3–17.7)
IMM GRANULOCYTES # BLD: 0.1 10E3/UL
IMM GRANULOCYTES NFR BLD: 1 %
LYMPHOCYTES # BLD AUTO: 0.8 10E3/UL (ref 0.8–5.3)
LYMPHOCYTES NFR BLD AUTO: 7 %
MCH RBC QN AUTO: 31.5 PG (ref 26.5–33)
MCHC RBC AUTO-ENTMCNC: 33.1 G/DL (ref 31.5–36.5)
MCV RBC AUTO: 95 FL (ref 78–100)
MONOCYTES # BLD AUTO: 0.6 10E3/UL (ref 0–1.3)
MONOCYTES NFR BLD AUTO: 6 %
NEUTROPHILS # BLD AUTO: 9.5 10E3/UL (ref 1.6–8.3)
NEUTROPHILS NFR BLD AUTO: 85 %
NRBC # BLD AUTO: 0 10E3/UL
NRBC BLD AUTO-RTO: 0 /100
NT-PROBNP SERPL-MCNC: 165 PG/ML (ref 0–1800)
PLATELET # BLD AUTO: 126 10E3/UL (ref 150–450)
POTASSIUM SERPL-SCNC: 4 MMOL/L (ref 3.4–5.3)
RBC # BLD AUTO: 4 10E6/UL (ref 4.4–5.9)
RSV RNA SPEC NAA+PROBE: NEGATIVE
SARS-COV-2 RNA RESP QL NAA+PROBE: NEGATIVE
SODIUM SERPL-SCNC: 138 MMOL/L (ref 136–145)
TROPONIN T SERPL HS-MCNC: 42 NG/L
WBC # BLD AUTO: 11.1 10E3/UL (ref 4–11)

## 2023-05-09 PROCEDURE — 36415 COLL VENOUS BLD VENIPUNCTURE: CPT | Performed by: EMERGENCY MEDICINE

## 2023-05-09 PROCEDURE — 93005 ELECTROCARDIOGRAM TRACING: CPT | Performed by: EMERGENCY MEDICINE

## 2023-05-09 PROCEDURE — 85025 COMPLETE CBC W/AUTO DIFF WBC: CPT | Performed by: EMERGENCY MEDICINE

## 2023-05-09 PROCEDURE — 83880 ASSAY OF NATRIURETIC PEPTIDE: CPT | Performed by: EMERGENCY MEDICINE

## 2023-05-09 PROCEDURE — 94640 AIRWAY INHALATION TREATMENT: CPT

## 2023-05-09 PROCEDURE — 87637 SARSCOV2&INF A&B&RSV AMP PRB: CPT | Performed by: EMERGENCY MEDICINE

## 2023-05-09 PROCEDURE — 250N000009 HC RX 250: Performed by: EMERGENCY MEDICINE

## 2023-05-09 PROCEDURE — 80048 BASIC METABOLIC PNL TOTAL CA: CPT | Performed by: EMERGENCY MEDICINE

## 2023-05-09 PROCEDURE — 71045 X-RAY EXAM CHEST 1 VIEW: CPT

## 2023-05-09 PROCEDURE — 84484 ASSAY OF TROPONIN QUANT: CPT | Performed by: EMERGENCY MEDICINE

## 2023-05-09 PROCEDURE — C9803 HOPD COVID-19 SPEC COLLECT: HCPCS

## 2023-05-09 PROCEDURE — 99285 EMERGENCY DEPT VISIT HI MDM: CPT | Mod: 25,CS

## 2023-05-09 RX ORDER — METHYLPREDNISOLONE SODIUM SUCCINATE 125 MG/2ML
125 INJECTION, POWDER, LYOPHILIZED, FOR SOLUTION INTRAMUSCULAR; INTRAVENOUS ONCE
Status: COMPLETED | OUTPATIENT
Start: 2023-05-10 | End: 2023-05-10

## 2023-05-09 RX ORDER — IPRATROPIUM BROMIDE AND ALBUTEROL SULFATE 2.5; .5 MG/3ML; MG/3ML
3 SOLUTION RESPIRATORY (INHALATION) ONCE
Status: COMPLETED | OUTPATIENT
Start: 2023-05-09 | End: 2023-05-09

## 2023-05-09 RX ORDER — IPRATROPIUM BROMIDE AND ALBUTEROL SULFATE 2.5; .5 MG/3ML; MG/3ML
3 SOLUTION RESPIRATORY (INHALATION) ONCE
Status: COMPLETED | OUTPATIENT
Start: 2023-05-10 | End: 2023-05-10

## 2023-05-09 RX ADMIN — IPRATROPIUM BROMIDE AND ALBUTEROL SULFATE 3 ML: 2.5; .5 SOLUTION RESPIRATORY (INHALATION) at 23:11

## 2023-05-09 ASSESSMENT — ACTIVITIES OF DAILY LIVING (ADL): ADLS_ACUITY_SCORE: 39

## 2023-05-09 NOTE — TELEPHONE ENCOUNTER
Nurse Triage SBAR    Is this a 2nd Level Triage? YES, LICENSED PRACTITIONER REVIEW IS REQUIRED    Situation: difficulty breathing    Background: Patient is having difficulty breathing. Patient is given advair and albuterol inhalers by wife.  Patient is currently on 2.5LNC which is effective. Patient has hx of COPD and has been recently hospitalized.     Assessment: O2 sats 93%   with home pulse oximeter. During triage patient is breathing better and denies difficulty breathing    Protocol Recommended Disposition:   Go to ED Now    Recommendation: Patient verbalized understanding of care advice. At this time wife is not bringing patient to ED because he is denying difficulty breathing, but will bring him if it continues to be an issue.       Claudia Mcdaniels RN on 5/9/2023 at 3:35 AM      Does the patient meet one of the following criteria for ADS visit consideration? No    Reason for Disposition    [1] MODERATE difficulty breathing (e.g., speaks in phrases, SOB even at rest, pulse 100-120) AND [2] NEW-onset or WORSE than normal    Additional Information    Negative: SEVERE difficulty breathing (e.g., struggling for each breath, speaks in single words)    Negative: [1] Breathing stopped AND [2] hasn't returned    Negative: Choking on something    Negative: Bluish (or gray) lips or face now    Negative: Difficult to awaken or acting confused (e.g., disoriented, slurred speech)    Negative: Passed out (i.e., lost consciousness, collapsed and was not responding)    Negative: Wheezing started suddenly after medicine, an allergic food or bee sting    Negative: Stridor    Negative: Slow, shallow and weak breathing    Negative: Sounds like a life-threatening emergency to the triager    Protocols used: BREATHING DIFFICULTY-A-AH

## 2023-05-10 LAB
ALBUMIN UR-MCNC: 30 MG/DL
ANION GAP SERPL CALCULATED.3IONS-SCNC: 12 MMOL/L (ref 7–15)
APPEARANCE UR: ABNORMAL
BACTERIA #/AREA URNS HPF: ABNORMAL /HPF
BASOPHILS # BLD AUTO: 0 10E3/UL (ref 0–0.2)
BASOPHILS NFR BLD AUTO: 0 %
BILIRUB UR QL STRIP: NEGATIVE
BUN SERPL-MCNC: 17.3 MG/DL (ref 8–23)
CALCIUM SERPL-MCNC: 8.8 MG/DL (ref 8.8–10.2)
CHLORIDE SERPL-SCNC: 104 MMOL/L (ref 98–107)
COLOR UR AUTO: ABNORMAL
CREAT SERPL-MCNC: 0.89 MG/DL (ref 0.67–1.17)
DEPRECATED HCO3 PLAS-SCNC: 23 MMOL/L (ref 22–29)
EOSINOPHIL # BLD AUTO: 0 10E3/UL (ref 0–0.7)
EOSINOPHIL NFR BLD AUTO: 0 %
ERYTHROCYTE [DISTWIDTH] IN BLOOD BY AUTOMATED COUNT: 16.7 % (ref 10–15)
GFR SERPL CREATININE-BSD FRML MDRD: 86 ML/MIN/1.73M2
GLUCOSE SERPL-MCNC: 138 MG/DL (ref 70–99)
GLUCOSE UR STRIP-MCNC: 300 MG/DL
HCT VFR BLD AUTO: 42.9 % (ref 40–53)
HGB BLD-MCNC: 13.7 G/DL (ref 13.3–17.7)
HGB UR QL STRIP: ABNORMAL
IMM GRANULOCYTES # BLD: 0 10E3/UL
IMM GRANULOCYTES NFR BLD: 0 %
KETONES UR STRIP-MCNC: ABNORMAL MG/DL
LEUKOCYTE ESTERASE UR QL STRIP: ABNORMAL
LYMPHOCYTES # BLD AUTO: 1 10E3/UL (ref 0.8–5.3)
LYMPHOCYTES NFR BLD AUTO: 11 %
MCH RBC QN AUTO: 31.1 PG (ref 26.5–33)
MCHC RBC AUTO-ENTMCNC: 31.9 G/DL (ref 31.5–36.5)
MCV RBC AUTO: 98 FL (ref 78–100)
MONOCYTES # BLD AUTO: 0.2 10E3/UL (ref 0–1.3)
MONOCYTES NFR BLD AUTO: 3 %
MUCOUS THREADS #/AREA URNS LPF: PRESENT /LPF
NEUTROPHILS # BLD AUTO: 8.2 10E3/UL (ref 1.6–8.3)
NEUTROPHILS NFR BLD AUTO: 86 %
NITRATE UR QL: POSITIVE
NRBC # BLD AUTO: 0 10E3/UL
NRBC BLD AUTO-RTO: 0 /100
PH UR STRIP: 5.5 [PH] (ref 5–7)
PLATELET # BLD AUTO: 126 10E3/UL (ref 150–450)
POTASSIUM SERPL-SCNC: 4.1 MMOL/L (ref 3.4–5.3)
RBC # BLD AUTO: 4.4 10E6/UL (ref 4.4–5.9)
RBC URINE: >182 /HPF
SODIUM SERPL-SCNC: 139 MMOL/L (ref 136–145)
SP GR UR STRIP: 1.02 (ref 1–1.03)
TROPONIN T SERPL HS-MCNC: 36 NG/L
UROBILINOGEN UR STRIP-MCNC: <2 MG/DL
WBC # BLD AUTO: 9.5 10E3/UL (ref 4–11)
WBC CLUMPS #/AREA URNS HPF: PRESENT /HPF
WBC URINE: >182 /HPF

## 2023-05-10 PROCEDURE — 94640 AIRWAY INHALATION TREATMENT: CPT | Mod: 76

## 2023-05-10 PROCEDURE — 81001 URINALYSIS AUTO W/SCOPE: CPT | Performed by: FAMILY MEDICINE

## 2023-05-10 PROCEDURE — 250N000011 HC RX IP 250 OP 636: Performed by: HOSPITALIST

## 2023-05-10 PROCEDURE — 96374 THER/PROPH/DIAG INJ IV PUSH: CPT

## 2023-05-10 PROCEDURE — 250N000011 HC RX IP 250 OP 636: Performed by: EMERGENCY MEDICINE

## 2023-05-10 PROCEDURE — 999N000032 HC STATISTIC CHRONIC DISEASE SPECIALIST RT CONSULT

## 2023-05-10 PROCEDURE — 85025 COMPLETE CBC W/AUTO DIFF WBC: CPT | Performed by: FAMILY MEDICINE

## 2023-05-10 PROCEDURE — 250N000013 HC RX MED GY IP 250 OP 250 PS 637: Performed by: HOSPITALIST

## 2023-05-10 PROCEDURE — G0378 HOSPITAL OBSERVATION PER HR: HCPCS

## 2023-05-10 PROCEDURE — G0463 HOSPITAL OUTPT CLINIC VISIT: HCPCS

## 2023-05-10 PROCEDURE — 250N000011 HC RX IP 250 OP 636: Performed by: FAMILY MEDICINE

## 2023-05-10 PROCEDURE — 999N000157 HC STATISTIC RCP TIME EA 10 MIN

## 2023-05-10 PROCEDURE — 84484 ASSAY OF TROPONIN QUANT: CPT | Performed by: EMERGENCY MEDICINE

## 2023-05-10 PROCEDURE — 80048 BASIC METABOLIC PNL TOTAL CA: CPT | Performed by: FAMILY MEDICINE

## 2023-05-10 PROCEDURE — 250N000009 HC RX 250: Performed by: FAMILY MEDICINE

## 2023-05-10 PROCEDURE — 36415 COLL VENOUS BLD VENIPUNCTURE: CPT | Performed by: EMERGENCY MEDICINE

## 2023-05-10 PROCEDURE — 94664 DEMO&/EVAL PT USE INHALER: CPT

## 2023-05-10 PROCEDURE — 99223 1ST HOSP IP/OBS HIGH 75: CPT | Performed by: FAMILY MEDICINE

## 2023-05-10 PROCEDURE — 999N000158 HC STATISTIC RCP TIME ED VENT EA 10 MIN

## 2023-05-10 PROCEDURE — 96376 TX/PRO/DX INJ SAME DRUG ADON: CPT

## 2023-05-10 PROCEDURE — 250N000009 HC RX 250: Performed by: EMERGENCY MEDICINE

## 2023-05-10 PROCEDURE — 999N000033 HC STATISTIC CHRONIC PULMONARY DISEASE SPECIALIST

## 2023-05-10 PROCEDURE — 96372 THER/PROPH/DIAG INJ SC/IM: CPT | Performed by: HOSPITALIST

## 2023-05-10 PROCEDURE — 87086 URINE CULTURE/COLONY COUNT: CPT | Performed by: FAMILY MEDICINE

## 2023-05-10 PROCEDURE — 36415 COLL VENOUS BLD VENIPUNCTURE: CPT | Performed by: FAMILY MEDICINE

## 2023-05-10 RX ORDER — LEVOTHYROXINE SODIUM 88 UG/1
88 TABLET ORAL DAILY
Status: DISCONTINUED | OUTPATIENT
Start: 2023-05-10 | End: 2023-05-13 | Stop reason: HOSPADM

## 2023-05-10 RX ORDER — ACETAMINOPHEN 325 MG/1
975 TABLET ORAL EVERY 8 HOURS PRN
Status: DISCONTINUED | OUTPATIENT
Start: 2023-05-10 | End: 2023-05-13 | Stop reason: HOSPADM

## 2023-05-10 RX ORDER — ENOXAPARIN SODIUM 100 MG/ML
40 INJECTION SUBCUTANEOUS EVERY 24 HOURS
Status: DISCONTINUED | OUTPATIENT
Start: 2023-05-10 | End: 2023-05-13 | Stop reason: HOSPADM

## 2023-05-10 RX ORDER — DULOXETIN HYDROCHLORIDE 60 MG/1
60 CAPSULE, DELAYED RELEASE ORAL DAILY
Status: DISCONTINUED | OUTPATIENT
Start: 2023-05-10 | End: 2023-05-13 | Stop reason: HOSPADM

## 2023-05-10 RX ORDER — IPRATROPIUM BROMIDE AND ALBUTEROL SULFATE 2.5; .5 MG/3ML; MG/3ML
3 SOLUTION RESPIRATORY (INHALATION)
Status: DISCONTINUED | OUTPATIENT
Start: 2023-05-10 | End: 2023-05-10

## 2023-05-10 RX ORDER — ASPIRIN 81 MG/1
81 TABLET, CHEWABLE ORAL DAILY
Status: DISCONTINUED | OUTPATIENT
Start: 2023-05-10 | End: 2023-05-13 | Stop reason: HOSPADM

## 2023-05-10 RX ORDER — METHYLPREDNISOLONE SODIUM SUCCINATE 125 MG/2ML
60 INJECTION, POWDER, LYOPHILIZED, FOR SOLUTION INTRAMUSCULAR; INTRAVENOUS EVERY 8 HOURS
Status: DISCONTINUED | OUTPATIENT
Start: 2023-05-10 | End: 2023-05-10

## 2023-05-10 RX ORDER — LEVALBUTEROL INHALATION SOLUTION 1.25 MG/3ML
1.25 SOLUTION RESPIRATORY (INHALATION) EVERY 4 HOURS PRN
Status: DISCONTINUED | OUTPATIENT
Start: 2023-05-10 | End: 2023-05-13 | Stop reason: HOSPADM

## 2023-05-10 RX ORDER — LEVALBUTEROL INHALATION SOLUTION 1.25 MG/3ML
1.25 SOLUTION RESPIRATORY (INHALATION)
Status: DISCONTINUED | OUTPATIENT
Start: 2023-05-10 | End: 2023-05-10

## 2023-05-10 RX ORDER — PANTOPRAZOLE SODIUM 40 MG/1
40 TABLET, DELAYED RELEASE ORAL
Status: DISCONTINUED | OUTPATIENT
Start: 2023-05-11 | End: 2023-05-13 | Stop reason: HOSPADM

## 2023-05-10 RX ORDER — QUETIAPINE FUMARATE 25 MG/1
25 TABLET, FILM COATED ORAL 2 TIMES DAILY PRN
Status: DISCONTINUED | OUTPATIENT
Start: 2023-05-10 | End: 2023-05-11

## 2023-05-10 RX ORDER — FLUTICASONE FUROATE AND VILANTEROL 100; 25 UG/1; UG/1
1 POWDER RESPIRATORY (INHALATION) DAILY
Status: DISCONTINUED | OUTPATIENT
Start: 2023-05-10 | End: 2023-05-13 | Stop reason: HOSPADM

## 2023-05-10 RX ORDER — ROSUVASTATIN CALCIUM 10 MG/1
10 TABLET, COATED ORAL AT BEDTIME
Status: DISCONTINUED | OUTPATIENT
Start: 2023-05-10 | End: 2023-05-13 | Stop reason: HOSPADM

## 2023-05-10 RX ORDER — LEVALBUTEROL INHALATION SOLUTION 1.25 MG/3ML
1.25 SOLUTION RESPIRATORY (INHALATION) 4 TIMES DAILY
Status: DISCONTINUED | OUTPATIENT
Start: 2023-05-10 | End: 2023-05-13

## 2023-05-10 RX ORDER — LATANOPROST 50 UG/ML
1 SOLUTION/ DROPS OPHTHALMIC AT BEDTIME
Status: DISCONTINUED | OUTPATIENT
Start: 2023-05-10 | End: 2023-05-13 | Stop reason: HOSPADM

## 2023-05-10 RX ORDER — BUSPIRONE HYDROCHLORIDE 5 MG/1
5 TABLET ORAL 2 TIMES DAILY
Status: DISCONTINUED | OUTPATIENT
Start: 2023-05-10 | End: 2023-05-13 | Stop reason: HOSPADM

## 2023-05-10 RX ORDER — PREDNISONE 20 MG/1
40 TABLET ORAL DAILY
Status: DISCONTINUED | OUTPATIENT
Start: 2023-05-11 | End: 2023-05-13 | Stop reason: HOSPADM

## 2023-05-10 RX ADMIN — Medication 2 MG: at 20:55

## 2023-05-10 RX ADMIN — OLANZAPINE 7.5 MG: 5 TABLET, FILM COATED ORAL at 20:55

## 2023-05-10 RX ADMIN — DULOXETINE HYDROCHLORIDE 60 MG: 60 CAPSULE, DELAYED RELEASE ORAL at 16:15

## 2023-05-10 RX ADMIN — IPRATROPIUM BROMIDE AND ALBUTEROL SULFATE 3 ML: 2.5; .5 SOLUTION RESPIRATORY (INHALATION) at 00:49

## 2023-05-10 RX ADMIN — IPRATROPIUM BROMIDE 0.5 MG: 0.5 SOLUTION RESPIRATORY (INHALATION) at 19:56

## 2023-05-10 RX ADMIN — LEVALBUTEROL HYDROCHLORIDE 1.25 MG: 1.25 SOLUTION RESPIRATORY (INHALATION) at 07:05

## 2023-05-10 RX ADMIN — LEVALBUTEROL HYDROCHLORIDE 1.25 MG: 1.25 SOLUTION RESPIRATORY (INHALATION) at 03:50

## 2023-05-10 RX ADMIN — IPRATROPIUM BROMIDE 0.5 MG: 0.5 SOLUTION RESPIRATORY (INHALATION) at 16:44

## 2023-05-10 RX ADMIN — BUSPIRONE HYDROCHLORIDE 5 MG: 5 TABLET ORAL at 20:56

## 2023-05-10 RX ADMIN — ENOXAPARIN SODIUM 40 MG: 40 INJECTION SUBCUTANEOUS at 16:14

## 2023-05-10 RX ADMIN — IPRATROPIUM BROMIDE 0.5 MG: 0.5 SOLUTION RESPIRATORY (INHALATION) at 11:21

## 2023-05-10 RX ADMIN — ROSUVASTATIN CALCIUM 10 MG: 10 TABLET, FILM COATED ORAL at 20:55

## 2023-05-10 RX ADMIN — LEVALBUTEROL HYDROCHLORIDE 1.25 MG: 1.25 SOLUTION RESPIRATORY (INHALATION) at 11:21

## 2023-05-10 RX ADMIN — IPRATROPIUM BROMIDE 0.5 MG: 0.5 SOLUTION RESPIRATORY (INHALATION) at 03:50

## 2023-05-10 RX ADMIN — LEVOTHYROXINE SODIUM 88 MCG: 88 TABLET ORAL at 16:14

## 2023-05-10 RX ADMIN — LEVALBUTEROL HYDROCHLORIDE 1.25 MG: 1.25 SOLUTION RESPIRATORY (INHALATION) at 16:44

## 2023-05-10 RX ADMIN — ACETAMINOPHEN 975 MG: 325 TABLET ORAL at 22:50

## 2023-05-10 RX ADMIN — LEVALBUTEROL HYDROCHLORIDE 1.25 MG: 1.25 SOLUTION RESPIRATORY (INHALATION) at 19:56

## 2023-05-10 RX ADMIN — ASPIRIN 81 MG 81 MG: 81 TABLET ORAL at 16:14

## 2023-05-10 RX ADMIN — METHYLPREDNISOLONE SODIUM SUCCINATE 62.5 MG: 125 INJECTION, POWDER, LYOPHILIZED, FOR SOLUTION INTRAMUSCULAR; INTRAVENOUS at 08:51

## 2023-05-10 RX ADMIN — FLUTICASONE FUROATE AND VILANTEROL TRIFENATATE 1 PUFF: 100; 25 POWDER RESPIRATORY (INHALATION) at 17:16

## 2023-05-10 RX ADMIN — IPRATROPIUM BROMIDE 0.5 MG: 0.5 SOLUTION RESPIRATORY (INHALATION) at 07:05

## 2023-05-10 RX ADMIN — METHYLPREDNISOLONE SODIUM SUCCINATE 125 MG: 125 INJECTION, POWDER, FOR SOLUTION INTRAMUSCULAR; INTRAVENOUS at 00:52

## 2023-05-10 ASSESSMENT — ACTIVITIES OF DAILY LIVING (ADL)
ADLS_ACUITY_SCORE: 37
ADLS_ACUITY_SCORE: 41
DRESSING/BATHING_MANAGEMENT: WIFE ASSIST
DRESSING/BATHING: BATHING DIFFICULTY, ASSISTANCE 1 PERSON
ADLS_ACUITY_SCORE: 37
ADLS_ACUITY_SCORE: 41
ADLS_ACUITY_SCORE: 39
EQUIPMENT_CURRENTLY_USED_AT_HOME: CANE, STRAIGHT
ADLS_ACUITY_SCORE: 41
TOILETING_ISSUES: YES
DOING_ERRANDS_INDEPENDENTLY_DIFFICULTY: YES
DRESSING/BATHING_DIFFICULTY: YES
CONCENTRATING,_REMEMBERING_OR_MAKING_DECISIONS_DIFFICULTY: YES
ADLS_ACUITY_SCORE: 41
FALL_HISTORY_WITHIN_LAST_SIX_MONTHS: YES
ADLS_ACUITY_SCORE: 41
CHANGE_IN_FUNCTIONAL_STATUS_SINCE_ONSET_OF_CURRENT_ILLNESS/INJURY: YES
WALKING_OR_CLIMBING_STAIRS_DIFFICULTY: YES
DEPENDENT_IADLS:: CLEANING;COOKING;LAUNDRY;SHOPPING;MEAL PREPARATION;MEDICATION MANAGEMENT;MONEY MANAGEMENT;TRANSPORTATION
ADLS_ACUITY_SCORE: 41
ADLS_ACUITY_SCORE: 37
DIFFICULTY_EATING/SWALLOWING: NO
ADLS_ACUITY_SCORE: 37
TOILETING_ASSISTANCE: TOILETING DIFFICULTY, ASSISTANCE 1 PERSON
ADLS_ACUITY_SCORE: 37
WEAR_GLASSES_OR_BLIND: NO

## 2023-05-10 NOTE — PROGRESS NOTES
COPD Inhaler demonstration and instruct  3/16/2023, 3:39 PM    Education:  Medication use and instruction done for albuterol inhaler.  Patient is able to achieve an adequate inhalation flow of 120 on the in-check device at free flow. Patient was unable to proper demonstrate use, use proper flow, verbalize use, or return demonstration after instructed on his albuterol inhaler. Instruction also done for their Advair inhaler. Patient was able to demonstrate proper technique. Written instructions for Advair device(s) were also given to patient.    Recommendations:  -Discontinue Albuterol inhaler use  -Continue use of Advair diskus 1 puff BID  -Start Duo Neb QID with nebulizer machine upon discharge, recommend use of mouthpiece for better deposition in lung    Ana Madrid RT, CTTS, Chronic Pulmonary Disease Specialist  Phone [phone]

## 2023-05-10 NOTE — PHARMACY-ADMISSION MEDICATION HISTORY
Pharmacist Admission Medication History    Admission medication history is complete. The information provided in this note is only as accurate as the sources available at the time of the update.    Medication reconciliation/reorder completed by provider prior to medication history? No    Information Source(s): Family member, Hospital records and CareEverywhere/SureScripts via phone    Pertinent Information: Patient was struggling to use the Advair inhaler    Changes made to PTA medication list:    Added: None    Deleted: None    Changed: melatonin dose, acetaminophen frequency    Medication Affordability:  Not including over the counter (OTC) medications, was there a time in the past 3 months when you did not take your medications as prescribed because of cost?: No    Allergies reviewed with patient and updates made in EHR: no    Medication History Completed By: Anna Guallpa [facility] 5/10/2023 8:08 AM    Prior to Admission medications    Medication Sig Last Dose Taking? Auth Provider Long Term End Date   acetaminophen (TYLENOL) 500 MG tablet Take 2 tablets (1,000 mg) by mouth every 8 hours for 30 days  Patient taking differently: Take 1,000 mg by mouth every 8 hours as needed for pain 5/9/2023 Yes Edmund Minaya MD  6/6/23   albuterol (PROAIR HFA/PROVENTIL HFA/VENTOLIN HFA) 108 (90 Base) MCG/ACT inhaler Inhale 2 puffs into the lungs every 6 hours as needed for shortness of breath, wheezing or cough Unknown Yes Edmund Minaya MD Yes 6/6/23   amLODIPine (NORVASC) 2.5 MG tablet Take 2.5 mg by mouth daily 5/9/2023 Yes Unknown, Entered By History No    aspirin (ASA) 81 MG chewable tablet Take 81 mg by mouth daily 5/9/2023 Yes Reported, Patient     busPIRone (BUSPAR) 5 MG tablet Take 5 mg by mouth 2 times daily 5/9/2023 Yes Unknown, Entered By History Yes    Cholecalciferol (VITAMIN D3) 50 MCG (2000 UT) CAPS Take 1 tablet by mouth daily  5/9/2023 Yes Reported, Patient     DULoxetine (CYMBALTA) 60 MG capsule Take 60 mg by  mouth daily 5/9/2023 Yes Unknown, Entered By History No    fluticasone-salmeterol (ADVAIR) 250-50 MCG/ACT inhaler Inhale 1 puff into the lungs every 12 hours for 30 days 5/9/2023 Yes Edmund Minaya MD Yes 6/6/23   furosemide (LASIX) 20 MG tablet Take 20 mg by mouth daily 5/9/2023 Yes Unknown, Entered By History No    latanoprost (XALATAN) 0.005 % ophthalmic solution Place 1 drop into both eyes At Bedtime 5/8/2023 Yes Reported, Patient Yes    levothyroxine (SYNTHROID/LEVOTHROID) 88 MCG tablet TAKE 1 TABLET BY MOUTH EVERY DAY IN THE MORNING ON AN EMPTY STOMACH FOR 30 DAYS 5/9/2023 Yes Reported, Patient Yes    losartan (COZAAR) 25 MG tablet Take 1 tablet (25 mg) by mouth daily 5/9/2023 Yes Miguel Celis, DO Yes    melatonin 1 MG TABS tablet Take 1 tablet (1 mg) by mouth At Bedtime  Patient taking differently: Take 2 mg by mouth At Bedtime 5/8/2023 Yes Akanksha Crocker MD     metoprolol succinate ER (TOPROL-XL) 25 MG 24 hr tablet Take 0.5 tablets (12.5 mg) by mouth daily 5/9/2023 Yes Misael Moe PA-C Yes    multivitamin w/minerals (THERA-VIT-M) tablet Take 1 tablet by mouth daily 5/9/2023 Yes Reported, Patient     OLANZapine (ZYPREXA) 5 MG tablet Take 7.5 mg by mouth At Bedtime 5/8/2023 Yes Misael Moe PA-C Yes    pantoprazole (PROTONIX) 40 MG EC tablet Take 1 tablet (40 mg) by mouth every morning (before breakfast) 5/9/2023 Yes Miguel Celis,      QUEtiapine (SEROQUEL) 25 MG tablet Take 1 tablet (25 mg) by mouth 2 times daily as needed (Agitation) 5/8/2023 Yes Akanksha Crocker MD Yes    rosuvastatin (CRESTOR) 10 MG tablet Take 10 mg by mouth At Bedtime  5/8/2023 Yes Reported, Patient Yes

## 2023-05-10 NOTE — H&P
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     1. COPD exacerbation- Admit patient. CXR not consistent with PNA. Scheduled Duonebs. IV solumedrol and supplemental O2.    2. Leukocytosis- Mild. CXR not consistent with PNA. Check urinalysis. Afebrile. Repeat CBC in a.m.    3. Hypotension- Improved when laying flat. Monitor vitals closely. Review home meds. Hold off of IV fluids due to history of CHF (per wife).    3. Dementia- Surrogate Decision maker is wife- Jovana Rojas [phone].     Diet:   Cardiac  DVT Prophylaxis: Pneumatic Compression Devices  Cabral Catheter: Not present  Lines: None     Cardiac Monitoring: None  Code Status:   Full Code per patient.    Clinically Significant Risk Factors Present on Admission                # Thrombocytopenia: Lowest platelets = 126 in last 2 days, will monitor for bleeding   # Hypertension: home medication list includes antihypertensive(s)   # Dementia: noted on problem list    # Obesity: Estimated body mass index is 30.07 kg/m  as calculated from the following:    Height as of this encounter: 1.702 m (5' 7\").    Weight as of this encounter: 87.1 kg (192 lb).           Disposition Plan  Anticipate discharge home when medically stable.     Expected Discharge Date: 05/12/2023                  Clarissa Strong MD  Hospitalist Service  [facility]  Securely message with [employer] (more info)  Text page via "[email or web address] Paging/Directory     ______________________________________________________________________    Chief Complaint   Shortness of breath    History is obtained from the patient and wife.    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 He says that he was feeling fine at the time of discharge, but over the past two days has become increasingly short of breath. Associated symptoms include wheezing and nonproductive cough. He denies fever, chills, chest pain, palpitations, nausea, vomiting or diarrhea.      Past Medical History    Past Medical History:   Diagnosis Date     AAA (abdominal aortic aneurysm) (H)     s/p stenting     Alcohol dependence in remission (H)      Alcohol use disorder, severe, in sustained remission, dependence (H) 8/16/2021     Anxiety      Atrial fibrillation with normal ventricular rate (H)      Benign prostatic hyperplasia with lower urinary tract symptoms      Bronchiectasis without complication (H)     2/27/2020     COPD (chronic obstructive pulmonary disease) (H)      CVA (cerebral vascular accident) (H) 2019     DDD (degenerative disc disease), lumbar      Depression      Depressive disorder      Diabetes (H)      Diastolic dysfunction 01/22/2021     DJD (degenerative joint disease) of knee     left     Essential hypertension      Glaucoma      Glaucoma      History of stroke without residual deficits      Hyperlipidemia      Hypertension      Hypothyroidism      Hypothyroidism      Kidney stones      Major depression      Memory problem      Nocturia      Obesity      Opioid use disorder, severe, dependence (H) 8/16/2021     Overactive bladder 02/25/2021     Primary osteoarthritis of left knee      Psoriasis      Psoriasis      Seborrheic keratoses      Somnolence, daytime      Stenosis of right carotid artery     history of TIA's       Past Surgical History   Past Surgical History:    Procedure Laterality Date     ABDOMEN SURGERY       ABDOMINAL AORTIC ANEURYSM REPAIR  2013    stent     CAROTID ENDARTERECTOMY Right 9/9/2019    Procedure: RIGHT CAROTID ENDARTERECTOMY;  Surgeon: Miguel Muller MD;  Location: [facility] OR;  Service: General     CIRCUMCISION       CYSTOSCOPY       CYSTOSCOPY PROSTATE W/ LASER N/A 6/12/2018    Procedure:  TRANSURETHRAL RESECTION OF THE PROSTATE WITH QUANTA LASER;  Surgeon: Eze Levi MD;  Location: [facility] OR;  Service:      CYSTOSCOPY PROSTATE W/ LASER N/A 2/18/2020    Procedure: CYSTOSCOPY WITH TRANSURETHRAL INCISION OF THE PROSTATE WITH QUANTA LASER;  Surgeon: Eze Levi MD;  Location: [facility] OR;  Service: Urology     CYSTOSCOPY W/ LITHOLAPAXY / [facility] N/A 6/12/2018    Procedure: CYSTOSCOPY AND LITHOLAPAXY;  Surgeon: Eze Levi MD;  Location: [facility];  Service:      IR ABDOMINAL AORTAGRAM  5/19/2020     IR ABDOMINAL AORTIC ANEURYSM ENDOGRAFT  5/19/2020     IR ABDOMINAL AORTOGRAM  5/19/2020     IR ABDOMINAL ENDOVASCULAR STENT GRAFT  5/19/2020     LITHOTRIPSY       PERCUTANEOUS NEPHROSTOLITHOTOMY Right 03/10/2020     ZZC HUANG W/O FACETEC FORAMOT/DSKC 1/2 VRT SEG, LUMBAR Bilateral 3/3/2021    Procedure: Bilateral lumbar 2 - Lumbar 4 decompressive lumbar laminectomy with medial facetectomies;  Surgeon: Renée King MD;  Location: [facility];  Service: Spine       Prior to Admission Medications   Prior to Admission Medications   Prescriptions Last Dose Informant Patient Reported? Taking?   Cholecalciferol (VITAMIN D3) 50 MCG (2000 UT) CAPS   Yes No   Sig: Take 1 tablet by mouth daily    DULoxetine (CYMBALTA) 60 MG capsule   Yes No   Sig: Take 60 mg by mouth daily   OLANZapine (ZYPREXA) 5 MG tablet   Yes No   Sig: Take 7.5 mg by mouth At Bedtime   QUEtiapine (SEROQUEL) 25 MG tablet   No No   Sig: Take 1 tablet (25 mg) by mouth 2 times daily as needed (Agitation)   acetaminophen (TYLENOL) 500 MG  tablet   No No   Sig: Take 2 tablets (1,000 mg) by mouth every 8 hours for 30 days   albuterol (PROAIR HFA/PROVENTIL HFA/VENTOLIN HFA) 108 (90 Base) MCG/ACT inhaler   No No   Sig: Inhale 2 puffs into the lungs every 6 hours as needed for shortness of breath, wheezing or cough   amLODIPine (NORVASC) 2.5 MG tablet   Yes No   Sig: Take 2.5 mg by mouth daily   aspirin (ASA) 81 MG chewable tablet   Yes No   Sig: Take 81 mg by mouth daily   busPIRone (BUSPAR) 5 MG tablet   Yes No   Sig: Take 5 mg by mouth 2 times daily   fluticasone-salmeterol (ADVAIR) 250-50 MCG/ACT inhaler   No No   Sig: Inhale 1 puff into the lungs every 12 hours for 30 days   furosemide (LASIX) 20 MG tablet   Yes No   Sig: Take 20 mg by mouth daily   latanoprost (XALATAN) 0.005 % ophthalmic solution   Yes No   Sig: Place 1 drop into both eyes At Bedtime   levothyroxine (SYNTHROID/LEVOTHROID) 88 MCG tablet   Yes No   Sig: TAKE 1 TABLET BY MOUTH EVERY DAY IN THE MORNING ON AN EMPTY STOMACH FOR 30 DAYS   losartan (COZAAR) 25 MG tablet   No No   Sig: Take 1 tablet (25 mg) by mouth daily   melatonin 1 MG TABS tablet   No No   Sig: Take 1 tablet (1 mg) by mouth At Bedtime   metoprolol succinate ER (TOPROL-XL) 25 MG 24 hr tablet   Yes No   Sig: Take 0.5 tablets (12.5 mg) by mouth daily   multivitamin w/minerals (THERA-VIT-M) tablet   Yes No   Sig: Take 1 tablet by mouth daily   pantoprazole (PROTONIX) 40 MG EC tablet   No No   Sig: Take 1 tablet (40 mg) by mouth every morning (before breakfast)   rosuvastatin (CRESTOR) 10 MG tablet   Yes No   Sig: Take 10 mg by mouth At Bedtime       Facility-Administered Medications: None        Review of Systems    The 10 point Review of Systems is negative other than noted in the HPI.    Physical Exam   Vital Signs: Temp: 97.5  F (36.4  C) Temp src: Temporal BP: 106/66 Pulse: 85   Resp: 16 SpO2: 96 % O2 Device: Nasal cannula Oxygen Delivery: 2 LPM  Weight: 192 lbs 0 oz    General Appearance: Awake, alert, oriented x  2  Respiratory: Scattered wheezes and diminished breath sounds in bases  Cardiovascular: Regular rate, S1S2  GI: +BS, soft, NT, ND  Skin: No rash  Other: No pedal edema     Medical Decision Making     50 MINUTES SPENT BY ME on the date of service doing chart review, history, exam, documentation & further activities per the note.      Data     I have personally reviewed the following data over the past 24 hrs:    11.1 (H)  \   12.6 (L)   / 126 (L)     138 103 19.7 /  114 (H)   4.0 23 1.16 \       Trop: 36 (H) BNP: 165       Imaging results reviewed over the past 24 hrs:   Recent Results (from the past 24 hour(s))   XR Chest Port 1 View    Narrative    EXAM: XR CHEST PORT 1 VIEW  LOCATION: [facility]  DATE/TIME: 5/9/2023 10:45 PM CDT    INDICATION: Dyspnea  COMPARISON: 04/24/2023      Impression    IMPRESSION: Cardiac enlargement and shallow inspiration. Bibasilar fibroatelectasis. No vascular congestion or significant effusion. Emphysema. Similar to prior.

## 2023-05-10 NOTE — ED PROVIDER NOTES
EMERGENCY DEPARTMENT ENCOUNTER      NAME: Dominik Rojas  AGE: 82 year old male  YOB: 1940  MRN: [id number]  EVALUATION DATE & TIME: 2023  9:08 PM    PCP: Misael Moe    ED PROVIDER: Antoine Garcia D.O.      Chief Complaint   Patient presents with     Shortness of Breath       FINAL IMPRESSION:  1. COPD exacerbation (H)        ED COURSE & MEDICAL DECISION MAKIN:16 PM I met with the patient to gather history and to perform my initial exam. I discussed the plan for care while in the Emergency Department.  12:12 AM I spoke with the hospitalist, Dr. Pineda. We discussed the patient's case, and they agree to admit the patient.         Pertinent Labs & Imaging studies reviewed. (See chart for details)  82 year old male presents to the Emergency Department for evaluation of increasing shortness of breath.  Patient has known history of COPD.  Recent admission for COPD exacerbation with pneumonia, with prolonged stay in the hospital.  He is afebrile tonight.  Differential includes PE, ACS, CHF, COPD, pneumonia.  Patient is hypoxic on the home O2 that he is supposed to be on, especially with ambulation.  He has been given multiple nebs in the emergency department as well as steroids, with minimal improvement in symptoms.  Chest x-ray tonight does not show any evidence of pneumonia, no pneumothorax, no other acute or emergent process.  There is no clinical concern for CHF.  Doubt ACS at this time.  Unlikely to represent PE.  Most consistent with COPD exacerbation.  As he is still hypoxic with any type of exertion, we will admit for further management.    Medical Decision Making    History:    Supplemental history from: Documented in chart, if applicable and Family Member/Significant Other    External Record(s) reviewed: Documented in chart, if applicable. and Other: Admission from 2023-2023     Work Up:    Chart documentation includes differential considered and any EKGs or imaging  independently interpreted by provider, where specified.    In additional to work up documented, I considered the following work up: Documented in chart, if applicable.    External consultation:    Discussion of management with another provider: Documented in chart, if applicable    Complicating factors:    Care impacted by chronic illness: Chronic Lung Disease    Care affected by social determinants of health: N/A    Disposition considerations: Admit.        At the conclusion of the encounter I discussed the results of all of the tests and the disposition. The questions were answered. The patient or family acknowledged understanding and was agreeable with the care plan.          HPI    Patient information was obtained from: Patient and wife     Use of : N/A       Dominik Rojas is a 82 year old male who presents to the ED via wheelchair for evaluation of shortness of breath.    Patient reports worsening shortness of breath since last night. States that he feels lightheaded secondary to his shortness of breath. Wife states that he was recently discharged from the hospital last Sunday (5/7/2023) for similar complaints. Wife reports that patient also has a cough and has been wheezing. Patient denies any nausea, chest pain or bloody stools. Wife is unsure if patient is normally on O2, but states patient was on O2 the whole day today.  Denies any recent smoking or alcohol use. Denies any allergies to medications.     Off note, wife reports that patient has a history of chronic UTI's for the past 4 years. Also reports a history of COPD, stroke in 2019, and dementia.     Per chart review, the patient was admitted to [facility] from 4/24/2023 to 5/7/2023 (13 days). The patient initially presented to the ED for evaluation of shortness of breath. Patient was recently treated with antibiotics. In the ED patient was noted to be very hypoxic and put on BiPAP but became more restless and so was switched to  high flow nasal oxygen. Full history is unobtainable because of patient's baseline dementia. The patient was the admitted for health care associated pneumonia. While admitted CT chest with IV contrast; Negative for PE but showed interstitial opacities in the dependent portions of bilateral lower lobes are nonspecific for atelectasis versus mild aspiration. Patient was medically stable and was discharged with plan for outpatient follow up with primary care provider.       REVIEW OF SYSTEMS  Constitutional:  Denies fever, chills, weight loss or weakness.   Eyes:  No pain, discharge, redness  HENT:  Denies sore throat, ear pain, congestion  Respiratory: Positive for shortness of breath, wheezing and cough.   Cardiovascular:  No CP, palpitations  GI:  Denies abdominal pain, nausea, vomiting, diarrhea  : Denies dysuria, hematuria  Musculoskeletal:  Denies any new muscle/joint pain, swelling or loss of function.  Skin:  Denies rash, pallor  Neurologic:  Denies headache, focal weakness or sensory changes. Positive for lightheadedness.   Lymph: Denies swollen nodes    All other systems negative unless noted in HPI.    PAST MEDICAL HISTORY:  Past Medical History:   Diagnosis Date     AAA (abdominal aortic aneurysm) (H)     s/p stenting     Alcohol dependence in remission (H)      Alcohol use disorder, severe, in sustained remission, dependence (H) 8/16/2021     Anxiety      Atrial fibrillation with normal ventricular rate (H)      Benign prostatic hyperplasia with lower urinary tract symptoms      Bronchiectasis without complication (H)     2/27/2020     COPD (chronic obstructive pulmonary disease) (H)      CVA (cerebral vascular accident) (H) 2019     DDD (degenerative disc disease), lumbar      Depression      Depressive disorder      Diabetes (H)      Diastolic dysfunction 01/22/2021     DJD (degenerative joint disease) of knee     left     Essential hypertension      Glaucoma      Glaucoma      History of stroke  without residual deficits      Hyperlipidemia      Hypertension      Hypothyroidism      Hypothyroidism      Kidney stones      Major depression      Memory problem      Nocturia      Obesity      Opioid use disorder, severe, dependence (H) 8/16/2021     Overactive bladder 02/25/2021     Primary osteoarthritis of left knee      Psoriasis      Psoriasis      Seborrheic keratoses      Somnolence, daytime      Stenosis of right carotid artery     history of TIA's       PAST SURGICAL HISTORY:  Past Surgical History:   Procedure Laterality Date     ABDOMEN SURGERY       ABDOMINAL AORTIC ANEURYSM REPAIR  2013    stent     CAROTID ENDARTERECTOMY Right 9/9/2019    Procedure: RIGHT CAROTID ENDARTERECTOMY;  Surgeon: Miguel Muller MD;  Location: [facility];  Service: General     CIRCUMCISION       CYSTOSCOPY       CYSTOSCOPY PROSTATE W/ LASER N/A 6/12/2018    Procedure:  TRANSURETHRAL RESECTION OF THE PROSTATE WITH QUANTA LASER;  Surgeon: Eze Levi MD;  Location: [facility];  Service:      CYSTOSCOPY PROSTATE W/ LASER N/A 2/18/2020    Procedure: CYSTOSCOPY WITH TRANSURETHRAL INCISION OF THE PROSTATE WITH QUANTA LASER;  Surgeon: Eze Levi MD;  Location: [facility];  Service: Urology     CYSTOSCOPY W/ LITHOLAPAXY / EHL N/A 6/12/2018    Procedure: CYSTOSCOPY AND LITHOLAPAXY;  Surgeon: Eze Levi MD;  Location: [facility];  Service:      IR ABDOMINAL AORTAGRAM  5/19/2020     IR ABDOMINAL AORTIC ANEURYSM ENDOGRAFT  5/19/2020     IR ABDOMINAL AORTOGRAM  5/19/2020     IR ABDOMINAL ENDOVASCULAR STENT GRAFT  5/19/2020     LITHOTRIPSY       PERCUTANEOUS NEPHROSTOLITHOTOMY Right 03/10/2020     ZZC HUANG W/O FACETEC FORAMOT/DSKC 1/2 VRT SEG, LUMBAR Bilateral 3/3/2021    Procedure: Bilateral lumbar 2 - Lumbar 4 decompressive lumbar laminectomy with medial facetectomies;  Surgeon: Renée King MD;  Location: [facility];  Service: Spine         CURRENT MEDICATIONS:     Current Facility-Administered Medications   Medication     ipratropium - albuterol 0.5 mg/2.5 mg/3 mL (DUONEB) neb solution 3 mL     methylPREDNISolone sodium succinate (solu-MEDROL) injection 125 mg     Current Outpatient Medications   Medication     acetaminophen (TYLENOL) 500 MG tablet     albuterol (PROAIR HFA/PROVENTIL HFA/VENTOLIN HFA) 108 (90 Base) MCG/ACT inhaler     amLODIPine (NORVASC) 2.5 MG tablet     aspirin (ASA) 81 MG chewable tablet     busPIRone (BUSPAR) 5 MG tablet     Cholecalciferol (VITAMIN D3) 50 MCG (2000 UT) CAPS     DULoxetine (CYMBALTA) 60 MG capsule     fluticasone-salmeterol (ADVAIR) 250-50 MCG/ACT inhaler     furosemide (LASIX) 20 MG tablet     latanoprost (XALATAN) 0.005 % ophthalmic solution     levothyroxine (SYNTHROID/LEVOTHROID) 88 MCG tablet     losartan (COZAAR) 25 MG tablet     melatonin 1 MG TABS tablet     metoprolol succinate ER (TOPROL-XL) 25 MG 24 hr tablet     multivitamin w/minerals (THERA-VIT-M) tablet     OLANZapine (ZYPREXA) 5 MG tablet     pantoprazole (PROTONIX) 40 MG EC tablet     QUEtiapine (SEROQUEL) 25 MG tablet     rosuvastatin (CRESTOR) 10 MG tablet         ALLERGIES:  Allergies   Allergen Reactions     Diclofenac      Other reaction(s): GI Upset     Loratadine      Other reaction(s): Urinary Retention     Sertraline Unknown     Other reaction(s): ineffective       FAMILY HISTORY:  Family History   Problem Relation Age of Onset     Emphysema Mother      No Known Problems Father      Cirrhosis Father      No Known Problems Sister      No Known Problems Brother      No Known Problems Maternal Aunt      No Known Problems Maternal Uncle      No Known Problems Paternal Aunt      No Known Problems Paternal Uncle      No Known Problems Maternal Grandmother      No Known Problems Maternal Grandfather      No Known Problems Paternal Grandmother      No Known Problems Paternal Grandfather      No Known Problems Other        SOCIAL HISTORY:  Social History  "    Socioeconomic History     Marital status:    Tobacco Use     Smoking status: Former     Packs/day: 0.50     Years: 35.00     Pack years: 17.50     Types: Cigarettes     Quit date: 1990     Years since quittin.3     Smokeless tobacco: Never     Tobacco comments:     quit    Substance and Sexual Activity     Alcohol use: No     Comment: Alcoholic Drinks/day: quit      Drug use: No     Sexual activity: Yes     Partners: Female     Birth control/protection: Post-menopausal   Other Topics Concern     Parent/sibling w/ CABG, MI or angioplasty before 65F 55M? No       VITALS:  Patient Vitals for the past 24 hrs:   BP Temp Temp src Pulse Resp SpO2 Height Weight   23 2315 106/66 -- -- 85 -- 96 % -- --   23 2230 105/63 -- -- 88 -- 94 % -- --   23 2215 97/63 -- -- -- -- -- -- --   23 2214 -- -- -- 84 -- 97 % -- --   23 2200 97/58 -- -- 93 -- 95 % -- --   23 2157 103/59 -- -- 92 -- 94 % -- --   23 2106 122/50 -- -- -- -- -- -- --   23 2104 -- 97.5  F (36.4  C) Temporal 98 16 90 % 1.702 m (5' 7\") 87.1 kg (192 lb)       PHYSICAL EXAM    VITAL SIGNS: /66   Pulse 85   Temp 97.5  F (36.4  C) (Temporal)   Resp 16   Ht 1.702 m (5' 7\")   Wt 87.1 kg (192 lb)   SpO2 96%   BMI 30.07 kg/m      General Appearance: Well-appearing, well-nourished, no acute distress   Head:  Normocephalic, without obvious abnormality, atraumatic  Eyes:  PERRL, conjunctiva/corneas clear, EOM's intact,  ENT:  Lips, mucosa, and tongue normal, membranes are moist without pallor  Neck:  Normal ROM, symmetrical, trachea midline    Cardio:  Regular rate and rhythm, no murmur, rub or gallop, 2+ pulses symmetric in all extremities  Pulm:  Clear to auscultation bilaterally, Diminished breath sounds bilaterally.   Abdomen:  Soft, non-tender, no rebound or guarding.  Musculoskeletal: Full ROM, no edema, no cyanosis, good ROM of major joints  Integument:  Warm, Dry, No erythema, No " rash.    Neurologic:  Alert & oriented.  No focal deficits appreciated.  Ambulatory.  Psychiatric:  Affect normal, Judgment normal, Mood normal.      LABS  Results for orders placed or performed during the hospital encounter of 05/09/23 (from the past 24 hour(s))   CBC with platelets + differential    Narrative    The following orders were created for panel order CBC with platelets + differential.  Procedure                               Abnormality         Status                     ---------                               -----------         ------                     CBC with platelets and d...[[id number]]  Abnormal            Final result                 Please view results for these tests on the individual orders.   Basic metabolic panel   Result Value Ref Range    Sodium 138 136 - 145 mmol/L    Potassium 4.0 3.4 - 5.3 mmol/L    Chloride 103 98 - 107 mmol/L    Carbon Dioxide (CO2) 23 22 - 29 mmol/L    Anion Gap 12 7 - 15 mmol/L    Urea Nitrogen 19.7 8.0 - 23.0 mg/dL    Creatinine 1.16 0.67 - 1.17 mg/dL    Calcium 8.5 (L) 8.8 - 10.2 mg/dL    Glucose 114 (H) 70 - 99 mg/dL    GFR Estimate 63 >60 mL/min/1.73m2   Troponin T, High Sensitivity (now)   Result Value Ref Range    Troponin T, High Sensitivity 42 (H) <=22 ng/L   Nt probnp inpatient   Result Value Ref Range    N terminal Pro BNP Inpatient 165 0 - 1,800 pg/mL   Symptomatic Influenza A/B, RSV, & SARS-CoV2 PCR (COVID-19) Nasopharyngeal    Specimen: Nasopharyngeal; Swab   Result Value Ref Range    Influenza A PCR Negative Negative    Influenza B PCR Negative Negative    RSV PCR Negative Negative    SARS CoV2 PCR Negative Negative    Narrative    Testing was performed using the Xpert Xpress CoV2/Flu/RSV Assay on the Cepheid GeneXpert Instrument. This test should be ordered for the detection of SARS-CoV-2, influenza, and RSV viruses in individuals who meet clinical and/or epidemiological criteria. Test performance is unknown in asymptomatic patients. This test is  for in vitro diagnostic use under the FDA EUA for laboratories certified under CLIA to perform high or moderate complexity testing. This test has not been FDA cleared or approved. A negative result does not rule out the presence of PCR inhibitors in the specimen or target RNA in concentration below the limit of detection for the assay. If only one viral target is positive but coinfection with multiple targets is suspected, the sample should be re-tested with another FDA cleared, approved, or authorized test, if coinfection would change clinical management. This test was validated by the [facility]. These laboratories are certified under the Clinical Laboratory Improvement Amendments of 1988 (CLIA-88) as qualified to perform high complexity laboratory testing.   CBC with platelets and differential   Result Value Ref Range    WBC Count 11.1 (H) 4.0 - 11.0 10e3/uL    RBC Count 4.00 (L) 4.40 - 5.90 10e6/uL    Hemoglobin 12.6 (L) 13.3 - 17.7 g/dL    Hematocrit 38.1 (L) 40.0 - 53.0 %    MCV 95 78 - 100 fL    MCH 31.5 26.5 - 33.0 pg    MCHC 33.1 31.5 - 36.5 g/dL    RDW 16.7 (H) 10.0 - 15.0 %    Platelet Count 126 (L) 150 - 450 10e3/uL    % Neutrophils 85 %    % Lymphocytes 7 %    % Monocytes 6 %    % Eosinophils 1 %    % Basophils 0 %    % Immature Granulocytes 1 %    NRBCs per 100 WBC 0 <1 /100    Absolute Neutrophils 9.5 (H) 1.6 - 8.3 10e3/uL    Absolute Lymphocytes 0.8 0.8 - 5.3 10e3/uL    Absolute Monocytes 0.6 0.0 - 1.3 10e3/uL    Absolute Eosinophils 0.1 0.0 - 0.7 10e3/uL    Absolute Basophils 0.0 0.0 - 0.2 10e3/uL    Absolute Immature Granulocytes 0.1 <=0.4 10e3/uL    Absolute NRBCs 0.0 10e3/uL   XR Chest Port 1 View    Narrative    EXAM: XR CHEST PORT 1 VIEW  LOCATION: [facility]  DATE/TIME: 5/9/2023 10:45 PM CDT    INDICATION: Dyspnea  COMPARISON: 04/24/2023      Impression    IMPRESSION: Cardiac enlargement and shallow inspiration. Bibasilar fibroatelectasis. No  vascular congestion or significant effusion. Emphysema. Similar to prior.         RADIOLOGY  XR Chest Port 1 View   Final Result   IMPRESSION: Cardiac enlargement and shallow inspiration. Bibasilar fibroatelectasis. No vascular congestion or significant effusion. Emphysema. Similar to prior.         .    EKG:    Rate: 95bpm  Rhythm: Sinus Rhythm with sinus rrhythmia  Axis: Normal  Interval: Normal  Conduction: Normal  QRS: Normal  ST: Normal  T-wave: Normal  QT: Not prolonged  Comparison EKG: no significant change compared to 29 Apr 2023  Impression:  No acute ischemic change   I have independently reviewed and interpreted today's EKG, pending Cardiologist read          MEDICATIONS GIVEN IN THE EMERGENCY:  Medications   ipratropium - albuterol 0.5 mg/2.5 mg/3 mL (DUONEB) neb solution 3 mL (has no administration in time range)   methylPREDNISolone sodium succinate (solu-MEDROL) injection 125 mg (has no administration in time range)   ipratropium - albuterol 0.5 mg/2.5 mg/3 mL (DUONEB) neb solution 3 mL (3 mLs Nebulization $Given 5/9/23 3408)       NEW PRESCRIPTIONS STARTED AT TODAY'S ER VISIT  New Prescriptions    No medications on file        I, Dee Murguia, am serving as a scribe to document services personally performed by Antoine Garcia D.O., based on my observations and the provider's statements to me.  I, Antoine Garcia D.O., attest that Dee Murguia is acting in a scribe capacity, has observed my performance of the services and has documented them in accordance with my direction.     Antoine Garcia D.O.  Emergency Medicine  [facility] EMERGENCY DEPARTMENT  [address] [place] [id number]  [phone]  Dept: [phone]     Antoine Garcia,   05/10/23 0147

## 2023-05-10 NOTE — PROGRESS NOTES
"PRIMARY DIAGNOSIS: \"GENERIC\" NURSING  OUTPATIENT/OBSERVATION GOALS TO BE MET BEFORE DISCHARGE:  1. ADLs back to baseline: No    2. Activity and level of assistance: Up with standby assistance.    3. Pain status: Pain free.    4. Return to near baseline physical activity: No     Discharge Planner Nurse   Safe discharge environment identified: No  Barriers to discharge: Yes       Entered by: Magdalena Mcnally RN 05/10/2023 5:45 PM     Patient denies pain. Ate 100% of dinner. Urine very odorous. Tele discontinued. On 2L o2     Please review provider order for any additional goals.   Nurse to notify provider when observation goals have been met and patient is ready for discharge.  "

## 2023-05-10 NOTE — PLAN OF CARE
Patient alert and oriented x2. Patient pleasant and cooperative with assessments. Patient denied pain. Patient ambulated to bathroom with A1/SBA. Patients Bps ranging in the low 100s. Patient reported occasional SOB.

## 2023-05-10 NOTE — CONSULTS
"Care Management Initial Consult    General Information  Assessment completed with: Spouse or significant otherJovana  Type of CM/SW Visit: Initial Assessment    Primary Care Provider verified and updated as needed: Yes   Readmission within the last 30 days: previous discharge plan unsuccessful (4/24/23 - 5/7/23 and also 4/12/23 - 4/16/23)   Return Category: Exacerbation of disease  Reason for Consult: discharge planning  Advance Care Planning: Advance Care Planning Reviewed: no concerns identified          Communication Assessment  Patient's communication style: spoken language (English or Bilingual)             Cognitive  Cognitive/Neuro/Behavioral: patient has a dementia diagnosis. Also noted from previous admissions: \"Spouse has had increasing difficulty managing patient's behaviors. She states at time she has to threaten to call the police to get him to take medications etc. She states inability to manage his aggressive behavior. Discussed option for memory care or nursing home placement. She states concern for financially being able to afford cost.\"    Living Environment:   People in home: spouse     Current living Arrangements: house (\"3 steps to enter the house, then it is a 1 level rambler and he can stay on the main level\".)      Able to return to prior arrangements: yes       Family/Social Support:  Care provided by: spouse/significant other  Provides care for: no one, unable/limited ability to care for self  Marital Status:   Wife, Children (\"2 daughters live near and help PRN\")  Jovana       Description of Support System: Supportive, Involved    Support Assessment: Adequate family and caregiver support, Adequate social supports    Current Resources:   Patient receiving home care services: No     Community Resources: DME (Home Oxygen set up by [facility] to be used at night.)  Equipment currently used at home: cane, straight, shower chair (\"don't normally use the shower chair because " "it takes up too much room in the shower\".)  Supplies currently used at home: Oxygen Tubing/Supplies (\"home oxygen at night\")    Employment/Financial:  Employment Status: retired        Financial Concerns: No concerns identified   Referral to Financial Worker: No  Finance Comments: On last admissions family given information from Financial  and staff for [employer] and information on [facility] and [facility] programs.    Does the patient's insurance plan have a 3 day qualifying hospital stay waiver?  No    Lifestyle & Psychosocial Needs:  Social Determinants of Health     Tobacco Use: Medium Risk (4/12/2023)    Patient History      Smoking Tobacco Use: Former      Smokeless Tobacco Use: Never      Passive Exposure: Not on file   Alcohol Use: Not on file   Financial Resource Strain: Not on file   Food Insecurity: Not on file   Transportation Needs: Not on file   Physical Activity: Not on file   Stress: Not on file   Social Connections: Not on file   Intimate Partner Violence: Not on file   Depression: Not on file   Housing Stability: Not on file       Functional Status:  Prior to admission patient needed assistance:   Dependent ADLs:: Ambulation-cane (\"also has a walker but doesn't use it.\")  Dependent IADLs:: Cleaning, Cooking, Laundry, Shopping, Meal Preparation, Medication Management, Money Management, Transportation (\"wife helps\")       Mental Health Status:  Mental Health Status: Past Concern  Mental Health Management: Medication    Chemical Dependency Status:  Chemical Dependency Status: Past Concern (diagnosis of \"Opioid use disorder, severe, dependence\")             Values/Beliefs:  Spiritual, Cultural Beliefs, Holiness Practices, Values that affect care:                 Additional Information:  Dominik lives in a house with his wife. The house has, \"3 steps to enter the house, then it is a 1 level rambler and he can stay on the main level\". They have \"2 daughters that live near and help PRN\". He has dementia " "at baseline and wife is primary caregiver.    He is independent with ADLs. Wife helps with all IADLs. He uses a cane for mobility.    He has been admitted on 4/24/23 - 5/7/23 and also 4/12/23 - 4/16/23. Per wife, \"it has been a struggle at home with his health and his increasing behaviors with dementia.\" On previous admission ,\"Spouse has had increasing difficulty managing patient's behaviors. She states at time she has to threaten to call the police to get him to take medications etc. She states inability to manage his aggressive behavior. She states concern for financially being able to afford cost for placement needs. Wife still works 10-12 hours per week at [employer] in [place].\" Family was given information for [employer], [facility], [facility], and financial  also spoke with them. They are working on figuring out finances before placement.    He has Home Oxygen at night from [facility].    Unknown discharge needs at this time.    Family to transport at discharge.    CM to follow for medical progression of care, discharge recommendations, and final discharge plan.    Cristin Toussaint RN      "

## 2023-05-10 NOTE — PLAN OF CARE
Problem: Plan of Care - These are the overarching goals to be used throughout the patient stay.    Goal: Readiness for Transition of Care  Intervention: Mutually Develop Transition Plan  Recent Flowsheet Documentation  Taken 5/10/2023 1300 by Shreyas Landry RN  Equipment Currently Used at Home: cane, straight   Goal Outcome Evaluation:       Pt. Arrived to unit at roughly 1200, pt. Is ambulatory and steady on fee, uses cane occasionally, continent bowel and bladder, forgetful, eating well, started on cardiac monitoring, (has a 24hr order) has been pleasant and cooperative, will cont to monitor.

## 2023-05-10 NOTE — ED TRIAGE NOTES
Patient back with same issue of wheezing and shortness of breath as was here recently with     Triage Assessment     Row Name 05/09/23 0254       Triage Assessment (Adult)    Airway WDL WDL       Respiratory WDL    Respiratory WDL WDL       Skin Circulation/Temperature WDL    Skin Circulation/Temperature WDL WDL       Cardiac WDL    Cardiac WDL WDL       Peripheral/Neurovascular WDL    Peripheral Neurovascular WDL WDL       Cognitive/Neuro/Behavioral WDL    Cognitive/Neuro/Behavioral WDL WDL

## 2023-05-10 NOTE — PROGRESS NOTES
Pt is on RA, Nebs given as scheduled. BS are diminished pre and post treatment. RT will cont to monitor.    Leonid Plasencia RT

## 2023-05-10 NOTE — CONSULTS
COPD Education Consult  5/10/2023, 11:36 AM  Reason for Consult: COPD  Patient Admitted for: COPD exacerbation (H) [J44.1] on 5/9/2023     History of Present Illness: Dominik is a 82 year old with a history of hypertension, congestive heart failure, h/o ETOH abuse, h/o CVA, depression, obesity, dementia, h/o AAA w/stenting, atrial fibrillation, former smoker, bronchiectasis, right lower lobe mass s/p radiation therapy, and COPD. Dominik last saw Dr. Ismael Sun for pulmonary due to his mass, is on 2L with activity via nasal cannula provided by [facility]. Dominik presented to the ED with increasing wheezing and shortness of breath since discharge on Sunday 5/7 He has been treated with IV steroid, supplemental oxygen and bronchodilators.    Last PFT:  Date: 5/6/2021  Post-Spirometry:  FVC: 2.55, 72%, +8%  FEV1: 1.72, 65%, +7%  FEV1/FVC: 67%    Home Respiratory Medications:  Bronchodilators:   -albuterol inhaler PRN  Combination Therapy:   -Advair 1 puff BID    Imaging:  XR CHEST PORT 1 VIEW  DATE/TIME: 5/9/2023 10:45 PM CDT                                                             IMPRESSION: Cardiac enlargement and shallow inspiration. Bibasilar fibroatelectasis. No vascular congestion or significant effusion. Emphysema. Similar to prior.    Assessment: Patient currently is resting in bed comfortably, on 2L nasal cannula in the mid to high 90s throughout our conversation, able to speak in full sentences with no difficulty.Concern over ability to be home as Dominik states his wife writes everything down and tells him when to do things, several times in our conversation he refers to needing to call his wife or I should talk with her. Concern over her being able to care for him at the time due to memory loss herself or being overwhelmed due to him discharging Sunday with oxygen, in which he was sent home with a tank which delayed his discharge, and reading ED provider note stating 'Wife is unsure if patient is normally  "on O2, but states patient was on O2 the whole day today'  Discussed patient needs/deficets with Bedside RN and RT. Unable to meet with wife as she will not be coming in today. Will attempt to see and talk with tomorrow. BP 99/68 (BP Location: Left arm, Patient Position: Sitting, Cuff Size: Adult Regular)   Pulse 92   Temp 98.5  F (36.9  C) (Oral)   Resp 19   Ht 1.702 m (5' 7\")   Wt 87.1 kg (192 lb)   SpO2 96%   BMI 30.07 kg/m      Education:  -Medication use and instruction done, see progress note for details.   -Reviewed what do when short of breath with patient and he states just placing an oximeter on after referring to the one on his finger. Asked about pursed lip breathing, demonstrated for patient, asked him to an was unable to properly, says his wife reminds him when he needs to use.  -Plan to give information to wife prior to discharge    Recommendations:  -Continue current inpatient therapy, per RCAT protocols, recommend mouthpiece with nebulizer.  -Consult(s) to Palliative Care for GOC discussion due to 3 admissions since 4/12.  -OT/PT evaluation prior to discharge  -Home medications patient is taking appear questionable, see instruction note for details    Dominik will not participate in our call back program at this time due to memory issues. Will continue to follow patient throughout hospital stay along with educating patient and family.    Total time spend with patient 10 minutes and 45 minutes spent in chart review, care coordination, and documentation.    Ana Madrid, RT, Chronic Pulmonary Disease Specialist  Phone [phone]    "

## 2023-05-10 NOTE — PROGRESS NOTES
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  Quickly improved after IV steroids and neb and likely medically ready to leave the hospital however family stated they cannot take him back. Hospital Day: 2          #Acute on chronic hypoxic respiratory failure  #Possible mild COPD exacerbation  Patient recently hospitalized for respiratory failure secondary to pneumonia.  Had high O2 needs and required BiPAP.  was still requiring O2 by time of discharge and went home with home oxygen.  Returns with shortness of breath and hypoxia requiring more than his home O2 flow.  Patient was started on neb treatments and IV steroids and has quickly turned around.  Now with excellent air movement on exam.  Requiring 2 L nasal cannula which is his home O2 flow.  COPD educator evaluated and significant concerns about patient's ability to do inhaler.  Technique was adequate for his Advair but he would definitely benefit from nebulizer instead of MDI for PRN JOSE.  -De-escalate steroid to p.o. once daily for another few days  -Continue scheduled nebs here  -Continue home Advair  -Needs neb machine with mouthpiece and Duoneb Rx for discharge  -Continue chronic O2NC 2L    #Leukocytosis  Mild. CXR not consistent with PNA.  Afebrile. Resolved spontaneously with no abx, was possibly just a stress  "response related to acute hypoxia when he came in.  Urinalysis consistent with possible infection and culture is in process.    -Repeat CBC in a.m.   -follow-up urine culture result     #Hypotension  Improved when laying flat. Monitor vitals closely. Hold off of IV fluids due to history of CHF (per wife).  -Hold home amlodipine, Lasix, losartan, metoprolol     #Dementia  Surrogate Decision maker is wife- Jovana Rojas [phone].  -Home Seroquel, Zyprexa  -Nightly melatonin  -Monitor for sundowning    #Mood, home BuSpar and Cymbalta  #Hyperlipidemia, home Crestor  #GERD, home PPI  #Hypothyroidism, home Synthroid  #RLL adenocarcinoma, completed radiotherapy and has a followup appt scheduled for 5/12 with Dr Bales       DVT Prophylaxis: Moderate risk. Lovenox  Diet: Combination Diet Low Saturated Fat Na <2400mg Diet, No Caffeine Diet    Cabral Catheter: Not present  Lines: None     Cardiac Monitoring: ACTIVE order. Indication: hypoxia  Code Status: No CPR- Do NOT Intubate.  Discussed and confirmed with wife.    Clinically Significant Risk Factors Present on Admission                # Thrombocytopenia: Lowest platelets = 126 in last 2 days, will monitor for bleeding   # Hypertension: home medication list includes antihypertensive(s)   # Dementia: noted on problem list    # Obesity: Estimated body mass index is 30.07 kg/m  as calculated from the following:    Height as of this encounter: 1.702 m (5' 7\").    Weight as of this encounter: 87.1 kg (192 lb).           Disposition Plan   Disposition: [facility]     Expected Discharge Date: 05/12/2023             Medically ready to discharge today: No     The patient's care was discussed with the Bedside Nurse, Patient and Patient's Family.    Claudia Isidro MD  Hospitalist Service  [facility]  Securely message with [employer] (more info)  Text page via [facility] Paging/Directory   ______________________________________________________________________      Physical " Exam   Vital Signs: Temp: 98.5  F (36.9  C) Temp src: Oral BP: 99/68 Pulse: 92   Resp: 19 SpO2: 96 % O2 Device: Nasal cannula Oxygen Delivery: 2 LPM  Weight: 192 lbs 0 oz  General: in no apparent distress, non-toxic and alert male sitting on edge of bed oriented to person and place  HEENT: Head normocephalic atraumatic, oral mucosa moist. Sclerae anicteric  CV: Regular rhythm, normal rate, no murmurs  Resp: No wheezes, no rales or rhonchi, no focal consolidations. Very rare end expiratory wheeze  GI: Belly soft, nondistended, nontender, bowel sounds present  Skin: No rashes or lesions  Extremities: No peripheral edema  Psych: Normal affect, mood euthymic  Neuro: CNII-XII grossly intact, moving all 4 extremities        Medical Decision Making               Data   Recent Results (from the past 12 hour(s))   Basic metabolic panel    Collection Time: 05/10/23  7:05 AM   Result Value Ref Range    Sodium 139 136 - 145 mmol/L    Potassium 4.1 3.4 - 5.3 mmol/L    Chloride 104 98 - 107 mmol/L    Carbon Dioxide (CO2) 23 22 - 29 mmol/L    Anion Gap 12 7 - 15 mmol/L    Urea Nitrogen 17.3 8.0 - 23.0 mg/dL    Creatinine 0.89 0.67 - 1.17 mg/dL    Calcium 8.8 8.8 - 10.2 mg/dL    Glucose 138 (H) 70 - 99 mg/dL    GFR Estimate 86 >60 mL/min/1.73m2   CBC with platelets and differential    Collection Time: 05/10/23  7:05 AM   Result Value Ref Range    WBC Count 9.5 4.0 - 11.0 10e3/uL    RBC Count 4.40 4.40 - 5.90 10e6/uL    Hemoglobin 13.7 13.3 - 17.7 g/dL    Hematocrit 42.9 40.0 - 53.0 %    MCV 98 78 - 100 fL    MCH 31.1 26.5 - 33.0 pg    MCHC 31.9 31.5 - 36.5 g/dL    RDW 16.7 (H) 10.0 - 15.0 %    Platelet Count 126 (L) 150 - 450 10e3/uL    % Neutrophils 86 %    % Lymphocytes 11 %    % Monocytes 3 %    % Eosinophils 0 %    % Basophils 0 %    % Immature Granulocytes 0 %    NRBCs per 100 WBC 0 <1 /100    Absolute Neutrophils 8.2 1.6 - 8.3 10e3/uL    Absolute Lymphocytes 1.0 0.8 - 5.3 10e3/uL    Absolute Monocytes 0.2 0.0 - 1.3 10e3/uL     Absolute Eosinophils 0.0 0.0 - 0.7 10e3/uL    Absolute Basophils 0.0 0.0 - 0.2 10e3/uL    Absolute Immature Granulocytes 0.0 <=0.4 10e3/uL    Absolute NRBCs 0.0 10e3/uL   UA with Microscopic reflex to Culture    Collection Time: 05/10/23  9:23 AM    Specimen: Urine, Midstream   Result Value Ref Range    Color Urine Light Brown (A) Colorless, Straw, Light Yellow, Yellow    Appearance Urine Turbid (A) Clear    Glucose Urine 300 (A) Negative mg/dL    Bilirubin Urine Negative Negative    Ketones Urine Trace (A) Negative mg/dL    Specific Gravity Urine 1.020 1.001 - 1.030    Blood Urine >1.0 mg/dL (A) Negative    pH Urine 5.5 5.0 - 7.0    Protein Albumin Urine 30 (A) Negative mg/dL    Urobilinogen Urine <2.0 <2.0 mg/dL    Nitrite Urine Positive (A) Negative    Leukocyte Esterase Urine 500 Juan/uL (A) Negative    Bacteria Urine Few (A) None Seen /HPF    WBC Clumps Urine Present (A) None Seen /HPF    Mucus Urine Present (A) None Seen /LPF    RBC Urine >182 (H) <=2 /HPF    WBC Urine >182 (H) <=5 /HPF     Interval History     Patient states feeling much better and wishes to go home.  Continues to require supplemental oxygen, he has had supplemental oxygen at home.  Denies any cough.  Lung fields fairly clear, just some scattered end expiratory wheezing.  He denies any pain.  Apparently wife with significant concerns about him returning home.  We will ask social work to check in.  Some concerns also about his ability to perform inhalers and he likely needs a neb machine.  Likely approaching medical readiness for discharge but need to sort out social component.  He is still on IV steroid though we could likely de-escalate this.     I called and updated wife.  She notes she has been very overwhelmed with his care at home.  Many recent hospitalizations, follow-up appointments, evolving diagnoses etc.  Patient with behavioral issues, very restless at nighttime complaining he cannot breathe.  He will tell her she needs to call [phone]  and then change his mind and refused to be evaluated etc.  Wife has concerns that the oxygen at home was not working right.  Wife is in favor of patient being placed in a facility but there are financial issues that are being worked out.  Discussed CODE STATUS.  Wife thinks she heard patient respond to another provider that he would not want to be resuscitated and this is in alignment with what he has expressed in the past and his personality in general.  We discussed that patient is very independent and set in his ways and likely would not have a very good quality of life if he were to become even more dependent for cares.  Patient changed to DNR/DNI.

## 2023-05-10 NOTE — TREATMENT PLAN
RCAT Treatment Plan    Patient Score: 14  Patient Acuity: 3    Clinical Indication for Therapy: history of bronchospasm, productive cough, history of mucous producing disease and atelectasis    Therapy Ordered: Atrovent/Xopenex QID, Xopenex Q4 prn, Incentive Spirometry per unit routine    Assessment Summary: PT is on room air with an SpO2 of 91% but per patient states he uses 2.5L O2 at home. 2L O2 at home per DME.  BS coarse, diminished.  Strong, productive coughs. Encourage to cough up secretions. RT will re-evaluate RCAT in x72 hours.    Raphael Schmid, RT  5/10/2023

## 2023-05-10 NOTE — UTILIZATION REVIEW
"Admission Status; Secondary Review Determination     Under the authority of the Utilization Management Committee, the utilization review process indicated a secondary review on Dominik Rojas.  The review outcome is based on review of the medical records, discussions with staff, and applying clinical experience noted on the date of the review.     (x) Observation Status Appropriate - This patient does not meet hospital inpatient criteria and is placed in observation status. If this patient's primary payer is Medicare and was admitted as an inpatient, Condition Code 44 should be used and patient status changed to \"observation\".     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 Dr Isidro is notified of this determination and agrees with downgrade of status.      The information on this document is developed by the utilization review team in order for the business office to ensure compliance.  This only denotes the appropriateness of proper admission status and does not reflect the quality of care rendered.         The definitions of Inpatient Status and Observation Status used in making the determination above are those provided in the CMS Coverage Manual, Chapter 1 and Chapter 6, section 70.4.      Sincerely,  Gil Reich MD  Utilization Review  Physician Advisor  [facility]  "

## 2023-05-11 ENCOUNTER — APPOINTMENT (OUTPATIENT)
Dept: PHYSICAL THERAPY | Facility: HOSPITAL | Age: 83
End: 2023-05-11
Attending: INTERNAL MEDICINE
Payer: COMMERCIAL

## 2023-05-11 LAB
ERYTHROCYTE [DISTWIDTH] IN BLOOD BY AUTOMATED COUNT: 17.2 % (ref 10–15)
HCT VFR BLD AUTO: 39.2 % (ref 40–53)
HGB BLD-MCNC: 12.7 G/DL (ref 13.3–17.7)
MCH RBC QN AUTO: 31.2 PG (ref 26.5–33)
MCHC RBC AUTO-ENTMCNC: 32.4 G/DL (ref 31.5–36.5)
MCV RBC AUTO: 96 FL (ref 78–100)
PLATELET # BLD AUTO: 166 10E3/UL (ref 150–450)
RBC # BLD AUTO: 4.07 10E6/UL (ref 4.4–5.9)
WBC # BLD AUTO: 12.8 10E3/UL (ref 4–11)

## 2023-05-11 PROCEDURE — 94640 AIRWAY INHALATION TREATMENT: CPT

## 2023-05-11 PROCEDURE — 36415 COLL VENOUS BLD VENIPUNCTURE: CPT | Performed by: HOSPITALIST

## 2023-05-11 PROCEDURE — 99232 SBSQ HOSP IP/OBS MODERATE 35: CPT | Performed by: INTERNAL MEDICINE

## 2023-05-11 PROCEDURE — 250N000013 HC RX MED GY IP 250 OP 250 PS 637: Performed by: INTERNAL MEDICINE

## 2023-05-11 PROCEDURE — 999N000032 HC STATISTIC CHRONIC DISEASE SPECIALIST RT CONSULT

## 2023-05-11 PROCEDURE — 85014 HEMATOCRIT: CPT | Performed by: HOSPITALIST

## 2023-05-11 PROCEDURE — G0378 HOSPITAL OBSERVATION PER HR: HCPCS

## 2023-05-11 PROCEDURE — 250N000013 HC RX MED GY IP 250 OP 250 PS 637: Performed by: HOSPITALIST

## 2023-05-11 PROCEDURE — 97161 PT EVAL LOW COMPLEX 20 MIN: CPT | Mod: GP

## 2023-05-11 PROCEDURE — 96372 THER/PROPH/DIAG INJ SC/IM: CPT | Performed by: HOSPITALIST

## 2023-05-11 PROCEDURE — 94640 AIRWAY INHALATION TREATMENT: CPT | Mod: 76

## 2023-05-11 PROCEDURE — 250N000012 HC RX MED GY IP 250 OP 636 PS 637: Performed by: HOSPITALIST

## 2023-05-11 PROCEDURE — 999N000157 HC STATISTIC RCP TIME EA 10 MIN

## 2023-05-11 PROCEDURE — 250N000009 HC RX 250: Performed by: FAMILY MEDICINE

## 2023-05-11 PROCEDURE — 250N000011 HC RX IP 250 OP 636: Performed by: HOSPITALIST

## 2023-05-11 RX ORDER — QUETIAPINE FUMARATE 25 MG/1
25 TABLET, FILM COATED ORAL 2 TIMES DAILY PRN
Status: DISCONTINUED | OUTPATIENT
Start: 2023-05-11 | End: 2023-05-13 | Stop reason: HOSPADM

## 2023-05-11 RX ORDER — HYDRALAZINE HYDROCHLORIDE 20 MG/ML
10 INJECTION INTRAMUSCULAR; INTRAVENOUS EVERY 4 HOURS PRN
Status: DISCONTINUED | OUTPATIENT
Start: 2023-05-11 | End: 2023-05-13 | Stop reason: HOSPADM

## 2023-05-11 RX ORDER — FUROSEMIDE 20 MG
20 TABLET ORAL DAILY
Status: DISCONTINUED | OUTPATIENT
Start: 2023-05-11 | End: 2023-05-13 | Stop reason: HOSPADM

## 2023-05-11 RX ORDER — OLANZAPINE 10 MG/2ML
5 INJECTION, POWDER, FOR SOLUTION INTRAMUSCULAR EVERY 8 HOURS PRN
Status: DISCONTINUED | OUTPATIENT
Start: 2023-05-11 | End: 2023-05-13 | Stop reason: HOSPADM

## 2023-05-11 RX ORDER — CALCIUM CARBONATE 500 MG/1
500 TABLET, CHEWABLE ORAL 3 TIMES DAILY PRN
Status: DISCONTINUED | OUTPATIENT
Start: 2023-05-11 | End: 2023-05-13 | Stop reason: HOSPADM

## 2023-05-11 RX ADMIN — IPRATROPIUM BROMIDE 0.5 MG: 0.5 SOLUTION RESPIRATORY (INHALATION) at 16:27

## 2023-05-11 RX ADMIN — IPRATROPIUM BROMIDE 0.5 MG: 0.5 SOLUTION RESPIRATORY (INHALATION) at 08:01

## 2023-05-11 RX ADMIN — LEVALBUTEROL HYDROCHLORIDE 1.25 MG: 1.25 SOLUTION RESPIRATORY (INHALATION) at 12:16

## 2023-05-11 RX ADMIN — ROSUVASTATIN CALCIUM 10 MG: 10 TABLET, FILM COATED ORAL at 20:23

## 2023-05-11 RX ADMIN — PREDNISONE 40 MG: 20 TABLET ORAL at 08:55

## 2023-05-11 RX ADMIN — LEVALBUTEROL HYDROCHLORIDE 1.25 MG: 1.25 SOLUTION RESPIRATORY (INHALATION) at 20:58

## 2023-05-11 RX ADMIN — FUROSEMIDE 20 MG: 20 TABLET ORAL at 13:56

## 2023-05-11 RX ADMIN — LEVALBUTEROL HYDROCHLORIDE 1.25 MG: 1.25 SOLUTION RESPIRATORY (INHALATION) at 16:27

## 2023-05-11 RX ADMIN — PANTOPRAZOLE SODIUM 40 MG: 40 TABLET, DELAYED RELEASE ORAL at 07:02

## 2023-05-11 RX ADMIN — LEVALBUTEROL HYDROCHLORIDE 1.25 MG: 1.25 SOLUTION RESPIRATORY (INHALATION) at 08:01

## 2023-05-11 RX ADMIN — IPRATROPIUM BROMIDE 0.5 MG: 0.5 SOLUTION RESPIRATORY (INHALATION) at 20:58

## 2023-05-11 RX ADMIN — DULOXETINE HYDROCHLORIDE 60 MG: 60 CAPSULE, DELAYED RELEASE ORAL at 08:55

## 2023-05-11 RX ADMIN — BUSPIRONE HYDROCHLORIDE 5 MG: 5 TABLET ORAL at 20:23

## 2023-05-11 RX ADMIN — OLANZAPINE 7.5 MG: 5 TABLET, FILM COATED ORAL at 20:23

## 2023-05-11 RX ADMIN — Medication 2 MG: at 20:23

## 2023-05-11 RX ADMIN — LEVOTHYROXINE SODIUM 88 MCG: 88 TABLET ORAL at 08:55

## 2023-05-11 RX ADMIN — ENOXAPARIN SODIUM 40 MG: 40 INJECTION SUBCUTANEOUS at 16:38

## 2023-05-11 RX ADMIN — ASPIRIN 81 MG 81 MG: 81 TABLET ORAL at 08:55

## 2023-05-11 RX ADMIN — IPRATROPIUM BROMIDE 0.5 MG: 0.5 SOLUTION RESPIRATORY (INHALATION) at 12:16

## 2023-05-11 RX ADMIN — BUSPIRONE HYDROCHLORIDE 5 MG: 5 TABLET ORAL at 08:55

## 2023-05-11 RX ADMIN — FLUTICASONE FUROATE AND VILANTEROL TRIFENATATE 1 PUFF: 100; 25 POWDER RESPIRATORY (INHALATION) at 08:55

## 2023-05-11 ASSESSMENT — ACTIVITIES OF DAILY LIVING (ADL)
ADLS_ACUITY_SCORE: 41

## 2023-05-11 NOTE — PLAN OF CARE
"PRIMARY DIAGNOSIS: \"GENERIC\" NURSING  OUTPATIENT/OBSERVATION GOALS TO BE MET BEFORE DISCHARGE:  ADLs back to baseline: Yes    Activity and level of assistance: Ambulating independently.    Pain status: Pain free.    Return to near baseline physical activity: Yes     Discharge Planner Nurse   Safe discharge environment identified: Yes  Barriers to discharge: No       Entered by: Karen Bui RN 05/11/2023 1:13 PM     Please review provider order for any additional goals.   Nurse to notify provider when observation goals have been met and patient is ready for discharge.Goal Outcome Evaluation:                        "

## 2023-05-11 NOTE — PROGRESS NOTES
COPD Follow up Note  5/11/2023, 2:27 PM     Admission diagnosis: COPD exacerbation (H) [J44.1]     Follow up:  Due to plan of patient going to long- term care after discharge, will not plan on calling Dominik's wife to offer education. Our service will remain available to her, family and staff for any questions they have regarding his COPD, or inhaled medications.    If you have questions please call COPD Education at [phone], thank you.    Ana Madrid, RT, CTTS, Chronic Pulmonary Disease Specialist

## 2023-05-11 NOTE — PROGRESS NOTES
PRIMARY DIAGNOSIS: COPD  OUTPATIENT/OBSERVATION GOALS TO BE MET BEFORE DISCHARGE:  1. ADLs back to baseline: Yes    2. Activity and level of assistance: Up with standby assistance.    3. Pain status: Improved-controlled with oral pain medications.    4. Return to near baseline physical activity: Yes     Discharge Planner Nurse   Safe discharge environment identified: No  Barriers to discharge: Yes       Entered by: Rebecca Go RN 05/11/2023 3:37 AM     Please review provider order for any additional goals.   Nurse to notify provider when observation goals have been met and patient is ready for discharge.    Appears to be asleep. Observed no signs of acute respiratory distress and/or being in pain. Equal chest rises. Currently on 2L O2 via NC.

## 2023-05-11 NOTE — PLAN OF CARE
"PRIMARY DIAGNOSIS: \"GENERIC\" NURSING  OUTPATIENT/OBSERVATION GOALS TO BE MET BEFORE DISCHARGE:  ADLs back to baseline: Yes    Activity and level of assistance: Ambulating independently.    Pain status: Pain free.    Return to near baseline physical activity: Yes     Discharge Planner Nurse   Safe discharge environment identified: Yes  Barriers to discharge: No       Entered by: Karen Bui RN 05/11/2023 2:47 PM     Please review provider order for any additional goals.   Nurse to notify provider when observation goals have been met and patient is ready for discharge.Goal Outcome Evaluation:                        "

## 2023-05-11 NOTE — PLAN OF CARE
Goal Outcome Evaluation:       Patient denies pain this shift.  Remains on 2 liters O2 which is baseline.  Independent in room, call light within reach.  Awaiting placement.

## 2023-05-11 NOTE — PROGRESS NOTES
Respiratory Care    Pt on 1-2L NC. Nebs done. LS diminished some expiratory wheezes. No respiratory distress noted. Will continue to follow.      Pan Child, RT

## 2023-05-11 NOTE — PROGRESS NOTES
Patient On 2 lpm with SPO2 94%.No respiratory distress noted at this time. Gave nebs as ordered. RT will follow.

## 2023-05-11 NOTE — UTILIZATION REVIEW
Concurrent stay review; Secondary Review Determination - [facility]       Under the authority of the Utilization Management Committee, the utilization review process indicated a secondary review on the above patient.  The review outcome is based on review of the medical records, discussions with staff, and applying clinical experience noted on the date of the review.        (x) Observation/outpatient Status Appropriate - Concurrent stay review       RATIONALE FOR DETERMINATION:     Mr. 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  PT recommending home with home PT but family unable to care for him at this time.     Patient delayed discharge is related to disposition, there is no medical necessity for inpatient admission at the time of this review. If there is a change in patient status, please resend for review.    The information on this document is developed by the utilization review team in order for the business office to ensure compliance.  This only denotes the appropriateness of proper admission status and does not reflect the quality of care rendered.       The definitions of Inpatient Status and Observation Status used in making the determination above are those provided in the CMS Coverage Manual, Chapter 1 and Chapter 6, section 70.4.       Sincerely,    Jo Ann Hardin, DO

## 2023-05-11 NOTE — CONSULTS
"Care Management Follow Up    Length of Stay (days): 1    Expected Discharge Date: 05/12/2023     Concerns to be Addressed:     Discharge disposition   Patient plan of care discussed at interdisciplinary rounds: Yes    Anticipated Discharge Disposition:  Unknown      Anticipated Discharge Services:  Unknown   Anticipated Discharge DME:  Per treatment team    Patient/family educated on Medicare website which has current facility and service quality ratings:  Not applicable at this time     Referrals Placed by CM/SW:  None at this time   Private pay costs discussed: not applicable     Additional Information:    9:45 AM  GALLO reviewed chart.  GALLO called and spoke with Pt's spouse, Jovana.  GALLO discussed observation status with Jovana and completed MEDEIROS.  Copy provided in Pt's room.  Jovana shares that she is feeling very overwhelmed and cannot take care of Pt at home anymore.  GALLO validated feelings and offered supportive listening.  Jovana's daughter has a call out to [facility] to assist with placement.  Jovana is working with Suman Johnson (ph: [phone]) who works with Pt's PCP and is advocating and assisting.  Jovana denies needs from GALLO at this time.      3:05 PM  GALLO received message to call Pt's wife.  GALLO called and spoke with Jovana.  Jovana requests GALLO calls Suman at PCP [facility] (number listed above).  GALLO called and left voice mail.  Jovana inquires if Pt could discharge to TCU.  Per wife, \"he can barely walk\".  GALLO sent request to MD.  PT consult ordered.  PT recommends home, home with assist.     DIRK Palacios    "

## 2023-05-11 NOTE — PROGRESS NOTES
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  Family expressed inability to care for him at home however were also unwilling to place him in a care facility and so he discharged home while family were figuring out financial aspect of long-term care placement, returned on 5/10 for evaluation of worsening dyspnea.  Quickly improved after IV steroids and neb and likely medically ready to leave the hospital however family stated they cannot take him back.             Acute on chronic hypoxic respiratory failure: Possible mild COPD exacerbation  Patient recently hospitalized for respiratory failure secondary to pneumonia.  Had high O2 needs and required BiPAP.  was still requiring O2 by time of discharge and went home with home oxygen.  Returns with shortness of breath and acute on chronic hypoxia. Patient was started on neb treatments and IV steroids and quickly improved.  Now with baseline oxygen requirements of 2 L nasal cannula  COPD educator evaluated and significant concerns about patient's ability to do inhaler.  Technique was adequate for his Advair but he would definitely benefit from nebulizer instead of MDI for PRN JOSE.  -Continue oral prednisone  -Continue scheduled nebs here  -Continue home Advair  -Needs neb machine with mouthpiece and Duoneb Rx for discharge  -Continue chronic O2NC 2L       Leukocytosis: now likely due to steroids  CXR not consistent with PNA.  Afebrile. Urinalysis consistent with possible infection and culture is in process.    --follow-up urine culture  "results       Hypotension  Improved when laying flat. Hold off of IV fluids due to history of CHF (per wife).  -continue to hold home amlodipine, losartan, metoprolol  - resume home lasix 5/11 and monitior  - add back home meds as BP tolerates       Dementia  Surrogate Decision maker is wife- Jovana Rojas [phone].  -continue gome Seroquel, Zyprexa, melatonin         Mood disorder: Continue home BuSpar and Cymbalta      Hyperlipidemia: Continue home Crestor      GERD: Continue home PPI      Hypothyroidism: Continue home replacement        History of RLL adenocarcinoma: completed radiotherapy and has a followup appt scheduled for 5/12 with Dr Bales.  Patient will likely be inpatient and will need to reschedule.             Diet: Combination Diet Low Saturated Fat Na <2400mg Diet    DVT Prophylaxis: Enoxaparin (Lovenox) SQ  Cabral Catheter: Not present  Lines: None     Cardiac Monitoring: None  Code Status: No CPR- Do NOT Intubate      Clinically Significant Risk Factors Present on Admission                # Drug Induced Platelet Defect: home medication list includes an antiplatelet medication   # Hypertension: Noted on problem list   # Dementia: noted on problem list    # Obesity: Estimated body mass index is 30.07 kg/m  as calculated from the following:    Height as of this encounter: 1.702 m (5' 7\").    Weight as of this encounter: 87.1 kg (192 lb).            Disposition Plan     Expected Discharge Date: 05/12/2023              Await LTC placement       Ayaan Jaffe DO  Hospitalist Service  [facility]  Securely message with [employer] (more info)  Text page via [employer] Paging/Directory   ______________________________________________________________________    Interval History   NAD. Denies any complains, wants to go home    Physical Exam   Vital Signs: Temp: 97.4  F (36.3  C) Temp src: Oral BP: 123/81 Pulse: 94   Resp: 18 SpO2: 92 % O2 Device: Nasal cannula Oxygen Delivery: 2 " LPM  Weight: 192 lbs 0 oz  General: NAD  RESPIRATORY: Breathing nonlabored  CARDIOVASCULAR: No le edema bilat.   NEUROLOGIC: Motor and sensory intact, speech clear        Medical Decision Making       >35 MINUTES SPENT BY ME on the date of service doing chart review, history, exam, documentation & further activities per the note.      Data

## 2023-05-11 NOTE — PROGRESS NOTES
PRIMARY DIAGNOSIS: COPD  OUTPATIENT/OBSERVATION GOALS TO BE MET BEFORE DISCHARGE:  1. ADLs back to baseline: No    2. Activity and level of assistance: Up with standby assistance.    3. Pain status: Pain free.    4. Return to near baseline physical activity: No     Discharge Planner Nurse   Safe discharge environment identified: No  Barriers to discharge: Yes       Entered by: Rebecca Go RN 05/10/2023 9:59 PM  Patient alert and disoriented to time. Denies pain. Remains on 2L O2 via NC. No SOB observed. Resting in bed well.   Please review provider order for any additional goals.   Nurse to notify provider when observation goals have been met and patient is ready for discharge.

## 2023-05-11 NOTE — PROGRESS NOTES
"   05/11/23 4056   Appointment Info   Signing Clinician's Name / Credentials (PT) Halie Groves, PT, DPT   Quick Adds   Quick Adds Certification   Living Environment   People in Home spouse   Current Living Arrangements house   Home Accessibility stairs to enter home   Number of Stairs, Main Entrance 3   Stair Railings, Main Entrance railings safe and in good condition;railing on right side (ascending)   Transportation Anticipated family or friend will provide   Self-Care   Usual Activity Tolerance good   Current Activity Tolerance good   Equipment Currently Used at Home cane, straight   Fall history within last six months yes   Number of times patient has fallen within last six months 1   Activity/Exercise/Self-Care Comment Patient may be poor historian due to history of dementia. Reports previous independence with mobility and ADLs. Per patient he does dressing/bathing, but later states \"my wife does everything for me\".   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   Existing Precautions/Restrictions fall   Weight-Bearing Status - LLE full weight-bearing   Weight-Bearing Status - RLE full weight-bearing   Cognition   Affect/Mental Status (Cognition) WFL   Orientation Status (Cognition) oriented x 4   Follows Commands (Cognition) WFL   Safety Deficit (Cognition) impulsivity   Range of Motion (ROM)   Range of Motion ROM is WFL   Strength (Manual Muscle Testing)   Strength (Manual Muscle Testing) strength is WFL   Bed Mobility " "  Bed Mobility no deficits identified   Comment, (Bed Mobility) Independent   Transfers   Transfers no deficits identified   Comment, (Transfers) Mod I with SEC for sit to stand and chair transfer.   Gait/Stairs (Locomotion)   [place] Level (Gait) modified independence   Assistive Device (Gait) cane, straight   Distance in Feet 250   Pattern (Gait) step-through   Comment, (Gait/Stairs) Patient declined stair navigation, stating \"my knees are bad\". Discussed with patient safe stair navigation to decrease knee pain during stair navigation, patient declined stating \"I know how to avoid the paths that cause me pain\".   Clinical Impression   Criteria for Skilled Therapeutic Intervention Evaluation only   PT Diagnosis (PT) None, evaluation only   Influenced by the following impairments None, evaluation only   Functional limitations due to impairments None, evaluation only   Clinical Presentation (PT Evaluation Complexity) Stable/Uncomplicated   Clinical Presentation Rationale Patient presents as medically diagnosed   Clinical Decision Making (Complexity) low complexity   Anticipated Equipment Needs at Discharge (PT) cane, straight   Risk & Benefits of therapy have been explained evaluation/treatment results reviewed;participants voiced agreement with care plan;participants included;patient   PT Total Evaluation Time   PT Eval, Low Complexity Minutes (91779) 15   Therapy Certification   Start of care date 05/11/23   Certification date from 05/11/23   Certification date to 05/11/23   Medical Diagnosis COPD exacerbation   Physical Therapy Goals   PT Frequency Other (see comments)  (Evaluation only)   PT Discharge Planning   PT Plan D/c from PT   PT Discharge Recommendation (DC Rec) home;home with assist   PT Rationale for DC Rec Patient currently is independent with bed mobility and modified independent with transfers and gait. He lives in a house with his wife who is able to provide assistance as needed. Anticipate " patient will be safe to discharge home with assist from wife.   PT Brief overview of current status Modified independent with transfers and gait   Total Session Time   Total Session Time (sum of timed and untimed services) 15       [facility]  OUTPATIENT PHYSICAL THERAPY EVALUATION  PLAN OF TREATMENT FOR OUTPATIENT REHABILITATION  (COMPLETE FOR INITIAL CLAIMS ONLY)  Patient's Last Name, First Name, M.I.  YOB: 1940  Dominik Rojas                        Provider's Name  [facility] Medical Record No.  [id number]                             Onset Date:  05/09/23   Start of Care Date:  05/11/23   Type:     _X_PT   ___OT   ___SLP Medical Diagnosis:  COPD exacerbation              PT Diagnosis:  None, evaluation only Visits from SOC:  1     See note for plan of treatment, functional goals and certification details    I CERTIFY THE NEED FOR THESE SERVICES FURNISHED UNDER        THIS PLAN OF TREATMENT AND WHILE UNDER MY CARE     (Physician co-signature of this document indicates review and certification of the therapy plan).              If medical status changes, please reorder PT as appropriate.      Halie Groves, PT, DPT

## 2023-05-11 NOTE — PROGRESS NOTES
PRIMARY DIAGNOSIS: COPD  OUTPATIENT/OBSERVATION GOALS TO BE MET BEFORE DISCHARGE:  1. ADLs back to baseline: No    2. Activity and level of assistance: Up with standby assistance.    3. Pain status: Improved-controlled with oral pain medications.    4. Return to near baseline physical activity: No     Discharge Planner Nurse   Safe discharge environment identified: No  Barriers to discharge: Yes       Entered by: Rebecca Go RN 05/10/2023 11:52 PM     Please review provider order for any additional goals.   Nurse to notify provider when observation goals have been met and patient is ready for discharge.    Patient appears to be asleep. Observed no signs of pain. Remain on 2L O2. Observed no signs of dyspnea.

## 2023-05-12 LAB
ATRIAL RATE - MUSE: 89 BPM
BACTERIA UR CULT: NORMAL
DIASTOLIC BLOOD PRESSURE - MUSE: NORMAL MMHG
INTERPRETATION ECG - MUSE: NORMAL
P AXIS - MUSE: 42 DEGREES
PR INTERVAL - MUSE: 154 MS
QRS DURATION - MUSE: 84 MS
QT - MUSE: 412 MS
QTC - MUSE: 517 MS
R AXIS - MUSE: 56 DEGREES
SYSTOLIC BLOOD PRESSURE - MUSE: NORMAL MMHG
T AXIS - MUSE: 35 DEGREES
VENTRICULAR RATE- MUSE: 95 BPM

## 2023-05-12 PROCEDURE — 250N000013 HC RX MED GY IP 250 OP 250 PS 637: Performed by: INTERNAL MEDICINE

## 2023-05-12 PROCEDURE — 99232 SBSQ HOSP IP/OBS MODERATE 35: CPT | Performed by: INTERNAL MEDICINE

## 2023-05-12 PROCEDURE — 250N000012 HC RX MED GY IP 250 OP 636 PS 637: Performed by: HOSPITALIST

## 2023-05-12 PROCEDURE — 250N000011 HC RX IP 250 OP 636: Performed by: HOSPITALIST

## 2023-05-12 PROCEDURE — 250N000013 HC RX MED GY IP 250 OP 250 PS 637: Performed by: HOSPITALIST

## 2023-05-12 PROCEDURE — 94640 AIRWAY INHALATION TREATMENT: CPT | Mod: 76

## 2023-05-12 PROCEDURE — G0378 HOSPITAL OBSERVATION PER HR: HCPCS

## 2023-05-12 PROCEDURE — 250N000009 HC RX 250: Performed by: FAMILY MEDICINE

## 2023-05-12 PROCEDURE — 94640 AIRWAY INHALATION TREATMENT: CPT

## 2023-05-12 PROCEDURE — 999N000156 HC STATISTIC RCP CONSULT EA 30 MIN

## 2023-05-12 PROCEDURE — 96372 THER/PROPH/DIAG INJ SC/IM: CPT | Performed by: HOSPITALIST

## 2023-05-12 PROCEDURE — 999N000157 HC STATISTIC RCP TIME EA 10 MIN

## 2023-05-12 RX ORDER — PREDNISONE 20 MG/1
40 TABLET ORAL DAILY
Qty: 10 TABLET | Refills: 0 | Status: CANCELLED | OUTPATIENT
Start: 2023-05-13 | End: 2023-05-18

## 2023-05-12 RX ORDER — AMLODIPINE BESYLATE 2.5 MG/1
2.5 TABLET ORAL DAILY
Status: DISCONTINUED | OUTPATIENT
Start: 2023-05-12 | End: 2023-05-13 | Stop reason: HOSPADM

## 2023-05-12 RX ADMIN — FLUTICASONE FUROATE AND VILANTEROL TRIFENATATE 1 PUFF: 100; 25 POWDER RESPIRATORY (INHALATION) at 10:37

## 2023-05-12 RX ADMIN — DULOXETINE HYDROCHLORIDE 60 MG: 60 CAPSULE, DELAYED RELEASE ORAL at 08:27

## 2023-05-12 RX ADMIN — IPRATROPIUM BROMIDE 0.5 MG: 0.5 SOLUTION RESPIRATORY (INHALATION) at 12:16

## 2023-05-12 RX ADMIN — AMLODIPINE BESYLATE 2.5 MG: 2.5 TABLET ORAL at 16:05

## 2023-05-12 RX ADMIN — BUSPIRONE HYDROCHLORIDE 5 MG: 5 TABLET ORAL at 08:28

## 2023-05-12 RX ADMIN — METOPROLOL SUCCINATE 12.5 MG: 25 TABLET, EXTENDED RELEASE ORAL at 16:05

## 2023-05-12 RX ADMIN — IPRATROPIUM BROMIDE 0.5 MG: 0.5 SOLUTION RESPIRATORY (INHALATION) at 08:56

## 2023-05-12 RX ADMIN — ENOXAPARIN SODIUM 40 MG: 40 INJECTION SUBCUTANEOUS at 16:58

## 2023-05-12 RX ADMIN — LEVALBUTEROL HYDROCHLORIDE 1.25 MG: 1.25 SOLUTION RESPIRATORY (INHALATION) at 20:50

## 2023-05-12 RX ADMIN — BUSPIRONE HYDROCHLORIDE 5 MG: 5 TABLET ORAL at 21:19

## 2023-05-12 RX ADMIN — ACETAMINOPHEN 650 MG: 325 TABLET ORAL at 00:40

## 2023-05-12 RX ADMIN — OLANZAPINE 7.5 MG: 5 TABLET, FILM COATED ORAL at 21:20

## 2023-05-12 RX ADMIN — ROSUVASTATIN CALCIUM 10 MG: 10 TABLET, FILM COATED ORAL at 21:19

## 2023-05-12 RX ADMIN — LATANOPROST 1 DROP: 50 SOLUTION OPHTHALMIC at 21:20

## 2023-05-12 RX ADMIN — FUROSEMIDE 20 MG: 20 TABLET ORAL at 08:27

## 2023-05-12 RX ADMIN — IPRATROPIUM BROMIDE 0.5 MG: 0.5 SOLUTION RESPIRATORY (INHALATION) at 16:15

## 2023-05-12 RX ADMIN — LEVALBUTEROL HYDROCHLORIDE 1.25 MG: 1.25 SOLUTION RESPIRATORY (INHALATION) at 16:16

## 2023-05-12 RX ADMIN — Medication 2 MG: at 21:19

## 2023-05-12 RX ADMIN — LEVALBUTEROL HYDROCHLORIDE 1.25 MG: 1.25 SOLUTION RESPIRATORY (INHALATION) at 08:56

## 2023-05-12 RX ADMIN — LEVOTHYROXINE SODIUM 88 MCG: 88 TABLET ORAL at 10:37

## 2023-05-12 RX ADMIN — PREDNISONE 40 MG: 20 TABLET ORAL at 08:27

## 2023-05-12 RX ADMIN — PANTOPRAZOLE SODIUM 40 MG: 40 TABLET, DELAYED RELEASE ORAL at 08:28

## 2023-05-12 RX ADMIN — LEVALBUTEROL HYDROCHLORIDE 1.25 MG: 1.25 SOLUTION RESPIRATORY (INHALATION) at 12:16

## 2023-05-12 RX ADMIN — ASPIRIN 81 MG 81 MG: 81 TABLET ORAL at 08:28

## 2023-05-12 RX ADMIN — IPRATROPIUM BROMIDE 0.5 MG: 0.5 SOLUTION RESPIRATORY (INHALATION) at 20:50

## 2023-05-12 ASSESSMENT — ACTIVITIES OF DAILY LIVING (ADL)
ADLS_ACUITY_SCORE: 42
ADLS_ACUITY_SCORE: 41
ADLS_ACUITY_SCORE: 42
ADLS_ACUITY_SCORE: 37
ADLS_ACUITY_SCORE: 42
ADLS_ACUITY_SCORE: 41
ADLS_ACUITY_SCORE: 42
ADLS_ACUITY_SCORE: 42
ADLS_ACUITY_SCORE: 41
ADLS_ACUITY_SCORE: 42

## 2023-05-12 NOTE — PROGRESS NOTES
"PRIMARY DIAGNOSIS: COPD  OUTPATIENT/OBSERVATION GOALS TO BE MET BEFORE DISCHARGE:  1. ADLs back to baseline: Yes    2. Activity and level of assistance: Ambulating independently.    3. Pain status: Improved-controlled with oral pain medications.    4. Return to near baseline physical activity: Yes     Discharge Planner Nurse   Safe discharge environment identified: No  Barriers to discharge: Yes       Entered by: Rebecca Go RN 05/12/2023 3:38 AM     Please review provider order for any additional goals.   Nurse to notify provider when observation goals have been met and patient is ready for discharge.    Patient appears to be asleep. Normal and equal chest rises. Observed no signs of respiratory distress. On 2L O2 via NC. Did report left knee pain prior to going to sleep. Pain is controlled with PRN tylenol however patient only took partial dose because \"I normally only take two tablets not three.\"      "

## 2023-05-12 NOTE — PLAN OF CARE
Problem: Plan of Care - These are the overarching goals to be used throughout the patient stay.    Goal: Plan of Care Review  Description: The Plan of Care Review/Shift note should be completed every shift.  The Outcome Evaluation is a brief statement about your assessment that the patient is improving, declining, or no change.  This information will be displayed automatically on your shift note.  Outcome: Progressing   Goal Outcome Evaluation:       Pt calm, pleasant and cooperative with cares. Disoriented to time. Lung sounds clear but diminished. Continues to receive neb treatments. On oxygen at 2 LPM. Good appetite. Denies pain. Independent in room.

## 2023-05-12 NOTE — PLAN OF CARE
PRIMARY DIAGNOSIS: GENERALIZED WEAKNESS    OUTPATIENT/OBSERVATION GOALS TO BE MET BEFORE DISCHARGE  1. Orthostatic performed: N/A    2. Tolerating PO medications: Yes    3. Return to near baseline physical activity:  not demonstrated on this shift     4. Cleared for discharge by consultants (if involved): Yes    Discharge Planner Nurse   Safe discharge environment identified:  this afternoon there has been clarification on patient's needs and his ability to discharge to home   Barriers to discharge:  just this afternoon, MD spoke with family regarding discharge to home       Entered by: Winter Ortiz RN 05/12/2023 3:10 PM     Please review provider order for any additional goals.   Nurse to notify provider when observation goals have been met and patient is ready for discharge.Goal Outcome Evaluation:

## 2023-05-12 NOTE — PROGRESS NOTES
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  Family expressed inability to care for him at home however were also unwilling to place him in a care facility and so he discharged home while family were figuring out financial aspect of long-term care placement, returned on 5/10 for evaluation of worsening dyspnea.  Quickly improved after IV steroids and neb and likely medically ready to leave the hospital however family stated they cannot take him back.             Acute on chronic hypoxic respiratory failure: Possible mild COPD exacerbation  Patient recently hospitalized for respiratory failure secondary to pneumonia.  Had high O2 needs and required BiPAP.  was still requiring O2 by time of discharge and went home with home oxygen.  Returns with shortness of breath and acute on chronic hypoxia. Patient was started on neb treatments and IV steroids and quickly improved.  Now with baseline oxygen requirements of 2 L nasal cannula  COPD educator evaluated and significant concerns about patient's ability to do inhaler.  Technique was adequate for his Advair but he would definitely benefit from nebulizer instead of MDI for PRN JOSE.  -Continue oral prednisone  -Continue scheduled nebs here  -Continue home Advair  -patient already has nebulizer with mouthpiece and albuterol solution at home   -Continue chronic O2NC 2L       Leukocytosis: now likely due to steroids  CXR not consistent with PNA.  Afebrile.     Urine culture shows mixture of urogenital tai, likely contaminated  "sample       Hypotension  Improved when laying flat.   - continue to hold home losartan  - resume home amlodipine and metoprolol 5/12  - resumed home lasix 5/11  - add back home ARB as BP tolerates       Dementia  Surrogate Decision maker is wife- Jovana Rojas [phone].  -continue gome Seroquel, Zyprexa, melatonin         Mood disorder: Continue home BuSpar and Cymbalta      Hyperlipidemia: Continue home Crestor      GERD: Continue home PPI      Hypothyroidism: Continue home replacement        History of RLL adenocarcinoma: completed radiotherapy and has a followup appt scheduled for 5/12 with Dr Bales.  Patient will likely be inpatient and will need to reschedule.             Diet: Combination Diet Low Saturated Fat Na <2400mg Diet    DVT Prophylaxis: Enoxaparin (Lovenox) SQ  Cabral Catheter: Not present  Lines: None     Cardiac Monitoring: None  Code Status: No CPR- Do NOT Intubate      Clinically Significant Risk Factors Present on Admission                # Drug Induced Platelet Defect: home medication list includes an antiplatelet medication   # Hypertension: Noted on problem list   # Dementia: noted on problem list    # Obesity: Estimated body mass index is 30.07 kg/m  as calculated from the following:    Height as of this encounter: 1.702 m (5' 7\").    Weight as of this encounter: 87.1 kg (192 lb).            Disposition Plan      Expected Discharge Date: 05/13/2023        Discharge Comments: wife is working with [employer], elder law attourney, and filling out medical assistance application  plan for home with homecare      Plan home with home care 5/13       Ayaan Jaffe DO  Hospitalist Service  [facility]  Securely message with [employer] (more info)  Text page via [employer] Paging/Directory   ______________________________________________________________________    Interval History   NAD. Denies any complaints    Physical Exam   Vital Signs: Temp: 97.9  F (36.6  C) Temp src: " Oral BP: 135/81 Pulse: 95   Resp: 16 SpO2: 94 % O2 Device: Nasal cannula Oxygen Delivery: 2 LPM  Weight: 192 lbs 0 oz  General: NAD  RESPIRATORY: Breathing nonlabored  CARDIOVASCULAR: No le edema bilat.   NEUROLOGIC: Motor and sensory intact, speech clear        Medical Decision Making       >35 MINUTES SPENT BY ME on the date of service doing chart review, history, exam, documentation & further activities per the note.      Data

## 2023-05-12 NOTE — PROGRESS NOTES
"Care Management Follow Up    Length of Stay (days): 1    Expected Discharge Date: 05/15/2023     Concerns to be Addressed:     Discharge disposition   Patient plan of care discussed at interdisciplinary rounds: Yes    Anticipated Discharge Disposition:  potentially assisted living with memory care     Anticipated Discharge Services:  Memory care  Anticipated Discharge DME:  Per treatment team      Education Provided on the Discharge Plan:  yes  Patient/Family in Agreement with the Plan:  yes    Social History:  Per chart review, Dominik lives in a house with his wife. The house has, \"3 steps to enter the house, then it is a 1 level rambler and he can stay on the main level\". They have \"2 daughters that live near and help PRN\". He has dementia at baseline and wife is primary caregiver.     He is independent with ADLs. Wife helps with all IADLs. He uses a cane for mobility.     He has been admitted on 4/24/23 - 5/7/23 and also 4/12/23 - 4/16/23. Per wife, \"it has been a struggle at home with his health and his increasing behaviors with dementia.\" On previous admission ,\"Spouse has had increasing difficulty managing patient's behaviors. She states at time she has to threaten to call the police to get him to take medications etc. She states inability to manage his aggressive behavior. She states concern for financially being able to afford cost for placement needs. Wife still works 10-12 hours per week at [employer] in [place].\" Family was given information for [employer], [place], Elderly [employer], and financial  also spoke with them. They are working on figuring out finances before placement.    Additional Information:  GALLO called and spoke with Pt's wife, Jovana.  Jovana reports she is working with [employer], and [facility] to assist with finding appropriate placement for Pt.  Jovana is meeting with an elder law  and working on Medical Assistance application as family does not have funds to " "pay privately for [facility].      Jovana voices feeling very overwhelmed and not sure how to navigate the situation.  Jovana's daughter is limited in her availability to help out due to her own work.  Jovana reports she has talked with Pt about not being able to return home, but states that he does not understand and thinks he can come home and they \"will figure things out\".      12:54 PM  SW discussed updates with MD.  MD spoke with Pt's wife regarding discharge. Pt medically stable for discharge.  Plan to discharge home on 5/13.  Home Care arranged through [facility].  Accepted for PT, OT, RN, HHA, SW.  Wife to transport home.    SW updated DIONI Will at [facility], Pt's primary clinic (ph: [phone]).      DIRK Palacios          "

## 2023-05-12 NOTE — PLAN OF CARE
"Goal Outcome Evaluation:             PRIMARY DIAGNOSIS: GENERALIZED WEAKNESS    OUTPATIENT/OBSERVATION GOALS TO BE MET BEFORE DISCHARGE  1. Orthostatic performed: N/A    2. Tolerating PO medications: Yes    3. Return to near baseline physical activity:  patient has been medically cleared for discharge    4. Cleared for discharge by consultants (if involved): Yes    Discharge Planner Nurse   Safe discharge environment identified:  this afternoon discharge has been clarified as safe to home   Barriers to discharge:  anticipating discharge to home 05/12/23       Entered by: Winter Ortiz RN 05/12/2023 3:15 PM     Please review provider order for any additional goals.   Nurse to notify provider when observation goals have been met and patient is ready for discharge.     /81 (BP Location: Right arm, Patient Position: Sitting, Cuff Size: Adult Regular)   Pulse 95   Temp 97.9  F (36.6  C) (Oral)   Resp 16   Ht 1.702 m (5' 7\")   Wt 87.1 kg (192 lb)   SpO2 94%   BMI 30.07 kg/m    Patient is in his room up in his chair appearing comfortable   MD has spoken with spouse regarding patient's medically stable and ready for discharge to home 05/13/23.  Winter Ortiz RN       "

## 2023-05-12 NOTE — PROGRESS NOTES
RESPIRATORY CARE NOTE:    Pt is currently on 1-2L NC. Atrovent/Xopenex given as scheduled. BS are crackles/diminished, with increased aeration post tx. Pt tolerated well. RT following.     Esthela Dorado, RT

## 2023-05-12 NOTE — PROGRESS NOTES
PRIMARY DIAGNOSIS: COPD  OUTPATIENT/OBSERVATION GOALS TO BE MET BEFORE DISCHARGE:  1. ADLs back to baseline: Yes    2. Activity and level of assistance: Up with standby assistance.    3. Pain status: Improved-controlled with oral pain medications.    4. Return to near baseline physical activity: No     Discharge Planner Nurse   Safe discharge environment identified: No  Barriers to discharge: Yes       Entered by: Rebecca Go RN 05/12/2023 1:06 AM     Please review provider order for any additional goals.   Nurse to notify provider when observation goals have been met and patient is ready for discharge.    Patient appears well, disoriented to time. Denies pain. Attempting to go to sleep but having a difficult time. Suggested for patient to listen to some music. Patient agreeable to plan and stated that will call if cannot go to sleep. Remains on 2L O2 via NC. No signs of respiratory distress.

## 2023-05-12 NOTE — PLAN OF CARE
Patient up with SBA to bathroom. Refusing chair alarm. Oxygen level 93 on 2 liters.     Problem: Plan of Care - These are the overarching goals to be used throughout the patient stay.    Goal: Absence of Hospital-Acquired Illness or Injury  Intervention: Identify and Manage Fall Risk  Recent Flowsheet Documentation  Taken 5/12/2023 1602 by Cristin Jacobson, RN  Safety Promotion/Fall Prevention: activity supervised                            observation and assessment

## 2023-05-12 NOTE — PROGRESS NOTES
RESPIRATORY CARE NOTE:    PT is currently on 1-2 L NC. Breath sounds crackles and diminished. Atrovent/Xopenex nebulizer given. PT tolerated treatments well.  RT will continue to follow.    Esthela Dorado, RT  5/12/2023

## 2023-05-13 VITALS
OXYGEN SATURATION: 96 % | RESPIRATION RATE: 18 BRPM | SYSTOLIC BLOOD PRESSURE: 138 MMHG | TEMPERATURE: 97.9 F | HEIGHT: 67 IN | WEIGHT: 192 LBS | DIASTOLIC BLOOD PRESSURE: 86 MMHG | HEART RATE: 77 BPM | BODY MASS INDEX: 30.13 KG/M2

## 2023-05-13 LAB
CREAT SERPL-MCNC: 0.86 MG/DL (ref 0.67–1.17)
GFR SERPL CREATININE-BSD FRML MDRD: 87 ML/MIN/1.73M2
PLATELET # BLD AUTO: 156 10E3/UL (ref 150–450)

## 2023-05-13 PROCEDURE — 99239 HOSP IP/OBS DSCHRG MGMT >30: CPT | Performed by: INTERNAL MEDICINE

## 2023-05-13 PROCEDURE — 85049 AUTOMATED PLATELET COUNT: CPT | Performed by: HOSPITALIST

## 2023-05-13 PROCEDURE — 250N000012 HC RX MED GY IP 250 OP 636 PS 637: Performed by: HOSPITALIST

## 2023-05-13 PROCEDURE — 82565 ASSAY OF CREATININE: CPT | Performed by: HOSPITALIST

## 2023-05-13 PROCEDURE — 250N000013 HC RX MED GY IP 250 OP 250 PS 637: Performed by: HOSPITALIST

## 2023-05-13 PROCEDURE — 250N000013 HC RX MED GY IP 250 OP 250 PS 637: Performed by: INTERNAL MEDICINE

## 2023-05-13 PROCEDURE — G0378 HOSPITAL OBSERVATION PER HR: HCPCS

## 2023-05-13 PROCEDURE — 999N000156 HC STATISTIC RCP CONSULT EA 30 MIN

## 2023-05-13 PROCEDURE — 36415 COLL VENOUS BLD VENIPUNCTURE: CPT | Performed by: HOSPITALIST

## 2023-05-13 RX ORDER — ALBUTEROL SULFATE 90 UG/1
2 AEROSOL, METERED RESPIRATORY (INHALATION) EVERY 6 HOURS PRN
Qty: 18 G | Refills: 1 | Status: ON HOLD | OUTPATIENT
Start: 2023-05-13 | End: 2024-06-29

## 2023-05-13 RX ORDER — ALBUTEROL SULFATE 0.83 MG/ML
2.5 SOLUTION RESPIRATORY (INHALATION) EVERY 6 HOURS PRN
Qty: 90 ML | Refills: 1 | Status: ON HOLD | OUTPATIENT
Start: 2023-05-13 | End: 2024-06-29

## 2023-05-13 RX ORDER — PREDNISONE 20 MG/1
40 TABLET ORAL DAILY
Qty: 4 TABLET | Refills: 0 | Status: SHIPPED | OUTPATIENT
Start: 2023-05-14 | End: 2023-05-16

## 2023-05-13 RX ORDER — ALBUTEROL SULFATE 90 UG/1
2 AEROSOL, METERED RESPIRATORY (INHALATION) EVERY 6 HOURS PRN
Status: DISCONTINUED | OUTPATIENT
Start: 2023-05-13 | End: 2023-05-13 | Stop reason: HOSPADM

## 2023-05-13 RX ADMIN — LEVOTHYROXINE SODIUM 88 MCG: 88 TABLET ORAL at 09:15

## 2023-05-13 RX ADMIN — PANTOPRAZOLE SODIUM 40 MG: 40 TABLET, DELAYED RELEASE ORAL at 06:43

## 2023-05-13 RX ADMIN — BUSPIRONE HYDROCHLORIDE 5 MG: 5 TABLET ORAL at 09:11

## 2023-05-13 RX ADMIN — ASPIRIN 81 MG 81 MG: 81 TABLET ORAL at 09:08

## 2023-05-13 RX ADMIN — METOPROLOL SUCCINATE 12.5 MG: 25 TABLET, EXTENDED RELEASE ORAL at 09:09

## 2023-05-13 RX ADMIN — AMLODIPINE BESYLATE 2.5 MG: 2.5 TABLET ORAL at 09:08

## 2023-05-13 RX ADMIN — FLUTICASONE FUROATE AND VILANTEROL TRIFENATATE 1 PUFF: 100; 25 POWDER RESPIRATORY (INHALATION) at 09:10

## 2023-05-13 RX ADMIN — PREDNISONE 40 MG: 20 TABLET ORAL at 09:08

## 2023-05-13 RX ADMIN — DULOXETINE HYDROCHLORIDE 60 MG: 60 CAPSULE, DELAYED RELEASE ORAL at 09:08

## 2023-05-13 RX ADMIN — FUROSEMIDE 20 MG: 20 TABLET ORAL at 09:08

## 2023-05-13 ASSESSMENT — ACTIVITIES OF DAILY LIVING (ADL)
ADLS_ACUITY_SCORE: 42

## 2023-05-13 NOTE — PROGRESS NOTES
Discharge is ordered, and patient's spouse has arrived.  She has concerns with discharge and requests to speak with a .  This Nurse has connected her with the  on duty and is meeting with spouse and patient in patient's room at this time.  Winter Ortiz RN

## 2023-05-13 NOTE — PROGRESS NOTES
"PRIMARY DIAGNOSIS: \"COPD Exacerbation\"  OUTPATIENT/OBSERVATION GOALS TO BE MET BEFORE DISCHARGE:  1. ADLs back to baseline: Yes    2. Activity and level of assistance: Up with standby assistance.    3. Pain status: Pain free.    4. Return to near baseline physical activity: Yes     Discharge Planner Nurse   Safe discharge environment identified: Yes  Barriers to discharge: No       Entered by: Chula Burleson RN 05/13/2023 6:44 AM     Please review provider order for any additional goals.   Nurse to notify provider when observation goals have been met and patient is ready for discharge.  "

## 2023-05-13 NOTE — TREATMENT PLAN
RCAT Treatment Plan    Patient Score: 8  Patient Acuity: 4    Clinical Indication for Therapy: history of mucous producing disease and atelectasis    Therapy Ordered: Albuterol MDI 2p Q6 prn, Xopenex/Atrovent Q4 prn.  Incentive Spirometer per unit routine    Assessment Summary: PT on 2L O2 chronically.  BS diminished.  Non-productive coughs.  PRN nebs if wheezing or SOB.    Raphael Schmid, RT  5/13/2023

## 2023-05-13 NOTE — PLAN OF CARE
"PRIMARY DIAGNOSIS: GENERALIZED WEAKNESS    OUTPATIENT/OBSERVATION GOALS TO BE MET BEFORE DISCHARGE  1. Orthostatic performed: N/A    2. Tolerating PO medications: Yes    3. Return to near baseline physical activity: Yes    4. Cleared for discharge by consultants (if involved): Yes    Discharge Planner Nurse   Safe discharge environment identified: Yes  Barriers to discharge: Family was unsure of patient's discharge to home as of 05/12/23 MD spoke with spouse to clarify discharge to home (see notes)  Anticipating discharge to home today.        Entered by: Winter Ortiz RN 05/13/2023 10:50 AM     Please review provider order for any additional goals.   Nurse to notify provider when observation goals have been met and patient is ready for discharge.Goal Outcome Evaluation:             /86   Pulse 77   Temp 97.9  F (36.6  C) (Oral)   Resp 18   Ht 1.702 m (5' 7\")   Wt 87.1 kg (192 lb)   SpO2 96%   BMI 30.07 kg/m    All medications taken and tolerated well.  Patient is pleasant and cooperative.  A&O x4 with only time clarified when patient read the clock.  Declined to walk with this Nurse .  Per NOC nurse, was up in his chair all night by choice Winter Ortiz RN            "

## 2023-05-13 NOTE — PROGRESS NOTES
"PRIMARY DIAGNOSIS: \"COPD exacerbation\"  OUTPATIENT/OBSERVATION GOALS TO BE MET BEFORE DISCHARGE:  1. ADLs back to baseline: Yes    2. Activity and level of assistance: Up with standby assistance.    3. Pain status: Pain free.    4. Return to near baseline physical activity: Yes     Discharge Planner Nurse   Safe discharge environment identified: Yes  Barriers to discharge: No       Entered by: Chula Burleson RN 05/13/2023 2:15 AM     Please review provider order for any additional goals.   Nurse to notify provider when observation goals have been met and patient is ready for discharge.  "

## 2023-05-13 NOTE — DISCHARGE SUMMARY
[facility]  Hospitalist Discharge Summary      Date of Admission:  5/9/2023  Date of Discharge:  5/13/2023  Discharging Provider: Ayaan Jaffe,   Discharge Service: Hospitalist Service         Follow-ups Needed After Discharge   Follow-up Appointments     Follow-up and recommended labs and tests       Follow up with primary care provider, Misael Moe, within 3-5 days,   for hospital follow- up.               Unresulted Labs Ordered in the Past 30 Days of this Admission     No orders found from 4/9/2023 to 5/10/2023.           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  Family expressed inability to care for him at home however were also unwilling to place him in a care facility and so he discharged home while family were figuring out financial aspect of long-term care placement, returned on 5/10 for evaluation of worsening dyspnea.  Quickly improved after IV steroids and neb and was quickly medically ready to leave the hospital however family stated they cannot take him back.    Upon further discussion with wife she was agreeable to discharge to home with increased home health services since patient does not qualify for TCU. Family will work on long term care placement after discharge.         Acute on chronic hypoxic respiratory failure: Possible mild COPD exacerbation  Patient recently hospitalized for respiratory failure secondary to pneumonia.  Had high O2 needs and required BiPAP.  was still requiring O2 by time of discharge and went home with home oxygen.  Returns with shortness of breath  and acute on chronic hypoxia. Patient was started on neb treatments and IV steroids and quickly improved.  Now with baseline oxygen requirements of 2 L nasal cannula  COPD educator evaluated and significant concerns about patient's ability to do inhaler.  Technique was adequate for his Advair but he would definitely benefit from nebulizer instead of MDI for PRN JOSE.  -Continue oral prednisone for 2 more days to complete burst without taper  -Continue home Advair  -Continue albuterol nebs that patient already has at home  -patient already has nebulizer with mouthpiece and albuterol solution at home   -Continue chronic O2NC 2L        Leukocytosis: now likely due to steroids  CXR not consistent with PNA.  Afebrile.     Urine culture shows mixture of urogenital tai, likely contaminated sample        Hypotension: resolved  - continue home losartan, amlodipine, lasix and metoprolol           Dementia  Surrogate Decision maker is wife- Jovana Rojas [phone].  -continue gome Seroquel, Zyprexa, melatonin           Mood disorder: Continue home BuSpar and Cymbalta        Hyperlipidemia: Continue home Crestor        GERD: Continue home PPI        Hypothyroidism: Continue home replacement        History of RLL adenocarcinoma: completed radiotherapy and has a followup appt scheduled for 5/12 with Dr Bales.  Patient will likely be inpatient and will need to reschedule.          Consultations This Hospital Stay   IP RESPIRATORY CARE CHRONIC PULMONARY DISEASE SPECIALIST  CARE MANAGEMENT / SOCIAL WORK IP CONSULT  CARE MANAGEMENT / SOCIAL WORK IP CONSULT  PHYSICAL THERAPY ADULT IP CONSULT    Code Status   No CPR- Do NOT Intubate    Time Spent on this Encounter   IAyaan DO, personally saw the patient today and spent greater than 30 minutes discharging this patient.       Ayaan Jaffe DO  [address] [address]  Phone: [phone]  Fax:  [phone]  ______________________________________________________________________    Physical Exam   Vital Signs: Temp: 97.9  F (36.6  C) Temp src: Oral BP: 138/86 Pulse: 77   Resp: 18 SpO2: 96 % O2 Device: Nasal cannula Oxygen Delivery: 2 LPM  Weight: 192 lbs 0 oz    General: NAD  HEENT: Without congestion or inflammation  RESPIRATORY: Respirations nonlabored  CARDIOVASCULAR: No le edema bilat.  NEUROLOGIC: No focal arm or leg weakness, speech is clear    Primary Care Physician   Misael Moe    Discharge Orders      Primary Care - Care Coordination Referral      Medication Therapy Management Referral      Home care nursing referral      Reason for your hospital stay    COPD exacerbation     Follow-up and recommended labs and tests     Follow up with primary care provider, Misael Moe, within 3-5 days, for hospital follow- up.     Activity    Your activity upon discharge: activity as tolerated     Diet    Follow this diet upon discharge: Orders Placed This Encounter      Combination Diet Low Saturated Fat Na <2400mg Diet         Discharge Medications   Current Discharge Medication List      START taking these medications    Details   albuterol (PROVENTIL) (2.5 MG/3ML) 0.083% neb solution Take 1 vial (2.5 mg) by nebulization every 6 hours as needed for shortness of breath, wheezing or cough Use first line for acute SOB or wheezing  Qty: 90 mL, Refills: 1    Associated Diagnoses: COPD exacerbation (H)      predniSONE (DELTASONE) 20 MG tablet Take 2 tablets (40 mg) by mouth daily for 2 days  Qty: 4 tablet, Refills: 0    Associated Diagnoses: COPD exacerbation (H)         CONTINUE these medications which have CHANGED    Details   albuterol (PROAIR HFA/PROVENTIL HFA/VENTOLIN HFA) 108 (90 Base) MCG/ACT inhaler Inhale 2 puffs into the lungs every 6 hours as needed for shortness of breath, wheezing or cough Use second line for acute SOB or wheezing if nebulizer unable to be used  Qty: 18 g, Refills: 1     Comments: Pharmacy may dispense brand covered by insurance (Proair, or proventil or ventolin or generic albuterol inhaler)  Associated Diagnoses: COPD exacerbation (H); Chronic systolic congestive heart failure (H); Pneumonia of right lower lobe due to infectious organism; Dementia with mood disturbance, unspecified dementia severity, unspecified dementia type (H)         CONTINUE these medications which have NOT CHANGED    Details   acetaminophen (TYLENOL) 500 MG tablet Take 2 tablets (1,000 mg) by mouth every 8 hours for 30 days  Qty: 180 tablet, Refills: 0    Associated Diagnoses: COPD exacerbation (H); Dementia with mood disturbance, unspecified dementia severity, unspecified dementia type (H); Pneumonia of right lower lobe due to infectious organism; Chronic systolic congestive heart failure (H)      amLODIPine (NORVASC) 2.5 MG tablet Take 2.5 mg by mouth daily      aspirin (ASA) 81 MG chewable tablet Take 81 mg by mouth daily      busPIRone (BUSPAR) 5 MG tablet Take 5 mg by mouth 2 times daily      Cholecalciferol (VITAMIN D3) 50 MCG (2000 UT) CAPS Take 1 tablet by mouth daily       DULoxetine (CYMBALTA) 60 MG capsule Take 60 mg by mouth daily      fluticasone-salmeterol (ADVAIR) 250-50 MCG/ACT inhaler Inhale 1 puff into the lungs every 12 hours for 30 days  Qty: 14 each, Refills: 1    Associated Diagnoses: COPD exacerbation (H); Dementia with mood disturbance, unspecified dementia severity, unspecified dementia type (H); Pneumonia of right lower lobe due to infectious organism; Chronic systolic congestive heart failure (H)      furosemide (LASIX) 20 MG tablet Take 20 mg by mouth daily      latanoprost (XALATAN) 0.005 % ophthalmic solution Place 1 drop into both eyes At Bedtime      levothyroxine (SYNTHROID/LEVOTHROID) 88 MCG tablet TAKE 1 TABLET BY MOUTH EVERY DAY IN THE MORNING ON AN EMPTY STOMACH FOR 30 DAYS      losartan (COZAAR) 25 MG tablet Take 1 tablet (25 mg) by mouth daily  Qty: 30 tablet, Refills: 0     Associated Diagnoses: Congestive heart failure, unspecified HF chronicity, unspecified heart failure type (H)      melatonin 1 MG TABS tablet Take 1 tablet (1 mg) by mouth At Bedtime  Qty: 30 tablet, Refills: 0    Associated Diagnoses: COPD exacerbation (H); Dementia with mood disturbance, unspecified dementia severity, unspecified dementia type (H); Pneumonia of right lower lobe due to infectious organism; Chronic systolic congestive heart failure (H)      metoprolol succinate ER (TOPROL-XL) 25 MG 24 hr tablet Take 0.5 tablets (12.5 mg) by mouth daily    Associated Diagnoses: Essential hypertension      multivitamin w/minerals (THERA-VIT-M) tablet Take 1 tablet by mouth daily      OLANZapine (ZYPREXA) 5 MG tablet Take 7.5 mg by mouth At Bedtime      pantoprazole (PROTONIX) 40 MG EC tablet Take 1 tablet (40 mg) by mouth every morning (before breakfast)  Qty: 30 tablet, Refills: 0    Associated Diagnoses: Gastroesophageal reflux disease with esophagitis without hemorrhage      QUEtiapine (SEROQUEL) 25 MG tablet Take 1 tablet (25 mg) by mouth 2 times daily as needed (Agitation)  Qty: 14 tablet, Refills: 0    Associated Diagnoses: COPD exacerbation (H); Dementia with mood disturbance, unspecified dementia severity, unspecified dementia type (H); Pneumonia of right lower lobe due to infectious organism; Chronic systolic congestive heart failure (H)      rosuvastatin (CRESTOR) 10 MG tablet Take 10 mg by mouth At Bedtime            Allergies   Allergies   Allergen Reactions     Diclofenac      Other reaction(s): GI Upset     Loratadine      Other reaction(s): Urinary Retention     Sertraline Unknown     Other reaction(s): ineffective

## 2023-05-13 NOTE — PROGRESS NOTES
Pt is on 2 lpm 02 sating 92%. Pt did have 02 off until RT came into room, he then put it on. RR 18 HR 87. Breath sounds diminished both pre and post neb tx. Neb given x 1.  Rt continue to monitor.

## 2023-05-13 NOTE — CONSULTS
Care Management Discharge Note    Discharge Date: 05/13/2023       Discharge Disposition:  Home     Discharge Services:  [facility] RN,PT,OT HHA SW    Discharge DME:  Has home O2    Discharge Transportation: family or friend will provide    Private pay costs discussed: yes, wife plans on working with an Elder Law  to discuss future plans    Does the patient's insurance plan have a 3 day qualifying hospital stay waiver?  NA    PAS Confirmation Code:  NA  Patient/family educated on Medicare website which has current facility and service quality ratings:  NA    Education Provided on the Discharge Plan:  yes  Persons Notified of Discharge Plans: wife, Sinan   Patient/Family in Agreement with the Plan: yes, wife is feeling overwhelmed. Encourage to  Seek help from daughters.    Additional Information:  Patient has had multiple readmissions. Wife is working on AL placement with [facility] and [facility]. She is working with Elder Law to see if he might qualify for MA/EW under spousal impoverishment protection    Suman Johnson at PCPs office notified of discharge at . Suman has been working with wife .    DIRK Christiansen

## 2023-05-16 ENCOUNTER — TELEPHONE (OUTPATIENT)
Dept: PHARMACY | Facility: OTHER | Age: 83
End: 2023-05-16

## 2023-05-16 NOTE — TELEPHONE ENCOUNTER
MTM referral from: Transitions of Care (recent hospital discharge or ED visit)    MTM referral outreach attempt #2 on May 16, 2023 at 9:38 AM      Outcome: Patient not reachable after several attempts, will route to [facility] Pharmacist/Provider as an FYI.  [facility] scheduling number is [phone].  Thank you for the referral.    Use [facility] Part D MAP  for the carrier/Plan on the flowsheet    Donya Taylor [facility]

## 2023-06-21 ENCOUNTER — HOSPITAL ENCOUNTER (OUTPATIENT)
Dept: CT IMAGING | Facility: HOSPITAL | Age: 83
Discharge: HOME | End: 2023-06-21
Attending: RADIOLOGY | Admitting: RADIOLOGY
Payer: COMMERCIAL

## 2023-06-21 DIAGNOSIS — C34.31 MALIGNANT NEOPLASM OF LOWER LOBE OF RIGHT LUNG (H): ICD-10-CM

## 2023-06-21 PROCEDURE — 71250 CT THORAX DX C-: CPT

## 2023-06-30 ENCOUNTER — OFFICE VISIT (OUTPATIENT)
Dept: RADIATION ONCOLOGY | Facility: HOSPITAL | Age: 83
End: 2023-06-30
Attending: RADIOLOGY
Payer: COMMERCIAL

## 2023-06-30 VITALS
SYSTOLIC BLOOD PRESSURE: 89 MMHG | OXYGEN SATURATION: 84 % | DIASTOLIC BLOOD PRESSURE: 66 MMHG | RESPIRATION RATE: 16 BRPM | HEART RATE: 92 BPM

## 2023-06-30 DIAGNOSIS — C34.31 MALIGNANT NEOPLASM OF LOWER LOBE OF RIGHT LUNG (H): Primary | ICD-10-CM

## 2023-06-30 PROCEDURE — G0463 HOSPITAL OUTPT CLINIC VISIT: HCPCS | Performed by: RADIOLOGY

## 2023-06-30 RX ORDER — FLUTICASONE PROPIONATE AND SALMETEROL 250; 50 UG/1; UG/1
POWDER RESPIRATORY (INHALATION)
Status: ON HOLD | COMMUNITY
End: 2024-06-29

## 2023-06-30 RX ORDER — ACETAMINOPHEN 500 MG
TABLET ORAL
Status: ON HOLD | COMMUNITY
End: 2024-06-29

## 2023-06-30 ASSESSMENT — PAIN SCALES - GENERAL: PAINLEVEL: NO PAIN (0)

## 2023-06-30 NOTE — PROGRESS NOTES
"Oncology Rooming Note    June 30, 2023 3:03 PM   Dominik Rojas is a 82 year old male who presents for:    Chief Complaint   Patient presents with     Oncology Clinic Visit     Lung Cancer     SBRT follow up with Dr. Bales     Initial Vitals: BP (!) 81/61   Pulse 92   Resp 16   SpO2 (!) 84%  Estimated body mass index is 30.07 kg/m  as calculated from the following:    Height as of 5/9/23: 1.702 m (5' 7\").    Weight as of 5/9/23: 87.1 kg (192 lb). There is no height or weight on file to calculate BSA.  No Pain (0) Comment: Data Unavailable   No LMP for male patient.  Allergies reviewed: Yes  Medications reviewed: Yes    Medications: Medication refills not needed today.  Pharmacy name entered into [employer]:    [id number] - [facility] [address]  [facility] [place], [place] - [address] [place] [address] DRUG STORE #[id number] - [place], [place] - [address] [address] [address] [address] [address] [facility]    Clinical concerns: SOB with activity, SpO2 at 84-85% on RA at rest currently. Uses O2 at home, but declining at this time. Hypotensive but asymptomatic. Will give PO fluids and check pt again prior to leaving.  Dr. Bales was notified.      Alia Oliver RN              "

## 2023-06-30 NOTE — LETTER
6/30/2023         RE: Dominik Rojas  [address]  [place] [address]        Dear Colleague,    Thank you for referring your patient, Dominik Rojas, to the [facility] RADIATION [facility]COLOGY [place]. Please see a copy of my visit note below.    [facility] Radiation Oncology Follow Up     Patient: Dominik Rojas  MRN: [id number]  Date of Service: 06/30/2023       DISEASE TREATED:  Adenocarcinoma of right lower lobe lung, clinical stage T1 N0 M0.  Patient is not a good candidate for surgery given his advanced age and medical status.      TYPE OF RADIATION THERAPY ADMINISTERED:  5000 cGy in 5 treatments given from 4/10/2023 - 4/21/2023.     INTERVAL SINCE COMPLETION OF RADIATION THERAPY: 2 months.      SUBJECTIVE:  Mr. Rojas is a 82 year old male who was a former smoker half to 1 pack a day for 35 years and quit smoking since 1990.  Patient has a multiple underlying medical condition including COPD, coronary artery disease, stroke, and depression.  Patient has been followed by pulmonology for lung nodule since 2021.  CT scan on 7/25/2022 showed an increased 18 x 13 mm right lower lobe pleural-based nodule suspicious for malignancy.  PET scan on 9/30/2022 also reviewed 1.3 x 1.8 cm FDG avid right lower lobe pulmonary nodules highly suspicious for malignancy.  There is no evidence of lymph nodes and systemic metastasis.  Patient underwent CT-guided needle biopsy on 3/8/2023 with pathology confirmed non-small cell carcinoma consistent with adenocarcinoma.  You have discussed with the patient about various treatment options.  Patient is not a good candidate for surgery given his advanced age and medical status.  The patient received stereotactic radiosurgery with a total dose of 5000 cGy in 5 treatments given from 4/10/2023 - 4/21/2023.  He tolerated radiation therapy very well with minimal side effect.     The patient has been recovering well since radiation therapy.  He denies any pain and  discomfort related to the therapy at the time of evaluation.  He is here for routine post therapy office follow-up.    Medications were reviewed and are up to date on EPIC.    The following portions of the patient's history were reviewed and updated as appropriate: allergies, current medications, past family history, past medical history, past social history, past surgical history and problem list.    Review of Systems:      General  Constitutional  Constitutional (WDL): Exceptions to WDL  Fatigue: Fatigue relieved by rest  EENT  Eye Disorders  Eye Disorder (WDL): All eye disorder elements are within defined limits  Ear Disorders  Ear Disorder (WDL): All ear disorder elements are within defined limits  Respiratory  Respiratory  Respiratory (WDL): Exceptions to WDL  Dyspnea: Shortness of breath at rest OR limiting self care ADL  Hypoxia: Decreased oxygen saturation at rest (e.g., pulse oximeter less than 88% or PaO2 less than or equal to 55 mmHg)  Cardiovascular  Cardiovascular  Cardiovascular (WDL): Exceptions to WDL (hypotensive today)  Gastrointestinal  Gastrointestinal  Gastrointestinal (WDL): All gastrointestinal elements are within defined limits  Musculoskeletal  Musculoskeletal and Connective Tissue Disorders  Musculoskeletal & Connective (WDL): Exceptions to WDL  Arthralgia: Mild pain  Integumentary  Integumentary  Integumentary (WDL): All integumentary elements are within defined limits  Neurological  Neurosensory  Neurosensory (WDL): Exceptions to WDL  Peripheral Motor Neuropathy: Moderate symptoms OR limiting instrumental ADL  Ataxia: Moderate symptoms OR limiting instrumental ADL  Confusion: Mild disorientation  Genitourinary/Reproductive  Genitourinary  Genitourinary (WDL): All genitourinary elements are within defined limits  Lymphatic  Lymph System Disorders  Lymph (WDL): All lymph elements are within defined limits  Pain  Pain Score: No Pain (0)  AUA Assessment                                                               Accompanied by  Accompanied By: spouse    Objective:      PHYSICAL EXAMINATION:    BP (!) 81/61   Pulse 92   Resp 16   SpO2 (!) 84%     Gen: Alert, in NAD  Pulm: No wheezing, stridor or respiratory distress  CV: Well-perfused, no cyanosis, no pedal edema  Back: No step-offs or pain to palpation along the thoracolumbar spine  Rectal: Deferred  : Deferred  Musculoskeletal: Normal muscle bulk and tone  Skin: Normal color and turgor  Neurologic: A/Ox3, CN II-XII intact, normal gait and station  Psychiatric: Appropriate mood and affect     Imaging: Imaging results 30 days: CT Chest w/o contrast    Result Date: 6/21/2023  EXAM: CT CHEST W/O CONTRAST LOCATION: [facility] DATE: 6/21/2023 INDICATION:  Malignant neoplasm of lower lobe of right lung (H) COMPARISON: 4/27/2023 and 1/12/2023 CTs TECHNIQUE: CT chest without IV contrast. Multiplanar reformats were obtained. Dose reduction techniques were used. CONTRAST: None. FINDINGS: LUNGS AND PLEURA: Mild decrease in size right lower lobe lung nodule now measures 1.4 x 1.1 cm. On 04/27/2023 this measured 2.4 x 1.2 cm. New mild infiltrates right lower lobe around this region likely some early radiation pneumonitis. No new findings. MEDIASTINUM/AXILLAE: No lymphadenopathy. CORONARY ARTERY CALCIFICATION: Moderate. UPPER ABDOMEN: Gallstones. MUSCULOSKELETAL: Unremarkable.     IMPRESSION: 1.  Moderate decrease in size right lower lobe lung nodule with mild surrounding posttreatment changes.        Impression     The patient is a 82 year old male with a diagnosis of adenocarcinoma of right lower lobe lung, clinical stage T1 N0 M0, status post SBRT completed 2 months ago.  Patient has been recovering well with restaging CT scan showed no evidence of recurrence.    Assessment & Plan:     1.  I have personally reviewed his restaging CT scan and compared to the previous CT scan and the radiation therapy field.  There is no evidence of  "cancer recurrence.  The patient is scheduled to return to radiation oncology in 3 months for office follow-up and CT chest.    2.  Continue follow-up with Dr. Fidel Sun, pulmonology and Dr. Misael Toussaint, PCP as planned.    Face to face time  15 minutes with > 80% spent on consultation, education and coordination of care.      Rachael Bales MD  Department of Radiation Oncology   [facility]  Tel: [phone]  Page: [phone]    [facility]  [address] [place] [address]     [facility]   [address] [facility]   [place] [address]    CC:  Patient Care Team:  Misael Moe PA-C as PCP - General (Physician Assistant)  Wilma Jansen RPH as Pharmacist (Pharmacist Ambulatory Care)  Kindra Pack MD as Assigned Heart and Vascular Provider  Fidel Sun MD as Assigned Pulmonology Provider  Rachael Bales MD as MD (Radiation Oncology)  Rachael Bales MD as Assigned Cancer Care Provider  Ana Madrid RT as Chronic Pulmonary Disease Specialist (Respiratory Therapy)    Oncology Rooming Note    June 30, 2023 3:03 PM   Dominik Rojas is a 82 year old male who presents for:    Chief Complaint   Patient presents with     Oncology Clinic Visit     Lung Cancer     SBRT follow up with Dr. Bales     Initial Vitals: BP (!) 81/61   Pulse 92   Resp 16   SpO2 (!) 84%  Estimated body mass index is 30.07 kg/m  as calculated from the following:    Height as of 5/9/23: 1.702 m (5' 7\").    Weight as of 5/9/23: 87.1 kg (192 lb). There is no height or weight on file to calculate BSA.  No Pain (0) Comment: Data Unavailable   No LMP for male patient.  Allergies reviewed: Yes  Medications reviewed: Yes    Medications: Medication refills not needed today.  Pharmacy name entered into [employer]:    [facility][id number] - [place], [place] - [address]  [place] [facility] [place], [place] - [address] [place] [address] DRUG STORE #[id number] - [place], [place] - [address] [address] [address] AT [address]    Clinical concerns: SOB with activity, SpO2 at 84-85% on RA at rest currently. Uses O2 at home, but declining at this time. Hypotensive but asymptomatic. Will give PO fluids and check pt again prior to leaving.  Dr. Bales was notified.      Alia Oliver, RN                  Again, thank you for allowing me to participate in the care of your patient.        Sincerely,        Rachael Bales MD

## 2023-06-30 NOTE — PROGRESS NOTES
[facility] Radiation Oncology Follow Up     Patient: Dominik Rojas  MRN: [id number]  Date of Service: 06/30/2023       DISEASE TREATED:  Adenocarcinoma of right lower lobe lung, clinical stage T1 N0 M0.  Patient is not a good candidate for surgery given his advanced age and medical status.      TYPE OF RADIATION THERAPY ADMINISTERED:  5000 cGy in 5 treatments given from 4/10/2023 - 4/21/2023.     INTERVAL SINCE COMPLETION OF RADIATION THERAPY: 2 months.      SUBJECTIVE:  Mr. Rojas is a 82 year old male who was a former smoker half to 1 pack a day for 35 years and quit smoking since 1990.  Patient has a multiple underlying medical condition including COPD, coronary artery disease, stroke, and depression.  Patient has been followed by pulmonology for lung nodule since 2021.  CT scan on 7/25/2022 showed an increased 18 x 13 mm right lower lobe pleural-based nodule suspicious for malignancy.  PET scan on 9/30/2022 also reviewed 1.3 x 1.8 cm FDG avid right lower lobe pulmonary nodules highly suspicious for malignancy.  There is no evidence of lymph nodes and systemic metastasis.  Patient underwent CT-guided needle biopsy on 3/8/2023 with pathology confirmed non-small cell carcinoma consistent with adenocarcinoma.  You have discussed with the patient about various treatment options.  Patient is not a good candidate for surgery given his advanced age and medical status.  The patient received stereotactic radiosurgery with a total dose of 5000 cGy in 5 treatments given from 4/10/2023 - 4/21/2023.  He tolerated radiation therapy very well with minimal side effect.     The patient has been recovering well since radiation therapy.  He denies any pain and discomfort related to the therapy at the time of evaluation.  He is here for routine post therapy office follow-up.    Medications were reviewed and are up to date on EPIC.    The following portions of the patient's history were reviewed and updated as appropriate:  allergies, current medications, past family history, past medical history, past social history, past surgical history and problem list.    Review of Systems:      General  Constitutional  Constitutional (WDL): Exceptions to WDL  Fatigue: Fatigue relieved by rest  EENT  Eye Disorders  Eye Disorder (WDL): All eye disorder elements are within defined limits  Ear Disorders  Ear Disorder (WDL): All ear disorder elements are within defined limits  Respiratory  Respiratory  Respiratory (WDL): Exceptions to WDL  Dyspnea: Shortness of breath at rest OR limiting self care ADL  Hypoxia: Decreased oxygen saturation at rest (e.g., pulse oximeter less than 88% or PaO2 less than or equal to 55 mmHg)  Cardiovascular  Cardiovascular  Cardiovascular (WDL): Exceptions to WDL (hypotensive today)  Gastrointestinal  Gastrointestinal  Gastrointestinal (WDL): All gastrointestinal elements are within defined limits  Musculoskeletal  Musculoskeletal and Connective Tissue Disorders  Musculoskeletal & Connective (WDL): Exceptions to WDL  Arthralgia: Mild pain  Integumentary  Integumentary  Integumentary (WDL): All integumentary elements are within defined limits  Neurological  Neurosensory  Neurosensory (WDL): Exceptions to WDL  Peripheral Motor Neuropathy: Moderate symptoms OR limiting instrumental ADL  Ataxia: Moderate symptoms OR limiting instrumental ADL  Confusion: Mild disorientation  Genitourinary/Reproductive  Genitourinary  Genitourinary (WDL): All genitourinary elements are within defined limits  Lymphatic  Lymph System Disorders  Lymph (WDL): All lymph elements are within defined limits  Pain  Pain Score: No Pain (0)  AUA Assessment                                                              Accompanied by  Accompanied By: spouse    Objective:      PHYSICAL EXAMINATION:    BP (!) 81/61   Pulse 92   Resp 16   SpO2 (!) 84%     Gen: Alert, in NAD  Pulm: No wheezing, stridor or respiratory distress  CV: Well-perfused, no cyanosis,  no pedal edema  Back: No step-offs or pain to palpation along the thoracolumbar spine  Rectal: Deferred  : Deferred  Musculoskeletal: Normal muscle bulk and tone  Skin: Normal color and turgor  Neurologic: A/Ox3, CN II-XII intact, normal gait and station  Psychiatric: Appropriate mood and affect     Imaging: Imaging results 30 days: CT Chest w/o contrast    Result Date: 6/21/2023  EXAM: CT CHEST W/O CONTRAST LOCATION: [facility] DATE: 6/21/2023 INDICATION:  Malignant neoplasm of lower lobe of right lung (H) COMPARISON: 4/27/2023 and 1/12/2023 CTs TECHNIQUE: CT chest without IV contrast. Multiplanar reformats were obtained. Dose reduction techniques were used. CONTRAST: None. FINDINGS: LUNGS AND PLEURA: Mild decrease in size right lower lobe lung nodule now measures 1.4 x 1.1 cm. On 04/27/2023 this measured 2.4 x 1.2 cm. New mild infiltrates right lower lobe around this region likely some early radiation pneumonitis. No new findings. MEDIASTINUM/AXILLAE: No lymphadenopathy. CORONARY ARTERY CALCIFICATION: Moderate. UPPER ABDOMEN: Gallstones. MUSCULOSKELETAL: Unremarkable.     IMPRESSION: 1.  Moderate decrease in size right lower lobe lung nodule with mild surrounding posttreatment changes.        Impression     The patient is a 82 year old male with a diagnosis of adenocarcinoma of right lower lobe lung, clinical stage T1 N0 M0, status post SBRT completed 2 months ago.  Patient has been recovering well with restaging CT scan showed no evidence of recurrence.    Assessment & Plan:     1.  I have personally reviewed his restaging CT scan and compared to the previous CT scan and the radiation therapy field.  There is no evidence of cancer recurrence.  The patient is scheduled to return to radiation oncology in 3 months for office follow-up and CT chest.    2.  Continue follow-up with Dr. Fidel Sun, pulmonology and Dr. Misael Toussaint, PCP as planned.    Face to face time  15 minutes with >  80% spent on consultation, education and coordination of care.      Rachael Bales MD  Department of Radiation Oncology   [facility]  Tel: [phone]  Page: [phone]    [facility]  [address] [place] [address]     [address] [place] [address]    CC:  Patient Care Team:  Misael Moe PA-C as PCP - General (Physician Assistant)  Wilma Jansen RPH as Pharmacist (Pharmacist Ambulatory Care)  Kindra Pack MD as Assigned Heart and Vascular Provider  Fidel Sun MD as Assigned Pulmonology Provider  Rachael Bales MD as MD (Radiation Oncology)  Rachael Bales MD as Assigned Cancer Care Provider  Ana Madrid RT as Chronic Pulmonary Disease Specialist (Respiratory Therapy)

## 2023-07-17 ENCOUNTER — HOSPITAL ENCOUNTER (OUTPATIENT)
Dept: CT IMAGING | Facility: HOSPITAL | Age: 83
Discharge: HOME | End: 2023-07-17
Attending: SURGERY
Payer: COMMERCIAL

## 2023-07-17 ENCOUNTER — OFFICE VISIT (OUTPATIENT)
Dept: VASCULAR SURGERY | Facility: CLINIC | Age: 83
End: 2023-07-17
Attending: SURGERY
Payer: COMMERCIAL

## 2023-07-17 ENCOUNTER — ANCILLARY PROCEDURE (OUTPATIENT)
Dept: VASCULAR ULTRASOUND | Facility: CLINIC | Age: 83
End: 2023-07-17
Attending: SURGERY
Payer: COMMERCIAL

## 2023-07-17 VITALS
OXYGEN SATURATION: 89 % | DIASTOLIC BLOOD PRESSURE: 79 MMHG | HEART RATE: 92 BPM | TEMPERATURE: 97.6 F | SYSTOLIC BLOOD PRESSURE: 128 MMHG

## 2023-07-17 DIAGNOSIS — I71.43 INFRARENAL ABDOMINAL AORTIC ANEURYSM (AAA) WITHOUT RUPTURE (H): Primary | ICD-10-CM

## 2023-07-17 DIAGNOSIS — I65.29 STENOSIS OF CAROTID ARTERY, UNSPECIFIED LATERALITY: ICD-10-CM

## 2023-07-17 DIAGNOSIS — I71.43 INFRARENAL ABDOMINAL AORTIC ANEURYSM (AAA) WITHOUT RUPTURE (H): ICD-10-CM

## 2023-07-17 LAB
CREAT BLD-MCNC: 1.3 MG/DL (ref 0.7–1.3)
GFR SERPL CREATININE-BSD FRML MDRD: 55 ML/MIN/1.73M2

## 2023-07-17 PROCEDURE — 250N000011 HC RX IP 250 OP 636: Performed by: SURGERY

## 2023-07-17 PROCEDURE — 93880 EXTRACRANIAL BILAT STUDY: CPT | Mod: 26 | Performed by: SURGERY

## 2023-07-17 PROCEDURE — 74174 CTA ABD&PLVS W/CONTRAST: CPT

## 2023-07-17 PROCEDURE — 93880 EXTRACRANIAL BILAT STUDY: CPT

## 2023-07-17 PROCEDURE — 82565 ASSAY OF CREATININE: CPT

## 2023-07-17 PROCEDURE — G0463 HOSPITAL OUTPT CLINIC VISIT: HCPCS | Mod: 25 | Performed by: SURGERY

## 2023-07-17 PROCEDURE — 99213 OFFICE O/P EST LOW 20 MIN: CPT | Performed by: SURGERY

## 2023-07-17 RX ORDER — IOPAMIDOL 755 MG/ML
67 INJECTION, SOLUTION INTRAVASCULAR ONCE
Status: COMPLETED | OUTPATIENT
Start: 2023-07-17 | End: 2023-07-17

## 2023-07-17 RX ORDER — IOPAMIDOL 755 MG/ML
90 INJECTION, SOLUTION INTRAVASCULAR ONCE
Status: DISCONTINUED | OUTPATIENT
Start: 2023-07-17 | End: 2023-07-17

## 2023-07-17 RX ADMIN — IOPAMIDOL 67 ML: 755 INJECTION, SOLUTION INTRAVENOUS at 09:35

## 2023-07-17 ASSESSMENT — PAIN SCALES - GENERAL: PAINLEVEL: NO PAIN (0)

## 2023-07-17 NOTE — PROGRESS NOTES
VASCULAR SURGERY PROGRESS NOTE   VASCULAR SURGEON: Kindra Pack MD, RPVI     LOCATION:  [facility]     Dominik Rojas   Medical Record #:  [id number]  YOB: 1940  Age:  82 year old     Date of Service: 7/17/2023    PRIMARY CARE PROVIDER: Misael Moe      Reason for visit:  6 month follow up AAA    IMPRESSION:  82 year old male returns to vascular center for surveillance of AAA. Stent is in place without evidence of endoleak. Carotid ultrasound is normal.     RECOMMENDATION/RISKS:  Continue best medical management and follow up with Angélica in one year with repeat CTA.     HPI:  Dominik Rojas is a 82 year old male who was seen today for follow up.    REVIEW OF SYSTEMS:    A 12 point ROS was reviewed and is negative.     PHH:    Past Medical History:   Diagnosis Date     AAA (abdominal aortic aneurysm) (H)     s/p stenting     Alcohol dependence in remission (H)      Alcohol use disorder, severe, in sustained remission, dependence (H) 8/16/2021     Anxiety      Atrial fibrillation with normal ventricular rate (H)      Benign prostatic hyperplasia with lower urinary tract symptoms      Bronchiectasis without complication (H)     2/27/2020     COPD (chronic obstructive pulmonary disease) (H)      CVA (cerebral vascular accident) (H) 2019     DDD (degenerative disc disease), lumbar      Depression      Depressive disorder      Diabetes (H)      Diastolic dysfunction 01/22/2021     DJD (degenerative joint disease) of knee     left     Essential hypertension      Glaucoma      Glaucoma      History of stroke without residual deficits      Hyperlipidemia      Hypertension      Hypothyroidism      Hypothyroidism      Kidney stones      Major depression      Memory problem      Nocturia      Obesity      Opioid use disorder, severe, dependence (H) 8/16/2021     Overactive bladder 02/25/2021     Primary osteoarthritis of left knee      Psoriasis      Psoriasis      Seborrheic  keratoses      Somnolence, daytime      Stenosis of right carotid artery     history of TIA's          Past Surgical History:   Procedure Laterality Date     ABDOMEN SURGERY       ABDOMINAL AORTIC ANEURYSM REPAIR  2013    stent     CAROTID ENDARTERECTOMY Right 9/9/2019    Procedure: RIGHT CAROTID ENDARTERECTOMY;  Surgeon: Miguel Muller MD;  Location: [facility];  Service: General     CIRCUMCISION       CYSTOSCOPY       CYSTOSCOPY PROSTATE W/ LASER N/A 6/12/2018    Procedure:  TRANSURETHRAL RESECTION OF THE PROSTATE WITH QUANTA LASER;  Surgeon: Eze Levi MD;  Location: [facility];  Service:      CYSTOSCOPY PROSTATE W/ LASER N/A 2/18/2020    Procedure: CYSTOSCOPY WITH TRANSURETHRAL INCISION OF THE PROSTATE WITH QUANTA LASER;  Surgeon: Eze Levi MD;  Location: [facility];  Service: Urology     CYSTOSCOPY W/ LITHOLAPAXY / EHL N/A 6/12/2018    Procedure: CYSTOSCOPY AND LITHOLAPAXY;  Surgeon: Eze Levi MD;  Location: [facility];  Service:      IR ABDOMINAL AORTAGRAM  5/19/2020     IR ABDOMINAL AORTIC ANEURYSM ENDOGRAFT  5/19/2020     IR ABDOMINAL AORTOGRAM  5/19/2020     IR ABDOMINAL ENDOVASCULAR STENT GRAFT  5/19/2020     LITHOTRIPSY       PERCUTANEOUS NEPHROSTOLITHOTOMY Right 03/10/2020     ZZC HUANG W/O FACETEC FORAMOT/DSKC 1/2 VRT SEG, LUMBAR Bilateral 3/3/2021    Procedure: Bilateral lumbar 2 - Lumbar 4 decompressive lumbar laminectomy with medial facetectomies;  Surgeon: Renée King MD;  Location: [facility];  Service: Spine       ALLERGIES:  Diclofenac, Loratadine, and Sertraline    MEDS:    Current Outpatient Medications:      acetaminophen (TYLENOL) 500 MG tablet, acetaminophen 500 mg tablet  TAKE 2 TABLETS (1,000 MG) BY MOUTH EVERY 8 HOURS FOR 30 DAYS, Disp: , Rfl:      albuterol (PROAIR HFA/PROVENTIL HFA/VENTOLIN HFA) 108 (90 Base) MCG/ACT inhaler, Inhale 2 puffs into the lungs every 6 hours as needed for shortness of breath, wheezing  or cough Use second line for acute SOB or wheezing if nebulizer unable to be used, Disp: 18 g, Rfl: 1     albuterol (PROVENTIL) (2.5 MG/3ML) 0.083% neb solution, Take 1 vial (2.5 mg) by nebulization every 6 hours as needed for shortness of breath, wheezing or cough Use first line for acute SOB or wheezing, Disp: 90 mL, Rfl: 1     amLODIPine (NORVASC) 2.5 MG tablet, Take 2.5 mg by mouth daily, Disp: , Rfl:      aspirin (ASA) 81 MG chewable tablet, Take 81 mg by mouth daily, Disp: , Rfl:      busPIRone (BUSPAR) 5 MG tablet, Take 5 mg by mouth 2 times daily, Disp: , Rfl:      Cholecalciferol (VITAMIN D3) 50 MCG (2000 UT) CAPS, Take 1 tablet by mouth daily , Disp: , Rfl:      DULoxetine (CYMBALTA) 60 MG capsule, Take 60 mg by mouth daily, Disp: , Rfl:      fluticasone-salmeterol (WIXELA INHUB) 250-50 MCG/ACT inhaler, Wixela Inhub 250 mcg-50 mcg/dose powder for inhalation  INHALE 1 PUFF INTO THE LUNGS EVERY 12 HOURS FOR 30 DAYS, Disp: , Rfl:      furosemide (LASIX) 20 MG tablet, Take 20 mg by mouth daily, Disp: , Rfl:      latanoprost (XALATAN) 0.005 % ophthalmic solution, Place 1 drop into both eyes At Bedtime, Disp: , Rfl:      levothyroxine (SYNTHROID/LEVOTHROID) 88 MCG tablet, TAKE 1 TABLET BY MOUTH EVERY DAY IN THE MORNING ON AN EMPTY STOMACH FOR 30 DAYS, Disp: , Rfl:      losartan (COZAAR) 25 MG tablet, Take 1 tablet (25 mg) by mouth daily, Disp: 30 tablet, Rfl: 0     melatonin 1 MG TABS tablet, Take 1 tablet (1 mg) by mouth At Bedtime (Patient taking differently: Take 2 mg by mouth At Bedtime), Disp: 30 tablet, Rfl: 0     metoprolol succinate ER (TOPROL-XL) 25 MG 24 hr tablet, Take 0.5 tablets (12.5 mg) by mouth daily, Disp: , Rfl:      multivitamin w/minerals (THERA-VIT-M) tablet, Take 1 tablet by mouth daily, Disp: , Rfl:      OLANZapine (ZYPREXA) 5 MG tablet, Take 7.5 mg by mouth At Bedtime, Disp: , Rfl:      pantoprazole (PROTONIX) 40 MG EC tablet, Take 1 tablet (40 mg) by mouth every morning (before  breakfast), Disp: 30 tablet, Rfl: 0     QUEtiapine (SEROQUEL) 25 MG tablet, Take 1 tablet (25 mg) by mouth 2 times daily as needed (Agitation), Disp: 14 tablet, Rfl: 0     rosuvastatin (CRESTOR) 10 MG tablet, Take 10 mg by mouth At Bedtime , Disp: , Rfl:      fluticasone-salmeterol (ADVAIR) 250-50 MCG/ACT inhaler, Inhale 1 puff into the lungs every 12 hours for 30 days, Disp: 14 each, Rfl: 1  No current facility-administered medications for this visit.    SOCIAL HABITS:    History   Smoking Status     Former     Packs/day: 0.50     Years: 35.00     Types: Cigarettes     Quit date: 1/1/1990   Smokeless Tobacco     Never     Social History    Substance and Sexual Activity      Alcohol use: No        Comment: Alcoholic Drinks/day: quit 1974      History   Drug Use No       FAMILY HISTORY:    Family History   Problem Relation Age of Onset     Emphysema Mother      No Known Problems Father      Cirrhosis Father      No Known Problems Sister      No Known Problems Brother      No Known Problems Maternal Aunt      No Known Problems Maternal Uncle      No Known Problems Paternal Aunt      No Known Problems Paternal Uncle      No Known Problems Maternal Grandmother      No Known Problems Maternal Grandfather      No Known Problems Paternal Grandmother      No Known Problems Paternal Grandfather      No Known Problems Other        PE:  /79   Pulse 92   Temp 97.6  F (36.4  C)   SpO2 (!) 89%   Wt Readings from Last 1 Encounters:   05/09/23 192 lb (87.1 kg)     There is no height or weight on file to calculate BMI.    EXAM:  GENERAL: This is a well-developed 82 year old male who appears his stated age  EYES: Grossly normal.  MOUTH: Buccal mucosa normal   CARDIAC: Normal   CHEST/LUNG: Clear to auscultation bilaterally  GASTROINTESINAL soft nontender nondistended  MUSCULOSKELETAL: Grossly normal and both lower extremities are intact.  HEME/LYMPH: No lymphedema  NEUROLOGIC: Focally intact, Alert and oriented x 3.   PSYCH:  appropriate affect  INTEGUMENT: No open lesions or ulcers  Pulse Exam:           DIAGNOSTIC STUDIES:     Images:  CT Chest w/o contrast    Result Date: 6/21/2023  EXAM: CT CHEST W/O CONTRAST LOCATION: [facility] DATE: 6/21/2023 INDICATION:  Malignant neoplasm of lower lobe of right lung (H) COMPARISON: 4/27/2023 and 1/12/2023 CTs TECHNIQUE: CT chest without IV contrast. Multiplanar reformats were obtained. Dose reduction techniques were used. CONTRAST: None. FINDINGS: LUNGS AND PLEURA: Mild decrease in size right lower lobe lung nodule now measures 1.4 x 1.1 cm. On 04/27/2023 this measured 2.4 x 1.2 cm. New mild infiltrates right lower lobe around this region likely some early radiation pneumonitis. No new findings. MEDIASTINUM/AXILLAE: No lymphadenopathy. CORONARY ARTERY CALCIFICATION: Moderate. UPPER ABDOMEN: Gallstones. MUSCULOSKELETAL: Unremarkable.     IMPRESSION: 1.  Moderate decrease in size right lower lobe lung nodule with mild surrounding posttreatment changes.       LABS:      Sodium   Date Value Ref Range Status   05/10/2023 139 136 - 145 mmol/L Final   05/09/2023 138 136 - 145 mmol/L Final   05/05/2023 144 136 - 145 mmol/L Final     Urea Nitrogen   Date Value Ref Range Status   05/10/2023 17.3 8.0 - 23.0 mg/dL Final   05/09/2023 19.7 8.0 - 23.0 mg/dL Final   05/05/2023 28.6 (H) 8.0 - 23.0 mg/dL Final   03/02/2022 16 8 - 28 mg/dL Final   10/26/2021 22 8 - 28 mg/dL Final   09/09/2021 23 8 - 28 mg/dL Final     Hemoglobin   Date Value Ref Range Status   05/11/2023 12.7 (L) 13.3 - 17.7 g/dL Final   05/10/2023 13.7 13.3 - 17.7 g/dL Final   05/09/2023 12.6 (L) 13.3 - 17.7 g/dL Final     Platelet Count   Date Value Ref Range Status   05/13/2023 156 150 - 450 10e3/uL Final   05/11/2023 166 150 - 450 10e3/uL Final   05/10/2023 126 (L) 150 - 450 10e3/uL Final     BNP   Date Value Ref Range Status   08/14/2021 61 0 - 84 pg/mL Final   08/09/2021 51 0 - 84 pg/mL Final   03/07/2021 50 0 - 84  pg/mL Final     INR   Date Value Ref Range Status   04/24/2023 1.16 (H) 0.85 - 1.15 Final   03/08/2023 1.16 (H) 0.85 - 1.15 Final   02/27/2021 1.15 (H) 0.90 - 1.10 Final       30 minutes spent on the day of encounter doing chart review, history and exam, documentation, and further activities as noted.     Kindra Pack MD, [facility]    VASCULAR SURGERY

## 2023-07-17 NOTE — PROGRESS NOTES
[facility]        Patient is here for a 6 month follow up  to discuss 6mo f/u AAA hx repair, hx carotid stenosis. history of infrarenal abdominal arctic aneurysm repair with bilateral iliac branch devices for bilateral common iliac artery aneurysms.      Pt is currently taking Aspirin and Statin.    /79   Pulse 92   Temp 97.6  F (36.4  C)   SpO2 (!) 89%     The provider has been notified that the patient has concerns of issues with mesh stents.     Questions patient would like addressed today are: N/A.    Refills are needed: No    Has homecare services and agency name:  No

## 2023-07-17 NOTE — PATIENT INSTRUCTIONS
Karishma Lehman,    Thank you for entrusting your care with us today. After your visit today with MD Kindra Pack this is the plan that was discussed at your appointment.    Follow up in 1 year with Angélica HERRON and repeat CTA scan    I am including additional information on these things and our contact information if you have any questions or concerns.   Please do not hesitate to reach out to us if you felt we did not answer your questions or you are unsure of the treatment plan after your visit today. Our number is [phone].Thank you for trusting us with your care.         Again thank you for your time.

## 2023-09-19 ENCOUNTER — OFFICE VISIT (OUTPATIENT)
Dept: PHARMACY | Facility: PHYSICIAN GROUP | Age: 83
End: 2023-09-19
Payer: COMMERCIAL

## 2023-09-19 VITALS — SYSTOLIC BLOOD PRESSURE: 110 MMHG | DIASTOLIC BLOOD PRESSURE: 70 MMHG | BODY MASS INDEX: 30.38 KG/M2 | WEIGHT: 194 LBS

## 2023-09-19 DIAGNOSIS — F41.9 ANXIETY: ICD-10-CM

## 2023-09-19 DIAGNOSIS — I10 ESSENTIAL HYPERTENSION: ICD-10-CM

## 2023-09-19 DIAGNOSIS — F32.A DEPRESSION, UNSPECIFIED DEPRESSION TYPE: ICD-10-CM

## 2023-09-19 DIAGNOSIS — K21.9 GASTROESOPHAGEAL REFLUX DISEASE, UNSPECIFIED WHETHER ESOPHAGITIS PRESENT: ICD-10-CM

## 2023-09-19 DIAGNOSIS — J44.9 CHRONIC OBSTRUCTIVE PULMONARY DISEASE, UNSPECIFIED COPD TYPE (H): ICD-10-CM

## 2023-09-19 DIAGNOSIS — R41.3 MEMORY LOSS: Primary | ICD-10-CM

## 2023-09-19 DIAGNOSIS — E03.9 HYPOTHYROIDISM, UNSPECIFIED TYPE: ICD-10-CM

## 2023-09-19 PROCEDURE — 99607 MTMS BY PHARM ADDL 15 MIN: CPT | Performed by: PHARMACIST

## 2023-09-19 PROCEDURE — 99605 MTMS BY PHARM NP 15 MIN: CPT | Performed by: PHARMACIST

## 2023-09-19 NOTE — PROGRESS NOTES
Medication Therapy Management (MTM) Encounter    ASSESSMENT:                            Medication Adherence/Access: No issues identified    Memory Problem with agitation:  It seems duplicative to have both olanzapine and quetiapine and we can simplify medications at this time. Additionally, melatonin does not appear to be indicated at this time as patient is not having difficulty falling asleep.    Depression/Anxiety:   Stable.    COPD:   Stable.    Hypothyroidism:   Stable.    GERD:   Stable.    Hypertension:   Stable.      PLAN:                            1. Stay off/stop of the quetiapine -- you do not need to  a refill of this at the pharmacy.    2. Stop the melatonin. We discussed that this medicine can help with difficulty falling asleep, but not really staying asleep.    2. Continue taking the Olanzapine at bedtime, because this one does seem to have helped a little with getting a more full nights sleep.    Follow-up: as needed for medication questions or concerns    Corry Barajas, Pharm.D., [facility]  Medication Therapy Management Pharmacist  [phone]     SUBJECTIVE/OBJECTIVE:                          Dominik Rojas is a 82 year old male coming in for an initial visit. He was referred to me from Misael Moe. Patient was accompanied by his wife, Jovana.     Reason for visit: medications in question are olanzapine and quetiapine, that those meds can worsen memory issues.  She would like to discuss this to see what their options are. She has noticed changes in him and thinks maybe it is because of those medications. She is also wondering about melatonin.    Allergies/ADRs: Reviewed in chart  Past Medical History: Reviewed in chart  Tobacco: He reports that he quit smoking about 33 years ago. His smoking use included cigarettes. He has a 17.50 pack-year smoking history. He has never used smokeless tobacco.  Alcohol: none; quit in 1974    Medication Adherence/Access: no issues  reported    Memory Problem with agitation:   Olanzapine 5mg 1.5 tablets at bedtime   Quetiapine 25mg twice daily PRN - was giving at bedtime only  Melatonin 3mg at bedtime   These meds were started this spring while inpatient with acute COPD exacebation and pneumonia; now questioning if these meds are safe for him?  Jovana reports he is agitated easily, but thinks things might be better since starting these meds? Days are hit an miss, some days good and some days difficult -- won't do things like exercise or PT that is recommended, oximeter, not wanting to take meds (but is taking them), not wanting to change his adult diaper  Jovana doesn't think sleep is better, Dominik feels he can fall asleep very easily and is sleeping better.     Depression/Anxiety:  Buspirone 5mg twice daily  Duloxetine 60mg once daily.   Patient reports no current medication side effects.  Hard to tell if these are making a difference. Jovana feels mood and anxiety levels are high. Dominik feels frustrated with himself.  PHQ9 (1/23/23): 18    COPD:   Wixela 250/50  Albuterol inhaler and nebs - uses nebs rarely  Home Oxygen  Patient reports no current medication side effects.    Patient reports the following symptoms: none. States breathing is good currently    Hypothyroidism:   Levothyroxine 88 mcg daily  Patient is having the following symptoms: none.   TSH   Date Value Ref Range Status   04/20/2023 2.02 0.30 - 4.20 uIU/mL Final   06/06/2022 0.01 (L) 0.30 - 5.00 uIU/mL Final     GERD:   Pantoprazole 40 mg once daily   Patient reports no current symptoms.  Patient feels that current regimen is effective. Also avoids foods that cause GI upset.    Hypertension:   Amlodipine 2.5mg daily  Losartan 25mg daily  Metoprolol succinate 25mg 1/2 tab daily  Patient reports no current medication side effects.  Patient does not self-monitor blood pressure.    BP Readings from Last 3 Encounters:   09/19/23 110/70   07/17/23 128/79   06/30/23 (!) 89/66      Pulse Readings from Last 3 Encounters:   07/17/23 92   06/30/23 92   05/13/23 77         Today's Vitals: /70   Wt 194 lb (88 kg)   BMI 30.38 kg/m    ----------------      I spent 60 minutes with this patient today. All changes were made via collaborative practice agreement with Misael Moe PA-C. A copy of the visit note was provided to the patient's provider(s).    A summary of these recommendations was given to the patient.    Corry Barajas, Pharm.D., [facility]  Medication Therapy Management Pharmacist  [phone]        Medication Therapy Recommendations  Memory problem    Current Medication: QUEtiapine (SEROQUEL) 25 MG tablet (Discontinued)   Rationale: No medical indication at this time - Unnecessary medication therapy - Indication   Recommendation: Discontinue Medication   Status: Accepted per CPA          Current Medication: melatonin 1 MG TABS tablet (Discontinued)   Rationale: No medical indication at this time - Unnecessary medication therapy - Indication   Recommendation: Discontinue Medication   Status: Accepted - no CPA Needed

## 2023-09-19 NOTE — PATIENT INSTRUCTIONS
Recommendations from today's MTM visit:                                                      1. Stay off/stop of the quetiapine -- you do not need to  a refill of this at the pharmacy.    2. Stop the melatonin. We discussed that this medicine can help with difficulty falling asleep, but not really staying asleep.    2. Continue taking the Olanzapine at bedtime, because this one does seem to have helped a little with getting a more full nights sleep.    Follow-up: as needed for medication questions or concerns    Corry Barajas Pharm.D., BCACP  Medication Therapy Management Pharmacist  [phone]     It was great to speak with you today.  I value your experience and would be very thankful for your time with providing feedback on our clinic survey. You may receive a survey via email or text message in the next few days.     To schedule another MTM appointment, please call the clinic directly or you may call the scheduling line at [phone].    My Clinical Pharmacist's contact information:                                                      Please feel free to contact me with any questions or concerns you have.      Corry Barajas Pharm.D., REBEKAHCP  Medication Therapy Management Pharmacist  [phone]

## 2023-09-19 NOTE — LETTER
_  Medication List        Prepared on: 09/19/2023     Bring your Medication List when you go to the doctor, hospital, or   emergency room. And, share it with your family or caregivers.     Note any changes to how you take your medications.  Cross out medications when you no longer use them.    Medication How I take it Why I use it Prescriber   acetaminophen (TYLENOL) 500 MG tablet acetaminophen 500 mg tablet   TAKE 2 TABLETS (1,000 MG) BY MOUTH EVERY 8 HOURS FOR 30 DAYS  Pain Patient Reported   albuterol (PROAIR HFA/PROVENTIL HFA/VENTOLIN HFA) 108 (90 Base) MCG/ACT inhaler Inhale 2 puffs into the lungs every 6 hours as needed for shortness of breath, wheezing or cough Use second line for acute SOB or wheezing if nebulizer unable to be used COPD Exacerbation (H); Chronic systolic congestive heart failure (H); Pneumonia of right lower lobe due to infectious organism (H); Dementia with mood disturbance, unspecified dementia severity, unspecified dementia type Ayaan Jaffe DO   albuterol (PROVENTIL) (2.5 MG/3ML) 0.083% neb solution Take 1 vial (2.5 mg) by nebulization every 6 hours as needed for shortness of breath, wheezing or cough Use first line for acute SOB or wheezing COPD Exacerbation (H) Ayaan Jaffe DO   amLODIPine (NORVASC) 2.5 MG tablet Take 2.5 mg by mouth daily High Blood Pressure Disorder Misael Moe PA-C   aspirin (ASA) 81 MG chewable tablet Take 81 mg by mouth daily Disease involving Lipid Deposits in the Arteries Misael Moe PA-C   busPIRone (BUSPAR) 5 MG tablet Take 5 mg by mouth 2 times daily Anxiety Disorder Misael Moe PA-C   Cholecalciferol (VITAMIN D3) 50 MCG (2000 UT) CAPS Take 1 tablet by mouth daily   General Health  Patient Reported   DULoxetine (CYMBALTA) 60 MG capsule Take 60 mg by mouth daily Major Depressive Disorder Misael Moe PA-C   fluticasone-salmeterol (ADVAIR) 250-50 MCG/ACT inhaler Inhale 1 puff into the lungs every 12 hours for 30 days COPD  Exacerbation (H); Dementia with mood disturbance, unspecified dementia severity, unspecified dementia type; Pneumonia of right lower lobe due to infectious organism (H); Chronic systolic congestive heart failure (H) Edmund Minaya MD   fluticasone-salmeterol (WIXELA INHUB) 250-50 MCG/ACT inhaler   Chronic Obstructive Lung Disease Misael Moe PA-C   furosemide (LASIX) 20 MG tablet Take 20 mg by mouth daily High Blood Pressure Disorder Misael Moe PA-C   latanoprost (XALATAN) 0.005 % ophthalmic solution Place 1 drop into both eyes At Bedtime  Glaucoma Patient Reported   levothyroxine (SYNTHROID/LEVOTHROID) 88 MCG tablet  Take 1 tablet by mouth every morning Underactive Thyroid Misael Moe PA-C   losartan (COZAAR) 25 MG tablet Take 1 tablet (25 mg) by mouth daily Congestive heart failure, unspecified HF chronicity, unspecified heart failure type (H) Miguel Celis DO   metoprolol succinate ER (TOPROL-XL) 25 MG 24 hr tablet Take 12.5 mg by mouth daily Essential Hypertension Misael Moe PA-C   multivitamin w/minerals (THERA-VIT-M) tablet Take 1 tablet by mouth daily  General Health  Patient Reported   OLANZapine (ZYPREXA) 5 MG tablet Take 7.5 mg by mouth At Bedtime Behavioral Disorders associated with Dementia Misael Moe PA-C   pantoprazole (PROTONIX) 40 MG EC tablet Take 1 tablet (40 mg) by mouth every morning (before breakfast) Gastroesophageal reflux disease with esophagitis without hemorrhage Miguel Celis DO   rosuvastatin (CRESTOR) 10 MG tablet Take 10 mg by mouth At Bedtime Disease involving Lipid Deposits in the Arteries Misael Moe PA-C         Add new medications, over-the-counter drugs, herbals, vitamins, or  minerals in the blank rows below.    Medication How I take it Why I use it Prescriber                                      Allergies:      diclofenac; loratadine; sertraline        Side effects I have had:               Other Information:              My notes and  questions:

## 2023-09-19 NOTE — LETTER
"Recommended To-Do List      Prepared on: 09/19/2023       You can get the best results from your medications by completing the items on this \"To-Do List.\"      Bring your To-Do List when you go to your doctor. And, share it with your family or caregivers.    My To-Do List:  What we talked about: What I should do:   A medication that you may no longer need    Stop taking QUEtiapine (SEROquel)          What we talked about: What I should do:   A medication that you may no longer need    Stop taking melatonin          What we talked about: What I should do:                     "

## 2023-09-19 NOTE — LETTER
September 19, 2023  Dominik Rojas  [address]  [place] [address]    Dear Mr. Rojas, [facility] [place] [facility]     Thank you for talking with me on Sep 19, 2023 about your health and medications. As a follow-up to our conversation, I have included two documents:      Your Recommended To-Do List has steps you should take to get the best results from your medications.  Your Medication List will help you keep track of your medications and how to take them.    If you want to talk about these documents, please call Corry Barajas RPH, PharmD, BCACP  at phone: [phone], Monday-Friday 8-4:30pm.    I look forward to working with you and your doctors to make sure your medications work well for you.    Sincerely,  Corry Barajas RPH, PharmD, BCACP   MTM Pharmacist, [facility]

## 2023-09-25 ENCOUNTER — HOSPITAL ENCOUNTER (OUTPATIENT)
Dept: CT IMAGING | Facility: HOSPITAL | Age: 83
Discharge: HOME | End: 2023-09-25
Attending: RADIOLOGY | Admitting: RADIOLOGY
Payer: COMMERCIAL

## 2023-09-25 DIAGNOSIS — C34.31 MALIGNANT NEOPLASM OF LOWER LOBE OF RIGHT LUNG (H): ICD-10-CM

## 2023-09-25 PROCEDURE — 71250 CT THORAX DX C-: CPT

## 2023-09-28 ENCOUNTER — OFFICE VISIT (OUTPATIENT)
Dept: RADIATION ONCOLOGY | Facility: HOSPITAL | Age: 83
End: 2023-09-28
Attending: RADIOLOGY
Payer: COMMERCIAL

## 2023-09-28 VITALS
HEART RATE: 100 BPM | DIASTOLIC BLOOD PRESSURE: 77 MMHG | RESPIRATION RATE: 18 BRPM | OXYGEN SATURATION: 89 % | SYSTOLIC BLOOD PRESSURE: 109 MMHG

## 2023-09-28 DIAGNOSIS — C34.31 MALIGNANT NEOPLASM OF LOWER LOBE OF RIGHT LUNG (H): Primary | ICD-10-CM

## 2023-09-28 PROCEDURE — 99214 OFFICE O/P EST MOD 30 MIN: CPT | Performed by: RADIOLOGY

## 2023-09-28 PROCEDURE — G0463 HOSPITAL OUTPT CLINIC VISIT: HCPCS | Performed by: RADIOLOGY

## 2023-09-28 ASSESSMENT — PAIN SCALES - GENERAL: PAINLEVEL: NO PAIN (0)

## 2023-09-28 NOTE — PROGRESS NOTES
[facility] Radiation Oncology Follow Up     Patient: Dominik Rojas  MRN: [id number]  Date of Service: 09/28/2023       DISEASE TREATED:  Adenocarcinoma of right lower lobe lung, clinical stage T1 N0 M0.  Patient is not a good candidate for surgery given his advanced age and medical status.      TYPE OF RADIATION THERAPY ADMINISTERED:  5000 cGy in 5 treatments given from 4/10/2023 - 4/21/2023.     INTERVAL SINCE COMPLETION OF RADIATION THERAPY: 5 months.      SUBJECTIVE:  Mr. Rojas is a 82 year old male who was a former smoker half to 1 pack a day for 35 years and quit smoking since 1990.  Patient has a multiple underlying medical condition including COPD, coronary artery disease, stroke, and depression.  Patient has been followed by pulmonology for lung nodule since 2021.  CT scan on 7/25/2022 showed an increased 18 x 13 mm right lower lobe pleural-based nodule suspicious for malignancy.  PET scan on 9/30/2022 also reviewed 1.3 x 1.8 cm FDG avid right lower lobe pulmonary nodules highly suspicious for malignancy.  There is no evidence of lymph nodes and systemic metastasis.  Patient underwent CT-guided needle biopsy on 3/8/2023 with pathology confirmed non-small cell carcinoma consistent with adenocarcinoma.  You have discussed with the patient about various treatment options.  Patient is not a good candidate for surgery given his advanced age and medical status.  The patient received stereotactic radiosurgery with a total dose of 5000 cGy in 5 treatments given from 4/10/2023 - 4/21/2023.  He tolerated radiation therapy very well with minimal side effect.      The patient has been recovering well since radiation therapy.  He denies any pain and discomfort related to the therapy at the time of evaluation.  He is here for routine post therapy office follow-up.     Medications were reviewed and are up to date on EPIC.    The following portions of the patient's history were reviewed and updated as  appropriate: allergies, current medications, past family history, past medical history, past social history, past surgical history and problem list.    Review of Systems:      General  Constitutional  Constitutional (WDL): Exceptions to WDL  Fatigue: Fatigue relieved by rest  EENT  Eye Disorders  Eye Disorder (WDL): All eye disorder elements are within defined limits  Ear Disorders  Ear Disorder (WDL): All ear disorder elements are within defined limits  Respiratory  Respiratory  Respiratory (WDL): Exceptions to WDL  Dyspnea: Shortness of breath with minimal exertion OR limiting instrumental ADL  Hypoxia: Decreased oxygen saturation at rest (e.g., pulse oximeter less than 88% or PaO2 less than or equal to 55 mmHg)  Cardiovascular  Cardiovascular  Cardiovascular (WDL): All cardiovascular elements are within defined limits  Gastrointestinal  Gastrointestinal  Gastrointestinal (WDL): All gastrointestinal elements are within defined limits  Musculoskeletal  Musculoskeletal and Connective Tissue Disorders  Musculoskeletal & Connective (WDL): Exceptions to WDL  Arthralgia: Moderate pain OR limiting instrumental ADL  Generalized Muscle Weakness: Symptomatic OR perceived by patient but not evident on physical exam  Integumentary  Integumentary  Integumentary (WDL): All integumentary elements are within defined limits  Neurological  Neurosensory  Neurosensory (WDL): Exceptions to WDL  Peripheral Motor Neuropathy: Moderate symptoms OR limiting instrumental ADL  Ataxia: Moderate symptoms OR limiting instrumental ADL  Genitourinary/Reproductive  Genitourinary  Genitourinary (WDL): All genitourinary elements are within defined limits  Lymphatic  Lymph System Disorders  Lymph (WDL): All lymph elements are within defined limits  Pain  Pain Score: No Pain (0)  AUA Assessment                                                              Accompanied by  Accompanied By: spouse    Objective:      PHYSICAL EXAMINATION:    /77    Pulse 100   Resp 18   SpO2 (!) 89%     Gen: Alert, in NAD  Eyes: PERRL, EOMI, sclera anicteric  Neck: Supple, full ROM, no LAD  Pulm: No wheezing, stridor or respiratory distress  CV: Well-perfused, no cyanosis, no pedal edema  Back: No step-offs or pain to palpation along the thoracolumbar spine  Rectal: Deferred  : Deferred  Musculoskeletal: Normal muscle bulk and tone  Skin: Normal color and turgor  Neurologic: A/Ox3, CN II-XII intact, normal gait and station  Psychiatric: Appropriate mood and affect     Imaging: Imaging results 30 days: CT Chest w/o contrast    Result Date: 9/26/2023  EXAM: CT CHEST W/O CONTRAST LOCATION: [facility] DATE: 9/25/2023 INDICATION:  Malignant neoplasm of lower lobe of right lung (H). COMPARISON: 06/21/2023, CTA chest dated 04/27/2023. TECHNIQUE: CT chest without IV contrast. Multiplanar reformats were obtained. Dose reduction techniques were used. CONTRAST: None. FINDINGS: LUNGS AND PLEURA: The posterior pleural-based nodule in the right lower lobe measures 1.2 x 1.2 cm on image 175 of series 4. This compares to 2.4 x 1.2 cm on 04/27/2023 and 1.4 x 1.1 cm on 06/21/2023. There is increased peribronchial soft tissue density in the right lower lobe anterior to the nodule consistent with postradiation fibrosis. No new nodules. Mild emphysema. No pneumothorax or pleural effusion. Mild cylindrical bronchiectasis in the lower lobes. MEDIASTINUM/AXILLAE: There is a mildly enlarged right lower paratracheal lymph node measuring 1.3 cm in diameter on image 57 of series 3. This compares to 1 cm in short axis on 06/21/2023. No other enlarged lymph nodes. No pericardial effusion. No thoracic aortic aneurysm. Mild calcification of the aortic valve leaflets. CORONARY ARTERY CALCIFICATION: Moderate. UPPER ABDOMEN: Cholelithiasis. Bilateral nonobstructing renal stones with an 8 mm stone in the left renal pelvis. Abdominal aortic stent graft incompletely visualized.  MUSCULOSKELETAL: Old compression deformity of T12. Mild degenerative change. No acute fracture or suspicious bony lesions.     IMPRESSION: 1.  Decreased size of right lower lobe nodule with increased pat-nodule cicatricial fibrosis consistent with postradiation change. 2.  Mildly enlarged right paratracheal lymph node now measures 1.3 cm in short axis versus 1 cm on the most recent comparison chest CT. 3.  Cholelithiasis and nephrolithiasis.       Impression     The patient is a 82 year old male with a diagnosis of adenocarcinoma of right lower lobe lung, clinical stage T1 N0 M0, status post SBRT completed 5 months ago.  Patient has been recovering well with restaging CT scan showed no evidence of recurrence.     Assessment & Plan:     1.  I have personally reviewed his restaging CT scan and compared to the previous CT scan and the radiation therapy field.  There is no evidence of cancer recurrence.  There is a mildly enlarged right paratracheal lymph node now measures 1.3 cm in short axis versus 1 cm on the most recent comparison chest 6/21/2023.  I will consider a short-term follow-up.  The patient is scheduled to return to radiation oncology in 2 months for office follow-up and CT chest.     2.  Continue follow-up with Dr. Fidel Sun, pulmonology and Dr. Misael Toussaint, PCP as planned.     Face to face time  30 minutes with > 80% spent on consultation, education and coordination of care.        Rachael Bales MD  Department of Radiation Oncology   [facility]  Tel: [phone]  Page: [phone]    [facility]  [address] [place] [address]     [id number] [facility] [place] [address]    CC:  Patient Care Team:  Misael Moe PA-C as PCP - General (Physician Assistant)  Wilma Jansen RPH as Pharmacist (Pharmacist Ambulatory Care)  Kindra Pack MD as Assigned Heart and Vascular Provider  Fidel Sun MD as Assigned Pulmonology Provider  Rachael Bales  MD as MD (Radiation Oncology)  Rachael Bales MD as Assigned Cancer Care Provider  Ana Madrid RT as Chronic Pulmonary Disease Specialist (Respiratory Therapy)  Corry Barajas RP as Pharmacist (Pharmacist)

## 2023-09-28 NOTE — LETTER
9/28/2023         RE: Dominik Rojas  [address]  [place] [address]        Dear Colleague,    Thank you for referring your patient, Dominik Rojas, to the [facility] RADIATION ONCOLOGY [place]. Please see a copy of my visit note below.    [facility] Radiation Oncology Follow Up     Patient: Dominik Rojas  MRN: [id number]  Date of Service: 09/28/2023       DISEASE TREATED:  Adenocarcinoma of right lower lobe lung, clinical stage T1 N0 M0.  Patient is not a good candidate for surgery given his advanced age and medical status.      TYPE OF RADIATION THERAPY ADMINISTERED:  5000 cGy in 5 treatments given from 4/10/2023 - 4/21/2023.     INTERVAL SINCE COMPLETION OF RADIATION THERAPY: 5 months.      SUBJECTIVE:  Mr. Rojas is a 82 year old male who was a former smoker half to 1 pack a day for 35 years and quit smoking since 1990.  Patient has a multiple underlying medical condition including COPD, coronary artery disease, stroke, and depression.  Patient has been followed by pulmonology for lung nodule since 2021.  CT scan on 7/25/2022 showed an increased 18 x 13 mm right lower lobe pleural-based nodule suspicious for malignancy.  PET scan on 9/30/2022 also reviewed 1.3 x 1.8 cm FDG avid right lower lobe pulmonary nodules highly suspicious for malignancy.  There is no evidence of lymph nodes and systemic metastasis.  Patient underwent CT-guided needle biopsy on 3/8/2023 with pathology confirmed non-small cell carcinoma consistent with adenocarcinoma.  You have discussed with the patient about various treatment options.  Patient is not a good candidate for surgery given his advanced age and medical status.  The patient received stereotactic radiosurgery with a total dose of 5000 cGy in 5 treatments given from 4/10/2023 - 4/21/2023.  He tolerated radiation therapy very well with minimal side effect.      The patient has been recovering well since radiation therapy.  He denies any pain  and discomfort related to the therapy at the time of evaluation.  He is here for routine post therapy office follow-up.     Medications were reviewed and are up to date on EPIC.    The following portions of the patient's history were reviewed and updated as appropriate: allergies, current medications, past family history, past medical history, past social history, past surgical history and problem list.    Review of Systems:      General  Constitutional  Constitutional (WDL): Exceptions to WDL  Fatigue: Fatigue relieved by rest  EENT  Eye Disorders  Eye Disorder (WDL): All eye disorder elements are within defined limits  Ear Disorders  Ear Disorder (WDL): All ear disorder elements are within defined limits  Respiratory  Respiratory  Respiratory (WDL): Exceptions to WDL  Dyspnea: Shortness of breath with minimal exertion OR limiting instrumental ADL  Hypoxia: Decreased oxygen saturation at rest (e.g., pulse oximeter less than 88% or PaO2 less than or equal to 55 mmHg)  Cardiovascular  Cardiovascular  Cardiovascular (WDL): All cardiovascular elements are within defined limits  Gastrointestinal  Gastrointestinal  Gastrointestinal (WDL): All gastrointestinal elements are within defined limits  Musculoskeletal  Musculoskeletal and Connective Tissue Disorders  Musculoskeletal & Connective (WDL): Exceptions to WDL  Arthralgia: Moderate pain OR limiting instrumental ADL  Generalized Muscle Weakness: Symptomatic OR perceived by patient but not evident on physical exam  Integumentary  Integumentary  Integumentary (WDL): All integumentary elements are within defined limits  Neurological  Neurosensory  Neurosensory (WDL): Exceptions to WDL  Peripheral Motor Neuropathy: Moderate symptoms OR limiting instrumental ADL  Ataxia: Moderate symptoms OR limiting instrumental ADL  Genitourinary/Reproductive  Genitourinary  Genitourinary (WDL): All genitourinary elements are within defined limits  Lymphatic  Lymph System Disorders  Lymph  (WDL): All lymph elements are within defined limits  Pain  Pain Score: No Pain (0)  AUA Assessment                                                              Accompanied by  Accompanied By: spouse    Objective:      PHYSICAL EXAMINATION:    /77   Pulse 100   Resp 18   SpO2 (!) 89%     Gen: Alert, in NAD  Eyes: PERRL, EOMI, sclera anicteric  Neck: Supple, full ROM, no LAD  Pulm: No wheezing, stridor or respiratory distress  CV: Well-perfused, no cyanosis, no pedal edema  Back: No step-offs or pain to palpation along the thoracolumbar spine  Rectal: Deferred  : Deferred  Musculoskeletal: Normal muscle bulk and tone  Skin: Normal color and turgor  Neurologic: A/Ox3, CN II-XII intact, normal gait and station  Psychiatric: Appropriate mood and affect     Imaging: Imaging results 30 days: CT Chest w/o contrast    Result Date: 9/26/2023  EXAM: CT CHEST W/O CONTRAST LOCATION: [facility] DATE: 9/25/2023 INDICATION:  Malignant neoplasm of lower lobe of right lung (H). COMPARISON: 06/21/2023, CTA chest dated 04/27/2023. TECHNIQUE: CT chest without IV contrast. Multiplanar reformats were obtained. Dose reduction techniques were used. CONTRAST: None. FINDINGS: LUNGS AND PLEURA: The posterior pleural-based nodule in the right lower lobe measures 1.2 x 1.2 cm on image 175 of series 4. This compares to 2.4 x 1.2 cm on 04/27/2023 and 1.4 x 1.1 cm on 06/21/2023. There is increased peribronchial soft tissue density in the right lower lobe anterior to the nodule consistent with postradiation fibrosis. No new nodules. Mild emphysema. No pneumothorax or pleural effusion. Mild cylindrical bronchiectasis in the lower lobes. MEDIASTINUM/AXILLAE: There is a mildly enlarged right lower paratracheal lymph node measuring 1.3 cm in diameter on image 57 of series 3. This compares to 1 cm in short axis on 06/21/2023. No other enlarged lymph nodes. No pericardial effusion. No thoracic aortic aneurysm. Mild  calcification of the aortic valve leaflets. CORONARY ARTERY CALCIFICATION: Moderate. UPPER ABDOMEN: Cholelithiasis. Bilateral nonobstructing renal stones with an 8 mm stone in the left renal pelvis. Abdominal aortic stent graft incompletely visualized. MUSCULOSKELETAL: Old compression deformity of T12. Mild degenerative change. No acute fracture or suspicious bony lesions.     IMPRESSION: 1.  Decreased size of right lower lobe nodule with increased pat-nodule cicatricial fibrosis consistent with postradiation change. 2.  Mildly enlarged right paratracheal lymph node now measures 1.3 cm in short axis versus 1 cm on the most recent comparison chest CT. 3.  Cholelithiasis and nephrolithiasis.       Impression     The patient is a 82 year old male with a diagnosis of adenocarcinoma of right lower lobe lung, clinical stage T1 N0 M0, status post SBRT completed 5 months ago.  Patient has been recovering well with restaging CT scan showed no evidence of recurrence.     Assessment & Plan:     1.  I have personally reviewed his restaging CT scan and compared to the previous CT scan and the radiation therapy field.  There is no evidence of cancer recurrence.  There is a mildly enlarged right paratracheal lymph node now measures 1.3 cm in short axis versus 1 cm on the most recent comparison chest 6/21/2023.  I will consider a short-term follow-up.  The patient is scheduled to return to radiation oncology in 2 months for office follow-up and CT chest.     2.  Continue follow-up with Dr. Fidel Sun, pulmonology and Dr. Misael Toussaint, PCP as planned.     Face to face time  30 minutes with > 80% spent on consultation, education and coordination of care.        Rachael Bales MD  Department of Radiation Oncology   [facility]  Tel: [phone]  Page: [phone]    [facility]  [address] [place] [address]     [facility]   [address] [facility] [place] [address]    CC:  Patient Care Team:  [place]  "Misael WHITE PA-C as PCP - General (Physician Assistant)  Wilma Jansen [facility] as Pharmacist (Pharmacist Ambulatory Care)  Kindra Pack MD as Assigned Heart and Vascular Provider  Fidel Sun MD as Assigned Pulmonology Provider  Rachael Bales MD as MD (Radiation Oncology)  Rachael Bales MD as Assigned Cancer Care Provider  Ana Madrid RT as Chronic Pulmonary Disease Specialist (Respiratory Therapy)  Corry Barajas [facility] as Pharmacist (Pharmacist)      Oncology Rooming Note    September 28, 2023 3:27 PM   Dominik Rojas is a 82 year old male who presents for:    Chief Complaint   Patient presents with     Oncology Clinic Visit     Lung Cancer     Rad Onc follow up     Initial Vitals: /77   Pulse 100   Resp 18   SpO2 (!) 89%  Estimated body mass index is 30.38 kg/m  as calculated from the following:    Height as of 5/9/23: 1.702 m (5' 7\").    Weight as of 9/19/23: 88 kg (194 lb). There is no height or weight on file to calculate BSA.  No Pain (0) Comment: Data Unavailable   No LMP for male patient.  Allergies reviewed: Yes  Medications reviewed: Yes    Medications: Medication refills not needed today.  Pharmacy name entered into [employer]:    [id number] [address]  [place] [place], [place] - [address] [place] [address] [facility]ORE #[id number] - [place], [place] - [address] [address] [address] [address] & [facility]    Clinical concerns: COPD, SOB with activity, SpO2 in upper 80s at baseline.   Dr. Bales was notified.      Alia Oliver, DIONI                Again, thank you for allowing me to participate in the care of your patient.        Sincerely,        Rachael Bales MD  "

## 2023-10-17 ENCOUNTER — LAB REQUISITION (OUTPATIENT)
Dept: LAB | Facility: CLINIC | Age: 83
End: 2023-10-17

## 2023-10-17 DIAGNOSIS — I10 ESSENTIAL (PRIMARY) HYPERTENSION: ICD-10-CM

## 2023-10-17 PROCEDURE — 80048 BASIC METABOLIC PNL TOTAL CA: CPT | Performed by: PHYSICIAN ASSISTANT

## 2023-10-18 LAB
ANION GAP SERPL CALCULATED.3IONS-SCNC: 14 MMOL/L (ref 7–15)
BUN SERPL-MCNC: 27.7 MG/DL (ref 8–23)
CALCIUM SERPL-MCNC: 9.5 MG/DL (ref 8.8–10.2)
CHLORIDE SERPL-SCNC: 103 MMOL/L (ref 98–107)
CREAT SERPL-MCNC: 1.37 MG/DL (ref 0.67–1.17)
DEPRECATED HCO3 PLAS-SCNC: 23 MMOL/L (ref 22–29)
EGFRCR SERPLBLD CKD-EPI 2021: 52 ML/MIN/1.73M2
GLUCOSE SERPL-MCNC: 92 MG/DL (ref 70–99)
POTASSIUM SERPL-SCNC: 4.6 MMOL/L (ref 3.4–5.3)
SODIUM SERPL-SCNC: 140 MMOL/L (ref 135–145)

## 2023-11-27 ENCOUNTER — HOSPITAL ENCOUNTER (OUTPATIENT)
Dept: CT IMAGING | Facility: HOSPITAL | Age: 83
Discharge: HOME | End: 2023-11-27
Attending: RADIOLOGY | Admitting: RADIOLOGY
Payer: COMMERCIAL

## 2023-11-27 DIAGNOSIS — C34.31 MALIGNANT NEOPLASM OF LOWER LOBE OF RIGHT LUNG (H): ICD-10-CM

## 2023-11-27 PROCEDURE — 71250 CT THORAX DX C-: CPT

## 2023-12-01 ENCOUNTER — OFFICE VISIT (OUTPATIENT)
Dept: RADIATION ONCOLOGY | Facility: HOSPITAL | Age: 83
End: 2023-12-01
Attending: RADIOLOGY
Payer: COMMERCIAL

## 2023-12-01 VITALS
OXYGEN SATURATION: 90 % | RESPIRATION RATE: 20 BRPM | HEART RATE: 84 BPM | DIASTOLIC BLOOD PRESSURE: 65 MMHG | SYSTOLIC BLOOD PRESSURE: 106 MMHG | TEMPERATURE: 98.6 F

## 2023-12-01 DIAGNOSIS — C34.31 MALIGNANT NEOPLASM OF LOWER LOBE OF RIGHT LUNG (H): Primary | ICD-10-CM

## 2023-12-01 PROCEDURE — G0463 HOSPITAL OUTPT CLINIC VISIT: HCPCS | Performed by: RADIOLOGY

## 2023-12-01 PROCEDURE — 99214 OFFICE O/P EST MOD 30 MIN: CPT | Performed by: RADIOLOGY

## 2023-12-01 RX ORDER — QUETIAPINE FUMARATE 25 MG/1
TABLET, FILM COATED ORAL
COMMUNITY
End: 2024-03-08

## 2023-12-01 ASSESSMENT — PAIN SCALES - GENERAL: PAINLEVEL: NO PAIN (0)

## 2023-12-01 NOTE — LETTER
12/1/2023         RE: Dominik Rojas  [address]  [place] [address]        Dear Colleague,    Thank you for referring your patient, Dominik Rojas, to the [facility] RADIATION ONCOLOGY [place]. Please see a copy of my visit note below.    [facility] Radiation Oncology Follow Up     Patient: Dominik Rojas  MRN: [id number]  Date of Service: 12/01/2023       DISEASE TREATED:  Adenocarcinoma of right lower lobe lung, clinical stage T1 N0 M0.  Patient is not a good candidate for surgery given his advanced age and medical status.      TYPE OF RADIATION THERAPY ADMINISTERED:  5000 cGy in 5 treatments given from 4/10/2023 - 4/21/2023.     INTERVAL SINCE COMPLETION OF RADIATION THERAPY: 7 months.      SUBJECTIVE:  Mr. Rojas is a 83 year old male who was a former smoker half to 1 pack a day for 35 years and quit smoking since 1990.  Patient has a multiple underlying medical condition including COPD, coronary artery disease, stroke, and depression.  Patient has been followed by pulmonology for lung nodule since 2021.  CT scan on 7/25/2022 showed an increased 18 x 13 mm right lower lobe pleural-based nodule suspicious for malignancy.  PET scan on 9/30/2022 also reviewed 1.3 x 1.8 cm FDG avid right lower lobe pulmonary nodules highly suspicious for malignancy.  There is no evidence of lymph nodes and systemic metastasis.  Patient underwent CT-guided needle biopsy on 3/8/2023 with pathology confirmed non-small cell carcinoma consistent with adenocarcinoma.  You have discussed with the patient about various treatment options.  Patient is not a good candidate for surgery given his advanced age and medical status.  The patient received stereotactic radiosurgery with a total dose of 5000 cGy in 5 treatments given from 4/10/2023 - 4/21/2023.  He tolerated radiation therapy very well with minimal side effect.      The patient has been recovering well since radiation therapy.  He denies any pain  and discomfort related to the therapy at the time of evaluation.  He is here for routine post therapy office follow-up.     Medications were reviewed and are up to date on EPIC.    The following portions of the patient's history were reviewed and updated as appropriate: allergies, current medications, past family history, past medical history, past social history, past surgical history and problem list.    Review of Systems:      General  Constitutional  Constitutional (WDL): Exceptions to WDL  Fatigue: Fatigue relieved by rest  EENT  Eye Disorders  Eye Disorder (WDL): Assessment not pertinent to visit  Ear Disorders  Ear Disorder (WDL): Assessment not pertinent to visit  Respiratory  Respiratory  Respiratory (WDL): Exceptions to WDL  Cough: Mild symptoms OR nonprescription intervention indicated  Dyspnea: Shortness of breath with minimal exertion OR limiting instrumental ADL  Hypoxia: Decreased oxygen saturation at rest (e.g., pulse oximeter less than 88% or PaO2 less than or equal to 55 mmHg)  Cardiovascular  Cardiovascular  Cardiovascular (WDL): All cardiovascular elements are within defined limits  Gastrointestinal  Gastrointestinal  Gastrointestinal (WDL): All gastrointestinal elements are within defined limits  Musculoskeletal  Musculoskeletal and Connective Tissue Disorders  Musculoskeletal & Connective (WDL): Exceptions to WDL  Arthralgia: Moderate pain OR limiting instrumental ADL  Generalized Muscle Weakness: Symptomatic OR perceived by patient but not evident on physical exam  Integumentary  Integumentary  Integumentary (WDL): All integumentary elements are within defined limits  Neurological  Neurosensory  Neurosensory (WDL): Exceptions to WDL  Peripheral Motor Neuropathy: Moderate symptoms OR limiting instrumental ADL  Ataxia: Moderate symptoms OR limiting instrumental ADL  Genitourinary/Reproductive  Genitourinary  Genitourinary (WDL): All genitourinary elements are within defined  limits  Lymphatic  Lymph System Disorders  Lymph (WDL): All lymph elements are within defined limits  Pain  Pain Score: No Pain (0)  AUA Assessment                                                              Accompanied by  Accompanied By: spouse    Objective:      PHYSICAL EXAMINATION:    /65 (BP Location: Right arm, Patient Position: Sitting)   Pulse 84   Temp 98.6  F (37  C)   Resp 20   SpO2 90%     Gen: Alert, in NAD  Eyes: PERRL, EOMI, sclera anicteric  Pulm: No wheezing, stridor or respiratory distress  CV: Well-perfused, no cyanosis, no pedal edema  Back: No step-offs or pain to palpation along the thoracolumbar spine  Rectal: Deferred  : Deferred  Musculoskeletal: Normal muscle bulk and tone  Skin: Normal color and turgor  Neurologic: A/Ox3, CN II-XII intact, normal gait and station  Psychiatric: Appropriate mood and affect     Imaging: Imaging results 30 days: CT Chest w/o contrast    Result Date: 11/28/2023  EXAM: CT CHEST W/O CONTRAST LOCATION: [facility] DATE: 11/27/2023 INDICATION:  Malignant neoplasm of lower lobe of right lung (H). Follow-up. COMPARISON: 09/25/2023. TECHNIQUE: CT chest without IV contrast. Multiplanar reformats were obtained. Dose reduction techniques were used. CONTRAST: None. FINDINGS: LUNGS AND PLEURA: No significant change of 12 mm right lower lobe nodular density and surrounding post treatment change. Stable mild basilar predominant fibrosis or bronchiectasis. Stable emphysema. No pleural effusion. MEDIASTINUM/AXILLAE: Low right paratracheal node measures 14 mm short axis versus 13 mm in September 2023 (series 4, image 46). Stable enlarged subcarinal node measuring 18 mm short axis (image 60). Otherwise, no new or enlarging adenopathy. CORONARY ARTERY CALCIFICATION: Severe. UPPER ABDOMEN: Cholelithiasis. Incompletely seen abdominal aortic stent graft. MUSCULOSKELETAL: Stable T12 compression deformity. Bony demineralization and degenerative  changes. No obvious aggressive bony lesion.     IMPRESSION: 1.  A low right paratracheal node measures marginally larger since September 2023 at 14 mm versus 13 mm; this is indeterminate. 2.  Otherwise, no other significant change. 3.  No other evidence of new or enlarging disease.       Impression     The patient is a 83 year old male with a diagnosis of adenocarcinoma of right lower lobe lung, clinical stage T1 N0 M0, status post SBRT completed 7 months ago.  Patient has been recovering well with restaging CT scan showed no evidence of recurrence.      Assessment & Plan:     1.  I have personally reviewed his restaging CT scan and compared to the previous CT scan and the radiation therapy field.  There is no evidence of cancer recurrence.  There is a mildly enlarged right paratracheal lymph node now measures 1.4 cm in short axis versus 1.3 cm on 9/25/2023 and 1.0 cm on 6/21/2023.  I will consider a short-term follow-up.  The patient is scheduled to return to radiation oncology in 3 months for office follow-up and CT chest.  We will consider to get PET scan if the lymph nodes continue to increase in size.     2.  Continue follow-up with Dr. Fidel Sun, pulmonology and Dr. Misael Toussaint, PCP as planned.     Face to face time  30 minutes with > 80% spent on consultation, education and coordination of care.       Rachael Bales MD  Department of Radiation Oncology   [facility]  Tel: [phone]  Page: [phone]    [facility]  [address] [place] [address]     [id number] [facility]   [place] [address]    CC:  Patient Care Team:  Misael Moe PA-C as PCP - General (Physician Assistant)  Wilma Jansen [facility] as Pharmacist (Pharmacist Ambulatory Care)  Kindra Pack MD as Assigned Heart and Vascular Provider  Fidel Sun MD as Assigned Pulmonology Provider  Rachael Bales MD as MD (Radiation Oncology)  Rachael Bales MD as Assigned Cancer Care  "Provider  Ana Madrid, RT as Chronic Pulmonary Disease Specialist (Respiratory Therapy)  Corry Barajas RPH as Pharmacist (Pharmacist)  Corry Barajas RPH as Assigned [facility] Pharmacist      Oncology Rooming Note    December 1, 2023 2:06 PM   Dominik Rojas is a 83 year old male who presents for:    Chief Complaint   Patient presents with     Oncology Clinic Visit     Follow up     Initial Vitals: /65 (BP Location: Right arm, Patient Position: Sitting)   Pulse 84   Temp 98.6  F (37  C)   Resp 20   SpO2 90%  Estimated body mass index is 30.38 kg/m  as calculated from the following:    Height as of 5/9/23: 1.702 m (5' 7\").    Weight as of 9/19/23: 88 kg (194 lb). There is no height or weight on file to calculate BSA.  No Pain (0) Comment: Data Unavailable   No LMP for male patient.  Allergies reviewed: Yes  Medications reviewed: Yes    Medications: Medication refills not needed today.  Pharmacy name entered into [employer]:    [id number] [id number] [facility] [address]  [place] [facility] [place], [place] - [address] [place] [address] [id number] - [place] [address] [id number] [address] [address] [facility]    Clinical concerns: follow up, CT done on 11/27  Dr. Bales was notified.      Petra Asher RN                Again, thank you for allowing me to participate in the care of your patient.        Sincerely,        Rachael Bales MD  "

## 2023-12-01 NOTE — PROGRESS NOTES
"Oncology Rooming Note    December 1, 2023 2:06 PM   Dominik Rojas is a 83 year old male who presents for:    Chief Complaint   Patient presents with    Oncology Clinic Visit     Follow up     Initial Vitals: /65 (BP Location: Right arm, Patient Position: Sitting)   Pulse 84   Temp 98.6  F (37  C)   Resp 20   SpO2 90%  Estimated body mass index is 30.38 kg/m  as calculated from the following:    Height as of 5/9/23: 1.702 m (5' 7\").    Weight as of 9/19/23: 88 kg (194 lb). There is no height or weight on file to calculate BSA.  No Pain (0) Comment: Data Unavailable   No LMP for male patient.  Allergies reviewed: Yes  Medications reviewed: Yes    Medications: Medication refills not needed today.  Pharmacy name entered into [employer]:    [id number] - [facility] [address]  [place] [facility] [place], [place] - [address] [place] [address] [facility] STORE #[id number] - [facility] [address] [address] [address] [facility]    Clinical concerns: follow up, CT done on 11/27  Dr. Bales was notified.      Petra Asher RN              "

## 2023-12-01 NOTE — PROGRESS NOTES
[facility] Radiation Oncology Follow Up     Patient: Dominik Rojas  MRN: [id number]  Date of Service: 12/01/2023       DISEASE TREATED:  Adenocarcinoma of right lower lobe lung, clinical stage T1 N0 M0.  Patient is not a good candidate for surgery given his advanced age and medical status.      TYPE OF RADIATION THERAPY ADMINISTERED:  5000 cGy in 5 treatments given from 4/10/2023 - 4/21/2023.     INTERVAL SINCE COMPLETION OF RADIATION THERAPY: 7 months.      SUBJECTIVE:  Mr. Rojas is a 83 year old male who was a former smoker half to 1 pack a day for 35 years and quit smoking since 1990.  Patient has a multiple underlying medical condition including COPD, coronary artery disease, stroke, and depression.  Patient has been followed by pulmonology for lung nodule since 2021.  CT scan on 7/25/2022 showed an increased 18 x 13 mm right lower lobe pleural-based nodule suspicious for malignancy.  PET scan on 9/30/2022 also reviewed 1.3 x 1.8 cm FDG avid right lower lobe pulmonary nodules highly suspicious for malignancy.  There is no evidence of lymph nodes and systemic metastasis.  Patient underwent CT-guided needle biopsy on 3/8/2023 with pathology confirmed non-small cell carcinoma consistent with adenocarcinoma.  You have discussed with the patient about various treatment options.  Patient is not a good candidate for surgery given his advanced age and medical status.  The patient received stereotactic radiosurgery with a total dose of 5000 cGy in 5 treatments given from 4/10/2023 - 4/21/2023.  He tolerated radiation therapy very well with minimal side effect.      The patient has been recovering well since radiation therapy.  He denies any pain and discomfort related to the therapy at the time of evaluation.  He is here for routine post therapy office follow-up.     Medications were reviewed and are up to date on EPIC.    The following portions of the patient's history were reviewed and updated as  appropriate: allergies, current medications, past family history, past medical history, past social history, past surgical history and problem list.    Review of Systems:      General  Constitutional  Constitutional (WDL): Exceptions to WDL  Fatigue: Fatigue relieved by rest  EENT  Eye Disorders  Eye Disorder (WDL): Assessment not pertinent to visit  Ear Disorders  Ear Disorder (WDL): Assessment not pertinent to visit  Respiratory  Respiratory  Respiratory (WDL): Exceptions to WDL  Cough: Mild symptoms OR nonprescription intervention indicated  Dyspnea: Shortness of breath with minimal exertion OR limiting instrumental ADL  Hypoxia: Decreased oxygen saturation at rest (e.g., pulse oximeter less than 88% or PaO2 less than or equal to 55 mmHg)  Cardiovascular  Cardiovascular  Cardiovascular (WDL): All cardiovascular elements are within defined limits  Gastrointestinal  Gastrointestinal  Gastrointestinal (WDL): All gastrointestinal elements are within defined limits  Musculoskeletal  Musculoskeletal and Connective Tissue Disorders  Musculoskeletal & Connective (WDL): Exceptions to WDL  Arthralgia: Moderate pain OR limiting instrumental ADL  Generalized Muscle Weakness: Symptomatic OR perceived by patient but not evident on physical exam  Integumentary  Integumentary  Integumentary (WDL): All integumentary elements are within defined limits  Neurological  Neurosensory  Neurosensory (WDL): Exceptions to WDL  Peripheral Motor Neuropathy: Moderate symptoms OR limiting instrumental ADL  Ataxia: Moderate symptoms OR limiting instrumental ADL  Genitourinary/Reproductive  Genitourinary  Genitourinary (WDL): All genitourinary elements are within defined limits  Lymphatic  Lymph System Disorders  Lymph (WDL): All lymph elements are within defined limits  Pain  Pain Score: No Pain (0)  AUA Assessment                                                              Accompanied by  Accompanied By: spouse    Objective:      PHYSICAL  EXAMINATION:    /65 (BP Location: Right arm, Patient Position: Sitting)   Pulse 84   Temp 98.6  F (37  C)   Resp 20   SpO2 90%     Gen: Alert, in NAD  Eyes: PERRL, EOMI, sclera anicteric  Pulm: No wheezing, stridor or respiratory distress  CV: Well-perfused, no cyanosis, no pedal edema  Back: No step-offs or pain to palpation along the thoracolumbar spine  Rectal: Deferred  : Deferred  Musculoskeletal: Normal muscle bulk and tone  Skin: Normal color and turgor  Neurologic: A/Ox3, CN II-XII intact, normal gait and station  Psychiatric: Appropriate mood and affect     Imaging: Imaging results 30 days: CT Chest w/o contrast    Result Date: 11/28/2023  EXAM: CT CHEST W/O CONTRAST LOCATION: [facility] DATE: 11/27/2023 INDICATION:  Malignant neoplasm of lower lobe of right lung (H). Follow-up. COMPARISON: 09/25/2023. TECHNIQUE: CT chest without IV contrast. Multiplanar reformats were obtained. Dose reduction techniques were used. CONTRAST: None. FINDINGS: LUNGS AND PLEURA: No significant change of 12 mm right lower lobe nodular density and surrounding post treatment change. Stable mild basilar predominant fibrosis or bronchiectasis. Stable emphysema. No pleural effusion. MEDIASTINUM/AXILLAE: Low right paratracheal node measures 14 mm short axis versus 13 mm in September 2023 (series 4, image 46). Stable enlarged subcarinal node measuring 18 mm short axis (image 60). Otherwise, no new or enlarging adenopathy. CORONARY ARTERY CALCIFICATION: Severe. UPPER ABDOMEN: Cholelithiasis. Incompletely seen abdominal aortic stent graft. MUSCULOSKELETAL: Stable T12 compression deformity. Bony demineralization and degenerative changes. No obvious aggressive bony lesion.     IMPRESSION: 1.  A low right paratracheal node measures marginally larger since September 2023 at 14 mm versus 13 mm; this is indeterminate. 2.  Otherwise, no other significant change. 3.  No other evidence of new or enlarging  disease.       Impression     The patient is a 83 year old male with a diagnosis of adenocarcinoma of right lower lobe lung, clinical stage T1 N0 M0, status post SBRT completed 7 months ago.  Patient has been recovering well with restaging CT scan showed no evidence of recurrence.      Assessment & Plan:     1.  I have personally reviewed his restaging CT scan and compared to the previous CT scan and the radiation therapy field.  There is no evidence of cancer recurrence.  There is a mildly enlarged right paratracheal lymph node now measures 1.4 cm in short axis versus 1.3 cm on 9/25/2023 and 1.0 cm on 6/21/2023.  I will consider a short-term follow-up.  The patient is scheduled to return to radiation oncology in 3 months for office follow-up and CT chest.  We will consider to get PET scan if the lymph nodes continue to increase in size.     2.  Continue follow-up with Dr. Fidel Sun, pulmonology and Dr. Misael Toussaint, PCP as planned.     Face to face time  30 minutes with > 80% spent on consultation, education and coordination of care.       Rachael Bales MD  Department of Radiation Oncology   [facility]  Tel: [phone]  Page: [phone]    [facility]  [address] [place] [address]     [address] [place] [address]    CC:  Patient Care Team:  Misael Moe PA-C as PCP - General (Physician Assistant)  Wilma Jansen RPH as Pharmacist (Pharmacist Ambulatory Care)  Kindra Pack MD as Assigned Heart and Vascular Provider  Fidel Sun MD as Assigned Pulmonology Provider  Rachael Bales MD as MD (Radiation Oncology)  Rachael Bales MD as Assigned Cancer Care Provider  Ana Madrid RT as Chronic Pulmonary Disease Specialist (Respiratory Therapy)  Corry Barajas RPH as Pharmacist (Pharmacist)  Corry Barajas RPH as Assigned MTM Pharmacist

## 2024-01-15 ENCOUNTER — LAB REQUISITION (OUTPATIENT)
Dept: LAB | Facility: CLINIC | Age: 84
End: 2024-01-15

## 2024-01-15 DIAGNOSIS — E78.2 MIXED HYPERLIPIDEMIA: ICD-10-CM

## 2024-01-15 DIAGNOSIS — E03.9 HYPOTHYROIDISM, UNSPECIFIED: ICD-10-CM

## 2024-01-15 LAB
ALBUMIN SERPL BCG-MCNC: 3.9 G/DL (ref 3.5–5.2)
ALP SERPL-CCNC: 118 U/L (ref 40–150)
ALT SERPL W P-5'-P-CCNC: 18 U/L (ref 0–70)
ANION GAP SERPL CALCULATED.3IONS-SCNC: 13 MMOL/L (ref 7–15)
AST SERPL W P-5'-P-CCNC: 26 U/L (ref 0–45)
BILIRUB SERPL-MCNC: 0.8 MG/DL
BUN SERPL-MCNC: 22.6 MG/DL (ref 8–23)
CALCIUM SERPL-MCNC: 9.5 MG/DL (ref 8.8–10.2)
CHLORIDE SERPL-SCNC: 102 MMOL/L (ref 98–107)
CHOLEST SERPL-MCNC: 141 MG/DL
CREAT SERPL-MCNC: 1.26 MG/DL (ref 0.67–1.17)
DEPRECATED HCO3 PLAS-SCNC: 28 MMOL/L (ref 22–29)
EGFRCR SERPLBLD CKD-EPI 2021: 57 ML/MIN/1.73M2
FASTING STATUS PATIENT QL REPORTED: NORMAL
GLUCOSE SERPL-MCNC: 78 MG/DL (ref 70–99)
HDLC SERPL-MCNC: 51 MG/DL
LDLC SERPL CALC-MCNC: 68 MG/DL
NONHDLC SERPL-MCNC: 90 MG/DL
POTASSIUM SERPL-SCNC: 4.3 MMOL/L (ref 3.4–5.3)
PROT SERPL-MCNC: 6.7 G/DL (ref 6.4–8.3)
SODIUM SERPL-SCNC: 143 MMOL/L (ref 135–145)
TRIGL SERPL-MCNC: 112 MG/DL
TSH SERPL DL<=0.005 MIU/L-ACNC: 1.18 UIU/ML (ref 0.3–4.2)

## 2024-01-15 PROCEDURE — 84443 ASSAY THYROID STIM HORMONE: CPT | Performed by: PHYSICIAN ASSISTANT

## 2024-01-15 PROCEDURE — 80053 COMPREHEN METABOLIC PANEL: CPT | Performed by: PHYSICIAN ASSISTANT

## 2024-01-15 PROCEDURE — 80061 LIPID PANEL: CPT | Performed by: PHYSICIAN ASSISTANT

## 2024-02-08 ENCOUNTER — HOSPITAL ENCOUNTER (EMERGENCY)
Facility: HOSPITAL | Age: 84
Discharge: HOME | End: 2024-02-08
Attending: EMERGENCY MEDICINE | Admitting: EMERGENCY MEDICINE
Payer: COMMERCIAL

## 2024-02-08 ENCOUNTER — APPOINTMENT (OUTPATIENT)
Dept: RADIOLOGY | Facility: HOSPITAL | Age: 84
End: 2024-02-08
Attending: EMERGENCY MEDICINE
Payer: COMMERCIAL

## 2024-02-08 ENCOUNTER — HOSPITAL ENCOUNTER (OUTPATIENT)
Facility: HOSPITAL | Age: 84
End: 2024-02-08
Attending: INTERNAL MEDICINE | Admitting: INTERNAL MEDICINE
Payer: COMMERCIAL

## 2024-02-08 VITALS
HEART RATE: 97 BPM | OXYGEN SATURATION: 91 % | WEIGHT: 220 LBS | RESPIRATION RATE: 20 BRPM | HEIGHT: 67 IN | DIASTOLIC BLOOD PRESSURE: 62 MMHG | TEMPERATURE: 98.4 F | SYSTOLIC BLOOD PRESSURE: 105 MMHG | BODY MASS INDEX: 34.53 KG/M2
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
## 2024-02-08 DIAGNOSIS — T18.128A ESOPHAGEAL OBSTRUCTION DUE TO FOOD IMPACTION: ICD-10-CM

## 2024-02-08 DIAGNOSIS — T18.108A FOREIGN BODY IN ESOPHAGUS, INITIAL ENCOUNTER: Primary | ICD-10-CM

## 2024-02-08 LAB
GLUCOSE BLDC GLUCOMTR-MCNC: 161 MG/DL (ref 70–99)
HOLD SPECIMEN: NORMAL

## 2024-02-08 PROCEDURE — 82962 GLUCOSE BLOOD TEST: CPT

## 2024-02-08 PROCEDURE — 71045 X-RAY EXAM CHEST 1 VIEW: CPT

## 2024-02-08 PROCEDURE — 99285 EMERGENCY DEPT VISIT HI MDM: CPT | Mod: 25

## 2024-02-08 PROCEDURE — 250N000013 HC RX MED GY IP 250 OP 250 PS 637: Performed by: EMERGENCY MEDICINE

## 2024-02-08 PROCEDURE — 96374 THER/PROPH/DIAG INJ IV PUSH: CPT

## 2024-02-08 PROCEDURE — 93005 ELECTROCARDIOGRAM TRACING: CPT | Performed by: EMERGENCY MEDICINE

## 2024-02-08 PROCEDURE — 250N000011 HC RX IP 250 OP 636: Performed by: EMERGENCY MEDICINE

## 2024-02-08 RX ORDER — SODIUM CHLORIDE, SODIUM LACTATE, POTASSIUM CHLORIDE, CALCIUM CHLORIDE 600; 310; 30; 20 MG/100ML; MG/100ML; MG/100ML; MG/100ML
INJECTION, SOLUTION INTRAVENOUS CONTINUOUS
Status: CANCELLED | OUTPATIENT
Start: 2024-02-08

## 2024-02-08 RX ORDER — ONDANSETRON 4 MG/1
4 TABLET, ORALLY DISINTEGRATING ORAL EVERY 30 MIN PRN
Status: CANCELLED | OUTPATIENT
Start: 2024-02-08

## 2024-02-08 RX ORDER — ONDANSETRON 2 MG/ML
4 INJECTION INTRAMUSCULAR; INTRAVENOUS
Status: CANCELLED | OUTPATIENT
Start: 2024-02-08

## 2024-02-08 RX ORDER — FAMOTIDINE 20 MG/1
20 TABLET, FILM COATED ORAL 2 TIMES DAILY
Qty: 30 TABLET | Refills: 0 | Status: ON HOLD | OUTPATIENT
Start: 2024-02-08 | End: 2024-06-29

## 2024-02-08 RX ORDER — OXYCODONE HYDROCHLORIDE 5 MG/1
10 TABLET ORAL
Status: CANCELLED | OUTPATIENT
Start: 2024-02-08

## 2024-02-08 RX ORDER — ONDANSETRON 2 MG/ML
4 INJECTION INTRAMUSCULAR; INTRAVENOUS EVERY 30 MIN PRN
Status: CANCELLED | OUTPATIENT
Start: 2024-02-08

## 2024-02-08 RX ORDER — SUCRALFATE 1 G/1
1 TABLET ORAL 4 TIMES DAILY
Qty: 60 TABLET | Refills: 0 | Status: SHIPPED | OUTPATIENT
Start: 2024-02-08 | End: 2024-03-08

## 2024-02-08 RX ORDER — LIDOCAINE 40 MG/G
CREAM TOPICAL
Status: CANCELLED | OUTPATIENT
Start: 2024-02-08

## 2024-02-08 RX ORDER — OXYCODONE HYDROCHLORIDE 5 MG/1
5 TABLET ORAL
Status: CANCELLED | OUTPATIENT
Start: 2024-02-08

## 2024-02-08 RX ORDER — LABETALOL HYDROCHLORIDE 5 MG/ML
10 INJECTION, SOLUTION INTRAVENOUS
Status: CANCELLED | OUTPATIENT
Start: 2024-02-08

## 2024-02-08 RX ORDER — FENTANYL CITRATE 50 UG/ML
25 INJECTION, SOLUTION INTRAMUSCULAR; INTRAVENOUS EVERY 5 MIN PRN
Status: CANCELLED | OUTPATIENT
Start: 2024-02-08

## 2024-02-08 RX ORDER — FENTANYL CITRATE 50 UG/ML
50 INJECTION, SOLUTION INTRAMUSCULAR; INTRAVENOUS EVERY 5 MIN PRN
Status: CANCELLED | OUTPATIENT
Start: 2024-02-08

## 2024-02-08 RX ADMIN — ANTACID/ANTIFLATULENT 4 G: 380; 550; 10; 10 GRANULE, EFFERVESCENT ORAL at 19:01

## 2024-02-08 RX ADMIN — Medication 1 MG: at 18:51

## 2024-02-08 ASSESSMENT — ACTIVITIES OF DAILY LIVING (ADL)
ADLS_ACUITY_SCORE: 38
ADLS_ACUITY_SCORE: 38

## 2024-02-09 NOTE — ED NOTES
Bed: JNFT11  Expected date: 2/8/24  Expected time: 6:23 PM  Means of arrival: Ambulance  Comments:  WBL 82 yo M Choked but airway clear now

## 2024-02-09 NOTE — ED NOTES
GI called to get report on pt; informed her that the pt had passed the PO challenge and would probably not need endoscopy; informed provider as well

## 2024-02-09 NOTE — ED PROVIDER NOTES
"EMERGENCY DEPARTMENT ENCOUNTER      NAME: Dominik Rojas  AGE: 83 year old male  YOB: 1940  MRN: [id number]  EVALUATION DATE & TIME: 2/8/2024  6:29 PM    PCP: Misael Moe    ED PROVIDER: Kwaku Almonte M.D.      Chief Complaint   Patient presents with    Choking         IMPRESSION  1. Foreign body in esophagus, initial encounter    2. Esophageal obstruction due to food impaction        PLAN  - avoid meat until GI clinic follow up  - add Pepcid/Carafate to previously-prescribed Protonix  - close PCP & GI follow up  - discharge to home    ED COURSE & MEDICAL DECISION MAKING    ED Course as of 02/08/24 2144   Thu Feb 08, 2024   1831 83yoM with history of COPD, a-fib (not on AC), AAA (s/p repair), obesity, HTN, HLD presenting per EMS from home for evaluation of food impaction. Was eating meat from a TV dinner when \"it got stuck\" in his lower throat. Still feels stuck. Tried drinking water but vomited it back up. Thus call RN line who said call 911. Hemodynamically stable en route with EMS. Denies any shortness of breath or chest pain. Still feels the food stuck here now. States this has never happened to him in the past. Was otherwise feeling well earlier today.    Poor O2 waveform on presentation (on 4L NC during my exam, reading 95% on this) with otherwise unremarkable vitals. Calm on exam with clear oropharynx, no active drooling, mild gurgly phonation with no stridor, mild coarse breath sounds bilaterally, benign abdomen, clear mentation.    Sounds like esophageal impaction. May have mild aspiration as well given his vomiting of water drinking attempts afterward. Will give glucagon & EZ gas to try and relieve obstruction. Patient comfortable with this plan; no further questions at this time.   1852 CXR independently reviewed & interpreted by me: no acute cardiopulmonary process.   1928 Patient failed glucagon & EZ gas. Discussed with GI who plans to perform EGD in OR; calling to get this set " "up. Currently doing EGD at [facility] ED.     While awaiting OR team, patient passed his food impaction spontaneously. Now swallowing without difficulty on recheck and drank a glass of water without issue. Wife at bedside and states \"he never chews his food\". I advised not eating any meat until see in GI clinic. Already on Protonix; will add Pepcid & Carafate. Satting well on his baseline home O2. States he feels baseline now & wants to go home. I called GI and informed them he no longer needs emergent EGD tonight; they recommended clinic follow up. Patient & wife comfortable with discharge at this time. Return precautions and need for PCP & GI follow up discussed and understood. No further questions at the time of discharge.      --------------------------------------------------------------------------------   --------------------------------------------------------------------------------     6:30 PM I met with the patient for the initial interview and physical examination. Discussed plan for treatment and workup in the ED.  8:45 PM Upon recheck, patient reports the piece of meat being dislodged and there no longer being a problem with his swallowing or eating. We discussed discharge and follow up cares.       This patient involved a high degree of complexity in medical decision making, as significant risks were present and assessed. Recent encounters & results in medical record reviewed by me.    All workup (i.e. any EKG/labs/imaging as per charting below) reviewed and independently interpreted by me. See respective sections for details.    Broad differential considered for this patient, including but not limited to:  esophageal obstruction, airway aspiration, pneumonitis, pneumonia, Boerhaave's      See additional MDM below if interested.      MEDICATIONS GIVEN IN THE EMERGENCY DEPARTMENT  Medications   glucagon injection 1 mg (1 mg Intravenous $Given 2/8/24 8558)   sod bicarbonate-citric acid-simethicone (EZ " GAS) 2.21-1.53-0.04 g packet 4 g (4 g Oral $Given 2/8/24 1901)       NEW PRESCRIPTIONS STARTED AT TODAY'S ER VISIT  Discharge Medication List as of 2/8/2024  8:47 PM        START taking these medications    Details   famotidine (PEPCID) 20 MG tablet Take 1 tablet (20 mg) by mouth 2 times daily, Disp-30 tablet, R-0, E-Prescribe      sucralfate (CARAFATE) 1 GM tablet Take 1 tablet (1 g) by mouth 4 times daily, Disp-60 tablet, R-0, E-Prescribe           CONTINUE these medications which have NOT CHANGED    Details   acetaminophen (TYLENOL) 500 MG tablet acetaminophen 500 mg tablet   TAKE 2 TABLETS (1,000 MG) BY MOUTH EVERY 8 HOURS FOR 30 DAYS, Historical      albuterol (PROAIR HFA/PROVENTIL HFA/VENTOLIN HFA) 108 (90 Base) MCG/ACT inhaler Inhale 2 puffs into the lungs every 6 hours as needed for shortness of breath, wheezing or cough Use second line for acute SOB or wheezing if nebulizer unable to be used, Disp-18 g, R-1, E-PrescribePharmacy may dispense brand covered by insurance (Proai r, or proventil or ventolin or generic albuterol inhaler)      albuterol (PROVENTIL) (2.5 MG/3ML) 0.083% neb solution Take 1 vial (2.5 mg) by nebulization every 6 hours as needed for shortness of breath, wheezing or cough Use first line for acute SOB or wheezing, Disp-90 mL, R-1, E-Prescribe      amLODIPine (NORVASC) 2.5 MG tablet Take 2.5 mg by mouth daily, Historical      aspirin (ASA) 81 MG chewable tablet Take 81 mg by mouth daily, Historical      busPIRone (BUSPAR) 5 MG tablet Take 5 mg by mouth 2 times daily, Historical      Cholecalciferol (VITAMIN D3) 50 MCG (2000 UT) CAPS Take 1 tablet by mouth daily , Historical      DULoxetine (CYMBALTA) 60 MG capsule Take 60 mg by mouth daily, Historical      fluticasone-salmeterol (WIXELA INHUB) 250-50 MCG/ACT inhaler Historical      furosemide (LASIX) 20 MG tablet Take 20 mg by mouth daily, Historical      latanoprost (XALATAN) 0.005 % ophthalmic solution Place 1 drop into both eyes At  Bedtime, Historical      levothyroxine (SYNTHROID/LEVOTHROID) 88 MCG tablet Historical      losartan (COZAAR) 25 MG tablet Take 1 tablet (25 mg) by mouth daily, Disp-30 tablet, R-0, E-Prescribe      metoprolol succinate ER (TOPROL-XL) 25 MG 24 hr tablet Take 12.5 mg by mouth daily, Historical      multivitamin w/minerals (THERA-VIT-M) tablet Take 1 tablet by mouth daily, Historical      OLANZapine (ZYPREXA) 5 MG tablet Take 7.5 mg by mouth At Bedtime, Historical      pantoprazole (PROTONIX) 40 MG EC tablet Take 1 tablet (40 mg) by mouth every morning (before breakfast), Disp-30 tablet, R-0, E-Prescribe      QUEtiapine (SEROQUEL) 25 MG tablet TAKE 1 TABLET (25 MG) BY MOUTH 2 TIMES DAILY AS NEEDED (AGITATION) for 15, Historical      rosuvastatin (CRESTOR) 10 MG tablet Take 10 mg by mouth At Bedtime, Historical                 =================================================================      HPI  Use of : N/A        Dominik Rojas is a 83 year old male with a pertinent history of hypertension, hyperplasia, carotid aneurysm, EtOH abuse, opioid use disorder, and EVAR, CVA, obesity, atrial fibrillation, COPD, chronic systolic congestive heart failure, dementia with mood disturbance who presents to this ED via EMS for evaluation of foreign object stuck in throat.    Per patient, he was eating part of a frozen dinner tonight when he suddenly started choking on a piece of meat.  He states that he coughed up the piece of meat, however since the incident he has not been able to swallow any liquids or food.  He states that he feels as if something is still stuck within his throat.  Due to this feeling, patient initially states attempting to drink some liquids, but when he did this he started to throw up.    Denies any pain with breathing or trouble breathing.  Denies any other pain related symptoms.    Otherwise in normal state of health. No further concerns at this time.     --------------- MEDICAL HISTORY  ---------------  PAST MEDICAL HISTORY:  Reviewed independently by me.  Past Medical History:   Diagnosis Date    AAA (abdominal aortic aneurysm) (H)     s/p stenting    Alcohol dependence in remission (H)     Alcohol use disorder, severe, in sustained remission, dependence (H) 8/16/2021    Anxiety     Atrial fibrillation with normal ventricular rate (H)     Benign prostatic hyperplasia with lower urinary tract symptoms     Bronchiectasis without complication (H)     2/27/2020    COPD (chronic obstructive pulmonary disease) (H)     CVA (cerebral vascular accident) (H) 2019    DDD (degenerative disc disease), lumbar     Depression     Depressive disorder     Diabetes (H)     Diastolic dysfunction 01/22/2021    DJD (degenerative joint disease) of knee     left    Essential hypertension     Glaucoma     Glaucoma     History of stroke without residual deficits     Hyperlipidemia     Hypertension     Hypothyroidism     Hypothyroidism     Kidney stones     Major depression     Memory problem     Nocturia     Obesity     Opioid use disorder, severe, dependence (H) 8/16/2021    Overactive bladder 02/25/2021    Primary osteoarthritis of left knee     Psoriasis     Psoriasis     Seborrheic keratoses     Somnolence, daytime     Stenosis of right carotid artery     history of TIA's     Patient Active Problem List   Diagnosis    AAA (abdominal aortic aneurysm) (H24)    Abnormal PSA    Accelerated hypertension    Colon cancer screening    Routine general medical examination at a health care facility    Special screening for malignant neoplasm of prostate    Acute ischemic right ANDREI stroke (H)    Allergic rhinitis    Asymptomatic stenosis of right carotid artery    Benign essential hypertension    Benign prostatic hyperplasia    Retention of urine    Bronchiectasis without acute exacerbation (H)    Calculus of kidney    Aneurysm, carotid artery, internal    Carotid aneurysm, left (H24)    Cholelithiasis    Dehydration    Depression     Depression, major, in remission (H24)    Diverticulosis    DJD (degenerative joint disease) of knee    DJD (degenerative joint disease)    Endoleak post (EVAR) endovascular aneurysm repair, initial encounter    ETOH abuse    Hematuria    Hepatic steatosis    History of cerebrovascular accident    HTN (hypertension)    Hydrocele    Hypercholesteremia    Hyperglycemia    Hypothyroidism    Lactic acidosis    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)    Obesity    Obesity, Class II, BMI 35-39.9, with comorbidity    Ocular hypertension, bilateral    Penile pain    Psoriasis    Psoriatic arthropathy (H)    QT prolongation    Recurrent UTI    Redundant prepuce and phimosis    Stroke-like symptoms    Vomiting and diarrhea    Abnormal urinalysis    Anxiety    Atrial fibrillation (H)    COPD (chronic obstructive pulmonary disease) (H)    Daytime somnolence    Degeneration of lumbar intervertebral disc    Generalized anxiety disorder    History of carotid endarterectomy    History of endovascular stent graft for abdominal aortic aneurysm (AAA)    Hyperammonemia (H24)    Hyperbilirubinemia    Incomplete cauda equina syndrome (H)    Intractable low back pain    Lower urinary tract symptoms due to benign prostatic hyperplasia    Memory problem    Metabolic encephalopathy    Normocytic anemia    Other seborrheic keratosis    Overactive bladder    Respiratory failure, post-operative (H24)    Spinal stenosis, lumbar region with neurogenic claudication    Vitamin D deficiency    Shortness of breath    Hypoxia    Bilateral lower extremity edema    Acute respiratory failure with hypoxia (H)    Dyspnea, unspecified type    Bronchomalacia    Chronic systolic congestive heart failure (H)    Opioid use disorder, severe, dependence (H)    Alcohol use disorder, severe, in sustained remission, dependence (H)    Acute pain of left knee    Opioid use disorder    Altered mental status    Macular drusen, bilateral    Age-related  cataract of both eyes    Malignant neoplasm of lower lobe of right lung (H)    Pneumonia of right lower lobe due to infectious organism    COPD exacerbation (H)    HCAP (healthcare-associated pneumonia)    Dementia with mood disturbance (H)       PAST SURGICAL HISTORY:  Reviewed independently by me.  Past Surgical History:   Procedure Laterality Date    ABDOMEN SURGERY      ABDOMINAL AORTIC ANEURYSM REPAIR  2013    stent    CAROTID ENDARTERECTOMY Right 9/9/2019    Procedure: RIGHT CAROTID ENDARTERECTOMY;  Surgeon: Miguel Muller MD;  Location: [facility] OR;  Service: General    CIRCUMCISION      CYSTOSCOPY      CYSTOSCOPY PROSTATE W/ LASER N/A 6/12/2018    Procedure:  TRANSURETHRAL RESECTION OF THE PROSTATE WITH QUANTA LASER;  Surgeon: Eze Levi MD;  Location: [facility] OR;  Service:     CYSTOSCOPY PROSTATE W/ LASER N/A 2/18/2020    Procedure: CYSTOSCOPY WITH TRANSURETHRAL INCISION OF THE PROSTATE WITH QUANTA LASER;  Surgeon: Eze Levi MD;  [place] [facility] [place]  Service: Urology    CYSTOSCOPY W/ LITHOLAPAXY / EHL N/A 6/12/2018    Procedure: CYSTOSCOPY AND LITHOLAPAXY;  Surgeon: Eze Levi MD;  [place] [facility] [place];  Service:     IR ABDOMINAL AORTAGRAM  5/19/2020    IR ABDOMINAL AORTIC ANEURYSM ENDOGRAFT  5/19/2020    IR ABDOMINAL AORTOGRAM  5/19/2020    IR ABDOMINAL ENDOVASCULAR STENT GRAFT  5/19/2020    LITHOTRIPSY      PERCUTANEOUS NEPHROSTOLITHOTOMY Right 03/10/2020    ZZC HUANG W/O FACETEC FORAMOT/DSKC 1/2 VRT SEG, LUMBAR Bilateral 3/3/2021    Procedure: Bilateral lumbar 2 - Lumbar 4 decompressive lumbar laminectomy with medial facetectomies;  Surgeon: Renée King MD;  [place] [facility] [place];  Service: Spine       CURRENT MEDICATIONS:    Reviewed independently by me.  No current facility-administered medications for this encounter.    Current Outpatient Medications:     famotidine (PEPCID) 20 MG tablet, Take 1 tablet (20 mg) by mouth 2 times  daily, Disp: 30 tablet, Rfl: 0    sucralfate (CARAFATE) 1 GM tablet, Take 1 tablet (1 g) by mouth 4 times daily, Disp: 60 tablet, Rfl: 0    acetaminophen (TYLENOL) 500 MG tablet, acetaminophen 500 mg tablet  TAKE 2 TABLETS (1,000 MG) BY MOUTH EVERY 8 HOURS FOR 30 DAYS, Disp: , Rfl:     albuterol (PROAIR HFA/PROVENTIL HFA/VENTOLIN HFA) 108 (90 Base) MCG/ACT inhaler, Inhale 2 puffs into the lungs every 6 hours as needed for shortness of breath, wheezing or cough Use second line for acute SOB or wheezing if nebulizer unable to be used (Patient taking differently: Inhale 2 puffs into the lungs every 6 hours as needed for shortness of breath, wheezing or cough Use second line for acute SOB or wheezing if nebulizer unable to be used), Disp: 18 g, Rfl: 1    albuterol (PROVENTIL) (2.5 MG/3ML) 0.083% neb solution, Take 1 vial (2.5 mg) by nebulization every 6 hours as needed for shortness of breath, wheezing or cough Use first line for acute SOB or wheezing (Patient taking differently: Take 2.5 mg by nebulization every 6 hours as needed for shortness of breath, wheezing or cough Use first line for acute SOB or wheezing), Disp: 90 mL, Rfl: 1    amLODIPine (NORVASC) 2.5 MG tablet, Take 2.5 mg by mouth daily, Disp: , Rfl:     aspirin (ASA) 81 MG chewable tablet, Take 81 mg by mouth daily, Disp: , Rfl:     busPIRone (BUSPAR) 5 MG tablet, Take 5 mg by mouth 2 times daily, Disp: , Rfl:     Cholecalciferol (VITAMIN D3) 50 MCG (2000 UT) CAPS, Take 1 tablet by mouth daily , Disp: , Rfl:     DULoxetine (CYMBALTA) 60 MG capsule, Take 60 mg by mouth daily, Disp: , Rfl:     fluticasone-salmeterol (ADVAIR) 250-50 MCG/ACT inhaler, Inhale 1 puff into the lungs every 12 hours for 30 days, Disp: 14 each, Rfl: 1    fluticasone-salmeterol (WIXELA INHUB) 250-50 MCG/ACT inhaler, , Disp: , Rfl:     furosemide (LASIX) 20 MG tablet, Take 20 mg by mouth daily, Disp: , Rfl:     latanoprost (XALATAN) 0.005 % ophthalmic solution, Place 1 drop into both  eyes At Bedtime, Disp: , Rfl:     levothyroxine (SYNTHROID/LEVOTHROID) 88 MCG tablet, , Disp: , Rfl:     losartan (COZAAR) 25 MG tablet, Take 1 tablet (25 mg) by mouth daily (Patient taking differently: Take 25 mg by mouth daily), Disp: 30 tablet, Rfl: 0    metoprolol succinate ER (TOPROL-XL) 25 MG 24 hr tablet, Take 12.5 mg by mouth daily, Disp: , Rfl:     multivitamin w/minerals (THERA-VIT-M) tablet, Take 1 tablet by mouth daily, Disp: , Rfl:     OLANZapine (ZYPREXA) 5 MG tablet, Take 7.5 mg by mouth At Bedtime, Disp: , Rfl:     pantoprazole (PROTONIX) 40 MG EC tablet, Take 1 tablet (40 mg) by mouth every morning (before breakfast) (Patient taking differently: Take 40 mg by mouth every morning (before breakfast)), Disp: 30 tablet, Rfl: 0    QUEtiapine (SEROQUEL) 25 MG tablet, TAKE 1 TABLET (25 MG) BY MOUTH 2 TIMES DAILY AS NEEDED (AGITATION) for 15, Disp: , Rfl:     rosuvastatin (CRESTOR) 10 MG tablet, Take 10 mg by mouth At Bedtime, Disp: , Rfl:     ALLERGIES:  Reviewed independently by me.  Allergies   Allergen Reactions    Diclofenac      Other reaction(s): GI Upset    Loratadine      Other reaction(s): Urinary Retention    Sertraline Unknown     Other reaction(s): ineffective       FAMILY HISTORY:  Reviewed independently by me.  Family History   Problem Relation Age of Onset    Emphysema Mother     No Known Problems Father     Cirrhosis Father     No Known Problems Sister     No Known Problems Brother     No Known Problems Maternal Aunt     No Known Problems Maternal Uncle     No Known Problems Paternal Aunt     No Known Problems Paternal Uncle     No Known Problems Maternal Grandmother     No Known Problems Maternal Grandfather     No Known Problems Paternal Grandmother     No Known Problems Paternal Grandfather     No Known Problems Other        SOCIAL HISTORY:   Reviewed independently by me.  Social History     Socioeconomic History    Marital status:    Tobacco Use    Smoking status: Former      "Packs/day: 0.50     Years: 35.00     Additional pack years: 0.00     Total pack years: 17.50     Types: Cigarettes     Quit date: 1990     Years since quittin.1    Smokeless tobacco: Never    Tobacco comments:     quit    Substance and Sexual Activity    Alcohol use: No     Comment: Alcoholic Drinks/day: quit     Drug use: No    Sexual activity: Yes     Partners: Female     Birth control/protection: Post-menopausal   Other Topics Concern    Parent/sibling w/ CABG, MI or angioplasty before 65F 55M? No       --------------- PHYSICAL EXAM ---------------  Nursing notes and vitals independently reviewed by me.  VITALS:  Vitals:    24 1837 24 1900 24   BP: 123/73 115/73 105/62   Pulse: 101 108 97   Resp: 20     Temp: 98.4  F (36.9  C)     TempSrc: Oral     SpO2: (!) 89% 91% 91%   Weight: 99.8 kg (220 lb)     Height: 1.702 m (5' 7\")         PHYSICAL EXAM:    General:  alert, interactive, no distress  Eyes:  conjunctivae clear, conjugate gaze  HENT:  atraumatic, nose with no rhinorrhea, oropharynx clear, handling secretions.  Neck:  no meningismus  Cardiovascular:  HR 70s during exam, regular rhythm, no murmurs, brisk cap refill  Chest:  no chest wall tenderness  Pulmonary:  no stridor, mild gurgling phonation, normal work of breathing, clear lungs bilaterally  Abdomen:  soft, nondistended, nontender  :  no CVA tenderness  Back:  no midline spinal tenderness  Musculoskeletal:  no pretibial edema, no calf tenderness. Gross ROM intact to joints of extremities with no obvious deformities.  Skin:  warm, dry, no rash  Neuro:  awake, alert, answers questions appropriately, follows commands, moves all limbs  Psych:  calm, normal affect      --------------- RESULTS ---------------  EKG:  (obtained given history of prolonged QT and possibility of needing nausea meds in the ED tonight)  Reviewed and interpreted by me.  - sinus tachycardia at 107bpm, no ST or T wave changes, QRS 84, QTc 509 " (prior 517)  - unchanged from prior on 5/9/23  My read.    RADIOLOGY:  Reviewed and independently interpreted by me. Please see official radiology report.  Recent Results (from the past 24 hour(s))   XR Chest Port 1 View    Narrative    EXAM: XR CHEST PORT 1 VIEW  LOCATION: [facility]  DATE: 2/8/2024    INDICATION: food impacting, vomiting  COMPARISON: 05/09/2023      Impression    IMPRESSION: Heart size is enlarged but stable. Chronic basilar fibrosis. Increasing nodular parenchymal opacity in the right lower lobe, may correspond to the area of postradiation change seen on intervening CT. This would be better assessed at CT,   however. Upper lobes remain clear. No acute bony abnormalities.             --------------- ADDITIONAL MDM ---------------  History:  - I considered systemic symptoms of the presenting illness.  - Supplemental history from:       -- patient, family (wife)  - External Record(s) reviewed:       -- Inpatient/outpatient record (clinic visit 1/29/24), prior labs (blood 1/15/24), prior imaging (CT chest 11/27/23)       -- see above ED course & MDM for further details    Workup:  - Chart documentation above includes differential considered and any EKGs or imaging independently interpreted by provider.  - In additional to work up documented, I considered the following work up:       -- blood, CT neck/chest       -- see above ED course & MDM for further details    External Consultation:  - Discussion of management with another provider:       -- ED pharmacist re: meds       -- see above charting for additional    Complicating Factors:  - Care impacted by chronic illness:       -- see above MDM, past medical history, & problem list  - Care affected by social determinants of health:       -- see above social history       -- Access to Affordable Healthcare    Disposition Considerations:  - Discharge       -- I considered escalation of care with admission to the hospital, but  ultimately discharged the patient per decision making above       -- I recommended the patient continue their current prescription strength medication(s) as charted above in current medications list       -- I prescribed prescription strength medication(s) as charted above       -- I recommended over-the-counter medication(s) as charted above & in discharge instructions         I, Sweetie Scott, am serving as a scribe to document services personally performed by Dr. Kwaku Almonte based on my observation and the provider's statements to me. I, Kwaku Almonte MD attest that Sweetie Scott is acting in a scribe capacity, has observed my performance of the services and has documented them in accordance with my direction.      Kwaku Almonte MD  02/08/24  Emergency Medicine  [facility] EMERGENCY DEPARTMENT  [address] [place] [id number]  [phone]  Dept: [phone]       Kwaku Almonte MD  02/08/24 5332       Kwaku Almonte MD  02/08/24 4635

## 2024-02-09 NOTE — ED NOTES
Pt told me that he feels better now. I went to tell provider; PO challenged- passed with no issues. Able to hold down water and not choke or cough.

## 2024-02-09 NOTE — ED TRIAGE NOTES
Pt at home choking on steak; could not get it out; feels like its stuck there; pt is on home O2 at 2 lpm; currently on 6lpm to maintain his O2 saturation.      Triage Assessment (Adult)       Row Name 02/08/24 4139          Triage Assessment    Airway WDL airway symptoms;X     Airway Symptoms other (see comments)  choked on steak at home; the steak did not come out that the pt is aware        Respiratory WDL    Respiratory WDL X;cough     Cough Frequency frequent     Cough Type loose        Skin Circulation/Temperature WDL    Skin Circulation/Temperature WDL WDL        Cardiac WDL    Cardiac WDL WDL        Peripheral/Neurovascular WDL    Peripheral Neurovascular WDL WDL        Cognitive/Neuro/Behavioral WDL    Cognitive/Neuro/Behavioral WDL WDL

## 2024-02-09 NOTE — DISCHARGE INSTRUCTIONS
Continue your previously-prescribed Protonix. Start the Pepcid & Carafate as well. These can help with acid-related issues of your esophagus.    Call GI clinic to arrange follow up with them. I also made a referral in the computer for you so they should be calling in the next couple days.    Follow up with your Primary Care provider in 2 days for a recheck.    Return to the Emergency Department for any inability to swallow, persistent vomiting, or any other concerns.

## 2024-02-19 LAB
ATRIAL RATE - MUSE: 110 BPM
DIASTOLIC BLOOD PRESSURE - MUSE: NORMAL MMHG
INTERPRETATION ECG - MUSE: NORMAL
P AXIS - MUSE: NORMAL DEGREES
PR INTERVAL - MUSE: NORMAL MS
QRS DURATION - MUSE: 84 MS
QT - MUSE: 382 MS
QTC - MUSE: 509 MS
R AXIS - MUSE: 53 DEGREES
SYSTOLIC BLOOD PRESSURE - MUSE: NORMAL MMHG
T AXIS - MUSE: 49 DEGREES
VENTRICULAR RATE- MUSE: 107 BPM

## 2024-03-04 ENCOUNTER — HOSPITAL ENCOUNTER (OUTPATIENT)
Dept: CT IMAGING | Facility: HOSPITAL | Age: 84
Discharge: HOME | End: 2024-03-04
Attending: RADIOLOGY | Admitting: RADIOLOGY
Payer: COMMERCIAL

## 2024-03-04 DIAGNOSIS — C34.31 MALIGNANT NEOPLASM OF LOWER LOBE OF RIGHT LUNG (H): ICD-10-CM

## 2024-03-04 PROCEDURE — 71250 CT THORAX DX C-: CPT

## 2024-03-08 ENCOUNTER — OFFICE VISIT (OUTPATIENT)
Dept: RADIATION ONCOLOGY | Facility: HOSPITAL | Age: 84
End: 2024-03-08
Attending: RADIOLOGY
Payer: COMMERCIAL

## 2024-03-08 VITALS
SYSTOLIC BLOOD PRESSURE: 103 MMHG | OXYGEN SATURATION: 89 % | RESPIRATION RATE: 18 BRPM | HEART RATE: 88 BPM | DIASTOLIC BLOOD PRESSURE: 75 MMHG | TEMPERATURE: 97.6 F

## 2024-03-08 DIAGNOSIS — C34.31 MALIGNANT NEOPLASM OF LOWER LOBE OF RIGHT LUNG (H): Primary | ICD-10-CM

## 2024-03-08 PROCEDURE — 99214 OFFICE O/P EST MOD 30 MIN: CPT | Performed by: RADIOLOGY

## 2024-03-08 PROCEDURE — G2211 COMPLEX E/M VISIT ADD ON: HCPCS | Performed by: RADIOLOGY

## 2024-03-08 PROCEDURE — G0463 HOSPITAL OUTPT CLINIC VISIT: HCPCS | Performed by: RADIOLOGY

## 2024-03-08 RX ORDER — PHENOL 1.4 %
10 AEROSOL, SPRAY (ML) MUCOUS MEMBRANE
Status: ON HOLD | COMMUNITY
Start: 2023-05-07 | End: 2024-06-29

## 2024-03-08 NOTE — LETTER
3/8/2024         RE: Dominik Rojas  [address]  [place] [address]        Dear Colleague,    Thank you for referring your patient, Dominik Rojas, to the [facility] RADIATION ONCOLOGY [place]. Please see a copy of my visit note below.    [facility] Radiation Oncology Follow Up     Patient: Dominik Rojas  MRN: [id number]  Date of Service: 03/08/2024       DISEASE TREATED:  Adenocarcinoma of right lower lobe lung, clinical stage T1 N0 M0.  Patient is not a good candidate for surgery given his advanced age and medical status.      TYPE OF RADIATION THERAPY ADMINISTERED:  5000 cGy in 5 treatments given from 4/10/2023 - 4/21/2023.     INTERVAL SINCE COMPLETION OF RADIATION THERAPY: 11 months.      SUBJECTIVE:  Mr. Rojas is a 83 year old male who was a former smoker half to 1 pack a day for 35 years and quit smoking since 1990.  Patient has a multiple underlying medical condition including COPD, coronary artery disease, stroke, and depression.  Patient has been followed by pulmonology for lung nodule since 2021.  CT scan on 7/25/2022 showed an increased 18 x 13 mm right lower lobe pleural-based nodule suspicious for malignancy.  PET scan on 9/30/2022 also reviewed 1.3 x 1.8 cm FDG avid right lower lobe pulmonary nodules highly suspicious for malignancy.  There is no evidence of lymph nodes and systemic metastasis.  Patient underwent CT-guided needle biopsy on 3/8/2023 with pathology confirmed non-small cell carcinoma consistent with adenocarcinoma.  You have discussed with the patient about various treatment options.  Patient is not a good candidate for surgery given his advanced age and medical status.  The patient received stereotactic radiosurgery with a total dose of 5000 cGy in 5 treatments given from 4/10/2023 - 4/21/2023.  He tolerated radiation therapy very well with minimal side effect.      The patient has been recovering well since radiation therapy.  He denies any pain  and discomfort related to the therapy at the time of evaluation.  Patient still have significant shortness of breath and chronic cough due to COPD.  He is here for routine post therapy office follow-up.     Medications were reviewed and are up to date on EPIC.    The following portions of the patient's history were reviewed and updated as appropriate: allergies, current medications, past family history, past medical history, past social history, past surgical history and problem list.    Review of Systems:      General  Constitutional  Constitutional (WDL): Exceptions to WDL  Fatigue: Fatigue relieved by rest  EENT  Eye Disorders  Eye Disorder (WDL): Assessment not pertinent to visit  Ear Disorders  Ear Disorder (WDL): Assessment not pertinent to visit  Respiratory  Respiratory  Respiratory (WDL): Exceptions to WDL  Cough: Mild symptoms OR nonprescription intervention indicated  Dyspnea: Shortness of breath with minimal exertion OR limiting instrumental ADL  Hypoxia: Decreased oxygen saturation at rest (e.g., pulse oximeter less than 88% or PaO2 less than or equal to 55 mmHg)  Cardiovascular  Cardiovascular  Cardiovascular (WDL): All cardiovascular elements are within defined limits  Gastrointestinal  Gastrointestinal  Gastrointestinal (WDL): All gastrointestinal elements are within defined limits  Musculoskeletal  Musculoskeletal and Connective Tissue Disorders  Musculoskeletal & Connective (WDL): Exceptions to WDL  Arthralgia: Moderate pain OR limiting instrumental ADL  Generalized Muscle Weakness: Symptomatic OR perceived by patient but not evident on physical exam  Integumentary  Integumentary  Integumentary (WDL): All integumentary elements are within defined limits  Neurological  Neurosensory  Neurosensory (WDL): Exceptions to WDL  Peripheral Motor Neuropathy: Moderate symptoms OR limiting instrumental ADL  Ataxia: Moderate symptoms OR limiting instrumental ADL  Peripheral Sensory Neuropathy:  Asymptomatic  Confusion: Mild disorientation  Genitourinary/Reproductive  Genitourinary  Genitourinary (WDL): All genitourinary elements are within defined limits  Lymphatic  Lymph System Disorders  Lymph (WDL): All lymph elements are within defined limits  Pain  Pain Score: No Pain (0)  AUA Assessment                                                              Accompanied by  Accompanied By: spouse    Objective:     PHYSICAL EXAMINATION:    /75   Pulse 88   Temp 97.6  F (36.4  C)   Resp 18   SpO2 (!) 89%     Gen: Alert, in NAD  Eyes: PERRL, EOMI, sclera anicteric  Neck: Supple, full ROM, no LAD  Pulm: No wheezing, stridor or respiratory distress  CV: Well-perfused, no cyanosis, no pedal edema  Back: No step-offs or pain to palpation along the thoracolumbar spine  Rectal: Deferred  : Deferred  Musculoskeletal: Normal muscle bulk and tone  Skin: Normal color and turgor  Neurologic: A/Ox3, CN II-XII intact, normal gait and station  Psychiatric: Appropriate mood and affect     Imaging: Imaging results 30 days: CT Chest w/o contrast    Result Date: 3/5/2024  EXAM: CT CHEST W/O CONTRAST LOCATION: [facility] DATE: 3/4/2024 INDICATION:  Malignant neoplasm of lower lobe of right lung (H) COMPARISON: 11/27/2023 TECHNIQUE: CT chest without IV contrast. Multiplanar reformats were obtained. Dose reduction techniques were used. CONTRAST: None. FINDINGS: LUNGS AND PLEURA: Soft tissue and architectural distortion in the posterior right lower lobe is 4.9 x 3.5 cm (previously 4.5 x 3.2 cm; series 4 image 182). Internal soft tissue measuring 12 mm has not changed (image 166). Peripheral reticular interstitial opacities in the lung bases have not significantly changed. Emphysema is present. Bronchiectasis is present. MEDIASTINUM/AXILLAE: Suspected thyroidectomy. A 16 mm right lower paratracheal lymph node was previously 13 mm. A 14 mm subcarinal lymph node was previously 17 mm. CORONARY ARTERY  CALCIFICATION: Severe. UPPER ABDOMEN: Cholelithiasis. There is a partially visualized calcification in the left renal pelvis. There is a small nonobstructing right renal calculus. An aortic stent graft is partially included in the field-of-view. MUSCULOSKELETAL: Degenerative change in the spine. T12 vertebral body height loss is again seen.     IMPRESSION: 1.  Soft tissue and architectural distortion in the posterior right lower lobe has slightly increased in size, which is likely due to differences in technique. Recommend attention on followup imaging. A central 12 mm opacity has not changed in size. 2.  One mediastinal lymph node has slightly increased in size while another has slightly decreased in size. 3.  Mild basilar fibrosis has not changed.    XR Chest Port 1 View    Result Date: 2/8/2024  EXAM: XR CHEST PORT 1 VIEW LOCATION: [facility] DATE: 2/8/2024 INDICATION: food impacting, vomiting COMPARISON: 05/09/2023     IMPRESSION: Heart size is enlarged but stable. Chronic basilar fibrosis. Increasing nodular parenchymal opacity in the right lower lobe, may correspond to the area of postradiation change seen on intervening CT. This would be better assessed at CT, however. Upper lobes remain clear. No acute bony abnormalities.      Impression     The patient is a 83 year old male with a diagnosis of adenocarcinoma of right lower lobe lung, clinical stage T1 N0 M0, status post SBRT completed 11 months ago.  Patient has been recovering well with restaging CT scan showed no evidence of recurrence.       Assessment & Plan:     1.  I have personally reviewed his restaging CT scan and compared to the previous CT scan and the radiation therapy field.  There is no evidence of cancer recurrence. One mediastinal lymph node has slightly increased in size while another has slightly decreased in size.  This is a worrisome for cancer recurrence.  I will schedule patient to have PET CT scan for  "restaging.  The patient is scheduled return to radiation oncology 2 days after PET scan for office follow-up and management recommendation.     2.  Continue follow-up with Dr. Fidel Sun, pulmonology and Dr. Misael Toussaint, PCP as planned.     Face to face time  30 minutes with > 80% spent on consultation, education and coordination of care.      Rachael Bales MD  Department of Radiation Oncology   [facility]  Tel: [phone]  Page: [phone]    [facility]  [address] [place] [address]     [facility]   [address] [place] [address]    CC:  Patient Care Team:  Misael Moe PA-C as PCP - General (Physician Assistant)  Wilma Jansen RPH as Pharmacist (Pharmacist Ambulatory Care)  Kindra Pack MD as Assigned Heart and Vascular Provider  Fidel Sun MD as Assigned Pulmonology Provider  Rachael Bales MD as MD (Radiation Oncology)  Rachael Bales MD as Assigned Cancer Care Provider  Ana Madrid RT as Chronic Pulmonary Disease Specialist (Respiratory Therapy)  Corry Barajas RPH as Pharmacist (Pharmacist)  Corry Barajas RPH as Assigned MTM Pharmacist      Oncology Rooming Note    March 8, 2024 2:12 PM   Dominik Rojas is a 83 year old male who presents for:    Chief Complaint   Patient presents with     Oncology Clinic Visit     Rad Onc follow up     Initial Vitals: /75   Pulse 88   Temp 97.6  F (36.4  C)   Resp 18   SpO2 (!) 89%  Estimated body mass index is 34.46 kg/m  as calculated from the following:    Height as of 2/8/24: 1.702 m (5' 7\").    Weight as of 2/8/24: 99.8 kg (220 lb). There is no height or weight on file to calculate BSA.  No Pain (0) Comment: Data Unavailable   No LMP for male patient.  Allergies reviewed: Yes  Medications reviewed: Yes    Medications: Medication refills not needed today.  Pharmacy name entered into [employer]:    [id number] - [address] " [place], [place] - [address] [facility] [address] [id number] [place] [place] [facility] [address] [facility] [facility] [address] [facility]    Frailty Screening:   Is the patient here for a new oncology consult visit in cancer care? 2. No      Clinical concerns: Pt is in w/c, uses O2 most of the time, confusion with known dementia, SOB and cough.   Dr. Bales was notified.      Alia Oliver RN                Again, thank you for allowing me to participate in the care of your patient.        Sincerely,        Rachael Bales MD

## 2024-03-08 NOTE — PROGRESS NOTES
[facility] Radiation Oncology Follow Up     Patient: Dominik Rojas  MRN: [id number]  Date of Service: 03/08/2024       DISEASE TREATED:  Adenocarcinoma of right lower lobe lung, clinical stage T1 N0 M0.  Patient is not a good candidate for surgery given his advanced age and medical status.      TYPE OF RADIATION THERAPY ADMINISTERED:  5000 cGy in 5 treatments given from 4/10/2023 - 4/21/2023.     INTERVAL SINCE COMPLETION OF RADIATION THERAPY: 11 months.      SUBJECTIVE:  Mr. Rojas is a 83 year old male who was a former smoker half to 1 pack a day for 35 years and quit smoking since 1990.  Patient has a multiple underlying medical condition including COPD, coronary artery disease, stroke, and depression.  Patient has been followed by pulmonology for lung nodule since 2021.  CT scan on 7/25/2022 showed an increased 18 x 13 mm right lower lobe pleural-based nodule suspicious for malignancy.  PET scan on 9/30/2022 also reviewed 1.3 x 1.8 cm FDG avid right lower lobe pulmonary nodules highly suspicious for malignancy.  There is no evidence of lymph nodes and systemic metastasis.  Patient underwent CT-guided needle biopsy on 3/8/2023 with pathology confirmed non-small cell carcinoma consistent with adenocarcinoma.  You have discussed with the patient about various treatment options.  Patient is not a good candidate for surgery given his advanced age and medical status.  The patient received stereotactic radiosurgery with a total dose of 5000 cGy in 5 treatments given from 4/10/2023 - 4/21/2023.  He tolerated radiation therapy very well with minimal side effect.      The patient has been recovering well since radiation therapy.  He denies any pain and discomfort related to the therapy at the time of evaluation.  Patient still have significant shortness of breath and chronic cough due to COPD.  He is here for routine post therapy office follow-up.     Medications were reviewed and are up to date on EPIC.    The  following portions of the patient's history were reviewed and updated as appropriate: allergies, current medications, past family history, past medical history, past social history, past surgical history and problem list.    Review of Systems:      General  Constitutional  Constitutional (WDL): Exceptions to WDL  Fatigue: Fatigue relieved by rest  EENT  Eye Disorders  Eye Disorder (WDL): Assessment not pertinent to visit  Ear Disorders  Ear Disorder (WDL): Assessment not pertinent to visit  Respiratory  Respiratory  Respiratory (WDL): Exceptions to WDL  Cough: Mild symptoms OR nonprescription intervention indicated  Dyspnea: Shortness of breath with minimal exertion OR limiting instrumental ADL  Hypoxia: Decreased oxygen saturation at rest (e.g., pulse oximeter less than 88% or PaO2 less than or equal to 55 mmHg)  Cardiovascular  Cardiovascular  Cardiovascular (WDL): All cardiovascular elements are within defined limits  Gastrointestinal  Gastrointestinal  Gastrointestinal (WDL): All gastrointestinal elements are within defined limits  Musculoskeletal  Musculoskeletal and Connective Tissue Disorders  Musculoskeletal & Connective (WDL): Exceptions to WDL  Arthralgia: Moderate pain OR limiting instrumental ADL  Generalized Muscle Weakness: Symptomatic OR perceived by patient but not evident on physical exam  Integumentary  Integumentary  Integumentary (WDL): All integumentary elements are within defined limits  Neurological  Neurosensory  Neurosensory (WDL): Exceptions to WDL  Peripheral Motor Neuropathy: Moderate symptoms OR limiting instrumental ADL  Ataxia: Moderate symptoms OR limiting instrumental ADL  Peripheral Sensory Neuropathy: Asymptomatic  Confusion: Mild disorientation  Genitourinary/Reproductive  Genitourinary  Genitourinary (WDL): All genitourinary elements are within defined limits  Lymphatic  Lymph System Disorders  Lymph (WDL): All lymph elements are within defined limits  Pain  Pain Score: No Pain  (0)  AUA Assessment                                                              Accompanied by  Accompanied By: spouse    Objective:     PHYSICAL EXAMINATION:    /75   Pulse 88   Temp 97.6  F (36.4  C)   Resp 18   SpO2 (!) 89%     Gen: Alert, in NAD  Eyes: PERRL, EOMI, sclera anicteric  Neck: Supple, full ROM, no LAD  Pulm: No wheezing, stridor or respiratory distress  CV: Well-perfused, no cyanosis, no pedal edema  Back: No step-offs or pain to palpation along the thoracolumbar spine  Rectal: Deferred  : Deferred  Musculoskeletal: Normal muscle bulk and tone  Skin: Normal color and turgor  Neurologic: A/Ox3, CN II-XII intact, normal gait and station  Psychiatric: Appropriate mood and affect     Imaging: Imaging results 30 days: CT Chest w/o contrast    Result Date: 3/5/2024  EXAM: CT CHEST W/O CONTRAST LOCATION: [facility] DATE: 3/4/2024 INDICATION:  Malignant neoplasm of lower lobe of right lung (H) COMPARISON: 11/27/2023 TECHNIQUE: CT chest without IV contrast. Multiplanar reformats were obtained. Dose reduction techniques were used. CONTRAST: None. FINDINGS: LUNGS AND PLEURA: Soft tissue and architectural distortion in the posterior right lower lobe is 4.9 x 3.5 cm (previously 4.5 x 3.2 cm; series 4 image 182). Internal soft tissue measuring 12 mm has not changed (image 166). Peripheral reticular interstitial opacities in the lung bases have not significantly changed. Emphysema is present. Bronchiectasis is present. MEDIASTINUM/AXILLAE: Suspected thyroidectomy. A 16 mm right lower paratracheal lymph node was previously 13 mm. A 14 mm subcarinal lymph node was previously 17 mm. CORONARY ARTERY CALCIFICATION: Severe. UPPER ABDOMEN: Cholelithiasis. There is a partially visualized calcification in the left renal pelvis. There is a small nonobstructing right renal calculus. An aortic stent graft is partially included in the field-of-view. MUSCULOSKELETAL: Degenerative change in  the spine. T12 vertebral body height loss is again seen.     IMPRESSION: 1.  Soft tissue and architectural distortion in the posterior right lower lobe has slightly increased in size, which is likely due to differences in technique. Recommend attention on followup imaging. A central 12 mm opacity has not changed in size. 2.  One mediastinal lymph node has slightly increased in size while another has slightly decreased in size. 3.  Mild basilar fibrosis has not changed.    XR Chest Port 1 View    Result Date: 2/8/2024  EXAM: XR CHEST PORT 1 VIEW LOCATION: [facility] DATE: 2/8/2024 INDICATION: food impacting, vomiting COMPARISON: 05/09/2023     IMPRESSION: Heart size is enlarged but stable. Chronic basilar fibrosis. Increasing nodular parenchymal opacity in the right lower lobe, may correspond to the area of postradiation change seen on intervening CT. This would be better assessed at CT, however. Upper lobes remain clear. No acute bony abnormalities.      Impression     The patient is a 83 year old male with a diagnosis of adenocarcinoma of right lower lobe lung, clinical stage T1 N0 M0, status post SBRT completed 11 months ago.  Patient has been recovering well with restaging CT scan showed no evidence of recurrence.       Assessment & Plan:     1.  I have personally reviewed his restaging CT scan and compared to the previous CT scan and the radiation therapy field.  There is no evidence of cancer recurrence. One mediastinal lymph node has slightly increased in size while another has slightly decreased in size.  This is a worrisome for cancer recurrence.  I will schedule patient to have PET CT scan for restaging.  The patient is scheduled return to radiation oncology 2 days after PET scan for office follow-up and management recommendation.     2.  Continue follow-up with Dr. Fidel Sun, pulmonology and Dr. Misael Toussaint, PCP as planned.     Face to face time  30 minutes with > 80% spent  on consultation, education and coordination of care.      Rachael Bales MD  Department of Radiation Oncology   [facility]  Tel: [phone]  Page: [phone]    [facility]  [address] [place] [address]     [facility]   [address]   [place] [address]    CC:  Patient Care Team:  Misael Moe PA-C as PCP - General (Physician Assistant)  Wilma Jansen RPH as Pharmacist (Pharmacist Ambulatory Care)  Kindra Pack MD as Assigned Heart and Vascular Provider  Fidel Sun MD as Assigned Pulmonology Provider  Rachael Bales MD as MD (Radiation Oncology)  Rachael Bales MD as Assigned Cancer Care Provider  Ana Madrid RT as Chronic Pulmonary Disease Specialist (Respiratory Therapy)  Corry Barajas RPH as Pharmacist (Pharmacist)  Corry Barajas RPH as Assigned MTM Pharmacist

## 2024-03-08 NOTE — PROGRESS NOTES
"Oncology Rooming Note    March 8, 2024 2:12 PM   Dominik Rojas is a 83 year old male who presents for:    Chief Complaint   Patient presents with    Oncology Clinic Visit     Rad Onc follow up     Initial Vitals: /75   Pulse 88   Temp 97.6  F (36.4  C)   Resp 18   SpO2 (!) 89%  Estimated body mass index is 34.46 kg/m  as calculated from the following:    Height as of 2/8/24: 1.702 m (5' 7\").    Weight as of 2/8/24: 99.8 kg (220 lb). There is no height or weight on file to calculate BSA.  No Pain (0) Comment: Data Unavailable   No LMP for male patient.  Allergies reviewed: Yes  Medications reviewed: Yes    Medications: Medication refills not needed today.  Pharmacy name entered into [employer]:    [id number] - [place], [place] - [address]  [place] [facility] [place], [place] - [address] [place] [address] [id number] - [place], [place] - [address] [address] [address] [address] [address] [facility]    Frailty Screening:   Is the patient here for a new oncology consult visit in cancer care? 2. No      Clinical concerns: Pt is in w/c, uses O2 most of the time, confusion with known dementia, SOB and cough.   Dr. Bales was notified.      Alia Oliver RN              "

## 2024-03-22 ENCOUNTER — HOSPITAL ENCOUNTER (OUTPATIENT)
Dept: PET IMAGING | Facility: HOSPITAL | Age: 84
Discharge: HOME | End: 2024-03-22
Attending: RADIOLOGY | Admitting: RADIOLOGY
Payer: COMMERCIAL

## 2024-03-22 DIAGNOSIS — C34.31 MALIGNANT NEOPLASM OF LOWER LOBE OF RIGHT LUNG (H): ICD-10-CM

## 2024-03-22 LAB — GLUCOSE BLDC GLUCOMTR-MCNC: 106 MG/DL (ref 70–99)

## 2024-03-22 PROCEDURE — A9552 F18 FDG: HCPCS | Performed by: RADIOLOGY

## 2024-03-22 PROCEDURE — 82962 GLUCOSE BLOOD TEST: CPT

## 2024-03-22 PROCEDURE — 78816 PET IMAGE W/CT FULL BODY: CPT | Mod: PS

## 2024-03-22 PROCEDURE — 343N000001 HC RX 343: Performed by: RADIOLOGY

## 2024-03-22 RX ADMIN — FLUDEOXYGLUCOSE F-18 13.25 MILLICURIE: 500 INJECTION, SOLUTION INTRAVENOUS at 11:39

## 2024-03-26 ENCOUNTER — PATIENT OUTREACH (OUTPATIENT)
Dept: ONCOLOGY | Facility: CLINIC | Age: 84
End: 2024-03-26

## 2024-03-26 ENCOUNTER — OFFICE VISIT (OUTPATIENT)
Dept: RADIATION ONCOLOGY | Facility: HOSPITAL | Age: 84
End: 2024-03-26
Attending: RADIOLOGY
Payer: COMMERCIAL

## 2024-03-26 VITALS
SYSTOLIC BLOOD PRESSURE: 120 MMHG | TEMPERATURE: 98.8 F | OXYGEN SATURATION: 88 % | HEART RATE: 102 BPM | DIASTOLIC BLOOD PRESSURE: 81 MMHG | RESPIRATION RATE: 20 BRPM

## 2024-03-26 DIAGNOSIS — C34.31 MALIGNANT NEOPLASM OF LOWER LOBE OF RIGHT LUNG (H): Primary | ICD-10-CM

## 2024-03-26 PROCEDURE — G0463 HOSPITAL OUTPT CLINIC VISIT: HCPCS | Performed by: RADIOLOGY

## 2024-03-26 PROCEDURE — 99214 OFFICE O/P EST MOD 30 MIN: CPT | Performed by: RADIOLOGY

## 2024-03-26 ASSESSMENT — PAIN SCALES - GENERAL: PAINLEVEL: NO PAIN (0)

## 2024-03-26 NOTE — PROGRESS NOTES
"New Patient Oncology Nurse Navigator Note     Referring provider: Dr. Rachael Bales    Referring Clinic/Organization: [facility]  Referred to: Medical Oncology  Requested provider (if applicable): First available - did not specify   Referral Received: 03/26/24       Evaluation for :   Diagnosis   C34.31 (ICD-10-CM) - Malignant neoplasm of lower lobe of right lung (H)     Clinical History (per Nurse review of records provided):      03/08/2023 Surgical Pathology (bookmarked) showed:   Final Diagnosis   RIGHT LUNG NODULE, CT-GUIDED CORE BIOPSIES:        1.  POSITIVE FOR MALIGNANT CELLS IN THE IMPRINT SMEARS; NON-SMALL CELL CARCINOMA, CONSISTENT              WITH ADENOCARCINOMA        2.  RARE ATYPICAL CELLS PRESENT IN CORE BIOPSIES; NOT DIAGNOSTIC OF MALIGNANCY (SEE COMMENT)   Electronically signed by Catie Wright MD on 3/14/2023 at  9:18 AM     Clinical Information  [facility]   Clinical history:  Right lung nodule  Reason for procedure:  Tissue type       03/22/2024 PET (bookmarked) showed:   IMPRESSION:     While there is likely posttreatment change in the right lower lobe, there has been development of FDG avid intrathoracic lymph nodes suspicious for metastases and progression of disease. EBUS would be helpful in further evaluation if desired/clinically   warranted.    03/26/2024 Recent OV note from referring provider (bookmarked) notes:   \"There has been development of FDG avid intrathoracic lymph nodes suspicious for metastases and progression of disease. EBUS would be helpful in further evaluation if desired/clinically warranted.  I therefore will recommend patient to see Dr. Fidel Sun for bronchoscopy/EBUS and biopsy to confirm the diagnosis.  I have briefly discussed with the patient about concurrent chemoradiation therapy for his lung cancer.     2.  MRI brain for staging.     3.  Referral for Dr. Santiago, medical oncology for evaluation and discussion of concurrent chemoradiation therapy after " "EBUS.\"    Clinical Assessment / Barriers to Care (Per Nurse):  Tobacco History    Smoking Status  Former Quit Date  1/1/1990 Smoking Frequency  0.50 packs/day for 35.00 years (17.50 ttl pk-yrs) Smoking Tobacco Type  Cigarettes quit in 1/1/1990     Records Location: [facility]   Records Needed: None  Additional testing needed prior to consult: EBUS with IP  Referral updates and Plan:   3/28: Per Bertha SOSA RN she will be reaching out to Dominik directly to schedule with Dr. Sun to discuss bx. Watching for bx to be scheduled then will set pt up with med onc.    4/4: Per Bertha, pt has been unreachable and not been returning calls. Writer called Dominik and there was no answer. Left a voicemail to return call. Will send for scheduling with med onc if NPS can reach the pt.     MILAGRO CastroN, RN, OCN  [facility] Oncology Nurse Navigator  [phone] / [phone]    "

## 2024-03-26 NOTE — PROGRESS NOTES
[facility] Radiation Oncology Follow Up     Patient: Dominik Rojas  MRN: [id number]  Date of Service: 03/26/2024       DISEASE TREATED:  Adenocarcinoma of right lower lobe lung, clinical stage T1 N0 M0.  Patient is not a good candidate for surgery given his advanced age and medical status.      TYPE OF RADIATION THERAPY ADMINISTERED:  5000 cGy in 5 treatments given from 4/10/2023 - 4/21/2023.     INTERVAL SINCE COMPLETION OF RADIATION THERAPY: 11 months.      SUBJECTIVE:  Mr. Rojas is a 83 year old male who was a former smoker half to 1 pack a day for 35 years and quit smoking since 1990.  Patient has a multiple underlying medical condition including COPD, coronary artery disease, stroke, and depression.  Patient has been followed by pulmonology for lung nodule since 2021.  CT scan on 7/25/2022 showed an increased 18 x 13 mm right lower lobe pleural-based nodule suspicious for malignancy.  PET scan on 9/30/2022 also reviewed 1.3 x 1.8 cm FDG avid right lower lobe pulmonary nodules highly suspicious for malignancy.  There is no evidence of lymph nodes and systemic metastasis.  Patient underwent CT-guided needle biopsy on 3/8/2023 with pathology confirmed non-small cell carcinoma consistent with adenocarcinoma.  You have discussed with the patient about various treatment options.  Patient is not a good candidate for surgery given his advanced age and medical status.  The patient received stereotactic radiosurgery with a total dose of 5000 cGy in 5 treatments given from 4/10/2023 - 4/21/2023.  He tolerated radiation therapy very well with minimal side effect.      The patient has been recovering well since radiation therapy.  He denies any pain and discomfort related to the therapy at the time of evaluation.  Patient still have significant shortness of breath and chronic cough due to COPD.  His last staging CT chest showed mediastinal lymph node has slightly increased in size while another has slightly  decreased in size.  This is a worrisome for cancer recurrence.  He had restaging PET scan on 3/22/2024.  This showed development of FDG avid intrathoracic lymph nodes suspicious for metastases and progression of disease.  The patient is here for follow-up visit and management recommendation.    Medications were reviewed and are up to date on EPIC.    The following portions of the patient's history were reviewed and updated as appropriate: allergies, current medications, past family history, past medical history, past social history, past surgical history and problem list.    Review of Systems:      General  Constitutional  Constitutional (WDL): Exceptions to WDL  Fatigue: Fatigue relieved by rest  EENT  Eye Disorders  Eye Disorder (WDL): Assessment not pertinent to visit  Ear Disorders  Ear Disorder (WDL): Assessment not pertinent to visit  Respiratory  Respiratory  Respiratory (WDL): Exceptions to WDL  Cough: Mild symptoms OR nonprescription intervention indicated  Dyspnea: Shortness of breath with minimal exertion OR limiting instrumental ADL  Hypoxia: Decreased oxygen saturation at rest (e.g., pulse oximeter less than 88% or PaO2 less than or equal to 55 mmHg)  Cardiovascular  Cardiovascular  Cardiovascular (WDL): All cardiovascular elements are within defined limits  Gastrointestinal  Gastrointestinal  Gastrointestinal (WDL): All gastrointestinal elements are within defined limits  Musculoskeletal  Musculoskeletal and Connective Tissue Disorders  Musculoskeletal & Connective (WDL): Exceptions to WDL  Arthralgia: Moderate pain OR limiting instrumental ADL  Generalized Muscle Weakness: Symptomatic OR perceived by patient but not evident on physical exam  Integumentary  Integumentary  Integumentary (WDL): All integumentary elements are within defined limits  Neurological  Neurosensory  Neurosensory (WDL): Exceptions to WDL  Peripheral Motor Neuropathy: Moderate symptoms OR limiting instrumental ADL  Ataxia:  Moderate symptoms OR limiting instrumental ADL  Peripheral Sensory Neuropathy: Asymptomatic  Confusion: Mild disorientation  Genitourinary/Reproductive  Genitourinary  Genitourinary (WDL): All genitourinary elements are within defined limits  Lymphatic  Lymph System Disorders  Lymph (WDL): All lymph elements are within defined limits  Pain  Pain Score: No Pain (0)  AUA Assessment                                                              Accompanied by  Accompanied By: spouse    Objective:     PHYSICAL EXAMINATION:    /81 (BP Location: Right arm, Patient Position: Sitting)   Pulse 102   Temp 98.8  F (37.1  C)   Resp 20   SpO2 (!) 88%     Gen: Alert, in NAD  Eyes: PERRL, EOMI, sclera anicteric  Neck: Supple, full ROM, no LAD  Pulm: No wheezing, stridor or respiratory distress  CV: Well-perfused, no cyanosis, no pedal edema  Back: No step-offs or pain to palpation along the thoracolumbar spine  Rectal: Deferred  : Deferred  Musculoskeletal: Normal muscle bulk and tone  Skin: Normal color and turgor  Neurologic: A/Ox3, CN II-XII intact, normal gait and station  Psychiatric: Appropriate mood and affect     Imaging: Imaging results 30 days: PET Oncology Whole Body    Result Date: 3/22/2024  EXAM: PET ONCOLOGY WHOLE BODY LOCATION: [facility] DATE: 3/22/2024 INDICATION: Subsequent treatment planning and restaging for malignant neoplasm of lower lobe, right bronchus or lung. Status post radiation therapy completed in April 2023. Monitor response COMPARISON: CT of the chest abdomen pelvis dated 03/04/2024 and FDG PET/CT dated 09/30/2022 CONTRAST: None TECHNIQUE: Serum glucose level 106 mg/dL. One hour post intravenous administration of 13.3 mCi F-18 FDG, PET imaging was performed from the skull vertex to feet, utilizing attenuation correction with concurrent axial CT and PET/CT image fusion. Dose reduction techniques were used. PET/CT FINDINGS: While there is FDG avid airspace opacity  in the right lower lobe with low level FDG uptake (Max SUV 3.6), which likely represents posttreatment inflammatory change without convincing evidence of active neoplasm in this region, there is development of right interlobar station 11R (max SUV 7.4), subcarinal station 7 (max SUV 14.8), and right greater than left lower paratracheal station 4 (max SUV 8.9) suspicious for metastases. The remaining FDG uptake is physiologic the skull vertex to feet. CT FINDINGS: Chronic right frontal lobe infarct. Mild to moderate carotid artery bifurcation calcification. Right neck surgical clips. Irregular outpouching in the lateral aortic arch. Moderate coronary artery calcium. Scattered emphysema. Left lower lobe scarring/atelectatic change. Cholelithiasis. Aortobiiliac endograft traversing a 7.3 cm infrarenal abdominal aortic aneurysm sac. Marked prostamegaly. Multilevel degenerative changes of the spine. Lumbar spine decompressive laminectomy change. The lower extremities are unremarkable.     IMPRESSION: While there is likely posttreatment change in the right lower lobe, there has been development of FDG avid intrathoracic lymph nodes suspicious for metastases and progression of disease. EBUS would be helpful in further evaluation if desired/clinically warranted.    CT Chest w/o contrast    Result Date: 3/5/2024  EXAM: CT CHEST W/O CONTRAST LOCATION: [facility] DATE: 3/4/2024 INDICATION:  Malignant neoplasm of lower lobe of right lung (H) COMPARISON: 11/27/2023 TECHNIQUE: CT chest without IV contrast. Multiplanar reformats were obtained. Dose reduction techniques were used. CONTRAST: None. FINDINGS: LUNGS AND PLEURA: Soft tissue and architectural distortion in the posterior right lower lobe is 4.9 x 3.5 cm (previously 4.5 x 3.2 cm; series 4 image 182). Internal soft tissue measuring 12 mm has not changed (image 166). Peripheral reticular interstitial opacities in the lung bases have not significantly  changed. Emphysema is present. Bronchiectasis is present. MEDIASTINUM/AXILLAE: Suspected thyroidectomy. A 16 mm right lower paratracheal lymph node was previously 13 mm. A 14 mm subcarinal lymph node was previously 17 mm. CORONARY ARTERY CALCIFICATION: Severe. UPPER ABDOMEN: Cholelithiasis. There is a partially visualized calcification in the left renal pelvis. There is a small nonobstructing right renal calculus. An aortic stent graft is partially included in the field-of-view. MUSCULOSKELETAL: Degenerative change in the spine. T12 vertebral body height loss is again seen.     IMPRESSION: 1.  Soft tissue and architectural distortion in the posterior right lower lobe has slightly increased in size, which is likely due to differences in technique. Recommend attention on followup imaging. A central 12 mm opacity has not changed in size. 2.  One mediastinal lymph node has slightly increased in size while another has slightly decreased in size. 3.  Mild basilar fibrosis has not changed.      Impression     The patient is a 83 year old male with a diagnosis of adenocarcinoma of right lower lobe lung, clinical stage T1 N0 M0, status post SBRT completed 11 months ago.  Patient has been recovering well with restaging PET CT scan showed evidence of mediastinal recurrence.         Assessment & Plan:     1.  I have personally reviewed his restaging PET CT scan and compared to the previous CT scan and the radiation therapy field.  There is no evidence of local cancer recurrence. There has been development of FDG avid intrathoracic lymph nodes suspicious for metastases and progression of disease. EBUS would be helpful in further evaluation if desired/clinically warranted.  I therefore will recommend patient to see Dr. Fidel uSn for bronchoscopy/EBUS and biopsy to confirm the diagnosis.  I have briefly discussed with the patient about concurrent chemoradiation therapy for his lung cancer.    2.  MRI brain for staging.    3.   Referral for Dr. Santiago, medical oncology for evaluation and discussion of concurrent chemoradiation therapy after EBUS.    4.  Return to radiation oncology in 3-4 weeks for final treatment recommendation.    Face to face time  30 minutes with > 80% spent on consultation, education and coordination of care.    Rachael Bales MD  Department of Radiation Oncology   [facility]  Tel: [phone]  Page: [phone]    [facility]  [address] [place] [address]     [address]   [place] [address]    CC:  Patient Care Team:  Misael Moe PA-C as PCP - General (Physician Assistant)  Wilma Jansen RPH as Pharmacist (Pharmacist Ambulatory Care)  Kindra Pack MD as Assigned Heart and Vascular Provider  Fidel Sun MD as Assigned Pulmonology Provider  Rachael Bales MD as MD (Radiation Oncology)  Rachael Bales MD as Assigned Cancer Care Provider  Ana Madrid RT as Chronic Pulmonary Disease Specialist (Respiratory Therapy)  Corry Barajas RPH as Pharmacist (Pharmacist)  Corry Barajas RPH as Assigned MTM Pharmacist

## 2024-03-26 NOTE — PROGRESS NOTES
I rec'd an IP referral for pt.  Upon review of chart it is noted that he is established with Dr. Sun.  IB message sent to Dr. Sun and his RNCC, Bertha.  Per Dr. Rachael Bales's office note today:   I have personally reviewed his restaging PET CT scan and compared to the previous CT scan and the radiation therapy field.  There is no evidence of local cancer recurrence. There has been development of FDG avid intrathoracic lymph nodes suspicious for metastases and progression of disease. EBUS would be helpful in further evaluation if desired/clinically warranted.  I therefore will recommend patient to see Dr. Fidel Sun for bronchoscopy/EBUS and biopsy to confirm the diagnosis.  I have briefly discussed with the patient about concurrent chemoradiation therapy for his lung cancer.     I will wait for a response from Dr. Sun and his care team and then cancel/reject referral.

## 2024-03-26 NOTE — PROGRESS NOTES
"Oncology Rooming Note    March 26, 2024 10:01 AM   Dominik Rojas is a 83 year old male who presents for:    Chief Complaint   Patient presents with    Oncology Clinic Visit     Follow up     Initial Vitals: /81 (BP Location: Right arm, Patient Position: Sitting)   Pulse 102   Temp 98.8  F (37.1  C)   Resp 20   SpO2 (!) 88%  Estimated body mass index is 34.46 kg/m  as calculated from the following:    Height as of 2/8/24: 1.702 m (5' 7\").    Weight as of 2/8/24: 99.8 kg (220 lb). There is no height or weight on file to calculate BSA.  No Pain (0) Comment: Data Unavailable   No LMP for male patient.  Allergies reviewed: Yes  Medications reviewed: Yes    Medications: Medication refills not needed today.  Pharmacy name entered into [employer]:    [id number] - [address]  [facility] [address] [id number] - [place] [address] [address] AT [place] [address] & [facility]    Frailty Screening:   Is the patient here for a new oncology consult visit in cancer care? 2. No      Clinical concerns: Follow up for PET results from 3/22.  Dr. Bales was notified.      Petra Asher RN              "

## 2024-03-26 NOTE — LETTER
3/26/2024         RE: Dominik Rojas  [address]  [place] [address]        Dear Colleague,    Thank you for referring your patient, Dominik Rojas, to the [facility] RADIATION ONCOLOGY [place]. Please see a copy of my visit note below.    [facility] Radiation Oncology Follow Up     Patient: Dominik Rojas  MRN: [id number]  Date of Service: 03/26/2024       DISEASE TREATED:  Adenocarcinoma of right lower lobe lung, clinical stage T1 N0 M0.  Patient is not a good candidate for surgery given his advanced age and medical status.      TYPE OF RADIATION THERAPY ADMINISTERED:  5000 cGy in 5 treatments given from 4/10/2023 - 4/21/2023.     INTERVAL SINCE COMPLETION OF RADIATION THERAPY: 11 months.      SUBJECTIVE:  Mr. Rojas is a 83 year old male who was a former smoker half to 1 pack a day for 35 years and quit smoking since 1990.  Patient has a multiple underlying medical condition including COPD, coronary artery disease, stroke, and depression.  Patient has been followed by pulmonology for lung nodule since 2021.  CT scan on 7/25/2022 showed an increased 18 x 13 mm right lower lobe pleural-based nodule suspicious for malignancy.  PET scan on 9/30/2022 also reviewed 1.3 x 1.8 cm FDG avid right lower lobe pulmonary nodules highly suspicious for malignancy.  There is no evidence of lymph nodes and systemic metastasis.  Patient underwent CT-guided needle biopsy on 3/8/2023 with pathology confirmed non-small cell carcinoma consistent with adenocarcinoma.  You have discussed with the patient about various treatment options.  Patient is not a good candidate for surgery given his advanced age and medical status.  The patient received stereotactic radiosurgery with a total dose of 5000 cGy in 5 treatments given from 4/10/2023 - 4/21/2023.  He tolerated radiation therapy very well with minimal side effect.      The patient has been recovering well since radiation therapy.  He denies any pain  and discomfort related to the therapy at the time of evaluation.  Patient still have significant shortness of breath and chronic cough due to COPD.  His last staging CT chest showed mediastinal lymph node has slightly increased in size while another has slightly decreased in size.  This is a worrisome for cancer recurrence.  He had restaging PET scan on 3/22/2024.  This showed development of FDG avid intrathoracic lymph nodes suspicious for metastases and progression of disease.  The patient is here for follow-up visit and management recommendation.    Medications were reviewed and are up to date on EPIC.    The following portions of the patient's history were reviewed and updated as appropriate: allergies, current medications, past family history, past medical history, past social history, past surgical history and problem list.    Review of Systems:      General  Constitutional  Constitutional (WDL): Exceptions to WDL  Fatigue: Fatigue relieved by rest  EENT  Eye Disorders  Eye Disorder (WDL): Assessment not pertinent to visit  Ear Disorders  Ear Disorder (WDL): Assessment not pertinent to visit  Respiratory  Respiratory  Respiratory (WDL): Exceptions to WDL  Cough: Mild symptoms OR nonprescription intervention indicated  Dyspnea: Shortness of breath with minimal exertion OR limiting instrumental ADL  Hypoxia: Decreased oxygen saturation at rest (e.g., pulse oximeter less than 88% or PaO2 less than or equal to 55 mmHg)  Cardiovascular  Cardiovascular  Cardiovascular (WDL): All cardiovascular elements are within defined limits  Gastrointestinal  Gastrointestinal  Gastrointestinal (WDL): All gastrointestinal elements are within defined limits  Musculoskeletal  Musculoskeletal and Connective Tissue Disorders  Musculoskeletal & Connective (WDL): Exceptions to WDL  Arthralgia: Moderate pain OR limiting instrumental ADL  Generalized Muscle Weakness: Symptomatic OR perceived by patient but not evident on physical  exam  Integumentary  Integumentary  Integumentary (WDL): All integumentary elements are within defined limits  Neurological  Neurosensory  Neurosensory (WDL): Exceptions to WDL  Peripheral Motor Neuropathy: Moderate symptoms OR limiting instrumental ADL  Ataxia: Moderate symptoms OR limiting instrumental ADL  Peripheral Sensory Neuropathy: Asymptomatic  Confusion: Mild disorientation  Genitourinary/Reproductive  Genitourinary  Genitourinary (WDL): All genitourinary elements are within defined limits  Lymphatic  Lymph System Disorders  Lymph (WDL): All lymph elements are within defined limits  Pain  Pain Score: No Pain (0)  AUA Assessment                                                              Accompanied by  Accompanied By: spouse    Objective:     PHYSICAL EXAMINATION:    /81 (BP Location: Right arm, Patient Position: Sitting)   Pulse 102   Temp 98.8  F (37.1  C)   Resp 20   SpO2 (!) 88%     Gen: Alert, in NAD  Eyes: PERRL, EOMI, sclera anicteric  Neck: Supple, full ROM, no LAD  Pulm: No wheezing, stridor or respiratory distress  CV: Well-perfused, no cyanosis, no pedal edema  Back: No step-offs or pain to palpation along the thoracolumbar spine  Rectal: Deferred  : Deferred  Musculoskeletal: Normal muscle bulk and tone  Skin: Normal color and turgor  Neurologic: A/Ox3, CN II-XII intact, normal gait and station  Psychiatric: Appropriate mood and affect     Imaging: Imaging results 30 days: PET Oncology Whole Body    Result Date: 3/22/2024  EXAM: PET ONCOLOGY WHOLE BODY LOCATION: [facility] DATE: 3/22/2024 INDICATION: Subsequent treatment planning and restaging for malignant neoplasm of lower lobe, right bronchus or lung. Status post radiation therapy completed in April 2023. Monitor response COMPARISON: CT of the chest abdomen pelvis dated 03/04/2024 and FDG PET/CT dated 09/30/2022 CONTRAST: None TECHNIQUE: Serum glucose level 106 mg/dL. One hour post intravenous  administration of 13.3 mCi F-18 FDG, PET imaging was performed from the skull vertex to feet, utilizing attenuation correction with concurrent axial CT and PET/CT image fusion. Dose reduction techniques were used. PET/CT FINDINGS: While there is FDG avid airspace opacity in the right lower lobe with low level FDG uptake (Max SUV 3.6), which likely represents posttreatment inflammatory change without convincing evidence of active neoplasm in this region, there is development of right interlobar station 11R (max SUV 7.4), subcarinal station 7 (max SUV 14.8), and right greater than left lower paratracheal station 4 (max SUV 8.9) suspicious for metastases. The remaining FDG uptake is physiologic the skull vertex to feet. CT FINDINGS: Chronic right frontal lobe infarct. Mild to moderate carotid artery bifurcation calcification. Right neck surgical clips. Irregular outpouching in the lateral aortic arch. Moderate coronary artery calcium. Scattered emphysema. Left lower lobe scarring/atelectatic change. Cholelithiasis. Aortobiiliac endograft traversing a 7.3 cm infrarenal abdominal aortic aneurysm sac. Marked prostamegaly. Multilevel degenerative changes of the spine. Lumbar spine decompressive laminectomy change. The lower extremities are unremarkable.     IMPRESSION: While there is likely posttreatment change in the right lower lobe, there has been development of FDG avid intrathoracic lymph nodes suspicious for metastases and progression of disease. EBUS would be helpful in further evaluation if desired/clinically warranted.    CT Chest w/o contrast    Result Date: 3/5/2024  EXAM: CT CHEST W/O CONTRAST LOCATION: [facility] DATE: 3/4/2024 INDICATION:  Malignant neoplasm of lower lobe of right lung (H) COMPARISON: 11/27/2023 TECHNIQUE: CT chest without IV contrast. Multiplanar reformats were obtained. Dose reduction techniques were used. CONTRAST: None. FINDINGS: LUNGS AND PLEURA: Soft tissue and  architectural distortion in the posterior right lower lobe is 4.9 x 3.5 cm (previously 4.5 x 3.2 cm; series 4 image 182). Internal soft tissue measuring 12 mm has not changed (image 166). Peripheral reticular interstitial opacities in the lung bases have not significantly changed. Emphysema is present. Bronchiectasis is present. MEDIASTINUM/AXILLAE: Suspected thyroidectomy. A 16 mm right lower paratracheal lymph node was previously 13 mm. A 14 mm subcarinal lymph node was previously 17 mm. CORONARY ARTERY CALCIFICATION: Severe. UPPER ABDOMEN: Cholelithiasis. There is a partially visualized calcification in the left renal pelvis. There is a small nonobstructing right renal calculus. An aortic stent graft is partially included in the field-of-view. MUSCULOSKELETAL: Degenerative change in the spine. T12 vertebral body height loss is again seen.     IMPRESSION: 1.  Soft tissue and architectural distortion in the posterior right lower lobe has slightly increased in size, which is likely due to differences in technique. Recommend attention on followup imaging. A central 12 mm opacity has not changed in size. 2.  One mediastinal lymph node has slightly increased in size while another has slightly decreased in size. 3.  Mild basilar fibrosis has not changed.      Impression     The patient is a 83 year old male with a diagnosis of adenocarcinoma of right lower lobe lung, clinical stage T1 N0 M0, status post SBRT completed 11 months ago.  Patient has been recovering well with restaging PET CT scan showed evidence of mediastinal recurrence.         Assessment & Plan:     1.  I have personally reviewed his restaging PET CT scan and compared to the previous CT scan and the radiation therapy field.  There is no evidence of local cancer recurrence. There has been development of FDG avid intrathoracic lymph nodes suspicious for metastases and progression of disease. EBUS would be helpful in further evaluation if desired/clinically  "warranted.  I therefore will recommend patient to see Dr. Fidel Sun for bronchoscopy/EBUS and biopsy to confirm the diagnosis.  I have briefly discussed with the patient about concurrent chemoradiation therapy for his lung cancer.    2.  MRI brain for staging.    3.  Referral for Dr. Santiago, medical oncology for evaluation and discussion of concurrent chemoradiation therapy after EBUS.    4.  Return to radiation oncology in 3-4 weeks for final treatment recommendation.    Face to face time  30 minutes with > 80% spent on consultation, education and coordination of care.    Rachael Bales MD  Department of Radiation Oncology   [facility]  Tel: [phone]  Page: [phone]    [facility]  [address] [place] [address]     [facility]   [address] [place] [address]    CC:  Patient Care Team:  Misael Moe PA-C as PCP - General (Physician Assistant)  Wilma Jansen RPH as Pharmacist (Pharmacist Ambulatory Care)  Kindra Pack MD as Assigned Heart and Vascular Provider  Fidel Sun MD as Assigned Pulmonology Provider  Rachael Bales MD as MD (Radiation Oncology)  Rachael Bales MD as Assigned Cancer Care Provider  Ana Madrid RT as Chronic Pulmonary Disease Specialist (Respiratory Therapy)  Corry Barajas RPH as Pharmacist (Pharmacist)  Corry Barajas RPH as Assigned MTM Pharmacist      Oncology Rooming Note    March 26, 2024 10:01 AM   Dominik Rojas is a 83 year old male who presents for:    Chief Complaint   Patient presents with     Oncology Clinic Visit     Follow up     Initial Vitals: /81 (BP Location: Right arm, Patient Position: Sitting)   Pulse 102   Temp 98.8  F (37.1  C)   Resp 20   SpO2 (!) 88%  Estimated body mass index is 34.46 kg/m  as calculated from the following:    Height as of 2/8/24: 1.702 m (5' 7\").    Weight as of 2/8/24: 99.8 kg (220 lb). There is no height or weight on file to calculate BSA.  No Pain " (0) Comment: Data Unavailable   No LMP for male patient.  Allergies reviewed: Yes  Medications reviewed: Yes    Medications: Medication refills not needed today.  Pharmacy name entered into [employer]:    [id number] - [place], [place] - [address]  [facility] - [place], [place] - [address] [id number] - [place], [place] - [address]    Frailty Screening:   Is the patient here for a new oncology consult visit in cancer care? 2. No      Clinical concerns: Follow up for PET results from 3/22.  Dr. Bales was notified.      Petra Asher, DIONI                Again, thank you for allowing me to participate in the care of your patient.        Sincerely,        Rachael Bales MD

## 2024-03-27 ENCOUNTER — TELEPHONE (OUTPATIENT)
Dept: PULMONOLOGY | Facility: CLINIC | Age: 84
End: 2024-03-27
Payer: COMMERCIAL

## 2024-03-28 ENCOUNTER — TELEPHONE (OUTPATIENT)
Dept: PULMONOLOGY | Facility: CLINIC | Age: 84
End: 2024-03-28
Payer: COMMERCIAL

## 2024-03-28 DIAGNOSIS — R91.1 LUNG NODULE: Primary | ICD-10-CM

## 2024-03-28 NOTE — TELEPHONE ENCOUNTER
Called patient to schedule EBUS procedure at [facility]. Spoke with wife crhistiana, she does not know her work schedule for the month of April and is going to call back with their availability on Friday.     Bertha WHEATLEY  [facility]  [phone]

## 2024-04-02 ENCOUNTER — TELEPHONE (OUTPATIENT)
Dept: PULMONOLOGY | Facility: CLINIC | Age: 84
End: 2024-04-02
Payer: COMMERCIAL

## 2024-04-02 NOTE — TELEPHONE ENCOUNTER
SEBASTIAN to see if patient would like to schedule EBUS procedure at  [facility]. Callback number left.     Bertha WHEATLEY   [facility]  [phone]

## 2024-04-04 NOTE — TELEPHONE ENCOUNTER
LVM for wife and patient regarding scheduling ebus procedure and other dates we could offer. Callback number left to call and schedule procedure.     Bertha WHEATLEY   [facility]   [phone]

## 2024-04-05 ENCOUNTER — DOCUMENTATION ONLY (OUTPATIENT)
Dept: PULMONOLOGY | Facility: CLINIC | Age: 84
End: 2024-04-05
Payer: COMMERCIAL

## 2024-04-05 DIAGNOSIS — J41.0 SIMPLE CHRONIC BRONCHITIS (H): Primary | ICD-10-CM

## 2024-04-15 ENCOUNTER — LAB REQUISITION (OUTPATIENT)
Dept: LAB | Facility: CLINIC | Age: 84
End: 2024-04-15

## 2024-04-15 DIAGNOSIS — N18.31 CHRONIC KIDNEY DISEASE, STAGE 3A (H): ICD-10-CM

## 2024-04-15 PROCEDURE — 80048 BASIC METABOLIC PNL TOTAL CA: CPT | Performed by: PHYSICIAN ASSISTANT

## 2024-04-16 ENCOUNTER — TELEPHONE (OUTPATIENT)
Dept: VASCULAR SURGERY | Facility: CLINIC | Age: 84
End: 2024-04-16
Payer: COMMERCIAL

## 2024-04-16 LAB
ANION GAP SERPL CALCULATED.3IONS-SCNC: 15 MMOL/L (ref 7–15)
BUN SERPL-MCNC: 21.7 MG/DL (ref 8–23)
CALCIUM SERPL-MCNC: 9.4 MG/DL (ref 8.8–10.2)
CHLORIDE SERPL-SCNC: 102 MMOL/L (ref 98–107)
CREAT SERPL-MCNC: 1.25 MG/DL (ref 0.67–1.17)
DEPRECATED HCO3 PLAS-SCNC: 25 MMOL/L (ref 22–29)
EGFRCR SERPLBLD CKD-EPI 2021: 57 ML/MIN/1.73M2
GLUCOSE SERPL-MCNC: 87 MG/DL (ref 70–99)
POTASSIUM SERPL-SCNC: 4.7 MMOL/L (ref 3.4–5.3)
SODIUM SERPL-SCNC: 142 MMOL/L (ref 135–145)

## 2024-04-16 NOTE — TELEPHONE ENCOUNTER
EMANI w wife (primary contact) to schedule CTA/1yr AAA follow up with Angélica in July. *CTA should be a few days prior to RW appt. [phone]  ________________________________  Vanessa Colin RN  [facility]  Needs 1 year follow-up with Angélica Dumas and CTA abd/pelvis

## 2024-05-05 ENCOUNTER — HOSPITAL ENCOUNTER (OUTPATIENT)
Dept: MRI IMAGING | Facility: HOSPITAL | Age: 84
Discharge: HOME | End: 2024-05-05
Attending: RADIOLOGY | Admitting: RADIOLOGY
Payer: COMMERCIAL

## 2024-05-05 DIAGNOSIS — C34.31 MALIGNANT NEOPLASM OF LOWER LOBE OF RIGHT LUNG (H): ICD-10-CM

## 2024-05-05 PROCEDURE — 255N000002 HC RX 255 OP 636: Performed by: RADIOLOGY

## 2024-05-05 PROCEDURE — 70553 MRI BRAIN STEM W/O & W/DYE: CPT

## 2024-05-05 PROCEDURE — A9585 GADOBUTROL INJECTION: HCPCS | Performed by: RADIOLOGY

## 2024-05-05 RX ORDER — GADOBUTROL 604.72 MG/ML
0.1 INJECTION INTRAVENOUS ONCE
Status: COMPLETED | OUTPATIENT
Start: 2024-05-05 | End: 2024-05-05

## 2024-05-05 RX ADMIN — GADOBUTROL 9 ML: 604.72 INJECTION INTRAVENOUS at 14:52

## 2024-05-15 ENCOUNTER — TELEPHONE (OUTPATIENT)
Dept: PULMONOLOGY | Facility: CLINIC | Age: 84
End: 2024-05-15
Payer: COMMERCIAL

## 2024-05-15 NOTE — TELEPHONE ENCOUNTER
[facility] Call Center    Phone Message    May a detailed message be left on voicemail: yes     Reason for Call: Other: Jovana- pt's wife would like to confirm that new oxygen orders were sent to [facility]. She states she received a letter in the mail stating that the orders were going to  24. Please confirm and let pt know.     Action Taken: Other: PULM    Travel Screening: Not Applicable

## 2024-06-11 ENCOUNTER — TELEPHONE (OUTPATIENT)
Dept: RESPIRATORY THERAPY | Facility: CLINIC | Age: 84
End: 2024-06-11

## 2024-06-11 NOTE — TELEPHONE ENCOUNTER
----- Message from Bertha Woods RN sent at 6/11/2024  9:48 AM CDT -----  Regarding: referrral  ELÍAS King,     This pt was a referral for an EBUS procedure a few mnths ago. I tried calling the wife several times to get it scheduled but she never returned my calls. She just called me and left me a voicemail wanting to schedule procedure. Please call and offer a clinic visit with Dr. Sun first since it has been a while, 7/10 at 3pm please. If they can not do that, next available.     Thank you.     Bertha

## 2024-06-13 ENCOUNTER — TELEPHONE (OUTPATIENT)
Dept: PULMONOLOGY | Facility: CLINIC | Age: 84
End: 2024-06-13

## 2024-06-17 ENCOUNTER — TELEPHONE (OUTPATIENT)
Dept: PULMONOLOGY | Facility: CLINIC | Age: 84
End: 2024-06-17

## 2024-06-29 ENCOUNTER — APPOINTMENT (OUTPATIENT)
Dept: RADIOLOGY | Facility: HOSPITAL | Age: 84
End: 2024-06-29
Attending: EMERGENCY MEDICINE
Payer: COMMERCIAL

## 2024-06-29 ENCOUNTER — APPOINTMENT (OUTPATIENT)
Dept: CT IMAGING | Facility: HOSPITAL | Age: 84
End: 2024-06-29
Attending: EMERGENCY MEDICINE
Payer: COMMERCIAL

## 2024-06-29 ENCOUNTER — HOSPITAL ENCOUNTER (OUTPATIENT)
Facility: HOSPITAL | Age: 84
Setting detail: OBSERVATION
Discharge: NURSING, REHAB OR LONG-TERM CARE | End: 2024-07-01
Attending: EMERGENCY MEDICINE | Admitting: STUDENT IN AN ORGANIZED HEALTH CARE EDUCATION/TRAINING PROGRAM
Payer: COMMERCIAL

## 2024-06-29 DIAGNOSIS — I50.22 CHRONIC SYSTOLIC CONGESTIVE HEART FAILURE (H): Primary | ICD-10-CM

## 2024-06-29 DIAGNOSIS — N30.01 ACUTE CYSTITIS WITH HEMATURIA: ICD-10-CM

## 2024-06-29 DIAGNOSIS — Z99.81 CHRONIC HYPOXIC RESPIRATORY FAILURE, ON HOME OXYGEN THERAPY (H): ICD-10-CM

## 2024-06-29 DIAGNOSIS — Z86.59 HISTORY OF DEMENTIA: ICD-10-CM

## 2024-06-29 DIAGNOSIS — R46.89 AGGRESSIVE BEHAVIOR: ICD-10-CM

## 2024-06-29 DIAGNOSIS — R41.82 ALTERED MENTAL STATUS, UNSPECIFIED ALTERED MENTAL STATUS TYPE: ICD-10-CM

## 2024-06-29 DIAGNOSIS — J96.11 CHRONIC HYPOXIC RESPIRATORY FAILURE, ON HOME OXYGEN THERAPY (H): ICD-10-CM

## 2024-06-29 LAB
ALBUMIN SERPL BCG-MCNC: 3.4 G/DL (ref 3.5–5.2)
ALBUMIN UR-MCNC: 30 MG/DL
ALP SERPL-CCNC: 153 U/L (ref 40–150)
ALT SERPL W P-5'-P-CCNC: 14 U/L (ref 0–70)
ANION GAP SERPL CALCULATED.3IONS-SCNC: 12 MMOL/L (ref 7–15)
APPEARANCE UR: ABNORMAL
AST SERPL W P-5'-P-CCNC: 25 U/L (ref 0–45)
BACTERIA #/AREA URNS HPF: ABNORMAL /HPF
BASE EXCESS BLDV CALC-SCNC: 4.8 MMOL/L (ref -3–3)
BASOPHILS # BLD AUTO: 0.1 10E3/UL (ref 0–0.2)
BASOPHILS NFR BLD AUTO: 0 %
BILIRUB SERPL-MCNC: 0.7 MG/DL
BILIRUB UR QL STRIP: NEGATIVE
BUN SERPL-MCNC: 19.9 MG/DL (ref 8–23)
CALCIUM SERPL-MCNC: 8.8 MG/DL (ref 8.8–10.2)
CHLORIDE SERPL-SCNC: 101 MMOL/L (ref 98–107)
COLOR UR AUTO: YELLOW
CREAT SERPL-MCNC: 1.08 MG/DL (ref 0.67–1.17)
DEPRECATED HCO3 PLAS-SCNC: 27 MMOL/L (ref 22–29)
EGFRCR SERPLBLD CKD-EPI 2021: 69 ML/MIN/1.73M2
EOSINOPHIL # BLD AUTO: 0.3 10E3/UL (ref 0–0.7)
EOSINOPHIL NFR BLD AUTO: 3 %
ERYTHROCYTE [DISTWIDTH] IN BLOOD BY AUTOMATED COUNT: 14.2 % (ref 10–15)
FLUAV RNA SPEC QL NAA+PROBE: NEGATIVE
FLUBV RNA RESP QL NAA+PROBE: NEGATIVE
GLUCOSE SERPL-MCNC: 147 MG/DL (ref 70–99)
GLUCOSE UR STRIP-MCNC: NEGATIVE MG/DL
HCO3 BLDV-SCNC: 29 MMOL/L (ref 21–28)
HCT VFR BLD AUTO: 42.8 % (ref 40–53)
HGB BLD-MCNC: 13.7 G/DL (ref 13.3–17.7)
HGB UR QL STRIP: ABNORMAL
HOLD SPECIMEN: NORMAL
HYALINE CASTS: 5 /LPF
IMM GRANULOCYTES # BLD: 0 10E3/UL
IMM GRANULOCYTES NFR BLD: 0 %
KETONES UR STRIP-MCNC: ABNORMAL MG/DL
LACTATE SERPL-SCNC: 1.6 MMOL/L (ref 0.7–2)
LEUKOCYTE ESTERASE UR QL STRIP: ABNORMAL
LYMPHOCYTES # BLD AUTO: 3.4 10E3/UL (ref 0.8–5.3)
LYMPHOCYTES NFR BLD AUTO: 27 %
MCH RBC QN AUTO: 28.8 PG (ref 26.5–33)
MCHC RBC AUTO-ENTMCNC: 32 G/DL (ref 31.5–36.5)
MCV RBC AUTO: 90 FL (ref 78–100)
MONOCYTES # BLD AUTO: 0.8 10E3/UL (ref 0–1.3)
MONOCYTES NFR BLD AUTO: 6 %
MUCOUS THREADS #/AREA URNS LPF: PRESENT /LPF
NEUTROPHILS # BLD AUTO: 8.2 10E3/UL (ref 1.6–8.3)
NEUTROPHILS NFR BLD AUTO: 64 %
NITRATE UR QL: NEGATIVE
NRBC # BLD AUTO: 0 10E3/UL
NRBC BLD AUTO-RTO: 0 /100
O2/TOTAL GAS SETTING VFR VENT: 32 %
OXYHGB MFR BLDV: 72 % (ref 70–75)
PCO2 BLDV: 46 MM HG (ref 40–50)
PH BLDV: 7.42 [PH] (ref 7.32–7.43)
PH UR STRIP: 6.5 [PH] (ref 5–7)
PLATELET # BLD AUTO: 294 10E3/UL (ref 150–450)
PO2 BLDV: 40 MM HG (ref 25–47)
POTASSIUM SERPL-SCNC: 4.1 MMOL/L (ref 3.4–5.3)
PROT SERPL-MCNC: 7 G/DL (ref 6.4–8.3)
RBC # BLD AUTO: 4.76 10E6/UL (ref 4.4–5.9)
RBC URINE: 0 /HPF
RSV RNA SPEC NAA+PROBE: NEGATIVE
SAO2 % BLDV: 72.8 % (ref 70–75)
SARS-COV-2 RNA RESP QL NAA+PROBE: NEGATIVE
SODIUM SERPL-SCNC: 140 MMOL/L (ref 135–145)
SP GR UR STRIP: 1.02 (ref 1–1.03)
TSH SERPL DL<=0.005 MIU/L-ACNC: 2.32 UIU/ML (ref 0.3–4.2)
TSH SERPL DL<=0.005 MIU/L-ACNC: 2.37 UIU/ML (ref 0.3–4.2)
UROBILINOGEN UR STRIP-MCNC: 2 MG/DL
WBC # BLD AUTO: 12.8 10E3/UL (ref 4–11)
WBC CLUMPS #/AREA URNS HPF: PRESENT /HPF
WBC URINE: >182 /HPF

## 2024-06-29 PROCEDURE — 36415 COLL VENOUS BLD VENIPUNCTURE: CPT | Performed by: STUDENT IN AN ORGANIZED HEALTH CARE EDUCATION/TRAINING PROGRAM

## 2024-06-29 PROCEDURE — 85025 COMPLETE CBC W/AUTO DIFF WBC: CPT | Performed by: EMERGENCY MEDICINE

## 2024-06-29 PROCEDURE — 70450 CT HEAD/BRAIN W/O DYE: CPT

## 2024-06-29 PROCEDURE — G0378 HOSPITAL OBSERVATION PER HR: HCPCS

## 2024-06-29 PROCEDURE — 87637 SARSCOV2&INF A&B&RSV AMP PRB: CPT | Performed by: EMERGENCY MEDICINE

## 2024-06-29 PROCEDURE — 250N000009 HC RX 250: Performed by: STUDENT IN AN ORGANIZED HEALTH CARE EDUCATION/TRAINING PROGRAM

## 2024-06-29 PROCEDURE — 99223 1ST HOSP IP/OBS HIGH 75: CPT | Performed by: STUDENT IN AN ORGANIZED HEALTH CARE EDUCATION/TRAINING PROGRAM

## 2024-06-29 PROCEDURE — 96361 HYDRATE IV INFUSION ADD-ON: CPT

## 2024-06-29 PROCEDURE — 87086 URINE CULTURE/COLONY COUNT: CPT | Performed by: STUDENT IN AN ORGANIZED HEALTH CARE EDUCATION/TRAINING PROGRAM

## 2024-06-29 PROCEDURE — 250N000011 HC RX IP 250 OP 636: Performed by: STUDENT IN AN ORGANIZED HEALTH CARE EDUCATION/TRAINING PROGRAM

## 2024-06-29 PROCEDURE — 258N000003 HC RX IP 258 OP 636: Performed by: STUDENT IN AN ORGANIZED HEALTH CARE EDUCATION/TRAINING PROGRAM

## 2024-06-29 PROCEDURE — 83605 ASSAY OF LACTIC ACID: CPT | Performed by: STUDENT IN AN ORGANIZED HEALTH CARE EDUCATION/TRAINING PROGRAM

## 2024-06-29 PROCEDURE — 96372 THER/PROPH/DIAG INJ SC/IM: CPT | Performed by: STUDENT IN AN ORGANIZED HEALTH CARE EDUCATION/TRAINING PROGRAM

## 2024-06-29 PROCEDURE — 87040 BLOOD CULTURE FOR BACTERIA: CPT | Performed by: EMERGENCY MEDICINE

## 2024-06-29 PROCEDURE — 250N000011 HC RX IP 250 OP 636: Performed by: EMERGENCY MEDICINE

## 2024-06-29 PROCEDURE — 82805 BLOOD GASES W/O2 SATURATION: CPT | Performed by: EMERGENCY MEDICINE

## 2024-06-29 PROCEDURE — 81001 URINALYSIS AUTO W/SCOPE: CPT | Performed by: STUDENT IN AN ORGANIZED HEALTH CARE EDUCATION/TRAINING PROGRAM

## 2024-06-29 PROCEDURE — 71045 X-RAY EXAM CHEST 1 VIEW: CPT

## 2024-06-29 PROCEDURE — 99285 EMERGENCY DEPT VISIT HI MDM: CPT | Mod: 25

## 2024-06-29 PROCEDURE — 84443 ASSAY THYROID STIM HORMONE: CPT | Performed by: STUDENT IN AN ORGANIZED HEALTH CARE EDUCATION/TRAINING PROGRAM

## 2024-06-29 PROCEDURE — 258N000003 HC RX IP 258 OP 636: Performed by: EMERGENCY MEDICINE

## 2024-06-29 PROCEDURE — 93005 ELECTROCARDIOGRAM TRACING: CPT | Performed by: STUDENT IN AN ORGANIZED HEALTH CARE EDUCATION/TRAINING PROGRAM

## 2024-06-29 PROCEDURE — 84443 ASSAY THYROID STIM HORMONE: CPT | Performed by: EMERGENCY MEDICINE

## 2024-06-29 PROCEDURE — 96365 THER/PROPH/DIAG IV INF INIT: CPT

## 2024-06-29 PROCEDURE — 80053 COMPREHEN METABOLIC PANEL: CPT | Performed by: EMERGENCY MEDICINE

## 2024-06-29 PROCEDURE — 36415 COLL VENOUS BLD VENIPUNCTURE: CPT | Performed by: EMERGENCY MEDICINE

## 2024-06-29 PROCEDURE — 250N000013 HC RX MED GY IP 250 OP 250 PS 637: Performed by: STUDENT IN AN ORGANIZED HEALTH CARE EDUCATION/TRAINING PROGRAM

## 2024-06-29 RX ORDER — SODIUM CHLORIDE 9 MG/ML
INJECTION, SOLUTION INTRAVENOUS CONTINUOUS
Status: DISCONTINUED | OUTPATIENT
Start: 2024-06-29 | End: 2024-06-29

## 2024-06-29 RX ORDER — LIDOCAINE 40 MG/G
CREAM TOPICAL
Status: DISCONTINUED | OUTPATIENT
Start: 2024-06-29 | End: 2024-07-01 | Stop reason: HOSPADM

## 2024-06-29 RX ORDER — QUETIAPINE FUMARATE 25 MG/1
25 TABLET, FILM COATED ORAL DAILY PRN
Status: DISCONTINUED | OUTPATIENT
Start: 2024-06-29 | End: 2024-07-01 | Stop reason: HOSPADM

## 2024-06-29 RX ORDER — ENOXAPARIN SODIUM 100 MG/ML
40 INJECTION SUBCUTANEOUS EVERY 24 HOURS
Status: DISCONTINUED | OUTPATIENT
Start: 2024-06-29 | End: 2024-07-01 | Stop reason: HOSPADM

## 2024-06-29 RX ORDER — CEFTRIAXONE 1 G/1
1 INJECTION, POWDER, FOR SOLUTION INTRAMUSCULAR; INTRAVENOUS ONCE
Status: COMPLETED | OUTPATIENT
Start: 2024-06-29 | End: 2024-06-29

## 2024-06-29 RX ORDER — IPRATROPIUM BROMIDE AND ALBUTEROL SULFATE 2.5; .5 MG/3ML; MG/3ML
3 SOLUTION RESPIRATORY (INHALATION) EVERY 4 HOURS PRN
Status: DISCONTINUED | OUTPATIENT
Start: 2024-06-29 | End: 2024-07-01 | Stop reason: HOSPADM

## 2024-06-29 RX ORDER — ACETAMINOPHEN 650 MG/1
650 SUPPOSITORY RECTAL EVERY 4 HOURS PRN
Status: DISCONTINUED | OUTPATIENT
Start: 2024-06-29 | End: 2024-07-01 | Stop reason: HOSPADM

## 2024-06-29 RX ORDER — SODIUM CHLORIDE 9 MG/ML
INJECTION, SOLUTION INTRAVENOUS CONTINUOUS
Status: ACTIVE | OUTPATIENT
Start: 2024-06-29 | End: 2024-06-29

## 2024-06-29 RX ORDER — CEFTRIAXONE 1 G/1
1 INJECTION, POWDER, FOR SOLUTION INTRAMUSCULAR; INTRAVENOUS EVERY 24 HOURS
Status: DISCONTINUED | OUTPATIENT
Start: 2024-06-30 | End: 2024-07-01 | Stop reason: HOSPADM

## 2024-06-29 RX ORDER — AMOXICILLIN 250 MG
2 CAPSULE ORAL 2 TIMES DAILY PRN
Status: DISCONTINUED | OUTPATIENT
Start: 2024-06-29 | End: 2024-07-01 | Stop reason: HOSPADM

## 2024-06-29 RX ORDER — CALCIUM CARBONATE 500 MG/1
1000 TABLET, CHEWABLE ORAL 4 TIMES DAILY PRN
Status: DISCONTINUED | OUTPATIENT
Start: 2024-06-29 | End: 2024-07-01 | Stop reason: HOSPADM

## 2024-06-29 RX ORDER — ACETAMINOPHEN 325 MG/1
650 TABLET ORAL EVERY 4 HOURS PRN
Status: DISCONTINUED | OUTPATIENT
Start: 2024-06-29 | End: 2024-07-01 | Stop reason: HOSPADM

## 2024-06-29 RX ORDER — AMOXICILLIN 250 MG
1 CAPSULE ORAL 2 TIMES DAILY PRN
Status: DISCONTINUED | OUTPATIENT
Start: 2024-06-29 | End: 2024-07-01 | Stop reason: HOSPADM

## 2024-06-29 RX ORDER — POLYETHYLENE GLYCOL 3350 17 G/17G
17 POWDER, FOR SOLUTION ORAL DAILY
Status: DISCONTINUED | OUTPATIENT
Start: 2024-06-29 | End: 2024-07-01 | Stop reason: HOSPADM

## 2024-06-29 RX ADMIN — SODIUM CHLORIDE: 9 INJECTION, SOLUTION INTRAVENOUS at 14:29

## 2024-06-29 RX ADMIN — CEFTRIAXONE SODIUM 1 G: 1 INJECTION, POWDER, FOR SOLUTION INTRAMUSCULAR; INTRAVENOUS at 10:06

## 2024-06-29 RX ADMIN — POLYETHYLENE GLYCOL 3350 17 G: 17 POWDER, FOR SOLUTION ORAL at 11:00

## 2024-06-29 RX ADMIN — ENOXAPARIN SODIUM 40 MG: 40 INJECTION SUBCUTANEOUS at 11:00

## 2024-06-29 RX ADMIN — IPRATROPIUM BROMIDE AND ALBUTEROL SULFATE 3 ML: .5; 3 SOLUTION RESPIRATORY (INHALATION) at 13:40

## 2024-06-29 RX ADMIN — SODIUM CHLORIDE 500 ML: 9 INJECTION, SOLUTION INTRAVENOUS at 08:50

## 2024-06-29 ASSESSMENT — ACTIVITIES OF DAILY LIVING (ADL)
ADLS_ACUITY_SCORE: 42
ADLS_ACUITY_SCORE: 43
ADLS_ACUITY_SCORE: 42
ADLS_ACUITY_SCORE: 46
ADLS_ACUITY_SCORE: 46
ADLS_ACUITY_SCORE: 42
ADLS_ACUITY_SCORE: 42
ADLS_ACUITY_SCORE: 38
ADLS_ACUITY_SCORE: 42

## 2024-06-29 ASSESSMENT — ENCOUNTER SYMPTOMS
DIARRHEA: 0
CONFUSION: 1
SHORTNESS OF BREATH: 0
VOMITING: 0
FEVER: 0
HEADACHES: 0
ABDOMINAL PAIN: 0

## 2024-06-29 ASSESSMENT — COLUMBIA-SUICIDE SEVERITY RATING SCALE - C-SSRS
6. HAVE YOU EVER DONE ANYTHING, STARTED TO DO ANYTHING, OR PREPARED TO DO ANYTHING TO END YOUR LIFE?: NO
1. IN THE PAST MONTH, HAVE YOU WISHED YOU WERE DEAD OR WISHED YOU COULD GO TO SLEEP AND NOT WAKE UP?: NO
2. HAVE YOU ACTUALLY HAD ANY THOUGHTS OF KILLING YOURSELF IN THE PAST MONTH?: NO
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
## 2024-06-29 NOTE — PHARMACY-ADMISSION MEDICATION HISTORY
Pharmacist Admission Medication History    Admission medication history is complete. The information provided in this note is only as accurate as the sources available at the time of the update.    Information Source(s): Family member and CareEverywhere/SureScripts via N/A    Pertinent Information: The patient's wife dropped off the med list with the nurses on short stay.  Unable to talk with the family to update last doses.    Changes made to PTA medication list:  Added: None  Deleted: acetaminophen, albuterol, famotidine, Advair/Wixela, melatonin  Changed: None    Allergies reviewed with patient and updates made in EHR: unable to assess    Medication History Completed By: Ana Bragg [facility] 6/29/2024 3:27 PM    PTA Med List   Medication Sig Last Dose    amLODIPine (NORVASC) 2.5 MG tablet Take 2.5 mg by mouth daily Unknown    busPIRone (BUSPAR) 5 MG tablet Take 5 mg by mouth 2 times daily Unknown    Cholecalciferol (VITAMIN D3) 50 MCG (2000 UT) CAPS Take 1 tablet by mouth daily  Unknown    DULoxetine (CYMBALTA) 60 MG capsule Take 60 mg by mouth daily Unknown    furosemide (LASIX) 20 MG tablet Take 20 mg by mouth daily Unknown    latanoprost (XALATAN) 0.005 % ophthalmic solution Place 1 drop into both eyes At Bedtime Unknown    levothyroxine (SYNTHROID/LEVOTHROID) 88 MCG tablet Take 88 mcg by mouth daily before breakfast Unknown    losartan (COZAAR) 25 MG tablet Take 1 tablet (25 mg) by mouth daily (Patient taking differently: Take 25 mg by mouth daily) Unknown    metoprolol succinate ER (TOPROL-XL) 25 MG 24 hr tablet Take 12.5 mg by mouth daily Unknown    multivitamin w/minerals (THERA-VIT-M) tablet Take 1 tablet by mouth daily Unknown    OLANZapine (ZYPREXA) 5 MG tablet Take 7.5 mg by mouth At Bedtime Unknown    pantoprazole (PROTONIX) 40 MG EC tablet Take 1 tablet (40 mg) by mouth every morning (before breakfast) (Patient taking differently: Take 40 mg by mouth every morning (before breakfast)) Unknown     rosuvastatin (CRESTOR) 10 MG tablet Take 10 mg by mouth At Bedtime Unknown

## 2024-06-29 NOTE — ED NOTES
Patient sitting in recliner eating lunch. A&O x4 with some intermittent short term forgetfulness. Patient states he is having a hard time getting comfortable. Denies any pain. Expiratory wheezing heard and O2 sats 89-90%, administered Duoneb. Patient remains cooperative and updated on plan of care.

## 2024-06-29 NOTE — ED TRIAGE NOTES
Patient arrives via EMS for increasing confusion at home over the past 2-3 days. Patient has some baseline confusion, but wife has noticed it has been worse. Hx of UTIs in the past. Patient did become aggressive with wife this morning when he was attempting to wander outside and look for his dog (which does not exist).      Triage Assessment (Adult)       Row Name 06/29/24 0830          Triage Assessment    Airway WDL WDL        Respiratory WDL    Respiratory WDL WDL        Skin Circulation/Temperature WDL    Skin Circulation/Temperature WDL WDL        Cardiac WDL    Cardiac WDL X;rhythm     Pulse Rate & Regularity tachycardic        Peripheral/Neurovascular WDL    Peripheral Neurovascular WDL WDL        Cognitive/Neuro/Behavioral WDL    Cognitive/Neuro/Behavioral WDL X     Level of Consciousness confused        [place] Coma Scale    Best Eye Response 4-->(E4) spontaneous     Best Motor Response 6-->(M6) obeys commands     Best Verbal Response 4-->(V4) confused     [place] Coma Scale Score 14

## 2024-06-29 NOTE — H&P
"[facility]    History and Physical - Hospitalist Service       Date of Admission:  6/29/2024    Assessment & Plan      Dominik Rojas is a 83 year old male admitted on 6/29/2024 for altered mental status in the setting of UTI. He has a medical history of COPD on chronic home oxygen, diastolic dysfunction, hypothyroidism. CT head negative for acute abnormalities.     UTI  Acute metabolic and toxic encephalopathy  -Urine culture pending  -Continue ceftriaxone pending results  -will run soft maintenance fluids throughout the evening due to continued tachycardia and low PO intake   -Low-dose PRN Seroquel for aggressive behaviors  -Will check TSH, additional diagnostics for encephalopathy pending response to infection    Elevated alk phos  -No abdominal symptoms, alk phos marginally elevated  -Trend with AM labs    Chronic respiratory failure   COPD   -DuoNebs as needed pending pharmacy med rec  -CXR pending  -continue O2 as needed with saturation goal >88%    Pre-diabetes  -monitor as needed    Hypothyroidism  HTN  Anxiety   GERD  -will await pharmacy med rec for these chronic issues      Diet: Combination Diet Regular Diet Adult  DVT Prophylaxis: Lovenox   Cabral Catheter: Not present  Lines: None     Cardiac Monitoring: None  Code Status: Full Code    Clinically Significant Risk Factors Present on Admission              # Hypoalbuminemia: Lowest albumin = 3.4 g/dL at 6/29/2024  8:27 AM, will monitor as appropriate   # Drug Induced Platelet Defect: home medication list includes an antiplatelet medication   # Hypertension: Noted on problem list   # Dementia: noted on problem list             # Obesity: Estimated body mass index is 35.51 kg/m  as calculated from the following:    Height as of this encounter: 1.676 m (5' 6\").    Weight as of this encounter: 99.8 kg (220 lb).              Disposition Plan     Medically Ready for Discharge: Anticipated Tomorrow           Ayaan Mckinnon, " DO  Hospitalist Service  [facility]  Securely message with [employer] (more info)  Text page via [employer] Paging/Directory     ______________________________________________________________________    Chief Complaint   confusion    History is obtained from the patient, chart review     History of Present Illness   Dominik Rojas is a 83 year old male who was brought into the hospital after wife noted confusion with aggressive behaviors.  Per review, patient was confused throughout the morning and was noted to go outside to find a family dog which the family does not have.  He then became acutely agitated with his wife at this time.  Patient himself says he feels a bit off but does not remember exactly why he came in.  Patient states he remembers going outside to look for a dog but otherwise admits that he has been feeling rather confused over the last day or so.  He also states he has noticed increased haziness to his urine.  He denies any fevers, chills, dysuria, nausea, vomiting, abdominal pain, headache, visual changes and weakness.      Past Medical History    Past Medical History:   Diagnosis Date    AAA (abdominal aortic aneurysm) (H)     s/p stenting    Alcohol dependence in remission (H)     Alcohol use disorder, severe, in sustained remission, dependence (H) 8/16/2021    Anxiety     Atrial fibrillation with normal ventricular rate (H)     Benign prostatic hyperplasia with lower urinary tract symptoms     Bronchiectasis without complication (H)     2/27/2020    COPD (chronic obstructive pulmonary disease) (H)     CVA (cerebral vascular accident) (H) 2019    DDD (degenerative disc disease), lumbar     Depression     Depressive disorder     Diabetes (H)     Diastolic dysfunction 01/22/2021    DJD (degenerative joint disease) of knee     left    Essential hypertension     Glaucoma     Glaucoma     History of stroke without residual deficits     Hyperlipidemia     Hypertension      Hypothyroidism     Hypothyroidism     Kidney stones     Major depression     Memory problem     Nocturia     Obesity     Opioid use disorder, severe, dependence (H) 8/16/2021    Overactive bladder 02/25/2021    Primary osteoarthritis of left knee     Psoriasis     Psoriasis     Seborrheic keratoses     Somnolence, daytime     Stenosis of right carotid artery     history of TIA's       Past Surgical History   Past Surgical History:   Procedure Laterality Date    ABDOMEN SURGERY      ABDOMINAL AORTIC ANEURYSM REPAIR  2013    stent    CAROTID ENDARTERECTOMY Right 9/9/2019    Procedure: RIGHT CAROTID ENDARTERECTOMY;  Surgeon: Miguel Muller MD;  Location: [facility];  Service: General    CIRCUMCISION      CYSTOSCOPY      CYSTOSCOPY PROSTATE W/ LASER N/A 6/12/2018    Procedure:  TRANSURETHRAL RESECTION OF THE PROSTATE WITH QUANTA LASER;  Surgeon: Eze Levi MD;  Location: [facility];  Service:     CYSTOSCOPY PROSTATE W/ LASER N/A 2/18/2020    Procedure: CYSTOSCOPY WITH TRANSURETHRAL INCISION OF THE PROSTATE WITH QUANTA LASER;  Surgeon: Eze Levi MD;  Location: [facility];  Service: Urology    CYSTOSCOPY W/ LITHOLAPAXY / EHL N/A 6/12/2018    Procedure: CYSTOSCOPY AND LITHOLAPAXY;  Surgeon: Eze Levi MD;  Location: [facility];  Service:     IR ABDOMINAL AORTAGRAM  5/19/2020    IR ABDOMINAL AORTIC ANEURYSM ENDOGRAFT  5/19/2020    IR ABDOMINAL AORTOGRAM  5/19/2020    IR ABDOMINAL ENDOVASCULAR STENT GRAFT  5/19/2020    LITHOTRIPSY      PERCUTANEOUS NEPHROSTOLITHOTOMY Right 03/10/2020    ZZC HUANG W/O FACETEC FORAMOT/DSKC 1/2 VRT SEG, LUMBAR Bilateral 3/3/2021    Procedure: Bilateral lumbar 2 - Lumbar 4 decompressive lumbar laminectomy with medial facetectomies;  Surgeon: Renée King MD;  Location: [facility];  Service: Spine       Prior to Admission Medications   Prior to Admission Medications   Prescriptions Last Dose Informant Patient Reported?  Taking?   Cholecalciferol (VITAMIN D3) 50 MCG (2000 UT) CAPS   Yes No   Sig: Take 1 tablet by mouth daily    DULoxetine (CYMBALTA) 60 MG capsule   Yes No   Sig: Take 60 mg by mouth daily   Melatonin 10 MG TABS tablet   Yes No   Sig: Take 10 mg by mouth nightly as needed   OLANZapine (ZYPREXA) 5 MG tablet   Yes No   Sig: Take 7.5 mg by mouth At Bedtime   acetaminophen (TYLENOL) 500 MG tablet   Yes No   Sig: acetaminophen 500 mg tablet   TAKE 2 TABLETS (1,000 MG) BY MOUTH EVERY 8 HOURS FOR 30 DAYS   albuterol (PROAIR HFA/PROVENTIL HFA/VENTOLIN HFA) 108 (90 Base) MCG/ACT inhaler   No No   Sig: Inhale 2 puffs into the lungs every 6 hours as needed for shortness of breath, wheezing or cough Use second line for acute SOB or wheezing if nebulizer unable to be used   Patient taking differently: Inhale 2 puffs into the lungs every 6 hours as needed for shortness of breath, wheezing or cough Use second line for acute SOB or wheezing if nebulizer unable to be used   albuterol (PROVENTIL) (2.5 MG/3ML) 0.083% neb solution   No No   Sig: Take 1 vial (2.5 mg) by nebulization every 6 hours as needed for shortness of breath, wheezing or cough Use first line for acute SOB or wheezing   Patient taking differently: Take 2.5 mg by nebulization every 6 hours as needed for shortness of breath, wheezing or cough Use first line for acute SOB or wheezing   amLODIPine (NORVASC) 2.5 MG tablet   Yes No   Sig: Take 2.5 mg by mouth daily   aspirin (ASA) 81 MG chewable tablet   Yes No   Sig: Take 81 mg by mouth daily   busPIRone (BUSPAR) 5 MG tablet   Yes No   Sig: Take 5 mg by mouth 2 times daily   famotidine (PEPCID) 20 MG tablet   No No   Sig: Take 1 tablet (20 mg) by mouth 2 times daily   fluticasone-salmeterol (ADVAIR) 250-50 MCG/ACT inhaler   No No   Sig: Inhale 1 puff into the lungs every 12 hours for 30 days   fluticasone-salmeterol (WIXELA INHUB) 250-50 MCG/ACT inhaler   Yes No   Patient not taking: Reported on 3/8/2024   furosemide  (LASIX) 20 MG tablet   Yes No   Sig: Take 20 mg by mouth daily   latanoprost (XALATAN) 0.005 % ophthalmic solution   Yes No   Sig: Place 1 drop into both eyes At Bedtime   levothyroxine (SYNTHROID/LEVOTHROID) 88 MCG tablet   Yes No   losartan (COZAAR) 25 MG tablet   No No   Sig: Take 1 tablet (25 mg) by mouth daily   Patient taking differently: Take 25 mg by mouth daily   metoprolol succinate ER (TOPROL-XL) 25 MG 24 hr tablet   Yes No   Sig: Take 12.5 mg by mouth daily   multivitamin w/minerals (THERA-VIT-M) tablet   Yes No   Sig: Take 1 tablet by mouth daily   pantoprazole (PROTONIX) 40 MG EC tablet   No No   Sig: Take 1 tablet (40 mg) by mouth every morning (before breakfast)   Patient taking differently: Take 40 mg by mouth every morning (before breakfast)   rosuvastatin (CRESTOR) 10 MG tablet   Yes No   Sig: Take 10 mg by mouth At Bedtime      Facility-Administered Medications: None        Social History   I have reviewed this patient's social history and updated it with pertinent information if needed.  Social History     Tobacco Use    Smoking status: Former     Current packs/day: 0.00     Average packs/day: 0.5 packs/day for 35.0 years (17.5 ttl pk-yrs)     Types: Cigarettes     Start date: 1955     Quit date: 1990     Years since quittin.5    Smokeless tobacco: Never    Tobacco comments:     quit    Substance Use Topics    Alcohol use: No     Comment: Alcoholic Drinks/day: quit     Drug use: No         Family History   I have reviewed this patient's family history and updated it with pertinent information if needed.  Family History   Problem Relation Age of Onset    Emphysema Mother     No Known Problems Father     Cirrhosis Father     No Known Problems Sister     No Known Problems Brother     No Known Problems Maternal Aunt     No Known Problems Maternal Uncle     No Known Problems Paternal Aunt     No Known Problems Paternal Uncle     No Known Problems Maternal Grandmother     No Known  Problems Maternal Grandfather     No Known Problems Paternal Grandmother     No Known Problems Paternal Grandfather     No Known Problems Other          Allergies   Allergies   Allergen Reactions    Diclofenac      Other reaction(s): GI Upset    Loratadine      Other reaction(s): Urinary Retention    Sertraline Unknown     Other reaction(s): ineffective        Physical Exam   Vital Signs: Temp: 98.7  F (37.1  C) Temp src: Oral BP: 105/76 Pulse: 115   Resp: 22 SpO2: 93 % O2 Device: Nasal cannula Oxygen Delivery: 3 LPM  Weight: 220 lbs 0 oz    General Appearance: No acute distress, nontoxic  Respiratory: Decreased air movement bilaterally without any wheezing, thin comfortably on chronic nasal cannula  Cardiovascular: Tachycardic with normal S1 and S2  GI: No pain, rebound or guarding  Skin: Scattered keratosis, no rash   Other: Mild tangential thoughts, making eye contact appropriately, alert and oriented x4    Medical Decision Making       75 MINUTES SPENT BY ME on the date of service doing chart review, history, exam, documentation & further activities per the note.      Data     I have personally reviewed the following data over the past 24 hrs:    12.8 (H)  \   13.7   / 294     140 101 19.9 /  147 (H)   4.1 27 1.08 \     ALT: 14 AST: 25 AP: 153 (H) TBILI: 0.7   ALB: 3.4 (L) TOT PROTEIN: 7.0 LIPASE: N/A     TSH: 2.37; 2.32 T4: N/A A1C: N/A     Procal: N/A CRP: N/A Lactic Acid: 1.6         Imaging results reviewed over the past 24 hrs:   Recent Results (from the past 24 hour(s))   CT Head w/o Contrast    Narrative    EXAM: CT HEAD W/O CONTRAST  LOCATION: [facility]  DATE: 6/29/2024    INDICATION: ams  COMPARISON: None.  TECHNIQUE: Routine CT Head without IV contrast. Multiplanar reformats. Dose reduction techniques were used.    FINDINGS:  INTRACRANIAL CONTENTS: No evidence of acute intracranial hemorrhage or mass effect. Few scattered foci of decreased attenuation within the cerebral  hemispheric white matter which are nonspecific, though most commonly ascribed to chronic small vessel   ischemic disease. The ventricles and sulci are prominent consistent with moderate brain parenchymal volume loss. Gray-white matter differentiation is maintained. The basilar cisterns are patent.    VISUALIZED ORBITS/SINUSES/MASTOIDS: The globes are unremarkable. The partially imaged paranasal sinuses, mastoid air cells and middle ear cavities are unremarkable.     BONES/SOFT TISSUES: The visualized skull base and calvarium are unremarkable.      Impression    IMPRESSION:    1.  No evidence of acute intracranial hemorrhage or mass effect.  2.  Mild nonspecific white matter changes. Moderate brain parenchymal volume loss.   XR Chest Port 1 View    Narrative    EXAM: XR CHEST PORTABLE 1 VIEW  LOCATION: [facility]  DATE: 06/29/2024    INDICATION: Confusion.  COMPARISON: Chest CT on 03/04/2024.      Impression    IMPRESSION: Single AP view of the chest was obtained. Cardiomediastinal silhouette is within normal limits. Small right pleural effusion and associated basilar atelectasis/consolidation. No significant left pleural effusion. Mild left basilar pulmonary   opacities, likely atelectasis. No significant pneumothorax.

## 2024-06-29 NOTE — ED PROVIDER NOTES
EMERGENCY DEPARTMENT ENCOUNTER      NAME: Dominik Rojas  AGE: 83 year old male  YOB: 1940  MRN: [id number]  EVALUATION DATE & TIME: 6/29/2024  8:24 AM    PCP: Misael Moe    ED PROVIDER: Jackelyn Christian M.D.      CHIEF COMPLAINT     Chief Complaint   Patient presents with    Altered Mental Status         FINAL IMPRESSION:     1. Acute cystitis with hematuria    2. Aggressive behavior    3. History of dementia    4. Chronic hypoxic respiratory failure, on home oxygen therapy (H)          MEDICAL DECISION MAKING:       Pertinent Labs & Imaging studies reviewed. (See chart for details)    83 year old male presents to the Emergency Department for evaluation of aggresive    History  Supplemental history from EMS and wife  External Record(s) review as documented below    Exam  Well-appearing cooperative and pleasant nontoxic sitting in bed on oxygen and oriented x 2    Differential Diagnosis include but not limited to urinary tract infection and worsening Alzheimer's CVA electrolyte abnormalities endocrine abnormalities sepsis among others.      Vital Signs: Tachycardic  EKG: None  Imaging: I independently interpreted CT head no intracranial hemorrhage.Formal read by radiologist.  Home meds:  ED meds: rocephin  Fluids: ns  Labs:  K 4.1  Cr 1.08  Wbc 12.8  Hgb 13.7  platelets 294    Clinical Impression and Decision Making    83-year-old male presents via EMS.  Following history is provided by EMS patient and wife.    Patient has a history of dementia today became aggressive with his wife) the arm actually broke part of the skin.    He started to go outside looking for a dog but he does not have a dog.    No recent trauma.    No recent vomiting or diarrhea.    On arrival patient is awake alert oriented x 2 does not know the president but is quite cooperative and is able to tell me that he does have problems urinary problems.    IV established.  He is afebrile.    Labs reveal normal renal function  elevated white blood cell count with no left shift.  Urine concerning for infection    I reviewed external records he had grown Enterococcus but there were no susceptibilities.    Started on Rocephin.    Patient on chronic oxygen.  Denies any chest pain denies any shortness of breath tachycardia without fever.  Will obtain a chest x-ray.    Discussed with patient's wife.  She is in agreement with plan.    Patient admitted in stable condition with guarded diagnosis.    I discussed with wife.  Right now she does not feel comfortable with him going home given his episode of aggressiveness.    Patient has been quite cooperative here.  Will admit him for antibiotics and further evaluation.    In addition to the work out documented, I considered the following work up lumbar puncture.  Patient has no meningismus.  Awake alert.  Does baseline dementia.           Medical Decision Making    History:  Supplemental history from: Documented in chart  External Record(s) reviewed: Documented in chart    Work Up:  Chart documentation includes differential considered and any EKGs or imaging independently interpreted by provider, where specified.  In additional to work up documented, I considered the following work up: Documented in chart, if applicable.    External consultation:  Discussion of management with another provider: Documented in chart, if applicable    Complicating factors:  Care impacted by chronic illness: Dementia  Care affected by social determinants of health: N/A    Disposition considerations: Admit.      Review of External Records  Hospital/Clinic: [facility] [place]  Date: 3/26/2024  Patient has a history of lung cancer    External Consultation  Hospitalist      ED COURSE   8:32 AM I introduced myself to the patient while obtaining history. I performed my physical exam and we discussed plan for care during his ED stay.   8:39 AM I spoke with the patient's wife over the phone to obtain additional history.    10:12 AM I spoke with hospitalist Dr. Mckinnon who accepted the for medical surgery observation.   1:04 PM reevaluated and updated sitting on a chair cooperative in no distress.    At the conclusion of the encounter I discussed the results of all of the tests and the disposition. The questions were answered. The patient and his wife acknowledged understanding and was agreeable with the care plan.     MEDICATIONS GIVEN IN THE EMERGENCY:     Medications   sodium chloride (PF) 0.9% PF flush 3 mL (has no administration in time range)   lidocaine 1 % 0.1-1 mL (has no administration in time range)   lidocaine (LMX4) cream (has no administration in time range)   sodium chloride (PF) 0.9% PF flush 3 mL (3 mLs Intracatheter $Given 6/29/24 1100)   sodium chloride (PF) 0.9% PF flush 3 mL (has no administration in time range)   senna-docusate (SENOKOT-S/PERICOLACE) 8.6-50 MG per tablet 1 tablet (has no administration in time range)     Or   senna-docusate (SENOKOT-S/PERICOLACE) 8.6-50 MG per tablet 2 tablet (has no administration in time range)   calcium carbonate (TUMS) chewable tablet 1,000 mg (has no administration in time range)   enoxaparin ANTICOAGULANT (LOVENOX) injection 40 mg (40 mg Subcutaneous $Given 6/29/24 1100)   acetaminophen (TYLENOL) tablet 650 mg (has no administration in time range)     Or   acetaminophen (TYLENOL) Suppository 650 mg (has no administration in time range)   polyethylene glycol (MIRALAX) Packet 17 g (17 g Oral $Given 6/29/24 1100)   melatonin tablet 1 mg (has no administration in time range)   QUEtiapine (SEROquel) tablet 25 mg (has no administration in time range)   cefTRIAXone (ROCEPHIN) 1 g vial to attach to  mL bag for ADULTS or NS 50 mL bag for PEDS (has no administration in time range)   sodium chloride 0.9% BOLUS 500 mL (0 mLs Intravenous Stopped 6/29/24 0922)   cefTRIAXone (ROCEPHIN) 1 g vial to attach to  mL bag for ADULTS or NS 50 mL bag for PEDS (0 g Intravenous Stopped  6/29/24 1051)       NEW PRESCRIPTIONS STARTED AT TODAY'S ER VISIT     New Prescriptions    No medications on file          =================================================================    HPI     Patient information was obtained from: the patient, the patient's wife and EMS personnel    Use of : N/A        Per EMS, Dominik Rojas is a 83 year old male who presents by EMS due to ~2-3 days of worsening confusion. Patient lives with wife who notes baseline memory issues. Today while attempting to go outside to see a dog the reportedly does not exist, he became physical with his wife. EMS also notes a strong odorous smell.     Per patient's wife, she reports he seemed fine yesterday but this morning he grabbed onto her arm hard enough to break the skin while he went to look for a dog. She reports he has never acted like this in the past. Does endorse a history of falls but denies any recently.     Patient reports no pain at this time. Denies any headache, chest pain, shortness of breath, abdominal pain, vomiting, diarrhea and fevers.       REVIEW OF SYSTEMS   Review of Systems   Constitutional:  Negative for fever.   Respiratory:  Negative for shortness of breath.    Cardiovascular:  Negative for chest pain.   Gastrointestinal:  Negative for abdominal pain, diarrhea and vomiting.   Neurological:  Negative for headaches.   Psychiatric/Behavioral:  Positive for confusion.    All other systems reviewed and are negative.       PAST MEDICAL HISTORY:     Past Medical History:   Diagnosis Date    AAA (abdominal aortic aneurysm) (H)     s/p stenting    Alcohol dependence in remission (H)     Alcohol use disorder, severe, in sustained remission, dependence (H) 8/16/2021    Anxiety     Atrial fibrillation with normal ventricular rate (H)     Benign prostatic hyperplasia with lower urinary tract symptoms     Bronchiectasis without complication (H)     2/27/2020    COPD (chronic obstructive pulmonary disease) (H)      CVA (cerebral vascular accident) (H) 2019    DDD (degenerative disc disease), lumbar     Depression     Depressive disorder     Diabetes (H)     Diastolic dysfunction 01/22/2021    DJD (degenerative joint disease) of knee     left    Essential hypertension     Glaucoma     Glaucoma     History of stroke without residual deficits     Hyperlipidemia     Hypertension     Hypothyroidism     Hypothyroidism     Kidney stones     Major depression     Memory problem     Nocturia     Obesity     Opioid use disorder, severe, dependence (H) 8/16/2021    Overactive bladder 02/25/2021    Primary osteoarthritis of left knee     Psoriasis     Psoriasis     Seborrheic keratoses     Somnolence, daytime     Stenosis of right carotid artery     history of TIA's       PAST SURGICAL HISTORY:     Past Surgical History:   Procedure Laterality Date    ABDOMEN SURGERY      ABDOMINAL AORTIC ANEURYSM REPAIR  2013    stent    CAROTID ENDARTERECTOMY Right 9/9/2019    Procedure: RIGHT CAROTID ENDARTERECTOMY;  Surgeon: Miguel Muller MD;  Location: [facility];  Service: General    CIRCUMCISION      CYSTOSCOPY      CYSTOSCOPY PROSTATE W/ LASER N/A 6/12/2018    Procedure:  TRANSURETHRAL RESECTION OF THE PROSTATE WITH QUANTA LASER;  Surgeon: Eze Levi MD;  Location: [facility];  Service:     CYSTOSCOPY PROSTATE W/ LASER N/A 2/18/2020    Procedure: CYSTOSCOPY WITH TRANSURETHRAL INCISION OF THE PROSTATE WITH QUANTA LASER;  Surgeon: Eze Levi MD;  Location: [facility];  Service: Urology    CYSTOSCOPY W/ LITHOLAPAXY / EHL N/A 6/12/2018    Procedure: CYSTOSCOPY AND LITHOLAPAXY;  Surgeon: Eze Levi MD;  Location: [facility];  Service:     IR ABDOMINAL AORTAGRAM  5/19/2020    IR ABDOMINAL AORTIC ANEURYSM ENDOGRAFT  5/19/2020    IR ABDOMINAL AORTOGRAM  5/19/2020    IR ABDOMINAL ENDOVASCULAR STENT GRAFT  5/19/2020    LITHOTRIPSY      PERCUTANEOUS NEPHROSTOLITHOTOMY Right 03/10/2020    On license of [facility]  W/O FACETEC FORAMOT/DSKC 1/2 VRT SEG, LUMBAR Bilateral 3/3/2021    Procedure: Bilateral lumbar 2 - Lumbar 4 decompressive lumbar laminectomy with medial facetectomies;  Surgeon: Renée King MD;  Location: [facility];  Service: Spine         CURRENT MEDICATIONS:   acetaminophen (TYLENOL) 500 MG tablet  albuterol (PROAIR HFA/PROVENTIL HFA/VENTOLIN HFA) 108 (90 Base) MCG/ACT inhaler  albuterol (PROVENTIL) (2.5 MG/3ML) 0.083% neb solution  amLODIPine (NORVASC) 2.5 MG tablet  aspirin (ASA) 81 MG chewable tablet  busPIRone (BUSPAR) 5 MG tablet  Cholecalciferol (VITAMIN D3) 50 MCG (2000 UT) CAPS  DULoxetine (CYMBALTA) 60 MG capsule  famotidine (PEPCID) 20 MG tablet  fluticasone-salmeterol (ADVAIR) 250-50 MCG/ACT inhaler  fluticasone-salmeterol (WIXELA INHUB) 250-50 MCG/ACT inhaler  furosemide (LASIX) 20 MG tablet  latanoprost (XALATAN) 0.005 % ophthalmic solution  levothyroxine (SYNTHROID/LEVOTHROID) 88 MCG tablet  losartan (COZAAR) 25 MG tablet  Melatonin 10 MG TABS tablet  metoprolol succinate ER (TOPROL-XL) 25 MG 24 hr tablet  multivitamin w/minerals (THERA-VIT-M) tablet  OLANZapine (ZYPREXA) 5 MG tablet  pantoprazole (PROTONIX) 40 MG EC tablet  rosuvastatin (CRESTOR) 10 MG tablet         ALLERGIES:     Allergies   Allergen Reactions    Diclofenac      Other reaction(s): GI Upset    Loratadine      Other reaction(s): Urinary Retention    Sertraline Unknown     Other reaction(s): ineffective       FAMILY HISTORY:     Family History   Problem Relation Age of Onset    Emphysema Mother     No Known Problems Father     Cirrhosis Father     No Known Problems Sister     No Known Problems Brother     No Known Problems Maternal Aunt     No Known Problems Maternal Uncle     No Known Problems Paternal Aunt     No Known Problems Paternal Uncle     No Known Problems Maternal Grandmother     No Known Problems Maternal Grandfather     No Known Problems Paternal Grandmother     No Known Problems Paternal Grandfather      "No Known Problems Other        SOCIAL HISTORY:     Social History     Socioeconomic History    Marital status:    Tobacco Use    Smoking status: Former     Current packs/day: 0.00     Average packs/day: 0.5 packs/day for 35.0 years (17.5 ttl pk-yrs)     Types: Cigarettes     Start date: 1955     Quit date: 1990     Years since quittin.5    Smokeless tobacco: Never    Tobacco comments:     quit    Substance and Sexual Activity    Alcohol use: No     Comment: Alcoholic Drinks/day: quit     Drug use: No    Sexual activity: Yes     Partners: Female     Birth control/protection: Post-menopausal   Other Topics Concern    Parent/sibling w/ CABG, MI or angioplasty before 65F 55M? No     Social Determinants of Health      Received from [email or web address] [facility], [facility] [facility]    Social Connections       VITALS:   /73   Pulse 111   Temp 98.7  F (37.1  C) (Oral)   Resp 19   Ht 1.676 m (5' 6\")   Wt 99.8 kg (220 lb)   SpO2 94%   BMI 35.51 kg/m      PHYSICAL EXAM     Physical Exam  Vitals and nursing note reviewed.   Constitutional:       Appearance: He is obese. He is not ill-appearing, toxic-appearing or diaphoretic.   Neurological:      GCS: GCS eye subscore is 4. GCS verbal subscore is 5. GCS motor subscore is 6.      Comments: Oriented to self.  He knows he is at [facility].  Knows the month and the year.  Does not know where the president is   Psychiatric:         Behavior: Behavior normal.         Physical Exam   Constitutional: Non-toxic and cooperative.    Head: Atraumatic.     Nose: Nose normal.     Mouth/Throat: Oropharynx is clear and mucus membranes are dry.     Eyes: EOM are normal. Pupils are equal, round, and reactive to light.     Ears: Bilateral pearly white tympanic membranes.    Neck: Normal range of motion. Neck supple.     Cardiovascular: Tachycardia, regular rhythm and normal heart sounds.  2+ femoral " pulses/radial/DP pulses B    Pulmonary/Chest: Normal effort and breath sounds normal. On supplemental oxygen.     Abdominal: soft nontender.    Musculoskeletal: Normal range of motion.     Neurological: Moves upper and lower extremities equally. Awake, alert and oriented x2.     Lymphatics: no edema, no calves pain, no palpable cords.    : Wears depends.     Skin: Skin is warm and dry. Multiple verruca.     Psychiatric: Normal mood and affect. Behavior is normal.       LAB:     All pertinent labs reviewed and interpreted.  Labs Ordered and Resulted from Time of ED Arrival to Time of ED Departure   COMPREHENSIVE METABOLIC PANEL - Abnormal       Result Value    Sodium 140      Potassium 4.1      Carbon Dioxide (CO2) 27      Anion Gap 12      Urea Nitrogen 19.9      Creatinine 1.08      GFR Estimate 69      Calcium 8.8      Chloride 101      Glucose 147 (*)     Alkaline Phosphatase 153 (*)     AST 25      ALT 14      Protein Total 7.0      Albumin 3.4 (*)     Bilirubin Total 0.7     BLOOD GAS VENOUS - Abnormal    pH Venous 7.42      pCO2 Venous 46      pO2 Venous 40      Bicarbonate Venous 29 (*)     Base Excess/Deficit Venous 4.8 (*)     FIO2 32      Oxyhemoglobin Venous 72      O2 Sat, Venous 72.8     CBC WITH PLATELETS AND DIFFERENTIAL - Abnormal    WBC Count 12.8 (*)     RBC Count 4.76      Hemoglobin 13.7      Hematocrit 42.8      MCV 90      MCH 28.8      MCHC 32.0      RDW 14.2      Platelet Count 294      % Neutrophils 64      % Lymphocytes 27      % Monocytes 6      % Eosinophils 3      % Basophils 0      % Immature Granulocytes 0      NRBCs per 100 WBC 0      Absolute Neutrophils 8.2      Absolute Lymphocytes 3.4      Absolute Monocytes 0.8      Absolute Eosinophils 0.3      Absolute Basophils 0.1      Absolute Immature Granulocytes 0.0      Absolute NRBCs 0.0     ROUTINE UA WITH MICROSCOPIC REFLEX TO CULTURE - Abnormal    Color Urine Yellow      Appearance Urine Turbid (*)     Glucose Urine Negative       Bilirubin Urine Negative      Ketones Urine Trace (*)     Specific Gravity Urine 1.019      Blood Urine 0.1 mg/dL (*)     pH Urine 6.5      Protein Albumin Urine 30 (*)     Urobilinogen Urine 2.0 (*)     Nitrite Urine Negative      Leukocyte Esterase Urine 500 Juan/uL (*)     Bacteria Urine Many (*)     WBC Clumps Urine Present (*)     Mucus Urine Present (*)     RBC Urine 0      WBC Urine >182 (*)     Hyaline Casts Urine 5 (*)    LACTIC ACID WHOLE BLOOD WITH 1X REPEAT IN 2 HR WHEN >2 - Normal    Lactic Acid, Initial 1.6     INFLUENZA A/B, RSV, & SARS-COV2 PCR - Normal    Influenza A PCR Negative      Influenza B PCR Negative      RSV PCR Negative      SARS CoV2 PCR Negative     TSH WITH FREE T4 REFLEX - Normal    TSH 2.37     TSH WITH FREE T4 REFLEX - Normal    TSH 2.32     BLOOD CULTURE   URINE CULTURE        RADIOLOGY:     Reviewed all pertinent imaging. Please see official radiology report.  CT Head w/o Contrast   Final Result   IMPRESSION:     1.  No evidence of acute intracranial hemorrhage or mass effect.   2.  Mild nonspecific white matter changes. Moderate brain parenchymal volume loss.      XR Chest Port 1 View    (Results Pending)        EKG:       I have independently reviewed and interpreted the EKG(s) documented above.      PROCEDURES:     Procedures      I, Dago Petty, am serving as a scribe to document services personally performed by Dr. Christian based on my observation and the provider's statements to me. I, Jackelyn Christian MD attest that Dago Petty is acting in a scribe capacity, has observed my performance of the services and has documented them in accordance with my direction.    Jackelyn Christian M.D.  Emergency Medicine  [facility] EMERGENCY DEPARTMENT  [address] [place] [id number]  [phone]  Dept: [phone]       Jackelyn Christian MD  06/29/24 8014       Jackelyn Christian MD  06/29/24 5884

## 2024-06-29 NOTE — PROGRESS NOTES
"PRIMARY DIAGNOSIS: \"GENERIC\" NURSING  OUTPATIENT/OBSERVATION GOALS TO BE MET BEFORE DISCHARGE:  ADLs back to baseline: No    Activity and level of assistance: Assist of 2    Pain status: Pain free.    Return to near baseline physical activity: No     Discharge Planner Nurse   Safe discharge environment identified: No  Barriers to discharge: Yes       Entered by: Tasha Cárdenas RN 06/29/2024 6:21 PM     Please review provider order for any additional goals.   Nurse to notify provider when observation goals have been met and patient is ready for discharge.  "

## 2024-06-29 NOTE — ED NOTES
Bed: [place] [address]  Expected date:   Expected time:   Means of arrival:   Comments:  WB 83 male AMS, UTI

## 2024-06-29 NOTE — PROGRESS NOTES
Patient admitted to room 4 at approximately 1445 via wheel chair from emergency room.  Reason for Admission:   Report received from:   Patient was accompanied by Self.  Discharge transportation provided by:  Patient ambulated/transferred:  assist x2 pivot. self.  Patient is alert and orientated x 3; not oriented to situation.  Safety risks were identified during admission:  fall.   Yellow risk/fall band applied:  Yes  Home meds sent to pharmacy: IF YES add some kind of note to chart for discharging nurse (see 1/2 sheet of laminated paper in HUC drawer): wife dropped off Fixadent powder, eye drops, and cough drops. Primary RN sent to pharmacy.  Detailed Belongings: cane, shirt, short, brief (all in plastic shopping bag).

## 2024-06-29 NOTE — PLAN OF CARE
Problem: Adult Inpatient Plan of Care  Goal: Plan of Care Review  Description: The Plan of Care Review/Shift note should be completed every shift.  The Outcome Evaluation is a brief statement about your assessment that the patient is improving, declining, or no change.  This information will be displayed automatically on your shift  note.  Outcome: Progressing     Problem: Adult Inpatient Plan of Care  Goal: Optimal Comfort and Wellbeing  Outcome: Progressing  Intervention: Monitor Pain and Promote Comfort  Recent Flowsheet Documentation  Taken 6/29/2024 3774 by Tasha Cárdenas RN  Pain Management Interventions: medication (see MAR)     Problem: Delirium  Goal: Improved Behavioral Control  Outcome: Progressing     Problem: Infection  Goal: Absence of Infection Signs and Symptoms  Outcome: Progressing   Goal Outcome Evaluation:      Pt A&O x 3, forgetful but pleasant, denies pain,   ml/hr infusing, 3L NC  92-93%, pending blood and urine cultures. Primofit in place

## 2024-06-30 ENCOUNTER — APPOINTMENT (OUTPATIENT)
Dept: OCCUPATIONAL THERAPY | Facility: HOSPITAL | Age: 84
End: 2024-06-30
Attending: STUDENT IN AN ORGANIZED HEALTH CARE EDUCATION/TRAINING PROGRAM
Payer: COMMERCIAL

## 2024-06-30 ENCOUNTER — APPOINTMENT (OUTPATIENT)
Dept: PHYSICAL THERAPY | Facility: HOSPITAL | Age: 84
End: 2024-06-30
Attending: STUDENT IN AN ORGANIZED HEALTH CARE EDUCATION/TRAINING PROGRAM
Payer: COMMERCIAL

## 2024-06-30 PROBLEM — J96.11 CHRONIC RESPIRATORY FAILURE WITH HYPOXIA AND HYPERCAPNIA (H): Status: ACTIVE | Noted: 2024-06-30

## 2024-06-30 PROBLEM — J96.12 CHRONIC RESPIRATORY FAILURE WITH HYPOXIA AND HYPERCAPNIA (H): Status: ACTIVE | Noted: 2024-06-30

## 2024-06-30 PROBLEM — N30.01 ACUTE CYSTITIS WITH HEMATURIA: Status: RESOLVED | Noted: 2024-06-29 | Resolved: 2024-06-30

## 2024-06-30 PROBLEM — I50.22 CHRONIC SYSTOLIC CONGESTIVE HEART FAILURE (H): Status: ACTIVE | Noted: 2021-08-14

## 2024-06-30 PROBLEM — R82.71 ASYMPTOMATIC BACTERIURIA: Status: ACTIVE | Noted: 2024-06-30

## 2024-06-30 PROBLEM — R41.82 ALTERED MENTAL STATUS: Status: ACTIVE | Noted: 2021-08-19

## 2024-06-30 LAB
ALP SERPL-CCNC: 139 U/L (ref 40–150)
BACTERIA UR CULT: NORMAL
CREAT SERPL-MCNC: 0.79 MG/DL (ref 0.67–1.17)
EGFRCR SERPLBLD CKD-EPI 2021: 89 ML/MIN/1.73M2

## 2024-06-30 PROCEDURE — 82565 ASSAY OF CREATININE: CPT | Performed by: STUDENT IN AN ORGANIZED HEALTH CARE EDUCATION/TRAINING PROGRAM

## 2024-06-30 PROCEDURE — 97165 OT EVAL LOW COMPLEX 30 MIN: CPT | Mod: GO

## 2024-06-30 PROCEDURE — 96372 THER/PROPH/DIAG INJ SC/IM: CPT | Performed by: STUDENT IN AN ORGANIZED HEALTH CARE EDUCATION/TRAINING PROGRAM

## 2024-06-30 PROCEDURE — G0378 HOSPITAL OBSERVATION PER HR: HCPCS

## 2024-06-30 PROCEDURE — 97162 PT EVAL MOD COMPLEX 30 MIN: CPT | Mod: GP

## 2024-06-30 PROCEDURE — 250N000013 HC RX MED GY IP 250 OP 250 PS 637: Performed by: STUDENT IN AN ORGANIZED HEALTH CARE EDUCATION/TRAINING PROGRAM

## 2024-06-30 PROCEDURE — 250N000011 HC RX IP 250 OP 636: Performed by: STUDENT IN AN ORGANIZED HEALTH CARE EDUCATION/TRAINING PROGRAM

## 2024-06-30 PROCEDURE — 97535 SELF CARE MNGMENT TRAINING: CPT | Mod: GO

## 2024-06-30 PROCEDURE — 96376 TX/PRO/DX INJ SAME DRUG ADON: CPT

## 2024-06-30 PROCEDURE — 250N000013 HC RX MED GY IP 250 OP 250 PS 637: Performed by: INTERNAL MEDICINE

## 2024-06-30 PROCEDURE — 36415 COLL VENOUS BLD VENIPUNCTURE: CPT | Performed by: STUDENT IN AN ORGANIZED HEALTH CARE EDUCATION/TRAINING PROGRAM

## 2024-06-30 PROCEDURE — 99232 SBSQ HOSP IP/OBS MODERATE 35: CPT | Performed by: INTERNAL MEDICINE

## 2024-06-30 PROCEDURE — 84075 ASSAY ALKALINE PHOSPHATASE: CPT | Performed by: STUDENT IN AN ORGANIZED HEALTH CARE EDUCATION/TRAINING PROGRAM

## 2024-06-30 PROCEDURE — 97530 THERAPEUTIC ACTIVITIES: CPT | Mod: GP

## 2024-06-30 RX ORDER — ROSUVASTATIN CALCIUM 10 MG/1
10 TABLET, COATED ORAL AT BEDTIME
Status: DISCONTINUED | OUTPATIENT
Start: 2024-06-30 | End: 2024-07-01 | Stop reason: HOSPADM

## 2024-06-30 RX ORDER — BUSPIRONE HYDROCHLORIDE 5 MG/1
5 TABLET ORAL 2 TIMES DAILY
Status: DISCONTINUED | OUTPATIENT
Start: 2024-06-30 | End: 2024-07-01 | Stop reason: HOSPADM

## 2024-06-30 RX ORDER — LEVOTHYROXINE SODIUM 88 UG/1
88 TABLET ORAL
Status: DISCONTINUED | OUTPATIENT
Start: 2024-07-01 | End: 2024-07-01 | Stop reason: HOSPADM

## 2024-06-30 RX ORDER — DULOXETIN HYDROCHLORIDE 60 MG/1
60 CAPSULE, DELAYED RELEASE ORAL DAILY
Status: DISCONTINUED | OUTPATIENT
Start: 2024-06-30 | End: 2024-07-01 | Stop reason: HOSPADM

## 2024-06-30 RX ORDER — LATANOPROST 50 UG/ML
1 SOLUTION/ DROPS OPHTHALMIC AT BEDTIME
Status: DISCONTINUED | OUTPATIENT
Start: 2024-06-30 | End: 2024-07-01 | Stop reason: HOSPADM

## 2024-06-30 RX ORDER — PANTOPRAZOLE SODIUM 40 MG/1
40 TABLET, DELAYED RELEASE ORAL
Status: DISCONTINUED | OUTPATIENT
Start: 2024-07-01 | End: 2024-07-01 | Stop reason: HOSPADM

## 2024-06-30 RX ADMIN — METOPROLOL SUCCINATE 12.5 MG: 25 TABLET, EXTENDED RELEASE ORAL at 15:02

## 2024-06-30 RX ADMIN — OLANZAPINE 7.5 MG: 5 TABLET, FILM COATED ORAL at 22:09

## 2024-06-30 RX ADMIN — POLYETHYLENE GLYCOL 3350 17 G: 17 POWDER, FOR SOLUTION ORAL at 10:02

## 2024-06-30 RX ADMIN — CEFTRIAXONE SODIUM 1 G: 1 INJECTION, POWDER, FOR SOLUTION INTRAMUSCULAR; INTRAVENOUS at 09:59

## 2024-06-30 RX ADMIN — BUSPIRONE HYDROCHLORIDE 5 MG: 5 TABLET ORAL at 20:14

## 2024-06-30 RX ADMIN — DULOXETINE HYDROCHLORIDE 60 MG: 60 CAPSULE, DELAYED RELEASE PELLETS ORAL at 14:58

## 2024-06-30 RX ADMIN — LATANOPROST 1 DROP: 50 SOLUTION/ DROPS OPHTHALMIC at 22:09

## 2024-06-30 RX ADMIN — ENOXAPARIN SODIUM 40 MG: 40 INJECTION SUBCUTANEOUS at 09:58

## 2024-06-30 RX ADMIN — Medication 1 MG: at 22:18

## 2024-06-30 RX ADMIN — ROSUVASTATIN 10 MG: 10 TABLET, FILM COATED ORAL at 22:09

## 2024-06-30 ASSESSMENT — ACTIVITIES OF DAILY LIVING (ADL)
ADLS_ACUITY_SCORE: 47
ADLS_ACUITY_SCORE: 38
ADLS_ACUITY_SCORE: 47
ADLS_ACUITY_SCORE: 38
ADLS_ACUITY_SCORE: 47
ADLS_ACUITY_SCORE: 46
DEPENDENT_IADLS:: CLEANING;COOKING;LAUNDRY;SHOPPING;MEAL PREPARATION;MEDICATION MANAGEMENT;MONEY MANAGEMENT;TRANSPORTATION;INCONTINENCE
ADLS_ACUITY_SCORE: 47
ADLS_ACUITY_SCORE: 38
ADLS_ACUITY_SCORE: 47
ADLS_ACUITY_SCORE: 46
ADLS_ACUITY_SCORE: 38
ADLS_ACUITY_SCORE: 47
ADLS_ACUITY_SCORE: 47
ADLS_ACUITY_SCORE: 38
ADLS_ACUITY_SCORE: 47
ADLS_ACUITY_SCORE: 38
ADLS_ACUITY_SCORE: 47
ADLS_ACUITY_SCORE: 47
ADLS_ACUITY_SCORE: 46
ADLS_ACUITY_SCORE: 47
ADLS_ACUITY_SCORE: 46
ADLS_ACUITY_SCORE: 47
ADLS_ACUITY_SCORE: 47

## 2024-06-30 NOTE — PLAN OF CARE
Pt denies pain. Up w/ assist of 1 or 2 using belt and walker. Has been up to chair multiples times today. Tolerated IV rocephin well. A/Ox4 w/ intermittent forgetfulness.     Problem: Adult Inpatient Plan of Care  Goal: Absence of Hospital-Acquired Illness or Injury  Outcome: Progressing  Intervention: Prevent Skin Injury  Recent Flowsheet Documentation  Taken 6/30/2024 0919 by Elinor Sandy RN  Body Position: position changed independently     Problem: Adult Inpatient Plan of Care  Goal: Optimal Comfort and Wellbeing  Outcome: Progressing     Problem: Infection  Goal: Absence of Infection Signs and Symptoms  Outcome: Progressing

## 2024-06-30 NOTE — PLAN OF CARE
"PRIMARY DIAGNOSIS: \"GENERIC\" NURSING  OUTPATIENT/OBSERVATION GOALS TO BE MET BEFORE DISCHARGE:  ADLs back to baseline: No    Activity and level of assistance: assist of 2    Pain status: Pain free.    Return to near baseline physical activity: No     Discharge Planner Nurse   Safe discharge environment identified: Yes  Barriers to discharge: Yes       Entered by: Sidney Zheng RN 06/30/2024 2:49 AM     Please review provider order for any additional goals.   Nurse to notify provider when observation goals have been met and patient is ready for discharge.Goal Outcome Evaluation: pt is alert and oriented but forgetful to situation. Denies pain or discomfort. Up with assist of 2. IV Abx. 3L O2 NC.                         "

## 2024-06-30 NOTE — PROGRESS NOTES
"PRIMARY DIAGNOSIS: \"GENERIC\" NURSING  OUTPATIENT/OBSERVATION GOALS TO BE MET BEFORE DISCHARGE:  ADLs back to baseline:  No    Activity and level of assistance: assist x2    Pain status: Pain free.    Return to near baseline physical activity: No     Discharge Planner Nurse   Safe discharge environment identified: No  Barriers to discharge: Yes       Entered by: Elinor Sandy RN 06/30/2024 11:32 AM  A/Ox4 w/ forgetfulness. Denies pain. Pt has been utilizing bedside commode, had BM this morning. Up with assist of 2 w/ belt and walker. IV rocephin administered, tolerated well.    Please review provider order for any additional goals.   Nurse to notify provider when observation goals have been met and patient is ready for discharge.  "

## 2024-06-30 NOTE — PROGRESS NOTES
06/30/24 1130   Appointment Info   Signing Clinician's Name / Credentials (PT) Corry Ortiz DPT   Quick Adds   Quick Adds Certification   Living Environment   People in Home spouse   Current Living Arrangements house   Home Accessibility stairs to enter home   Number of Stairs, Main Entrance 2   Stair Railings, Main Entrance railings safe and in good condition   Transportation Anticipated family or friend will provide   Living Environment Comments able to live on one level in home. reports walker will not fit in hallway   Self-Care   Usual Activity Tolerance moderate   Current Activity Tolerance fair   Equipment Currently Used at Home cane, straight   Fall history within last six months yes   Number of times patient has fallen within last six months 1   Activity/Exercise/Self-Care Comment reports fall is d/t knee giving out   General Information   Onset of Illness/Injury or Date of Surgery 06/29/24   Referring Physician Ayaan Mckinnon DO   Patient/Family Therapy Goals Statement (PT) return home   Pertinent History of Current Problem (include personal factors and/or comorbidities that impact the POC) Dominik Rojas is a 83 year old male admitted on 6/29/2024 for valuation of altered mental status. He has a medical history of dementia, COPD on chronic home oxygen, diastolic dysfunction, and hypothyroidism.   Existing Precautions/Restrictions oxygen therapy device and L/min   Cognition   Cognitive Status Comments preseverating on knee pain   Pain Assessment   Patient Currently in Pain Yes, see Vital Sign flowsheet  (knee L)   Range of Motion (ROM)   Range of Motion ROM deficits secondary to pain   Strength (Manual Muscle Testing)   Strength (Manual Muscle Testing) Deficits observed during functional mobility   Bed Mobility   Comment, (Bed Mobility) not attempted, pt in chair before and after PT   Transfers   Transfers sit-stand transfer   Sit-Stand Transfer   Sit-Stand [place] (Transfers) minimum  assist (75% patient effort);1 person assist   Assistive Device (Sit-Stand Transfers) walker, front-wheeled   Comment, (Sit-Stand Transfer) pulls to stand with walker   Gait/Stairs (Locomotion)   [place] Level (Gait) minimum assist (75% patient effort);moderate assist (50% patient effort)   Assistive Device (Gait) walker, front-wheeled   Distance in Feet (Gait) 10'   Pattern (Gait) step-to   Deviations/Abnormal Patterns (Gait) antalgic;gait speed decreased   Comment, (Gait/Stairs) stairs not completed, pt too weak. unsteady with short amb. pt had taken off O2 prior to amb despite cues to keep on   Balance   Balance other (describe)   Balance Comments unsteady with static standing with FWW   Clinical Impression   Criteria for Skilled Therapeutic Intervention Yes, treatment indicated   PT Diagnosis (PT) impaired functional moblity, gait abnormality   Influenced by the following impairments decreased strength, decreased endurance, inc pain   Functional limitations due to impairments gait, transfers, bed mob, stairs   Clinical Presentation (PT Evaluation Complexity) evolving   Clinical Presentation Rationale pt presents as medically diagnosed   Clinical Decision Making (Complexity) moderate complexity   Planned Therapy Interventions (PT) balance training;bed mobility training;gait training;home exercise program;neuromuscular re-education;patient/family education;strengthening;transfer training;stair training   Risk & Benefits of therapy have been explained evaluation/treatment results reviewed;patient   PT Total Evaluation Time   PT Joyce, Moderate Complexity Minutes (88748) 10   Therapy Certification   Start of care date 06/30/24   Certification date from 06/30/24   Certification date to 07/07/24   Medical Diagnosis COPD   Physical Therapy Goals   PT Frequency 5x/week   PT Predicted Duration/Target Date for Goal Attainment 07/07/24   PT Goals Bed Mobility;Transfers;Gait;Stairs   PT: Bed Mobility Independent;Supine  to/from sit   PT: Transfers Supervision/stand-by assist;Sit to/from stand;Bed to/from chair;Assistive device   PT: Gait Supervision/stand-by assist;Assistive device;Rolling walker;25 feet   PT: Stairs Minimal assist;2 stairs;Rail on both sides   Interventions   Interventions Quick Adds Therapeutic Activity   Therapeutic Activity   Therapeutic Activities: dynamic activities to improve functional performance Minutes (29382) 15   Symptoms Noted During/After Treatment Increased pain   Treatment Detail/Skilled Intervention additional sit <> stand x 2. One with FWW Dianelys x 1and cues for L foot placement to reduce pain as pt having significant amounts of pain with all standing (per report). Pt reports it does not feel any different to stand with LLE extended vs tucked back. Sit <> stand x 1 with SEC, modA x 1, pt unsteady and needing more of a boost into standing d/t instability. Static standing with SEC, Dianelys x 1, unsteady   PT Discharge Planning   PT Plan amb w/ FWW, bring O2, bed mob, 2 stairs to return home   PT Discharge Recommendation (DC Rec) Transitional Care Facility   PT Rationale for DC Rec pt not safe to reutrn home, unsteady and only able to amb x 10' today with FWW. Declines need for FWW at home, would do better with FWW at home vs SEC. TCU d/t instability and weakness   PT Brief overview of current status amb x 10' with FWW Dianelys x 1, sit <> stand Dianelys-modA x 1 with SEC vs FWW   PT Equipment Needed at Discharge walker, rolling   Total Session Time   Timed Code Treatment Minutes 15   Total Session Time (sum of timed and untimed services) 25     [facility]  OUTPATIENT PHYSICAL THERAPY EVALUATION  PLAN OF TREATMENT FOR OUTPATIENT REHABILITATION  (COMPLETE FOR INITIAL CLAIMS ONLY)  Patient's Last Name, First Name, M.I.  YOB: 1940  Dominik Rojas                        Provider's Name  [facility] Medical Record No.  [id number]                              Onset Date:  06/29/24   Start of Care Date:  06/30/24   Type:     _X_PT   ___OT   ___SLP Medical Diagnosis:  COPD              PT Diagnosis:  impaired functional moblity, gait abnormality Visits from SOC:  1     See note for plan of treatment, functional goals and certification details    I CERTIFY THE NEED FOR THESE SERVICES FURNISHED UNDER        THIS PLAN OF TREATMENT AND WHILE UNDER MY CARE     (Physician co-signature of this document indicates review and certification of the therapy plan).

## 2024-06-30 NOTE — PROGRESS NOTES
Occupational Therapy      06/30/24 0730   Appointment Info   Signing Clinician's Name / Credentials (OT) Dianne Graham OTR/L   Quick Adds   Quick Adds Certification   Living Environment   People in Home spouse   Current Living Arrangements house   Home Accessibility stairs to enter home   Number of Stairs, Main Entrance 2   Stair Railings, Main Entrance railings safe and in good condition   Transportation Anticipated family or friend will provide   Living Environment Comments pt able to live on one level in home- Tub/shower w/ GB and shower chair   Self-Care   Usual Activity Tolerance good   Current Activity Tolerance moderate   Regular Exercise No   Equipment Currently Used at Home cane, straight   Activity/Exercise/Self-Care Comment Pt reports being Ind. w/ ADL tasks   Instrumental Activities of Daily Living (IADL)   IADL Comments Pt reporting he is Ind. w/ IADL tasks unclear on accuracy of pt report   General Information   Onset of Illness/Injury or Date of Surgery 06/29/24   Referring Physician Ayaan Mckinnon DO   Additional Occupational Profile Info/Pertinent History of Current Problem 83 year old male admitted on 6/29/2024 for altered mental status in the setting of UTI. He has a medical history of COPD on chronic home oxygen, diastolic dysfunction, hypothyroidism   Existing Precautions/Restrictions fall   Cognitive Status Examination   Orientation Status person   Affect/Mental Status (Cognitive) confused   Safety Deficit problem-solving;safety precautions awareness;insight into deficits/self-awareness   Range of Motion Comprehensive   General Range of Motion bilateral upper extremity ROM WNL   Bed Mobility   Bed Mobility supine-sit   Supine-Sit [place] (Bed Mobility) minimum assist (75% patient effort);supervision;verbal cues   Transfers   Transfers sit-stand transfer   Sit-Stand Transfer   Sit-Stand [place] (Transfers) minimum assist (75% patient effort);contact guard   Assistive Device (Sit-Stand  Transfers) walker, front-wheeled   Balance   Balance Assessment standing dynamic balance   Standing Balance: Dynamic minimal assist;contact guard   Position/Device Used, Standing Balance walker, front-wheeled   Activities of Daily Living   BADL Assessment/Intervention lower body dressing   Lower Body Dressing Assessment/Training   [place] Level (Lower Body Dressing) moderate assist (50% patient effort)   Clinical Impression   Criteria for Skilled Therapeutic Interventions Met (OT) Yes, treatment indicated   OT Diagnosis decreased ADL ind.   OT Problem List-Impairments impacting ADL problems related to;activity tolerance impaired;strength;mobility   Assessment of Occupational Performance 1-3 Performance Deficits   Planned Therapy Interventions (OT) ADL retraining;balance training;strengthening;transfer training;home program guidelines;progressive activity/exercise   Clinical Decision Making Complexity (OT) problem focused assessment/low complexity   Risk & Benefits of therapy have been explained evaluation/treatment results reviewed;risks/benefits reviewed;patient   OT Total Evaluation Time   OT Eval, Low Complexity Minutes (69039) 10   Therapy Certification   Medical Diagnosis weakness   Start of Care Date 06/30/24   Certification date from 06/30/24   Certification date to 07/07/24   OT Goals   Therapy Frequency (OT) 5 times/week   OT Predicted Duration/Target Date for Goal Attainment 07/07/24   OT Goals Hygiene/Grooming;Lower Body Dressing;Cognition;Toilet Transfer/Toileting;Transfers   OT: Hygiene/Grooming modified independent   OT: Lower Body Dressing Supervision/stand-by assist;using adaptive equipment   OT: Transfer Supervision/stand-by assist;with assistive device   OT: Toilet Transfer/Toileting Supervision/stand-by assist;using adaptive equipment   OT: Cognitive Patient/caregiver will verbalize understanding of cognitive assessment results/recommendations as needed for safe discharge planning    Self-Care/Home Management   Self-Care/Home Mgmt/ADL, Compensatory, Meal Prep Minutes (89645) 15   Symptoms Noted During/After Treatment (Meal Preparation/Planning Training) fatigue   Treatment Detail/Skilled Intervention Pt up in bed upon OT arrival pt agreeable to therapy session. Pt completed additional STS Min.A w/ cues for tech/safety w/ fww pt slightly unsteady w/ trnfs and had LOB w/ functional ambulation w/in room- OT able to get pt safely into chair, pt incontinent of urine Max.A brief management- Min.A while standing to pull up brief. alarm on at end of session   OT Discharge Planning   OT Plan SLUMS, toileting, g/h at sink- chair follow for safety   OT Discharge Recommendation (DC Rec) (S)  Transitional Care Facility   OT Rationale for DC Rec OT rec. assistx1-2 for trnfs/self cares d/t increased instability. Pt lives in home w/ spouse and unclear on pt spouse ability to provide care for pt and physically assist. OT rec. increased supervision/support d/t decreased safety awareness.   OT Brief overview of current status Min.A trnfs- rec. chair follow if ambulating d/t BLE weakness   Total Session Time   Timed Code Treatment Minutes 15   Total Session Time (sum of timed and untimed services) 25    [facility]  OUTPATIENT OCCUPATIONAL THERAPY  EVALUATION  PLAN OF TREATMENT FOR OUTPATIENT REHABILITATION  (COMPLETE FOR INITIAL CLAIMS ONLY)  Patient's Last Name, First Name, M.I.  YOB: 1940  Dominik Rojas                          Provider's Name  [facility] Medical Record No.  [id number]                             Onset Date:  06/29/24   Start of Care Date:  06/30/24   Type:     ___PT   _X_OT   ___SLP Medical Diagnosis:  weakness                    OT Diagnosis:  decreased ADL ind. Visits from SOC:  1     See note for plan of treatment, functional goals and certification details    I CERTIFY THE NEED FOR THESE SERVICES FURNISHED  UNDER        THIS PLAN OF TREATMENT AND WHILE UNDER MY CARE     (Physician co-signature of this document indicates review and certification of the therapy plan).

## 2024-06-30 NOTE — PROGRESS NOTES
"PRIMARY DIAGNOSIS: \"GENERIC\" NURSING  OUTPATIENT/OBSERVATION GOALS TO BE MET BEFORE DISCHARGE:  ADLs back to baseline: No    Activity and level of assistance: assist x2    Pain status: Pain free.    Return to near baseline physical activity: No     Discharge Planner Nurse   Safe discharge environment identified: No  Barriers to discharge: Yes       Entered by: Elinor Sandy RN 06/30/2024 1:48 PM     Please review provider order for any additional goals.   Nurse to notify provider when observation goals have been met and patient is ready for discharge.  "

## 2024-06-30 NOTE — PROGRESS NOTES
[facility]    Medicine Progress Note - Hospitalist Service    Date of Admission:  6/29/2024    Assessment & Plan      Dominik Rojas is a 83 year old male admitted on 6/29/2024 for valuation of altered mental status. He has a medical history of dementia, COPD on chronic home oxygen, diastolic dysfunction, and hypothyroidism.     Altered mental status, asymptomatic bacteriuria, dementia with mood disturbance:  Per family, patient has been confused for the past few days. He tried to go outside to look for a dog which the family does not have. He also has some agitation.   CT head negative for acute abnormalities.   Urinalysis shows large amount of leukocytes and bacteria. Leukocyte esterase is negative.  Reports no urinary symptoms.  Urine culture shows mixture of urogenital tai.  - Per chart review, patient has been having similar urinalysis and urine culture results since 2023. He always reports no urinary symptoms. He has not been treated for UTI since 2023. Patient likely have asymptomatic bacteriuria. No indication for antibiotics.   His AMS is most likely due to his dementia.   - Continue Ceftriaxone today. If blood culture has no growth tomorrow, will discontinue.   - His mental status has returned to normal today. Continue to monitor.     Generalized weakness: Per PT/OT, patient needs assist 1-2. Recommends TCU.     Essential hypertension:   It is not clear whether patient is taking amlodipine and losartan at home. His BP is controlled without them.      Chronic stable condition:  COPD: no signs of exacerbation. Continue PTA inhaler prn.  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 Received radiation treatment in 2023.  Chronic respiratory failure on home oxygen: due to COPD and his lung cancer. Oxygen needs at baseline.   HFrEF: LVEF 40-50%. Currently euvolemic. Continue PTA metoprolol.  Pre-diabetes: Novolog as needed.  Hypothyroidism: TSH within normal  "range.  Anxiety: Continue home medication.      I called his wife and she was not available.       Diet: Combination Diet Regular Diet Adult  DVT Prophylaxis: Lovenox   Cabral Catheter: Not present  Lines: None     Cardiac Monitoring: None  Code Status: Full Code          Diet: Combination Diet Regular Diet Adult    DVT Prophylaxis: Low Risk/Ambulatory with no VTE prophylaxis indicated  Cabral Catheter: Not present  Lines: None     Cardiac Monitoring: None  Code Status: Full Code      Clinically Significant Risk Factors Present on Admission              # Hypoalbuminemia: Lowest albumin = 3.4 g/dL at 6/29/2024  8:27 AM, will monitor as appropriate     # Hypertension: Noted on problem list   # Dementia: noted on problem list           # Obesity: Estimated body mass index is 30.28 kg/m  as calculated from the following:    Height as of this encounter: 1.676 m (5' 6\").    Weight as of this encounter: 85.1 kg (187 lb 9.8 oz).              Disposition Plan     Medically Ready for Discharge: Anticipated Tomorrow             Akanksha Crocker MD  Hospitalist Service  [facility]  Securely message with saambaa (more info)  Text page via [employer] Paging/Directory   ______________________________________________________________________    Interval History   Patient reports feeling well today. He reports no urinary symptoms, no abdominal pain or flank pain.  Patient concerns recurrent UTI. He wonders how to prevent his UTI from happening.     Physical Exam   Vital Signs: Temp: 98.6  F (37  C) Temp src: Oral BP: 105/68 Pulse: 97   Resp: 16 SpO2: 91 % O2 Device: Nasal cannula Oxygen Delivery: 3 LPM  Weight: 187 lbs 9.78 oz    General appearance: not in acute distress  HEENT: PERRL, EOMI  Lungs: Clear breath sounds in bilateral lung fields  Cardiovascular: Regular rate and rhythm, normal S1-S2  Abdomen: Soft, non tender, no distension  Musculoskeletal: No joint swelling  Skin: No rash and no edema  Neurology: AAO " ×3.  Cranial nerves II - XII normal.  Normal muscle strength in all four extremities.     Medical Decision Making       48 MINUTES SPENT BY ME on the date of service doing chart review, history, exam, documentation & further activities per the note.      Data     I have personally reviewed the following data over the past 24 hrs:    N/A  \   N/A   / N/A     N/A N/A N/A /  N/A   N/A N/A 0.79 \     ALT: N/A AST: N/A AP: 139 TBILI: N/A   ALB: N/A TOT PROTEIN: N/A LIPASE: N/A       Imaging results reviewed over the past 24 hrs:   No results found for this or any previous visit (from the past 24 hour(s)).

## 2024-06-30 NOTE — PLAN OF CARE
Problem: Delirium  Goal: Improved Behavioral Control  Outcome: Not Progressing  Goal: Improved Sleep  Outcome: Not Progressing     Problem: Adult Inpatient Plan of Care  Goal: Absence of Hospital-Acquired Illness or Injury  Outcome: Progressing  Intervention: Prevent Skin Injury  Recent Flowsheet Documentation  Taken 6/30/2024 0316 by Sidney Zheng RN  Body Position: position changed independently  Taken 6/29/2024 2315 by Sidney Zheng RN  Body Position: position changed independently  Taken 6/29/2024 2032 by Sidney Zheng RN  Body Position: position changed independently  Goal: Optimal Comfort and Wellbeing  Outcome: Progressing     Problem: Infection  Goal: Absence of Infection Signs and Symptoms  Outcome: Progressing     Problem: Delirium  Goal: Improved Attention and Thought Clarity  Outcome: Progressing   Goal Outcome Evaluation:

## 2024-06-30 NOTE — PROGRESS NOTES
"PRIMARY DIAGNOSIS: \"GENERIC\" NURSING  OUTPATIENT/OBSERVATION GOALS TO BE MET BEFORE DISCHARGE:  ADLs back to baseline: No    Activity and level of assistance: assist 1    Pain status: Pain free.    Return to near baseline physical activity: No     Discharge Planner Nurse   Safe discharge environment identified: No  Barriers to discharge: Yes       Entered by: Sidney Zheng RN 06/30/2024 6:48 AM     Please review provider order for any additional goals.   Nurse to notify provider when observation goals have been met and patient is ready for discharge. pt is alert and oriented but forgetful to situation and intermittent confusion. Denies pain or discomfort. Up with assist of 1. N/S infusion dc'd. Pt remains with saturation above 90% with 3L O2 NC.   "

## 2024-06-30 NOTE — CONSULTS
Care Management Initial Consult    General Information  Assessment completed with: Spouse or significant other,    Type of CM/SW Visit: Initial Assessment    Primary Care Provider verified and updated as needed: Yes   Readmission within the last 30 days: no previous admission in last 30 days         Advance Care Planning: Advance Care Planning Reviewed:  (no HCD)          Communication Assessment  Patient's communication style: spoken language (English or Bilingual)    Hearing Difficulty or Deaf: no        Cognitive  Cognitive/Neuro/Behavioral: .WDL except, orientation  Level of Consciousness: intermittent confusion  Arousal Level: opens eyes spontaneously  Orientation: oriented x 4  Mood/Behavior: calm, cooperative     Speech: clear, spontaneous    Living Environment:   People in home: spouse     Current living Arrangements: house      Able to return to prior arrangements: yes       Family/Social Support:  Care provided by: self, spouse/significant other, child(latrell)  Provides care for: no one, unable/limited ability to care for self  Marital Status:   Children          Description of Support System: Supportive, Involved         Current Resources:   Patient receiving home care services: No     Community Resources: None  Equipment currently used at home: cane, straight  Supplies currently used at home:  Oxygen      Does the patient's insurance plan have a 3 day qualifying hospital stay waiver?  Yes     Which insurance plan 3 day waiver is available? Alternative insurance waiver    Will the waiver be used for post-acute placement? Undetermined at this time    Lifestyle & Psychosocial Needs:  Social Determinants of Health     Food Insecurity: Not on file   Depression: Not on file   Housing Stability: Not on file   Tobacco Use: Medium Risk (7/17/2023)    Patient History     Smoking Tobacco Use: Former     Smokeless Tobacco Use: Never     Passive Exposure: Not on file   Financial Resource Strain: Not on file    Alcohol Use: Not on file   Transportation Needs: Not on file   Physical Activity: Not on file   Interpersonal Safety: Not on file   Stress: Not on file   Social Connections: Unknown (1/3/2024)    [facility]    Social Connections     Frequency of Communication with Friends and Family: Not on file   Health Literacy: Not on file       Functional Status:  Prior to admission patient needed assistance:   Dependent ADLs:: Ambulation-cane, Bathing, Incontinence, Dressing  Dependent IADLs:: Cleaning, Cooking, Laundry, Shopping, Meal Preparation, Medication Management, Money Management, Transportation, Incontinence         Additional Information:    Assessment completed with patient's spouse. Patient lives in his house with spouse. He requires assistance with ADLs/IADLs, ambulates short distances with walker and no home care services. Patient has home O2. Spouse is primary family contact.     Discussed OT recommendations for TCU. Spouse states if it is paid for but patient would most likely decline.  Spoke with patient who declines going to TCU.  PT to eval.  will send for TCU referrals if PT also recommends.    3:40 PM  PT recommending TCU also.  Referrals faxed. Anticipate more discussion with spouse regarding TCU as patient will decline.        Kennedi Borrego RN

## 2024-07-01 ENCOUNTER — DOCUMENTATION ONLY (OUTPATIENT)
Dept: GERIATRICS | Facility: CLINIC | Age: 84
End: 2024-07-01
Payer: COMMERCIAL

## 2024-07-01 VITALS
HEIGHT: 66 IN | WEIGHT: 185.19 LBS | OXYGEN SATURATION: 94 % | TEMPERATURE: 98.5 F | BODY MASS INDEX: 29.76 KG/M2 | HEART RATE: 92 BPM | DIASTOLIC BLOOD PRESSURE: 83 MMHG | RESPIRATION RATE: 16 BRPM | SYSTOLIC BLOOD PRESSURE: 111 MMHG

## 2024-07-01 PROCEDURE — 96376 TX/PRO/DX INJ SAME DRUG ADON: CPT

## 2024-07-01 PROCEDURE — 250N000013 HC RX MED GY IP 250 OP 250 PS 637: Performed by: INTERNAL MEDICINE

## 2024-07-01 PROCEDURE — G0378 HOSPITAL OBSERVATION PER HR: HCPCS

## 2024-07-01 PROCEDURE — 96372 THER/PROPH/DIAG INJ SC/IM: CPT | Performed by: STUDENT IN AN ORGANIZED HEALTH CARE EDUCATION/TRAINING PROGRAM

## 2024-07-01 PROCEDURE — 250N000011 HC RX IP 250 OP 636: Performed by: STUDENT IN AN ORGANIZED HEALTH CARE EDUCATION/TRAINING PROGRAM

## 2024-07-01 PROCEDURE — 99239 HOSP IP/OBS DSCHRG MGMT >30: CPT | Performed by: INTERNAL MEDICINE

## 2024-07-01 RX ORDER — FUROSEMIDE 20 MG
20 TABLET ORAL DAILY
DISCHARGE
Start: 2024-07-01 | End: 2024-10-05

## 2024-07-01 RX ORDER — FUROSEMIDE 20 MG
20 TABLET ORAL DAILY
DISCHARGE
Start: 2024-07-01 | End: 2024-07-01

## 2024-07-01 RX ADMIN — CEFTRIAXONE SODIUM 1 G: 1 INJECTION, POWDER, FOR SOLUTION INTRAMUSCULAR; INTRAVENOUS at 10:17

## 2024-07-01 RX ADMIN — METOPROLOL SUCCINATE 12.5 MG: 25 TABLET, EXTENDED RELEASE ORAL at 07:53

## 2024-07-01 RX ADMIN — DULOXETINE HYDROCHLORIDE 60 MG: 60 CAPSULE, DELAYED RELEASE PELLETS ORAL at 07:52

## 2024-07-01 RX ADMIN — ENOXAPARIN SODIUM 40 MG: 40 INJECTION SUBCUTANEOUS at 11:04

## 2024-07-01 RX ADMIN — LEVOTHYROXINE SODIUM 88 MCG: 88 TABLET ORAL at 06:42

## 2024-07-01 RX ADMIN — BUSPIRONE HYDROCHLORIDE 5 MG: 5 TABLET ORAL at 08:57

## 2024-07-01 RX ADMIN — PANTOPRAZOLE SODIUM 40 MG: 40 TABLET, DELAYED RELEASE ORAL at 06:42

## 2024-07-01 ASSESSMENT — ACTIVITIES OF DAILY LIVING (ADL)
ADLS_ACUITY_SCORE: 45
ADLS_ACUITY_SCORE: 41
ADLS_ACUITY_SCORE: 45
ADLS_ACUITY_SCORE: 46
ADLS_ACUITY_SCORE: 41
ADLS_ACUITY_SCORE: 45
ADLS_ACUITY_SCORE: 45
ADLS_ACUITY_SCORE: 46
ADLS_ACUITY_SCORE: 45
ADLS_ACUITY_SCORE: 45
ADLS_ACUITY_SCORE: 46

## 2024-07-01 NOTE — PLAN OF CARE
"  Problem: Adult Inpatient Plan of Care  Goal: Patient-Specific Goal (Individualized)  Description: You can add care plan individualizations to a care plan. Examples of Individualization might be:  \"Parent requests to be called daily at 9am for status\", \"I have a hard time hearing out of my right ear\", or \"Do not touch me to wake me up as it startles  me\".  Outcome: Progressing     Problem: Delirium  Goal: Optimal Coping  Outcome: Progressing     Problem: Skin Injury Risk Increased  Goal: Skin Health and Integrity  Intervention: Plan: Nurse Driven Intervention: Moisture Management  Recent Flowsheet Documentation  Taken 6/30/2024 2000 by Tasha Cárdenas, RN  Moisture Interventions: Encourage regular toileting  Bathing/Skin Care:   incontinence care   linen changed     Problem: Delirium  Goal: Improved Attention and Thought Clarity  Outcome: Progressing   Goal Outcome Evaluation:                        "

## 2024-07-01 NOTE — PROGRESS NOTES
"PRIMARY DIAGNOSIS: \"GENERIC\" NURSING  OUTPATIENT/OBSERVATION GOALS TO BE MET BEFORE DISCHARGE:  ADLs back to baseline: Yes    Activity and level of assistance: Assist of 1 walker    Pain status: Pain free.    Return to near baseline physical activity: Yes     Discharge Planner Nurse   Safe discharge environment identified: No  Barriers to discharge: Yes       Entered by: Tasha Cárdenas RN 06/30/2024 11:03 PM   Pt requested PRN melatonin at bedtime, took all scheduled medications, 3L O2 NC  Please review provider order for any additional goals.   Nurse to notify provider when observation goals have been met and patient is ready for discharge.  "

## 2024-07-01 NOTE — PLAN OF CARE
"PRIMARY DIAGNOSIS: \"GENERIC\" NURSING  OUTPATIENT/OBSERVATION GOALS TO BE MET BEFORE DISCHARGE:  ADLs back to baseline: Yes    Activity and level of assistance: Assist of one    Pain status: Pain free.    Return to near baseline physical activity: Yes     Discharge Planner Nurse   Safe discharge environment identified: Yes  Barriers to discharge: Yes       Entered by: Josefina Acosta RN 07/01/2024 11:16 AM     Please review provider order for any additional goals.   Nurse to notify provider when observation goals have been met and patient is ready for discharge.Goal Outcome Evaluation:       Pt AxO4, VSS, denies pain. Pt on 3L O2 on nasal cannula. Pt assist of one with walker to commode.                  "

## 2024-07-01 NOTE — PLAN OF CARE
Problem: Pain Acute  Goal: Optimal Pain Control and Function  Outcome: Adequate for Care Transition  Intervention: Prevent or Manage Pain  Recent Flowsheet Documentation  Taken 7/1/2024 0752 by Josefina Acosta RN  Medication Review/Management: medications reviewed  Intervention: Optimize Psychosocial Wellbeing  Recent Flowsheet Documentation  Taken 7/1/2024 0752 by Josefina Acosta RN  Supportive Measures: active listening utilized     Problem: Comorbidity Management  Goal: Maintenance of Behavioral Health Symptom Control  Outcome: Adequate for Care Transition  Intervention: Maintain Behavioral Health Symptom Control  Recent Flowsheet Documentation  Taken 7/1/2024 0752 by Josefina Acosta RN  Medication Review/Management: medications reviewed   Goal Outcome Evaluation:      Plan of Care Reviewed With: patient      Patient discharged to transitional care unit at 1320 via Health plan transportation.  Accompanied by self and staff.  Discharge instructions were reviewed with patient, opportunity offered to ask questions.    Prescriptions N/A.  Access discontinued: Yes  Care plan and education discontinued: Yes  Home meds retrieved from pharmacy: Yes  Belongings were sent home with patient/family:  Assistive/Medical Devices: cane, Clothing: Shirt(s):   and Pants:  , Dental appliances/dentures:  , and Hearing Aids:   .

## 2024-07-01 NOTE — PLAN OF CARE
"PRIMARY DIAGNOSIS: \"GENERIC\" NURSING  OUTPATIENT/OBSERVATION GOALS TO BE MET BEFORE DISCHARGE:  ADLs back to baseline: No    Activity and level of assistance: Up with maximum assistance. Consider SW and/or PT evaluation.     Pain status: Pain free.    Return to near baseline physical activity: No     Discharge Planner Nurse   Safe discharge environment identified: No  Barriers to discharge: Yes       Entered by: Lexi Campos RN 07/01/2024 7:59 AM     Please review provider order for any additional goals.   Nurse to notify provider when observation goals have been met and patient is ready for discharge.Goal Outcome Evaluation:      Plan of Care Reviewed With: patient                 "

## 2024-07-01 NOTE — PLAN OF CARE
Physical Therapy Discharge Summary    Reason for therapy discharge:    Discharged to transitional care facility.    Progress towards therapy goal(s). See goals on Care Plan in [facility] electronic health record for goal details.  Goals not met.  Barriers to achieving goals:   discharge from facility.    Therapy recommendation(s):    Continued therapy is recommended.  Rationale/Recommendations:  PT at TCU to help patient return to PLOF.

## 2024-07-01 NOTE — PROGRESS NOTES
Care Management Discharge Note    Discharge Date: 07/01/2024       Discharge Disposition:  [facility]   Discharge Services:  TCU   Discharge DME:  n/a    Discharge Transportation: family or friend will provide    Private pay costs discussed: Not applicable    Does the patient's insurance plan have a 3 day qualifying hospital stay waiver?  Yes     Which insurance plan 3 day waiver is available? Alternative insurance waiver    Will the waiver be used for post-acute placement? Yes    PAS Confirmation Code:    Patient/family educated on Medicare website which has current facility and service quality ratings:  yes    Education Provided on the Discharge Plan: yes    Persons Notified of Discharge Plans: yes  Patient/Family in Agreement with the Plan:  yes    Handoff Referral Completed: Yes    Additional Information:  Pt accepted to [facility] (bed secured); transport needed. [employer] ride set for 1330 with a  window between [phone] (agreeable to private pay). All parties aware and agreeable to discharge plan. SW spoke with wife who is agreeable to said plan as well.   11:31 AM    Brenna Kjellberg, BSW  7/1/2024

## 2024-07-01 NOTE — PLAN OF CARE
Occupational Therapy Discharge Summary    Reason for therapy discharge:    Discharged to transitional care facility.    Progress towards therapy goal(s). See goals on Care Plan in [facility] electronic health record for goal details.  Goals partially met.  Barriers to achieving goals:   discharge from facility.    Therapy recommendation(s):    Continued therapy is recommended.  Rationale/Recommendations:  recommend continued OT services at TCU.    Anna uMeller OTR/DARREL 7/1/24

## 2024-07-01 NOTE — PLAN OF CARE
"  Problem: Adult Inpatient Plan of Care  Goal: Patient-Specific Goal (Individualized)  Description: You can add care plan individualizations to a care plan. Examples of Individualization might be:  \"Parent requests to be called daily at 9am for status\", \"I have a hard time hearing out of my right ear\", or \"Do not touch me to wake me up as it startles  me\".  Outcome: Progressing     Problem: Delirium  Goal: Optimal Coping  Outcome: Progressing   Goal Outcome Evaluation:      Plan of Care Reviewed With: patient  Blood pressure slightly elevated in the 140 to 160 systolic, will let the day team know this morning. Patient denies pain, short of breath with activity. Patient on 3 liters of oxygen, on oxygen at base line about 5 liters per patient. Denies pain this shift, left leg appears to be sore with activity. Patient up to bed side commode with assist of one person, walker and gait belt. Continue to monitor.               "

## 2024-07-01 NOTE — DISCHARGE SUMMARY
"[facility]  Hospitalist Discharge Summary      Date of Admission:  6/29/2024  Date of Discharge:  7/1/2024    Discharging Provider: Akanksha Crocker MD  Discharge Service: Hospitalist Service    Discharge Diagnoses     Principal Problem:    Altered mental status  Active Problems:    Benign essential hypertension    Hypothyroidism    COPD (chronic obstructive pulmonary disease) (H)    Generalized anxiety disorder    Chronic systolic congestive heart failure (H)    Malignant neoplasm of lower lobe of right lung (H)    Dementia with mood disturbance (H)    Aggressive behavior    Chronic respiratory failure with hypoxia and hypercapnia (H)    Asymptomatic bacteriuria       Clinically Significant Risk Factors     # Overweight: Estimated body mass index is 29.89 kg/m  as calculated from the following:    Height as of this encounter: 1.676 m (5' 6\").    Weight as of this encounter: 84 kg (185 lb 3 oz).       Follow-ups Needed After Discharge   Follow-up Appointments     Follow Up and recommended labs and tests      Follow up with Nursing home physician.  No follow up labs or test are   needed.          Discharge Disposition   Discharged to short-term care facility  Condition at discharge: Stable    Hospital Course     Dominik Rojas is a 83 year old male admitted on 6/29/2024 for valuation of altered mental status. He has a medical history of dementia, COPD on chronic home oxygen, lung cancer, diastolic cardiac dysfunction, and hypothyroidism.     Altered mental status, asymptomatic bacteriuria, dementia with mood disturbance:  Per family, patient became confused for several days. He tried to go outside to look for a dog which the family does not have. When wife tried to stop that, he became very agitated.   CT head showed no acute abnormalities. Urinalysis shows large amount of leukocytes and bacteria. Leukocyte esterase is negative. He reports no urinary symptoms. Urine culture shows mixture of " urogenital tai. Per chart review, patient has been having similar urinalysis and urine culture results since 2023. He always reports no urinary symptoms. He has not been treated for UTI since 2023 despite urinalysis results as described above. He did receive UTI treatment for once when urine culture grew Enterococcus in 2021. Patient likely has asymptomatic bacteriuria. Patient received Ceftriaxone briefly. No indication to continue antibiotics. His AMS and agitation are most likely due to his dementia.  His mental status returned to his normal baseline during his admission. He was discharged to TCU per PT/OT recommendation due to his generalized weakness.     Essential hypertension:  Home amlodipine was discontinued due to low BP.      Chronic stable condition:  COPD: no signs of exacerbation. Continue PTA inhaler prn.  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 Received radiation treatment in 2023.  Chronic respiratory failure on home oxygen: due to COPD and his lung cancer. Oxygen needs at baseline.   HFrEF: LVEF 40-50%. Currently euvolemic. Continue PTA metoprolol.  Pre-diabetes: Novolog as needed.  Hypothyroidism: TSH within normal range.  Anxiety: Continue home medication.         Diet: Combination Diet Regular Diet Adult  DVT Prophylaxis: Lovenox   Cabral Catheter: Not present  Lines: None     Cardiac Monitoring: None  Code Status: Full Code    Consultations This Hospital Stay   PHYSICAL THERAPY ADULT IP CONSULT  OCCUPATIONAL THERAPY ADULT IP CONSULT  CARE MANAGEMENT / SOCIAL WORK IP CONSULT  PHYSICAL THERAPY ADULT IP CONSULT  OCCUPATIONAL THERAPY ADULT IP CONSULT    Code Status   Prior    Time Spent on this Encounter   I, Akanksha Crocker MD, personally saw the patient today and spent greater than 30 minutes discharging this patient.       Akanksha Crocker MD  [facility]TEND[facility]ORT STAY  [address] [place] [id number]  Phone:  [phone]  Fax: [phone]  ______________________________________________________________________    Physical Exam   Vital Signs: Temp: 98.5  F (36.9  C) Temp src: Oral BP: 111/83 Pulse: 92   Resp: 16 SpO2: 94 % O2 Device: Nasal cannula Oxygen Delivery: 3 LPM  Weight: 185 lbs 2.98 oz    General appearance: not in acute distress  HEENT: PERRL, EOMI  Lungs: Clear breath sounds in bilateral lung fields  Cardiovascular: Regular rate and rhythm, normal S1-S2  Abdomen: Soft, non tender, no distension  Musculoskeletal: No joint swelling  Skin: No rash and no edema  Neurology: AAO ×3.  Cranial nerves II - XII normal.  Normal muscle strength in all four extremities.       Primary Care Physician   Misael Moe    Discharge Orders      Medication Therapy Management Referral      General info for SNF    Length of Stay Estimate: Short Term Care: Estimated # of Days <30  Condition at Discharge: Improving  Level of care:skilled   Rehabilitation Potential: Excellent  Admission H&P remains valid and up-to-date: Yes  Recent Chemotherapy: N/A  Use Nursing Home Standing Orders: Yes     Mantoux instructions    Give two-step Mantoux (PPD) Per Facility Policy Yes     Follow Up and recommended labs and tests    Follow up with Nursing home physician.  No follow up labs or test are needed.     Reason for your hospital stay    Confusion and agitation     Activity - Up with nursing assistance     Physical Therapy Adult Consult    Evaluate and treat as clinically indicated.    Reason:  Generalized weakness     Occupational Therapy Adult Consult    Evaluate and treat as clinically indicated.    Reason:  Generalized weakness     Fall precautions     Diet    Follow this diet upon discharge: Orders Placed This Encounter      Combination Diet Regular Diet Adult       Significant Results and Procedures   Most Recent 3 CBC's:  Recent Labs   Lab Test 06/29/24  0827 05/13/23  0903 05/11/23  0650 05/10/23  0705   WBC 12.8*  --  12.8* 9.5   HGB  13.7  --  12.7* 13.7   MCV 90  --  96 98    156 166 126*     Most Recent 3 BMP's:  Recent Labs   Lab Test 06/30/24  0612 06/29/24  0827 04/15/24  1327 03/22/24  1135 02/08/24  1916 01/15/24  1332   NA  --  140 142  --   --  143   POTASSIUM  --  4.1 4.7  --   --  4.3   CHLORIDE  --  101 102  --   --  102   CO2  --  27 25  --   --  28   BUN  --  19.9 21.7  --   --  22.6   CR 0.79 1.08 1.25*  --   --  1.26*   ANIONGAP  --  12 15  --   --  13   CHELI  --  8.8 9.4  --   --  9.5   GLC  --  147* 87 106*   < > 78    < > = values in this interval not displayed.   ,   Results for orders placed or performed during the hospital encounter of 06/29/24   CT Head w/o Contrast    Narrative    EXAM: CT HEAD W/O CONTRAST  LOCATION: [facility]  DATE: 6/29/2024    INDICATION: ams  COMPARISON: None.  TECHNIQUE: Routine CT Head without IV contrast. Multiplanar reformats. Dose reduction techniques were used.    FINDINGS:  INTRACRANIAL CONTENTS: No evidence of acute intracranial hemorrhage or mass effect. Few scattered foci of decreased attenuation within the cerebral hemispheric white matter which are nonspecific, though most commonly ascribed to chronic small vessel   ischemic disease. The ventricles and sulci are prominent consistent with moderate brain parenchymal volume loss. Gray-white matter differentiation is maintained. The basilar cisterns are patent.    VISUALIZED ORBITS/SINUSES/MASTOIDS: The globes are unremarkable. The partially imaged paranasal sinuses, mastoid air cells and middle ear cavities are unremarkable.     BONES/SOFT TISSUES: The visualized skull base and calvarium are unremarkable.      Impression    IMPRESSION:    1.  No evidence of acute intracranial hemorrhage or mass effect.  2.  Mild nonspecific white matter changes. Moderate brain parenchymal volume loss.   XR Chest Port 1 View    Narrative    EXAM: XR CHEST PORTABLE 1 VIEW  LOCATION: [facility]  DATE:  06/29/2024    INDICATION: Confusion.  COMPARISON: Chest CT on 03/04/2024.      Impression    IMPRESSION: Single AP view of the chest was obtained. Cardiomediastinal silhouette is within normal limits. Small right pleural effusion and associated basilar atelectasis/consolidation. No significant left pleural effusion. Mild left basilar pulmonary   opacities, likely atelectasis. No significant pneumothorax.         Discharge Medications   Discharge Medication List as of 7/1/2024 12:53 PM        CONTINUE these medications which have CHANGED    Details   furosemide (LASIX) 20 MG tablet Take 1 tablet (20 mg) by mouth daily, Transitional           CONTINUE these medications which have NOT CHANGED    Details   busPIRone (BUSPAR) 5 MG tablet Take 5 mg by mouth 2 times daily, Historical      Cholecalciferol (VITAMIN D3) 50 MCG (2000 UT) CAPS Take 1 tablet by mouth daily , Historical      DULoxetine (CYMBALTA) 60 MG capsule Take 60 mg by mouth daily, Historical      latanoprost (XALATAN) 0.005 % ophthalmic solution Place 1 drop into both eyes At Bedtime, Historical      levothyroxine (SYNTHROID/LEVOTHROID) 88 MCG tablet Take 88 mcg by mouth daily before breakfast, Historical      losartan (COZAAR) 25 MG tablet Take 1 tablet (25 mg) by mouth daily, Disp-30 tablet, R-0, E-Prescribe      metoprolol succinate ER (TOPROL-XL) 25 MG 24 hr tablet Take 12.5 mg by mouth daily, Historical      multivitamin w/minerals (THERA-VIT-M) tablet Take 1 tablet by mouth daily, Historical      OLANZapine (ZYPREXA) 5 MG tablet Take 7.5 mg by mouth At Bedtime, Historical      pantoprazole (PROTONIX) 40 MG EC tablet Take 1 tablet (40 mg) by mouth every morning (before breakfast), Disp-30 tablet, R-0, E-Prescribe      rosuvastatin (CRESTOR) 10 MG tablet Take 10 mg by mouth At Bedtime, Historical           STOP taking these medications       amLODIPine (NORVASC) 2.5 MG tablet Comments:   Reason for Stopping:             Allergies   Allergies   Allergen  Reactions    Diclofenac      Other reaction(s): GI Upset    Loratadine      Other reaction(s): Urinary Retention    Sertraline Unknown     Other reaction(s): ineffective

## 2024-07-01 NOTE — PLAN OF CARE
PRIMARY DIAGNOSIS: GENERALIZED WEAKNESS    OUTPATIENT/OBSERVATION GOALS TO BE MET BEFORE DISCHARGE  1. Orthostatic performed: N/A    2. Tolerating PO medications: Yes    3. Return to near baseline physical activity: No    4. Cleared for discharge by consultants (if involved): No    Discharge Planner Nurse   Safe discharge environment identified: Yes  Barriers to discharge: Yes       Entered by: Lexi Campos RN 07/01/2024 4:19 AM     Please review provider order for any additional goals.   Nurse to notify provider when observation goals have been met and patient is ready for discharge.Goal Outcome Evaluation:  Patient alert and oriented x4. Bed alarm on for safety. Call light in reach, patient encouraged to use all light prior to getting out of bed. Incontinent of urine.

## 2024-07-03 ENCOUNTER — TRANSITIONAL CARE UNIT VISIT (OUTPATIENT)
Dept: GERIATRICS | Facility: CLINIC | Age: 84
End: 2024-07-03
Payer: COMMERCIAL

## 2024-07-03 ENCOUNTER — LAB REQUISITION (OUTPATIENT)
Dept: LAB | Facility: CLINIC | Age: 84
End: 2024-07-03
Payer: COMMERCIAL

## 2024-07-03 VITALS
HEART RATE: 68 BPM | RESPIRATION RATE: 18 BRPM | BODY MASS INDEX: 29.73 KG/M2 | OXYGEN SATURATION: 93 % | SYSTOLIC BLOOD PRESSURE: 122 MMHG | WEIGHT: 184.2 LBS | DIASTOLIC BLOOD PRESSURE: 78 MMHG

## 2024-07-03 DIAGNOSIS — R09.89 BIBASILAR CRACKLES: ICD-10-CM

## 2024-07-03 DIAGNOSIS — G93.41 METABOLIC ENCEPHALOPATHY: ICD-10-CM

## 2024-07-03 DIAGNOSIS — I10 BENIGN ESSENTIAL HYPERTENSION: ICD-10-CM

## 2024-07-03 DIAGNOSIS — D72.829 ELEVATED WHITE BLOOD CELL COUNT, UNSPECIFIED: ICD-10-CM

## 2024-07-03 DIAGNOSIS — J44.9 CHRONIC OBSTRUCTIVE PULMONARY DISEASE, UNSPECIFIED COPD TYPE (H): ICD-10-CM

## 2024-07-03 DIAGNOSIS — I50.22 CHRONIC SYSTOLIC CONGESTIVE HEART FAILURE (H): ICD-10-CM

## 2024-07-03 DIAGNOSIS — F03.93 DEMENTIA WITH MOOD DISTURBANCE, UNSPECIFIED DEMENTIA SEVERITY, UNSPECIFIED DEMENTIA TYPE (H): ICD-10-CM

## 2024-07-03 DIAGNOSIS — R53.81 PHYSICAL DECONDITIONING: Primary | ICD-10-CM

## 2024-07-03 DIAGNOSIS — I48.20 CHRONIC ATRIAL FIBRILLATION (H): ICD-10-CM

## 2024-07-03 PROCEDURE — 99309 SBSQ NF CARE MODERATE MDM 30: CPT | Performed by: NURSE PRACTITIONER

## 2024-07-03 RX ORDER — IPRATROPIUM BROMIDE AND ALBUTEROL SULFATE 2.5; .5 MG/3ML; MG/3ML
1 SOLUTION RESPIRATORY (INHALATION) EVERY 6 HOURS PRN
Status: ON HOLD | COMMUNITY

## 2024-07-03 RX ORDER — ACETAMINOPHEN 325 MG/1
650 TABLET ORAL EVERY 4 HOURS PRN
COMMUNITY
End: 2024-09-12

## 2024-07-03 NOTE — PROGRESS NOTES
"[facility] GERIATRICS    PRIMARY CARE PROVIDER AND CLINIC:  Misael Moe PA-C, [address] / [place] [address]  Chief Complaint   Patient presents with    [facility] F/U      [place] Medical Record Number:  [id number]  Place of Service where encounter took place:  [facility] ([facility]) [[id number]]    HPI:   Dominik Rojas  is a 83 year old  (1940), admitted to the above facility from [facility]..      Dominik Rojas is a 83 year old male admitted on 6/29-7/2/2024 for valuation of AMS. He has a medical history of dementia, COPD on chronic home oxygen, lung cancer, diastolic cardiac dysfunction, and hypothyroidism.      Per family, patient became confused and agitated for several days prior to admission. CT negative for acute pathology. Bacteruria present, did receive some rocephin but UC negative so no oral antibiotics orders. Mental status did improve inpatient.     Chronic condition management:  Essential hypertension: Home amlodipine was discontinued due to low BP.  COPD: no signs of exacerbation.  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 Received radiation treatment in 2023.  Chronic respiratory failure on home oxygen: due to COPD and his lung cancer. Oxygen needs at baseline.   HFrEF: LVEF 40-50%. Currently euvolemic. Continue PTA metoprolol.  Hypothyroidism: TSH within normal range.  Anxiety: Continue home medication.    Today: The patients reports he is feeling \"just fine\". He is able to answer orientation questions correctly. He is participating in speech intake assessment. When asked if he feels he can think more clearly since the hospital he says \"I think so\". When asked why he thinks he was so confused before, he says \"not sure\". He denies fevers, chills, chest pain, SOB at rest, feelings of heart racing, dizziness, headaches, vision changes, and agitation. He is agreeable to exam.     CODE STATUS/ADVANCE DIRECTIVES DISCUSSION:  CPR/Full code "   ALLERGIES:   Allergies   Allergen Reactions    Diclofenac      Other reaction(s): GI Upset    Loratadine      Other reaction(s): Urinary Retention    Sertraline Unknown     Other reaction(s): ineffective      PAST MEDICAL HISTORY:   Past Medical History:   Diagnosis Date    AAA (abdominal aortic aneurysm) (H)     s/p stenting    Alcohol dependence in remission (H)     Alcohol use disorder, severe, in sustained remission, dependence (H) 8/16/2021    Anxiety     Atrial fibrillation with normal ventricular rate (H)     Benign prostatic hyperplasia with lower urinary tract symptoms     Bronchiectasis without complication (H)     2/27/2020    COPD (chronic obstructive pulmonary disease) (H)     CVA (cerebral vascular accident) (H) 2019    DDD (degenerative disc disease), lumbar     Depression     Depressive disorder     Diabetes (H)     Diastolic dysfunction 01/22/2021    DJD (degenerative joint disease) of knee     left    Essential hypertension     Glaucoma     Glaucoma     History of stroke without residual deficits     Hyperlipidemia     Hypertension     Hypothyroidism     Hypothyroidism     Kidney stones     Major depression     Memory problem     Nocturia     Obesity     Opioid use disorder, severe, dependence (H) 8/16/2021    Overactive bladder 02/25/2021    Primary osteoarthritis of left knee     Psoriasis     Psoriasis     Seborrheic keratoses     Somnolence, daytime     Stenosis of right carotid artery     history of TIA's      PAST SURGICAL HISTORY:   has a past surgical history that includes Abdomen surgery; lithotripsy; IR Abdominal Endovascular Stent Graft (5/19/2020); IR Abdominal Aortogram (5/19/2020); Abdominal Aortic Aneurysm Repair (2013); Cystoscopy; Cystoscopy W/ Litholapaxy / Ehl (N/A, 6/12/2018); Cystoscopy Prostate W/ Laser (N/A, 6/12/2018); Circumcision; carotid endarterectomy (Right, 9/9/2019); Cystoscopy Prostate W/ Laser (N/A, 2/18/2020); Ir Abdominal Aortic Aneurysm Endograft (5/19/2020);  Ir Abdominal Aortagram (5/19/2020); Percutaneous Nephrostolithotomy (Right, 03/10/2020); and HUANG W/O FACETEC FORAMOT/DSKC 1/2 VRT SEG, LUMBAR (Bilateral, 3/3/2021).  FAMILY HISTORY: family history includes Cirrhosis in his father; Emphysema in his mother; No Known Problems in his brother, father, maternal aunt, maternal grandfather, maternal grandmother, maternal uncle, paternal aunt, paternal grandfather, paternal grandmother, paternal uncle, sister, and another family member.  SOCIAL HISTORY:   reports that he quit smoking about 34 years ago. His smoking use included cigarettes. He started smoking about 69 years ago. He has a 17.5 pack-year smoking history. He has never used smokeless tobacco. He reports that he does not drink alcohol and does not use drugs.  Patient's living condition: lives with family,       Post Discharge Medication Reconciliation Status:   MED REC REQUIRED  Post Medication Reconciliation Status:  Discharge medications reconciled, continue medications without change       Current Outpatient Medications   Medication Sig Dispense Refill    acetaminophen (TYLENOL) 325 MG tablet Take 325-650 mg by mouth every 6 hours as needed for mild pain      busPIRone (BUSPAR) 5 MG tablet Take 5 mg by mouth 2 times daily      Cholecalciferol (VITAMIN D3) 50 MCG (2000 UT) CAPS Take 1 tablet by mouth daily       DULoxetine (CYMBALTA) 60 MG capsule Take 60 mg by mouth daily      furosemide (LASIX) 20 MG tablet Take 1 tablet (20 mg) by mouth daily      ipratropium - albuterol 0.5 mg/2.5 mg/3 mL (DUONEB) 0.5-2.5 (3) MG/3ML neb solution Take 1 vial by nebulization every 12 hours      latanoprost (XALATAN) 0.005 % ophthalmic solution Place 1 drop into both eyes At Bedtime      levothyroxine (SYNTHROID/LEVOTHROID) 88 MCG tablet Take 88 mcg by mouth daily before breakfast      losartan (COZAAR) 25 MG tablet Take 1 tablet (25 mg) by mouth daily (Patient taking differently: Take 25 mg by mouth daily) 30 tablet 0     metoprolol succinate ER (TOPROL-XL) 25 MG 24 hr tablet Take 12.5 mg by mouth daily      multivitamin w/minerals (THERA-VIT-M) tablet Take 1 tablet by mouth daily      OLANZapine (ZYPREXA) 5 MG tablet Take 7.5 mg by mouth At Bedtime      pantoprazole (PROTONIX) 40 MG EC tablet Take 1 tablet (40 mg) by mouth every morning (before breakfast) (Patient taking differently: Take 40 mg by mouth every morning (before breakfast)) 30 tablet 0    rosuvastatin (CRESTOR) 10 MG tablet Take 10 mg by mouth At Bedtime       No current facility-administered medications for this visit.       ROS:  4 point ROS including Respiratory, CV, GI and , other than that noted in the HPI,  is negative    Vitals:  /78   Pulse 68   Resp 18   Wt 83.6 kg (184 lb 3.2 oz)   SpO2 93%   BMI 29.73 kg/m    Exam:  GENERAL APPEARANCE:  Alert, in no distress  EYES:  EOM, conjunctivae, lids, pupils and irises normal, PERRL  RESP:  normal WOB, no use of accessory muscles, using 3 L supplemental O2 via NC, decreased air movement in BUL, crackles in bilateral lower lobes  CV:  regular rate and rhythm, no murmur, rub, or gallop, no edema, +2 pedal pulses  M/S:   Digits and nails normal  NEURO:   Cranial nerves 2-12 are normal tested and grossly at patient's baseline  PSYCH:  oriented X 3, insight and judgement impaired    Lab/Diagnostic data:  Recent labs in [facility] reviewed by me today.   EXAM: XR CHEST PORTABLE 1 VIEW  LOCATION: [facility]  DATE: 06/29/2024  INDICATION: Confusion.  COMPARISON: Chest CT on 03/04/2024.                                                             IMPRESSION: Single AP view of the chest was obtained. Cardiomediastinal silhouette is within normal limits. Small right pleural effusion and associated basilar atelectasis/consolidation. No significant left pleural effusion. Mild left basilar pulmonary   opacities, likely atelectasis. No significant pneumothorax.        ASSESSMENT/PLAN:    1. Physical  deconditioning  Patient is deconditioned related to recent illness. Inpatient recommendations for PT/OT, waiting on evals at time of visit.    2. Chronic obstructive pulmonary disease, unspecified COPD type (H)  3. Bibasilar crackles  Patient has a documented history of COPD and is on chronic oxygen via NC. His home O2 orders are 1-3 LPM with goal of SpO2 >88%. He is not requiring increased O2 needs at this time nor does he have increased WOB on exam. There is no wheezing appreciated on exam which would be indicative of COPD exacerbation. There are bibasilar crackles which could be consistent with findings on CXR from 6/27 showing R pleural effusion and L opacities, likley d/t hx of lung cancer s/p radiation as well as atelectasis. We will repeat CXR on Friday July 5th to reassess previous findings and r/o PNA. We will also check a CBC at this time. For current management, we will order Duoneb scheduled BID, patient is a poor candidate for inhaler d/t his cognitive impairment. He did receive nebulizers inpatient and tolerated them well.  -CXR  -CBC  -Duoneb BID      4. Chronic systolic congestive heart failure (H)  5. Chronic atrial fibrillation (H)  Controlled.  Patient has mildly reduced EF. No signs of acute cardiac event. No acute signs of fluid overload like edema or increased O2 needs or SOB at rest. RR regular, no hypotension, and patient denies dizziness thus afib is well controlled. Patient has scheduled imaging to assess AAA.    APPT: JULY 17TH @ 11:05AM CTA ABDOMEN PELVIS W/ CONTRAST     6. Benign essential hypertension  Controlled.   Amlodipine was d/cd inpatient d/t hypotension. If patient has repeated episodes of hypotension, can consider tapering off losartan.    7. Dementia with mood disturbance, unspecified dementia severity, unspecified dementia type (H)  8. Metabolic Encephalopathy   Controlled.  [facility] 7/20  Patient has a documented history of dementia although he was acutely confused which  prompted this last admission. The patient seemed to improve inpatient despite UC being negative. CBC showed mild leukocytosis which could contribute to ME. Will recheck CBC  -CBC    Orders:  Duoneb BID  CBC  CXR    Note by Mirian Little DNP Student    I was present with the student who participated in the service and in the documentation of the note. I have verified the history and personally performed the physical exam and medical decision-making. I agree with the assessment and plan of care as documented in the note.  Electronically signed by:   CAYLA Aranda CNP on 7/3/2024 at 8:23 PM

## 2024-07-03 NOTE — LETTER
" 7/3/2024      Dominik Rojas  [address]  [place] [address]        [facility] GERIATRICS    PRIMARY CARE PROVIDER AND CLINIC:  Misael Moe PA-C, [address] / [place] [address]  Chief Complaint   Patient presents with     Hospital F/U      [place] Medical Record Number:  [id number]  Place of Service where encounter took place:  [facility] ([facility]) [[id number]]    HPI:   Dominik Rojas  is a 83 year old  (1940), admitted to the above facility from [facility]..      Dominik Rojas is a 83 year old male admitted on 6/29-7/2/2024 for valuation of AMS. He has a medical history of dementia, COPD on chronic home oxygen, lung cancer, diastolic cardiac dysfunction, and hypothyroidism.      Per family, patient became confused and agitated for several days prior to admission. CT negative for acute pathology. Bacteruria present, did receive some rocephin but UC negative so no oral antibiotics orders. Mental status did improve inpatient.     Chronic condition management:  Essential hypertension: Home amlodipine was discontinued due to low BP.  COPD: no signs of exacerbation.  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 Received radiation treatment in 2023.  Chronic respiratory failure on home oxygen: due to COPD and his lung cancer. Oxygen needs at baseline.   HFrEF: LVEF 40-50%. Currently euvolemic. Continue PTA metoprolol.  Hypothyroidism: TSH within normal range.  Anxiety: Continue home medication.    Today: The patients reports he is feeling \"just fine\". He is able to answer orientation questions correctly. He is participating in speech intake assessment. When asked if he feels he can think more clearly since the hospital he says \"I think so\". When asked why he thinks he was so confused before, he says \"not sure\". He denies fevers, chills, chest pain, SOB at rest, feelings of heart racing, dizziness, headaches, vision changes, and agitation. He is " agreeable to exam.     CODE STATUS/ADVANCE DIRECTIVES DISCUSSION:  CPR/Full code   ALLERGIES:   Allergies   Allergen Reactions     Diclofenac      Other reaction(s): GI Upset     Loratadine      Other reaction(s): Urinary Retention     Sertraline Unknown     Other reaction(s): ineffective      PAST MEDICAL HISTORY:   Past Medical History:   Diagnosis Date     AAA (abdominal aortic aneurysm) (H)     s/p stenting     Alcohol dependence in remission (H)      Alcohol use disorder, severe, in sustained remission, dependence (H) 8/16/2021     Anxiety      Atrial fibrillation with normal ventricular rate (H)      Benign prostatic hyperplasia with lower urinary tract symptoms      Bronchiectasis without complication (H)     2/27/2020     COPD (chronic obstructive pulmonary disease) (H)      CVA (cerebral vascular accident) (H) 2019     DDD (degenerative disc disease), lumbar      Depression      Depressive disorder      Diabetes (H)      Diastolic dysfunction 01/22/2021     DJD (degenerative joint disease) of knee     left     Essential hypertension      Glaucoma      Glaucoma      History of stroke without residual deficits      Hyperlipidemia      Hypertension      Hypothyroidism      Hypothyroidism      Kidney stones      Major depression      Memory problem      Nocturia      Obesity      Opioid use disorder, severe, dependence (H) 8/16/2021     Overactive bladder 02/25/2021     Primary osteoarthritis of left knee      Psoriasis      Psoriasis      Seborrheic keratoses      Somnolence, daytime      Stenosis of right carotid artery     history of TIA's      PAST SURGICAL HISTORY:   has a past surgical history that includes Abdomen surgery; lithotripsy; IR Abdominal Endovascular Stent Graft (5/19/2020); IR Abdominal Aortogram (5/19/2020); Abdominal Aortic Aneurysm Repair (2013); Cystoscopy; Cystoscopy W/ Litholapaxy / Ehl (N/A, 6/12/2018); Cystoscopy Prostate W/ Laser (N/A, 6/12/2018); Circumcision; carotid endarterectomy  (Right, 9/9/2019); Cystoscopy Prostate W/ Laser (N/A, 2/18/2020); Ir Abdominal Aortic Aneurysm Endograft (5/19/2020); Ir Abdominal Aortagram (5/19/2020); Percutaneous Nephrostolithotomy (Right, 03/10/2020); and HUANG W/O FACETEC FORAMOT/DSKC 1/2 VRT SEG, LUMBAR (Bilateral, 3/3/2021).  FAMILY HISTORY: family history includes Cirrhosis in his father; Emphysema in his mother; No Known Problems in his brother, father, maternal aunt, maternal grandfather, maternal grandmother, maternal uncle, paternal aunt, paternal grandfather, paternal grandmother, paternal uncle, sister, and another family member.  SOCIAL HISTORY:   reports that he quit smoking about 34 years ago. His smoking use included cigarettes. He started smoking about 69 years ago. He has a 17.5 pack-year smoking history. He has never used smokeless tobacco. He reports that he does not drink alcohol and does not use drugs.  Patient's living condition: lives with family,       Post Discharge Medication Reconciliation Status:   MED REC REQUIRED  Post Medication Reconciliation Status:  Discharge medications reconciled, continue medications without change       Current Outpatient Medications   Medication Sig Dispense Refill     acetaminophen (TYLENOL) 325 MG tablet Take 325-650 mg by mouth every 6 hours as needed for mild pain       busPIRone (BUSPAR) 5 MG tablet Take 5 mg by mouth 2 times daily       Cholecalciferol (VITAMIN D3) 50 MCG (2000 UT) CAPS Take 1 tablet by mouth daily        DULoxetine (CYMBALTA) 60 MG capsule Take 60 mg by mouth daily       furosemide (LASIX) 20 MG tablet Take 1 tablet (20 mg) by mouth daily       ipratropium - albuterol 0.5 mg/2.5 mg/3 mL (DUONEB) 0.5-2.5 (3) MG/3ML neb solution Take 1 vial by nebulization every 12 hours       latanoprost (XALATAN) 0.005 % ophthalmic solution Place 1 drop into both eyes At Bedtime       levothyroxine (SYNTHROID/LEVOTHROID) 88 MCG tablet Take 88 mcg by mouth daily before breakfast       losartan (COZAAR)  25 MG tablet Take 1 tablet (25 mg) by mouth daily (Patient taking differently: Take 25 mg by mouth daily) 30 tablet 0     metoprolol succinate ER (TOPROL-XL) 25 MG 24 hr tablet Take 12.5 mg by mouth daily       multivitamin w/minerals (THERA-VIT-M) tablet Take 1 tablet by mouth daily       OLANZapine (ZYPREXA) 5 MG tablet Take 7.5 mg by mouth At Bedtime       pantoprazole (PROTONIX) 40 MG EC tablet Take 1 tablet (40 mg) by mouth every morning (before breakfast) (Patient taking differently: Take 40 mg by mouth every morning (before breakfast)) 30 tablet 0     rosuvastatin (CRESTOR) 10 MG tablet Take 10 mg by mouth At Bedtime       No current facility-administered medications for this visit.       ROS:  4 point ROS including Respiratory, CV, GI and , other than that noted in the HPI,  is negative    Vitals:  /78   Pulse 68   Resp 18   Wt 83.6 kg (184 lb 3.2 oz)   SpO2 93%   BMI 29.73 kg/m    Exam:  GENERAL APPEARANCE:  Alert, in no distress  EYES:  EOM, conjunctivae, lids, pupils and irises normal, PERRL  RESP:  normal WOB, no use of accessory muscles, using 3 L supplemental O2 via NC, decreased air movement in BUL, crackles in bilateral lower lobes  CV:  regular rate and rhythm, no murmur, rub, or gallop, no edema, +2 pedal pulses  M/S:   Digits and nails normal  NEURO:   Cranial nerves 2-12 are normal tested and grossly at patient's baseline  PSYCH:  oriented X 3, insight and judgement impaired    Lab/Diagnostic data:  Recent labs in [facility] reviewed by me today.   EXAM: XR CHEST PORTABLE 1 VIEW  LOCATION: [facility]  DATE: 06/29/2024  INDICATION: Confusion.  COMPARISON: Chest CT on 03/04/2024.                                                             IMPRESSION: Single AP view of the chest was obtained. Cardiomediastinal silhouette is within normal limits. Small right pleural effusion and associated basilar atelectasis/consolidation. No significant left pleural effusion. Mild  left basilar pulmonary   opacities, likely atelectasis. No significant pneumothorax.        ASSESSMENT/PLAN:    1. Physical deconditioning  Patient is deconditioned related to recent illness. Inpatient recommendations for PT/OT, waiting on evals at time of visit.    2. Chronic obstructive pulmonary disease, unspecified COPD type (H)  3. Bibasilar crackles  Patient has a documented history of COPD and is on chronic oxygen via NC. His home O2 orders are 1-3 LPM with goal of SpO2 >88%. He is not requiring increased O2 needs at this time nor does he have increased WOB on exam. There is no wheezing appreciated on exam which would be indicative of COPD exacerbation. There are bibasilar crackles which could be consistent with findings on CXR from 6/27 showing R pleural effusion and L opacities, likley d/t hx of lung cancer s/p radiation as well as atelectasis. We will repeat CXR on Friday July 5th to reassess previous findings and r/o PNA. We will also check a CBC at this time. For current management, we will order Duoneb scheduled BID, patient is a poor candidate for inhaler d/t his cognitive impairment. He did receive nebulizers inpatient and tolerated them well.  -CXR  -CBC  -Duoneb BID      4. Chronic systolic congestive heart failure (H)  5. Chronic atrial fibrillation (H)  Controlled.  Patient has mildly reduced EF. No signs of acute cardiac event. No acute signs of fluid overload like edema or increased O2 needs or SOB at rest. RR regular, no hypotension, and patient denies dizziness thus afib is well controlled. Patient has scheduled imaging to assess AAA.    APPT: JULY 17TH @ 11:05AM CTA ABDOMEN PELVIS W/ CONTRAST     6. Benign essential hypertension  Controlled.   Amlodipine was d/cd inpatient d/t hypotension. If patient has repeated episodes of hypotension, can consider tapering off losartan.    7. Dementia with mood disturbance, unspecified dementia severity, unspecified dementia type (H)  8. Metabolic  Encephalopathy   Controlled.  [facility] 7/20  Patient has a documented history of dementia although he was acutely confused which prompted this last admission. The patient seemed to improve inpatient despite UC being negative. CBC showed mild leukocytosis which could contribute to ME. Will recheck CBC  -CBC    Orders:  Duoneb BID  CBC  CXR    Note by Mirian Little DNP Student    I was present with the student who participated in the service and in the documentation of the note. I have verified the history and personally performed the physical exam and medical decision-making. I agree with the assessment and plan of care as documented in the note.  Electronically signed by:   CAYLA Aranda CNP on 7/3/2024 at 8:23 PM                      Sincerely,        CAYLA Aranda CNP

## 2024-07-04 LAB — BACTERIA BLD CULT: NO GROWTH

## 2024-07-05 ENCOUNTER — TRANSITIONAL CARE UNIT VISIT (OUTPATIENT)
Dept: GERIATRICS | Facility: CLINIC | Age: 84
End: 2024-07-05
Payer: COMMERCIAL

## 2024-07-05 VITALS
HEIGHT: 62 IN | RESPIRATION RATE: 18 BRPM | DIASTOLIC BLOOD PRESSURE: 70 MMHG | WEIGHT: 185.6 LBS | HEART RATE: 93 BPM | TEMPERATURE: 97.1 F | BODY MASS INDEX: 34.16 KG/M2 | OXYGEN SATURATION: 93 % | SYSTOLIC BLOOD PRESSURE: 135 MMHG

## 2024-07-05 DIAGNOSIS — D72.829 LEUKOCYTOSIS, UNSPECIFIED TYPE: Primary | ICD-10-CM

## 2024-07-05 LAB
ERYTHROCYTE [DISTWIDTH] IN BLOOD BY AUTOMATED COUNT: 14.4 % (ref 10–15)
HCT VFR BLD AUTO: 44.8 % (ref 40–53)
HGB BLD-MCNC: 14.4 G/DL (ref 13.3–17.7)
MCH RBC QN AUTO: 29.1 PG (ref 26.5–33)
MCHC RBC AUTO-ENTMCNC: 32.1 G/DL (ref 31.5–36.5)
MCV RBC AUTO: 91 FL (ref 78–100)
PLATELET # BLD AUTO: 299 10E3/UL (ref 150–450)
RBC # BLD AUTO: 4.95 10E6/UL (ref 4.4–5.9)
WBC # BLD AUTO: 14 10E3/UL (ref 4–11)

## 2024-07-05 PROCEDURE — 81001 URINALYSIS AUTO W/SCOPE: CPT | Performed by: NURSE PRACTITIONER

## 2024-07-05 PROCEDURE — 87086 URINE CULTURE/COLONY COUNT: CPT | Performed by: NURSE PRACTITIONER

## 2024-07-05 PROCEDURE — 99308 SBSQ NF CARE LOW MDM 20: CPT | Performed by: NURSE PRACTITIONER

## 2024-07-05 PROCEDURE — 36415 COLL VENOUS BLD VENIPUNCTURE: CPT | Performed by: INTERNAL MEDICINE

## 2024-07-05 PROCEDURE — P9603 ONE-WAY ALLOW PRORATED MILES: HCPCS | Performed by: INTERNAL MEDICINE

## 2024-07-05 PROCEDURE — 85027 COMPLETE CBC AUTOMATED: CPT | Performed by: INTERNAL MEDICINE

## 2024-07-05 NOTE — PROGRESS NOTES
"[facility] GERIATRICS    Chief Complaint   Patient presents with    RECHECK     HPI:  Dominik Rojas is a 83 year old  (1940), who is being seen today for an episodic care visit at: [facility] ([facility]) [[id number]].     Today's concern is:   CXR done today to follow up on results from the hospital. Results are not back yet. CBC shows elevated WBCs. Patient denies any respiratory symptoms. The only symptom he reports is cloudy urine. He thinks he has a UTI. He has not had any fever or chills. No GI symptoms. No pain.     Allergies, and PMH/PSH reviewed in EPIC today.  REVIEW OF SYSTEMS:  Limited secondary to cognitive impairment but today pt reports 4 point ROS including Respiratory, CV, GI and , other than that noted in the HPI, is negative    Objective:   /70   Pulse 93   Temp 97.1  F (36.2  C)   Resp 18   Ht 1.575 m (5' 2\")   Wt 84.2 kg (185 lb 9.6 oz)   SpO2 93%   BMI 33.95 kg/m    GENERAL APPEARANCE:  Alert, in no distress  EYES:  EOM normal, conjunctiva and lids normal  RESP:  no respiratory distress  CV:  no edema  PSYCH:  insight and judgement impaired, memory impaired     Recent labs in [facility] reviewed by me today.     Assessment/Plan:  (A75.272) Leukocytosis, unspecified type  (primary encounter diagnosis)  Comment: No s/sx respiratory infection. His urine culture from the hospital just showed mixed urogenital organisms. Due to rising WBCs, and no other symptoms of illness, will send another UA/UC        Electronically signed by: Ana Arguello, CAYLA CNP       "

## 2024-07-05 NOTE — LETTER
" 7/5/2024      Dominik Rojas  [address]  [place] [address]        [facility]    Chief Complaint   Patient presents with     RECHECK     HPI:  Dominik Rojas is a 83 year old  (1940), who is being seen today for an episodic care visit at: [facility] ([facility]) [[id number]].     Today's concern is:   CXR done today to follow up on results from the hospital. Results are not back yet. CBC shows elevated WBCs. Patient denies any respiratory symptoms. The only symptom he reports is cloudy urine. He thinks he has a UTI. He has not had any fever or chills. No GI symptoms. No pain.     Allergies, and PMH/PSH reviewed in EPIC today.  REVIEW OF SYSTEMS:  Limited secondary to cognitive impairment but today pt reports 4 point ROS including Respiratory, CV, GI and , other than that noted in the HPI, is negative    Objective:   /70   Pulse 93   Temp 97.1  F (36.2  C)   Resp 18   Ht 1.575 m (5' 2\")   Wt 84.2 kg (185 lb 9.6 oz)   SpO2 93%   BMI 33.95 kg/m    GENERAL APPEARANCE:  Alert, in no distress  EYES:  EOM normal, conjunctiva and lids normal  RESP:  no respiratory distress  CV:  no edema  PSYCH:  insight and judgement impaired, memory impaired     Recent labs in [facility] reviewed by me today.     Assessment/Plan:  (D72.829) Leukocytosis, unspecified type  (primary encounter diagnosis)  Comment: No s/sx respiratory infection. His urine culture from the hospital just showed mixed urogenital organisms. Due to rising WBCs, and no other symptoms of illness, will send another UA/UC        Electronically signed by: CAYLA Aranda CNP           Sincerely,        CAYLA Aranda CNP      "

## 2024-07-06 ENCOUNTER — LAB REQUISITION (OUTPATIENT)
Dept: LAB | Facility: CLINIC | Age: 84
End: 2024-07-06
Payer: COMMERCIAL

## 2024-07-06 DIAGNOSIS — R82.90 UNSPECIFIED ABNORMAL FINDINGS IN URINE: ICD-10-CM

## 2024-07-06 LAB
ALBUMIN UR-MCNC: 30 MG/DL
APPEARANCE UR: ABNORMAL
ATRIAL RATE - MUSE: 109 BPM
BACTERIA #/AREA URNS HPF: ABNORMAL /HPF
BILIRUB UR QL STRIP: NEGATIVE
CAOX CRY #/AREA URNS HPF: ABNORMAL /HPF
COLOR UR AUTO: ABNORMAL
DIASTOLIC BLOOD PRESSURE - MUSE: 73 MMHG
GLUCOSE UR STRIP-MCNC: NEGATIVE MG/DL
HGB UR QL STRIP: ABNORMAL
INTERPRETATION ECG - MUSE: NORMAL
KETONES UR STRIP-MCNC: NEGATIVE MG/DL
LEUKOCYTE ESTERASE UR QL STRIP: ABNORMAL
MUCOUS THREADS #/AREA URNS LPF: PRESENT /LPF
NITRATE UR QL: NEGATIVE
P AXIS - MUSE: NORMAL DEGREES
PH UR STRIP: 6 [PH] (ref 5–7)
PR INTERVAL - MUSE: 168 MS
QRS DURATION - MUSE: 80 MS
QT - MUSE: 372 MS
QTC - MUSE: 500 MS
R AXIS - MUSE: 60 DEGREES
RBC URINE: 14 /HPF
SP GR UR STRIP: 1.02 (ref 1–1.03)
SYSTOLIC BLOOD PRESSURE - MUSE: 98 MMHG
T AXIS - MUSE: 55 DEGREES
UROBILINOGEN UR STRIP-MCNC: NORMAL MG/DL
VENTRICULAR RATE- MUSE: 109 BPM
WBC CLUMPS #/AREA URNS HPF: PRESENT /HPF
WBC URINE: >182 /HPF

## 2024-07-07 LAB — BACTERIA UR CULT: NORMAL

## 2024-07-08 ENCOUNTER — TRANSITIONAL CARE UNIT VISIT (OUTPATIENT)
Dept: GERIATRICS | Facility: CLINIC | Age: 84
End: 2024-07-08
Payer: COMMERCIAL

## 2024-07-08 VITALS
BODY MASS INDEX: 34.04 KG/M2 | TEMPERATURE: 97 F | OXYGEN SATURATION: 94 % | HEART RATE: 101 BPM | WEIGHT: 185 LBS | HEIGHT: 62 IN | SYSTOLIC BLOOD PRESSURE: 102 MMHG | RESPIRATION RATE: 16 BRPM | DIASTOLIC BLOOD PRESSURE: 88 MMHG

## 2024-07-08 DIAGNOSIS — R82.998 CALCIUM OXALATE CRYSTALS PRESENT IN URINE: Primary | ICD-10-CM

## 2024-07-08 PROCEDURE — 99308 SBSQ NF CARE LOW MDM 20: CPT | Performed by: NURSE PRACTITIONER

## 2024-07-08 NOTE — LETTER
" 7/8/2024      Dominik Rojas  [address]  [place] [address]        [facility]    Chief Complaint   Patient presents with     RECHECK     HPI:  Dominik Rojas is a 83 year old  (1940), who is being seen today for an episodic care visit at: [facility] ([facility]) [[id number]].     Today's concern is:   Patient had requested UA/UC due to cloudy urine. He says he has been fighting infections for two years. Provider updates him that the urine culture was negative, but the UA did show calcium crystals. He says he has had stones in the past. He currently denies hematuria, dysuria, difficulty voiding. When asked if he has a urologist, he says he has not seen him in about a year. Most of the visit is spent discussing his discharge planning. He says several times that he feels bad for his wife, she can't take on everything, but he cannot state specifically what she is dealing with.     Allergies, and PMH/PSH reviewed in [facility] today.  REVIEW OF SYSTEMS:  4 point ROS including Respiratory, CV, GI and , other than that noted in the HPI,  is negative    Objective:   /88   Pulse 101   Temp 97  F (36.1  C)   Resp 16   Ht 1.575 m (5' 2\")   Wt 83.9 kg (185 lb)   SpO2 94%   BMI 33.84 kg/m    GENERAL APPEARANCE:  Alert, in no distress  RESP:  no respiratory distress  PSYCH:  patient is oriented, is correct on much of his history, but then is vague about his social issues    Recent labs in [facility] reviewed by me today.     Assessment/Plan:  (R88.998) Calcium oxalate crystals present in urine  (primary encounter diagnosis)  Comment: No evidence of infection. It is possible that he has stones, but he does not currently have pain or obstruction. Recommend follow up with urology. Monitor for new or worsening urinary symptoms        Electronically signed by: CAYLA Aranda CNP           Sincerely,        CAYLA Aranda CNP      "

## 2024-07-08 NOTE — PROGRESS NOTES
"[facility] GERIATRICS    Chief Complaint   Patient presents with    RECHECK     HPI:  Dominik Rojas is a 83 year old  (1940), who is being seen today for an episodic care visit at: [facility] ([facility]) [[id number]].     Today's concern is:   Patient had requested UA/UC due to cloudy urine. He says he has been fighting infections for two years. Provider updates him that the urine culture was negative, but the UA did show calcium crystals. He says he has had stones in the past. He currently denies hematuria, dysuria, difficulty voiding. When asked if he has a urologist, he says he has not seen him in about a year. Most of the visit is spent discussing his discharge planning. He says several times that he feels bad for his wife, she can't take on everything, but he cannot state specifically what she is dealing with.     Allergies, and PMH/PSH reviewed in EPIC today.  REVIEW OF SYSTEMS:  4 point ROS including Respiratory, CV, GI and , other than that noted in the HPI,  is negative    Objective:   /88   Pulse 101   Temp 97  F (36.1  C)   Resp 16   Ht 1.575 m (5' 2\")   Wt 83.9 kg (185 lb)   SpO2 94%   BMI 33.84 kg/m    GENERAL APPEARANCE:  Alert, in no distress  RESP:  no respiratory distress  PSYCH:  patient is oriented, is correct on much of his history, but then is vague about his social issues    Recent labs in [facility] reviewed by me today.     Assessment/Plan:  (R13.643) Calcium oxalate crystals present in urine  (primary encounter diagnosis)  Comment: No evidence of infection. It is possible that he has stones, but he does not currently have pain or obstruction. Recommend follow up with urology. Monitor for new or worsening urinary symptoms        Electronically signed by: CAYLA Aranda CNP       "

## 2024-07-10 ENCOUNTER — LAB REQUISITION (OUTPATIENT)
Dept: LAB | Facility: CLINIC | Age: 84
End: 2024-07-10
Payer: COMMERCIAL

## 2024-07-10 DIAGNOSIS — D72.829 ELEVATED WHITE BLOOD CELL COUNT, UNSPECIFIED: ICD-10-CM

## 2024-07-12 LAB
ERYTHROCYTE [DISTWIDTH] IN BLOOD BY AUTOMATED COUNT: 14.7 % (ref 10–15)
HCT VFR BLD AUTO: 42.8 % (ref 40–53)
HGB BLD-MCNC: 13.8 G/DL (ref 13.3–17.7)
MCH RBC QN AUTO: 29 PG (ref 26.5–33)
MCHC RBC AUTO-ENTMCNC: 32.2 G/DL (ref 31.5–36.5)
MCV RBC AUTO: 90 FL (ref 78–100)
PLATELET # BLD AUTO: 272 10E3/UL (ref 150–450)
RBC # BLD AUTO: 4.76 10E6/UL (ref 4.4–5.9)
WBC # BLD AUTO: 10.9 10E3/UL (ref 4–11)

## 2024-07-12 PROCEDURE — 36415 COLL VENOUS BLD VENIPUNCTURE: CPT | Performed by: INTERNAL MEDICINE

## 2024-07-12 PROCEDURE — P9603 ONE-WAY ALLOW PRORATED MILES: HCPCS | Performed by: INTERNAL MEDICINE

## 2024-07-12 PROCEDURE — 85014 HEMATOCRIT: CPT | Performed by: INTERNAL MEDICINE

## 2024-07-14 NOTE — PROGRESS NOTES
[facility] GERIATRICS  INITIAL VISIT NOTE  July 15, 2024    PRIMARY CARE PROVIDER AND CLINIC:  Misael Moe [address] / [place] [address]    [facility] Medical Record Number:  [id number]  Place of Service where encounter took place:  [facility] ([facility]) [[id number]]    Chief Complaint   Patient presents with    Hospital F/U       HPI:    Dominik Rojas is a 83 year old  (1940) [facility] Medical history is notable for R lower lung adenocarcinoma s/p XRT (2023), COPD, chronic hypoxic respiratory failure, HFrEF (EF 40-50%), HTN, dementia and JOJO. He was hospitalized at [facility] from 6/29/24 to 7/1/24 where he presented with several days of confusion and then agitation when he was told he could not go outside to look for their dog (they don't have a dog). No infectious or metabolic etiology was found and ultimately confusion and behaviors/agitation are secondary to dementia. PTA amlodipine discontinued due to lower BPs. He was admitted to this facility for  rehab, medical management, and nursing care.      History obtained from: facility chart records, facility staff, patient report, [facility] chart review, and [facility].      Today, Mr. Rojas is seen in his room sitting up in bed. He was on 3L supplemental O2. He is a limited historian (BIMS 14/15, SLUMS 7/20). Reports no aches or pains. No chest pain. No trouble breathing. No belly pain. No acute concerns today per discussion with nursing. He is working with therapies.       CODE STATUS: CPR/Full code     ALLERGIES:  Allergies   Allergen Reactions    Diclofenac      Other reaction(s): GI Upset    Loratadine      Other reaction(s): Urinary Retention    Sertraline Unknown     Other reaction(s): ineffective       PAST MEDICAL HISTORY:   Past Medical History:   Diagnosis Date    AAA (abdominal aortic aneurysm) (H24)     s/p stenting    Alcohol dependence in  remission (H)     Alcohol use disorder, severe, in sustained remission, dependence (H) 8/16/2021    Anxiety     Atrial fibrillation with normal ventricular rate (H)     Benign prostatic hyperplasia with lower urinary tract symptoms     Bronchiectasis without complication (H)     2/27/2020    COPD (chronic obstructive pulmonary disease) (H)     CVA (cerebral vascular accident) (H) 2019    DDD (degenerative disc disease), lumbar     Depression     Depressive disorder     Diabetes (H)     Diastolic dysfunction 01/22/2021    DJD (degenerative joint disease) of knee     left    Essential hypertension     Glaucoma     Glaucoma     History of stroke without residual deficits     Hyperlipidemia     Hypertension     Hypothyroidism     Hypothyroidism     Kidney stones     Major depression     Memory problem     Nocturia     Obesity     Opioid use disorder, severe, dependence (H) 8/16/2021    Overactive bladder 02/25/2021    Primary osteoarthritis of left knee     Psoriasis     Psoriasis     Seborrheic keratoses     Somnolence, daytime     Stenosis of right carotid artery     history of TIA's       PAST SURGICAL HISTORY:   Past Surgical History:   Procedure Laterality Date    ABDOMEN SURGERY      ABDOMINAL AORTIC ANEURYSM REPAIR  2013    stent    CAROTID ENDARTERECTOMY Right 9/9/2019    Procedure: RIGHT CAROTID ENDARTERECTOMY;  Surgeon: Miguel Muller MD;  Location: [facility];  Service: General    CIRCUMCISION      CYSTOSCOPY      CYSTOSCOPY PROSTATE W/ LASER N/A 6/12/2018    Procedure:  TRANSURETHRAL RESECTION OF THE PROSTATE WITH QUANTA LASER;  Surgeon: Eze Levi MD;  Location[facility];  Service:     CYSTOSCOPY PROSTATE W/ LASER N/A 2/18/2020    Procedure: CYSTOSCOPY WITH TRANSURETHRAL INCISION OF THE PROSTATE WITH QUANTA LASER;  Surgeon: Eze Levi MD;  [facility];  Service: Urology    CYSTOSCOPY W/ LITHOLAPAXY / EHL N/A 6/12/2018    Procedure: CYSTOSCOPY AND  LITHOLAPAXY;  Surgeon: Eze Levi MD;  Location: [facility] OR;  Service:     IR ABDOMINAL AORTAGRAM  5/19/2020    IR ABDOMINAL AORTIC ANEURYSM ENDOGRAFT  5/19/2020    IR ABDOMINAL AORTOGRAM  5/19/2020    IR ABDOMINAL ENDOVASCULAR STENT GRAFT  5/19/2020    LITHOTRIPSY      PERCUTANEOUS NEPHROSTOLITHOTOMY Right 03/10/2020    ZZC HUANG W/O FACETEC FORAMOT/DSKC 1/2 VRT SEG, LUMBAR Bilateral 3/3/2021    Procedure: Bilateral lumbar 2 - Lumbar 4 decompressive lumbar laminectomy with medial facetectomies;  Surgeon: Renée King MD;  Location: [facility];  Service: Spine       FAMILY HISTORY:   Family History   Problem Relation Age of Onset    Emphysema Mother     No Known Problems Father     Cirrhosis Father        SOCIAL HISTORY:   Patient's living condition:  lives with spouse in a house    MEDICATIONS:  Post Discharge Medication Reconciliation Status: discharge medications reconciled and changed, per note/orders.  Current Outpatient Medications   Medication Sig Dispense Refill    acetaminophen (TYLENOL) 325 MG tablet Take 650 mg by mouth every 4 hours as needed for mild pain      busPIRone (BUSPAR) 5 MG tablet Take 5 mg by mouth 2 times daily      Cholecalciferol (VITAMIN D3) 50 MCG (2000 UT) CAPS Take 1 tablet by mouth daily       DULoxetine (CYMBALTA) 60 MG capsule Take 60 mg by mouth daily      furosemide (LASIX) 20 MG tablet Take 1 tablet (20 mg) by mouth daily      ipratropium - albuterol 0.5 mg/2.5 mg/3 mL (DUONEB) 0.5-2.5 (3) MG/3ML neb solution Take 1 vial by nebulization every 12 hours      latanoprost (XALATAN) 0.005 % ophthalmic solution Place 1 drop into both eyes At Bedtime      levothyroxine (SYNTHROID/LEVOTHROID) 88 MCG tablet Take 88 mcg by mouth daily before breakfast      losartan (COZAAR) 25 MG tablet Take 1 tablet (25 mg) by mouth daily (Patient taking differently: Take 25 mg by mouth daily Hold for SBP <110) 30 tablet 0    metoprolol succinate ER (TOPROL-XL) 25 MG 24 hr  "tablet Take 12.5 mg by mouth daily      multivitamin w/minerals (THERA-VIT-M) tablet Take 1 tablet by mouth daily      OLANZapine (ZYPREXA) 5 MG tablet Take 7.5 mg by mouth At Bedtime      pantoprazole (PROTONIX) 40 MG EC tablet Take 1 tablet (40 mg) by mouth every morning (before breakfast) 30 tablet 0    rosuvastatin (CRESTOR) 10 MG tablet Take 10 mg by mouth At Bedtime         ROS:  Unable to obtain due to cognitive impairment or aphasia. SLUMS 7/20.     PHYSICAL EXAM:  BP (!) 148/84   Pulse 80   Temp (!) 96.6  F (35.9  C)   Resp 16   Ht 1.575 m (5' 2\")   Wt 84.4 kg (186 lb)   SpO2 98%   BMI 34.02 kg/m    Gen: sitting up in bed, alert, cooperative and in no acute distress  HEENT: good hearing acuity  Card: RRR, S1, S2, no murmurs  Resp: lungs clear to auscultation bilaterally anteriorly and laterally, no crackles or wheezes; moving good air; nasal cannula on 3L  Ext: no LE edema  Neuro: CX II-XII grossly in tact; ROM in all four extremities grossly in tact  Psych: memory, judgement and insight impaired     LABORATORY/IMAGING DATA:  Reviewed as per [facility] and/or [facility]    ASSESSMENT/PLAN:    Dementia With Behavioral Disturbance  Generalized Anxiety Disorder  BIMS 14/15 and SLUMS 7/20 at [facility]. Lives with spouse.   -- buspirone 5 mg BID, duloxetine 60 mg daily  -- olanzapine 7.5 mg at bedtime  -- ACP to follow - appreciate them seeing him  -- OT and SW following for cog testing and dispo     HFrEF (EF 40-50%), HTN, HLD  SBPs 100s-120s, most 100s. Weight stable around `86 lbs. PTA amlodipine 2.5 mg daily discontinued during hospitalization due to lower BPs.    -- furosemide 20 mg daily, metoprolol XL 12.5 mg daily, rosuvastatin 10 mg at bedtime  -- losartan 25 mg daily - add hold for SBP <110  -- follow BPs, weights, clinical volume status    COPD  Chronic Hypoxic Respiratory Failure  Adenocarcinoma of R Lower Lung s/p XRT (2023)  On 3L supplemental O2. Lungs clear today.   -- Pita " q12h    Hypothyroidisim  TSH 2.32 last month.   -- levothyroxine 88 mcg daily     GERD  -- pantoprazole 40 mg daily    Physical Deconditioning  In setting of hospitalization and underlying medical conditions  -- ongoing PT/OT    Electronically signed by:  Inessa Hurt MD

## 2024-07-15 ENCOUNTER — TRANSITIONAL CARE UNIT VISIT (OUTPATIENT)
Dept: GERIATRICS | Facility: CLINIC | Age: 84
End: 2024-07-15
Payer: COMMERCIAL

## 2024-07-15 VITALS
HEIGHT: 62 IN | RESPIRATION RATE: 16 BRPM | WEIGHT: 186 LBS | DIASTOLIC BLOOD PRESSURE: 84 MMHG | TEMPERATURE: 96.6 F | OXYGEN SATURATION: 98 % | SYSTOLIC BLOOD PRESSURE: 148 MMHG | BODY MASS INDEX: 34.23 KG/M2 | HEART RATE: 80 BPM

## 2024-07-15 DIAGNOSIS — I50.22 CHRONIC SYSTOLIC CONGESTIVE HEART FAILURE (H): ICD-10-CM

## 2024-07-15 DIAGNOSIS — J44.9 CHRONIC OBSTRUCTIVE PULMONARY DISEASE, UNSPECIFIED COPD TYPE (H): ICD-10-CM

## 2024-07-15 DIAGNOSIS — F41.1 GAD (GENERALIZED ANXIETY DISORDER): ICD-10-CM

## 2024-07-15 DIAGNOSIS — E03.9 HYPOTHYROIDISM, UNSPECIFIED TYPE: ICD-10-CM

## 2024-07-15 DIAGNOSIS — K21.9 GASTROESOPHAGEAL REFLUX DISEASE WITHOUT ESOPHAGITIS: ICD-10-CM

## 2024-07-15 DIAGNOSIS — I10 BENIGN ESSENTIAL HYPERTENSION: ICD-10-CM

## 2024-07-15 DIAGNOSIS — E78.5 HYPERLIPIDEMIA, UNSPECIFIED HYPERLIPIDEMIA TYPE: ICD-10-CM

## 2024-07-15 DIAGNOSIS — R53.81 PHYSICAL DECONDITIONING: ICD-10-CM

## 2024-07-15 DIAGNOSIS — J96.11 CHRONIC HYPOXIC RESPIRATORY FAILURE (H): ICD-10-CM

## 2024-07-15 DIAGNOSIS — F03.93 DEMENTIA WITH MOOD DISTURBANCE, UNSPECIFIED DEMENTIA SEVERITY, UNSPECIFIED DEMENTIA TYPE (H): Primary | ICD-10-CM

## 2024-07-15 DIAGNOSIS — C34.91 PRIMARY LUNG ADENOCARCINOMA, RIGHT (H): ICD-10-CM

## 2024-07-15 PROCEDURE — 99305 1ST NF CARE MODERATE MDM 35: CPT | Performed by: INTERNAL MEDICINE

## 2024-07-15 NOTE — LETTER
7/15/2024      Dominik Rojas  [address]  [place] [address]        M [facility] GERIATRICS  INITIAL VISIT NOTE  July 15, 2024    PRIMARY CARE PROVIDER AND CLINIC:  Misael Moe [address] / [place] [address]    M [facility] Medical Record Number:  [id number]  Place of Service where encounter took place:  [facility] ([facility]) [[id number]]    Chief Complaint   Patient presents with     Hospital F/U       HPI:    Dominik Rojas is a 83 year old  (1940) male seen today at [facility]. Medical history is notable for R lower lung adenocarcinoma s/p XRT (2023), COPD, chronic hypoxic respiratory failure, HFrEF (EF 40-50%), HTN, dementia and JOJO. He was hospitalized at [facility] from 6/29/24 to 7/1/24 where he presented with several days of confusion and then agitation when he was told he could not go outside to look for their dog (they don't have a dog). No infectious or metabolic etiology was found and ultimately confusion and behaviors/agitation are secondary to dementia. PTA amlodipine discontinued due to lower BPs. He was admitted to this facility for  rehab, medical management, and nursing care.      History obtained from: facility chart records, facility staff, patient report, [facility] chart review, and Care [facility] chart review.      Today, Mr. Rojas is seen in his room sitting up in bed. He was on 3L supplemental O2. He is a limited historian (BIMS 14/15, SLUMS 7/20). Reports no aches or pains. No chest pain. No trouble breathing. No belly pain. No acute concerns today per discussion with nursing. He is working with therapies.       CODE STATUS: CPR/Full code     ALLERGIES:  Allergies   Allergen Reactions     Diclofenac      Other reaction(s): GI Upset     Loratadine      Other reaction(s): Urinary Retention     Sertraline Unknown     Other reaction(s): ineffective       PAST MEDICAL HISTORY:   Past Medical History:   Diagnosis Date      AAA (abdominal aortic aneurysm) (H24)     s/p stenting     Alcohol dependence in remission (H)      Alcohol use disorder, severe, in sustained remission, dependence (H) 8/16/2021     Anxiety      Atrial fibrillation with normal ventricular rate (H)      Benign prostatic hyperplasia with lower urinary tract symptoms      Bronchiectasis without complication (H)     2/27/2020     COPD (chronic obstructive pulmonary disease) (H)      CVA (cerebral vascular accident) (H) 2019     DDD (degenerative disc disease), lumbar      Depression      Depressive disorder      Diabetes (H)      Diastolic dysfunction 01/22/2021     DJD (degenerative joint disease) of knee     left     Essential hypertension      Glaucoma      Glaucoma      History of stroke without residual deficits      Hyperlipidemia      Hypertension      Hypothyroidism      Hypothyroidism      Kidney stones      Major depression      Memory problem      Nocturia      Obesity      Opioid use disorder, severe, dependence (H) 8/16/2021     Overactive bladder 02/25/2021     Primary osteoarthritis of left knee      Psoriasis      Psoriasis      Seborrheic keratoses      Somnolence, daytime      Stenosis of right carotid artery     history of TIA's       PAST SURGICAL HISTORY:   Past Surgical History:   Procedure Laterality Date     ABDOMEN SURGERY       ABDOMINAL AORTIC ANEURYSM REPAIR  2013    stent     CAROTID ENDARTERECTOMY Right 9/9/2019    Procedure: RIGHT CAROTID ENDARTERECTOMY;  Surgeon: Miguel Muller MD;  Location: [facility];  Service: General     CIRCUMCISION       CYSTOSCOPY       CYSTOSCOPY PROSTATE W/ LASER N/A 6/12/2018    Procedure:  TRANSURETHRAL RESECTION OF THE PROSTATE WITH QUANTA LASER;  Surgeon: Eze Levi MD;  Location: [facility];  Service:      CYSTOSCOPY PROSTATE W/ LASER N/A 2/18/2020    Procedure: CYSTOSCOPY WITH TRANSURETHRAL INCISION OF THE PROSTATE WITH QUANTA LASER;  Surgeon: Eze Levi MD;  Location:  [facility] OR;  Service: Urology     CYSTOSCOPY W/ LITHOLAPAXY / EHL N/A 6/12/2018    Procedure: CYSTOSCOPY AND LITHOLAPAXY;  Surgeon: Eze Levi MD;  Location: [facility] OR;  Service:      IR ABDOMINAL AORTAGRAM  5/19/2020     IR ABDOMINAL AORTIC ANEURYSM ENDOGRAFT  5/19/2020     IR ABDOMINAL AORTOGRAM  5/19/2020     IR ABDOMINAL ENDOVASCULAR STENT GRAFT  5/19/2020     LITHOTRIPSY       PERCUTANEOUS NEPHROSTOLITHOTOMY Right 03/10/2020     ZZC HUANG W/O FACETEC FORAMOT/DSKC 1/2 VRT SEG, LUMBAR Bilateral 3/3/2021    Procedure: Bilateral lumbar 2 - Lumbar 4 decompressive lumbar laminectomy with medial facetectomies;  Surgeon: Renée King MD;  Location: [facility] OR;  Service: Spine       FAMILY HISTORY:   Family History   Problem Relation Age of Onset     Emphysema Mother      No Known Problems Father      Cirrhosis Father        SOCIAL HISTORY:   Patient's living condition:  lives with spouse in a house    MEDICATIONS:  Post Discharge Medication Reconciliation Status: discharge medications reconciled and changed, per note/orders.  Current Outpatient Medications   Medication Sig Dispense Refill     acetaminophen (TYLENOL) 325 MG tablet Take 650 mg by mouth every 4 hours as needed for mild pain       busPIRone (BUSPAR) 5 MG tablet Take 5 mg by mouth 2 times daily       Cholecalciferol (VITAMIN D3) 50 MCG (2000 UT) CAPS Take 1 tablet by mouth daily        DULoxetine (CYMBALTA) 60 MG capsule Take 60 mg by mouth daily       furosemide (LASIX) 20 MG tablet Take 1 tablet (20 mg) by mouth daily       ipratropium - albuterol 0.5 mg/2.5 mg/3 mL (DUONEB) 0.5-2.5 (3) MG/3ML neb solution Take 1 vial by nebulization every 12 hours       latanoprost (XALATAN) 0.005 % ophthalmic solution Place 1 drop into both eyes At Bedtime       levothyroxine (SYNTHROID/LEVOTHROID) 88 MCG tablet Take 88 mcg by mouth daily before breakfast       losartan (COZAAR) 25 MG tablet Take 1 tablet (25 mg) by mouth daily  "(Patient taking differently: Take 25 mg by mouth daily Hold for SBP <110) 30 tablet 0     metoprolol succinate ER (TOPROL-XL) 25 MG 24 hr tablet Take 12.5 mg by mouth daily       multivitamin w/minerals (THERA-VIT-M) tablet Take 1 tablet by mouth daily       OLANZapine (ZYPREXA) 5 MG tablet Take 7.5 mg by mouth At Bedtime       pantoprazole (PROTONIX) 40 MG EC tablet Take 1 tablet (40 mg) by mouth every morning (before breakfast) 30 tablet 0     rosuvastatin (CRESTOR) 10 MG tablet Take 10 mg by mouth At Bedtime         ROS:  Unable to obtain due to cognitive impairment or aphasia. SLUMS 7/20.     PHYSICAL EXAM:  BP (!) 148/84   Pulse 80   Temp (!) 96.6  F (35.9  C)   Resp 16   Ht 1.575 m (5' 2\")   Wt 84.4 kg (186 lb)   SpO2 98%   BMI 34.02 kg/m    Gen: sitting up in bed, alert, cooperative and in no acute distress  HEENT: good hearing acuity  Card: RRR, S1, S2, no murmurs  Resp: lungs clear to auscultation bilaterally anteriorly and laterally, no crackles or wheezes; moving good air; nasal cannula on 3L  Ext: no LE edema  Neuro: CX II-XII grossly in tact; ROM in all four extremities grossly in tact  Psych: memory, judgement and insight impaired     LABORATORY/IMAGING DATA:  Reviewed as per [facility] and/or [facility]    ASSESSMENT/PLAN:    Dementia With Behavioral Disturbance  Generalized Anxiety Disorder  BIMS 14/15 and SLUMS 7/20 at [facility]. Lives with spouse.   -- buspirone 5 mg BID, duloxetine 60 mg daily  -- olanzapine 7.5 mg at bedtime  -- ACP to follow - appreciate them seeing him  -- OT and SW following for cog testing and dispo     HFrEF (EF 40-50%), HTN, HLD  SBPs 100s-120s, most 100s. Weight stable around `86 lbs. PTA amlodipine 2.5 mg daily discontinued during hospitalization due to lower BPs.    -- furosemide 20 mg daily, metoprolol XL 12.5 mg daily, rosuvastatin 10 mg at bedtime  -- losartan 25 mg daily - add hold for SBP <110  -- follow BPs, weights, clinical volume status    COPD  Chronic Hypoxic " Respiratory Failure  Adenocarcinoma of R Lower Lung s/p XRT (2023)  On 3L supplemental O2. Lungs clear today.   -- DuoNebs q12h    Hypothyroidisim  TSH 2.32 last month.   -- levothyroxine 88 mcg daily     GERD  -- pantoprazole 40 mg daily    Physical Deconditioning  In setting of hospitalization and underlying medical conditions  -- ongoing PT/OT    Electronically signed by:  Inessa Hurt MD                          Sincerely,        Inessa Hurt MD

## 2024-07-17 ENCOUNTER — HOSPITAL ENCOUNTER (OUTPATIENT)
Dept: CT IMAGING | Facility: HOSPITAL | Age: 84
Discharge: HOME | End: 2024-07-17
Attending: SURGERY | Admitting: SURGERY
Payer: COMMERCIAL

## 2024-07-17 DIAGNOSIS — I71.43 INFRARENAL ABDOMINAL AORTIC ANEURYSM (AAA) WITHOUT RUPTURE (H): ICD-10-CM

## 2024-07-17 PROCEDURE — 74174 CTA ABD&PLVS W/CONTRAST: CPT

## 2024-07-17 PROCEDURE — 250N000011 HC RX IP 250 OP 636: Performed by: SURGERY

## 2024-07-17 RX ORDER — IOPAMIDOL 755 MG/ML
100 INJECTION, SOLUTION INTRAVASCULAR ONCE
Status: COMPLETED | OUTPATIENT
Start: 2024-07-17 | End: 2024-07-17

## 2024-07-17 RX ADMIN — IOPAMIDOL 100 ML: 755 INJECTION, SOLUTION INTRAVENOUS at 13:04

## 2024-07-24 ENCOUNTER — TRANSITIONAL CARE UNIT VISIT (OUTPATIENT)
Dept: GERIATRICS | Facility: CLINIC | Age: 84
End: 2024-07-24
Payer: COMMERCIAL

## 2024-07-24 VITALS
RESPIRATION RATE: 18 BRPM | TEMPERATURE: 97.2 F | BODY MASS INDEX: 33.54 KG/M2 | DIASTOLIC BLOOD PRESSURE: 86 MMHG | WEIGHT: 183.4 LBS | HEART RATE: 72 BPM | SYSTOLIC BLOOD PRESSURE: 119 MMHG | OXYGEN SATURATION: 91 %

## 2024-07-24 DIAGNOSIS — R53.81 PHYSICAL DECONDITIONING: Primary | ICD-10-CM

## 2024-07-24 DIAGNOSIS — F41.1 GAD (GENERALIZED ANXIETY DISORDER): ICD-10-CM

## 2024-07-24 DIAGNOSIS — F32.0 CURRENT MILD EPISODE OF MAJOR DEPRESSIVE DISORDER, UNSPECIFIED WHETHER RECURRENT (H): ICD-10-CM

## 2024-07-24 DIAGNOSIS — I50.22 CHRONIC SYSTOLIC CONGESTIVE HEART FAILURE (H): ICD-10-CM

## 2024-07-24 DIAGNOSIS — F03.93 DEMENTIA WITH MOOD DISTURBANCE, UNSPECIFIED DEMENTIA SEVERITY, UNSPECIFIED DEMENTIA TYPE (H): ICD-10-CM

## 2024-07-24 DIAGNOSIS — J44.9 CHRONIC OBSTRUCTIVE PULMONARY DISEASE, UNSPECIFIED COPD TYPE (H): ICD-10-CM

## 2024-07-24 PROCEDURE — 99309 SBSQ NF CARE MODERATE MDM 30: CPT | Performed by: NURSE PRACTITIONER

## 2024-07-24 NOTE — PROGRESS NOTES
"[facility] GERIATRICS    Chief Complaint   Patient presents with    RECHECK     HPI:  Dominik Rojas is a 83 year old  (1940), who is being seen today for an episodic care visit at: [facility] ([facility]) [[id number]].He is a 83 year old male admitted on 6/29-7/2/2024 to [facility] for AMS. He has a medical history of dementia, COPD on chronic home oxygen, lung cancer, diastolic cardiac dysfunction, and hypothyroidism.     Today's concern is: Today the patient is up in his wheelchair working with SLP. He is talkative and smiling. He was asked how he is feeling and said \"very good\". He is a limited historian d/t dementia. He denies chest pain, SOB at rest, edema, heart racing, headaches, and vision changes. He has no acute concerns besides \"getting home\".    Allergies, and PMH/PSH reviewed in EPIC today.  REVIEW OF SYSTEMS:  4 point ROS including Respiratory, CV, GI and , other than that noted in the HPI,  is negative    Current Outpatient Medications   Medication Sig Dispense Refill    acetaminophen (TYLENOL) 325 MG tablet Take 650 mg by mouth every 4 hours as needed for mild pain      busPIRone (BUSPAR) 5 MG tablet Take 5 mg by mouth 2 times daily      Cholecalciferol (VITAMIN D3) 50 MCG (2000 UT) CAPS Take 1 tablet by mouth daily       DULoxetine (CYMBALTA) 60 MG capsule Take 60 mg by mouth daily      furosemide (LASIX) 20 MG tablet Take 1 tablet (20 mg) by mouth daily      ipratropium - albuterol 0.5 mg/2.5 mg/3 mL (DUONEB) 0.5-2.5 (3) MG/3ML neb solution Take 1 vial by nebulization every 12 hours      latanoprost (XALATAN) 0.005 % ophthalmic solution Place 1 drop into both eyes At Bedtime      levothyroxine (SYNTHROID/LEVOTHROID) 88 MCG tablet Take 88 mcg by mouth daily before breakfast      losartan (COZAAR) 25 MG tablet Take 1 tablet (25 mg) by mouth daily (Patient taking differently: Take 25 mg by mouth daily Hold for SBP <110) 30 tablet 0    metoprolol succinate ER (TOPROL-XL) 25 MG 24 hr tablet " Take 12.5 mg by mouth daily      multivitamin w/minerals (THERA-VIT-M) tablet Take 1 tablet by mouth daily      OLANZapine (ZYPREXA) 5 MG tablet Take 7.5 mg by mouth At Bedtime      pantoprazole (PROTONIX) 40 MG EC tablet Take 1 tablet (40 mg) by mouth every morning (before breakfast) 30 tablet 0    rosuvastatin (CRESTOR) 10 MG tablet Take 10 mg by mouth At Bedtime       No current facility-administered medications for this visit.        Objective:   /86   Pulse 72   Temp 97.2  F (36.2  C)   Resp 18   Wt 83.2 kg (183 lb 6.4 oz)   SpO2 91%   BMI 33.54 kg/m    GENERAL APPEARANCE:  Alert, in no distress  RESP:  normal WOB, no use of accessory muscles, using 3 L supplemental O2 via NC, decreased air movement in BUL and RM/LL, course LS of LLL  CV:  regular rate and rhythm, no murmur, rub, or gallop, no edema, +2 pedal pulses  M/S:   Digits and nails normal  NEURO:   Cranial nerves 2-12 are normal tested and grossly at patient's baseline  PSYCH:  waxing and waning orientation ultimately oriented to self, insight and judgement impaired    Recent labs in EPIC reviewed by me today.       Assessment/Plan:  (R53.81) Physical deconditioning  (primary encounter diagnosis)  Comment: Improving. Therapy has chosen a last covered day of 7/31/24  Plan: PT/OT eval and treat, discharge planning per their recommendations.    (F03.93) Dementia with mood disturbance, unspecified dementia severity, unspecified dementia type (H)  (F32.0) Current mild episode of major depressive disorder, unspecified whether recurrent (H24)  (F41.1) JOJO (generalized anxiety disorder)  Comment: Patient SLUMS indicating dementia. Dementia is contributing to MDD and JOJO as noted by psychologist at recent visit who recommended 1-4X/month CBT talk therapy. Continue buspar and zyprexa. Follow-up with clinical psychology for further management of mental health.    (I50.22) Chronic systolic congestive heart failure (H)  Comment: Controlled.  Vitally  stable. Euvolemic. Denies symptoms of acute exacerbation. PE unchanged for patient. No increased O2 needs.  Continue lasix, metoprolol, and losartan.    (J44.9) Chronic obstructive pulmonary disease, unspecified COPD type (H)  Comment: Controlled.   Patient on chronic oxygen via NC at 3 LPM. LS are unchanged from previous visit, consistent with COPD. Recent CBC reassuring there is no infectious process. Continue nebs.         Orders:  NO new orders    Note by Mirian Little DNP Student    I was present with the student who participated in the service and in the documentation of the note. I have verified the history and personally performed the physical exam and medical decision-making. I agree with the assessment and plan of care as documented in the note.  Electronically signed by:   CAYLA Aranda CNP on 7/24/2024 at 9:18 PM

## 2024-07-24 NOTE — LETTER
" 7/24/2024      Dominik Rojas  [address]  [place] [address]        M [facility] GERIATRICS    Chief Complaint   Patient presents with     RECHECK     HPI:  Dominik Rojas is a 83 year old  (1940), who is being seen today for an episodic care visit at: [facility] ([facility]) [[id number]].He is a 83 year old male admitted on 6/29-7/2/2024 to [facility] for AMS. He has a medical history of dementia, COPD on chronic home oxygen, lung cancer, diastolic cardiac dysfunction, and hypothyroidism.     Today's concern is: Today the patient is up in his wheelchair working with SLP. He is talkative and smiling. He was asked how he is feeling and said \"very good\". He is a limited historian d/t dementia. He denies chest pain, SOB at rest, edema, heart racing, headaches, and vision changes. He has no acute concerns besides \"getting home\".    Allergies, and PMH/PSH reviewed in [facility] today.  REVIEW OF SYSTEMS:  4 point ROS including Respiratory, CV, GI and , other than that noted in the HPI,  is negative    Current Outpatient Medications   Medication Sig Dispense Refill     acetaminophen (TYLENOL) 325 MG tablet Take 650 mg by mouth every 4 hours as needed for mild pain       busPIRone (BUSPAR) 5 MG tablet Take 5 mg by mouth 2 times daily       Cholecalciferol (VITAMIN D3) 50 MCG (2000 UT) CAPS Take 1 tablet by mouth daily        DULoxetine (CYMBALTA) 60 MG capsule Take 60 mg by mouth daily       furosemide (LASIX) 20 MG tablet Take 1 tablet (20 mg) by mouth daily       ipratropium - albuterol 0.5 mg/2.5 mg/3 mL (DUONEB) 0.5-2.5 (3) MG/3ML neb solution Take 1 vial by nebulization every 12 hours       latanoprost (XALATAN) 0.005 % ophthalmic solution Place 1 drop into both eyes At Bedtime       levothyroxine (SYNTHROID/LEVOTHROID) 88 MCG tablet Take 88 mcg by mouth daily before breakfast       losartan (COZAAR) 25 MG tablet Take 1 tablet (25 mg) by mouth daily (Patient taking differently: Take 25 mg by mouth daily " Hold for SBP <110) 30 tablet 0     metoprolol succinate ER (TOPROL-XL) 25 MG 24 hr tablet Take 12.5 mg by mouth daily       multivitamin w/minerals (THERA-VIT-M) tablet Take 1 tablet by mouth daily       OLANZapine (ZYPREXA) 5 MG tablet Take 7.5 mg by mouth At Bedtime       pantoprazole (PROTONIX) 40 MG EC tablet Take 1 tablet (40 mg) by mouth every morning (before breakfast) 30 tablet 0     rosuvastatin (CRESTOR) 10 MG tablet Take 10 mg by mouth At Bedtime       No current facility-administered medications for this visit.        Objective:   /86   Pulse 72   Temp 97.2  F (36.2  C)   Resp 18   Wt 83.2 kg (183 lb 6.4 oz)   SpO2 91%   BMI 33.54 kg/m    GENERAL APPEARANCE:  Alert, in no distress  RESP:  normal WOB, no use of accessory muscles, using 3 L supplemental O2 via NC, decreased air movement in BUL and RM/LL, course LS of LLL  CV:  regular rate and rhythm, no murmur, rub, or gallop, no edema, +2 pedal pulses  M/S:   Digits and nails normal  NEURO:   Cranial nerves 2-12 are normal tested and grossly at patient's baseline  PSYCH:  waxing and waning orientation ultimately oriented to self, insight and judgement impaired    Recent labs in EPIC reviewed by me today.       Assessment/Plan:  (R53.81) Physical deconditioning  (primary encounter diagnosis)  Comment: Improving. Therapy has chosen a last covered day of 7/31/24  Plan: PT/OT eval and treat, discharge planning per their recommendations.    (F03.93) Dementia with mood disturbance, unspecified dementia severity, unspecified dementia type (H)  (F32.0) Current mild episode of major depressive disorder, unspecified whether recurrent (H24)  (F41.1) JOJO (generalized anxiety disorder)  Comment: Patient SLUMS indicating dementia. Dementia is contributing to MDD and JOJO as noted by psychologist at recent visit who recommended 1-4X/month CBT talk therapy. Continue buspar and zyprexa. Follow-up with clinical psychology for further management of mental  health.    (I50.22) Chronic systolic congestive heart failure (H)  Comment: Controlled.  Vitally stable. Euvolemic. Denies symptoms of acute exacerbation. PE unchanged for patient. No increased O2 needs.  Continue lasix, metoprolol, and losartan.    (J44.9) Chronic obstructive pulmonary disease, unspecified COPD type (H)  Comment: Controlled.   Patient on chronic oxygen via NC at 3 LPM. LS are unchanged from previous visit, consistent with COPD. Recent CBC reassuring there is no infectious process. Continue nebs.         Orders:  NO new orders    Note by Mirian Little DNP Student    I was present with the student who participated in the service and in the documentation of the note. I have verified the history and personally performed the physical exam and medical decision-making. I agree with the assessment and plan of care as documented in the note.  Electronically signed by:   CAYLA Aranda CNP on 7/24/2024 at 9:18 PM          Sincerely,        CAYLA Aranda CNP

## 2024-07-29 ENCOUNTER — DISCHARGE SUMMARY NURSING HOME (OUTPATIENT)
Dept: GERIATRICS | Facility: CLINIC | Age: 84
End: 2024-07-29
Payer: COMMERCIAL

## 2024-07-29 VITALS
HEART RATE: 86 BPM | RESPIRATION RATE: 18 BRPM | HEIGHT: 66 IN | BODY MASS INDEX: 29.41 KG/M2 | TEMPERATURE: 96.4 F | OXYGEN SATURATION: 95 % | DIASTOLIC BLOOD PRESSURE: 78 MMHG | WEIGHT: 183 LBS | SYSTOLIC BLOOD PRESSURE: 130 MMHG

## 2024-07-29 DIAGNOSIS — F03.B3 MODERATE DEMENTIA WITH MOOD DISTURBANCE, UNSPECIFIED DEMENTIA TYPE (H): ICD-10-CM

## 2024-07-29 DIAGNOSIS — I10 BENIGN ESSENTIAL HYPERTENSION: ICD-10-CM

## 2024-07-29 DIAGNOSIS — J96.11 CHRONIC RESPIRATORY FAILURE WITH HYPOXIA AND HYPERCAPNIA (H): ICD-10-CM

## 2024-07-29 DIAGNOSIS — R82.998 CALCIUM OXALATE CRYSTALS IN URINE: ICD-10-CM

## 2024-07-29 DIAGNOSIS — I50.22 CHRONIC SYSTOLIC CONGESTIVE HEART FAILURE (H): ICD-10-CM

## 2024-07-29 DIAGNOSIS — J96.12 CHRONIC RESPIRATORY FAILURE WITH HYPOXIA AND HYPERCAPNIA (H): ICD-10-CM

## 2024-07-29 DIAGNOSIS — R53.81 PHYSICAL DECONDITIONING: Primary | ICD-10-CM

## 2024-07-29 PROBLEM — I48.91 ATRIAL FIBRILLATION (H): Status: RESOLVED | Noted: 2021-05-03 | Resolved: 2024-07-29

## 2024-07-29 PROCEDURE — 99316 NF DSCHRG MGMT 30 MIN+: CPT | Performed by: NURSE PRACTITIONER

## 2024-07-29 NOTE — LETTER
7/29/2024      Dominik Rojas  [address]  [place] [address]        M [facility] GERIATRICS DISCHARGE SUMMARY  PATIENT'S NAME: Dominik Rojas  YOB: 1940  MEDICAL RECORD NUMBER:  [id number]  Place of Service where encounter took place:  [facility] [[id number]]    PRIMARY CARE PROVIDER AND CLINIC RESPONSIBLE AFTER TRANSFER:   Misael Moe PA-C, [address] [facility] [place] [address]    Non-[facility] Provider     Transferring providers: CAYLA Aranda CNP, Inessa Hurt MD  Recent Hospitalization/ED:  [facility] 6/29/24 to 7/1/24  Date of SNF Admission: July 01, 2024  Date of SNF (anticipated) Discharge: August 01, 2024  Discharged to: previous independent home  Cognitive Scores: SLUMS: 7/20  DME: Wheelchair    CODE STATUS/ADVANCE DIRECTIVES DISCUSSION:  Full Code   ALLERGIES: Diclofenac, Loratadine, and Sertraline    NURSING FACILITY COURSE   Medication Changes/Rationale:   none    Summary of nursing facility stay:   Physical deconditioning  Limited PT documentation available for review. They noted that he went 70 meters on his 2 minute walk test. Progress limited by fatigue and LEDESMA. Therapy recommended a wheelchair for use at home. Social work has ordered this.     Moderate dementia with mood disturbance, unspecified dementia type (H)  Patient has been eager to get home since admission. He will say that he feels bad for his wife, he needs to be home to help her, but then he can't say with what specifically. He has not had any issues with mood or behavior    Chronic respiratory failure with hypoxia and hypercapnia (H)  On chronic oxygen. No acute concerns    Chronic systolic congestive heart failure (H)  Currently compensated    Benign essential hypertension  -150s/70-80s. To avoid risk of hypotension, falls, dizziness and tissue hypoperfusion, recommend  BP goal is < 150/90mmHg.    Calcium oxalate crystals in urine  Early in his TCU stay, patient requested  "UA/UC due to cloudy urine and \"battling an infection for years.\" Culture was negative, but the UA did show calcium crystals. He currently denies dysuria, hematuria, pain. Recommend f/up with urology      Discharge Medications:  Current Outpatient Medications   Medication Sig Dispense Refill     acetaminophen (TYLENOL) 325 MG tablet Take 650 mg by mouth every 4 hours as needed for mild pain       busPIRone (BUSPAR) 5 MG tablet Take 5 mg by mouth 2 times daily       Cholecalciferol (VITAMIN D3) 50 MCG (2000 UT) CAPS Take 1 tablet by mouth daily        DULoxetine (CYMBALTA) 60 MG capsule Take 60 mg by mouth daily       furosemide (LASIX) 20 MG tablet Take 1 tablet (20 mg) by mouth daily       ipratropium - albuterol 0.5 mg/2.5 mg/3 mL (DUONEB) 0.5-2.5 (3) MG/3ML neb solution Take 1 vial by nebulization every 12 hours       latanoprost (XALATAN) 0.005 % ophthalmic solution Place 1 drop into both eyes At Bedtime       levothyroxine (SYNTHROID/LEVOTHROID) 88 MCG tablet Take 88 mcg by mouth daily before breakfast       losartan (COZAAR) 25 MG tablet Take 1 tablet (25 mg) by mouth daily (Patient taking differently: Take 25 mg by mouth daily Hold for SBP <110) 30 tablet 0     metoprolol succinate ER (TOPROL-XL) 25 MG 24 hr tablet Take 12.5 mg by mouth daily       multivitamin w/minerals (THERA-VIT-M) tablet Take 1 tablet by mouth daily       OLANZapine (ZYPREXA) 5 MG tablet Take 7.5 mg by mouth At Bedtime       pantoprazole (PROTONIX) 40 MG EC tablet Take 1 tablet (40 mg) by mouth every morning (before breakfast) 30 tablet 0     rosuvastatin (CRESTOR) 10 MG tablet Take 10 mg by mouth At Bedtime          Controlled medications:   not applicable/none     Past Medical History:   Past Medical History:   Diagnosis Date     AAA (abdominal aortic aneurysm) (H24)     s/p stenting     Alcohol dependence in remission (H)      Alcohol use disorder, severe, in sustained remission, dependence (H) 8/16/2021     Anxiety      Atrial " "fibrillation with normal ventricular rate (H)      Benign prostatic hyperplasia with lower urinary tract symptoms      Bronchiectasis without complication (H)     2/27/2020     COPD (chronic obstructive pulmonary disease) (H)      CVA (cerebral vascular accident) (H) 2019     DDD (degenerative disc disease), lumbar      Depression      Depressive disorder      Diabetes (H)      Diastolic dysfunction 01/22/2021     DJD (degenerative joint disease) of knee     left     Essential hypertension      Glaucoma      Glaucoma      History of stroke without residual deficits      Hyperlipidemia      Hypertension      Hypothyroidism      Hypothyroidism      Kidney stones      Major depression      Memory problem      Nocturia      Obesity      Opioid use disorder, severe, dependence (H) 8/16/2021     Overactive bladder 02/25/2021     Primary osteoarthritis of left knee      Psoriasis      Psoriasis      Seborrheic keratoses      Somnolence, daytime      Stenosis of right carotid artery     history of TIA's     Physical Exam:   Vitals: /78   Pulse 86   Temp (!) 96.4  F (35.8  C)   Resp 18   Ht 1.676 m (5' 6\")   Wt 83 kg (183 lb)   SpO2 95%   BMI 29.54 kg/m    BMI: Body mass index is 29.54 kg/m .  GENERAL APPEARANCE:  Alert, in no distress  ENT:  Mouth and posterior oropharynx normal, moist mucous membranes  EYES:  EOM normal, conjunctiva and lids normal  RESP:  no respiratory distress  CV:  no edema  PSYCH:  insight and judgement impaired, memory impaired , affect and mood normal     SNF labs: Labs done in SNF are in [facility] EPIC. Please refer to them using [facility] Everywhere.    DISCHARGE PLAN:  Follow up labs: No labs orders/due  Medical Follow Up:      Follow up with primary care provider in 2-3 weeks  Discharge Services: Home Care:  Occupational Therapy, Physical Therapy, Registered Nurse, and Home Health Aide      TOTAL DISCHARGE TIME:   Greater than 30 minutes  Electronically signed by:  Ana Arguello, " APRN CNP       Documentation of Face to Face and Certification for Home Health Services    I certify that services are/were furnished while this patient was under the care of a physician and that a physician or an allowed non-physician practitioner (NPP), had a face-to-face encounter that meets the physician face-to-face encounter requirements. The encounter was in whole, or in part, related to the primary reason for home health. The patient is confined to his/her home and needs intermittent skilled nursing, physical therapy, speech-language pathology, or the continued need for occupational therapy. A plan of care has been established by a physician and is periodically reviewed by a physician.  Date of Face-to-Face Encounter: 7/29/2024.    I certify that, based on my findings, the following services are medically necessary home health services: Nursing, Occupational Therapy, and Physical Therapy.    My clinical findings support the need for the above skilled services because: Requires assistance of another person or specialized equipment to access medical services because patient: Is prone to wander/get lost without assistance...    Patient to re-establish plan of care with their PCP within 7-10 days after leaving the facility to reestablish care.  Medicare certified PEC[facility] provider: CAYLA Aranda CNP  Date: July 29, 2024                  Sincerely,        CAYLA Aranda CNP

## 2024-07-29 NOTE — PROGRESS NOTES
"[facility] GERIATRICS DISCHARGE SUMMARY  PATIENT'S NAME: Dominik Rojas  YOB: 1940  MEDICAL RECORD NUMBER:  [id number]  Place of Service where encounter took place:  [facility] [[id number]]    PRIMARY CARE PROVIDER AND CLINIC RESPONSIBLE AFTER TRANSFER:   Misael Moe PA-C, [address] / [place] [address]    Non-FMG Provider     Transferring providers: CAYLA Aranda CNP, Inessa Hurt MD  Recent Hospitalization/ED:  [facility] 6/29/24 to 7/1/24  Date of SNF Admission: July 01, 2024  Date of SNF (anticipated) Discharge: August 01, 2024  Discharged to: previous independent home  Cognitive Scores: SLUMS: 7/20  DME: Wheelchair    CODE STATUS/ADVANCE DIRECTIVES DISCUSSION:  Full Code   ALLERGIES: Diclofenac, Loratadine, and Sertraline    NURSING FACILITY COURSE   Medication Changes/Rationale:   none    Summary of nursing facility stay:   Physical deconditioning  Limited PT documentation available for review. They noted that he went 70 meters on his 2 minute walk test. Progress limited by fatigue and LEDESMA. Therapy recommended a wheelchair for use at home. Social work has ordered this.     Moderate dementia with mood disturbance, unspecified dementia type (H)  Patient has been eager to get home since admission. He will say that he feels bad for his wife, he needs to be home to help her, but then he can't say with what specifically. He has not had any issues with mood or behavior    Chronic respiratory failure with hypoxia and hypercapnia (H)  On chronic oxygen. No acute concerns    Chronic systolic congestive heart failure (H)  Currently compensated    Benign essential hypertension  -150s/70-80s. To avoid risk of hypotension, falls, dizziness and tissue hypoperfusion, recommend  BP goal is < 150/90mmHg.    Calcium oxalate crystals in urine  Early in his TCU stay, patient requested UA/UC due to cloudy urine and \"battling an infection for years.\" Culture was " negative, but the UA did show calcium crystals. He currently denies dysuria, hematuria, pain. Recommend f/up with urology      Discharge Medications:  Current Outpatient Medications   Medication Sig Dispense Refill    acetaminophen (TYLENOL) 325 MG tablet Take 650 mg by mouth every 4 hours as needed for mild pain      busPIRone (BUSPAR) 5 MG tablet Take 5 mg by mouth 2 times daily      Cholecalciferol (VITAMIN D3) 50 MCG (2000 UT) CAPS Take 1 tablet by mouth daily       DULoxetine (CYMBALTA) 60 MG capsule Take 60 mg by mouth daily      furosemide (LASIX) 20 MG tablet Take 1 tablet (20 mg) by mouth daily      ipratropium - albuterol 0.5 mg/2.5 mg/3 mL (DUONEB) 0.5-2.5 (3) MG/3ML neb solution Take 1 vial by nebulization every 12 hours      latanoprost (XALATAN) 0.005 % ophthalmic solution Place 1 drop into both eyes At Bedtime      levothyroxine (SYNTHROID/LEVOTHROID) 88 MCG tablet Take 88 mcg by mouth daily before breakfast      losartan (COZAAR) 25 MG tablet Take 1 tablet (25 mg) by mouth daily (Patient taking differently: Take 25 mg by mouth daily Hold for SBP <110) 30 tablet 0    metoprolol succinate ER (TOPROL-XL) 25 MG 24 hr tablet Take 12.5 mg by mouth daily      multivitamin w/minerals (THERA-VIT-M) tablet Take 1 tablet by mouth daily      OLANZapine (ZYPREXA) 5 MG tablet Take 7.5 mg by mouth At Bedtime      pantoprazole (PROTONIX) 40 MG EC tablet Take 1 tablet (40 mg) by mouth every morning (before breakfast) 30 tablet 0    rosuvastatin (CRESTOR) 10 MG tablet Take 10 mg by mouth At Bedtime          Controlled medications:   not applicable/none     Past Medical History:   Past Medical History:   Diagnosis Date    AAA (abdominal aortic aneurysm) (H24)     s/p stenting    Alcohol dependence in remission (H)     Alcohol use disorder, severe, in sustained remission, dependence (H) 8/16/2021    Anxiety     Atrial fibrillation with normal ventricular rate (H)     Benign prostatic hyperplasia with lower urinary tract  "symptoms     Bronchiectasis without complication (H)     2/27/2020    COPD (chronic obstructive pulmonary disease) (H)     CVA (cerebral vascular accident) (H) 2019    DDD (degenerative disc disease), lumbar     Depression     Depressive disorder     Diabetes (H)     Diastolic dysfunction 01/22/2021    DJD (degenerative joint disease) of knee     left    Essential hypertension     Glaucoma     Glaucoma     History of stroke without residual deficits     Hyperlipidemia     Hypertension     Hypothyroidism     Hypothyroidism     Kidney stones     Major depression     Memory problem     Nocturia     Obesity     Opioid use disorder, severe, dependence (H) 8/16/2021    Overactive bladder 02/25/2021    Primary osteoarthritis of left knee     Psoriasis     Psoriasis     Seborrheic keratoses     Somnolence, daytime     Stenosis of right carotid artery     history of TIA's     Physical Exam:   Vitals: /78   Pulse 86   Temp (!) 96.4  F (35.8  C)   Resp 18   Ht 1.676 m (5' 6\")   Wt 83 kg (183 lb)   SpO2 95%   BMI 29.54 kg/m    BMI: Body mass index is 29.54 kg/m .  GENERAL APPEARANCE:  Alert, in no distress  ENT:  Mouth and posterior oropharynx normal, moist mucous membranes  EYES:  EOM normal, conjunctiva and lids normal  RESP:  no respiratory distress  CV:  no edema  PSYCH:  insight and judgement impaired, memory impaired , affect and mood normal     SNF labs: Labs done in SNF are in [facility] EPIC. Please refer to them using [facility] Everywhere.    DISCHARGE PLAN:  Follow up labs: No labs orders/due  Medical Follow Up:      Follow up with primary care provider in 2-3 weeks  Discharge Services: Home Care:  Occupational Therapy, Physical Therapy, Registered Nurse, and Home Health Aide      TOTAL DISCHARGE TIME:   Greater than 30 minutes  Electronically signed by:  CAYLA Aranda CNP       Documentation of Face to Face and Certification for Home Health Services    I certify that services are/were furnished " while this patient was under the care of a physician and that a physician or an allowed non-physician practitioner (NPP), had a face-to-face encounter that meets the physician face-to-face encounter requirements. The encounter was in whole, or in part, related to the primary reason for home health. The patient is confined to his/her home and needs intermittent skilled nursing, physical therapy, speech-language pathology, or the continued need for occupational therapy. A plan of care has been established by a physician and is periodically reviewed by a physician.  Date of Face-to-Face Encounter: 7/29/2024.    I certify that, based on my findings, the following services are medically necessary home health services: Nursing, Occupational Therapy, and Physical Therapy.    My clinical findings support the need for the above skilled services because: Requires assistance of another person or specialized equipment to access medical services because patient: Is prone to wander/get lost without assistance...    Patient to re-establish plan of care with their PCP within 7-10 days after leaving the facility to reestablish care.  Medicare certified PECOS provider: CAYLA Aranda CNP  Date: July 29, 2024

## 2024-08-01 DIAGNOSIS — I10 ESSENTIAL HYPERTENSION: ICD-10-CM

## 2024-08-01 RX ORDER — METOPROLOL SUCCINATE 25 MG/1
12.5 TABLET, EXTENDED RELEASE ORAL DAILY
Qty: 30 TABLET | Refills: 0 | Status: SHIPPED | OUTPATIENT
Start: 2024-08-01 | End: 2024-10-05

## 2024-08-12 ENCOUNTER — LAB REQUISITION (OUTPATIENT)
Dept: LAB | Facility: CLINIC | Age: 84
End: 2024-08-12

## 2024-08-12 DIAGNOSIS — R39.9 UNSPECIFIED SYMPTOMS AND SIGNS INVOLVING THE GENITOURINARY SYSTEM: ICD-10-CM

## 2024-08-12 DIAGNOSIS — N18.31 CHRONIC KIDNEY DISEASE, STAGE 3A (H): ICD-10-CM

## 2024-08-12 LAB
ALBUMIN UR-MCNC: 100 MG/DL
APPEARANCE UR: ABNORMAL
BACTERIA #/AREA URNS HPF: ABNORMAL /HPF
BILIRUB UR QL STRIP: NEGATIVE
COLOR UR AUTO: ABNORMAL
GLUCOSE UR STRIP-MCNC: NEGATIVE MG/DL
HGB UR QL STRIP: ABNORMAL
KETONES UR STRIP-MCNC: NEGATIVE MG/DL
LEUKOCYTE ESTERASE UR QL STRIP: ABNORMAL
MUCOUS THREADS #/AREA URNS LPF: PRESENT /LPF
NITRATE UR QL: NEGATIVE
PH UR STRIP: 6 [PH] (ref 5–7)
RBC URINE: 55 /HPF
SP GR UR STRIP: 1.02 (ref 1–1.03)
UROBILINOGEN UR STRIP-MCNC: 2 MG/DL
WBC CLUMPS #/AREA URNS HPF: PRESENT /HPF
WBC URINE: >182 /HPF

## 2024-08-12 PROCEDURE — 81001 URINALYSIS AUTO W/SCOPE: CPT | Performed by: PHYSICIAN ASSISTANT

## 2024-08-12 PROCEDURE — 80048 BASIC METABOLIC PNL TOTAL CA: CPT | Performed by: PHYSICIAN ASSISTANT

## 2024-08-12 PROCEDURE — 87086 URINE CULTURE/COLONY COUNT: CPT | Performed by: PHYSICIAN ASSISTANT

## 2024-08-12 PROCEDURE — 87186 SC STD MICRODIL/AGAR DIL: CPT | Performed by: PHYSICIAN ASSISTANT

## 2024-08-13 LAB
ANION GAP SERPL CALCULATED.3IONS-SCNC: 12 MMOL/L (ref 7–15)
BACTERIA UR CULT: ABNORMAL
BUN SERPL-MCNC: 22.2 MG/DL (ref 8–23)
CALCIUM SERPL-MCNC: 8.7 MG/DL (ref 8.8–10.4)
CHLORIDE SERPL-SCNC: 99 MMOL/L (ref 98–107)
CREAT SERPL-MCNC: 1.21 MG/DL (ref 0.67–1.17)
EGFRCR SERPLBLD CKD-EPI 2021: 60 ML/MIN/1.73M2
GLUCOSE SERPL-MCNC: 88 MG/DL (ref 70–99)
HCO3 SERPL-SCNC: 27 MMOL/L (ref 22–29)
POTASSIUM SERPL-SCNC: 4.5 MMOL/L (ref 3.4–5.3)
SODIUM SERPL-SCNC: 138 MMOL/L (ref 135–145)

## 2024-09-04 ENCOUNTER — TELEPHONE (OUTPATIENT)
Dept: PULMONOLOGY | Facility: CLINIC | Age: 84
End: 2024-09-04
Payer: COMMERCIAL

## 2024-09-04 NOTE — TELEPHONE ENCOUNTER
----- Message from Bertha BILLINGSLEY sent at 9/4/2024 12:56 PM CDT -----  Yes please  ----- Message -----  From: Christine Pinto  Sent: 9/4/2024  11:55 AM CDT  To: Bertha Woods RN    Next opening is 12/11?  ----- Message -----  From: Bertha Woods RN  Sent: 9/4/2024  11:48 AM CDT  To: Christine Pinto    Received a phone call from scheduling stating this patient called to schedule their ebus. Last seen by Dr. Sun in February. Please call patient and offer next available apt with Dr. Sun for follow up.     Thank you,     Bertha

## 2024-09-08 ENCOUNTER — HOSPITAL ENCOUNTER (EMERGENCY)
Facility: HOSPITAL | Age: 84
Discharge: HOME | End: 2024-09-08
Attending: EMERGENCY MEDICINE | Admitting: EMERGENCY MEDICINE
Payer: COMMERCIAL

## 2024-09-08 ENCOUNTER — APPOINTMENT (OUTPATIENT)
Dept: RADIOLOGY | Facility: HOSPITAL | Age: 84
End: 2024-09-08
Attending: EMERGENCY MEDICINE
Payer: COMMERCIAL

## 2024-09-08 VITALS
SYSTOLIC BLOOD PRESSURE: 139 MMHG | HEART RATE: 84 BPM | HEIGHT: 67 IN | BODY MASS INDEX: 28.12 KG/M2 | TEMPERATURE: 98.2 F | RESPIRATION RATE: 21 BRPM | DIASTOLIC BLOOD PRESSURE: 93 MMHG | WEIGHT: 179.2 LBS | OXYGEN SATURATION: 96 %

## 2024-09-08 DIAGNOSIS — S22.32XA CLOSED FRACTURE OF ONE RIB OF LEFT SIDE, INITIAL ENCOUNTER: ICD-10-CM

## 2024-09-08 DIAGNOSIS — N30.01 ACUTE CYSTITIS WITH HEMATURIA: ICD-10-CM

## 2024-09-08 LAB
ALBUMIN UR-MCNC: 70 MG/DL
APPEARANCE UR: ABNORMAL
BACTERIA #/AREA URNS HPF: ABNORMAL /HPF
BILIRUB UR QL STRIP: NEGATIVE
COLOR UR AUTO: YELLOW
GLUCOSE UR STRIP-MCNC: NEGATIVE MG/DL
HGB UR QL STRIP: ABNORMAL
KETONES UR STRIP-MCNC: NEGATIVE MG/DL
LEUKOCYTE ESTERASE UR QL STRIP: ABNORMAL
MUCOUS THREADS #/AREA URNS LPF: PRESENT /LPF
NITRATE UR QL: POSITIVE
PH UR STRIP: 6 [PH] (ref 5–7)
RBC URINE: 157 /HPF
SP GR UR STRIP: 1.03 (ref 1–1.03)
UROBILINOGEN UR STRIP-MCNC: 2 MG/DL
WBC URINE: >182 /HPF

## 2024-09-08 PROCEDURE — 71101 X-RAY EXAM UNILAT RIBS/CHEST: CPT | Mod: LT

## 2024-09-08 PROCEDURE — 81001 URINALYSIS AUTO W/SCOPE: CPT | Performed by: EMERGENCY MEDICINE

## 2024-09-08 PROCEDURE — 250N000013 HC RX MED GY IP 250 OP 250 PS 637: Performed by: EMERGENCY MEDICINE

## 2024-09-08 PROCEDURE — 87086 URINE CULTURE/COLONY COUNT: CPT | Performed by: EMERGENCY MEDICINE

## 2024-09-08 PROCEDURE — 99284 EMERGENCY DEPT VISIT MOD MDM: CPT | Performed by: EMERGENCY MEDICINE

## 2024-09-08 PROCEDURE — 87186 SC STD MICRODIL/AGAR DIL: CPT | Performed by: EMERGENCY MEDICINE

## 2024-09-08 RX ORDER — LIDOCAINE 4 G/G
1 PATCH TOPICAL ONCE
Status: DISCONTINUED | OUTPATIENT
Start: 2024-09-08 | End: 2024-09-08 | Stop reason: HOSPADM

## 2024-09-08 RX ORDER — NAPROXEN SODIUM 220 MG
220 TABLET ORAL ONCE
Status: DISCONTINUED | OUTPATIENT
Start: 2024-09-08 | End: 2024-09-08

## 2024-09-08 RX ORDER — CEFPODOXIME PROXETIL 100 MG/1
100 TABLET, FILM COATED ORAL 2 TIMES DAILY
Qty: 14 TABLET | Refills: 0 | Status: ON HOLD | OUTPATIENT
Start: 2024-09-08 | End: 2024-09-14

## 2024-09-08 RX ORDER — LIDOCAINE 50 MG/G
1 PATCH TOPICAL EVERY 24 HOURS
Qty: 9 PATCH | Refills: 0 | Status: ON HOLD | OUTPATIENT
Start: 2024-09-08

## 2024-09-08 RX ORDER — NAPROXEN 250 MG/1
250 TABLET ORAL ONCE
Status: COMPLETED | OUTPATIENT
Start: 2024-09-08 | End: 2024-09-08

## 2024-09-08 RX ADMIN — LIDOCAINE 1 PATCH: 4 PATCH TOPICAL at 11:46

## 2024-09-08 RX ADMIN — NAPROXEN 250 MG: 250 TABLET ORAL at 10:08

## 2024-09-08 ASSESSMENT — ACTIVITIES OF DAILY LIVING (ADL)
ADLS_ACUITY_SCORE: 38

## 2024-09-08 ASSESSMENT — COLUMBIA-SUICIDE SEVERITY RATING SCALE - C-SSRS
1. IN THE PAST MONTH, HAVE YOU WISHED YOU WERE DEAD OR WISHED YOU COULD GO TO SLEEP AND NOT WAKE UP?: NO
2. HAVE YOU ACTUALLY HAD ANY THOUGHTS OF KILLING YOURSELF IN THE PAST MONTH?: NO
6. HAVE YOU EVER DONE ANYTHING, STARTED TO DO ANYTHING, OR PREPARED TO DO ANYTHING TO END YOUR LIFE?: NO

## 2024-09-08 NOTE — ED PROVIDER NOTES
EMERGENCY DEPARTMENT ENCOUNTER      NAME: Dominik Rojas  AGE: 83 year old male  YOB: 1940  MRN: [id number]  EVALUATION DATE & TIME: 9/8/2024  9:20 AM    PCP: Misael Moe    ED PROVIDER: Mey Bates MD    Chief Complaint   Patient presents with    Fall         FINAL IMPRESSION:  1. Closed fracture of one rib of left side, initial encounter    2. Acute cystitis with hematuria          ED COURSE & MEDICAL DECISION MAKING:    Pertinent Labs & Imaging studies reviewed. (See chart for details)  83 year old male with history of HTN, HLD, A-fib not anticoagulated, AAA status postrepair, previous CVA, COPD on 3 L home O2 who presents to the Emergency Department for evaluation of left-sided chest wall pain after a fall 2 days ago losing his balance and hitting coffee table.  On examination has area of abrasion with ecchymosis and crepitus highly suspicious for rib fracture.  States the pain is worse with deep breath, movement but is not any more short of breath than his baseline and breath sounds are equal and my suspicion for hemopneumothorax is low.  Patient has a history of previous opioid dependence and wife states that when he was taken off of this he had severe withdrawal and became very agitated when they have tried it previously and so they really want avoid any opioids moving forward.  He took Tylenol prior to arrival.  Per chart review his renal function previously has been okay and they are comfortable with adding NSAIDs for pain management at this time.  Incidentally note history of frequent UTI though he is asymptomatic would like to have a urinalysis today to evaluate for same.    X-rays of the chest and left rib cage displaced left 10th rib fracture, will add lidocaine patch to the above Tylenol leave combination.   Urinalysis nitrite positive.  Based on recent urine culture, was treated with Keflex at that time so we will treat with cefpodoxime today.      ED Course as of 09/08/24  1214   Sun Sep 08, 2024   0920 Met with patient and his wife for initial interview and exam.   0928 Renal function minimally increased chronically w cr 1.1-1.2   1021 Ribs XR, unilat 3 views + PA chest,  left  X-ray independently interpreted by myself with visualized fracture to the left rib no hemopneumothorax   1204 Nitrite Urine(!): Positive       Medical Decision Making    History:  Supplemental history from: Family Member/Significant Other  External Record(s) reviewed: Inpatient Record: 07/29/2024 [facility]    Work Up:  Chart documentation includes differential considered and any EKGs or imaging independently interpreted by provider, see MDM  In additional to work up documented, I considered the following work up: see MDM    External consultation:  Discussion of management with another provider: na    Complicating factors:  Care impacted by chronic illness: Chronic Lung Disease, Dementia, Heart Disease, Hypertension, and Mental Health  Care affected by social determinants of health: Access to Medical Care, no weekend access to PCP    Disposition considerations: Discharge. I prescribed additional prescription strength medication(s) as charted. See documentation for any additional details.    At the conclusion of the encounter I discussed the results of all of the tests and the disposition. The questions were answered. The patient or family acknowledged understanding and was agreeable with the care plan.      MEDICATIONS GIVEN IN THE EMERGENCY:  Medications   Lidocaine (LIDOCARE) 4 % Patch 1 patch (1 patch Transdermal $Patch/Med Applied 9/8/24 1146)   naproxen (NAPROSYN) tablet 250 mg (250 mg Oral $Given 9/8/24 1008)       NEW PRESCRIPTIONS STARTED AT TODAY'S ER VISIT  New Prescriptions    CEFPODOXIME (VANTIN) 100 MG TABLET    Take 1 tablet (100 mg) by mouth 2 times daily for 7 days.    LIDOCAINE (LIDODERM) 5 % PATCH    Place 1 patch over 12 hours onto the skin every 24 hours. To prevent  lidocaine toxicity, patient should be patch free for 12 hrs daily.          =================================================================    HPI    Patient information was obtained from: Patient and his wife    Use of Intrepreter: N/A      Dominik Rojas is a 83 year old male with pertinent medical history of dementia, hypertension, CVA, frequent UTIs, chronic systolic CHF, COPD, chronic respiratory failure who presents with back pain after a fall.    The patient fell 2 days ago while getting up from a chair and turning to walk. He hit his left side against a coffee table on the way down, did not hit his head. The patient reports left sided back pain wrapping around to his left lower chest wall. Pain worsens with movement and coughing. Per the patient's wife, he has a history of frequent UTIs.    His wife reports a history of back pain and surgery. She has been giving him Tylenol for pain and last gave him 2 this morning. Reports a history of adverse reaction to oxycodone with aggression and violence. The patient is unsure if he has ever taken Vicodin.    Patient denies increased shortness of breath and abdominal pain. He is on 3L oxygen at home. Denies current UTI symptoms.    Per chart review, patient was admitted to [facility] from 06/29/2024-07/01/2024 with altered mental status and asymptomatic bacteriuria. Received ceftriaxone briefly. AMS and agitation likely due to dementia. Discharged to TCU, where he requested UA/UC due to cloudy urine. Culture negative, UA did show calcium crystals. He was discharged back to his nursing facility.    PAST MEDICAL HISTORY:  Past Medical History:   Diagnosis Date    AAA (abdominal aortic aneurysm) (H24)     s/p stenting    Alcohol dependence in remission (H)     Alcohol use disorder, severe, in sustained remission, dependence (H) 8/16/2021    Anxiety     Atrial fibrillation with normal ventricular rate (H)     Benign prostatic hyperplasia with lower  urinary tract symptoms     Bronchiectasis without complication (H)     2/27/2020    COPD (chronic obstructive pulmonary disease) (H)     CVA (cerebral vascular accident) (H) 2019    DDD (degenerative disc disease), lumbar     Depression     Depressive disorder     Diabetes (H)     Diastolic dysfunction 01/22/2021    DJD (degenerative joint disease) of knee     left    Essential hypertension     Glaucoma     Glaucoma     History of stroke without residual deficits     Hyperlipidemia     Hypertension     Hypothyroidism     Hypothyroidism     Kidney stones     Major depression     Memory problem     Nocturia     Obesity     Opioid use disorder, severe, dependence (H) 8/16/2021    Overactive bladder 02/25/2021    Primary osteoarthritis of left knee     Psoriasis     Psoriasis     Seborrheic keratoses     Somnolence, daytime     Stenosis of right carotid artery     history of TIA's       PAST SURGICAL HISTORY:  Past Surgical History:   Procedure Laterality Date    ABDOMEN SURGERY      ABDOMINAL AORTIC ANEURYSM REPAIR  2013    stent    CAROTID ENDARTERECTOMY Right 9/9/2019    Procedure: RIGHT CAROTID ENDARTERECTOMY;  Surgeon: Miguel Muller MD;  Location: [facility];  Service: General    CIRCUMCISION      CYSTOSCOPY      CYSTOSCOPY PROSTATE W/ LASER N/A 6/12/2018    Procedure:  TRANSURETHRAL RESECTION OF THE PROSTATE WITH QUANTA LASER;  Surgeon: Eze Levi MD;  [place] [facility];  Service:     CYSTOSCOPY PROSTATE W/ LASER N/A 2/18/2020    Procedure: CYSTOSCOPY WITH TRANSURETHRAL INCISION OF THE PROSTATE WITH QUANTA LASER;  Surgeon: Eze Levi MD;  [place] [facility];  Service: Urology    CYSTOSCOPY W/ LITHOLAPAXY / EHL N/A 6/12/2018    Procedure: CYSTOSCOPY AND LITHOLAPAXY;  Surgeon: Eze Levi MD;  [place] [facility];  Service:     IR ABDOMINAL AORTAGRAM  5/19/2020    IR ABDOMINAL AORTIC ANEURYSM ENDOGRAFT  5/19/2020    IR ABDOMINAL AORTOGRAM  5/19/2020     IR ABDOMINAL ENDOVASCULAR STENT GRAFT  5/19/2020    LITHOTRIPSY      PERCUTANEOUS NEPHROSTOLITHOTOMY Right 03/10/2020    ZZC HUANG W/O FACETEC FORAMOT/DSKC 1/2 VRT SEG, LUMBAR Bilateral 3/3/2021    Procedure: Bilateral lumbar 2 - Lumbar 4 decompressive lumbar laminectomy with medial facetectomies;  Surgeon: Renée King MD;  Location: [facility];  Service: Spine       CURRENT MEDICATIONS:    Prior to Admission Medications   Prescriptions Last Dose Informant Patient Reported? Taking?   Cholecalciferol (VITAMIN D3) 50 MCG (2000 UT) CAPS   Yes No   Sig: Take 1 tablet by mouth daily    DULoxetine (CYMBALTA) 60 MG capsule   Yes No   Sig: Take 60 mg by mouth daily   OLANZapine (ZYPREXA) 5 MG tablet   Yes No   Sig: Take 7.5 mg by mouth At Bedtime   acetaminophen (TYLENOL) 325 MG tablet   Yes No   Sig: Take 650 mg by mouth every 4 hours as needed for mild pain   busPIRone (BUSPAR) 5 MG tablet   Yes No   Sig: Take 5 mg by mouth 2 times daily   furosemide (LASIX) 20 MG tablet   No No   Sig: Take 1 tablet (20 mg) by mouth daily   ipratropium - albuterol 0.5 mg/2.5 mg/3 mL (DUONEB) 0.5-2.5 (3) MG/3ML neb solution   Yes No   Sig: Take 1 vial by nebulization every 12 hours   latanoprost (XALATAN) 0.005 % ophthalmic solution   Yes No   Sig: Place 1 drop into both eyes At Bedtime   levothyroxine (SYNTHROID/LEVOTHROID) 88 MCG tablet   Yes No   Sig: Take 88 mcg by mouth daily before breakfast   losartan (COZAAR) 25 MG tablet   No No   Sig: Take 1 tablet (25 mg) by mouth daily   Patient taking differently: Take 25 mg by mouth daily Hold for SBP <110   metoprolol succinate ER (TOPROL XL) 25 MG 24 hr tablet   No No   Sig: Take 0.5 tablets (12.5 mg) by mouth daily   multivitamin w/minerals (THERA-VIT-M) tablet   Yes No   Sig: Take 1 tablet by mouth daily   pantoprazole (PROTONIX) 40 MG EC tablet   No No   Sig: Take 1 tablet (40 mg) by mouth every morning (before breakfast)   rosuvastatin (CRESTOR) 10 MG tablet   Yes No  "  Sig: Take 10 mg by mouth At Bedtime      Facility-Administered Medications: None       ALLERGIES:  Allergies   Allergen Reactions    Diclofenac      Other reaction(s): GI Upset    Loratadine      Other reaction(s): Urinary Retention    Oxycodone      Agitation     Sertraline Unknown     Other reaction(s): ineffective       FAMILY HISTORY:  Family History   Problem Relation Age of Onset    Emphysema Mother     No Known Problems Father     Cirrhosis Father     No Known Problems Sister     No Known Problems Brother     No Known Problems Maternal Aunt     No Known Problems Maternal Uncle     No Known Problems Paternal Aunt     No Known Problems Paternal Uncle     No Known Problems Maternal Grandmother     No Known Problems Maternal Grandfather     No Known Problems Paternal Grandmother     No Known Problems Paternal Grandfather     No Known Problems Other        SOCIAL HISTORY:  Social History     Tobacco Use    Smoking status: Former     Current packs/day: 0.00     Average packs/day: 0.5 packs/day for 35.0 years (17.5 ttl pk-yrs)     Types: Cigarettes     Start date: 1955     Quit date: 1990     Years since quittin.7    Smokeless tobacco: Never    Tobacco comments:     quit    Substance Use Topics    Alcohol use: No     Comment: Alcoholic Drinks/day: quit     Drug use: No        VITALS:  Patient Vitals for the past 24 hrs:   BP Temp Temp src Pulse Resp SpO2 Height Weight   24 1151 (!) 139/93 -- -- 84 -- 96 % -- --   24 1009 121/69 -- -- 82 -- 97 % -- --   24 0912 135/67 98.2  F (36.8  C) Oral 87 21 92 % 1.702 m (5' 7\") 81.3 kg (179 lb 3.2 oz)       PHYSICAL EXAM    General Appearance: Well-appearing, well-nourished, no acute distress.  Head:  Normocephalic, atraumatic  ENT:  membranes are moist without pallor  Neck:  Supple, no midline tenderness to palpation  Chest:  Abrasion and mild ecchymosis to left lateral chest wall. Point tenderness and crepitus, no subcutaneous " emphysema.  Cardio:  Regular rate and rhythm, no murmur/gallop/rub  Pulm:  No respiratory distress, clear to auscultation bilaterally, on his 3 L home O2  Back:  No midline tenderness to palpation, no paraspinal tenderness, No CVA tenderness  Abdomen:  Soft, non-tender  Extremities: Ambulates slowly with cane  Skin:  Skin warm, dry, no rashes  Neuro:  Alert and oriented ×3     RADIOLOGY/LABS:  Reviewed all pertinent imaging. Please see official radiology report. All pertinent labs reviewed and interpreted.    Results for orders placed or performed during the hospital encounter of 09/08/24   Ribs XR, unilat 3 views + PA chest,  left    Impression    IMPRESSION: Bibasilar atelectasis, right greater than left. Stable small right pleural effusion. No pneumothorax. Stable enlarged cardiac silhouette. Normal pulmonary vascularity. An acute, minimally displaced, anterior/lateral fracture of the left 10th   rib.   UA with Microscopic reflex to Culture    Specimen: Urine, Catheter   Result Value Ref Range    Color Urine Yellow Colorless, Straw, Light Yellow, Yellow    Appearance Urine Cloudy (A) Clear    Glucose Urine Negative Negative mg/dL    Bilirubin Urine Negative Negative    Ketones Urine Negative Negative mg/dL    Specific Gravity Urine 1.029 1.001 - 1.030    Blood Urine 1.0 mg/dL (A) Negative    pH Urine 6.0 5.0 - 7.0    Protein Albumin Urine 70 (A) Negative mg/dL    Urobilinogen Urine 2.0 (A) <2.0 mg/dL    Nitrite Urine Positive (A) Negative    Leukocyte Esterase Urine 500 Juan/uL (A) Negative    Bacteria Urine Many (A) None Seen /HPF    Mucus Urine Present (A) None Seen /LPF    RBC Urine 157 (H) <=2 /HPF    WBC Urine >182 (H) <=5 /HPF       The creation of this record is based on the scribe s observations of the work being performed by Mey Bates MD and the provider s statements to them. It was created on her behalf by Igor Encinas, a trained medical scribe. This document has been checked and approved by the  attending provider.    Mey Bates MD  Emergency Medicine  [facility] EMERGENCY DEPARTMENT  [facility] [facility] [address] [phone]  Dept: [phone]     Mey Bates MD  09/08/24 1213

## 2024-09-08 NOTE — ED TRIAGE NOTES
Pt ambulatory to triage c/o back pain after a fall 2 days ago. Pt states he was getting up from a chair and turning to walk but lost his balance and fell, hitting a coffee table. Pt c/o left thoracic back pain radiating anteriorly along left rib cage since fall, but worsening and had difficulty sleeping last night. Pt states pain worse with deep breath. Pt thinks he hit the left side of his head as well, denies anticoagulants.  Of note, pt arrives to ED on room air but he does use home oxygen 3 liters at all times.      Triage Assessment (Adult)       Row Name 09/08/24 0915          Triage Assessment    Airway WDL WDL        Skin Circulation/Temperature WDL    Skin Circulation/Temperature WDL WDL        Peripheral/Neurovascular WDL    Peripheral Neurovascular WDL WDL        Cognitive/Neuro/Behavioral WDL    Cognitive/Neuro/Behavioral WDL X  Hx dementia.

## 2024-09-08 NOTE — DISCHARGE INSTRUCTIONS
Tylenol 1000 mg 4 times daily as needed for pain.  Aleve twice daily as needed for pain.  Lidocaine patches prescribed.  There is evidence of urinary tract infection.  Take antibiotics as prescribed.

## 2024-09-11 LAB
BACTERIA UR CULT: ABNORMAL

## 2024-09-12 ENCOUNTER — APPOINTMENT (OUTPATIENT)
Dept: RADIOLOGY | Facility: HOSPITAL | Age: 84
DRG: 689 | End: 2024-09-12
Attending: EMERGENCY MEDICINE
Payer: COMMERCIAL

## 2024-09-12 ENCOUNTER — APPOINTMENT (OUTPATIENT)
Dept: CT IMAGING | Facility: HOSPITAL | Age: 84
DRG: 689 | End: 2024-09-12
Attending: EMERGENCY MEDICINE
Payer: COMMERCIAL

## 2024-09-12 ENCOUNTER — HOSPITAL ENCOUNTER (INPATIENT)
Facility: HOSPITAL | Age: 84
LOS: 4 days | Discharge: NURSING, REHAB OR LONG-TERM CARE | DRG: 689 | End: 2024-09-16
Attending: EMERGENCY MEDICINE | Admitting: INTERNAL MEDICINE
Payer: COMMERCIAL

## 2024-09-12 ENCOUNTER — TELEPHONE (OUTPATIENT)
Dept: NURSING | Facility: CLINIC | Age: 84
End: 2024-09-12
Payer: COMMERCIAL

## 2024-09-12 DIAGNOSIS — N30.00 ACUTE CYSTITIS WITHOUT HEMATURIA: ICD-10-CM

## 2024-09-12 DIAGNOSIS — J96.11 CHRONIC RESPIRATORY FAILURE WITH HYPOXIA AND HYPERCAPNIA (H): ICD-10-CM

## 2024-09-12 DIAGNOSIS — R41.82 ALTERED MENTAL STATUS, UNSPECIFIED ALTERED MENTAL STATUS TYPE: ICD-10-CM

## 2024-09-12 DIAGNOSIS — J96.12 CHRONIC RESPIRATORY FAILURE WITH HYPOXIA AND HYPERCAPNIA (H): ICD-10-CM

## 2024-09-12 DIAGNOSIS — K59.01 SLOW TRANSIT CONSTIPATION: Primary | ICD-10-CM

## 2024-09-12 LAB
ALBUMIN SERPL BCG-MCNC: 3.1 G/DL (ref 3.5–5.2)
ALBUMIN UR-MCNC: 10 MG/DL
ALP SERPL-CCNC: 165 U/L (ref 40–150)
ALT SERPL W P-5'-P-CCNC: 17 U/L (ref 0–70)
ANION GAP SERPL CALCULATED.3IONS-SCNC: 9 MMOL/L (ref 7–15)
APPEARANCE UR: ABNORMAL
AST SERPL W P-5'-P-CCNC: 27 U/L (ref 0–45)
BACTERIA #/AREA URNS HPF: ABNORMAL /HPF
BASE EXCESS BLDV CALC-SCNC: 4.5 MMOL/L (ref -3–3)
BASOPHILS # BLD AUTO: 0.1 10E3/UL (ref 0–0.2)
BASOPHILS NFR BLD AUTO: 1 %
BILIRUB SERPL-MCNC: 0.6 MG/DL
BILIRUB UR QL STRIP: NEGATIVE
BUN SERPL-MCNC: 20.4 MG/DL (ref 8–23)
CALCIUM SERPL-MCNC: 8.6 MG/DL (ref 8.8–10.4)
CHLORIDE SERPL-SCNC: 105 MMOL/L (ref 98–107)
COLOR UR AUTO: YELLOW
CREAT SERPL-MCNC: 0.86 MG/DL (ref 0.67–1.17)
CREAT SERPL-MCNC: 0.89 MG/DL (ref 0.67–1.17)
EGFRCR SERPLBLD CKD-EPI 2021: 86 ML/MIN/1.73M2
EGFRCR SERPLBLD CKD-EPI 2021: 86 ML/MIN/1.73M2
EOSINOPHIL # BLD AUTO: 0.2 10E3/UL (ref 0–0.7)
EOSINOPHIL NFR BLD AUTO: 2 %
ERYTHROCYTE [DISTWIDTH] IN BLOOD BY AUTOMATED COUNT: 15.1 % (ref 10–15)
FLUAV RNA SPEC QL NAA+PROBE: NEGATIVE
FLUBV RNA RESP QL NAA+PROBE: NEGATIVE
GLUCOSE SERPL-MCNC: 119 MG/DL (ref 70–99)
GLUCOSE UR STRIP-MCNC: NEGATIVE MG/DL
HBA1C MFR BLD: 5.8 %
HCO3 BLDV-SCNC: 30 MMOL/L (ref 21–28)
HCO3 SERPL-SCNC: 26 MMOL/L (ref 22–29)
HCT VFR BLD AUTO: 40.8 % (ref 40–53)
HGB BLD-MCNC: 12.9 G/DL (ref 13.3–17.7)
HGB UR QL STRIP: ABNORMAL
HOLD SPECIMEN: NORMAL
IMM GRANULOCYTES # BLD: 0 10E3/UL
IMM GRANULOCYTES NFR BLD: 0 %
KETONES UR STRIP-MCNC: NEGATIVE MG/DL
LACTATE SERPL-SCNC: 1.2 MMOL/L (ref 0.7–2)
LEUKOCYTE ESTERASE UR QL STRIP: ABNORMAL
LYMPHOCYTES # BLD AUTO: 1.4 10E3/UL (ref 0.8–5.3)
LYMPHOCYTES NFR BLD AUTO: 13 %
MCH RBC QN AUTO: 28.2 PG (ref 26.5–33)
MCHC RBC AUTO-ENTMCNC: 31.6 G/DL (ref 31.5–36.5)
MCV RBC AUTO: 89 FL (ref 78–100)
MONOCYTES # BLD AUTO: 0.8 10E3/UL (ref 0–1.3)
MONOCYTES NFR BLD AUTO: 7 %
NEUTROPHILS # BLD AUTO: 8.5 10E3/UL (ref 1.6–8.3)
NEUTROPHILS NFR BLD AUTO: 77 %
NITRATE UR QL: NEGATIVE
NRBC # BLD AUTO: 0 10E3/UL
NRBC BLD AUTO-RTO: 0 /100
NT-PROBNP SERPL-MCNC: 159 PG/ML (ref 0–1800)
O2/TOTAL GAS SETTING VFR VENT: 32 %
OXYHGB MFR BLDV: 83 % (ref 70–75)
PCO2 BLDV: 49 MM HG (ref 40–50)
PH BLDV: 7.4 [PH] (ref 7.32–7.43)
PH UR STRIP: 6.5 [PH] (ref 5–7)
PLATELET # BLD AUTO: 299 10E3/UL (ref 150–450)
PO2 BLDV: 48 MM HG (ref 25–47)
POTASSIUM SERPL-SCNC: 4.5 MMOL/L (ref 3.4–5.3)
PROT SERPL-MCNC: 6.6 G/DL (ref 6.4–8.3)
RBC # BLD AUTO: 4.58 10E6/UL (ref 4.4–5.9)
RBC URINE: 38 /HPF
RSV RNA SPEC NAA+PROBE: NEGATIVE
SAO2 % BLDV: 83.5 % (ref 70–75)
SARS-COV-2 RNA RESP QL NAA+PROBE: NEGATIVE
SODIUM SERPL-SCNC: 140 MMOL/L (ref 135–145)
SP GR UR STRIP: 1.01 (ref 1–1.03)
TROPONIN T SERPL HS-MCNC: 17 NG/L
UROBILINOGEN UR STRIP-MCNC: <2 MG/DL
WBC # BLD AUTO: 11 10E3/UL (ref 4–11)
WBC URINE: >182 /HPF

## 2024-09-12 PROCEDURE — 250N000011 HC RX IP 250 OP 636: Performed by: EMERGENCY MEDICINE

## 2024-09-12 PROCEDURE — 80053 COMPREHEN METABOLIC PANEL: CPT | Performed by: EMERGENCY MEDICINE

## 2024-09-12 PROCEDURE — 82805 BLOOD GASES W/O2 SATURATION: CPT | Performed by: EMERGENCY MEDICINE

## 2024-09-12 PROCEDURE — 120N000001 HC R&B MED SURG/OB

## 2024-09-12 PROCEDURE — 250N000011 HC RX IP 250 OP 636: Performed by: INTERNAL MEDICINE

## 2024-09-12 PROCEDURE — 84484 ASSAY OF TROPONIN QUANT: CPT | Performed by: EMERGENCY MEDICINE

## 2024-09-12 PROCEDURE — 258N000003 HC RX IP 258 OP 636: Performed by: EMERGENCY MEDICINE

## 2024-09-12 PROCEDURE — 71045 X-RAY EXAM CHEST 1 VIEW: CPT

## 2024-09-12 PROCEDURE — 99285 EMERGENCY DEPT VISIT HI MDM: CPT | Mod: 25

## 2024-09-12 PROCEDURE — 82565 ASSAY OF CREATININE: CPT | Performed by: INTERNAL MEDICINE

## 2024-09-12 PROCEDURE — 85041 AUTOMATED RBC COUNT: CPT | Performed by: EMERGENCY MEDICINE

## 2024-09-12 PROCEDURE — 83036 HEMOGLOBIN GLYCOSYLATED A1C: CPT | Performed by: INTERNAL MEDICINE

## 2024-09-12 PROCEDURE — 83605 ASSAY OF LACTIC ACID: CPT | Performed by: EMERGENCY MEDICINE

## 2024-09-12 PROCEDURE — 70450 CT HEAD/BRAIN W/O DYE: CPT

## 2024-09-12 PROCEDURE — 87637 SARSCOV2&INF A&B&RSV AMP PRB: CPT | Performed by: EMERGENCY MEDICINE

## 2024-09-12 PROCEDURE — 36415 COLL VENOUS BLD VENIPUNCTURE: CPT | Performed by: EMERGENCY MEDICINE

## 2024-09-12 PROCEDURE — 36415 COLL VENOUS BLD VENIPUNCTURE: CPT | Performed by: INTERNAL MEDICINE

## 2024-09-12 PROCEDURE — 87086 URINE CULTURE/COLONY COUNT: CPT | Performed by: EMERGENCY MEDICINE

## 2024-09-12 PROCEDURE — 83880 ASSAY OF NATRIURETIC PEPTIDE: CPT | Performed by: EMERGENCY MEDICINE

## 2024-09-12 PROCEDURE — 99222 1ST HOSP IP/OBS MODERATE 55: CPT | Performed by: INTERNAL MEDICINE

## 2024-09-12 PROCEDURE — 250N000013 HC RX MED GY IP 250 OP 250 PS 637: Performed by: INTERNAL MEDICINE

## 2024-09-12 PROCEDURE — 81001 URINALYSIS AUTO W/SCOPE: CPT | Performed by: EMERGENCY MEDICINE

## 2024-09-12 RX ORDER — CEFTRIAXONE 1 G/1
1 INJECTION, POWDER, FOR SOLUTION INTRAMUSCULAR; INTRAVENOUS EVERY 24 HOURS
Status: DISCONTINUED | OUTPATIENT
Start: 2024-09-13 | End: 2024-09-16 | Stop reason: HOSPADM

## 2024-09-12 RX ORDER — ASPIRIN 81 MG/1
81 TABLET, CHEWABLE ORAL EVERY MORNING
Status: ON HOLD | COMMUNITY
End: 2024-10-07

## 2024-09-12 RX ORDER — CALCIUM CARBONATE 500 MG/1
1000 TABLET, CHEWABLE ORAL 4 TIMES DAILY PRN
Status: DISCONTINUED | OUTPATIENT
Start: 2024-09-12 | End: 2024-09-16 | Stop reason: HOSPADM

## 2024-09-12 RX ORDER — ONDANSETRON 4 MG/1
4 TABLET, ORALLY DISINTEGRATING ORAL EVERY 6 HOURS PRN
Status: DISCONTINUED | OUTPATIENT
Start: 2024-09-12 | End: 2024-09-16 | Stop reason: HOSPADM

## 2024-09-12 RX ORDER — CEFTRIAXONE 1 G/1
1 INJECTION, POWDER, FOR SOLUTION INTRAMUSCULAR; INTRAVENOUS ONCE
Status: COMPLETED | OUTPATIENT
Start: 2024-09-12 | End: 2024-09-12

## 2024-09-12 RX ORDER — OLANZAPINE 7.5 MG/1
7.5 TABLET, FILM COATED ORAL AT BEDTIME
Status: ON HOLD | COMMUNITY
Start: 2024-07-30

## 2024-09-12 RX ORDER — NAPROXEN SODIUM 220 MG
220 TABLET ORAL 2 TIMES DAILY WITH MEALS
Status: ON HOLD | COMMUNITY

## 2024-09-12 RX ORDER — ACETAMINOPHEN 650 MG/1
650 SUPPOSITORY RECTAL EVERY 4 HOURS PRN
Status: DISCONTINUED | OUTPATIENT
Start: 2024-09-12 | End: 2024-09-16 | Stop reason: HOSPADM

## 2024-09-12 RX ORDER — ACETAMINOPHEN 500 MG
1000 TABLET ORAL 2 TIMES DAILY
Status: ON HOLD | COMMUNITY
End: 2024-10-07

## 2024-09-12 RX ORDER — AMOXICILLIN 250 MG
2 CAPSULE ORAL 2 TIMES DAILY PRN
Status: DISCONTINUED | OUTPATIENT
Start: 2024-09-12 | End: 2024-09-16 | Stop reason: HOSPADM

## 2024-09-12 RX ORDER — HYDRALAZINE HYDROCHLORIDE 20 MG/ML
10 INJECTION INTRAMUSCULAR; INTRAVENOUS EVERY 4 HOURS PRN
Status: DISCONTINUED | OUTPATIENT
Start: 2024-09-12 | End: 2024-09-16 | Stop reason: HOSPADM

## 2024-09-12 RX ORDER — HYDRALAZINE HYDROCHLORIDE 10 MG/1
10 TABLET, FILM COATED ORAL EVERY 4 HOURS PRN
Status: DISCONTINUED | OUTPATIENT
Start: 2024-09-12 | End: 2024-09-16 | Stop reason: HOSPADM

## 2024-09-12 RX ORDER — ENOXAPARIN SODIUM 100 MG/ML
40 INJECTION SUBCUTANEOUS EVERY 24 HOURS
Status: DISCONTINUED | OUTPATIENT
Start: 2024-09-12 | End: 2024-09-16 | Stop reason: HOSPADM

## 2024-09-12 RX ORDER — ONDANSETRON 2 MG/ML
4 INJECTION INTRAMUSCULAR; INTRAVENOUS EVERY 6 HOURS PRN
Status: DISCONTINUED | OUTPATIENT
Start: 2024-09-12 | End: 2024-09-16 | Stop reason: HOSPADM

## 2024-09-12 RX ORDER — LIDOCAINE 40 MG/G
CREAM TOPICAL
Status: DISCONTINUED | OUTPATIENT
Start: 2024-09-12 | End: 2024-09-16 | Stop reason: HOSPADM

## 2024-09-12 RX ORDER — PHENOL 1.4 %
10 AEROSOL, SPRAY (ML) MUCOUS MEMBRANE AT BEDTIME
Status: ON HOLD | COMMUNITY

## 2024-09-12 RX ORDER — ACETAMINOPHEN 325 MG/1
650 TABLET ORAL EVERY 4 HOURS PRN
Status: DISCONTINUED | OUTPATIENT
Start: 2024-09-12 | End: 2024-09-16 | Stop reason: HOSPADM

## 2024-09-12 RX ORDER — AMOXICILLIN 250 MG
1 CAPSULE ORAL 2 TIMES DAILY PRN
Status: DISCONTINUED | OUTPATIENT
Start: 2024-09-12 | End: 2024-09-16 | Stop reason: HOSPADM

## 2024-09-12 RX ADMIN — ACETAMINOPHEN 650 MG: 325 TABLET ORAL at 20:23

## 2024-09-12 RX ADMIN — SODIUM CHLORIDE 1500 MG: 9 INJECTION, SOLUTION INTRAVENOUS at 15:56

## 2024-09-12 RX ADMIN — ENOXAPARIN SODIUM 40 MG: 40 INJECTION SUBCUTANEOUS at 20:23

## 2024-09-12 RX ADMIN — CEFTRIAXONE SODIUM 1 G: 1 INJECTION, POWDER, FOR SOLUTION INTRAMUSCULAR; INTRAVENOUS at 14:52

## 2024-09-12 ASSESSMENT — COLUMBIA-SUICIDE SEVERITY RATING SCALE - C-SSRS
6. HAVE YOU EVER DONE ANYTHING, STARTED TO DO ANYTHING, OR PREPARED TO DO ANYTHING TO END YOUR LIFE?: NO
2. HAVE YOU ACTUALLY HAD ANY THOUGHTS OF KILLING YOURSELF IN THE PAST MONTH?: NO
1. IN THE PAST MONTH, HAVE YOU WISHED YOU WERE DEAD OR WISHED YOU COULD GO TO SLEEP AND NOT WAKE UP?: NO

## 2024-09-12 ASSESSMENT — ACTIVITIES OF DAILY LIVING (ADL)
ADLS_ACUITY_SCORE: 39
ADLS_ACUITY_SCORE: 39
ADLS_ACUITY_SCORE: 36
ADLS_ACUITY_SCORE: 39
ADLS_ACUITY_SCORE: 50
DEPENDENT_IADLS:: CLEANING;COOKING;LAUNDRY;SHOPPING;MEDICATION MANAGEMENT;MEAL PREPARATION;MONEY MANAGEMENT;TRANSPORTATION;INCONTINENCE
ADLS_ACUITY_SCORE: 39
ADLS_ACUITY_SCORE: 38
ADLS_ACUITY_SCORE: 39

## 2024-09-12 NOTE — CONSULTS
Care Management Initial Consult    General Information  Assessment completed with: Patient,    Type of CM/SW Visit: Initial Assessment    Primary Care Provider verified and updated as needed: Yes   Readmission within the last 30 days: no previous admission in last 30 days      Reason for Consult: discharge planning  Advance Care Planning: Advance Care Planning Reviewed: no concerns identified          Communication Assessment  Patient's communication style: spoken language (English or Bilingual)             Cognitive  Cognitive/Neuro/Behavioral: orientation (Wife states patient seems confused at times. Has been happening since diagnosed with UTI.)        Orientation: oriented x 4             Living Environment:   People in home: spouse     Current living Arrangements: house      Able to return to prior arrangements: other (see comments) (to be determined)       Family/Social Support:  Care provided by: self, spouse/significant other  Provides care for: no one, unable/limited ability to care for self  Marital Status:   Support system: Wife, Children  Jovana       Description of Support System: Supportive, Involved    Support Assessment: Adequate family and caregiver support    Current Resources:   Patient receiving home care services: Yes  Skilled Home Care Services:  (patient is unsure)     Community Resources: None  Equipment currently used at home: cane, straight, shower chair (has walker and wheelchair but doesn't use)  Supplies currently used at home: Oxygen Tubing/Supplies    Employment/Financial:  Employment Status: retired        Financial Concerns: none   Referral to Financial Worker: No       Does the patient's insurance plan have a 3 day qualifying hospital stay waiver?  No    Lifestyle & Psychosocial Needs:  Social Determinants of Health     Food Insecurity: Not on file   Depression: Not on file   Housing Stability: Not on file   Tobacco Use: Medium Risk (7/24/2024)    Patient History     Smoking  Tobacco Use: Former     Smokeless Tobacco Use: Never     Passive Exposure: Not on file   Financial Resource Strain: Not on file   Alcohol Use: Not on file   Transportation Needs: Not on file   Physical Activity: Not on file   Interpersonal Safety: Not on file   Stress: Not on file   Social Connections: Unknown (1/3/2024)    [facility]    Social Connections     Frequency of Communication with Friends and Family: Not on file   Health Literacy: Not on file       Functional Status:  Prior to admission patient needed assistance:   Dependent ADLs:: Ambulation-cane, Bathing, Dressing, Incontinence  Dependent IADLs:: Cleaning, Cooking, Laundry, Shopping, Medication Management, Meal Preparation, Money Management, Transportation, Incontinence       Mental Health Status:  Mental Health Status: No Current Concerns       Chemical Dependency Status:  Chemical Dependency Status: No Current Concerns             Values/Beliefs:  Spiritual, Cultural Beliefs, Denominational Practices, Values that affect care: no               Discussed  Partnership in Safe Discharge Planning  document with patient/family: No    Additional Information:  CM consult received for discharge planning.  Met with patient at bedside to introduce CM role and perform initial assessment.  Dominik lives in a one story house with his spouse Jovana.  Per patient he ambulates with a cane at baseline.  Has a walker and manual wheelchair at home but says he cannot use them as the passageways at his home are too narrow. Spouse assists with bathing, dressing, and all IADLs.  Has home oxygen, 2.5lpm per patient but he doesn't know which company provides.  Patient also reports he has home care but does not know name of agency or what services they are providing.  Attempted to call spouse x 2 to obtain clarification.  No answer and voicemail box full.  Per patient, spouse can transport at  discharge.  PT and OT recommendations pending.       Next Steps: CM to follow for medical progression of care, discharge recommendations, and final discharge plan.      Christo Stuart CRNI

## 2024-09-12 NOTE — PLAN OF CARE
Goal Outcome Evaluation:      Plan of Care Reviewed With: patient          Outcome Evaluation: Discharge plan pending medical readiness and PT/OT recommendations.

## 2024-09-12 NOTE — PHARMACY-VANCOMYCIN DOSING SERVICE
Pharmacy Vancomycin Initial Note  Date of Service 2024  Patient's  1940  83 year old, male    Indication: Urinary Tract Infection    Current estimated CrCl = Estimated Creatinine Clearance: 65.4 mL/min (based on SCr of 0.89 mg/dL).    Creatinine for last 3 days  2024: 12:37 PM Creatinine 0.89 mg/dL    Recent Vancomycin Level(s) for last 3 days  No results found for requested labs within last 3 days.      Vancomycin IV Administrations (past 72 hours)        No vancomycin orders with administrations in past 72 hours.                    Nephrotoxins and other renal medications (From now, onward)      Start     Dose/Rate Route Frequency Ordered Stop    24 1500  vancomycin (VANCOCIN) 1,500 mg in sodium chloride 0.9 % 250 mL intermittent infusion         1,500 mg  over 90 Minutes Intravenous ONCE 24 1431              Contrast Orders - past 72 hours (72h ago, onward)      None                Plan:  Start vancomycin 1500 mg IV x1.   Please reconsult pharmacy if vancomycin therapy is continued.     Ibeth Novak, InesD

## 2024-09-12 NOTE — ED NOTES
The pt is sitting up in bed eating food, he has no complaints at this time. Will continue to monitor.

## 2024-09-12 NOTE — PROGRESS NOTES
Patient has arrived on P1 at 1850. Vital signs taken, patient is on 4L nasal canula, primo fit in place.  This RN has only settled patient.  Lorraine Ortiz RN

## 2024-09-12 NOTE — ED PROVIDER NOTES
EMERGENCY DEPARTMENT ENCOUNTER      NAME: Dominik Rojas  AGE: 83 year old male  YOB: 1940  MRN: [id number]  EVALUATION DATE & TIME: 2024 12:05 PM    PCP: Misael Moe    ED PROVIDER: Alia Solis M.D.      Chief Complaint   Patient presents with    Altered Mental Status    UTI Concerns       FINAL IMPRESSION:  1. Acute cystitis without hematuria    2. Altered mental status, unspecified altered mental status type        ED COURSE & MEDICAL DECISION MAKIN. AMS.  Differentials include infection such as incompletely treated UTI, pneumonia, gastroenteritis, viral illness, electrolyte abnormality, dehydration, cognitive decline.   No known head trauma, falls.  No change to medications, no suspicion for overdose or misuse.  I ordered blood work, repeat urinalysis, CT head, chest x-ray.  Upon urine culture review from 24 ED visit, UA positive for enterococcus faecalis as well as e coli x 2, cefpodoxime does not cover for enterococcus and vancomycin ordered to cover.  This is likely the cause of his altered mental status.   VBG stable, electrolytes and glucose stable, UA continues to be positive for UTI. Neg COVID, influenza, RSV.   I read and reviewed CXR and CT head, confirmed results with Radiology read. CXR shows stable right pleural effusion. CT head neg for acute process.  I admitted patient to hospitalist due to wife's concerns for care at home with increased AMS/agitation due to UTI.        12:27 PM I met with the patient to gather history and to perform my initial exam. I discussed the plan for care while in the Emergency Department.  ED Course as of 24 1501   Thu Sep 12, 2024   1231 EKG reviewed by myself at 1216 and shows sinus rhythm rate 116, Qtc 514 , compared to previous EKG 24.  Sinus tachycardia present, similar to previous EKG  I have independently reviewed and interpreted today's EKG, pending Cardiologist read     7947 I spoke to hospitalist for admission  "    Pertinent Labs & Imaging studies reviewed. (See chart for details).    Medical Decision Making  Obtained supplemental history:Supplemental history obtained?: Family Member/Significant Other  Reviewed external records: External records reviewed?: Documented in chart  Care impacted by chronic illness:Chronic Lung Disease, Heart Disease, Hypertension, and Mental Health  Care significantly affected by social determinants of health:Access to Medical Care  Did you consider but not order tests?: Work up considered but not performed and documented in chart, if applicable  Did you interpret images independently?: Independent interpretation of ECG and images noted in documentation, when applicable.  Consultation discussion with other provider:Did you involve another provider (consultant, , pharmacy, etc.)?: I discussed the care with another health care provider, see documentation for details.  Admit.  Not Applicable      At the conclusion of the encounter I discussed the results of all of the tests and the disposition. The questions were answered. The patient or family acknowledged understanding and was agreeable with the care plan.      CRITICAL CARE:  N/A    HPI    Patient information was obtained from: wife and patient     Use of : N/A     HPI limited due to dementia.     Dominik Rojas is a 83 year old male who presents with altered mental status.     On 9/8 the patient had a fall and was diagnosed with a UTI and left 10th rib fracture. He was started on cefpodoxime and has had 6 doses since discharge. About 1.5 months ago he had a UTI and became agitated and combative with wife, PD, and EMS. At that time he was also talking about needing to get their dog from outside (they do not own a dog). Today, the patient stated that he felt similar agitation to back then and \"I'm going out of my mind, take me to the ER\". He was also refusing to sit down this morning and seemed \"off\" last night. His wife gave " "Olanzapine and melatonin last night. Since his discharge on 8/1 he has had a home health nurse coming who told the wife that he is incontinent and should be changing his Depends frequently. The patient often refuses. He had a stroke 5 years ago.     Per patient,   When asked if anything bothers him he replies \"yes, but I'm not sure what\". He denies shortness of breath or any other complaints at this time..      REVIEW OF SYSTEMS  All other systems negative unless noted in HPI.    PAST MEDICAL HISTORY:  Past Medical History:   Diagnosis Date    AAA (abdominal aortic aneurysm) (H24)     s/p stenting    Alcohol dependence in remission (H)     Alcohol use disorder, severe, in sustained remission, dependence (H) 8/16/2021    Anxiety     Atrial fibrillation with normal ventricular rate (H)     Benign prostatic hyperplasia with lower urinary tract symptoms     Bronchiectasis without complication (H)     2/27/2020    COPD (chronic obstructive pulmonary disease) (H)     CVA (cerebral vascular accident) (H) 2019    DDD (degenerative disc disease), lumbar     Depression     Depressive disorder     Diabetes (H)     Diastolic dysfunction 01/22/2021    DJD (degenerative joint disease) of knee     left    Essential hypertension     Glaucoma     Glaucoma     History of stroke without residual deficits     Hyperlipidemia     Hypertension     Hypothyroidism     Hypothyroidism     Kidney stones     Major depression     Memory problem     Nocturia     Obesity     Opioid use disorder, severe, dependence (H) 8/16/2021    Overactive bladder 02/25/2021    Primary osteoarthritis of left knee     Psoriasis     Psoriasis     Seborrheic keratoses     Somnolence, daytime     Stenosis of right carotid artery     history of TIA's       PAST SURGICAL HISTORY:  Past Surgical History:   Procedure Laterality Date    ABDOMEN SURGERY      ABDOMINAL AORTIC ANEURYSM REPAIR  2013    stent    CAROTID ENDARTERECTOMY Right 9/9/2019    Procedure: RIGHT " CAROTID ENDARTERECTOMY;  Surgeon: Miguel Muller MD;  Location: [facility] OR;  Service: General    CIRCUMCISION      CYSTOSCOPY      CYSTOSCOPY PROSTATE W/ LASER N/A 6/12/2018    Procedure:  TRANSURETHRAL RESECTION OF THE PROSTATE WITH QUANTA LASER;  Surgeon: Eze Levi MD;  Location: [facility] OR;  Service:     CYSTOSCOPY PROSTATE W/ LASER N/A 2/18/2020    Procedure: CYSTOSCOPY WITH TRANSURETHRAL INCISION OF THE PROSTATE WITH QUANTA LASER;  Surgeon: Eze Levi MD;  Location: [facility] OR;  Service: Urology    CYSTOSCOPY W/ LITHOLAPAXY / EHL N/A 6/12/2018    Procedure: CYSTOSCOPY AND LITHOLAPAXY;  Surgeon: Eze Levi MD;  Location: [facility];  Service:     IR ABDOMINAL AORTAGRAM  5/19/2020    IR ABDOMINAL AORTIC ANEURYSM ENDOGRAFT  5/19/2020    IR ABDOMINAL AORTOGRAM  5/19/2020    IR ABDOMINAL ENDOVASCULAR STENT GRAFT  5/19/2020    LITHOTRIPSY      PERCUTANEOUS NEPHROSTOLITHOTOMY Right 03/10/2020    ZZC HUANG W/O FACETEC FORAMOT/DSKC 1/2 VRT SEG, LUMBAR Bilateral 3/3/2021    Procedure: Bilateral lumbar 2 - Lumbar 4 decompressive lumbar laminectomy with medial facetectomies;  Surgeon: Renée King MD;  Location: [facility];  Service: Spine         CURRENT MEDICATIONS:    Current Facility-Administered Medications   Medication Dose Route Frequency Provider Last Rate Last Admin    acetaminophen (TYLENOL) tablet 650 mg  650 mg Oral Q4H PRN Lu Skaggs MD   650 mg at 09/13/24 0808    Or    acetaminophen (TYLENOL) Suppository 650 mg  650 mg Rectal Q4H PRN Lu Skaggs MD        aspirin (ASA) chewable tablet 81 mg  81 mg Oral Daily Lu Skaggs MD   81 mg at 09/13/24 1201    busPIRone (BUSPAR) tablet 5 mg  5 mg Oral BID Lu Skaggs MD   5 mg at 09/13/24 1202    calcium carbonate (TUMS) chewable tablet 1,000 mg  1,000 mg Oral 4x Daily PRN Lu Skaggs MD        cefTRIAXone (ROCEPHIN) 1 g vial to attach to  mL bag for ADULTS or NS 50 mL  bag for PEDS  1 g Intravenous Q24H Lu Skaggs MD   1 g at 09/13/24 1413    DULoxetine (CYMBALTA) DR capsule 60 mg  60 mg Oral Daily Lu Skaggs MD   60 mg at 09/13/24 1201    enoxaparin ANTICOAGULANT (LOVENOX) injection 40 mg  40 mg Subcutaneous Q24H Lu Skaggs MD   40 mg at 09/12/24 2023    furosemide (LASIX) tablet 20 mg  20 mg Oral Daily Lu Skaggs MD   20 mg at 09/13/24 1201    hydrALAZINE (APRESOLINE) tablet 10 mg  10 mg Oral Q4H PRN Lu Skaggs MD        Or    hydrALAZINE (APRESOLINE) injection 10 mg  10 mg Intravenous Q4H PRN Lu Skaggs MD        ipratropium - albuterol 0.5 mg/2.5 mg/3 mL (DUONEB) neb solution 3 mL  1 vial Nebulization Q4H PRN Lu Skaggs MD        latanoprost (XALATAN) 0.005 % ophthalmic solution 1 drop  1 drop Both Eyes At Bedtime Lu Skaggs MD        [START ON 9/14/2024] levothyroxine (SYNTHROID/LEVOTHROID) tablet 88 mcg  88 mcg Oral QAM AC Lu Skaggs MD        lidocaine (LMX4) cream   Topical Q1H PRN Lu Skaggs MD        lidocaine 1 % 0.1-1 mL  0.1-1 mL Other Q1H PRN Lu Skaggs MD        losartan (COZAAR) tablet 25 mg  25 mg Oral Daily Lu Skaggs MD   25 mg at 09/13/24 1201    metoprolol succinate ER (TOPROL-XL) 24 hr half-tab 12.5 mg  12.5 mg Oral Daily Lu Skaggs MD   12.5 mg at 09/13/24 1201    multivitamin w/minerals (THERA-VIT-M) tablet 1 tablet  1 tablet Oral Daily Lu Skaggs MD   1 tablet at 09/13/24 1201    OLANZapine (zyPREXA) tablet 7.5 mg  7.5 mg Oral At Bedtime Lu Skaggs MD        ondansetron (ZOFRAN ODT) ODT tab 4 mg  4 mg Oral Q6H PRN Lu Skaggs MD        Or    ondansetron (ZOFRAN) injection 4 mg  4 mg Intravenous Q6H PRN Lu Skaggs MD        QUEtiapine (SEROquel) half-tab 12.5 mg  12.5 mg Oral Q6H PRN Lu Skaggs MD        rosuvastatin (CRESTOR) tablet 10 mg  10 mg Oral At Bedtime Lu Skaggs MD        senna-docusate (SENOKOT-S/PERICOLACE) 8.6-50 MG per tablet 1 tablet  1 tablet Oral  BID PRN Lu Skaggs MD        Or    senna-docusate (SENOKOT-S/PERICOLACE) 8.6-50 MG per tablet 2 tablet  2 tablet Oral BID PRN Lu Skaggs MD        sodium chloride (PF) 0.9% PF flush 3 mL  3 mL Intracatheter Q8H Lu Skaggs MD   3 mL at 24 1413    sodium chloride (PF) 0.9% PF flush 3 mL  3 mL Intracatheter q1 min prn Lu Skaggs MD        traZODone (DESYREL) half-tab 25 mg  25 mg Oral At Bedtime PRN Lu Skaggs MD        Vitamin D3 (CHOLECALCIFEROL) tablet 25 mcg  25 mcg Oral Daily Lu Skaggs MD   25 mcg at 24 1202         ALLERGIES:  Allergies   Allergen Reactions    Diclofenac      Other reaction(s): GI Upset    Loratadine      Other reaction(s): Urinary Retention    Oxycodone      Agitation     Sertraline Unknown     Other reaction(s): ineffective       FAMILY HISTORY:  Family History   Problem Relation Age of Onset    Emphysema Mother     No Known Problems Father     Cirrhosis Father     No Known Problems Sister     No Known Problems Brother     No Known Problems Maternal Aunt     No Known Problems Maternal Uncle     No Known Problems Paternal Aunt     No Known Problems Paternal Uncle     No Known Problems Maternal Grandmother     No Known Problems Maternal Grandfather     No Known Problems Paternal Grandmother     No Known Problems Paternal Grandfather     No Known Problems Other        SOCIAL HISTORY:  Social History     Socioeconomic History    Marital status:    Tobacco Use    Smoking status: Former     Current packs/day: 0.00     Average packs/day: 0.5 packs/day for 35.0 years (17.5 ttl pk-yrs)     Types: Cigarettes     Start date: 1955     Quit date: 1990     Years since quittin.7    Smokeless tobacco: Never    Tobacco comments:     quit    Substance and Sexual Activity    Alcohol use: No     Comment: Alcoholic Drinks/day: quit     Drug use: No    Sexual activity: Yes     Partners: Female     Birth control/protection: Post-menopausal   Other  "Topics Concern    Parent/sibling w/ CABG, MI or angioplasty before 65F 55M? No     Social Determinants of Health      Received from [facility] [employer] [facility] [employer] [facility], [facility] [employer] [facility]    Social Connections       VITALS:  Patient Vitals for the past 24 hrs:   BP Temp Temp src Pulse Resp SpO2 Height Weight   09/13/24 1325 -- -- -- -- -- 95 % -- --   09/13/24 0726 129/89 98.7  F (37.1  C) Oral 84 20 94 % -- --   09/12/24 2350 (!) 142/81 98  F (36.7  C) Oral 80 28 98 % -- --   09/12/24 2221 -- -- -- -- -- -- 1.702 m (5' 7\") 82.6 kg (182 lb 1.6 oz)   09/12/24 1850 134/76 98.2  F (36.8  C) Oral 83 24 97 % -- --   09/12/24 1730 133/82 -- -- 87 27 97 % -- --       PHYSICAL EXAM    VITAL SIGNS: /89 (BP Location: Left arm)   Pulse 84   Temp 98.7  F (37.1  C) (Oral)   Resp 20   Ht 1.702 m (5' 7\")   Wt 82.6 kg (182 lb 1.6 oz)   SpO2 95%   BMI 28.52 kg/m    Physical Exam  Vitals and nursing note reviewed.   Constitutional:       Appearance: He is obese.      Comments: Chronically ill appearing.   HENT:      Head: Normocephalic and atraumatic.      Nose: No congestion.      Mouth/Throat:      Pharynx: No oropharyngeal exudate or posterior oropharyngeal erythema.   Eyes:      General:         Right eye: No discharge.         Left eye: No discharge.      Pupils: Pupils are equal, round, and reactive to light.   Cardiovascular:      Rate and Rhythm: Regular rhythm. Tachycardia present.      Pulses: Normal pulses.   Pulmonary:      Effort: Pulmonary effort is normal. No respiratory distress.   Abdominal:      Palpations: Abdomen is soft.      Tenderness: There is no abdominal tenderness. There is no guarding or rebound.   Musculoskeletal:      Cervical back: Normal range of motion. No rigidity.   Skin:     General: Skin is warm and dry.   Neurological:      Mental Status: He is alert.      Comments: Confused to situation, following simple commands. Speech appears clear without " aphasia, dysarthria.   Psychiatric:      Comments: Cooperative during my exam.         LABS  Labs Ordered and Resulted from Time of ED Arrival to Time of ED Departure   ROUTINE UA WITH MICROSCOPIC REFLEX TO CULTURE - Abnormal       Result Value    Color Urine Yellow      Appearance Urine Turbid (*)     Glucose Urine Negative      Bilirubin Urine Negative      Ketones Urine Negative      Specific Gravity Urine 1.013      Blood Urine 0.5 mg/dL (*)     pH Urine 6.5      Protein Albumin Urine 10 (*)     Urobilinogen Urine <2.0      Nitrite Urine Negative      Leukocyte Esterase Urine 500 Juan/uL (*)     Bacteria Urine Few (*)     RBC Urine 38 (*)     WBC Urine >182 (*)    COMPREHENSIVE METABOLIC PANEL - Abnormal    Sodium 140      Potassium 4.5      Carbon Dioxide (CO2) 26      Anion Gap 9      Urea Nitrogen 20.4      Creatinine 0.89      GFR Estimate 86      Calcium 8.6 (*)     Chloride 105      Glucose 119 (*)     Alkaline Phosphatase 165 (*)     AST 27      ALT 17      Protein Total 6.6      Albumin 3.1 (*)     Bilirubin Total 0.6     BLOOD GAS VENOUS - Abnormal    pH Venous 7.40      pCO2 Venous 49      pO2 Venous 48 (*)     Bicarbonate Venous 30 (*)     Base Excess/Deficit Venous 4.5 (*)     FIO2 32      Oxyhemoglobin Venous 83 (*)     O2 Sat, Venous 83.5 (*)    CBC WITH PLATELETS AND DIFFERENTIAL - Abnormal    WBC Count 11.0      RBC Count 4.58      Hemoglobin 12.9 (*)     Hematocrit 40.8      MCV 89      MCH 28.2      MCHC 31.6      RDW 15.1 (*)     Platelet Count 299      % Neutrophils 77      % Lymphocytes 13      % Monocytes 7      % Eosinophils 2      % Basophils 1      % Immature Granulocytes 0      NRBCs per 100 WBC 0      Absolute Neutrophils 8.5 (*)     Absolute Lymphocytes 1.4      Absolute Monocytes 0.8      Absolute Eosinophils 0.2      Absolute Basophils 0.1      Absolute Immature Granulocytes 0.0      Absolute NRBCs 0.0     HEMOGLOBIN A1C - Abnormal    Hemoglobin A1C 5.8 (*)    NT PROBNP INPATIENT -  Normal    N terminal Pro BNP Inpatient 159     TROPONIN T, HIGH SENSITIVITY - Normal    Troponin T, High Sensitivity 17     INFLUENZA A/B, RSV, & SARS-COV2 PCR - Normal    Influenza A PCR Negative      Influenza B PCR Negative      RSV PCR Negative      SARS CoV2 PCR Negative     LACTIC ACID WHOLE BLOOD - Normal    Lactic Acid 1.2     CREATININE - Normal    Creatinine 0.86      GFR Estimate 86     GLUCOSE MONITOR NURSING POCT         RADIOLOGY  Head CT w/o contrast   Final Result   IMPRESSION:      1. No acute intracranial pathology.   2. Age-related and incidental findings as described.          XR Chest Port 1 View   Final Result   IMPRESSION: Unchanged small right pleural effusion with right basilar airspace opacities. No pneumothorax. Stable enlarged cardiomediastinal silhouette.         I have independently reviewed the above image. See radiology report for detail.      EKG:    See ED course notes above.       PROCEDURES:  N/A      MEDICATIONS GIVEN IN THE EMERGENCY:  Medications   lidocaine 1 % 0.1-1 mL (has no administration in time range)   lidocaine (LMX4) cream (has no administration in time range)   sodium chloride (PF) 0.9% PF flush 3 mL (3 mLs Intracatheter $Given 9/13/24 1413)   sodium chloride (PF) 0.9% PF flush 3 mL (has no administration in time range)   senna-docusate (SENOKOT-S/PERICOLACE) 8.6-50 MG per tablet 1 tablet (has no administration in time range)     Or   senna-docusate (SENOKOT-S/PERICOLACE) 8.6-50 MG per tablet 2 tablet (has no administration in time range)   calcium carbonate (TUMS) chewable tablet 1,000 mg (has no administration in time range)   enoxaparin ANTICOAGULANT (LOVENOX) injection 40 mg (40 mg Subcutaneous $Given 9/12/24 2023)   hydrALAZINE (APRESOLINE) tablet 10 mg (has no administration in time range)     Or   hydrALAZINE (APRESOLINE) injection 10 mg (has no administration in time range)   acetaminophen (TYLENOL) tablet 650 mg (650 mg Oral $Given 9/13/24 0808)     Or    acetaminophen (TYLENOL) Suppository 650 mg ( Rectal See Alternative 9/13/24 0808)   ondansetron (ZOFRAN ODT) ODT tab 4 mg (has no administration in time range)     Or   ondansetron (ZOFRAN) injection 4 mg (has no administration in time range)   cefTRIAXone (ROCEPHIN) 1 g vial to attach to  mL bag for ADULTS or NS 50 mL bag for PEDS (1 g Intravenous $New Bag 9/13/24 1413)   traZODone (DESYREL) half-tab 25 mg (has no administration in time range)   QUEtiapine (SEROquel) half-tab 12.5 mg (has no administration in time range)   aspirin (ASA) chewable tablet 81 mg (81 mg Oral $Given 9/13/24 1201)   busPIRone (BUSPAR) tablet 5 mg (5 mg Oral $Given 9/13/24 1202)   Vitamin D3 (CHOLECALCIFEROL) tablet 25 mcg (25 mcg Oral $Given 9/13/24 1202)   DULoxetine (CYMBALTA) DR capsule 60 mg (60 mg Oral $Given 9/13/24 1201)   furosemide (LASIX) tablet 20 mg (20 mg Oral $Given 9/13/24 1201)   latanoprost (XALATAN) 0.005 % ophthalmic solution 1 drop (has no administration in time range)   levothyroxine (SYNTHROID/LEVOTHROID) tablet 88 mcg (has no administration in time range)   losartan (COZAAR) tablet 25 mg (25 mg Oral $Given 9/13/24 1201)   metoprolol succinate ER (TOPROL-XL) 24 hr half-tab 12.5 mg (12.5 mg Oral $Given 9/13/24 1201)   multivitamin w/minerals (THERA-VIT-M) tablet 1 tablet (1 tablet Oral $Given 9/13/24 1201)   OLANZapine (zyPREXA) tablet 7.5 mg (has no administration in time range)   rosuvastatin (CRESTOR) tablet 10 mg (has no administration in time range)   ipratropium - albuterol 0.5 mg/2.5 mg/3 mL (DUONEB) neb solution 3 mL (has no administration in time range)   cefTRIAXone (ROCEPHIN) 1 g vial to attach to  mL bag for ADULTS or NS 50 mL bag for PEDS (0 g Intravenous Stopped 9/12/24 1538)   vancomycin (VANCOCIN) 1,500 mg in sodium chloride 0.9 % 250 mL intermittent infusion (0 mg Intravenous Stopped 9/12/24 1748)       NEW PRESCRIPTIONS STARTED AT TODAY'S ER VISIT  Current Discharge Medication List            I, Diaz Munroe, am serving as a scribe to document services personally performed by Alia Solis MD, based on my observations and the provider's statements to me.  I, Alia Solis MD, attest that Diaz Munroe is acting in a scribe capacity, has observed my performance of the services and has documented them in accordance with my direction.     Alia Solis MD  Emergency Medicine  [facility] EMERGENCY DEPARTMENT  [address] [place] [id number] [phone]  Dept: [phone]             Alia Solis MD  09/13/24 0455

## 2024-09-12 NOTE — LETTER
September 12, 2024        Dominik Rojas  [address]  [place] [address]          Dear Dominik Rojas:    You were seen in the [facility] Emergency Department at [facility] on 9/8/2024.  We are unable to reach you by phone, so we are sending you this letter.     It is important that you call [facility] Emergency Department lab result nurse at [phone], as we have information to relay to you AND/OR we MAY have to make some changes in your treatment.    Best time to call back is between 9AM and 5:30PM, 7 days a week.      Sincerely,     [facility] Emergency Department Lab Result RN  [phone]

## 2024-09-12 NOTE — H&P
"[facility]    History and Physical - Hospitalist Service       Date of Admission:  9/12/2024    Assessment & Plan      Dominik Rojas is a 83 year old male admitted on 9/12/2024. He has h/o COPD on home o2, h/o AMS due to UTI, diastolic CHF, hypothyroidism. He had a fall on 9/8 (4 days ago). He was diagnosed of rib fracture and UTI. He is on Cefpodoxime. He was brought to ER by wife due to worsening agitation and altered mental status.    UTI due to E coli  Acute metabolic and toxic encephalopathy  -Urine culture on 9/8 + for E Coli  -Continue ceftriaxone which was started in ER  -Low-dose PRN Seroquel for aggressive behaviors     Elevated alk phos  -No abdominal symptoms, alk phos marginally elevated  -Trend with AM labs     Chronic respiratory failure with hypoxia  COPD   -DuoNebs as needed   -CXR : no pneumothorax. Small right pleural effusion and right base opacity  -continue O2 as needed with saturation goal >88%     Pre-diabetes  -monitor as needed  Check hgb a1c     Hypothyroidism  HTN  Anxiety   GERD  -will await pharmacy med rec for these chronic issues         Diet: Combination Diet Regular Diet Adult    DVT Prophylaxis: Enoxaparin (Lovenox) SQ  Cabral Catheter: Not present  Lines: None     Cardiac Monitoring: None  Code Status: Full Code      Clinically Significant Risk Factors Present on Admission              # Hypoalbuminemia: Lowest albumin = 3.1 g/dL at 9/12/2024 12:37 PM, will monitor as appropriate     # Hypertension: Noted on problem list   # Dementia: noted on problem list       # Overweight: Estimated body mass index is 28.16 kg/m  as calculated from the following:    Height as of this encounter: 1.702 m (5' 7\").    Weight as of this encounter: 81.6 kg (179 lb 12.8 oz).                    Disposition Plan     Medically Ready for Discharge: Anticipated in 2-4 Days           Lu Skaggs MD  Hospitalist Service  [facility]  Securely message with " "[place] (more info)  Text page via [facility] Paging/Directory     ______________________________________________________________________    Chief Complaint   AMS    History is obtained from the patient and emergency department physician    History of Present Illness   Dominik Rojas is a 83 year old male who presents with altered mental status.      On 9/8 the patient had a fall and was diagnosed with a UTI and rib fracture. He was started on cefpodoxime and has had 6 doses since discharge. About 1.5 months ago he had a UTI and became agitated and combative with wife, PD, and EMS. At that time he was also talking about needing to get their dog from outside (they do not own a dog). Today, the patient stated that he felt similar agitation to back then and \"I'm going out of my mind, take me to the ER\". He was also refusing to sit down this morning and seemed \"off\" last night. His wife gave Olanzapine and melatonin last night. Since his discharge on 8/1 he has had a home health nurse coming who told the wife that he is incontinent and should be changing his Depends frequently. The patient often refuses. He had a stroke 5 years ago.      Per patient,   When asked if anything bothers him he replies \"yes, but I'm not sure what\". He denies shortness of breath or any other complaints at this time..         Past Medical History    Past Medical History:   Diagnosis Date    AAA (abdominal aortic aneurysm) (H24)     s/p stenting    Alcohol dependence in remission (H)     Alcohol use disorder, severe, in sustained remission, dependence (H) 8/16/2021    Anxiety     Atrial fibrillation with normal ventricular rate (H)     Benign prostatic hyperplasia with lower urinary tract symptoms     Bronchiectasis without complication (H)     2/27/2020    COPD (chronic obstructive pulmonary disease) (H)     CVA (cerebral vascular accident) (H) 2019    DDD (degenerative disc disease), lumbar     Depression     Depressive disorder     Diabetes (H)  "    Diastolic dysfunction 01/22/2021    DJD (degenerative joint disease) of knee     left    Essential hypertension     Glaucoma     Glaucoma     History of stroke without residual deficits     Hyperlipidemia     Hypertension     Hypothyroidism     Hypothyroidism     Kidney stones     Major depression     Memory problem     Nocturia     Obesity     Opioid use disorder, severe, dependence (H) 8/16/2021    Overactive bladder 02/25/2021    Primary osteoarthritis of left knee     Psoriasis     Psoriasis     Seborrheic keratoses     Somnolence, daytime     Stenosis of right carotid artery     history of TIA's       Past Surgical History   Past Surgical History:   Procedure Laterality Date    ABDOMEN SURGERY      ABDOMINAL AORTIC ANEURYSM REPAIR  2013    stent    CAROTID ENDARTERECTOMY Right 9/9/2019    Procedure: RIGHT CAROTID ENDARTERECTOMY;  Surgeon: Miguel Muller MD;  Location: [facility];  Service: General    CIRCUMCISION      CYSTOSCOPY      CYSTOSCOPY PROSTATE W/ LASER N/A 6/12/2018    Procedure:  TRANSURETHRAL RESECTION OF THE PROSTATE WITH QUANTA LASER;  Surgeon: Eze Levi MD;  Location: [facility];  Service:     CYSTOSCOPY PROSTATE W/ LASER N/A 2/18/2020    Procedure: CYSTOSCOPY WITH TRANSURETHRAL INCISION OF THE PROSTATE WITH QUANTA LASER;  Surgeon: Eze Levi MD;  Location: [facility];  Service: Urology    CYSTOSCOPY W/ LITHOLAPAXY / EHL N/A 6/12/2018    Procedure: CYSTOSCOPY AND LITHOLAPAXY;  Surgeon: Eze Levi MD;  Location: [facility];  Service:     IR ABDOMINAL AORTAGRAM  5/19/2020    IR ABDOMINAL AORTIC ANEURYSM ENDOGRAFT  5/19/2020    IR ABDOMINAL AORTOGRAM  5/19/2020    IR ABDOMINAL ENDOVASCULAR STENT GRAFT  5/19/2020    LITHOTRIPSY      PERCUTANEOUS NEPHROSTOLITHOTOMY Right 03/10/2020    ZZC HUANG W/O FACETEC FORAMOT/DSKC 1/2 VRT SEG, LUMBAR Bilateral 3/3/2021    Procedure: Bilateral lumbar 2 - Lumbar 4 decompressive lumbar laminectomy with  medial facetectomies;  Surgeon: Renée King MD;  Location: [facility];  Service: Spine       Prior to Admission Medications   Prior to Admission Medications   Prescriptions Last Dose Informant Patient Reported? Taking?   Cholecalciferol (VITAMIN D3) 50 MCG (2000 UT) CAPS   Yes No   Sig: Take 1 tablet by mouth daily    DULoxetine (CYMBALTA) 60 MG capsule   Yes No   Sig: Take 60 mg by mouth daily   OLANZapine (ZYPREXA) 5 MG tablet   Yes No   Sig: Take 7.5 mg by mouth At Bedtime   acetaminophen (TYLENOL) 325 MG tablet   Yes No   Sig: Take 650 mg by mouth every 4 hours as needed for mild pain   busPIRone (BUSPAR) 5 MG tablet   Yes No   Sig: Take 5 mg by mouth 2 times daily   cefpodoxime (VANTIN) 100 MG tablet   No No   Sig: Take 1 tablet (100 mg) by mouth 2 times daily for 7 days.   furosemide (LASIX) 20 MG tablet   No No   Sig: Take 1 tablet (20 mg) by mouth daily   ipratropium - albuterol 0.5 mg/2.5 mg/3 mL (DUONEB) 0.5-2.5 (3) MG/3ML neb solution   Yes No   Sig: Take 1 vial by nebulization every 12 hours   latanoprost (XALATAN) 0.005 % ophthalmic solution   Yes No   Sig: Place 1 drop into both eyes At Bedtime   levothyroxine (SYNTHROID/LEVOTHROID) 88 MCG tablet   Yes No   Sig: Take 88 mcg by mouth daily before breakfast   lidocaine (LIDODERM) 5 % patch   No No   Sig: Place 1 patch over 12 hours onto the skin every 24 hours. To prevent lidocaine toxicity, patient should be patch free for 12 hrs daily.   losartan (COZAAR) 25 MG tablet   No No   Sig: Take 1 tablet (25 mg) by mouth daily   Patient taking differently: Take 25 mg by mouth daily Hold for SBP <110   metoprolol succinate ER (TOPROL XL) 25 MG 24 hr tablet   No No   Sig: Take 0.5 tablets (12.5 mg) by mouth daily   multivitamin w/minerals (THERA-VIT-M) tablet   Yes No   Sig: Take 1 tablet by mouth daily   pantoprazole (PROTONIX) 40 MG EC tablet   No No   Sig: Take 1 tablet (40 mg) by mouth every morning (before breakfast)   rosuvastatin (CRESTOR)  10 MG tablet   Yes No   Sig: Take 10 mg by mouth At Bedtime      Facility-Administered Medications: None        Allergies   Allergies   Allergen Reactions    Diclofenac      Other reaction(s): GI Upset    Loratadine      Other reaction(s): Urinary Retention    Oxycodone      Agitation     Sertraline Unknown     Other reaction(s): ineffective        Physical Exam   Vital Signs: Temp: 97.8  F (36.6  C) Temp src: Oral BP: 126/78 Pulse: 88   Resp: 17 SpO2: 98 % O2 Device: Nasal cannula Oxygen Delivery: 4 LPM  Weight: 179 lbs 12.8 oz    General.  Mild confusion and orientedx3 not in acute distress.  HEENT.  Pupils equal round react to light, anicteric, EOM intact.  Neck supple no JVD.  CVS regular rhythm no murmur gallops.  Lungs.  Clear to auscultation bilateral no wheezing or rales.  Abdomen.  Soft nontender bowel sounds present.  Extremities.  No edema no calf tenderness.  Neurological.  Awake and alert. No focal deficit.  Skin no rash. No pallor.  Psych. Impaired memory    Medical Decision Making       67 MINUTES SPENT BY ME on the date of service doing chart review, history, exam, documentation & further activities per the note.      Data     I have personally reviewed the following data over the past 24 hrs:    11.0  \   12.9 (L)   / 299     140 105 20.4 /  119 (H)   4.5 26 0.89 \     ALT: 17 AST: 27 AP: 165 (H) TBILI: 0.6   ALB: 3.1 (L) TOT PROTEIN: 6.6 LIPASE: N/A     Trop: 17 BNP: 159     Procal: N/A CRP: N/A Lactic Acid: 1.2         Imaging results reviewed over the past 24 hrs:   Recent Results (from the past 24 hour(s))   XR Chest Port 1 View    Narrative    EXAM: XR CHEST PORT 1 VIEW  LOCATION: [facility]  DATE: 9/12/2024    INDICATION: sob  COMPARISON: 9/8/2024      Impression    IMPRESSION: Unchanged small right pleural effusion with right basilar airspace opacities. No pneumothorax. Stable enlarged cardiomediastinal silhouette.   Head CT w/o contrast    Narrative    EXAM: CT HEAD  W/O CONTRAST  LOCATION: [facility]  DATE: 9/12/2024    INDICATION: ams    COMPARISON: 06/29/2024 head CT and 05/05/2024 brain MRI    TECHNIQUE: Routine CT Head without IV contrast. Multiplanar reformats. Dose reduction techniques were used.    FINDINGS:    INTRACRANIAL CONTENTS:     No intracranial hemorrhage, extraaxial collection, or mass effect.  No acute loss of gray-white differentiation. Chronic appearing, small infarction within the right frontal lobe, unchanged. Extensive degree of asymmetrical, patchy and confluent   hypoattenuation throughout the periventricular white matter, nonspecific but likely representative of the sequelae of chronic small vessel ischemic disease. There is a moderate degree of diffuse cerebral parenchymal volume loss.     VISUALIZED ORBITS/SINUSES/MASTOIDS: No intraorbital abnormality. No significant paranasal sinus mucosal disease. No middle ear or mastoid effusion.    BONES/SOFT TISSUES: No acute abnormality.      Impression    IMPRESSION:    1. No acute intracranial pathology.  2. Age-related and incidental findings as described.

## 2024-09-12 NOTE — TELEPHONE ENCOUNTER
[facility]    Reason for call: Lab Result Notification     Lab Result (including Rx patient on, if applicable).  If culture, copy of lab report at bottom.  Lab Result: Urine Culture  9/8/24 Prescribed Cefpodoxime (VANTIN) 100 MG tablet Take 1 tablet (100 mg) by mouth 2 times daily for 7 days. - Oral (Bacteria Ecoli #1 and #2 strain SUSCEPTIBLE not #3 Bacteria Enterococcus faecalis - advise switch to Nitrofurantoin is not pt allergy)     Creatinine Level (mg/dl)   Creatinine   Date Value Ref Range Status   08/12/2024 1.21 (H) 0.67 - 1.17 mg/dL Final    Creatinine clearance (ml/min), if applicable    Serum creatinine: 1.21 mg/dL (H) 08/12/24 1523  Estimated creatinine clearance: 48.1 mL/min (A)     RN Recommendations/Instructions per [facility] ED lab result protocol:   [facility] ED lab result protocol utilized: Urine Culture    Unable to reach patient/caregiver.   Left voicemail message requesting a call back to [phone] between 9 a.m. and 5:30 p.m. for patient's ED/UC lab results.  Letter pended to be sent via [employer]PS mail.       Isabella Franco RN

## 2024-09-12 NOTE — ED TRIAGE NOTES
Pt arrives to ED in wheel chair via private vehicle from home with wife.    Pt's wife reports intermittent confusion. Last time this happened pt had a UTI. Pt was diagnosed with UTI and rib fracture after a fall on 9/8. Pt's wife and pt reports increased agitation. Pt denies current urinary symptoms or shortness of breath. Pt is A/Ox4 in triage. Pt 84% on room air upon arrival. Supposed to wear 3L O2 via NC at baseline. O2 sats up to 92% on 4L via NC in triage.    Pt oriented to department, standard department flow, and updated on immediate plan of care.      Triage Assessment (Adult)       Row Name 09/12/24 1200          Triage Assessment    Airway WDL WDL        Respiratory WDL    Respiratory WDL X        Skin Circulation/Temperature WDL    Skin Circulation/Temperature WDL WDL        Cardiac WDL    Cardiac WDL X;rhythm     Pulse Rate & Regularity tachycardic        Peripheral/Neurovascular WDL    Peripheral Neurovascular WDL WDL        Cognitive/Neuro/Behavioral WDL    Cognitive/Neuro/Behavioral WDL X  intermittent confusion per wife

## 2024-09-12 NOTE — ED NOTES
"[facility] ED Handoff Report    ED Chief Complaint: Altered mental status, UTI    ED Diagnosis:  (N30.00) Acute cystitis without hematuria    (R41.82) Altered mental status, unspecified altered mental status type         PMH:    Past Medical History:   Diagnosis Date    AAA (abdominal aortic aneurysm) (H24)     s/p stenting    Alcohol dependence in remission (H)     Alcohol use disorder, severe, in sustained remission, dependence (H) 8/16/2021    Anxiety     Atrial fibrillation with normal ventricular rate (H)     Benign prostatic hyperplasia with lower urinary tract symptoms     Bronchiectasis without complication (H)     2/27/2020    COPD (chronic obstructive pulmonary disease) (H)     CVA (cerebral vascular accident) (H) 2019    DDD (degenerative disc disease), lumbar     Depression     Depressive disorder     Diabetes (H)     Diastolic dysfunction 01/22/2021    DJD (degenerative joint disease) of knee     left    Essential hypertension     Glaucoma     Glaucoma     History of stroke without residual deficits     Hyperlipidemia     Hypertension     Hypothyroidism     Hypothyroidism     Kidney stones     Major depression     Memory problem     Nocturia     Obesity     Opioid use disorder, severe, dependence (H) 8/16/2021    Overactive bladder 02/25/2021    Primary osteoarthritis of left knee     Psoriasis     Psoriasis     Seborrheic keratoses     Somnolence, daytime     Stenosis of right carotid artery     history of TIA's        Code Status:  Full Code     Falls Risk: Yes Band: Applied    Current Living Situation/Residence: lives alone     Elimination Status: Continent: Yes     Activity Level: SBA w/ walker    Patients Preferred Language:  English     Needed: No    Vital Signs:  /82   Pulse 87   Temp 97.8  F (36.6  C) (Oral)   Resp 27   Ht 1.702 m (5' 7\")   Wt 81.6 kg (179 lb 12.8 oz)   SpO2 97%   BMI 28.16 kg/m       Cardiac Rhythm: NA    Pain Score: 0/10    Is the Patient " RN cannot approve Refill Request    RN can NOT refill this medication Protocol failed and NO refill given. Last office visit: 9/24/2019 Brayden Mahmood MD Last Physical: Visit date not found Last MTM visit: Visit date not found Last visit same specialty: 9/24/2019 Brayden Mahmood MD.  Next visit within 3 mo: Visit date not found  Next physical within 3 mo: Visit date not found      Ivon Rai, [facility] Triage/Med Refill 1/5/2020    Requested Prescriptions   Pending Prescriptions Disp Refills     oxybutynin (DITROPAN) 5 MG tablet [Pharmacy Med Name: OXYBUTYNIN 5MG TABLETS] 540 tablet 3     Sig: TAKE 2 TABLETS BY MOUTH THREE TIMES DAILY FOR BLADDER       There is no refill protocol information for this order            Confused:  No    Last Food or Drink: 09/12/24 at 1630    Focused Assessment:  The pt is alert and able to answer questions. He reports that he has pain in his lower left side due to current rib fxs from a fall. He is a male external cathter placed but is able to get up to the beside commode with assist to have a BM. Pt is able to make needs known. He is on 4 lpm of oxygen via NC.     Tests Performed: Done: Labs and Imaging    Treatments Provided:  IV antibiotics     Family Dynamics/Concerns: No    Family Updated On Visitor Policy: No    Plan of Care Communicated to Family: No    Who Was Updated about Plan of Care: Patient    Belongings Checklist Done and Signed by Patient: No    Medications sent with patient: NA    Covid: asymptomatic , not done     Additional Information: GUILHERME    RN: Lore Garrett   9/12/2024 6:20 PM

## 2024-09-12 NOTE — MEDICATION SCRIBE - ADMISSION MEDICATION HISTORY
Medication Scribe Admission Medication History    Admission medication history is complete. The information provided in this note is only as accurate as the sources available at the time of the update.    Information Source(s): Family member via in-person    Pertinent Information: Patient's medications are being managed by spouse, Jovana. Jovana was present at bedside.     Patient reported to be no longer taking: Amlodipine     Patient was in TCU for about a month - thus the discrepancy between fill history and time frame.     Changes made to PTA medication list:  Added: Naproxen, Melatonin, Aspirin  Deleted: None  Changed: Tylenol to 1000 mg BID (per patient)    Allergies reviewed with patient and updates made in EHR: yes    Medication History Completed By: Sunny Carmichael 9/12/2024 2:51 PM    PTA Med List   Medication Sig Note Last Dose    acetaminophen (TYLENOL) 500 MG tablet Take 1,000 mg by mouth 2 times daily.  9/12/2024 at am    aspirin (ASA) 81 MG chewable tablet Take 81 mg by mouth daily.  9/12/2024 at am    busPIRone (BUSPAR) 5 MG tablet Take 5 mg by mouth 2 times daily  9/12/2024 at am    cefpodoxime (VANTIN) 100 MG tablet Take 1 tablet (100 mg) by mouth 2 times daily for 7 days. 9/12/2024: Patient reports taking 6 out of 14 tablets so far.  9/12/2024 at am    Cholecalciferol (VITAMIN D3) 50 MCG (2000 UT) CAPS Take 1 tablet by mouth daily   9/12/2024 at am    DULoxetine (CYMBALTA) 60 MG capsule Take 60 mg by mouth daily  9/12/2024 at am    furosemide (LASIX) 20 MG tablet Take 1 tablet (20 mg) by mouth daily  9/12/2024 at am    ipratropium - albuterol 0.5 mg/2.5 mg/3 mL (DUONEB) 0.5-2.5 (3) MG/3ML neb solution Take 1 vial by nebulization every 12 hours  More than a month at prn    latanoprost (XALATAN) 0.005 % ophthalmic solution Place 1 drop into both eyes At Bedtime  9/11/2024 at pm    levothyroxine (SYNTHROID/LEVOTHROID) 88 MCG tablet Take 88 mcg by mouth daily before breakfast  9/12/2024 at am     lidocaine (LIDODERM) 5 % patch Place 1 patch over 12 hours onto the skin every 24 hours. To prevent lidocaine toxicity, patient should be patch free for 12 hrs daily.  9/12/2024 at am    losartan (COZAAR) 25 MG tablet Take 1 tablet (25 mg) by mouth daily (Patient taking differently: Take 25 mg by mouth daily. Hold for SBP <110)  9/12/2024 at am    Melatonin 10 MG TABS tablet Take 10 mg by mouth at bedtime.  9/11/2024 at pm    metoprolol succinate ER (TOPROL XL) 25 MG 24 hr tablet Take 0.5 tablets (12.5 mg) by mouth daily  9/12/2024 at am    multivitamin w/minerals (THERA-VIT-M) tablet Take 1 tablet by mouth daily  9/12/2024 at am    naproxen sodium (ANAPROX) 220 MG tablet Take 220 mg by mouth 2 times daily (with meals).  9/12/2024 at am    OLANZapine (ZYPREXA) 7.5 MG tablet Take 7.5 mg by mouth at bedtime.  9/11/2024 at pm    pantoprazole (PROTONIX) 40 MG EC tablet Take 1 tablet (40 mg) by mouth every morning (before breakfast)  9/12/2024 at am    rosuvastatin (CRESTOR) 10 MG tablet Take 10 mg by mouth At Bedtime  9/11/2024 at pm

## 2024-09-13 ENCOUNTER — APPOINTMENT (OUTPATIENT)
Dept: OCCUPATIONAL THERAPY | Facility: HOSPITAL | Age: 84
DRG: 689 | End: 2024-09-13
Attending: INTERNAL MEDICINE
Payer: COMMERCIAL

## 2024-09-13 ENCOUNTER — APPOINTMENT (OUTPATIENT)
Dept: PHYSICAL THERAPY | Facility: HOSPITAL | Age: 84
DRG: 689 | End: 2024-09-13
Attending: INTERNAL MEDICINE
Payer: COMMERCIAL

## 2024-09-13 LAB
ALP SERPL-CCNC: 174 U/L (ref 40–150)
ANION GAP SERPL CALCULATED.3IONS-SCNC: 11 MMOL/L (ref 7–15)
BACTERIA UR CULT: NORMAL
BUN SERPL-MCNC: 19.5 MG/DL (ref 8–23)
CALCIUM SERPL-MCNC: 8.7 MG/DL (ref 8.8–10.4)
CHLORIDE SERPL-SCNC: 104 MMOL/L (ref 98–107)
CREAT SERPL-MCNC: 0.78 MG/DL (ref 0.67–1.17)
EGFRCR SERPLBLD CKD-EPI 2021: 89 ML/MIN/1.73M2
ERYTHROCYTE [DISTWIDTH] IN BLOOD BY AUTOMATED COUNT: 15.1 % (ref 10–15)
GLUCOSE SERPL-MCNC: 99 MG/DL (ref 70–99)
HCO3 SERPL-SCNC: 25 MMOL/L (ref 22–29)
HCT VFR BLD AUTO: 40.4 % (ref 40–53)
HGB BLD-MCNC: 12.8 G/DL (ref 13.3–17.7)
MCH RBC QN AUTO: 28.5 PG (ref 26.5–33)
MCHC RBC AUTO-ENTMCNC: 31.7 G/DL (ref 31.5–36.5)
MCV RBC AUTO: 90 FL (ref 78–100)
PLATELET # BLD AUTO: 293 10E3/UL (ref 150–450)
POTASSIUM SERPL-SCNC: 4.4 MMOL/L (ref 3.4–5.3)
RBC # BLD AUTO: 4.49 10E6/UL (ref 4.4–5.9)
SODIUM SERPL-SCNC: 140 MMOL/L (ref 135–145)
WBC # BLD AUTO: 12.1 10E3/UL (ref 4–11)

## 2024-09-13 PROCEDURE — 36415 COLL VENOUS BLD VENIPUNCTURE: CPT | Performed by: INTERNAL MEDICINE

## 2024-09-13 PROCEDURE — 84075 ASSAY ALKALINE PHOSPHATASE: CPT | Performed by: INTERNAL MEDICINE

## 2024-09-13 PROCEDURE — 97116 GAIT TRAINING THERAPY: CPT | Mod: GP

## 2024-09-13 PROCEDURE — 97535 SELF CARE MNGMENT TRAINING: CPT | Mod: GO

## 2024-09-13 PROCEDURE — 99232 SBSQ HOSP IP/OBS MODERATE 35: CPT | Performed by: INTERNAL MEDICINE

## 2024-09-13 PROCEDURE — 80048 BASIC METABOLIC PNL TOTAL CA: CPT | Performed by: INTERNAL MEDICINE

## 2024-09-13 PROCEDURE — 97165 OT EVAL LOW COMPLEX 30 MIN: CPT | Mod: GO

## 2024-09-13 PROCEDURE — 250N000011 HC RX IP 250 OP 636: Performed by: INTERNAL MEDICINE

## 2024-09-13 PROCEDURE — 97162 PT EVAL MOD COMPLEX 30 MIN: CPT | Mod: GP

## 2024-09-13 PROCEDURE — 97110 THERAPEUTIC EXERCISES: CPT | Mod: GP

## 2024-09-13 PROCEDURE — 250N000013 HC RX MED GY IP 250 OP 250 PS 637: Performed by: INTERNAL MEDICINE

## 2024-09-13 PROCEDURE — 120N000001 HC R&B MED SURG/OB

## 2024-09-13 PROCEDURE — 85041 AUTOMATED RBC COUNT: CPT | Performed by: INTERNAL MEDICINE

## 2024-09-13 RX ORDER — LATANOPROST 50 UG/ML
1 SOLUTION/ DROPS OPHTHALMIC AT BEDTIME
Status: DISCONTINUED | OUTPATIENT
Start: 2024-09-13 | End: 2024-09-16 | Stop reason: HOSPADM

## 2024-09-13 RX ORDER — ASPIRIN 81 MG/1
81 TABLET, CHEWABLE ORAL DAILY
Status: DISCONTINUED | OUTPATIENT
Start: 2024-09-13 | End: 2024-09-16 | Stop reason: HOSPADM

## 2024-09-13 RX ORDER — LEVOTHYROXINE SODIUM 88 UG/1
88 TABLET ORAL
Status: DISCONTINUED | OUTPATIENT
Start: 2024-09-14 | End: 2024-09-16 | Stop reason: HOSPADM

## 2024-09-13 RX ORDER — IPRATROPIUM BROMIDE AND ALBUTEROL SULFATE 2.5; .5 MG/3ML; MG/3ML
1 SOLUTION RESPIRATORY (INHALATION)
Status: DISCONTINUED | OUTPATIENT
Start: 2024-09-13 | End: 2024-09-13

## 2024-09-13 RX ORDER — IPRATROPIUM BROMIDE AND ALBUTEROL SULFATE 2.5; .5 MG/3ML; MG/3ML
1 SOLUTION RESPIRATORY (INHALATION) EVERY 4 HOURS PRN
Status: DISCONTINUED | OUTPATIENT
Start: 2024-09-13 | End: 2024-09-16 | Stop reason: HOSPADM

## 2024-09-13 RX ORDER — BUSPIRONE HYDROCHLORIDE 5 MG/1
5 TABLET ORAL 2 TIMES DAILY
Status: DISCONTINUED | OUTPATIENT
Start: 2024-09-13 | End: 2024-09-16 | Stop reason: HOSPADM

## 2024-09-13 RX ORDER — DULOXETIN HYDROCHLORIDE 60 MG/1
60 CAPSULE, DELAYED RELEASE ORAL DAILY
Status: DISCONTINUED | OUTPATIENT
Start: 2024-09-13 | End: 2024-09-16 | Stop reason: HOSPADM

## 2024-09-13 RX ORDER — FUROSEMIDE 20 MG
20 TABLET ORAL DAILY
Status: DISCONTINUED | OUTPATIENT
Start: 2024-09-13 | End: 2024-09-16 | Stop reason: HOSPADM

## 2024-09-13 RX ORDER — ROSUVASTATIN CALCIUM 10 MG/1
10 TABLET, COATED ORAL AT BEDTIME
Status: DISCONTINUED | OUTPATIENT
Start: 2024-09-13 | End: 2024-09-16 | Stop reason: HOSPADM

## 2024-09-13 RX ORDER — MULTIPLE VITAMINS W/ MINERALS TAB 9MG-400MCG
1 TAB ORAL DAILY
Status: DISCONTINUED | OUTPATIENT
Start: 2024-09-13 | End: 2024-09-16 | Stop reason: HOSPADM

## 2024-09-13 RX ORDER — LIDOCAINE 4 G/G
1 PATCH TOPICAL
Status: DISCONTINUED | OUTPATIENT
Start: 2024-09-13 | End: 2024-09-16 | Stop reason: HOSPADM

## 2024-09-13 RX ORDER — LOSARTAN POTASSIUM 25 MG/1
25 TABLET ORAL DAILY
Status: DISCONTINUED | OUTPATIENT
Start: 2024-09-13 | End: 2024-09-16 | Stop reason: HOSPADM

## 2024-09-13 RX ORDER — VITAMIN B COMPLEX
25 TABLET ORAL DAILY
Status: DISCONTINUED | OUTPATIENT
Start: 2024-09-13 | End: 2024-09-16 | Stop reason: HOSPADM

## 2024-09-13 RX ADMIN — ASPIRIN 81 MG CHEWABLE TABLET 81 MG: 81 TABLET CHEWABLE at 12:01

## 2024-09-13 RX ADMIN — BUSPIRONE HYDROCHLORIDE 5 MG: 5 TABLET ORAL at 12:02

## 2024-09-13 RX ADMIN — CEFTRIAXONE SODIUM 1 G: 1 INJECTION, POWDER, FOR SOLUTION INTRAMUSCULAR; INTRAVENOUS at 14:13

## 2024-09-13 RX ADMIN — ENOXAPARIN SODIUM 40 MG: 40 INJECTION SUBCUTANEOUS at 18:52

## 2024-09-13 RX ADMIN — LATANOPROST 1 DROP: 50 SOLUTION OPHTHALMIC at 21:56

## 2024-09-13 RX ADMIN — ACETAMINOPHEN 650 MG: 325 TABLET ORAL at 08:08

## 2024-09-13 RX ADMIN — Medication 1 TABLET: at 12:01

## 2024-09-13 RX ADMIN — METOPROLOL SUCCINATE 12.5 MG: 25 TABLET, EXTENDED RELEASE ORAL at 12:01

## 2024-09-13 RX ADMIN — Medication 25 MCG: at 12:02

## 2024-09-13 RX ADMIN — OLANZAPINE 7.5 MG: 5 TABLET, FILM COATED ORAL at 21:56

## 2024-09-13 RX ADMIN — FUROSEMIDE 20 MG: 20 TABLET ORAL at 12:01

## 2024-09-13 RX ADMIN — ACETAMINOPHEN 650 MG: 325 TABLET ORAL at 00:58

## 2024-09-13 RX ADMIN — DULOXETINE HYDROCHLORIDE 60 MG: 60 CAPSULE, DELAYED RELEASE PELLETS ORAL at 12:01

## 2024-09-13 RX ADMIN — LOSARTAN POTASSIUM 25 MG: 25 TABLET, FILM COATED ORAL at 12:01

## 2024-09-13 RX ADMIN — LIDOCAINE 1 PATCH: 4 PATCH TOPICAL at 18:52

## 2024-09-13 RX ADMIN — ROSUVASTATIN 10 MG: 10 TABLET, FILM COATED ORAL at 21:56

## 2024-09-13 RX ADMIN — BUSPIRONE HYDROCHLORIDE 5 MG: 5 TABLET ORAL at 21:56

## 2024-09-13 ASSESSMENT — ACTIVITIES OF DAILY LIVING (ADL)
ADLS_ACUITY_SCORE: 45
ADLS_ACUITY_SCORE: 44
ADLS_ACUITY_SCORE: 44
ADLS_ACUITY_SCORE: 45
ADLS_ACUITY_SCORE: 44
ADLS_ACUITY_SCORE: 44
ADLS_ACUITY_SCORE: 45
ADLS_ACUITY_SCORE: 44
ADLS_ACUITY_SCORE: 45
ADLS_ACUITY_SCORE: 47
ADLS_ACUITY_SCORE: 41
ADLS_ACUITY_SCORE: 44
ADLS_ACUITY_SCORE: 41
ADLS_ACUITY_SCORE: 45
ADLS_ACUITY_SCORE: 46
ADLS_ACUITY_SCORE: 45

## 2024-09-13 NOTE — PROGRESS NOTES
"OT JoyceJO Score     09/13/24 1100   Appointment Info   Signing Clinician's Name / Credentials (OT) Aviva Cifuentes, OTR/L   Living Environment   People in Home spouse   Current Living Arrangements house   Home Accessibility stairs to enter home   Number of Stairs, Main Entrance 3   Stair Railings, Main Entrance railings safe and in good condition   Living Environment Comments Tub w/ bench, pt reports narrown hallways and \"can't fit w/c or walker in home\". 2.5L home O2   Self-Care   Current Activity Tolerance moderate   Equipment Currently Used at Home cane, straight;tub bench  (Owns FWW, manual w/c)   Fall history within last six months yes   Activity/Exercise/Self-Care Comment A w/ dressing sometimes, A w/ drying self after bathing, pt reports he bathes INDly, IND toileting. Uses cane, does not use w/c or FWW per pt.   Instrumental Activities of Daily Living (IADL)   IADL Comments Dep IADLs, aside from finances, per pt. He shares financial management w/ spouse   General Information   Onset of Illness/Injury or Date of Surgery 09/12/24   Referring Physician Lu Skaggs MD   Patient/Family Therapy Goal Statement (OT) go home   Additional Occupational Profile Info/Pertinent History of Current Problem Dominik Rojas is a 83 year old male admitted on 9/12/2024. He has h/o COPD on home o2, h/o AMS due to UTI, diastolic CHF, hypothyroidism. He had a fall on 9/8 (4 days ago). He was diagnosed of rib fracture and UTI. He is on Cefpodoxime. He was brought to ER by wife due to worsening agitation and altered mental status.   Existing Precautions/Restrictions fall;oxygen therapy device and L/min   Limitations/Impairments safety/cognitive   Cognitive Status Examination   Orientation Status place;person   Affect/Mental Status (Cognitive) WFL;flat/blunted affect   Follows Commands WFL   Safety Deficit at risk behavior observed   Cognitive Status Comments Pt appearing to have limited insight into deficits, not oriented to " time (stated it was July), able to follow commands/answer Qs WFL.   Cognitive Screens/Assessments   Cognitive Assessments Completed [facility] Mental Status Exam (UMS):  Total Score out of /30 17   [facility] Norms 1-20 equals dementia   [facility] Domains assessed: orientation, memory, attention, executive functions   Visual Perception   Impact of Vision Impairment on Function (Vision) Typically wears glasses, did not have present at hospital.   Sensory   Sensory Quick Adds sensation intact   Range of Motion Comprehensive   General Range of Motion no range of motion deficits identified   Strength Comprehensive (MMT)   General Manual Muscle Testing (MMT) Assessment no strength deficits identified   Muscle Tone Assessment   Muscle Tone Quick Adds No deficits were identified   Coordination   Upper Extremity Coordination No deficits were identified   Transfers   Transfers sit-stand transfer   Sit-Stand Transfer   Sit-Stand Wake (Transfers) supervision   Assistive Device (Sit-Stand Transfers) walker, front-wheeled   Balance   Balance Comments SBA-CGA   Activities of Daily Living   BADL Assessment/Intervention toileting   Toileting   [place] (Toileting) contact guard assist   Clinical Impression   Criteria for Skilled Therapeutic Interventions Met (OT) Yes, treatment indicated   OT Diagnosis Impaired cognition /higher-level cognitive tasks, impaired activity tolerance, increased fall risk   Influenced by the following impairments Deconditioning, cognition   OT Problem List-Impairments impacting ADL problems related to;activity tolerance impaired;balance;cognition;mobility;strength   Assessment of Occupational Performance 3-5 Performance Deficits   Identified Performance Deficits Toileting, IADLs, higher-level cognitive tasks, functional mobility   Planned Therapy Interventions (OT) ADL retraining;IADL retraining;home program guidelines;progressive activity/exercise;transfer  training;strengthening   Clinical Decision Making Complexity (OT) problem focused assessment/low complexity   Risk & Benefits of therapy have been explained evaluation/treatment results reviewed;care plan/treatment goals reviewed;patient   OT Total Evaluation Time   OT Eval, Low Complexity Minutes (77612) 10   OT Goals   Therapy Frequency (OT) 5 times/week   OT Predicted Duration/Target Date for Goal Attainment 09/20/24   OT Goals Toilet Transfer/Toileting;Cognition;Aerobic Activity   OT: Toilet Transfer/Toileting Modified independent   OT: Cognitive Patient/caregiver will verbalize understanding of cognitive assessment results/recommendations as needed for safe discharge planning   OT: Perform aerobic activity with stable cardiovascular response continuous activity;5 minutes;ambulation   Self-Care/Home Management   Self-Care/Home Mgmt/ADL, Compensatory, Meal Prep Minutes (96979) 25   Symptoms Noted During/After Treatment (Meal Preparation/Planning Training) fatigue   Treatment Detail/Skilled Intervention Eval complete, treatment indicated and initiated. OT facilitating conversation regarding cognition, skilled education provided on indications of screening results, such as recommending increased assist w/ higher-level cognitive tasks. Pt asking OT Qs regarding pre-existing cognitive decline that he has been aware off (reported increased difficulty w/ memory in recent years), compared to recent confusion and AMS. OT discussing pt's concerns w/ ADL/IADLs at home, educating on tasks that require increased cognition/EF/memory, methods to modify tasks as able, as well as education on rationale for recommending increased supervision and assist w/ IADLs. Pt receptive, stating he has family nearby that can potentially check-in more often, stating he also already has assist w/ medications and transport. Pt stating he does manage finances himself, recommend his spouse start completing this task. Pt then completed additional  in-room mobility w/ FWW CGW ~30-40', OT providing education on fall prevention, using a 4WW/FWW instead of a cane for increased support, education on different types of AD as pt reports he does not like using FWW. Education provided on HC OT which is recommended, pt declining, stating he isn't interested in HC services. Would benefit from further reinforcement. Session ended w/ chair alarm on, call button near, pt seated in chair.   OT Discharge Planning   OT Plan Cont monitoring cognition, activity tolerance /standing tolerance, strengthening, safety w/ AD   OT Discharge Recommendation (DC Rec) home with assist;home with home care occupational therapy   OT Rationale for DC Rec Pt scored a 17/30 on the SLUMS today, indicating need for 24/7 supervision, A w/ higher level cognitive tasks ie. finances. Pt also demo'ing increased fall risk, would benefit from increased assist at home. Pt declining HC OT as of today, OT still recommending HC OT to facilitate safe transition, further assess cognition.   OT Brief overview of current status SBA-CGA FWW   Total Session Time   Timed Code Treatment Minutes 25   Total Session Time (sum of timed and untimed services) 35

## 2024-09-13 NOTE — PLAN OF CARE
"  Problem: Adult Inpatient Plan of Care  Goal: Absence of Hospital-Acquired Illness or Injury  Intervention: Identify and Manage Fall Risk  Recent Flowsheet Documentation  Taken 9/12/2024 2325 by Kishore Abernathy, RN  Safety Promotion/Fall Prevention:   activity supervised   assistive device/personal items within reach   clutter free environment maintained   lighting adjusted   nonskid shoes/slippers when out of bed   room organization consistent   supervised activity  Taken 9/12/2024 2023 by Kishore Abernathy RN  Safety Promotion/Fall Prevention:   activity supervised   assistive device/personal items within reach   clutter free environment maintained   lighting adjusted   nonskid shoes/slippers when out of bed   room organization consistent   supervised activity     Problem: Adult Inpatient Plan of Care  Goal: Patient-Specific Goal (Individualized)  Description: You can add care plan individualizations to a care plan. Examples of Individualization might be:  \"Parent requests to be called daily at 9am for status\", \"I have a hard time hearing out of my right ear\", or \"Do not touch me to wake me up as it startles  me\".  Outcome: Progressing   Goal Outcome Evaluation:    Pain was well controlled with PRN x 2 and cold application during night shift.  Safety was maintained with hourly rounding, standard fall precautions, and bed alarm use. Pt remained compliant about calling for assistance during night shift.  Pt slept approx. 20% of night shift with minimal interruptions.  Discharge planning in progress.    "

## 2024-09-13 NOTE — PLAN OF CARE
Problem: Risk for Delirium  Goal: Improved Behavioral Control  Intervention: Minimize Safety Risk  Recent Flowsheet Documentation  Taken 9/13/2024 0800 by Viky Portillo, RN  Communication Enhancement Strategies: call light answered in person  Enhanced Safety Measures: pain management   Goal Outcome Evaluation:      Understands risks for falling, (fell at home, pain, unsteady gait, unfamiliar surroundings).  Willing to use call light.  Bed and chair alarms on.      Pain of 6 to left flank area.  Says Ice helps the most, also getting Tylenol PRN with good results.      O2 95% on 2 liters.

## 2024-09-13 NOTE — PROGRESS NOTES
"[facility]    Medicine Progress Note - Hospitalist Service    Date of Admission:  9/12/2024    Assessment & Plan      Dominik Rojas is a 83 year old male admitted on 9/12/2024. He has h/o COPD on home o2, h/o AMS due to UTI, diastolic CHF, hypothyroidism. He had a fall on 9/8 (4 days ago). He was diagnosed of rib fracture and UTI. He is on Cefpodoxime. He was brought to ER by wife due to worsening agitation and altered mental status.    UTI due to E coli  Acute metabolic and toxic encephalopathy; improved  -Urine culture on 9/8 + for E Coli  -Continue ceftriaxone which was started in ER  -Low-dose PRN Seroquel for aggressive behaviors  -pt is more cooperative and calm. Pt wishes to go home. Await pt/ot/sw eval.      Elevated alk phos  -No abdominal symptoms, alk phos marginally elevated  -Trend with AM labs     Chronic respiratory failure with hypoxia  COPD   -DuoNebs as needed   -CXR : no pneumothorax. Small right pleural effusion and right base opacity  -continue O2 as needed with saturation goal >88%     Pre-diabetes  -monitor as needed  Check hgb A1c-5.8     Hypothyroidism  HTN  Anxiety   GERD  -resume PTA meds          Diet: Combination Diet Regular Diet Adult    DVT Prophylaxis: Enoxaparin (Lovenox) SQ  Cabral Catheter: Not present  Lines: None     Cardiac Monitoring: None  Code Status: Full Code      Clinically Significant Risk Factors Present on Admission              # Hypoalbuminemia: Lowest albumin = 3.1 g/dL at 9/12/2024 12:37 PM, will monitor as appropriate   # Drug Induced Platelet Defect: home medication list includes an antiplatelet medication   # Hypertension: Noted on problem list   # Dementia: noted on problem list       # Overweight: Estimated body mass index is 28.52 kg/m  as calculated from the following:    Height as of this encounter: 1.702 m (5' 7\").    Weight as of this encounter: 82.6 kg (182 lb 1.6 oz).       # Financial/Environmental Concerns: none           "     Disposition Plan     Medically Ready for Discharge: Anticipated Tomorrow    Await pt/ot/sw jr Skaggs MD  Hospitalist Service  [facility]  Securely message with [employer] (more info)  Text page via [employer] Paging/Directory   ______________________________________________________________________    Interval History   Mild confusion and agitation, overall cooperative, pain in control    Physical Exam   Vital Signs: Temp: 98  F (36.7  C) Temp src: Oral BP: (!) 142/81 Pulse: 80   Resp: 28 SpO2: 98 % O2 Device: Nasal cannula Oxygen Delivery: 3 LPM  Weight: 182 lbs 1.6 oz    General.  Awake alert oriented not in acute distress.  HEENT.  Pupils equal round react to light, anicteric, EOM intact.  Neck supple no JVD.  CVS regular rhythm no murmur gallops.  Lungs.  Clear to auscultation bilateral no wheezing or rales.  Abdomen.  Soft nontender bowel sounds present.  Extremities.  No edema no calf tenderness.  Neurological.  Awake and alert. No focal deficit.general weakness  Skin no rash. No pallor.  Psych. Impaired memory    Medical Decision Making       45 MINUTES SPENT BY ME on the date of service doing chart review, history, exam, documentation & further activities per the note.      Data     I have personally reviewed the following data over the past 24 hrs:    12.1 (H)  \   12.8 (L)   / 293     140 104 19.5 /  99   4.4 25 0.78 \     ALT: 17 AST: 27 AP: 165 (H) TBILI: 0.6   ALB: 3.1 (L) TOT PROTEIN: 6.6 LIPASE: N/A     Trop: 17 BNP: 159     TSH: N/A T4: N/A A1C: 5.8 (H)     Procal: N/A CRP: N/A Lactic Acid: 1.2         Imaging results reviewed over the past 24 hrs:   Recent Results (from the past 24 hour(s))   XR Chest Port 1 View    Narrative    EXAM: XR CHEST PORT 1 VIEW  LOCATION: [facility]  DATE: 9/12/2024    INDICATION: sob  COMPARISON: 9/8/2024      Impression    IMPRESSION: Unchanged small right pleural effusion with right basilar airspace  opacities. No pneumothorax. Stable enlarged cardiomediastinal silhouette.   Head CT w/o contrast    Narrative    EXAM: CT HEAD W/O CONTRAST  LOCATION: [facility]  DATE: 9/12/2024    INDICATION: ams    COMPARISON: 06/29/2024 head CT and 05/05/2024 brain MRI    TECHNIQUE: Routine CT Head without IV contrast. Multiplanar reformats. Dose reduction techniques were used.    FINDINGS:    INTRACRANIAL CONTENTS:     No intracranial hemorrhage, extraaxial collection, or mass effect.  No acute loss of gray-white differentiation. Chronic appearing, small infarction within the right frontal lobe, unchanged. Extensive degree of asymmetrical, patchy and confluent   hypoattenuation throughout the periventricular white matter, nonspecific but likely representative of the sequelae of chronic small vessel ischemic disease. There is a moderate degree of diffuse cerebral parenchymal volume loss.     VISUALIZED ORBITS/SINUSES/MASTOIDS: No intraorbital abnormality. No significant paranasal sinus mucosal disease. No middle ear or mastoid effusion.    BONES/SOFT TISSUES: No acute abnormality.      Impression    IMPRESSION:    1. No acute intracranial pathology.  2. Age-related and incidental findings as described.

## 2024-09-13 NOTE — PROGRESS NOTES
"PT Evaluation   09/13/24 1318   Appointment Info   Signing Clinician's Name / Credentials (PT) Rosalina Louie PT, DPT   Living Environment   People in Home spouse   Current Living Arrangements house   Home Accessibility stairs to enter home   Number of Stairs, Main Entrance 3   Stair Railings, Main Entrance railings on both sides of stairs   Living Environment Comments Tub w/ bench, pt reports narrown hallways and \"can't fit w/c or walker in home\". 2.5L home O2   Self-Care   Equipment Currently Used at Home cane, straight;tub bench  (owns fww, manual wc)   Fall history within last six months yes   Number of times patient has fallen within last six months 1  (states he hasn't had other falls other than the one PTA)   Activity/Exercise/Self-Care Comment Wife helps with drying back. A w/ dress/bathing sometimes, usually IND. Uses cane, not wc or FWW.   General Information   Onset of Illness/Injury or Date of Surgery 09/12/24   Referring Physician Lu Skaggs MD   Patient/Family Therapy Goals Statement (PT) None stated.   Pertinent History of Current Problem (include personal factors and/or comorbidities that impact the POC) Per chart: \" 83 year old male admitted on 9/12/2024. He has h/o COPD on home o2, h/o AMS due to UTI, diastolic CHF, hypothyroidism. He had a fall on 9/8 (4 days ago). He was diagnosed of rib fracture and UTI. He is on Cefpodoxime. He was brought to ER by wife due to worsening agitation and altered mental status.\"   Existing Precautions/Restrictions fall   Cognition   Affect/Mental Status (Cognition) WFL   Orientation Status (Cognition) disoriented to;time;verbal cues/prompts needed for orientation   Follows Commands (Cognition) WNL   Pain Assessment   Patient Currently in Pain Yes, see Vital Sign flowsheet  (back and rib pain with mobility. L knee pain with WB and amb)   Posture    Posture Forward head position   Range of Motion (ROM)   Range of Motion ROM deficits secondary to pain;ROM deficits " secondary to weakness   Strength (Manual Muscle Testing)   Strength (Manual Muscle Testing) Deficits observed during functional mobility   Bed Mobility   Comment, (Bed Mobility) Pt already up in chair when PT arrived   Transfers   Transfers sit-stand transfer   Transfer Safety Concerns Noted losing balance backward;decreased step length   Impairments Contributing to Impaired Transfers impaired balance;pain;decreased strength   Sit-Stand Transfer   Sit-Stand [place] (Transfers) verbal cues;nonverbal cues (demo/gesture);contact guard   Assistive Device (Sit-Stand Transfers) cane, straight   Gait/Stairs (Locomotion)   [place] Level (Gait) contact guard;verbal cues;nonverbal cues (demo/gesture);1 person assist   Assistive Device (Gait) walker, front-wheeled   Distance in Feet (Gait) 10'   Pattern (Gait) step-to   Deviations/Abnormal Patterns (Gait) antalgic;base of support, narrow;gloria decreased;gait speed decreased;weight shifting decreased  (short step length RLE; maintains R knee flexed throughout amb)   Balance   Balance Comments standing balance Dianelys with SPC; standing balance SBA with FWW.   Clinical Impression   Criteria for Skilled Therapeutic Intervention Yes, treatment indicated   PT Diagnosis (PT) Impaired functional mobility   Influenced by the following impairments Impaired strength, balance, activity tolerance; Rib & L knee pain   Functional limitations due to impairments transfers, gait   Clinical Presentation (PT Evaluation Complexity) evolving   Clinical Presentation Rationale clinical judgment   Clinical Decision Making (Complexity) moderate complexity   Planned Therapy Interventions (PT) balance training;bed mobility training;gait training;home exercise program;neuromuscular re-education;patient/family education;strengthening;transfer training;stair training;ROM (range of motion);home program guidelines;progressive activity/exercise;stretching   Risk & Benefits of therapy have been  explained evaluation/treatment results reviewed;care plan/treatment goals reviewed;risks/benefits reviewed;participants included;patient   PT Total Evaluation Time   PT Eval, Moderate Complexity Minutes (96981) 10   Physical Therapy Goals   PT Frequency Daily   PT Predicted Duration/Target Date for Goal Attainment 09/20/24   PT Goals Bed Mobility;Transfers;Gait;Stairs   PT: Bed Mobility Supervision/stand-by assist;Supine to/from sit   PT: Transfers Modified independent;Sit to/from stand;Bed to/from chair;Assistive device   PT: Gait Supervision/stand-by assist;Assistive device;150 feet   PT: Stairs Supervision/stand-by assist;3 stairs;Rail on both sides   Interventions   Interventions Quick Adds Therapeutic Procedure;Gait Training;Therapeutic Activity   Therapeutic Procedure/Exercise   Ther. Procedure: strength, endurance, ROM, flexibillity Minutes (24351) 10   Symptoms Noted During/After Treatment fatigue   Treatment Detail/Skilled Intervention Educated on importance of strengthening and functional mobility to improve overall BLE strength and help with pain management. instructed in seated therex x10 BLEs, cues for excursion, tempo, technique - hip flex, hip abd/add, knee ext, ankle pf/df. Pt reports cracking in L knee but denies pain.   Therapeutic Activity   Therapeutic Activities: dynamic activities to improve functional performance Minutes (02790) 5   Treatment Detail/Skilled Intervention SBA-CGA transfers with FWW with cues for technique. end of session pt left in chair with call light & all other needs in reach, chair alarm engaged.   Gait Training   Gait Training Minutes (99517) 15   Symptoms Noted During/After Treatment (Gait Training) increased pain;fatigue   Treatment Detail/Skilled Intervention with FWW CGA for safety, PT managing IV pole, cues for safe management of FWW & upright posture. With increased dist, pt reports pain in R knee and demos increasingly flexed R knee. Short step length RLE compared  to LLE, minimal toe clearance B. Educated on benefits of using FWW to decrease fall risk, as pt unsteady with SPC. Pt stating his house is too narrow to accommodate FWW and that he does not like how the FWW steers. PT recommended trying to at least use in bigger spaces ie living room/outdoors in order to decrease fall risk and have something sturdy to maintain balance.   Distance in Feet 140'   [place] Level (Gait Training) contact guard   Physical Assistance Level (Gait Training) verbal cues;nonverbal cues (demo/gestures);1 person assist   Assistive Device (Gait Training) rolling walker   Gait Analysis Deviations decreased gloria;increased time in double stance;decreased velocity of limb motion;decreased toe-to-floor clearance;decreased weight-shifting ability   Impairments (Gait Analysis/Training) balance impaired;pain;strength decreased   PT Discharge Planning   PT Plan Bring 4WW to trial/prog mob with SPC (pt has SPC in room); trial 3 GONZALEZ if appropriate; seated/standing therex   PT Discharge Recommendation (DC Rec) home with assist;home with home care physical therapy   PT Rationale for DC Rec Patient mobilizes near baseline with walker, needs to use AD more supportive than his SPC at this time. Patient also demonstrating cognitive deficits, per OT would need 24/7 supervision for safety. Recommend home with 24/7 supervision in addition to home PT to help increase pt's functional mobility, safety, and IND in the home and decrease fall risk.   PT Brief overview of current status CGA FWW; Dianelys SPC   Total Session Time   Timed Code Treatment Minutes 30   Total Session Time (sum of timed and untimed services) 40

## 2024-09-14 ENCOUNTER — APPOINTMENT (OUTPATIENT)
Dept: PHYSICAL THERAPY | Facility: HOSPITAL | Age: 84
DRG: 689 | End: 2024-09-14
Payer: COMMERCIAL

## 2024-09-14 LAB
ERYTHROCYTE [DISTWIDTH] IN BLOOD BY AUTOMATED COUNT: 15.1 % (ref 10–15)
HCT VFR BLD AUTO: 44.4 % (ref 40–53)
HGB BLD-MCNC: 14.2 G/DL (ref 13.3–17.7)
MCH RBC QN AUTO: 28.5 PG (ref 26.5–33)
MCHC RBC AUTO-ENTMCNC: 32 G/DL (ref 31.5–36.5)
MCV RBC AUTO: 89 FL (ref 78–100)
PLATELET # BLD AUTO: 295 10E3/UL (ref 150–450)
RBC # BLD AUTO: 4.98 10E6/UL (ref 4.4–5.9)
WBC # BLD AUTO: 12.9 10E3/UL (ref 4–11)

## 2024-09-14 PROCEDURE — 97116 GAIT TRAINING THERAPY: CPT | Mod: GP

## 2024-09-14 PROCEDURE — 97110 THERAPEUTIC EXERCISES: CPT | Mod: GP

## 2024-09-14 PROCEDURE — 36415 COLL VENOUS BLD VENIPUNCTURE: CPT | Performed by: INTERNAL MEDICINE

## 2024-09-14 PROCEDURE — 99233 SBSQ HOSP IP/OBS HIGH 50: CPT | Performed by: INTERNAL MEDICINE

## 2024-09-14 PROCEDURE — 250N000013 HC RX MED GY IP 250 OP 250 PS 637: Performed by: INTERNAL MEDICINE

## 2024-09-14 PROCEDURE — 250N000011 HC RX IP 250 OP 636: Performed by: INTERNAL MEDICINE

## 2024-09-14 PROCEDURE — 120N000001 HC R&B MED SURG/OB

## 2024-09-14 PROCEDURE — 85014 HEMATOCRIT: CPT | Performed by: INTERNAL MEDICINE

## 2024-09-14 RX ORDER — AMOXICILLIN 250 MG
1 CAPSULE ORAL 2 TIMES DAILY PRN
Status: ON HOLD | COMMUNITY
Start: 2024-09-14

## 2024-09-14 RX ORDER — CEFPODOXIME PROXETIL 100 MG/1
100 TABLET, FILM COATED ORAL 2 TIMES DAILY
Status: SHIPPED
Start: 2024-09-14 | End: 2024-09-16

## 2024-09-14 RX ADMIN — ACETAMINOPHEN 650 MG: 325 TABLET ORAL at 11:34

## 2024-09-14 RX ADMIN — Medication 1 TABLET: at 08:05

## 2024-09-14 RX ADMIN — LATANOPROST 1 DROP: 50 SOLUTION OPHTHALMIC at 21:53

## 2024-09-14 RX ADMIN — FUROSEMIDE 20 MG: 20 TABLET ORAL at 07:52

## 2024-09-14 RX ADMIN — BUSPIRONE HYDROCHLORIDE 5 MG: 5 TABLET ORAL at 21:54

## 2024-09-14 RX ADMIN — ROSUVASTATIN 10 MG: 10 TABLET, FILM COATED ORAL at 21:53

## 2024-09-14 RX ADMIN — LOSARTAN POTASSIUM 25 MG: 25 TABLET, FILM COATED ORAL at 07:52

## 2024-09-14 RX ADMIN — BUSPIRONE HYDROCHLORIDE 5 MG: 5 TABLET ORAL at 08:05

## 2024-09-14 RX ADMIN — LEVOTHYROXINE SODIUM 88 MCG: 88 TABLET ORAL at 08:05

## 2024-09-14 RX ADMIN — Medication 25 MCG: at 08:04

## 2024-09-14 RX ADMIN — LIDOCAINE 1 PATCH: 4 PATCH TOPICAL at 17:33

## 2024-09-14 RX ADMIN — ENOXAPARIN SODIUM 40 MG: 40 INJECTION SUBCUTANEOUS at 17:33

## 2024-09-14 RX ADMIN — TRAZODONE HYDROCHLORIDE 25 MG: 50 TABLET ORAL at 00:43

## 2024-09-14 RX ADMIN — ASPIRIN 81 MG CHEWABLE TABLET 81 MG: 81 TABLET CHEWABLE at 08:04

## 2024-09-14 RX ADMIN — METOPROLOL SUCCINATE 12.5 MG: 25 TABLET, EXTENDED RELEASE ORAL at 07:50

## 2024-09-14 RX ADMIN — DULOXETINE HYDROCHLORIDE 60 MG: 60 CAPSULE, DELAYED RELEASE PELLETS ORAL at 08:04

## 2024-09-14 RX ADMIN — CEFTRIAXONE SODIUM 1 G: 1 INJECTION, POWDER, FOR SOLUTION INTRAMUSCULAR; INTRAVENOUS at 15:04

## 2024-09-14 RX ADMIN — ACETAMINOPHEN 650 MG: 325 TABLET ORAL at 00:41

## 2024-09-14 ASSESSMENT — ACTIVITIES OF DAILY LIVING (ADL)
ADLS_ACUITY_SCORE: 41
ADLS_ACUITY_SCORE: 45
ADLS_ACUITY_SCORE: 41
ADLS_ACUITY_SCORE: 41
ADLS_ACUITY_SCORE: 45
ADLS_ACUITY_SCORE: 41
ADLS_ACUITY_SCORE: 45
ADLS_ACUITY_SCORE: 41
ADLS_ACUITY_SCORE: 45
ADLS_ACUITY_SCORE: 41
ADLS_ACUITY_SCORE: 45

## 2024-09-14 NOTE — PROGRESS NOTES
"[facility]    Medicine Progress Note - Hospitalist Service    Date of Admission:  9/12/2024    Assessment & Plan   Dominik Rojas is a 83 year old male admitted on 9/12/2024. He has h/o COPD on home o2, h/o AMS due to UTI, diastolic CHF, hypothyroidism. He had a fall on 9/8 (4 days ago). He was diagnosed of rib fracture and UTI. He is on Cefpodoxime. He was brought to ER by wife due to worsening agitation and altered mental status.    UTI due to E coli  Acute metabolic and toxic encephalopathy;  -Urine culture on 9/8 + for E Coli  -Continue ceftriaxone which was started in ER  -Low-dose PRN Seroquel for aggressive behaviors  -pt is more sleepy and tired, weaker on 9/14. Will hold Zyprexia at night     Elevated alk phos  -No abdominal symptoms, alk phos marginally elevated  -Trend with AM labs     Chronic respiratory failure with hypoxia  COPD   -DuoNebs as needed   -CXR : no pneumothorax. Small right pleural effusion and right base opacity  -continue O2 as needed with saturation goal >88%     Pre-diabetes  -monitor as needed  Check hgb A1c-5.8     Hypothyroidism  HTN  Anxiety   GERD  -resume PTA meds          Diet: Combination Diet Regular Diet Adult  Diet    DVT Prophylaxis: Enoxaparin (Lovenox) SQ  Cabral Catheter: Not present  Lines: None     Cardiac Monitoring: None  Code Status: Full Code      Clinically Significant Risk Factors              # Hypoalbuminemia: Lowest albumin = 3.1 g/dL at 9/12/2024 12:37 PM, will monitor as appropriate     # Hypertension: Noted on problem list    # Dementia: noted on problem list        # Overweight: Estimated body mass index is 28.52 kg/m  as calculated from the following:    Height as of this encounter: 1.702 m (5' 7\").    Weight as of this encounter: 82.6 kg (182 lb 1.6 oz)., PRESENT ON ADMISSION     # Financial/Environmental Concerns: none               Disposition Plan     Medically Ready for Discharge: Anticipated in 2-4 Days    Barrier: weakness, " confusion, placement plan          Lu Skaggs MD  Hospitalist Service  [facility]  Securely message with [employer] (more info)  Text page via [employer] Paging/Directory   ______________________________________________________________________    Interval History   Pt is sleepy and tired and some confusion. No f/c, pt wishes to go home, but not sure if safe. Pt recommended 24/7 supervision and assistance.     Physical Exam   Vital Signs: Temp: 98.3  F (36.8  C) Temp src: Oral BP: (!) 157/93 Pulse: 81   Resp: 18 SpO2: 92 % O2 Device: Nasal cannula Oxygen Delivery: 2 LPM  Weight: 182 lbs 1.6 oz    General.  Sleepy and oriented not in acute distress.  HEENT.  Pupils equal round react to light, anicteric, EOM intact.  Neck supple no JVD.  CVS regular rhythm no murmur gallops.  Lungs.  Clear to auscultation bilateral no wheezing or rales.  Abdomen.  Soft nontender bowel sounds present.  Extremities.  No edema no calf tenderness.  Neurological.  General weakness  Skin no rash. No pallor.  Psych. Impaired memory      Medical Decision Making       52 MINUTES SPENT BY ME on the date of service doing chart review, history, exam, documentation & further activities per the note.      Data     I have personally reviewed the following data over the past 24 hrs:    12.9 (H)  \   14.2   / 295     N/A N/A N/A /  N/A   N/A N/A N/A \       Imaging results reviewed over the past 24 hrs:   No results found for this or any previous visit (from the past 24 hour(s)).

## 2024-09-14 NOTE — PLAN OF CARE
"  Problem: Risk for Delirium  Goal: Optimal Coping  Outcome: Progressing     Problem: Skin Injury Risk Increased  Goal: Skin Health and Integrity  Outcome: Progressing  Intervention: Plan: Nurse Driven Intervention: Moisture Management  Recent Flowsheet Documentation  Taken 9/13/2024 6155 by Pat Lopez RN  Moisture Interventions: Incontinence pad   Goal Outcome Evaluation:       No acute events this shift. PrimoFit in place and functioning. On 1.5L NC. Lido patch in place, ice pack to area. Pt took meds whole with water. VSS.    /72 (BP Location: Left arm)   Pulse 88   Temp 98.6  F (37  C) (Oral)   Resp 18   Ht 1.702 m (5' 7\")   Wt 82.6 kg (182 lb 1.6 oz)   SpO2 92%   BMI 28.52 kg/m                   "

## 2024-09-14 NOTE — PROGRESS NOTES
Care Management Discharge Note    Discharge Date: 09/14/2024       Discharge Disposition: Home, Home Care    Discharge Services: Home Care    Discharge DME: None    Discharge Transportation: family or friend will provide    Private pay costs discussed: Not applicable    Does the patient's insurance plan have a 3 day qualifying hospital stay waiver?  Yes     Which insurance plan 3 day waiver is available? Alternative insurance waiver    Will the waiver be used for post-acute placement? No    Education Provided on the Discharge Plan: Yes  Persons Notified of Discharge Plans: Home care  Patient/Family in Agreement with the Plan: yes    Handoff Referral Completed: Yes, [facility] PCP: Internal handoff referral completed    Additional Information:  8:45 AM  GALLO informed pt likely ready for discharge today. GALLO attempted to call pts wife, Jovana, to discuss home care services that pt has in place. Call went to ,  box full. Per nursing sticky note pts wife is at work until 1:00 today. GALLO reached out to [facility] as pt discharged from their facility in July and home care was arranged at discharge. Staff at [facility] were able to identify that pt was set up with [facility]. GALLO sent referral to [facility] to confirm services.     [facility] confirmed the [facility] branch is seeing the pt currently. GALLO sent discharge orders to [facility].    10:45 AM  GALLO updated by PT about concerns regarding pt discharging to home due to unsteadiness and uncertainty about available assistance. 24/7 assistance/supervision is recommended at this time. PT recommendation updated to TCU. OT recommendation 24/7 supervision due to SLUMS score of 17. GALLO updated MD and will plan to discuss with pts wife when she is available.    1:32 PM  GALLO attempted to call pts wife, Jovana, to discuss discharge planning. Her cell phone number went to  box which was full. GALLO left message on the home phone number  requesting call back.    3:30 PM  SW attempted to call pts wife, Jovana, again. Call went to VM box which was full.        FALGUNI LoweSW

## 2024-09-14 NOTE — PLAN OF CARE
Problem: Risk for Delirium  Goal: Improved Behavioral Control  Intervention: Minimize Safety Risk  Recent Flowsheet Documentation  Taken 9/14/2024 0900 by Viky Portillo, RN  Communication Enhancement Strategies:   call light answered in person   extra time allowed for response  Enhanced Safety Measures:   pain management   review medications for side effects with activity   Goal Outcome Evaluation:    Unsteady gait.  Assistance of one staff, gait belt and walker when up.  Bed and chair alarms on.      Left flank pain.  Medicated with Tylenol and ice pack one time.

## 2024-09-14 NOTE — PLAN OF CARE
Problem: Adult Inpatient Plan of Care  Goal: Optimal Comfort and Wellbeing  Outcome: Progressing  Intervention: Monitor Pain and Promote Comfort  Recent Flowsheet Documentation  Taken 9/13/2024 4746 by Jessica Vides RN  Pain Management Interventions: cold applied   Goal Outcome Evaluation:       Alert/intermittent confusion, back pain managed with ice pact with noted relief. On 1.5 L oxygen via NC, sat mid 90s. No acute changes this shift, call light within reach.

## 2024-09-14 NOTE — PLAN OF CARE
"  Problem: Risk for Delirium  Goal: Improved Behavioral Control  Outcome: Progressing     Problem: Skin Injury Risk Increased  Goal: Skin Health and Integrity  Outcome: Progressing   Goal Outcome Evaluation:       No acute events this shift. VSS. Up in recliner, bed alarm on. Care ongoing.    /79 (BP Location: Left arm)   Pulse 106   Temp 98  F (36.7  C) (Oral)   Resp 17   Ht 1.702 m (5' 7\")   Wt 82.6 kg (182 lb 1.6 oz)   SpO2 92%   BMI 28.52 kg/m                     "

## 2024-09-15 ENCOUNTER — APPOINTMENT (OUTPATIENT)
Dept: PHYSICAL THERAPY | Facility: HOSPITAL | Age: 84
DRG: 689 | End: 2024-09-15
Payer: COMMERCIAL

## 2024-09-15 LAB
CREAT SERPL-MCNC: 0.81 MG/DL (ref 0.67–1.17)
EGFRCR SERPLBLD CKD-EPI 2021: 88 ML/MIN/1.73M2
GLUCOSE BLDC GLUCOMTR-MCNC: 96 MG/DL (ref 70–99)
PLATELET # BLD AUTO: 296 10E3/UL (ref 150–450)

## 2024-09-15 PROCEDURE — 120N000001 HC R&B MED SURG/OB

## 2024-09-15 PROCEDURE — 250N000011 HC RX IP 250 OP 636: Performed by: INTERNAL MEDICINE

## 2024-09-15 PROCEDURE — 99232 SBSQ HOSP IP/OBS MODERATE 35: CPT | Performed by: INTERNAL MEDICINE

## 2024-09-15 PROCEDURE — 85049 AUTOMATED PLATELET COUNT: CPT | Performed by: INTERNAL MEDICINE

## 2024-09-15 PROCEDURE — 36415 COLL VENOUS BLD VENIPUNCTURE: CPT | Performed by: INTERNAL MEDICINE

## 2024-09-15 PROCEDURE — 97530 THERAPEUTIC ACTIVITIES: CPT | Mod: GP

## 2024-09-15 PROCEDURE — 97116 GAIT TRAINING THERAPY: CPT | Mod: GP

## 2024-09-15 PROCEDURE — 250N000013 HC RX MED GY IP 250 OP 250 PS 637: Performed by: INTERNAL MEDICINE

## 2024-09-15 PROCEDURE — 82565 ASSAY OF CREATININE: CPT | Performed by: INTERNAL MEDICINE

## 2024-09-15 RX ADMIN — DULOXETINE HYDROCHLORIDE 60 MG: 60 CAPSULE, DELAYED RELEASE PELLETS ORAL at 08:44

## 2024-09-15 RX ADMIN — CEFTRIAXONE SODIUM 1 G: 1 INJECTION, POWDER, FOR SOLUTION INTRAMUSCULAR; INTRAVENOUS at 15:33

## 2024-09-15 RX ADMIN — LATANOPROST 1 DROP: 50 SOLUTION OPHTHALMIC at 21:31

## 2024-09-15 RX ADMIN — ENOXAPARIN SODIUM 40 MG: 40 INJECTION SUBCUTANEOUS at 18:54

## 2024-09-15 RX ADMIN — ASPIRIN 81 MG CHEWABLE TABLET 81 MG: 81 TABLET CHEWABLE at 08:44

## 2024-09-15 RX ADMIN — BUSPIRONE HYDROCHLORIDE 5 MG: 5 TABLET ORAL at 08:45

## 2024-09-15 RX ADMIN — Medication 25 MCG: at 08:45

## 2024-09-15 RX ADMIN — LIDOCAINE 1 PATCH: 4 PATCH TOPICAL at 18:54

## 2024-09-15 RX ADMIN — ROSUVASTATIN 10 MG: 10 TABLET, FILM COATED ORAL at 21:31

## 2024-09-15 RX ADMIN — Medication 1 TABLET: at 08:45

## 2024-09-15 RX ADMIN — ACETAMINOPHEN 650 MG: 325 TABLET ORAL at 13:24

## 2024-09-15 RX ADMIN — BUSPIRONE HYDROCHLORIDE 5 MG: 5 TABLET ORAL at 21:31

## 2024-09-15 RX ADMIN — LOSARTAN POTASSIUM 25 MG: 25 TABLET, FILM COATED ORAL at 08:44

## 2024-09-15 RX ADMIN — METOPROLOL SUCCINATE 12.5 MG: 25 TABLET, EXTENDED RELEASE ORAL at 08:45

## 2024-09-15 RX ADMIN — LEVOTHYROXINE SODIUM 88 MCG: 88 TABLET ORAL at 08:45

## 2024-09-15 RX ADMIN — FUROSEMIDE 20 MG: 20 TABLET ORAL at 08:44

## 2024-09-15 ASSESSMENT — ACTIVITIES OF DAILY LIVING (ADL)
ADLS_ACUITY_SCORE: 41
ADLS_ACUITY_SCORE: 45
ADLS_ACUITY_SCORE: 41
ADLS_ACUITY_SCORE: 45
ADLS_ACUITY_SCORE: 41
ADLS_ACUITY_SCORE: 44
ADLS_ACUITY_SCORE: 41
ADLS_ACUITY_SCORE: 45
ADLS_ACUITY_SCORE: 41
ADLS_ACUITY_SCORE: 45
ADLS_ACUITY_SCORE: 41

## 2024-09-15 NOTE — PROGRESS NOTES
"[facility]    Medicine Progress Note - Hospitalist Service    Date of Admission:  9/12/2024    Assessment & Plan   Dominik Rojas is a 83 year old male admitted on 9/12/2024. He has h/o COPD on home o2, h/o AMS due to UTI, diastolic CHF, hypothyroidism. He had a fall on 9/8 (4 days ago). He was diagnosed of rib fracture and UTI. He is on Cefpodoxime. He was brought to ER by wife due to worsening agitation and altered mental status.    UTI due to E coli  Acute metabolic and toxic encephalopathy;  -Urine culture on 9/8 + for E Coli  -Continue ceftriaxone which was started in ER  -Low-dose PRN Seroquel for aggressive behaviors  -pt is more sleepy and tired, weaker on 9/14. Hold Zyprexia at night  -PT: recommended TCU. SW to coordinate placement. Hard to reach wife     Elevated alk phos  -No abdominal symptoms, alk phos marginally elevated  -Trend with AM labs     Chronic respiratory failure with hypoxia  COPD   -DuoNebs as needed   -CXR : no pneumothorax. Small right pleural effusion and right base opacity  -continue O2 as needed with saturation goal >88%     Pre-diabetes  -monitor as needed  Check hgb A1c-5.8     Hypothyroidism  HTN  Anxiety   GERD  -resume PTA meds          Diet: Combination Diet Regular Diet Adult  Diet    DVT Prophylaxis: Enoxaparin (Lovenox) SQ  Cabral Catheter: Not present  Lines: None     Cardiac Monitoring: None  Code Status: Full Code      Clinically Significant Risk Factors              # Hypoalbuminemia: Lowest albumin = 3.1 g/dL at 9/12/2024 12:37 PM, will monitor as appropriate     # Hypertension: Noted on problem list    # Dementia: noted on problem list        # Overweight: Estimated body mass index is 28.52 kg/m  as calculated from the following:    Height as of this encounter: 1.702 m (5' 7\").    Weight as of this encounter: 82.6 kg (182 lb 1.6 oz)., PRESENT ON ADMISSION     # Financial/Environmental Concerns: none               Disposition Plan     Medically " Ready for Discharge: Anticipated Tomorrow    Weakness, confusion and dementia, await safe placement plan         Lu Skaggs MD  Hospitalist Service  [facility]  Securely message with [employer] (more info)  Text page via [employer] Paging/Directory   ______________________________________________________________________    Interval History   Intermittent confusion. Dementia. General weakness. No acute event overnight    Physical Exam   Vital Signs: Temp: 98.5  F (36.9  C) Temp src: Oral BP: (!) 134/91 Pulse: 99   Resp: 18 SpO2: 92 % O2 Device: None (Room air) Oxygen Delivery: 2 LPM  Weight: 182 lbs 1.6 oz    General.   not in acute distress.  HEENT.  EOM intact.  Neck supple no JVD.  CVS regular rhythm no murmur gallops.  Lungs.  Clear to auscultation bilateral no wheezing or rales.  Abdomen.  Soft bowel sounds present.  Extremities.  No edema no calf tenderness.  Neurological.   No focal deficit.  Skin no rash. No pallor.  Psych. Impaired memory and cognition    Medical Decision Making       40 MINUTES SPENT BY ME on the date of service doing chart review, history, exam, documentation & further activities per the note.      Data     I have personally reviewed the following data over the past 24 hrs:    12.9 (H)  \   14.2   / 296     N/A N/A N/A /  96   N/A N/A 0.81 \       Imaging results reviewed over the past 24 hrs:   No results found for this or any previous visit (from the past 24 hour(s)).

## 2024-09-15 NOTE — PLAN OF CARE
Problem: Risk for Delirium  Goal: Improved Attention and Thought Clarity  Outcome: Progressing     Problem: Skin Injury Risk Increased  Goal: Skin Health and Integrity  Outcome: Progressing  Intervention: Plan: Nurse Driven Intervention: Moisture Management  Recent Flowsheet Documentation  Taken 9/15/2024 1645 by Pat Lopez, RN  Moisture Interventions: Encourage regular toileting   Goal Outcome Evaluation:       No acute events this shift. VSS. A&Ox2. Care ongoing.

## 2024-09-15 NOTE — PLAN OF CARE
Problem: Adult Inpatient Plan of Care  Goal: Optimal Comfort and Wellbeing  Outcome: Progressing  Intervention: Monitor Pain and Promote Comfort  Recent Flowsheet Documentation  Taken 9/14/2024 [id number] by Jessica Vides RN  Pain Management Interventions: cold applied   Goal Outcome Evaluation:       Alert/intermittent confusion, back pain managed with ice pact with noted relief. On 1.5 L oxygen via NC, sat mid 90s. No acute changes this shift, call light within reach

## 2024-09-15 NOTE — PROGRESS NOTES
Care Management Follow Up    Length of Stay (days): 3    Expected Discharge Date: 09/15/2024    Anticipated Discharge Plan:  TCU    Transportation: TBD    PT Recommendations: Transitional Care Facility  OT Recommendations:  home with assist, home with home care occupational therapy     Barriers to Discharge: medical stability    Prior Living Situation: house with spouse    Discussed  Partnership in Safe Discharge Planning  document with patient/family: No     Handoff Completed: No, handoff not indicated or clinically appropriate    Patient/Spokesperson Updated: Yes. Who? Wife Jovana     Additional Information:  Spoke with wife Jovana via telephone.  Reviewed PT recs of TCU, OT recs of home w/ home care & assist.  Jovana reviewed at length his medical difficulties, repeat admissions, recent TCU stay.  She is in agreement with TCU; reviewed previous [facility], referral sent.  She requests transportation to be scheduled.     Next Steps: Waiting continued therapy, medical stability for discharge. Need to schedule transportation.    Adamaris Mckinney, BSW, LSW

## 2024-09-16 ENCOUNTER — MEDICAL CORRESPONDENCE (OUTPATIENT)
Dept: HEALTH INFORMATION MANAGEMENT | Facility: CLINIC | Age: 84
End: 2024-09-16

## 2024-09-16 VITALS
BODY MASS INDEX: 28.58 KG/M2 | OXYGEN SATURATION: 93 % | DIASTOLIC BLOOD PRESSURE: 96 MMHG | WEIGHT: 182.1 LBS | TEMPERATURE: 98.1 F | HEIGHT: 67 IN | HEART RATE: 79 BPM | SYSTOLIC BLOOD PRESSURE: 169 MMHG | RESPIRATION RATE: 16 BRPM

## 2024-09-16 PROCEDURE — 99238 HOSP IP/OBS DSCHRG MGMT 30/<: CPT | Performed by: INTERNAL MEDICINE

## 2024-09-16 PROCEDURE — 250N000013 HC RX MED GY IP 250 OP 250 PS 637: Performed by: INTERNAL MEDICINE

## 2024-09-16 RX ADMIN — ACETAMINOPHEN 650 MG: 325 TABLET ORAL at 03:38

## 2024-09-16 RX ADMIN — ACETAMINOPHEN 650 MG: 325 TABLET ORAL at 08:44

## 2024-09-16 RX ADMIN — BUSPIRONE HYDROCHLORIDE 5 MG: 5 TABLET ORAL at 08:32

## 2024-09-16 RX ADMIN — DULOXETINE HYDROCHLORIDE 60 MG: 60 CAPSULE, DELAYED RELEASE PELLETS ORAL at 08:32

## 2024-09-16 RX ADMIN — LEVOTHYROXINE SODIUM 88 MCG: 88 TABLET ORAL at 08:32

## 2024-09-16 RX ADMIN — METOPROLOL SUCCINATE 12.5 MG: 25 TABLET, EXTENDED RELEASE ORAL at 08:32

## 2024-09-16 RX ADMIN — LOSARTAN POTASSIUM 25 MG: 25 TABLET, FILM COATED ORAL at 08:32

## 2024-09-16 RX ADMIN — FUROSEMIDE 20 MG: 20 TABLET ORAL at 08:32

## 2024-09-16 RX ADMIN — Medication 25 MCG: at 08:32

## 2024-09-16 RX ADMIN — Medication 1 TABLET: at 08:32

## 2024-09-16 RX ADMIN — ASPIRIN 81 MG CHEWABLE TABLET 81 MG: 81 TABLET CHEWABLE at 08:43

## 2024-09-16 ASSESSMENT — ACTIVITIES OF DAILY LIVING (ADL)
ADLS_ACUITY_SCORE: 44
ADLS_ACUITY_SCORE: 43
ADLS_ACUITY_SCORE: 44
ADLS_ACUITY_SCORE: 44
ADLS_ACUITY_SCORE: 43

## 2024-09-16 NOTE — PLAN OF CARE
Occupational Therapy Discharge Summary    Reason for therapy discharge:    Discharged to transitional care facility.    Progress towards therapy goal(s). See goals on Care Plan in [facility] electronic health record for goal details.  Goals partially met.  Barriers to achieving goals:   discharge from facility.    Therapy recommendation(s):    Continued therapy is recommended.  Rationale/Recommendations:  @u.    Amira TYLER, OTR/L, CLT 9/16/2024 , 10:45 AM      Goal Outcome Evaluation:

## 2024-09-16 NOTE — DISCHARGE SUMMARY
[facility]    Discharge Summary  Hospitalist    Date of Admission:  9/12/2024  Date of Discharge:  9/16/2024  Discharging Provider: Lu Skaggs MD  Date of Service (when I saw the patient): 09/16/24    Discharge Diagnoses   Metabolic encephalopathy  Dementia  UTI    History of Present Illness   Dominik Rojas is an 83 year old male who presented with altered mental status.    Hospital Course   Dominik Rojas is a 83 year old male admitted on 9/12/2024. He has h/o COPD on home o2, h/o AMS due to UTI, diastolic CHF, hypothyroidism. He had a fall on 9/8 (4 days ago). He was diagnosed of rib fracture and UTI. He is on Cefpodoxime. He was brought to ER by wife due to worsening agitation and altered mental status.    UTI due to E coli  Acute metabolic and toxic encephalopathy;  -Urine culture on 9/8 + for E Coli  -Continue ceftriaxone which was started in ER. With oral antibiotics (9/8-9/12) and iv Rocephin (9/12-9/16), pt finished the course of antibiotics for UTI  -Low-dose PRN Seroquel for aggressive behaviors  -pt is more sleepy and tired, weaker on 9/14. Hold Zyprexia at night  -PT: recommended TCU.      Elevated alk phos  -No abdominal symptoms, alk phos marginally elevated  -Trend with AM labs     Chronic respiratory failure with hypoxia  COPD   -DuoNebs as needed   -CXR : no pneumothorax. Small right pleural effusion and right base opacity  -continue O2      Pre-diabetes  -monitor as needed  Check hgb A1c-5.8     Hypothyroidism  HTN  Anxiety   GERD  -resume PTA meds      Lu Skaggs MD    Significant Results and Procedures   See below    Pending Results     Unresulted Labs Ordered in the Past 30 Days of this Admission       No orders found from 8/13/2024 to 9/13/2024.            Code Status   Full Code       Primary Care Physician   Misael Moe    General.  Awake disoriented not in acute distress.  HEENT.  Pupils equal round react to light, anicteric, EOM intact.  Neck  supple no JVD.  CVS regular rhythm no murmur gallops.  Lungs.  Clear to auscultation bilateral no wheezing or rales.  Abdomen.  Soft nontender bowel sounds present.  Extremities.  No edema no calf tenderness.  Neurological.  Awake and alert. No focal deficit.general weakness  Skin no rash. No pallor.  Psych. Impaired memory and cognition     Discharge Disposition   Discharged to TCU  Condition at discharge: Fair    Consultations This Hospital Stay   PHARMACY TO DOSE VANCO  PHYSICAL THERAPY ADULT IP CONSULT  OCCUPATIONAL THERAPY ADULT IP CONSULT  CARE MANAGEMENT / SOCIAL WORK IP CONSULT  PHARMACY IP CONSULT  PHYSICAL THERAPY ADULT IP CONSULT  OCCUPATIONAL THERAPY ADULT IP CONSULT    Time Spent on this Encounter   I, Lu Skaggs MD, personally saw the patient today and spent less than or equal to 30 minutes discharging this patient.    Discharge Orders      Primary Care - Care Coordination Referral      General info for SNF    Length of Stay Estimate: Short Term Care: Estimated # of Days <30  Condition at Discharge: Improving  Level of care:skilled   Rehabilitation Potential: Fair  Admission H&P remains valid and up-to-date: Yes  Recent Chemotherapy: N/A  Use Nursing Home Standing Orders: Yes     Mantoux instructions    Give two-step Mantoux (PPD) Per Facility Policy Yes     Follow Up and recommended labs and tests    Follow up with Nursing home physician.  No follow up labs or test are needed.     Reason for your hospital stay    Metabolic encephalopathy  UTI  Dementia  Recent fall and rib fracture     Activity - Up with nursing assistance     Full Code     Physical Therapy Adult Consult    Evaluate and treat as clinically indicated.    Reason:  weakness     Occupational Therapy Adult Consult    Evaluate and treat as clinically indicated.    Reason:  weakness and dementia     Oxygen (SNF/TCU) Discharge     Fall precautions     Diet    Follow this diet upon discharge: Current Diet:Orders Placed This Encounter       Combination Diet Regular Diet Adult      Diet     Discharge Medications   Current Discharge Medication List        START taking these medications    Details   senna-docusate (SENOKOT-S/PERICOLACE) 8.6-50 MG tablet Take 1 tablet by mouth 2 times daily as needed for constipation.    Associated Diagnoses: Slow transit constipation           CONTINUE these medications which have NOT CHANGED    Details   acetaminophen (TYLENOL) 500 MG tablet Take 1,000 mg by mouth 2 times daily.      aspirin (ASA) 81 MG chewable tablet Take 81 mg by mouth daily.      busPIRone (BUSPAR) 5 MG tablet Take 5 mg by mouth 2 times daily      Cholecalciferol (VITAMIN D3) 50 MCG (2000 UT) CAPS Take 1 tablet by mouth daily       DULoxetine (CYMBALTA) 60 MG capsule Take 60 mg by mouth daily      furosemide (LASIX) 20 MG tablet Take 1 tablet (20 mg) by mouth daily    Associated Diagnoses: Chronic systolic congestive heart failure (H)      ipratropium - albuterol 0.5 mg/2.5 mg/3 mL (DUONEB) 0.5-2.5 (3) MG/3ML neb solution Take 1 vial by nebulization every 12 hours      latanoprost (XALATAN) 0.005 % ophthalmic solution Place 1 drop into both eyes At Bedtime      levothyroxine (SYNTHROID/LEVOTHROID) 88 MCG tablet Take 88 mcg by mouth daily before breakfast      lidocaine (LIDODERM) 5 % patch Place 1 patch over 12 hours onto the skin every 24 hours. To prevent lidocaine toxicity, patient should be patch free for 12 hrs daily.  Qty: 9 patch, Refills: 0      losartan (COZAAR) 25 MG tablet Take 1 tablet (25 mg) by mouth daily  Qty: 30 tablet, Refills: 0    Associated Diagnoses: Congestive heart failure, unspecified HF chronicity, unspecified heart failure type (H)      Melatonin 10 MG TABS tablet Take 10 mg by mouth at bedtime.      metoprolol succinate ER (TOPROL XL) 25 MG 24 hr tablet Take 0.5 tablets (12.5 mg) by mouth daily  Qty: 30 tablet, Refills: 0    Associated Diagnoses: Essential hypertension      multivitamin w/minerals (THERA-VIT-M) tablet Take  1 tablet by mouth daily      naproxen sodium (ANAPROX) 220 MG tablet Take 220 mg by mouth 2 times daily (with meals).      OLANZapine (ZYPREXA) 7.5 MG tablet Take 7.5 mg by mouth at bedtime.      pantoprazole (PROTONIX) 40 MG EC tablet Take 1 tablet (40 mg) by mouth every morning (before breakfast)  Qty: 30 tablet, Refills: 0    Associated Diagnoses: Gastroesophageal reflux disease with esophagitis without hemorrhage      rosuvastatin (CRESTOR) 10 MG tablet Take 10 mg by mouth At Bedtime           STOP taking these medications       cefpodoxime (VANTIN) 100 MG tablet Comments:   Reason for Stopping:             Allergies   Allergies   Allergen Reactions    Diclofenac      Other reaction(s): GI Upset    Loratadine      Other reaction(s): Urinary Retention    Oxycodone      Agitation     Sertraline Unknown     Other reaction(s): ineffective     Data   Most Recent 3 CBC's:  Recent Labs   Lab Test 09/15/24  0623 09/14/24  0957 09/13/24  0817 09/12/24  1237   WBC  --  12.9* 12.1* 11.0   HGB  --  14.2 12.8* 12.9*   MCV  --  89 90 89    295 293 299      Most Recent 3 BMP's:  Recent Labs   Lab Test 09/15/24  0835 09/15/24  0623 09/13/24  0817 09/12/24  1553 09/12/24  1237 08/12/24  1523   NA  --   --  140  --  140 138   POTASSIUM  --   --  4.4  --  4.5 4.5   CHLORIDE  --   --  104  --  105 99   CO2  --   --  25  --  26 27   BUN  --   --  19.5  --  20.4 22.2   CR  --  0.81 0.78 0.86 0.89 1.21*   ANIONGAP  --   --  11  --  9 12   CHELI  --   --  8.7*  --  8.6* 8.7*   GLC 96  --  99  --  119* 88     Most Recent 2 LFT's:  Recent Labs   Lab Test 09/13/24 0817 09/12/24  1237 06/30/24  0612 06/29/24  0827   AST  --  27  --  25   ALT  --  17  --  14   ALKPHOS 174* 165*   < > 153*   BILITOTAL  --  0.6  --  0.7    < > = values in this interval not displayed.     Most Recent INR's and Anticoagulation Dosing History:  Anticoagulation Dose History          Latest Ref Rng & Units 8/5/2019 8/27/2019 1/14/2021 2/27/2021 3/8/2023  4/24/2023   Recent Dosing and Labs   INR 0.85 - 1.15 1.11  1.14  1.13  1.15  1.16  1.16       Details                 Most Recent 3 Troponin's:No lab results found.  Most Recent Cholesterol Panel:  Recent Labs   Lab Test 01/15/24  1332   CHOL 141   LDL 68   HDL 51   TRIG 112     Most Recent 6 Bacteria Isolates From Any Culture (See EPIC Reports for Culture Details):No lab results found.  Most Recent TSH, T4 and A1c Labs:  Recent Labs   Lab Test 09/12/24  1237 06/29/24  0827 04/20/23  1549 01/23/23  1148   TSH  --  2.32  2.37   < > 0.29*   T4  --   --   --  1.32   A1C 5.8*  --   --  5.7*    < > = values in this interval not displayed.     Results for orders placed or performed during the hospital encounter of 09/12/24   XR Chest Port 1 View    Narrative    EXAM: XR CHEST PORT 1 VIEW  LOCATION: [facility]  DATE: 9/12/2024    INDICATION: sob  COMPARISON: 9/8/2024      Impression    IMPRESSION: Unchanged small right pleural effusion with right basilar airspace opacities. No pneumothorax. Stable enlarged cardiomediastinal silhouette.   Head CT w/o contrast    Narrative    EXAM: CT HEAD W/O CONTRAST  LOCATION: [facility]  DATE: 9/12/2024    INDICATION: ams    COMPARISON: 06/29/2024 head CT and 05/05/2024 brain MRI    TECHNIQUE: Routine CT Head without IV contrast. Multiplanar reformats. Dose reduction techniques were used.    FINDINGS:    INTRACRANIAL CONTENTS:     No intracranial hemorrhage, extraaxial collection, or mass effect.  No acute loss of gray-white differentiation. Chronic appearing, small infarction within the right frontal lobe, unchanged. Extensive degree of asymmetrical, patchy and confluent   hypoattenuation throughout the periventricular white matter, nonspecific but likely representative of the sequelae of chronic small vessel ischemic disease. There is a moderate degree of diffuse cerebral parenchymal volume loss.     VISUALIZED ORBITS/SINUSES/MASTOIDS:  No intraorbital abnormality. No significant paranasal sinus mucosal disease. No middle ear or mastoid effusion.    BONES/SOFT TISSUES: No acute abnormality.      Impression    IMPRESSION:    1. No acute intracranial pathology.  2. Age-related and incidental findings as described.

## 2024-09-16 NOTE — PLAN OF CARE
"  Problem: Adult Inpatient Plan of Care  Goal: Optimal Comfort and Wellbeing  Outcome: Met  Intervention: Monitor Pain and Promote Comfort  Recent Flowsheet Documentation  Taken 9/16/2024 0832 by Layla Persaud, RN  Pain Management Interventions:   medication (see MAR)   cold applied   emotional support     Problem: Skin Injury Risk Increased  Goal: Skin Health and Integrity  Outcome: Met  Intervention: Optimize Skin Protection  Recent Flowsheet Documentation  Taken 9/16/2024 0838 by Layla Persaud, RN  Activity Management:   activity adjusted per tolerance   ambulated in room   ambulated to bathroom     BP (!) 169/96 (BP Location: Left arm)   Pulse 79   Temp 98.1  F (36.7  C) (Oral)   Resp 16   Ht 1.702 m (5' 7\")   Wt 82.6 kg (182 lb 1.6 oz)   SpO2 93%   BMI 28.52 kg/m      Pt denies pain. Up with assist of 1 and cane. Pt denies pain. Discharge to [facility] all belongings taken with. Discharge paperwork sent with transport.   "

## 2024-09-16 NOTE — PROGRESS NOTES
"[facility]    Medicine Progress Note - Hospitalist Service    Date of Admission:  9/12/2024    Assessment & Plan   Dominik Rojas is a 83 year old male admitted on 9/12/2024. He has h/o COPD on home o2, h/o AMS due to UTI, diastolic CHF, hypothyroidism. He had a fall on 9/8 (4 days ago). He was diagnosed of rib fracture and UTI. He is on Cefpodoxime. He was brought to ER by wife due to worsening agitation and altered mental status.    UTI due to E coli  Acute metabolic and toxic encephalopathy;  -Urine culture on 9/8 + for E Coli  -Continue ceftriaxone which was started in ER  -Low-dose PRN Seroquel for aggressive behaviors  -pt is more sleepy and tired, weaker on 9/14. Hold Zyprexia at night  -PT: recommended TCU. SW to coordinate placement. Hard to reach wife     Elevated alk phos  -No abdominal symptoms, alk phos marginally elevated  -Trend with AM labs     Chronic respiratory failure with hypoxia  COPD   -DuoNebs as needed   -CXR : no pneumothorax. Small right pleural effusion and right base opacity  -continue O2 as needed with saturation goal >88%     Pre-diabetes  -monitor as needed  Check hgb A1c-5.8     Hypothyroidism  HTN  Anxiety   GERD  -resume PTA meds          Diet: Combination Diet Regular Diet Adult  Diet    DVT Prophylaxis: Enoxaparin (Lovenox) SQ  Cabral Catheter: Not present  Lines: None     Cardiac Monitoring: None  Code Status: Full Code      Clinically Significant Risk Factors              # Hypoalbuminemia: Lowest albumin = 3.1 g/dL at 9/12/2024 12:37 PM, will monitor as appropriate     # Hypertension: Noted on problem list    # Dementia: noted on problem list        # Overweight: Estimated body mass index is 28.52 kg/m  as calculated from the following:    Height as of this encounter: 1.702 m (5' 7\").    Weight as of this encounter: 82.6 kg (182 lb 1.6 oz)., PRESENT ON ADMISSION     # Financial/Environmental Concerns: none               Disposition Plan     Medically " Ready for Discharge: Ready Now    Await TCU placement         Lu Skaggs MD  Hospitalist Service  [facility]  Securely message with [employer] (more info)  Text page via [employer] Paging/Directory   ______________________________________________________________________    Interval History   Dementia, disoriented, no acute event, no fever, no new complaints    Physical Exam   Vital Signs: Temp: 98.2  F (36.8  C) Temp src: Oral BP: (!) 160/97 Pulse: 73   Resp: 18 SpO2: 93 % O2 Device: Nasal cannula Oxygen Delivery: 1/2 LPM  Weight: 182 lbs 1.6 oz    General.  Awake disoriented not in acute distress.  HEENT.  Pupils equal round react to light, anicteric, EOM intact.  Neck supple no JVD.  CVS regular rhythm no murmur gallops.  Lungs.  Clear to auscultation bilateral no wheezing or rales.  Abdomen.  Soft nontender bowel sounds present.  Extremities.  No edema no calf tenderness.  Neurological.  Awake and alert. No focal deficit. General weakness  Skin no rash. No pallor.  Psych. Impaired memory and cognition    Medical Decision Making       45 MINUTES SPENT BY ME on the date of service doing chart review, history, exam, documentation & further activities per the note.      Data         Imaging results reviewed over the past 24 hrs:   No results found for this or any previous visit (from the past 24 hour(s)).

## 2024-09-16 NOTE — PROGRESS NOTES
Care Management Discharge Note    Discharge Date: 09/16/2024       Discharge Disposition: Home, Home Care    Discharge Services: Home Care    Discharge DME: None    Discharge Transportation: family or friend will provide    Private pay costs discussed: transportation costs    Does the patient's insurance plan have a 3 day qualifying hospital stay waiver?  No    PAS Confirmation Code: [id number]  Patient/family educated on Medicare website which has current facility and service quality ratings:  yes    Education Provided on the Discharge Plan: Yes  Persons Notified of Discharge Plans: yes  Patient/Family in Agreement with the Plan: yes    Handoff Referral Completed: No, handoff not indicated or clinically appropriate    Additional Information:  SW left message for [facility] admissions as pt is interested in [facility] at discharge; l waiting for call to be returned.  8:14 AM    Pt accepted to [facility] TCU pendind bed availability; care management following. Tentative ride set; [facility] ride set for 1300 with a  window between [phone] (agreeable to private pay). All parties aware and agreeable to discharge plan. PAS DONE.  9:35 AM    Bed available for today; all parties aware and agreeable to plan including spouse. No other needs at this time. Care management following through discharge.  10:33 AM    Brenna Kjellberg, BSW LSW  9/16/2024

## 2024-09-16 NOTE — PLAN OF CARE
Problem: Adult Inpatient Plan of Care  Goal: Optimal Comfort and Wellbeing  Outcome: Progressing  Intervention: Monitor Pain and Promote Comfort  Recent Flowsheet Documentation  Taken 9/16/2024 0009 by Jessica Vides RN  Pain Management Interventions: cold applied   Goal Outcome Evaluation:        Alert/oriented, prn tylenol for mild back pain. Oxygen at 1.5 L via NC and sat in the 90'. No acute changes, call light within reach.

## 2024-09-17 ENCOUNTER — TELEPHONE (OUTPATIENT)
Dept: GERIATRICS | Facility: CLINIC | Age: 84
End: 2024-09-17
Payer: COMMERCIAL

## 2024-09-17 NOTE — TELEPHONE ENCOUNTER
[facility] Geriatrics Triage Nurse Telephone Encounter    Provider: Inessa Hurt MD  Facility: [facility] Facility Type:  TCU    Caller: Rosalio  Call Back Number: [phone]    Allergies:    Allergies   Allergen Reactions    Diclofenac      Other reaction(s): GI Upset    Loratadine      Other reaction(s): Urinary Retention    Oxycodone      Agitation     Sertraline Unknown     Other reaction(s): ineffective        Reason for call: The nurse called to report that he had a choking episode at lunch where he became cyanotic and unable to speak. They performed 5 abdominal thrusts and were able to clear the obstruction, which was a lot of meat. Nurse auscultates rhonchi in the lungs and they sound quite wet. He's still coughing up white sputum. VS: T=97.8, P=97, R=20, UV=346/93, O2=initially were 86-88% 2L/min, but after the obstruction was cleared and some time passed, O2 sat came up to 91% on 2L/min. Patient has orders for Duo Nebs Q 12 hours and he got the first dose at 8am, however after this incident, they gave him a dose around noon.  Speech therapy has already been ordered.      Verbal Order/Direction given by Provider: Continue to monitor.  Update if patient is requiring more O2 or having respiratory distress.      Provider giving Order:  Inessa Hurt MD    Verbal Order given to: Rosalio Hoang RN

## 2024-09-17 NOTE — PLAN OF CARE
Physical Therapy Discharge Summary    Reason for therapy discharge:    Discharged to transitional care facility.    Progress towards therapy goal(s). See goals on Care Plan in [facility] electronic health record for goal details.  Goals partially met.  Barriers to achieving goals:   discharge from facility.    Therapy recommendation(s):    Continued therapy is recommended.  Rationale/Recommendations:  Recommend continued PT at TCU.    Mouna Baez, PT  9/17/2024

## 2024-09-18 ENCOUNTER — DOCUMENTATION ONLY (OUTPATIENT)
Dept: GERIATRICS | Facility: CLINIC | Age: 84
End: 2024-09-18

## 2024-09-18 ENCOUNTER — TRANSITIONAL CARE UNIT VISIT (OUTPATIENT)
Dept: GERIATRICS | Facility: CLINIC | Age: 84
End: 2024-09-18
Payer: COMMERCIAL

## 2024-09-18 VITALS
DIASTOLIC BLOOD PRESSURE: 89 MMHG | BODY MASS INDEX: 27.81 KG/M2 | HEART RATE: 83 BPM | SYSTOLIC BLOOD PRESSURE: 160 MMHG | OXYGEN SATURATION: 93 % | RESPIRATION RATE: 18 BRPM | WEIGHT: 177.2 LBS | TEMPERATURE: 98.4 F | HEIGHT: 67 IN

## 2024-09-18 DIAGNOSIS — F03.93 DEMENTIA WITH MOOD DISTURBANCE, UNSPECIFIED DEMENTIA SEVERITY, UNSPECIFIED DEMENTIA TYPE (H): ICD-10-CM

## 2024-09-18 DIAGNOSIS — I50.32 CHRONIC HEART FAILURE WITH PRESERVED EJECTION FRACTION (H): ICD-10-CM

## 2024-09-18 DIAGNOSIS — R53.81 PHYSICAL DECONDITIONING: Primary | ICD-10-CM

## 2024-09-18 DIAGNOSIS — J44.9 CHRONIC OBSTRUCTIVE PULMONARY DISEASE, UNSPECIFIED COPD TYPE (H): ICD-10-CM

## 2024-09-18 DIAGNOSIS — I10 BENIGN ESSENTIAL HYPERTENSION: ICD-10-CM

## 2024-09-18 PROBLEM — R40.0 DAYTIME SOMNOLENCE: Status: RESOLVED | Noted: 2021-05-03 | Resolved: 2024-09-18

## 2024-09-18 PROBLEM — T82.330A LEAKAGE OF AORTIC (BIFURCATION) GRAFT (REPLACEMENT), INITIAL ENCOUNTER (H): Status: RESOLVED | Noted: 2020-07-20 | Resolved: 2024-09-18

## 2024-09-18 PROBLEM — F10.21 ALCOHOL USE DISORDER, SEVERE, IN SUSTAINED REMISSION, DEPENDENCE (H): Status: RESOLVED | Noted: 2021-08-16 | Resolved: 2024-09-18

## 2024-09-18 PROBLEM — R73.9 HYPERGLYCEMIA: Status: RESOLVED | Noted: 2020-07-20 | Resolved: 2024-09-18

## 2024-09-18 PROBLEM — G83.4: Status: RESOLVED | Noted: 2021-01-15 | Resolved: 2024-09-18

## 2024-09-18 PROBLEM — F41.9 ANXIETY: Status: RESOLVED | Noted: 2021-05-03 | Resolved: 2024-09-18

## 2024-09-18 PROBLEM — E86.0 DEHYDRATION: Status: RESOLVED | Noted: 2020-07-20 | Resolved: 2024-09-18

## 2024-09-18 PROBLEM — N39.46 MIXED INCONTINENCE URGE AND STRESS (MALE)(FEMALE): Status: RESOLVED | Noted: 2020-02-24 | Resolved: 2024-09-18

## 2024-09-18 PROBLEM — I50.22 CHRONIC SYSTOLIC CONGESTIVE HEART FAILURE (H): Status: RESOLVED | Noted: 2021-08-14 | Resolved: 2024-09-18

## 2024-09-18 PROBLEM — F11.90 OPIOID USE DISORDER: Status: RESOLVED | Noted: 2021-08-18 | Resolved: 2024-09-18

## 2024-09-18 PROBLEM — E72.20 HYPERAMMONEMIA (H): Status: RESOLVED | Noted: 2021-03-08 | Resolved: 2024-09-18

## 2024-09-18 PROBLEM — E87.20 LACTIC ACIDOSIS: Status: RESOLVED | Noted: 2020-07-20 | Resolved: 2024-09-18

## 2024-09-18 PROBLEM — M19.90 DJD (DEGENERATIVE JOINT DISEASE): Status: RESOLVED | Noted: 2020-07-20 | Resolved: 2024-09-18

## 2024-09-18 PROBLEM — R41.82 ALTERED MENTAL STATUS: Status: RESOLVED | Noted: 2021-08-19 | Resolved: 2024-09-18

## 2024-09-18 PROBLEM — F11.20 OPIOID USE DISORDER, SEVERE, DEPENDENCE (H): Status: RESOLVED | Noted: 2021-08-16 | Resolved: 2024-09-18

## 2024-09-18 PROBLEM — R82.90 ABNORMAL URINALYSIS: Status: RESOLVED | Noted: 2021-03-07 | Resolved: 2024-09-18

## 2024-09-18 PROCEDURE — 99309 SBSQ NF CARE MODERATE MDM 30: CPT | Performed by: NURSE PRACTITIONER

## 2024-09-18 NOTE — PROGRESS NOTES
[facility] GERIATRICS    PRIMARY CARE PROVIDER AND CLINIC:  Misael Moe PA-C, [address] / [place] [address]  Chief Complaint   Patient presents with    [facility] Medical Record Number:  [id number]  Place of Service where encounter took place:  [facility] (TCU) [[id number]]    Dominik Rojas  is a 83 year old  (1940), admitted to the above facility from  [facility]. Hospital stay 9/12/24 through 9/16/24. Patient with PMH dementia, COPD on oxygen, HFpEF, hypothyroidism and recurrent UTIs presented to the ED with AMS. He was seen in the ED on 9/8 after a fall, found to have left rib fracture and UTI, discharged on cefpodoxime. He came back to the ED due to worsening confusion.     HPI obtained from patient visit, review of nursing home record, discussion with facility staff, and Epic review.     HPI:    Patient had a choking episode yesterday that required the heimlich maneuver. It was caused by a very large piece of meat. Due to this his diet has been changed to mechanical soft and speech therapy orders written. He does remember the choking and he says his ground up chicken sandwich was actually pretty good last night. He denies any pain related to the incident. He had some congestion afterward that did seem to clear with nebs. Today he denies any SOB, wheezing, cough. Patient was just discharged from this TCU at the end of July. Provider reminds him of the typical TCU course. Per therapy, he is very impulsive and he has an odd shuffling gait, but he is currently able to walk 100 feet.     CODE STATUS/ADVANCE DIRECTIVES DISCUSSION:  Full Code    ALLERGIES:   Allergies   Allergen Reactions    Diclofenac      Other reaction(s): GI Upset    Loratadine      Other reaction(s): Urinary Retention    Oxycodone      Agitation     Sertraline Unknown     Other reaction(s): ineffective      PAST MEDICAL HISTORY:   Past Medical History:   Diagnosis Date     AAA (abdominal aortic aneurysm) (H24)     s/p stenting    AAA (abdominal aortic aneurysm) (H24) 11/15/2012    AAA (abdominal aortic aneurysm) CT 11-12  6.4 cm. Saw Dr Muller 1-13;  S/P endovascular repair 3-13;  s/p stenting      Alcohol dependence in remission (H)     Alcohol use disorder, severe, in sustained remission, dependence (H) 08/16/2021    Anxiety     Atrial fibrillation with normal ventricular rate (H)     Benign prostatic hyperplasia with lower urinary tract symptoms     Bronchiectasis without complication (H)     2/27/2020    COPD (chronic obstructive pulmonary disease) (H)     CVA (cerebral vascular accident) (H) 2019    DDD (degenerative disc disease), lumbar     Depression     Depressive disorder     Diabetes (H)     Diastolic dysfunction 01/22/2021    DJD (degenerative joint disease) of knee     left    Essential hypertension     Glaucoma     Glaucoma     History of stroke without residual deficits     Hyperlipidemia     Hypertension     Hypothyroidism     Hypothyroidism     Kidney stones     Major depression     Memory problem     Nocturia     Obesity     Opioid use disorder, severe, dependence (H) 08/16/2021    Overactive bladder 02/25/2021    Primary osteoarthritis of left knee     Psoriasis     Psoriasis     Seborrheic keratoses     Somnolence, daytime     Stenosis of right carotid artery     history of TIA's      PAST SURGICAL HISTORY:   has a past surgical history that includes Abdomen surgery; lithotripsy; IR Abdominal Endovascular Stent Graft (5/19/2020); IR Abdominal Aortogram (5/19/2020); Abdominal Aortic Aneurysm Repair (2013); Cystoscopy; Cystoscopy W/ Litholapaxy / Ehl (N/A, 6/12/2018); Cystoscopy Prostate W/ Laser (N/A, 6/12/2018); Circumcision; carotid endarterectomy (Right, 9/9/2019); Cystoscopy Prostate W/ Laser (N/A, 2/18/2020); Ir Abdominal Aortic Aneurysm Endograft (5/19/2020); Ir Abdominal Aortagram (5/19/2020); Percutaneous Nephrostolithotomy (Right, 03/10/2020); and HUANG  W/O FACETEC FORAMOT/DSKC 1/2 VRT SEG, LUMBAR (Bilateral, 3/3/2021).  FAMILY HISTORY: family history includes Cirrhosis in his father; Emphysema in his mother; No Known Problems in his brother, father, maternal aunt, maternal grandfather, maternal grandmother, maternal uncle, paternal aunt, paternal grandfather, paternal grandmother, paternal uncle, sister, and another family member.  SOCIAL HISTORY:   reports that he quit smoking about 34 years ago. His smoking use included cigarettes. He started smoking about 69 years ago. He has a 17.5 pack-year smoking history. He has never used smokeless tobacco. He reports that he does not drink alcohol and does not use drugs.  Patient's living condition: lives with spouse    Post Discharge Medication Reconciliation Status:   MED REC REQUIRED  Post Medication Reconciliation Status:  Discharge medications reconciled, continue medications without change       Current Outpatient Medications   Medication Sig Dispense Refill    acetaminophen (TYLENOL) 500 MG tablet Take 1,000 mg by mouth 2 times daily.      aspirin (ASA) 81 MG chewable tablet Take 81 mg by mouth daily.      busPIRone (BUSPAR) 5 MG tablet Take 5 mg by mouth 2 times daily      Cholecalciferol (VITAMIN D3) 50 MCG (2000 UT) CAPS Take 1 tablet by mouth daily       DULoxetine (CYMBALTA) 60 MG capsule Take 60 mg by mouth daily      furosemide (LASIX) 20 MG tablet Take 1 tablet (20 mg) by mouth daily      ipratropium - albuterol 0.5 mg/2.5 mg/3 mL (DUONEB) 0.5-2.5 (3) MG/3ML neb solution Take 1 vial by nebulization every 12 hours      latanoprost (XALATAN) 0.005 % ophthalmic solution Place 1 drop into both eyes At Bedtime      levothyroxine (SYNTHROID/LEVOTHROID) 88 MCG tablet Take 88 mcg by mouth daily before breakfast      lidocaine (LIDODERM) 5 % patch Place 1 patch over 12 hours onto the skin every 24 hours. To prevent lidocaine toxicity, patient should be patch free for 12 hrs daily. 9 patch 0    losartan (COZAAR) 25  "MG tablet Take 1 tablet (25 mg) by mouth daily 30 tablet 0    Melatonin 10 MG TABS tablet Take 10 mg by mouth at bedtime.      metoprolol succinate ER (TOPROL XL) 25 MG 24 hr tablet Take 0.5 tablets (12.5 mg) by mouth daily 30 tablet 0    multivitamin w/minerals (THERA-VIT-M) tablet Take 1 tablet by mouth daily      naproxen sodium (ANAPROX) 220 MG tablet Take 220 mg by mouth 2 times daily (with meals).      OLANZapine (ZYPREXA) 7.5 MG tablet Take 7.5 mg by mouth at bedtime.      pantoprazole (PROTONIX) 40 MG EC tablet Take 1 tablet (40 mg) by mouth every morning (before breakfast) 30 tablet 0    rosuvastatin (CRESTOR) 10 MG tablet Take 10 mg by mouth At Bedtime      senna-docusate (SENOKOT-S/PERICOLACE) 8.6-50 MG tablet Take 1 tablet by mouth 2 times daily as needed for constipation.       No current facility-administered medications for this visit.       ROS:  4 point ROS including Respiratory, CV, GI and , other than that noted in the HPI,  is negative    Vitals:  BP (!) 160/89   Pulse 83   Temp 98.4  F (36.9  C)   Resp 18   Ht 1.702 m (5' 7\")   Wt 80.4 kg (177 lb 3.2 oz)   SpO2 93%   BMI 27.75 kg/m    Exam:  GENERAL APPEARANCE:  Alert, in no distress  ENT:  Mouth and posterior oropharynx normal, moist mucous membranes  EYES:  EOM normal, conjunctiva and lids normal  RESP:  respiratory effort and palpation of chest normal, lungs clear to auscultation , no respiratory distress  CV:  no edema  ABDOMEN:  soft, non-tender  PSYCH:  insight and judgement impaired, memory impaired , affect and mood normal    Lab/Diagnostic data:  Recent labs in [facility] reviewed by me today.     ASSESSMENT/PLAN:  (R53.81) Physical deconditioning  (primary encounter diagnosis)  Comment: Due to acute illness. During his last TCU stay, he was very eager to discharge, but did stay as long as therapy recommended  Plan: PT/OT eval and treat, discharge planning per their recommendations.    (F03.93) Dementia with mood disturbance, " unspecified dementia severity, unspecified dementia type (H)  Comment: Chronic, progressive. Requires 24 hour supervision. Expect further functional and cognitive decline. He is likely quite a burden to care for at home. It would certainly be appropriate if his wife decided he needed a higher level of care.  Plan: Continue 24 hour care. Monitor weight. Monitor functional status. Monitor for behavioral disturbances.    (J44.9) Chronic obstructive pulmonary disease, unspecified COPD type (H)  Comment: Chronic condition being managed with medications and oxygen and is currently asymptomatic.  Plan: Continue current POC with no changes at this time and adjustments as needed.    (I10) Benign essential hypertension  Comment: Chronic. BP elevated today, but all previous -120s  Plan: Continue current POC with no changes at this time and adjustments as needed.    (I50.32) Chronic heart failure with preserved ejection fraction (H)  Comment: Chronic: CHF due to effects of HTN/Heart Disease. Currently: compensated.  Plan: Continue current medications: furosemide. Monitor for shortness of breath, wheezing, increasing lower extremity edema and change in activity tolerance.         Electronically signed by:  CAYLA Aranda CNP

## 2024-09-18 NOTE — LETTER
9/18/2024      Dominik Rojas  [address]  [place] [address]        [facility] GERIATRICS    PRIMARY CARE PROVIDER AND CLINIC:  Misael Moe PA-C, [address] / [place] [address]  Chief Complaint   Patient presents with     GLEN      [facility] Medical Record Number:  [id number]  Place of Service where encounter took place:  [facility] (TCU) [[id number]]    Dominik Rojas  is a 83 year old  (1940), admitted to the above facility from  [facility]. Hospital stay 9/12/24 through 9/16/24. Patient with PMH dementia, COPD on oxygen, HFpEF, hypothyroidism and recurrent UTIs presented to the ED with AMS. He was seen in the ED on 9/8 after a fall, found to have left rib fracture and UTI, discharged on cefpodoxime. He came back to the ED due to worsening confusion.     HPI obtained from patient visit, review of nursing home record, discussion with facility staff, and Epic review.     HPI:    Patient had a choking episode yesterday that required the heimlich maneuver. It was caused by a very large piece of meat. Due to this his diet has been changed to mechanical soft and speech therapy orders written. He does remember the choking and he says his ground up chicken sandwich was actually pretty good last night. He denies any pain related to the incident. He had some congestion afterward that did seem to clear with nebs. Today he denies any SOB, wheezing, cough. Patient was just discharged from this TCU at the end of July. Provider reminds him of the typical TCU course. Per therapy, he is very impulsive and he has an odd shuffling gait, but he is currently able to walk 100 feet.     CODE STATUS/ADVANCE DIRECTIVES DISCUSSION:  Full Code    ALLERGIES:   Allergies   Allergen Reactions     Diclofenac      Other reaction(s): GI Upset     Loratadine      Other reaction(s): Urinary Retention     Oxycodone      Agitation      Sertraline Unknown     Other  reaction(s): ineffective      PAST MEDICAL HISTORY:   Past Medical History:   Diagnosis Date     AAA (abdominal aortic aneurysm) (H24)     s/p stenting     AAA (abdominal aortic aneurysm) (H24) 11/15/2012    AAA (abdominal aortic aneurysm) CT 11-12  6.4 cm. Saw Dr Muller 1-13;  S/P endovascular repair 3-13;  s/p stenting       Alcohol dependence in remission (H)      Alcohol use disorder, severe, in sustained remission, dependence (H) 08/16/2021     Anxiety      Atrial fibrillation with normal ventricular rate (H)      Benign prostatic hyperplasia with lower urinary tract symptoms      Bronchiectasis without complication (H)     2/27/2020     COPD (chronic obstructive pulmonary disease) (H)      CVA (cerebral vascular accident) (H) 2019     DDD (degenerative disc disease), lumbar      Depression      Depressive disorder      Diabetes (H)      Diastolic dysfunction 01/22/2021     DJD (degenerative joint disease) of knee     left     Essential hypertension      Glaucoma      Glaucoma      History of stroke without residual deficits      Hyperlipidemia      Hypertension      Hypothyroidism      Hypothyroidism      Kidney stones      Major depression      Memory problem      Nocturia      Obesity      Opioid use disorder, severe, dependence (H) 08/16/2021     Overactive bladder 02/25/2021     Primary osteoarthritis of left knee      Psoriasis      Psoriasis      Seborrheic keratoses      Somnolence, daytime      Stenosis of right carotid artery     history of TIA's      PAST SURGICAL HISTORY:   has a past surgical history that includes Abdomen surgery; lithotripsy; IR Abdominal Endovascular Stent Graft (5/19/2020); IR Abdominal Aortogram (5/19/2020); Abdominal Aortic Aneurysm Repair (2013); Cystoscopy; Cystoscopy W/ Litholapaxy / Ehl (N/A, 6/12/2018); Cystoscopy Prostate W/ Laser (N/A, 6/12/2018); Circumcision; carotid endarterectomy (Right, 9/9/2019); Cystoscopy Prostate W/ Laser (N/A, 2/18/2020); Ir Abdominal Aortic  Aneurysm Endograft (5/19/2020); Ir Abdominal Aortagram (5/19/2020); Percutaneous Nephrostolithotomy (Right, 03/10/2020); and HUANG W/O FACETEC FORAMOT/DSKC 1/2 VRT SEG, LUMBAR (Bilateral, 3/3/2021).  FAMILY HISTORY: family history includes Cirrhosis in his father; Emphysema in his mother; No Known Problems in his brother, father, maternal aunt, maternal grandfather, maternal grandmother, maternal uncle, paternal aunt, paternal grandfather, paternal grandmother, paternal uncle, sister, and another family member.  SOCIAL HISTORY:   reports that he quit smoking about 34 years ago. His smoking use included cigarettes. He started smoking about 69 years ago. He has a 17.5 pack-year smoking history. He has never used smokeless tobacco. He reports that he does not drink alcohol and does not use drugs.  Patient's living condition: lives with spouse    Post Discharge Medication Reconciliation Status:   MED REC REQUIRED  Post Medication Reconciliation Status:  Discharge medications reconciled, continue medications without change       Current Outpatient Medications   Medication Sig Dispense Refill     acetaminophen (TYLENOL) 500 MG tablet Take 1,000 mg by mouth 2 times daily.       aspirin (ASA) 81 MG chewable tablet Take 81 mg by mouth daily.       busPIRone (BUSPAR) 5 MG tablet Take 5 mg by mouth 2 times daily       Cholecalciferol (VITAMIN D3) 50 MCG (2000 UT) CAPS Take 1 tablet by mouth daily        DULoxetine (CYMBALTA) 60 MG capsule Take 60 mg by mouth daily       furosemide (LASIX) 20 MG tablet Take 1 tablet (20 mg) by mouth daily       ipratropium - albuterol 0.5 mg/2.5 mg/3 mL (DUONEB) 0.5-2.5 (3) MG/3ML neb solution Take 1 vial by nebulization every 12 hours       latanoprost (XALATAN) 0.005 % ophthalmic solution Place 1 drop into both eyes At Bedtime       levothyroxine (SYNTHROID/LEVOTHROID) 88 MCG tablet Take 88 mcg by mouth daily before breakfast       lidocaine (LIDODERM) 5 % patch Place 1 patch over 12 hours onto  "the skin every 24 hours. To prevent lidocaine toxicity, patient should be patch free for 12 hrs daily. 9 patch 0     losartan (COZAAR) 25 MG tablet Take 1 tablet (25 mg) by mouth daily 30 tablet 0     Melatonin 10 MG TABS tablet Take 10 mg by mouth at bedtime.       metoprolol succinate ER (TOPROL XL) 25 MG 24 hr tablet Take 0.5 tablets (12.5 mg) by mouth daily 30 tablet 0     multivitamin w/minerals (THERA-VIT-M) tablet Take 1 tablet by mouth daily       naproxen sodium (ANAPROX) 220 MG tablet Take 220 mg by mouth 2 times daily (with meals).       OLANZapine (ZYPREXA) 7.5 MG tablet Take 7.5 mg by mouth at bedtime.       pantoprazole (PROTONIX) 40 MG EC tablet Take 1 tablet (40 mg) by mouth every morning (before breakfast) 30 tablet 0     rosuvastatin (CRESTOR) 10 MG tablet Take 10 mg by mouth At Bedtime       senna-docusate (SENOKOT-S/PERICOLACE) 8.6-50 MG tablet Take 1 tablet by mouth 2 times daily as needed for constipation.       No current facility-administered medications for this visit.       ROS:  4 point ROS including Respiratory, CV, GI and , other than that noted in the HPI,  is negative    Vitals:  BP (!) 160/89   Pulse 83   Temp 98.4  F (36.9  C)   Resp 18   Ht 1.702 m (5' 7\")   Wt 80.4 kg (177 lb 3.2 oz)   SpO2 93%   BMI 27.75 kg/m    Exam:  GENERAL APPEARANCE:  Alert, in no distress  ENT:  Mouth and posterior oropharynx normal, moist mucous membranes  EYES:  EOM normal, conjunctiva and lids normal  RESP:  respiratory effort and palpation of chest normal, lungs clear to auscultation , no respiratory distress  CV:  no edema  ABDOMEN:  soft, non-tender  PSYCH:  insight and judgement impaired, memory impaired , affect and mood normal    Lab/Diagnostic data:  Recent labs in [facility] reviewed by me today.     ASSESSMENT/PLAN:  (R57.48) Physical deconditioning  (primary encounter diagnosis)  Comment: Due to acute illness. During his last TCU stay, he was very eager to discharge, but did stay as long as " therapy recommended  Plan: PT/OT eval and treat, discharge planning per their recommendations.    (F03.93) Dementia with mood disturbance, unspecified dementia severity, unspecified dementia type (H)  Comment: Chronic, progressive. Requires 24 hour supervision. Expect further functional and cognitive decline. He is likely quite a burden to care for at home. It would certainly be appropriate if his wife decided he needed a higher level of care.  Plan: Continue 24 hour care. Monitor weight. Monitor functional status. Monitor for behavioral disturbances.    (J44.9) Chronic obstructive pulmonary disease, unspecified COPD type (H)  Comment: Chronic condition being managed with medications and oxygen and is currently asymptomatic.  Plan: Continue current POC with no changes at this time and adjustments as needed.    (I10) Benign essential hypertension  Comment: Chronic. BP elevated today, but all previous -120s  Plan: Continue current POC with no changes at this time and adjustments as needed.    (I50.32) Chronic heart failure with preserved ejection fraction (H)  Comment: Chronic: CHF due to effects of HTN/Heart Disease. Currently: compensated.  Plan: Continue current medications: furosemide. Monitor for shortness of breath, wheezing, increasing lower extremity edema and change in activity tolerance.         Electronically signed by:  CAYLA Aranda CNP                       Sincerely,        CAYLA Aranda CNP

## 2024-09-23 NOTE — TELEPHONE ENCOUNTER
Jovana with [facility] calling because see got a letter in the mail. Provided UC results. She reports he is now in the TCU after a hospitalization.

## 2024-09-25 ENCOUNTER — TRANSITIONAL CARE UNIT VISIT (OUTPATIENT)
Dept: GERIATRICS | Facility: CLINIC | Age: 84
End: 2024-09-25
Payer: COMMERCIAL

## 2024-09-25 VITALS
HEIGHT: 67 IN | DIASTOLIC BLOOD PRESSURE: 93 MMHG | TEMPERATURE: 96.3 F | HEART RATE: 106 BPM | WEIGHT: 178.1 LBS | OXYGEN SATURATION: 91 % | BODY MASS INDEX: 27.95 KG/M2 | SYSTOLIC BLOOD PRESSURE: 140 MMHG | RESPIRATION RATE: 16 BRPM

## 2024-09-25 DIAGNOSIS — R53.81 PHYSICAL DECONDITIONING: Primary | ICD-10-CM

## 2024-09-25 DIAGNOSIS — J44.9 CHRONIC OBSTRUCTIVE PULMONARY DISEASE, UNSPECIFIED COPD TYPE (H): ICD-10-CM

## 2024-09-25 PROCEDURE — 99309 SBSQ NF CARE MODERATE MDM 30: CPT | Performed by: NURSE PRACTITIONER

## 2024-09-25 NOTE — PROGRESS NOTES
"[facility] GERIATRICS    Chief Complaint   Patient presents with    RECHECK     HPI:  Dominik Rojas is a 83 year old  (1940), who is being seen today for an episodic care visit at: [facility] ([facility]) [[id number]].     Today's concern is:   Patient is being seen by OT and ST regularly, it appears he is only getting PT twice a week. They do not address much in their notes regarding his functional mobility. So far this week, he was only in a group activity. He moves about the [facility] in his wheelchair. They did say that he has been coming into the gym and trying to exercise on his own, which is not safe or recommended. He has been refusing the scheduled nebs. [place] 7/30, [place] 15/30. He denies any concerns today    Allergies, and PMH/PSH reviewed in EPIC today.  REVIEW OF SYSTEMS:  Limited secondary to cognitive impairment but today pt reports 4 point ROS including Respiratory, CV, GI and , other than that noted in the HPI,  is negative    Objective:   BP (!) 140/93   Pulse 106   Temp (!) 96.3  F (35.7  C)   Resp 16   Ht 1.702 m (5' 7\")   Wt 80.8 kg (178 lb 1.6 oz)   SpO2 91%   BMI 27.89 kg/m    GENERAL APPEARANCE:  Alert, in no distress  RESP:  no respiratory distress, diminished breath sounds throughout  PSYCH:  insight and judgement impaired, memory impaired       Assessment/Plan:  (R53.81) Physical deconditioning  (primary encounter diagnosis)  Comment: Improvement will be quite slow if he is only able to have PT twice weekly. Social work has a care conference scheduled for 9/30. She will verify discharge planning that day. It is presumed that he will go back home with his wife, but this needs to be verified  Plan: PT/OT eval and treat, discharge planning per their recommendations.    (J44.9) Chronic obstructive pulmonary disease, unspecified COPD type (H)  Comment: Chronic condition being managed with medications and oxygen  Plan: Change nebs to prn        Electronically signed by: Ana" Rose Arguello, APRN CNP

## 2024-09-25 NOTE — LETTER
" 9/25/2024      Dominik Rojas  [address]  [place] [address]        [facility] GERIATRICS    Chief Complaint   Patient presents with     RECHECK     HPI:  Dominik Rojas is a 83 year old  (1940), who is being seen today for an episodic care visit at: [facility] ([facility]) [[id number]].     Today's concern is:   Patient is being seen by OT and ST regularly, it appears he is only getting PT twice a week. They do not address much in their notes regarding his functional mobility. So far this week, he was only in a group activity. He moves about the [facility] in his wheelchair. They did say that he has been coming into the gym and trying to exercise on his own, which is not safe or recommended. He has been refusing the scheduled nebs. [facility] 7/30, [place] 15/30. He denies any concerns today    Allergies, and PMH/PSH reviewed in EPIC today.  REVIEW OF SYSTEMS:  Limited secondary to cognitive impairment but today pt reports 4 point ROS including Respiratory, CV, GI and , other than that noted in the HPI,  is negative    Objective:   BP (!) 140/93   Pulse 106   Temp (!) 96.3  F (35.7  C)   Resp 16   Ht 1.702 m (5' 7\")   Wt 80.8 kg (178 lb 1.6 oz)   SpO2 91%   BMI 27.89 kg/m    GENERAL APPEARANCE:  Alert, in no distress  RESP:  no respiratory distress, diminished breath sounds throughout  PSYCH:  insight and judgement impaired, memory impaired       Assessment/Plan:  (R53.81) Physical deconditioning  (primary encounter diagnosis)  Comment: Improvement will be quite slow if he is only able to have PT twice weekly. Social work has a care conference scheduled for 9/30. She will verify discharge planning that day. It is presumed that he will go back home with his wife, but this needs to be verified  Plan: PT/OT eval and treat, discharge planning per their recommendations.    (J44.9) Chronic obstructive pulmonary disease, unspecified COPD type (H)  Comment: Chronic condition being managed with " medications and oxygen  Plan: Change nebs to prn        Electronically signed by: CAYLA Aranda CNP           Sincerely,        CAYLA Aranda CNP

## 2024-10-02 ENCOUNTER — TRANSITIONAL CARE UNIT VISIT (OUTPATIENT)
Dept: GERIATRICS | Facility: CLINIC | Age: 84
End: 2024-10-02
Payer: COMMERCIAL

## 2024-10-02 ENCOUNTER — LAB REQUISITION (OUTPATIENT)
Dept: LAB | Facility: CLINIC | Age: 84
End: 2024-10-02

## 2024-10-02 VITALS
BODY MASS INDEX: 28.08 KG/M2 | OXYGEN SATURATION: 94 % | HEART RATE: 103 BPM | WEIGHT: 179.3 LBS | DIASTOLIC BLOOD PRESSURE: 83 MMHG | TEMPERATURE: 98.1 F | RESPIRATION RATE: 16 BRPM | SYSTOLIC BLOOD PRESSURE: 125 MMHG

## 2024-10-02 DIAGNOSIS — I10 ESSENTIAL (PRIMARY) HYPERTENSION: ICD-10-CM

## 2024-10-02 DIAGNOSIS — I10 BENIGN ESSENTIAL HYPERTENSION: ICD-10-CM

## 2024-10-02 DIAGNOSIS — J44.9 CHRONIC OBSTRUCTIVE PULMONARY DISEASE, UNSPECIFIED COPD TYPE (H): ICD-10-CM

## 2024-10-02 DIAGNOSIS — R53.81 PHYSICAL DECONDITIONING: Primary | ICD-10-CM

## 2024-10-02 DIAGNOSIS — F03.B0 MODERATE DEMENTIA WITHOUT BEHAVIORAL DISTURBANCE, PSYCHOTIC DISTURBANCE, MOOD DISTURBANCE, OR ANXIETY, UNSPECIFIED DEMENTIA TYPE (H): ICD-10-CM

## 2024-10-02 PROCEDURE — 99309 SBSQ NF CARE MODERATE MDM 30: CPT | Performed by: NURSE PRACTITIONER

## 2024-10-02 NOTE — PROGRESS NOTES
[facility] GERIATRICS    Chief Complaint   Patient presents with    RECHECK     HPI:  Dominik Rojas is a 83 year old  (1940), who is being seen today for an episodic care visit at: [facility] ([facility]) [[id number]].     Today's concern is:   Patient is participating in therapy and making good progress, but continues to be impulsive and unsafe at times (staff witnessed him walking backwards with his walker on his own). [place] 14/30. The plan is for him to return home with his spouse. As at previous visits, 24 hour supervision is strongly recommended. Apparently his wife still works and leaves him alone while gone. Provider typically seems him moving about the nursing home in his wheelchair throughout the day. He says he is doing ok. His only concern is that he sleeps in a recliner and his current one is not very comfortable and is not electric. Social work said they would get him a different one, he just doesn't want them to forget.     Allergies, and PMH/PSH reviewed in [facility] today.  REVIEW OF SYSTEMS:  4 point ROS including Respiratory, CV, GI and , other than that noted in the HPI,  is negative    Objective:   /83   Pulse 103   Temp 98.1  F (36.7  C)   Resp 16   Wt 81.3 kg (179 lb 4.8 oz)   SpO2 94%   BMI 28.08 kg/m    GENERAL APPEARANCE:  Alert, in no distress  EYES:  EOM normal, conjunctiva and lids normal  RESP:  no respiratory distress, scattered rhonchi, diminished lung sounds  PSYCH:  insight and judgement impaired, memory impaired     Recent labs in [facility] reviewed by me today.     Assessment/Plan:  (R53.81) Physical deconditioning  (primary encounter diagnosis)  Comment: Improving, but he remains impulsive and a fall risk due to dementia  Plan: PT/OT eval and treat, discharge planning per their recommendations.    (F03.B0) Moderate dementia without behavioral disturbance, psychotic disturbance, mood disturbance, or anxiety, unspecified dementia type (H)  Comment: Chronic,  progressive. Recommend 24 hour supervision    (J44.9) Chronic obstructive pulmonary disease, unspecified COPD type (H)  Comment: Asymptomatic  Plan: Continue current POC with no changes at this time and adjustments as needed.    (I10) Benign essential hypertension  Comment: Chronic, controlled  Plan: Continue current POC with no changes at this time and adjustments as needed.        Electronically signed by: CAYLA Aranda CNP

## 2024-10-02 NOTE — LETTER
10/2/2024      Dominik Rojas  [address]  [place] [address]        [facility] GERIATRICS    Chief Complaint   Patient presents with     RECHECK     HPI:  Dominik Rojas is a 83 year old  (1940), who is being seen today for an episodic care visit at: [facility] ([facility]) [[id number]].     Today's concern is:   Patient is participating in therapy and making good progress, but continues to be impulsive and unsafe at times (staff witnessed him walking backwards with his walker on his own). [place] 14/30. The plan is for him to return home with his spouse. As at previous visits, 24 hour supervision is strongly recommended. Apparently his wife still works and leaves him alone while gone. Provider typically seems him moving about the nursing home in his wheelchair throughout the day. He says he is doing ok. His only concern is that he sleeps in a recliner and his current one is not very comfortable and is not electric. Social work said they would get him a different one, he just doesn't want them to forget.     Allergies, and PMH/PSH reviewed in [facility] today.  REVIEW OF SYSTEMS:  4 point ROS including Respiratory, CV, GI and , other than that noted in the HPI,  is negative    Objective:   /83   Pulse 103   Temp 98.1  F (36.7  C)   Resp 16   Wt 81.3 kg (179 lb 4.8 oz)   SpO2 94%   BMI 28.08 kg/m    GENERAL APPEARANCE:  Alert, in no distress  EYES:  EOM normal, conjunctiva and lids normal  RESP:  no respiratory distress, scattered rhonchi, diminished lung sounds  PSYCH:  insight and judgement impaired, memory impaired     Recent labs in [facility] reviewed by me today.     Assessment/Plan:  (R53.81) Physical deconditioning  (primary encounter diagnosis)  Comment: Improving, but he remains impulsive and a fall risk due to dementia  Plan: PT/OT eval and treat, discharge planning per their recommendations.    (F03.B0) Moderate dementia without behavioral disturbance, psychotic disturbance, mood  disturbance, or anxiety, unspecified dementia type (H)  Comment: Chronic, progressive. Recommend 24 hour supervision    (J44.9) Chronic obstructive pulmonary disease, unspecified COPD type (H)  Comment: Asymptomatic  Plan: Continue current POC with no changes at this time and adjustments as needed.    (I10) Benign essential hypertension  Comment: Chronic, controlled  Plan: Continue current POC with no changes at this time and adjustments as needed.        Electronically signed by: CAYLA Aranda CNP           Sincerely,        CAYLA Aranda CNP

## 2024-10-03 LAB
ANION GAP SERPL CALCULATED.3IONS-SCNC: 10 MMOL/L (ref 7–15)
BUN SERPL-MCNC: 31.3 MG/DL (ref 8–23)
CALCIUM SERPL-MCNC: 9.3 MG/DL (ref 8.8–10.4)
CHLORIDE SERPL-SCNC: 103 MMOL/L (ref 98–107)
CREAT SERPL-MCNC: 0.87 MG/DL (ref 0.67–1.17)
EGFRCR SERPLBLD CKD-EPI 2021: 86 ML/MIN/1.73M2
GLUCOSE SERPL-MCNC: 145 MG/DL (ref 70–99)
HCO3 SERPL-SCNC: 28 MMOL/L (ref 22–29)
POTASSIUM SERPL-SCNC: 4.4 MMOL/L (ref 3.4–5.3)
SODIUM SERPL-SCNC: 141 MMOL/L (ref 135–145)

## 2024-10-03 PROCEDURE — 36415 COLL VENOUS BLD VENIPUNCTURE: CPT | Performed by: INTERNAL MEDICINE

## 2024-10-03 PROCEDURE — P9604 ONE-WAY ALLOW PRORATED TRIP: HCPCS | Performed by: INTERNAL MEDICINE

## 2024-10-03 PROCEDURE — 80048 BASIC METABOLIC PNL TOTAL CA: CPT | Performed by: INTERNAL MEDICINE

## 2024-10-05 ENCOUNTER — APPOINTMENT (OUTPATIENT)
Dept: RADIOLOGY | Facility: HOSPITAL | Age: 84
DRG: 480 | End: 2024-10-05
Attending: EMERGENCY MEDICINE
Payer: COMMERCIAL

## 2024-10-05 ENCOUNTER — APPOINTMENT (OUTPATIENT)
Dept: CT IMAGING | Facility: HOSPITAL | Age: 84
DRG: 480 | End: 2024-10-05
Attending: EMERGENCY MEDICINE
Payer: COMMERCIAL

## 2024-10-05 ENCOUNTER — HOSPITAL ENCOUNTER (INPATIENT)
Facility: HOSPITAL | Age: 84
LOS: 6 days | Discharge: NURSING, REHAB OR LONG-TERM CARE | DRG: 480 | End: 2024-10-11
Attending: EMERGENCY MEDICINE | Admitting: INTERNAL MEDICINE
Payer: COMMERCIAL

## 2024-10-05 ENCOUNTER — TRANSFERRED RECORDS (OUTPATIENT)
Dept: HEALTH INFORMATION MANAGEMENT | Facility: CLINIC | Age: 84
End: 2024-10-05

## 2024-10-05 DIAGNOSIS — S72.002A HIP FRACTURE, LEFT, CLOSED, INITIAL ENCOUNTER (H): Primary | ICD-10-CM

## 2024-10-05 DIAGNOSIS — B37.2 CANDIDIASIS OF SKIN: ICD-10-CM

## 2024-10-05 DIAGNOSIS — J43.1 PANLOBULAR EMPHYSEMA (H): ICD-10-CM

## 2024-10-05 DIAGNOSIS — N30.00 ACUTE CYSTITIS WITHOUT HEMATURIA: ICD-10-CM

## 2024-10-05 LAB
ALBUMIN SERPL BCG-MCNC: 2.8 G/DL (ref 3.5–5.2)
ALP SERPL-CCNC: 208 U/L (ref 40–150)
ALT SERPL W P-5'-P-CCNC: 26 U/L (ref 0–70)
ANION GAP SERPL CALCULATED.3IONS-SCNC: 10 MMOL/L (ref 7–15)
AST SERPL W P-5'-P-CCNC: 44 U/L (ref 0–45)
ATRIAL RATE - MUSE: 103 BPM
BASOPHILS # BLD AUTO: 0 10E3/UL (ref 0–0.2)
BASOPHILS NFR BLD AUTO: 0 %
BILIRUB DIRECT SERPL-MCNC: 0.2 MG/DL (ref 0–0.3)
BILIRUB SERPL-MCNC: 0.5 MG/DL
BUN SERPL-MCNC: 29.1 MG/DL (ref 8–23)
CALCIUM SERPL-MCNC: 8.8 MG/DL (ref 8.8–10.4)
CHLORIDE SERPL-SCNC: 104 MMOL/L (ref 98–107)
CREAT SERPL-MCNC: 0.87 MG/DL (ref 0.67–1.17)
DIASTOLIC BLOOD PRESSURE - MUSE: NORMAL MMHG
EGFRCR SERPLBLD CKD-EPI 2021: 86 ML/MIN/1.73M2
EOSINOPHIL # BLD AUTO: 0.1 10E3/UL (ref 0–0.7)
EOSINOPHIL NFR BLD AUTO: 1 %
ERYTHROCYTE [DISTWIDTH] IN BLOOD BY AUTOMATED COUNT: 16 % (ref 10–15)
GLUCOSE SERPL-MCNC: 121 MG/DL (ref 70–99)
HCO3 SERPL-SCNC: 27 MMOL/L (ref 22–29)
HCT VFR BLD AUTO: 37 % (ref 40–53)
HGB BLD-MCNC: 11.9 G/DL (ref 13.3–17.7)
HOLD SPECIMEN: NORMAL
IMM GRANULOCYTES # BLD: 0.1 10E3/UL
IMM GRANULOCYTES NFR BLD: 0 %
INTERPRETATION ECG - MUSE: NORMAL
LACTATE SERPL-SCNC: 1 MMOL/L (ref 0.7–2)
LYMPHOCYTES # BLD AUTO: 1.9 10E3/UL (ref 0.8–5.3)
LYMPHOCYTES NFR BLD AUTO: 13 %
MCH RBC QN AUTO: 28.3 PG (ref 26.5–33)
MCHC RBC AUTO-ENTMCNC: 32.2 G/DL (ref 31.5–36.5)
MCV RBC AUTO: 88 FL (ref 78–100)
MONOCYTES # BLD AUTO: 1.4 10E3/UL (ref 0–1.3)
MONOCYTES NFR BLD AUTO: 9 %
NEUTROPHILS # BLD AUTO: 10.9 10E3/UL (ref 1.6–8.3)
NEUTROPHILS NFR BLD AUTO: 76 %
NRBC # BLD AUTO: 0 10E3/UL
NRBC BLD AUTO-RTO: 0 /100
NT-PROBNP SERPL-MCNC: 245 PG/ML (ref 0–1800)
P AXIS - MUSE: -6 DEGREES
PLATELET # BLD AUTO: 267 10E3/UL (ref 150–450)
POTASSIUM SERPL-SCNC: 4.8 MMOL/L (ref 3.4–5.3)
PR INTERVAL - MUSE: 140 MS
PROT SERPL-MCNC: 6.2 G/DL (ref 6.4–8.3)
QRS DURATION - MUSE: 80 MS
QT - MUSE: 394 MS
QTC - MUSE: 516 MS
R AXIS - MUSE: 49 DEGREES
RBC # BLD AUTO: 4.21 10E6/UL (ref 4.4–5.9)
SODIUM SERPL-SCNC: 141 MMOL/L (ref 135–145)
SYSTOLIC BLOOD PRESSURE - MUSE: NORMAL MMHG
T AXIS - MUSE: 40 DEGREES
VENTRICULAR RATE- MUSE: 103 BPM
WBC # BLD AUTO: 14.4 10E3/UL (ref 4–11)

## 2024-10-05 PROCEDURE — 120N000001 HC R&B MED SURG/OB

## 2024-10-05 PROCEDURE — 82248 BILIRUBIN DIRECT: CPT | Performed by: EMERGENCY MEDICINE

## 2024-10-05 PROCEDURE — 250N000013 HC RX MED GY IP 250 OP 250 PS 637: Performed by: INTERNAL MEDICINE

## 2024-10-05 PROCEDURE — 72125 CT NECK SPINE W/O DYE: CPT

## 2024-10-05 PROCEDURE — 36415 COLL VENOUS BLD VENIPUNCTURE: CPT | Performed by: INTERNAL MEDICINE

## 2024-10-05 PROCEDURE — 96374 THER/PROPH/DIAG INJ IV PUSH: CPT

## 2024-10-05 PROCEDURE — 36415 COLL VENOUS BLD VENIPUNCTURE: CPT | Performed by: STUDENT IN AN ORGANIZED HEALTH CARE EDUCATION/TRAINING PROGRAM

## 2024-10-05 PROCEDURE — 71045 X-RAY EXAM CHEST 1 VIEW: CPT

## 2024-10-05 PROCEDURE — 73502 X-RAY EXAM HIP UNI 2-3 VIEWS: CPT

## 2024-10-05 PROCEDURE — 82310 ASSAY OF CALCIUM: CPT | Performed by: EMERGENCY MEDICINE

## 2024-10-05 PROCEDURE — 83880 ASSAY OF NATRIURETIC PEPTIDE: CPT | Performed by: INTERNAL MEDICINE

## 2024-10-05 PROCEDURE — 70450 CT HEAD/BRAIN W/O DYE: CPT

## 2024-10-05 PROCEDURE — 99285 EMERGENCY DEPT VISIT HI MDM: CPT | Mod: 25

## 2024-10-05 PROCEDURE — 250N000013 HC RX MED GY IP 250 OP 250 PS 637: Performed by: EMERGENCY MEDICINE

## 2024-10-05 PROCEDURE — 85004 AUTOMATED DIFF WBC COUNT: CPT | Performed by: EMERGENCY MEDICINE

## 2024-10-05 PROCEDURE — 250N000011 HC RX IP 250 OP 636: Performed by: EMERGENCY MEDICINE

## 2024-10-05 PROCEDURE — 99223 1ST HOSP IP/OBS HIGH 75: CPT | Performed by: INTERNAL MEDICINE

## 2024-10-05 PROCEDURE — 96375 TX/PRO/DX INJ NEW DRUG ADDON: CPT

## 2024-10-05 PROCEDURE — 83605 ASSAY OF LACTIC ACID: CPT | Performed by: INTERNAL MEDICINE

## 2024-10-05 RX ORDER — NALOXONE HYDROCHLORIDE 0.4 MG/ML
0.4 INJECTION, SOLUTION INTRAMUSCULAR; INTRAVENOUS; SUBCUTANEOUS
Status: DISCONTINUED | OUTPATIENT
Start: 2024-10-05 | End: 2024-10-11 | Stop reason: HOSPADM

## 2024-10-05 RX ORDER — LEVOTHYROXINE SODIUM 88 UG/1
88 TABLET ORAL
Status: DISCONTINUED | OUTPATIENT
Start: 2024-10-06 | End: 2024-10-11 | Stop reason: HOSPADM

## 2024-10-05 RX ORDER — NALOXONE HYDROCHLORIDE 0.4 MG/ML
0.2 INJECTION, SOLUTION INTRAMUSCULAR; INTRAVENOUS; SUBCUTANEOUS
Status: DISCONTINUED | OUTPATIENT
Start: 2024-10-05 | End: 2024-10-11 | Stop reason: HOSPADM

## 2024-10-05 RX ORDER — ACETAMINOPHEN 325 MG/1
975 TABLET ORAL 3 TIMES DAILY
Status: DISCONTINUED | OUTPATIENT
Start: 2024-10-05 | End: 2024-10-11 | Stop reason: HOSPADM

## 2024-10-05 RX ORDER — DULOXETIN HYDROCHLORIDE 60 MG/1
60 CAPSULE, DELAYED RELEASE ORAL EVERY MORNING
Status: DISCONTINUED | OUTPATIENT
Start: 2024-10-06 | End: 2024-10-11 | Stop reason: HOSPADM

## 2024-10-05 RX ORDER — ONDANSETRON 2 MG/ML
4 INJECTION INTRAMUSCULAR; INTRAVENOUS EVERY 6 HOURS PRN
Status: DISCONTINUED | OUTPATIENT
Start: 2024-10-05 | End: 2024-10-05

## 2024-10-05 RX ORDER — LOSARTAN POTASSIUM 25 MG/1
25 TABLET ORAL EVERY MORNING
Status: ON HOLD | COMMUNITY
End: 2024-11-12

## 2024-10-05 RX ORDER — MORPHINE SULFATE 2 MG/ML
2 INJECTION, SOLUTION INTRAMUSCULAR; INTRAVENOUS ONCE
Status: COMPLETED | OUTPATIENT
Start: 2024-10-05 | End: 2024-10-05

## 2024-10-05 RX ORDER — BUSPIRONE HYDROCHLORIDE 5 MG/1
5 TABLET ORAL 2 TIMES DAILY
Status: DISCONTINUED | OUTPATIENT
Start: 2024-10-05 | End: 2024-10-11 | Stop reason: HOSPADM

## 2024-10-05 RX ORDER — HYDROMORPHONE HCL IN WATER/PF 6 MG/30 ML
0.4 PATIENT CONTROLLED ANALGESIA SYRINGE INTRAVENOUS EVERY 4 HOURS PRN
Status: DISCONTINUED | OUTPATIENT
Start: 2024-10-05 | End: 2024-10-06

## 2024-10-05 RX ORDER — LIDOCAINE 40 MG/G
CREAM TOPICAL
Status: DISCONTINUED | OUTPATIENT
Start: 2024-10-05 | End: 2024-10-11 | Stop reason: HOSPADM

## 2024-10-05 RX ORDER — AMOXICILLIN 250 MG
2 CAPSULE ORAL 2 TIMES DAILY
Status: DISCONTINUED | OUTPATIENT
Start: 2024-10-05 | End: 2024-10-11 | Stop reason: HOSPADM

## 2024-10-05 RX ORDER — LATANOPROST 50 UG/ML
1 SOLUTION/ DROPS OPHTHALMIC AT BEDTIME
Status: DISCONTINUED | OUTPATIENT
Start: 2024-10-05 | End: 2024-10-11 | Stop reason: HOSPADM

## 2024-10-05 RX ORDER — AMOXICILLIN 250 MG
1 CAPSULE ORAL 2 TIMES DAILY
Status: DISCONTINUED | OUTPATIENT
Start: 2024-10-05 | End: 2024-10-11 | Stop reason: HOSPADM

## 2024-10-05 RX ORDER — ASPIRIN 81 MG/1
81 TABLET, CHEWABLE ORAL EVERY MORNING
Status: DISCONTINUED | OUTPATIENT
Start: 2024-10-06 | End: 2024-10-06

## 2024-10-05 RX ORDER — HYDRALAZINE HYDROCHLORIDE 10 MG/1
10 TABLET, FILM COATED ORAL EVERY 4 HOURS PRN
Status: DISCONTINUED | OUTPATIENT
Start: 2024-10-05 | End: 2024-10-11 | Stop reason: HOSPADM

## 2024-10-05 RX ORDER — HYDRALAZINE HYDROCHLORIDE 20 MG/ML
10 INJECTION INTRAMUSCULAR; INTRAVENOUS EVERY 4 HOURS PRN
Status: DISCONTINUED | OUTPATIENT
Start: 2024-10-05 | End: 2024-10-11 | Stop reason: HOSPADM

## 2024-10-05 RX ORDER — FUROSEMIDE 20 MG/1
20 TABLET ORAL EVERY MORNING
Status: DISCONTINUED | OUTPATIENT
Start: 2024-10-06 | End: 2024-10-11 | Stop reason: HOSPADM

## 2024-10-05 RX ORDER — LOSARTAN POTASSIUM 25 MG/1
25 TABLET ORAL EVERY MORNING
Status: DISCONTINUED | OUTPATIENT
Start: 2024-10-06 | End: 2024-10-11 | Stop reason: HOSPADM

## 2024-10-05 RX ORDER — FUROSEMIDE 20 MG/1
20 TABLET ORAL EVERY MORNING
COMMUNITY

## 2024-10-05 RX ORDER — PANTOPRAZOLE SODIUM 40 MG/1
40 TABLET, DELAYED RELEASE ORAL
Status: DISCONTINUED | OUTPATIENT
Start: 2024-10-06 | End: 2024-10-11 | Stop reason: HOSPADM

## 2024-10-05 RX ORDER — IPRATROPIUM BROMIDE AND ALBUTEROL SULFATE 2.5; .5 MG/3ML; MG/3ML
1 SOLUTION RESPIRATORY (INHALATION) EVERY 6 HOURS PRN
Status: DISCONTINUED | OUTPATIENT
Start: 2024-10-05 | End: 2024-10-06

## 2024-10-05 RX ORDER — HYDROMORPHONE HCL IN WATER/PF 6 MG/30 ML
0.2 PATIENT CONTROLLED ANALGESIA SYRINGE INTRAVENOUS EVERY 4 HOURS PRN
Status: DISCONTINUED | OUTPATIENT
Start: 2024-10-05 | End: 2024-10-06

## 2024-10-05 RX ORDER — CALCIUM CARBONATE 500 MG/1
1000 TABLET, CHEWABLE ORAL 4 TIMES DAILY PRN
Status: DISCONTINUED | OUTPATIENT
Start: 2024-10-05 | End: 2024-10-11 | Stop reason: HOSPADM

## 2024-10-05 RX ORDER — ROSUVASTATIN CALCIUM 10 MG/1
10 TABLET, COATED ORAL AT BEDTIME
Status: DISCONTINUED | OUTPATIENT
Start: 2024-10-05 | End: 2024-10-11 | Stop reason: HOSPADM

## 2024-10-05 RX ORDER — KETOROLAC TROMETHAMINE 15 MG/ML
15 INJECTION, SOLUTION INTRAMUSCULAR; INTRAVENOUS ONCE
Status: COMPLETED | OUTPATIENT
Start: 2024-10-05 | End: 2024-10-05

## 2024-10-05 RX ORDER — AMOXICILLIN 250 MG
1 CAPSULE ORAL 2 TIMES DAILY PRN
Status: DISCONTINUED | OUTPATIENT
Start: 2024-10-05 | End: 2024-10-11 | Stop reason: HOSPADM

## 2024-10-05 RX ORDER — ONDANSETRON 4 MG/1
4 TABLET, ORALLY DISINTEGRATING ORAL EVERY 6 HOURS PRN
Status: DISCONTINUED | OUTPATIENT
Start: 2024-10-05 | End: 2024-10-06

## 2024-10-05 RX ORDER — OLANZAPINE 2.5 MG/1
7.5 TABLET, FILM COATED ORAL AT BEDTIME
Status: DISCONTINUED | OUTPATIENT
Start: 2024-10-06 | End: 2024-10-11 | Stop reason: HOSPADM

## 2024-10-05 RX ORDER — AMOXICILLIN 250 MG
2 CAPSULE ORAL 2 TIMES DAILY PRN
Status: DISCONTINUED | OUTPATIENT
Start: 2024-10-05 | End: 2024-10-11 | Stop reason: HOSPADM

## 2024-10-05 RX ORDER — ACETAMINOPHEN 500 MG
1000 TABLET ORAL 2 TIMES DAILY
Status: DISCONTINUED | OUTPATIENT
Start: 2024-10-05 | End: 2024-10-05

## 2024-10-05 RX ADMIN — OLANZAPINE 7.5 MG: 5 TABLET, FILM COATED ORAL at 22:10

## 2024-10-05 RX ADMIN — MORPHINE SULFATE 2 MG: 2 INJECTION, SOLUTION INTRAMUSCULAR; INTRAVENOUS at 23:13

## 2024-10-05 RX ADMIN — LATANOPROST 1 DROP: 50 SOLUTION/ DROPS OPHTHALMIC at 23:25

## 2024-10-05 RX ADMIN — MORPHINE SULFATE 2 MG: 2 INJECTION, SOLUTION INTRAMUSCULAR; INTRAVENOUS at 19:59

## 2024-10-05 RX ADMIN — ACETAMINOPHEN 975 MG: 325 TABLET ORAL at 23:17

## 2024-10-05 RX ADMIN — SENNOSIDES AND DOCUSATE SODIUM 1 TABLET: 8.6; 5 TABLET ORAL at 23:17

## 2024-10-05 RX ADMIN — ROSUVASTATIN 10 MG: 10 TABLET, FILM COATED ORAL at 23:17

## 2024-10-05 RX ADMIN — BUSPIRONE HYDROCHLORIDE 5 MG: 5 TABLET ORAL at 23:24

## 2024-10-05 RX ADMIN — KETOROLAC TROMETHAMINE 15 MG: 15 INJECTION, SOLUTION INTRAMUSCULAR; INTRAVENOUS at 19:59

## 2024-10-05 ASSESSMENT — COLUMBIA-SUICIDE SEVERITY RATING SCALE - C-SSRS
2. HAVE YOU ACTUALLY HAD ANY THOUGHTS OF KILLING YOURSELF IN THE PAST MONTH?: NO
1. IN THE PAST MONTH, HAVE YOU WISHED YOU WERE DEAD OR WISHED YOU COULD GO TO SLEEP AND NOT WAKE UP?: NO
6. HAVE YOU EVER DONE ANYTHING, STARTED TO DO ANYTHING, OR PREPARED TO DO ANYTHING TO END YOUR LIFE?: NO

## 2024-10-05 ASSESSMENT — ACTIVITIES OF DAILY LIVING (ADL)
ADLS_ACUITY_SCORE: 44

## 2024-10-05 NOTE — ED TRIAGE NOTES
Pt presents via EMS from TCU.  Pt reports he fell yesterday at 0500 and had leg pain.  Reported pain today and had xray that showed left femur fx.  Leg is shortened and rotated.  Positive CMS in BLE.  Pt reports that he hit head during fall.  Unsure of blood thinners, none listed on pt's med list from TCU.     Triage Assessment (Adult)       Row Name 10/05/24 0899          Triage Assessment    Airway WDL WDL        Respiratory WDL    Respiratory WDL WDL        Skin Circulation/Temperature WDL    Skin Circulation/Temperature WDL WDL        Cardiac WDL    Cardiac WDL WDL        Peripheral/Neurovascular WDL    Peripheral Neurovascular WDL WDL        Cognitive/Neuro/Behavioral WDL    Cognitive/Neuro/Behavioral WDL WDL

## 2024-10-05 NOTE — ED NOTES
Pt does have c spine tenderness midline with palpation.  Declining c collar at this time.  Would like provider to eval.

## 2024-10-05 NOTE — ED PROVIDER NOTES
EMERGENCY DEPARTMENT NOTE     Name: Dominik Rojas    Age/Sex: 83 year old male   MRN: [id number]   Evaluation Date & Time:  10/5/2024  6:34 PM    PCP:    Misael Moe   ED Provider: Tyler Parkinson D.O.       CHIEF COMPLAINT    Leg Pain     HISTORY OF PRESENT ILLNESS   Dominik Rojas is a 83 year old year old male with a relevant past history of COPD, hypertension, hyperlipidemia, PVD, dementia, right lung malignancy without current treatment who presents to the ED  for evaluation of left hip pain.  Patient had recent admission in September 12 through September 16 for metabolic encephalopathy secondary to UTI and is currently at TCU.  Patient reports he was sitting in her recliner chair when he slid forward out of bed falling onto his left side.  He did strike his head but no loss of consciousness.  Patient had persistent pain in his hip to today and x-rays were done at the TCU which showed intertrochanteric versus femoral neck fracture and was transferred to the ED for evaluation.          DIAGNOSIS & DISPOSITION/MEDICAL DECISION MAKING     1. Hip fracture, left, closed, initial encounter (H)        EMERGENCY DEPARTMENT COURSE   6:46 PM I met with the patient to gather history and to perform my initial exam.  We discussed treatment options and the plan for care while in the Emergency Department.  Triage vital signs: /78 temp 99.3 pulse: 1 respiratory rate 16 SpO2 RA 91%  Differential diagnosis considered included but not limited to: Traumatic process including subdural, cervical spine fracture, rib fracture or pneumothorax, hip fracture    MDM:  ED Course as of 10/05/24 2152   Sat Oct 05, 2024   2037 Labs are reviewed.  Hemoglobin 11.9 stable for chronic kidney disease, WBC 14.4, renal function and electrolytes normal.  LFTs normal except for alk phos of 208   2148 Left hip x-ray was reviewed by myself and showed an femoral neck with intertrochanteric involvement fracture of the left hip.  Chest x-ray  "reviewed by myself and showed chronic right pleural effusion but no pneumothorax or obvious rib fracture.  CT of the head was reviewed and interpreted by myself and showed no evidence of subdural or ICH and confirmed by radiology over read.  CT cervical spine without fracture.  Patient was given morphine and Toradol for pain management.  He is encephalopathic after receiving morphine.  He has urge to urinate but is been unable to.  Will check bladder scan and do straight cath if evidence of retention.  Patient will also be given evening dose of Zyprexa.  Discussed case with [facility] and also the hospitalist and arrangements are made for admission     Discharge Vital Signs:/72   Pulse 96   Temp 99.3  F (37.4  C) (Oral)   Resp 16   Ht 1.702 m (5' 7\")   Wt 80.9 kg (178 lb 4.8 oz)   SpO2 94%   BMI 27.93 kg/m     PROCEDURES:   None  Diagnostic studies:  XR Chest 1 View   Final Result   IMPRESSION: Stable enlargement of the cardiomediastinal silhouette. Persistent right pleural effusion with associated compressive atelectasis. Left lung is grossly clear without significant pleural effusion or pneumothorax. No displaced rib fractures are    visualized.      XR Pelvis and Hip Left 2 Views   Final Result   IMPRESSION: There is an acute comminuted left proximal femoral fracture with displacement, impaction and varus alignment.      There is no evidence of a left hip dislocation. Moderate degenerative changes of the underlying left hip.      Bones are demineralized.      Prior aortoiliac stenting. Atherosclerotic vascular calcifications.      NOTE: ABNORMAL REPORT      THE DICTATION ABOVE DESCRIBES AN ABNORMALITY FOR WHICH FOLLOW-UP IS NEEDED.        CT Cervical Spine w/o Contrast   Final Result   IMPRESSION:   1.  No fracture or posttraumatic subluxation.         CT Head w/o Contrast   Final Result   IMPRESSION:   1.  No CT evidence for acute intracranial process.   2.  Chronic changes similar to prior. "        Labs Ordered and Resulted from Time of ED Arrival to Time of ED Departure   BASIC METABOLIC PANEL - Abnormal       Result Value    Sodium 141      Potassium 4.8      Chloride 104      Carbon Dioxide (CO2) 27      Anion Gap 10      Urea Nitrogen 29.1 (*)     Creatinine 0.87      GFR Estimate 86      Calcium 8.8      Glucose 121 (*)    HEPATIC FUNCTION PANEL - Abnormal    Protein Total 6.2 (*)     Albumin 2.8 (*)     Bilirubin Total 0.5      Alkaline Phosphatase 208 (*)     AST 44      ALT 26      Bilirubin Direct 0.20     CBC WITH PLATELETS AND DIFFERENTIAL - Abnormal    WBC Count 14.4 (*)     RBC Count 4.21 (*)     Hemoglobin 11.9 (*)     Hematocrit 37.0 (*)     MCV 88      MCH 28.3      MCHC 32.2      RDW 16.0 (*)     Platelet Count 267      % Neutrophils 76      % Lymphocytes 13      % Monocytes 9      % Eosinophils 1      % Basophils 0      % Immature Granulocytes 0      NRBCs per 100 WBC 0      Absolute Neutrophils 10.9 (*)     Absolute Lymphocytes 1.9      Absolute Monocytes 1.4 (*)     Absolute Eosinophils 0.1      Absolute Basophils 0.0      Absolute Immature Granulocytes 0.1      Absolute NRBCs 0.0     ROUTINE UA WITH MICROSCOPIC REFLEX TO CULTURE     ED INTERVENTIONS     Medications   OLANZapine (zyPREXA) tablet 7.5 mg (has no administration in time range)   ketorolac (TORADOL) injection 15 mg (15 mg Intravenous $Given 10/5/24 1959)   morphine (PF) injection 2 mg (2 mg Intravenous $Given 10/5/24 1959)     TOTAL CRITICAL CARE TIME (EXCLUDING PROCEDURES): Not applicable      DISCHARGE MEDICATIONS        Review of your medicines        UNREVIEWED medicines. Ask your doctor about these medicines        Dose / Directions   acetaminophen 500 MG tablet  Commonly known as: TYLENOL      Dose: 1,000 mg  Take 1,000 mg by mouth 2 times daily.  Refills: 0     aspirin 81 MG chewable tablet  Commonly known as: ASA      Dose: 81 mg  Take 81 mg by mouth every morning.  Refills: 0     busPIRone 5 MG tablet  Commonly  known as: BUSPAR  Indication: Anxiety Disorder      Dose: 5 mg  Take 5 mg by mouth 2 times daily  Refills: 0     DULoxetine 60 MG capsule  Commonly known as: CYMBALTA  Indication: Major Depressive Disorder      Dose: 60 mg  Take 60 mg by mouth every morning.  Refills: 0     furosemide 20 MG tablet  Commonly known as: LASIX  Indication: Cardiac Failure  Ask about: Which instructions should I use?      Dose: 20 mg  Take 20 mg by mouth every morning.  Refills: 0     ipratropium - albuterol 0.5 mg/2.5 mg/3 mL 0.5-2.5 (3) MG/3ML neb solution  Commonly known as: DUONEB      Dose: 1 vial  Take 1 vial by nebulization every 6 hours as needed for wheezing.  Refills: 0     latanoprost 0.005 % ophthalmic solution  Commonly known as: XALATAN      Dose: 1 drop  Place 1 drop into both eyes At Bedtime  Refills: 0     levothyroxine 88 MCG tablet  Commonly known as: SYNTHROID/LEVOTHROID  Indication: Underactive Thyroid      Dose: 88 mcg  Take 88 mcg by mouth daily before breakfast  Refills: 0     lidocaine 5 % patch  Commonly known as: LIDODERM      Dose: 1 patch  Place 1 patch over 12 hours onto the skin every 24 hours. To prevent lidocaine toxicity, patient should be patch free for 12 hrs daily.  Quantity: 9 patch  Refills: 0     losartan 25 MG tablet  Commonly known as: COZAAR  Indication: Left Systolic Heart Failure  Ask about: Which instructions should I use?      Dose: 25 mg  Take 25 mg by mouth every morning.  Refills: 0     multivitamin w/minerals tablet      Dose: 1 tablet  Take 1 tablet by mouth daily  Refills: 0     naproxen sodium 220 MG tablet  Commonly known as: ANAPROX      Dose: 220 mg  Take 220 mg by mouth 2 times daily (with meals).  Refills: 0     OLANZapine 7.5 MG tablet  Commonly known as: zyPREXA      Dose: 7.5 mg  Take 7.5 mg by mouth at bedtime.  Refills: 0     pantoprazole 40 MG EC tablet  Commonly known as: PROTONIX  Used for: Gastroesophageal reflux disease with esophagitis without hemorrhage      Dose: 40  mg  Take 1 tablet (40 mg) by mouth every morning (before breakfast)  Quantity: 30 tablet  Refills: 0     rosuvastatin 10 MG tablet  Commonly known as: CRESTOR  Indication: Disease involving Lipid Deposits in the Arteries      Dose: 10 mg  Take 10 mg by mouth At Bedtime  Refills: 0     senna-docusate 8.6-50 MG tablet  Commonly known as: SENOKOT-S/PERICOLACE  Used for: Slow transit constipation      Dose: 1 tablet  Take 1 tablet by mouth 2 times daily as needed for constipation.  Refills: 0     vitamin D3 50 MCG (2000 UT) Caps      Dose: 1 tablet  Take 1 tablet by mouth daily  Refills: 0            CONTINUE these medicines which have NOT CHANGED        Dose / Directions   Melatonin 10 MG Tabs tablet      Dose: 10 mg  Take 10 mg by mouth at bedtime.  Refills: 0            DISPOSITION: Admit to [facility]    Medical Decision Making    History:  Supplemental history from: NA  External Record(s) reviewed:   Discharge summary from September 16, 2024 reviewed:  History of Present Illness  Dominik Rojas is an 83 year old male who presented with altered mental status.   Hospital Course  Dominik Rojas is a 83 year old male admitted on 9/12/2024. He has h/o COPD on home o2, h/o AMS due to UTI, diastolic CHF, hypothyroidism. He had a fall on 9/8 (4 days ago). He was diagnosed of rib fracture and UTI. He is on Cefpodoxime. He was brought to ER by wife due to worsening agitation and altered mental status.     UTI due to E coli  Acute metabolic and toxic encephalopathy;  -Urine culture on 9/8 + for E Coli  -Continue ceftriaxone which was started in ER. With oral antibiotics (9/8-9/12) and iv Rocephin (9/12-9/16), pt finished the course of antibiotics for UTI  -Low-dose PRN Seroquel for aggressive behaviors  -pt is more sleepy and tired, weaker on 9/14. Hold Zyprexia at night  -PT: recommended TCU.      Elevated alk phos  -No abdominal symptoms, alk phos marginally elevated  -Trend with AM labs     Chronic respiratory failure  with hypoxia  COPD   -DuoNebs as needed   -CXR : no pneumothorax. Small right pleural effusion and right base opacity  -continue O2     Work Up:  Emergent/Severe conditions considered and evaluated for: See differential diagnosis  I independently reviewed and interpreted EKG  In additional to work up documented, I considered the following work up: NA  Medications given that require intensive monitoring for toxicity: NA    External consultation:  Discussion of management with another provider: NA    Complicating factors:  Care impacted by chronic illness:   Care affected by social determinants of health: Access to medical care    Disposition considerations: Admit  Prescriptions considered/prescribed: NA      At the conclusion of the encounter I discussed the results of all of the tests and the disposition. The questions were answered. The patient or family acknowledged understanding and was agreeable with the care plan.            INFORMATION SOURCE AND LIMITATIONS    History/Exam limitations: None  Patient information was obtained from: Patient  Use of : N/A        REVIEW OF SYSTEMS:   All other systems reviewed and are negative except as noted above in HPI.    PATIENT HISTORY     Past Medical History:   Diagnosis Date    AAA (abdominal aortic aneurysm) (H)     AAA (abdominal aortic aneurysm) (H) 11/15/2012    Alcohol dependence in remission (H)     Alcohol use disorder, severe, in sustained remission, dependence (H) 08/16/2021    Anxiety     Atrial fibrillation with normal ventricular rate (H)     Benign prostatic hyperplasia with lower urinary tract symptoms     Bronchiectasis without complication (H)     COPD (chronic obstructive pulmonary disease) (H)     CVA (cerebral vascular accident) (H) 2019    DDD (degenerative disc disease), lumbar     Depression     Depressive disorder     Diabetes (H)     Diastolic dysfunction 01/22/2021    DJD (degenerative joint disease) of knee     Essential hypertension      Glaucoma     Glaucoma     History of stroke without residual deficits     Hyperlipidemia     Hypertension     Hypothyroidism     Hypothyroidism     Kidney stones     Major depression     Memory problem     Nocturia     Obesity     Opioid use disorder, severe, dependence (H) 08/16/2021    Overactive bladder 02/25/2021    Primary osteoarthritis of left knee     Psoriasis     Psoriasis     Seborrheic keratoses     Somnolence, daytime     Stenosis of right carotid artery      Patient Active Problem List   Diagnosis    Abnormal PSA    Colon cancer screening    Routine general medical examination at a health care facility    Special screening for malignant neoplasm of prostate    Acute ischemic right ANDREI stroke (H)    Allergic rhinitis    Asymptomatic stenosis of right carotid artery    Benign essential hypertension    Benign prostatic hyperplasia    Retention of urine    Bronchiectasis without acute exacerbation (H)    Calculus of kidney    Aneurysm, carotid artery, internal    Carotid aneurysm, left (H)    Cholelithiasis    Depression    Depression, major, in remission (H)    Diverticulosis    DJD (degenerative joint disease) of knee    ETOH abuse    Hematuria    Hepatic steatosis    History of cerebrovascular accident    HTN (hypertension)    Hydrocele    Hypercholesteremia    Hypothyroidism    Obesity    Obesity, Class II, BMI 35-39.9, with comorbidity    Ocular hypertension, bilateral    Penile pain    Psoriasis    Psoriatic arthropathy (H)    QT prolongation    Recurrent UTI    Redundant prepuce and phimosis    Stroke-like symptoms    Vomiting and diarrhea    COPD (chronic obstructive pulmonary disease) (H)    Degeneration of lumbar intervertebral disc    Generalized anxiety disorder    History of carotid endarterectomy    History of endovascular stent graft for abdominal aortic aneurysm (AAA)    Hyperbilirubinemia    Intractable low back pain    Lower urinary tract symptoms due to benign prostatic hyperplasia     Memory problem    Metabolic encephalopathy    Normocytic anemia    Other seborrheic keratosis    Overactive bladder    Respiratory failure, post-operative (H)    Spinal stenosis, lumbar region with neurogenic claudication    Vitamin D deficiency    Shortness of breath    Hypoxia    Bilateral lower extremity edema    Acute respiratory failure with hypoxia (H)    Dyspnea, unspecified type    Bronchomalacia    Acute pain of left knee    Macular drusen, bilateral    Age-related cataract of both eyes    Malignant neoplasm of lower lobe of right lung (H)    Pneumonia of right lower lobe due to infectious organism    COPD exacerbation (H)    HCAP (healthcare-associated pneumonia)    Dementia with mood disturbance (H)    Aggressive behavior    History of dementia    Chronic respiratory failure with hypoxia and hypercapnia (H)    Asymptomatic bacteriuria    Acute cystitis without hematuria    Altered mental status, unspecified altered mental status type    Hip fracture, left, closed, initial encounter (H)     Past Surgical History:   Procedure Laterality Date    ABDOMEN SURGERY      ABDOMINAL AORTIC ANEURYSM REPAIR  2013    stent    CAROTID ENDARTERECTOMY Right 9/9/2019    Procedure: RIGHT CAROTID ENDARTERECTOMY;  Surgeon: Miguel Muller MD;  [facility];  Service: General    CIRCUMCISION      CYSTOSCOPY      CYSTOSCOPY PROSTATE W/ LASER N/A 6/12/2018    Procedure:  TRANSURETHRAL RESECTION OF THE PROSTATE WITH QUANTA LASER;  Surgeon: Eze Levi MD;  Location: [facility] OR;  Service:     CYSTOSCOPY PROSTATE W/ LASER N/A 2/18/2020    Procedure: CYSTOSCOPY WITH TRANSURETHRAL INCISION OF THE PROSTATE WITH QUANTA LASER;  Surgeon: Eze Levi MD;  Location: [facility] [place];  Service: Urology    CYSTOSCOPY W/ LITHOLAPAXY / EHL N/A 6/12/2018    Procedure: CYSTOSCOPY AND LITHOLAPAXY;  Surgeon: Eze Levi MD;  Location: [facility] [place];  Service:     IR ABDOMINAL AORTAGRAM   5/19/2020    IR ABDOMINAL AORTIC ANEURYSM ENDOGRAFT  5/19/2020    IR ABDOMINAL AORTOGRAM  5/19/2020    IR ABDOMINAL ENDOVASCULAR STENT GRAFT  5/19/2020    LITHOTRIPSY      PERCUTANEOUS NEPHROSTOLITHOTOMY Right 03/10/2020    ZZC HUANG W/O FACETEC FORAMOT/DSKC 1/2 VRT SEG, LUMBAR Bilateral 3/3/2021    Procedure: Bilateral lumbar 2 - Lumbar 4 decompressive lumbar laminectomy with medial facetectomies;  Surgeon: Renée King MD;  Location: [facility] OR;  Service: Spine       Allergies   Allergen Reactions    Diclofenac      Other reaction(s): GI Upset    Loratadine      Other reaction(s): Urinary Retention    Oxycodone      Agitation     Sertraline Unknown     Other reaction(s): ineffective       OUTPATIENT MEDICATIONS     New Prescriptions    No medications on file      Vitals:    10/05/24 1914 10/05/24 2059 10/05/24 2101 10/05/24 2129   BP: 125/73  116/81 123/72   Pulse: 96 96  96   Resp:       Temp:       TempSrc:       SpO2: 94% 91%  94%   Weight:       Height:           Physical Exam   Constitutional: Oriented to person Appears well-developed and well-nourished.   HEENT:    Head: Atraumatic.   Neck: Tenderness of the mid cervical spine.  Cardiovascular: Normal rate, regular rhythm and normal heart sounds.    Pulmonary/Chest: Normal effort  and breath sounds normal.   Abdominal: Soft. Bowel sounds are normal.   Musculoskeletal: Tenderness of the left hip, left knee and left ankle nontender.  Neurological: Alert and oriented to person Normal strength. No sensory deficit.       DIAGNOSTICS    LABORATORY FINDINGS (REVIEWED AND INTERPRETED):  Labs Ordered and Resulted from Time of ED Arrival to Time of ED Departure   BASIC METABOLIC PANEL - Abnormal       Result Value    Sodium 141      Potassium 4.8      Chloride 104      Carbon Dioxide (CO2) 27      Anion Gap 10      Urea Nitrogen 29.1 (*)     Creatinine 0.87      GFR Estimate 86      Calcium 8.8      Glucose 121 (*)    HEPATIC FUNCTION PANEL - Abnormal     Protein Total 6.2 (*)     Albumin 2.8 (*)     Bilirubin Total 0.5      Alkaline Phosphatase 208 (*)     AST 44      ALT 26      Bilirubin Direct 0.20     CBC WITH PLATELETS AND DIFFERENTIAL - Abnormal    WBC Count 14.4 (*)     RBC Count 4.21 (*)     Hemoglobin 11.9 (*)     Hematocrit 37.0 (*)     MCV 88      MCH 28.3      MCHC 32.2      RDW 16.0 (*)     Platelet Count 267      % Neutrophils 76      % Lymphocytes 13      % Monocytes 9      % Eosinophils 1      % Basophils 0      % Immature Granulocytes 0      NRBCs per 100 WBC 0      Absolute Neutrophils 10.9 (*)     Absolute Lymphocytes 1.9      Absolute Monocytes 1.4 (*)     Absolute Eosinophils 0.1      Absolute Basophils 0.0      Absolute Immature Granulocytes 0.1      Absolute NRBCs 0.0     ROUTINE UA WITH MICROSCOPIC REFLEX TO CULTURE         IMAGING (REVIEWED AND INTERPRETED):  XR Chest 1 View   Final Result   IMPRESSION: Stable enlargement of the cardiomediastinal silhouette. Persistent right pleural effusion with associated compressive atelectasis. Left lung is grossly clear without significant pleural effusion or pneumothorax. No displaced rib fractures are    visualized.      XR Pelvis and Hip Left 2 Views   Final Result   IMPRESSION: There is an acute comminuted left proximal femoral fracture with displacement, impaction and varus alignment.      There is no evidence of a left hip dislocation. Moderate degenerative changes of the underlying left hip.      Bones are demineralized.      Prior aortoiliac stenting. Atherosclerotic vascular calcifications.      NOTE: ABNORMAL REPORT      THE DICTATION ABOVE DESCRIBES AN ABNORMALITY FOR WHICH FOLLOW-UP IS NEEDED.        CT Cervical Spine w/o Contrast   Final Result   IMPRESSION:   1.  No fracture or posttraumatic subluxation.         CT Head w/o Contrast   Final Result   IMPRESSION:   1.  No CT evidence for acute intracranial process.   2.  Chronic changes similar to prior.                       Tyler KEMP  Tesha HINES  EMERGENCY MEDICINE   10/05/24  [facility] EMERGENCY DEPARTMENT  [facility] [id number] [phone]  Dept: [phone]     Tyler Parkinson DO  10/05/24 2156

## 2024-10-05 NOTE — ED NOTES
Bed: [place] [address]  Expected date: 10/5/24  Expected time: 6:30 PM  Means of arrival: Ambulance  Comments:  83M fall with possible femur fx  Norberto

## 2024-10-06 ENCOUNTER — APPOINTMENT (OUTPATIENT)
Dept: RADIOLOGY | Facility: HOSPITAL | Age: 84
DRG: 480 | End: 2024-10-06
Attending: ORTHOPAEDIC SURGERY
Payer: COMMERCIAL

## 2024-10-06 ENCOUNTER — APPOINTMENT (OUTPATIENT)
Dept: CARDIOLOGY | Facility: HOSPITAL | Age: 84
DRG: 480 | End: 2024-10-06
Attending: INTERNAL MEDICINE
Payer: COMMERCIAL

## 2024-10-06 ENCOUNTER — APPOINTMENT (OUTPATIENT)
Dept: RADIOLOGY | Facility: HOSPITAL | Age: 84
DRG: 480 | End: 2024-10-06
Attending: INTERNAL MEDICINE
Payer: COMMERCIAL

## 2024-10-06 ENCOUNTER — ANESTHESIA (OUTPATIENT)
Dept: SURGERY | Facility: HOSPITAL | Age: 84
DRG: 480 | End: 2024-10-06
Payer: COMMERCIAL

## 2024-10-06 ENCOUNTER — ANESTHESIA EVENT (OUTPATIENT)
Dept: SURGERY | Facility: HOSPITAL | Age: 84
DRG: 480 | End: 2024-10-06
Payer: COMMERCIAL

## 2024-10-06 ENCOUNTER — APPOINTMENT (OUTPATIENT)
Dept: RADIOLOGY | Facility: HOSPITAL | Age: 84
DRG: 480 | End: 2024-10-06
Attending: PHYSICIAN ASSISTANT
Payer: COMMERCIAL

## 2024-10-06 ENCOUNTER — APPOINTMENT (OUTPATIENT)
Dept: RADIOLOGY | Facility: HOSPITAL | Age: 84
DRG: 480 | End: 2024-10-06
Payer: COMMERCIAL

## 2024-10-06 LAB
ABO/RH(D): NORMAL
ALBUMIN UR-MCNC: 50 MG/DL
ANION GAP SERPL CALCULATED.3IONS-SCNC: 8 MMOL/L (ref 7–15)
ANTIBODY SCREEN: NEGATIVE
APPEARANCE UR: ABNORMAL
BASE EXCESS BLDV CALC-SCNC: 4.7 MMOL/L (ref -3–3)
BILIRUB UR QL STRIP: NEGATIVE
BUN SERPL-MCNC: 32.1 MG/DL (ref 8–23)
CALCIUM SERPL-MCNC: 8.6 MG/DL (ref 8.8–10.4)
CHLORIDE SERPL-SCNC: 104 MMOL/L (ref 98–107)
CK SERPL-CCNC: 158 U/L (ref 39–308)
COLOR UR AUTO: YELLOW
CREAT SERPL-MCNC: 0.95 MG/DL (ref 0.67–1.17)
EGFRCR SERPLBLD CKD-EPI 2021: 80 ML/MIN/1.73M2
ERYTHROCYTE [DISTWIDTH] IN BLOOD BY AUTOMATED COUNT: 16 % (ref 10–15)
GLUCOSE SERPL-MCNC: 97 MG/DL (ref 70–99)
GLUCOSE UR STRIP-MCNC: NEGATIVE MG/DL
HCO3 BLDV-SCNC: 32 MMOL/L (ref 21–28)
HCO3 SERPL-SCNC: 29 MMOL/L (ref 22–29)
HCT VFR BLD AUTO: 37.8 % (ref 40–53)
HGB BLD-MCNC: 11.9 G/DL (ref 13.3–17.7)
HGB UR QL STRIP: ABNORMAL
HOLD SPECIMEN: NORMAL
HOLD SPECIMEN: NORMAL
KETONES UR STRIP-MCNC: ABNORMAL MG/DL
LEUKOCYTE ESTERASE UR QL STRIP: ABNORMAL
LVEF ECHO: NORMAL
MAGNESIUM SERPL-MCNC: 1.9 MG/DL (ref 1.7–2.3)
MCH RBC QN AUTO: 28.2 PG (ref 26.5–33)
MCHC RBC AUTO-ENTMCNC: 31.5 G/DL (ref 31.5–36.5)
MCV RBC AUTO: 90 FL (ref 78–100)
MUCOUS THREADS #/AREA URNS LPF: PRESENT /LPF
NITRATE UR QL: NEGATIVE
O2/TOTAL GAS SETTING VFR VENT: 32 %
OXYHGB MFR BLDV: 52 % (ref 70–75)
PCO2 BLDV: 57 MM HG (ref 40–50)
PH BLDV: 7.35 [PH] (ref 7.32–7.43)
PH UR STRIP: 5.5 [PH] (ref 5–7)
PLATELET # BLD AUTO: 247 10E3/UL (ref 150–450)
PO2 BLDV: 32 MM HG (ref 25–47)
POTASSIUM SERPL-SCNC: 4.5 MMOL/L (ref 3.4–5.3)
RBC # BLD AUTO: 4.22 10E6/UL (ref 4.4–5.9)
RBC URINE: >182 /HPF
SAO2 % BLDV: 53.1 % (ref 70–75)
SODIUM SERPL-SCNC: 141 MMOL/L (ref 135–145)
SP GR UR STRIP: 1.02 (ref 1–1.03)
SPECIMEN EXPIRATION DATE: NORMAL
T4 FREE SERPL-MCNC: 0.83 NG/DL (ref 0.9–1.7)
TROPONIN T SERPL HS-MCNC: 22 NG/L
TSH SERPL DL<=0.005 MIU/L-ACNC: 6.93 UIU/ML (ref 0.3–4.2)
UROBILINOGEN UR STRIP-MCNC: <2 MG/DL
WBC # BLD AUTO: 13.1 10E3/UL (ref 4–11)
WBC CLUMPS #/AREA URNS HPF: PRESENT /HPF
WBC URINE: >182 /HPF

## 2024-10-06 PROCEDURE — 27244 TREAT THIGH FRACTURE: CPT | Performed by: NURSE ANESTHETIST, CERTIFIED REGISTERED

## 2024-10-06 PROCEDURE — 93306 TTE W/DOPPLER COMPLETE: CPT

## 2024-10-06 PROCEDURE — 93306 TTE W/DOPPLER COMPLETE: CPT | Mod: 26 | Performed by: INTERNAL MEDICINE

## 2024-10-06 PROCEDURE — 86900 BLOOD TYPING SEROLOGIC ABO: CPT | Performed by: ORTHOPAEDIC SURGERY

## 2024-10-06 PROCEDURE — 999N000157 HC STATISTIC RCP TIME EA 10 MIN

## 2024-10-06 PROCEDURE — 250N000009 HC RX 250: Performed by: NURSE ANESTHETIST, CERTIFIED REGISTERED

## 2024-10-06 PROCEDURE — 84443 ASSAY THYROID STIM HORMONE: CPT | Performed by: INTERNAL MEDICINE

## 2024-10-06 PROCEDURE — 27244 TREAT THIGH FRACTURE: CPT | Performed by: ANESTHESIOLOGY

## 2024-10-06 PROCEDURE — 258N000003 HC RX IP 258 OP 636: Performed by: NURSE ANESTHETIST, CERTIFIED REGISTERED

## 2024-10-06 PROCEDURE — 83735 ASSAY OF MAGNESIUM: CPT | Performed by: INTERNAL MEDICINE

## 2024-10-06 PROCEDURE — 250N000011 HC RX IP 250 OP 636: Performed by: INTERNAL MEDICINE

## 2024-10-06 PROCEDURE — 120N000001 HC R&B MED SURG/OB

## 2024-10-06 PROCEDURE — 370N000017 HC ANESTHESIA TECHNICAL FEE, PER MIN: Performed by: ORTHOPAEDIC SURGERY

## 2024-10-06 PROCEDURE — 250N000011 HC RX IP 250 OP 636: Performed by: ANESTHESIOLOGY

## 2024-10-06 PROCEDURE — 99100 ANES PT EXTEME AGE<1 YR&>70: CPT | Performed by: NURSE ANESTHETIST, CERTIFIED REGISTERED

## 2024-10-06 PROCEDURE — 85014 HEMATOCRIT: CPT | Performed by: INTERNAL MEDICINE

## 2024-10-06 PROCEDURE — 250N000011 HC RX IP 250 OP 636: Performed by: PHYSICIAN ASSISTANT

## 2024-10-06 PROCEDURE — 82805 BLOOD GASES W/O2 SATURATION: CPT | Performed by: INTERNAL MEDICINE

## 2024-10-06 PROCEDURE — 36415 COLL VENOUS BLD VENIPUNCTURE: CPT | Performed by: INTERNAL MEDICINE

## 2024-10-06 PROCEDURE — 0QS736Z REPOSITION LEFT UPPER FEMUR WITH INTRAMEDULLARY INTERNAL FIXATION DEVICE, PERCUTANEOUS APPROACH: ICD-10-PCS | Performed by: ORTHOPAEDIC SURGERY

## 2024-10-06 PROCEDURE — 250N000013 HC RX MED GY IP 250 OP 250 PS 637: Performed by: INTERNAL MEDICINE

## 2024-10-06 PROCEDURE — C1713 ANCHOR/SCREW BN/BN,TIS/BN: HCPCS | Performed by: ORTHOPAEDIC SURGERY

## 2024-10-06 PROCEDURE — 272N000001 HC OR GENERAL SUPPLY STERILE: Performed by: ORTHOPAEDIC SURGERY

## 2024-10-06 PROCEDURE — 999N000180 XR SURGERY CARM FLUORO LESS THAN 5 MIN: Mod: TC

## 2024-10-06 PROCEDURE — 999N000141 HC STATISTIC PRE-PROCEDURE NURSING ASSESSMENT: Performed by: ORTHOPAEDIC SURGERY

## 2024-10-06 PROCEDURE — 73070 X-RAY EXAM OF ELBOW: CPT | Mod: LT

## 2024-10-06 PROCEDURE — 250N000011 HC RX IP 250 OP 636

## 2024-10-06 PROCEDURE — 250N000013 HC RX MED GY IP 250 OP 250 PS 637: Performed by: PHYSICIAN ASSISTANT

## 2024-10-06 PROCEDURE — 84439 ASSAY OF FREE THYROXINE: CPT | Performed by: INTERNAL MEDICINE

## 2024-10-06 PROCEDURE — 250N000011 HC RX IP 250 OP 636: Performed by: NURSE ANESTHETIST, CERTIFIED REGISTERED

## 2024-10-06 PROCEDURE — 99207 PR CDG-CUT & PASTE-POTENTIAL IMPACT ON LEVEL: CPT | Performed by: INTERNAL MEDICINE

## 2024-10-06 PROCEDURE — 258N000003 HC RX IP 258 OP 636: Performed by: PHYSICIAN ASSISTANT

## 2024-10-06 PROCEDURE — 99140 ANES COMP EMERGENCY COND: CPT | Performed by: NURSE ANESTHETIST, CERTIFIED REGISTERED

## 2024-10-06 PROCEDURE — 87186 SC STD MICRODIL/AGAR DIL: CPT | Performed by: PHYSICIAN ASSISTANT

## 2024-10-06 PROCEDURE — 80048 BASIC METABOLIC PNL TOTAL CA: CPT | Performed by: INTERNAL MEDICINE

## 2024-10-06 PROCEDURE — 86901 BLOOD TYPING SEROLOGIC RH(D): CPT | Performed by: ORTHOPAEDIC SURGERY

## 2024-10-06 PROCEDURE — 250N000011 HC RX IP 250 OP 636: Performed by: ORTHOPAEDIC SURGERY

## 2024-10-06 PROCEDURE — 710N000009 HC RECOVERY PHASE 1, LEVEL 1, PER MIN: Performed by: ORTHOPAEDIC SURGERY

## 2024-10-06 PROCEDURE — 99233 SBSQ HOSP IP/OBS HIGH 50: CPT | Performed by: INTERNAL MEDICINE

## 2024-10-06 PROCEDURE — 82550 ASSAY OF CK (CPK): CPT | Performed by: INTERNAL MEDICINE

## 2024-10-06 PROCEDURE — 84484 ASSAY OF TROPONIN QUANT: CPT | Performed by: INTERNAL MEDICINE

## 2024-10-06 PROCEDURE — 360N000084 HC SURGERY LEVEL 4 W/ FLUORO, PER MIN: Performed by: ORTHOPAEDIC SURGERY

## 2024-10-06 PROCEDURE — 71045 X-RAY EXAM CHEST 1 VIEW: CPT

## 2024-10-06 PROCEDURE — 999N000065 XR PELVIS AND HIP PORTABLE LEFT 1 VIEW

## 2024-10-06 PROCEDURE — C1769 GUIDE WIRE: HCPCS | Performed by: ORTHOPAEDIC SURGERY

## 2024-10-06 PROCEDURE — 250N000025 HC SEVOFLURANE, PER MIN: Performed by: ORTHOPAEDIC SURGERY

## 2024-10-06 PROCEDURE — 81001 URINALYSIS AUTO W/SCOPE: CPT | Performed by: PHYSICIAN ASSISTANT

## 2024-10-06 PROCEDURE — 250N000011 HC RX IP 250 OP 636: Mod: JZ | Performed by: ANESTHESIOLOGY

## 2024-10-06 DEVICE — SCREW BN 40MM 5MM LCK X25 STRL IM NL 04.045.040S: Type: IMPLANTABLE DEVICE | Site: HIP | Status: FUNCTIONAL

## 2024-10-06 DEVICE — 9MM/125 DEG TI CANN TFNA 170MM - STERILE
Type: IMPLANTABLE DEVICE | Site: HIP | Status: FUNCTIONAL
Brand: TFN-ADVANCE

## 2024-10-06 DEVICE — IMP SCR SYN TFNA FENESTRATED LAG 95MM 04.038.195S: Type: IMPLANTABLE DEVICE | Site: HIP | Status: FUNCTIONAL

## 2024-10-06 RX ORDER — FENTANYL CITRATE 50 UG/ML
25 INJECTION, SOLUTION INTRAMUSCULAR; INTRAVENOUS EVERY 5 MIN PRN
Status: DISCONTINUED | OUTPATIENT
Start: 2024-10-06 | End: 2024-10-06 | Stop reason: HOSPADM

## 2024-10-06 RX ORDER — ACETAMINOPHEN 325 MG/1
975 TABLET ORAL ONCE
Status: DISCONTINUED | OUTPATIENT
Start: 2024-10-06 | End: 2024-10-06 | Stop reason: HOSPADM

## 2024-10-06 RX ORDER — SODIUM CHLORIDE, SODIUM LACTATE, POTASSIUM CHLORIDE, CALCIUM CHLORIDE 600; 310; 30; 20 MG/100ML; MG/100ML; MG/100ML; MG/100ML
INJECTION, SOLUTION INTRAVENOUS CONTINUOUS
Status: DISCONTINUED | OUTPATIENT
Start: 2024-10-06 | End: 2024-10-06 | Stop reason: HOSPADM

## 2024-10-06 RX ORDER — ONDANSETRON 2 MG/ML
4 INJECTION INTRAMUSCULAR; INTRAVENOUS EVERY 30 MIN PRN
Status: DISCONTINUED | OUTPATIENT
Start: 2024-10-06 | End: 2024-10-06 | Stop reason: HOSPADM

## 2024-10-06 RX ORDER — MAGNESIUM SULFATE 4 G/50ML
4 INJECTION INTRAVENOUS ONCE
Status: DISCONTINUED | OUTPATIENT
Start: 2024-10-06 | End: 2024-10-06 | Stop reason: HOSPADM

## 2024-10-06 RX ORDER — IPRATROPIUM BROMIDE AND ALBUTEROL SULFATE 2.5; .5 MG/3ML; MG/3ML
3 SOLUTION RESPIRATORY (INHALATION)
Status: DISCONTINUED | OUTPATIENT
Start: 2024-10-06 | End: 2024-10-06

## 2024-10-06 RX ORDER — CEFAZOLIN SODIUM/WATER 2 G/20 ML
2 SYRINGE (ML) INTRAVENOUS
Status: COMPLETED | OUTPATIENT
Start: 2024-10-06 | End: 2024-10-06

## 2024-10-06 RX ORDER — LIDOCAINE HYDROCHLORIDE 10 MG/ML
INJECTION, SOLUTION INFILTRATION; PERINEURAL PRN
Status: DISCONTINUED | OUTPATIENT
Start: 2024-10-06 | End: 2024-10-06

## 2024-10-06 RX ORDER — HYDROMORPHONE HYDROCHLORIDE 1 MG/ML
0.3 INJECTION, SOLUTION INTRAMUSCULAR; INTRAVENOUS; SUBCUTANEOUS
Status: DISCONTINUED | OUTPATIENT
Start: 2024-10-06 | End: 2024-10-11 | Stop reason: HOSPADM

## 2024-10-06 RX ORDER — ACETAMINOPHEN 325 MG/1
650 TABLET ORAL EVERY 4 HOURS PRN
Status: DISCONTINUED | OUTPATIENT
Start: 2024-10-09 | End: 2024-10-08

## 2024-10-06 RX ORDER — CEFAZOLIN SODIUM/WATER 2 G/20 ML
2 SYRINGE (ML) INTRAVENOUS SEE ADMIN INSTRUCTIONS
Status: DISCONTINUED | OUTPATIENT
Start: 2024-10-06 | End: 2024-10-06 | Stop reason: HOSPADM

## 2024-10-06 RX ORDER — DEXMEDETOMIDINE HYDROCHLORIDE 4 UG/ML
INJECTION, SOLUTION INTRAVENOUS
Status: DISCONTINUED
Start: 2024-10-06 | End: 2024-10-06 | Stop reason: HOSPADM

## 2024-10-06 RX ORDER — HYDROXYZINE HYDROCHLORIDE 10 MG/1
10 TABLET, FILM COATED ORAL EVERY 6 HOURS PRN
Status: DISCONTINUED | OUTPATIENT
Start: 2024-10-06 | End: 2024-10-08

## 2024-10-06 RX ORDER — IPRATROPIUM BROMIDE AND ALBUTEROL SULFATE 2.5; .5 MG/3ML; MG/3ML
3 SOLUTION RESPIRATORY (INHALATION) EVERY 6 HOURS PRN
Status: DISCONTINUED | OUTPATIENT
Start: 2024-10-06 | End: 2024-10-11 | Stop reason: HOSPADM

## 2024-10-06 RX ORDER — AMOXICILLIN 250 MG
1 CAPSULE ORAL 2 TIMES DAILY
Status: DISCONTINUED | OUTPATIENT
Start: 2024-10-06 | End: 2024-10-06

## 2024-10-06 RX ORDER — ASPIRIN 81 MG/1
81 TABLET, CHEWABLE ORAL 2 TIMES DAILY
Status: DISCONTINUED | OUTPATIENT
Start: 2024-10-07 | End: 2024-10-11 | Stop reason: HOSPADM

## 2024-10-06 RX ORDER — ONDANSETRON 4 MG/1
4 TABLET, ORALLY DISINTEGRATING ORAL EVERY 6 HOURS PRN
Status: DISCONTINUED | OUTPATIENT
Start: 2024-10-06 | End: 2024-10-11 | Stop reason: HOSPADM

## 2024-10-06 RX ORDER — FUROSEMIDE 10 MG/ML
20 INJECTION INTRAMUSCULAR; INTRAVENOUS ONCE
Status: COMPLETED | OUTPATIENT
Start: 2024-10-06 | End: 2024-10-06

## 2024-10-06 RX ORDER — FENTANYL CITRATE 50 UG/ML
100 INJECTION, SOLUTION INTRAMUSCULAR; INTRAVENOUS ONCE
Status: COMPLETED | OUTPATIENT
Start: 2024-10-06 | End: 2024-10-06

## 2024-10-06 RX ORDER — BISACODYL 10 MG
10 SUPPOSITORY, RECTAL RECTAL DAILY PRN
Status: DISCONTINUED | OUTPATIENT
Start: 2024-10-06 | End: 2024-10-11 | Stop reason: HOSPADM

## 2024-10-06 RX ORDER — NALOXONE HYDROCHLORIDE 0.4 MG/ML
0.1 INJECTION, SOLUTION INTRAMUSCULAR; INTRAVENOUS; SUBCUTANEOUS
Status: DISCONTINUED | OUTPATIENT
Start: 2024-10-06 | End: 2024-10-06 | Stop reason: HOSPADM

## 2024-10-06 RX ORDER — ONDANSETRON 2 MG/ML
4 INJECTION INTRAMUSCULAR; INTRAVENOUS EVERY 6 HOURS PRN
Status: DISCONTINUED | OUTPATIENT
Start: 2024-10-06 | End: 2024-10-11 | Stop reason: HOSPADM

## 2024-10-06 RX ORDER — CEFAZOLIN SODIUM 2 G/100ML
2 INJECTION, SOLUTION INTRAVENOUS EVERY 8 HOURS
Status: COMPLETED | OUTPATIENT
Start: 2024-10-06 | End: 2024-10-07

## 2024-10-06 RX ORDER — FENTANYL CITRATE 50 UG/ML
50 INJECTION, SOLUTION INTRAMUSCULAR; INTRAVENOUS EVERY 5 MIN PRN
Status: DISCONTINUED | OUTPATIENT
Start: 2024-10-06 | End: 2024-10-06 | Stop reason: HOSPADM

## 2024-10-06 RX ORDER — HYDROMORPHONE HCL IN WATER/PF 6 MG/30 ML
0.1 PATIENT CONTROLLED ANALGESIA SYRINGE INTRAVENOUS
Status: DISCONTINUED | OUTPATIENT
Start: 2024-10-06 | End: 2024-10-11 | Stop reason: HOSPADM

## 2024-10-06 RX ORDER — ACETAMINOPHEN 325 MG/1
975 TABLET ORAL EVERY 8 HOURS
Status: DISCONTINUED | OUTPATIENT
Start: 2024-10-06 | End: 2024-10-06

## 2024-10-06 RX ORDER — ONDANSETRON 2 MG/ML
INJECTION INTRAMUSCULAR; INTRAVENOUS PRN
Status: DISCONTINUED | OUTPATIENT
Start: 2024-10-06 | End: 2024-10-06

## 2024-10-06 RX ORDER — SODIUM CHLORIDE, SODIUM LACTATE, POTASSIUM CHLORIDE, CALCIUM CHLORIDE 600; 310; 30; 20 MG/100ML; MG/100ML; MG/100ML; MG/100ML
INJECTION, SOLUTION INTRAVENOUS CONTINUOUS
Status: DISCONTINUED | OUTPATIENT
Start: 2024-10-06 | End: 2024-10-07

## 2024-10-06 RX ORDER — DEXAMETHASONE SODIUM PHOSPHATE 10 MG/ML
INJECTION, SOLUTION INTRAMUSCULAR; INTRAVENOUS PRN
Status: DISCONTINUED | OUTPATIENT
Start: 2024-10-06 | End: 2024-10-06

## 2024-10-06 RX ORDER — SODIUM CHLORIDE, SODIUM LACTATE, POTASSIUM CHLORIDE, CALCIUM CHLORIDE 600; 310; 30; 20 MG/100ML; MG/100ML; MG/100ML; MG/100ML
INJECTION, SOLUTION INTRAVENOUS CONTINUOUS PRN
Status: DISCONTINUED | OUTPATIENT
Start: 2024-10-06 | End: 2024-10-06

## 2024-10-06 RX ORDER — POLYETHYLENE GLYCOL 3350 17 G/17G
17 POWDER, FOR SOLUTION ORAL DAILY
Status: DISCONTINUED | OUTPATIENT
Start: 2024-10-07 | End: 2024-10-11 | Stop reason: HOSPADM

## 2024-10-06 RX ORDER — FENTANYL CITRATE 50 UG/ML
INJECTION, SOLUTION INTRAMUSCULAR; INTRAVENOUS
Status: COMPLETED
Start: 2024-10-06 | End: 2024-10-06

## 2024-10-06 RX ORDER — PROCHLORPERAZINE MALEATE 5 MG/1
5 TABLET ORAL EVERY 6 HOURS PRN
Status: DISCONTINUED | OUTPATIENT
Start: 2024-10-06 | End: 2024-10-11 | Stop reason: HOSPADM

## 2024-10-06 RX ORDER — TRANEXAMIC ACID 650 MG/1
1950 TABLET ORAL ONCE
Status: DISCONTINUED | OUTPATIENT
Start: 2024-10-06 | End: 2024-10-06 | Stop reason: HOSPADM

## 2024-10-06 RX ORDER — TRAMADOL HYDROCHLORIDE 50 MG/1
50 TABLET ORAL EVERY 6 HOURS PRN
Status: DISCONTINUED | OUTPATIENT
Start: 2024-10-06 | End: 2024-10-11 | Stop reason: HOSPADM

## 2024-10-06 RX ORDER — PROPOFOL 10 MG/ML
INJECTION, EMULSION INTRAVENOUS PRN
Status: DISCONTINUED | OUTPATIENT
Start: 2024-10-06 | End: 2024-10-06

## 2024-10-06 RX ORDER — LIDOCAINE 40 MG/G
CREAM TOPICAL
Status: DISCONTINUED | OUTPATIENT
Start: 2024-10-06 | End: 2024-10-06 | Stop reason: HOSPADM

## 2024-10-06 RX ORDER — DEXAMETHASONE SODIUM PHOSPHATE 10 MG/ML
4 INJECTION, SOLUTION INTRAMUSCULAR; INTRAVENOUS
Status: DISCONTINUED | OUTPATIENT
Start: 2024-10-06 | End: 2024-10-06 | Stop reason: HOSPADM

## 2024-10-06 RX ORDER — HYDROMORPHONE HCL IN WATER/PF 6 MG/30 ML
0.4 PATIENT CONTROLLED ANALGESIA SYRINGE INTRAVENOUS EVERY 5 MIN PRN
Status: DISCONTINUED | OUTPATIENT
Start: 2024-10-06 | End: 2024-10-06 | Stop reason: HOSPADM

## 2024-10-06 RX ORDER — ONDANSETRON 4 MG/1
4 TABLET, ORALLY DISINTEGRATING ORAL EVERY 30 MIN PRN
Status: DISCONTINUED | OUTPATIENT
Start: 2024-10-06 | End: 2024-10-06 | Stop reason: HOSPADM

## 2024-10-06 RX ORDER — HYDROMORPHONE HCL IN WATER/PF 6 MG/30 ML
0.2 PATIENT CONTROLLED ANALGESIA SYRINGE INTRAVENOUS EVERY 5 MIN PRN
Status: DISCONTINUED | OUTPATIENT
Start: 2024-10-06 | End: 2024-10-06 | Stop reason: HOSPADM

## 2024-10-06 RX ORDER — BUPIVACAINE HYDROCHLORIDE 2.5 MG/ML
INJECTION, SOLUTION EPIDURAL; INFILTRATION; INTRACAUDAL
Status: COMPLETED | OUTPATIENT
Start: 2024-10-06 | End: 2024-10-06

## 2024-10-06 RX ORDER — LIDOCAINE 40 MG/G
CREAM TOPICAL
Status: DISCONTINUED | OUTPATIENT
Start: 2024-10-06 | End: 2024-10-06

## 2024-10-06 RX ADMIN — DEXAMETHASONE SODIUM PHOSPHATE 4 MG: 10 INJECTION, SOLUTION INTRAMUSCULAR; INTRAVENOUS at 12:20

## 2024-10-06 RX ADMIN — FENTANYL CITRATE 25 MCG: 50 INJECTION, SOLUTION INTRAMUSCULAR; INTRAVENOUS at 11:16

## 2024-10-06 RX ADMIN — PANTOPRAZOLE SODIUM 40 MG: 40 TABLET, DELAYED RELEASE ORAL at 08:30

## 2024-10-06 RX ADMIN — PROPOFOL 120 MG: 10 INJECTION, EMULSION INTRAVENOUS at 11:47

## 2024-10-06 RX ADMIN — HYDROMORPHONE HYDROCHLORIDE 0.2 MG: 0.2 INJECTION, SOLUTION INTRAMUSCULAR; INTRAVENOUS; SUBCUTANEOUS at 00:34

## 2024-10-06 RX ADMIN — SODIUM CHLORIDE, POTASSIUM CHLORIDE, SODIUM LACTATE AND CALCIUM CHLORIDE: 600; 310; 30; 20 INJECTION, SOLUTION INTRAVENOUS at 11:00

## 2024-10-06 RX ADMIN — ONDANSETRON 4 MG: 2 INJECTION INTRAMUSCULAR; INTRAVENOUS at 12:20

## 2024-10-06 RX ADMIN — HYDROMORPHONE HYDROCHLORIDE 0.2 MG: 0.2 INJECTION, SOLUTION INTRAMUSCULAR; INTRAVENOUS; SUBCUTANEOUS at 06:14

## 2024-10-06 RX ADMIN — FENTANYL CITRATE 25 MCG: 50 INJECTION, SOLUTION INTRAMUSCULAR; INTRAVENOUS at 13:27

## 2024-10-06 RX ADMIN — ACETAMINOPHEN 975 MG: 325 TABLET ORAL at 08:30

## 2024-10-06 RX ADMIN — BUSPIRONE HYDROCHLORIDE 5 MG: 5 TABLET ORAL at 20:28

## 2024-10-06 RX ADMIN — CEFAZOLIN SODIUM 2 G: 2 INJECTION, SOLUTION INTRAVENOUS at 20:28

## 2024-10-06 RX ADMIN — FUROSEMIDE 20 MG: 20 TABLET ORAL at 08:30

## 2024-10-06 RX ADMIN — SODIUM CHLORIDE, POTASSIUM CHLORIDE, SODIUM LACTATE AND CALCIUM CHLORIDE: 600; 310; 30; 20 INJECTION, SOLUTION INTRAVENOUS at 16:33

## 2024-10-06 RX ADMIN — OLANZAPINE 7.5 MG: 2.5 TABLET, FILM COATED ORAL at 21:58

## 2024-10-06 RX ADMIN — ROCURONIUM BROMIDE 40 MG: 50 INJECTION, SOLUTION INTRAVENOUS at 11:48

## 2024-10-06 RX ADMIN — METOPROLOL SUCCINATE 12.5 MG: 25 TABLET, EXTENDED RELEASE ORAL at 08:30

## 2024-10-06 RX ADMIN — FENTANYL CITRATE 25 MCG: 50 INJECTION, SOLUTION INTRAMUSCULAR; INTRAVENOUS at 13:40

## 2024-10-06 RX ADMIN — LEVOTHYROXINE SODIUM 88 MCG: 88 TABLET ORAL at 08:30

## 2024-10-06 RX ADMIN — BUSPIRONE HYDROCHLORIDE 5 MG: 5 TABLET ORAL at 08:30

## 2024-10-06 RX ADMIN — LOSARTAN POTASSIUM 25 MG: 25 TABLET, FILM COATED ORAL at 08:30

## 2024-10-06 RX ADMIN — PHENYLEPHRINE HYDROCHLORIDE 0.5 MCG/KG/MIN: 10 INJECTION INTRAVENOUS at 11:52

## 2024-10-06 RX ADMIN — SUGAMMADEX 200 MG: 100 INJECTION, SOLUTION INTRAVENOUS at 12:56

## 2024-10-06 RX ADMIN — DULOXETINE HYDROCHLORIDE 60 MG: 60 CAPSULE, DELAYED RELEASE PELLETS ORAL at 08:32

## 2024-10-06 RX ADMIN — HYDROMORPHONE HYDROCHLORIDE 0.3 MG: 1 INJECTION, SOLUTION INTRAMUSCULAR; INTRAVENOUS; SUBCUTANEOUS at 20:55

## 2024-10-06 RX ADMIN — SENNOSIDES AND DOCUSATE SODIUM 1 TABLET: 8.6; 5 TABLET ORAL at 20:28

## 2024-10-06 RX ADMIN — ACETAMINOPHEN 975 MG: 325 TABLET ORAL at 17:58

## 2024-10-06 RX ADMIN — LIDOCAINE HYDROCHLORIDE 50 MG: 10 INJECTION, SOLUTION INFILTRATION; PERINEURAL at 11:47

## 2024-10-06 RX ADMIN — Medication 2 G: at 11:56

## 2024-10-06 RX ADMIN — DEXMEDETOMIDINE HYDROCHLORIDE 12 MCG: 100 INJECTION, SOLUTION INTRAVENOUS at 11:12

## 2024-10-06 RX ADMIN — PHENYLEPHRINE HYDROCHLORIDE 200 MCG: 10 INJECTION INTRAVENOUS at 11:50

## 2024-10-06 RX ADMIN — FUROSEMIDE 20 MG: 10 INJECTION, SOLUTION INTRAMUSCULAR; INTRAVENOUS at 05:54

## 2024-10-06 RX ADMIN — BUPIVACAINE HYDROCHLORIDE 30 ML: 2.5 INJECTION, SOLUTION EPIDURAL; INFILTRATION; INTRACAUDAL; PERINEURAL at 11:25

## 2024-10-06 ASSESSMENT — ACTIVITIES OF DAILY LIVING (ADL)
ADLS_ACUITY_SCORE: 47
ADLS_ACUITY_SCORE: 47
ADLS_ACUITY_SCORE: 38
ADLS_ACUITY_SCORE: 47
ADLS_ACUITY_SCORE: 38
ADLS_ACUITY_SCORE: 47
ADLS_ACUITY_SCORE: 47
ADLS_ACUITY_SCORE: 38
ADLS_ACUITY_SCORE: 47
ADLS_ACUITY_SCORE: 38
ADLS_ACUITY_SCORE: 47
ADLS_ACUITY_SCORE: 38
ADLS_ACUITY_SCORE: 38
ADLS_ACUITY_SCORE: 47
ADLS_ACUITY_SCORE: 38
ADLS_ACUITY_SCORE: 47
ADLS_ACUITY_SCORE: 38
ADLS_ACUITY_SCORE: 47

## 2024-10-06 ASSESSMENT — COPD QUESTIONNAIRES: COPD: 1

## 2024-10-06 NOTE — PROGRESS NOTES
Pt admitted to unit from ED at 2300. Pt was restless, pain 9/10 in LLE. Scheduled IV morphine given as well as tylenol and other HS meds. Pt settled into the room. Pt called his wife. Repositioned pt, bed alarm activated. Report given to NOC RN.

## 2024-10-06 NOTE — ANESTHESIA PROCEDURE NOTES
"Fascia iliaca Procedure Note    Pre-Procedure   Staff -        Anesthesiologist:  Delta Herrmann MD       Performed By: anesthesiologist       Location: pre-op       Procedure Start/Stop Times: 10/6/2024 11:25 AM and 10/6/2024 11:28 AM       Pre-Anesthestic Checklist: patient identified, IV checked, site marked, risks and benefits discussed, informed consent, monitors and equipment checked, pre-op evaluation, at physician/surgeon's request and post-op pain management  Timeout:       Correct Patient: Yes        Correct Procedure: Yes        Correct Site: Yes        Correct Position: Yes        Correct Laterality: Yes        Site Marked: Yes  Procedure Documentation  Procedure: Fascia iliaca       Laterality: left       Patient Position: supine       Patient Prep/Sterile Barriers: sterile gloves, mask       Skin prep: Chloraprep       Needle Type: short bevel and insulated       Needle Gauge: 20.        Needle Length (Inches): 4        Ultrasound guided       1. Ultrasound was used to identify targeted nerve, plexus, vascular marker, or fascial plane and place a needle adjacent to it in real-time.       2. Ultrasound was used to visualize the spread of anesthetic in close proximity to the above referenced structure.       3. A permanent image is entered into the patient's record.       4. The visualized anatomic structures appeared normal.       5. There were no apparent abnormal pathologic findings.    Assessment/Narrative         The placement was negative for: blood aspirated, painful injection and site bleeding       Paresthesias: No.       Bolus given via needle..        Secured via.        Complications: none    Medication(s) Administered   Bupivacaine 0.25% PF (Infiltration) - Infiltration   30 mL - 10/6/2024 11:25:00 AM  Medication Administration Time: 10/6/2024 11:25 AM      FOR [facility] ([facility] [place] [place]) ONLY:   Pain Team Contact information: please page the Pain Team Via [employer]. Search \"Pain\". During " daytime hours, please page the attending first. At night please page the resident first.

## 2024-10-06 NOTE — SIGNIFICANT EVENT
Significant Event Note    Time of event: 4:12 AM October 6, 2024    Description of event:  Paged by the patient's nurse for concerns of increasing oxygen demands and new coarse breath sounds. Went to evaluate the patient who is resting comfortably in bed and does not appear to be in any acute respiratory distress. Saturating around 92% on 5L of oxygen via NC. The patient does have some crackles heard at the bilateral lung bases R>L. Mild wheezing bilaterally and coarse upper airway noises. The patient does not appear to be overtly volume overloaded with history of hear failure. Trace bilateral lower extremity edema.     Paged respiratory who agree that continuing oxygen via NC at this time is appropriate. It does not appear that the patient requires BiPAP at this time.    Chest x-ray obtained showing small interval change in right lung base atelectasis and pleural effusion.      Plan:  Increased oxygen demands in the setting of right pleural effusion   - Titrate oxygen via NC to maintain Sp02 of 92%  - Give 20 mg IV Lasix     Discussed with: bedside nurse    Halie Aguilar DO

## 2024-10-06 NOTE — PROGRESS NOTES
[facility]    Medicine Progress Note - Hospitalist Service    Date of Admission:  10/5/2024    Assessment & Plan     Dominik Rojas is a 83 year old male with past medical history of COPD on 2 to 3 L nasal cannula at home, chronic systolic heart failure, hypertension, hypothyroidism, anxiety, recent hospitalization from 9/stable to 9/16 for UTI, metabolic encephalopathy and discharged to TCU.  Patient brought to ED for evaluation of left hip pain after fall.  Patient found to have left proximal femur fracture with displacement.  Patient admitted for further management     Mechanical fall per history;  Acute comminuted left proximal femur fracture;  Left elbow contusion  -- Patient reported he was on a recliner chair when he tried to get up and turn then slide forward and fall.  Denied chest pain, dizziness, shortness of breath prior to or after fall  --s/p left hip trochanteric nailing with cephalomedullary device on 10/6/24.  Orthopedic surgery assistance appreciated   --Check left elbow xray  --activity/dvt proph per surgery     Chronic systolic heart failure;  --nt-BNP nml, Troponin T-HS 22.  --TTE LVEF 60-65%, Left ventricular diastolic function is abnormal, right ventricle is normal in size and function, No significant valve disease, no RWMA's  -- PTA Lasix, losartan, Toprol-XL     COPD  Acute on Chronic hypoxic respiratory failure on 2-3l NC;  --CXR reported right pleural effusion but also reports chronic.   --VBG, Nebs  --incentive spirometry, flutter valve  --oximetry  -Titrate FiO2 to maintain sPO2 88-94%     Leukocytosis;  -- No reported febrile illness but patient reports urinary frequency, dysuria  -- Patient treated for UTI recently.  Multiple culture results reviewed.  Ordered UA, but specimen not collected  --Likely reactive rather than infectious  -CBC monitoring     History of RLL adenocarcinoma s/p radiation therapy     Anxiety and depression;  Dementia and mood disorder  "per previous chart;  Acute encephalopathy, unspecified  -CT Head/C spine negative for acute process  -delirium monitoring  -VBG  -minimize deliriogenic meds  -Cymbalta, Zyprexa     HLD  -Crestor     GERD  -PPI     Hypothyroidism  -check TSH  -Synthroid          Diet: NPO per Anesthesia Guidelines for Procedure/Surgery Except for: Meds    DVT Prophylaxis: Pneumatic Compression Devices  Cabral Catheter: Not present  Lines: None     Cardiac Monitoring: ACTIVE order. Indication: Procedural area  Code Status: Full Code      Clinically Significant Risk Factors Present on Admission              # Hypoalbuminemia: Lowest albumin = 2.8 g/dL at 10/5/2024  6:59 PM, will monitor as appropriate   # Drug Induced Platelet Defect: home medication list includes an antiplatelet medication   # Hypertension: Noted on problem list   # Dementia: noted on problem list       # Overweight: Estimated body mass index is 27.93 kg/m  as calculated from the following:    Height as of this encounter: 1.702 m (5' 7\").    Weight as of this encounter: 80.9 kg (178 lb 4.8 oz).       # Financial/Environmental Concerns:           Disposition Plan     Medically Ready for Discharge: Anticipated in 2-4 Days             Miguel Celis DO, DO  Hospitalist Service  [facility]  Securely message with Kartela (more info)  Text page via [employer] Paging/Directory   ______________________________________________________________________    Interval History   Afebrile. Lethargic this morning. Did not c/o pain.    Physical Exam   Vital Signs: Temp: 99  F (37.2  C) Temp src: Oral BP: 131/77 Pulse: 93   Resp: 19 SpO2: 98 % O2 Device: Oxymask Oxygen Delivery: 4 LPM  Weight: 178 lbs 4.8 oz    Gen lethargic, nad  Eyes anicteric  Cv rrr  Lungs cta ant. Non labored  Abd bs+, nttp  Neuro opens eyes to command, lethargic, did not want to engage in conversation    Lab/imaging reviewed  "

## 2024-10-06 NOTE — PLAN OF CARE
Problem: Gas Exchange Impaired  Goal: Optimal Gas Exchange  Outcome: Progressing     Problem: Mobility Impairment  Goal: Optimal Mobility  Outcome: Not Progressing     Problem: Pain Acute  Goal: Optimal Pain Control and Function  Outcome: Progressing     Problem: Hip Fracture Medical Management  Goal: Fracture Stability  Outcome: Progressing  Intervention: Promote Fracture Stability and Healing  Flowsheets (Taken 10/6/2024 0600)  Equipment: ice  Fracture Immobilization: supported with pillows     Goal Outcome Evaluation:    NPO since midnight for surgery today at 1120 to repair left femur fracture. Significant left hip pain especially with movement; managed with ice, pain medication, pillow support and repositioning. PRN IV dilaudid 0.2 mg given x 2.

## 2024-10-06 NOTE — ANESTHESIA CARE TRANSFER NOTE
Patient: Dominik Rojas    Procedure: Procedure(s):  INTERNAL FIXATION, FRACTURE, TROCHANTERIC, HIP, USING PINS OR RODS       Diagnosis: Hip fracture, left, closed, initial encounter (H) [S72.002A]  Diagnosis Additional Information: No value filed.    Anesthesia Type:   General     Note:    Oropharynx: oropharynx clear of all foreign objects  Level of Consciousness: drowsy  Oxygen Supplementation: face mask  Level of Supplemental Oxygen (L/min / FiO2): 10  Independent Airway: airway patency satisfactory and stable  Dentition: dentition unchanged  Vital Signs Stable: post-procedure vital signs reviewed and stable  Report to RN Given: handoff report given  Patient transferred to: PACU    Handoff Report: Identifed the Patient, Identified the Reponsible Provider, Reviewed the pertinent medical history, Discussed the surgical course, Reviewed Intra-OP anesthesia mangement and issues during anesthesia, Set expectations for post-procedure period and Allowed opportunity for questions and acknowledgement of understanding      Vitals:  Vitals Value Taken Time   /81 10/06/24 1308   Temp 36  C (96.8  F) 10/06/24 1309   Pulse 79 10/06/24 1309   Resp 18 10/06/24 1309   SpO2 95 % 10/06/24 1309   Vitals shown include unfiled device data.    Electronically Signed By: CAYLA Meza CRNA  October 6, 2024  1:10 PM

## 2024-10-06 NOTE — PROVIDER NOTIFICATION
Patient O2 sats decreased from low 90s down to 85% while on oxygen 3 L nasal cannula. Increased oxygen to 5 L. Wet breath sounds. Coarse lung sounds. Has been NPO for surgery today at 1120 for left femur fracture. Called and spoke to Dr. Aguilar. Chest xray ordered. Will come to unit to see patient.    Addendum: New order for IV lasix 20 mg once.

## 2024-10-06 NOTE — MEDICATION SCRIBE - ADMISSION MEDICATION HISTORY
Medication Scribe Admission Medication History    Admission medication history is complete. The information provided in this note is only as accurate as the sources available at the time of the update.    Information Source(s): Facility (U/NH/) medication list/MAR and CareEverywhere/SureScripts via in-person    Pertinent Information: Patient comes from [facility] [phone], MAR/med list sent with patient utilized for med hx. Pt did not receive HS doses of medications today 10/5/24.    Changes made to PTA medication list:  Added: Calmoseptine oint  Deleted: None  Changed: None    Allergies reviewed with patient and updates made in EHR: yes    Medication History Completed By: Armaan Belcher 10/5/2024 9:45 PM    PTA Med List   Medication Sig Last Dose    acetaminophen (TYLENOL) 500 MG tablet Take 1,000 mg by mouth 2 times daily. 10/5/2024 at 0800    aspirin (ASA) 81 MG chewable tablet Take 81 mg by mouth every morning. 10/5/2024 at 0800    busPIRone (BUSPAR) 5 MG tablet Take 5 mg by mouth 2 times daily 10/5/2024 at 0800    Cholecalciferol (VITAMIN D3) 50 MCG (2000 UT) CAPS Take 1 tablet by mouth every morning. 10/5/2024 at 0800    DULoxetine (CYMBALTA) 60 MG capsule Take 60 mg by mouth every morning. 10/5/2024 at 0800    furosemide (LASIX) 20 MG tablet Take 20 mg by mouth every morning. 10/5/2024 at 0800    ipratropium - albuterol 0.5 mg/2.5 mg/3 mL (DUONEB) 0.5-2.5 (3) MG/3ML neb solution Take 1 vial by nebulization every 6 hours as needed for wheezing. Unknown at PRN    latanoprost (XALATAN) 0.005 % ophthalmic solution Place 1 drop into both eyes At Bedtime 10/4/2024 at 2000    levothyroxine (SYNTHROID/LEVOTHROID) 88 MCG tablet Take 88 mcg by mouth daily before breakfast 10/5/2024 at 0700    lidocaine (LIDODERM) 5 % patch Place 1 patch over 12 hours onto the skin every 24 hours. To prevent lidocaine toxicity, patient should be patch free for 12 hrs daily. 10/5/2024 at 1229    losartan  (COZAAR) 25 MG tablet Take 25 mg by mouth every morning. 10/5/2024 at 0800    Melatonin 10 MG TABS tablet Take 10 mg by mouth at bedtime. 10/4/2024 at 2000    menthol-zinc oxide (CALMOSEPTINE) 0.44-20.6 % OINT ointment Apply topically 3 times daily. Apply to saccral, coccyx, and groin area(s) topically three times daily for prevention. 10/5/2024 at AM (1st administration of day))    metoprolol succinate ER (TOPROL-XL) 12.5 mg TABS 24 hr half-tab Take 12.5 mg by mouth every morning. 10/5/2024 at 0800    multivitamin w/minerals (THERA-VIT-M) tablet Take 1 tablet by mouth every morning. 10/5/2024 at 0800    naproxen sodium (ANAPROX) 220 MG tablet Take 220 mg by mouth 2 times daily (with meals). 10/5/2024 at 1600 (2nd dose)    OLANZapine (ZYPREXA) 7.5 MG tablet Take 7.5 mg by mouth at bedtime. 10/4/2024 at 2000    pantoprazole (PROTONIX) 40 MG EC tablet Take 1 tablet (40 mg) by mouth every morning (before breakfast) 10/5/2024 at 0700    rosuvastatin (CRESTOR) 10 MG tablet Take 10 mg by mouth at bedtime. 10/4/2024 at 2000    senna-docusate (SENOKOT-S/PERICOLACE) 8.6-50 MG tablet Take 1 tablet by mouth 2 times daily as needed for constipation. Unknown at PRN

## 2024-10-06 NOTE — PLAN OF CARE
Patient with Left IT hip fracture  - Plan for surgery tomorrow with Dr. Oliver.  Tentatively on for 11:30.  - NPO  - Medical optimization   - Bed rest pending surgery  - Formal consult in AM.

## 2024-10-06 NOTE — ANESTHESIA PROCEDURE NOTES
Airway       Patient location during procedure: OR       Procedure Start/Stop Times: 10/6/2024 11:51 AM  Staff -        CRNA: Cristin Dias APRN CRNA       Performed By: CRNA  Consent for Airway        Urgency: elective  Indications and Patient Condition       Indications for airway management: pat-procedural       Induction type:intravenous       Mask difficulty assessment: 1 - vent by mask    Final Airway Details       Final airway type: endotracheal airway       Successful airway: ETT - single  Endotracheal Airway Details        ETT size (mm): 7.5       Cuffed: yes       Successful intubation technique: direct laryngoscopy       DL Blade Type: Beckett 2       Grade View of Cords: 1       Adjucts: stylet       Position: Right       Measured from: gums/teeth       Secured at (cm): 22       Bite block used: None    Post intubation assessment        Placement verified by: capnometry, equal breath sounds and chest rise        Number of attempts at approach: 1       Number of other approaches attempted: 0       Secured with: tape       Ease of procedure: easy       Dentition: Dental injury    Medication(s) Administered   Medication Administration Time: 10/6/2024 11:51 AM

## 2024-10-06 NOTE — ED NOTES
Pt requesting urinal to void. He was unable to void after several minutes. He reports feeling of urgency to void for the past half hour and has been able to void normally at home. He also reports tenderness and bleeding at his testicles. Pt reports he will use the call light if he feels he can use the urinal. Ankit WHEATLEY updated.

## 2024-10-06 NOTE — ANESTHESIA PREPROCEDURE EVALUATION
Anesthesia Pre-Procedure Evaluation    Patient: Dominik Rojas   MRN: [id number] : 1940        Procedure : Procedure(s):  INTERNAL FIXATION, FRACTURE, TROCHANTERIC, HIP, USING PINS OR RODS          Past Medical History:   Diagnosis Date    AAA (abdominal aortic aneurysm) (H)     s/p stenting    AAA (abdominal aortic aneurysm) (H) 11/15/2012    AAA (abdominal aortic aneurysm) CT 11-12  6.4 cm. Saw Dr Muller 1-13;  S/P endovascular repair 3-13;  s/p stenting      Alcohol dependence in remission (H)     Alcohol use disorder, severe, in sustained remission, dependence (H) 2021    Anxiety     Atrial fibrillation with normal ventricular rate (H)     Benign prostatic hyperplasia with lower urinary tract symptoms     Bronchiectasis without complication (H)     2020    COPD (chronic obstructive pulmonary disease) (H)     CVA (cerebral vascular accident) (H)     DDD (degenerative disc disease), lumbar     Depression     Depressive disorder     Diabetes (H)     Diastolic dysfunction 2021    DJD (degenerative joint disease) of knee     left    Essential hypertension     Glaucoma     Glaucoma     History of stroke without residual deficits     Hyperlipidemia     Hypertension     Hypothyroidism     Hypothyroidism     Kidney stones     Major depression     Memory problem     Nocturia     Obesity     Opioid use disorder, severe, dependence (H) 2021    Overactive bladder 2021    Primary osteoarthritis of left knee     Psoriasis     Psoriasis     Seborrheic keratoses     Somnolence, daytime     Stenosis of right carotid artery     history of TIA's      Past Surgical History:   Procedure Laterality Date    ABDOMEN SURGERY      ABDOMINAL AORTIC ANEURYSM REPAIR  2013    stent    CAROTID ENDARTERECTOMY Right 2019    Procedure: RIGHT CAROTID ENDARTERECTOMY;  Surgeon: Miguel Muller MD;  Location: [facility];  Service: General    CIRCUMCISION      CYSTOSCOPY      CYSTOSCOPY  PROSTATE W/ LASER N/A 2018    Procedure:  TRANSURETHRAL RESECTION OF THE PROSTATE WITH QUANTA LASER;  Surgeon: Eze Levi MD;  Location: [facility] OR;  Service:     CYSTOSCOPY PROSTATE W/ LASER N/A 2020    Procedure: CYSTOSCOPY WITH TRANSURETHRAL INCISION OF THE PROSTATE WITH QUANTA LASER;  Surgeon: Eze Levi MD;  Location: [facility] OR;  Service: Urology    CYSTOSCOPY W/ LITHOLAPAXY / [facility] N/A 2018    Procedure: CYSTOSCOPY AND LITHOLAPAXY;  Surgeon: Eze Levi MD;  Location: [facility] OR;  Service:     IR ABDOMINAL AORTAGRAM  2020    IR ABDOMINAL AORTIC ANEURYSM ENDOGRAFT  2020    IR ABDOMINAL AORTOGRAM  2020    IR ABDOMINAL ENDOVASCULAR STENT GRAFT  2020    LITHOTRIPSY      PERCUTANEOUS NEPHROSTOLITHOTOMY Right 03/10/2020    ZZC HUANG W/O FACETEC FORAMOT/[facility]  VRT SEG, LUMBAR Bilateral 3/3/2021    Procedure: Bilateral lumbar 2 - Lumbar 4 decompressive lumbar laminectomy with medial facetectomies;  Surgeon: Renée King MD;  [place] [facility]  Service: Spine      Allergies   Allergen Reactions    Diclofenac      Other reaction(s): GI Upset    Loratadine      Other reaction(s): Urinary Retention    Oxycodone      Agitation     Sertraline Unknown     Other reaction(s): ineffective      Social History     Tobacco Use    Smoking status: Former     Current packs/day: 0.00     Average packs/day: 0.5 packs/day for 35.0 years (17.5 ttl pk-yrs)     Types: Cigarettes     Start date: 1955     Quit date: 1990     Years since quittin.7    Smokeless tobacco: Never    Tobacco comments:     quit    Substance Use Topics    Alcohol use: No     Comment: Alcoholic Drinks/day: quit       Wt Readings from Last 1 Encounters:   10/05/24 80.9 kg (178 lb 4.8 oz)        Anesthesia Evaluation            ROS/MED HX  ENT/Pulmonary:     (+)                          COPD,              Neurologic:     (+)   dementia,       CVA,     "TIA,                  Cardiovascular:     (+)  hypertension- Peripheral Vascular Disease-- Carotid Stenosis and Non Symptomatic.   -  - -   Taking blood thinners                              Previous cardiac testing     METS/Exercise Tolerance:     Hematologic:       Musculoskeletal:   (+)  arthritis,             GI/Hepatic:     (+)             liver disease,       Renal/Genitourinary:     (+) renal disease, type: CRI,            Endo:     (+)  type II DM,        thyroid problem,     Obesity,       Psychiatric/Substance Use:     (+) psychiatric history depression alcohol abuse      Infectious Disease:       Malignancy:   (+) Malignancy, History of Lung.    Other:            Physical Exam    Airway  airway exam normal      Mallampati: II   TM distance: > 3 FB   Neck ROM: full   Mouth opening: > 3 cm    Respiratory Devices and Support         Dental       (+) Edentulous      Cardiovascular   cardiovascular exam normal       Rhythm and rate: regular and normal     Pulmonary   pulmonary exam normal        breath sounds clear to auscultation           OUTSIDE LABS:  CBC:   Lab Results   Component Value Date    WBC 13.1 (H) 10/06/2024    WBC 14.4 (H) 10/05/2024    HGB 11.9 (L) 10/06/2024    HGB 11.9 (L) 10/05/2024    HCT 37.8 (L) 10/06/2024    HCT 37.0 (L) 10/05/2024     10/06/2024     10/05/2024     BMP:   Lab Results   Component Value Date     10/06/2024     10/05/2024    POTASSIUM 4.5 10/06/2024    POTASSIUM 4.8 10/05/2024    CHLORIDE 104 10/06/2024    CHLORIDE 104 10/05/2024    CO2 29 10/06/2024    CO2 27 10/05/2024    BUN 32.1 (H) 10/06/2024    BUN 29.1 (H) 10/05/2024    CR 0.95 10/06/2024    CR 0.87 10/05/2024    GLC 97 10/06/2024     (H) 10/05/2024     COAGS:   Lab Results   Component Value Date    PTT 34 02/27/2021    INR 1.16 (H) 04/24/2023     POC: No results found for: \"BGM\", \"HCG\", \"HCGS\"  HEPATIC:   Lab Results   Component Value Date    ALBUMIN 2.8 (L) 10/05/2024    PROTTOTAL " "6.2 (L) 10/05/2024    ALT 26 10/05/2024    AST 44 10/05/2024    ALKPHOS 208 (H) 10/05/2024    BILITOTAL 0.5 10/05/2024    DEANA 51 (H) 08/23/2021     OTHER:   Lab Results   Component Value Date    PH 7.38 08/16/2021    LACT 1.0 10/05/2024    A1C 5.8 (H) 09/12/2024    CHELI 8.6 (L) 10/06/2024    MAG 1.9 10/06/2024    LIPASE 24 02/22/2020    TSH 2.37 06/29/2024    TSH 2.32 06/29/2024    T4 1.32 01/23/2023    T3 92 08/10/2021    CRP 0.4 09/09/2021       Anesthesia Plan    ASA Status:  3, emergent    NPO Status:  NPO Appropriate    Anesthesia Type: General.     - Airway: ETT   Induction: Intravenous, Propofol.   Maintenance: TIVA.   Techniques and Equipment:       - Drips/Meds: Dexmed. bolus     Consents    Anesthesia Plan(s) and associated risks, benefits, and realistic alternatives discussed. Questions answered and patient/representative(s) expressed understanding.     - Discussed:     - Discussed with:  Spouse, Patient      - Extended Intubation/Ventilatory Support Discussed: No.      - Patient is DNR/DNI Status: No     Use of blood products discussed: No .     Postoperative Care    Pain management: IV analgesics, Multi-modal analgesia, Peripheral nerve block (Single Shot).   PONV prophylaxis: Ondansetron (or other 5HT-3), Dexamethasone or Solumedrol     Comments:               Delta Herrmann MD    I have reviewed the pertinent notes and labs in the chart from the past 30 days and (re)examined the patient.  Any updates or changes from those notes are reflected in this note.          # Hypoalbuminemia: Lowest albumin = 2.8 g/dL at 10/5/2024  6:59 PM, will monitor as appropriate   # Drug Induced Platelet Defect: home medication list includes an antiplatelet medication   # Hypertension: Noted on problem list   # Dementia: noted on problem list       # Overweight: Estimated body mass index is 27.93 kg/m  as calculated from the following:    Height as of this encounter: 1.702 m (5' 7\").    Weight as of this " encounter: 80.9 kg (178 lb 4.8 oz).       # Financial/Environmental Concerns:

## 2024-10-06 NOTE — PROGRESS NOTES
[facility]AT Treatment Plan    Patient Score: 10  Patient Acuity: 4    Clinical Indication for Therapy: history of bronchospasm    Therapy Ordered: Duoneb/flutter prn    Assessment Summary: Pt admitted for fall/femur fracture.  History of COPD, wears 2-3L @ basline, now on 4L.  Lung sounds scattered wheeze, pt denies SOB, states breathing feels fine, no increased WOB, refuses nebulizer and flutter valve.  Will change therapy to prn per refusal.      Winter Condon, RT  10/6/2024

## 2024-10-06 NOTE — PROGRESS NOTES
Pt reported severe pain in left hip. Increases with movement. Repositioned, prn IV dilaudid 0.2 mg given and ice pack applied. Measures effective.

## 2024-10-06 NOTE — CONSULTS
ORTHOPEDIC CONSULTATION    IMPRESSION:  Closed, left hip intertrochanteric fracture     PLAN:  This fracture will require surgical intervention.  Plan for surgery later today.  Please keep n.p.o.  Discussed this in detail with the patient and he is comfortable with the plan.  Plan clark fixation of the fracture, which will allow for early postoperative mobilization and weightbearing.  Pain control and bedrest for now.    Thank you for this consultation and allowing [facility] to participate in the care of your patient  _______________________________________________________    CHIEF COMPLAINT: Left hip fracture      HISTORY OF PRESENT ILLNESS:  The patient is seen in orthopedic consultation at the request of Dr. Celis, Miguel Vick DO.  The patient is a 83 year old male with cardiac history, COPD, diabetes and multiple other medical issues who presented after a ground-level fall at home.  Emergency department x-rays showed left intertrochanteric hip fracture.  Admitted for management.  Denies pain at any other sites.    PAST MEDICAL HISTORY:     Past Medical History:   Diagnosis Date    AAA (abdominal aortic aneurysm) (H)     s/p stenting    AAA (abdominal aortic aneurysm) (H) 11/15/2012    AAA (abdominal aortic aneurysm) CT 11-12  6.4 cm. Saw Dr Muller 1-13;  S/P endovascular repair 3-13;  s/p stenting      Alcohol dependence in remission (H)     Alcohol use disorder, severe, in sustained remission, dependence (H) 08/16/2021    Anxiety     Atrial fibrillation with normal ventricular rate (H)     Benign prostatic hyperplasia with lower urinary tract symptoms     Bronchiectasis without complication (H)     2/27/2020    COPD (chronic obstructive pulmonary disease) (H)     CVA (cerebral vascular accident) (H) 2019    DDD (degenerative disc disease), lumbar     Depression     Depressive disorder     Diabetes (H)     Diastolic dysfunction 01/22/2021    DJD (degenerative joint disease) of knee     left     Essential hypertension     Glaucoma     Glaucoma     History of stroke without residual deficits     Hyperlipidemia     Hypertension     Hypothyroidism     Hypothyroidism     Kidney stones     Major depression     Memory problem     Nocturia     Obesity     Opioid use disorder, severe, dependence (H) 08/16/2021    Overactive bladder 02/25/2021    Primary osteoarthritis of left knee     Psoriasis     Psoriasis     Seborrheic keratoses     Somnolence, daytime     Stenosis of right carotid artery     history of TIA's       ALLERGIES:   Diclofenac, Loratadine, Oxycodone, and Sertraline    MEDICATIONS ON ADMISSION:  Medications were reviewed.  They do include:   Medications Prior to Admission   Medication Sig Dispense Refill Last Dose    acetaminophen (TYLENOL) 500 MG tablet Take 1,000 mg by mouth 2 times daily.   10/5/2024 at 0800    aspirin (ASA) 81 MG chewable tablet Take 81 mg by mouth every morning.   10/5/2024 at 0800    busPIRone (BUSPAR) 5 MG tablet Take 5 mg by mouth 2 times daily   10/5/2024 at 0800    Cholecalciferol (VITAMIN D3) 50 MCG (2000 UT) CAPS Take 1 tablet by mouth every morning.   10/5/2024 at 0800    DULoxetine (CYMBALTA) 60 MG capsule Take 60 mg by mouth every morning.   10/5/2024 at 0800    furosemide (LASIX) 20 MG tablet Take 20 mg by mouth every morning.   10/5/2024 at 0800    ipratropium - albuterol 0.5 mg/2.5 mg/3 mL (DUONEB) 0.5-2.5 (3) MG/3ML neb solution Take 1 vial by nebulization every 6 hours as needed for wheezing.   Unknown at PRN    latanoprost (XALATAN) 0.005 % ophthalmic solution Place 1 drop into both eyes At Bedtime   10/4/2024 at 2000    levothyroxine (SYNTHROID/LEVOTHROID) 88 MCG tablet Take 88 mcg by mouth daily before breakfast   10/5/2024 at 0700    lidocaine (LIDODERM) 5 % patch Place 1 patch over 12 hours onto the skin every 24 hours. To prevent lidocaine toxicity, patient should be patch free for 12 hrs daily. 9 patch 0 10/5/2024 at 1229    losartan (COZAAR) 25 MG tablet  Take 25 mg by mouth every morning.   10/5/2024 at 0800    Melatonin 10 MG TABS tablet Take 10 mg by mouth at bedtime.   10/4/2024 at 2000    menthol-zinc oxide (CALMOSEPTINE) 0.44-20.6 % OINT ointment Apply topically 3 times daily. Apply to saccral, coccyx, and groin area(s) topically three times daily for prevention.   10/5/2024 at AM (1st administration of day))    metoprolol succinate ER (TOPROL-XL) 12.5 mg TABS 24 hr half-tab Take 12.5 mg by mouth every morning.   10/5/2024 at 0800    multivitamin w/minerals (THERA-VIT-M) tablet Take 1 tablet by mouth every morning.   10/5/2024 at 0800    naproxen sodium (ANAPROX) 220 MG tablet Take 220 mg by mouth 2 times daily (with meals).   10/5/2024 at 1600 (2nd dose)    OLANZapine (ZYPREXA) 7.5 MG tablet Take 7.5 mg by mouth at bedtime.   10/4/2024 at 2000    pantoprazole (PROTONIX) 40 MG EC tablet Take 1 tablet (40 mg) by mouth every morning (before breakfast) 30 tablet 0 10/5/2024 at 0700    rosuvastatin (CRESTOR) 10 MG tablet Take 10 mg by mouth at bedtime.   10/4/2024 at 2000    senna-docusate (SENOKOT-S/PERICOLACE) 8.6-50 MG tablet Take 1 tablet by mouth 2 times daily as needed for constipation.   Unknown at PRN       SOCIAL HISTORY:    reports that he quit smoking about 34 years ago. His smoking use included cigarettes. He started smoking about 69 years ago. He has a 17.5 pack-year smoking history. He has never used smokeless tobacco. He reports that he does not drink alcohol and does not use drugs.     FAMILY HISTORY:  Family History   Problem Relation Age of Onset    Emphysema Mother     No Known Problems Father     Cirrhosis Father     No Known Problems Sister     No Known Problems Brother     No Known Problems Maternal Aunt     No Known Problems Maternal Uncle     No Known Problems Paternal Aunt     No Known Problems Paternal Uncle     No Known Problems Maternal Grandmother     No Known Problems Maternal Grandfather     No Known Problems Paternal Grandmother      No Known Problems Paternal Grandfather     No Known Problems Other        REVIEW OF SYSTEMS:   See Admission History and Physical    PHYSICAL EXAMINATION:  General: On examination, the patient is A&Ox3  SKIN/HAIR/NAILS: normal   Pulses: Dorsalis pedis pulse intact.    Sensation: Intact to light touch  Tenderness: General Tenderness around the left hip  ROM: Hip range of motion not tested.  Ankle range of motion full.  Pain with ROM: Pain with logrolling left hip.  Motor: EHL/FHL/dorsiflexion and plantarflexion motor function intact    Temp:  [99  F (37.2  C)-99.3  F (37.4  C)] 99  F (37.2  C)  Pulse:  [] 82  Resp:  [16-18] 18  BP: (116-176)/() 127/104  SpO2:  [91 %-95 %] 91 %    RADIOGRAPHIC EVALUATION:  AP pelvis and left hip x-rays show mildly comminuted intertrochanteric fracture of the left hip    LABORATORY DATA:   CBC RESULTS:   Recent Labs   Lab Test 10/06/24  0558   WBC 13.1*   RBC 4.22*   HGB 11.9*   HCT 37.8*   MCV 90   MCH 28.2   MCHC 31.5   RDW 16.0*        Last Comprehensive Metabolic Panel:  Sodium   Date Value Ref Range Status   10/06/2024 141 135 - 145 mmol/L Final     Potassium   Date Value Ref Range Status   10/06/2024 4.5 3.4 - 5.3 mmol/L Final   03/02/2022 4.5 3.5 - 5.0 mmol/L Final     Chloride   Date Value Ref Range Status   10/06/2024 104 98 - 107 mmol/L Final   03/02/2022 105 98 - 107 mmol/L Final     Carbon Dioxide (CO2)   Date Value Ref Range Status   10/06/2024 29 22 - 29 mmol/L Final   03/02/2022 27 22 - 31 mmol/L Final     Anion Gap   Date Value Ref Range Status   10/06/2024 8 7 - 15 mmol/L Final   03/02/2022 11 5 - 18 mmol/L Final     Glucose   Date Value Ref Range Status   10/06/2024 97 70 - 99 mg/dL Final   03/02/2022 95 70 - 125 mg/dL Final     GLUCOSE BY METER POCT   Date Value Ref Range Status   09/15/2024 96 70 - 99 mg/dL Final     Urea Nitrogen   Date Value Ref Range Status   10/06/2024 32.1 (H) 8.0 - 23.0 mg/dL Final   03/02/2022 16 8 - 28 mg/dL Final      Creatinine   Date Value Ref Range Status   10/06/2024 0.95 0.67 - 1.17 mg/dL Final     GFR Estimate   Date Value Ref Range Status   10/06/2024 80 >60 mL/min/1.73m2 Final     Comment:     eGFR calculated using 2021 CKD-EPI equation.   06/22/2021 >60 >60 mL/min/1.73m2 Final     GFR, ESTIMATED POCT   Date Value Ref Range Status   07/17/2023 55 (L) >60 mL/min/1.73m2 Final     Calcium   Date Value Ref Range Status   10/06/2024 8.6 (L) 8.8 - 10.4 mg/dL Final     Comment:     Reference intervals for this test were updated on 7/16/2024 to reflect our healthy population more accurately. There may be differences in the flagging of prior results with similar values performed with this method. Those prior results can be interpreted in the context of the updated reference intervals.     INR   Date Value Ref Range Status   04/24/2023 1.16 (H) 0.85 - 1.15 Final            Tad Oliver DO

## 2024-10-06 NOTE — ANESTHESIA POSTPROCEDURE EVALUATION
Patient: Dominik Rojas    Procedure: Procedure(s):  INTERNAL FIXATION, FRACTURE, TROCHANTERIC, HIP, USING PINS OR RODS       Anesthesia Type:  General    Note:  Disposition: Inpatient   Postop Pain Control: Uneventful            Sign Out: Well controlled pain   PONV: No   Neuro/Psych: Uneventful            Sign Out: Acceptable/Baseline neuro status   Airway/Respiratory: Uneventful            Sign Out: Acceptable/Baseline resp. status   CV/Hemodynamics: Uneventful            Sign Out: Acceptable CV status; No obvious hypovolemia; No obvious fluid overload   Other NRE: NONE   DID A NON-ROUTINE EVENT OCCUR? No           Last vitals:  Vitals Value Taken Time   /88 10/06/24 1330   Temp 36.1  C (96.98  F) 10/06/24 1337   Pulse 88 10/06/24 1337   Resp 17 10/06/24 1337   SpO2 97 % 10/06/24 1337   Vitals shown include unfiled device data.    Electronically Signed By: Delta Herrmann MD  October 6, 2024  1:39 PM

## 2024-10-06 NOTE — OP NOTE
Operative Report    PATIENT Dominik Rojas   DATE OF SURGERY:  10/5/2024 - 10/6/2024      PREOPERATIVE DIAGNOSIS   Hip fracture, left, closed, initial encounter (H) [S72.002A].    POSTOPERATIVE DIAGNOSIS   Hip fracture, left, closed, initial encounter (H) [S72.002A].    PROCEDURE PERFORMED   left hip trochanteric nailing with cephalomedullary device    IMPLANTS  See [facility]    SURGEON  Tad Oliver DO    ASSISTANT   Fidel Correa PA-C; assistant was required for patient positioning, surgical assistance, wound closure and monitoring patient's safety throughout the case.    ANESTHESIA  Choice with Block      FINDINGS:  Comminuted left Intertrochanteric femur fracture    SPECIMENS:  none    ESTIMATED BLOOD LOSS:  Less than 50 ml    COMPLICATIONS   None.        INDICATION FOR PROCEDURE  Dominik Rojas is a 83 year old male who sustained a left intertrochanteric hip fracture after a mechanical fall.  This is an unstable fracture pattern requiring operative intervention.  The risks, benefits and expected outcomes of the procedure were discussed with the patient.  Questions were answered fully.  The patient signed informed consent prior to the procedure.    INFORMED CONSENT  Dominik Rojas was identified in the preoperative holding area and was identified using medical record number, name, and date of birth, all of which were confirmed. The operative hip was marked using an indelible marker. Once again, all risks and benefits as well as alternatives to surgical intervention were discussed with the patient in detail and all their questions were answered. Risks discussed included but were not limited to: persistent pain, infection, bleeding, scarring, stiffness, thromboembolic events, fracture, malalignment/malrotation, malunion/nonunion, implant complications, severe limb dysfunction, loss of limb, and loss of life. The patient signed informed consent and wished to proceed with surgery as scheduled.     DESCRIPTION OF  PROCEDURE   Dominik Rojas was brought back to the operating room.  Spinal anesthesia was achieved without difficulty.  The patient was then transferred to the fracture table.  Arms were tucked, and the nonoperative leg was placed in a well-leg navarrete and secured.  All bony prominences were well-padded.  The operative leg was placed in the traction boot.  Fluoroscopy was used to confirm good reduction on the fracture table on AP and lateral views.  The patient was then prepped and draped in the usual sterile fashion.    A timeout was performed prior to the procedure.  Three separate staff members confirmed the patient's name, correct site and side of surgery and procedure being performed.  Antibiotics were confirmed to be given prior to incision.  Implants were confirmed to be available and ready.    C-arm was used to guide an incision proximally above the tip of the trochanter.  I split the gluteal fascia and musculature and palpated the starting point for the guide pin and placed the guidepin.  Its position was confirmed on C-arm.  I then used the starting reamer to open the hole for the nail in the tip of the trochanter.  Appropriate neck shaft angle nail was chosen based on the C-arm images. The short nail was then passed without difficulty.  I then made a separate incision for placement of the lag screw.  The appropriate aiming device was utilized and the pin was placed in a center center position.  This was confirmed on AP and lateral C-arm images and then it was measured for length and the lag screw was inserted to the appropriate depth.  Fracture was compressed as much as possible, but with comminution of the lesser trochanter I elected to fully lock the screw.  Distal locking was performed through yet another incision.  After placement of this final images were obtained both at the hip and distally to assure acceptable hardware position.    I then performed a lavage, then closure of the wound in layers  with 0 Vicryl for fascia, 2-0 Vicryl for the subcutaneous tissue, and then staples. The standard dressing was applied, and the patient was taken to Recovery in stable condition.     Comments:  postop plan: Weight-bear as tolerated left lower extremity with walker.  PT OT for mobilization.  Pain control.  Aspirin 81 mg twice daily for DVT prophylaxis unless on previous blood thinners.  Follow-up in 2 weeks for incisional check and repeat x-rays.    Implant Name Type Inv. Item Serial No.  Lot No. LRB No. Used Action   De[employer] Synthes 9mm/ 125 deg ti carlos alberto tfna    Depuy [id number] Left 1 Implanted   IMP SCR SYN TFNA FENESTRATED LAG 95MM [id number] - [id number] Metallic Hardware/[employer] IMP SCR SYN TFNA FENESTRATED LAG 95MM [id number]  [facility]  Left 1 Implanted       Tad Oliver DO

## 2024-10-07 ENCOUNTER — APPOINTMENT (OUTPATIENT)
Dept: OCCUPATIONAL THERAPY | Facility: HOSPITAL | Age: 84
DRG: 480 | End: 2024-10-07
Attending: PHYSICIAN ASSISTANT
Payer: COMMERCIAL

## 2024-10-07 ENCOUNTER — APPOINTMENT (OUTPATIENT)
Dept: PHYSICAL THERAPY | Facility: HOSPITAL | Age: 84
DRG: 480 | End: 2024-10-07
Attending: PHYSICIAN ASSISTANT
Payer: COMMERCIAL

## 2024-10-07 LAB
ALBUMIN SERPL BCG-MCNC: 2.8 G/DL (ref 3.5–5.2)
ALP SERPL-CCNC: 212 U/L (ref 40–150)
ALT SERPL W P-5'-P-CCNC: 24 U/L (ref 0–70)
ANION GAP SERPL CALCULATED.3IONS-SCNC: 18 MMOL/L (ref 7–15)
AST SERPL W P-5'-P-CCNC: 45 U/L (ref 0–45)
BASE EXCESS BLDV CALC-SCNC: 0.9 MMOL/L (ref -3–3)
BASOPHILS # BLD MANUAL: 0 10E3/UL (ref 0–0.2)
BASOPHILS NFR BLD MANUAL: 0 %
BILIRUB SERPL-MCNC: 0.5 MG/DL
BUN SERPL-MCNC: 38.6 MG/DL (ref 8–23)
CALCIUM SERPL-MCNC: 8.4 MG/DL (ref 8.8–10.4)
CHLORIDE SERPL-SCNC: 101 MMOL/L (ref 98–107)
CREAT SERPL-MCNC: 1.24 MG/DL (ref 0.67–1.17)
EGFRCR SERPLBLD CKD-EPI 2021: 58 ML/MIN/1.73M2
EOSINOPHIL # BLD MANUAL: 0 10E3/UL (ref 0–0.7)
EOSINOPHIL NFR BLD MANUAL: 0 %
ERYTHROCYTE [DISTWIDTH] IN BLOOD BY AUTOMATED COUNT: 16 % (ref 10–15)
GLUCOSE BLDC GLUCOMTR-MCNC: 129 MG/DL (ref 70–99)
GLUCOSE SERPL-MCNC: 123 MG/DL (ref 70–99)
HCO3 BLDV-SCNC: 27 MMOL/L (ref 21–28)
HCO3 SERPL-SCNC: 22 MMOL/L (ref 22–29)
HCT VFR BLD AUTO: 38.2 % (ref 40–53)
HGB BLD-MCNC: 11.7 G/DL (ref 13.3–17.7)
LACTATE SERPL-SCNC: 2.1 MMOL/L (ref 0.7–2)
LACTATE SERPL-SCNC: 2.1 MMOL/L (ref 0.7–2)
LACTATE SERPL-SCNC: 4.4 MMOL/L (ref 0.7–2)
LYMPHOCYTES # BLD MANUAL: 3.3 10E3/UL (ref 0.8–5.3)
LYMPHOCYTES NFR BLD MANUAL: 18 %
MCH RBC QN AUTO: 28.1 PG (ref 26.5–33)
MCHC RBC AUTO-ENTMCNC: 30.6 G/DL (ref 31.5–36.5)
MCV RBC AUTO: 92 FL (ref 78–100)
MONOCYTES # BLD MANUAL: 1.4 10E3/UL (ref 0–1.3)
MONOCYTES NFR BLD MANUAL: 8 %
NEUTROPHILS # BLD MANUAL: 13.4 10E3/UL (ref 1.6–8.3)
NEUTROPHILS NFR BLD MANUAL: 74 %
NRBC # BLD AUTO: 0 10E3/UL
NRBC BLD AUTO-RTO: 0 /100
O2/TOTAL GAS SETTING VFR VENT: 0 %
OXYHGB MFR BLDV: 73 % (ref 70–75)
PCO2 BLDV: 47 MM HG (ref 40–50)
PH BLDV: 7.37 [PH] (ref 7.32–7.43)
PLAT MORPH BLD: ABNORMAL
PLATELET # BLD AUTO: 310 10E3/UL (ref 150–450)
PO2 BLDV: 42 MM HG (ref 25–47)
POTASSIUM SERPL-SCNC: 4.1 MMOL/L (ref 3.4–5.3)
PROT SERPL-MCNC: 6.3 G/DL (ref 6.4–8.3)
RBC # BLD AUTO: 4.17 10E6/UL (ref 4.4–5.9)
RBC MORPH BLD: ABNORMAL
SAO2 % BLDV: 74.6 % (ref 70–75)
SODIUM SERPL-SCNC: 141 MMOL/L (ref 135–145)
WBC # BLD AUTO: 18.1 10E3/UL (ref 4–11)

## 2024-10-07 PROCEDURE — 250N000011 HC RX IP 250 OP 636: Performed by: INTERNAL MEDICINE

## 2024-10-07 PROCEDURE — 99207 PR NO BILLABLE SERVICE THIS VISIT: CPT | Performed by: INTERNAL MEDICINE

## 2024-10-07 PROCEDURE — 80053 COMPREHEN METABOLIC PANEL: CPT | Performed by: INTERNAL MEDICINE

## 2024-10-07 PROCEDURE — 85007 BL SMEAR W/DIFF WBC COUNT: CPT | Performed by: INTERNAL MEDICINE

## 2024-10-07 PROCEDURE — 258N000003 HC RX IP 258 OP 636: Performed by: INTERNAL MEDICINE

## 2024-10-07 PROCEDURE — 97110 THERAPEUTIC EXERCISES: CPT | Mod: GP

## 2024-10-07 PROCEDURE — 97162 PT EVAL MOD COMPLEX 30 MIN: CPT | Mod: GP

## 2024-10-07 PROCEDURE — 97535 SELF CARE MNGMENT TRAINING: CPT | Mod: GO

## 2024-10-07 PROCEDURE — 82805 BLOOD GASES W/O2 SATURATION: CPT | Performed by: INTERNAL MEDICINE

## 2024-10-07 PROCEDURE — 250N000013 HC RX MED GY IP 250 OP 250 PS 637: Performed by: INTERNAL MEDICINE

## 2024-10-07 PROCEDURE — 97165 OT EVAL LOW COMPLEX 30 MIN: CPT | Mod: GO

## 2024-10-07 PROCEDURE — 250N000013 HC RX MED GY IP 250 OP 250 PS 637: Performed by: PHYSICIAN ASSISTANT

## 2024-10-07 PROCEDURE — 99232 SBSQ HOSP IP/OBS MODERATE 35: CPT | Performed by: INTERNAL MEDICINE

## 2024-10-07 PROCEDURE — 250N000011 HC RX IP 250 OP 636: Mod: JZ | Performed by: PHYSICIAN ASSISTANT

## 2024-10-07 PROCEDURE — 83605 ASSAY OF LACTIC ACID: CPT | Performed by: INTERNAL MEDICINE

## 2024-10-07 PROCEDURE — 36415 COLL VENOUS BLD VENIPUNCTURE: CPT | Performed by: INTERNAL MEDICINE

## 2024-10-07 PROCEDURE — 97530 THERAPEUTIC ACTIVITIES: CPT | Mod: GP

## 2024-10-07 PROCEDURE — 85027 COMPLETE CBC AUTOMATED: CPT | Performed by: INTERNAL MEDICINE

## 2024-10-07 PROCEDURE — 120N000001 HC R&B MED SURG/OB

## 2024-10-07 RX ORDER — ASPIRIN 81 MG/1
81 TABLET, CHEWABLE ORAL 2 TIMES DAILY
Qty: 60 TABLET | Refills: 0 | Status: ON HOLD | OUTPATIENT
Start: 2024-10-07 | End: 2024-11-12

## 2024-10-07 RX ORDER — CEFTRIAXONE 1 G/1
1 INJECTION, POWDER, FOR SOLUTION INTRAMUSCULAR; INTRAVENOUS EVERY 24 HOURS
Status: DISCONTINUED | OUTPATIENT
Start: 2024-10-07 | End: 2024-10-08

## 2024-10-07 RX ORDER — ACETAMINOPHEN 325 MG/1
975 TABLET ORAL 3 TIMES DAILY
Qty: 100 TABLET | Refills: 0 | Status: SHIPPED | OUTPATIENT
Start: 2024-10-07

## 2024-10-07 RX ADMIN — HYDROXYZINE HYDROCHLORIDE 10 MG: 10 TABLET ORAL at 18:16

## 2024-10-07 RX ADMIN — ACETAMINOPHEN 975 MG: 325 TABLET ORAL at 20:36

## 2024-10-07 RX ADMIN — POLYETHYLENE GLYCOL 3350 17 G: 17 POWDER, FOR SOLUTION ORAL at 08:26

## 2024-10-07 RX ADMIN — ROSUVASTATIN 10 MG: 10 TABLET, FILM COATED ORAL at 21:00

## 2024-10-07 RX ADMIN — METOPROLOL SUCCINATE 12.5 MG: 25 TABLET, EXTENDED RELEASE ORAL at 08:30

## 2024-10-07 RX ADMIN — OLANZAPINE 7.5 MG: 2.5 TABLET, FILM COATED ORAL at 21:00

## 2024-10-07 RX ADMIN — SENNOSIDES AND DOCUSATE SODIUM 1 TABLET: 8.6; 5 TABLET ORAL at 08:26

## 2024-10-07 RX ADMIN — ASPIRIN 81 MG CHEWABLE TABLET 81 MG: 81 TABLET CHEWABLE at 08:25

## 2024-10-07 RX ADMIN — PANTOPRAZOLE SODIUM 40 MG: 40 TABLET, DELAYED RELEASE ORAL at 06:39

## 2024-10-07 RX ADMIN — SODIUM CHLORIDE 500 ML: 9 INJECTION, SOLUTION INTRAVENOUS at 08:15

## 2024-10-07 RX ADMIN — ACETAMINOPHEN 975 MG: 325 TABLET ORAL at 13:00

## 2024-10-07 RX ADMIN — DULOXETINE HYDROCHLORIDE 60 MG: 60 CAPSULE, DELAYED RELEASE PELLETS ORAL at 08:26

## 2024-10-07 RX ADMIN — SENNOSIDES AND DOCUSATE SODIUM 2 TABLET: 8.6; 5 TABLET ORAL at 20:36

## 2024-10-07 RX ADMIN — LEVOTHYROXINE SODIUM 88 MCG: 88 TABLET ORAL at 06:39

## 2024-10-07 RX ADMIN — ACETAMINOPHEN 975 MG: 325 TABLET ORAL at 08:25

## 2024-10-07 RX ADMIN — BUSPIRONE HYDROCHLORIDE 5 MG: 5 TABLET ORAL at 20:36

## 2024-10-07 RX ADMIN — MICONAZOLE NITRATE ANTIFUNGAL POWDER: 2 POWDER TOPICAL at 20:51

## 2024-10-07 RX ADMIN — LATANOPROST 1 DROP: 50 SOLUTION/ DROPS OPHTHALMIC at 21:00

## 2024-10-07 RX ADMIN — BUSPIRONE HYDROCHLORIDE 5 MG: 5 TABLET ORAL at 08:26

## 2024-10-07 RX ADMIN — MICONAZOLE NITRATE ANTIFUNGAL POWDER: 2 POWDER TOPICAL at 12:59

## 2024-10-07 RX ADMIN — CEFTRIAXONE SODIUM 1 G: 1 INJECTION, POWDER, FOR SOLUTION INTRAMUSCULAR; INTRAVENOUS at 09:03

## 2024-10-07 RX ADMIN — ASPIRIN 81 MG CHEWABLE TABLET 81 MG: 81 TABLET CHEWABLE at 20:36

## 2024-10-07 RX ADMIN — CEFAZOLIN SODIUM 2 G: 2 INJECTION, SOLUTION INTRAVENOUS at 03:36

## 2024-10-07 ASSESSMENT — ACTIVITIES OF DAILY LIVING (ADL)
ADLS_ACUITY_SCORE: 46
ADLS_ACUITY_SCORE: 48
ADLS_ACUITY_SCORE: 47
ADLS_ACUITY_SCORE: 46
ADLS_ACUITY_SCORE: 48
ADLS_ACUITY_SCORE: 47
ADLS_ACUITY_SCORE: 48
ADLS_ACUITY_SCORE: 47
ADLS_ACUITY_SCORE: 47
ADLS_ACUITY_SCORE: 46
ADLS_ACUITY_SCORE: 46
ADLS_ACUITY_SCORE: 47
ADLS_ACUITY_SCORE: 48
ADLS_ACUITY_SCORE: 47
ADLS_ACUITY_SCORE: 47
DEPENDENT_IADLS:: CLEANING;COOKING;LAUNDRY;SHOPPING;MEDICATION MANAGEMENT;MEAL PREPARATION;MONEY MANAGEMENT;TRANSPORTATION;INCONTINENCE
ADLS_ACUITY_SCORE: 48
ADLS_ACUITY_SCORE: 48
ADLS_ACUITY_SCORE: 46
ADLS_ACUITY_SCORE: 47
ADLS_ACUITY_SCORE: 48
ADLS_ACUITY_SCORE: 48

## 2024-10-07 NOTE — PROGRESS NOTES
Patient has continued c/o left hip and leg pain yet is refusing narcotics including tramadol.  Ice packs applied, repositioned, scheduled tylenol and PRN atarax given with some relief.  Continues to be disoriented to situation and intermittently to place.  Redirectable at this time.  Attempted to get out of bed with SCD pumps on legs x 1.  Assisted up to the chair with gait belt and walker. Urinated 100 ml and bladder scan 56 ml.  Continuing to monitor urine output.  IV leaking at site and patient reported was painful.  Removed.  Attempted x 2 to place new IV.  SWAT RN called and will place new IV.  Continue with plan of care and charge RN updated.

## 2024-10-07 NOTE — PLAN OF CARE
Problem: Risk for Delirium  Goal: Improved Behavioral Control  Outcome: Not Progressing     Problem: Adult Inpatient Plan of Care  Goal: Optimal Comfort and Wellbeing  Outcome: Progressing     Problem: Pain Acute  Goal: Optimal Pain Control and Function  10/6/2024 2018 by Navjot Sanders RN  Outcome: Progressing  10/6/2024 2014 by Navjot Sanders RN  Outcome: Progressing  10/6/2024 1515 by Navjot Sanders RN  Outcome: Progressing  Intervention: Prevent or Manage Pain  Recent Flowsheet Documentation  Taken 10/6/2024 1645 by Navjot Sanders RN  Sensory Stimulation Regulation:   auditory stimulation minimized   visual stimulation minimized  Taken 10/6/2024 0830 by Navjot Sanders RN  Sensory Stimulation Regulation:   auditory stimulation minimized   visual stimulation minimized     Problem: Fall Injury Risk  Goal: Absence of Fall and Fall-Related Injury  Outcome: Progressing     Problem: Gas Exchange Impaired  Goal: Optimal Gas Exchange  10/6/2024 2018 by Navjot Sanders RN  Outcome: Progressing  10/6/2024 2014 by Navjot Sanders RN  Outcome: Progressing  10/6/2024 1515 by Navjot Sanders RN  Outcome: Progressing  Intervention: Optimize Oxygenation and Ventilation  Recent Flowsheet Documentation  Taken 10/6/2024 1645 by Navjot Sanders RN  Head of Bed (HOB) Positioning: HOB at 15 degrees  Taken 10/6/2024 0830 by Navjot Sanders RN  Head of Bed (HOB) Positioning: HOB at 15 degrees     Problem: Hip Fracture Medical Management  Goal: Pain Control and Function  Outcome: Progressing     Problem: Adult Inpatient Plan of Care  Goal: Absence of Hospital-Acquired Illness or Injury  Intervention: Prevent Skin Injury  Recent Flowsheet Documentation  Taken 10/6/2024 1645 by Navjot Sanders RN  Body Position: supine, head elevated  Taken 10/6/2024 0830 by Navjot Sanders RN  Body Position: supine, head elevated     Problem: Mobility Impairment  Goal: Optimal Mobility  Intervention: Optimize Mobility  Recent Flowsheet  Documentation  Taken 10/6/2024 1645 by Navjot Sanders RN  Positioning/Transfer Devices:   pillows   in use  Taken 10/6/2024 0830 by Navjot Sanders RN  Positioning/Transfer Devices:   pillows   in use     Problem: Hip Fracture Medical Management  Goal: Baseline Cognitive Function Maintained  Intervention: Maximize Cognitive Function  Recent Flowsheet Documentation  Taken 10/6/2024 1645 by Navjot Sanders RN  Reorientation Measures: clock in view  Taken 10/6/2024 0830 by Navjot Sanders RN  Reorientation Measures: clock in view     Problem: Risk for Delirium  Goal: Improved Attention and Thought Clarity  Intervention: Maximize Cognitive Function  Recent Flowsheet Documentation  Taken 10/6/2024 1645 by Navjot Sanders RN  Sensory Stimulation Regulation:   auditory stimulation minimized   visual stimulation minimized  Reorientation Measures: clock in view  Taken 10/6/2024 0830 by Navjot Sanders RN  Sensory Stimulation Regulation:   auditory stimulation minimized   visual stimulation minimized  Reorientation Measures: clock in view      Goal Outcome Evaluation:       Patient came up from surgery early afternoon and was sleeping until dinnertime. Patient was oriented x4, and slow to respond to questions. His mental status declined rapidly and he began calling out for nursing staff, in spite of being redirected to use call light. Pain reported at 10/10. Patient offered narcotic pain medications, but would only accept acetaminophen. Urinary incontinence present. Wound dressings remained clean, dry, and intact.

## 2024-10-07 NOTE — PLAN OF CARE
Problem: Adult Inpatient Plan of Care  Goal: Optimal Comfort and Wellbeing  Outcome: Progressing     Problem: Pain Acute  Goal: Optimal Pain Control and Function  10/6/2024 2014 by Navjot Sanders RN  Outcome: Progressing  10/6/2024 1515 by Navjot Sanders RN  Outcome: Progressing  Intervention: Prevent or Manage Pain  Recent Flowsheet Documentation  Taken 10/6/2024 1645 by Navjot Sanders RN  Sensory Stimulation Regulation:   auditory stimulation minimized   visual stimulation minimized  Taken 10/6/2024 0830 by Navjot Sanders RN  Sensory Stimulation Regulation:   auditory stimulation minimized   visual stimulation minimized     Problem: Gas Exchange Impaired  Goal: Optimal Gas Exchange  10/6/2024 2014 by Navjot Sanders RN  Outcome: Progressing  10/6/2024 1515 by Navjot Sanders RN  Outcome: Progressing  Intervention: Optimize Oxygenation and Ventilation  Recent Flowsheet Documentation  Taken 10/6/2024 1645 by Navjot Sanders RN  Head of Bed (HOB) Positioning: HOB at 15 degrees  Taken 10/6/2024 0830 by Navjot Sanders RN  Head of Bed (HOB) Positioning: HOB at 15 degrees     Problem: Hip Fracture Medical Management  Goal: Pain Control and Function  Outcome: Progressing     Problem: Adult Inpatient Plan of Care  Goal: Absence of Hospital-Acquired Illness or Injury  Intervention: Prevent Skin Injury  Recent Flowsheet Documentation  Taken 10/6/2024 1645 by Navjot Sanders RN  Body Position: supine, head elevated  Taken 10/6/2024 0830 by Navjot Sanders RN  Body Position: supine, head elevated     Problem: Mobility Impairment  Goal: Optimal Mobility  Intervention: Optimize Mobility  Recent Flowsheet Documentation  Taken 10/6/2024 1645 by Navjot Sanders RN  Positioning/Transfer Devices:   pillows   in use  Taken 10/6/2024 0830 by Navjot Sanders RN  Positioning/Transfer Devices:   pillows   in use     Problem: Hip Fracture Medical Management  Goal: Baseline Cognitive Function Maintained  Intervention:  Maximize Cognitive Function  Recent Flowsheet Documentation  Taken 10/6/2024 1645 by Navjot Sanders RN  Reorientation Measures: clock in view  Taken 10/6/2024 0830 by Navjot Sanders RN  Reorientation Measures: clock in view     Problem: Risk for Delirium  Goal: Improved Attention and Thought Clarity  Intervention: Maximize Cognitive Function  Recent Flowsheet Documentation  Taken 10/6/2024 1645 by Navjot Sanders RN  Sensory Stimulation Regulation:   auditory stimulation minimized   visual stimulation minimized  Reorientation Measures: clock in view  Taken 10/6/2024 0830 by Navjot Sanders RN  Sensory Stimulation Regulation:   auditory stimulation minimized   visual stimulation minimized  Reorientation Measures: clock in view   Goal Outcome Evaluation:       Patient is oriented x4, but lethargic. He's sleeping between cares. BP elevated with mechanical BP cuff this morning, but taken with manual and 122/82. Patient went down to surgery mid-morning.

## 2024-10-07 NOTE — PLAN OF CARE
Goal Outcome Evaluation:      Problem: Adult Inpatient Plan of Care  Goal: Absence of Hospital-Acquired Illness or Injury  Intervention: Prevent Infection  Recent Flowsheet Documentation  Taken 10/7/2024 0820 by Raissa Harley RN  Infection Prevention:   hand hygiene promoted   personal protective equipment utilized   rest/sleep promoted   single patient room provided     Problem: Pain Acute  Goal: Optimal Pain Control and Function  Outcome: Progressing  Intervention: Develop Pain Management Plan  Recent Flowsheet Documentation  Taken 10/7/2024 0820 by Raissa Harley RN  Pain Management Interventions:   medication (see MAR)   cold applied   distraction   diversional activity provided   emotional support   pillow support provided   repositioned   rest  Intervention: Prevent or Manage Pain  Recent Flowsheet Documentation  Taken 10/7/2024 0820 by Raissa Harley RN  Sensory Stimulation Regulation:   care clustered   quiet environment promoted  Bowel Elimination Promotion:   adequate fluid intake promoted   ambulation promoted  Medication Review/Management: medications reviewed  Intervention: Optimize Psychosocial Wellbeing  Recent Flowsheet Documentation  Taken 10/7/2024 0820 by Raissa Harley RN  Supportive Measures:   active listening utilized   positive reinforcement provided     Problem: Mobility Impairment  Goal: Optimal Mobility  Intervention: Optimize Mobility  Recent Flowsheet Documentation  Taken 10/7/2024 1000 by Raissa Harley RN  Assistive Device Utilized:   gait belt   walker  Activity Management:   activity adjusted per tolerance   activity encouraged   up to bedside commode   up in chair     Patient is pleasant and cooperative.   Disoriented to situation and has forgetfulness.  Redirectable.  Lactic 4.4 this morning.  MD notified and 500 ml normal saline bolus administered per orders.  Started on IV rocephin for UTI.  Urine is icteric, cloudy and odorous.  Recheck lactic was 2.1.  continuing to  encourage fluids.  Up with assist of 2 to bedside commode and now up in chair.  Dressing CDI to left hip. Pain managed with scheduled tylenol and ice packs.  Oxygen via nasal canula at 3L which is patient's baseline oxygen use at home.   Continue with plan of care.

## 2024-10-07 NOTE — PROGRESS NOTES
Assumed care for this pt on this date at 1900. POD internal fixation of the right hip. Patient is  confused. Attempting multiple times to get out of bed.
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
## 2024-10-07 NOTE — CONSULTS
Care Management Initial Consult    General Information  Assessment completed with: Patient,    Type of CM/SW Visit: Initial Assessment    Primary Care Provider verified and updated as needed: Yes   Readmission within the last 30 days: current reason for admission unrelated to previous admission      Reason for Consult: discharge planning  Advance Care Planning: Advance Care Planning Reviewed: no concerns identified          Communication Assessment  Patient's communication style: spoken language (English or Bilingual)    Hearing Difficulty or Deaf: no   Wear Glasses or Blind: yes    Cognitive  Cognitive/Neuro/Behavioral: .WDL except, level of consciousness, arousability, orientation  Level of Consciousness: alert  Arousal Level: arouses to voice  Orientation: disoriented to, situation  Mood/Behavior: calm, cooperative  Best Language: 0 - No aphasia  Speech: clear, logical    Living Environment:   People in home: spouse  Scott  Current living Arrangements: house      Able to return to prior arrangements: other (see comments) (TCU recommended)       Family/Social Support:  Care provided by: self, spouse/significant other  Provides care for: no one, unable/limited ability to care for self  Marital Status:   Support system: Wife, Children  Scott       Description of Support System: Supportive    Support Assessment: Adequate family and caregiver support    Current Resources:   Patient receiving home care services:  no        Community Resources:  none  Equipment currently used at home: cane, straight, tub bench  Supplies currently used at home:  walker, grab bars, shower chair    Employment/Financial:  Employment Status: retired        Financial Concerns:             Does the patient's insurance plan have a 3 day qualifying hospital stay waiver?  No    Lifestyle & Psychosocial Needs:  Social Determinants of Health     Food Insecurity: Low Risk  (10/6/2024)    Food Insecurity     Within the past 12 months,  did you worry that your food would run out before you got money to buy more?: No     Within the past 12 months, did the food you bought just not last and you didn t have money to get more?: No   Depression: Not on file   Housing Stability: Low Risk  (10/6/2024)    Housing Stability     Do you have housing? : Yes     Are you worried about losing your housing?: No   Tobacco Use: Medium Risk (7/24/2024)    Patient History     Smoking Tobacco Use: Former     Smokeless Tobacco Use: Never     Passive Exposure: Not on file   Financial Resource Strain: Low Risk  (10/6/2024)    Financial Resource Strain     Within the past 12 months, have you or your family members you live with been unable to get utilities (heat, electricity) when it was really needed?: No   Alcohol Use: Not on file   Transportation Needs: Low Risk  (10/6/2024)    Transportation Needs     Within the past 12 months, has lack of transportation kept you from medical appointments, getting your medicines, non-medical meetings or appointments, work, or from getting things that you need?: No   Physical Activity: Not on file   Interpersonal Safety: Low Risk  (10/6/2024)    Interpersonal Safety     Do you feel physically and emotionally safe where you currently live?: Yes     Within the past 12 months, have you been hit, slapped, kicked or otherwise physically hurt by someone?: No     Within the past 12 months, have you been humiliated or emotionally abused in other ways by your partner or ex-partner?: No   Stress: Not on file   Social Connections: Unknown (1/3/2024)    Received from [facility], [facility]    Social Connections     Frequency of Communication with Friends and Family: Not on file   Health Literacy: Not on file       Functional Status:  Prior to admission patient needed assistance:   Dependent ADLs:: Ambulation-cane, Bathing, Dressing, Incontinence  Dependent IADLs:: Cleaning, Cooking,  Laundry, Shopping, Medication Management, Meal Preparation, Money Management, Transportation, Incontinence       Mental Health Status:  Mental Health Status: No Current Concerns       Chemical Dependency Status:  No current concerns                Values/Beliefs:  Spiritual, Cultural Beliefs, Faith Practices, Values that affect care: no               Discussed  Partnership in Safe Discharge Planning  document with patient/family: No    Additional Information:  Met with patient to review role of care management, progression of care and possible need for services at discharge, including OP services, home care, or skilled nursing care. Patient alert, with some confusion and engaged in the conversation. Writer then verified patient demographics and updated any changes needed in the patient chart.  Patient has recent admission in September 12-16th for metabolic encephalopathy secondary to UTI and then went to a TCU. Patient had a fall at TCU and fractured his left hip.   Patient lives in a house with spouse who assists him with ADLs and IADLs. Patient has oxygen, rolling walker, shower bench and grab bars in his home. Patients goal is to return home but agrees to go to TCU before then. Patient recommended speaking with his spouse Scott to discuss TCU options. Attempted to call spouse on cell phone but unable to leave message. Called spouse on home phone. She states patient has had multiple UTI over the past 5 years. When he has these gets quite confused.   Scott believed that he was going back to [facility]. Will contact them to check into this otherwise will send a referral.     Next Steps: medical progression, TCU placement    [place]ia Daniels RN

## 2024-10-07 NOTE — PROGRESS NOTES
"Orthopedic Progress Note      Assessment: 1 Day Post-Op  S/P Procedure(s):  INTERNAL FIXATION, FRACTURE, TROCHANTERIC, LEFT HIP, USING RODS     Plan:   - Continue PT/OT  - Weightbearing status: WBAT LLE  - Activity: Up with assist and assistive device until independent.  - Anticoagulation: ASA 81 PO BID in addition to SCDs, cristela stockings and early ambulation.  - Antibiotics: 24 hours periop  - Pain Management; continue current regimen  - Diet: progress diet as tolerated  - Labs: hgb 11.7, transfuse if <7.0. No indication today  - Dressing: Keep dry and intact  - Elevation: Elevate LLE on pillow to keep above the level of the heart as much as possible  - Follow-up: Outpatient follow up in 2 weeks  - Disposition: Anticipate discharge pending medical stability, progress with therapy.  TCU at discharge. Okay to discharge from an orthopedic standpoint.        Subjective:  Pain: moderate  Nausea, Vomiting:  No  Lightheadedness, Dizziness:  No  Neuro:  Patient denies new onset numbness or paresthesias  Fever, chills: No  Chest pain: No  SOB: No    Patient reports feeling well today. Patient reports pain is tolerable with current pain regimen. Patient eating and drinking well.  Refuses any opioids.  Patient voiding and passing gas however no BM. All questions/concerns answered.      Objective:  /75   Pulse 102   Temp 98.2  F (36.8  C) (Oral)   Resp 20   Ht 1.702 m (5' 7\")   Wt 80.9 kg (178 lb 4.8 oz)   SpO2 92%   BMI 27.93 kg/m      The patient is A&Ox3. Appears comfortable, sitting up at bedside.  Calves without tenderness, neg Amber's  Brisk capillary refill in the toes.   Palpable Left dorsalis pedis pulse. Left foot warm & well-perfused.  Sensation is intact to light touch & equal bilaterally in the femoral, DP, SP & tibial nerve distributions.  ROM: Appropriately flexes & extends all toes bilaterally.   Motor: +5/5 dorsiflexion, plantar flexion & EHL bilaterally.   Leg lengths equal.  Dressing C/D/I " without strikethrough, no surrounding erythema.      No drain     Pertinent Labs   Lab Results: personally reviewed.   Lab Results   Component Value Date    INR 1.16 (H) 04/24/2023    INR 1.16 (H) 03/08/2023    INR 1.15 (H) 02/27/2021     Lab Results   Component Value Date    WBC 18.1 (H) 10/07/2024    HGB 11.7 (L) 10/07/2024    HCT 38.2 (L) 10/07/2024    MCV 92 10/07/2024     10/07/2024     Lab Results   Component Value Date     10/07/2024    CO2 22 10/07/2024         Report completed by:  PARMINDER CANTU PA-C  Date: 10/07/2024  [facility]

## 2024-10-07 NOTE — PROGRESS NOTES
[facility]    Medicine Progress Note - Hospitalist Service    Date of Admission:  10/5/2024    Assessment & Plan   Dominik Rojas is a 83 year old male with past medical history of COPD on 2 to 3 L nasal cannula at home, chronic systolic heart failure, hypertension, hypothyroidism, anxiety, recent hospitalization from 9/stable to 9/16 for UTI, metabolic encephalopathy and discharged to TCU.  Patient brought to ED for evaluation of left hip pain after fall.  Patient found to have left proximal femur fracture with displacement.  Patient admitted for further management     Mechanical fall per history;  Acute comminuted left proximal femur fracture;  Left elbow contusion  -- Patient reported he was on a recliner chair when he tried to get up and turn then slide forward and fall.  Denied chest pain, dizziness, shortness of breath prior to or after fall  --s/p left hip trochanteric nailing with cephalomedullary device on 10/6/24.  Orthopedic surgery assistance appreciated   --negative left elbow xray  --activity/dvt proph per surgery  -PT/OT  -anticipate TCU will be needed     H/O Chronic systolic heart failure with normalization of LV function;  Diastolic dysfunction  --nt-BNP nml, Troponin T-HS 22.  --TTE LVEF 60-65%, Left ventricular diastolic function is abnormal, right ventricle is normal in size and function, No significant valve disease, no RWMA's  --Continue Toprol-XL  -Hold Lasix/Losartan today as discussed below     COPD  Acute on Chronic hypoxic respiratory failure on 2-3l NC;  --CXR reported right pleural effusion but also reports chronic.   --VBG, Nebs  --incentive spirometry, flutter valve  --oximetry  -Titrate FiO2 to maintain sPO2 88-92%  -Acute hypoxia resolved    AMAURI  -Cr 1.24, AGMA, L.A. elevated  -appears hypovolemic  -NS 500ml x 1 ran over 4hrs with  -hold lasix, losartan  -labs a.m.     Leukocytosis;  Abnormal UA  Lactic acidosis, improved  -- No reported febrile illness but  "patient reports urinary frequency, dysuria  --L.A. 4.4 -> 500cc bolus x 1 -> repeat L.A. 2.1  -- await UC  -IV CTX  -CBC monitoring     History of RLL adenocarcinoma s/p radiation therapy     Anxiety and depression;  Dementia and mood disorder per previous chart;  Acute encephalopathy, unspecified  -CT Head/C spine negative for acute process  -delirium monitoring  -VBG  -minimize deliriogenic meds  -Cymbalta, Zyprexa     HLD  -Crestor     GERD  -PPI     Hypothyroidism  -TSH 6.93, FT4 0.83  -continue Synthroid as uncertain if compliant as outpatient  -recommend repeat TFT's in 4 weeks with PCP          Diet: Advance Diet as Tolerated: Regular Diet Adult    DVT Prophylaxis: ASA  Cabral Catheter: Not present  Lines: None     Cardiac Monitoring: None  Code Status: Full Code      Clinically Significant Risk Factors              # Hypoalbuminemia: Lowest albumin = 2.8 g/dL at 10/7/2024  5:38 AM, will monitor as appropriate     # Hypertension: Noted on problem list    # Dementia: noted on problem list        # Overweight: Estimated body mass index is 27.93 kg/m  as calculated from the following:    Height as of this encounter: 1.702 m (5' 7\").    Weight as of this encounter: 80.9 kg (178 lb 4.8 oz)., PRESENT ON ADMISSION     # Financial/Environmental Concerns:           Disposition Plan     Medically Ready for Discharge: Anticipated in 2-4 Days             Miguel Celis DO, DO  Hospitalist Service  [facility]  Securely message with [employer] (more info)  Text page via [employer] Paging/Directory   ______________________________________________________________________    Interval History   Afebrile. Events overnight noted.    Physical Exam   Vital Signs: Temp: 98.2  F (36.8  C) Temp src: Oral BP: 117/75 Pulse: 102   Resp: 20 SpO2: 92 % O2 Device: Nasal cannula Oxygen Delivery: 3 LPM  Weight: 178 lbs 4.8 oz    Gen nad  Eyes anicteric  Cv rrr. No edema  Lungs decreased anteriorly, no wheezing, non " labored  Abd bs+, nttp  Neuro alert and oriented to month, year, [place] but not name of hospital     Lab/imaging reviewed

## 2024-10-07 NOTE — PLAN OF CARE
Problem: Adult Inpatient Plan of Care  Goal: Plan of Care Review  Description: The Plan of Care Review/Shift note should be completed every shift.  The Outcome Evaluation is a brief statement about your assessment that the patient is improving, declining, or no change.  This information will be displayed automatically on your shift  note.  Outcome: Progressing  Flowsheets (Taken 10/7/2024 0533)  Plan of Care Reviewed With: patient  Overall Patient Progress: improving   Goal Outcome Evaluation:      Plan of Care Reviewed With: patient    Overall Patient Progress: improvingOverall Patient Progress: improving

## 2024-10-07 NOTE — PROGRESS NOTES
"PT Evaluation   10/07/24 7533   Appointment Info   Signing Clinician's Name / Credentials (PT) Rosalina Louie PT, DPT   Living Environment   People in Home spouse   Current Living Arrangements house   Home Accessibility stairs to enter home   Number of Stairs, Main Entrance 2   Stair Railings, Main Entrance railings on both sides of stairs   Transportation Anticipated family or friend will provide   Living Environment Comments Tub w/ bench, pt reports narrown hallways and \"can't fit w/c or walker in home\". At home on 2-3LPM O2 via NC.   Self-Care   Usual Activity Tolerance moderate   Current Activity Tolerance fair   Equipment Currently Used at Home cane, straight;tub bench  (owns fww but does not use - pt states it is too big for his house)   Fall history within last six months yes   Number of times patient has fallen within last six months 2   Activity/Exercise/Self-Care Comment Wife helps with drying back. A w/ dress/bathing sometimes, usually IND. Uses cane, not wc or FWW.   General Information   Onset of Illness/Injury or Date of Surgery 10/05/24   Referring Physician Fidel Correa, BALDO   Patient/Family Therapy Goals Statement (PT) none stated   Pertinent History of Current Problem (include personal factors and/or comorbidities that impact the POC) Per chart: \"83 year old male with past medical history of COPD on 2 to 3 L nasal cannula at home, chronic systolic heart failure, hypertension, hypothyroidism, anxiety, recent hospitalization from 9/stable to 9/16 for UTI, metabolic encephalopathy and discharged to TCU.  Patient brought to ED for evaluation of left hip pain after fall.  Patient found to have left proximal femur fracture with displacement.  Patient admitted for further management\"   Existing Precautions/Restrictions fall;weight bearing   Weight-Bearing Status - LLE weight-bearing as tolerated   Cognition   Affect/Mental Status (Cognition) WFL   Orientation Status (Cognition) disoriented " "to;place;situation;verbal cues/prompts needed for orientation   Follows Commands (Cognition) increased processing time needed;delayed response/completion;verbal cues/prompting required;follows one-step commands   Cognitive Status Comments Pt perseverating on a man having spoken to him stating that he was not at a \"cleaning place\" but at the hospital. Therapeutic listening and reassurance provided to patient.   Pain Assessment   Patient Currently in Pain Yes, see Vital Sign flowsheet  (L hip worsening with mobility and WB)   Posture    Posture Forward head position   Range of Motion (ROM)   Range of Motion ROM deficits secondary to pain;ROM deficits secondary to weakness   Strength (Manual Muscle Testing)   Strength (Manual Muscle Testing) Deficits observed during functional mobility   Bed Mobility   Comment, (Bed Mobility) Pt already sitting up in recliner when PT arrived   Transfers   Transfers sit-stand transfer   Transfer Safety Concerns Noted losing balance backward;decreased step length   Impairments Contributing to Impaired Transfers impaired balance;pain;decreased strength   Sit-Stand Transfer   Sit-Stand [place] (Transfers) minimum assist (75% patient effort);2 person assist   Assistive Device (Sit-Stand Transfers) walker, front-wheeled   Comment, (Sit-Stand Transfer) report of some dizziness upon initial standing.   Gait/Stairs (Locomotion)   [place] Level (Gait) minimum assist (75% patient effort);2 person assist;verbal cues;nonverbal cues (demo/gesture)   Assistive Device (Gait) walker, front-wheeled   Distance in Feet (Gait) 5'   Pattern (Gait) step-to   Deviations/Abnormal Patterns (Gait) antalgic;gait speed decreased;gloria decreased;stride length decreased;weight shifting decreased   Comment, (Gait/Stairs) Not appropriate to trial stairs yet at this time per pain and minimal activity/amb tolerance in standing.   Balance   Balance other (describe)   Balance Comments min-modAx2 with FWW in " static standing & amb.   Clinical Impression   Criteria for Skilled Therapeutic Intervention Yes, treatment indicated   PT Diagnosis (PT) Impaired functional mobility   Influenced by the following impairments Impaired strength, balance, activity tolerance; pain   Functional limitations due to impairments Bed mobility, transfers, gait, endurance   Clinical Presentation (PT Evaluation Complexity) stable   Clinical Presentation Rationale Clinical judgment   Clinical Decision Making (Complexity) moderate complexity   Planned Therapy Interventions (PT) balance training;bed mobility training;gait training;home exercise program;neuromuscular re-education;patient/family education;stair training;strengthening;stretching;transfer training;progressive activity/exercise;home program guidelines   Risk & Benefits of therapy have been explained evaluation/treatment results reviewed;participants included;patient;participants voiced agreement with care plan   PT Total Evaluation Time   PT Eval, Moderate Complexity Minutes (27290) 11   Physical Therapy Goals   PT Frequency 5x/week   PT Predicted Duration/Target Date for Goal Attainment 10/14/24   PT Goals Bed Mobility;Transfers;Gait;Stairs   PT: Bed Mobility Supervision/stand-by assist;Supine to/from sit   PT: Transfers Supervision/stand-by assist;Sit to/from stand;Bed to/from chair;Assistive device   PT: Gait Supervision/stand-by assist;Rolling walker;50 feet  (eventually progress to SPC if appropriate as pt is adamant about not using FWW in his home.)   PT: Stairs Minimal assist;2 stairs;Rail on both sides   Therapeutic Procedure/Exercise   Ther. Procedure: strength, endurance, ROM, flexibillity Minutes (67798) 8   Symptoms Noted During/After Treatment fatigue;increased pain   Treatment Detail/Skilled Intervention instructed in seated therex, advised pt to perform ind while in chair as well - 2x10-15 reps BLEs - LAQ & ankle pf/df. Pt with reduced L knee ext AROM d/t pain & weakness  compared to RLE full AROM.   Therapeutic Activity   Therapeutic Activities: dynamic activities to improve functional performance Minutes (66774) 9   Symptoms Noted During/After Treatment Fatigue;Increased pain   Treatment Detail/Skilled Intervention Eval completed, tx initiated. min-modAx2 with FWW BSC transfer, verbal & visual cues for safe technique to decrease fall risk. Additional amb ~8' within room with min-modAx2 for balance throughout with FWW for safety. 1 further sit<>stand minAx1 for balance with FWW with cues for pt to sit further back in chair for improved positioning. Pt continuing to express concerEnd of session pt left sitting up in recliner with call light & all other needs in reach, chair alarm engaged.   PT Discharge Planning   PT Plan sit<>stands, assess bed mob, standing weightshifting & multidirectional stepping with FWW, gait FWW   PT Discharge Recommendation (DC Rec) Transitional Care Facility   PT Rationale for DC Rec Patient is needing Ax1-2 for mobility at this time, is unsteady on feet with FWW and unable to amb a functional distance at this time. Recommend TCU at discharge.   PT Brief overview of current status min-modAx2 amb FWW, minAx1-2 sit<>stand FWW   PT Equipment Needed at Discharge   (tbd)   Total Session Time   Timed Code Treatment Minutes 17   Total Session Time (sum of timed and untimed services) 28

## 2024-10-08 ENCOUNTER — APPOINTMENT (OUTPATIENT)
Dept: PHYSICAL THERAPY | Facility: HOSPITAL | Age: 84
DRG: 480 | End: 2024-10-08
Payer: COMMERCIAL

## 2024-10-08 LAB
ALBUMIN SERPL BCG-MCNC: 3.1 G/DL (ref 3.5–5.2)
ALP SERPL-CCNC: 213 U/L (ref 40–150)
ALT SERPL W P-5'-P-CCNC: 22 U/L (ref 0–70)
ANION GAP SERPL CALCULATED.3IONS-SCNC: 9 MMOL/L (ref 7–15)
AST SERPL W P-5'-P-CCNC: 68 U/L (ref 0–45)
BACTERIA UR CULT: ABNORMAL
BASOPHILS # BLD AUTO: 0.1 10E3/UL (ref 0–0.2)
BASOPHILS NFR BLD AUTO: 1 %
BILIRUB SERPL-MCNC: 1 MG/DL
BUN SERPL-MCNC: 43.3 MG/DL (ref 8–23)
CALCIUM SERPL-MCNC: 8.6 MG/DL (ref 8.8–10.4)
CHLORIDE SERPL-SCNC: 100 MMOL/L (ref 98–107)
CK SERPL-CCNC: 512 U/L (ref 39–308)
CREAT SERPL-MCNC: 1.1 MG/DL (ref 0.67–1.17)
EGFRCR SERPLBLD CKD-EPI 2021: 67 ML/MIN/1.73M2
EOSINOPHIL # BLD AUTO: 0.4 10E3/UL (ref 0–0.7)
EOSINOPHIL NFR BLD AUTO: 3 %
ERYTHROCYTE [DISTWIDTH] IN BLOOD BY AUTOMATED COUNT: 15.8 % (ref 10–15)
GLUCOSE BLDC GLUCOMTR-MCNC: 109 MG/DL (ref 70–99)
GLUCOSE SERPL-MCNC: 99 MG/DL (ref 70–99)
HCO3 SERPL-SCNC: 26 MMOL/L (ref 22–29)
HCT VFR BLD AUTO: 33.5 % (ref 40–53)
HGB BLD-MCNC: 10.7 G/DL (ref 13.3–17.7)
IMM GRANULOCYTES # BLD: 0.1 10E3/UL
IMM GRANULOCYTES NFR BLD: 1 %
LACTATE SERPL-SCNC: 1.7 MMOL/L (ref 0.7–2)
LYMPHOCYTES # BLD AUTO: 2.3 10E3/UL (ref 0.8–5.3)
LYMPHOCYTES NFR BLD AUTO: 16 %
MAGNESIUM SERPL-MCNC: 1.8 MG/DL (ref 1.7–2.3)
MAGNESIUM SERPL-MCNC: 2.1 MG/DL (ref 1.7–2.3)
MCH RBC QN AUTO: 28.4 PG (ref 26.5–33)
MCHC RBC AUTO-ENTMCNC: 31.9 G/DL (ref 31.5–36.5)
MCV RBC AUTO: 89 FL (ref 78–100)
MONOCYTES # BLD AUTO: 1.3 10E3/UL (ref 0–1.3)
MONOCYTES NFR BLD AUTO: 9 %
NEUTROPHILS # BLD AUTO: 9.9 10E3/UL (ref 1.6–8.3)
NEUTROPHILS NFR BLD AUTO: 70 %
NRBC # BLD AUTO: 0 10E3/UL
NRBC BLD AUTO-RTO: 0 /100
PHOSPHATE SERPL-MCNC: 2.7 MG/DL (ref 2.5–4.5)
PLATELET # BLD AUTO: 276 10E3/UL (ref 150–450)
POTASSIUM SERPL-SCNC: 4.1 MMOL/L (ref 3.4–5.3)
PROT SERPL-MCNC: 6.2 G/DL (ref 6.4–8.3)
RBC # BLD AUTO: 3.77 10E6/UL (ref 4.4–5.9)
SODIUM SERPL-SCNC: 135 MMOL/L (ref 135–145)
WBC # BLD AUTO: 14.1 10E3/UL (ref 4–11)

## 2024-10-08 PROCEDURE — 36415 COLL VENOUS BLD VENIPUNCTURE: CPT | Performed by: INTERNAL MEDICINE

## 2024-10-08 PROCEDURE — 99232 SBSQ HOSP IP/OBS MODERATE 35: CPT | Performed by: INTERNAL MEDICINE

## 2024-10-08 PROCEDURE — 80053 COMPREHEN METABOLIC PANEL: CPT | Performed by: INTERNAL MEDICINE

## 2024-10-08 PROCEDURE — 120N000001 HC R&B MED SURG/OB

## 2024-10-08 PROCEDURE — 93010 ELECTROCARDIOGRAM REPORT: CPT | Performed by: INTERNAL MEDICINE

## 2024-10-08 PROCEDURE — 97116 GAIT TRAINING THERAPY: CPT | Mod: GP

## 2024-10-08 PROCEDURE — 83605 ASSAY OF LACTIC ACID: CPT | Performed by: INTERNAL MEDICINE

## 2024-10-08 PROCEDURE — 250N000013 HC RX MED GY IP 250 OP 250 PS 637: Performed by: PHYSICIAN ASSISTANT

## 2024-10-08 PROCEDURE — 84100 ASSAY OF PHOSPHORUS: CPT | Performed by: INTERNAL MEDICINE

## 2024-10-08 PROCEDURE — 250N000009 HC RX 250: Performed by: INTERNAL MEDICINE

## 2024-10-08 PROCEDURE — 97530 THERAPEUTIC ACTIVITIES: CPT | Mod: GP

## 2024-10-08 PROCEDURE — 85025 COMPLETE CBC W/AUTO DIFF WBC: CPT | Performed by: INTERNAL MEDICINE

## 2024-10-08 PROCEDURE — 82550 ASSAY OF CK (CPK): CPT | Performed by: INTERNAL MEDICINE

## 2024-10-08 PROCEDURE — 250N000011 HC RX IP 250 OP 636: Performed by: INTERNAL MEDICINE

## 2024-10-08 PROCEDURE — 83735 ASSAY OF MAGNESIUM: CPT | Performed by: INTERNAL MEDICINE

## 2024-10-08 PROCEDURE — 93005 ELECTROCARDIOGRAM TRACING: CPT

## 2024-10-08 PROCEDURE — 250N000013 HC RX MED GY IP 250 OP 250 PS 637: Performed by: INTERNAL MEDICINE

## 2024-10-08 RX ORDER — PIPERACILLIN SODIUM, TAZOBACTAM SODIUM 3; .375 G/15ML; G/15ML
3.38 INJECTION, POWDER, LYOPHILIZED, FOR SOLUTION INTRAVENOUS ONCE
Status: COMPLETED | OUTPATIENT
Start: 2024-10-08 | End: 2024-10-08

## 2024-10-08 RX ORDER — PIPERACILLIN SODIUM, TAZOBACTAM SODIUM 3; .375 G/15ML; G/15ML
3.38 INJECTION, POWDER, LYOPHILIZED, FOR SOLUTION INTRAVENOUS EVERY 8 HOURS
Status: DISCONTINUED | OUTPATIENT
Start: 2024-10-08 | End: 2024-10-08

## 2024-10-08 RX ORDER — PIPERACILLIN SODIUM, TAZOBACTAM SODIUM 3; .375 G/15ML; G/15ML
3.38 INJECTION, POWDER, LYOPHILIZED, FOR SOLUTION INTRAVENOUS EVERY 8 HOURS
Status: DISCONTINUED | OUTPATIENT
Start: 2024-10-08 | End: 2024-10-09

## 2024-10-08 RX ORDER — MAGNESIUM SULFATE HEPTAHYDRATE 40 MG/ML
2 INJECTION, SOLUTION INTRAVENOUS ONCE
Status: COMPLETED | OUTPATIENT
Start: 2024-10-08 | End: 2024-10-08

## 2024-10-08 RX ADMIN — DULOXETINE HYDROCHLORIDE 60 MG: 60 CAPSULE, DELAYED RELEASE PELLETS ORAL at 08:50

## 2024-10-08 RX ADMIN — MICONAZOLE NITRATE ANTIFUNGAL POWDER: 2 POWDER TOPICAL at 22:35

## 2024-10-08 RX ADMIN — LATANOPROST 1 DROP: 50 SOLUTION/ DROPS OPHTHALMIC at 21:11

## 2024-10-08 RX ADMIN — MAGNESIUM SULFATE HEPTAHYDRATE 2 G: 40 INJECTION, SOLUTION INTRAVENOUS at 12:29

## 2024-10-08 RX ADMIN — ACETAMINOPHEN 975 MG: 325 TABLET ORAL at 08:50

## 2024-10-08 RX ADMIN — METOPROLOL SUCCINATE 12.5 MG: 25 TABLET, EXTENDED RELEASE ORAL at 08:50

## 2024-10-08 RX ADMIN — PIPERACILLIN AND TAZOBACTAM 3.38 G: 3; .375 INJECTION, POWDER, FOR SOLUTION INTRAVENOUS at 13:51

## 2024-10-08 RX ADMIN — ACETAMINOPHEN 975 MG: 325 TABLET ORAL at 19:07

## 2024-10-08 RX ADMIN — SENNOSIDES AND DOCUSATE SODIUM 1 TABLET: 8.6; 5 TABLET ORAL at 08:49

## 2024-10-08 RX ADMIN — LEVOTHYROXINE SODIUM 88 MCG: 88 TABLET ORAL at 06:05

## 2024-10-08 RX ADMIN — ASPIRIN 81 MG CHEWABLE TABLET 81 MG: 81 TABLET CHEWABLE at 08:49

## 2024-10-08 RX ADMIN — ASPIRIN 81 MG CHEWABLE TABLET 81 MG: 81 TABLET CHEWABLE at 21:10

## 2024-10-08 RX ADMIN — POLYETHYLENE GLYCOL 3350 17 G: 17 POWDER, FOR SOLUTION ORAL at 08:48

## 2024-10-08 RX ADMIN — BUSPIRONE HYDROCHLORIDE 5 MG: 5 TABLET ORAL at 08:50

## 2024-10-08 RX ADMIN — PIPERACILLIN AND TAZOBACTAM 3.38 G: 3; .375 INJECTION, POWDER, FOR SOLUTION INTRAVENOUS at 21:14

## 2024-10-08 RX ADMIN — TRAMADOL HYDROCHLORIDE 50 MG: 50 TABLET, COATED ORAL at 16:18

## 2024-10-08 RX ADMIN — TRAMADOL HYDROCHLORIDE 50 MG: 50 TABLET, COATED ORAL at 00:22

## 2024-10-08 RX ADMIN — BUSPIRONE HYDROCHLORIDE 5 MG: 5 TABLET ORAL at 19:08

## 2024-10-08 RX ADMIN — PIPERACILLIN AND TAZOBACTAM 3.38 G: 3; .375 INJECTION, POWDER, FOR SOLUTION INTRAVENOUS at 08:54

## 2024-10-08 RX ADMIN — OLANZAPINE 7.5 MG: 2.5 TABLET, FILM COATED ORAL at 21:09

## 2024-10-08 RX ADMIN — PANTOPRAZOLE SODIUM 40 MG: 40 TABLET, DELAYED RELEASE ORAL at 06:04

## 2024-10-08 ASSESSMENT — ACTIVITIES OF DAILY LIVING (ADL)
ADLS_ACUITY_SCORE: 46

## 2024-10-08 NOTE — PROGRESS NOTES
[facility]    Medicine Progress Note - Hospitalist Service    Date of Admission:  10/5/2024    Assessment & Plan   Dominik Rojas is a 83 year old male with past medical history of COPD on 2 to 3 L nasal cannula at home, chronic systolic heart failure, hypertension, hypothyroidism, anxiety, recent hospitalization from 9/stable to 9/16 for UTI, metabolic encephalopathy and discharged to TCU.  Patient brought to ED for evaluation of left hip pain after fall.  Patient found to have left proximal femur fracture with displacement.  Patient admitted for further management     Mechanical fall per history;  Acute comminuted left proximal femur fracture;  Left elbow contusion  -Patient reported he was on a recliner chair when he tried to get up and turn then slide forward and fall.  Denied chest pain, dizziness, shortness of breath prior to or after fall  -s/p left hip trochanteric nailing with cephalomedullary device on 10/6/24.  Orthopedic surgery assistance appreciated   -negative left elbow xray  -activity/dvt proph per surgery  -PT/OT  -TCU anticipated     H/O Chronic systolic heart failure with normalization of LV function;  Diastolic dysfunction  -nt-BNP nml, Troponin T-HS 22.  -TTE LVEF 60-65%, Left ventricular diastolic function is abnormal, right ventricle is normal in size and function, No significant valve disease, no RWMA's  -Continue Toprol-XL  -Hold Lasix/Losartan today   -strict I/O, daily weights     COPD  Acute on Chronic hypoxic respiratory failure on 2-3l NC;  -CXR reported right pleural effusion but also reports chronic.   -incentive spirometry, flutter valve  -oximetry  -Titrate FiO2 to maintain sPO2 88-92%  -Acute hypoxia resolved  -on 4L O2 today  -Duoneb prn  -Combivent respimat    AMAURI, resolved  -hold lasix, losartan  -labs a.m.    Mild rhabdomyolysis  -'s  -hold statin  -encourage fluids     Leukocytosis;  Abnormal UA  H/O Recurrent UTI  Lactic acidosis,  "resolved  -last UC showed e. Coli x 2 strains and enterococcus faecalis  -await current UC  -Change to Zosyn     History of RLL adenocarcinoma s/p radiation therapy     Anxiety and depression;  Dementia and mood disorder per previous chart;  Acute encephalopathy, unspecified, resolved  -CT Head/C spine negative for acute process  -delirium monitoring  -VBG no hypercarbia.  -minimize deliriogenic meds  -Cymbalta, Zyprexa, Buspar     HLD  -Crestor     GERD  -PPI     Hypothyroidism  -TSH 6.93, FT4 0.83  -continue Synthroid as uncertain if compliant as outpatient  -recommend repeat TFT's in 4 weeks with PCP          Diet: Advance Diet as Tolerated: Regular Diet Adult  Discharge Instruction - Regular Diet Adult    DVT Prophylaxis: ASA  Cabral Catheter: Not present  Lines: None     Cardiac Monitoring: None  Code Status: Full Code      Clinically Significant Risk Factors              # Hypoalbuminemia: Lowest albumin = 2.8 g/dL at 10/7/2024  5:38 AM, will monitor as appropriate     # Hypertension: Noted on problem list    # Dementia: noted on problem list        # Overweight: Estimated body mass index is 27.93 kg/m  as calculated from the following:    Height as of this encounter: 1.702 m (5' 7\").    Weight as of this encounter: 80.9 kg (178 lb 4.8 oz)., PRESENT ON ADMISSION     # Financial/Environmental Concerns:           Disposition Plan     Medically Ready for Discharge: Anticipated Tomorrow             Miguel Celis DO, DO  Hospitalist Service  [facility]  Securely message with [employer] (more info)  Text page via [employer] Paging/Directory   ______________________________________________________________________    Interval History   Afebrile. Sitting up in chair. Hip pain when moving. Denies chest pain, sob, abd pain.    Physical Exam   Vital Signs: Temp: 99.1  F (37.3  C) Temp src: Oral BP: 115/73 Pulse: 97   Resp: 18 SpO2: 91 % O2 Device: Nasal cannula Oxygen Delivery: 4 LPM  Weight: 178 lbs " 4.8 oz    Gen nad  Eyes anicteric  Cv rrr. No edema  Lungs decreased anteriorly, no wheezing, non labored  Abd bs+, nttp  Neuro alert and oriented to month, year, [facility]. Flat affect. Not agitated or combative.    Labs reviewed     Lab/imaging reviewed

## 2024-10-08 NOTE — PLAN OF CARE
"  Problem: Adult Inpatient Plan of Care  Goal: Plan of Care Review  Description: The Plan of Care Review/Shift note should be completed every shift.  The Outcome Evaluation is a brief statement about your assessment that the patient is improving, declining, or no change.  This information will be displayed automatically on your shift  note.  Outcome: Progressing     Problem: Adult Inpatient Plan of Care  Goal: Patient-Specific Goal (Individualized)  Description: You can add care plan individualizations to a care plan. Examples of Individualization might be:  \"Parent requests to be called daily at 9am for status\", \"I have a hard time hearing out of my right ear\", or \"Do not touch me to wake me up as it startles  me\".  Outcome: Progressing     Problem: Adult Inpatient Plan of Care  Goal: Absence of Hospital-Acquired Illness or Injury  Intervention: Identify and Manage Fall Risk  Recent Flowsheet Documentation  Taken 10/8/2024 0851 by Kirby Vigil, RN  Safety Promotion/Fall Prevention:   activity supervised   assistive device/personal items within reach     Problem: Adult Inpatient Plan of Care  Goal: Absence of Hospital-Acquired Illness or Injury  Intervention: Prevent Skin Injury  Recent Flowsheet Documentation  Taken 10/8/2024 0851 by Kirby Vigil, RN  Skin Protection: adhesive use limited  Device Skin Pressure Protection: absorbent pad utilized/changed     Problem: Adult Inpatient Plan of Care  Goal: Readiness for Transition of Care  Outcome: Progressing     Problem: Pain Acute  Goal: Optimal Pain Control and Function  Intervention: Prevent or Manage Pain  Recent Flowsheet Documentation  Taken 10/8/2024 0851 by Kirby Vigil, RN  Sensory Stimulation Regulation: care clustered  Bowel Elimination Promotion: adequate fluid intake promoted  Medication Review/Management: medications reviewed   Goal Outcome Evaluation:       Patient alert with intermittent confusion, able to express needs, pain 9/10 in left hip, " only wanted tylenol for pain, assist of 2 transfer.

## 2024-10-08 NOTE — PROGRESS NOTES
Care Management Follow Up    Length of Stay (days): 3    Expected Discharge Date: 10/09/2024     Concerns to be Addressed: discharge planning     Patient plan of care discussed at interdisciplinary rounds: No    Anticipated Discharge Disposition:  TCU          Referrals Placed by CM/SW:  [facility]      Discussed  Partnership in Safe Discharge Planning  document with patient/family: No     Handoff Completed: No, handoff not indicated or clinically appropriate    Additional Information:  Called [place] admissions to check on availability to return. Patient does not have a bed hold. Patient to potentially discharge tomorrow per MD. Admissions will reach out later today if to report if they have a bed.     Next Steps: medical progression, TCU placement follow up    [place] Daniels RN

## 2024-10-08 NOTE — PLAN OF CARE
Goal Outcome Evaluation:                      Problem: Adult Inpatient Plan of Care  Goal: Plan of Care Review  Description: The Plan of Care Review/Shift note should be completed every shift.  The Outcome Evaluation is a brief statement about your assessment that the patient is improving, declining, or no change.  This information will be displayed automatically on your shift  note.  Outcome: Progressing     Problem: Pain Acute  Goal: Optimal Pain Control and Function  Outcome: Progressing     Problem: Mobility Impairment  Goal: Optimal Mobility  Outcome: Progressing     Problem: Fall Injury Risk  Goal: Absence of Fall and Fall-Related Injury  Outcome: Progressing     Problem: Gas Exchange Impaired  Goal: Optimal Gas Exchange  Outcome: Progressing     Problem: Hip Fracture Medical Management  Goal: Optimal Coping with Change in Health Status  Intervention: Support Psychosocial Response to Injury  Recent Flowsheet Documentation  Taken 10/8/2024 0022 by Yani Quinn RN  Supportive Measures:   active listening utilized   positive reinforcement provided     Problem: Hip Fracture Medical Management  Goal: Pain Control and Function  Outcome: Progressing     Problem: Hip Fracture Medical Management  Goal: Effective Urinary Elimination  Outcome: Progressing     Problem: Risk for Delirium  Goal: Improved Behavioral Control  Outcome: Progressing   Pt is alert and oriented x3, VSS. Pt in pain at the beginning of shift, pain is 10/10, requested PRN Tramadol 50 mg given, effective. Pt moved from bed to reclining chair A2 by walker, walked slowly but steady. Used urinal during the night. Pt used call button appropriately. Pt did not sleep until the last hour of the shift and preferred to sleep on the chair. Seat alarm on.

## 2024-10-08 NOTE — PLAN OF CARE
The patient is alert and oriented, repositions often due to pain. Rated pain as moderate pain. Tramadol and Tylenol given. Ice pack applied to left hip. Needs encouragement and education to take pain medication. EKG taken. The provider was updated on Qtc, the Seroquel PRN was not given as it was decided it was not indicated, pain control was the concern at this time. Magnesium lab ordered as the provider requested. Result was 2.1mg/dL. The patient was up to the chair for dinner. Post void residual completed. Was 23ml.     Goal Outcome Evaluation:             Problem: Adult Inpatient Plan of Care  Goal: Optimal Comfort and Wellbeing  Intervention: Monitor Pain and Promote Comfort  Recent Flowsheet Documentation  Taken 10/8/2024 1555 by Florencia Payne, RN  Pain Management Interventions:   medication (see MAR)   emotional support   repositioned

## 2024-10-08 NOTE — PROGRESS NOTES
"Orthopedic Progress Note      Assessment: 2 Days Post-Op  S/P Procedure(s):  INTERNAL FIXATION, FRACTURE, TROCHANTERIC, LEFT HIP, USING RODS     Plan:   - Continue PT/OT  - Weightbearing status: WBAT LLE  - Activity: Up with assist and assistive device until independent.  - Anticoagulation: ASA 81 PO BIDx 30 days in addition to SCDs, cristela stockings and early ambulation.  - Antibiotics: 24 hours perioperative completed.   - Pain Management; continue current regimen and transition from IV to PO as tolerated  - Diet: progress diet as tolerated  - Dressing: Keep dry and intact.  - Elevation: Elevate LLE on pillow to keep above the level of the heart as much as possible  - Follow-up: Outpatient follow up in 2 weeks  - Disposition: Anticipate discharge pending medical stability, progress with therapy and placement in TCU. Possibly [place] tomorrow. Orthopaedically stable.       Subjective:  Pain: mild  Nausea, Vomiting:  No  Lightheadedness, Dizziness:  No  Neuro:  Patient denies new onset numbness or paresthesias  Fever, chills: No  Chest pain: No  SOB: No    Patient reports feeling well today. He rates his pain 6/10.  Patient reports pain is tolerable with current pain regimen. Patient eating and drinking well. Patient voiding and passing gas. He did have a bowel movement. Working with therapy right after our visit. He denies any new symptoms or concerns.  All questions/concerns answered.    Objective:  /73   Pulse 97   Temp 99.1  F (37.3  C)   Resp 18   Ht 1.702 m (5' 7\")   Wt 80.9 kg (178 lb 4.8 oz)   SpO2 91%   BMI 27.93 kg/m      The patient is A&Ox3. Appears comfortable, sitting up at bedside.  Calves without tenderness, neg Amber's  Brisk capillary refill in the toes.   Palpable Right dorsalis pedis pulse. Right foot warm & well-perfused.  Sensation is intact to light touch & equal bilaterally in the femoral, DP, SP & tibial nerve distributions.  ROM: Appropriately flexes & extends all toes " bilaterally.   Motor: +5/5 dorsiflexion, plantar flexion & EHL bilaterally.   Leg lengths equal.  Dressing C/D/I without strikethrough, no surrounding erythema.      No drain     Pertinent Labs   Lab Results: personally reviewed.   Lab Results   Component Value Date    INR 1.16 (H) 04/24/2023    INR 1.16 (H) 03/08/2023    INR 1.15 (H) 02/27/2021     Lab Results   Component Value Date    WBC 14.1 (H) 10/08/2024    HGB 10.7 (L) 10/08/2024    HCT 33.5 (L) 10/08/2024    MCV 89 10/08/2024     10/08/2024     Lab Results   Component Value Date     10/08/2024    CO2 26 10/08/2024         Report completed by:  Loretta Jones PA-C  Date: 10/08/2024  [facility]

## 2024-10-08 NOTE — PLAN OF CARE
Problem: Adult Inpatient Plan of Care  Goal: Plan of Care Review  Description: The Plan of Care Review/Shift note should be completed every shift.  The Outcome Evaluation is a brief statement about your assessment that the patient is improving, declining, or no change.  This information will be displayed automatically on your shift  note.  Outcome: Progressing     Problem: Pain Acute  Goal: Optimal Pain Control and Function  Outcome: Progressing  Intervention: Develop Pain Management Plan  Recent Flowsheet Documentation  Taken 10/7/2024 2036 by Brenda Ceballos RN  Pain Management Interventions: medication (see MAR)  Intervention: Prevent or Manage Pain  Recent Flowsheet Documentation  Taken 10/7/2024 2040 by Brenda Ceballos RN  Sensory Stimulation Regulation:   care clustered   quiet environment promoted  Bowel Elimination Promotion:   adequate fluid intake promoted   ambulation promoted  Medication Review/Management: medications reviewed  Intervention: Optimize Psychosocial Wellbeing  Recent Flowsheet Documentation  Taken 10/7/2024 2040 by Brenda Ceballos RN  Supportive Measures:   active listening utilized   positive reinforcement provided     Problem: Mobility Impairment  Goal: Optimal Mobility  Outcome: Progressing     Problem: Fall Injury Risk  Goal: Absence of Fall and Fall-Related Injury  Outcome: Progressing  Intervention: Identify and Manage Contributors  Recent Flowsheet Documentation  Taken 10/7/2024 2040 by Brenda Ceballos RN  Medication Review/Management: medications reviewed  Intervention: Promote Injury-Free Environment  Recent Flowsheet Documentation  Taken 10/7/2024 2040 by Brenda Ceballos RN  Safety Promotion/Fall Prevention:   activity supervised   assistive device/personal items within reach   clutter free environment maintained   increased rounding and observation   increase visualization of patient   mobility aid in reach   nonskid shoes/slippers when out of bed   patient and family  education   safety round/check completed     Problem: Gas Exchange Impaired  Goal: Optimal Gas Exchange  Outcome: Progressing     Problem: Hip Fracture Medical Management  Goal: Optimal Coping with Change in Health Status  Outcome: Progressing  Intervention: Support Psychosocial Response to Injury  Recent Flowsheet Documentation  Taken 10/7/2024 2040 by Brenda Ceballos RN  Supportive Measures:   active listening utilized   positive reinforcement provided     Problem: Risk for Delirium  Goal: Optimal Coping  Outcome: Progressing  Intervention: Optimize Psychosocial Adjustment to Delirium  Recent Flowsheet Documentation  Taken 10/7/2024 2040 by Brenda Ceballso RN  Supportive Measures:   active listening utilized   positive reinforcement provided     Problem: Comorbidity Management  Goal: Maintenance of Behavioral Health Symptom Control  Outcome: Progressing  Intervention: Maintain Behavioral Health Symptom Control  Recent Flowsheet Documentation  Taken 10/7/2024 2040 by Brenda Ceballos RN  Medication Review/Management: medications reviewed     Problem: Cognitive Impairment  Goal: Optimal Cognitive Function  Outcome: Progressing  Intervention: Optimize Cognitive Function  Recent Flowsheet Documentation  Taken 10/7/2024 2040 by Brenda Ceballos RN  Sensory Stimulation Regulation:   care clustered   quiet environment promoted  Environment Familiarity/Consistency: daily routine followed     Problem: Adult Inpatient Plan of Care  Goal: Absence of Hospital-Acquired Illness or Injury  Intervention: Identify and Manage Fall Risk  Recent Flowsheet Documentation  Taken 10/7/2024 2040 by Brenda Ceballos RN  Safety Promotion/Fall Prevention:   activity supervised   assistive device/personal items within reach   clutter free environment maintained   increased rounding and observation   increase visualization of patient   mobility aid in reach   nonskid shoes/slippers when out of bed   patient and family education   safety  round/check completed  Intervention: Prevent Skin Injury  Recent Flowsheet Documentation  Taken 10/7/2024 2040 by Brenda Ceballos RN  Skin Protection:   adhesive use limited   incontinence pads utilized  Device Skin Pressure Protection:   absorbent pad utilized/changed   adhesive use limited  Intervention: Prevent and Manage VTE (Venous Thromboembolism) Risk  Recent Flowsheet Documentation  Taken 10/7/2024 2040 by Brenda Ceballos RN  VTE Prevention/Management: SCDs on (sequential compression devices)  Intervention: Prevent Infection  Recent Flowsheet Documentation  Taken 10/7/2024 2040 by Brenda Ceballos RN  Infection Prevention:   hand hygiene promoted   personal protective equipment utilized   rest/sleep promoted   single patient room provided  Goal: Optimal Comfort and Wellbeing  Intervention: Monitor Pain and Promote Comfort  Recent Flowsheet Documentation  Taken 10/7/2024 2036 by Brenda Ceballos RN  Pain Management Interventions: medication (see MAR)     Problem: Hip Fracture Medical Management  Goal: Effective Bowel Elimination  Intervention: Promote Effective Bowel Elimination  Recent Flowsheet Documentation  Taken 10/7/2024 2040 by Brenda Ceballos RN  Bowel Elimination Management: (scheduled bowel meds)   toileting offered   other (see comments)  Bowel Elimination Promotion:   adequate fluid intake promoted   ambulation promoted  Goal: Baseline Cognitive Function Maintained  Intervention: Maximize Cognitive Function  Recent Flowsheet Documentation  Taken 10/7/2024 2040 by Brenda Ceballos RN  Reorientation Measures: clock in view  Environment Familiarity/Consistency: daily routine followed  Goal: Absence of Embolism  Intervention: Prevent or Manage Embolism Risk  Recent Flowsheet Documentation  Taken 10/7/2024 2040 by Brenda Ceballos RN  VTE Prevention/Management: SCDs on (sequential compression devices)  Goal: Pain Control and Function  Intervention: Manage Acute Orthopaedic-Related Pain  Recent  Flowsheet Documentation  Taken 10/7/2024 2036 by Brenda Ceballos RN  Pain Management Interventions: medication (see MAR)  Goal: Effective Urinary Elimination  Intervention: Support Effective Urinary Elimination  Recent Flowsheet Documentation  Taken 10/7/2024 2040 by Brenda Ceballos RN  Urinary Elimination Promotion:   absorbent pad/diaper use encouraged   toileting offered     Problem: Risk for Delirium  Goal: Improved Behavioral Control  Intervention: Prevent and Manage Agitation  Recent Flowsheet Documentation  Taken 10/7/2024 2040 by Brenda Ceballos RN  Environment Familiarity/Consistency: daily routine followed  Intervention: Minimize Safety Risk  Recent Flowsheet Documentation  Taken 10/7/2024 2040 by Brenda Ceballos RN  Communication Enhancement Strategies:   call light answered in person   communication board used   family involved in communication plan   one-step directions provided   repetition utilized  Enhanced Safety Measures:   assistive devices when indicated   pain management   review medications for side effects with activity  Goal: Improved Attention and Thought Clarity  Intervention: Maximize Cognitive Function  Recent Flowsheet Documentation  Taken 10/7/2024 2040 by Brenda Ceballos RN  Sensory Stimulation Regulation:   care clustered   quiet environment promoted  Reorientation Measures: clock in view     Problem: Comorbidity Management  Goal: Maintenance of Heart Failure Symptom Control  Intervention: Maintain Heart Failure Management  Recent Flowsheet Documentation  Taken 10/7/2024 2040 by Brenda Ceballos RN  Medication Review/Management: medications reviewed  Goal: Blood Pressure in Desired Range  Intervention: Maintain Blood Pressure Management  Recent Flowsheet Documentation  Taken 10/7/2024 2040 by Brenda Ceballos RN  Medication Review/Management: medications reviewed  Goal: Maintenance of COPD Symptom Control  Intervention: Maintain COPD Symptom Control  Recent Flowsheet  Documentation  Taken 10/7/2024 2040 by Brenda Ceballos RN  Supportive Measures:   active listening utilized   positive reinforcement provided  Medication Review/Management: medications reviewed   Goal Outcome Evaluation:                  Pt.confused and forgetful .Had pain and gave scheduled meds. Denied narcotics this shift even after saying his in more pain. 3L O2. Assist of 2 with walker. Continue with POC.

## 2024-10-09 ENCOUNTER — APPOINTMENT (OUTPATIENT)
Dept: OCCUPATIONAL THERAPY | Facility: HOSPITAL | Age: 84
DRG: 480 | End: 2024-10-09
Payer: COMMERCIAL

## 2024-10-09 ENCOUNTER — APPOINTMENT (OUTPATIENT)
Dept: ULTRASOUND IMAGING | Facility: HOSPITAL | Age: 84
DRG: 480 | End: 2024-10-09
Attending: INTERNAL MEDICINE
Payer: COMMERCIAL

## 2024-10-09 ENCOUNTER — APPOINTMENT (OUTPATIENT)
Dept: PHYSICAL THERAPY | Facility: HOSPITAL | Age: 84
DRG: 480 | End: 2024-10-09
Payer: COMMERCIAL

## 2024-10-09 LAB
ATRIAL RATE - MUSE: 110 BPM
BASOPHILS # BLD AUTO: 0.1 10E3/UL (ref 0–0.2)
BASOPHILS NFR BLD AUTO: 1 %
CK SERPL-CCNC: 324 U/L (ref 39–308)
CREAT SERPL-MCNC: 0.94 MG/DL (ref 0.67–1.17)
DIASTOLIC BLOOD PRESSURE - MUSE: NORMAL MMHG
EGFRCR SERPLBLD CKD-EPI 2021: 81 ML/MIN/1.73M2
EOSINOPHIL # BLD AUTO: 0.4 10E3/UL (ref 0–0.7)
EOSINOPHIL NFR BLD AUTO: 4 %
ERYTHROCYTE [DISTWIDTH] IN BLOOD BY AUTOMATED COUNT: 15.9 % (ref 10–15)
FOLATE SERPL-MCNC: 12.5 NG/ML (ref 4.6–34.8)
HCT VFR BLD AUTO: 32 % (ref 40–53)
HGB BLD-MCNC: 10 G/DL (ref 13.3–17.7)
IMM GRANULOCYTES # BLD: 0.1 10E3/UL
IMM GRANULOCYTES NFR BLD: 1 %
INTERPRETATION ECG - MUSE: NORMAL
LYMPHOCYTES # BLD AUTO: 1.9 10E3/UL (ref 0.8–5.3)
LYMPHOCYTES NFR BLD AUTO: 17 %
MAGNESIUM SERPL-MCNC: 2 MG/DL (ref 1.7–2.3)
MCH RBC QN AUTO: 28.1 PG (ref 26.5–33)
MCHC RBC AUTO-ENTMCNC: 31.3 G/DL (ref 31.5–36.5)
MCV RBC AUTO: 90 FL (ref 78–100)
MONOCYTES # BLD AUTO: 1.3 10E3/UL (ref 0–1.3)
MONOCYTES NFR BLD AUTO: 12 %
NEUTROPHILS # BLD AUTO: 7.2 10E3/UL (ref 1.6–8.3)
NEUTROPHILS NFR BLD AUTO: 66 %
NRBC # BLD AUTO: 0 10E3/UL
NRBC BLD AUTO-RTO: 0 /100
P AXIS - MUSE: 7 DEGREES
PLATELET # BLD AUTO: 233 10E3/UL (ref 150–450)
POTASSIUM SERPL-SCNC: 3.9 MMOL/L (ref 3.4–5.3)
PR INTERVAL - MUSE: 150 MS
QRS DURATION - MUSE: 86 MS
QT - MUSE: 396 MS
QTC - MUSE: 535 MS
R AXIS - MUSE: 34 DEGREES
RBC # BLD AUTO: 3.56 10E6/UL (ref 4.4–5.9)
SYSTOLIC BLOOD PRESSURE - MUSE: NORMAL MMHG
T AXIS - MUSE: 38 DEGREES
VENTRICULAR RATE- MUSE: 110 BPM
VIT B12 SERPL-MCNC: 750 PG/ML (ref 232–1245)
WBC # BLD AUTO: 10.9 10E3/UL (ref 4–11)

## 2024-10-09 PROCEDURE — 250N000013 HC RX MED GY IP 250 OP 250 PS 637: Performed by: INTERNAL MEDICINE

## 2024-10-09 PROCEDURE — 99207 PR NO BILLABLE SERVICE THIS VISIT: CPT | Performed by: INTERNAL MEDICINE

## 2024-10-09 PROCEDURE — 97530 THERAPEUTIC ACTIVITIES: CPT | Mod: GP

## 2024-10-09 PROCEDURE — 250N000011 HC RX IP 250 OP 636: Performed by: INTERNAL MEDICINE

## 2024-10-09 PROCEDURE — 83735 ASSAY OF MAGNESIUM: CPT | Performed by: INTERNAL MEDICINE

## 2024-10-09 PROCEDURE — 93970 EXTREMITY STUDY: CPT

## 2024-10-09 PROCEDURE — 97110 THERAPEUTIC EXERCISES: CPT | Mod: GO

## 2024-10-09 PROCEDURE — 82607 VITAMIN B-12: CPT | Performed by: INTERNAL MEDICINE

## 2024-10-09 PROCEDURE — 97116 GAIT TRAINING THERAPY: CPT | Mod: GP

## 2024-10-09 PROCEDURE — 36415 COLL VENOUS BLD VENIPUNCTURE: CPT | Performed by: INTERNAL MEDICINE

## 2024-10-09 PROCEDURE — 85018 HEMOGLOBIN: CPT | Performed by: INTERNAL MEDICINE

## 2024-10-09 PROCEDURE — 82550 ASSAY OF CK (CPK): CPT | Performed by: INTERNAL MEDICINE

## 2024-10-09 PROCEDURE — 120N000001 HC R&B MED SURG/OB

## 2024-10-09 PROCEDURE — 85004 AUTOMATED DIFF WBC COUNT: CPT | Performed by: INTERNAL MEDICINE

## 2024-10-09 PROCEDURE — 99232 SBSQ HOSP IP/OBS MODERATE 35: CPT | Performed by: INTERNAL MEDICINE

## 2024-10-09 PROCEDURE — 97535 SELF CARE MNGMENT TRAINING: CPT | Mod: GO

## 2024-10-09 PROCEDURE — 250N000013 HC RX MED GY IP 250 OP 250 PS 637: Performed by: PHYSICIAN ASSISTANT

## 2024-10-09 PROCEDURE — 250N000009 HC RX 250: Performed by: INTERNAL MEDICINE

## 2024-10-09 PROCEDURE — 82746 ASSAY OF FOLIC ACID SERUM: CPT | Performed by: INTERNAL MEDICINE

## 2024-10-09 PROCEDURE — 84132 ASSAY OF SERUM POTASSIUM: CPT | Performed by: INTERNAL MEDICINE

## 2024-10-09 PROCEDURE — 82565 ASSAY OF CREATININE: CPT | Performed by: INTERNAL MEDICINE

## 2024-10-09 RX ORDER — AMOXICILLIN 250 MG/1
500 CAPSULE ORAL EVERY 8 HOURS SCHEDULED
Status: DISCONTINUED | OUTPATIENT
Start: 2024-10-09 | End: 2024-10-11 | Stop reason: HOSPADM

## 2024-10-09 RX ADMIN — PANTOPRAZOLE SODIUM 40 MG: 40 TABLET, DELAYED RELEASE ORAL at 06:48

## 2024-10-09 RX ADMIN — TRAMADOL HYDROCHLORIDE 50 MG: 50 TABLET, COATED ORAL at 23:28

## 2024-10-09 RX ADMIN — MICONAZOLE NITRATE ANTIFUNGAL POWDER: 2 POWDER TOPICAL at 21:56

## 2024-10-09 RX ADMIN — LATANOPROST 1 DROP: 50 SOLUTION/ DROPS OPHTHALMIC at 21:56

## 2024-10-09 RX ADMIN — IPRATROPIUM BROMIDE AND ALBUTEROL 1 PUFF: 20; 100 SPRAY, METERED RESPIRATORY (INHALATION) at 21:56

## 2024-10-09 RX ADMIN — PIPERACILLIN AND TAZOBACTAM 3.38 G: 3; .375 INJECTION, POWDER, FOR SOLUTION INTRAVENOUS at 06:50

## 2024-10-09 RX ADMIN — AMOXICILLIN 500 MG: 250 CAPSULE ORAL at 14:08

## 2024-10-09 RX ADMIN — METOPROLOL SUCCINATE 12.5 MG: 25 TABLET, EXTENDED RELEASE ORAL at 10:14

## 2024-10-09 RX ADMIN — AMOXICILLIN 500 MG: 250 CAPSULE ORAL at 21:56

## 2024-10-09 RX ADMIN — MICONAZOLE NITRATE ANTIFUNGAL POWDER: 2 POWDER TOPICAL at 10:15

## 2024-10-09 RX ADMIN — DULOXETINE HYDROCHLORIDE 60 MG: 60 CAPSULE, DELAYED RELEASE PELLETS ORAL at 10:14

## 2024-10-09 RX ADMIN — ASPIRIN 81 MG CHEWABLE TABLET 81 MG: 81 TABLET CHEWABLE at 10:14

## 2024-10-09 RX ADMIN — LEVOTHYROXINE SODIUM 88 MCG: 88 TABLET ORAL at 06:48

## 2024-10-09 RX ADMIN — POLYETHYLENE GLYCOL 3350 17 G: 17 POWDER, FOR SOLUTION ORAL at 10:13

## 2024-10-09 RX ADMIN — SENNOSIDES AND DOCUSATE SODIUM 1 TABLET: 8.6; 5 TABLET ORAL at 20:16

## 2024-10-09 RX ADMIN — TRAMADOL HYDROCHLORIDE 50 MG: 50 TABLET, COATED ORAL at 14:08

## 2024-10-09 RX ADMIN — IPRATROPIUM BROMIDE AND ALBUTEROL 1 PUFF: 20; 100 SPRAY, METERED RESPIRATORY (INHALATION) at 14:09

## 2024-10-09 RX ADMIN — BUSPIRONE HYDROCHLORIDE 5 MG: 5 TABLET ORAL at 20:16

## 2024-10-09 RX ADMIN — OLANZAPINE 7.5 MG: 2.5 TABLET, FILM COATED ORAL at 21:55

## 2024-10-09 RX ADMIN — TRAMADOL HYDROCHLORIDE 50 MG: 50 TABLET, COATED ORAL at 00:39

## 2024-10-09 RX ADMIN — ACETAMINOPHEN 975 MG: 325 TABLET ORAL at 10:14

## 2024-10-09 RX ADMIN — ASPIRIN 81 MG CHEWABLE TABLET 81 MG: 81 TABLET CHEWABLE at 20:15

## 2024-10-09 RX ADMIN — SENNOSIDES AND DOCUSATE SODIUM 1 TABLET: 8.6; 5 TABLET ORAL at 10:14

## 2024-10-09 RX ADMIN — BUSPIRONE HYDROCHLORIDE 5 MG: 5 TABLET ORAL at 10:14

## 2024-10-09 RX ADMIN — Medication 3 MG: at 20:16

## 2024-10-09 RX ADMIN — IPRATROPIUM BROMIDE AND ALBUTEROL 1 PUFF: 20; 100 SPRAY, METERED RESPIRATORY (INHALATION) at 10:15

## 2024-10-09 RX ADMIN — ACETAMINOPHEN 975 MG: 325 TABLET ORAL at 20:16

## 2024-10-09 ASSESSMENT — ACTIVITIES OF DAILY LIVING (ADL)
ADLS_ACUITY_SCORE: 52
ADLS_ACUITY_SCORE: 50
ADLS_ACUITY_SCORE: 48
ADLS_ACUITY_SCORE: 49
ADLS_ACUITY_SCORE: 50
ADLS_ACUITY_SCORE: 52
ADLS_ACUITY_SCORE: 49
ADLS_ACUITY_SCORE: 48
ADLS_ACUITY_SCORE: 48
ADLS_ACUITY_SCORE: 49
ADLS_ACUITY_SCORE: 52
ADLS_ACUITY_SCORE: 52
ADLS_ACUITY_SCORE: 50
ADLS_ACUITY_SCORE: 49
ADLS_ACUITY_SCORE: 50
ADLS_ACUITY_SCORE: 49
ADLS_ACUITY_SCORE: 46
ADLS_ACUITY_SCORE: 50

## 2024-10-09 NOTE — PLAN OF CARE
Problem: Pain Acute  Goal: Optimal Pain Control and Function  Intervention: Develop Pain Management Plan  Recent Flowsheet Documentation  Taken 10/9/2024 1015 by Carolyn Gomez RN  Pain Management Interventions:   medication (see MAR)   distraction   rest   Goal Outcome Evaluation:             Pt calm/flat on shift.  Per bedside 1:1 pt up/down from recline on 2nd part of shift. Per prior assessments/reports most have found d/t increase of pain to LLE. Pt willing to try PRN tramadol, on sched. APAP as well. Will cont. To monitor.       Problem: Hip Fracture Medical Management  Goal: Absence of Bleeding  Outcome: Progressing          Dressing to L hip area c/d/I. Ortho PA assessed hip at bedside in AM this shift. Large amount of bruising to LLE.

## 2024-10-09 NOTE — PLAN OF CARE
Problem: Adult Inpatient Plan of Care  Goal: Plan of Care Review  Description: The Plan of Care Review/Shift note should be completed every shift.  The Outcome Evaluation is a brief statement about your assessment that the patient is improving, declining, or no change.  This information will be displayed automatically on your shift  note.  Outcome: Progressing   Goal Outcome Evaluation:               Pt alert and oriented, complains of pain, PRN tramadol given,  1:1 remains for safety, impulsive with getting out of bed, and fall risk, call light within reach,

## 2024-10-09 NOTE — PROGRESS NOTES
Care Management Follow Up    Length of Stay (days): 4    Expected Discharge Date: 10/11/2024     Concerns to be Addressed: discharge planning     Patient plan of care discussed at interdisciplinary rounds: Yes    Anticipated Discharge Disposition: Transitional Care  Anticipated Discharge Services: None  Anticipated Discharge DME:      Patient/family educated on Medicare website which has current facility and service quality ratings: yes  Education Provided on the Discharge Plan: Yes  Patient/Family in Agreement with the Plan:      Referrals Placed by CM/SW: Post Acute Facilities  Private pay costs discussed: Not applicable    Discussed  Partnership in Safe Discharge Planning  document with patient/family: No     Handoff Completed: No, handoff not indicated or clinically appropriate    Additional Information:  3:13 PM  GALLO confirmed with Faustino liajose Navarro that [facility] will be able to accept Pt back to their TCU on Friday (10/11) if off the 1:1 sitter for at least 24 hours. SW updated nursing regarding the needs for Pt to get off the 1:1 sitter.     Next Steps: CM to follow up on Pt's medical progression and assist with discharge planning back to TCU.     DIRK Cruz       84

## 2024-10-09 NOTE — PROGRESS NOTES
[facility]    Medicine Progress Note - Hospitalist Service    Date of Admission:  10/5/2024    Assessment & Plan   Dominik Rojas is a 83 year old male with past medical history of COPD on 2 to 3 L nasal cannula at home, chronic systolic heart failure, hypertension, hypothyroidism, anxiety, recent hospitalization from 9/stable to 9/16 for UTI, metabolic encephalopathy and discharged to TCU.  Patient brought to ED for evaluation of left hip pain after fall.  Patient found to have left proximal femur fracture with displacement.  Patient admitted for further management     Mechanical fall per history;  Acute comminuted left proximal femur fracture;  Left elbow contusion  -Patient reported he was on a recliner chair when he tried to get up and turn then slide forward and fall.  Denied chest pain, dizziness, shortness of breath prior to or after fall  -s/p left hip trochanteric nailing with cephalomedullary device on 10/6/24.  Orthopedic surgery assistance appreciated   -negative left elbow xray  -activity/dvt proph per surgery  -PT/OT  -TCU      H/O Chronic systolic heart failure with normalization of LV function;  Diastolic dysfunction  -nt-BNP nml, Troponin T-HS 22.  -TTE LVEF 60-65%, Left ventricular diastolic function is abnormal, right ventricle is normal in size and function, No significant valve disease, no RWMA's  -Continue Toprol-XL  -Hold Lasix/Losartan today based on hemodynamic trends  -strict I/O, daily weights     COPD  Acute on Chronic hypoxic respiratory failure on 2-3l NC;  -CXR reported right pleural effusion but also reports chronic.   -Acute hypoxia resolved  -on 4L O2 today with sPO2 100%. Order placed to titrate O2 to goal sPO2 range of 88-92%  -Duoneb prn  -Combivent respimat  -incentive spirometry, flutter valve  -oximetry    AMAURI, resolved  -hold lasix, losartan today based on hemodynamic trends    Mild rhabdomyolysis  -'s -> 300's  -hold statin  -encourage  "fluids  -CK a.m.     Leukocytosis, resolved  Enterococcus faecalis UTI  H/O Recurrent UTI  Lactic acidosis, resolved  -stop zosyn  -start amoxicillin 500mg TID x 9d     History of RLL adenocarcinoma s/p radiation therapy     Anxiety and depression;  Dementia with behavioral change/agitation   Mood disorder  Acute encephalopathy, unspecified  -CT Head/C spine negative for acute process  -delirium protocol  -VBG no hypercarbia.  -minimize deliriogenic meds  -Cymbalta, Zyprexa, Buspar, seroquel prn  -try to remove 1:1   -B12, Folate level    BLE edema  -BLE venous duplex US     HLD  -Crestor on hold    GERD  -PPI     Hypothyroidism  -TSH 6.93, FT4 0.83  -continue Synthroid as uncertain if compliant as outpatient  -recommend repeat TFT's in 4 weeks with PCP          Diet: Advance Diet as Tolerated: Regular Diet Adult  Discharge Instruction - Regular Diet Adult    DVT Prophylaxis: ASA  Cabral Catheter: Not present  Lines: None     Cardiac Monitoring: None  Code Status: Full Code      Clinically Significant Risk Factors              # Hypoalbuminemia: Lowest albumin = 2.8 g/dL at 10/7/2024  5:38 AM, will monitor as appropriate     # Hypertension: Noted on problem list    # Dementia: noted on problem list        # Overweight: Estimated body mass index is 27.93 kg/m  as calculated from the following:    Height as of this encounter: 1.702 m (5' 7\").    Weight as of this encounter: 80.9 kg (178 lb 4.8 oz)., PRESENT ON ADMISSION     # Financial/Environmental Concerns:           Disposition Plan     Medically Ready for Discharge: Anticipated Tomorrow  TCU if off 1:1           Miguel Celis DO, DO  Hospitalist Service  [facility]  Securely message with [employer] (more info)  Text page via [facility] Paging/Directory   ______________________________________________________________________    Interval History   Afebrile. Agitation yesterday. On 1:1. Hip pain. No n/v.    Physical Exam   Vital Signs: Temp: 99.3 "  F (37.4  C) Temp src: Oral BP: (!) 151/86 Pulse: 92   Resp: 18 SpO2: 100 % O2 Device: Nasal cannula Oxygen Delivery: 4 LPM  Weight: 178 lbs 4.8 oz    Gen nad  Eyes anicteric  Cv rrr. BLE edema  Lungs decreased anteriorly, no wheezing, non labored  Abd bs+, nttp  Neuro alert and oriented to month, year, [facility]. Flat affect. Not agitated or combative.  Sitting in chair.     Labs reviewed

## 2024-10-09 NOTE — PROGRESS NOTES
"Orthopedic Progress Note      Assessment: 3 Days Post-Op  S/P Procedure(s):  INTERNAL FIXATION, FRACTURE, TROCHANTERIC, LEFT HIP, USING RODS     Plan:   - Continue PT/OT  - Weightbearing status: WBAT LLE  - Activity: Up with assist and assistive device until independent.  - Anticoagulation: ASA 81 PO BIDx 30 days in addition to SCDs, cristela stockings and early ambulation.  - Antibiotics: 24 hours perioperative completed.   - Pain Management; continue current regimen and transition from IV to PO as tolerated  - Diet: progress diet as tolerated  - Dressing: Keep dry and intact.  - Elevation: Elevate LLE on pillow to keep above the level of the heart as much as possible  - Follow-up: Outpatient follow up in 2 weeks  - Disposition: Anticipate discharge today/tomorrow pending TCU bed availability/placement, and medical clearance. Orthopaedically stable.       Subjective:  Pain: mild  Nausea, Vomiting:  No  Lightheadedness, Dizziness:  No  Neuro:  Patient denies new onset numbness or paresthesias  Fever, chills: No  Chest pain: No  SOB: patient complains of shortness of breath, nurse was in checking O2 and adjusting nasal canula for oxygen flow accordingly.     Patient reports feeling well today. He rates his pain as mild but states it is  tolerable with current pain regimen. Patient eating and drinking well. Patient voiding and passing gas. He did have a bowel movement. Worked with therapy before our visit and states it went okay. He denies any new symptoms or concerns.  All questions/concerns answered.    Objective:  BP (!) 151/86 (BP Location: Left arm)   Pulse 92   Temp 99.3  F (37.4  C) (Oral)   Resp 18   Ht 1.702 m (5' 7\")   Wt 80.9 kg (178 lb 4.8 oz)   SpO2 100%   BMI 27.93 kg/m      The patient is A&Ox3. Appears comfortable, sitting up at bedside.  Calves without tenderness, neg Amber's  Brisk capillary refill in the toes.   Palpable Left dorsalis pedis pulse. Left foot warm & well-perfused.  Sensation is " intact to light touch & equal bilaterally in the femoral, DP, SP & tibial nerve distributions.  ROM: Appropriately flexes & extends all toes bilaterally.   Motor: +5/5 dorsiflexion, plantar flexion & EHL bilaterally.   Leg lengths equal.  Dressing C/D/I without strikethrough, no surrounding erythema.      No drain     Pertinent Labs   Lab Results: personally reviewed.   Lab Results   Component Value Date    INR 1.16 (H) 04/24/2023    INR 1.16 (H) 03/08/2023    INR 1.15 (H) 02/27/2021     Lab Results   Component Value Date    WBC 10.9 10/09/2024    HGB 10.0 (L) 10/09/2024    HCT 32.0 (L) 10/09/2024    MCV 90 10/09/2024     10/09/2024     Lab Results   Component Value Date     10/08/2024    CO2 26 10/08/2024         Report completed by:  Arline Peralta PA-C  Date: 10/09/2024  [facility]

## 2024-10-10 ENCOUNTER — APPOINTMENT (OUTPATIENT)
Dept: OCCUPATIONAL THERAPY | Facility: HOSPITAL | Age: 84
DRG: 480 | End: 2024-10-10
Payer: COMMERCIAL

## 2024-10-10 ENCOUNTER — APPOINTMENT (OUTPATIENT)
Dept: PHYSICAL THERAPY | Facility: HOSPITAL | Age: 84
DRG: 480 | End: 2024-10-10
Payer: COMMERCIAL

## 2024-10-10 LAB
ANION GAP SERPL CALCULATED.3IONS-SCNC: 14 MMOL/L (ref 7–15)
BUN SERPL-MCNC: 28.9 MG/DL (ref 8–23)
CALCIUM SERPL-MCNC: 8.3 MG/DL (ref 8.8–10.4)
CHLORIDE SERPL-SCNC: 101 MMOL/L (ref 98–107)
CK SERPL-CCNC: 331 U/L (ref 39–308)
CREAT SERPL-MCNC: 0.89 MG/DL (ref 0.67–1.17)
EGFRCR SERPLBLD CKD-EPI 2021: 86 ML/MIN/1.73M2
GLUCOSE SERPL-MCNC: 97 MG/DL (ref 70–99)
HCO3 SERPL-SCNC: 23 MMOL/L (ref 22–29)
POTASSIUM SERPL-SCNC: 4.3 MMOL/L (ref 3.4–5.3)
SODIUM SERPL-SCNC: 138 MMOL/L (ref 135–145)

## 2024-10-10 PROCEDURE — 97110 THERAPEUTIC EXERCISES: CPT | Mod: GP

## 2024-10-10 PROCEDURE — 250N000013 HC RX MED GY IP 250 OP 250 PS 637: Performed by: PHYSICIAN ASSISTANT

## 2024-10-10 PROCEDURE — 99232 SBSQ HOSP IP/OBS MODERATE 35: CPT | Performed by: INTERNAL MEDICINE

## 2024-10-10 PROCEDURE — 36415 COLL VENOUS BLD VENIPUNCTURE: CPT | Performed by: INTERNAL MEDICINE

## 2024-10-10 PROCEDURE — 97535 SELF CARE MNGMENT TRAINING: CPT | Mod: GO

## 2024-10-10 PROCEDURE — 250N000013 HC RX MED GY IP 250 OP 250 PS 637: Performed by: INTERNAL MEDICINE

## 2024-10-10 PROCEDURE — 97116 GAIT TRAINING THERAPY: CPT | Mod: GP

## 2024-10-10 PROCEDURE — 80048 BASIC METABOLIC PNL TOTAL CA: CPT | Performed by: INTERNAL MEDICINE

## 2024-10-10 PROCEDURE — 97530 THERAPEUTIC ACTIVITIES: CPT | Mod: GP

## 2024-10-10 PROCEDURE — 120N000001 HC R&B MED SURG/OB

## 2024-10-10 PROCEDURE — 82550 ASSAY OF CK (CPK): CPT | Performed by: INTERNAL MEDICINE

## 2024-10-10 RX ORDER — TRAMADOL HYDROCHLORIDE 50 MG/1
50 TABLET ORAL EVERY 6 HOURS PRN
Qty: 15 TABLET | Refills: 0 | Status: ON HOLD | OUTPATIENT
Start: 2024-10-10 | End: 2024-11-12

## 2024-10-10 RX ADMIN — DULOXETINE HYDROCHLORIDE 60 MG: 60 CAPSULE, DELAYED RELEASE PELLETS ORAL at 08:25

## 2024-10-10 RX ADMIN — IPRATROPIUM BROMIDE AND ALBUTEROL 1 PUFF: 20; 100 SPRAY, METERED RESPIRATORY (INHALATION) at 08:29

## 2024-10-10 RX ADMIN — ASPIRIN 81 MG CHEWABLE TABLET 81 MG: 81 TABLET CHEWABLE at 20:51

## 2024-10-10 RX ADMIN — IPRATROPIUM BROMIDE AND ALBUTEROL 1 PUFF: 20; 100 SPRAY, METERED RESPIRATORY (INHALATION) at 11:21

## 2024-10-10 RX ADMIN — OLANZAPINE 7.5 MG: 2.5 TABLET, FILM COATED ORAL at 22:15

## 2024-10-10 RX ADMIN — SENNOSIDES AND DOCUSATE SODIUM 1 TABLET: 8.6; 5 TABLET ORAL at 19:40

## 2024-10-10 RX ADMIN — TRAMADOL HYDROCHLORIDE 50 MG: 50 TABLET, COATED ORAL at 06:41

## 2024-10-10 RX ADMIN — METOPROLOL SUCCINATE 12.5 MG: 25 TABLET, EXTENDED RELEASE ORAL at 08:26

## 2024-10-10 RX ADMIN — BUSPIRONE HYDROCHLORIDE 5 MG: 5 TABLET ORAL at 08:28

## 2024-10-10 RX ADMIN — BUSPIRONE HYDROCHLORIDE 5 MG: 5 TABLET ORAL at 19:40

## 2024-10-10 RX ADMIN — AMOXICILLIN 500 MG: 250 CAPSULE ORAL at 22:15

## 2024-10-10 RX ADMIN — TRAMADOL HYDROCHLORIDE 50 MG: 50 TABLET, COATED ORAL at 17:21

## 2024-10-10 RX ADMIN — ACETAMINOPHEN 975 MG: 325 TABLET ORAL at 13:18

## 2024-10-10 RX ADMIN — Medication 3 MG: at 20:51

## 2024-10-10 RX ADMIN — MICONAZOLE NITRATE ANTIFUNGAL POWDER: 2 POWDER TOPICAL at 08:29

## 2024-10-10 RX ADMIN — ACETAMINOPHEN 975 MG: 325 TABLET ORAL at 08:25

## 2024-10-10 RX ADMIN — TRAMADOL HYDROCHLORIDE 50 MG: 50 TABLET, COATED ORAL at 11:20

## 2024-10-10 RX ADMIN — SENNOSIDES AND DOCUSATE SODIUM 2 TABLET: 8.6; 5 TABLET ORAL at 08:25

## 2024-10-10 RX ADMIN — POLYETHYLENE GLYCOL 3350 17 G: 17 POWDER, FOR SOLUTION ORAL at 08:24

## 2024-10-10 RX ADMIN — ACETAMINOPHEN 975 MG: 325 TABLET ORAL at 20:51

## 2024-10-10 RX ADMIN — AMOXICILLIN 500 MG: 250 CAPSULE ORAL at 13:18

## 2024-10-10 RX ADMIN — LEVOTHYROXINE SODIUM 88 MCG: 88 TABLET ORAL at 06:42

## 2024-10-10 RX ADMIN — LATANOPROST 1 DROP: 50 SOLUTION/ DROPS OPHTHALMIC at 22:16

## 2024-10-10 RX ADMIN — PANTOPRAZOLE SODIUM 40 MG: 40 TABLET, DELAYED RELEASE ORAL at 06:41

## 2024-10-10 RX ADMIN — AMOXICILLIN 500 MG: 250 CAPSULE ORAL at 06:42

## 2024-10-10 RX ADMIN — ASPIRIN 81 MG CHEWABLE TABLET 81 MG: 81 TABLET CHEWABLE at 08:25

## 2024-10-10 ASSESSMENT — ACTIVITIES OF DAILY LIVING (ADL)
ADLS_ACUITY_SCORE: 52
ADLS_ACUITY_SCORE: 48
ADLS_ACUITY_SCORE: 52
ADLS_ACUITY_SCORE: 48
ADLS_ACUITY_SCORE: 52
ADLS_ACUITY_SCORE: 48
ADLS_ACUITY_SCORE: 52

## 2024-10-10 NOTE — PLAN OF CARE
Problem: Adult Inpatient Plan of Care  Goal: Plan of Care Review  Description: The Plan of Care Review/Shift note should be completed every shift.  The Outcome Evaluation is a brief statement about your assessment that the patient is improving, declining, or no change.  This information will be displayed automatically on your shift  note.  10/10/2024 0956 by Jesi Whittaker RN  Outcome: Progressing   Goal Outcome Evaluation:      Plan of Care Reviewed With: patient    Overall Patient Progress: improvingOverall Patient Progress: improving    Outcome Evaluation: Pt up in chair this am with assist of 2 and walker. Appetite good Alert x 4 today.  Medicated with scheduled tylenol for pain. Sitter d'c'd at 8am.

## 2024-10-10 NOTE — PLAN OF CARE
Problem: Adult Inpatient Plan of Care  Goal: Plan of Care Review  Outcome: Progressing  Flowsheets (Taken 10/9/2024 8745)  Plan of Care Reviewed With: patient  Overall Patient Progress: improving     Problem: Adult Inpatient Plan of Care  Goal: Patient-Specific Goal (Individualized)  Outcome: Progressing     Problem: Adult Inpatient Plan of Care  Goal: Absence of Hospital-Acquired Illness or Injury  Outcome: Progressing     Problem: Adult Inpatient Plan of Care  Goal: Optimal Comfort and Wellbeing  Outcome: Progressing     Problem: Pain Acute  Goal: Optimal Pain Control and Function  Outcome: Progressing     Problem: Fall Injury Risk  Goal: Absence of Fall and Fall-Related Injury  Outcome: Progressing     Problem: Hip Fracture Medical Management  Goal: Optimal Coping with Change in Health Status  Outcome: Progressing     Problem: Hip Fracture Medical Management  Goal: Absence of Bleeding  Outcome: Progressing     Problem: Hip Fracture Medical Management  Goal: Fracture Stability  Outcome: Progressing   Goal Outcome Evaluation: Pt AOX3. VSS, pain managed with scheduled meds and ice pack. Dressing on L hip, CDI, area significantly bruised. 1:1 sitter at bedside for safety.       Plan of Care Reviewed With: patient    Overall Patient Progress: improvingOverall Patient Progress: improving

## 2024-10-10 NOTE — PROGRESS NOTES
"Orthopedic Progress Note      Assessment: 4 Days Post-Op  S/P Procedure(s):  INTERNAL FIXATION, FRACTURE, TROCHANTERIC, LEFT HIP, USING RODS     Plan:   - Continue PT/OT  - Weightbearing status: WBAT LLE  - Activity: Up with assist and assistive device until independent.  - Anticoagulation: ASA 81 PO BIDx 30 days in addition to SCDs, cristela stockings and early ambulation.  - Pain Management; continue current regimen and transition from IV to PO as tolerated  - Diet: progress diet as tolerated  - Dressing: Keep dry and intact.  - Elevation: Elevate LLE on pillow to keep above the level of the heart as much as possible  - Follow-up: Outpatient follow up in 2 weeks  - Disposition: Anticipate discharge today/tomorrow pending TCU bed availability/placement, and medical clearance. Orthopaedically stable.       Subjective:    Patient reports feeling well today. He rates his pain as mild but states it is  tolerable with current pain regimen. Patient eating and drinking well. Patient voiding and passing gas. He did have a bowel movement.  He denies any new symptoms or concerns.  All questions/concerns answered.    Objective:  BP (!) 148/70 (BP Location: Right arm, Patient Position: Chair, Cuff Size: Adult Regular)   Pulse 94   Temp 98  F (36.7  C) (Oral)   Resp 18   Ht 1.702 m (5' 7\")   Wt 80.9 kg (178 lb 4.8 oz)   SpO2 96%   BMI 27.93 kg/m      The patient is A&Ox3. Appears comfortable, sitting up at bedside.  Calves without tenderness, neg Amber's  Brisk capillary refill in the toes.   Palpable Left dorsalis pedis pulse. Left foot warm & well-perfused.  Sensation is intact to light touch & equal bilaterally in the femoral, DP, SP & tibial nerve distributions.  ROM: Appropriately flexes & extends all toes bilaterally.   Motor: +5/5 dorsiflexion, plantar flexion & EHL bilaterally.   Leg lengths equal.  Dressing C/D/I distal dressing, mild strikethrough proximal dressing, no surrounding erythema.  Ecchymossis but no " palpable hematoma      No drain     Pertinent Labs   Lab Results: personally reviewed.   Lab Results   Component Value Date    INR 1.16 (H) 04/24/2023    INR 1.16 (H) 03/08/2023    INR 1.15 (H) 02/27/2021     Lab Results   Component Value Date    WBC 10.9 10/09/2024    HGB 10.0 (L) 10/09/2024    HCT 32.0 (L) 10/09/2024    MCV 90 10/09/2024     10/09/2024     Lab Results   Component Value Date     10/08/2024    CO2 26 10/08/2024         PARMINDER CANTU PA-C  Date: 10/10/2024  [facility]

## 2024-10-10 NOTE — PROGRESS NOTES
[facility]    Medicine Progress Note - Hospitalist Service    Date of Admission:  10/5/2024    Assessment & Plan   Dominik Rojas is a 83 year old male with past medical history of COPD on 2 to 3 L nasal cannula at home, chronic systolic heart failure, hypertension, hypothyroidism, anxiety, recent hospitalization from 9/stable to 9/16 for UTI, metabolic encephalopathy and discharged to TCU.  Patient brought to ED for evaluation of left hip pain after fall.  Patient found to have left proximal femur fracture with displacement.  Patient admitted for further management     Mechanical fall per history;  Acute comminuted left proximal femur fracture;  Left elbow contusion  -Patient reported he was on a recliner chair when he tried to get up and turn then slide forward and fall.  Denied chest pain, dizziness, shortness of breath prior to or after fall  -s/p left hip trochanteric nailing with cephalomedullary device on 10/6/24.  Orthopedic surgery assistance appreciated   -negative left elbow xray  -activity/dvt proph per surgery  -PT/OT  -TCU when off 1:1 for 24 hours     H/O Chronic systolic heart failure with normalization of LV function;  Diastolic dysfunction  -nt-BNP nml, Troponin T-HS 22.  -TTE LVEF 60-65%, Left ventricular diastolic function is abnormal, right ventricle is normal in size and function, No significant valve disease, no RWMA's  -Continue Toprol-XL  -Hold Lasix/Losartan today based on hemodynamic trends  -strict I/O, daily weights     COPD  Acute on Chronic hypoxic respiratory failure on 2-3l NC;  -CXR reported right pleural effusion but also reports chronic.   -Acute hypoxia resolved  -on 4L O2 today with sPO2 100%. Order placed to titrate O2 to goal sPO2 range of 88-92%  -Duoneb prn  -Combivent respimat  -incentive spirometry, flutter valve  -oximetry    AMAURI, resolved  -hold lasix, losartan today based on hemodynamic trends    Mild rhabdomyolysis  -'s -> 300's  -hold  "statin  -encourage fluids  -CK a.m.     Leukocytosis, resolved  Enterococcus faecalis UTI  H/O Recurrent UTI  Lactic acidosis, resolved  -stop zosyn  -start amoxicillin 500mg TID x 9d     History of RLL adenocarcinoma s/p radiation therapy     Anxiety and depression;  Dementia with behavioral change/agitation   Mood disorder  Acute encephalopathy, unspecified  -CT Head/C spine negative for acute process  -delirium protocol  -VBG no hypercarbia.  -minimize deliriogenic meds  -Cymbalta, Zyprexa, Buspar, seroquel prn  -try to remove 1:1   -B12, Folate level    BLE edema  -BLE venous duplex US     HLD  -Crestor on hold    GERD  -PPI     Hypothyroidism  -TSH 6.93, FT4 0.83  -continue Synthroid as uncertain if compliant as outpatient  -recommend repeat TFT's in 4 weeks with PCP          Diet: Advance Diet as Tolerated: Regular Diet Adult  Discharge Instruction - Regular Diet Adult    DVT Prophylaxis: ASA, SCD  Cabral Catheter: Not present  Lines: None     Cardiac Monitoring: None  Code Status: Full Code      Clinically Significant Risk Factors               # Hypoalbuminemia: Lowest albumin = 2.8 g/dL at 10/7/2024  5:38 AM, will monitor as appropriate     # Hypertension: Noted on problem list    # Dementia: noted on problem list        # Overweight: Estimated body mass index is 27.93 kg/m  as calculated from the following:    Height as of this encounter: 1.702 m (5' 7\").    Weight as of this encounter: 80.9 kg (178 lb 4.8 oz).      # Financial/Environmental Concerns:           Disposition Plan     Medically Ready for Discharge: Anticipated Tomorrow    Back to TCU after being off 1:1 for 24 hours         Lu Skaggs MD  Hospitalist Service  [facility]  Securely message with [email or web address] (more info)  Text page via [employer] Paging/Directory   ______________________________________________________________________    Interval History   Eating breakfast, no complaints except hip pain on movement, no cp/sob, no " f/c,     Physical Exam   Vital Signs: Temp: 97.4  F (36.3  C) Temp src: Oral BP: 139/73 Pulse: 81   Resp: 18 SpO2: 94 % O2 Device: Nasal cannula Oxygen Delivery: 4 LPM  Weight: 178 lbs 4.8 oz    General.  Awake not in acute distress.  HEENT.  Pupils equal round react to light, anicteric, EOM intact.  Neck supple no JVD.  CVS regular rhythm no murmur gallops.  Lungs.  Clear to auscultation bilateral no wheezing or rales.  Abdomen.  Soft nontender bowel sounds present.  Extremities.  S/p left hip sx.  Neurological.  General weakness  Skin no rash. No pallor.  Psych. Impaired memory and  cognition    Medical Decision Making       40 MINUTES SPENT BY ME on the date of service doing chart review, history, exam, documentation & further activities per the note.      Data         Imaging results reviewed over the past 24 hrs:   Recent Results (from the past 24 hour(s))   US Lower Extremity Venous Duplex Bilateral    Narrative    EXAM: US LOWER EXTREMITY VENOUS DUPLEX BILATERAL  LOCATION: [facility]  DATE: 10/9/2024    INDICATION: Bilateral leg swelling. Bilateral lower extremity edema.  COMPARISON: None.  TECHNIQUE: Venous Duplex ultrasound of bilateral lower extremities with and without compression, augmentation and duplex. Color flow and spectral Doppler with waveform analysis performed.    FINDINGS: Exam includes the common femoral, femoral, popliteal veins as well as segmentally visualized deep calf veins and greater saphenous vein.     RIGHT: No deep vein thrombosis. No superficial thrombophlebitis. No popliteal cyst.    LEFT: No deep vein thrombosis. No superficial thrombophlebitis. No popliteal cyst.      Impression    IMPRESSION:  1.  No deep venous thrombosis in the bilateral lower extremities.

## 2024-10-10 NOTE — PROGRESS NOTES
Care Management Follow Up    Length of Stay (days): 5    Expected Discharge Date: 10/11/2024     Concerns to be Addressed: discharge planning     Patient plan of care discussed at interdisciplinary rounds: Yes    Anticipated Discharge Disposition: Transitional Care  Anticipated Discharge Services: None  Anticipated Discharge DME:      Patient/family educated on Medicare website which has current facility and service quality ratings: yes  Education Provided on the Discharge Plan: Yes  Patient/Family in Agreement with the Plan:      Referrals Placed by CM/SW: Post Acute Facilities  Private pay costs discussed: Not applicable    Discussed  Partnership in Safe Discharge Planning  document with patient/family: No     Handoff Completed: No, handoff not indicated or clinically appropriate    Additional Information:  Plan is for Pt to discharge back to [facility] ([facility]) tomorrow. 1:1 sitter discontinued around 0800 this morning.    12:56 PM  SW called Pt's wife Jovana's cell phone and was unable to leave a voicemail due to the mailbox being full. SW called Jovana's home phone and left a voicemail requesting a return phone call back to discuss plans for discharge to TCU tomorrow. Left CM callback contact [phone].    3:33 PM   SW reached out to [facility] liaison Melanie to inquire about what time they would like for discharge planning tomorrow. Melanie asked for CM to plan for an afternoon discharge tomorrow.    Next Steps: CM to call Pt's wife again to discuss transportation plan to TCU tomorrow.       DIRK Cruz

## 2024-10-10 NOTE — PLAN OF CARE
Problem: Gas Exchange Impaired  Goal: Optimal Gas Exchange  Intervention: Optimize Oxygenation and Ventilation  Recent Flowsheet Documentation  Taken 10/10/2024 0234 by Yana Mackenzie RN  Head of Bed (HOB) Positioning: HOB at 30-45 degrees     Problem: Hip Fracture Medical Management  Goal: Optimal Coping with Change in Health Status  Intervention: Support Psychosocial Response to Injury  Recent Flowsheet Documentation  Taken 10/9/2024 2322 by Yana Mackenzie RN  Supportive Measures:   active listening utilized   verbalization of feelings encouraged     Problem: Hip Fracture Medical Management  Goal: Fracture Stability  Intervention: Promote Fracture Stability and Healing  Recent Flowsheet Documentation  Taken 10/9/2024 2322 by Yana Mackenzie, RN  Equipment: ice  Fracture Immobilization: supported with pillows     Problem: Hip Fracture Medical Management  Goal: Effective Urinary Elimination  Intervention: Support Effective Urinary Elimination  Recent Flowsheet Documentation  Taken 10/9/2024 2322 by Yana Mackenzie RN  Urinary Elimination Promotion: absorbent pad/diaper use encouraged   Goal Outcome Evaluation:        Pt alert,intermittent confusion, on O2 4LPM; de oriented to situation, constant left hip pain, PRN Tramadol was given x 2 and ice applied to the incision. Incision dressing intact, clean, dry Pt remain 1:1, assist of 2, able to use bedside urinal, urinated frequently  with small output, total urine  output this shift 500 ml, input 450 ml; constantly wants to leave the room. Continuous to monitor.

## 2024-10-11 ENCOUNTER — LAB REQUISITION (OUTPATIENT)
Dept: LAB | Facility: CLINIC | Age: 84
End: 2024-10-11
Payer: COMMERCIAL

## 2024-10-11 VITALS
WEIGHT: 178.3 LBS | SYSTOLIC BLOOD PRESSURE: 112 MMHG | TEMPERATURE: 99 F | BODY MASS INDEX: 27.99 KG/M2 | HEIGHT: 67 IN | RESPIRATION RATE: 18 BRPM | DIASTOLIC BLOOD PRESSURE: 65 MMHG | HEART RATE: 80 BPM | OXYGEN SATURATION: 99 %

## 2024-10-11 DIAGNOSIS — J44.1 CHRONIC OBSTRUCTIVE PULMONARY DISEASE WITH (ACUTE) EXACERBATION (H): ICD-10-CM

## 2024-10-11 LAB
CK SERPL-CCNC: 217 U/L (ref 39–308)
HOLD SPECIMEN: NORMAL

## 2024-10-11 PROCEDURE — 250N000013 HC RX MED GY IP 250 OP 250 PS 637: Performed by: INTERNAL MEDICINE

## 2024-10-11 PROCEDURE — 36415 COLL VENOUS BLD VENIPUNCTURE: CPT | Performed by: INTERNAL MEDICINE

## 2024-10-11 PROCEDURE — 250N000013 HC RX MED GY IP 250 OP 250 PS 637: Performed by: PHYSICIAN ASSISTANT

## 2024-10-11 PROCEDURE — 99239 HOSP IP/OBS DSCHRG MGMT >30: CPT | Performed by: INTERNAL MEDICINE

## 2024-10-11 PROCEDURE — 82550 ASSAY OF CK (CPK): CPT | Performed by: INTERNAL MEDICINE

## 2024-10-11 RX ORDER — AMOXICILLIN 500 MG/1
500 CAPSULE ORAL EVERY 8 HOURS
Status: ON HOLD | DISCHARGE
Start: 2024-10-11 | End: 2024-11-12

## 2024-10-11 RX ADMIN — PANTOPRAZOLE SODIUM 40 MG: 40 TABLET, DELAYED RELEASE ORAL at 06:42

## 2024-10-11 RX ADMIN — MICONAZOLE NITRATE ANTIFUNGAL POWDER: 2 POWDER TOPICAL at 08:33

## 2024-10-11 RX ADMIN — AMOXICILLIN 500 MG: 250 CAPSULE ORAL at 06:43

## 2024-10-11 RX ADMIN — AMOXICILLIN 500 MG: 250 CAPSULE ORAL at 13:10

## 2024-10-11 RX ADMIN — POLYETHYLENE GLYCOL 3350 17 G: 17 POWDER, FOR SOLUTION ORAL at 08:22

## 2024-10-11 RX ADMIN — ASPIRIN 81 MG CHEWABLE TABLET 81 MG: 81 TABLET CHEWABLE at 08:22

## 2024-10-11 RX ADMIN — ACETAMINOPHEN 975 MG: 325 TABLET ORAL at 08:22

## 2024-10-11 RX ADMIN — ACETAMINOPHEN 975 MG: 325 TABLET ORAL at 13:10

## 2024-10-11 RX ADMIN — TRAMADOL HYDROCHLORIDE 50 MG: 50 TABLET, COATED ORAL at 03:57

## 2024-10-11 RX ADMIN — METOPROLOL SUCCINATE 12.5 MG: 25 TABLET, EXTENDED RELEASE ORAL at 08:21

## 2024-10-11 RX ADMIN — IPRATROPIUM BROMIDE AND ALBUTEROL 1 PUFF: 20; 100 SPRAY, METERED RESPIRATORY (INHALATION) at 13:10

## 2024-10-11 RX ADMIN — IPRATROPIUM BROMIDE AND ALBUTEROL 1 PUFF: 20; 100 SPRAY, METERED RESPIRATORY (INHALATION) at 08:22

## 2024-10-11 RX ADMIN — LEVOTHYROXINE SODIUM 88 MCG: 88 TABLET ORAL at 06:42

## 2024-10-11 RX ADMIN — BUSPIRONE HYDROCHLORIDE 5 MG: 5 TABLET ORAL at 08:22

## 2024-10-11 RX ADMIN — DULOXETINE HYDROCHLORIDE 60 MG: 60 CAPSULE, DELAYED RELEASE PELLETS ORAL at 08:21

## 2024-10-11 RX ADMIN — SENNOSIDES AND DOCUSATE SODIUM 1 TABLET: 8.6; 5 TABLET ORAL at 08:22

## 2024-10-11 ASSESSMENT — ACTIVITIES OF DAILY LIVING (ADL)
ADLS_ACUITY_SCORE: 48

## 2024-10-11 NOTE — DISCHARGE SUMMARY
[facility]    Discharge Summary  Hospitalist    Date of Admission:  10/5/2024  Date of Discharge:  10/11/2024  Discharging Provider: Lu Skaggs MD  Date of Service (when I saw the patient): 10/11/24    Discharge Diagnoses   Fall  Acute comminuted left proximal femur fracture s/p internal fixation  Left elbow contusion  COPD on home o2  AMAURI  Mild rhabdomyolysis     History of Present Illness   Dominik Rojas is an 83 year old male who presented with fall    Hospital Course   Dominik Rojas is a 83 year old male with past medical history of COPD on 2 to 3 L nasal cannula at home, chronic systolic heart failure, hypertension, hypothyroidism, anxiety, recent hospitalization from 9/stable to 9/16 for UTI, metabolic encephalopathy and discharged to TCU.  Patient brought to ED for evaluation of left hip pain after fall.  Patient found to have left proximal femur fracture with displacement.       Mechanical fall per history;  Acute comminuted left proximal femur fracture;  Left elbow contusion  -Patient reported he was on a recliner chair when he tried to get up and turn then slide forward and fall.  Denied chest pain, dizziness, shortness of breath prior to or after fall  -s/p left hip trochanteric nailing with cephalomedullary device on 10/6/24.  Orthopedic surgery assistance appreciated   -negative left elbow xray  -activity/dvt proph per surgery  -back to TCU when off 1:1 for 24 hours     H/O Chronic systolic heart failure with normalization of LV function;  Diastolic dysfunction  -nt-BNP nml, Troponin T-HS 22.  -TTE LVEF 60-65%, Left ventricular diastolic function is abnormal, right ventricle is normal in size and function, No significant valve disease, no RWMA's  -Continue Toprol-XL  -Hold Lasix/Losartan with amauri and soft BP. Resumed at discharge  -strict I/O, daily weights     COPD  Acute on Chronic hypoxic respiratory failure on 2-3l NC;  -CXR reported right pleural effusion but  also reports chronic.   -Acute hypoxia resolved  -on 4L O2 today with sPO2 100%. Order placed to titrate O2 to goal sPO2 range of 88-92%  -Duoneb prn  -Combivent respimat  -incentive spirometry, flutter valve  -oximetry     AMAURI, resolved  -hold lasix, losartan today based on hemodynamic trends     Mild rhabdomyolysis  -'s -> 300's ->200s  -hold statin  -encourage fluids     Leukocytosis, resolved  Enterococcus faecalis UTI  H/O Recurrent UTI  Lactic acidosis, resolved  -stop zosyn  -start amoxicillin 500mg TID x 9d     History of RLL adenocarcinoma s/p radiation therapy     Anxiety and depression;  Dementia with behavioral change/agitation   Mood disorder  Acute encephalopathy, unspecified  -CT Head/C spine negative for acute process  -delirium protocol  -VBG no hypercarbia.  -minimize deliriogenic meds  -Cymbalta, Zyprexa, Buspar, seroquel prn  -tried to remove 1:1   -B12, Folate level     BLE edema  -BLE venous duplex US     HLD  -Crestor on hold     GERD  -PPI      Hypothyroidism  -TSH 6.93, FT4 0.83  -continue Synthroid as uncertain if compliant as outpatient  -recommend repeat TFT's in 4 weeks with PCP          Lu Skaggs MD    Significant Results and Procedures   See below    Pending Results     Unresulted Labs Ordered in the Past 30 Days of this Admission       No orders found from 9/5/2024 to 10/6/2024.            Code Status   Full Code       Primary Care Physician   Misael Moe    General.  Awake not in acute distress.  HEENT.  Pupils equal round react to light, anicteric, EOM intact.  Neck supple no JVD.  CVS regular rhythm no murmur gallops.  Lungs.  Clear to auscultation bilateral no wheezing or rales.  Abdomen.  Soft nontender bowel sounds present.  Extremities.  S/p left hip sx  Neurological.  General weakness  Skin no rash. No pallor.  Psych. Impaired memory     Discharge Disposition   Discharged to TCU  Condition at discharge: Fair    Consultations This Hospital Stay   CARE MANAGEMENT  "/ SOCIAL WORK IP CONSULT  PHYSICAL THERAPY ADULT IP CONSULT  OCCUPATIONAL THERAPY ADULT IP CONSULT  PHARMACY IP CONSULT  PHYSICAL THERAPY ADULT IP CONSULT  OCCUPATIONAL THERAPY ADULT IP CONSULT    Time Spent on this Encounter   I, Lu Skaggs MD, personally saw the patient today and spent greater than 30 minutes discharging this patient.    Discharge Orders      Primary Care - Care Coordination Referral      Reason for your hospital stay    S/p hip repair     When to call - Contact Surgeon Team    You may experience symptoms that require follow-up before your scheduled appointment. Refer to the \"Stoplight Tool\" for instructions on when to contact your Surgeon Team if you are concerned about pain control, blood clots, constipation, or if you are unable to urinate.     When to call - Reach out to Urgent Care    If you are not able to reach your Surgeon Team and you need immediate care, go to the Orthopedic Walk-in Clinic or Urgent Care at your Surgeon's office.  Do NOT go to the Emergency Room unless you have shortness of breath, chest pain, or other signs of a medical emergency.     When to call - Reasons to Call 911    Call 911 immediately if you experience sudden-onset chest pain, arm weakness/numbness, slurred speech, or shortness of breath     Discharge Instruction - Breathing exercises    Perform breathing exercises using your Incentive Spirometer 10 times per hour while awake for 2 weeks.     Symptoms - Fever Management    A low grade fever can be expected after surgery.  Use acetaminophen (TYLENOL) as needed for fever management.  Contact your Surgeon Team if you have a fever greater than 101.5 F, chills, and/or night sweats.     Symptoms - Constipation management    Constipation (hard, dry bowel movements) is expected after surgery due to the combination of being less active, the anesthetic, and the opioid pain medication.  You can do the following to help reduce constipation:  ~  FLUIDS:  Drink clear " liquids (water or Gatorade), or juice (apple/prune).  ~  DIET:  Eat a fiber rich diet.    ~  ACTIVITY:  Get up and move around several times a day.  Increase your activity as you are able.  MEDICATIONS:  Reduce the risk of constipation by starting medications before you are constipated.  You can take Miralax   (1 packet as directed) and/or a stool softener (Senokot 1-2 tablets 1-2 times a day).  If you already have constipation and these medications are not working, you can get magnesium citrate and use as directed.  If you continue to have constipation you can try an over the counter suppository or enema.  Call your Surgeon Team if it has been greater than 3 days since your last bowel movement.     Symptoms - Reduced Urine Output    Changes in the amount of fluids you drank before and after surgery may result in problems urinating.  It is important to stay well-hydrated after surgery and drink plenty of water. If it has been greater than 8 hours since you have urinated despite drinking plenty of water, call your Surgeon Team.     Activity - Exercises to prevent blood clots    Unless otherwise directed by your Surgeon team, perform the following exercises at least three times per day for the first four weeks after surgery to prevent blood clots in your legs: 1) Point and flex your feet (Ankle Pumps), 2) Move your ankle around in big circles, 3) Wiggle your toes, 4) Walk, even for short distances, several times a day, will help decrease the risk of blood clots.     Comfort and Pain Management - Pain after Surgery    Pain after surgery is normal and expected.  You will have some amount of pain for several weeks after surgery.  Your pain will improve with time.  There are several things you can do to help reduce your pain including: rest, ice, elevation, and using pain medications as needed. Contact your Surgeon Team if you have pain that persists or worsens after surgery despite rest, ice, elevation, and taking your  "medication(s) as prescribed. Contact your Surgeon Team if you have new numbness, tingling, or weakness in your operative extremity.     Comfort and Pain Management - Swelling after Surgery    Swelling and/or bruising of the surgical extremity is common and may persist for several months after surgery. In addition to frequent icing and elevation, gentle compressive support with an ACE wrap or tubigrip may help with swelling. Apply compression regularly, removing at least twice daily to perform skin checks. Contact your Surgeon Team if your swelling increases and is NOT associated with an increase in your activity level, or if your swelling increases and is associated with redness and pain.     Comfort and Pain Management - LOWER Extremity Elevation    Swelling is expected for several months after surgery. This type of swelling is usually associated with gravity and activity, and can be improved with elevation.   The best way to do this is to get your \"toes above your nose\" by laying down and placing several pillows lengthwise under your calf and heel. When elevating your leg keep your knee completely straight. Perform this elevation as often as possible especially for the first two weeks after surgery.     Comfort and Pain Management - Cold therapy    Ice can be used to control swelling and discomfort after surgery. Place a thin towel over your operative site and apply the ice pack overtop. Leave ice pack in place for 20 minutes, then remove for 20 minutes. Repeat this 20 minutes on/20 minutes off routine as often as tolerated.     Medication Instructions - Acetaminophen (TYLENOL) Instructions    You were discharged with acetaminophen (TYLENOL) for pain management after surgery. Acetaminophen most effectively manages pain symptoms when it is taken on a schedule without missing doses (every four, six, or eight hours). Your Provider will prescribe a safe daily dose between 3000 - 4000 mg.  Do NOT exceed this daily " dose. Most patients use acetaminophen for pain control for the first four weeks after surgery.  You can wean from this medication as your pain decreases.     Follow Up Care    Please follow-up with Dr. Oliver's team in 2 weeks at [facility]. Call our scheduling line at [phone] to make an appointment, if you do not already have one scheduled.     Activity    Activity as tolerated     Return to Driving    Return to driving - Driving is NOT permitted until directed by your provider. Under no circumstance are you permitted to drive while using narcotic pain medications.     Weight bearing as tolerated    Weight bearing as tolerated on your operative extremity.     Dressing / Wound Care - Wound    You have a clean dressing on your surgical wound. Dressing change instructions as follows: dressing will be removed at your follow-up appointment. Contact your Surgeon Team if you have increased redness, warmth around the surgical wound, and/or drainage from the surgical wound.     Dressing / Wound care - Shower with wound/dressing covered    You must COVER your dressing or incision with saran wrap (or any other non-permeable covering) to allow the incision to remain dry while showering.  You may shower 2 days after surgery as long as the surgical wound stays dry. Continue to cover your dressing or incision for showering until your first office visit.  You are strictly prohibited from soaking or submerging the surgical wound underwater.     Dressing / Wound Care - NO Tub Bathing    Tub bathing, swimming, or any other activities that will cause your incision to be submerged in water should be avoided until allowed by your Surgeon.     Medication instructions -  Anticoagulation - aspirin    Take the aspirin as prescribed for a total of four weeks after surgery.  This is given to help minimize your risk of blood clot.     Opioid Instructions (Greater than or equal to 65 years)    You were discharged with an opioid  medication (hydromorphone, oxycodone, hydrocodone, or tramadol). This medication should only be taken for breakthrough pain that is not controlled with acetaminophen (TYLENOL). If you rate your pain less than 3 you do not need this medication. Pain rating 0-3: You do not need this medication Pain rating 4-6: Take 1/2 tablet every 4-6 hours as needed Pain rating 7-10: Take 1 tablet every 4-6 hours as needed Do not exceed 4 tablets per day     Medication Instructions - Opioids - Tapering Instructions    In the first three days following surgery, your symptoms may warrant use of the narcotic pain medication every four to six hours as prescribed. This is normal. As your pain symptoms improve, focus your efforts on decreasing (tapering) use of narcotic medications. The most successful tapering strategy is to first, decrease the number of tablets you take every 4-6 hours to the minimum prescribed. Then, increase the amount of time between doses. For example: First, taper to   or 1 tablet every 4-6 hours. Then, taper to   or 1 tablet every 6-8 hours. Then, taper to   or 1 tablet every 8-10 hours. Then, taper to   or 1 tablet every 10-12 hours. Then, taper to   or 1 tablet at bedtime. The bedtime dose can help with comfort during sleep and is typically the last dose to be discontinued after surgery.     No precautions    No precautions directed by your Provider.  You may perform range of motion activities as tolerated.     General info for SNF    Length of Stay Estimate: Short Term Care: Estimated # of Days 31-90  Condition at Discharge: Improving  Level of care:skilled   Rehabilitation Potential: Fair  Admission H&P remains valid and up-to-date: Yes  Recent Chemotherapy: N/A  Use Nursing Home Standing Orders: Yes     Mantoux instructions    Give two-step Mantoux (PPD) Per Facility Policy Yes     Follow Up and recommended labs and tests    Follow up with shelter physician.  The following labs/tests are recommended:  cbc, bmp.     Reason for your hospital stay    Fall   Left hip fracture  Metabolic encephalopathy  AMAURI  Rhabdomyolysis   Dementia  COPD     Activity - Up with nursing assistance     Physical Therapy Adult Consult    Evaluate and treat as clinically indicated.    Reason:  weakness     Occupational Therapy Adult Consult    Evaluate and treat as clinically indicated.    Reason:  weakness     Oxygen (SNF/TCU) Discharge     Fall precautions     Walker DME    DME Documentation: Describe the reason for need to support medical necessity: Impaired gait status post hip surgery. I, the undersigned, certify that the above prescribed supplies are medically necessary for this patient and is both reasonable and necessary in reference to accepted standards of medical practice in the treatment of this patient's condition and is not prescribed as a convenience.     Discharge Instruction - Regular Diet Adult    Return to your pre-surgery diet unless instructed otherwise     Diet    Follow this diet upon discharge: Current Diet:Orders Placed This Encounter      Advance Diet as Tolerated: Regular Diet Adult      Discharge Instruction - Regular Diet Adult     Discharge Medications   Current Discharge Medication List        START taking these medications    Details   amoxicillin (AMOXIL) 500 MG capsule Take 1 capsule (500 mg) by mouth every 8 hours for 5 days.    Associated Diagnoses: Acute cystitis without hematuria      miconazole (MICATIN) 2 % external powder Apply topically 2 times daily.    Associated Diagnoses: Candidiasis of skin      traMADol (ULTRAM) 50 MG tablet Take 1 tablet (50 mg) by mouth every 6 hours as needed for severe pain.  Qty: 15 tablet, Refills: 0    Associated Diagnoses: Hip fracture, left, closed, initial encounter (H)           CONTINUE these medications which have CHANGED    Details   acetaminophen (TYLENOL) 325 MG tablet Take 3 tablets (975 mg) by mouth 3 times daily.  Qty: 100 tablet, Refills: 0    Associated  Diagnoses: Hip fracture, left, closed, initial encounter (H)      aspirin (ASA) 81 MG chewable tablet Take 1 tablet (81 mg) by mouth 2 times daily.  Qty: 60 tablet, Refills: 0    Associated Diagnoses: Hip fracture, left, closed, initial encounter (H)           CONTINUE these medications which have NOT CHANGED    Details   busPIRone (BUSPAR) 5 MG tablet Take 5 mg by mouth 2 times daily      Cholecalciferol (VITAMIN D3) 50 MCG (2000 UT) CAPS Take 1 tablet by mouth every morning.      DULoxetine (CYMBALTA) 60 MG capsule Take 60 mg by mouth every morning.      furosemide (LASIX) 20 MG tablet Take 20 mg by mouth every morning.      ipratropium - albuterol 0.5 mg/2.5 mg/3 mL (DUONEB) 0.5-2.5 (3) MG/3ML neb solution Take 1 vial by nebulization every 6 hours as needed for wheezing.      latanoprost (XALATAN) 0.005 % ophthalmic solution Place 1 drop into both eyes At Bedtime      levothyroxine (SYNTHROID/LEVOTHROID) 88 MCG tablet Take 88 mcg by mouth daily before breakfast      lidocaine (LIDODERM) 5 % patch Place 1 patch over 12 hours onto the skin every 24 hours. To prevent lidocaine toxicity, patient should be patch free for 12 hrs daily.  Qty: 9 patch, Refills: 0      losartan (COZAAR) 25 MG tablet Take 25 mg by mouth every morning.      Melatonin 10 MG TABS tablet Take 10 mg by mouth at bedtime.      menthol-zinc oxide (CALMOSEPTINE) 0.44-20.6 % OINT ointment Apply topically 3 times daily. Apply to saccral, coccyx, and groin area(s) topically three times daily for prevention.      metoprolol succinate ER (TOPROL-XL) 12.5 mg TABS 24 hr half-tab Take 12.5 mg by mouth every morning.      multivitamin w/minerals (THERA-VIT-M) tablet Take 1 tablet by mouth every morning.      OLANZapine (ZYPREXA) 7.5 MG tablet Take 7.5 mg by mouth at bedtime.      pantoprazole (PROTONIX) 40 MG EC tablet Take 1 tablet (40 mg) by mouth every morning (before breakfast)  Qty: 30 tablet, Refills: 0    Associated Diagnoses: Gastroesophageal reflux  disease with esophagitis without hemorrhage      rosuvastatin (CRESTOR) 10 MG tablet Take 10 mg by mouth at bedtime.      senna-docusate (SENOKOT-S/PERICOLACE) 8.6-50 MG tablet Take 1 tablet by mouth 2 times daily as needed for constipation.    Associated Diagnoses: Slow transit constipation           STOP taking these medications       naproxen sodium (ANAPROX) 220 MG tablet Comments:   Reason for Stopping:           Recent AMAURI    Allergies   Allergies   Allergen Reactions    Diclofenac      Other reaction(s): GI Upset    Loratadine      Other reaction(s): Urinary Retention    Oxycodone      Agitation     Sertraline Unknown     Other reaction(s): ineffective     Data   Most Recent 3 CBC's:  Recent Labs   Lab Test 10/09/24  0458 10/08/24  0536 10/07/24  0538   WBC 10.9 14.1* 18.1*   HGB 10.0* 10.7* 11.7*   MCV 90 89 92    276 310      Most Recent 3 BMP's:  Recent Labs   Lab Test 10/10/24  0445 10/09/24  0458 10/08/24  0635 10/08/24  0536 10/07/24  0544 10/07/24  0538     --   --  135  --  141   POTASSIUM 4.3 3.9  --  4.1  --  4.1   CHLORIDE 101  --   --  100  --  101   CO2 23  --   --  26  --  22   BUN 28.9*  --   --  43.3*  --  38.6*   CR 0.89 0.94  --  1.10  --  1.24*   ANIONGAP 14  --   --  9  --  18*   CHELI 8.3*  --   --  8.6*  --  8.4*   GLC 97  --  109* 99   < > 123*    < > = values in this interval not displayed.     Most Recent 2 LFT's:  Recent Labs   Lab Test 10/08/24  0536 10/07/24  0538   AST 68* 45   ALT 22 24   ALKPHOS 213* 212*   BILITOTAL 1.0 0.5     Most Recent INR's and Anticoagulation Dosing History:  Anticoagulation Dose History          Latest Ref Rng & Units 8/5/2019 8/27/2019 1/14/2021 2/27/2021 3/8/2023 4/24/2023   Recent Dosing and Labs   INR 0.85 - 1.15 1.11  1.14  1.13  1.15  1.16  1.16       Details                 Most Recent 3 Troponin's:No lab results found.  Most Recent Cholesterol Panel:  Recent Labs   Lab Test 01/15/24  1332   CHOL 141   LDL 68   HDL 51   TRIG 112      Most Recent 6 Bacteria Isolates From Any Culture (See EPIC Reports for Culture Details):No lab results found.  Most Recent TSH, T4 and A1c Labs:  Recent Labs   Lab Test 10/06/24  0558 09/12/24  1237   TSH 6.93*  --    T4 0.83*  --    A1C  --  5.8*     Results for orders placed or performed during the hospital encounter of 10/05/24   CT Head w/o Contrast    Narrative    EXAM: CT HEAD W/O CONTRAST  LOCATION: [facility]  DATE: 10/5/2024    INDICATION: Head injury.  COMPARISON: CT 9/12/2024.  TECHNIQUE: Routine CT Head without IV contrast. Multiplanar reformats. Dose reduction techniques were used.    FINDINGS:  INTRACRANIAL CONTENTS: No intracranial hemorrhage, extraaxial collection, or mass effect.  No CT evidence of acute infarct. Small chronic encephalomalacia right frontal lobe. Moderate presumed chronic small vessel ischemic changes. Mild to moderate   generalized volume loss. No hydrocephalus.     VISUALIZED ORBITS/SINUSES/MASTOIDS: No intraorbital abnormality. No paranasal sinus mucosal disease. No middle ear or mastoid effusion.    BONES/SOFT TISSUES: No acute abnormality.      Impression    IMPRESSION:  1.  No CT evidence for acute intracranial process.  2.  Chronic changes similar to prior.   CT Cervical Spine w/o Contrast    Narrative    EXAM: CT CERVICAL SPINE W/O CONTRAST  LOCATION: [facility]  DATE: 10/5/2024    INDICATION: Head injury.  COMPARISON: None.  TECHNIQUE: Routine CT Cervical Spine without IV contrast. Multiplanar reformats. Dose reduction techniques were used.    FINDINGS:  VERTEBRA: Normal vertebral body heights. No fracture or posttraumatic subluxation. Loss of lordosis.    CANAL/FORAMINA: At C4-5, moderate to severe bilateral foraminal stenosis, moderate central stenosis. At C5-6, severe left foraminal stenosis. At C6-7, moderate left foraminal stenosis.    PARASPINAL: No extraspinal abnormality. Surgical clips in the right neck.       "Impression    IMPRESSION:  1.  No fracture or posttraumatic subluxation.     XR Pelvis and Hip Left 2 Views    Narrative    EXAM: XR PELVIS AND HIP LEFT 2 VIEWS  LOCATION: [facility]  DATE: 10/5/2024    INDICATION: Fall, hip pain.  COMPARISON: None.      Impression    IMPRESSION: There is an acute comminuted left proximal femoral fracture with displacement, impaction and varus alignment.    There is no evidence of a left hip dislocation. Moderate degenerative changes of the underlying left hip.    Bones are demineralized.    Prior aortoiliac stenting. Atherosclerotic vascular calcifications.    NOTE: ABNORMAL REPORT    THE DICTATION ABOVE DESCRIBES AN ABNORMALITY FOR WHICH FOLLOW-UP IS NEEDED.     XR Chest 1 View    Narrative    EXAM: XR CHEST 1 VIEW  LOCATION: [facility]  DATE: 10/5/2024    INDICATION: fall, left CP  COMPARISON: Chest radiograph 9/12/2024      Impression    IMPRESSION: Stable enlargement of the cardiomediastinal silhouette. Persistent right pleural effusion with associated compressive atelectasis. Left lung is grossly clear without significant pleural effusion or pneumothorax. No displaced rib fractures are   visualized.   XR Chest Port 1 View    Narrative    EXAM: XR CHEST PORT 1 VIEW  LOCATION: [facility]  DATE: 10/6/2024    INDICATION: increased oxygen demand  COMPARISON: 10/5/2024      Impression    IMPRESSION: Stable cardiomediastinal contours. Slight interval increase in bibasilar atelectasis and right pleural effusion. No visible pneumothorax.   XR Surgery QUETA Fluoro L/T 5 Min    Narrative    This exam was marked as non-reportable because it will not be read by a   radiologist or a [place] non-radiologist provider.         POC US Guidance Needle Placement    Narrative    Ultrasound was performed as guidance to an anesthesia procedure.  Click   \"PACS images\" hyperlink below to view any stored images.  For specific "   procedure details, view procedure note authored by anesthesia.   XR Pelvis w Hip Port Left  1 View    Narrative    EXAM: XR PELVIS AND HIP PORTABLE LEFT 1 VIEW  LOCATION: [facility]  DATE: 10/6/2024    INDICATION: Status post Hip surgery  COMPARISON: 10/05/2024      Impression    IMPRESSION: Interval internal fixation of left intertrochanteric femur fracture with placement of intramedullary nail and interlocking femoral neck screw. Lesser trochanteric fragments remain medially displaced by approximately 2 cm. No new fracture.   Mild joint space narrowing in both hips. Vascular stents project over the pelvis and surgical clips project over the inguinal regions. Overlying skin staples.   XR Elbow Port Left 2 Views    Narrative    EXAM: XR ELBOW PORT LEFT 2 VIEWS  LOCATION: [facility]  DATE: 10/6/2024    INDICATION: fall, bruising  COMPARISON: None.      Impression    IMPRESSION: Normal joint spaces and alignment. No fracture or joint effusion.   US Lower Extremity Venous Duplex Bilateral    Narrative    EXAM: US LOWER EXTREMITY VENOUS DUPLEX BILATERAL  LOCATION: [facility]  DATE: 10/9/2024    INDICATION: Bilateral leg swelling. Bilateral lower extremity edema.  COMPARISON: None.  TECHNIQUE: Venous Duplex ultrasound of bilateral lower extremities with and without compression, augmentation and duplex. Color flow and spectral Doppler with waveform analysis performed.    FINDINGS: Exam includes the common femoral, femoral, popliteal veins as well as segmentally visualized deep calf veins and greater saphenous vein.     RIGHT: No deep vein thrombosis. No superficial thrombophlebitis. No popliteal cyst.    LEFT: No deep vein thrombosis. No superficial thrombophlebitis. No popliteal cyst.      Impression    IMPRESSION:  1.  No deep venous thrombosis in the bilateral lower extremities.   Echocardiogram Complete     Value    LVEF  60-65%    Narrative     [id number]  [id number]  [id number]  [id number]^DANIELLE^[place] [address]     Name: LARRY BREWSTER  MRN: [id number]  : 1940  Study Date: 10/06/2024 07:45 AM  Age: 83 yrs  Gender: Male  Patient Location: [facility]  Reason For Study: CHF  Ordering Physician: HELEN SANTOS  Performed By:      BSA: 1.9 m2  Height: 67 in  Weight: 178 lb  HR: 86  ______________________________________________________________________________  Procedure  Complete Portable Echo Adult.  ______________________________________________________________________________  Interpretation Summary     There is mild to moderate concentric left ventricular hypertrophy.  The visual ejection fraction is 60-65%.  Left ventricular diastolic function is abnormal.  The right ventricle is normal in size and function.  No significant valve disease. Compared to the prior study dated 8/10/2021,  there are changes as noted. LV systolic function has improved.  ______________________________________________________________________________  Left Ventricle  The left ventricle is normal in size. There is mild to moderate concentric  left ventricular hypertrophy. Proximal septal thickening is noted. The visual  ejection fraction is 60-65%. Left ventricular diastolic function is abnormal.  No regional wall motion abnormalities noted.     Right Ventricle  The right ventricle is normal in size and function.     Atria  The left atrium is not well visualized. Right atrium not well visualized.     Mitral Valve  The mitral valve leaflets are mildly thickened. There is mild mitral annular  calcification. There is no mitral regurgitation noted. There is no mitral  valve stenosis.     Tricuspid Valve  The tricuspid valve is not well visualized.     Aortic Valve  There is severe trileaflet aortic sclerosis. No aortic regurgitation is  present. No aortic stenosis is present.     Pulmonic Valve  The pulmonic valve is not well seen, but is  grossly normal. There is trace  pulmonic valvular regurgitation. Right ventricular diastolic pressure is  approximated at 10mmHg plus the right atrial pressure.     Vessels  The aorta root is normal. Normal size ascending aorta. IVC diameter <2.1 cm  collapsing >50% with sniff suggests a normal RA pressure of 3 mmHg.     Pericardium  There is no pericardial effusion.     ______________________________________________________________________________  MMode/2D Measurements & Calculations  IVSd: 1.5 cm  LVIDd: 3.8 cm  LVIDs: 2.5 cm  LVPWd: 1.2 cm     FS: 34.0 %  LV mass(C)d: 185.9 grams  LV mass(C)dI: 96.6 grams/m2  Ao root diam: 3.5 cm  asc Aorta Diam: 3.1 cm  LVOT diam: 2.3 cm  LVOT area: 4.2 cm2  Ao root diam index Ht(cm/m): 2.0  Ao root diam index BSA (cm/m2): 1.8  Asc Ao diam index BSA (cm/m2): 1.6  Asc Ao diam index Ht(cm/m): 1.8  RWT: 0.63  TAPSE: 1.8 cm     Doppler Measurements & Calculations  MV E max parth: 62.4 cm/sec  MV A max parth: 87.6 cm/sec  MV E/A: 0.71  MV max P.6 mmHg  MV mean P.0 mmHg  MV V2 VTI: 22.6 cm  MVA(VTI): 2.3 cm2  MV dec slope: 250.0 cm/sec2  MV dec time: 0.25 sec     Ao V2 max: 133.5 cm/sec  Ao max P.0 mmHg  Ao V2 mean: 93.6 cm/sec  Ao mean P.0 mmHg  Ao V2 VTI: 25.1 cm  ANASTASIA(I,D): 2.1 cm2  ANASTASIA(V,D): 2.3 cm2  LV V1 max P.2 mmHg  LV V1 max: 74.2 cm/sec  LV V1 VTI: 12.6 cm  SV(LVOT): 52.3 ml  SI(LVOT): 27.2 ml/m2  PA V2 max: 96.2 cm/sec  PA max PG: 3.7 mmHg  PA acc time: 0.08 sec  PI end-d parth: 162.0 cm/sec  AV Parth Ratio (DI): 0.56  ANASTASIA Index (cm2/m2): 1.1  E/E' av.0  Lateral E/e': 10.1  Medial E/e': 17.9  RV S Parth: 13.8 cm/sec     ______________________________________________________________________________  Report approved by: Ricardo Higgins 10/06/2024 09:56 AM

## 2024-10-11 NOTE — PLAN OF CARE
Occupational Therapy Discharge Summary    Reason for therapy discharge:    Discharged to transitional care facility.    Progress towards therapy goal(s). See goals on Care Plan in [facility] electronic health record for goal details.  Goals partially met.  Barriers to achieving goals:   discharge from facility.    Therapy recommendation(s):    Continued therapy is recommended.  Rationale/Recommendations:  maximize ADL IND.

## 2024-10-11 NOTE — PLAN OF CARE
Problem: Adult Inpatient Plan of Care  Goal: Absence of Hospital-Acquired Illness or Injury  Intervention: Identify and Manage Fall Risk  Recent Flowsheet Documentation  Taken 10/11/2024 0800 by Pamela Fajardo RN  Safety Promotion/Fall Prevention:   activity supervised   assistive device/personal items within reach   nonskid shoes/slippers when out of bed   room door open   room near nurse's station   safety round/check completed     Problem: Adult Inpatient Plan of Care  Goal: Absence of Hospital-Acquired Illness or Injury  Intervention: Prevent Skin Injury  Recent Flowsheet Documentation  Taken 10/11/2024 0800 by Pamela Fajardo RN  Body Position: sitting up in bed  Taken 10/11/2024 0750 by Pamela Fajardo RN  Body Position: sitting up in bed   Goal Outcome Evaluation: pt alert and confused, ate 100% of breakfast and lunch, sitting up in chair, tried many times to get up by himself, called family on phone multiple times, pt will be leaving  around 1600, Iv lives discontinued, pt incontinent of bladder, surgical dressing clean, dry and intact.

## 2024-10-11 NOTE — PLAN OF CARE
"  Problem: Adult Inpatient Plan of Care  Goal: Plan of Care Review  Description: The Plan of Care Review/Shift note should be completed every shift.  The Outcome Evaluation is a brief statement about your assessment that the patient is improving, declining, or no change.  This information will be displayed automatically on your shift  note.  10/11/2024 1357 by Pamela Fajardo RN  Outcome: Met  10/11/2024 1333 by Pamela Fajardo RN  Outcome: Progressing  Goal: Patient-Specific Goal (Individualized)  Description: You can add care plan individualizations to a care plan. Examples of Individualization might be:  \"Parent requests to be called daily at 9am for status\", \"I have a hard time hearing out of my right ear\", or \"Do not touch me to wake me up as it startles  me\".  10/11/2024 1357 by Pamela Fajardo RN  Outcome: Met  10/11/2024 1333 by Pamela Fajardo RN  Outcome: Progressing  Goal: Absence of Hospital-Acquired Illness or Injury  10/11/2024 1357 by Pamela Fajardo RN  Outcome: Met  10/11/2024 1333 by Pamela Fajardo RN  Outcome: Progressing  Intervention: Identify and Manage Fall Risk  Recent Flowsheet Documentation  Taken 10/11/2024 0800 by Pamela Fajardo RN  Safety Promotion/Fall Prevention:   activity supervised   assistive device/personal items within reach   nonskid shoes/slippers when out of bed   room door open   room near nurse's station   safety round/check completed  Intervention: Prevent Skin Injury  Recent Flowsheet Documentation  Taken 10/11/2024 0800 by Pamela Fajardo RN  Body Position: sitting up in bed  Taken 10/11/2024 0750 by Pamela Fajardo RN  Body Position: sitting up in bed  Intervention: Prevent Infection  Recent Flowsheet Documentation  Taken 10/11/2024 0800 by Pamela Fajardo RN  Infection Prevention:   hand hygiene promoted   personal protective equipment utilized   rest/sleep promoted   single patient room provided  Goal: Optimal Comfort and Wellbeing  10/11/2024 " 1357 by Pamela Fajardo, RN  Outcome: Met  10/11/2024 1333 by Pamela Fajardo, RN  Outcome: Progressing  Goal: Readiness for Transition of Care  10/11/2024 1357 by Pamela Fajardo, RN  Outcome: Met  10/11/2024 1333 by Pamela Fajardo, RN  Outcome: Progressing   Goal Outcome Evaluation:Pt discharging to TCU, wheelchair ride here for , belongings with the pt, pt on oxygen.

## 2024-10-11 NOTE — PLAN OF CARE
Problem: Mobility Impairment  Goal: Optimal Mobility  Outcome: Progressing  Intervention: Optimize Mobility  Recent Flowsheet Documentation  Taken 10/10/2024 1900 by Elvia Alcantara RN  Assistive Device Utilized:   gait belt   walker  Activity Management:   up in chair   ambulated to bathroom  Positioning/Transfer Devices:   pillows   in use  Taken 10/10/2024 1718 by Elvia Alcantara RN  Assistive Device Utilized:   gait belt   walker  Activity Management: up in chair     Problem: Pain Acute  Goal: Optimal Pain Control and Function  Outcome: Progressing  Intervention: Develop Pain Management Plan  Recent Flowsheet Documentation  Taken 10/10/2024 2051 by Elvia Alcantara RN  Pain Management Interventions: medication (see MAR)  Taken 10/10/2024 1717 by Elvia Alcantara RN  Pain Management Interventions: medication (see MAR)  Intervention: Prevent or Manage Pain  Recent Flowsheet Documentation  Taken 10/10/2024 1718 by Elvia Alcantara RN  Medication Review/Management: high-risk medications identified     Problem: Hip Fracture Medical Management  Goal: Baseline Cognitive Function Maintained  Outcome: Progressing     Goal Outcome Evaluation:       Pt presented to the hospital on 10/05 after falling at his TCU and sustaining a left hip fracture. Recently hospitalized in September for a UTI. Currently POD #5 s/p left hip ORIF. CMS intact. 2+ edema to the LLE. Extensive bruising to the left upper leg and hip. Dressing is clean, dry and intact. Receiving scheduled Tylenol along with prn Tramadol at 1720 for pain management, which was effective. Assist of 2 with ambulation using a walker. Unsteady gait. Receiving PT and OT services. Pt is on 4 liters of oxygen via NC, which is his baseline. Short of breath of dyspnea. Heat rate is tachy. A&O x3. Pt is forgetful. Mood is pleasant. Receiving oral abx in the form of Amoxicillin. Tolerating a regular diet. Appetite is adequate. Anticipate discharge to TCU tomorrow.

## 2024-10-11 NOTE — PROGRESS NOTES
Care Management Discharge Note    Discharge Date: 10/11/2024       Discharge Disposition: Transitional Care    Discharge Services: rehab    Discharge DME:  O2    Discharge Transportation: wife, Jovana requested [employer] wheelchair    Private pay costs discussed: transportation costs    Does the patient's insurance plan have a 3 day qualifying hospital stay waiver?  No    PAS Confirmation Code: N/A  Patient/family educated on Medicare website which has current facility and service quality ratings: yes    Education Provided on the Discharge Plan: Yes  Persons Notified of Discharge Plans: patient and his wife  Patient/Family in Agreement with the Plan:  yes    Handoff Referral Completed: no    Additional Information:  Patient to return to [facility] TCU. [place] wheelchair with O2 [id number]. TCU updated. Orders sent.    DIRK Christiansen

## 2024-10-11 NOTE — PLAN OF CARE
Physical Therapy Discharge Summary    Reason for therapy discharge:    Discharged to transitional care facility.    Progress towards therapy goal(s). See goals on Care Plan in [facility] electronic health record for goal details.  Goals not met.  Barriers to achieving goals:   discharge from facility.    Therapy recommendation(s):    Continued therapy is recommended.  Rationale/Recommendations:  PT at TCU to improve functional mobility.

## 2024-10-11 NOTE — PLAN OF CARE
Goal Outcome Evaluation:                    Problem: Adult Inpatient Plan of Care  Goal: Absence of Hospital-Acquired Illness or Injury  Outcome: Progressing  Intervention: Identify and Manage Fall Risk  Recent Flowsheet Documentation  Taken 10/11/2024 0000 by Sedrick Rebollar RN  Safety Promotion/Fall Prevention:   activity supervised   assistive device/personal items within reach   nonskid shoes/slippers when out of bed   room door open   room near nurse's station   safety round/check completed  Intervention: Prevent Skin Injury  Recent Flowsheet Documentation  Taken 10/11/2024 0234 by Sedrick Rebollar RN  Body Position: (Ind repo) other (see comments)  Taken 10/11/2024 0000 by Sedrick Rebollar RN  Body Position:   supine, head elevated   weight shifting  Taken 10/10/2024 2218 by Sedrick Rebollar RN  Body Position:   supine, head elevated   weight shifting  Intervention: Prevent and Manage VTE (Venous Thromboembolism) Risk  Recent Flowsheet Documentation  Taken 10/11/2024 0000 by Sedrick Rebollar RN  VTE Prevention/Management: SCDs off (sequential compression devices)  Intervention: Prevent Infection  Recent Flowsheet Documentation  Taken 10/11/2024 0000 by Sedrick Rebollar RN  Infection Prevention:   hand hygiene promoted   personal protective equipment utilized   rest/sleep promoted   single patient room provided     Problem: Pain Acute  Goal: Optimal Pain Control and Function  Outcome: Progressing  Intervention: Prevent or Manage Pain  Recent Flowsheet Documentation  Taken 10/11/2024 0000 by Sedrick Rebollar RN  Sensory Stimulation Regulation:   care clustered   lighting decreased  Medication Review/Management: high-risk medications identified     Problem: Hip Fracture Medical Management  Goal: Optimal Coping with Change in Health Status  Outcome: Progressing     VSS on 4L NC @ BL, A&Ox2 forgetful, pain rated 5/10  to L hip given tramadol x1. Significant bruise around LLE and hip. Heavy Ax2 walker GB. Will continue with plan  of care.

## 2024-10-12 ENCOUNTER — APPOINTMENT (OUTPATIENT)
Dept: RADIOLOGY | Facility: HOSPITAL | Age: 84
End: 2024-10-12
Attending: EMERGENCY MEDICINE
Payer: COMMERCIAL

## 2024-10-12 ENCOUNTER — APPOINTMENT (OUTPATIENT)
Dept: CT IMAGING | Facility: HOSPITAL | Age: 84
End: 2024-10-12
Attending: EMERGENCY MEDICINE
Payer: COMMERCIAL

## 2024-10-12 ENCOUNTER — APPOINTMENT (OUTPATIENT)
Dept: ULTRASOUND IMAGING | Facility: HOSPITAL | Age: 84
End: 2024-10-12
Attending: EMERGENCY MEDICINE
Payer: COMMERCIAL

## 2024-10-12 ENCOUNTER — HOSPITAL ENCOUNTER (INPATIENT)
Facility: HOSPITAL | Age: 84
LOS: 31 days | Discharge: NURSING, REHAB OR LONG-TERM CARE | End: 2024-11-12
Attending: EMERGENCY MEDICINE | Admitting: INTERNAL MEDICINE
Payer: COMMERCIAL

## 2024-10-12 DIAGNOSIS — G47.00 INSOMNIA, UNSPECIFIED TYPE: ICD-10-CM

## 2024-10-12 DIAGNOSIS — J44.1 COPD EXACERBATION (H): ICD-10-CM

## 2024-10-12 DIAGNOSIS — Z91.89 AT HIGH RISK FOR IMPAIRED SKIN INTEGRITY: Primary | ICD-10-CM

## 2024-10-12 DIAGNOSIS — Z51.5 HOSPICE CARE PATIENT: ICD-10-CM

## 2024-10-12 DIAGNOSIS — I48.91 ATRIAL FIBRILLATION WITH RVR (H): ICD-10-CM
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
## 2024-10-12 DIAGNOSIS — J18.9 HCAP (HEALTHCARE-ASSOCIATED PNEUMONIA): ICD-10-CM

## 2024-10-12 LAB
ANION GAP SERPL CALCULATED.3IONS-SCNC: 11 MMOL/L (ref 7–15)
BASOPHILS # BLD AUTO: 0 10E3/UL (ref 0–0.2)
BASOPHILS NFR BLD AUTO: 0 %
BUN SERPL-MCNC: 20.9 MG/DL (ref 8–23)
CALCIUM SERPL-MCNC: 8.4 MG/DL (ref 8.8–10.4)
CHLORIDE SERPL-SCNC: 104 MMOL/L (ref 98–107)
CREAT SERPL-MCNC: 0.79 MG/DL (ref 0.67–1.17)
EGFRCR SERPLBLD CKD-EPI 2021: 89 ML/MIN/1.73M2
EOSINOPHIL # BLD AUTO: 0 10E3/UL (ref 0–0.7)
EOSINOPHIL NFR BLD AUTO: 0 %
ERYTHROCYTE [DISTWIDTH] IN BLOOD BY AUTOMATED COUNT: 17.2 % (ref 10–15)
GLUCOSE SERPL-MCNC: 100 MG/DL (ref 70–99)
HCO3 SERPL-SCNC: 25 MMOL/L (ref 22–29)
HCT VFR BLD AUTO: 34.1 % (ref 40–53)
HGB BLD-MCNC: 11 G/DL (ref 13.3–17.7)
IMM GRANULOCYTES # BLD: 0.1 10E3/UL
IMM GRANULOCYTES NFR BLD: 1 %
LYMPHOCYTES # BLD AUTO: 1.3 10E3/UL (ref 0.8–5.3)
LYMPHOCYTES NFR BLD AUTO: 9 %
MAGNESIUM SERPL-MCNC: 1.7 MG/DL (ref 1.7–2.3)
MCH RBC QN AUTO: 29 PG (ref 26.5–33)
MCHC RBC AUTO-ENTMCNC: 32.3 G/DL (ref 31.5–36.5)
MCV RBC AUTO: 90 FL (ref 78–100)
MONOCYTES # BLD AUTO: 1.1 10E3/UL (ref 0–1.3)
MONOCYTES NFR BLD AUTO: 8 %
NEUTROPHILS # BLD AUTO: 11.8 10E3/UL (ref 1.6–8.3)
NEUTROPHILS NFR BLD AUTO: 82 %
NRBC # BLD AUTO: 0 10E3/UL
NRBC BLD AUTO-RTO: 0 /100
PLATELET # BLD AUTO: 309 10E3/UL (ref 150–450)
POTASSIUM SERPL-SCNC: 4.4 MMOL/L (ref 3.4–5.3)
RBC # BLD AUTO: 3.79 10E6/UL (ref 4.4–5.9)
SODIUM SERPL-SCNC: 140 MMOL/L (ref 135–145)
WBC # BLD AUTO: 14.4 10E3/UL (ref 4–11)

## 2024-10-12 PROCEDURE — 83735 ASSAY OF MAGNESIUM: CPT | Performed by: EMERGENCY MEDICINE

## 2024-10-12 PROCEDURE — 85025 COMPLETE CBC W/AUTO DIFF WBC: CPT | Performed by: EMERGENCY MEDICINE

## 2024-10-12 PROCEDURE — 250N000011 HC RX IP 250 OP 636: Performed by: INTERNAL MEDICINE

## 2024-10-12 PROCEDURE — 93005 ELECTROCARDIOGRAM TRACING: CPT | Performed by: EMERGENCY MEDICINE

## 2024-10-12 PROCEDURE — 80048 BASIC METABOLIC PNL TOTAL CA: CPT | Performed by: EMERGENCY MEDICINE

## 2024-10-12 PROCEDURE — 99285 EMERGENCY DEPT VISIT HI MDM: CPT | Mod: 25

## 2024-10-12 PROCEDURE — 70450 CT HEAD/BRAIN W/O DYE: CPT

## 2024-10-12 PROCEDURE — 93971 EXTREMITY STUDY: CPT | Mod: LT

## 2024-10-12 PROCEDURE — 36415 COLL VENOUS BLD VENIPUNCTURE: CPT | Performed by: EMERGENCY MEDICINE

## 2024-10-12 PROCEDURE — 99223 1ST HOSP IP/OBS HIGH 75: CPT | Performed by: INTERNAL MEDICINE

## 2024-10-12 PROCEDURE — 250N000009 HC RX 250: Performed by: EMERGENCY MEDICINE

## 2024-10-12 PROCEDURE — 250N000013 HC RX MED GY IP 250 OP 250 PS 637: Performed by: INTERNAL MEDICINE

## 2024-10-12 PROCEDURE — 258N000003 HC RX IP 258 OP 636: Performed by: EMERGENCY MEDICINE

## 2024-10-12 PROCEDURE — 72170 X-RAY EXAM OF PELVIS: CPT

## 2024-10-12 PROCEDURE — 72125 CT NECK SPINE W/O DYE: CPT

## 2024-10-12 PROCEDURE — 250N000013 HC RX MED GY IP 250 OP 250 PS 637: Performed by: EMERGENCY MEDICINE

## 2024-10-12 PROCEDURE — 120N000004 HC R&B MS OVERFLOW

## 2024-10-12 PROCEDURE — 73552 X-RAY EXAM OF FEMUR 2/>: CPT | Mod: LT

## 2024-10-12 RX ORDER — ROSUVASTATIN CALCIUM 10 MG/1
10 TABLET, COATED ORAL AT BEDTIME
Status: DISCONTINUED | OUTPATIENT
Start: 2024-10-12 | End: 2024-11-11

## 2024-10-12 RX ORDER — LIDOCAINE 40 MG/G
CREAM TOPICAL
Status: DISCONTINUED | OUTPATIENT
Start: 2024-10-12 | End: 2024-11-12 | Stop reason: HOSPADM

## 2024-10-12 RX ORDER — AMOXICILLIN 250 MG
2 CAPSULE ORAL 2 TIMES DAILY PRN
Status: DISCONTINUED | OUTPATIENT
Start: 2024-10-12 | End: 2024-11-12 | Stop reason: HOSPADM

## 2024-10-12 RX ORDER — LEVOTHYROXINE SODIUM 88 UG/1
88 TABLET ORAL
Status: DISCONTINUED | OUTPATIENT
Start: 2024-10-13 | End: 2024-11-11

## 2024-10-12 RX ORDER — TRAMADOL HYDROCHLORIDE 50 MG/1
50 TABLET ORAL EVERY 6 HOURS PRN
Status: DISCONTINUED | OUTPATIENT
Start: 2024-10-12 | End: 2024-10-17

## 2024-10-12 RX ORDER — AMOXICILLIN 250 MG/1
500 CAPSULE ORAL EVERY 8 HOURS
Status: DISCONTINUED | OUTPATIENT
Start: 2024-10-12 | End: 2024-10-14

## 2024-10-12 RX ORDER — DULOXETIN HYDROCHLORIDE 60 MG/1
60 CAPSULE, DELAYED RELEASE ORAL EVERY MORNING
Status: DISCONTINUED | OUTPATIENT
Start: 2024-10-13 | End: 2024-11-12 | Stop reason: HOSPADM

## 2024-10-12 RX ORDER — ENOXAPARIN SODIUM 100 MG/ML
40 INJECTION SUBCUTANEOUS EVERY 24 HOURS
Status: DISCONTINUED | OUTPATIENT
Start: 2024-10-12 | End: 2024-11-04

## 2024-10-12 RX ORDER — ACETAMINOPHEN 325 MG/1
975 TABLET ORAL ONCE
Status: COMPLETED | OUTPATIENT
Start: 2024-10-12 | End: 2024-10-12

## 2024-10-12 RX ORDER — NALOXONE HYDROCHLORIDE 0.4 MG/ML
0.4 INJECTION, SOLUTION INTRAMUSCULAR; INTRAVENOUS; SUBCUTANEOUS
Status: DISCONTINUED | OUTPATIENT
Start: 2024-10-12 | End: 2024-11-11

## 2024-10-12 RX ORDER — CALCIUM CARBONATE 500 MG/1
1000 TABLET, CHEWABLE ORAL 4 TIMES DAILY PRN
Status: DISCONTINUED | OUTPATIENT
Start: 2024-10-12 | End: 2024-11-12 | Stop reason: HOSPADM

## 2024-10-12 RX ORDER — AMOXICILLIN 250 MG
2 CAPSULE ORAL 2 TIMES DAILY
Status: DISCONTINUED | OUTPATIENT
Start: 2024-10-12 | End: 2024-10-25

## 2024-10-12 RX ORDER — DILTIAZEM HYDROCHLORIDE 5 MG/ML
0.25 INJECTION INTRAVENOUS ONCE
Status: DISCONTINUED | OUTPATIENT
Start: 2024-10-12 | End: 2024-10-12

## 2024-10-12 RX ORDER — IPRATROPIUM BROMIDE AND ALBUTEROL SULFATE 2.5; .5 MG/3ML; MG/3ML
1 SOLUTION RESPIRATORY (INHALATION) EVERY 6 HOURS PRN
Status: DISCONTINUED | OUTPATIENT
Start: 2024-10-12 | End: 2024-11-06

## 2024-10-12 RX ORDER — ACETAMINOPHEN 325 MG/1
650 TABLET ORAL EVERY 4 HOURS PRN
Status: DISCONTINUED | OUTPATIENT
Start: 2024-10-12 | End: 2024-10-16

## 2024-10-12 RX ORDER — METOPROLOL TARTRATE 1 MG/ML
5 INJECTION, SOLUTION INTRAVENOUS
Status: COMPLETED | OUTPATIENT
Start: 2024-10-12 | End: 2024-10-12

## 2024-10-12 RX ORDER — AMOXICILLIN 250 MG
1 CAPSULE ORAL 2 TIMES DAILY PRN
Status: DISCONTINUED | OUTPATIENT
Start: 2024-10-12 | End: 2024-11-12 | Stop reason: HOSPADM

## 2024-10-12 RX ORDER — PANTOPRAZOLE SODIUM 40 MG/1
40 TABLET, DELAYED RELEASE ORAL
Status: DISCONTINUED | OUTPATIENT
Start: 2024-10-13 | End: 2024-11-12 | Stop reason: HOSPADM

## 2024-10-12 RX ORDER — METOPROLOL TARTRATE 25 MG/1
25 TABLET, FILM COATED ORAL 2 TIMES DAILY
Status: COMPLETED | OUTPATIENT
Start: 2024-10-12 | End: 2024-10-26

## 2024-10-12 RX ORDER — NALOXONE HYDROCHLORIDE 0.4 MG/ML
0.2 INJECTION, SOLUTION INTRAMUSCULAR; INTRAVENOUS; SUBCUTANEOUS
Status: DISCONTINUED | OUTPATIENT
Start: 2024-10-12 | End: 2024-11-11

## 2024-10-12 RX ORDER — ASPIRIN 81 MG/1
81 TABLET, CHEWABLE ORAL DAILY
Status: DISCONTINUED | OUTPATIENT
Start: 2024-10-13 | End: 2024-11-11

## 2024-10-12 RX ORDER — ACETAMINOPHEN 650 MG/1
650 SUPPOSITORY RECTAL EVERY 4 HOURS PRN
Status: DISCONTINUED | OUTPATIENT
Start: 2024-10-12 | End: 2024-10-16

## 2024-10-12 RX ORDER — ONDANSETRON 2 MG/ML
4 INJECTION INTRAMUSCULAR; INTRAVENOUS EVERY 6 HOURS PRN
Status: DISCONTINUED | OUTPATIENT
Start: 2024-10-12 | End: 2024-10-12

## 2024-10-12 RX ORDER — AMOXICILLIN 250 MG
1 CAPSULE ORAL 2 TIMES DAILY
Status: DISCONTINUED | OUTPATIENT
Start: 2024-10-12 | End: 2024-11-12 | Stop reason: HOSPADM

## 2024-10-12 RX ORDER — ONDANSETRON 4 MG/1
4 TABLET, ORALLY DISINTEGRATING ORAL EVERY 6 HOURS PRN
Status: DISCONTINUED | OUTPATIENT
Start: 2024-10-12 | End: 2024-10-12

## 2024-10-12 RX ORDER — BUSPIRONE HYDROCHLORIDE 5 MG/1
5 TABLET ORAL 2 TIMES DAILY
Status: DISCONTINUED | OUTPATIENT
Start: 2024-10-12 | End: 2024-11-12 | Stop reason: HOSPADM

## 2024-10-12 RX ORDER — LATANOPROST 50 UG/ML
1 SOLUTION/ DROPS OPHTHALMIC AT BEDTIME
Status: DISCONTINUED | OUTPATIENT
Start: 2024-10-12 | End: 2024-11-12 | Stop reason: HOSPADM

## 2024-10-12 RX ORDER — OLANZAPINE 2.5 MG/1
7.5 TABLET, FILM COATED ORAL AT BEDTIME
Status: DISCONTINUED | OUTPATIENT
Start: 2024-10-12 | End: 2024-10-14

## 2024-10-12 RX ADMIN — METOPROLOL TARTRATE 25 MG: 25 TABLET, FILM COATED ORAL at 16:20

## 2024-10-12 RX ADMIN — ACETAMINOPHEN 650 MG: 325 TABLET ORAL at 23:01

## 2024-10-12 RX ADMIN — LATANOPROST 1 DROP: 50 SOLUTION/ DROPS OPHTHALMIC at 20:20

## 2024-10-12 RX ADMIN — METOPROLOL TARTRATE 25 MG: 25 TABLET, FILM COATED ORAL at 23:01

## 2024-10-12 RX ADMIN — BUSPIRONE HYDROCHLORIDE 5 MG: 5 TABLET ORAL at 20:20

## 2024-10-12 RX ADMIN — METOPROLOL TARTRATE 5 MG: 5 INJECTION INTRAVENOUS at 15:41

## 2024-10-12 RX ADMIN — ROSUVASTATIN 10 MG: 10 TABLET, FILM COATED ORAL at 20:20

## 2024-10-12 RX ADMIN — OLANZAPINE 7.5 MG: 2.5 TABLET, FILM COATED ORAL at 20:19

## 2024-10-12 RX ADMIN — ACETAMINOPHEN 975 MG: 325 TABLET ORAL at 13:57

## 2024-10-12 RX ADMIN — AMOXICILLIN 500 MG: 250 CAPSULE ORAL at 20:20

## 2024-10-12 RX ADMIN — MICONAZOLE NITRATE: 20 POWDER TOPICAL at 20:20

## 2024-10-12 RX ADMIN — ENOXAPARIN SODIUM 40 MG: 40 INJECTION SUBCUTANEOUS at 18:05

## 2024-10-12 RX ADMIN — Medication 3 MG: at 23:01

## 2024-10-12 RX ADMIN — METOPROLOL TARTRATE 5 MG: 5 INJECTION INTRAVENOUS at 15:59

## 2024-10-12 RX ADMIN — TRAMADOL HYDROCHLORIDE 50 MG: 50 TABLET, COATED ORAL at 18:06

## 2024-10-12 RX ADMIN — METOPROLOL TARTRATE 5 MG: 5 INJECTION INTRAVENOUS at 15:47

## 2024-10-12 RX ADMIN — SODIUM CHLORIDE 1000 ML: 9 INJECTION, SOLUTION INTRAVENOUS at 13:49

## 2024-10-12 ASSESSMENT — ACTIVITIES OF DAILY LIVING (ADL)
DIFFICULTY_EATING/SWALLOWING: NO
DIFFICULTY_COMMUNICATING: NO
FALL_HISTORY_WITHIN_LAST_SIX_MONTHS: YES
DOING_ERRANDS_INDEPENDENTLY_DIFFICULTY: NO
WALKING_OR_CLIMBING_STAIRS_DIFFICULTY: YES
ADLS_ACUITY_SCORE: 38
ADLS_ACUITY_SCORE: 42
ADLS_ACUITY_SCORE: 37
ADLS_ACUITY_SCORE: 39
TOILETING_ISSUES: YES
ADLS_ACUITY_SCORE: 45
CONCENTRATING,_REMEMBERING_OR_MAKING_DECISIONS_DIFFICULTY: YES
DRESSING/BATHING_DIFFICULTY: YES
ADLS_ACUITY_SCORE: 38
EQUIPMENT_CURRENTLY_USED_AT_HOME: WALKER, STANDARD
WEAR_GLASSES_OR_BLIND: YES
CHANGE_IN_FUNCTIONAL_STATUS_SINCE_ONSET_OF_CURRENT_ILLNESS/INJURY: YES
ADLS_ACUITY_SCORE: 39
HEARING_DIFFICULTY_OR_DEAF: YES
ADLS_ACUITY_SCORE: 39
TOILETING: 1-->ASSISTANCE (EQUIPMENT/PERSON) NEEDED (NOT DEVELOPMENTALLY APPROPRIATE)
TOILETING_ASSISTANCE: TOILETING DIFFICULTY, ASSISTANCE 1 PERSON
DRESSING/BATHING: BATHING DIFFICULTY, ASSISTANCE 1 PERSON
ADLS_ACUITY_SCORE: 39
ADLS_ACUITY_SCORE: 42
WERE_AUXILIARY_AIDS_OFFERED?: NO
TOILETING: 1-->ASSISTANCE (EQUIPMENT/PERSON) NEEDED

## 2024-10-12 NOTE — ED NOTES
Patient brought to room to use urinal.  It was in place for a few minutes but patient was not able to go.  EDT assisted in changing brief and brought back to hallway.  After a few minutes, patient became restless and frequently calling out for nurse.  When writer approached patient, he stated he needed to go to the bathroom.  Reminded patient that he was just given a urinal and was unable to go.  Remains very restless and pulling at gown.  Offered patient to try go use urinal again.  Patient was able to stand without issue but still could not go despite having the urinal in place.  Per Dr. Morales, wants repeat EKG and to bladder scan patient.

## 2024-10-12 NOTE — ED TRIAGE NOTES
Pt brought in by [facility] from care facility. Pt was discharged from hospital yesterday post left hip surgery one week ago. Pt had a fall yesterday after getting back to care facility. Staff members state that he was confused this morning however he is alert and oriented x4 at this time. Pt remembers the fall and denies any light-headedness or dizziness prior. States it was a mechanical fall yesterday. He states his only pain is in his left hip. Does state he has numbness from left knee down since his surgery a week ago, however when sensation is tested in both legs and feet, patient states they are the same. Pt did hit the back of his head when he fell. He thinks he takes blood thinners, however none are noted on chart and he doesn't know the name of his medications. Provider notified and no trauma alert called at this time.      Triage Assessment (Adult)       Row Name 10/12/24 5539          Triage Assessment    Airway WDL WDL        Respiratory WDL    Respiratory WDL WDL        Skin Circulation/Temperature WDL    Skin Circulation/Temperature WDL WDL        Cardiac WDL    Cardiac WDL WDL        Peripheral/Neurovascular WDL    Peripheral Neurovascular WDL neurovascular assessment lower        Cognitive/Neuro/Behavioral WDL    Cognitive/Neuro/Behavioral WDL WDL        LLE Neurovascular Assessment    Sensation LLE numbness present  Pt states that his left leg has been numb from knee down since his surgery a week ago, but also states sensation is equal between both lower legs and feet when tested.

## 2024-10-12 NOTE — ED NOTES
Patient states needs to use the urinal.  Brought to room ED 38 and urinal in place.  Tried for a few minutes but patient was unable to go.  EKG completed.  Marked ready for imaging.

## 2024-10-12 NOTE — H&P
[facility]    History and Physical - Hospitalist Service       Date of Admission:  10/12/2024    Assessment & Plan      Dominik Rojas is a 83 year old male with past medical history of COPD on 2 to 3 L nasal cannula at home, chronic systolic heart failure, hypertension, hypothyroidism, anxiety, recent fall and left hip fracture s/p ORIF who was jsut discharged to TCU yesterday.  Patient brought to ED for evaluation another fall at TCU with left hip pain. Xray showing postop changes without new acute changes. Patient was found to be tachycardiac, concerning for sinus tachycardia vs intermittent a fib rvr.      Mechanical fall  Left hip contusion  Recent left hip fracture s/p ORIF  -Patient reported a mechanical fall after going to TCU yesterday, with worse left hip pain.  Denied chest pain, dizziness, shortness of breath prior to or after fall  -s/p left hip ORIF just days ago  -negative xray for new fracture or dislocation  -Negative for DVT on US  -pain control    Tachycardia, sinus vs a fib rvr  -hr up to 140  -received Metoprolol in ER now in 70s  -reviewed ECG. Mostly sinus tachy but cannot r/o a fib  -consult cardiologist  -pt is not a good candidate for anticoagulation due to frequent falls       H/O Chronic systolic heart failure with normalization of LV function;  Diastolic dysfunction  -recent Echo showing preserved EF  -PTA meds o resume  -Lasix on hold due to concerning for dehydration     COPD  Chronic hypoxic respiratory failure on 2-3l NC;  -PTA meds  -Oximeter prn   -or prn     Recent UTI  -PTA amoxicillin 500mg TID  -await UA     History of RLL adenocarcinoma s/p radiation therapy     Anxiety and depression;  Dementia with behavioral change/agitation   Mood disorder  Acute encephalopathy, unspecified  -resume PTA meds     HLD  -Crestor     GERD  -PPI      Hypothyroidism  -recent TSH 6.93, FT4 0.83  -continue Synthroid   -recommend repeat TFT's in 4 weeks with PCP          "  Diet:  regular  DVT Prophylaxis: Enoxaparin (Lovenox) SQ  Cabral Catheter: Not present  Lines: None     Cardiac Monitoring: None  Code Status:  full    Clinically Significant Risk Factors Present on Admission                 # Drug Induced Platelet Defect: home medication list includes an antiplatelet medication   # Hypertension: Noted on problem list   # Dementia: noted on problem list       # Overweight: Estimated body mass index is 29.13 kg/m  as calculated from the following:    Height as of this encounter: 1.702 m (5' 7\").    Weight as of this encounter: 84.4 kg (186 lb).       # Financial/Environmental Concerns:           Disposition Plan     Medically Ready for Discharge: Anticipated in 2-4 Days  SW to help with placement, might need higher level of care?  Called daughter and updated (Wife and Lore didn't answer), who doesn't know if pt has a fib in the past. Pt did have CVA in the past.         Lu Skaggs MD  Hospitalist Service  [facility]  Securely message with [employer] (more info)  Text page via [employer] Paging/Directory     ______________________________________________________________________    Chief Complaint   fall    History is obtained from the patient and emergency department physician    History of Present Illness   Dominik Rojas is a 83 year old male with a pertinent history of HTN, COPD, AAA, A Fib who presents to this ED  for evaluation after a fall.      Patient was discharged from the hospital yesterday status post left hip surgery 1 week ago.  Patient had a fall yesterday after getting back to the care facility.  This morning the patient appeared more confused.  However he was alert and oriented x 4.  The patient reports he remembers the fall, but states that was a mechanical fall.  He states his only pain is in his left hip.  Wanted to seen he had thought he is on blood thinners but in review his medications, he is on aspirin 81 mg but no other blood thinners.   "   Left hip pain, fell last week at his home, presented to the hospital with acute femur fracture on the left, surgery on 10/6/24. Had a mechanical fall yesterday while reaching for a bag while sitting on the bed, with fall on his left hip. Now with left hip pain, numbness and squeezing down to the left knee. He was a little confused afterwards, but not at baseline. He did hit the back of his head.       Past Medical History    Past Medical History:   Diagnosis Date    AAA (abdominal aortic aneurysm) (H)     s/p stenting    AAA (abdominal aortic aneurysm) (H) 11/15/2012    AAA (abdominal aortic aneurysm) CT 11-12  6.4 cm. Saw Dr Muller 1-13;  S/P endovascular repair 3-13;  s/p stenting      Alcohol dependence in remission (H)     Alcohol use disorder, severe, in sustained remission, dependence (H) 08/16/2021    Anxiety     Atrial fibrillation with normal ventricular rate (H)     Benign prostatic hyperplasia with lower urinary tract symptoms     Bronchiectasis without complication (H)     2/27/2020    COPD (chronic obstructive pulmonary disease) (H)     CVA (cerebral vascular accident) (H) 2019    DDD (degenerative disc disease), lumbar     Depression     Depressive disorder     Diabetes (H)     Diastolic dysfunction 01/22/2021    DJD (degenerative joint disease) of knee     left    Essential hypertension     Glaucoma     Glaucoma     History of stroke without residual deficits     Hyperlipidemia     Hypertension     Hypothyroidism     Hypothyroidism     Kidney stones     Major depression     Memory problem     Nocturia     Obesity     Opioid use disorder, severe, dependence (H) 08/16/2021    Overactive bladder 02/25/2021    Primary osteoarthritis of left knee     Psoriasis     Psoriasis     Seborrheic keratoses     Somnolence, daytime     Stenosis of right carotid artery     history of TIA's       Past Surgical History   Past Surgical History:   Procedure Laterality Date    ABDOMEN SURGERY      ABDOMINAL AORTIC  ANEURYSM REPAIR  2013    stent    CAROTID ENDARTERECTOMY Right 9/9/2019    Procedure: RIGHT CAROTID ENDARTERECTOMY;  Surgeon: Miguel Muller MD;  Location: [facility];  Service: General    CIRCUMCISION      CYSTOSCOPY      CYSTOSCOPY PROSTATE W/ LASER N/A 6/12/2018    Procedure:  TRANSURETHRAL RESECTION OF THE PROSTATE WITH QUANTA LASER;  Surgeon: Eze Levi MD;  Location: [facility] OR;  Service:     CYSTOSCOPY PROSTATE W/ LASER N/A 2/18/2020    Procedure: CYSTOSCOPY WITH TRANSURETHRAL INCISION OF THE PROSTATE WITH QUANTA LASER;  Surgeon: Eze Levi MD;  Location: [facility] OR;  Service: Urology    CYSTOSCOPY W/ LITHOLAPAXY / EHL N/A 6/12/2018    Procedure: CYSTOSCOPY AND LITHOLAPAXY;  Surgeon: Eze Levi MD;  [place] [facility] [place]  Service:     IR ABDOMINAL AORTAGRAM  5/19/2020    IR ABDOMINAL AORTIC ANEURYSM ENDOGRAFT  5/19/2020    IR ABDOMINAL AORTOGRAM  5/19/2020    IR ABDOMINAL ENDOVASCULAR STENT GRAFT  5/19/2020    LITHOTRIPSY      OPEN REDUCTION INTERNAL FIXATION HIP NAILING Left 10/6/2024    Procedure: INTERNAL FIXATION, FRACTURE, TROCHANTERIC, LEFT HIP, USING RODS;  Surgeon: Tad Oliver DO;  Location: [facility]    PERCUTANEOUS NEPHROSTOLITHOTOMY Right 03/10/2020    ZZC HUANG W/O FACETEC FORAMOT/DSKC 1/2 VRT SEG, LUMBAR Bilateral 3/3/2021    Procedure: Bilateral lumbar 2 - Lumbar 4 decompressive lumbar laminectomy with medial facetectomies;  Surgeon: Renée King MD;  [place] [facility] [place];  Service: Spine       Prior to Admission Medications   Prior to Admission Medications   Prescriptions Last Dose Informant Patient Reported? Taking?   Cholecalciferol (VITAMIN D3) 50 MCG (2000 UT) CAPS 10/12/2024 at am  Yes Yes   Sig: Take 1 tablet by mouth every morning.   DULoxetine (CYMBALTA) 60 MG capsule 10/12/2024 at am  Yes Yes   Sig: Take 60 mg by mouth every morning.   Melatonin 10 MG TABS tablet 10/11/2024 at pm  Yes Yes   Sig: Take 10  mg by mouth at bedtime.   OLANZapine (ZYPREXA) 7.5 MG tablet 10/11/2024 at pm  Yes Yes   Sig: Take 7.5 mg by mouth at bedtime.   acetaminophen (TYLENOL) 325 MG tablet 10/12/2024 at am  No Yes   Sig: Take 3 tablets (975 mg) by mouth 3 times daily.   amoxicillin (AMOXIL) 500 MG capsule 10/12/2024 at am  No Yes   Sig: Take 1 capsule (500 mg) by mouth every 8 hours for 5 days.   aspirin (ASA) 81 MG chewable tablet 10/12/2024 at am  No Yes   Sig: Take 1 tablet (81 mg) by mouth 2 times daily.   busPIRone (BUSPAR) 5 MG tablet 10/12/2024 at am  Yes Yes   Sig: Take 5 mg by mouth 2 times daily   furosemide (LASIX) 20 MG tablet 10/12/2024 at am  Yes Yes   Sig: Take 20 mg by mouth every morning.   ipratropium - albuterol 0.5 mg/2.5 mg/3 mL (DUONEB) 0.5-2.5 (3) MG/3ML neb solution 10/12/2024 at am  Yes Yes   Sig: Take 1 vial by nebulization every 6 hours as needed for wheezing.   latanoprost (XALATAN) 0.005 % ophthalmic solution 10/11/2024 at pm  Yes Yes   Sig: Place 1 drop into both eyes At Bedtime   levothyroxine (SYNTHROID/LEVOTHROID) 88 MCG tablet 10/12/2024 at am  Yes Yes   Sig: Take 88 mcg by mouth daily before breakfast   lidocaine (LIDODERM) 5 % patch 10/12/2024 at am-on  No Yes   Sig: Place 1 patch over 12 hours onto the skin every 24 hours. To prevent lidocaine toxicity, patient should be patch free for 12 hrs daily.   losartan (COZAAR) 25 MG tablet 10/12/2024 at am  Yes Yes   Sig: Take 25 mg by mouth every morning.   menthol-zinc oxide (CALMOSEPTINE) 0.44-20.6 % OINT ointment 10/12/2024 at am  Yes Yes   Sig: Apply topically 3 times daily. Apply to saccral, coccyx, and groin area(s) topically three times daily for prevention.   metoprolol succinate ER (TOPROL-XL) 12.5 mg TABS 24 hr half-tab 10/12/2024 at am  Yes Yes   Sig: Take 12.5 mg by mouth every morning.   miconazole (MICATIN) 2 % external powder 10/12/2024 at am  No Yes   Sig: Apply topically 2 times daily.   multivitamin w/minerals (THERA-VIT-M) tablet  10/12/2024 at am  Yes Yes   Sig: Take 1 tablet by mouth every morning.   pantoprazole (PROTONIX) 40 MG EC tablet 10/12/2024 at am  No Yes   Sig: Take 1 tablet (40 mg) by mouth every morning (before breakfast)   rosuvastatin (CRESTOR) 10 MG tablet 10/11/2024 at pm  Yes Yes   Sig: Take 10 mg by mouth at bedtime.   senna-docusate (SENOKOT-S/PERICOLACE) 8.6-50 MG tablet Unknown at prn  No Yes   Sig: Take 1 tablet by mouth 2 times daily as needed for constipation.   traMADol (ULTRAM) 50 MG tablet Unknown at prn  No Yes   Sig: Take 1 tablet (50 mg) by mouth every 6 hours as needed for severe pain.      Facility-Administered Medications: None        Allergies   Allergies   Allergen Reactions    Diclofenac      Other reaction(s): GI Upset    Loratadine      Other reaction(s): Urinary Retention    Oxycodone      Agitation     Sertraline Unknown     Other reaction(s): ineffective        Physical Exam   Vital Signs:     BP: 124/63 Pulse: 117   Resp: 18 SpO2: 96 % O2 Device: None (Room air)    Weight: 186 lbs 0 oz    General.  Awake alert not in acute distress.  HEENT.  Pupils equal round react to light, anicteric, EOM intact.  Neck supple no JVD.  CVS irregular rhythm no murmur gallops.  Lungs.  Mild wheezing to auscultation bilateral no rales.  Abdomen.  Soft nontender bowel sounds present.  Extremities.  + edema, left worse than right, no calf tenderness.  Neurological.  Awake and alert. No focal deficit.General weakness  Skin no rash. No pallor. +bruises and ecchymosis   Psych. Impaired memory       Medical Decision Making       76 MINUTES SPENT BY ME on the date of service doing chart review, history, exam, documentation & further activities per the note.      Data     I have personally reviewed the following data over the past 24 hrs:    14.4 (H)  \   11.0 (L)   / 309     140 104 20.9 /  100 (H)   4.4 25 0.79 \       Imaging results reviewed over the past 24 hrs:   Recent Results (from the past 24 hour(s))   Head CT w/o  contrast    Narrative    EXAM: CT HEAD WITHOUT CONTRAST, CT CERVICAL SPINE WITHOUT CONTRAST  LOCATION: [facility]  DATE: 10/12/2024    INDICATION: Fall, head injury.  COMPARISON: CT brain and CT cervical spine 10/05/2024. MRI brain 05/05/2024.  TECHNIQUE:   1) Routine CT Head without IV contrast. Multiplanar reformats. Dose reduction techniques were used.  2) Routine CT Cervical Spine without IV contrast. Multiplanar reformats. Dose reduction techniques were used.    FINDINGS:   HEAD CT:   INTRACRANIAL CONTENTS: No finding for intracranial hemorrhage or mass or convincing finding for acute infarct. Chronic volume loss and encephalomalacic change is seen within the right frontal lobe compatible with sequelae of prior ischemia and stable   compared to prior. Moderate patchy low-attenuation change is again seen within the cerebral white matter, nonspecific but compatible with sequelae of chronic microvascular ischemic change given the patient's age and similar to prior. Moderate stable   prominence of the lateral ventricles and sulci and mild prominence of the third ventricle.    Cerebellar tonsils are normally positioned. Sella is unremarkable for technique. Generalized thinning of the body of the corpus callosum which otherwise appears normally formed.    VISUALIZED ORBITS/SINUSES/MASTOIDS: Both globes are borderline proptotic which is favored to relate to body habitus as there is no abnormal enlargement of the extraocular muscles to strongly suggest thyroid-related orbitopathy.    BONES/SOFT TISSUES: Calvarium is intact, without suspicious lytic or blastic foci.    CERVICAL SPINE CT:   VERTEBRA: Slight smooth convex left cervical curvature. Normal cervical lordosis. Craniocervical junction is intact. Mild to moderate degenerative change anterior C1-C2 articulation.    Allowing for chronic endplate degenerative changes seen throughout the visualized spine, vertebral body heights are grossly  preserved and there is no finding for acute fracture or posttraumatic subluxation. Endplate irregularity and advanced interspace   narrowing C2-C3 through T1-T2. Mild posterior interbody spurring is seen throughout the cervical spine. Mild to moderate right-sided facet arthropathy C3-C4 through C5-C6. Mild left-sided facet arthropathy C2-C3.    CANAL/FORAMINA: Mild spinal canal narrowing C5-C6 and C6-C7. Moderate to severe bilateral foraminal stenosis C3-C4 and C4-C5 and on the left at C5-C6 and C6-C7.    PARASPINAL: Dorsal paraspinal soft tissues are unremarkable. No prevertebral soft tissue swelling. Multiple surgical clips are seen within the right neck likely relating to prior carotid endarterectomy. Lung apices are clear.      Impression    IMPRESSION:  HEAD CT:  1.  No finding for intracranial hemorrhage, mass, or acute infarct.    2.  Moderate volume loss and moderate to advanced presumed sequelae of chronic microvascular ischemic change. Stable volume loss and gliosis right frontal lobe.    CERVICAL SPINE CT:  1.  Advanced cervical spondylosis without finding for acute fracture or posttraumatic subluxation.       CT Cervical Spine w/o Contrast    Narrative    EXAM: CT HEAD WITHOUT CONTRAST, CT CERVICAL SPINE WITHOUT CONTRAST  LOCATION: [facility]  DATE: 10/12/2024    INDICATION: Fall, head injury.  COMPARISON: CT brain and CT cervical spine 10/05/2024. MRI brain 05/05/2024.  TECHNIQUE:   1) Routine CT Head without IV contrast. Multiplanar reformats. Dose reduction techniques were used.  2) Routine CT Cervical Spine without IV contrast. Multiplanar reformats. Dose reduction techniques were used.    FINDINGS:   HEAD CT:   INTRACRANIAL CONTENTS: No finding for intracranial hemorrhage or mass or convincing finding for acute infarct. Chronic volume loss and encephalomalacic change is seen within the right frontal lobe compatible with sequelae of prior ischemia and stable   compared to  prior. Moderate patchy low-attenuation change is again seen within the cerebral white matter, nonspecific but compatible with sequelae of chronic microvascular ischemic change given the patient's age and similar to prior. Moderate stable   prominence of the lateral ventricles and sulci and mild prominence of the third ventricle.    Cerebellar tonsils are normally positioned. Sella is unremarkable for technique. Generalized thinning of the body of the corpus callosum which otherwise appears normally formed.    VISUALIZED ORBITS/SINUSES/MASTOIDS: Both globes are borderline proptotic which is favored to relate to body habitus as there is no abnormal enlargement of the extraocular muscles to strongly suggest thyroid-related orbitopathy.    BONES/SOFT TISSUES: Calvarium is intact, without suspicious lytic or blastic foci.    CERVICAL SPINE CT:   VERTEBRA: Slight smooth convex left cervical curvature. Normal cervical lordosis. Craniocervical junction is intact. Mild to moderate degenerative change anterior C1-C2 articulation.    Allowing for chronic endplate degenerative changes seen throughout the visualized spine, vertebral body heights are grossly preserved and there is no finding for acute fracture or posttraumatic subluxation. Endplate irregularity and advanced interspace   narrowing C2-C3 through T1-T2. Mild posterior interbody spurring is seen throughout the cervical spine. Mild to moderate right-sided facet arthropathy C3-C4 through C5-C6. Mild left-sided facet arthropathy C2-C3.    CANAL/FORAMINA: Mild spinal canal narrowing C5-C6 and C6-C7. Moderate to severe bilateral foraminal stenosis C3-C4 and C4-C5 and on the left at C5-C6 and C6-C7.    PARASPINAL: Dorsal paraspinal soft tissues are unremarkable. No prevertebral soft tissue swelling. Multiple surgical clips are seen within the right neck likely relating to prior carotid endarterectomy. Lung apices are clear.      Impression    IMPRESSION:  HEAD CT:  1.  No  finding for intracranial hemorrhage, mass, or acute infarct.    2.  Moderate volume loss and moderate to advanced presumed sequelae of chronic microvascular ischemic change. Stable volume loss and gliosis right frontal lobe.    CERVICAL SPINE CT:  1.  Advanced cervical spondylosis without finding for acute fracture or posttraumatic subluxation.       XR Pelvis 1/2 Views    Narrative    EXAM: XR PELVIS 1/2 VIEWS  LOCATION: [facility]  DATE: 10/12/2024    INDICATION: Left hip pain status post fall since left hip surgery.  COMPARISON: 10/6/2024      Impression    IMPRESSION: No significant interval change. Postop ORIF left intertrochanteric femur fracture with dynamic hip screw. Hardware and alignment unchanged. Lesser trochanteric fragments remain medially displaced by approximately 2 cm. No new fracture. Mild   to moderate left and mild right hip osteoarthritis. Vascular stents project over the pelvis and surgical clips project over the right inguinal region. Both obturator rings appear intact. Arterial calcification.   XR Femur Left 2 Views    Narrative    EXAM: XR FEMUR LEFT 2 VIEWS  LOCATION: [facility]  DATE: 10/12/2024    INDICATION: Left hip pain, status post fall after hip surgery.  COMPARISON: 10/06/2024.      Impression    IMPRESSION: Postoperative changes redemonstrated related to ORIF intertrochanteric left proximal femur fracture with dynamic hip screw. Alignment and hardware unchanged. Displaced lesser trochanteric fracture fragments are unchanged. Lateral skin staples   remain along the lateral left hip and proximal thigh. Anatomic alignment left femur. No new left femur fracture or joint malalignment. Mild to moderate left hip osteoarthritis. Moderate to severe degenerative change medial compartment left knee.   Arterial calcification. Small left knee joint effusion. Lateral left hip and proximal thigh soft tissue edema.     US Lower Extremity Venous  Duplex Left    Narrative    EXAM: US LOWER EXTREMITY VENOUS DUPLEX LEFT  LOCATION: [facility]  DATE: 10/12/2024    INDICATION: ongoing LLE pain after left hip surgery on 10 6 24  COMPARISON: None.  TECHNIQUE: Venous Duplex ultrasound of the left lower extremity with and without compression, augmentation and duplex. Color flow and spectral Doppler with waveform analysis performed.    FINDINGS: Exam includes the common femoral, femoral, popliteal, and contralateral common femoral veins as well as segmentally visualized deep calf veins and greater saphenous vein.     LEFT: No deep vein thrombosis. No superficial thrombophlebitis. No popliteal cyst.      Impression    IMPRESSION:  No deep venous thrombosis in the left lower extremity.

## 2024-10-12 NOTE — ED NOTES
Bed: JNED-B  Expected date: 10/12/24  Expected time: 12:54 PM  Means of arrival:   Comments:  [place]/left hip surgery. Fall yesterday

## 2024-10-12 NOTE — ED NOTES
Patient returned from imaging.  Placed back onto monitor.  HR remains tachycardic.  Patient states wants to try to use urinal again.

## 2024-10-12 NOTE — PROGRESS NOTES
Transfer in from the Emergency Room due to recent fall in TCU monitor reading showed sinus Dysrhythmia . Noted  patient to have generalized edema lung sounds with inspiratory and expiratory wheezing and placed on 2 L of Oxygen verbalized being SOB with activity  very restless and could not get comfortable in bed was able to stand at the side of bed with assist of 2 tolerated the activity well  and now able to sit at the side of bed for dinner  no issues with swallowing Alert to self and place but not to situation.complained of pain 10/10 and given 50 mg of Tramadol and will continue to monitor .Voided once with the urinal.

## 2024-10-12 NOTE — MEDICATION SCRIBE - ADMISSION MEDICATION HISTORY
Medication Scribe Admission Medication History    Admission medication history is complete. The information provided in this note is only as accurate as the sources available at the time of the update.    Information Source(s): Facility ([facility]/) medication list/MAR and CareEverywhere/SureScripts via phone    Pertinent Information:     Changes made to PTA medication list:  Added: None  Deleted: None  Changed: None    Allergies reviewed with patient and updates made in EHR: yes    Medication History Completed By: KAYE ROD 10/12/2024 3:56 PM    PTA Med List   Medication Sig Last Dose    acetaminophen (TYLENOL) 325 MG tablet Take 3 tablets (975 mg) by mouth 3 times daily. 10/12/2024 at am    amoxicillin (AMOXIL) 500 MG capsule Take 1 capsule (500 mg) by mouth every 8 hours for 5 days. 10/12/2024 at am    aspirin (ASA) 81 MG chewable tablet Take 1 tablet (81 mg) by mouth 2 times daily. 10/12/2024 at am    busPIRone (BUSPAR) 5 MG tablet Take 5 mg by mouth 2 times daily 10/12/2024 at am    Cholecalciferol (VITAMIN D3) 50 MCG (2000 UT) CAPS Take 1 tablet by mouth every morning. 10/12/2024 at am    DULoxetine (CYMBALTA) 60 MG capsule Take 60 mg by mouth every morning. 10/12/2024 at am    furosemide (LASIX) 20 MG tablet Take 20 mg by mouth every morning. 10/12/2024 at am    ipratropium - albuterol 0.5 mg/2.5 mg/3 mL (DUONEB) 0.5-2.5 (3) MG/3ML neb solution Take 1 vial by nebulization every 6 hours as needed for wheezing. 10/12/2024 at am    latanoprost (XALATAN) 0.005 % ophthalmic solution Place 1 drop into both eyes At Bedtime 10/11/2024 at pm    levothyroxine (SYNTHROID/LEVOTHROID) 88 MCG tablet Take 88 mcg by mouth daily before breakfast 10/12/2024 at am    lidocaine (LIDODERM) 5 % patch Place 1 patch over 12 hours onto the skin every 24 hours. To prevent lidocaine toxicity, patient should be patch free for 12 hrs daily. 10/12/2024 at am-on    losartan (COZAAR) 25 MG tablet Take 25 mg by mouth every morning.  10/12/2024 at am    Melatonin 10 MG TABS tablet Take 10 mg by mouth at bedtime. 10/11/2024 at pm    menthol-zinc oxide (CALMOSEPTINE) 0.44-20.6 % OINT ointment Apply topically 3 times daily. Apply to saccral, coccyx, and groin area(s) topically three times daily for prevention. 10/12/2024 at am    metoprolol succinate ER (TOPROL-XL) 12.5 mg TABS 24 hr half-tab Take 12.5 mg by mouth every morning. 10/12/2024 at am    miconazole (MICATIN) 2 % external powder Apply topically 2 times daily. 10/12/2024 at am    multivitamin w/minerals (THERA-VIT-M) tablet Take 1 tablet by mouth every morning. 10/12/2024 at am    OLANZapine (ZYPREXA) 7.5 MG tablet Take 7.5 mg by mouth at bedtime. 10/11/2024 at pm    pantoprazole (PROTONIX) 40 MG EC tablet Take 1 tablet (40 mg) by mouth every morning (before breakfast) 10/12/2024 at am    rosuvastatin (CRESTOR) 10 MG tablet Take 10 mg by mouth at bedtime. 10/11/2024 at pm    senna-docusate (SENOKOT-S/PERICOLACE) 8.6-50 MG tablet Take 1 tablet by mouth 2 times daily as needed for constipation. Unknown at prn    traMADol (ULTRAM) 50 MG tablet Take 1 tablet (50 mg) by mouth every 6 hours as needed for severe pain. Unknown at prn

## 2024-10-12 NOTE — ED PROVIDER NOTES
EMERGENCY DEPARTMENT ENCOUNTER      NAME: Dominik Rojas  AGE: 83 year old male  YOB: 1940  MRN: [id number]  EVALUATION DATE & TIME: 10/12/2024  1:05 PM    PCP: Misael Moe    ED PROVIDER: Haydee Morales MD      Chief Complaint   Patient presents with    Fall         FINAL IMPRESSION:  1. Fall, initial encounter    2. Atrial fibrillation with RVR (H)          ED COURSE & MEDICAL DECISION MAKING:    Pertinent Labs & Imaging studies reviewed. (See chart for details)  83 year old male presents to the Emergency Department for evaluation of left leg pain.  He is status post internal fixation of left intertrochanteric hip fracture after a mechanical fall.  The patient reports that he has been having ongoing pain in the left hip since the surgery on October 6.  Patient denies any change in the pain after the fall yesterday.  He fell from a bed height.  He also hit his head and has no ongoing headache.  Otherwise denies any constitutional symptoms.  He reports this was a mechanical fall.  Patient is tachycardic.  Initially thought to be a sinus tachycardia, patient given a liter of IV fluids with persistent heart rate in the 120s to 130s.  On cardiac monitor it is more consistent with A-fib with RVR.  Patient will be given IV metprolol, will require admission for ongoing cardiac monitoring for sinus tachycardia with intermittent A-fib with RVR.    1:45 PM Patient in a sinus tachycardia. Per review of his medication log, patient did take morning labetalol.  Patient with a prolonged QTc.  Will give metoprolol IV for rate control.   3:26 PM Patient was shuffling gait from side-to-side when he got up to urinate.  Still unable to give a urine.  3:31 PM repeat EKG reviewed appears to be a sinus tachycardia.  However, at times on cardiac monitoring the patient does appear to have A-fib with RVR, possible paroxysmal A-fib.  Will admit him for continued monitoring of such.  Patient has attempted to use the  bathroom multiple times and is unable to urinate.  On bladder scan patient with 46 cc in the bladder.  Currently at the bedside, the patient's heart rate jumps up to the 130s, 140s, then back down to the 1 teens.  Ultrasound done with no evidence of DVT.        At the conclusion of the encounter I discussed the results of all of the tests and the disposition. The questions were answered. The patient or family acknowledged understanding and was agreeable with the care plan.         Medical Decision Making  Obtained supplemental history:Supplemental history obtained?: No  Reviewed external records: External records reviewed?: No  Care impacted by chronic illness:Documented in Chart  Care significantly affected by social determinants of health:Access to Medical Care  Did you consider but not order tests?: Work up considered but not performed and documented in chart, if applicable  Did you interpret images independently?: Independent interpretation of ECG and images noted in documentation, when applicable.  Consultation discussion with other provider:Did you involve another provider (consultant, , pharmacy, etc.)?: No  Admit.      MEDICATIONS GIVEN IN THE EMERGENCY:  Medications   metoprolol (LOPRESSOR) injection 5 mg (has no administration in time range)   metoprolol tartrate (LOPRESSOR) tablet 25 mg (has no administration in time range)   acetaminophen (TYLENOL) tablet 975 mg (975 mg Oral $Given 10/12/24 1357)   sodium chloride 0.9% BOLUS 1,000 mL (1,000 mLs Intravenous $New Bag 10/12/24 1349)       NEW PRESCRIPTIONS STARTED AT TODAY'S ER VISIT  New Prescriptions    No medications on file          =================================================================    HPI    Patient information was obtained from: Patient    Use of : N/A         Dominik Rojas is a 83 year old male with a pertinent history of HTN, COPD, AAA, A Fib who presents to this ED  for evaluation after a fall.  Patient was discharged  from the hospital yesterday status post left hip surgery 1 week ago.  Patient had a fall yesterday after getting back to the care facility.  This morning the patient appeared more confused.  However he was alert and oriented x 4.  The patient reports he remembers the fall, but states that was a mechanical fall.  He states his only pain is in his left hip.  Wanted to seen he had thought he is on blood thinners but in review his medications, he is on aspirin 81 mg but no other blood thinners.    Left hip pain, fell last week at his home, presented to the hospital with acute femur fracture on the left, surgery on 10/6/24. Had a mechanical fall yesterday while reaching for a bag while sitting on the bed, with fall on his left hip. Now with left hip pain, numbness and squeezing down to the left knee. He was a little confused afterwards, but not at baseline. He did hit the back of his head.     PAST MEDICAL HISTORY:  Past Medical History:   Diagnosis Date    AAA (abdominal aortic aneurysm) (H)     s/p stenting    AAA (abdominal aortic aneurysm) (H) 11/15/2012    AAA (abdominal aortic aneurysm) CT 11-12  6.4 cm. Saw Dr Muller 1-13;  S/P endovascular repair 3-13;  s/p stenting      Alcohol dependence in remission (H)     Alcohol use disorder, severe, in sustained remission, dependence (H) 08/16/2021    Anxiety     Atrial fibrillation with normal ventricular rate (H)     Benign prostatic hyperplasia with lower urinary tract symptoms     Bronchiectasis without complication (H)     2/27/2020    COPD (chronic obstructive pulmonary disease) (H)     CVA (cerebral vascular accident) (H) 2019    DDD (degenerative disc disease), lumbar     Depression     Depressive disorder     Diabetes (H)     Diastolic dysfunction 01/22/2021    DJD (degenerative joint disease) of knee     left    Essential hypertension     Glaucoma     Glaucoma     History of stroke without residual deficits     Hyperlipidemia     Hypertension     Hypothyroidism      Hypothyroidism     Kidney stones     Major depression     Memory problem     Nocturia     Obesity     Opioid use disorder, severe, dependence (H) 08/16/2021    Overactive bladder 02/25/2021    Primary osteoarthritis of left knee     Psoriasis     Psoriasis     Seborrheic keratoses     Somnolence, daytime     Stenosis of right carotid artery     history of TIA's       PAST SURGICAL HISTORY:  Past Surgical History:   Procedure Laterality Date    ABDOMEN SURGERY      ABDOMINAL AORTIC ANEURYSM REPAIR  2013    stent    CAROTID ENDARTERECTOMY Right 9/9/2019    Procedure: RIGHT CAROTID ENDARTERECTOMY;  Surgeon: Miguel Muller MD;  Location: [facility];  Service: General    CIRCUMCISION      CYSTOSCOPY      CYSTOSCOPY PROSTATE W/ LASER N/A 6/12/2018    Procedure:  TRANSURETHRAL RESECTION OF THE PROSTATE WITH QUANTA LASER;  Surgeon: Eze Levi MD;  Location: [facility];  Service:     CYSTOSCOPY PROSTATE W/ LASER N/A 2/18/2020    Procedure: CYSTOSCOPY WITH TRANSURETHRAL INCISION OF THE PROSTATE WITH QUANTA LASER;  Surgeon: Eze Levi MD;  Location: [facility];  Service: Urology    CYSTOSCOPY W/ LITHOLAPAXY / EHL N/A 6/12/2018    Procedure: CYSTOSCOPY AND LITHOLAPAXY;  Surgeon: Eze Levi MD;  Location: [facility];  Service:     IR ABDOMINAL AORTAGRAM  5/19/2020    IR ABDOMINAL AORTIC ANEURYSM ENDOGRAFT  5/19/2020    IR ABDOMINAL AORTOGRAM  5/19/2020    IR ABDOMINAL ENDOVASCULAR STENT GRAFT  5/19/2020    LITHOTRIPSY      OPEN REDUCTION INTERNAL FIXATION HIP NAILING Left 10/6/2024    Procedure: INTERNAL FIXATION, FRACTURE, TROCHANTERIC, LEFT HIP, USING RODS;  Surgeon: Tad Oliver DO;  Location: [facility]    PERCUTANEOUS NEPHROSTOLITHOTOMY Right 03/10/2020    ZZC HUANG W/O FACETEC FORAMOT/DSKC 1/2 VRT SEG, LUMBAR Bilateral 3/3/2021    Procedure: Bilateral lumbar 2 - Lumbar 4 decompressive lumbar laminectomy with medial facetectomies;  Surgeon: Christine  Renée KEMP MD;  Location: [facility] OR;  Service: Spine           CURRENT MEDICATIONS:    acetaminophen (TYLENOL) 325 MG tablet  amoxicillin (AMOXIL) 500 MG capsule  aspirin (ASA) 81 MG chewable tablet  busPIRone (BUSPAR) 5 MG tablet  Cholecalciferol (VITAMIN D3) 50 MCG (2000 UT) CAPS  DULoxetine (CYMBALTA) 60 MG capsule  furosemide (LASIX) 20 MG tablet  ipratropium - albuterol 0.5 mg/2.5 mg/3 mL (DUONEB) 0.5-2.5 (3) MG/3ML neb solution  latanoprost (XALATAN) 0.005 % ophthalmic solution  levothyroxine (SYNTHROID/LEVOTHROID) 88 MCG tablet  lidocaine (LIDODERM) 5 % patch  losartan (COZAAR) 25 MG tablet  Melatonin 10 MG TABS tablet  menthol-zinc oxide (CALMOSEPTINE) 0.44-20.6 % OINT ointment  metoprolol succinate ER (TOPROL-XL) 12.5 mg TABS 24 hr half-tab  miconazole (MICATIN) 2 % external powder  multivitamin w/minerals (THERA-VIT-M) tablet  OLANZapine (ZYPREXA) 7.5 MG tablet  pantoprazole (PROTONIX) 40 MG EC tablet  rosuvastatin (CRESTOR) 10 MG tablet  senna-docusate (SENOKOT-S/PERICOLACE) 8.6-50 MG tablet  traMADol (ULTRAM) 50 MG tablet        ALLERGIES:  Allergies   Allergen Reactions    Diclofenac      Other reaction(s): GI Upset    Loratadine      Other reaction(s): Urinary Retention    Oxycodone      Agitation     Sertraline Unknown     Other reaction(s): ineffective       FAMILY HISTORY:  Family History   Problem Relation Age of Onset    Emphysema Mother     No Known Problems Father     Cirrhosis Father     No Known Problems Sister     No Known Problems Brother     No Known Problems Maternal Aunt     No Known Problems Maternal Uncle     No Known Problems Paternal Aunt     No Known Problems Paternal Uncle     No Known Problems Maternal Grandmother     No Known Problems Maternal Grandfather     No Known Problems Paternal Grandmother     No Known Problems Paternal Grandfather     No Known Problems Other        SOCIAL HISTORY:   Social History     Socioeconomic History    Marital status:    Tobacco  Use    Smoking status: Former     Current packs/day: 0.00     Average packs/day: 0.5 packs/day for 35.0 years (17.5 ttl pk-yrs)     Types: Cigarettes     Start date: 1955     Quit date: 1990     Years since quittin.8    Smokeless tobacco: Never    Tobacco comments:     quit    Substance and Sexual Activity    Alcohol use: No     Comment: Alcoholic Drinks/day: quit     Drug use: No    Sexual activity: Yes     Partners: Female     Birth control/protection: Post-menopausal   Other Topics Concern    Parent/sibling w/ CABG, MI or angioplasty before 65F 55M? No     Social Determinants of Health     Financial Resource Strain: Low Risk  (10/6/2024)    Financial Resource Strain     Within the past 12 months, have you or your family members you live with been unable to get utilities (heat, electricity) when it was really needed?: No   Food Insecurity: Low Risk  (10/6/2024)    Food Insecurity     Within the past 12 months, did you worry that your food would run out before you got money to buy more?: No     Within the past 12 months, did the food you bought just not last and you didn t have money to get more?: No   Transportation Needs: Low Risk  (10/6/2024)    Transportation Needs     Within the past 12 months, has lack of transportation kept you from medical appointments, getting your medicines, non-medical meetings or appointments, work, or from getting things that you need?: No    Received [facility]    Social Connections   Interpersonal Safety: Low Risk  (10/6/2024)    Interpersonal Safety     Do you feel physically and emotionally safe where you currently live?: Yes     Within the past 12 months, have you been hit, slapped, kicked or otherwise physically hurt by someone?: No     Within the past 12 months, have you been humiliated or emotionally abused in other ways by your partner or ex-partner?: No   Housing  "Stability: Low Risk  (10/6/2024)    Housing Stability     Do you have housing? : Yes     Are you worried about losing your housing?: No       VITALS:  /77   Pulse (!) 135   Resp 18   Ht 1.702 m (5' 7\")   Wt 84.4 kg (186 lb)   SpO2 96%   BMI 29.13 kg/m      PHYSICAL EXAM    Constitutional: Well developed, Well nourished, NAD  HENT: Normocephalic, Atraumatic, Bilateral external ears normal, Oropharynx normal, mucous membranes moist, Nose normal.   Neck- Normal range of motion, No tenderness, Supple, No stridor.  Eyes: PERRL, EOMI, Conjunctiva normal, No discharge.   Respiratory: Normal breath sounds, No respiratory distress  Cardiovascular: Tachycardia, Regular rhythm  GI: Bowel sounds normal, Soft, No tenderness,   Musculoskeletal: Edema of left lower extremity with associated postop changes.  Good range of motion in all major joints. No tenderness to palpation or major deformities noted.   Integument: Warm, Dry, No erythema, No rash, ecchymosis of bilateral arms reportedly from IVs.  No other signs of trauma.  Neurologic: Alert & oriented x 3, Normal motor function, Normal sensory function, No focal deficits noted. Normal gait.   Psychiatric: Affect normal, Judgment normal, Mood normal.      LAB:  All pertinent labs reviewed and interpreted.  Results for orders placed or performed during the hospital encounter of 10/12/24   XR Femur Left 2 Views    Impression    IMPRESSION: Postoperative changes redemonstrated related to ORIF intertrochanteric left proximal femur fracture with dynamic hip screw. Alignment and hardware unchanged. Displaced lesser trochanteric fracture fragments are unchanged. Lateral skin staples   remain along the lateral left hip and proximal thigh. Anatomic alignment left femur. No new left femur fracture or joint malalignment. Mild to moderate left hip osteoarthritis. Moderate to severe degenerative change medial compartment left knee.   Arterial calcification. Small left knee joint " effusion. Lateral left hip and proximal thigh soft tissue edema.     Head CT w/o contrast    Impression    IMPRESSION:  HEAD CT:  1.  No finding for intracranial hemorrhage, mass, or acute infarct.    2.  Moderate volume loss and moderate to advanced presumed sequelae of chronic microvascular ischemic change. Stable volume loss and gliosis right frontal lobe.    CERVICAL SPINE CT:  1.  Advanced cervical spondylosis without finding for acute fracture or posttraumatic subluxation.       CT Cervical Spine w/o Contrast    Impression    IMPRESSION:  HEAD CT:  1.  No finding for intracranial hemorrhage, mass, or acute infarct.    2.  Moderate volume loss and moderate to advanced presumed sequelae of chronic microvascular ischemic change. Stable volume loss and gliosis right frontal lobe.    CERVICAL SPINE CT:  1.  Advanced cervical spondylosis without finding for acute fracture or posttraumatic subluxation.       XR Pelvis 1/2 Views    Impression    IMPRESSION: No significant interval change. Postop ORIF left intertrochanteric femur fracture with dynamic hip screw. Hardware and alignment unchanged. Lesser trochanteric fragments remain medially displaced by approximately 2 cm. No new fracture. Mild   to moderate left and mild right hip osteoarthritis. Vascular stents project over the pelvis and surgical clips project over the right inguinal region. Both obturator rings appear intact. Arterial calcification.   Basic metabolic panel   Result Value Ref Range    Sodium 140 135 - 145 mmol/L    Potassium 4.4 3.4 - 5.3 mmol/L    Chloride 104 98 - 107 mmol/L    Carbon Dioxide (CO2) 25 22 - 29 mmol/L    Anion Gap 11 7 - 15 mmol/L    Urea Nitrogen 20.9 8.0 - 23.0 mg/dL    Creatinine 0.79 0.67 - 1.17 mg/dL    GFR Estimate 89 >60 mL/min/1.73m2    Calcium 8.4 (L) 8.8 - 10.4 mg/dL    Glucose 100 (H) 70 - 99 mg/dL   Result Value Ref Range    Magnesium 1.7 1.7 - 2.3 mg/dL   CBC with platelets and differential   Result Value Ref Range     WBC Count 14.4 (H) 4.0 - 11.0 10e3/uL    RBC Count 3.79 (L) 4.40 - 5.90 10e6/uL    Hemoglobin 11.0 (L) 13.3 - 17.7 g/dL    Hematocrit 34.1 (L) 40.0 - 53.0 %    MCV 90 78 - 100 fL    MCH 29.0 26.5 - 33.0 pg    MCHC 32.3 31.5 - 36.5 g/dL    RDW 17.2 (H) 10.0 - 15.0 %    Platelet Count 309 150 - 450 10e3/uL    % Neutrophils 82 %    % Lymphocytes 9 %    % Monocytes 8 %    % Eosinophils 0 %    % Basophils 0 %    % Immature Granulocytes 1 %    NRBCs per 100 WBC 0 <1 /100    Absolute Neutrophils 11.8 (H) 1.6 - 8.3 10e3/uL    Absolute Lymphocytes 1.3 0.8 - 5.3 10e3/uL    Absolute Monocytes 1.1 0.0 - 1.3 10e3/uL    Absolute Eosinophils 0.0 0.0 - 0.7 10e3/uL    Absolute Basophils 0.0 0.0 - 0.2 10e3/uL    Absolute Immature Granulocytes 0.1 <=0.4 10e3/uL    Absolute NRBCs 0.0 10e3/uL       RADIOLOGY:  Reviewed all pertinent imaging. Please see official radiology report.  XR Femur Left 2 Views   Preliminary Result   IMPRESSION: Postoperative changes redemonstrated related to ORIF intertrochanteric left proximal femur fracture with dynamic hip screw. Alignment and hardware unchanged. Displaced lesser trochanteric fracture fragments are unchanged. Lateral skin staples    remain along the lateral left hip and proximal thigh. Anatomic alignment left femur. No new left femur fracture or joint malalignment. Mild to moderate left hip osteoarthritis. Moderate to severe degenerative change medial compartment left knee.    Arterial calcification. Small left knee joint effusion. Lateral left hip and proximal thigh soft tissue edema.         XR Pelvis 1/2 Views   Final Result   IMPRESSION: No significant interval change. Postop ORIF left intertrochanteric femur fracture with dynamic hip screw. Hardware and alignment unchanged. Lesser trochanteric fragments remain medially displaced by approximately 2 cm. No new fracture. Mild    to moderate left and mild right hip osteoarthritis. Vascular stents project over the pelvis and surgical clips  project over the right inguinal region. Both obturator rings appear intact. Arterial calcification.      CT Cervical Spine w/o Contrast   Final Result   IMPRESSION:   HEAD CT:   1.  No finding for intracranial hemorrhage, mass, or acute infarct.      2.  Moderate volume loss and moderate to advanced presumed sequelae of chronic microvascular ischemic change. Stable volume loss and gliosis right frontal lobe.      CERVICAL SPINE CT:   1.  Advanced cervical spondylosis without finding for acute fracture or posttraumatic subluxation.            Head CT w/o contrast   Final Result   IMPRESSION:   HEAD CT:   1.  No finding for intracranial hemorrhage, mass, or acute infarct.      2.  Moderate volume loss and moderate to advanced presumed sequelae of chronic microvascular ischemic change. Stable volume loss and gliosis right frontal lobe.      CERVICAL SPINE CT:   1.  Advanced cervical spondylosis without finding for acute fracture or posttraumatic subluxation.            US Lower Extremity Venous Duplex Left    (Results Pending)       EKG:    Performed at: 14:08    Impression: sinus tachycardia    Rate: 117 bpm  Rhythm: sinus tachycardia    AK Interval: 176 ms  QRS Interval: 84 ms  QTc Interval: 530 ms  ST Changes: no acute changes  Comparison: no change when compared with ECG from 10/8/24    I have independently reviewed and interpreted the EKG(s) documented above.        Haydee Morales MD  Emergency Medicine  [facility] EMERGENCY DEPARTMENT  [facility] [facility] [id number] [phone]       Haydee Morales MD  10/12/24 [id number]

## 2024-10-13 LAB
ALBUMIN UR-MCNC: 30 MG/DL
APPEARANCE UR: ABNORMAL
ATRIAL RATE - MUSE: 131 BPM
ATRIAL RATE - MUSE: 72 BPM
BACTERIA #/AREA URNS HPF: ABNORMAL /HPF
BILIRUB UR QL STRIP: NEGATIVE
COLOR UR AUTO: YELLOW
DIASTOLIC BLOOD PRESSURE - MUSE: NORMAL MMHG
DIASTOLIC BLOOD PRESSURE - MUSE: NORMAL MMHG
ERYTHROCYTE [DISTWIDTH] IN BLOOD BY AUTOMATED COUNT: 17.7 % (ref 10–15)
GLUCOSE UR STRIP-MCNC: NEGATIVE MG/DL
HCT VFR BLD AUTO: 31.8 % (ref 40–53)
HGB BLD-MCNC: 10 G/DL (ref 13.3–17.7)
HGB UR QL STRIP: ABNORMAL
HOLD SPECIMEN: NORMAL
INTERPRETATION ECG - MUSE: NORMAL
INTERPRETATION ECG - MUSE: NORMAL
KETONES UR STRIP-MCNC: ABNORMAL MG/DL
LEUKOCYTE ESTERASE UR QL STRIP: ABNORMAL
MCH RBC QN AUTO: 29 PG (ref 26.5–33)
MCHC RBC AUTO-ENTMCNC: 31.4 G/DL (ref 31.5–36.5)
MCV RBC AUTO: 92 FL (ref 78–100)
MUCOUS THREADS #/AREA URNS LPF: PRESENT /LPF
NITRATE UR QL: NEGATIVE
P AXIS - MUSE: 39 DEGREES
P AXIS - MUSE: NORMAL DEGREES
PH UR STRIP: 6 [PH] (ref 5–7)
PLATELET # BLD AUTO: 295 10E3/UL (ref 150–450)
PR INTERVAL - MUSE: 154 MS
PR INTERVAL - MUSE: 176 MS
QRS DURATION - MUSE: 84 MS
QRS DURATION - MUSE: 88 MS
QT - MUSE: 380 MS
QT - MUSE: 456 MS
QTC - MUSE: 499 MS
QTC - MUSE: 530 MS
R AXIS - MUSE: 39 DEGREES
R AXIS - MUSE: 51 DEGREES
RBC # BLD AUTO: 3.45 10E6/UL (ref 4.4–5.9)
RBC URINE: >182 /HPF
SP GR UR STRIP: 1.03 (ref 1–1.03)
SYSTOLIC BLOOD PRESSURE - MUSE: NORMAL MMHG
SYSTOLIC BLOOD PRESSURE - MUSE: NORMAL MMHG
T AXIS - MUSE: 41 DEGREES
T AXIS - MUSE: 50 DEGREES
UROBILINOGEN UR STRIP-MCNC: 6 MG/DL
VENTRICULAR RATE- MUSE: 117 BPM
VENTRICULAR RATE- MUSE: 72 BPM
WBC # BLD AUTO: 12.5 10E3/UL (ref 4–11)
WBC URINE: >182 /HPF

## 2024-10-13 PROCEDURE — 93005 ELECTROCARDIOGRAM TRACING: CPT

## 2024-10-13 PROCEDURE — 93010 ELECTROCARDIOGRAM REPORT: CPT | Performed by: INTERNAL MEDICINE

## 2024-10-13 PROCEDURE — 99232 SBSQ HOSP IP/OBS MODERATE 35: CPT | Performed by: INTERNAL MEDICINE

## 2024-10-13 PROCEDURE — 87086 URINE CULTURE/COLONY COUNT: CPT | Performed by: INTERNAL MEDICINE

## 2024-10-13 PROCEDURE — 250N000013 HC RX MED GY IP 250 OP 250 PS 637: Performed by: INTERNAL MEDICINE

## 2024-10-13 PROCEDURE — 250N000009 HC RX 250: Performed by: INTERNAL MEDICINE

## 2024-10-13 PROCEDURE — 93010 ELECTROCARDIOGRAM REPORT: CPT | Mod: RTG | Performed by: INTERNAL MEDICINE

## 2024-10-13 PROCEDURE — 81001 URINALYSIS AUTO W/SCOPE: CPT | Performed by: INTERNAL MEDICINE

## 2024-10-13 PROCEDURE — 99222 1ST HOSP IP/OBS MODERATE 55: CPT | Performed by: INTERNAL MEDICINE

## 2024-10-13 PROCEDURE — 250N000011 HC RX IP 250 OP 636: Performed by: INTERNAL MEDICINE

## 2024-10-13 PROCEDURE — 250N000013 HC RX MED GY IP 250 OP 250 PS 637

## 2024-10-13 PROCEDURE — 94640 AIRWAY INHALATION TREATMENT: CPT

## 2024-10-13 PROCEDURE — 36415 COLL VENOUS BLD VENIPUNCTURE: CPT | Performed by: INTERNAL MEDICINE

## 2024-10-13 PROCEDURE — 120N000004 HC R&B MS OVERFLOW

## 2024-10-13 PROCEDURE — 999N000157 HC STATISTIC RCP TIME EA 10 MIN

## 2024-10-13 PROCEDURE — 85027 COMPLETE CBC AUTOMATED: CPT | Performed by: INTERNAL MEDICINE

## 2024-10-13 RX ORDER — QUETIAPINE FUMARATE 25 MG/1
25 TABLET, FILM COATED ORAL
Status: COMPLETED | OUTPATIENT
Start: 2024-10-13 | End: 2024-10-13

## 2024-10-13 RX ORDER — HYDROMORPHONE HYDROCHLORIDE 1 MG/ML
0.3 INJECTION, SOLUTION INTRAMUSCULAR; INTRAVENOUS; SUBCUTANEOUS ONCE
Status: COMPLETED | OUTPATIENT
Start: 2024-10-13 | End: 2024-10-13

## 2024-10-13 RX ADMIN — MICONAZOLE NITRATE: 20 POWDER TOPICAL at 11:07

## 2024-10-13 RX ADMIN — LATANOPROST 1 DROP: 50 SOLUTION/ DROPS OPHTHALMIC at 22:07

## 2024-10-13 RX ADMIN — ASPIRIN 81 MG CHEWABLE TABLET 81 MG: 81 TABLET CHEWABLE at 11:05

## 2024-10-13 RX ADMIN — METOPROLOL TARTRATE 25 MG: 25 TABLET, FILM COATED ORAL at 11:06

## 2024-10-13 RX ADMIN — TRAMADOL HYDROCHLORIDE 50 MG: 50 TABLET, COATED ORAL at 22:04

## 2024-10-13 RX ADMIN — QUETIAPINE FUMARATE 25 MG: 25 TABLET ORAL at 04:28

## 2024-10-13 RX ADMIN — DULOXETINE HYDROCHLORIDE 60 MG: 60 CAPSULE, DELAYED RELEASE PELLETS ORAL at 11:03

## 2024-10-13 RX ADMIN — AMOXICILLIN 500 MG: 250 CAPSULE ORAL at 04:28

## 2024-10-13 RX ADMIN — SENNOSIDES AND DOCUSATE SODIUM 1 TABLET: 8.6; 5 TABLET ORAL at 11:04

## 2024-10-13 RX ADMIN — AMOXICILLIN 500 MG: 250 CAPSULE ORAL at 22:03

## 2024-10-13 RX ADMIN — MICONAZOLE NITRATE: 20 POWDER TOPICAL at 22:07

## 2024-10-13 RX ADMIN — BUSPIRONE HYDROCHLORIDE 5 MG: 5 TABLET ORAL at 22:03

## 2024-10-13 RX ADMIN — METOPROLOL TARTRATE 25 MG: 25 TABLET, FILM COATED ORAL at 22:03

## 2024-10-13 RX ADMIN — ENOXAPARIN SODIUM 40 MG: 40 INJECTION SUBCUTANEOUS at 17:15

## 2024-10-13 RX ADMIN — SENNOSIDES AND DOCUSATE SODIUM 1 TABLET: 8.6; 5 TABLET ORAL at 22:12

## 2024-10-13 RX ADMIN — IPRATROPIUM BROMIDE AND ALBUTEROL SULFATE 3 ML: .5; 3 SOLUTION RESPIRATORY (INHALATION) at 07:43

## 2024-10-13 RX ADMIN — ACETAMINOPHEN 650 MG: 325 TABLET ORAL at 13:06

## 2024-10-13 RX ADMIN — LEVOTHYROXINE SODIUM 88 MCG: 88 TABLET ORAL at 11:05

## 2024-10-13 RX ADMIN — ROSUVASTATIN 10 MG: 10 TABLET, FILM COATED ORAL at 22:04

## 2024-10-13 RX ADMIN — TRAMADOL HYDROCHLORIDE 50 MG: 50 TABLET, COATED ORAL at 04:28

## 2024-10-13 RX ADMIN — OLANZAPINE 7.5 MG: 2.5 TABLET, FILM COATED ORAL at 22:03

## 2024-10-13 RX ADMIN — BUSPIRONE HYDROCHLORIDE 5 MG: 5 TABLET ORAL at 11:06

## 2024-10-13 RX ADMIN — PANTOPRAZOLE SODIUM 40 MG: 40 TABLET, DELAYED RELEASE ORAL at 11:04

## 2024-10-13 RX ADMIN — AMOXICILLIN 500 MG: 250 CAPSULE ORAL at 13:09

## 2024-10-13 RX ADMIN — TRAMADOL HYDROCHLORIDE 50 MG: 50 TABLET, COATED ORAL at 16:48

## 2024-10-13 RX ADMIN — Medication 3 MG: at 22:03

## 2024-10-13 ASSESSMENT — ACTIVITIES OF DAILY LIVING (ADL)
ADLS_ACUITY_SCORE: 49
ADLS_ACUITY_SCORE: 51
ADLS_ACUITY_SCORE: 51
ADLS_ACUITY_SCORE: 49
ADLS_ACUITY_SCORE: 51
ADLS_ACUITY_SCORE: 55
ADLS_ACUITY_SCORE: 51
ADLS_ACUITY_SCORE: 49
ADLS_ACUITY_SCORE: 45
ADLS_ACUITY_SCORE: 55
ADLS_ACUITY_SCORE: 45
ADLS_ACUITY_SCORE: 51
ADLS_ACUITY_SCORE: 45
ADLS_ACUITY_SCORE: 45
ADLS_ACUITY_SCORE: 49
ADLS_ACUITY_SCORE: 51
ADLS_ACUITY_SCORE: 55
ADLS_ACUITY_SCORE: 45
ADLS_ACUITY_SCORE: 49
ADLS_ACUITY_SCORE: 51
ADLS_ACUITY_SCORE: 49

## 2024-10-13 NOTE — PLAN OF CARE
"  Problem: Adult Inpatient Plan of Care  Goal: Plan of Care Review  Description: The Plan of Care Review/Shift note should be completed every shift.  The Outcome Evaluation is a brief statement about your assessment that the patient is improving, declining, or no change.  This information will be displayed automatically on your shift  note.  Outcome: Progressing  Goal: Patient-Specific Goal (Individualized)  Description: You can add care plan individualizations to a care plan. Examples of Individualization might be:  \"Parent requests to be called daily at 9am for status\", \"I have a hard time hearing out of my right ear\", or \"Do not touch me to wake me up as it startles  me\".  Outcome: Progressing  Goal: Absence of Hospital-Acquired Illness or Injury  Outcome: Progressing  Intervention: Identify and Manage Fall Risk  Recent Flowsheet Documentation  Taken 10/13/2024 1718 by Sally Toussaint RN  Safety Promotion/Fall Prevention:   bedside attendant   activity supervised   mobility aid in reach   nonskid shoes/slippers when out of bed  Taken 10/13/2024 1312 by Sally Toussaint RN  Safety Promotion/Fall Prevention:   bedside attendant   activity supervised   mobility aid in reach   nonskid shoes/slippers when out of bed  Taken 10/13/2024 0730 by Sally Toussaint RN  Safety Promotion/Fall Prevention:   bedside attendant   activity supervised   mobility aid in reach   nonskid shoes/slippers when out of bed  Intervention: Prevent Skin Injury  Recent Flowsheet Documentation  Taken 10/13/2024 1718 by Sally Toussaint RN  Body Position: supine, head elevated  Skin Protection: adhesive use limited  Device Skin Pressure Protection: absorbent pad utilized/changed  Taken 10/13/2024 1312 by Sally Toussaint RN  Body Position: supine, head elevated  Skin Protection: adhesive use limited  Device Skin Pressure Protection: absorbent pad utilized/changed  Taken 10/13/2024 1100 by Sally Toussaint RN  Body Position:   " turned   heels elevated   supine  Taken 10/13/2024 0730 by Sally Toussaint RN  Body Position: supine, head elevated  Skin Protection: adhesive use limited  Device Skin Pressure Protection: absorbent pad utilized/changed  Intervention: Prevent and Manage VTE (Venous Thromboembolism) Risk  Recent Flowsheet Documentation  Taken 10/13/2024 1718 by Sally Toussaint RN  VTE Prevention/Management: SCDs off (sequential compression devices)  Taken 10/13/2024 1312 by Sally Toussaint RN  VTE Prevention/Management: SCDs off (sequential compression devices)  Taken 10/13/2024 0730 by Sally Toussaint RN  VTE Prevention/Management: SCDs off (sequential compression devices)  Intervention: Prevent Infection  Recent Flowsheet Documentation  Taken 10/13/2024 1718 by Sally Toussaint RN  Infection Prevention:   environmental surveillance performed   rest/sleep promoted  Taken 10/13/2024 1312 by Sally Toussaint RN  Infection Prevention:   environmental surveillance performed   rest/sleep promoted  Taken 10/13/2024 0730 by Sally Toussaint RN  Infection Prevention:   environmental surveillance performed   rest/sleep promoted  Goal: Optimal Comfort and Wellbeing  Outcome: Progressing  Intervention: Monitor Pain and Promote Comfort  Recent Flowsheet Documentation  Taken 10/13/2024 1748 by Sally Toussaint RN  Pain Management Interventions: emotional support  Taken 10/13/2024 1648 by Sally Toussaint RN  Pain Management Interventions: medication (see MAR)  Taken 10/13/2024 1406 by Sally Toussaint RN  Pain Management Interventions: emotional support  Taken 10/13/2024 1306 by Sally Toussaint RN  Pain Management Interventions: medication (see MAR)   Goal Outcome Evaluation:  Intermittently confused and restless and kept saying I want to show you I will not do anything wrong  had to be explained that the reason somebody was sitting in the Room with him was because he fell recently  given tylenol and tramadol for pain    with better control from Tramadol .remained to be on 1:1 cares and remained free from falls  with periods of agitation and restlessness .

## 2024-10-13 NOTE — PROGRESS NOTES
Patient found to be  having inspiratory and expiratory wheezing wheezing  and requested a nebulizer treatment  Pt no sleeping comfortably and unable to give medications on time .

## 2024-10-13 NOTE — PLAN OF CARE
"Assumed care 1900 to 0730. A&O x 4. Assist x 2 with a gait belt and walker. Tele is sinus rhythm w/ PVC's. C/O left hip pain, PRN Tylenol and Tramadol given (see MAR). Patient slept minimally overnight and became agitated, trying to get out of bed. \"Just let me go.\" Provider notified and one time Seroquel given. Call light within reach, able to make needs known. Bed alarm on for safety.    Problem: Risk for Delirium  Goal: Improved Behavioral Control  Intervention: Prevent and Manage Agitation  Recent Flowsheet Documentation  Taken 10/13/2024 0428 by Sheryl Garza RN  Environment Familiarity/Consistency: daily routine followed  Taken 10/12/2024 2301 by Sheryl Garza RN  Environment Familiarity/Consistency: daily routine followed  Taken 10/12/2024 2020 by Sheryl Garza RN  Environment Familiarity/Consistency: daily routine followed     Problem: Adult Inpatient Plan of Care  Goal: Optimal Comfort and Wellbeing  Intervention: Monitor Pain and Promote Comfort  Recent Flowsheet Documentation  Taken 10/13/2024 0428 by Sheryl Garza RN  Pain Management Interventions: medication (see MAR)  Taken 10/12/2024 2301 by Sheryl Garza RN  Pain Management Interventions: medication (see MAR)  Taken 10/12/2024 2020 by Sheryl Garza RN  Pain Management Interventions: medication (see MAR)       "

## 2024-10-13 NOTE — PROGRESS NOTES
Care Management Follow Up    Length of Stay (days): 1    Expected Discharge Date: 10/14/2024    Anticipated Discharge Plan:       Transportation: TBD    PT Recommendations:    OT Recommendations:        Barriers to Discharge: medical stability    Prior Living Situation:   admitted from [facility]    Discussed  Partnership in Safe Discharge Planning  document with patient/family: No     Handoff Completed: No, handoff not indicated or clinically appropriate    Patient/Spokesperson Updated: No    Additional Information:  Message left for wife requesting call back to complete assessment     Per chart review, patient admitted from [facility]. Facesheet sent to check on bed hold, awaiting response.     Next Steps: complete assessment     Alia Barry RN

## 2024-10-13 NOTE — CONSULTS
"  Thank you,  No ref. provider found, for asking the [facility] Care team to see Dominik Rojas to evaluate possible atrial fibrillation.      Assessment/Recommendations   Assessment:    Sinus tachycardia with brief episodes of atrial tachycardia, no atrial fibrillation documented in the chart or on telemetry  COPD exacerbation  Dementia  Abdominal aortic aneurysm status post endovascular repair  Peripheral edema without evidence of central venous congestion/normal BNP    Plan:  Frequent PACs and atrial tachycardia tend to occur during COPD exacerbation.  No specific treatment is typically necessary unless tachycardia becomes sustained in which case calcium blocker such as diltiazem can be helpful.  He does not need to be anticoagulated for atrial arrhythmias at this point.  Cardiology will sign off    Clinically Significant Risk Factors Present on Admission                   # Drug Induced Platelet Defect: home medication list includes an antiplatelet medication       # Hypertension: Noted on problem list       # Dementia: noted on problem list  # Anemia: based on hgb <11         # Overweight: Estimated body mass index is 29.13 kg/m  as calculated from the following:    Height as of this encounter: 1.702 m (5' 7\").    Weight as of this encounter: 84.4 kg (186 lb).         # Financial/Environmental Concerns:                    History of Present Illness/Subjective    Mr. Dominik Rojas is a 83 year old male who was readmitted after recent hip surgery.  Apparently he was feeling weak and lightheaded.  He suffers from dementia and he is unable to provide extensive history.  He denies chest pain or worsening shortness today.  The hospitalist was uncertain about the nature of the heart rhythm and cardiology consult was requested.  The patient has no history of documented atrial fibrillation.  Recent echo showed normal LV systolic function.  He is not known to have obstructive obstructive coronary artery " "disease or significant valvular heart disease.    ECG: Personally reviewed.  Sinus tachycardia with PACs and short runs of atrial tachycardia.    ECHO (personnaly Reviewed):   There is mild to moderate concentric left ventricular hypertrophy.  The visual ejection fraction is 60-65%.  Left ventricular diastolic function is abnormal.  The right ventricle is normal in size and function.  No significant valve disease. Compared to the prior study dated 8/10/2021,  there are changes as noted. LV systolic function has improved       Physical Examination Review of Systems   /76   Pulse 71   Temp 97.7  F (36.5  C) (Oral)   Resp 18   Ht 1.702 m (5' 7\")   Wt 84.4 kg (186 lb)   SpO2 100%   BMI 29.13 kg/m    Body mass index is 29.13 kg/m .  Wt Readings from Last 3 Encounters:   10/12/24 84.4 kg (186 lb)   10/05/24 80.9 kg (178 lb 4.8 oz)   10/02/24 81.3 kg (179 lb 4.8 oz)       Intake/Output Summary (Last 24 hours) at 10/13/2024 0959  Last data filed at 10/13/2024 0026  Gross per 24 hour   Intake 240 ml   Output 250 ml   Net -10 ml     General Appearance:   Alert, no distress, appears stated age   Head/ENT: Normocephalic, without obvious abnormality. Membranes moist.      EYES:  No scleral icterus   Neck: Supple, symmetrical, trachea midline, no adenopathy, thyroid: not enlarged, symmetric, no carotid bruit or JVD   Chest/Lungs:   l wheezes bilaterally   Cardiovascular:   Regular rhythm, S1, S2 normal, no murmur, rub or gallop.   Abdomen:  Soft, non-tender, bowel sounds active all four quadrants,  no masses, no organomegaly   Extremities: no cyanosis or clubbing.  2+ lower extremity edema                10 system review of systems completed see history of present illness and inpatient H&P (reviewed) for further details.          Lab Results    Chemistry/lipid CBC Cardiac Enzymes/BNP/TSH/INR   Recent Labs   Lab Test 01/15/24  1332   CHOL 141   HDL 51   LDL 68   TRIG 112     Recent Labs   Lab Test 01/15/24  1332 " 01/23/23  1148 10/26/21  0957   LDL 68 71 62     Recent Labs   Lab Test 10/12/24  1344      POTASSIUM 4.4   CHLORIDE 104   CO2 25   *   BUN 20.9   CR 0.79   GFRESTIMATED 89   CHELI 8.4*     Recent Labs   Lab Test 10/12/24  1344 10/10/24  0445 10/09/24  0458   CR 0.79 0.89 0.94     Recent Labs   Lab Test 09/12/24  1237 01/23/23  1148 08/18/21  0427   A1C 5.8* 5.7* 5.8*          Recent Labs   Lab Test 10/13/24  0455   WBC 12.5*   HGB 10.0*   HCT 31.8*   MCV 92        Recent Labs   Lab Test 10/13/24  0455 10/12/24  1344 10/09/24  0458   HGB 10.0* 11.0* 10.0*    Recent Labs   Lab Test 08/22/21  0927 08/17/21  0206 08/16/21  2237   TROPONINI 0.02 0.02 0.02     Recent Labs   Lab Test 10/05/24  1859 09/12/24  1237 05/09/23  2201 04/12/23  2342 08/14/21  0216 08/09/21 2008 03/07/21  0038   BNP  --   --   --   --  61 51 50   NTBNPI 245 159 165   < >  --   --   --     < > = values in this interval not displayed.     Recent Labs   Lab Test 10/06/24  0558   TSH 6.93*     Recent Labs   Lab Test 04/24/23  2140 03/08/23  0943 02/27/21  1040   INR 1.16* 1.16* 1.15*        Medical History  Surgical History Family History Social History   Past Medical History:   Diagnosis Date    AAA (abdominal aortic aneurysm) (H)     s/p stenting    AAA (abdominal aortic aneurysm) (H) 11/15/2012    AAA (abdominal aortic aneurysm) CT 11-12  6.4 cm. Saw Dr Muller 1-13;  S/P endovascular repair 3-13;  s/p stenting      Alcohol dependence in remission (H)     Alcohol use disorder, severe, in sustained remission, dependence (H) 08/16/2021    Anxiety     Atrial fibrillation with normal ventricular rate (H)     Benign prostatic hyperplasia with lower urinary tract symptoms     Bronchiectasis without complication (H)     2/27/2020    COPD (chronic obstructive pulmonary disease) (H)     CVA (cerebral vascular accident) (H) 2019    DDD (degenerative disc disease), lumbar     Depression     Depressive disorder     Diabetes (H)      Diastolic dysfunction 01/22/2021    DJD (degenerative joint disease) of knee     left    Essential hypertension     Glaucoma     Glaucoma     History of stroke without residual deficits     Hyperlipidemia     Hypertension     Hypothyroidism     Hypothyroidism     Kidney stones     Major depression     Memory problem     Nocturia     Obesity     Opioid use disorder, severe, dependence (H) 08/16/2021    Overactive bladder 02/25/2021    Primary osteoarthritis of left knee     Psoriasis     Psoriasis     Seborrheic keratoses     Somnolence, daytime     Stenosis of right carotid artery     history of TIA's     Past Surgical History:   Procedure Laterality Date    ABDOMEN SURGERY      ABDOMINAL AORTIC ANEURYSM REPAIR  2013    stent    CAROTID ENDARTERECTOMY Right 9/9/2019    Procedure: RIGHT CAROTID ENDARTERECTOMY;  Surgeon: Miguel Muller MD;  Location: [facility];  Service: General    CIRCUMCISION      CYSTOSCOPY      CYSTOSCOPY PROSTATE W/ LASER N/A 6/12/2018    Procedure:  TRANSURETHRAL RESECTION OF THE PROSTATE WITH QUANTA LASER;  Surgeon: Eze Levi MD;  Location: [facility];  Service:     CYSTOSCOPY PROSTATE W/ LASER N/A 2/18/2020    Procedure: CYSTOSCOPY WITH TRANSURETHRAL INCISION OF THE PROSTATE WITH QUANTA LASER;  Surgeon: Eze Levi MD;  Location: [facility];  Service: Urology    CYSTOSCOPY W/ LITHOLAPAXY / EHL N/A 6/12/2018    Procedure: CYSTOSCOPY AND LITHOLAPAXY;  Surgeon: Eze Levi MD;  Location: [facility];  Service:     IR ABDOMINAL AORTAGRAM  5/19/2020    IR ABDOMINAL AORTIC ANEURYSM ENDOGRAFT  5/19/2020    IR ABDOMINAL AORTOGRAM  5/19/2020    IR ABDOMINAL ENDOVASCULAR STENT GRAFT  5/19/2020    LITHOTRIPSY      OPEN REDUCTION INTERNAL FIXATION HIP NAILING Left 10/6/2024    Procedure: INTERNAL FIXATION, FRACTURE, TROCHANTERIC, LEFT HIP, USING RODS;  Surgeon: Tad Oliver DO;  Location: [facility]    PERCUTANEOUS NEPHROSTOLITHOTOMY  Right 03/10/2020    ZZC HUANG W/O FACETEC FORAMOT/DSKC  VRT SEG, LUMBAR Bilateral 3/3/2021    Procedure: Bilateral lumbar 2 - Lumbar 4 decompressive lumbar laminectomy with medial facetectomies;  Surgeon: Renée King MD;  Location: [facility];  Service: Spine     No premature CAD, SCD,cardiomyopathy   Social History     Socioeconomic History    Marital status:      Spouse name: Not on file    Number of children: Not on file    Years of education: Not on file    Highest education level: Not on file   Occupational History    Not on file   Tobacco Use    Smoking status: Former     Current packs/day: 0.00     Average packs/day: 0.5 packs/day for 35.0 years (17.5 ttl pk-yrs)     Types: Cigarettes     Start date: 1955     Quit date: 1990     Years since quittin.8    Smokeless tobacco: Never    Tobacco comments:     quit    Substance and Sexual Activity    Alcohol use: No     Comment: Alcoholic Drinks/day: quit     Drug use: No    Sexual activity: Yes     Partners: Female     Birth control/protection: Post-menopausal   Other Topics Concern    Parent/sibling w/ CABG, MI or angioplasty before 65F 55M? No   Social History Narrative    Not on file     Social Determinants of Health     Financial Resource Strain: Low Risk  (10/6/2024)    Financial Resource Strain     Within the past 12 months, have you or your family members you live with been unable to get utilities (heat, electricity) when it was really needed?: No   Food Insecurity: Low Risk  (10/6/2024)    Food Insecurity     Within the past 12 months, did you worry that your food would run out before you got money to buy more?: No     Within the past 12 months, did the food you bought just not last and you didn t have money to get more?: No   Transportation Needs: Low Risk  (10/6/2024)    Transportation Needs     Within the past 12 months, has lack of transportation kept you from medical appointments, getting your medicines,  non-medical meetings or appointments, work, or from getting things that you need?: No   Physical Activity: Not on file   Stress: Not on file   Social Connections: Unknown (1/3/2024)    [facility]    Social Connections     Frequency of Communication with Friends and Family: Not on file   Interpersonal Safety: Low Risk  (10/6/2024)    Interpersonal Safety     Do you feel physically and emotionally safe where you currently live?: Yes     Within the past 12 months, have you been hit, slapped, kicked or otherwise physically hurt by someone?: No     Within the past 12 months, have you been humiliated or emotionally abused in other ways by your partner or ex-partner?: No   Housing Stability: Low Risk  (10/6/2024)    Housing Stability     Do you have housing? : Yes     Are you worried about losing your housing?: No         Medications  Allergies   Current Facility-Administered Medications Ordered in Epic   Medication Dose Route Frequency Provider Last Rate Last Admin    acetaminophen (TYLENOL) tablet 650 mg  650 mg Oral Q4H PRN Lu Skaggs MD   650 mg at 10/12/24 2301    Or    acetaminophen (TYLENOL) Suppository 650 mg  650 mg Rectal Q4H PRN Lu Skaggs MD        amoxicillin (AMOXIL) capsule 500 mg  500 mg Oral Q8H Lu Skaggs MD   500 mg at 10/13/24 0428    aspirin (ASA) chewable tablet 81 mg  81 mg Oral Daily Lu Skaggs MD        busPIRone (BUSPAR) tablet 5 mg  5 mg Oral BID Lu Skaggs MD   5 mg at 10/12/24 2020    calcium carbonate (TUMS) chewable tablet 1,000 mg  1,000 mg Oral 4x Daily PRN Lu Skaggs MD        DULoxetine (CYMBALTA) DR capsule 60 mg  60 mg Oral QAM Lu Skaggs MD        enoxaparin ANTICOAGULANT (LOVENOX) injection 40 mg  40 mg Subcutaneous Q24H Lu Skaggs MD   40 mg at 10/12/24 1805    ipratropium - albuterol 0.5 mg/2.5 mg/3 mL (DUONEB) neb solution 3 mL  1 vial Nebulization  Q6H PRN Lu Skaggs MD   3 mL at 10/13/24 0743    latanoprost (XALATAN) 0.005 % ophthalmic solution 1 drop  1 drop Both Eyes At Bedtime Lu Skaggs MD   1 drop at 10/12/24 2020    levothyroxine (SYNTHROID/LEVOTHROID) tablet 88 mcg  88 mcg Oral QAM AC Lu Skaggs MD        lidocaine (LMX4) cream   Topical Q1H PRN Lu Skaggs MD        lidocaine 1 % 0.1-1 mL  0.1-1 mL Other Q1H PRN Lu Skaggs MD        melatonin tablet 3 mg  3 mg Oral At Bedtime PRN Lu Skaggs MD   3 mg at 10/12/24 2301    metoprolol tartrate (LOPRESSOR) tablet 25 mg  25 mg Oral BID Lu Skaggs MD   25 mg at 10/12/24 2301    miconazole (MICATIN) 2 % powder   Topical BID Lu Skaggs MD   Given at 10/12/24 2020    naloxone (NARCAN) injection 0.2 mg  0.2 mg Intravenous Q2 Min PRN Lu Skaggs MD        Or    naloxone (NARCAN) injection 0.4 mg  0.4 mg Intravenous Q2 Min PRN Lu Skaggs MD        Or    naloxone (NARCAN) injection 0.2 mg  0.2 mg Intramuscular Q2 Min PRN Lu Skaggs MD        Or    naloxone (NARCAN) injection 0.4 mg  0.4 mg Intramuscular Q2 Min PRN Lu Skaggs MD        OLANZapine (zyPREXA) tablet 7.5 mg  7.5 mg Oral At Bedtime Lu Skaggs MD   7.5 mg at 10/12/24 2019    pantoprazole (PROTONIX) EC tablet 40 mg  40 mg Oral QAM Lu Richards MD        rosuvastatin (CRESTOR) tablet 10 mg  10 mg Oral At Bedtime Lu Skaggs MD   10 mg at 10/12/24 2020    senna-docusate (SENOKOT-S/PERICOLACE) 8.6-50 MG per tablet 1 tablet  1 tablet Oral BID PRN Lu Skaggs MD        Or    senna-docusate (SENOKOT-S/PERICOLACE) 8.6-50 MG per tablet 2 tablet  2 tablet Oral BID PRN Lu Skaggs MD        senna-docusate (SENOKOT-S/PERICOLACE) 8.6-50 MG per tablet 1 tablet  1 tablet Oral BID Lu Skaggs MD        Or    senna-docusate (SENOKOT-S/PERICOLACE) 8.6-50 MG per tablet 2 tablet  2 tablet Oral BID Lu Skaggs MD        sodium chloride (PF) 0.9% PF flush 3 mL  3 mL Intracatheter Q8H Sharifa  MD Lu   3 mL at 10/12/24 2305    sodium chloride (PF) 0.9% PF flush 3 mL  3 mL Intracatheter q1 min prn Lu Skaggs MD        traMADol (ULTRAM) tablet 50 mg  50 mg Oral Q6H PRN Lu Skaggs MD   50 mg at 10/13/24 1588     No current Epic-ordered outpatient medications on file.       Allergies   Allergen Reactions    Diclofenac      Other reaction(s): GI Upset    Loratadine      Other reaction(s): Urinary Retention    Oxycodone      Agitation     Sertraline Unknown     Other reaction(s): ineffective

## 2024-10-13 NOTE — PROGRESS NOTES
[facility]    Medicine Progress Note - Hospitalist Service    Date of Admission:  10/12/2024    Assessment & Plan      Dominik Rojas is a 83 year old male with past medical history of COPD on 2 to 3 L nasal cannula at home, chronic systolic heart failure, hypertension, hypothyroidism, anxiety, recent fall and left hip fracture s/p ORIF who was jsut discharged to TCU yesterday.  Patient brought to ED for evaluation another fall at TCU with left hip pain. Xray showing postop changes without new acute changes. Patient was found to be tachycardiac, concerning for sinus tachycardia vs intermittent a fib rvr.      Mechanical fall  Left hip contusion  Recent left hip fracture s/p ORIF  -Patient reported a mechanical fall after going to TCU yesterday, with worse left hip pain.  Denied chest pain, dizziness, shortness of breath prior to or after fall  -s/p left hip ORIF just days ago  -negative xray for new fracture or dislocation  -Negative for DVT on US  -pain control    Tachycardia, sinus with brief episodes of atrial tachy  -hr up to 140  -received Metoprolol in ER now in 70s  -reviewed ECG. Mostly sinus tachy but cannot r/o a fib  -consult cardiologist: no a fib, no need for anticoagulation  -pt is not a good candidate for anticoagulation due to frequent falls       H/O Chronic systolic heart failure with normalization of LV function;  Diastolic dysfunction  -recent Echo showing preserved EF  -PTA meds o resume  -Lasix on hold due to concerning for dehydration     COPD  Chronic hypoxic respiratory failure on 2-3l NC;  -PTA meds  -Oximeter prn   -or prn     Recent UTI  -PTA amoxicillin 500mg TID  -await UA     History of RLL adenocarcinoma s/p radiation therapy     Anxiety and depression;  Dementia with behavioral change/agitation   Mood disorder  Acute encephalopathy, unspecified  -resume PTA meds     HLD  -Crestor     GERD  -PPI      Hypothyroidism  -recent TSH 6.93, FT4 0.83  -continue  "Synthroid   -recommend repeat TFT's in 4 weeks with PCP             Diet: Combination Diet Regular Diet Adult    DVT Prophylaxis: Enoxaparin (Lovenox) SQ  Cabral Catheter: Not present  Lines: None     Cardiac Monitoring: ACTIVE order. Indication: Tachyarrhythmias, acute (48 hours)  Code Status: Full Code      Clinically Significant Risk Factors Present on Admission                 # Drug Induced Platelet Defect: home medication list includes an antiplatelet medication   # Hypertension: Noted on problem list   # Dementia: noted on problem list  # Anemia: based on hgb <11       # Overweight: Estimated body mass index is 29.13 kg/m  as calculated from the following:    Height as of this encounter: 1.702 m (5' 7\").    Weight as of this encounter: 84.4 kg (186 lb).       # Financial/Environmental Concerns:           Disposition Plan     Medically Ready for Discharge: Ready Now    Barrier: 1:1 due to dementia and delirium. Safe placement.   D/wed with SW. Patient might need memory care?         Lu Skaggs MD  Hospitalist Service  [facility]  Securely message with [employer] (more info)  Text page via [employer] Paging/Directory   ______________________________________________________________________    Interval History   No acute distress, no fall overnight. Patient was more confused and restless at night. Hr in control.     Physical Exam   Vital Signs: Temp: 97.7  F (36.5  C) Temp src: Oral BP: 137/73 Pulse: 79   Resp: 18 SpO2: 95 % O2 Device: Nasal cannula Oxygen Delivery: 2 LPM  Weight: 186 lbs 0 oz    General.   not in acute distress.  HEENT.  EOM intact.  Neck supple no JVD.  CVS regular rhythm no murmur gallops.  Lungs.  Mild wheezing to auscultation bilateral   Abdomen.  Soft nontender bowel sounds present.  Extremities.  1+ edema no calf tenderness.  Neurological.   No focal deficit.  Skin +bruises  Psych. Impaired memory     Medical Decision Making       46 MINUTES SPENT BY ME on the date of " service doing chart review, history, exam, documentation & further activities per the note.      Data     I have personally reviewed the following data over the past 24 hrs:    12.5 (H)  \   10.0 (L)   / 295     140 104 20.9 /  100 (H)   4.4 25 0.79 \       Imaging results reviewed over the past 24 hrs:   Recent Results (from the past 24 hour(s))   Head CT w/o contrast    Narrative    EXAM: CT HEAD WITHOUT CONTRAST, CT CERVICAL SPINE WITHOUT CONTRAST  LOCATION: [facility]  DATE: 10/12/2024    INDICATION: Fall, head injury.  COMPARISON: CT brain and CT cervical spine 10/05/2024. MRI brain 05/05/2024.  TECHNIQUE:   1) Routine CT Head without IV contrast. Multiplanar reformats. Dose reduction techniques were used.  2) Routine CT Cervical Spine without IV contrast. Multiplanar reformats. Dose reduction techniques were used.    FINDINGS:   HEAD CT:   INTRACRANIAL CONTENTS: No finding for intracranial hemorrhage or mass or convincing finding for acute infarct. Chronic volume loss and encephalomalacic change is seen within the right frontal lobe compatible with sequelae of prior ischemia and stable   compared to prior. Moderate patchy low-attenuation change is again seen within the cerebral white matter, nonspecific but compatible with sequelae of chronic microvascular ischemic change given the patient's age and similar to prior. Moderate stable   prominence of the lateral ventricles and sulci and mild prominence of the third ventricle.    Cerebellar tonsils are normally positioned. Sella is unremarkable for technique. Generalized thinning of the body of the corpus callosum which otherwise appears normally formed.    VISUALIZED ORBITS/SINUSES/MASTOIDS: Both globes are borderline proptotic which is favored to relate to body habitus as there is no abnormal enlargement of the extraocular muscles to strongly suggest thyroid-related orbitopathy.    BONES/SOFT TISSUES: Calvarium is intact, without  suspicious lytic or blastic foci.    CERVICAL SPINE CT:   VERTEBRA: Slight smooth convex left cervical curvature. Normal cervical lordosis. Craniocervical junction is intact. Mild to moderate degenerative change anterior C1-C2 articulation.    Allowing for chronic endplate degenerative changes seen throughout the visualized spine, vertebral body heights are grossly preserved and there is no finding for acute fracture or posttraumatic subluxation. Endplate irregularity and advanced interspace   narrowing C2-C3 through T1-T2. Mild posterior interbody spurring is seen throughout the cervical spine. Mild to moderate right-sided facet arthropathy C3-C4 through C5-C6. Mild left-sided facet arthropathy C2-C3.    CANAL/FORAMINA: Mild spinal canal narrowing C5-C6 and C6-C7. Moderate to severe bilateral foraminal stenosis C3-C4 and C4-C5 and on the left at C5-C6 and C6-C7.    PARASPINAL: Dorsal paraspinal soft tissues are unremarkable. No prevertebral soft tissue swelling. Multiple surgical clips are seen within the right neck likely relating to prior carotid endarterectomy. Lung apices are clear.      Impression    IMPRESSION:  HEAD CT:  1.  No finding for intracranial hemorrhage, mass, or acute infarct.    2.  Moderate volume loss and moderate to advanced presumed sequelae of chronic microvascular ischemic change. Stable volume loss and gliosis right frontal lobe.    CERVICAL SPINE CT:  1.  Advanced cervical spondylosis without finding for acute fracture or posttraumatic subluxation.       CT Cervical Spine w/o Contrast    Narrative    EXAM: CT HEAD WITHOUT CONTRAST, CT CERVICAL SPINE WITHOUT CONTRAST  LOCATION: [facility]  DATE: 10/12/2024    INDICATION: Fall, head injury.  COMPARISON: CT brain and CT cervical spine 10/05/2024. MRI brain 05/05/2024.  TECHNIQUE:   1) Routine CT Head without IV contrast. Multiplanar reformats. Dose reduction techniques were used.  2) Routine CT Cervical Spine  without IV contrast. Multiplanar reformats. Dose reduction techniques were used.    FINDINGS:   HEAD CT:   INTRACRANIAL CONTENTS: No finding for intracranial hemorrhage or mass or convincing finding for acute infarct. Chronic volume loss and encephalomalacic change is seen within the right frontal lobe compatible with sequelae of prior ischemia and stable   compared to prior. Moderate patchy low-attenuation change is again seen within the cerebral white matter, nonspecific but compatible with sequelae of chronic microvascular ischemic change given the patient's age and similar to prior. Moderate stable   prominence of the lateral ventricles and sulci and mild prominence of the third ventricle.    Cerebellar tonsils are normally positioned. Sella is unremarkable for technique. Generalized thinning of the body of the corpus callosum which otherwise appears normally formed.    VISUALIZED ORBITS/SINUSES/MASTOIDS: Both globes are borderline proptotic which is favored to relate to body habitus as there is no abnormal enlargement of the extraocular muscles to strongly suggest thyroid-related orbitopathy.    BONES/SOFT TISSUES: Calvarium is intact, without suspicious lytic or blastic foci.    CERVICAL SPINE CT:   VERTEBRA: Slight smooth convex left cervical curvature. Normal cervical lordosis. Craniocervical junction is intact. Mild to moderate degenerative change anterior C1-C2 articulation.    Allowing for chronic endplate degenerative changes seen throughout the visualized spine, vertebral body heights are grossly preserved and there is no finding for acute fracture or posttraumatic subluxation. Endplate irregularity and advanced interspace   narrowing C2-C3 through T1-T2. Mild posterior interbody spurring is seen throughout the cervical spine. Mild to moderate right-sided facet arthropathy C3-C4 through C5-C6. Mild left-sided facet arthropathy C2-C3.    CANAL/FORAMINA: Mild spinal canal narrowing C5-C6 and C6-C7.  Moderate to severe bilateral foraminal stenosis C3-C4 and C4-C5 and on the left at C5-C6 and C6-C7.    PARASPINAL: Dorsal paraspinal soft tissues are unremarkable. No prevertebral soft tissue swelling. Multiple surgical clips are seen within the right neck likely relating to prior carotid endarterectomy. Lung apices are clear.      Impression    IMPRESSION:  HEAD CT:  1.  No finding for intracranial hemorrhage, mass, or acute infarct.    2.  Moderate volume loss and moderate to advanced presumed sequelae of chronic microvascular ischemic change. Stable volume loss and gliosis right frontal lobe.    CERVICAL SPINE CT:  1.  Advanced cervical spondylosis without finding for acute fracture or posttraumatic subluxation.       XR Pelvis 1/2 Views    Narrative    EXAM: XR PELVIS 1/2 VIEWS  LOCATION: [facility]  DATE: 10/12/2024    INDICATION: Left hip pain status post fall since left hip surgery.  COMPARISON: 10/6/2024      Impression    IMPRESSION: No significant interval change. Postop ORIF left intertrochanteric femur fracture with dynamic hip screw. Hardware and alignment unchanged. Lesser trochanteric fragments remain medially displaced by approximately 2 cm. No new fracture. Mild   to moderate left and mild right hip osteoarthritis. Vascular stents project over the pelvis and surgical clips project over the right inguinal region. Both obturator rings appear intact. Arterial calcification.   XR Femur Left 2 Views    Narrative    EXAM: XR FEMUR LEFT 2 VIEWS  LOCATION: [facility]  DATE: 10/12/2024    INDICATION: Left hip pain, status post fall after hip surgery.  COMPARISON: 10/06/2024.      Impression    IMPRESSION: Postoperative changes redemonstrated related to ORIF intertrochanteric left proximal femur fracture with dynamic hip screw. Alignment and hardware unchanged. Displaced lesser trochanteric fracture fragments are unchanged. Lateral skin staples   remain along  the lateral left hip and proximal thigh. Anatomic alignment left femur. No new left femur fracture or joint malalignment. Mild to moderate left hip osteoarthritis. Moderate to severe degenerative change medial compartment left knee.   Arterial calcification. Small left knee joint effusion. Lateral left hip and proximal thigh soft tissue edema.     US Lower Extremity Venous Duplex Left    Narrative    EXAM: US LOWER EXTREMITY VENOUS DUPLEX LEFT  LOCATION: [facility]  DATE: 10/12/2024    INDICATION: ongoing LLE pain after left hip surgery on 10 6 24  COMPARISON: None.  TECHNIQUE: Venous Duplex ultrasound of the left lower extremity with and without compression, augmentation and duplex. Color flow and spectral Doppler with waveform analysis performed.    FINDINGS: Exam includes the common femoral, femoral, popliteal, and contralateral common femoral veins as well as segmentally visualized deep calf veins and greater saphenous vein.     LEFT: No deep vein thrombosis. No superficial thrombophlebitis. No popliteal cyst.      Impression    IMPRESSION:  No deep venous thrombosis in the left lower extremity.

## 2024-10-14 LAB
ATRIAL RATE - MUSE: 129 BPM
BACTERIA UR CULT: NORMAL
DIASTOLIC BLOOD PRESSURE - MUSE: 77 MMHG
INTERPRETATION ECG - MUSE: NORMAL
P AXIS - MUSE: 48 DEGREES
PR INTERVAL - MUSE: 176 MS
QRS DURATION - MUSE: 80 MS
QT - MUSE: 338 MS
QTC - MUSE: 473 MS
R AXIS - MUSE: 59 DEGREES
SYSTOLIC BLOOD PRESSURE - MUSE: 124 MMHG
T AXIS - MUSE: 58 DEGREES
VENTRICULAR RATE- MUSE: 118 BPM

## 2024-10-14 PROCEDURE — 250N000013 HC RX MED GY IP 250 OP 250 PS 637: Performed by: INTERNAL MEDICINE

## 2024-10-14 PROCEDURE — 250N000011 HC RX IP 250 OP 636: Performed by: INTERNAL MEDICINE

## 2024-10-14 PROCEDURE — 120N000004 HC R&B MS OVERFLOW

## 2024-10-14 PROCEDURE — 250N000013 HC RX MED GY IP 250 OP 250 PS 637: Performed by: REGISTERED NURSE

## 2024-10-14 PROCEDURE — 99232 SBSQ HOSP IP/OBS MODERATE 35: CPT | Performed by: INTERNAL MEDICINE

## 2024-10-14 PROCEDURE — 99222 1ST HOSP IP/OBS MODERATE 55: CPT | Performed by: REGISTERED NURSE

## 2024-10-14 RX ORDER — AMOXICILLIN 250 MG/1
500 CAPSULE ORAL EVERY 8 HOURS
Status: DISCONTINUED | OUTPATIENT
Start: 2024-10-14 | End: 2024-10-15

## 2024-10-14 RX ADMIN — ACETAMINOPHEN 650 MG: 325 TABLET ORAL at 20:13

## 2024-10-14 RX ADMIN — Medication 3 MG: at 21:08

## 2024-10-14 RX ADMIN — TRAMADOL HYDROCHLORIDE 50 MG: 50 TABLET, COATED ORAL at 05:01

## 2024-10-14 RX ADMIN — LATANOPROST 1 DROP: 50 SOLUTION/ DROPS OPHTHALMIC at 20:04

## 2024-10-14 RX ADMIN — ASPIRIN 81 MG CHEWABLE TABLET 81 MG: 81 TABLET CHEWABLE at 09:34

## 2024-10-14 RX ADMIN — TRAZODONE HYDROCHLORIDE 25 MG: 50 TABLET ORAL at 20:04

## 2024-10-14 RX ADMIN — MICONAZOLE NITRATE 1 APPLICATOR: 20 POWDER TOPICAL at 09:36

## 2024-10-14 RX ADMIN — DULOXETINE HYDROCHLORIDE 60 MG: 60 CAPSULE, DELAYED RELEASE PELLETS ORAL at 09:35

## 2024-10-14 RX ADMIN — BUSPIRONE HYDROCHLORIDE 5 MG: 5 TABLET ORAL at 09:35

## 2024-10-14 RX ADMIN — AMOXICILLIN 500 MG: 250 CAPSULE ORAL at 21:08

## 2024-10-14 RX ADMIN — AMOXICILLIN 500 MG: 250 CAPSULE ORAL at 12:44

## 2024-10-14 RX ADMIN — AMOXICILLIN 500 MG: 250 CAPSULE ORAL at 04:28

## 2024-10-14 RX ADMIN — METOPROLOL TARTRATE 25 MG: 25 TABLET, FILM COATED ORAL at 20:04

## 2024-10-14 RX ADMIN — ACETAMINOPHEN 650 MG: 325 TABLET ORAL at 09:35

## 2024-10-14 RX ADMIN — SENNOSIDES AND DOCUSATE SODIUM 1 TABLET: 8.6; 5 TABLET ORAL at 09:35

## 2024-10-14 RX ADMIN — ROSUVASTATIN 10 MG: 10 TABLET, FILM COATED ORAL at 20:05

## 2024-10-14 RX ADMIN — TRAMADOL HYDROCHLORIDE 50 MG: 50 TABLET, COATED ORAL at 11:53

## 2024-10-14 RX ADMIN — ENOXAPARIN SODIUM 40 MG: 40 INJECTION SUBCUTANEOUS at 17:42

## 2024-10-14 RX ADMIN — ACETAMINOPHEN 650 MG: 325 TABLET ORAL at 16:06

## 2024-10-14 RX ADMIN — METOPROLOL TARTRATE 25 MG: 25 TABLET, FILM COATED ORAL at 09:35

## 2024-10-14 RX ADMIN — SENNOSIDES AND DOCUSATE SODIUM 1 TABLET: 8.6; 5 TABLET ORAL at 20:04

## 2024-10-14 RX ADMIN — MICONAZOLE NITRATE 1 APPLICATOR: 20 POWDER TOPICAL at 20:05

## 2024-10-14 RX ADMIN — BUSPIRONE HYDROCHLORIDE 5 MG: 5 TABLET ORAL at 20:04

## 2024-10-14 ASSESSMENT — ACTIVITIES OF DAILY LIVING (ADL)
ADLS_ACUITY_SCORE: 53
DEPENDENT_IADLS:: CLEANING;COOKING;LAUNDRY;SHOPPING;MEDICATION MANAGEMENT;MEAL PREPARATION;MONEY MANAGEMENT;TRANSPORTATION
ADLS_ACUITY_SCORE: 53
ADLS_ACUITY_SCORE: 49
ADLS_ACUITY_SCORE: 53
ADLS_ACUITY_SCORE: 53
ADLS_ACUITY_SCORE: 49
ADLS_ACUITY_SCORE: 53
ADLS_ACUITY_SCORE: 48
ADLS_ACUITY_SCORE: 49
ADLS_ACUITY_SCORE: 53

## 2024-10-14 NOTE — PLAN OF CARE
Problem: Adult Inpatient Plan of Care  Goal: Absence of Hospital-Acquired Illness or Injury  Intervention: Identify and Manage Fall Risk  Recent Flowsheet Documentation  Taken 10/14/2024 1321 by Gia Hardin RN  Safety Promotion/Fall Prevention: bedside attendant  Taken 10/14/2024 0900 by Gia Hardin RN  Safety Promotion/Fall Prevention: bedside attendant  Intervention: Prevent Infection  Recent Flowsheet Documentation  Taken 10/14/2024 1321 by Gia Hardin RN  Infection Prevention: environmental surveillance performed  Taken 10/14/2024 0900 by Gia Hardin RN  Infection Prevention: environmental surveillance performed  Goal: Optimal Comfort and Wellbeing  Intervention: Monitor Pain and Promote Comfort  Recent Flowsheet Documentation  Taken 10/14/2024 1153 by Gia Hardin RN  Pain Management Interventions: medication (see MAR)  Taken 10/14/2024 0935 by Gia Hardin RN  Pain Management Interventions: medication (see MAR)     Problem: Risk for Delirium  Goal: Optimal Coping  Outcome: Progressing  Goal: Improved Behavioral Control  Outcome: Progressing  Intervention: Minimize Safety Risk  Recent Flowsheet Documentation  Taken 10/14/2024 1321 by Gia Hardin RN  Enhanced Safety Measures: pain management  Taken 10/14/2024 0900 by Gia Hardin RN  Enhanced Safety Measures: pain management  Goal: Improved Attention and Thought Clarity  Outcome: Progressing     Problem: Pain Acute  Goal: Optimal Pain Control and Function  Outcome: Not Progressing  Intervention: Prevent or Manage Pain  Recent Flowsheet Documentation  Taken 10/14/2024 1321 by Gia Hardin RN  Medication Review/Management: medications reviewed   Goal Outcome Evaluation:    On NC 2L 95%. Alert to self and place.  NSR w pvcs. Left leg 3+ bruised and hip is painful.  Gave tylenol and tramadol with little effect.  Doppler was used for LLE pulse.  Right leg 2+ edema.  Pt was cooperative.  Got up in chair with assist of 2 and walker.   Expiratory wheezing present. Ate double order of Hungarian toast for lunch.  1:1 for safety.

## 2024-10-14 NOTE — PLAN OF CARE
Goal Outcome Evaluation:      Plan of Care Reviewed With: child          Outcome Evaluation: Family's goal is for Pt to return to TCU.    MILAGRO WarrenW

## 2024-10-14 NOTE — CONSULTS
"      Initial Psychiatric Consult   Consult date: 2024         Reason for Consult, requesting source:    Dementia, restless and confused at night, unable to remove 1:1 and thus not able to discharge. Please advise    Requesting source: Lu Skaggs    Labs and imaging reviewed. Provider contacted with recommendations.         HPI:   Psychiatry seeing patient today regarding restlessness and confusion at HS, thus unable to remove 1:1 and patient is reportedly unable to discharge due to this.       Dominik Rojas is a 83 year old male with past medical history of COPD on 2 to 3 L nasal cannula at home, chronic systolic heart failure, hypertension, hypothyroidism, anxiety, recent fall and left hip fracture s/p ORIF who was jsut discharged to TCU yesterday. Patient brought to ED for evaluation another fall at TCU with left hip pain. Xray showing postop changes without new acute changes. Patient was found to be tachycardiac, concerning for sinus tachycardia vs intermittent a fib rvr.     Today, patient reports he feels restless, \"All day\" and describes this as physical restlessness. He denies feeling anxious or agitated. He denies AH/VH.         Past Psychiatric History:   No OP psychiatric services.           Substance Use and History:     Tobacco Use    Smoking status: Former     Current packs/day: 0.00     Average packs/day: 0.5 packs/day for 35.0 years (17.5 ttl pk-yrs)     Types: Cigarettes     Start date: 1955     Quit date: 1990     Years since quittin.8    Smokeless tobacco: Never    Tobacco comments:     quit    Substance Use Topics    Alcohol use: No     Comment: Alcoholic Drinks/day: quit            Past Medical History:   PAST MEDICAL HISTORY:   Past Medical History:   Diagnosis Date    AAA (abdominal aortic aneurysm) (H)     s/p stenting    AAA (abdominal aortic aneurysm) (H) 11/15/2012    AAA (abdominal aortic aneurysm) CT   6.4 cm. Saw Dr Muller ;  S/P " endovascular repair 3-13;  s/p stenting      Alcohol dependence in remission (H)     Alcohol use disorder, severe, in sustained remission, dependence (H) 08/16/2021    Anxiety     Atrial fibrillation with normal ventricular rate (H)     Benign prostatic hyperplasia with lower urinary tract symptoms     Bronchiectasis without complication (H)     2/27/2020    COPD (chronic obstructive pulmonary disease) (H)     CVA (cerebral vascular accident) (H) 2019    DDD (degenerative disc disease), lumbar     Depression     Depressive disorder     Diabetes (H)     Diastolic dysfunction 01/22/2021    DJD (degenerative joint disease) of knee     left    Essential hypertension     Glaucoma     Glaucoma     History of stroke without residual deficits     Hyperlipidemia     Hypertension     Hypothyroidism     Hypothyroidism     Kidney stones     Major depression     Memory problem     Nocturia     Obesity     Opioid use disorder, severe, dependence (H) 08/16/2021    Overactive bladder 02/25/2021    Primary osteoarthritis of left knee     Psoriasis     Psoriasis     Seborrheic keratoses     Somnolence, daytime     Stenosis of right carotid artery     history of TIA's       PAST SURGICAL HISTORY:   Past Surgical History:   Procedure Laterality Date    ABDOMEN SURGERY      ABDOMINAL AORTIC ANEURYSM REPAIR  2013    stent    CAROTID ENDARTERECTOMY Right 9/9/2019    Procedure: RIGHT CAROTID ENDARTERECTOMY;  Surgeon: Miguel Muller MD;  Location: [facility];  Service: General    CIRCUMCISION      CYSTOSCOPY      CYSTOSCOPY PROSTATE W/ LASER N/A 6/12/2018    Procedure:  TRANSURETHRAL RESECTION OF THE PROSTATE WITH QUANTA LASER;  Surgeon: Eze Levi MD;  Location: [facility];  Service:     CYSTOSCOPY PROSTATE W/ LASER N/A 2/18/2020    Procedure: CYSTOSCOPY WITH TRANSURETHRAL INCISION OF THE PROSTATE WITH QUANTA LASER;  Surgeon: Eze Levi MD;  Location: [facility];  Service: Urology    CYSTOSCOPY  W/ LITHOLAPAXY / EHL N/A 6/12/2018    Procedure: CYSTOSCOPY AND LITHOLAPAXY;  Surgeon: Eze Levi MD;  Location: [facility];  Service:     IR ABDOMINAL AORTAGRAM  5/19/2020    IR ABDOMINAL AORTIC ANEURYSM ENDOGRAFT  5/19/2020    IR ABDOMINAL AORTOGRAM  5/19/2020    IR ABDOMINAL ENDOVASCULAR STENT GRAFT  5/19/2020    LITHOTRIPSY      OPEN REDUCTION INTERNAL FIXATION HIP NAILING Left 10/6/2024    Procedure: INTERNAL FIXATION, FRACTURE, TROCHANTERIC, LEFT HIP, USING RODS;  Surgeon: Tad Oliver DO;  Location: [facility] [place]    PERCUTANEOUS NEPHROSTOLITHOTOMY Right 03/10/2020    ZZC HUANG W/O FACETEC FORAMOT/DSKC 1/2 VRT SEG, LUMBAR Bilateral 3/3/2021    Procedure: Bilateral lumbar 2 - Lumbar 4 decompressive lumbar laminectomy with medial facetectomies;  Surgeon: Renée King MD;  Location: [facility];  Service: Spine             Family History:   FAMILY HISTORY:   Family History   Problem Relation Age of Onset    Emphysema Mother     No Known Problems Father     Cirrhosis Father     No Known Problems Sister     No Known Problems Brother     No Known Problems Maternal Aunt     No Known Problems Maternal Uncle     No Known Problems Paternal Aunt     No Known Problems Paternal Uncle     No Known Problems Maternal Grandmother     No Known Problems Maternal Grandfather     No Known Problems Paternal Grandmother     No Known Problems Paternal Grandfather     No Known Problems Other            Social History:    and admitted to the hospital from TCU.          Physical ROS:   The 10 point Review of Systems is negative other than noted in the HPI or here.           Medications:     Current Facility-Administered Medications   Medication Dose Route Frequency Provider Last Rate Last Admin    amoxicillin (AMOXIL) capsule 500 mg  500 mg Oral Q8H Lu Skaggs MD   500 mg at 10/14/24 1244    aspirin (ASA) chewable tablet 81 mg  81 mg Oral Daily Lu Skaggs MD   81 mg at 10/14/24 0920     busPIRone (BUSPAR) tablet 5 mg  5 mg Oral BID Lu Skaggs MD   5 mg at 10/14/24 0935    DULoxetine (CYMBALTA) DR capsule 60 mg  60 mg Oral QAM Lu Skaggs MD   60 mg at 10/14/24 0935    enoxaparin ANTICOAGULANT (LOVENOX) injection 40 mg  40 mg Subcutaneous Q24H Lu Skaggs MD   40 mg at 10/14/24 1742    latanoprost (XALATAN) 0.005 % ophthalmic solution 1 drop  1 drop Both Eyes At Bedtime Lu Skaggs MD   1 drop at 10/13/24 2207    levothyroxine (SYNTHROID/LEVOTHROID) tablet 88 mcg  88 mcg Oral QAM AC Lu Skaggs MD   88 mcg at 10/13/24 1105    metoprolol tartrate (LOPRESSOR) tablet 25 mg  25 mg Oral BID Lu Skaggs MD   25 mg at 10/14/24 0935    miconazole (MICATIN) 2 % powder   Topical BID Lu Skaggs MD   1 applicator at 10/14/24 0936    pantoprazole (PROTONIX) EC tablet 40 mg  40 mg Oral QAM AC Lu Skaggs MD   40 mg at 10/13/24 1104    rosuvastatin (CRESTOR) tablet 10 mg  10 mg Oral At Bedtime Lu Skaggs MD   10 mg at 10/13/24 2204    senna-docusate (SENOKOT-S/PERICOLACE) 8.6-50 MG per tablet 1 tablet  1 tablet Oral BID Lu Skaggs MD   1 tablet at 10/14/24 0935    Or    senna-docusate (SENOKOT-S/PERICOLACE) 8.6-50 MG per tablet 2 tablet  2 tablet Oral BID Lu Skaggs MD        sodium chloride (PF) 0.9% PF flush 3 mL  3 mL Intracatheter Q8H Lu Skaggs MD   3 mL at 10/14/24 1606    traZODone (DESYREL) half-tab 25 mg  25 mg Oral At Bedtime Elvia Dhaliwal NP                  Allergies:     Allergies   Allergen Reactions    Diclofenac      Other reaction(s): GI Upset    Loratadine      Other reaction(s): Urinary Retention    Oxycodone      Agitation     Sertraline Unknown     Other reaction(s): ineffective          Labs:     Recent Results (from the past 48 hour(s))   UA with Microscopic reflex to Culture    Collection Time: 10/13/24 12:34 AM    Specimen: Urine, Clean Catch   Result Value Ref Range    Color Urine Yellow Colorless, Straw, Light Yellow, Yellow     Appearance Urine Turbid (A) Clear    Glucose Urine Negative Negative mg/dL    Bilirubin Urine Negative Negative    Ketones Urine Trace (A) Negative mg/dL    Specific Gravity Urine 1.032 (H) 1.001 - 1.030    Blood Urine >1.0 mg/dL (A) Negative    pH Urine 6.0 5.0 - 7.0    Protein Albumin Urine 30 (A) Negative mg/dL    Urobilinogen Urine 6.0 (A) <2.0 mg/dL    Nitrite Urine Negative Negative    Leukocyte Esterase Urine 500 Juan/uL (A) Negative    Bacteria Urine Few (A) None Seen /HPF    Mucus Urine Present (A) None Seen /LPF    RBC Urine >182 (H) <=2 /HPF    WBC Urine >182 (H) <=5 /HPF   Urine Culture    Collection Time: 10/13/24 12:34 AM    Specimen: Urine, Clean Catch   Result Value Ref Range    Culture <10,000 CFU/mL Mixture of Urogenital Estefany    CBC with platelets    Collection Time: 10/13/24  4:55 AM   Result Value Ref Range    WBC Count 12.5 (H) 4.0 - 11.0 10e3/uL    RBC Count 3.45 (L) 4.40 - 5.90 10e6/uL    Hemoglobin 10.0 (L) 13.3 - 17.7 g/dL    Hematocrit 31.8 (L) 40.0 - 53.0 %    MCV 92 78 - 100 fL    MCH 29.0 26.5 - 33.0 pg    MCHC 31.4 (L) 31.5 - 36.5 g/dL    RDW 17.7 (H) 10.0 - 15.0 %    Platelet Count 295 150 - 450 10e3/uL   Extra Green Top Tube (LAB USE ONLY)    Collection Time: 10/13/24  4:55 AM   Result Value Ref Range    Hold Specimen [facility]    ECG 12-LEAD WITH MUSE (LHE)    Collection Time: 10/13/24  6:11 AM   Result Value Ref Range    Systolic Blood Pressure  mmHg    Diastolic Blood Pressure  mmHg    Ventricular Rate 72 BPM    Atrial Rate 72 BPM    NH Interval 154 ms    QRS Duration 88 ms     ms    QTc 499 ms    P Axis 39 degrees    R AXIS 51 degrees    T Axis 50 degrees    Interpretation ECG       Sinus rhythm with Premature supraventricular complexes  Prolonged QT  Low voltage in limb leads  Abnormal ECG  When compared with ECG of 12-Oct-2024 15:24,  Premature supraventricular complexes are now Present  Vent. rate has decreased by  46 bpm  Confirmed by CALVIN CORDERO MD LOC:WW ([id number]) on  "10/13/2024 1:28:26 PM            Physical and Psychiatric Examination:     /69   Pulse 78   Temp 98.8  F (37.1  C) (Oral)   Resp 18   Ht 1.702 m (5' 7\")   Wt 83 kg (182 lb 15.7 oz)   SpO2 96%   BMI 28.66 kg/m    Weight is 182 lbs 15.71 oz  Body mass index is 28.66 kg/m .    Physical Exam:  I have reviewed the physical exam as documented by by the medical team and agree with findings and assessment and have no additional findings to add at this time.         MSE:   Calm and cooperative. Some thought blocking, but answers questions within context. Denies SI/SIB, as well as AH/VH. Sitting up in chair eating breakfast.                DSM-5 Diagnosis:   Unspecified neurocognitive disorder, delirium superimposed on dementia          Assessment:   Psychiatry seeing patient today regarding restlessness and confusion at HS and unable to remove 1:1, leading to difficulty discharging. Report indicates patient has pre-existing dementia diagnosis. With this, it is possible for patient to experience increased restlessness and confusion at HS. In addition, antipsychotics at times can exacerbate confusion and/or restlessness with underlying dementia diagnosis.     Patient did share that he feels physically restless, \"All the time\", which could be a result of Zyprexa, so this has been discontinued. Instead, Trazodone may be a more reasonable option for evening restlessness, but will need to continue monitoring QTc.           Summary of Recommendations:   1) Could also consider increasing Buspar to 5 mg TID to further target restlessness.     2) Given patient's physical restlessness and discomfort, discontinued Zyprexa.    3) Initiated Trazodone 25 mg at HS to help target sleep and evening restlessness  -Ordered EKG for tomorrow AM, as QTc on 10/13 was at 499.     4) Delirium precautions:  Up during the day with lights on  Lights off at night, avoid interruptions during the night as much as possible  Family " "visits  Encourage wearing glasses  Reorientation  Avoid opioids, benzodiazepines, anticholinergics.    Continue to ensure proper nutrition, fluid and electrolyte balance. Monitor for infections, hypoxia, metabolic derangements, or other causes of delirium.     5) Non-pharmacological approaches are preferable to reduce responsive behaviours, improve/maintain functional capacity and reduce emotional disorders (Yolanda Luna 2018).     Please see below.       When first meeting patient or with each new interaction:  -Knock - Announce self   -Greet & Smile   -Always use the positive physical approach:    Call the person by name   Move slowly  - respect personal space & be supportive, not confrontational   Give your name & greet patient  Get to the person s level to speak with them  Now deliver message    Types of Help   Visual   -Written Information - Schedules and notes   -Key Word Signs - locators & identifiers   -Objects in View - familiar items to stimulate task performance   -Gestures - pointing and movements   -Demonstration     Auditory  -Talking and Telling - give information, ask questions, provide choices   -Breaking it Down - Step-by-Step task Instructions   -Using Simple Words and Phrases  -Positive Feedback - praise, encouragement   -Acknowledge patient's response/reaction to your statement  -Limit your words - keep it simple    Try the following prompts:  -  It s about time for     -  Let s go this way     -  Here are your socks      Don't ask questions you don't want to hear the answer to  ( Do you want to  ,  Are you ready to  , \"I need you to  )     Only ask  Are you ready to   If you are willing to come back later    Do   Go with the flow    Use supportive communication techniques   - Use objects and the environment   - Give examples   - Use gestures and pointing   - Acknowledge & accept emotions   - Use empathy & validation   - Use familiar phrases or known interests   - Respect patient's values and " "beliefs      Approaching When The Person is distressed:  After greeting, approach patient with the following:  - It sounds like you are (give an emotion or feeling that seems to be true)?\"     -Utilize empathy to assess what the person really needs or wants (\"It sounds like \" \"It looks like \" \"It seems like \" \"You're feeling \")     -Once the person is listening and responding to you THEN -   -Redirect their attention and actions to a desired action or activity  -Redirect from negative behaviors, patient may not understand their behavior to be negative.       Pay attention to how you are communicatin. How you speak   - Tone of voice   - Pitch of voice   - Rate of speech    2. What you are saying  -Acknowledge emotions - \"sounds like , seems like , I can see you are \"   -Use familiar words or phrases (what the person uses)   -Know who the person is as a person. What does the patient value?  -Use familiar objects, pictures, actions to help & direct   -Be prepared to repeat information  -Be prepared for possible emotional lability  -Don't argue, but don't let the person get into a dangerous situation    3.  How you respond to the person  -Use positive, friendly approval or praise  -Offer thanks and appreciation for their efforts     How is the patient responding to our interaction and communication?  -Watch movements & actions of patient  -Watch facial expressions and eye movements   -Listen for changes in volume, frequency, and intensity of sounds or words   -Give a short, direct message about what is happening   -Give simple choices about what the person can do   -Ask the person to help you do something   -Break down the task - give it one step at a time   -Assess possible patient needs:   Physical needs:  -Tired   -In pain or uncomfortable   -Thirsty or Hungry   -Need to urinate or have a BM or already did & need help   -Too hot or too cold     Emotional needs:  -Afraid   -Lonely   -Bored   -Angry   -Excited "     If what you are doing is not working - stop, back down and give patient space, decide what to do differently and try again.        Additional non-pharmacological interventions include, but are not limited to:   Lavender   Warm Blankets and/or weighted blankets   Activities of interest currently or in the past   Calming music   5,4,3,2,1    Grounding exercise: 5 things you see, 4 things you feel, 3 things you hear, 2 things you smell, 1 thing you taste             Page me or re-consult psychiatry as needed (psychiatry signing off).       Elvia Dhaliwal, OCTAVIA, APRN  Consult/Liaison Psychiatry  [facility]   Contact information available via [facility] Paging/Directory.  If I am not available, please call [facility] intake ([phone])

## 2024-10-14 NOTE — PROGRESS NOTES
Brief progress note 4:25 AM      Paged by RN regarding patient being restless, wanting to leave.  Spoke with RN, who stated that last night patient responded well to p.o. Seroquel 25 mg as he was getting combative with staff, however he then slept until about noon.    Tonight he is restless again, having some difficulty redirecting him, though is not combative.  He is also due for his scheduled pain meds, has been having significant pain in the context of his recent hip fracture.    Plan:  As patient is not combative and we are approaching morning, discussed with RN that we will plan to start with scheduled pain medications, continued redirection, and monitor closely as patient is already on a one-to-one.  I worry that repeat dosing of Seroquel at this time will again continue to throw off his day and night schedule and potentially worsen delirium.  -Will monitor closely  -Consider smaller dose such as 12.5 mg Seroquel if patient becomes combative  -Day team to consider additional management plan for evening behaviors    Pankaj Heller MD PGY-1  House Officer      Addendum 5:09 AM   Paged by RN for increasing agitation and behaviors.  Went to see the patient, he was sitting calmly and comfortably in the chair watching the news on my arrival.  Previously declined all pain medications, now is open to taking his tramadol.   -Continue conservative management  -Delirium precautions ordered    Pankaj Heller MD PGY-1  House Officer

## 2024-10-14 NOTE — PROGRESS NOTES
[facility]    Medicine Progress Note - Hospitalist Service    Date of Admission:  10/12/2024    Assessment & Plan   Dominik Rojas is a 83 year old male with past medical history of COPD on 2 to 3 L nasal cannula at home, chronic systolic heart failure, hypertension, hypothyroidism, anxiety, recent fall and left hip fracture s/p ORIF who was jsut discharged to TCU yesterday.  Patient brought to ED for evaluation another fall at TCU with left hip pain. Xray showing postop changes without new acute changes. Patient was found to be tachycardiac, concerning for sinus tachycardia vs intermittent a fib rvr.     Await placement (back to TCU?). Barrier: 1:1, and still high risk and frequent falls at TCU     Mechanical fall  Left hip contusion without new fracture  Recent left hip fracture s/p ORIF  -Patient reported a mechanical fall after going to TCU yesterday, with worse left hip pain.  Denied chest pain, dizziness, shortness of breath prior to or after fall  -s/p left hip ORIF just days ago  -negative xray for new fracture or dislocation  -Negative for DVT on US  -pain control    Tachycardia, sinus with brief episodes of atrial tachy  -hr up to 140  -received Metoprolol in ER now in 70s  -reviewed ECG. Mostly sinus tachy but cannot r/o a fib  -consult cardiologist: no a fib, no need for anticoagulation  -pt is not a good candidate for anticoagulation due to frequent falls  -hr now in control     H/O Chronic systolic heart failure with normalization of LV function;  Diastolic dysfunction  -recent Echo showing preserved EF  -PTA meds o resume  -Lasix on hold due to concerning for dehydration     COPD  Chronic hypoxic respiratory failure on 2-3l NC;  -PTA meds  -Oximeter prn   -or prn     Recent UTI  -PTA amoxicillin 500mg TID  -await UA     History of RLL adenocarcinoma s/p radiation therapy     Anxiety and depression;  Dementia with behavioral change/agitation   Mood disorder  Acute  "encephalopathy, unspecified  -resume PTA meds  -on 1:1  -worse during the night     HLD  -Crestor     GERD  -PPI      Hypothyroidism  -recent TSH 6.93, FT4 0.83  -continue Synthroid   -recommend repeat TFT's in 4 weeks with PCP             Diet: Combination Diet Regular Diet Adult    DVT Prophylaxis: Enoxaparin (Lovenox) SQ  Cabral Catheter: Not present  Lines: None     Cardiac Monitoring: ACTIVE order. Indication: Tachyarrhythmias, acute (48 hours)  Code Status: Full Code      Clinically Significant Risk Factors                   # Hypertension: Noted on problem list    # Dementia: noted on problem list        # Overweight: Estimated body mass index is 28.66 kg/m  as calculated from the following:    Height as of this encounter: 1.702 m (5' 7\").    Weight as of this encounter: 83 kg (182 lb 15.7 oz)., PRESENT ON ADMISSION     # Financial/Environmental Concerns:           Disposition Plan     Medically Ready for Discharge: Ready Now    Barrier: 1:1, worse restlessness at night; safe placement.          Lu Skaggs MD  Hospitalist Service  [facility]  Securely message with [employer] (more info)  Text page via [email or web address] Paging/Directory   ______________________________________________________________________    Interval History   Restless at night and didn't have good sleep. Staff reported pt's mental status worsening during the night. Still on 1:1    Physical Exam   Vital Signs: Temp: 98.8  F (37.1  C) Temp src: Oral BP: (!) 144/76 Pulse: 94   Resp: 20 SpO2: 98 % O2 Device: Nasal cannula Oxygen Delivery: 2 LPM  Weight: 182 lbs 15.71 oz    General.  not in acute distress.  Neck supple no JVD.  CVS regular rhythm no murmur gallops.  Lungs.  Clear to auscultation bilateral no wheezing or rales.  Abdomen.  Soft nontender bowel sounds present.  Extremities.  S/p left hip sx  Neurological.  General weakness; + left ankle edema  Skin ecchymosis+  Psych. Impaired memory and cognition    Medical Decision " Making       46 MINUTES SPENT BY ME on the date of service doing chart review, history, exam, documentation & further activities per the note.      Data         Imaging results reviewed over the past 24 hrs:   No results found for this or any previous visit (from the past 24 hour(s)).

## 2024-10-14 NOTE — CONSULTS
Care Management Initial Consult    General Information  Assessment completed with: Children daughter Lore  Type of CM/SW Visit: Initial Assessment    Primary Care Provider verified and updated as needed: No   Readmission within the last 30 days: previous discharge plan unsuccessful   Return Category: Progression of disease  Reason for Consult: discharge planning  Advance Care Planning:            Communication Assessment  Patient's communication style: spoken language (English or Bilingual)    Hearing Difficulty or Deaf: yes   Wear Glasses or Blind: yes    Cognitive  Cognitive/Neuro/Behavioral: orientation  Level of Consciousness: confused  Arousal Level: opens eyes spontaneously  Orientation: disoriented to, time, situation  Mood/Behavior: restless  Best Language: 0 - No aphasia  Speech: clear    Living Environment:   People in home: spouse  Jovana  Current living Arrangements: house      Able to return to prior arrangements: other (see comments)       Family/Social Support:  Care provided by: self, spouse/significant other  Provides care for: no one, unable/limited ability to care for self  Marital Status:   Support system: Wife, Children  Jovana       Description of Support System: Involved, Supportive    Support Assessment: Adequate family and caregiver support    Current Resources:   Patient receiving home care services: No        Community Resources: Skilled Nursing Facility  Equipment currently used at home: walker, standard  Supplies currently used at home: Oxygen Tubing/Supplies    Employment/Financial:  Employment Status: retired        Financial Concerns: none           Does the patient's insurance plan have a 3 day qualifying hospital stay waiver?  No    Lifestyle & Psychosocial Needs:  Social Determinants of Health     Food Insecurity: Low Risk  (10/14/2024)    Food Insecurity     Within the past 12 months, did you worry that your food would run out before you got money to buy more?: No      Within the past 12 months, did the food you bought just not last and you didn t have money to get more?: No   Depression: Not on file   Housing Stability: Low Risk  (10/14/2024)    Housing Stability     Do you have housing? : Yes     Are you worried about losing your housing?: No   Tobacco Use: Medium Risk (7/24/2024)    Patient History     Smoking Tobacco Use: Former     Smokeless Tobacco Use: Never     Passive Exposure: Not on file   Financial Resource Strain: Low Risk  (10/14/2024)    Financial Resource Strain     Within the past 12 months, have you or your family members you live with been unable to get utilities (heat, electricity) when it was really needed?: No   Alcohol Use: Not on file   Transportation Needs: Low Risk  (10/14/2024)    Transportation Needs     Within the past 12 months, has lack of transportation kept you from medical appointments, getting your medicines, non-medical meetings or appointments, work, or from getting things that you need?: No   Physical Activity: Not on file   Interpersonal Safety: Low Risk  (10/14/2024)    Interpersonal Safety     Do you feel physically and emotionally safe where you currently live?: Yes     Within the past 12 months, have you been hit, slapped, kicked or otherwise physically hurt by someone?: No     Within the past 12 months, have you been humiliated or emotionally abused in other ways by your partner or ex-partner?: No   Stress: Not on file   Social Connections: Unknown (1/3/2024)    [facility]    Social Connections     Frequency of Communication with Friends and Family: Not on file   Health Literacy: Not on file       Functional Status:  Prior to admission patient needed assistance:   Dependent ADLs:: Ambulation-cane, Ambulation-walker, Bathing, Dressing, Toileting  Dependent IADLs:: Cleaning, Cooking, Laundry, Shopping, Medication Management, Meal Preparation,  Money Management, Transportation       Mental Health Status:  Mental Health Status: No Current Concerns       Chemical Dependency Status:  Chemical Dependency Status: No Current Concerns             Values/Beliefs:  Spiritual, Cultural Beliefs, Episcopal Practices, Values that affect care: no               Discussed  Partnership in Safe Discharge Planning  document with patient/family: No    Additional Information:  Due to Pt experiencing confusion, GALLO attempted to contact Pt's wife Jovana - cell ([phone]) went straight to voicemail, unable to leave a message as voicemail is full; home ([phone]) no answer, left voicemail requesting call back.     GALLO spoke with Pt's daughter Lore ([phone]). Lore reports Pt was at [facility]. Lore reports Pt lives at home with his wife Jovana. Pt uses a walker and cane but does not like to use the walker, per Lore. Lore reports Pt is dependent for most I/ADLs, but Jovana works during the day, so Pt is often home alone. Pt is on O2 at baseline. Lore reports they anticipate Pt to return to [facility]. GALLO discussed Pt being on 1:1 and that Pt may need a higher level of care such as LTC or memory care if he cannot come off the 1:1 and return to TCU. Lore reports understanding. Lore reports she will tell Jovana to contact CM to discuss discharge plan.    GALLO spoke with Melanei at [facility]. Pt does not have bed hold. CM will need to resend referral so [facility] can reassess and confirm bed availability. Pt is currently on a 1:1, so will need to be off 1:1 for 48 hrs for Pt to be able to return to TCU.     GALLO spoke with Pt's wife Jovana to discuss discharge plan. Jovana reports she isn't sure what to do for discharge plan but that she would likely want to find either long-term care or memory care placement for Pt as Pt cannot be alone at home, and she is unable to provide care for him due to her working. Jovana reports they do not have family  support to help care for Pt either. GALLO discussed Pt being on 1:1 so may not be able to return to TCU depending on if Pt is weaned off 1:1 or not. GALLO also discussed LTC and memory care being private pay if there is no LTC insurance coverage. Jovana reports they do not have the funds to private pay for LTC. GALLO discussed the possibility of applying for MA to pay for LTC. Jovana reports she and Lore have been talking to a friend who is a  about helping navigate finding placement for Pt. Jovana reports she will talk with Lore gamble and update CM. Jovana asks that CM contact her on her home phone - [phone].    Next Steps:   Discuss discharge plan and LTC funding/MA application with Pt's wife Jovana. Secure placement.    MILAGRO WarrenW

## 2024-10-14 NOTE — PLAN OF CARE
Assumed care 1900 to 0730. A&O x 1, disoriented to time, place, & situation. Assist x 2 pivot to chair/commode with gait belt and walker. Tele is NSR w/ PVC's. C/O left hip pain, PRN Tramadol and Tylenol given (see MAR). 2L O2 via nasal cannula. Around 0415, patient became restless and agitated. Asked the 1:1 sitter for the phone number to [facility]. Made frequent calls to family early in the morning. Provider notified of restlessness, no change in the plan of care. Call light within reach, able to make needs known. Bed alarm on for safety.      Problem: Risk for Delirium  Goal: Improved Behavioral Control  Intervention: Prevent and Manage Agitation  Recent Flowsheet Documentation  Taken 10/14/2024 0417 by Sheryl Garza RN  Environment Familiarity/Consistency: daily routine followed  Taken 10/14/2024 0145 by Sheryl Garza RN  Environment Familiarity/Consistency: daily routine followed  Taken 10/13/2024 2203 by Sheryl Garza RN  Environment Familiarity/Consistency: daily routine followed     Problem: Adult Inpatient Plan of Care  Goal: Optimal Comfort and Wellbeing  Intervention: Monitor Pain and Promote Comfort  Recent Flowsheet Documentation  Taken 10/14/2024 0417 by Sheryl Garza RN  Pain Management Interventions: medication (see MAR)  Taken 10/14/2024 0145 by Sheryl Garza RN  Pain Management Interventions: medication (see MAR)  Taken 10/13/2024 2203 by Sheryl Garza RN  Pain Management Interventions: medication (see MAR)

## 2024-10-15 ENCOUNTER — APPOINTMENT (OUTPATIENT)
Dept: PHYSICAL THERAPY | Facility: HOSPITAL | Age: 84
End: 2024-10-15
Attending: INTERNAL MEDICINE
Payer: COMMERCIAL

## 2024-10-15 LAB
ATRIAL RATE - MUSE: 79 BPM
CREAT SERPL-MCNC: 0.81 MG/DL (ref 0.67–1.17)
DIASTOLIC BLOOD PRESSURE - MUSE: NORMAL MMHG
EGFRCR SERPLBLD CKD-EPI 2021: 88 ML/MIN/1.73M2
ERYTHROCYTE [DISTWIDTH] IN BLOOD BY AUTOMATED COUNT: 19.1 % (ref 10–15)
HCT VFR BLD AUTO: 33.3 % (ref 40–53)
HGB BLD-MCNC: 10.1 G/DL (ref 13.3–17.7)
INTERPRETATION ECG - MUSE: NORMAL
MCH RBC QN AUTO: 28.9 PG (ref 26.5–33)
MCHC RBC AUTO-ENTMCNC: 30.3 G/DL (ref 31.5–36.5)
MCV RBC AUTO: 95 FL (ref 78–100)
P AXIS - MUSE: 26 DEGREES
PLATELET # BLD AUTO: 337 10E3/UL (ref 150–450)
PR INTERVAL - MUSE: 168 MS
QRS DURATION - MUSE: 88 MS
QT - MUSE: 428 MS
QTC - MUSE: 490 MS
R AXIS - MUSE: 27 DEGREES
RBC # BLD AUTO: 3.5 10E6/UL (ref 4.4–5.9)
SYSTOLIC BLOOD PRESSURE - MUSE: NORMAL MMHG
T AXIS - MUSE: 44 DEGREES
VENTRICULAR RATE- MUSE: 79 BPM
WBC # BLD AUTO: 15.4 10E3/UL (ref 4–11)

## 2024-10-15 PROCEDURE — 120N000004 HC R&B MS OVERFLOW

## 2024-10-15 PROCEDURE — 250N000013 HC RX MED GY IP 250 OP 250 PS 637: Performed by: INTERNAL MEDICINE

## 2024-10-15 PROCEDURE — 99232 SBSQ HOSP IP/OBS MODERATE 35: CPT | Performed by: INTERNAL MEDICINE

## 2024-10-15 PROCEDURE — 36415 COLL VENOUS BLD VENIPUNCTURE: CPT | Performed by: INTERNAL MEDICINE

## 2024-10-15 PROCEDURE — 93010 ELECTROCARDIOGRAM REPORT: CPT | Mod: RTG | Performed by: INTERNAL MEDICINE

## 2024-10-15 PROCEDURE — 250N000011 HC RX IP 250 OP 636: Performed by: INTERNAL MEDICINE

## 2024-10-15 PROCEDURE — 93005 ELECTROCARDIOGRAM TRACING: CPT | Performed by: REGISTERED NURSE

## 2024-10-15 PROCEDURE — 97162 PT EVAL MOD COMPLEX 30 MIN: CPT | Mod: GP | Performed by: PHYSICAL THERAPIST

## 2024-10-15 PROCEDURE — 250N000013 HC RX MED GY IP 250 OP 250 PS 637: Performed by: REGISTERED NURSE

## 2024-10-15 PROCEDURE — 93010 ELECTROCARDIOGRAM REPORT: CPT | Performed by: INTERNAL MEDICINE

## 2024-10-15 PROCEDURE — 97530 THERAPEUTIC ACTIVITIES: CPT | Mod: GP | Performed by: PHYSICAL THERAPIST

## 2024-10-15 PROCEDURE — 99207 PR NO CHARGE LOS: CPT | Performed by: REGISTERED NURSE

## 2024-10-15 PROCEDURE — 85014 HEMATOCRIT: CPT | Performed by: INTERNAL MEDICINE

## 2024-10-15 PROCEDURE — 82565 ASSAY OF CREATININE: CPT | Performed by: INTERNAL MEDICINE

## 2024-10-15 PROCEDURE — 93005 ELECTROCARDIOGRAM TRACING: CPT

## 2024-10-15 RX ORDER — LORAZEPAM 2 MG/ML
1 INJECTION INTRAMUSCULAR
Status: DISCONTINUED | OUTPATIENT
Start: 2024-10-15 | End: 2024-10-15

## 2024-10-15 RX ORDER — FUROSEMIDE 20 MG/1
20 TABLET ORAL EVERY MORNING
Status: DISCONTINUED | OUTPATIENT
Start: 2024-10-16 | End: 2024-10-20

## 2024-10-15 RX ORDER — LOSARTAN POTASSIUM 25 MG/1
25 TABLET ORAL EVERY MORNING
Status: DISCONTINUED | OUTPATIENT
Start: 2024-10-15 | End: 2024-10-26

## 2024-10-15 RX ORDER — LORAZEPAM 2 MG/ML
0.5 INJECTION INTRAMUSCULAR
Status: COMPLETED | OUTPATIENT
Start: 2024-10-15 | End: 2024-10-19

## 2024-10-15 RX ORDER — HYDROMORPHONE HCL IN WATER/PF 6 MG/30 ML
0.2 PATIENT CONTROLLED ANALGESIA SYRINGE INTRAVENOUS ONCE
Status: COMPLETED | OUTPATIENT
Start: 2024-10-15 | End: 2024-10-15

## 2024-10-15 RX ADMIN — HYDROMORPHONE HYDROCHLORIDE 0.2 MG: 0.2 INJECTION, SOLUTION INTRAMUSCULAR; INTRAVENOUS; SUBCUTANEOUS at 14:41

## 2024-10-15 RX ADMIN — ROSUVASTATIN 10 MG: 10 TABLET, FILM COATED ORAL at 20:07

## 2024-10-15 RX ADMIN — MICONAZOLE NITRATE: 20 POWDER TOPICAL at 09:10

## 2024-10-15 RX ADMIN — SENNOSIDES AND DOCUSATE SODIUM 1 TABLET: 8.6; 5 TABLET ORAL at 10:03

## 2024-10-15 RX ADMIN — TRAMADOL HYDROCHLORIDE 50 MG: 50 TABLET, COATED ORAL at 20:13

## 2024-10-15 RX ADMIN — TRAMADOL HYDROCHLORIDE 50 MG: 50 TABLET, COATED ORAL at 03:13

## 2024-10-15 RX ADMIN — METOPROLOL TARTRATE 25 MG: 25 TABLET, FILM COATED ORAL at 20:07

## 2024-10-15 RX ADMIN — PANTOPRAZOLE SODIUM 40 MG: 40 TABLET, DELAYED RELEASE ORAL at 06:10

## 2024-10-15 RX ADMIN — ACETAMINOPHEN 650 MG: 325 TABLET ORAL at 14:11

## 2024-10-15 RX ADMIN — AMOXICILLIN 500 MG: 250 CAPSULE ORAL at 06:09

## 2024-10-15 RX ADMIN — SENNOSIDES AND DOCUSATE SODIUM 1 TABLET: 8.6; 5 TABLET ORAL at 20:07

## 2024-10-15 RX ADMIN — ACETAMINOPHEN 650 MG: 325 TABLET ORAL at 03:13

## 2024-10-15 RX ADMIN — TRAMADOL HYDROCHLORIDE 50 MG: 50 TABLET, COATED ORAL at 10:03

## 2024-10-15 RX ADMIN — BUSPIRONE HYDROCHLORIDE 5 MG: 5 TABLET ORAL at 20:08

## 2024-10-15 RX ADMIN — LEVOTHYROXINE SODIUM 88 MCG: 88 TABLET ORAL at 06:09

## 2024-10-15 RX ADMIN — Medication 3 MG: at 22:12

## 2024-10-15 RX ADMIN — LOSARTAN POTASSIUM 25 MG: 25 TABLET, FILM COATED ORAL at 11:49

## 2024-10-15 RX ADMIN — BUSPIRONE HYDROCHLORIDE 5 MG: 5 TABLET ORAL at 09:10

## 2024-10-15 RX ADMIN — LATANOPROST 1 DROP: 50 SOLUTION/ DROPS OPHTHALMIC at 20:08

## 2024-10-15 RX ADMIN — TRAZODONE HYDROCHLORIDE 25 MG: 50 TABLET ORAL at 20:06

## 2024-10-15 RX ADMIN — ACETAMINOPHEN 650 MG: 325 TABLET ORAL at 19:13

## 2024-10-15 RX ADMIN — ENOXAPARIN SODIUM 40 MG: 40 INJECTION SUBCUTANEOUS at 17:39

## 2024-10-15 RX ADMIN — DULOXETINE HYDROCHLORIDE 60 MG: 60 CAPSULE, DELAYED RELEASE PELLETS ORAL at 09:10

## 2024-10-15 RX ADMIN — ACETAMINOPHEN 650 MG: 325 TABLET ORAL at 10:03

## 2024-10-15 RX ADMIN — ASPIRIN 81 MG CHEWABLE TABLET 81 MG: 81 TABLET CHEWABLE at 09:10

## 2024-10-15 RX ADMIN — MICONAZOLE NITRATE 1 APPLICATOR: 20 POWDER TOPICAL at 20:09

## 2024-10-15 RX ADMIN — METOPROLOL TARTRATE 25 MG: 25 TABLET, FILM COATED ORAL at 09:10

## 2024-10-15 ASSESSMENT — ACTIVITIES OF DAILY LIVING (ADL)
ADLS_ACUITY_SCORE: 53
ADLS_ACUITY_SCORE: 49
ADLS_ACUITY_SCORE: 53
ADLS_ACUITY_SCORE: 49
ADLS_ACUITY_SCORE: 53
ADLS_ACUITY_SCORE: 48
ADLS_ACUITY_SCORE: 49
ADLS_ACUITY_SCORE: 53
ADLS_ACUITY_SCORE: 49
ADLS_ACUITY_SCORE: 53
ADLS_ACUITY_SCORE: 48
ADLS_ACUITY_SCORE: 49
ADLS_ACUITY_SCORE: 49

## 2024-10-15 NOTE — PLAN OF CARE
"  Problem: Risk for Delirium  Goal: Improved Behavioral Control  Outcome: Not Progressing  Intervention: Minimize Safety Risk  Recent Flowsheet Documentation  Taken 10/15/2024 0055 by Britt Jean RN  Communication Enhancement Strategies:   call light answered in person   extra time allowed for response   one-step directions provided  Enhanced Safety Measures: pain management     Problem: Risk for Delirium  Goal: Improved Attention and Thought Clarity  Outcome: Not Progressing  Intervention: Maximize Cognitive Function  Recent Flowsheet Documentation  Taken 10/15/2024 0055 by Britt Jean RN  Sensory Stimulation Regulation:   care clustered   lighting decreased   television on  Reorientation Measures:   calendar in view   clock in view   reorientation provided     Problem: Pain Acute  Goal: Optimal Pain Control and Function  Outcome: Progressing  Intervention: Develop Pain Management Plan  Recent Flowsheet Documentation  Taken 10/15/2024 0313 by Britt Jean RN  Pain Management Interventions: medication (see MAR)  Intervention: Prevent or Manage Pain  Recent Flowsheet Documentation  Taken 10/15/2024 0055 by Britt Jean RN  Sensory Stimulation Regulation:   care clustered   lighting decreased   television on  Medication Review/Management: medications reviewed   Goal Outcome Evaluation:    Pt's vital signs were stable on 2 L of O2. He was NSR on telemetry. At the beginning of the night pt was very drowsy and only opened his eyes with repeated stimulation and voice. He was sleeping and didn't appear to be in pain. Around 0250 pt became very agitated with the 1:1 staff member and pt's nurse. He wanted to leave his room stating \"I have a party to get to now get out of my way.\" Pt was swearing at staff and calling them names. After some time staff was able to get the pt to lay back in bed. He did seem to be in pain. The writer offered him pain meds 3 times and he refused them. He did eventually except an ice " pack and pain meds. Dr. Pineda was updated about this and she gave a PRN ativan order if he gets agitated again. He has been sleeping comfortably since getting the pain meds. He continues to have a 1:1 sitter at bedside for safety. Call light within reach.

## 2024-10-15 NOTE — PROGRESS NOTES
Care Management Follow Up    Length of Stay (days): 3    Expected Discharge Date: 10/16/2024    Anticipated Discharge Plan:  TCU vs LTC     Transportation: TBD    PT Recommendations:    OT Recommendations:        Barriers to Discharge: placement    Prior Living Situation: house with spouse    Advanced Directive on File:       Patient/Spokesperson Updated: No    Additional Information:  SW requested MD to place PT/OT consult so that Pt can be evaluated for TCU or LTC. SW attempted to contact Jovana - no answer, SW left voicemail requesting call back. Per chart, Pt was removed from 1:1 today 10/15 at 9AM.    MILAGRO WarrenW

## 2024-10-15 NOTE — PROGRESS NOTES
[facility]    Medicine Progress Note - Hospitalist Service    Date of Admission:  10/12/2024    Assessment & Plan   Dominik Rojas is a 83 year old male with past medical history of COPD on 2 to 3 L nasal cannula at home, chronic systolic heart failure, hypertension, hypothyroidism, anxiety, recent fall and left hip fracture s/p ORIF who was jsut discharged to TCU.  Patient was brought to ED for evaluation another fall at TCU with left hip pain. Xray showing postop changes without new acute changes. Patient was found to be tachycardiac, concerning for sinus tachycardia vs intermittent a fib rvr.     Await placement (back to TCU?). Barrier: 1:1 due to restless, agitation at night, and still high risk and frequent falls.      Mechanical fall  Left hip contusion without new fracture  Recent left hip fracture s/p ORIF  -Patient reported a mechanical fall after going to TCU one day PTA, with worse left hip pain.  Denied chest pain, dizziness, shortness of breath prior to or after fall  -s/p left hip ORIF just days ago  -negative xray for new fracture or dislocation  -Negative for DVT on US  -pain control    Tachycardia, sinus with brief episodes of atrial tachy  -hr up to 140 in ER  -received Metoprolol in ER now in 70s  -reviewed ECG. Mostly sinus tachy but cannot r/o a fib  -consult cardiologist: no a fib, no need for anticoagulation  -pt is not a good candidate for anticoagulation due to frequent falls  -hr now in control     H/O Chronic systolic heart failure with normalization of LV function;  Diastolic dysfunction  -recent Echo showing preserved EF  -PTA meds o resume  -Lasix on hold initially due to concerning for dehydration, poor oral intake. Restart Lasix for legs edema.     COPD  Chronic hypoxic respiratory failure on 2-3l NC;  -PTA meds  -Oximeter prn   -or prn     Recent UTI  -PTA amoxicillin 500mg TID; finished course of abx;   -repeated UC neg     History of RLL adenocarcinoma s/p  "radiation therapy     Anxiety and depression;  Dementia with behavioral change/agitation   Mood disorder  Acute encephalopathy, unspecified  -resume PTA meds  -on 1:1  -worse during the night  -consulted psych: schedule Trazodone to hep with sleep, avoid benzodiazepines, seroquel prn for agitation     HLD  -Crestor     GERD  -PPI      Hypothyroidism  -recent TSH 6.93, FT4 0.83  -continue Synthroid   -recommend repeat TFT's in 4 weeks with PCP             Diet: Combination Diet Regular Diet Adult    DVT Prophylaxis: Enoxaparin (Lovenox) SQ  Cabral Catheter: Not present  Lines: None     Cardiac Monitoring: ACTIVE order. Indication: Tachyarrhythmias, acute (48 hours)  Code Status: Full Code      Clinically Significant Risk Factors                   # Hypertension: Noted on problem list    # Dementia: noted on problem list        # Overweight: Estimated body mass index is 28.76 kg/m  as calculated from the following:    Height as of this encounter: 1.702 m (5' 7\").    Weight as of this encounter: 83.3 kg (183 lb 10.3 oz)., PRESENT ON ADMISSION     # Financial/Environmental Concerns: none         Disposition Plan     Medically Ready for Discharge: Anticipated Tomorrow    Barrier: 1:1. SW and family to decide on placement plan. Patient has dementia, might need LTC, memory unit?         Lu Skaggs MD  Hospitalist Service  [facility]  Securely message with [employer] (more info)  Text page via [employer] Paging/Directory   ______________________________________________________________________    Interval History   Nurse reported pt being restless, delirious and aggressive during the night. Received meds and now sleeping.     Physical Exam   Vital Signs: Temp: 97.5  F (36.4  C) Temp src: Oral BP: 135/78 Pulse: 66   Resp: 20 SpO2: 96 % O2 Device: Nasal cannula Oxygen Delivery: 2 LPM  Weight: 183 lbs 10.29 oz    General. sleeping not in acute distress.  Neck supple no JVD.  CVS regular rhythm no murmur " gallops.  Lungs.  no wheezing or rales.  Abdomen.  Soft  bowel sounds present.  Extremities.  + edema, s/p left hip sx  Neurological.  No focal deficit.  Skin bruise+      Medical Decision Making       42 MINUTES SPENT BY ME on the date of service doing chart review, history, exam, documentation & further activities per the note.      Data     I have personally reviewed the following data over the past 24 hrs:    15.4 (H)  \   10.1 (L)   / 337     N/A N/A N/A /  N/A   N/A N/A 0.81 \       Imaging results reviewed over the past 24 hrs:   No results found for this or any previous visit (from the past 24 hour(s)).

## 2024-10-15 NOTE — PROGRESS NOTES
"   10/15/24 3360   Appointment Info   Signing Clinician's Name / Credentials (PT) Mouna Baez, PT, DPT   Self-Care   Activity/Exercise/Self-Care Comment Patient admitted from TCU and presents with confusion, unable to obtain PLOF information.   General Information   Onset of Illness/Injury or Date of Surgery 10/12/24   Referring Physician Lu Skaggs MD   Patient/Family Therapy Goals Statement (PT) None stated.   Pertinent History of Current Problem (include personal factors and/or comorbidities that impact the POC) Per H&P: \"83 year old male with past medical history of COPD on 2 to 3 L nasal cannula at home, chronic systolic heart failure, hypertension, hypothyroidism, anxiety, recent fall and left hip fracture s/p ORIF who was jsut discharged to TCU yesterday.  Patient brought to ED for evaluation another fall at TCU with left hip pain. Xray showing postop changes without new acute changes. Patient was found to be tachycardiac, concerning for sinus tachycardia vs intermittent a fib rvr. \"   Existing Precautions/Restrictions fall   Weight-Bearing Status - LLE weight-bearing as tolerated   Cognition   Affect/Mental Status (Cognition) confused;low arousal/lethargic   Pain Assessment   Patient Currently in Pain Yes, see Vital Sign flowsheet  (L hip)   Range of Motion (ROM)   Range of Motion ROM deficits secondary to surgical procedure;ROM deficits secondary to weakness;ROM deficits secondary to pain   Strength (Manual Muscle Testing)   Strength (Manual Muscle Testing) Deficits observed during functional mobility   Bed Mobility   Comment, (Bed Mobility) Patient seated in chair at start of session. Pt requiring assist for sit <> stand transfers, likely to require assist for bed mobility.   Transfers   Transfers sit-stand transfer   Transfer Safety Concerns Noted decreased weight-shifting ability   Impairments Contributing to Impaired Transfers impaired balance;pain;decreased strength   Sit-Stand Transfer "   Sit-Stand [place] (Transfers) moderate assist (50% patient effort);verbal cues   Assistive Device (Sit-Stand Transfers) walker, front-wheeled   Gait/Stairs (Locomotion)   [place] Level (Gait) minimum assist (75% patient effort);verbal cues   Assistive Device (Gait) walker, front-wheeled   Distance in Feet (Gait) 3' forward/back   Pattern (Gait) step-to   Deviations/Abnormal Patterns (Gait) antalgic;gloria decreased;gait speed decreased   Clinical Impression   Criteria for Skilled Therapeutic Intervention Yes, treatment indicated   PT Diagnosis (PT) impaired functional mobility   Influenced by the following impairments pain, decreased strength, cognition, activity tolerance   Functional limitations due to impairments transfers, ambulation   Clinical Presentation (PT Evaluation Complexity) evolving   Clinical Presentation Rationale Clinical judgment.   Clinical Decision Making (Complexity) moderate complexity   Planned Therapy Interventions (PT) balance training;bed mobility training;gait training;home exercise program;neuromuscular re-education;patient/family education;ROM (range of motion);stair training;strengthening;transfer training   Risk & Benefits of therapy have been explained evaluation/treatment results reviewed;participants voiced agreement with care plan;participants included;patient   PT Total Evaluation Time   PT Eval, Moderate Complexity Minutes (23041) 10   Physical Therapy Goals   PT Frequency 5x/week   PT Predicted Duration/Target Date for Goal Attainment 10/22/24   PT Goals Bed Mobility;Transfers;Gait   PT: Bed Mobility Minimal assist;Supine to/from sit   PT: Transfers Supervision/stand-by assist;Sit to/from stand;Assistive device   PT: Gait Assistive device;Rolling walker;Minimal assist;50 feet   Therapeutic Activity   Therapeutic Activities: dynamic activities to improve functional performance Minutes (65600) 10   Symptoms Noted During/After Treatment Increased pain   Treatment  Detail/Skilled Intervention Attempted supine post-op hip exercises. Patient completes 10 reps of ankle pumps B LE. Difficulty following commands for quad sets and glut sets. Attempted heel slides, pt allows very minimal ROM secondary to pain. Patient completes additional sit <> stand from chair with FWW, mod assist of 1. Pt seated in chair at end of session, call light in reach, chair alarm engaged.   PT Discharge Planning   PT Plan progress mobility as able, gait with FWW and chair follow, post-op hip ex   PT Discharge Recommendation (DC Rec) Transitional Care Facility   PT Rationale for DC Rec Pt requiring hands on assist of 1 with transfers and is tolerating very minimal ambulation. Recommend TCU at discharge.   PT Brief overview of current status Sit <> stand, mod assist of 1. Ambulates 3' forward/back, min assist of 1.   Total Session Time   Timed Code Treatment Minutes 10   Total Session Time (sum of timed and untimed services) 20

## 2024-10-15 NOTE — PLAN OF CARE
Problem: Risk for Delirium  Goal: Optimal Coping  Outcome: Progressing  Goal: Improved Behavioral Control  Outcome: Progressing  Intervention: Minimize Safety Risk  Recent Flowsheet Documentation  Taken 10/14/2024 2015 by Mami Yeboah RN  Enhanced Safety Measures: pain management  Taken 10/14/2024 1615 by Mami Yeboah RN  Enhanced Safety Measures: pain management  Goal: Improved Attention and Thought Clarity  Outcome: Progressing  Goal: Improved Sleep  Outcome: Progressing     Problem: Pain Acute  Goal: Optimal Pain Control and Function  Outcome: Progressing  Intervention: Develop Pain Management Plan  Recent Flowsheet Documentation  Taken 10/14/2024 2013 by Mami Yeboah RN  Pain Management Interventions: medication (see MAR)  Taken 10/14/2024 1606 by Mami Yeboah RN  Pain Management Interventions: medication (see MAR)  Intervention: Prevent or Manage Pain  Recent Flowsheet Documentation  Taken 10/14/2024 2015 by Mami Yeboah RN  Medication Review/Management: medications reviewed  Taken 10/14/2024 1615 by Mami Yeboah RN  Medication Review/Management: medications reviewed     Problem: Dysrhythmia  Goal: Normalized Cardiac Rhythm  Outcome: Progressing   Goal Outcome Evaluation:       Pt is alert and oriented to self and time, knows it's October,2024. Lung sounds crackles, expiratory wheezes, inspiratory wheezes.Pt refused neb when offered for wheezing. He is on chronic O2 at 2 LPM per nasal cannula, SpO2 96 to 97%. Occasional congested cough noted. Has SOB with activity. He c/o pain in the left leg, refused tramadol, takes Tylenol only with some relief. HR in the 70's to 80's, NSR/ Sinus arrhythmia  with occ PVC's,and PAC's. Bedside attendant in place for safety.

## 2024-10-15 NOTE — CONSULTS
Psychiatry Consultation; Follow up     Psychiatry assessed patient 10/14/24 regarding agitation, primarily at HS. Scheduled Trazodone to help target sleep. Notes reviewed, and patient woke early this morning and received Ativan PRN.    Would continue to avoid benzodiazepines, as these can exacerbate confusion.     Seroquel 12.5 mg BID PRN ordered to help target agitation. Continue to assess for increased restlessness and agitation following administration of antipsychotics. EKG ordered yesterday for this morning, as bot antipsychotics and Trazodone can prolong QTc.         Page me or re-consult psychiatry as needed.       Elvia Dhaliwal, MAYRAP, APRN  Consult/Liaison Psychiatry  [facility]   Contact information available via [facility] Paging/Directory.  If I am not available, please call [facility] intake ([phone])

## 2024-10-15 NOTE — PLAN OF CARE
Problem: Dysrhythmia  Goal: Normalized Cardiac Rhythm  Outcome: Progressing     Problem: Pain Acute  Goal: Optimal Pain Control and Function  Outcome: Progressing  Intervention: Develop Pain Management Plan  Recent Flowsheet Documentation  Taken 10/15/2024 1441 by Dario Bernal RN  Pain Management Interventions: medication (see MAR)  Taken 10/15/2024 1411 by Dario Bernal RN  Pain Management Interventions:   cold applied   repositioned   medication (see MAR)  Taken 10/15/2024 1127 by Dario Bernal RN  Pain Management Interventions:   cold applied   repositioned   Goal Outcome Evaluation:    Pt was having pain in morning 8/10 that was treated with prn apap and tramadol.  Pt ate breakfast and was able to pivot transfer to chair.  1:1 was removed at 9 a.m.   Pt had been calm sitting in chair till 2pm  Pt had 10/10 pain and was unable to find comfortable position  prn apap was given with no relief.  MD was updated and one time order for iv pain medication obtained and given.  Pt felt relief and is resting in bed.  Pt on 2L NC with sats upper 90's  expiratory wheezing heard  pt decline prn neb.      Pt has some urinary incontinence  bladder scan was 115.

## 2024-10-16 ENCOUNTER — APPOINTMENT (OUTPATIENT)
Dept: OCCUPATIONAL THERAPY | Facility: HOSPITAL | Age: 84
End: 2024-10-16
Attending: INTERNAL MEDICINE
Payer: COMMERCIAL

## 2024-10-16 ENCOUNTER — APPOINTMENT (OUTPATIENT)
Dept: PHYSICAL THERAPY | Facility: HOSPITAL | Age: 84
End: 2024-10-16
Payer: COMMERCIAL

## 2024-10-16 PROCEDURE — 97166 OT EVAL MOD COMPLEX 45 MIN: CPT | Mod: GO

## 2024-10-16 PROCEDURE — 250N000013 HC RX MED GY IP 250 OP 250 PS 637: Performed by: INTERNAL MEDICINE

## 2024-10-16 PROCEDURE — 250N000013 HC RX MED GY IP 250 OP 250 PS 637: Performed by: REGISTERED NURSE

## 2024-10-16 PROCEDURE — 120N000004 HC R&B MS OVERFLOW

## 2024-10-16 PROCEDURE — 97110 THERAPEUTIC EXERCISES: CPT | Mod: GP

## 2024-10-16 PROCEDURE — 97535 SELF CARE MNGMENT TRAINING: CPT | Mod: GO

## 2024-10-16 PROCEDURE — 99233 SBSQ HOSP IP/OBS HIGH 50: CPT | Performed by: STUDENT IN AN ORGANIZED HEALTH CARE EDUCATION/TRAINING PROGRAM

## 2024-10-16 PROCEDURE — 250N000013 HC RX MED GY IP 250 OP 250 PS 637: Performed by: STUDENT IN AN ORGANIZED HEALTH CARE EDUCATION/TRAINING PROGRAM

## 2024-10-16 PROCEDURE — 250N000011 HC RX IP 250 OP 636: Performed by: INTERNAL MEDICINE

## 2024-10-16 PROCEDURE — 97530 THERAPEUTIC ACTIVITIES: CPT | Mod: GP

## 2024-10-16 RX ORDER — ACETAMINOPHEN 325 MG/1
975 TABLET ORAL EVERY 8 HOURS
Status: DISCONTINUED | OUTPATIENT
Start: 2024-10-16 | End: 2024-10-18

## 2024-10-16 RX ORDER — LIDOCAINE 4 G/G
1 PATCH TOPICAL
Status: DISCONTINUED | OUTPATIENT
Start: 2024-10-16 | End: 2024-10-20

## 2024-10-16 RX ADMIN — LOSARTAN POTASSIUM 25 MG: 25 TABLET, FILM COATED ORAL at 09:33

## 2024-10-16 RX ADMIN — MICONAZOLE NITRATE: 20 POWDER TOPICAL at 09:38

## 2024-10-16 RX ADMIN — ASPIRIN 81 MG CHEWABLE TABLET 81 MG: 81 TABLET CHEWABLE at 09:34

## 2024-10-16 RX ADMIN — LEVOTHYROXINE SODIUM 88 MCG: 88 TABLET ORAL at 09:33

## 2024-10-16 RX ADMIN — LIDOCAINE 1 PATCH: 4 PATCH TOPICAL at 19:52

## 2024-10-16 RX ADMIN — DULOXETINE HYDROCHLORIDE 60 MG: 60 CAPSULE, DELAYED RELEASE PELLETS ORAL at 09:32

## 2024-10-16 RX ADMIN — SENNOSIDES AND DOCUSATE SODIUM 2 TABLET: 8.6; 5 TABLET ORAL at 20:03

## 2024-10-16 RX ADMIN — QUETIAPINE FUMARATE 12.5 MG: 25 TABLET ORAL at 03:46

## 2024-10-16 RX ADMIN — LATANOPROST 1 DROP: 50 SOLUTION/ DROPS OPHTHALMIC at 20:08

## 2024-10-16 RX ADMIN — METOPROLOL TARTRATE 25 MG: 25 TABLET, FILM COATED ORAL at 20:03

## 2024-10-16 RX ADMIN — TRAMADOL HYDROCHLORIDE 50 MG: 50 TABLET, COATED ORAL at 17:37

## 2024-10-16 RX ADMIN — Medication 3 MG: at 20:57

## 2024-10-16 RX ADMIN — ENOXAPARIN SODIUM 40 MG: 40 INJECTION SUBCUTANEOUS at 17:37

## 2024-10-16 RX ADMIN — ROSUVASTATIN 10 MG: 10 TABLET, FILM COATED ORAL at 20:03

## 2024-10-16 RX ADMIN — METOPROLOL TARTRATE 25 MG: 25 TABLET, FILM COATED ORAL at 09:33

## 2024-10-16 RX ADMIN — MICONAZOLE NITRATE: 20 POWDER TOPICAL at 20:08

## 2024-10-16 RX ADMIN — FUROSEMIDE 20 MG: 20 TABLET ORAL at 09:34

## 2024-10-16 RX ADMIN — ACETAMINOPHEN 975 MG: 325 TABLET ORAL at 20:03

## 2024-10-16 RX ADMIN — TRAMADOL HYDROCHLORIDE 50 MG: 50 TABLET, COATED ORAL at 05:55

## 2024-10-16 RX ADMIN — PANTOPRAZOLE SODIUM 40 MG: 40 TABLET, DELAYED RELEASE ORAL at 09:34

## 2024-10-16 RX ADMIN — TRAMADOL HYDROCHLORIDE 50 MG: 50 TABLET, COATED ORAL at 11:23

## 2024-10-16 RX ADMIN — QUETIAPINE FUMARATE 12.5 MG: 25 TABLET ORAL at 20:57

## 2024-10-16 RX ADMIN — ACETAMINOPHEN 650 MG: 325 TABLET ORAL at 09:33

## 2024-10-16 RX ADMIN — ACETAMINOPHEN 650 MG: 325 TABLET ORAL at 15:25

## 2024-10-16 RX ADMIN — TRAMADOL HYDROCHLORIDE 50 MG: 50 TABLET, COATED ORAL at 23:42

## 2024-10-16 RX ADMIN — TRAZODONE HYDROCHLORIDE 25 MG: 50 TABLET ORAL at 20:03

## 2024-10-16 RX ADMIN — ACETAMINOPHEN 650 MG: 325 TABLET ORAL at 03:42

## 2024-10-16 RX ADMIN — BUSPIRONE HYDROCHLORIDE 5 MG: 5 TABLET ORAL at 09:35

## 2024-10-16 RX ADMIN — SENNOSIDES AND DOCUSATE SODIUM 1 TABLET: 8.6; 5 TABLET ORAL at 09:34

## 2024-10-16 RX ADMIN — BUSPIRONE HYDROCHLORIDE 5 MG: 5 TABLET ORAL at 20:03

## 2024-10-16 ASSESSMENT — ACTIVITIES OF DAILY LIVING (ADL)
ADLS_ACUITY_SCORE: 53
ADLS_ACUITY_SCORE: 57
ADLS_ACUITY_SCORE: 57
ADLS_ACUITY_SCORE: 53
ADLS_ACUITY_SCORE: 53
ADLS_ACUITY_SCORE: 57
ADLS_ACUITY_SCORE: 53
ADLS_ACUITY_SCORE: 56
ADLS_ACUITY_SCORE: 53
ADLS_ACUITY_SCORE: 56
ADLS_ACUITY_SCORE: 53
ADLS_ACUITY_SCORE: 56
ADLS_ACUITY_SCORE: 53
ADLS_ACUITY_SCORE: 56
ADLS_ACUITY_SCORE: 53
ADLS_ACUITY_SCORE: 56
ADLS_ACUITY_SCORE: 53
ADLS_ACUITY_SCORE: 53

## 2024-10-16 NOTE — PLAN OF CARE
Problem: Risk for Delirium  Goal: Optimal Coping  Outcome: Progressing  Goal: Improved Behavioral Control  Outcome: Progressing  Intervention: Minimize Safety Risk  Recent Flowsheet Documentation  Taken 10/15/2024 2330 by Mami Yeboah RN  Enhanced Safety Measures: pain management  Taken 10/15/2024 1530 by Mami Yeboah RN  Enhanced Safety Measures: pain management  Goal: Improved Attention and Thought Clarity  Outcome: Progressing  Goal: Improved Sleep  Outcome: Progressing    Problem: Pain Acute  Goal: Optimal Pain Control and Function  10/16/2024 0501 by Mami Yeboah RN  Outcome: Progressing  10/15/2024 2222 by Mami Yeboah RN  Outcome: Progressing  Intervention: Develop Pain Management Plan  Recent Flowsheet Documentation  Taken 10/15/2024 1913 by Mami Yeboah RN  Pain Management Interventions: medication (see MAR)  Intervention: Prevent or Manage Pain  Recent Flowsheet Documentation  Taken 10/15/2024 2330 by Mami Yeboah RN  Medication Review/Management: medications reviewed  Taken 10/15/2024 1530 by Mami Yeboah RN  Medication Review/Management: medications reviewed      Goal Outcome Evaluation:         Pt is alert and oriented x 4, at the start of the shift, calm and cooperative. At 0340, pt woke up to urinate, and was about to climb out of the bed. Bed alarm went off, and told pt to wait so writer can grab the urinal. He was able to void but still upset with the staff. He was looking for his wife, reminded him he is in the hospital and his wife is at home. Tylenol and prn Seroquel given with relief. Pt was sleeping when checked after 45 minutes. Lung sounds diminished, expiratory wheezes noted, on chronic O2 at 2 LPM per nasal cannula. Bed alarm in place, 3 side rails up for safety. Tramadol 50 mg given at 0555.Pt sleeping when checked after 45 minutes.

## 2024-10-16 NOTE — PROGRESS NOTES
"   10/16/24 0852   Appointment Info   Signing Clinician's Name / Credentials (OT) JASMYN Don   Living Environment   People in Home spouse   Current Living Arrangements house   Home Accessibility stairs to enter home   Number of Stairs, Main Entrance 2   Stair Railings, Main Entrance railings on both sides of stairs   Transportation Anticipated family or friend will provide   Living Environment Comments Tub w/ bench, pt reports narrown hallways and \"can't fit w/c or walker in home\". 2-3LPM home O2. Pt reports not using any AD for ambulation at home.   Self-Care   Usual Activity Tolerance moderate   Current Activity Tolerance fair   Regular Exercise No   Equipment Currently Used at Home walker, standard   Fall history within last six months yes   Number of times patient has fallen within last six months 3   Activity/Exercise/Self-Care Comment Pt reports being ind w/ dressing sometimes otherwise, receives help from spouse. Per chart review, patient has assistance with all ADLs.   Instrumental Activities of Daily Living (IADL)   IADL Comments Pt reports being ind w/ IADLs at baseline, spouse helps as needed, however per chart wife assists with all IADLs.   General Information   Onset of Illness/Injury or Date of Surgery 10/12/24   Referring Physician Lu Skaggs MD   Patient/Family Therapy Goal Statement (OT) Return home   Additional Occupational Profile Info/Pertinent History of Current Problem Per chart review, \"83 year old male with past medical history of COPD on 2 to 3 L nasal cannula at home, chronic systolic heart failure, hypertension, hypothyroidism, anxiety, recent fall and left hip fracture s/p ORIF who was jsut discharged to TCU.  Patient was brought to ED for evaluation another fall at TCU with left hip pain. Xray showing postop changes without new acute changes. Patient was found to be tachycardiac, concerning for sinus tachycardia vs intermittent a fib rvr. \"   Existing " Precautions/Restrictions fall;oxygen therapy device and L/min  (2-3LPM at home)   Limitations/Impairments safety/cognitive   Left Lower Extremity (Weight-bearing Status) weight-bearing as tolerated (WBAT)   Cognitive Status Examination   Orientation Status person   Affect/Mental Status (Cognitive) confused   Follows Commands WFL   Cognitive Status Comments Pt may be poor historian. Trouble w/ word finding. Follows commands and responds to questions appropriately   Pain Assessment   Patient Currently in Pain   (Pt reporting L leg pain (side he previously fractured) w/ bed mobility.)   Range of Motion Comprehensive   General Range of Motion no range of motion deficits identified   Strength Comprehensive (MMT)   Comment, General Manual Muscle Testing (MMT) Assessment generalized weakness   Bed Mobility   Bed Mobility rolling right;supine-sit;sit-supine;scooting/bridging   Rolling Right [place] (Bed Mobility) verbal cues;minimum assist (75% patient effort)   Scooting/Bridging [place] (Bed Mobility) supervision;verbal cues   Supine-Sit [place] (Bed Mobility) minimum assist (75% patient effort);moderate assist (50% patient effort);2 person assist;verbal cues   Sit-Supine [place] (Bed Mobility) minimum assist (75% patient effort);moderate assist (50% patient effort);2 person assist;verbal cues   Assistive Device (Bed Mobility) bed rails;draw sheet   Transfers   Transfers sit-stand transfer   Sit-Stand Transfer   Sit-Stand [place] (Transfers) minimum assist (75% patient effort);moderate assist (50% patient effort);2 person assist   Assistive Device (Sit-Stand Transfers) walker, front-wheeled   Balance   Balance Assessment sitting static balance;sitting dynamic balance;sit to stand dynamic balance;standing static balance;standing dynamic balance   Sitting Balance: Static independent   Sitting Balance: Dynamic independent   Position, Sitting Balance unsupported;sitting edge of bed   Sit-to-Stand  Balance minimal assist;2-person assist   Standing Balance: Static contact guard;supervision   Standing Balance: Dynamic contact guard;verbal cues;1-person assist   Position/Device Used, Standing Balance walker, front-wheeled   Activities of Daily Living   BADL Assessment/Intervention lower body dressing;grooming;toileting   Lower Body Dressing Assessment/Training   [place] Level (Lower Body Dressing) not tested   Comment, (Lower Body Dressing) Per clinical reasoning, anticipated pt may have difficulty w/ LB dressing d/t decreased activity tolerance, LLE pain/stiffness, balance deficits, and cognition concerns.   Grooming Assessment/Training   [place] Level (Grooming) not tested   Comment, (Grooming) Per clinical reasoning, anticipated pt may have difficulty w/ toileting tasks d/t decreased activity tolerance, LLE pain/stiffness, balance deficits, and cognition concerns.   Toileting   [place] Level (Toileting) toileting skills;change pad/brief;moderate assist (50% patient effort);verbal cues   Position (Toileting) edge of bed sitting;unsupported sitting   Assistive Devices (Toileting) urinal   Clinical Impression   Criteria for Skilled Therapeutic Interventions Met (OT) Yes, treatment indicated   OT Diagnosis Decreased activity tolerance, impaired cognition, increased fall risk   Influenced by the following impairments Deconditioning, cognition   OT Problem List-Impairments impacting ADL problems related to;activity tolerance impaired;balance;cognition;mobility;strength;pain   Assessment of Occupational Performance 3-5 Performance Deficits   Identified Performance Deficits endurance, toileting, LB dressing, trsfs, cognition/orientation   Planned Therapy Interventions (OT) ADL retraining;balance training;bed mobility training;strengthening;transfer training;home program guidelines;progressive activity/exercise   Clinical Decision Making Complexity (OT) detailed assessment/moderate complexity   Risk &  "Benefits of therapy have been explained evaluation/treatment results reviewed;care plan/treatment goals reviewed;risks/benefits reviewed;participants included;patient   OT Total Evaluation Time   OT Eval, Moderate Complexity Minutes (61836) 10   OT Goals   Therapy Frequency (OT) 5 times/week   OT Predicted Duration/Target Date for Goal Attainment 10/23/24   OT Goals Hygiene/Grooming;Lower Body Dressing;Transfers;Toilet Transfer/Toileting;Cognition;Aerobic Activity   OT: Hygiene/Grooming supervision/stand-by assist;while standing;using adaptive equipment   OT: Lower Body Dressing Minimal assist;using adaptive equipment;including set-up/clothing retrieval   OT: Transfer Supervision/stand-by assist;with assistive device   OT: Toilet Transfer/Toileting toilet transfer;cleaning and garment management;using adaptive equipment;Minimal assist   OT: Cognitive Patient/caregiver will verbalize understanding of cognitive assessment results/recommendations as needed for safe discharge planning   OT: Perform aerobic activity with stable cardiovascular response continuous activity;5 minutes;ambulation   Self-Care/Home Management   Self-Care/Home Mgmt/ADL, Compensatory, Meal Prep Minutes (75335) 10   Symptoms Noted During/After Treatment (Meal Preparation/Planning Training) increased pain   Treatment Detail/Skilled Intervention Nurse present during session. Eval complete, treatment initiated. Pt completed additional STSx4 w/ Min.Ax2, verbal cues, and FWW. Pt c/o feeling \"woozy\" throughout session. Pt having 1 LOB, corrected w/ CGA and verbal cues. Pt able to tolerate standing >5min while attempting to void w/ urinal, verbal cues for posturing and deep breaths. Pt reported feeling shaky and prompted to sit EOB w/ VC/tactile cues - additional time requried for pt to try voiding while seated. Pt doffed/donned new brief w/ Mod.Ax2 and verbal cues. Pt unaware he soiled brief. Pt able to tolerate ~1-2 min of standing EOB w/ FWW and CGA " while linens were changed. Pt required 2 min steaded rest break. Pt then able to tolerate additional 1 min of standing w/ FWW and CGA for linen change. Pt sit>supine w/ Mod.Ax2 and required boost for bed postioning. At end of session, pt in bed w/ nurse in room.   OT Discharge Planning   OT Plan track cog, transfers, bed mobility, LB dressing, toileting, standing g/h, progress mobility in room   OT Discharge Recommendation (DC Rec) Transitional Care Facility   OT Rationale for DC Rec Pt currently requring assistx1-2 for trnfs/self cares d/t increased pain/weakness. Pt lives in home w/ spouse at baseline. OT rec. TCU upon d/c to enhance safety/ADL ind. Following TCU, pt may benefit from more supportive living environment (LTC/memory care) if family cannot provide level of assist necessary for safety.   OT Brief overview of current status Min.Ax1-2 trsfs, FWW   Total Session Time   Timed Code Treatment Minutes 10   Total Session Time (sum of timed and untimed services) 20

## 2024-10-16 NOTE — PROGRESS NOTES
[facility]    Medicine Progress Note - Hospitalist Service    Date of Admission:  10/12/2024    Assessment & Plan   Dominik Rojas is a 83 year old male with past medical history of COPD on 2 to 3 L nasal cannula at home, chronic systolic heart failure, hypertension, hypothyroidism, anxiety, recent fall and left hip fracture s/p ORIF who was jsut discharged to TCU.  Patient was brought to ED for evaluation another fall at TCU with left hip pain. Xray showing postop changes without new acute changes. Patient was found to be tachycardiac, concerning for sinus tachycardia vs intermittent a fib rvr.      Await placement (back to TCU?). Barrier: 1:1 due to restless, agitation at night, and still high risk and frequent falls, Pain consulted - poorly controlled     Mechanical fall  Left hip contusion without new fracture  Recent left hip fracture s/p ORIF  -Patient reported a mechanical fall after going to TCU one day PTA, with worse left hip pain.  Denied chest pain, dizziness, shortness of breath prior to or after fall  -s/p left hip ORIF  -negative xray for new fracture or dislocation, has soft tissue edema  -Negative for DVT on US  -Pain management consulted    Leukocytosis  - wbc 15.4, suspect if stress de-margination  - No symptoms/signs of infection  - Ucx mixed tai  - will check wbc AM tomorrow     Tachycardia, sinus with brief episodes of atrial tachy  -HR up to 140 in ER  -received Metoprolol in ER now in 70s  -consult cardiologist: no a fib, no need for anticoagulation  -pt is not a good candidate for anticoagulation due to frequent falls  -HR now in control     H/O Chronic systolic heart failure with normalization of LV function;  Diastolic dysfunction  -recent Echo showing preserved EF  -Lasix on hold initially due to concerning for dehydration, poor oral intake. Restart Lasix for legs edema.  -On Metoprolol, Losartan     COPD  Chronic hypoxic respiratory failure on 2-3l NC  -PTA  "meds  -Oximeter prn      Recent UTI  -PTA amoxicillin 500mg TID; finished course of abx;   -repeated UC mixed tai     History of RLL adenocarcinoma s/p radiation therapy     Anxiety and depression;  Dementia with behavioral change/agitation   Mood disorder  Acute toxic/metabolic encephalopathy  -resume PTA meds  -on 1:1, will try and wean off  -worse during the night  -consulted psych: schedule Trazodone to hep with sleep, avoid benzodiazepines, seroquel prn for agitation. Qtc 490     HLD  -Crestor     GERD  -PPI      Hypothyroidism  -recent TSH 6.93, FT4 0.83  -continue Synthroid   -recommend repeat TFT's in 4 weeks with PCP          Diet: Combination Diet Regular Diet Adult    DVT Prophylaxis: Enoxaparin (Lovenox) SQ  Cabral Catheter: Not present  Lines: None     Cardiac Monitoring: None  Code Status: Full Code      Clinically Significant Risk Factors                   # Hypertension: Noted on problem list    # Dementia: noted on problem list        # Overweight: Estimated body mass index is 28.76 kg/m  as calculated from the following:    Height as of this encounter: 1.702 m (5' 7\").    Weight as of this encounter: 83.3 kg (183 lb 10.3 oz)., PRESENT ON ADMISSION     # Financial/Environmental Concerns: none         Disposition Plan     Medically Ready for Discharge: Anticipated Tomorrow             Rosi Childress MD  Hospitalist Service  [facility]  Securely message with [facility] (more info)  Text page via [email or web address] Paging/Directory   ______________________________________________________________________    Interval History   Patient was examined at the bedside, new to me today.  States pain is not controlled, pain team consulted for further recommendations due to underlying encephalopathy.   Anticipate discharge to TCU when ready  -On my exam was calm, cooperative and comfortable.  Earlier during the day was restless and trying to get out of bed, will continue to monitor and try to wean off " 1:1 sitter    Physical Exam   Vital Signs: Temp: 98  F (36.7  C) Temp src: Oral BP: 135/74 Pulse: 69   Resp: 18 SpO2: 93 % O2 Device: Nasal cannula Oxygen Delivery: 2 LPM  Weight: 183 lbs 10.29 oz    General. Sitting comfortably in the chair.  Neck supple no JVD.  CVS regular rhythm no murmur gallops.  Lungs.  no wheezing or rales.  Abdomen.  Soft  bowel sounds present.  Extremities.  + edema, s/p left hip sx  Neurological.  No focal deficit.  Skin bruise+    Medical Decision Making       50 MINUTES SPENT BY ME on the date of service doing chart review, history, exam, documentation & further activities per the note.      Data         Imaging results reviewed over the past 24 hrs:   No results found for this or any previous visit (from the past 24 hour(s)).

## 2024-10-16 NOTE — PROGRESS NOTES
Acute Pain Management Team Note:    Acute Pain Management Team consulted for severe pain, uncontrolled with tramadol.  Dr. Childress is ok with us seeing patient in the morning, and agreeable with lidocaine patch, which will be started tonight.    Thank you,  Jayda Lamb, [facility]  Acute Pain Management Team  [place]

## 2024-10-16 NOTE — PLAN OF CARE
Problem: Adult Inpatient Plan of Care  Goal: Optimal Comfort and Wellbeing  Intervention: Monitor Pain and Promote Comfort  Recent Flowsheet Documentation  Taken 10/15/2024 1913 by Mami Yeboah RN  Pain Management Interventions: medication (see MAR)     Problem: Risk for Delirium  Goal: Improved Behavioral Control  Intervention: Minimize Safety Risk  Recent Flowsheet Documentation  Taken 10/15/2024 1530 by Mami Yeboah RN  Enhanced Safety Measures: pain management     Problem: Pain Acute  Goal: Optimal Pain Control and Function  Outcome: Progressing  Intervention: Develop Pain Management Plan  Recent Flowsheet Documentation  Taken 10/15/2024 1913 by Mami Yeboah RN  Pain Management Interventions: medication (see MAR)  Intervention: Prevent or Manage Pain  Recent Flowsheet Documentation  Taken 10/15/2024 1530 by Mami Yeboah RN  Medication Review/Management: medications reviewed   Goal Outcome Evaluation:       Pt is alert and oriented x 4, uses call light appropriately. Lung sounds crackles , diminished with expiratory and inspiratory wheezes. SOB with activity noted, on O2 at 2 LPM per nasal cannula. SpO2 96 to 97%. Non tele. Off beside attendant at 1900, pt is calm and cooperative. PRN Tylenol and Tramadol given with minimal relief. He is assist of 2 with transfer using gait belt and walker. At 2200, pt was having trouble transferring back to bed, his right leg is very weak, uses easy stand/niurka steady to get him back to bed.

## 2024-10-16 NOTE — PROGRESS NOTES
"Therapy REC is TCU, back on 1:1 currently.     Received VM from spouse Jovana. Called her back and discussed Jovana's interest in completing a HCD. She spoke about having 'a paper on the refrigerator that says if they should do CPR or not, because you know broken ribs could happen'. GALLO explained that a POLST is more medically specific and a HCD names who can make decisions if pt were unable.     Jovana reported that her daughter had spoken with [employer] and they required/recommended that a HCD be completed. She is worried about getting a notary and asks if we have one. GALLO messaged with [facility] staff member about assisting tomorrow with HCD. Will await [facility] staff's response in AM for coordination of timing with spouse.     Spouse reported that she lives in [facility] and that there in a TCU near her. She doesn't drive much and is interested in [facility] - \"I didn't get this option last time, is it the same type of place?\" She asked, and GALLO explained TCU options. Spouse agreed ok to send referral to [facility] after off 1:1. SW will call spouse back in the morning.     Anticipate referral to [facility], pending 1:1 successful.   "

## 2024-10-16 NOTE — PLAN OF CARE
Goal Outcome Evaluation:      Plan of Care Reviewed With: patient    Overall Patient Progress: improvingOverall Patient Progress: improving    Outcome Evaluation: Pt continues on 1:1 for impulsivity, restlessness and confusion.  Pt was trialed off of 1:1 and pt was too restless and impulsive and placed back on 1:1.   Pt continues on baseline home oxygen at 2.5L per NC.

## 2024-10-17 ENCOUNTER — APPOINTMENT (OUTPATIENT)
Dept: PHYSICAL THERAPY | Facility: HOSPITAL | Age: 84
End: 2024-10-17
Payer: COMMERCIAL

## 2024-10-17 ENCOUNTER — APPOINTMENT (OUTPATIENT)
Dept: OCCUPATIONAL THERAPY | Facility: HOSPITAL | Age: 84
End: 2024-10-17
Payer: COMMERCIAL

## 2024-10-17 LAB
BASOPHILS # BLD AUTO: 0.1 10E3/UL (ref 0–0.2)
BASOPHILS NFR BLD AUTO: 1 %
EOSINOPHIL # BLD AUTO: 0.6 10E3/UL (ref 0–0.7)
EOSINOPHIL NFR BLD AUTO: 4 %
ERYTHROCYTE [DISTWIDTH] IN BLOOD BY AUTOMATED COUNT: 19.9 % (ref 10–15)
HCT VFR BLD AUTO: 32.9 % (ref 40–53)
HGB BLD-MCNC: 9.8 G/DL (ref 13.3–17.7)
HOLD SPECIMEN: NORMAL
IMM GRANULOCYTES # BLD: 0.1 10E3/UL
IMM GRANULOCYTES NFR BLD: 1 %
LYMPHOCYTES # BLD AUTO: 2 10E3/UL (ref 0.8–5.3)
LYMPHOCYTES NFR BLD AUTO: 16 %
MCH RBC QN AUTO: 29.1 PG (ref 26.5–33)
MCHC RBC AUTO-ENTMCNC: 29.8 G/DL (ref 31.5–36.5)
MCV RBC AUTO: 98 FL (ref 78–100)
MONOCYTES # BLD AUTO: 1.5 10E3/UL (ref 0–1.3)
MONOCYTES NFR BLD AUTO: 12 %
NEUTROPHILS # BLD AUTO: 8.2 10E3/UL (ref 1.6–8.3)
NEUTROPHILS NFR BLD AUTO: 66 %
NRBC # BLD AUTO: 0 10E3/UL
NRBC BLD AUTO-RTO: 0 /100
PLATELET # BLD AUTO: 278 10E3/UL (ref 150–450)
RBC # BLD AUTO: 3.37 10E6/UL (ref 4.4–5.9)
WBC # BLD AUTO: 12.4 10E3/UL (ref 4–11)

## 2024-10-17 PROCEDURE — 250N000013 HC RX MED GY IP 250 OP 250 PS 637: Performed by: REGISTERED NURSE

## 2024-10-17 PROCEDURE — 36415 COLL VENOUS BLD VENIPUNCTURE: CPT | Performed by: STUDENT IN AN ORGANIZED HEALTH CARE EDUCATION/TRAINING PROGRAM

## 2024-10-17 PROCEDURE — 250N000011 HC RX IP 250 OP 636: Performed by: INTERNAL MEDICINE

## 2024-10-17 PROCEDURE — 250N000013 HC RX MED GY IP 250 OP 250 PS 637: Performed by: STUDENT IN AN ORGANIZED HEALTH CARE EDUCATION/TRAINING PROGRAM

## 2024-10-17 PROCEDURE — 97110 THERAPEUTIC EXERCISES: CPT | Mod: GP

## 2024-10-17 PROCEDURE — 250N000013 HC RX MED GY IP 250 OP 250 PS 637: Performed by: INTERNAL MEDICINE

## 2024-10-17 PROCEDURE — 97530 THERAPEUTIC ACTIVITIES: CPT | Mod: GP

## 2024-10-17 PROCEDURE — 99233 SBSQ HOSP IP/OBS HIGH 50: CPT | Performed by: STUDENT IN AN ORGANIZED HEALTH CARE EDUCATION/TRAINING PROGRAM

## 2024-10-17 PROCEDURE — 85025 COMPLETE CBC W/AUTO DIFF WBC: CPT | Performed by: STUDENT IN AN ORGANIZED HEALTH CARE EDUCATION/TRAINING PROGRAM

## 2024-10-17 PROCEDURE — 250N000013 HC RX MED GY IP 250 OP 250 PS 637: Performed by: PHYSICIAN ASSISTANT

## 2024-10-17 PROCEDURE — 120N000004 HC R&B MS OVERFLOW

## 2024-10-17 PROCEDURE — 99222 1ST HOSP IP/OBS MODERATE 55: CPT | Performed by: PHYSICIAN ASSISTANT

## 2024-10-17 PROCEDURE — 97535 SELF CARE MNGMENT TRAINING: CPT | Mod: GO

## 2024-10-17 RX ORDER — TRAMADOL HYDROCHLORIDE 50 MG/1
50 TABLET ORAL EVERY 6 HOURS
Status: DISCONTINUED | OUTPATIENT
Start: 2024-10-17 | End: 2024-10-18

## 2024-10-17 RX ORDER — ACETAMINOPHEN AND CODEINE PHOSPHATE 300; 30 MG/1; MG/1
1 TABLET ORAL EVERY 8 HOURS PRN
Status: DISCONTINUED | OUTPATIENT
Start: 2024-10-17 | End: 2024-10-18

## 2024-10-17 RX ADMIN — LIDOCAINE 1 PATCH: 4 PATCH TOPICAL at 19:11

## 2024-10-17 RX ADMIN — DULOXETINE HYDROCHLORIDE 60 MG: 60 CAPSULE, DELAYED RELEASE PELLETS ORAL at 09:34

## 2024-10-17 RX ADMIN — ACETAMINOPHEN 975 MG: 325 TABLET ORAL at 11:16

## 2024-10-17 RX ADMIN — MICONAZOLE NITRATE: 20 POWDER TOPICAL at 09:39

## 2024-10-17 RX ADMIN — LEVOTHYROXINE SODIUM 88 MCG: 88 TABLET ORAL at 05:46

## 2024-10-17 RX ADMIN — BUSPIRONE HYDROCHLORIDE 5 MG: 5 TABLET ORAL at 19:10

## 2024-10-17 RX ADMIN — BUSPIRONE HYDROCHLORIDE 5 MG: 5 TABLET ORAL at 09:35

## 2024-10-17 RX ADMIN — MICONAZOLE NITRATE: 20 POWDER TOPICAL at 19:23

## 2024-10-17 RX ADMIN — ENOXAPARIN SODIUM 40 MG: 40 INJECTION SUBCUTANEOUS at 19:09

## 2024-10-17 RX ADMIN — ROSUVASTATIN 10 MG: 10 TABLET, FILM COATED ORAL at 19:09

## 2024-10-17 RX ADMIN — FUROSEMIDE 20 MG: 20 TABLET ORAL at 09:34

## 2024-10-17 RX ADMIN — TRAMADOL HYDROCHLORIDE 50 MG: 50 TABLET, COATED ORAL at 09:35

## 2024-10-17 RX ADMIN — METOPROLOL TARTRATE 25 MG: 25 TABLET, FILM COATED ORAL at 19:10

## 2024-10-17 RX ADMIN — METOPROLOL TARTRATE 25 MG: 25 TABLET, FILM COATED ORAL at 09:35

## 2024-10-17 RX ADMIN — TRAZODONE HYDROCHLORIDE 25 MG: 50 TABLET ORAL at 19:10

## 2024-10-17 RX ADMIN — ACETAMINOPHEN 975 MG: 325 TABLET ORAL at 05:45

## 2024-10-17 RX ADMIN — LATANOPROST 1 DROP: 50 SOLUTION/ DROPS OPHTHALMIC at 19:12

## 2024-10-17 RX ADMIN — SENNOSIDES AND DOCUSATE SODIUM 2 TABLET: 8.6; 5 TABLET ORAL at 09:33

## 2024-10-17 RX ADMIN — TRAMADOL HYDROCHLORIDE 50 MG: 50 TABLET, COATED ORAL at 14:26

## 2024-10-17 RX ADMIN — PANTOPRAZOLE SODIUM 40 MG: 40 TABLET, DELAYED RELEASE ORAL at 05:46

## 2024-10-17 RX ADMIN — ASPIRIN 81 MG CHEWABLE TABLET 81 MG: 81 TABLET CHEWABLE at 09:34

## 2024-10-17 RX ADMIN — ACETAMINOPHEN 975 MG: 325 TABLET ORAL at 19:10

## 2024-10-17 RX ADMIN — LOSARTAN POTASSIUM 25 MG: 25 TABLET, FILM COATED ORAL at 09:35

## 2024-10-17 RX ADMIN — TRAMADOL HYDROCHLORIDE 50 MG: 50 TABLET, COATED ORAL at 19:10

## 2024-10-17 ASSESSMENT — ACTIVITIES OF DAILY LIVING (ADL)
ADLS_ACUITY_SCORE: 50
ADLS_ACUITY_SCORE: 52
ADLS_ACUITY_SCORE: 55
ADLS_ACUITY_SCORE: 50
ADLS_ACUITY_SCORE: 54
ADLS_ACUITY_SCORE: 50
ADLS_ACUITY_SCORE: 55
ADLS_ACUITY_SCORE: 50
ADLS_ACUITY_SCORE: 51
ADLS_ACUITY_SCORE: 50
ADLS_ACUITY_SCORE: 55
ADLS_ACUITY_SCORE: 50
ADLS_ACUITY_SCORE: 57
ADLS_ACUITY_SCORE: 55
ADLS_ACUITY_SCORE: 50
ADLS_ACUITY_SCORE: 50
ADLS_ACUITY_SCORE: 54
ADLS_ACUITY_SCORE: 49
ADLS_ACUITY_SCORE: 50

## 2024-10-17 NOTE — PLAN OF CARE
Problem: Adult Inpatient Plan of Care  Goal: Optimal Comfort and Wellbeing  Intervention: Monitor Pain and Promote Comfort  Recent Flowsheet Documentation  Taken 10/16/2024 2003 by aMmi Yeboah RN  Pain Management Interventions: medication (see MAR)  Taken 10/16/2024 1737 by Mami Yeboah RN  Pain Management Interventions: medication (see MAR)  Taken 10/16/2024 1525 by Mami Yeboah RN  Pain Management Interventions: medication (see MAR)     Problem: Risk for Delirium  Goal: Optimal Coping  Outcome: Progressing  Goal: Improved Behavioral Control  Outcome: Progressing  Intervention: Minimize Safety Risk  Recent Flowsheet Documentation  Taken 10/16/2024 1545 by Mami Yeboah RN  Communication Enhancement Strategies:   extra time allowed for response   one-step directions provided  Enhanced Safety Measures: pain management  Goal: Improved Attention and Thought Clarity  Outcome: Progressing  Intervention: Maximize Cognitive Function  Recent Flowsheet Documentation  Taken 10/16/2024 1545 by Mami Yeboah RN  Reorientation Measures:   calendar in view   clock in view   reorientation provided     Problem: Pain Acute  Goal: Optimal Pain Control and Function  Outcome: Progressing  Intervention: Develop Pain Management Plan  Recent Flowsheet Documentation  Taken 10/16/2024 2003 by Mami Yeboah RN  Pain Management Interventions: medication (see MAR)  Taken 10/16/2024 1737 by Mami Yeboah RN  Pain Management Interventions: medication (see MAR)  Taken 10/16/2024 1525 by Mami Yeboah RN  Pain Management Interventions: medication (see MAR)  Intervention: Prevent or Manage Pain  Recent Flowsheet Documentation  Taken 10/16/2024 1545 by Mami Yeboah RN  Medication Review/Management: medications reviewed   Goal Outcome Evaluation:      Pt is alert and oriented x 4, with intermittent confusion. He c/o pain in the left leg, PRN Tramadol and  scheduled Tylenol and Lidocaine patch given with minimal relief. Ice pack applied. Lung sounds crackles with occasional expiratory wheezes, on chronic O2 at 2 LPM per nasal cannula. Tried to removed 1:1 for the first 4 hours but pt was restless and impulsive. He also not trusting his bedside attendant. He verbalized going home. He also refused to be transferred to another room near the station. Bedside attendant placed back for safety. PRN Melatonin and Seroquel along with Trazodone  given at bedtime. Assist of 2 with transfer. Pt's legs are weak to walk. Pt is incontinent with bladder, had one episode tonight.  Dressing changed tonight, mepilex was fully soaked.

## 2024-10-17 NOTE — CONSULTS
[facility] ACUTE INPATIENT PAIN SERVICE    [facility], [facility], [facility], [facility], [place]   PAIN CONSULT      Assessment/Plan:  Dominik Rojas is a 83 year old male who was admitted on 10/12/2024.  I was asked by Dr. Rosi Mercedes to see the patient for his uncontrolled pain. He is s/p left hip fracture with ORIF. Was discharged to the TCU on 10/11 and shortly after had another fall resulting in readmission to the hospital. History of COPD on 2 to 3 L nasal cannula at home, chronic systolic heart failure, hypertension, hypothyroidism, anxiety. Describes pain as 7/10. Has been taking 200mg daily of Tramadol with minimal relief.     MNPMP reviewed: He is not on any controlled medications.     Opioids received in the past 24h:  *(4) 50mg tramadol tablets     Total MME is 40    PLAN:  Acute post-operative pain. Complicated by mechanical fall on 10/12. Opioid naive.  Multimodal Medication Therapy:   Adjuvants:   - APAP 975mg q8h  - ASA 81mg  - Duloxetine 60mg once daily  - Topical Lidocaine  - Trazodone 25mg bedtime  Opioids:   - Tramadol 50mg q6h with hold parameters  - Add T#3 q8h prn   Non-medication interventions- Ice  Constipation Prophylaxis- Senna-S  Follow up /Discharge Recommendations - We recommend prescribing the following at the time of discharge:  Short taper Rx of Tramadol is appropriate.           Subjective:  Dominik is seen today in his bed alert and oriented in no acute distress. Reports his pain is currently a 7/10. Pain has been difficult to control while hospitalized since his fall. Has been taking up to 4 Tramadol tablets daily with minimal relief. Agreeable to add on T#3 for breakthrough pain.      Denies concerns with nausea, vomiting, constipation.      Reviewed continuing with current plan, all questions answered.           History   Drug Use No         Tobacco Use      Smoking status: Former        Packs/day: 0.00        Years: 0.5 packs/day for 35.0 years (17.5 ttl pk-yrs)         "Types: Cigarettes        Start date: 1955        Quit date: 1990        Years since quittin.8      Smokeless tobacco: Never      Tobacco comments: quit         Objective:  Vital signs in last 24 hours:  B/P: 118/79, T: 97.7, P: 79, R: 18   Blood pressure 118/79, pulse 79, temperature 97.7  F (36.5  C), temperature source Axillary, resp. rate 18, height 1.702 m (5' 7\"), weight 85.4 kg (188 lb 4.4 oz), SpO2 96%.        Review of Systems:   As per subjective, all others negative.    Physical Exam    General: in no apparent distress and non-toxic   HEENT: Head normocephalic atraumatic, oral mucosa moist. Sclerae anicteric  CV: Regular rhythm, normal rate, no murmurs  Resp: No wheezes, no rales or rhonchi, no focal consolidations  GI: Normoactive bowel sounds  Skin: No rashes or lesions  Extremities: Peripheral edema  Psych: Normal affect, mood euthymic  Neuro: Grossly normal          Imaging:  Personally Reviewed.    Results for orders placed or performed during the hospital encounter of 10/12/24   XR Femur Left 2 Views    Impression    IMPRESSION: Postoperative changes redemonstrated related to ORIF intertrochanteric left proximal femur fracture with dynamic hip screw. Alignment and hardware unchanged. Displaced lesser trochanteric fracture fragments are unchanged. Lateral skin staples   remain along the lateral left hip and proximal thigh. Anatomic alignment left femur. No new left femur fracture or joint malalignment. Mild to moderate left hip osteoarthritis. Moderate to severe degenerative change medial compartment left knee.   Arterial calcification. Small left knee joint effusion. Lateral left hip and proximal thigh soft tissue edema.     Head CT w/o contrast    Impression    IMPRESSION:  HEAD CT:  1.  No finding for intracranial hemorrhage, mass, or acute infarct.    2.  Moderate volume loss and moderate to advanced presumed sequelae of chronic microvascular ischemic change. Stable volume loss " and gliosis right frontal lobe.    CERVICAL SPINE CT:  1.  Advanced cervical spondylosis without finding for acute fracture or posttraumatic subluxation.       CT Cervical Spine w/o Contrast    Impression    IMPRESSION:  HEAD CT:  1.  No finding for intracranial hemorrhage, mass, or acute infarct.    2.  Moderate volume loss and moderate to advanced presumed sequelae of chronic microvascular ischemic change. Stable volume loss and gliosis right frontal lobe.    CERVICAL SPINE CT:  1.  Advanced cervical spondylosis without finding for acute fracture or posttraumatic subluxation.       US Lower Extremity Venous Duplex Left    Impression    IMPRESSION:  No deep venous thrombosis in the left lower extremity.   XR Pelvis 1/2 Views    Impression    IMPRESSION: No significant interval change. Postop ORIF left intertrochanteric femur fracture with dynamic hip screw. Hardware and alignment unchanged. Lesser trochanteric fragments remain medially displaced by approximately 2 cm. No new fracture. Mild   to moderate left and mild right hip osteoarthritis. Vascular stents project over the pelvis and surgical clips project over the right inguinal region. Both obturator rings appear intact. Arterial calcification.        Lab Results:  Personally Reviewed.   Last Comprehensive Metabolic Panel:  Sodium   Date Value Ref Range Status   10/12/2024 140 135 - 145 mmol/L Final     Potassium   Date Value Ref Range Status   10/12/2024 4.4 3.4 - 5.3 mmol/L Final   03/02/2022 4.5 3.5 - 5.0 mmol/L Final     Chloride   Date Value Ref Range Status   10/12/2024 104 98 - 107 mmol/L Final   03/02/2022 105 98 - 107 mmol/L Final     Carbon Dioxide (CO2)   Date Value Ref Range Status   10/12/2024 25 22 - 29 mmol/L Final   03/02/2022 27 22 - 31 mmol/L Final     Anion Gap   Date Value Ref Range Status   10/12/2024 11 7 - 15 mmol/L Final   03/02/2022 11 5 - 18 mmol/L Final     Glucose   Date Value Ref Range Status   10/12/2024 100 (H) 70 - 99 mg/dL Final    03/02/2022 95 70 - 125 mg/dL Final     GLUCOSE BY METER POCT   Date Value Ref Range Status   10/08/2024 109 (H) 70 - 99 mg/dL Final     Urea Nitrogen   Date Value Ref Range Status   10/12/2024 20.9 8.0 - 23.0 mg/dL Final   03/02/2022 16 8 - 28 mg/dL Final     Creatinine   Date Value Ref Range Status   10/15/2024 0.81 0.67 - 1.17 mg/dL Final     GFR Estimate   Date Value Ref Range Status   10/15/2024 88 >60 mL/min/1.73m2 Final     Comment:     eGFR calculated using 2021 CKD-EPI equation.   06/22/2021 >60 >60 mL/min/1.73m2 Final     GFR, ESTIMATED POCT   Date Value Ref Range Status   07/17/2023 55 (L) >60 mL/min/1.73m2 Final     Calcium   Date Value Ref Range Status   10/12/2024 8.4 (L) 8.8 - 10.4 mg/dL Final     Comment:     Reference intervals for this test were updated on 7/16/2024 to reflect our healthy population more accurately. There may be differences in the flagging of prior results with similar values performed with this method. Those prior results can be interpreted in the context of the updated reference intervals.        UA:   Amphetamines Urine   Date Value Ref Range Status   08/14/2021 Screen Negative Screen Negative Final     Barbiturates Urine   Date Value Ref Range Status   08/14/2021 Screen Negative Screen Negative Final     Cannabinoids Urine   Date Value Ref Range Status   08/14/2021 Screen Negative Screen Negative Final     Cocaine Urine   Date Value Ref Range Status   08/14/2021 Screen Negative Screen Negative Final     Opiates Urine   Date Value Ref Range Status   08/14/2021 Screen Negative Screen Negative Final     PCP Urine   Date Value Ref Range Status   08/14/2021 Screen Negative Screen Negative Final              Please see A&P for additional details of medical decision making.      Aaron Crane PA-C  Acute Care Pain Management  Team  Hours of pain coverage Mon-Fri 8-1600, afterhours please call the house officer   VIRIDIANA Dumont (JESUS, VINAYs, SD, RH)   Page via Vocera text web console  -Click for [place]

## 2024-10-17 NOTE — PLAN OF CARE
Goal Outcome Evaluation:       No behavioral issues noted overnight. Vitals stable, maintaining oxygen saturation on 2LNC. Pt was cooperative with all cares. Tramadol PRN given once for L hip pain for moderate relief. Pt was able to sleep in between cares overnight.

## 2024-10-17 NOTE — PROGRESS NOTES
[facility]    Medicine Progress Note - Hospitalist Service    Date of Admission:  10/12/2024    Assessment & Plan   Dominik Rojas is a 83 year old male with past medical history of COPD on 2 to 3 L nasal cannula at home, chronic systolic heart failure, hypertension, hypothyroidism, anxiety, recent fall and left hip fracture s/p ORIF who was jsut discharged to TCU.  Patient was brought to ED for evaluation another fall at TCU with left hip pain. Xray showing postop changes without new acute changes. Patient was found to be tachycardiac, concerning for sinus tachycardia vs intermittent a fib rvr.      Await placement (back to TCU?). Barrier: 1:1 due to restless, agitation - now improving, Pain consulted - poorly controlled     Mechanical fall  Left hip contusion without new fracture  Recent left hip fracture s/p ORIF  -Patient reported a mechanical fall after going to TCU one day PTA, with worse left hip pain.  Denied chest pain, dizziness, shortness of breath prior to or after fall  -s/p left hip ORIF  -negative xray for new fracture or dislocation, has soft tissue edema  -Negative for DVT on US  -Seen by Pain team, appreciate assistance  -On tramadol 50 mg q6, add Tylenol#3 every 8 as needed    Leukocytosis - improving  - wbc 12.4, suspect if stress de-margination  - No symptoms/signs of infection  - Ucx mixed tai  - Monitor wbc     Tachycardia, sinus with brief episodes of atrial tachy  -HR up to 140 in ER  -received Metoprolol in ER now in 70s  -consult cardiologist: no a fib, no need for anticoagulation  -pt is not a good candidate for anticoagulation due to frequent falls  -HR now in control     H/O Chronic systolic heart failure with normalization of LV function;  Diastolic dysfunction  -recent Echo showing preserved EF  -Lasix on hold initially due to concerning for dehydration, poor oral intake. Restart Lasix for legs edema.  -On Metoprolol, Losartan     COPD  Chronic hypoxic  "respiratory failure on 2-3l NC  -PTA meds  -Oximeter prn      Recent UTI  -PTA amoxicillin 500mg TID; finished course of abx;   -repeated UC mixed tai     History of RLL adenocarcinoma s/p radiation therapy     Anxiety and depression;  Dementia with behavioral change/agitation   Mood disorder  Acute toxic/metabolic encephalopathy  -resume PTA meds  -on 1:1  -improving, wean off 1:1 as able  -consulted psych: schedule Trazodone to hep with sleep, avoid benzodiazepines, seroquel prn for agitation. Qtc 490    Normocytic anemia  - Hb baseline 10-11  - contusions on the Left thigh  - Monitor Hb     HLD  -Crestor     GERD  -PPI      Hypothyroidism  -recent TSH 6.93, FT4 0.83  -continue Synthroid   -recommend repeat TFT's in 4 weeks with PCP          Diet: Combination Diet Regular Diet Adult    DVT Prophylaxis: Enoxaparin (Lovenox) SQ  Cabral Catheter: Not present  Lines: None     Cardiac Monitoring: None  Code Status: Full Code      Clinically Significant Risk Factors                   # Hypertension: Noted on problem list    # Dementia: noted on problem list        # Overweight: Estimated body mass index is 29.49 kg/m  as calculated from the following:    Height as of this encounter: 1.702 m (5' 7\").    Weight as of this encounter: 85.4 kg (188 lb 4.4 oz).      # Financial/Environmental Concerns: none         Disposition Plan     Medically Ready for Discharge: Anticipated Tomorrow             Rosi Childress MD  Hospitalist Service  [facility]  Securely message with [employer] (more info)  Text page via [employer] Paging/Directory   ______________________________________________________________________    Interval History   Patient was examined at the bedside, sitting comfortably in the chair.  Denies any new complaints  Seen by pain team, will wean off 1:1 as able.  Patient is more cooperative and improving  -Plan for discharge to TCU when ready    Physical Exam   Vital Signs: Temp: 97.7  F (36.5  C) " Temp src: Axillary BP: 118/79 Pulse: 79   Resp: 18 SpO2: 96 % O2 Device: Nasal cannula Oxygen Delivery: 2 LPM  Weight: 188 lbs 4.37 oz    General. Sitting comfortably in the chair.  Neck supple no JVD.  CVS regular rhythm no murmur gallops.  Lungs.  no wheezing or rales.  Abdomen.  Soft  bowel sounds present.  Extremities.  + edema, s/p left hip sx  Neurological.  No focal deficit.  Skin bruise+    Medical Decision Making       50 MINUTES SPENT BY ME on the date of service doing chart review, history, exam, documentation & further activities per the note.      Data     I have personally reviewed the following data over the past 24 hrs:    12.4 (H)  \   9.8 (L)   / 278     N/A N/A N/A /  N/A   N/A N/A N/A \       Imaging results reviewed over the past 24 hrs:   No results found for this or any previous visit (from the past 24 hour(s)).

## 2024-10-17 NOTE — PLAN OF CARE
Goal Outcome Evaluation:      Plan of Care Reviewed With: patient          Outcome Evaluation: Pt continues on 1:1 for impulsivity, confusion and restlessness.  Pt on baseline oxygen 2L.  Pt with pain in left hip with some relief from tylenol and tramadol.  Pt refuses ice pack.  Pt up in chair with assist.

## 2024-10-17 NOTE — PROGRESS NOTES
Care Management Follow Up    Length of Stay (days): 5    Expected Discharge Date: 10/21/2024     Concerns to be Addressed: discharge planning     Patient plan of care discussed at interdisciplinary rounds: Yes    Anticipated Discharge Disposition: Transitional Care       Referrals Placed by CM/SW:    Private pay costs discussed: Not applicable    Discussed  Partnership in Safe Discharge Planning  document with patient/family: Yes:     Handoff Completed: No, handoff not indicated or clinically appropriate    Additional Information:    Spoke with pt Dominik in room and both spouse Jovana and Dtr Lore over the phone.     Spouse Jovana would like referral to [facility] once off 1:1.     Spouse/Dtr/SW discussed needs for HCD and financial POA. SW completed HCD with patient in presence of arranged staff notary. Original HCD left in hard chart for spouse to pickup tomorrow, Friday. SW explained to both spouse & dtr that they will need to arrange their own mobile notary for the financial POA. Both stated understanding.       Next Steps: Send referral to [facility] TCU once off 1:1      WONG Frank

## 2024-10-17 NOTE — CONSULTS
"SPIRITUAL HEALTH SERVICES ([facility])  SPIRITUAL ASSESSMENT Progress Note  [facility]. Unit P3    REFERRAL SOURCE: LOS'; Consult (Routine)      Mr Rojas was sitting on the edge of his bed but was very groggy. His eyes kept closing as he told me how sad he was and how difficult it was being in the hospital. The place where he injured himself \"hurts terribly\" he said put he doesn't want to take any of the pain medications offered. He then went on to share about his wife's bout with cancer and he blessed his wife. It became clear that he was too sleepy to continue so we ended with some traditional Voodoo Prayers.      PLAN: [facility] will follow as able.      Sharona Womack, Ph.D., [facility]      SHS available 24/7 for emergency requests/referrals, either by having the on-call  paged or by entering an ASAP/STAT consult in Epic (this will also page the on-call ).  "

## 2024-10-18 ENCOUNTER — APPOINTMENT (OUTPATIENT)
Dept: PHYSICAL THERAPY | Facility: HOSPITAL | Age: 84
End: 2024-10-18
Payer: COMMERCIAL

## 2024-10-18 LAB
ANION GAP SERPL CALCULATED.3IONS-SCNC: 7 MMOL/L (ref 7–15)
BUN SERPL-MCNC: 19.5 MG/DL (ref 8–23)
CALCIUM SERPL-MCNC: 8.2 MG/DL (ref 8.8–10.4)
CHLORIDE SERPL-SCNC: 103 MMOL/L (ref 98–107)
CREAT SERPL-MCNC: 0.64 MG/DL (ref 0.67–1.17)
EGFRCR SERPLBLD CKD-EPI 2021: >90 ML/MIN/1.73M2
ERYTHROCYTE [DISTWIDTH] IN BLOOD BY AUTOMATED COUNT: 19.9 % (ref 10–15)
GLUCOSE SERPL-MCNC: 78 MG/DL (ref 70–99)
HCO3 SERPL-SCNC: 29 MMOL/L (ref 22–29)
HCT VFR BLD AUTO: 33.9 % (ref 40–53)
HGB BLD-MCNC: 10.2 G/DL (ref 13.3–17.7)
MCH RBC QN AUTO: 28.7 PG (ref 26.5–33)
MCHC RBC AUTO-ENTMCNC: 30.1 G/DL (ref 31.5–36.5)
MCV RBC AUTO: 95 FL (ref 78–100)
PLATELET # BLD AUTO: 343 10E3/UL (ref 150–450)
POTASSIUM SERPL-SCNC: 4.2 MMOL/L (ref 3.4–5.3)
RBC # BLD AUTO: 3.56 10E6/UL (ref 4.4–5.9)
SODIUM SERPL-SCNC: 139 MMOL/L (ref 135–145)
WBC # BLD AUTO: 12.9 10E3/UL (ref 4–11)

## 2024-10-18 PROCEDURE — 250N000013 HC RX MED GY IP 250 OP 250 PS 637: Performed by: INTERNAL MEDICINE

## 2024-10-18 PROCEDURE — 85014 HEMATOCRIT: CPT | Performed by: STUDENT IN AN ORGANIZED HEALTH CARE EDUCATION/TRAINING PROGRAM

## 2024-10-18 PROCEDURE — 120N000004 HC R&B MS OVERFLOW

## 2024-10-18 PROCEDURE — 97110 THERAPEUTIC EXERCISES: CPT | Mod: GP

## 2024-10-18 PROCEDURE — 80048 BASIC METABOLIC PNL TOTAL CA: CPT | Performed by: STUDENT IN AN ORGANIZED HEALTH CARE EDUCATION/TRAINING PROGRAM

## 2024-10-18 PROCEDURE — 36415 COLL VENOUS BLD VENIPUNCTURE: CPT | Performed by: STUDENT IN AN ORGANIZED HEALTH CARE EDUCATION/TRAINING PROGRAM

## 2024-10-18 PROCEDURE — 250N000011 HC RX IP 250 OP 636: Performed by: INTERNAL MEDICINE

## 2024-10-18 PROCEDURE — 250N000013 HC RX MED GY IP 250 OP 250 PS 637: Performed by: PHYSICIAN ASSISTANT

## 2024-10-18 PROCEDURE — 99232 SBSQ HOSP IP/OBS MODERATE 35: CPT | Performed by: PHYSICIAN ASSISTANT

## 2024-10-18 PROCEDURE — 250N000013 HC RX MED GY IP 250 OP 250 PS 637: Performed by: STUDENT IN AN ORGANIZED HEALTH CARE EDUCATION/TRAINING PROGRAM

## 2024-10-18 PROCEDURE — 250N000013 HC RX MED GY IP 250 OP 250 PS 637: Performed by: REGISTERED NURSE

## 2024-10-18 PROCEDURE — 99233 SBSQ HOSP IP/OBS HIGH 50: CPT | Performed by: STUDENT IN AN ORGANIZED HEALTH CARE EDUCATION/TRAINING PROGRAM

## 2024-10-18 PROCEDURE — 97116 GAIT TRAINING THERAPY: CPT | Mod: GP

## 2024-10-18 PROCEDURE — 97530 THERAPEUTIC ACTIVITIES: CPT | Mod: GP

## 2024-10-18 RX ORDER — ACETAMINOPHEN AND CODEINE PHOSPHATE 300; 30 MG/1; MG/1
1 TABLET ORAL EVERY 4 HOURS PRN
Status: DISCONTINUED | OUTPATIENT
Start: 2024-10-18 | End: 2024-10-21

## 2024-10-18 RX ORDER — ACETAMINOPHEN 325 MG/1
650 TABLET ORAL EVERY 4 HOURS PRN
Status: DISCONTINUED | OUTPATIENT
Start: 2024-10-18 | End: 2024-10-24

## 2024-10-18 RX ORDER — ACETAMINOPHEN 650 MG/1
650 SUPPOSITORY RECTAL EVERY 4 HOURS PRN
Status: DISCONTINUED | OUTPATIENT
Start: 2024-10-18 | End: 2024-10-24

## 2024-10-18 RX ADMIN — DULOXETINE HYDROCHLORIDE 60 MG: 60 CAPSULE, DELAYED RELEASE PELLETS ORAL at 07:47

## 2024-10-18 RX ADMIN — ACETAMINOPHEN AND CODEINE PHOSPHATE 1 TABLET: 300; 30 TABLET ORAL at 21:09

## 2024-10-18 RX ADMIN — ACETAMINOPHEN 975 MG: 325 TABLET ORAL at 03:01

## 2024-10-18 RX ADMIN — LOSARTAN POTASSIUM 25 MG: 25 TABLET, FILM COATED ORAL at 07:48

## 2024-10-18 RX ADMIN — METOPROLOL TARTRATE 25 MG: 25 TABLET, FILM COATED ORAL at 20:06

## 2024-10-18 RX ADMIN — METOPROLOL TARTRATE 25 MG: 25 TABLET, FILM COATED ORAL at 07:46

## 2024-10-18 RX ADMIN — BUSPIRONE HYDROCHLORIDE 5 MG: 5 TABLET ORAL at 09:42

## 2024-10-18 RX ADMIN — LEVOTHYROXINE SODIUM 88 MCG: 88 TABLET ORAL at 06:21

## 2024-10-18 RX ADMIN — PANTOPRAZOLE SODIUM 40 MG: 40 TABLET, DELAYED RELEASE ORAL at 06:21

## 2024-10-18 RX ADMIN — TRAMADOL HYDROCHLORIDE 50 MG: 50 TABLET, COATED ORAL at 08:02

## 2024-10-18 RX ADMIN — MICONAZOLE NITRATE: 20 POWDER TOPICAL at 21:01

## 2024-10-18 RX ADMIN — LATANOPROST 1 DROP: 50 SOLUTION/ DROPS OPHTHALMIC at 21:02

## 2024-10-18 RX ADMIN — TRAZODONE HYDROCHLORIDE 25 MG: 50 TABLET ORAL at 20:07

## 2024-10-18 RX ADMIN — ACETAMINOPHEN 650 MG: 325 TABLET ORAL at 15:02

## 2024-10-18 RX ADMIN — SENNOSIDES AND DOCUSATE SODIUM 1 TABLET: 8.6; 5 TABLET ORAL at 20:06

## 2024-10-18 RX ADMIN — TRAMADOL HYDROCHLORIDE 50 MG: 50 TABLET, COATED ORAL at 02:53

## 2024-10-18 RX ADMIN — ACETAMINOPHEN AND CODEINE PHOSPHATE 1 TABLET: 300; 30 TABLET ORAL at 12:45

## 2024-10-18 RX ADMIN — FUROSEMIDE 20 MG: 20 TABLET ORAL at 07:48

## 2024-10-18 RX ADMIN — ACETAMINOPHEN AND CODEINE PHOSPHATE 1 TABLET: 300; 30 TABLET ORAL at 17:09

## 2024-10-18 RX ADMIN — SENNOSIDES AND DOCUSATE SODIUM 1 TABLET: 8.6; 5 TABLET ORAL at 07:47

## 2024-10-18 RX ADMIN — ROSUVASTATIN 10 MG: 10 TABLET, FILM COATED ORAL at 20:07

## 2024-10-18 RX ADMIN — ENOXAPARIN SODIUM 40 MG: 40 INJECTION SUBCUTANEOUS at 17:10

## 2024-10-18 RX ADMIN — ASPIRIN 81 MG CHEWABLE TABLET 81 MG: 81 TABLET CHEWABLE at 07:46

## 2024-10-18 RX ADMIN — LIDOCAINE 1 PATCH: 4 PATCH TOPICAL at 20:05

## 2024-10-18 RX ADMIN — BUSPIRONE HYDROCHLORIDE 5 MG: 5 TABLET ORAL at 20:08

## 2024-10-18 RX ADMIN — MICONAZOLE NITRATE: 20 POWDER TOPICAL at 08:00

## 2024-10-18 RX ADMIN — Medication 3 MG: at 20:07

## 2024-10-18 ASSESSMENT — ACTIVITIES OF DAILY LIVING (ADL)
ADLS_ACUITY_SCORE: 53
ADLS_ACUITY_SCORE: 48
ADLS_ACUITY_SCORE: 53
ADLS_ACUITY_SCORE: 49
ADLS_ACUITY_SCORE: 52
ADLS_ACUITY_SCORE: 50
ADLS_ACUITY_SCORE: 48
ADLS_ACUITY_SCORE: 55
ADLS_ACUITY_SCORE: 48
ADLS_ACUITY_SCORE: 52
ADLS_ACUITY_SCORE: 50
ADLS_ACUITY_SCORE: 48
ADLS_ACUITY_SCORE: 50
ADLS_ACUITY_SCORE: 53
ADLS_ACUITY_SCORE: 49
ADLS_ACUITY_SCORE: 55
ADLS_ACUITY_SCORE: 52
ADLS_ACUITY_SCORE: 52
ADLS_ACUITY_SCORE: 53
ADLS_ACUITY_SCORE: 55
ADLS_ACUITY_SCORE: 52

## 2024-10-18 NOTE — PLAN OF CARE
Problem: Adult Inpatient Plan of Care  Goal: Plan of Care Review  Outcome: Progressing  Flowsheets (Taken 10/18/2024 1353)  Plan of Care Reviewed With: patient  Overall Patient Progress: improving  D/o to time. Calm and redirectable. 1:1 for safety/frequent falls, continue to trial off.   PT/OT following. Ambulated deleon with Ax1, GB and FWW, chair following behind him.   Pain team following. Patient was agreeable to try Tylenol-3 and ice this afternoon. Some effectiveness.   Ortho consulted today. Pending L femur CT without contrast today.   Non-tele.      Problem: Risk for Delirium  Goal: Improved Behavioral Control  Outcome: Progressing     Problem: Pain Acute  Goal: Optimal Pain Control and Function  Outcome: Progressing  Intervention: Develop Pain Management Plan  Recent Flowsheet Documentation  Taken 10/18/2024 1331 by Beena Murguia, RN  Pain Management Interventions:   cold applied   repositioned  Taken 10/18/2024 1245 by Beena Murguia, RN  Pain Management Interventions:   medication (see MAR)   cold applied     Problem: Fall Injury Risk  Goal: Absence of Fall and Fall-Related Injury  Outcome: Progressing     Problem: Comorbidity Management  Goal: Blood Pressure in Desired Range  Outcome: Progressing         Goal Outcome Evaluation:      Plan of Care Reviewed With: patient    Overall Patient Progress: improvingOverall Patient Progress: improving

## 2024-10-18 NOTE — PROGRESS NOTES
[facility] ACUTE INPATIENT PAIN SERVICE    [facility], [facility], [facility], [facility], [place]   PAIN PROGRESS      Assessment/Plan:  Dominik Rojas is a 83 year old male who was admitted on 10/12/2024.  I was asked by Dr. Rosi Mercedes to see the patient for his uncontrolled pain. He is s/p left hip fracture with ORIF. Was discharged to the TCU on 10/11 and shortly after had another fall resulting in readmission to the hospital. History of COPD on 2 to 3 L nasal cannula at home, chronic systolic heart failure, hypertension, hypothyroidism, anxiety. Describes pain as 8/10. Has been taking 200mg daily of Tramadol with minimal relief. States he had a difficult night. He is open to a medication rotation today.     [facility] is not on any controlled medications.     Opioids received in the past 24h:  *(4) 50mg tramadol tablets     Total MME is 40     PLAN:  Acute post-operative pain. Complicated by mechanical fall on 10/12. Opioid naive.  Multimodal Medication Therapy:   Adjuvants:   - APAP 975mg q8h  - ASA 81mg  - Duloxetine 60mg once daily  - Topical Lidocaine  - Trazodone 25mg bedtime  Opioids:   - Tramadol 50mg q6h - discontinued.   - Start acetaminophen-codeine 300-30mg q4h prn  Non-medication interventions- Ice  Constipation Prophylaxis- Senna-S  Follow up /Discharge Recommendations - We recommend prescribing the following at the time of discharge:  Short taper Rx of Tramadol is appropriate.               Subjective:  Dominik is seen today in his chair alert and oriented in no acute distress. Reports his pain is currently a 8/10. Pain has been difficult to control while hospitalized since his fall. Has been taking up to 4 Tramadol tablets daily with minimal relief. Reports difficult night managing his pain which affects his overall mood. Agreeable to opioid rotation today.      Denies concerns with nausea, vomiting, constipation.      Reviewed continuing with current plan with adjustments mentioned  "above, all questions answered.                 History   Drug Use No         Tobacco Use      Smoking status: Former        Packs/day: 0.00        Years: 0.5 packs/day for 35.0 years (17.5 ttl pk-yrs)        Types: Cigarettes        Start date: 1955        Quit date: 1990        Years since quittin.8      Smokeless tobacco: Never      Tobacco comments: quit         Objective:  Vital signs in last 24 hours:  B/P: 137/68, T: 97.9, P: 70, R: 18   Blood pressure 137/68, pulse 70, temperature 97.9  F (36.6  C), temperature source Oral, resp. rate 18, height 1.702 m (5' 7\"), weight 85.4 kg (188 lb 4.4 oz), SpO2 100%.        Review of Systems:   As per subjective, all others negative.    Physical Exam    General: in no apparent distress and non-toxic   HEENT: Head normocephalic atraumatic, oral mucosa moist. Sclerae anicteric  CV: Regular rhythm, normal rate, no murmurs  Resp: No wheezes, no rales or rhonchi, no focal consolidations  GI: Normoactive bowel sounds  Skin: No rashes or lesions  Extremities: Peripheral edema  Psych: Normal affect, mood euthymic  Neuro: Grossly normal          Imaging:  Personally Reviewed.    Results for orders placed or performed during the hospital encounter of 10/12/24   XR Femur Left 2 Views    Impression    IMPRESSION: Postoperative changes redemonstrated related to ORIF intertrochanteric left proximal femur fracture with dynamic hip screw. Alignment and hardware unchanged. Displaced lesser trochanteric fracture fragments are unchanged. Lateral skin staples   remain along the lateral left hip and proximal thigh. Anatomic alignment left femur. No new left femur fracture or joint malalignment. Mild to moderate left hip osteoarthritis. Moderate to severe degenerative change medial compartment left knee.   Arterial calcification. Small left knee joint effusion. Lateral left hip and proximal thigh soft tissue edema.     Head CT w/o contrast    Impression    IMPRESSION:  HEAD " CT:  1.  No finding for intracranial hemorrhage, mass, or acute infarct.    2.  Moderate volume loss and moderate to advanced presumed sequelae of chronic microvascular ischemic change. Stable volume loss and gliosis right frontal lobe.    CERVICAL SPINE CT:  1.  Advanced cervical spondylosis without finding for acute fracture or posttraumatic subluxation.       CT Cervical Spine w/o Contrast    Impression    IMPRESSION:  HEAD CT:  1.  No finding for intracranial hemorrhage, mass, or acute infarct.    2.  Moderate volume loss and moderate to advanced presumed sequelae of chronic microvascular ischemic change. Stable volume loss and gliosis right frontal lobe.    CERVICAL SPINE CT:  1.  Advanced cervical spondylosis without finding for acute fracture or posttraumatic subluxation.       US Lower Extremity Venous Duplex Left    Impression    IMPRESSION:  No deep venous thrombosis in the left lower extremity.   XR Pelvis 1/2 Views    Impression    IMPRESSION: No significant interval change. Postop ORIF left intertrochanteric femur fracture with dynamic hip screw. Hardware and alignment unchanged. Lesser trochanteric fragments remain medially displaced by approximately 2 cm. No new fracture. Mild   to moderate left and mild right hip osteoarthritis. Vascular stents project over the pelvis and surgical clips project over the right inguinal region. Both obturator rings appear intact. Arterial calcification.        Lab Results:  Personally Reviewed.   Last Comprehensive Metabolic Panel:  Sodium   Date Value Ref Range Status   10/18/2024 139 135 - 145 mmol/L Final     Potassium   Date Value Ref Range Status   10/18/2024 4.2 3.4 - 5.3 mmol/L Final   03/02/2022 4.5 3.5 - 5.0 mmol/L Final     Chloride   Date Value Ref Range Status   10/18/2024 103 98 - 107 mmol/L Final   03/02/2022 105 98 - 107 mmol/L Final     Carbon Dioxide (CO2)   Date Value Ref Range Status   10/18/2024 29 22 - 29 mmol/L Final   03/02/2022 27 22 - 31 mmol/L  Final     Anion Gap   Date Value Ref Range Status   10/18/2024 7 7 - 15 mmol/L Final   03/02/2022 11 5 - 18 mmol/L Final     Glucose   Date Value Ref Range Status   10/18/2024 78 70 - 99 mg/dL Final   03/02/2022 95 70 - 125 mg/dL Final     GLUCOSE BY METER POCT   Date Value Ref Range Status   10/08/2024 109 (H) 70 - 99 mg/dL Final     Urea Nitrogen   Date Value Ref Range Status   10/18/2024 19.5 8.0 - 23.0 mg/dL Final   03/02/2022 16 8 - 28 mg/dL Final     Creatinine   Date Value Ref Range Status   10/18/2024 0.64 (L) 0.67 - 1.17 mg/dL Final     GFR Estimate   Date Value Ref Range Status   10/18/2024 >90 >60 mL/min/1.73m2 Final     Comment:     eGFR calculated using 2021 CKD-EPI equation.   06/22/2021 >60 >60 mL/min/1.73m2 Final     GFR, ESTIMATED POCT   Date Value Ref Range Status   07/17/2023 55 (L) >60 mL/min/1.73m2 Final     Calcium   Date Value Ref Range Status   10/18/2024 8.2 (L) 8.8 - 10.4 mg/dL Final     Comment:     Reference intervals for this test were updated on 7/16/2024 to reflect our healthy population more accurately. There may be differences in the flagging of prior results with similar values performed with this method. Those prior results can be interpreted in the context of the updated reference intervals.        UA:   Amphetamines Urine   Date Value Ref Range Status   08/14/2021 Screen Negative Screen Negative Final     Barbiturates Urine   Date Value Ref Range Status   08/14/2021 Screen Negative Screen Negative Final     Cannabinoids Urine   Date Value Ref Range Status   08/14/2021 Screen Negative Screen Negative Final     Cocaine Urine   Date Value Ref Range Status   08/14/2021 Screen Negative Screen Negative Final     Opiates Urine   Date Value Ref Range Status   08/14/2021 Screen Negative Screen Negative Final     PCP Urine   Date Value Ref Range Status   08/14/2021 Screen Negative Screen Negative Final              Please see A&P for additional details of medical decision making.  Aaron  Rosa Maria BLAND  Acute Care Pain Management  Team  Hours of pain coverage Mon-Fri 8-1600, afterhours please call the house officer   [facility] (JESUS, Noel, [place], [place])   Page via Vocera text web console -Click for [employer]

## 2024-10-18 NOTE — CONSULTS
ORTHOPEDIC CONSULTATION    Consultation  Dominik Rojas,  1940, MRN [id number]    Atrial fibrillation with RVR (H) [I48.91]  Fall, initial encounter [W19.XXXA]    PCP: Misael Moe, [phone]   Code status:  Full Code       Extended Emergency Contact Information  Primary Emergency Contact: MARVIN ROJAS  Home Phone: [phone]  Mobile Phone: [phone]  Relation: Spouse  Secondary Emergency Contact: Lore Stevens   [facility]  Home Phone: [phone]  Mobile Phone: [phone]  Relation: Daughter         IMPRESSION:  Left hip pain status post left hip nail with Dr. Raygoza on 10/6/2024     PLAN:  This patient was discussed with Dr. Emmanuel, on-call surgeon for [facility] and they are in agreement with the following plan.   -No indication for urgent surgical intervention at this time.  Reviewed x-rays and there does not appear to be any acute changes compared to postoperatively  -Due to ongoing pain and fall sustained at his TCU will obtain a left femur CT scan to evaluate further for any acute changes.  -Weight-bear as tolerated left lower extremity  -Inpatient pain team consulted, appreciate recommendations.  -PT/OT for ongoing work on weightbearing and increasing ambulation.    Thank you for including [facility] in the care of Dominik Rojas. It has been a pleasure participating in their care.        CHIEF COMPLAINT: Fall, initial encounter    HISTORY OF PRESENT ILLNESS:  The patient is seen in orthopedic consultation at the request of Rosi Childress MD for left hip pain.  The patient is a 83 year old male    Today patient is experiencing ongoing left hip pain ever since the time of his surgery.  He came into the hospital on 10/5/2024 and was found to have a left hip intertrochanteric fracture, repaired with a hip nail with Dr. Raygoza on 10/6/2024.  He recovered well in the hospital and was discharged to TCU.  He then came back into the hospital on 10/12/2024 after  suffering a fall while at the TCU.  He notes that he was taken from his wheelchair to the toilet when his leg gave out underneath him and he fell to the floor.  He states that he does think that he made direct contact with his left hip and the floor.  He does however notes that the pain that he is having has been fairly consistent ever since the time of surgery, he did not have any acute worsening of his left hip pain following the fall.  Pain is worse with weightbearing and ambulating but he does have some pain at rest as well.  Pain is mostly intermittent and at times he  is fairly comfortable at rest.  Denies fever/chills    ALLERGIES:   Review of patient's allergies indicates   Allergies   Allergen Reactions    Diclofenac      Other reaction(s): GI Upset    Loratadine      Other reaction(s): Urinary Retention    Oxycodone      Agitation     Sertraline Unknown     Other reaction(s): ineffective         MEDICATIONS UPON ADMISSION:  Medications were reviewed.  They include:   Medications Prior to Admission   Medication Sig Dispense Refill Last Dose    acetaminophen (TYLENOL) 325 MG tablet Take 3 tablets (975 mg) by mouth 3 times daily. 100 tablet 0 10/12/2024 at am    [] amoxicillin (AMOXIL) 500 MG capsule Take 1 capsule (500 mg) by mouth every 8 hours for 5 days.   10/12/2024 at am    aspirin (ASA) 81 MG chewable tablet Take 1 tablet (81 mg) by mouth 2 times daily. 60 tablet 0 10/12/2024 at am    busPIRone (BUSPAR) 5 MG tablet Take 5 mg by mouth 2 times daily   10/12/2024 at am    Cholecalciferol (VITAMIN D3) 50 MCG (2000 UT) CAPS Take 1 tablet by mouth every morning.   10/12/2024 at am    DULoxetine (CYMBALTA) 60 MG capsule Take 60 mg by mouth every morning.   10/12/2024 at am    furosemide (LASIX) 20 MG tablet Take 20 mg by mouth every morning.   10/12/2024 at am    ipratropium - albuterol 0.5 mg/2.5 mg/3 mL (DUONEB) 0.5-2.5 (3) MG/3ML neb solution Take 1 vial by nebulization every 6 hours as needed for  wheezing.   10/12/2024 at am    latanoprost (XALATAN) 0.005 % ophthalmic solution Place 1 drop into both eyes At Bedtime   10/11/2024 at pm    levothyroxine (SYNTHROID/LEVOTHROID) 88 MCG tablet Take 88 mcg by mouth daily before breakfast   10/12/2024 at am    lidocaine (LIDODERM) 5 % patch Place 1 patch over 12 hours onto the skin every 24 hours. To prevent lidocaine toxicity, patient should be patch free for 12 hrs daily. 9 patch 0 10/12/2024 at am-on    losartan (COZAAR) 25 MG tablet Take 25 mg by mouth every morning.   10/12/2024 at am    Melatonin 10 MG TABS tablet Take 10 mg by mouth at bedtime.   10/11/2024 at pm    menthol-zinc oxide (CALMOSEPTINE) 0.44-20.6 % OINT ointment Apply topically 3 times daily. Apply to saccral, coccyx, and groin area(s) topically three times daily for prevention.   10/12/2024 at am    metoprolol succinate ER (TOPROL-XL) 12.5 mg TABS 24 hr half-tab Take 12.5 mg by mouth every morning.   10/12/2024 at am    miconazole (MICATIN) 2 % external powder Apply topically 2 times daily.   10/12/2024 at am    multivitamin w/minerals (THERA-VIT-M) tablet Take 1 tablet by mouth every morning.   10/12/2024 at am    OLANZapine (ZYPREXA) 7.5 MG tablet Take 7.5 mg by mouth at bedtime.   10/11/2024 at pm    pantoprazole (PROTONIX) 40 MG EC tablet Take 1 tablet (40 mg) by mouth every morning (before breakfast) 30 tablet 0 10/12/2024 at am    rosuvastatin (CRESTOR) 10 MG tablet Take 10 mg by mouth at bedtime.   10/11/2024 at pm    senna-docusate (SENOKOT-S/PERICOLACE) 8.6-50 MG tablet Take 1 tablet by mouth 2 times daily as needed for constipation.   Unknown at prn    traMADol (ULTRAM) 50 MG tablet Take 1 tablet (50 mg) by mouth every 6 hours as needed for severe pain. 15 tablet 0 Unknown at prn         SOCIAL HISTORY:   he  reports that he quit smoking about 34 years ago. His smoking use included cigarettes. He started smoking about 69 years ago. He has a 17.5 pack-year smoking history. He has never  "used smokeless tobacco. He reports that he does not drink alcohol and does not use drugs.      FAMILY HISTORY:  family history includes Cirrhosis in his father; Emphysema in his mother; No Known Problems in his brother, father, maternal aunt, maternal grandfather, maternal grandmother, maternal uncle, paternal aunt, paternal grandfather, paternal grandmother, paternal uncle, sister, and another family member.      REVIEW OF SYSTEMS:   Reviewed with patient. See HPI, otherwise negative       PHYSICAL EXAMINATION:  Vitals: /68 (BP Location: Left arm, Patient Position: Sitting, Cuff Size: Adult Regular)   Pulse 70   Temp 97.9  F (36.6  C) (Oral)   Resp 18   Ht 1.702 m (5' 7\")   Wt 85.4 kg (188 lb 4.4 oz)   SpO2 100%   BMI 29.49 kg/m    General: On examination, the patient is resting comfortably, NAD, awake, and alert and oriented to person, place, time, and, and general circumstances   SKIN: There is generalized swelling through his left thigh.  3 incisions are well-approximated and healing appropriately, closed with staples.  Middle incision does have some surrounding erythema, no concern for cellulitis at this time appears to be reactive.  No bleeding or drainage from any incisions.  Pulses:  Dorsalis pedis and posterior tibialis pulse is intact and equal bilaterally  Sensation: intact and equal bilaterally to the distal lower extremities.  Tenderness: He is tender to palpation over the left hip.  No distal tenderness  ROM: He is able to actively flex his hip to about 100 degrees, extend his knee from 90 degrees to about 45 degrees and DF/PF and wiggle toes  Motor: TIB ANT, PF, ELH, QUAD, HF 5/5  Contralateral side= Full range of motion, Negative joint instability findings, 5/5 motor groups about the joint, Non-tender.       RADIOGRAPHIC EVALUATION:  Personally reviewed  EXAM: XR FEMUR LEFT 2 VIEWS  LOCATION: [facility]  DATE: 10/12/2024     INDICATION: Left hip pain, status " post fall after hip surgery.  COMPARISON: 10/06/2024.                                                                      IMPRESSION: Postoperative changes redemonstrated related to ORIF intertrochanteric left proximal femur fracture with dynamic hip screw. Alignment and hardware unchanged. Displaced lesser trochanteric fracture fragments are unchanged. Lateral skin staples   remain along the lateral left hip and proximal thigh. Anatomic alignment left femur. No new left femur fracture or joint malalignment. Mild to moderate left hip osteoarthritis. Moderate to severe degenerative change medial compartment left knee.   Arterial calcification. Small left knee joint effusion. Lateral left hip and proximal thigh soft tissue edema.    PERTINENT LABS:  Personally reviewed  Recent Labs   Lab Test 10/18/24  0454 04/25/23  0657 04/24/23  2140   INR  --   --  1.16*   HGB 10.2*   < > 15.6      < > 250    < > = values in this interval not displayed.         PARMINDER CANTU PA-C  Date: 10/18/2024  Time: 12:02 PM  [facility]    CC1:   Rosi Childress MD

## 2024-10-18 NOTE — PLAN OF CARE
Problem: Adult Inpatient Plan of Care  Goal: Plan of Care Review  Description: The Plan of Care Review/Shift note should be completed every shift.  The Outcome Evaluation is a brief statement about your assessment that the patient is improving, declining, or no change.  This information will be displayed automatically on your shift  note.  Outcome: Progressing     Problem: Risk for Delirium  Goal: Optimal Coping  Outcome: Progressing  Intervention: Optimize Psychosocial Adjustment to Delirium  Recent Flowsheet Documentation  Taken 10/17/2024 1645 by Po Coto RN  Supportive Measures:   active listening utilized   verbalization of feelings encouraged  Goal: Improved Behavioral Control  Intervention: Minimize Safety Risk  Recent Flowsheet Documentation  Taken 10/17/2024 1645 by Po Coto RN  Communication Enhancement Strategies:   extra time allowed for response   repetition utilized  Enhanced Safety Measures:   patient/family teach back on injury risk   review medications for side effects with activity  Goal: Improved Attention and Thought Clarity  Intervention: Maximize Cognitive Function  Recent Flowsheet Documentation  Taken 10/17/2024 1645 by Po Coto RN  Sensory Stimulation Regulation:   care clustered   quiet environment promoted   television on  Reorientation Measures:   calendar in view   clock in view   glasses use encouraged   reorientation provided     Problem: Pain Acute  Goal: Optimal Pain Control and Function  Intervention: Prevent or Manage Pain  Recent Flowsheet Documentation  Taken 10/17/2024 1645 by Po Coto RN  Sensory Stimulation Regulation:   care clustered   quiet environment promoted   television on  Bowel Elimination Promotion: adequate fluid intake promoted  Medication Review/Management: medications reviewed  Intervention: Optimize Psychosocial Wellbeing  Recent Flowsheet Documentation  Taken 10/17/2024 1645 by Po Coto RN  Supportive Measures:   active listening  "utilized   verbalization of feelings encouraged     Problem: Fall Injury Risk  Goal: Absence of Fall and Fall-Related Injury  Outcome: Progressing  Intervention: Identify and Manage Contributors  Recent Flowsheet Documentation  Taken 10/17/2024 1645 by Po Coto RN  Medication Review/Management: medications reviewed  Intervention: Promote Injury-Free Environment  Recent Flowsheet Documentation  Taken 10/17/2024 1645 by Po Coto RN  Safety Promotion/Fall Prevention:   activity supervised   bedside attendant   clutter free environment maintained   nonskid shoes/slippers when out of bed   patient and family education   safety round/check completed   supervised activity   Goal Outcome Evaluation:                    Alert. Intermittently confused. Patient able to make his needs known. Quiet, flat affect. Patient multi times calling his daughter Solange, calls were unanswered.     Patient feel secured when someone was with him in the room. He would say, \" please don't go\", to whoever was him when there was sitter change.     Reported his breathing about the same, not worst. Rhonchi auscultated posteriorly. Lower extremity edema noted, left moderate, right mild. O2 sat above 90s on 2 lpm nc.    Left hip incision bruised and edematous. Staples in place.     Moan, grimaced face with movement, rating 9/10. Scheduled pain medication given, not needing prn.    Sitting up on recliner most of the time this shift. Encouraged to elevate legs, initially refused but later agreed.     Assist of 1 with transfer, gait belt and walker.     After bedtime meds given, patient was agreeable to transfer to his new room closer to nurses station.         "

## 2024-10-18 NOTE — PROGRESS NOTES
[facility]    Medicine Progress Note - Hospitalist Service    Date of Admission:  10/12/2024    Assessment & Plan   Dominik Rojas is a 83 year old male with past medical history of COPD on 2 to 3 L nasal cannula at home, chronic systolic heart failure, hypertension, hypothyroidism, anxiety, recent fall and left hip fracture s/p ORIF who was jsut discharged to TCU.  Patient was brought to ED for evaluation another fall at TCU with left hip pain. Xray showing postop changes without new acute changes. Patient was found to be tachycardiac, concerning for sinus tachycardia vs intermittent a fib rvr.      Await placement (back to TCU?). Barrier: 1:1 due to restless, agitation - now improving, Pain consulted - poorly controlled     Mechanical fall  Left hip contusion without new fracture  Recent left hip fracture s/p ORIF  -Patient reported a mechanical fall after going to TCU one day PTA, with worse left hip pain.  Denied chest pain, dizziness, shortness of breath prior to or after fall  -s/p left hip ORIF  -negative xray for new fracture or dislocation, has soft tissue edema  -Negative for DVT on US  -Seen by Ortho, appreciate recs  -Pending CT left femur  -Seen by Pain team, appreciate assistance  -on Tylenol#3 every 4 as needed, discontinued Tramadol. Tylenol prn 650mg    Leukocytosis - improving  - wbc 12.9, suspect if stress de-margination  - No symptoms/signs of infection  - Ucx mixed tai  - Monitor wbc     Tachycardia, sinus with brief episodes of atrial tachy  -HR up to 140 in ER  -received Metoprolol in ER now in 70s  -consult cardiologist: no a fib, no need for anticoagulation  -pt is not a good candidate for anticoagulation due to frequent falls  -HR now in control     H/O Chronic systolic heart failure with normalization of LV function;  Diastolic dysfunction  -recent Echo showing preserved EF  -Lasix on hold initially due to concerning for dehydration, poor oral intake. Restart  "Lasix for legs edema.  -On Metoprolol, Losartan     COPD  Chronic hypoxic respiratory failure on 2-3l NC  -PTA meds  -Oximeter prn      Recent UTI  -PTA amoxicillin 500mg TID; finished course of abx;   -repeated UC mixed tai     History of RLL adenocarcinoma s/p radiation therapy     Anxiety and depression;  Dementia with behavioral change/agitation   Mood disorder  Acute toxic/metabolic encephalopathy  -resume PTA meds  -on 1:1  -improving, wean off 1:1 as able  -consulted psych: schedule Trazodone to hep with sleep, avoid benzodiazepines, seroquel prn for agitation. Qtc 490    Normocytic anemia  - Hb baseline 10-11  - contusions on the Left thigh  - Monitor Hb     HLD  -Crestor     GERD  -PPI      Hypothyroidism  -recent TSH 6.93, FT4 0.83  -continue Synthroid   -recommend repeat TFT's in 4 weeks with PCP          Diet: Combination Diet Regular Diet Adult    DVT Prophylaxis: Enoxaparin (Lovenox) SQ  Cabral Catheter: Not present  Lines: None     Cardiac Monitoring: None  Code Status: Full Code      Clinically Significant Risk Factors                   # Hypertension: Noted on problem list    # Dementia: noted on problem list        # Overweight: Estimated body mass index is 29.49 kg/m  as calculated from the following:    Height as of this encounter: 1.702 m (5' 7\").    Weight as of this encounter: 85.4 kg (188 lb 4.4 oz).      # Financial/Environmental Concerns: none         Disposition Plan     Medically Ready for Discharge: Anticipated in 2-4 Days             Rosi Childress MD  Hospitalist Service  [facility]  Securely message with [employer] (more info)  Text page via [employer] Paging/Directory   ______________________________________________________________________    Interval History   Patient was examined at the bedside, denies any new complaints.  Pain in the left thigh is persistent with no improvement, consulted orthopedic.  Pending CT femur left side  Managing pain and encourage " ambulation with PT    Physical Exam   Vital Signs: Temp: 97.9  F (36.6  C) Temp src: Oral BP: 137/68 Pulse: 70   Resp: 18 SpO2: 100 % O2 Device: Nasal cannula Oxygen Delivery: 2 LPM  Weight: 188 lbs 4.37 oz    General. Sitting comfortably in the chair.  Neck supple no JVD.  CVS regular rhythm no murmur gallops.  Lungs.  no wheezing or rales.  Abdomen.  Soft  bowel sounds present.  Extremities.  + edema, s/p left hip sx  Neurological.  No focal deficit.  Skin bruise+    Medical Decision Making       50 MINUTES SPENT BY ME on the date of service doing chart review, history, exam, documentation & further activities per the note.      Data     I have personally reviewed the following data over the past 24 hrs:    12.9 (H)  \   10.2 (L)   / 343     139 103 19.5 /  78   4.2 29 0.64 (L) \       Imaging results reviewed over the past 24 hrs:   No results found for this or any previous visit (from the past 24 hour(s)).

## 2024-10-18 NOTE — PROGRESS NOTES
Patient refusing to transfer to room 322 right now. Holding patient's transfer for now till bedtime meds kicks- in to prevent patient's behavior to escalate. As of the moment he is calm. Will re approach patient . 1:1 sitter at bedside continued for safety.

## 2024-10-18 NOTE — PLAN OF CARE
Problem: Pain Acute  Goal: Optimal Pain Control and Function  Outcome: Progressing  Intervention: Develop Pain Management Plan  Recent Flowsheet Documentation  Taken 10/18/2024 0343 by Faith Franco RN  Pain Management Interventions:   repositioned   cold applied  Taken 10/18/2024 0301 by Faith Franco RN  Pain Management Interventions: medication (see MAR)  Taken 10/18/2024 0253 by Faith Franco RN  Pain Management Interventions: medication (see MAR)  Intervention: Prevent or Manage Pain  Recent Flowsheet Documentation  Taken 10/18/2024 0000 by Faith Franco RN  Sensory Stimulation Regulation:   care clustered   quiet environment promoted   television on   lighting decreased  Bowel Elimination Promotion: adequate fluid intake promoted  Medication Review/Management: medications reviewed  Intervention: Optimize Psychosocial Wellbeing  Recent Flowsheet Documentation  Taken 10/18/2024 0000 by Faith Franco RN  Supportive Measures:   active listening utilized   verbalization of feelings encouraged   Goal Outcome Evaluation:        Pt. Alert and oriented to self, place. Disoriented to time. Pleasant and cooperative with cares. Left hip pain. Scheduled Tylenol and Tramadol given. Cold pack and repositioning helpful for pain management. Continues on 1:1 for safety. Pt. Stands up frequently when in recliner d/t pain in hip. Also restless when in bed and wants to get up frequently d/t pain left hip. Slept for 3 hours over the night. Vital signs stable. Continue with current plan of care.    Faith Franco RN

## 2024-10-19 ENCOUNTER — APPOINTMENT (OUTPATIENT)
Dept: RADIOLOGY | Facility: HOSPITAL | Age: 84
End: 2024-10-19
Attending: STUDENT IN AN ORGANIZED HEALTH CARE EDUCATION/TRAINING PROGRAM
Payer: COMMERCIAL

## 2024-10-19 ENCOUNTER — APPOINTMENT (OUTPATIENT)
Dept: CT IMAGING | Facility: HOSPITAL | Age: 84
End: 2024-10-19
Attending: PHYSICIAN ASSISTANT
Payer: COMMERCIAL

## 2024-10-19 LAB
ERYTHROCYTE [DISTWIDTH] IN BLOOD BY AUTOMATED COUNT: 20 % (ref 10–15)
HCT VFR BLD AUTO: 35.1 % (ref 40–53)
HGB BLD-MCNC: 10.9 G/DL (ref 13.3–17.7)
HOLD SPECIMEN: NORMAL
MCH RBC QN AUTO: 29.8 PG (ref 26.5–33)
MCHC RBC AUTO-ENTMCNC: 31.1 G/DL (ref 31.5–36.5)
MCV RBC AUTO: 96 FL (ref 78–100)
PLATELET # BLD AUTO: 351 10E3/UL (ref 150–450)
PROCALCITONIN SERPL IA-MCNC: 0.06 NG/ML
RBC # BLD AUTO: 3.66 10E6/UL (ref 4.4–5.9)
WBC # BLD AUTO: 15.3 10E3/UL (ref 4–11)

## 2024-10-19 PROCEDURE — 120N000004 HC R&B MS OVERFLOW

## 2024-10-19 PROCEDURE — 250N000011 HC RX IP 250 OP 636: Performed by: INTERNAL MEDICINE

## 2024-10-19 PROCEDURE — 71045 X-RAY EXAM CHEST 1 VIEW: CPT

## 2024-10-19 PROCEDURE — 87040 BLOOD CULTURE FOR BACTERIA: CPT | Performed by: STUDENT IN AN ORGANIZED HEALTH CARE EDUCATION/TRAINING PROGRAM

## 2024-10-19 PROCEDURE — 250N000013 HC RX MED GY IP 250 OP 250 PS 637: Performed by: REGISTERED NURSE

## 2024-10-19 PROCEDURE — 84145 PROCALCITONIN (PCT): CPT | Performed by: STUDENT IN AN ORGANIZED HEALTH CARE EDUCATION/TRAINING PROGRAM

## 2024-10-19 PROCEDURE — 250N000013 HC RX MED GY IP 250 OP 250 PS 637: Performed by: PHYSICIAN ASSISTANT

## 2024-10-19 PROCEDURE — 73700 CT LOWER EXTREMITY W/O DYE: CPT | Mod: LT

## 2024-10-19 PROCEDURE — 250N000013 HC RX MED GY IP 250 OP 250 PS 637: Performed by: INTERNAL MEDICINE

## 2024-10-19 PROCEDURE — 85027 COMPLETE CBC AUTOMATED: CPT | Performed by: STUDENT IN AN ORGANIZED HEALTH CARE EDUCATION/TRAINING PROGRAM

## 2024-10-19 PROCEDURE — 36415 COLL VENOUS BLD VENIPUNCTURE: CPT | Performed by: STUDENT IN AN ORGANIZED HEALTH CARE EDUCATION/TRAINING PROGRAM

## 2024-10-19 PROCEDURE — 250N000011 HC RX IP 250 OP 636: Performed by: FAMILY MEDICINE

## 2024-10-19 PROCEDURE — 99233 SBSQ HOSP IP/OBS HIGH 50: CPT | Performed by: STUDENT IN AN ORGANIZED HEALTH CARE EDUCATION/TRAINING PROGRAM

## 2024-10-19 RX ADMIN — SENNOSIDES AND DOCUSATE SODIUM 1 TABLET: 8.6; 5 TABLET ORAL at 08:10

## 2024-10-19 RX ADMIN — ACETAMINOPHEN 650 MG: 325 TABLET ORAL at 02:23

## 2024-10-19 RX ADMIN — METOPROLOL TARTRATE 25 MG: 25 TABLET, FILM COATED ORAL at 21:06

## 2024-10-19 RX ADMIN — SENNOSIDES AND DOCUSATE SODIUM 1 TABLET: 8.6; 5 TABLET ORAL at 08:03

## 2024-10-19 RX ADMIN — LEVOTHYROXINE SODIUM 88 MCG: 88 TABLET ORAL at 08:03

## 2024-10-19 RX ADMIN — MICONAZOLE NITRATE: 20 POWDER TOPICAL at 08:11

## 2024-10-19 RX ADMIN — SENNOSIDES AND DOCUSATE SODIUM 1 TABLET: 8.6; 5 TABLET ORAL at 21:06

## 2024-10-19 RX ADMIN — ROSUVASTATIN 10 MG: 10 TABLET, FILM COATED ORAL at 21:07

## 2024-10-19 RX ADMIN — METOPROLOL TARTRATE 25 MG: 25 TABLET, FILM COATED ORAL at 08:03

## 2024-10-19 RX ADMIN — ACETAMINOPHEN AND CODEINE PHOSPHATE 1 TABLET: 300; 30 TABLET ORAL at 01:17

## 2024-10-19 RX ADMIN — ENOXAPARIN SODIUM 40 MG: 40 INJECTION SUBCUTANEOUS at 21:08

## 2024-10-19 RX ADMIN — LORAZEPAM 0.5 MG: 2 INJECTION INTRAMUSCULAR; INTRAVENOUS at 18:08

## 2024-10-19 RX ADMIN — ACETAMINOPHEN 650 MG: 325 TABLET ORAL at 23:59

## 2024-10-19 RX ADMIN — BUSPIRONE HYDROCHLORIDE 5 MG: 5 TABLET ORAL at 08:03

## 2024-10-19 RX ADMIN — ACETAMINOPHEN AND CODEINE PHOSPHATE 1 TABLET: 300; 30 TABLET ORAL at 08:03

## 2024-10-19 RX ADMIN — DULOXETINE HYDROCHLORIDE 60 MG: 60 CAPSULE, DELAYED RELEASE PELLETS ORAL at 08:03

## 2024-10-19 RX ADMIN — MICONAZOLE NITRATE: 20 POWDER TOPICAL at 21:09

## 2024-10-19 RX ADMIN — ACETAMINOPHEN AND CODEINE PHOSPHATE 1 TABLET: 300; 30 TABLET ORAL at 15:43

## 2024-10-19 RX ADMIN — BUSPIRONE HYDROCHLORIDE 5 MG: 5 TABLET ORAL at 21:08

## 2024-10-19 RX ADMIN — PANTOPRAZOLE SODIUM 40 MG: 40 TABLET, DELAYED RELEASE ORAL at 08:03

## 2024-10-19 RX ADMIN — LATANOPROST 1 DROP: 50 SOLUTION/ DROPS OPHTHALMIC at 21:08

## 2024-10-19 RX ADMIN — FUROSEMIDE 20 MG: 20 TABLET ORAL at 08:03

## 2024-10-19 RX ADMIN — TRAZODONE HYDROCHLORIDE 25 MG: 50 TABLET ORAL at 21:07

## 2024-10-19 RX ADMIN — LOSARTAN POTASSIUM 25 MG: 25 TABLET, FILM COATED ORAL at 08:03

## 2024-10-19 RX ADMIN — QUETIAPINE FUMARATE 12.5 MG: 25 TABLET ORAL at 15:43

## 2024-10-19 RX ADMIN — ASPIRIN 81 MG CHEWABLE TABLET 81 MG: 81 TABLET CHEWABLE at 08:03

## 2024-10-19 RX ADMIN — ACETAMINOPHEN AND CODEINE PHOSPHATE 1 TABLET: 300; 30 TABLET ORAL at 21:07

## 2024-10-19 ASSESSMENT — ACTIVITIES OF DAILY LIVING (ADL)
ADLS_ACUITY_SCORE: 53
ADLS_ACUITY_SCORE: 49
ADLS_ACUITY_SCORE: 49
ADLS_ACUITY_SCORE: 53
ADLS_ACUITY_SCORE: 49
ADLS_ACUITY_SCORE: 53
ADLS_ACUITY_SCORE: 53
ADLS_ACUITY_SCORE: 49
ADLS_ACUITY_SCORE: 53
ADLS_ACUITY_SCORE: 49
ADLS_ACUITY_SCORE: 53

## 2024-10-19 NOTE — PROGRESS NOTES
[facility]    Medicine Progress Note - Hospitalist Service    Date of Admission:  10/12/2024    Assessment & Plan   Dominik Rojas is a 83 year old male with past medical history of COPD on 2 to 3 L nasal cannula at home, chronic systolic heart failure, hypertension, hypothyroidism, anxiety, recent fall and left hip fracture s/p ORIF who was jsut discharged to TCU.  Patient was brought to ED for evaluation another fall at TCU with left hip pain. Xray showing postop changes without new acute changes. Patient was found to be tachycardiac, concerning for sinus tachycardia vs intermittent a fib rvr.      Await placement back to TCU. Barrier: 1:1 due to restless, agitation - now improving, Pain consulted - poorly controlled, Leukocytosis work up     Mechanical fall  Left hip contusion without new fracture  Recent left hip fracture s/p ORIF  -Patient reported a mechanical fall after going to TCU one day PTA, with worse left hip pain.  Denied chest pain, dizziness, shortness of breath prior to or after fall  -s/p left hip ORIF  -negative xray for new fracture or dislocation, has soft tissue edema  -Negative for DVT on US  -CT Left femur no evidence of new fracture, small lateral left hip subcutaneous hematoma  -Seen by Ortho, appreciate recs, follow-up outpatient  -Seen by Pain team, appreciate assistance  -on Tylenol#3 every 4 as needed, discontinued Tramadol. Tylenol prn 650mg    Leukocytosis  - wbc 15.3, suspect if stress de-margination  - procalcitonin not elevated  - No symptoms/signs of infection  - Ucx mixed tai  - pending Bcx, CXR r/o pna, Resp viral panel  - Monitor wbc     Tachycardia, sinus with brief episodes of atrial tachy  -consult cardiologist: no a fib, no need for anticoagulation  -pt is not a good candidate for anticoagulation due to frequent falls  -HR now in control     H/O Chronic systolic heart failure with normalization of LV function;  Diastolic dysfunction  -recent Echo  "showing preserved EF  -On Metoprolol, Losartan, Lasix     COPD  Chronic hypoxic respiratory failure on 2-3l NC  -PTA meds  -Oximeter prn      Recent UTI  -PTA amoxicillin 500mg TID; finished course of abx;   -repeated UC mixed tai     History of RLL adenocarcinoma s/p radiation therapy     Anxiety and depression;  Dementia with behavioral change/agitation   Mood disorder  Acute toxic/metabolic encephalopathy  -resume PTA meds  -on 1:1  -improving, wean off 1:1 as able  -consulted psych: schedule Trazodone to hep with sleep, avoid benzodiazepines, seroquel prn for agitation. Qtc 490    Normocytic anemia  - Hb baseline 10-11  - contusions and hematoma on the Left thigh  - Monitor Hb     HLD  -Crestor     GERD  -PPI      Hypothyroidism  -recent TSH 6.93, FT4 0.83  -continue Synthroid   -recommend repeat TFT's in 4 weeks with PCP          Diet: Combination Diet Regular Diet Adult    DVT Prophylaxis: Enoxaparin (Lovenox) SQ  Cabral Catheter: Not present  Lines: None     Cardiac Monitoring: None  Code Status: Full Code      Clinically Significant Risk Factors                   # Hypertension: Noted on problem list    # Dementia: noted on problem list        # Overweight: Estimated body mass index is 29.49 kg/m  as calculated from the following:    Height as of this encounter: 1.702 m (5' 7\").    Weight as of this encounter: 85.4 kg (188 lb 4.8 oz).      # Financial/Environmental Concerns: none         Disposition Plan     Medically Ready for Discharge: Anticipated Tomorrow             Rosi Childress MD  Hospitalist Service  [facility]  Securely message with [employer] (more info)  Text page via [employer] Paging/Directory   ______________________________________________________________________    Interval History   Patient was examined at the bedside, sitting comfortably in the chair.  Was somewhat sleepy today, denies any new complaint  -Workup for leukocytosis, plan to wean off one-to-one discharged " to TCU when ready    Physical Exam   Vital Signs: Temp: 98.6  F (37  C) Temp src: Oral BP: 138/72 Pulse: 80   Resp: 18 SpO2: 96 % O2 Device: Nasal cannula Oxygen Delivery: 2 LPM  Weight: 188 lbs 4.8 oz    General. Sitting comfortably in the chair.  Neck supple no JVD.  CVS regular rhythm no murmur gallops.  Lungs.  no wheezing or rales.  Abdomen.  Soft  bowel sounds present.  Extremities.  1+ edema b/l , s/p left hip sx  Neurological.  No focal deficit.  Skin bruise+    Medical Decision Making       50 MINUTES SPENT BY ME on the date of service doing chart review, history, exam, documentation & further activities per the note.      Data     I have personally reviewed the following data over the past 24 hrs:    15.3 (H)  \   10.9 (L)   / 351     N/A N/A N/A /  N/A   N/A N/A N/A \     Procal: 0.06 CRP: N/A Lactic Acid: N/A         Imaging results reviewed over the past 24 hrs:   Recent Results (from the past 24 hour(s))   CT Femur Thigh Left w/o Contrast    Narrative    EXAM: CT FEMUR THIGH LEFT W/O CONTRAST  LOCATION: [facility]  DATE: 10/19/2024    INDICATION: Assess for fracture s p hip nail and new fall  COMPARISON: Radiograph 10/12/2024. CT 07/17/2024.  TECHNIQUE: Noncontrast. Axial, sagittal and coronal thin-section reconstruction. Dose reduction techniques were used.     FINDINGS:     BONES:  Reduced and nailed comminuted intertrochanteric left femoral fracture with dynamic femoral head/neck screw and single distal interlocking screw. No instrumentation failure. Similar alignment compared to radiograph 10/12/2024. Distracted comminuted   fragments of the lesser trochanter are unchanged unchanged. There is mildly displaced comminution of the greater trochanter. There is soft tissue edema about the fracture. There is a small subcutaneous hematoma along the surgical path (series 4 image 8).   There are staples along the lateral left thigh.    Moderate left hip osteoarthritis. Degenerative  changes pubic symphysis. Mild left sacroiliac joint osteoarthritis. Moderate medial compartment left knee osteoarthritis.    SOFT TISSUES:  Edema about the fracture as above. Small hematoma in the overlying subcutaneous tissue.    Diffuse subcutaneous edema of the left thigh. Stranding in the left groin related to prior vascular access. Partially evaluated intra-abdominal vascular stents. Vascular calcifications without aneurysm.      Impression    IMPRESSION:  1.  Reduced and nailed comminuted intertrochanteric left femoral fracture in unchanged alignment. No CT evidence of new fracture.  2.  Small lateral left hip subcutaneous hematoma.

## 2024-10-19 NOTE — PLAN OF CARE
Problem: Risk for Delirium  Goal: Optimal Coping  Outcome: Progressing  Intervention: Optimize Psychosocial Adjustment to Delirium  Recent Flowsheet Documentation  Taken 10/18/2024 1623 by Mami Yeboah RN  Supportive Measures:   active listening utilized   verbalization of feelings encouraged  Goal: Improved Behavioral Control  Outcome: Progressing  Intervention: Prevent and Manage Agitation  Recent Flowsheet Documentation  Taken 10/18/2024 1623 by Mami Yeboah RN  Environment Familiarity/Consistency: daily routine followed  Intervention: Minimize Safety Risk  Recent Flowsheet Documentation  Taken 10/18/2024 1623 by Maim Yeboah RN  Communication Enhancement Strategies:   extra time allowed for response   repetition utilized  Enhanced Safety Measures:   patient/family teach back on injury risk   review medications for side effects with activity  Goal: Improved Attention and Thought Clarity  Outcome: Progressing  Intervention: Maximize Cognitive Function  Recent Flowsheet Documentation  Taken 10/18/2024 1623 by Mami Yeboah RN  Sensory Stimulation Regulation:   care clustered   quiet environment promoted   television on   lighting decreased  Reorientation Measures:   calendar in view   clock in view   glasses use encouraged   reorientation provided  Goal: Improved Sleep  Outcome: Progressing     Problem: Pain Acute  Goal: Optimal Pain Control and Function  Outcome: Progressing  Intervention: Develop Pain Management Plan  Recent Flowsheet Documentation  Taken 10/18/2024 1709 by Mami Yeboah RN  Pain Management Interventions: medication (see MAR)  Taken 10/18/2024 1600 by Mami Yeboah RN  Pain Management Interventions: distraction  Intervention: Prevent or Manage Pain  Recent Flowsheet Documentation  Taken 10/18/2024 1623 by Mami Yeboah RN  Sensory Stimulation Regulation:   care clustered   quiet environment promoted   television on    lighting decreased  Bowel Elimination Promotion: adequate fluid intake promoted  Medication Review/Management: medications reviewed  Intervention: Optimize Psychosocial Wellbeing  Recent Flowsheet Documentation  Taken 10/18/2024 1623 by Mami Yeboah RN  Supportive Measures:   active listening utilized   verbalization of feelings encouraged   Goal Outcome Evaluation:       Pt is alert and oriented to self and place, sometimes he knows it's 2024 and stated he had surgery last Monday. Pt refused to go back to bed, unable to get his weight using the bed. Pt is unsteady due to left leg pain. Tylenol # 3 given pt verbalized pain came down to 8/10 from 10/10. Bedside attendant still in place for safety. Pt continues to be impulsive, gets up on his own while in the recliner. Lung sounds clear, diminished, on chronic O2 at 2 LPM per nasal cannula.

## 2024-10-19 NOTE — PROGRESS NOTES
"Orthopedic Surgery Progress Note    Subjective: No acute events overnight.  Pain controlled at rest.  Reportedly forgetful overnight.  sleeping in recliner.  One-to-one at bedside.    Exam:  /74 (BP Location: Left arm)   Pulse 78   Temp 98.5  F (36.9  C) (Oral)   Resp 16   Ht 1.702 m (5' 7\")   Wt 85.4 kg (188 lb 4.8 oz)   SpO2 95%   BMI 29.49 kg/m    Gen: Sleeping, nonacute distress  Resp: breathing equal and non-labored  Extremities:    Left LE: Intact skin over the lateral thigh.  3 incisions well-approximated and healing with staples.  No obvious drainage, dehiscence, cellulitis.  Palpable pulses distally.      Labs:    Recent Labs   Lab 10/19/24  0643 10/18/24  0454 10/17/24  0541 10/15/24  0942   WBC 15.3* 12.9* 12.4* 15.4*   HGB 10.9* 10.2* 9.8* 10.1*    343 278 337     Recent Labs   Lab 10/18/24  0454 10/15/24  0942 10/12/24  1344     --  140   POTASSIUM 4.2  --  4.4   CHLORIDE 103  --  104   CO2 29  --  25   BUN 19.5  --  20.9   CR 0.64* 0.81 0.79   GLC 78  --  100*   MAG  --   --  1.7     No lab results found in last 7 days.    Imaging:  CT left femur/thigh:  1.  Reduced and nailed comminuted intertrochanteric left femoral fracture in unchanged alignment. No CT evidence of new fracture.  2.  Small lateral left hip subcutaneous hematoma.    Assessment:   84 year old male with PMH of COPD, chronic systolic heart failure, hypertension, anxiety, recent fall status post ORIF left proximal femur fracture (10/6/2024 with Dr. Raygoza)    Awaiting placement    Plan:  Weight-bear as tolerated  PT/OT  Pain per primary and inpatient pain team  Follow-up outpatient      Future Appointments   Date Time Provider Department Center   10/21/2024 11:30 AM [facility]   10/21/2024  2:00 PM Margaret Lawrence, P[facility]z MD  Orthopedic Surgery  [facility]    "

## 2024-10-19 NOTE — PLAN OF CARE
"Goal Outcome Evaluation:                    Patient is alert to self, place, and month.  Patient seemed to be forgetful and not understand why he was still in the hospital.  Routine CT scan completed when patient was awake over night.  Tylenol #3 given, patient said it was not effective for pain, so patient then received tylenol, and was able to sleep.  Patient declined seroquel, which nurse offered when patient was saying he wanted to leave and was not open to RN explanation for why he was in hospital.  Once pain was better controlled with tylenol patient calmed down and rested.  Patient declines to sleep in bed, preferring recliner.  Ice pack offered for hip, but patient refused.  1:1 sitter remains with patient due to unpredictable behavior.  Will continue to monitor.    Patient refused levothyroxine and protonix offered by RN.  Wanted RN to leave him alone, appeared distrusftul of nurse.  Day shift updated.    Problem: Adult Inpatient Plan of Care  Goal: Plan of Care Review  Description: The Plan of Care Review/Shift note should be completed every shift.  The Outcome Evaluation is a brief statement about your assessment that the patient is improving, declining, or no change.  This information will be displayed automatically on your shift  note.  Outcome: Progressing  Goal: Patient-Specific Goal (Individualized)  Description: You can add care plan individualizations to a care plan. Examples of Individualization might be:  \"Parent requests to be called daily at 9am for status\", \"I have a hard time hearing out of my right ear\", or \"Do not touch me to wake me up as it startles  me\".  Outcome: Progressing  Goal: Absence of Hospital-Acquired Illness or Injury  Intervention: Identify and Manage Fall Risk  Recent Flowsheet Documentation  Taken 10/18/2024 8218 by Corry Rodriguez, RN  Safety Promotion/Fall Prevention:   activity supervised   bedside attendant   increased rounding and observation   increase visualization " of patient   lighting adjusted   safety round/check completed   supervised activity   toileting scheduled   nonskid shoes/slippers when out of bed  Intervention: Prevent Skin Injury  Recent Flowsheet Documentation  Taken 10/18/2024 2348 by Corry Rodriguez RN  Body Position: position changed independently  Intervention: Prevent Infection  Recent Flowsheet Documentation  Taken 10/18/2024 2348 by Corry Rodriguez RN  Infection Prevention:   rest/sleep promoted   single patient room provided  Goal: Optimal Comfort and Wellbeing  Intervention: Monitor Pain and Promote Comfort  Recent Flowsheet Documentation  Taken 10/19/2024 0223 by Corry Rodriguez RN  Pain Management Interventions: medication (see MAR)  Taken 10/19/2024 0217 by Corry Rodriguez RN  Pain Management Interventions: (offered ice pack, patient refused) other (see comments)  Taken 10/19/2024 0117 by Corry Rodriguez RN  Pain Management Interventions: medication (see MAR)  Goal: Readiness for Transition of Care  Outcome: Progressing

## 2024-10-19 NOTE — PLAN OF CARE
Problem: Risk for Delirium  Goal: Improved Behavioral Control  Outcome: Not Progressing  Goal: Improved Attention and Thought Clarity  Outcome: Not Progressing     Problem: Pain Acute  Goal: Optimal Pain Control and Function  Outcome: Progressing  Intervention: Optimize Psychosocial Wellbeing  Recent Flowsheet Documentation  Taken 10/19/2024 1530 by Jessica Ramírez RN  Supportive Measures: active listening utilized  Taken 10/19/2024 0788 by Jessica Ramírez RN  Supportive Measures: active listening utilized     Problem: Dysrhythmia  Goal: Normalized Cardiac Rhythm  Outcome: Progressing   Goal Outcome Evaluation:       Patient alert but disoriented to time and situation. Reported having moderate pain to his right hip. Administered PRN tylenol-3 for pain. He also had a couple episodes of being agitated once this morning and early this evening. Administered PRN seroquel x1 and ativan x1. Vital signs stable. 1:1 for safety

## 2024-10-20 LAB
BASOPHILS # BLD AUTO: 0.1 10E3/UL (ref 0–0.2)
BASOPHILS NFR BLD AUTO: 1 %
C PNEUM DNA SPEC QL NAA+PROBE: NOT DETECTED
EOSINOPHIL # BLD AUTO: 0.4 10E3/UL (ref 0–0.7)
EOSINOPHIL NFR BLD AUTO: 3 %
ERYTHROCYTE [DISTWIDTH] IN BLOOD BY AUTOMATED COUNT: 20.3 % (ref 10–15)
FLUAV H1 2009 PAND RNA SPEC QL NAA+PROBE: NOT DETECTED
FLUAV H1 RNA SPEC QL NAA+PROBE: NOT DETECTED
FLUAV H3 RNA SPEC QL NAA+PROBE: NOT DETECTED
FLUAV RNA SPEC QL NAA+PROBE: NOT DETECTED
FLUBV RNA SPEC QL NAA+PROBE: NOT DETECTED
HADV DNA SPEC QL NAA+PROBE: NOT DETECTED
HCOV PNL SPEC NAA+PROBE: NOT DETECTED
HCT VFR BLD AUTO: 32.5 % (ref 40–53)
HGB BLD-MCNC: 10.1 G/DL (ref 13.3–17.7)
HMPV RNA SPEC QL NAA+PROBE: NOT DETECTED
HPIV1 RNA SPEC QL NAA+PROBE: NOT DETECTED
HPIV2 RNA SPEC QL NAA+PROBE: NOT DETECTED
HPIV3 RNA SPEC QL NAA+PROBE: NOT DETECTED
HPIV4 RNA SPEC QL NAA+PROBE: NOT DETECTED
IMM GRANULOCYTES # BLD: 0.1 10E3/UL
IMM GRANULOCYTES NFR BLD: 1 %
LYMPHOCYTES # BLD AUTO: 1.8 10E3/UL (ref 0.8–5.3)
LYMPHOCYTES NFR BLD AUTO: 14 %
M PNEUMO DNA SPEC QL NAA+PROBE: NOT DETECTED
MCH RBC QN AUTO: 29.6 PG (ref 26.5–33)
MCHC RBC AUTO-ENTMCNC: 31.1 G/DL (ref 31.5–36.5)
MCV RBC AUTO: 95 FL (ref 78–100)
MONOCYTES # BLD AUTO: 1.5 10E3/UL (ref 0–1.3)
MONOCYTES NFR BLD AUTO: 11 %
NEUTROPHILS # BLD AUTO: 9.5 10E3/UL (ref 1.6–8.3)
NEUTROPHILS NFR BLD AUTO: 71 %
NRBC # BLD AUTO: 0 10E3/UL
NRBC BLD AUTO-RTO: 0 /100
NT-PROBNP SERPL-MCNC: 221 PG/ML (ref 0–1800)
PLATELET # BLD AUTO: 326 10E3/UL (ref 150–450)
RBC # BLD AUTO: 3.41 10E6/UL (ref 4.4–5.9)
RSV RNA SPEC QL NAA+PROBE: NOT DETECTED
RSV RNA SPEC QL NAA+PROBE: NOT DETECTED
RV+EV RNA SPEC QL NAA+PROBE: NOT DETECTED
WBC # BLD AUTO: 13.4 10E3/UL (ref 4–11)

## 2024-10-20 PROCEDURE — 250N000013 HC RX MED GY IP 250 OP 250 PS 637: Performed by: REGISTERED NURSE

## 2024-10-20 PROCEDURE — 250N000013 HC RX MED GY IP 250 OP 250 PS 637: Performed by: INTERNAL MEDICINE

## 2024-10-20 PROCEDURE — 99233 SBSQ HOSP IP/OBS HIGH 50: CPT | Performed by: STUDENT IN AN ORGANIZED HEALTH CARE EDUCATION/TRAINING PROGRAM

## 2024-10-20 PROCEDURE — 250N000011 HC RX IP 250 OP 636: Performed by: INTERNAL MEDICINE

## 2024-10-20 PROCEDURE — 87633 RESP VIRUS 12-25 TARGETS: CPT | Performed by: STUDENT IN AN ORGANIZED HEALTH CARE EDUCATION/TRAINING PROGRAM

## 2024-10-20 PROCEDURE — 85025 COMPLETE CBC W/AUTO DIFF WBC: CPT | Performed by: STUDENT IN AN ORGANIZED HEALTH CARE EDUCATION/TRAINING PROGRAM

## 2024-10-20 PROCEDURE — 120N000004 HC R&B MS OVERFLOW

## 2024-10-20 PROCEDURE — 87486 CHLMYD PNEUM DNA AMP PROBE: CPT | Performed by: STUDENT IN AN ORGANIZED HEALTH CARE EDUCATION/TRAINING PROGRAM

## 2024-10-20 PROCEDURE — 250N000013 HC RX MED GY IP 250 OP 250 PS 637: Performed by: PHYSICIAN ASSISTANT

## 2024-10-20 PROCEDURE — 250N000011 HC RX IP 250 OP 636: Performed by: STUDENT IN AN ORGANIZED HEALTH CARE EDUCATION/TRAINING PROGRAM

## 2024-10-20 PROCEDURE — 250N000013 HC RX MED GY IP 250 OP 250 PS 637: Performed by: STUDENT IN AN ORGANIZED HEALTH CARE EDUCATION/TRAINING PROGRAM

## 2024-10-20 PROCEDURE — 36415 COLL VENOUS BLD VENIPUNCTURE: CPT | Performed by: STUDENT IN AN ORGANIZED HEALTH CARE EDUCATION/TRAINING PROGRAM

## 2024-10-20 PROCEDURE — 83880 ASSAY OF NATRIURETIC PEPTIDE: CPT | Performed by: STUDENT IN AN ORGANIZED HEALTH CARE EDUCATION/TRAINING PROGRAM

## 2024-10-20 RX ORDER — FUROSEMIDE 10 MG/ML
40 INJECTION INTRAMUSCULAR; INTRAVENOUS DAILY
Status: DISCONTINUED | OUTPATIENT
Start: 2024-10-20 | End: 2024-10-22

## 2024-10-20 RX ORDER — LIDOCAINE 4 G/G
1 PATCH TOPICAL
Status: DISCONTINUED | OUTPATIENT
Start: 2024-10-20 | End: 2024-11-12 | Stop reason: HOSPADM

## 2024-10-20 RX ADMIN — SENNOSIDES AND DOCUSATE SODIUM 1 TABLET: 8.6; 5 TABLET ORAL at 19:34

## 2024-10-20 RX ADMIN — ROSUVASTATIN 10 MG: 10 TABLET, FILM COATED ORAL at 19:35

## 2024-10-20 RX ADMIN — SENNOSIDES AND DOCUSATE SODIUM 2 TABLET: 8.6; 5 TABLET ORAL at 08:25

## 2024-10-20 RX ADMIN — PANTOPRAZOLE SODIUM 40 MG: 40 TABLET, DELAYED RELEASE ORAL at 06:20

## 2024-10-20 RX ADMIN — TRAZODONE HYDROCHLORIDE 25 MG: 50 TABLET ORAL at 19:35

## 2024-10-20 RX ADMIN — Medication 3 MG: at 00:00

## 2024-10-20 RX ADMIN — LATANOPROST 1 DROP: 50 SOLUTION/ DROPS OPHTHALMIC at 19:36

## 2024-10-20 RX ADMIN — ENOXAPARIN SODIUM 40 MG: 40 INJECTION SUBCUTANEOUS at 17:47

## 2024-10-20 RX ADMIN — ACETAMINOPHEN AND CODEINE PHOSPHATE 1 TABLET: 300; 30 TABLET ORAL at 19:34

## 2024-10-20 RX ADMIN — ACETAMINOPHEN AND CODEINE PHOSPHATE 1 TABLET: 300; 30 TABLET ORAL at 06:20

## 2024-10-20 RX ADMIN — MICONAZOLE NITRATE: 20 POWDER TOPICAL at 08:45

## 2024-10-20 RX ADMIN — FUROSEMIDE 40 MG: 10 INJECTION, SOLUTION INTRAVENOUS at 14:21

## 2024-10-20 RX ADMIN — LEVOTHYROXINE SODIUM 88 MCG: 88 TABLET ORAL at 06:20

## 2024-10-20 RX ADMIN — LOSARTAN POTASSIUM 25 MG: 25 TABLET, FILM COATED ORAL at 08:25

## 2024-10-20 RX ADMIN — HYDROMORPHONE HYDROCHLORIDE 1 MG: 2 TABLET ORAL at 17:46

## 2024-10-20 RX ADMIN — HYDROMORPHONE HYDROCHLORIDE 1 MG: 2 TABLET ORAL at 13:01

## 2024-10-20 RX ADMIN — MICONAZOLE NITRATE: 20 POWDER TOPICAL at 19:36

## 2024-10-20 RX ADMIN — QUETIAPINE FUMARATE 12.5 MG: 25 TABLET ORAL at 00:01

## 2024-10-20 RX ADMIN — FUROSEMIDE 20 MG: 20 TABLET ORAL at 08:25

## 2024-10-20 RX ADMIN — METOPROLOL TARTRATE 25 MG: 25 TABLET, FILM COATED ORAL at 08:25

## 2024-10-20 RX ADMIN — ASPIRIN 81 MG CHEWABLE TABLET 81 MG: 81 TABLET CHEWABLE at 08:26

## 2024-10-20 RX ADMIN — DULOXETINE HYDROCHLORIDE 60 MG: 60 CAPSULE, DELAYED RELEASE PELLETS ORAL at 08:25

## 2024-10-20 RX ADMIN — BUSPIRONE HYDROCHLORIDE 5 MG: 5 TABLET ORAL at 19:36

## 2024-10-20 RX ADMIN — METOPROLOL TARTRATE 25 MG: 25 TABLET, FILM COATED ORAL at 19:33

## 2024-10-20 RX ADMIN — HYDROMORPHONE HYDROCHLORIDE 1 MG: 2 TABLET ORAL at 22:36

## 2024-10-20 RX ADMIN — BUSPIRONE HYDROCHLORIDE 5 MG: 5 TABLET ORAL at 08:25

## 2024-10-20 RX ADMIN — Medication 3 MG: at 19:34

## 2024-10-20 RX ADMIN — LIDOCAINE 1 PATCH: 4 PATCH TOPICAL at 08:43

## 2024-10-20 RX ADMIN — ACETAMINOPHEN AND CODEINE PHOSPHATE 1 TABLET: 300; 30 TABLET ORAL at 10:22

## 2024-10-20 RX ADMIN — ACETAMINOPHEN AND CODEINE PHOSPHATE 1 TABLET: 300; 30 TABLET ORAL at 15:39

## 2024-10-20 ASSESSMENT — ACTIVITIES OF DAILY LIVING (ADL)
ADLS_ACUITY_SCORE: 54
ADLS_ACUITY_SCORE: 55
ADLS_ACUITY_SCORE: 54
ADLS_ACUITY_SCORE: 49
ADLS_ACUITY_SCORE: 54
ADLS_ACUITY_SCORE: 49
ADLS_ACUITY_SCORE: 54
ADLS_ACUITY_SCORE: 49
ADLS_ACUITY_SCORE: 49
ADLS_ACUITY_SCORE: 54
ADLS_ACUITY_SCORE: 49
ADLS_ACUITY_SCORE: 49
ADLS_ACUITY_SCORE: 54
ADLS_ACUITY_SCORE: 49
ADLS_ACUITY_SCORE: 54
ADLS_ACUITY_SCORE: 49
ADLS_ACUITY_SCORE: 54
ADLS_ACUITY_SCORE: 49
ADLS_ACUITY_SCORE: 54

## 2024-10-20 NOTE — PROGRESS NOTES
"Pt. Has generalized edema.  Non pitting in upper extremities, but pitting in legs, overall swollen.   Sleeping in chair right now.    Standing weight done.   Urinated  x 1. Wears \"pull up\" breif for incontinence.    "

## 2024-10-20 NOTE — PROGRESS NOTES
Care Management Follow Up    Length of Stay (days): 8    Expected Discharge Date: 10/21/2024    Anticipated Discharge Plan:  Transitional Care    Transportation: TBD    PT Recommendations: Transitional Care Facility, Per plan established by the PT  OT Recommendations:  Transitional Care Facility     Barriers to Discharge: placement    Prior Living Situation: house with spouse    Advanced Directive on File:       Patient/Spokesperson Updated: No    Additional Information:  Per chart, Pt off 1:1 at 12AM today 10/20. TCU referral pending. CM to continue working to secure placement.     MILAGRO WarrenW

## 2024-10-20 NOTE — PROGRESS NOTES
[facility]    Medicine Progress Note - Hospitalist Service    Date of Admission:  10/12/2024    Assessment & Plan   Dominik Rojas is a 83 year old male with past medical history of COPD on 2 to 3 L nasal cannula at home, chronic systolic heart failure, hypertension, hypothyroidism, anxiety, recent fall and left hip fracture s/p ORIF who was jsut discharged to TCU.  Patient was brought to ED for evaluation another fall at TCU with left hip pain. Xray showing postop changes without new acute changes. Patient was found to be tachycardiac, concerning for sinus tachycardia vs intermittent a fib rvr.      Await placement back to TCU. Barrier: 1:1 due to restless, agitation - now improving, Pain consulted - poorly controlled, Leukocytosis work up     Mechanical fall  Left hip contusion without new fracture  Recent left hip fracture s/p ORIF  -Patient reported a mechanical fall after going to TCU one day PTA, with worse left hip pain.  Denied chest pain, dizziness, shortness of breath prior to or after fall  -s/p left hip ORIF  -negative xray for new fracture or dislocation, has soft tissue edema  -Negative for DVT on US  -CT Left femur no evidence of new fracture, small lateral left hip subcutaneous hematoma  -Seen by Ortho, appreciate recs, follow-up outpatient  -Seen by Pain team, appreciate assistance  -on Tylenol#3 every 4 as needed, discontinued Tramadol. Tylenol prn 650mg  -Pain poorly controlled, did not sleep at night and frequent repositioning, added Dilaudid prn    Leukocytosis  - wbc 15.3 -> 13.4, suspect if stress de-margination vs chronic leukocytosis  - procalcitonin not elevated  - No symptoms/signs of infection  - Ucx mixed tai, Bcx NGTD  - CXR no e/o pna  - Resp panel pending  - Monitor wbc     Tachycardia, sinus with brief episodes of atrial tachy  -consult cardiologist: no a fib, no need for anticoagulation  -pt is not a good candidate for anticoagulation due to frequent  "falls  -HR now in control     Acute on chronic HFpEF  -Hypervolemic on exam, lasix was held on admission due to poor po intake  -recent Echo showing preserved EF  -CXR 10/19: Single small right pleural effusion, mild pulmonary vascular congestion  -BNP not elevated, false negative due to elevated BMI  -IV Lasix 40mg daily  -On Metoprolol, Losartan     COPD  Chronic hypoxic respiratory failure on 2-3l NC  -PTA meds  -Oximeter prn      Recent UTI  -PTA amoxicillin 500mg TID; finished course of abx;   -repeated UC mixed tai     History of RLL adenocarcinoma s/p radiation therapy     Anxiety and depression;  Dementia with behavioral change/agitation   Mood disorder  Acute toxic/metabolic encephalopathy  -resume PTA meds  -on 1:1  -improving, wean off 1:1 as able  -consulted psych: schedule Trazodone to hep with sleep, avoid benzodiazepines, seroquel prn for agitation. Qtc 490    Normocytic anemia  - Hb baseline 10-11  - contusions and hematoma on the Left thigh  - Monitor Hb     HLD  -Crestor     GERD  -PPI      Hypothyroidism  -recent TSH 6.93, FT4 0.83  -continue Synthroid   -recommend repeat TFT's in 4 weeks with PCP          Diet: Combination Diet Regular Diet Adult    DVT Prophylaxis: Enoxaparin (Lovenox) SQ  Cabral Catheter: Not present  Lines: None     Cardiac Monitoring: None  Code Status: Full Code      Clinically Significant Risk Factors                   # Hypertension: Noted on problem list    # Dementia: noted on problem list        # Overweight: Estimated body mass index is 29.49 kg/m  as calculated from the following:    Height as of this encounter: 1.702 m (5' 7\").    Weight as of this encounter: 85.4 kg (188 lb 4.8 oz).      # Financial/Environmental Concerns: none         Disposition Plan     Medically Ready for Discharge: Anticipated in 2-4 Days             Rosi Childress MD  Hospitalist Service  [facility]  Securely message with [employer] (more info)  Text page via [employer] " Paging/Directory   ______________________________________________________________________    Interval History   Patient was examined at the bedside, very sleepy today as patient did not sleep all night due to controlled pain.  Frequently repositioning due to pain, Dilaudid as needed added.   Plan for TCU when ready      Physical Exam   Vital Signs: Temp: 98.7  F (37.1  C) Temp src: Oral BP: (!) 140/67 Pulse: 80   Resp: 20 SpO2: 98 % O2 Device: Nasal cannula Oxygen Delivery: 2 LPM  Weight: 188 lbs 4.8 oz    General.  Moderate distress due to pain, frequently repositioning  Neck supple no JVD.  CVS regular rhythm no murmur gallops.  Lungs. Mild crackles b/l bases and rhonchi + no wheezing.  Abdomen.  Soft  bowel sounds present.  Extremities.  2+ edema b/l , s/p left hip sx  Neurological.  No focal deficit.  Skin bruise+    Medical Decision Making       55 MINUTES SPENT BY ME on the date of service doing chart review, history, exam, documentation & further activities per the note.      Data     I have personally reviewed the following data over the past 24 hrs:    13.4 (H)  \   10.1 (L)   / 326     N/A N/A N/A /  N/A   N/A N/A N/A \     Trop: N/A BNP: 221       Imaging results reviewed over the past 24 hrs:   No results found for this or any previous visit (from the past 24 hour(s)).

## 2024-10-20 NOTE — PROGRESS NOTES
"Orthopedic Surgery Progress Note    Subjective:   Dominik is doing ok this am. Still having pain in left hip.     Exam:  BP (!) 140/67 (BP Location: Left arm)   Pulse 80   Temp 98.7  F (37.1  C) (Oral)   Resp 20   Ht 1.702 m (5' 7\")   Wt 85.4 kg (188 lb 4.8 oz)   SpO2 98%   BMI 29.49 kg/m    Gen: Sleeping, nonacute distress  Resp: breathing equal and non-labored  Extremities:     Left LE: Intact skin over the lateral thigh.  3 incisions well-approximated and healing with staples. Swelling over the hip however No obvious drainage, dehiscence, cellulitis.  Palpable pulses distally.         Labs:    Recent Labs   Lab 10/20/24  0529 10/19/24  0643 10/18/24  0454 10/17/24  0541   WBC 13.4* 15.3* 12.9* 12.4*   HGB 10.1* 10.9* 10.2* 9.8*    351 343 278     Recent Labs   Lab 10/18/24  0454 10/15/24  0942     --    POTASSIUM 4.2  --    CHLORIDE 103  --    CO2 29  --    BUN 19.5  --    CR 0.64* 0.81   GLC 78  --      No lab results found in last 7 days.    Assessment:   84 year old male with PMH of COPD, chronic systolic heart failure, hypertension, anxiety, recent fall status post ORIF left proximal femur fracture (10/6/2024 with Dr. Raygoza)     Awaiting placement     Plan:  Weight-bear as tolerated  PT/OT  Pain per primary and inpatient pain team  Follow-up outpatient    Jamie Montesinos MD  Orthopedic Surgery  [facility]    "

## 2024-10-20 NOTE — PROGRESS NOTES
Patient off 1:1 since 12am this AM, 10/20. Up in chair with chair alarm in place, call light within reach.

## 2024-10-20 NOTE — PLAN OF CARE
"  Problem: Adult Inpatient Plan of Care  Goal: Plan of Care Review  Description: The Plan of Care Review/Shift note should be completed every shift.  The Outcome Evaluation is a brief statement about your assessment that the patient is improving, declining, or no change.  This information will be displayed automatically on your shift  note.  Outcome: Progressing  Goal: Patient-Specific Goal (Individualized)  Description: You can add care plan individualizations to a care plan. Examples of Individualization might be:  \"Parent requests to be called daily at 9am for status\", \"I have a hard time hearing out of my right ear\", or \"Do not touch me to wake me up as it startles  me\".  Outcome: Progressing  Goal: Readiness for Transition of Care  Outcome: Progressing     Problem: Risk for Delirium  Goal: Optimal Coping  Outcome: Progressing  Intervention: Optimize Psychosocial Adjustment to Delirium  Recent Flowsheet Documentation  Taken 10/19/2024 2200 by Jesi Knight RN  Supportive Measures: active listening utilized  Goal: Improved Behavioral Control  Outcome: Progressing  Intervention: Minimize Safety Risk  Recent Flowsheet Documentation  Taken 10/19/2024 2200 by Jesi Knight RN  Communication Enhancement Strategies: call light answered in person  Goal: Improved Attention and Thought Clarity  Outcome: Progressing  Intervention: Maximize Cognitive Function  Recent Flowsheet Documentation  Taken 10/19/2024 2200 by Jesi Knight RN  Sensory Stimulation Regulation: care clustered  Reorientation Measures: clock in view  Goal: Improved Sleep  Outcome: Progressing     Problem: Pain Acute  Goal: Optimal Pain Control and Function  Outcome: Progressing  Intervention: Prevent or Manage Pain  Recent Flowsheet Documentation  Taken 10/19/2024 2200 by Jesi Knight RN  Sensory Stimulation Regulation: care clustered  Intervention: Optimize Psychosocial Wellbeing  Recent Flowsheet Documentation  Taken 10/19/2024 2200 by Jesi Knight" RN  Supportive Measures: active listening utilized     Problem: Dysrhythmia  Goal: Normalized Cardiac Rhythm  Outcome: Progressing     Problem: Fall Injury Risk  Goal: Absence of Fall and Fall-Related Injury  Outcome: Progressing  Intervention: Promote Injury-Free Environment  Recent Flowsheet Documentation  Taken 10/19/2024 2200 by Jesi Knight RN  Safety Promotion/Fall Prevention: activity supervised     Problem: Comorbidity Management  Goal: Maintenance of Behavioral Health Symptom Control  Outcome: Progressing  Goal: Maintenance of COPD Symptom Control  Outcome: Progressing  Intervention: Maintain COPD Symptom Control  Recent Flowsheet Documentation  Taken 10/19/2024 2200 by Jesi Knight RN  Supportive Measures: active listening utilized  Goal: Maintenance of Heart Failure Symptom Control  Outcome: Progressing  Goal: Blood Pressure in Desired Range  Outcome: Progressing   Goal Outcome Evaluation:      Patient remains 1;1. Patient will get out of his chair and say I got to go home. Patient impulsive. Medicated with tylenol # 3. Patient walks to br with assist of 1. Patient refused lidocaine patch.

## 2024-10-20 NOTE — PROGRESS NOTES
Pt. Uncomfortable and  still in pain we tried moving him to bed and then was restless and not sleeping moved back ot chair and medicated for pain and sleep and he is now sleeping with chair alarm and 1 to 1 nursing assistant.

## 2024-10-20 NOTE — PLAN OF CARE
Problem: Pain Acute  Goal: Optimal Pain Control and Function  Outcome: Progressing  Intervention: Develop Pain Management Plan  Recent Flowsheet Documentation  Taken 10/20/2024 0900 by Alia Farmer RN  Pain Management Interventions: (lidocaine patch) medication (see MAR)  Intervention: Prevent or Manage Pain  Recent Flowsheet Documentation  Taken 10/20/2024 0859 by Alia Farmer RN  Bowel Elimination Promotion: adequate fluid intake promoted     Problem: Adult Inpatient Plan of Care  Goal: Readiness for Transition of Care  Outcome: Progressing   Goal Outcome Evaluation:    Vital signs stable. On baseline 2L O2. Patient experienced significant pain in L hip this AM, 9/10. No improvement in pain with tylenol #3, lidocaine patch, and ice. Patient was getting restless and frequently standing to reposition while sitting in chair. Discussed with hospitalist and PRN PO dilaudid added. 1 PRN dose given and patient appears more comfortable and not trying to stand and reposition as frequently. Ambulated to the deleon and back with assist x 2 with walker. Incontinent of urine.     Wife stopped in for short while this afternoon. Planning for TCU at discharge. Remains of 1:1. Alarms in place for safety and room near nursing station.

## 2024-10-21 ENCOUNTER — APPOINTMENT (OUTPATIENT)
Dept: OCCUPATIONAL THERAPY | Facility: HOSPITAL | Age: 84
End: 2024-10-21
Payer: COMMERCIAL

## 2024-10-21 LAB
ANION GAP SERPL CALCULATED.3IONS-SCNC: 10 MMOL/L (ref 7–15)
BUN SERPL-MCNC: 19.5 MG/DL (ref 8–23)
CALCIUM SERPL-MCNC: 8.4 MG/DL (ref 8.8–10.4)
CHLORIDE SERPL-SCNC: 97 MMOL/L (ref 98–107)
CREAT SERPL-MCNC: 0.76 MG/DL (ref 0.67–1.17)
EGFRCR SERPLBLD CKD-EPI 2021: 89 ML/MIN/1.73M2
GLUCOSE BLDC GLUCOMTR-MCNC: 135 MG/DL (ref 70–99)
GLUCOSE BLDC GLUCOMTR-MCNC: 88 MG/DL (ref 70–99)
GLUCOSE SERPL-MCNC: 103 MG/DL (ref 70–99)
HCO3 SERPL-SCNC: 29 MMOL/L (ref 22–29)
POTASSIUM SERPL-SCNC: 3.7 MMOL/L (ref 3.4–5.3)
SODIUM SERPL-SCNC: 136 MMOL/L (ref 135–145)
WBC # BLD AUTO: 16.3 10E3/UL (ref 4–11)

## 2024-10-21 PROCEDURE — 250N000011 HC RX IP 250 OP 636: Performed by: STUDENT IN AN ORGANIZED HEALTH CARE EDUCATION/TRAINING PROGRAM

## 2024-10-21 PROCEDURE — 120N000004 HC R&B MS OVERFLOW

## 2024-10-21 PROCEDURE — 99233 SBSQ HOSP IP/OBS HIGH 50: CPT | Performed by: STUDENT IN AN ORGANIZED HEALTH CARE EDUCATION/TRAINING PROGRAM

## 2024-10-21 PROCEDURE — 99232 SBSQ HOSP IP/OBS MODERATE 35: CPT | Performed by: PHYSICIAN ASSISTANT

## 2024-10-21 PROCEDURE — 36415 COLL VENOUS BLD VENIPUNCTURE: CPT | Performed by: STUDENT IN AN ORGANIZED HEALTH CARE EDUCATION/TRAINING PROGRAM

## 2024-10-21 PROCEDURE — 250N000013 HC RX MED GY IP 250 OP 250 PS 637: Performed by: STUDENT IN AN ORGANIZED HEALTH CARE EDUCATION/TRAINING PROGRAM

## 2024-10-21 PROCEDURE — 80048 BASIC METABOLIC PNL TOTAL CA: CPT | Performed by: STUDENT IN AN ORGANIZED HEALTH CARE EDUCATION/TRAINING PROGRAM

## 2024-10-21 PROCEDURE — 250N000013 HC RX MED GY IP 250 OP 250 PS 637: Performed by: INTERNAL MEDICINE

## 2024-10-21 PROCEDURE — 85048 AUTOMATED LEUKOCYTE COUNT: CPT | Performed by: STUDENT IN AN ORGANIZED HEALTH CARE EDUCATION/TRAINING PROGRAM

## 2024-10-21 PROCEDURE — 250N000013 HC RX MED GY IP 250 OP 250 PS 637: Performed by: REGISTERED NURSE

## 2024-10-21 PROCEDURE — 97535 SELF CARE MNGMENT TRAINING: CPT | Mod: GO

## 2024-10-21 PROCEDURE — 250N000013 HC RX MED GY IP 250 OP 250 PS 637: Performed by: PHYSICIAN ASSISTANT

## 2024-10-21 RX ORDER — ACETAMINOPHEN AND CODEINE PHOSPHATE 300; 30 MG/1; MG/1
1 TABLET ORAL EVERY 4 HOURS
Status: DISCONTINUED | OUTPATIENT
Start: 2024-10-21 | End: 2024-10-24

## 2024-10-21 RX ORDER — HALOPERIDOL 5 MG/ML
2.5 INJECTION INTRAMUSCULAR EVERY 6 HOURS PRN
Status: DISCONTINUED | OUTPATIENT
Start: 2024-10-21 | End: 2024-10-22

## 2024-10-21 RX ADMIN — LOSARTAN POTASSIUM 25 MG: 25 TABLET, FILM COATED ORAL at 08:30

## 2024-10-21 RX ADMIN — DULOXETINE HYDROCHLORIDE 60 MG: 60 CAPSULE, DELAYED RELEASE PELLETS ORAL at 08:29

## 2024-10-21 RX ADMIN — ACETAMINOPHEN AND CODEINE PHOSPHATE 1 TABLET: 300; 30 TABLET ORAL at 17:43

## 2024-10-21 RX ADMIN — BUSPIRONE HYDROCHLORIDE 5 MG: 5 TABLET ORAL at 21:25

## 2024-10-21 RX ADMIN — HYDROMORPHONE HYDROCHLORIDE 1 MG: 2 TABLET ORAL at 15:46

## 2024-10-21 RX ADMIN — FUROSEMIDE 40 MG: 10 INJECTION, SOLUTION INTRAVENOUS at 08:30

## 2024-10-21 RX ADMIN — METOPROLOL TARTRATE 25 MG: 25 TABLET, FILM COATED ORAL at 21:25

## 2024-10-21 RX ADMIN — ACETAMINOPHEN 650 MG: 325 TABLET ORAL at 11:34

## 2024-10-21 RX ADMIN — METOPROLOL TARTRATE 25 MG: 25 TABLET, FILM COATED ORAL at 08:30

## 2024-10-21 RX ADMIN — PANTOPRAZOLE SODIUM 40 MG: 40 TABLET, DELAYED RELEASE ORAL at 08:30

## 2024-10-21 RX ADMIN — MICONAZOLE NITRATE: 20 POWDER TOPICAL at 21:32

## 2024-10-21 RX ADMIN — SENNOSIDES AND DOCUSATE SODIUM 1 TABLET: 8.6; 5 TABLET ORAL at 21:24

## 2024-10-21 RX ADMIN — MICONAZOLE NITRATE: 20 POWDER TOPICAL at 08:33

## 2024-10-21 RX ADMIN — BUSPIRONE HYDROCHLORIDE 5 MG: 5 TABLET ORAL at 08:29

## 2024-10-21 RX ADMIN — TRAZODONE HYDROCHLORIDE 25 MG: 50 TABLET ORAL at 21:25

## 2024-10-21 RX ADMIN — LIDOCAINE 1 PATCH: 4 PATCH TOPICAL at 08:28

## 2024-10-21 RX ADMIN — ACETAMINOPHEN AND CODEINE PHOSPHATE 1 TABLET: 300; 30 TABLET ORAL at 21:24

## 2024-10-21 RX ADMIN — ASPIRIN 81 MG CHEWABLE TABLET 81 MG: 81 TABLET CHEWABLE at 08:30

## 2024-10-21 RX ADMIN — ACETAMINOPHEN AND CODEINE PHOSPHATE 1 TABLET: 300; 30 TABLET ORAL at 13:22

## 2024-10-21 RX ADMIN — LATANOPROST 1 DROP: 50 SOLUTION/ DROPS OPHTHALMIC at 21:26

## 2024-10-21 RX ADMIN — ROSUVASTATIN 10 MG: 10 TABLET, FILM COATED ORAL at 21:24

## 2024-10-21 RX ADMIN — LEVOTHYROXINE SODIUM 88 MCG: 88 TABLET ORAL at 08:29

## 2024-10-21 ASSESSMENT — ACTIVITIES OF DAILY LIVING (ADL)
ADLS_ACUITY_SCORE: 53
ADLS_ACUITY_SCORE: 54
ADLS_ACUITY_SCORE: 53
ADLS_ACUITY_SCORE: 49
ADLS_ACUITY_SCORE: 54
ADLS_ACUITY_SCORE: 53
ADLS_ACUITY_SCORE: 53
ADLS_ACUITY_SCORE: 49
ADLS_ACUITY_SCORE: 54
ADLS_ACUITY_SCORE: 54

## 2024-10-21 NOTE — PROGRESS NOTES
[facility]    Medicine Progress Note - Hospitalist Service    Date of Admission:  10/12/2024    Assessment & Plan   Dominik Rojas is a 83 year old male with past medical history of COPD on 2 to 3 L nasal cannula at home, chronic systolic heart failure, hypertension, hypothyroidism, anxiety, recent fall and left hip fracture s/p ORIF who was jsut discharged to TCU.  Patient was brought to ED for evaluation another fall at TCU with left hip pain. Xray showing postop changes without new acute changes. Patient was found to be tachycardiac, concerning for sinus tachycardia vs intermittent a fib rvr.        Mechanical fall  Left hip contusion without new fracture  Recent left hip fracture s/p ORIF  -Patient reported a mechanical fall after going to TCU one day PTA, with worse left hip pain.  Denied chest pain, dizziness, shortness of breath prior to or after fall  -s/p left hip ORIF  -negative xray for new fracture or dislocation, has soft tissue edema  -Negative for DVT on US  -CT Left femur no evidence of new fracture, small lateral left hip subcutaneous hematoma  -Seen by Ortho, appreciate recs, follow-up outpatient  -Seen by Pain team, appreciate assistance  -on Tylenol#3 scheduled q4,Tylenol prn 650mg, Dilaudid prn po 1mg q4    Leukocytosis  - suspect if stress de-margination, also has chronic leukocytosis  - procalcitonin not elevated  - No symptoms/signs of infection  - Ucx mixed tai, Bcx NGTD  - CXR no e/o pna  - Resp panel negative  - Monitor wbc intermittently     Tachycardia, sinus with brief episodes of atrial tachy  -Cradiology was consulted, no a fib, no need for anticoagulation  -pt is not a good candidate for anticoagulation due to frequent falls  -HR now in control     Acute on chronic HFpEF  -Hypervolemic on exam, lasix was held on admission due to poor po intake  -recent Echo showing preserved EF  -CXR 10/19: Single small right pleural effusion, mild pulmonary vascular  "congestion  -BNP not elevated, false negative due to elevated BMI  -IV Lasix 40mg daily  -On Metoprolol, Losartan  -daily weight, Fluid restriction     COPD  Chronic hypoxic respiratory failure on 2-3l NC  -PTA meds  -Not in exacerbation     Recent UTI  -PTA amoxicillin 500mg TID; finished course of abx;   -repeated UC mixed tai     History of RLL adenocarcinoma s/p radiation therapy     Anxiety and depression;  Dementia with behavioral change/agitation   Mood disorder  Acute toxic/metabolic encephalopathy  -resume PTA meds  -improving, off 1:1   -Seen by Psych, trazodone 25 mg to help with sleep at bedtime  -Seroquel as needed for agitation, IV Haldol as needed  -Monitor Qtc  -Delirium precautions    Normocytic anemia  - Hb baseline 10-11  - contusions and hematoma on the Left thigh  - Monitor Hb     HLD  -Crestor     GERD  -PPI      Hypothyroidism  -recent TSH 6.93, FT4 0.83  -continue Synthroid   -recommend repeat TFT's in 4 weeks with PCP          Diet: Combination Diet Regular Diet Adult    DVT Prophylaxis: Enoxaparin (Lovenox) SQ  Cabral Catheter: Not present  Lines: None     Cardiac Monitoring: None  Code Status: Full Code      Clinically Significant Risk Factors          # Hypochloremia: Lowest Cl = 97 mmol/L in last 2 days, will monitor as appropriate          # Hypertension: Noted on problem list    # Dementia: noted on problem list        # Overweight: Estimated body mass index is 29.18 kg/m  as calculated from the following:    Height as of this encounter: 1.702 m (5' 7\").    Weight as of this encounter: 84.5 kg (186 lb 4.8 oz).      # Financial/Environmental Concerns: none         Disposition Plan     Medically Ready for Discharge: Anticipated in 2-4 Days             Rosi Childress MD  Hospitalist Service  [facility]  Securely message with [email or web address] (more info)  Text page via [employer] Paging/Directory "   ______________________________________________________________________    Interval History   Patient was examined at the bedside, comfortably in the chair and having lunch.  Did not want to talk much.  Denies any complaints today  -RN informed later patient was agitated, continues to be off 1:1, as needed Seroquel/Haldol available    Physical Exam   Vital Signs: Temp: 98.4  F (36.9  C) Temp src: Oral BP: (!) 150/74 Pulse: 87   Resp: 20 SpO2: 95 % O2 Device: Nasal cannula Oxygen Delivery: 2 LPM  Weight: 186 lbs 4.8 oz    General.  Mild distress due to pain  Neck supple no JVD.  CVS regular rhythm no murmur gallops.  Lungs. Mild crackles b/l bases and rhonchi + no wheezing.  Abdomen.  Soft  bowel sounds present.  Extremities.  2+ edema b/l , s/p left hip sx  Neurological.  No focal deficit.  Skin bruise+    Medical Decision Making       50 MINUTES SPENT BY ME on the date of service doing chart review, history, exam, documentation & further activities per the note.      Data     I have personally reviewed the following data over the past 24 hrs:    16.3 (H)  \   N/A   / N/A     136 97 (L) 19.5 /  135 (H)   3.7 29 0.76 \       Imaging results reviewed over the past 24 hrs:   No results found for this or any previous visit (from the past 24 hour(s)).

## 2024-10-21 NOTE — PROGRESS NOTES
Care Management Follow Up    Length of Stay (days): 9    Expected Discharge Date: 10/22/2024     Concerns to be Addressed: discharge planning     Patient plan of care discussed at interdisciplinary rounds: Yes    Anticipated Discharge Disposition: Transitional Care  Anticipated Discharge Services: None  Anticipated Discharge DME: None    Patient/family educated on Medicare website which has current facility and service quality ratings: yes  Education Provided on the Discharge Plan: Yes  Patient/Family in Agreement with the Plan:      Referrals Placed by CM/SW: Post Acute Facilities  Private pay costs discussed: Not applicable    Discussed  Partnership in Safe Discharge Planning  document with patient/family: No     Handoff Completed: No, handoff not indicated or clinically appropriate    Additional Information:  SAMUEL is working on finding TCU placement for Pt at discharge. Pt has been off of the 1:1 sitter since yesterday (10/20) at 12 AM. Referral sent to [facility] remains pending at this time.     8:21 AM  GALLO called and left a voicemail with [facility] requesting a return phone call back to discuss Pt's referral.    9:43 AM  GALLO called and left a voicemail with [facility] again requesting a phone call back.    11:30 AM  Maureen from [facility] responded that they would like to see another day of Pt off the 1:1 sitter given Pt's falls and cognition. GALLO attempted to call and update Pt's wife Jovana with the update but was unable to leave a voicemail due to the mailbox being full.     Next Steps: CM to follow up with [facility] tomorrow to see if they would be able to accept Pt for placement.       DIRK Cruz

## 2024-10-21 NOTE — PROGRESS NOTES
[facility] ACUTE INPATIENT PAIN SERVICE    [facility], [facility], [facility], [facility], [place]   PAIN PROGRESS      Assessment/Plan:  Dominik Rojas is a 83 year old male who was admitted on 10/12/2024.  I was asked by Dr. Rosi Mercedes to see the patient for his uncontrolled pain. He is s/p left proximal femur fracture with ORIF (10/06/24). Was discharged to the TCU on 10/11 and shortly after had another fall resulting in readmission to the hospital. History of COPD on 2 to 3 L nasal cannula at home, chronic systolic heart failure, hypertension, hypothyroidism, anxiety. Describes pain as 8/10. Dilaudid PO added over the weekend with some benefit. Overall reports pain as poorly controlled. I reviewed with him his opioid options for control and he stated he would like to stick with T#3. I have agreed to make it scheduled with hold parameters and he will use the dilaudid prn for additional breakthrough pain. Dominik was agreeable to the plan.     [facility] reviewed: He is not on any controlled medications.     Opioids received in the past 24h:  *(3) 300-30mg acetaminophen-codeine tablets  *(4) 1mg dilaudid tablets       PLAN:  Acute post-operative pain. Complicated by mechanical fall on 10/12. Opioid naive.  Multimodal Medication Therapy:   Adjuvants:   - APAP 975mg q8h  - ASA 81mg  - Duloxetine 60mg once daily  - Topical Lidocaine  - Trazodone 25mg bedtime  Opioids:   - Acetaminophen-codeine 300-30mg q4h with hold parameters  - Dilaudid 1mg q4h prn - second line  Non-medication interventions- Ice  Constipation Prophylaxis- Senna-S  Follow up /Discharge Recommendations - We recommend prescribing the following at the time of discharge:  Short taper Rx of T#3 is appropriate.               Subjective:  Dominik is seen today in his chair alert and oriented x 2. Reports his pain is currently a 8/10. Dilaudid PO was added over the weekend with some benefit. Admits he has been having a hard time controlling his pain. He does  "get good relief from the T#3 but states he has not been receiving as much as he needs to control his pain. It appears he has only received half of his maximum daily dose allowed. I have agreed to make it scheduled with hold parameters which he was happy about. Voiced no other pain concerns today.     Denies concerns with nausea, vomiting, constipation.      Reviewed continuing with current plan with adjustments mentioned above, all questions answered.              History   Drug Use No         Tobacco Use      Smoking status: Former        Packs/day: 0.00        Years: 0.5 packs/day for 35.0 years (17.5 ttl pk-yrs)        Types: Cigarettes        Start date: 1955        Quit date: 1990        Years since quittin.8      Smokeless tobacco: Never      Tobacco comments: quit         Objective:  Vital signs in last 24 hours:  B/P: 150/74, T: 98.4, P: 79, R: 16   Blood pressure (!) 150/74, pulse 79, temperature 98.4  F (36.9  C), temperature source Oral, resp. rate 16, height 1.702 m (5' 7\"), weight 84.5 kg (186 lb 4.8 oz), SpO2 98%.        Review of Systems:   As per subjective, all others negative.    Physical Exam    General: in no apparent distress and non-toxic. Mild somnolence  HEENT: Head normocephalic atraumatic, oral mucosa moist. Sclerae anicteric  CV: Regular rhythm, normal rate, no murmurs  Resp: No wheezes, no rales or rhonchi, no focal consolidations  GI: Normoactive bowel sounds  Skin: No rashes or lesions  Extremities: Peripheral edema  Psych: Normal affect, mood euthymic  Neuro: Grossly normal       Imaging:  Personally Reviewed.    Results for orders placed or performed during the hospital encounter of 10/12/24   XR Femur Left 2 Views    Impression    IMPRESSION: Postoperative changes redemonstrated related to ORIF intertrochanteric left proximal femur fracture with dynamic hip screw. Alignment and hardware unchanged. Displaced lesser trochanteric fracture fragments are unchanged. " Lateral skin staples   remain along the lateral left hip and proximal thigh. Anatomic alignment left femur. No new left femur fracture or joint malalignment. Mild to moderate left hip osteoarthritis. Moderate to severe degenerative change medial compartment left knee.   Arterial calcification. Small left knee joint effusion. Lateral left hip and proximal thigh soft tissue edema.     Head CT w/o contrast    Impression    IMPRESSION:  HEAD CT:  1.  No finding for intracranial hemorrhage, mass, or acute infarct.    2.  Moderate volume loss and moderate to advanced presumed sequelae of chronic microvascular ischemic change. Stable volume loss and gliosis right frontal lobe.    CERVICAL SPINE CT:  1.  Advanced cervical spondylosis without finding for acute fracture or posttraumatic subluxation.       CT Cervical Spine w/o Contrast    Impression    IMPRESSION:  HEAD CT:  1.  No finding for intracranial hemorrhage, mass, or acute infarct.    2.  Moderate volume loss and moderate to advanced presumed sequelae of chronic microvascular ischemic change. Stable volume loss and gliosis right frontal lobe.    CERVICAL SPINE CT:  1.  Advanced cervical spondylosis without finding for acute fracture or posttraumatic subluxation.       US Lower Extremity Venous Duplex Left    Impression    IMPRESSION:  No deep venous thrombosis in the left lower extremity.   XR Pelvis 1/2 Views    Impression    IMPRESSION: No significant interval change. Postop ORIF left intertrochanteric femur fracture with dynamic hip screw. Hardware and alignment unchanged. Lesser trochanteric fragments remain medially displaced by approximately 2 cm. No new fracture. Mild   to moderate left and mild right hip osteoarthritis. Vascular stents project over the pelvis and surgical clips project over the right inguinal region. Both obturator rings appear intact. Arterial calcification.   CT Femur Thigh Left w/o Contrast    Impression    IMPRESSION:  1.  Reduced and  nailed comminuted intertrochanteric left femoral fracture in unchanged alignment. No CT evidence of new fracture.  2.  Small lateral left hip subcutaneous hematoma.     XR Chest 1 View    Impression    IMPRESSION: Mild bibasilar atelectasis. Increasing small right pleural effusion. No pneumothorax. Stable enlarged cardiac silhouette. Mild pulmonary vascular congestion.        Lab Results:  Personally Reviewed.   Last Comprehensive Metabolic Panel:  Sodium   Date Value Ref Range Status   10/21/2024 136 135 - 145 mmol/L Final     Potassium   Date Value Ref Range Status   10/21/2024 3.7 3.4 - 5.3 mmol/L Final   03/02/2022 4.5 3.5 - 5.0 mmol/L Final     Chloride   Date Value Ref Range Status   10/21/2024 97 (L) 98 - 107 mmol/L Final   03/02/2022 105 98 - 107 mmol/L Final     Carbon Dioxide (CO2)   Date Value Ref Range Status   10/21/2024 29 22 - 29 mmol/L Final   03/02/2022 27 22 - 31 mmol/L Final     Anion Gap   Date Value Ref Range Status   10/21/2024 10 7 - 15 mmol/L Final   03/02/2022 11 5 - 18 mmol/L Final     Glucose   Date Value Ref Range Status   10/21/2024 103 (H) 70 - 99 mg/dL Final   03/02/2022 95 70 - 125 mg/dL Final     GLUCOSE BY METER POCT   Date Value Ref Range Status   10/08/2024 109 (H) 70 - 99 mg/dL Final     Urea Nitrogen   Date Value Ref Range Status   10/21/2024 19.5 8.0 - 23.0 mg/dL Final   03/02/2022 16 8 - 28 mg/dL Final     Creatinine   Date Value Ref Range Status   10/21/2024 0.76 0.67 - 1.17 mg/dL Final     GFR Estimate   Date Value Ref Range Status   10/21/2024 89 >60 mL/min/1.73m2 Final     Comment:     eGFR calculated using 2021 CKD-EPI equation.   06/22/2021 >60 >60 mL/min/1.73m2 Final     GFR, ESTIMATED POCT   Date Value Ref Range Status   07/17/2023 55 (L) >60 mL/min/1.73m2 Final     Calcium   Date Value Ref Range Status   10/21/2024 8.4 (L) 8.8 - 10.4 mg/dL Final     Comment:     Reference intervals for this test were updated on 7/16/2024 to reflect our healthy population more  accurately. There may be differences in the flagging of prior results with similar values performed with this method. Those prior results can be interpreted in the context of the updated reference intervals.        UA:   Amphetamines Urine   Date Value Ref Range Status   08/14/2021 Screen Negative Screen Negative Final     Barbiturates Urine   Date Value Ref Range Status   08/14/2021 Screen Negative Screen Negative Final     Cannabinoids Urine   Date Value Ref Range Status   08/14/2021 Screen Negative Screen Negative Final     Cocaine Urine   Date Value Ref Range Status   08/14/2021 Screen Negative Screen Negative Final     Opiates Urine   Date Value Ref Range Status   08/14/2021 Screen Negative Screen Negative Final     PCP Urine   Date Value Ref Range Status   08/14/2021 Screen Negative Screen Negative Final              Please see A&P for additional details of medical decision making.         Aaron Crane PA-C  Acute Care Pain Management  Team  Hours of pain coverage Mon-Fri 8-1600, afterhours please call the house officer    [employer] (JESUS, Noel, SD, RH)   Page via [employer] web console -Click for [employer]

## 2024-10-21 NOTE — PLAN OF CARE
Problem: Adult Inpatient Plan of Care  Goal: Optimal Comfort and Wellbeing  Intervention: Monitor Pain and Promote Comfort  Recent Flowsheet Documentation  Taken 10/20/2024 1746 by Mami Yeboah RN  Pain Management Interventions:   medication (see MAR)   cold applied  Taken 10/20/2024 1539 by Mami Yeboah RN  Pain Management Interventions: medication (see MAR)     Problem: Pain Acute  Goal: Optimal Pain Control and Function  Outcome: Progressing  Intervention: Develop Pain Management Plan  Recent Flowsheet Documentation  Taken 10/20/2024 1746 by Mami Yeboah RN  Pain Management Interventions:   medication (see MAR)   cold applied  Taken 10/20/2024 1539 by Mami Yeboah RN  Pain Management Interventions: medication (see MAR)  Intervention: Prevent or Manage Pain  Recent Flowsheet Documentation  Taken 10/20/2024 1600 by Mami Yeboah RN  Bowel Elimination Promotion: adequate fluid intake promoted     Problem: Risk for Delirium  Goal: Optimal Coping  Outcome: Progressing  Goal: Improved Behavioral Control  Outcome: Progressing  Goal: Improved Attention and Thought Clarity  Outcome: Progressing  Goal: Improved Sleep  Outcome: Progressing   Goal Outcome Evaluation:       Pt is alert and oriented x 4, forgetful. He c/o pain in the left hip and left leg. PRN Tylenol # 3, Hydromorphone 1 mg po given for pain without relief per pt. He did sleep for about 2 hours after he got Trazodone, Melatonin, and Tylenol # 3. Lung sounds diminished, fine crackles in the bases,on chronic O2 at 2 LPM per nasal cannula, SpO2 97 to 98%. He is non tele. Pt keeps on getting up and reposition himself in the recliner. He refused to sleep in the bed. Pt is afraid to be by himself in the room, requested the RN to stay with him in the room. He is assist of 1-2 with gait belt and walker. Ambulated in the hallway x 1, tolerated about 15 feet only. He is limping during the walk.    He is off 1:1  during this shift, pt uses call light or she will call the name of the nurse.

## 2024-10-21 NOTE — PLAN OF CARE
Goal Outcome Evaluation:  Problem: Risk for Delirium  Goal: Improved Sleep  Outcome: Progressing     Problem: Pain Acute  Goal: Optimal Pain Control and Function  Outcome: Progressing    Pt. Sleeping well in chair, much less restlessness and pain than last   night.    Have been visually checking pt. Q hour for safety and has chair alarm on.   02 remains on per NC

## 2024-10-21 NOTE — PROGRESS NOTES
"Orthopedic Surgery Progress Note    Subjective:   Dominik is doing ok this am. Still having pain in left hip.     Exam:  BP (!) 150/74   Pulse 79   Temp 98.4  F (36.9  C) (Oral)   Resp 16   Ht 1.702 m (5' 7\")   Wt 84.5 kg (186 lb 4.8 oz)   SpO2 98%   BMI 29.18 kg/m    Gen: Sleeping, nonacute distress  Resp: breathing equal and non-labored  Extremities:     Left LE: Intact skin over the lateral thigh.  3 incisions well-approximated and healing with staples. Swelling over the hip however No obvious drainage, dehiscence, cellulitis.  Palpable pulses distally.         Labs:    Recent Labs   Lab 10/21/24  0450 10/20/24  0529 10/19/24  0643 10/18/24  0454 10/17/24  0541   WBC 16.3* 13.4* 15.3* 12.9* 12.4*   HGB  --  10.1* 10.9* 10.2* 9.8*   PLT  --  326 351 343 278     Recent Labs   Lab 10/21/24  0450 10/18/24  0454 10/15/24  0942    139  --    POTASSIUM 3.7 4.2  --    CHLORIDE 97* 103  --    CO2 29 29  --    BUN 19.5 19.5  --    CR 0.76 0.64* 0.81   * 78  --      No lab results found in last 7 days.    Assessment:   84 year old male with PMH of COPD, chronic systolic heart failure, hypertension, anxiety, recent fall status post ORIF left proximal femur fracture (10/6/2024 with Dr. Raygoza)     Awaiting placement     Plan:  Weight-bear as tolerated  PT/OT  Pain per primary and inpatient pain team  Follow-up outpatient    Ortho will sign off. Please feel free to reach out with any further questions or concerns.     PARMINDER CANTU PA-C  Orthopedic Surgery  [facility]    "

## 2024-10-21 NOTE — PROGRESS NOTES
While still has edema in legs much improved, legs pale and still has old bruising on surgical leg.   Pt. Sleeping well hard time getting temp so probably not arcuate, will try again toward AM   BP WNL at  136/75 and 02 at 94-96 % but had to use forehead sensor as pt.'s fingers run cool to touch.

## 2024-10-21 NOTE — PLAN OF CARE
"  Problem: Adult Inpatient Plan of Care  Goal: Plan of Care Review  Description: The Plan of Care Review/Shift note should be completed every shift.  The Outcome Evaluation is a brief statement about your assessment that the patient is improving, declining, or no change.  This information will be displayed automatically on your shift  note.  Outcome: Progressing  Flowsheets (Taken 10/21/2024 1233)  Plan of Care Reviewed With: patient  Overall Patient Progress: improving  Goal: Patient-Specific Goal (Individualized)  Description: You can add care plan individualizations to a care plan. Examples of Individualization might be:  \"Parent requests to be called daily at 9am for status\", \"I have a hard time hearing out of my right ear\", or \"Do not touch me to wake me up as it startles  me\".  Outcome: Progressing  Goal: Absence of Hospital-Acquired Illness or Injury  Intervention: Identify and Manage Fall Risk  Recent Flowsheet Documentation  Taken 10/21/2024 0830 by Flor Ochoa RN  Safety Promotion/Fall Prevention: activity supervised  Intervention: Prevent Infection  Recent Flowsheet Documentation  Taken 10/21/2024 0830 by Flor Ochoa RN  Infection Prevention:   rest/sleep promoted   single patient room provided  Goal: Readiness for Transition of Care  Outcome: Progressing     Goal Outcome Evaluation:      Plan of Care Reviewed With: patient    Overall Patient Progress: improving    Pt. A&O x4 but is forgetful. Pt. Does not always answer questions. Pt. On 2L of O2. Denies pain. Call light within reach.     1200 - Place primofit on pt.       1530 - Pt. Is refusing vitals. Pt. Is refusing to talk and answer questions. Pt stated that RN \"is a pathetic bitch\". RN educated patient that if he needs something or if he is having pain that he can use words to communicate. Pt. Tried to throw stuff at nurse. Pt. Told nursing assistant \"get the fuck out\". Pt. Refused assessment. Charge RN aware of situation. MD aware.     1535 - Pt. " Refused Lovenox. RN provided education. Pt. Continues to refuse.

## 2024-10-22 ENCOUNTER — APPOINTMENT (OUTPATIENT)
Dept: OCCUPATIONAL THERAPY | Facility: HOSPITAL | Age: 84
End: 2024-10-22
Payer: COMMERCIAL

## 2024-10-22 LAB
ANION GAP SERPL CALCULATED.3IONS-SCNC: 7 MMOL/L (ref 7–15)
BASOPHILS # BLD AUTO: 0.1 10E3/UL (ref 0–0.2)
BASOPHILS NFR BLD AUTO: 1 %
BUN SERPL-MCNC: 20.1 MG/DL (ref 8–23)
CALCIUM SERPL-MCNC: 8.2 MG/DL (ref 8.8–10.4)
CHLORIDE SERPL-SCNC: 97 MMOL/L (ref 98–107)
CREAT SERPL-MCNC: 0.85 MG/DL (ref 0.67–1.17)
EGFRCR SERPLBLD CKD-EPI 2021: 86 ML/MIN/1.73M2
EOSINOPHIL # BLD AUTO: 0.3 10E3/UL (ref 0–0.7)
EOSINOPHIL NFR BLD AUTO: 3 %
GLUCOSE SERPL-MCNC: 91 MG/DL (ref 70–99)
HCO3 SERPL-SCNC: 33 MMOL/L (ref 22–29)
IMM GRANULOCYTES # BLD: 0.1 10E3/UL
IMM GRANULOCYTES NFR BLD: 1 %
LYMPHOCYTES # BLD AUTO: 1.8 10E3/UL (ref 0.8–5.3)
LYMPHOCYTES NFR BLD AUTO: 17 %
MAGNESIUM SERPL-MCNC: 1.6 MG/DL (ref 1.7–2.3)
MONOCYTES # BLD AUTO: 1.3 10E3/UL (ref 0–1.3)
MONOCYTES NFR BLD AUTO: 12 %
NEUTROPHILS # BLD AUTO: 7.3 10E3/UL (ref 1.6–8.3)
NEUTROPHILS NFR BLD AUTO: 67 %
NRBC # BLD AUTO: 0 10E3/UL
NRBC BLD AUTO-RTO: 0 /100
POTASSIUM SERPL-SCNC: 3.7 MMOL/L (ref 3.4–5.3)
SODIUM SERPL-SCNC: 137 MMOL/L (ref 135–145)
WBC # BLD AUTO: 10.8 10E3/UL (ref 4–11)

## 2024-10-22 PROCEDURE — 97535 SELF CARE MNGMENT TRAINING: CPT | Mod: GO

## 2024-10-22 PROCEDURE — 250N000013 HC RX MED GY IP 250 OP 250 PS 637: Performed by: INTERNAL MEDICINE

## 2024-10-22 PROCEDURE — 250N000011 HC RX IP 250 OP 636: Performed by: INTERNAL MEDICINE

## 2024-10-22 PROCEDURE — 85004 AUTOMATED DIFF WBC COUNT: CPT | Performed by: STUDENT IN AN ORGANIZED HEALTH CARE EDUCATION/TRAINING PROGRAM

## 2024-10-22 PROCEDURE — 250N000013 HC RX MED GY IP 250 OP 250 PS 637: Performed by: STUDENT IN AN ORGANIZED HEALTH CARE EDUCATION/TRAINING PROGRAM

## 2024-10-22 PROCEDURE — 83735 ASSAY OF MAGNESIUM: CPT | Performed by: STUDENT IN AN ORGANIZED HEALTH CARE EDUCATION/TRAINING PROGRAM

## 2024-10-22 PROCEDURE — 250N000013 HC RX MED GY IP 250 OP 250 PS 637: Performed by: PHYSICIAN ASSISTANT

## 2024-10-22 PROCEDURE — 80048 BASIC METABOLIC PNL TOTAL CA: CPT | Performed by: STUDENT IN AN ORGANIZED HEALTH CARE EDUCATION/TRAINING PROGRAM

## 2024-10-22 PROCEDURE — 120N000004 HC R&B MS OVERFLOW

## 2024-10-22 PROCEDURE — 250N000011 HC RX IP 250 OP 636: Performed by: STUDENT IN AN ORGANIZED HEALTH CARE EDUCATION/TRAINING PROGRAM

## 2024-10-22 PROCEDURE — 85048 AUTOMATED LEUKOCYTE COUNT: CPT | Performed by: STUDENT IN AN ORGANIZED HEALTH CARE EDUCATION/TRAINING PROGRAM

## 2024-10-22 PROCEDURE — 99232 SBSQ HOSP IP/OBS MODERATE 35: CPT | Performed by: PHYSICIAN ASSISTANT

## 2024-10-22 PROCEDURE — 36415 COLL VENOUS BLD VENIPUNCTURE: CPT | Performed by: STUDENT IN AN ORGANIZED HEALTH CARE EDUCATION/TRAINING PROGRAM

## 2024-10-22 PROCEDURE — 250N000013 HC RX MED GY IP 250 OP 250 PS 637: Performed by: REGISTERED NURSE

## 2024-10-22 PROCEDURE — 99233 SBSQ HOSP IP/OBS HIGH 50: CPT | Performed by: STUDENT IN AN ORGANIZED HEALTH CARE EDUCATION/TRAINING PROGRAM

## 2024-10-22 RX ORDER — MAGNESIUM OXIDE 400 MG/1
400 TABLET ORAL EVERY 4 HOURS
Status: COMPLETED | OUTPATIENT
Start: 2024-10-22 | End: 2024-10-22

## 2024-10-22 RX ORDER — POTASSIUM CHLORIDE 1500 MG/1
20 TABLET, EXTENDED RELEASE ORAL ONCE
Status: COMPLETED | OUTPATIENT
Start: 2024-10-22 | End: 2024-10-22

## 2024-10-22 RX ORDER — FUROSEMIDE 10 MG/ML
40 INJECTION INTRAMUSCULAR; INTRAVENOUS DAILY
Status: DISCONTINUED | OUTPATIENT
Start: 2024-10-23 | End: 2024-10-26

## 2024-10-22 RX ADMIN — ASPIRIN 81 MG CHEWABLE TABLET 81 MG: 81 TABLET CHEWABLE at 07:54

## 2024-10-22 RX ADMIN — DULOXETINE HYDROCHLORIDE 60 MG: 60 CAPSULE, DELAYED RELEASE PELLETS ORAL at 07:54

## 2024-10-22 RX ADMIN — ACETAMINOPHEN AND CODEINE PHOSPHATE 1 TABLET: 300; 30 TABLET ORAL at 05:32

## 2024-10-22 RX ADMIN — ACETAMINOPHEN AND CODEINE PHOSPHATE 1 TABLET: 300; 30 TABLET ORAL at 21:29

## 2024-10-22 RX ADMIN — ROSUVASTATIN 10 MG: 10 TABLET, FILM COATED ORAL at 21:29

## 2024-10-22 RX ADMIN — ACETAMINOPHEN AND CODEINE PHOSPHATE 1 TABLET: 300; 30 TABLET ORAL at 09:44

## 2024-10-22 RX ADMIN — ACETAMINOPHEN AND CODEINE PHOSPHATE 1 TABLET: 300; 30 TABLET ORAL at 18:27

## 2024-10-22 RX ADMIN — SENNOSIDES AND DOCUSATE SODIUM 1 TABLET: 8.6; 5 TABLET ORAL at 07:54

## 2024-10-22 RX ADMIN — BUSPIRONE HYDROCHLORIDE 5 MG: 5 TABLET ORAL at 09:44

## 2024-10-22 RX ADMIN — LOSARTAN POTASSIUM 25 MG: 25 TABLET, FILM COATED ORAL at 07:54

## 2024-10-22 RX ADMIN — LIDOCAINE 1 PATCH: 4 PATCH TOPICAL at 07:53

## 2024-10-22 RX ADMIN — LATANOPROST 1 DROP: 50 SOLUTION/ DROPS OPHTHALMIC at 21:30

## 2024-10-22 RX ADMIN — ENOXAPARIN SODIUM 40 MG: 40 INJECTION SUBCUTANEOUS at 18:27

## 2024-10-22 RX ADMIN — MICONAZOLE NITRATE: 20 POWDER TOPICAL at 09:44

## 2024-10-22 RX ADMIN — PANTOPRAZOLE SODIUM 40 MG: 40 TABLET, DELAYED RELEASE ORAL at 06:36

## 2024-10-22 RX ADMIN — QUETIAPINE FUMARATE 12.5 MG: 25 TABLET ORAL at 16:18

## 2024-10-22 RX ADMIN — MICONAZOLE NITRATE: 20 POWDER TOPICAL at 21:44

## 2024-10-22 RX ADMIN — BUSPIRONE HYDROCHLORIDE 5 MG: 5 TABLET ORAL at 21:29

## 2024-10-22 RX ADMIN — HYDROMORPHONE HYDROCHLORIDE 1 MG: 2 TABLET ORAL at 11:27

## 2024-10-22 RX ADMIN — TRAZODONE HYDROCHLORIDE 25 MG: 50 TABLET ORAL at 21:30

## 2024-10-22 RX ADMIN — SENNOSIDES AND DOCUSATE SODIUM 1 TABLET: 8.6; 5 TABLET ORAL at 21:29

## 2024-10-22 RX ADMIN — METOPROLOL TARTRATE 25 MG: 25 TABLET, FILM COATED ORAL at 21:29

## 2024-10-22 RX ADMIN — MAGNESIUM OXIDE TAB 400 MG (241.3 MG ELEMENTAL MG) 400 MG: 400 (241.3 MG) TAB at 14:10

## 2024-10-22 RX ADMIN — LEVOTHYROXINE SODIUM 88 MCG: 88 TABLET ORAL at 06:37

## 2024-10-22 RX ADMIN — ACETAMINOPHEN AND CODEINE PHOSPHATE 1 TABLET: 300; 30 TABLET ORAL at 14:10

## 2024-10-22 RX ADMIN — MAGNESIUM OXIDE TAB 400 MG (241.3 MG ELEMENTAL MG) 400 MG: 400 (241.3 MG) TAB at 11:24

## 2024-10-22 RX ADMIN — POTASSIUM CHLORIDE 20 MEQ: 1500 TABLET, EXTENDED RELEASE ORAL at 18:34

## 2024-10-22 RX ADMIN — FUROSEMIDE 40 MG: 10 INJECTION, SOLUTION INTRAVENOUS at 08:42

## 2024-10-22 RX ADMIN — METOPROLOL TARTRATE 25 MG: 25 TABLET, FILM COATED ORAL at 07:54

## 2024-10-22 ASSESSMENT — ACTIVITIES OF DAILY LIVING (ADL)
ADLS_ACUITY_SCORE: 49
ADLS_ACUITY_SCORE: 49
ADLS_ACUITY_SCORE: 55
ADLS_ACUITY_SCORE: 49
ADLS_ACUITY_SCORE: 51
ADLS_ACUITY_SCORE: 49
ADLS_ACUITY_SCORE: 55
ADLS_ACUITY_SCORE: 55
ADLS_ACUITY_SCORE: 51
ADLS_ACUITY_SCORE: 55
ADLS_ACUITY_SCORE: 51
ADLS_ACUITY_SCORE: 55
ADLS_ACUITY_SCORE: 51
ADLS_ACUITY_SCORE: 49
ADLS_ACUITY_SCORE: 49
ADLS_ACUITY_SCORE: 55
ADLS_ACUITY_SCORE: 55
ADLS_ACUITY_SCORE: 49
ADLS_ACUITY_SCORE: 51

## 2024-10-22 NOTE — PROGRESS NOTES
Care Management Follow Up    Length of Stay (days): 10    Expected Discharge Date: 10/22/2024     Concerns to be Addressed: discharge planning     Patient plan of care discussed at interdisciplinary rounds: No    Anticipated Discharge Disposition: Transitional Care      Anticipated Discharge Services: TCU  Anticipated Discharge DME: None    Patient/family educated on Medicare website which has current facility and service quality ratings: yes  Education Provided on the Discharge Plan: Yes  Patient/Family in Agreement with the Plan:      Referrals Placed by CM/SW: Post Acute Facilities  Private pay costs discussed:  not discussed    Discussed  Partnership in Safe Discharge Planning  document with patient/family: No     Handoff Completed: No, handoff not indicated or clinically appropriate    Additional Information:  Called [facility] as requested to check on status of acceptance. Maureen returned call and stated they cannot take the patient due to his behaviors at night    9:09 AM  Called wife and updated her on the denial of TCU. She will come in to  the packet of TCU choices today.    12:02 PM  Spouse came and picked up TCU choices. She will call tomorrow with 3-5 choices.     Social History  Assessment: Follow. Came from [facility] (no bed hold), recent discharge & had fall at [facility]. Live w/wife at home, mostly dependent I/ADLs.  Plan: Therapy rec TCU      Needs: need more TCU choices spouse will come today to  list  Transport: TBD        Next Steps: give patient and family TCU choices, medical progression    Albania Daniels, RN

## 2024-10-22 NOTE — PROGRESS NOTES
[facility] ACUTE INPATIENT PAIN SERVICE    [facility], [facility], [facility], [facility], [place]   PAIN PROGRESS      Assessment/Plan:  Dominik Rojas is a 83 year old male who was admitted on 10/12/2024.  I was asked by Dr. Rosi Mercedes to see the patient for his uncontrolled pain. He is s/p left proximal femur fracture with ORIF (10/06/24). Was discharged to the TCU on 10/11 and shortly after had another fall resulting in readmission to the hospital. History of COPD on 2 to 3 L nasal cannula at home, chronic systolic heart failure, hypertension, hypothyroidism, anxiety. Describes pain as 10/10. He does have better pain control with scheduled tylenol#3. Reviewed he can use his dilaudid as needed for additional breakthrough pain throughout his day.     [facility] He is not on any controlled medications.     Opioids received in the past 24h:  *(4) 300-30mg acetaminophen-codeine tablets  *(1) 1mg dilaudid tablets        PLAN:  Acute post-operative pain. Complicated by mechanical fall on 10/12. Opioid naive.  Multimodal Medication Therapy:   Adjuvants:   - APAP 975mg q8h  - ASA 81mg  - Duloxetine 60mg once daily  - Topical Lidocaine  - Trazodone 25mg bedtime  Opioids:   - Acetaminophen-codeine 300-30mg q4h with hold parameters  - Dilaudid 1mg q4h prn - second line  Non-medication interventions- Ice  Constipation Prophylaxis- Senna-S  Follow up /Discharge Recommendations - We recommend prescribing the following at the time of discharge:  Short taper Rx of T#3 is appropriate.               Subjective:  Dominik is seen today in his chair alert and oriented x 2. Reports his pain is currently a 10/10. Scheduling tylenol#3 has been beneficial in controlling his pain. He does report his pain decreases to 0/10 after taking the medications but will suddenly spike to 10/10 at times throughout the day. Discussed with him that Dilaudid should be used for breakthrough pain.     Denies concerns with nausea, vomiting,  "constipation.      Reviewed continuing with current plan, all questions answered.        History   Drug Use No         Tobacco Use      Smoking status: Former        Packs/day: 0.00        Years: 0.5 packs/day for 35.0 years (17.5 ttl pk-yrs)        Types: Cigarettes        Start date: 1955        Quit date: 1990        Years since quittin.8      Smokeless tobacco: Never      Tobacco comments: quit         Objective:  Vital signs in last 24 hours:  B/P: 148/81, T: 97.8, P: 74, R: 20   Blood pressure (!) 148/81, pulse 74, temperature 97.8  F (36.6  C), temperature source Oral, resp. rate 20, height 1.702 m (5' 7\"), weight 82.4 kg (181 lb 9.6 oz), SpO2 96%.        Review of Systems:   As per subjective, all others negative.    Physical Exam    General: in no apparent distress and non-toxic. Mild somnolence  HEENT: Head normocephalic atraumatic, oral mucosa moist. Sclerae anicteric  CV: Regular rhythm, normal rate, no murmurs  Resp: No wheezes, no rales or rhonchi, no focal consolidations  GI: Normoactive bowel sounds  Skin: No rashes or lesions  Extremities: Peripheral edema  Psych: Normal affect, mood euthymic  Neuro: Grossly normal             Imaging:  Personally Reviewed.    Results for orders placed or performed during the hospital encounter of 10/12/24   XR Femur Left 2 Views    Impression    IMPRESSION: Postoperative changes redemonstrated related to ORIF intertrochanteric left proximal femur fracture with dynamic hip screw. Alignment and hardware unchanged. Displaced lesser trochanteric fracture fragments are unchanged. Lateral skin staples   remain along the lateral left hip and proximal thigh. Anatomic alignment left femur. No new left femur fracture or joint malalignment. Mild to moderate left hip osteoarthritis. Moderate to severe degenerative change medial compartment left knee.   Arterial calcification. Small left knee joint effusion. Lateral left hip and proximal thigh soft tissue " edema.     Head CT w/o contrast    Impression    IMPRESSION:  HEAD CT:  1.  No finding for intracranial hemorrhage, mass, or acute infarct.    2.  Moderate volume loss and moderate to advanced presumed sequelae of chronic microvascular ischemic change. Stable volume loss and gliosis right frontal lobe.    CERVICAL SPINE CT:  1.  Advanced cervical spondylosis without finding for acute fracture or posttraumatic subluxation.       CT Cervical Spine w/o Contrast    Impression    IMPRESSION:  HEAD CT:  1.  No finding for intracranial hemorrhage, mass, or acute infarct.    2.  Moderate volume loss and moderate to advanced presumed sequelae of chronic microvascular ischemic change. Stable volume loss and gliosis right frontal lobe.    CERVICAL SPINE CT:  1.  Advanced cervical spondylosis without finding for acute fracture or posttraumatic subluxation.       US Lower Extremity Venous Duplex Left    Impression    IMPRESSION:  No deep venous thrombosis in the left lower extremity.   XR Pelvis 1/2 Views    Impression    IMPRESSION: No significant interval change. Postop ORIF left intertrochanteric femur fracture with dynamic hip screw. Hardware and alignment unchanged. Lesser trochanteric fragments remain medially displaced by approximately 2 cm. No new fracture. Mild   to moderate left and mild right hip osteoarthritis. Vascular stents project over the pelvis and surgical clips project over the right inguinal region. Both obturator rings appear intact. Arterial calcification.   CT Femur Thigh Left w/o Contrast    Impression    IMPRESSION:  1.  Reduced and nailed comminuted intertrochanteric left femoral fracture in unchanged alignment. No CT evidence of new fracture.  2.  Small lateral left hip subcutaneous hematoma.     XR Chest 1 View    Impression    IMPRESSION: Mild bibasilar atelectasis. Increasing small right pleural effusion. No pneumothorax. Stable enlarged cardiac silhouette. Mild pulmonary vascular congestion.         Lab Results:  Personally Reviewed.   Last Comprehensive Metabolic Panel:  Sodium   Date Value Ref Range Status   10/22/2024 137 135 - 145 mmol/L Final     Potassium   Date Value Ref Range Status   10/22/2024 3.7 3.4 - 5.3 mmol/L Final   03/02/2022 4.5 3.5 - 5.0 mmol/L Final     Chloride   Date Value Ref Range Status   10/22/2024 97 (L) 98 - 107 mmol/L Final   03/02/2022 105 98 - 107 mmol/L Final     Carbon Dioxide (CO2)   Date Value Ref Range Status   10/22/2024 33 (H) 22 - 29 mmol/L Final   03/02/2022 27 22 - 31 mmol/L Final     Anion Gap   Date Value Ref Range Status   10/22/2024 7 7 - 15 mmol/L Final   03/02/2022 11 5 - 18 mmol/L Final     Glucose   Date Value Ref Range Status   10/22/2024 91 70 - 99 mg/dL Final   03/02/2022 95 70 - 125 mg/dL Final     GLUCOSE BY METER POCT   Date Value Ref Range Status   10/21/2024 135 (H) 70 - 99 mg/dL Final     Urea Nitrogen   Date Value Ref Range Status   10/22/2024 20.1 8.0 - 23.0 mg/dL Final   03/02/2022 16 8 - 28 mg/dL Final     Creatinine   Date Value Ref Range Status   10/22/2024 0.85 0.67 - 1.17 mg/dL Final     GFR Estimate   Date Value Ref Range Status   10/22/2024 86 >60 mL/min/1.73m2 Final     Comment:     eGFR calculated using 2021 CKD-EPI equation.   06/22/2021 >60 >60 mL/min/1.73m2 Final     GFR, ESTIMATED POCT   Date Value Ref Range Status   07/17/2023 55 (L) >60 mL/min/1.73m2 Final     Calcium   Date Value Ref Range Status   10/22/2024 8.2 (L) 8.8 - 10.4 mg/dL Final     Comment:     Reference intervals for this test were updated on 7/16/2024 to reflect our healthy population more accurately. There may be differences in the flagging of prior results with similar values performed with this method. Those prior results can be interpreted in the context of the updated reference intervals.        UA:   Amphetamines Urine   Date Value Ref Range Status   08/14/2021 Screen Negative Screen Negative Final     Barbiturates Urine   Date Value Ref Range Status    08/14/2021 Screen Negative Screen Negative Final     Cannabinoids Urine   Date Value Ref Range Status   08/14/2021 Screen Negative Screen Negative Final     Cocaine Urine   Date Value Ref Range Status   08/14/2021 Screen Negative Screen Negative Final     Opiates Urine   Date Value Ref Range Status   08/14/2021 Screen Negative Screen Negative Final     PCP Urine   Date Value Ref Range Status   08/14/2021 Screen Negative Screen Negative Final              Please see A&P for additional details of medical decision making.    Aaron Crane PA-C  Acute Care Pain Management  Team  Hours of pain coverage Mon-Fri 8-1600, afterhours please call the house officer    [facility] (WW, JNs, SD, RH)   Page via Vocera text web console -Click for [place]

## 2024-10-22 NOTE — PLAN OF CARE
Problem: Risk for Delirium  Goal: Improved Sleep  Outcome: Progressing     Problem: Pain Acute  Goal: Optimal Pain Control and Function  Outcome: Progressing     Problem: Dysrhythmia  Goal: Normalized Cardiac Rhythm  Outcome: Progressing     Problem: Fall Injury Risk  Goal: Absence of Fall and Fall-Related Injury  Outcome: Progressing  Intervention: Promote Injury-Free Environment  Recent Flowsheet Documentation  Taken 10/22/2024 0130 by Arline Christina RN  Safety Promotion/Fall Prevention:   activity supervised   room near nurse's station   nonskid shoes/slippers when out of bed  Taken 10/21/2024 2138 by Arline Christina RN  Safety Promotion/Fall Prevention:   activity supervised   room near nurse's station   nonskid shoes/slippers when out of bed     Problem: Comorbidity Management  Goal: Maintenance of Behavioral Health Symptom Control  Outcome: Progressing  Goal: Maintenance of COPD Symptom Control  Outcome: Progressing  Goal: Maintenance of Heart Failure Symptom Control  Outcome: Progressing  Goal: Blood Pressure in Desired Range  Outcome: Progressing   Goal Outcome Evaluation:  Patient disoriented to time, place, situation. Pain managed with scheduled T3. Patient slept well overnight. Patient was cooperative and calm overnight. VSS. On O2- 2L. Primofit in place. A2- d/t weakness with walker and gait belt. Staples to L hip JERILYN. Meds with water.

## 2024-10-22 NOTE — PROGRESS NOTES
[facility]    Medicine Progress Note - Hospitalist Service    Date of Admission:  10/12/2024    Assessment & Plan   Dominik Rojas is a 83 year old male with past medical history of COPD on 2 to 3 L nasal cannula at home, chronic systolic heart failure, hypertension, hypothyroidism, anxiety, recent fall and left hip fracture s/p ORIF who was jsut discharged to TCU.  Patient was brought to ED for evaluation another fall at TCU with left hip pain. Xray showing postop changes without new acute changes. Patient was found to be tachycardiac, concerning for sinus tachycardia vs intermittent a fib rvr.        Mechanical fall  Left hip contusion without new fracture  Recent left hip fracture s/p ORIF  -Patient reported a mechanical fall after going to TCU one day PTA, with worse left hip pain.  Denied chest pain, dizziness, shortness of breath prior to or after fall  -s/p left hip ORIF  -negative xray for new fracture or dislocation, has soft tissue edema  -Negative for DVT on US  -CT Left femur no evidence of new fracture, small lateral left hip subcutaneous hematoma  -Seen by Ortho, appreciate recs, follow-up outpatient  -Seen by Pain team, appreciate assistance  -on Tylenol#3 scheduled q4,Tylenol prn 650mg, Dilaudid prn po 1mg q4    Leukocytosis - resolved  - suspect if stress de-margination, also has chronic leukocytosis  - procalcitonin not elevated  - No symptoms/signs of infection  - Ucx mixed tai, Bcx NGTD  - CXR no e/o pna  - Resp panel negative  - Monitor wbc intermittently    Acute on chronic HFpEF  -Hypervolemic on exam, lasix was held on admission due to poor po intake, weight was up 10 lbs since admission  -recent Echo showing preserved EF  -CXR 10/19: Single small right pleural effusion, mild pulmonary vascular congestion  -BNP not elevated, false negative due to elevated BMI  -IV Lasix 40mg daily  -On Metoprolol, Losartan  -daily weight, Fluid restriction     Tachycardia, now  "resolved  -Cardiology was consulted, no a fib, no need for anticoagulation  -pt is not a good candidate for anticoagulation due to frequent falls  -HR now in control    Hypomagnesemia  - Monitor and replete as needed     COPD  Chronic hypoxic respiratory failure on 2-3l NC  -PTA meds  -Not in exacerbation     Recent UTI  -PTA amoxicillin 500mg TID; finished course of abx;   -repeated UC mixed tai     History of RLL adenocarcinoma s/p radiation therapy     Anxiety and depression;  Dementia with behavioral change/agitation   Mood disorder  Acute toxic/metabolic encephalopathy  -resume PTA meds  -was improving and off 1:1, got agitated overnight and now again on 1:1  -Seen by Psych, trazodone 25 mg to help with sleep at bedtime  -Seroquel as needed for agitation  -Monitor Qtc  -Delirium precautions    Normocytic anemia  - Hb baseline 10-11  - contusions and hematoma on the Left thigh  - Monitor Hb     HLD  -Crestor     GERD  -PPI      Hypothyroidism  -recent TSH 6.93, FT4 0.83  -continue Synthroid   -recommend repeat TFT's in 4 weeks with PCP    TCU when ready          Diet: Combination Diet Regular Diet Adult  Fluid restriction 1800 ML FLUID    DVT Prophylaxis: Enoxaparin (Lovenox) SQ  Cabral Catheter: Not present  Lines: None     Cardiac Monitoring: None  Code Status: Full Code      Clinically Significant Risk Factors          # Hypochloremia: Lowest Cl = 97 mmol/L in last 2 days, will monitor as appropriate    # Hypomagnesemia: Lowest Mg = 1.6 mg/dL in last 2 days, will replace as needed       # Hypertension: Noted on problem list    # Dementia: noted on problem list        # Overweight: Estimated body mass index is 28.44 kg/m  as calculated from the following:    Height as of this encounter: 1.702 m (5' 7\").    Weight as of this encounter: 82.4 kg (181 lb 9.6 oz).      # Financial/Environmental Concerns: none         Disposition Plan     Medically Ready for Discharge: Anticipated in 2-4 Days             Rosi " MD Fior  Hospitalist Service  [facility]  Securely message with [employer] (more info)  Text page via [employer] Paging/Directory   ______________________________________________________________________    Interval History   Patient was examined at the bedside, comfortably in the chair.  Was agitated overnight and now continues to be on 1:1, will wean off as able  Denies any other new complaints, states pain is poorly controlled  Asked patient if he wanted me to call his wife, which he declined    Physical Exam   Vital Signs: Temp: 98.7  F (37.1  C) Temp src: Oral BP: 92/64 Pulse: 71   Resp: 20 SpO2: 95 % O2 Device: Nasal cannula Oxygen Delivery: 2 LPM  Weight: 181 lbs 9.6 oz    General.  No acute distress  Neck supple no JVD.  CVS regular rhythm no murmur gallops.  Lungs. Mild crackles b/l bases and rhonchi + no wheezing.  Abdomen.  Soft  bowel sounds present.  Extremities.  2+ edema b/l improving , s/p left hip sx  Neurological.  No focal deficit.  Skin bruise+    Medical Decision Making       55 MINUTES SPENT BY ME on the date of service doing chart review, history, exam, documentation & further activities per the note.      Data     I have personally reviewed the following data over the past 24 hrs:    10.8  \   N/A   / N/A     137 97 (L) 20.1 /  91   3.7 33 (H) 0.85 \       Imaging results reviewed over the past 24 hrs:   No results found for this or any previous visit (from the past 24 hour(s)).

## 2024-10-23 ENCOUNTER — APPOINTMENT (OUTPATIENT)
Dept: PHYSICAL THERAPY | Facility: HOSPITAL | Age: 84
End: 2024-10-23
Payer: COMMERCIAL

## 2024-10-23 PROBLEM — S72.002D HIP FRACTURE, LEFT, CLOSED, WITH ROUTINE HEALING, SUBSEQUENT ENCOUNTER: Status: ACTIVE | Noted: 2024-10-05

## 2024-10-23 LAB
ALBUMIN UR-MCNC: 20 MG/DL
ANION GAP SERPL CALCULATED.3IONS-SCNC: 7 MMOL/L (ref 7–15)
APPEARANCE UR: ABNORMAL
BILIRUB UR QL STRIP: NEGATIVE
BUN SERPL-MCNC: 22.5 MG/DL (ref 8–23)
CALCIUM SERPL-MCNC: 8.6 MG/DL (ref 8.8–10.4)
CHLORIDE SERPL-SCNC: 97 MMOL/L (ref 98–107)
COLOR UR AUTO: YELLOW
CREAT SERPL-MCNC: 0.93 MG/DL (ref 0.67–1.17)
EGFRCR SERPLBLD CKD-EPI 2021: 81 ML/MIN/1.73M2
GLUCOSE SERPL-MCNC: 94 MG/DL (ref 70–99)
GLUCOSE UR STRIP-MCNC: NEGATIVE MG/DL
HCO3 SERPL-SCNC: 33 MMOL/L (ref 22–29)
HGB UR QL STRIP: ABNORMAL
HOLD SPECIMEN: NORMAL
KETONES UR STRIP-MCNC: NEGATIVE MG/DL
LEUKOCYTE ESTERASE UR QL STRIP: ABNORMAL
MAGNESIUM SERPL-MCNC: 1.7 MG/DL (ref 1.7–2.3)
MUCOUS THREADS #/AREA URNS LPF: PRESENT /LPF
NITRATE UR QL: NEGATIVE
PH UR STRIP: 6 [PH] (ref 5–7)
PLATELET # BLD AUTO: 349 10E3/UL (ref 150–450)
POTASSIUM SERPL-SCNC: 4 MMOL/L (ref 3.4–5.3)
RBC URINE: 48 /HPF
SODIUM SERPL-SCNC: 137 MMOL/L (ref 135–145)
SP GR UR STRIP: 1.02 (ref 1–1.03)
UROBILINOGEN UR STRIP-MCNC: 6 MG/DL
WBC CLUMPS #/AREA URNS HPF: PRESENT /HPF
WBC URINE: >182 /HPF
YEAST #/AREA URNS HPF: ABNORMAL /HPF

## 2024-10-23 PROCEDURE — 99232 SBSQ HOSP IP/OBS MODERATE 35: CPT | Performed by: PHYSICIAN ASSISTANT

## 2024-10-23 PROCEDURE — 250N000013 HC RX MED GY IP 250 OP 250 PS 637: Performed by: PHYSICIAN ASSISTANT

## 2024-10-23 PROCEDURE — 250N000011 HC RX IP 250 OP 636: Performed by: STUDENT IN AN ORGANIZED HEALTH CARE EDUCATION/TRAINING PROGRAM

## 2024-10-23 PROCEDURE — 85049 AUTOMATED PLATELET COUNT: CPT | Performed by: HOSPITALIST

## 2024-10-23 PROCEDURE — 36415 COLL VENOUS BLD VENIPUNCTURE: CPT | Performed by: HOSPITALIST

## 2024-10-23 PROCEDURE — 81001 URINALYSIS AUTO W/SCOPE: CPT | Performed by: HOSPITALIST

## 2024-10-23 PROCEDURE — 97110 THERAPEUTIC EXERCISES: CPT | Mod: GP

## 2024-10-23 PROCEDURE — 83735 ASSAY OF MAGNESIUM: CPT | Performed by: STUDENT IN AN ORGANIZED HEALTH CARE EDUCATION/TRAINING PROGRAM

## 2024-10-23 PROCEDURE — 80048 BASIC METABOLIC PNL TOTAL CA: CPT | Performed by: STUDENT IN AN ORGANIZED HEALTH CARE EDUCATION/TRAINING PROGRAM

## 2024-10-23 PROCEDURE — 120N000004 HC R&B MS OVERFLOW

## 2024-10-23 PROCEDURE — 250N000013 HC RX MED GY IP 250 OP 250 PS 637: Performed by: INTERNAL MEDICINE

## 2024-10-23 PROCEDURE — 97116 GAIT TRAINING THERAPY: CPT | Mod: GP

## 2024-10-23 PROCEDURE — 250N000013 HC RX MED GY IP 250 OP 250 PS 637: Performed by: REGISTERED NURSE

## 2024-10-23 PROCEDURE — 36415 COLL VENOUS BLD VENIPUNCTURE: CPT | Performed by: STUDENT IN AN ORGANIZED HEALTH CARE EDUCATION/TRAINING PROGRAM

## 2024-10-23 PROCEDURE — 250N000013 HC RX MED GY IP 250 OP 250 PS 637: Performed by: HOSPITALIST

## 2024-10-23 PROCEDURE — 99232 SBSQ HOSP IP/OBS MODERATE 35: CPT | Performed by: HOSPITALIST

## 2024-10-23 PROCEDURE — 250N000013 HC RX MED GY IP 250 OP 250 PS 637: Performed by: STUDENT IN AN ORGANIZED HEALTH CARE EDUCATION/TRAINING PROGRAM

## 2024-10-23 PROCEDURE — 250N000011 HC RX IP 250 OP 636: Performed by: INTERNAL MEDICINE

## 2024-10-23 PROCEDURE — 87086 URINE CULTURE/COLONY COUNT: CPT | Performed by: HOSPITALIST

## 2024-10-23 RX ORDER — POLYETHYLENE GLYCOL 3350 17 G/17G
17 POWDER, FOR SOLUTION ORAL DAILY
Status: DISCONTINUED | OUTPATIENT
Start: 2024-10-23 | End: 2024-11-12 | Stop reason: HOSPADM

## 2024-10-23 RX ORDER — MAGNESIUM OXIDE 400 MG/1
400 TABLET ORAL EVERY 4 HOURS
Status: COMPLETED | OUTPATIENT
Start: 2024-10-23 | End: 2024-10-23

## 2024-10-23 RX ADMIN — LIDOCAINE 1 PATCH: 4 PATCH TOPICAL at 09:08

## 2024-10-23 RX ADMIN — ACETAMINOPHEN AND CODEINE PHOSPHATE 1 TABLET: 300; 30 TABLET ORAL at 03:34

## 2024-10-23 RX ADMIN — MICONAZOLE NITRATE: 20 POWDER TOPICAL at 09:08

## 2024-10-23 RX ADMIN — METOPROLOL TARTRATE 25 MG: 25 TABLET, FILM COATED ORAL at 09:07

## 2024-10-23 RX ADMIN — MAGNESIUM OXIDE TAB 400 MG (241.3 MG ELEMENTAL MG) 400 MG: 400 (241.3 MG) TAB at 18:26

## 2024-10-23 RX ADMIN — LEVOTHYROXINE SODIUM 88 MCG: 88 TABLET ORAL at 06:53

## 2024-10-23 RX ADMIN — POLYETHYLENE GLYCOL 3350 17 G: 17 POWDER, FOR SOLUTION ORAL at 18:26

## 2024-10-23 RX ADMIN — TRAZODONE HYDROCHLORIDE 25 MG: 50 TABLET ORAL at 21:15

## 2024-10-23 RX ADMIN — DULOXETINE HYDROCHLORIDE 60 MG: 60 CAPSULE, DELAYED RELEASE PELLETS ORAL at 09:06

## 2024-10-23 RX ADMIN — ACETAMINOPHEN AND CODEINE PHOSPHATE 1 TABLET: 300; 30 TABLET ORAL at 06:53

## 2024-10-23 RX ADMIN — ENOXAPARIN SODIUM 40 MG: 40 INJECTION SUBCUTANEOUS at 18:26

## 2024-10-23 RX ADMIN — LOSARTAN POTASSIUM 25 MG: 25 TABLET, FILM COATED ORAL at 09:06

## 2024-10-23 RX ADMIN — METOPROLOL TARTRATE 25 MG: 25 TABLET, FILM COATED ORAL at 20:34

## 2024-10-23 RX ADMIN — BUSPIRONE HYDROCHLORIDE 5 MG: 5 TABLET ORAL at 20:36

## 2024-10-23 RX ADMIN — BUSPIRONE HYDROCHLORIDE 5 MG: 5 TABLET ORAL at 09:08

## 2024-10-23 RX ADMIN — FUROSEMIDE 40 MG: 10 INJECTION, SOLUTION INTRAVENOUS at 09:07

## 2024-10-23 RX ADMIN — ACETAMINOPHEN AND CODEINE PHOSPHATE 1 TABLET: 300; 30 TABLET ORAL at 20:32

## 2024-10-23 RX ADMIN — PANTOPRAZOLE SODIUM 40 MG: 40 TABLET, DELAYED RELEASE ORAL at 06:53

## 2024-10-23 RX ADMIN — ROSUVASTATIN 10 MG: 10 TABLET, FILM COATED ORAL at 20:37

## 2024-10-23 RX ADMIN — ACETAMINOPHEN AND CODEINE PHOSPHATE 1 TABLET: 300; 30 TABLET ORAL at 15:22

## 2024-10-23 RX ADMIN — ASPIRIN 81 MG CHEWABLE TABLET 81 MG: 81 TABLET CHEWABLE at 09:07

## 2024-10-23 RX ADMIN — MAGNESIUM OXIDE TAB 400 MG (241.3 MG ELEMENTAL MG) 400 MG: 400 (241.3 MG) TAB at 15:22

## 2024-10-23 RX ADMIN — SENNOSIDES AND DOCUSATE SODIUM 1 TABLET: 8.6; 5 TABLET ORAL at 09:06

## 2024-10-23 RX ADMIN — ACETAMINOPHEN AND CODEINE PHOSPHATE 1 TABLET: 300; 30 TABLET ORAL at 12:09

## 2024-10-23 ASSESSMENT — ACTIVITIES OF DAILY LIVING (ADL)
ADLS_ACUITY_SCORE: 26.75
ADLS_ACUITY_SCORE: 27.75
ADLS_ACUITY_SCORE: 26.75
ADLS_ACUITY_SCORE: 27.25
ADLS_ACUITY_SCORE: 27.75
ADLS_ACUITY_SCORE: 27.75
ADLS_ACUITY_SCORE: 26.75
ADLS_ACUITY_SCORE: 27.75
ADLS_ACUITY_SCORE: 25.75
ADLS_ACUITY_SCORE: 27.75
ADLS_ACUITY_SCORE: 25.75
ADLS_ACUITY_SCORE: 26.75
ADLS_ACUITY_SCORE: 26.75
ADLS_ACUITY_SCORE: 27.75
ADLS_ACUITY_SCORE: 55
ADLS_ACUITY_SCORE: 27.75

## 2024-10-23 NOTE — PLAN OF CARE
Problem: Adult Inpatient Plan of Care  Goal: Plan of Care Review  Description: The Plan of Care Review/Shift note should be completed every shift.  The Outcome Evaluation is a brief statement about your assessment that the patient is improving, declining, or no change.  This information will be displayed automatically on your shift  note.  Outcome: Progressing     Problem: Risk for Delirium  Goal: Optimal Coping  Intervention: Optimize Psychosocial Adjustment to Delirium  Recent Flowsheet Documentation  Taken 10/22/2024 1620 by Po Coto RN  Supportive Measures:   active listening utilized   verbalization of feelings encouraged     Problem: Risk for Delirium  Goal: Improved Behavioral Control  Outcome: Progressing  Intervention: Prevent and Manage Agitation  Recent Flowsheet Documentation  Taken 10/22/2024 1620 by Po Coto RN  Environment Familiarity/Consistency: daily routine followed  Intervention: Minimize Safety Risk  Recent Flowsheet Documentation  Taken 10/22/2024 1620 by Po Coto RN  Enhanced Safety Measures:   pain management   patient/family teach back on injury risk     Problem: Pain Acute  Goal: Optimal Pain Control and Function  Outcome: Progressing  Intervention: Develop Pain Management Plan  Recent Flowsheet Documentation  Taken 10/22/2024 1620 by Po Coto RN  Pain Management Interventions:   repositioned   emotional support  Intervention: Prevent or Manage Pain  Recent Flowsheet Documentation  Taken 10/22/2024 1620 by Po Coto RN  Bowel Elimination Promotion: adequate fluid intake promoted  Medication Review/Management: medications reviewed  Intervention: Optimize Psychosocial Wellbeing  Recent Flowsheet Documentation  Taken 10/22/2024 1620 by Po Coto RN  Supportive Measures:   active listening utilized   verbalization of feelings encouraged     Problem: Fall Injury Risk  Goal: Absence of Fall and Fall-Related Injury  Outcome: Progressing  Intervention: Identify and  "Manage Contributors  Recent Flowsheet Documentation  Taken 10/22/2024 1620 by Po Coto RN  Medication Review/Management: medications reviewed  Intervention: Promote Injury-Free Environment  Recent Flowsheet Documentation  Taken 10/22/2024 1620 by Po Coto RN  Safety Promotion/Fall Prevention:   activity supervised   clutter free environment maintained   nonskid shoes/slippers when out of bed   patient and family education   safety round/check completed   Goal Outcome Evaluation:                        Patient was yelling this afternoon, he wanted to go out. Redirection worked only briefly. Alert. Confused and disoriented. Yelling and calling his nurse name despite writer was infront of him. PRN Seroquel 12.5 mg po given, improvement with his behavior noted after.    He reported pain on his left hip, scheduled Tylenol # 3 given, cold pack applied, pain and his behavior improved.    He was more settled when somebody was holding his hand.     Occasionally he stood up, called out \"hey\" when staff passed his room.    He was agreeable in taking his bedtime medication. He refused to transfer to his bed later when was sleepy.     Weak, unsteady. Impulsive. 1:1 sitter continued for safety. Falls precaution and interventions maintained.      " 78

## 2024-10-23 NOTE — PROGRESS NOTES
"[facility] ACUTE INPATIENT PAIN SERVICE    [facility], [facility], [facility], [facility], [place]   PAIN PROGRESS      Assessment/Plan:  Dominik Rojas is a 83 year old male who was admitted on 10/12/2024.  I was asked by Dr. Rosi Mercedes to see the patient for his uncontrolled pain. He is s/p left proximal femur fracture with ORIF (10/06/24). Was discharged to the TCU on 10/11 and shortly after had another fall resulting in readmission to the hospital. History of COPD on 2 to 3 L nasal cannula at home, chronic systolic heart failure, hypertension, hypothyroidism, anxiety. Describes pain as 6/10. He has been experiencing less pain flares with having his tylenol#3 scheduled. States today is pain is, \"under control.\"    [place]NPMP reviewed: He is not on any controlled medications.     Opioids received in the past 24h:  *(6) 300-30mg acetaminophen-codeine tablets  *(1) 1mg dilaudid tablets        PLAN:  Acute post-operative pain. Complicated by mechanical fall on 10/12. Opioid naive.  Multimodal Medication Therapy:   Adjuvants:   - APAP 975mg q8h  - ASA 81mg  - Duloxetine 60mg once daily  - Topical Lidocaine  - Trazodone 25mg bedtime  Opioids:   - Acetaminophen-codeine 300-30mg q4h with hold parameters  - Dilaudid 1mg q4h prn - second line  Non-medication interventions- Ice  Constipation Prophylaxis- Senna-S  Follow up /Discharge Recommendations - We recommend prescribing the following at the time of discharge:  Short taper Rx of T#3 is appropriate.        Subjective:  Dominik is seen today in his chair alert and oriented x 2. Reports his pain is currently a 6/10. Reports his pain today is under control. He does experience mild pain flares with motion. Overall more controlled compared to earlier during the week.      Denies concerns with nausea, vomiting, constipation.      Reviewed continuing with current plan, all questions answered.              History   Drug Use No         Tobacco Use      Smoking status: Former   " "     Packs/day: 0.00        Years: 0.5 packs/day for 35.0 years (17.5 ttl pk-yrs)        Types: Cigarettes        Start date: 1955        Quit date: 1990        Years since quittin.8      Smokeless tobacco: Never      Tobacco comments: quit         Objective:  Vital signs in last 24 hours:  B/P: 140/76, T: 98.9, P: 72, R: 18   Blood pressure (!) 140/76, pulse 72, temperature 98.9  F (37.2  C), temperature source Oral, resp. rate 18, height 1.702 m (5' 7\"), weight 87 kg (191 lb 11.2 oz), SpO2 97%.        Review of Systems:   As per subjective, all others negative.    Physical Exam    General: in no apparent distress and non-toxic. Mild somnolence  HEENT: Head normocephalic atraumatic, oral mucosa moist. Sclerae anicteric  CV: Regular rhythm, normal rate, no murmurs  Resp: No wheezes, no rales or rhonchi, no focal consolidations  GI: Normoactive bowel sounds  Skin: No rashes or lesions  Extremities: Peripheral edema  Psych: Normal affect, mood euthymic  Neuro: Grossly normal             Imaging:  Personally Reviewed.    Results for orders placed or performed during the hospital encounter of 10/12/24   XR Femur Left 2 Views    Impression    IMPRESSION: Postoperative changes redemonstrated related to ORIF intertrochanteric left proximal femur fracture with dynamic hip screw. Alignment and hardware unchanged. Displaced lesser trochanteric fracture fragments are unchanged. Lateral skin staples   remain along the lateral left hip and proximal thigh. Anatomic alignment left femur. No new left femur fracture or joint malalignment. Mild to moderate left hip osteoarthritis. Moderate to severe degenerative change medial compartment left knee.   Arterial calcification. Small left knee joint effusion. Lateral left hip and proximal thigh soft tissue edema.     Head CT w/o contrast    Impression    IMPRESSION:  HEAD CT:  1.  No finding for intracranial hemorrhage, mass, or acute infarct.    2.  Moderate volume loss " and moderate to advanced presumed sequelae of chronic microvascular ischemic change. Stable volume loss and gliosis right frontal lobe.    CERVICAL SPINE CT:  1.  Advanced cervical spondylosis without finding for acute fracture or posttraumatic subluxation.       CT Cervical Spine w/o Contrast    Impression    IMPRESSION:  HEAD CT:  1.  No finding for intracranial hemorrhage, mass, or acute infarct.    2.  Moderate volume loss and moderate to advanced presumed sequelae of chronic microvascular ischemic change. Stable volume loss and gliosis right frontal lobe.    CERVICAL SPINE CT:  1.  Advanced cervical spondylosis without finding for acute fracture or posttraumatic subluxation.       US Lower Extremity Venous Duplex Left    Impression    IMPRESSION:  No deep venous thrombosis in the left lower extremity.   XR Pelvis 1/2 Views    Impression    IMPRESSION: No significant interval change. Postop ORIF left intertrochanteric femur fracture with dynamic hip screw. Hardware and alignment unchanged. Lesser trochanteric fragments remain medially displaced by approximately 2 cm. No new fracture. Mild   to moderate left and mild right hip osteoarthritis. Vascular stents project over the pelvis and surgical clips project over the right inguinal region. Both obturator rings appear intact. Arterial calcification.   CT Femur Thigh Left w/o Contrast    Impression    IMPRESSION:  1.  Reduced and nailed comminuted intertrochanteric left femoral fracture in unchanged alignment. No CT evidence of new fracture.  2.  Small lateral left hip subcutaneous hematoma.     XR Chest 1 View    Impression    IMPRESSION: Mild bibasilar atelectasis. Increasing small right pleural effusion. No pneumothorax. Stable enlarged cardiac silhouette. Mild pulmonary vascular congestion.        Lab Results:  Personally Reviewed.   Last Comprehensive Metabolic Panel:  Sodium   Date Value Ref Range Status   10/22/2024 137 135 - 145 mmol/L Final     Potassium    Date Value Ref Range Status   10/22/2024 3.7 3.4 - 5.3 mmol/L Final   03/02/2022 4.5 3.5 - 5.0 mmol/L Final     Chloride   Date Value Ref Range Status   10/22/2024 97 (L) 98 - 107 mmol/L Final   03/02/2022 105 98 - 107 mmol/L Final     Carbon Dioxide (CO2)   Date Value Ref Range Status   10/22/2024 33 (H) 22 - 29 mmol/L Final   03/02/2022 27 22 - 31 mmol/L Final     Anion Gap   Date Value Ref Range Status   10/22/2024 7 7 - 15 mmol/L Final   03/02/2022 11 5 - 18 mmol/L Final     Glucose   Date Value Ref Range Status   10/22/2024 91 70 - 99 mg/dL Final   03/02/2022 95 70 - 125 mg/dL Final     GLUCOSE BY METER POCT   Date Value Ref Range Status   10/21/2024 135 (H) 70 - 99 mg/dL Final     Urea Nitrogen   Date Value Ref Range Status   10/22/2024 20.1 8.0 - 23.0 mg/dL Final   03/02/2022 16 8 - 28 mg/dL Final     Creatinine   Date Value Ref Range Status   10/22/2024 0.85 0.67 - 1.17 mg/dL Final     GFR Estimate   Date Value Ref Range Status   10/22/2024 86 >60 mL/min/1.73m2 Final     Comment:     eGFR calculated using 2021 CKD-EPI equation.   06/22/2021 >60 >60 mL/min/1.73m2 Final     GFR, ESTIMATED POCT   Date Value Ref Range Status   07/17/2023 55 (L) >60 mL/min/1.73m2 Final     Calcium   Date Value Ref Range Status   10/22/2024 8.2 (L) 8.8 - 10.4 mg/dL Final     Comment:     Reference intervals for this test were updated on 7/16/2024 to reflect our healthy population more accurately. There may be differences in the flagging of prior results with similar values performed with this method. Those prior results can be interpreted in the context of the updated reference intervals.        UA:   Amphetamines Urine   Date Value Ref Range Status   08/14/2021 Screen Negative Screen Negative Final     Barbiturates Urine   Date Value Ref Range Status   08/14/2021 Screen Negative Screen Negative Final     Cannabinoids Urine   Date Value Ref Range Status   08/14/2021 Screen Negative Screen Negative Final     Cocaine Urine   Date  Value Ref Range Status   08/14/2021 Screen Negative Screen Negative Final     Opiates Urine   Date Value Ref Range Status   08/14/2021 Screen Negative Screen Negative Final     PCP Urine   Date Value Ref Range Status   08/14/2021 Screen Negative Screen Negative Final              Please see A&P for additional details of medical decision making.      Aaron Crane PA-C  Acute Care Pain Management  Team  Hours of pain coverage Mon-Fri 8-1600, afterhours please call the house officer    [employer] (JESUS, Noel, SD, RH)   Page via Vocera text web console -Click for [employer]

## 2024-10-23 NOTE — PLAN OF CARE
Problem: Pain Acute  Goal: Optimal Pain Control and Function  Outcome: Progressing  Intervention: Optimize Psychosocial Wellbeing  Recent Flowsheet Documentation  Taken 10/23/2024 1652 by Keysha Lowery RN  Supportive Measures:   active listening utilized   verbalization of feelings encouraged  Taken 10/23/2024 0900 by Keysha Lowery RN  Supportive Measures:   active listening utilized   verbalization of feelings encouraged     Problem: Risk for Delirium  Goal: Optimal Coping  Outcome: Progressing  Intervention: Optimize Psychosocial Adjustment to Delirium  Recent Flowsheet Documentation  Taken 10/23/2024 1652 by Keysha Lowery RN  Supportive Measures:   active listening utilized   verbalization of feelings encouraged  Taken 10/23/2024 0900 by Keysha Lowery RN  Supportive Measures:   active listening utilized   verbalization of feelings encouraged     Problem: Risk for Delirium  Goal: Improved Behavioral Control  Outcome: Progressing  Intervention: Prevent and Manage Agitation  Recent Flowsheet Documentation  Taken 10/23/2024 1652 by Keysha Lowery RN  Environment Familiarity/Consistency: daily routine followed  Taken 10/23/2024 0900 by Keysha Lowery RN  Environment Familiarity/Consistency: daily routine followed   VS stable. No SOB noted. Oxygen saturation maintained well on RA. Pt continued having anxious and restless behaviors due to he is continue 1:1 sitter per family request recheck UA was done.  Family updated about his status.

## 2024-10-23 NOTE — PROGRESS NOTES
[facility]    Medicine Progress Note - Hospitalist Service    Date of Admission:  10/12/2024    Assessment & Plan                Dominik Rojas is a 84 year old male with history of COPD on 2 to 3 L nasal cannula at home, chronic systolic heart failure, hypertension, hypothyroidism, anxiety, recent fall and left hip fracture s/p ORIF who was jsut discharged to TCU.  Patient was brought to ED for evaluation another fall at TCU with left hip pain. Xray showing postop changes without new acute changes. Patient was found to be tachycardiac, concerning for sinus tachycardia vs intermittent a fib rvr. Here also altered mental status requiring 1:1 sitter. Hospital Day: 12         Mechanical fall  Left hip contusion without new fracture  Recent left hip fracture s/p ORIF  -Patient reported a mechanical fall after going to TCU one day PTA, with worse left hip pain.  Denied chest pain, dizziness, shortness of breath prior to or after fall  -s/p recent left hip ORIF  -Negative for DVT on US  -CT Left femur no evidence of new fracture, small lateral left hip subcutaneous hematoma  -Seen by Ortho, appreciate recs, follow-up outpatient  -Seen by Pain team, appreciate assistance  -on Tylenol#3 scheduled q4,Tylenol prn 650mg, Dilaudid prn po 1mg q4     Anxiety and depression;  Dementia with behavioral change/agitation   Mood disorder  Acute toxic/metabolic encephalopathy  -Continue home BuSpar, Cymbalta, Zyprexa  -was improving and off 1:1, got agitated overnight and now again on 1:1  -Seen by Psych, trazodone 25 mg to help with sleep at bedtime  -Seroquel as needed for agitation  -Monitor Qtc  -Delirium precautions  -Checking urinalysis at family request  -Voiding adequately per nursing report  -Last BM on 10/20, increase bowel regimen  -May need to decrease scheduled pain meds, defer to pain team     Leukocytosis - resolved  - suspect stress de-margination, also has chronic leukocytosis (not  consistent)  - procalcitonin not elevated  - No symptoms/signs of infection  -Workup negative  -No antibiotics     Acute on chronic HFpEF  -Hypervolemic on exam, lasix was held on admission due to poor po intake, weight was up 10 lbs since admission  -recent Echo showing preserved EF  -CXR 10/19: Single small right pleural effusion, mild pulmonary vascular congestion  -IV Lasix 40mg daily  -On Metoprolol, Losartan  -daily weight, Fluid restriction     Tachycardia, now resolved  -Cardiology was consulted, no a fib, no need for anticoagulation  -pt is not a good candidate for anticoagulation due to frequent falls  -HR now in control     Hypomagnesemia  -Protocol     COPD  Chronic hypoxic respiratory failure on 2-3l NC  -PTA meds  -Not in exacerbation     Recent UTI  -PTA amoxicillin 500mg TID; finished course of abx;   -repeated UC 10/13 mixed tai  -Family requested repeat urinalysis now with him being more drowsy, reasonable     History of RLL adenocarcinoma s/p radiation therapy     Normocytic anemia  - Hb baseline 10-11  - contusions and hematoma on the Left thigh  - Monitor Hb     HLD  -Crestor     GERD  -PPI      Hypothyroidism  -recent TSH 6.93  -continue Synthroid   -recommend repeat TSH in 4 weeks with PCP. FT4 not useful for titrating Synthroid generally     TCU when ready          Diet: Combination Diet Regular Diet Adult  Fluid restriction 1800 ML FLUID    DVT Prophylaxis: Moderate risk.   Enoxaparin (Lovenox) SQ  Cabral Catheter: Not present  Lines: None     Cardiac Monitoring: None  Code Status: Full Code      Clinically Significant Risk Factors          # Hypochloremia: Lowest Cl = 97 mmol/L in last 2 days, will monitor as appropriate    # Hypomagnesemia: Lowest Mg = 1.6 mg/dL in last 2 days, will replace as needed       # Hypertension: Noted on problem list    # Dementia: noted on problem list        # Obesity: Estimated body mass index is 30.02 kg/m  as calculated from the following:    Height as of  "this encounter: 1.702 m (5' 7\").    Weight as of this encounter: 87 kg (191 lb 11.2 oz).      # Financial/Environmental Concerns: none         Disposition Plan     Medically Ready for Discharge: Anticipated Tomorrow         Discharge barrier(s): AMS, 1:1 sitter  Care discussed with: patient, RN      Claudia Isidro MD  Hospitalist Service  [facility]  Securely message with Apiary (more info)  Text page via [employer] Paging/Directory   ______________________________________________________________________      Physical Exam   Vital Signs: Temp: 98.1  F (36.7  C) Temp src: Oral BP: 103/60 Pulse: 72   Resp: 18 SpO2: 98 % O2 Device: Nasal cannula Oxygen Delivery: 2 LPM  Weight: 191 lbs 11.2 oz    General: in no apparent distress and non-toxic drowsy male sitting in bedside chair, doesn't really rouse when I try to talk to him  HEENT: Head normocephalic atraumatic, oral mucosa moist. Sclerae anicteric  CV: Regular rhythm, normal rate, no murmurs  Resp: No wheezes, no rales or rhonchi, no focal consolidations  GI: Belly soft, nondistended, nontender, bowel sounds present  Skin: No rashes or lesions  Extremities: No peripheral edema  Psych: very drowsy  Neuro: very drowsy      Medical Decision Making               Data   Recent Results (from the past 16 hours)   Magnesium    Collection Time: 10/23/24  7:04 AM   Result Value Ref Range    Magnesium 1.7 1.7 - 2.3 mg/dL   Extra Purple Top EDTA (LAB USE ONLY)    Collection Time: 10/23/24  7:04 AM   Result Value Ref Range    Hold Specimen [facility]    Basic metabolic panel    Collection Time: 10/23/24  8:16 AM   Result Value Ref Range    Sodium 137 135 - 145 mmol/L    Potassium 4.0 3.4 - 5.3 mmol/L    Chloride 97 (L) 98 - 107 mmol/L    Carbon Dioxide (CO2) 33 (H) 22 - 29 mmol/L    Anion Gap 7 7 - 15 mmol/L    Urea Nitrogen 22.5 8.0 - 23.0 mg/dL    Creatinine 0.93 0.67 - 1.17 mg/dL    GFR Estimate 81 >60 mL/min/1.73m2    Calcium 8.6 (L) 8.8 - 10.4 mg/dL    Glucose 94 " 70 - 99 mg/dL   Platelet count    Collection Time: 10/23/24  3:03 PM   Result Value Ref Range    Platelet Count 349 150 - 450 10e3/uL       Interval History     Patient is currently very lethargic, sitting in the chair.  He would nod his head to simple questions otherwise does not answer questions.  Apparently was very active and restless/agitated during the night.  Medically stable to discharge to TCU when behaviors adequately controlled.  Remains on one-to-one sitter currently.

## 2024-10-23 NOTE — PLAN OF CARE
Problem: Risk for Delirium  Goal: Optimal Coping  Outcome: Progressing  Goal: Improved Behavioral Control  Outcome: Progressing  Intervention: Minimize Safety Risk  Recent Flowsheet Documentation  Taken 10/23/2024 0035 by Winter Shaw RN  Enhanced Safety Measures: pain management  Goal: Improved Attention and Thought Clarity  Outcome: Progressing  Goal: Improved Sleep  Outcome: Progressing     Problem: Fall Injury Risk  Goal: Absence of Fall and Fall-Related Injury  Outcome: Progressing  Intervention: Promote Injury-Free Environment  Recent Flowsheet Documentation  Taken 10/23/2024 0035 by Winter Shaw RN  Safety Promotion/Fall Prevention:   activity supervised   assistive device/personal items within reach   bedside attendant   clutter free environment maintained   lighting adjusted   nonskid shoes/slippers when out of bed   patient and family education   safety round/check completed   Goal Outcome Evaluation:         Patient reports pain to his left hip 8/10 scheduled tylenol #3. Patient did sleep for some of the night, otherwise he was up wanting to walk the halls and getting agitated. Patient remains confused and intermittently agitated. He continues to call out for staff even with 1:1 sitter.       Vitals:    10/22/24 1917 10/22/24 2127 10/22/24 2315 10/23/24 0040   BP: 98/55 101/54 119/66    BP Location: Left arm Left arm Left arm    Patient Position:  Sitting     Pulse: 95 82  86   Resp: 20  16    Temp: 98.5  F (36.9  C)  98.7  F (37.1  C)    TempSrc: Oral  Oral    SpO2: 96% 95%  98%   Weight:       Height:

## 2024-10-23 NOTE — PROGRESS NOTES
"Patient went for a walk in the hallway with 1:1 staff. Patient starting to get more feisty. His dentures were hanging out of his mouth so nursing staff took them for him and placed them in a cup in his room. Patient then got upset he did not have his teeth anymore.     He was then swearing at the 1:1 staff stating he \"doesn't like that son of a bitch\" and was raising his fist to staff but didn't hit.     Patient then decided to walk back to his room for his teeth. He then raised his fist to writer and attempted to hit.     Staff attempting to change his brief but patient not allowing at this time. Patient sitting in recliner refusing care.   "

## 2024-10-24 ENCOUNTER — APPOINTMENT (OUTPATIENT)
Dept: OCCUPATIONAL THERAPY | Facility: HOSPITAL | Age: 84
End: 2024-10-24
Payer: COMMERCIAL

## 2024-10-24 LAB
ANION GAP SERPL CALCULATED.3IONS-SCNC: 10 MMOL/L (ref 7–15)
BACTERIA BLD CULT: NO GROWTH
BUN SERPL-MCNC: 25.5 MG/DL (ref 8–23)
CALCIUM SERPL-MCNC: 8.7 MG/DL (ref 8.8–10.4)
CHLORIDE SERPL-SCNC: 98 MMOL/L (ref 98–107)
CREAT SERPL-MCNC: 0.79 MG/DL (ref 0.67–1.17)
EGFRCR SERPLBLD CKD-EPI 2021: 88 ML/MIN/1.73M2
GLUCOSE SERPL-MCNC: 102 MG/DL (ref 70–99)
HCO3 SERPL-SCNC: 30 MMOL/L (ref 22–29)
HGB BLD-MCNC: 11 G/DL (ref 13.3–17.7)
MAGNESIUM SERPL-MCNC: 1.8 MG/DL (ref 1.7–2.3)
PLATELET # BLD AUTO: 302 10E3/UL (ref 150–450)
POTASSIUM SERPL-SCNC: 3.5 MMOL/L (ref 3.4–5.3)
SODIUM SERPL-SCNC: 138 MMOL/L (ref 135–145)

## 2024-10-24 PROCEDURE — 99232 SBSQ HOSP IP/OBS MODERATE 35: CPT | Performed by: PHYSICIAN ASSISTANT

## 2024-10-24 PROCEDURE — 250N000011 HC RX IP 250 OP 636: Performed by: INTERNAL MEDICINE

## 2024-10-24 PROCEDURE — 85018 HEMOGLOBIN: CPT | Performed by: HOSPITALIST

## 2024-10-24 PROCEDURE — 97535 SELF CARE MNGMENT TRAINING: CPT | Mod: GO

## 2024-10-24 PROCEDURE — 250N000013 HC RX MED GY IP 250 OP 250 PS 637: Performed by: HOSPITALIST

## 2024-10-24 PROCEDURE — 120N000001 HC R&B MED SURG/OB

## 2024-10-24 PROCEDURE — 80048 BASIC METABOLIC PNL TOTAL CA: CPT | Performed by: HOSPITALIST

## 2024-10-24 PROCEDURE — 250N000013 HC RX MED GY IP 250 OP 250 PS 637: Performed by: PHYSICIAN ASSISTANT

## 2024-10-24 PROCEDURE — 250N000013 HC RX MED GY IP 250 OP 250 PS 637: Performed by: INTERNAL MEDICINE

## 2024-10-24 PROCEDURE — 85049 AUTOMATED PLATELET COUNT: CPT | Performed by: HOSPITALIST

## 2024-10-24 PROCEDURE — 36415 COLL VENOUS BLD VENIPUNCTURE: CPT | Performed by: HOSPITALIST

## 2024-10-24 PROCEDURE — 250N000013 HC RX MED GY IP 250 OP 250 PS 637: Performed by: STUDENT IN AN ORGANIZED HEALTH CARE EDUCATION/TRAINING PROGRAM

## 2024-10-24 PROCEDURE — 250N000013 HC RX MED GY IP 250 OP 250 PS 637: Performed by: REGISTERED NURSE

## 2024-10-24 PROCEDURE — 250N000011 HC RX IP 250 OP 636: Performed by: STUDENT IN AN ORGANIZED HEALTH CARE EDUCATION/TRAINING PROGRAM

## 2024-10-24 PROCEDURE — 99232 SBSQ HOSP IP/OBS MODERATE 35: CPT | Performed by: HOSPITALIST

## 2024-10-24 PROCEDURE — 83735 ASSAY OF MAGNESIUM: CPT | Performed by: HOSPITALIST

## 2024-10-24 RX ORDER — ACETAMINOPHEN 325 MG/1
650 TABLET ORAL 3 TIMES DAILY
Status: DISCONTINUED | OUTPATIENT
Start: 2024-10-24 | End: 2024-10-26

## 2024-10-24 RX ORDER — MAGNESIUM OXIDE 400 MG/1
400 TABLET ORAL EVERY 4 HOURS
Status: COMPLETED | OUTPATIENT
Start: 2024-10-24 | End: 2024-10-24

## 2024-10-24 RX ORDER — POTASSIUM CHLORIDE 1500 MG/1
20 TABLET, EXTENDED RELEASE ORAL ONCE
Status: COMPLETED | OUTPATIENT
Start: 2024-10-24 | End: 2024-10-24

## 2024-10-24 RX ORDER — ACETAMINOPHEN AND CODEINE PHOSPHATE 300; 30 MG/1; MG/1
1 TABLET ORAL EVERY 4 HOURS PRN
Status: DISCONTINUED | OUTPATIENT
Start: 2024-10-24 | End: 2024-10-25

## 2024-10-24 RX ADMIN — DULOXETINE HYDROCHLORIDE 60 MG: 60 CAPSULE, DELAYED RELEASE PELLETS ORAL at 10:32

## 2024-10-24 RX ADMIN — ACETAMINOPHEN AND CODEINE PHOSPHATE 1 TABLET: 300; 30 TABLET ORAL at 04:01

## 2024-10-24 RX ADMIN — TRAZODONE HYDROCHLORIDE 25 MG: 50 TABLET ORAL at 21:12

## 2024-10-24 RX ADMIN — METOPROLOL TARTRATE 25 MG: 25 TABLET, FILM COATED ORAL at 10:38

## 2024-10-24 RX ADMIN — ENOXAPARIN SODIUM 40 MG: 40 INJECTION SUBCUTANEOUS at 17:15

## 2024-10-24 RX ADMIN — QUETIAPINE FUMARATE 12.5 MG: 25 TABLET ORAL at 02:04

## 2024-10-24 RX ADMIN — POLYETHYLENE GLYCOL 3350 17 G: 17 POWDER, FOR SOLUTION ORAL at 10:45

## 2024-10-24 RX ADMIN — MAGNESIUM OXIDE TAB 400 MG (241.3 MG ELEMENTAL MG) 400 MG: 400 (241.3 MG) TAB at 10:34

## 2024-10-24 RX ADMIN — ACETAMINOPHEN AND CODEINE PHOSPHATE 1 TABLET: 300; 30 TABLET ORAL at 10:36

## 2024-10-24 RX ADMIN — ROSUVASTATIN 10 MG: 10 TABLET, FILM COATED ORAL at 21:12

## 2024-10-24 RX ADMIN — BUSPIRONE HYDROCHLORIDE 5 MG: 5 TABLET ORAL at 10:32

## 2024-10-24 RX ADMIN — POTASSIUM CHLORIDE 20 MEQ: 1500 TABLET, EXTENDED RELEASE ORAL at 10:38

## 2024-10-24 RX ADMIN — MICONAZOLE NITRATE: 20 POWDER TOPICAL at 10:38

## 2024-10-24 RX ADMIN — ACETAMINOPHEN AND CODEINE PHOSPHATE 1 TABLET: 300; 30 TABLET ORAL at 00:06

## 2024-10-24 RX ADMIN — METOPROLOL TARTRATE 25 MG: 25 TABLET, FILM COATED ORAL at 21:11

## 2024-10-24 RX ADMIN — QUETIAPINE FUMARATE 12.5 MG: 25 TABLET ORAL at 14:15

## 2024-10-24 RX ADMIN — LOSARTAN POTASSIUM 25 MG: 25 TABLET, FILM COATED ORAL at 10:37

## 2024-10-24 RX ADMIN — PANTOPRAZOLE SODIUM 40 MG: 40 TABLET, DELAYED RELEASE ORAL at 06:41

## 2024-10-24 RX ADMIN — LATANOPROST 1 DROP: 50 SOLUTION/ DROPS OPHTHALMIC at 21:12

## 2024-10-24 RX ADMIN — Medication 3 MG: at 18:06

## 2024-10-24 RX ADMIN — LIDOCAINE 1 PATCH: 4 PATCH TOPICAL at 10:37

## 2024-10-24 RX ADMIN — BUSPIRONE HYDROCHLORIDE 5 MG: 5 TABLET ORAL at 21:12

## 2024-10-24 RX ADMIN — MICONAZOLE NITRATE: 20 POWDER TOPICAL at 21:12

## 2024-10-24 RX ADMIN — SENNOSIDES AND DOCUSATE SODIUM 1 TABLET: 8.6; 5 TABLET ORAL at 21:09

## 2024-10-24 RX ADMIN — FUROSEMIDE 40 MG: 10 INJECTION, SOLUTION INTRAVENOUS at 10:32

## 2024-10-24 RX ADMIN — LEVOTHYROXINE SODIUM 88 MCG: 88 TABLET ORAL at 06:41

## 2024-10-24 RX ADMIN — ACETAMINOPHEN 650 MG: 325 TABLET ORAL at 21:12

## 2024-10-24 RX ADMIN — ASPIRIN 81 MG CHEWABLE TABLET 81 MG: 81 TABLET CHEWABLE at 10:36

## 2024-10-24 ASSESSMENT — ACTIVITIES OF DAILY LIVING (ADL)
ADLS_ACUITY_SCORE: 25.75

## 2024-10-24 NOTE — PLAN OF CARE
"Goal Outcome Evaluation:       Alert and orientated to self only. Denies chest pain. Some SOB upon exertion otherwise pt denies any shortness of breath. Remains on 3LNC. Complaints of pain in left hip treated with scheduled Tylenol #3. Around 0200 pt became agitated with 1:1 sitter and attempting to leave room. Writer was able to talk the pt down and PRN Seroquel was given. Up in room with assist of 1 gait belt and walker.   Problem: Adult Inpatient Plan of Care  Goal: Plan of Care Review  Description: The Plan of Care Review/Shift note should be completed every shift.  The Outcome Evaluation is a brief statement about your assessment that the patient is improving, declining, or no change.  This information will be displayed automatically on your shift  note.  Outcome: Progressing  Goal: Patient-Specific Goal (Individualized)  Description: You can add care plan individualizations to a care plan. Examples of Individualization might be:  \"Parent requests to be called daily at 9am for status\", \"I have a hard time hearing out of my right ear\", or \"Do not touch me to wake me up as it startles  me\".  Outcome: Progressing  Goal: Readiness for Transition of Care  Outcome: Progressing     Problem: Risk for Delirium  Goal: Optimal Coping  Outcome: Progressing  Goal: Improved Behavioral Control  Outcome: Progressing  Goal: Improved Attention and Thought Clarity  Outcome: Progressing  Intervention: Maximize Cognitive Function  Recent Flowsheet Documentation  Taken 10/24/2024 0400 by Jan Dave RN  Reorientation Measures:   clock in view   reorientation provided  Taken 10/23/2024 2000 by Jan Dave RN  Reorientation Measures:   clock in view   reorientation provided  Goal: Improved Sleep  Outcome: Progressing     Problem: Pain Acute  Goal: Optimal Pain Control and Function  Outcome: Progressing  Intervention: Develop Pain Management Plan  Recent Flowsheet Documentation  Taken 10/24/2024 0400 by Jan Dave" RN  Pain Management Interventions: medication (see MAR)  Taken 10/23/2024 2000 by Jan Dave RN  Pain Management Interventions: medication (see MAR)  Intervention: Prevent or Manage Pain  Recent Flowsheet Documentation  Taken 10/24/2024 0400 by Jan Dave RN  Medication Review/Management: medications reviewed  Taken 10/23/2024 2000 by Jan Dave RN  Medication Review/Management: medications reviewed     Problem: Dysrhythmia  Goal: Normalized Cardiac Rhythm  Outcome: Progressing     Problem: Fall Injury Risk  Goal: Absence of Fall and Fall-Related Injury  Outcome: Progressing  Intervention: Identify and Manage Contributors  Recent Flowsheet Documentation  Taken 10/24/2024 0400 by Jan Dave RN  Medication Review/Management: medications reviewed  Taken 10/23/2024 2000 by Jan Dave RN  Medication Review/Management: medications reviewed  Intervention: Promote Injury-Free Environment  Recent Flowsheet Documentation  Taken 10/24/2024 0400 by Jan Dave RN  Safety Promotion/Fall Prevention:   activity supervised   assistive device/personal items within reach   clutter free environment maintained   increased rounding and observation   increase visualization of patient   lighting adjusted   mobility aid in reach   nonskid shoes/slippers when out of bed   patient and family education   room organization consistent   safety round/check completed   supervised activity   treat reversible contributory factors   treat underlying cause  Taken 10/23/2024 2000 by Jan Dave RN  Safety Promotion/Fall Prevention:   activity supervised   assistive device/personal items within reach   clutter free environment maintained   increased rounding and observation   increase visualization of patient   lighting adjusted   mobility aid in reach   nonskid shoes/slippers when out of bed   patient and family education   room organization consistent   safety round/check completed   supervised  activity   treat reversible contributory factors   treat underlying cause     Problem: Comorbidity Management  Goal: Maintenance of Behavioral Health Symptom Control  Outcome: Progressing  Intervention: Maintain Behavioral Health Symptom Control  Recent Flowsheet Documentation  Taken 10/24/2024 0400 by Jan Dave RN  Medication Review/Management: medications reviewed  Taken 10/23/2024 2000 by Jan Dave RN  Medication Review/Management: medications reviewed  Goal: Maintenance of COPD Symptom Control  Outcome: Progressing  Intervention: Maintain COPD Symptom Control  Recent Flowsheet Documentation  Taken 10/24/2024 0400 by Jan Dave RN  Medication Review/Management: medications reviewed  Taken 10/23/2024 2000 by Jan Dave RN  Medication Review/Management: medications reviewed  Goal: Maintenance of Heart Failure Symptom Control  Outcome: Progressing  Intervention: Maintain Heart Failure Management  Recent Flowsheet Documentation  Taken 10/24/2024 0400 by Jan Dave RN  Medication Review/Management: medications reviewed  Taken 10/23/2024 2000 by Jan Dave RN  Medication Review/Management: medications reviewed  Goal: Blood Pressure in Desired Range  Outcome: Progressing  Intervention: Maintain Blood Pressure Management  Recent Flowsheet Documentation  Taken 10/24/2024 0400 by Jan Dave RN  Medication Review/Management: medications reviewed  Taken 10/23/2024 2000 by Jan Dave RN  Medication Review/Management: medications reviewed

## 2024-10-24 NOTE — PROGRESS NOTES
[facility], [place]    Medicine Progress Note - Hospitalist Service    Date of Admission:  10/12/2024    Assessment & Plan                Dominik Rojas is a 84 year old male with history of COPD on 2 to 3 L nasal cannula at home, chronic systolic heart failure, hypertension, hypothyroidism, anxiety, recent fall and left hip fracture s/p ORIF who was jsut discharged to TCU.  Patient was brought to ED for evaluation another fall at TCU with left hip pain. Xray showing postop changes without new acute changes. Patient was found to be tachycardiac, concerning for sinus tachycardia vs intermittent a fib rvr. Here also altered mental status requiring 1:1 sitter. Hospital Day: 13         Mechanical fall  Left hip contusion without new fracture  Recent left hip fracture s/p ORIF  -Patient reported a mechanical fall after going to TCU one day PTA, with worse left hip pain.  Denied chest pain, dizziness, shortness of breath prior to or after fall  -s/p recent left hip ORIF  -Negative for DVT on US  -CT Left femur no evidence of new fracture, small lateral left hip subcutaneous hematoma  -Seen by Ortho, appreciate recs, follow-up outpatient  -Seen by Pain team, appreciate assistance  -on Tylenol#3 scheduled q4, Tylenol prn 650mg, Dilaudid prn po 1mg q4. Today T3 changed to PRN due to excessive sedation. Will change the tylenol to 650mg TID scheduled. Stopping hydromorphone     Anxiety and depression;  Dementia with behavioral change/agitation   Mood disorder  Acute toxic/metabolic encephalopathy  -Continue home BuSpar, Cymbalta, Zyprexa  -was improving and off 1:1, got agitated overnight and now again on 1:1  -Seen by Psych, trazodone 25 mg to help with sleep at bedtime  -Seroquel as needed for agitation  -Delirium precautions  -Checked urinalysis at family request, culture in progress  -Voiding adequately per nursing report  -Last BM on 10/23, continue bowel regimen  -limit opioids as able, see changes  above  -add suppertime melatonin to help with day/night reversal     Leukocytosis - resolved  - suspect stress de-margination, also has chronic leukocytosis (not consistent)  - procalcitonin not elevated  - No symptoms/signs of infection  -Workup negative  -No antibiotics     Acute on chronic HFpEF  -Hypervolemic on exam, lasix was held on admission due to poor po intake, weight was up 10 lbs since admission  -recent Echo showing preserved EF  -CXR 10/19: Single small right pleural effusion, mild pulmonary vascular congestion  -IV Lasix 40mg daily  -On Metoprolol, Losartan  -daily weight, Fluid restriction     Tachycardia, now resolved  -Cardiology was consulted, no a fib, no need for anticoagulation  -pt is not a good candidate for anticoagulation due to frequent falls  -HR now in control     Hypomagnesemia  -Protocol     COPD  Chronic hypoxic respiratory failure on 2-3l NC  -PTA meds  -Not in exacerbation     Recent UTI  -PTA amoxicillin 500mg TID; finished course of abx;   -repeated UC 10/13 mixed tai  -Family requested repeat urinalysis now with him being more drowsy, reasonable. Urine culture pending     History of RLL adenocarcinoma s/p radiation therapy     Normocytic anemia  - Hb baseline 10-11  - contusions and hematoma on the Left thigh  - Monitor Hb     HLD  -Crestor     GERD  -PPI      Hypothyroidism  -recent TSH 6.93  -continue Synthroid   -recommend repeat TSH in 4 weeks with PCP. FT4 not useful for titrating Synthroid generally     TCU when ready          Diet: Combination Diet Regular Diet Adult  Fluid restriction 1800 ML FLUID    DVT Prophylaxis: Moderate risk.   Enoxaparin (Lovenox) SQ  Cabral Catheter: Not present  Lines: None     Cardiac Monitoring: None  Code Status: Full Code      Clinically Significant Risk Factors          # Hypochloremia: Lowest Cl = 97 mmol/L in last 2 days, will monitor as appropriate          # Hypertension: Noted on problem list    # Dementia: noted on problem list       "  # Obesity: Estimated body mass index is 30.02 kg/m  as calculated from the following:    Height as of this encounter: 1.702 m (5' 7\").    Weight as of this encounter: 87 kg (191 lb 11.2 oz).        # Financial/Environmental Concerns: none         Disposition Plan     Medically Ready for Discharge: Anticipated Tomorrow         Discharge barrier(s): AMS, 1:1 sitter  Care discussed with: patient, RN. Tried to call wife but no answer, will try back tomorrow      Claudia Isidro MD  Hospitalist Service  [facility]  Securely message with [employer] (more info)  Text page via [employer] Paging/Directory   ______________________________________________________________________      Physical Exam   Vital Signs: Temp: 97.1  F (36.2  C) Temp src: Axillary BP: 110/71 Pulse: 70   Resp: 16 SpO2: 94 % O2 Device: Nasal cannula Oxygen Delivery: 2 LPM  Weight: 191 lbs 11.2 oz    General: in no apparent distress and non-toxic drowsy male sitting in bedside chair fixedly eating a muffin, doesn't respond to questions.  HEENT: Head normocephalic atraumatic, oral mucosa moist. Sclerae anicteric  Skin: No rashes or lesions  Extremities: No peripheral edema  Psych: drowsy, inattentive  Neuro: drowsy      Medical Decision Making               Data   Recent Results (from the past 16 hours)   Magnesium    Collection Time: 10/24/24  5:17 AM   Result Value Ref Range    Magnesium 1.8 1.7 - 2.3 mg/dL   Platelet count    Collection Time: 10/24/24  5:17 AM   Result Value Ref Range    Platelet Count 302 150 - 450 10e3/uL   Hemoglobin    Collection Time: 10/24/24  5:17 AM   Result Value Ref Range    Hemoglobin 11.0 (L) 13.3 - 17.7 g/dL   Basic metabolic panel    Collection Time: 10/24/24  5:17 AM   Result Value Ref Range    Sodium 138 135 - 145 mmol/L    Potassium 3.5 3.4 - 5.3 mmol/L    Chloride 98 98 - 107 mmol/L    Carbon Dioxide (CO2) 30 (H) 22 - 29 mmol/L    Anion Gap 10 7 - 15 mmol/L    Urea Nitrogen 25.5 (H) 8.0 - 23.0 mg/dL    " Creatinine 0.79 0.67 - 1.17 mg/dL    GFR Estimate 88 >60 mL/min/1.73m2    Calcium 8.7 (L) 8.8 - 10.4 mg/dL    Glucose 102 (H) 70 - 99 mg/dL       Interval History     Patient has been very drowsy.  This morning he is awake and eating a muffin but he seems very lethargic, not verbally responding to questions.  Apparently was awake and shouting during the night.  Seems to have some day/night reversal.  Wondering about changing pain med to alternate therapy or possibly changing to as needed rather than scheduled.  Message sent to pain team.  Not ready for discharge due to continues to need one-to-one sitter.

## 2024-10-24 NOTE — PLAN OF CARE
Problem: Pain Acute  Goal: Optimal Pain Control and Function  Outcome: Progressing  Intervention: Prevent or Manage Pain  Recent Flowsheet Documentation  Taken 10/24/2024 1045 by Dario Bernal RN  Sensory Stimulation Regulation:   care clustered   television on     Problem: Fall Injury Risk  Goal: Absence of Fall and Fall-Related Injury  Outcome: Progressing   Goal Outcome Evaluation:       PT was sleeping in chair majority of morning.  Pt did awaken to eat breakfast and take medications after which he fell asleep.     Pt reports pain in left hip for which he was given tylenol #3 and lidocaine patch.    Pt has urinary incontinence.      Pt is on 2L NC which is his baseline.     Pt is K+ and Mg protocols which are a.m. rechecks.     Pt was transferred to  around 1:30 pm.

## 2024-10-24 NOTE — PLAN OF CARE
Problem: Risk for Delirium  Goal: Optimal Coping  Intervention: Optimize Psychosocial Adjustment to Delirium  Recent Flowsheet Documentation  Taken 10/24/2024 1638 by Pamela Fajardo RN  Supportive Measures:   active listening utilized   verbalization of feelings encouraged     Problem: Risk for Delirium  Goal: Improved Behavioral Control  Outcome: Progressing   Goal Outcome Evaluation:pt transferred to the unit around 1330, up in the recliner, on 2 liters oxygen, O2 SATs above 90%, vitally stable, on 1:1 care, pt keeps trying to get out of the room, yelling help, when asked what he needs help with, he will be mute. Very confusion and forgetful.

## 2024-10-24 NOTE — PROGRESS NOTES
[facility] ACUTE INPATIENT PAIN SERVICE    [facility], [facility], [facility], [facility], [place]   PAIN PROGRESS      Assessment/Plan:  Dominik Rojas is a 83 year old male who was admitted on 10/12/2024.  I was asked by Dr. Rosi Mercedes to see the patient for his uncontrolled left hip pain. S/p left proximal femur fracture with ORIF (10/06/24). Was discharged to the TCU on 10/11 and shortly after had another fall resulting in readmission to the hospital. History of COPD on 2 to 3 L nasal cannula at home, chronic systolic heart failure, hypertension, hypothyroidism, anxiety. Describes pain as 0/10. He is somnolent during my visit today. RN stated he had a restless night and was awake and hammering his phone on the bed. Discussed care with hospitalist team and plan to change his tylenol #3 to prn with hold parameters.     [place]MP reviewed: He is not on any controlled medications.     Opioids received in the past 24h:  *(5) 300-30mg acetaminophen-codeine tablets        PLAN:  Acute post-operative pain (left proximal femur ORIF 10/06/24). Complicated by mechanical fall on 10/12. Opioid naive.  Multimodal Medication Therapy:   Adjuvants:   - APAP 975mg q8h  - ASA 81mg  - Duloxetine 60mg once daily  - Topical Lidocaine  - Trazodone 25mg bedtime  Opioids:   - Acetaminophen-codeine 300-30mg q4h change from schedule to prn with hold parameters  - Discontinue Dilaudid 1mg q4h prn - second line  Non-medication interventions- Ice  Constipation Prophylaxis- Senna-S  Follow up /Discharge Recommendations - We recommend prescribing the following at the time of discharge:  Short taper Rx of T#3 is appropriate.               Subjective:  Dominik is seen today in his chair. He is somnolent and will fall asleep during the conversation. RN is bedside with him and reports he had a restless night and was seen hammering his phone against his bed. Pain is currently a 0/10.            History   Drug Use No         Tobacco Use       "Smoking status: Former        Packs/day: 0.00        Years: 0.5 packs/day for 35.0 years (17.5 ttl pk-yrs)        Types: Cigarettes        Start date: 1955        Quit date: 1990        Years since quittin.8      Smokeless tobacco: Never      Tobacco comments: quit         Objective:  Vital signs in last 24 hours:  B/P: 140/78, T: 97.4, P: 80, R: 17   Blood pressure (!) 140/78, pulse 80, temperature 97.4  F (36.3  C), temperature source Axillary, resp. rate 17, height 1.702 m (5' 7\"), weight 87 kg (191 lb 11.2 oz), SpO2 97%.        Review of Systems:   As per subjective, all others negative.    Physical Exam    General: Somnolent  HEENT: Head normocephalic atraumatic, oral mucosa moist. Sclerae anicteric  CV: Regular rhythm, normal rate, no murmurs  Resp: No wheezes, no rales or rhonchi, no focal consolidations  GI: Normoactive bowel sounds  Skin: No rashes or lesions  Extremities: Peripheral edema  Neuro: Grossly normal          Imaging:  Personally Reviewed.    Results for orders placed or performed during the hospital encounter of 10/12/24   XR Femur Left 2 Views    Impression    IMPRESSION: Postoperative changes redemonstrated related to ORIF intertrochanteric left proximal femur fracture with dynamic hip screw. Alignment and hardware unchanged. Displaced lesser trochanteric fracture fragments are unchanged. Lateral skin staples   remain along the lateral left hip and proximal thigh. Anatomic alignment left femur. No new left femur fracture or joint malalignment. Mild to moderate left hip osteoarthritis. Moderate to severe degenerative change medial compartment left knee.   Arterial calcification. Small left knee joint effusion. Lateral left hip and proximal thigh soft tissue edema.     Head CT w/o contrast    Impression    IMPRESSION:  HEAD CT:  1.  No finding for intracranial hemorrhage, mass, or acute infarct.    2.  Moderate volume loss and moderate to advanced presumed sequelae of chronic " microvascular ischemic change. Stable volume loss and gliosis right frontal lobe.    CERVICAL SPINE CT:  1.  Advanced cervical spondylosis without finding for acute fracture or posttraumatic subluxation.       CT Cervical Spine w/o Contrast    Impression    IMPRESSION:  HEAD CT:  1.  No finding for intracranial hemorrhage, mass, or acute infarct.    2.  Moderate volume loss and moderate to advanced presumed sequelae of chronic microvascular ischemic change. Stable volume loss and gliosis right frontal lobe.    CERVICAL SPINE CT:  1.  Advanced cervical spondylosis without finding for acute fracture or posttraumatic subluxation.       US Lower Extremity Venous Duplex Left    Impression    IMPRESSION:  No deep venous thrombosis in the left lower extremity.   XR Pelvis 1/2 Views    Impression    IMPRESSION: No significant interval change. Postop ORIF left intertrochanteric femur fracture with dynamic hip screw. Hardware and alignment unchanged. Lesser trochanteric fragments remain medially displaced by approximately 2 cm. No new fracture. Mild   to moderate left and mild right hip osteoarthritis. Vascular stents project over the pelvis and surgical clips project over the right inguinal region. Both obturator rings appear intact. Arterial calcification.   CT Femur Thigh Left w/o Contrast    Impression    IMPRESSION:  1.  Reduced and nailed comminuted intertrochanteric left femoral fracture in unchanged alignment. No CT evidence of new fracture.  2.  Small lateral left hip subcutaneous hematoma.     XR Chest 1 View    Impression    IMPRESSION: Mild bibasilar atelectasis. Increasing small right pleural effusion. No pneumothorax. Stable enlarged cardiac silhouette. Mild pulmonary vascular congestion.        Lab Results:  Personally Reviewed.   Last Comprehensive Metabolic Panel:  Sodium   Date Value Ref Range Status   10/24/2024 138 135 - 145 mmol/L Final     Potassium   Date Value Ref Range Status   10/24/2024 3.5 3.4 -  5.3 mmol/L Final   03/02/2022 4.5 3.5 - 5.0 mmol/L Final     Chloride   Date Value Ref Range Status   10/24/2024 98 98 - 107 mmol/L Final   03/02/2022 105 98 - 107 mmol/L Final     Carbon Dioxide (CO2)   Date Value Ref Range Status   10/24/2024 30 (H) 22 - 29 mmol/L Final   03/02/2022 27 22 - 31 mmol/L Final     Anion Gap   Date Value Ref Range Status   10/24/2024 10 7 - 15 mmol/L Final   03/02/2022 11 5 - 18 mmol/L Final     Glucose   Date Value Ref Range Status   10/24/2024 102 (H) 70 - 99 mg/dL Final   03/02/2022 95 70 - 125 mg/dL Final     GLUCOSE BY METER POCT   Date Value Ref Range Status   10/21/2024 135 (H) 70 - 99 mg/dL Final     Urea Nitrogen   Date Value Ref Range Status   10/24/2024 25.5 (H) 8.0 - 23.0 mg/dL Final   03/02/2022 16 8 - 28 mg/dL Final     Creatinine   Date Value Ref Range Status   10/24/2024 0.79 0.67 - 1.17 mg/dL Final     GFR Estimate   Date Value Ref Range Status   10/24/2024 88 >60 mL/min/1.73m2 Final     Comment:     eGFR calculated using 2021 CKD-EPI equation.   06/22/2021 >60 >60 mL/min/1.73m2 Final     GFR, ESTIMATED POCT   Date Value Ref Range Status   07/17/2023 55 (L) >60 mL/min/1.73m2 Final     Calcium   Date Value Ref Range Status   10/24/2024 8.7 (L) 8.8 - 10.4 mg/dL Final     Comment:     Reference intervals for this test were updated on 7/16/2024 to reflect our healthy population more accurately. There may be differences in the flagging of prior results with similar values performed with this method. Those prior results can be interpreted in the context of the updated reference intervals.        UA:   Amphetamines Urine   Date Value Ref Range Status   08/14/2021 Screen Negative Screen Negative Final     Barbiturates Urine   Date Value Ref Range Status   08/14/2021 Screen Negative Screen Negative Final     Cannabinoids Urine   Date Value Ref Range Status   08/14/2021 Screen Negative Screen Negative Final     Cocaine Urine   Date Value Ref Range Status   08/14/2021 Screen  Negative Screen Negative Final     Opiates Urine   Date Value Ref Range Status   08/14/2021 Screen Negative Screen Negative Final     PCP Urine   Date Value Ref Range Status   08/14/2021 Screen Negative Screen Negative Final              Please see A&P for additional details of medical decision making.    Aaron Crane PA-C  Acute Care Pain Management  Team  Hours of pain coverage Mon-Fri 8-1600, afterhours please call the house officer    [employer] (JESUS, Noel, SD, RH)   Page via Vocera text web console -Click for [place]

## 2024-10-25 ENCOUNTER — APPOINTMENT (OUTPATIENT)
Dept: OCCUPATIONAL THERAPY | Facility: HOSPITAL | Age: 84
End: 2024-10-25
Payer: COMMERCIAL

## 2024-10-25 ENCOUNTER — DOCUMENTATION ONLY (OUTPATIENT)
Dept: OTHER | Facility: CLINIC | Age: 84
End: 2024-10-25
Payer: COMMERCIAL

## 2024-10-25 LAB
BACTERIA UR CULT: NORMAL
HOLD SPECIMEN: NORMAL
MAGNESIUM SERPL-MCNC: 1.7 MG/DL (ref 1.7–2.3)
POTASSIUM SERPL-SCNC: 3.6 MMOL/L (ref 3.4–5.3)

## 2024-10-25 PROCEDURE — 250N000013 HC RX MED GY IP 250 OP 250 PS 637: Performed by: REGISTERED NURSE

## 2024-10-25 PROCEDURE — 36415 COLL VENOUS BLD VENIPUNCTURE: CPT | Performed by: HOSPITALIST

## 2024-10-25 PROCEDURE — 97535 SELF CARE MNGMENT TRAINING: CPT | Mod: GO

## 2024-10-25 PROCEDURE — 250N000013 HC RX MED GY IP 250 OP 250 PS 637: Performed by: HOSPITALIST

## 2024-10-25 PROCEDURE — 84132 ASSAY OF SERUM POTASSIUM: CPT | Performed by: HOSPITALIST

## 2024-10-25 PROCEDURE — 120N000001 HC R&B MED SURG/OB

## 2024-10-25 PROCEDURE — 250N000011 HC RX IP 250 OP 636: Performed by: STUDENT IN AN ORGANIZED HEALTH CARE EDUCATION/TRAINING PROGRAM

## 2024-10-25 PROCEDURE — 250N000013 HC RX MED GY IP 250 OP 250 PS 637: Performed by: INTERNAL MEDICINE

## 2024-10-25 PROCEDURE — 83735 ASSAY OF MAGNESIUM: CPT | Performed by: HOSPITALIST

## 2024-10-25 PROCEDURE — 97110 THERAPEUTIC EXERCISES: CPT | Mod: GO

## 2024-10-25 PROCEDURE — 250N000013 HC RX MED GY IP 250 OP 250 PS 637: Performed by: STUDENT IN AN ORGANIZED HEALTH CARE EDUCATION/TRAINING PROGRAM

## 2024-10-25 PROCEDURE — 250N000011 HC RX IP 250 OP 636: Performed by: INTERNAL MEDICINE

## 2024-10-25 PROCEDURE — 99232 SBSQ HOSP IP/OBS MODERATE 35: CPT | Performed by: PAIN MEDICINE

## 2024-10-25 PROCEDURE — 99232 SBSQ HOSP IP/OBS MODERATE 35: CPT | Performed by: HOSPITALIST

## 2024-10-25 RX ORDER — MAGNESIUM HYDROXIDE/ALUMINUM HYDROXICE/SIMETHICONE 120; 1200; 1200 MG/30ML; MG/30ML; MG/30ML
15 SUSPENSION ORAL ONCE
Status: COMPLETED | OUTPATIENT
Start: 2024-10-25 | End: 2024-10-25

## 2024-10-25 RX ORDER — POTASSIUM CHLORIDE 1500 MG/1
20 TABLET, EXTENDED RELEASE ORAL ONCE
Status: COMPLETED | OUTPATIENT
Start: 2024-10-25 | End: 2024-10-25

## 2024-10-25 RX ADMIN — BUSPIRONE HYDROCHLORIDE 5 MG: 5 TABLET ORAL at 21:24

## 2024-10-25 RX ADMIN — LATANOPROST 1 DROP: 50 SOLUTION/ DROPS OPHTHALMIC at 21:29

## 2024-10-25 RX ADMIN — LOSARTAN POTASSIUM 25 MG: 25 TABLET, FILM COATED ORAL at 09:32

## 2024-10-25 RX ADMIN — LIDOCAINE 1 PATCH: 4 PATCH TOPICAL at 09:29

## 2024-10-25 RX ADMIN — MICONAZOLE NITRATE: 20 POWDER TOPICAL at 09:34

## 2024-10-25 RX ADMIN — QUETIAPINE FUMARATE 12.5 MG: 25 TABLET ORAL at 06:30

## 2024-10-25 RX ADMIN — POLYETHYLENE GLYCOL 3350 17 G: 17 POWDER, FOR SOLUTION ORAL at 09:31

## 2024-10-25 RX ADMIN — ACETAMINOPHEN 650 MG: 325 TABLET ORAL at 14:32

## 2024-10-25 RX ADMIN — DULOXETINE HYDROCHLORIDE 60 MG: 60 CAPSULE, DELAYED RELEASE PELLETS ORAL at 09:32

## 2024-10-25 RX ADMIN — POTASSIUM CHLORIDE 20 MEQ: 1500 TABLET, EXTENDED RELEASE ORAL at 09:33

## 2024-10-25 RX ADMIN — FUROSEMIDE 40 MG: 10 INJECTION, SOLUTION INTRAVENOUS at 09:33

## 2024-10-25 RX ADMIN — ALUMINUM HYDROXIDE, MAGNESIUM HYDROXIDE, AND SIMETHICONE 15 ML: 200; 200; 20 SUSPENSION ORAL at 17:14

## 2024-10-25 RX ADMIN — LEVOTHYROXINE SODIUM 88 MCG: 88 TABLET ORAL at 06:30

## 2024-10-25 RX ADMIN — SENNOSIDES AND DOCUSATE SODIUM 1 TABLET: 8.6; 5 TABLET ORAL at 09:31

## 2024-10-25 RX ADMIN — ACETAMINOPHEN 650 MG: 325 TABLET ORAL at 21:27

## 2024-10-25 RX ADMIN — Medication 3 MG: at 17:14

## 2024-10-25 RX ADMIN — TRAZODONE HYDROCHLORIDE 25 MG: 50 TABLET ORAL at 21:24

## 2024-10-25 RX ADMIN — ENOXAPARIN SODIUM 40 MG: 40 INJECTION SUBCUTANEOUS at 17:14

## 2024-10-25 RX ADMIN — ACETAMINOPHEN 650 MG: 325 TABLET ORAL at 09:31

## 2024-10-25 RX ADMIN — BUSPIRONE HYDROCHLORIDE 5 MG: 5 TABLET ORAL at 09:34

## 2024-10-25 RX ADMIN — METOPROLOL TARTRATE 25 MG: 25 TABLET, FILM COATED ORAL at 09:32

## 2024-10-25 RX ADMIN — PANTOPRAZOLE SODIUM 40 MG: 40 TABLET, DELAYED RELEASE ORAL at 06:30

## 2024-10-25 RX ADMIN — QUETIAPINE FUMARATE 12.5 MG: 25 TABLET ORAL at 19:27

## 2024-10-25 RX ADMIN — ASPIRIN 81 MG CHEWABLE TABLET 81 MG: 81 TABLET CHEWABLE at 09:32

## 2024-10-25 ASSESSMENT — ACTIVITIES OF DAILY LIVING (ADL)
ADLS_ACUITY_SCORE: 25.75
ADLS_ACUITY_SCORE: 26.25
ADLS_ACUITY_SCORE: 25.75
ADLS_ACUITY_SCORE: 0
ADLS_ACUITY_SCORE: 28.25
ADLS_ACUITY_SCORE: 0
ADLS_ACUITY_SCORE: 26.25
ADLS_ACUITY_SCORE: 0
ADLS_ACUITY_SCORE: 25.75
ADLS_ACUITY_SCORE: 0
ADLS_ACUITY_SCORE: 25.75
ADLS_ACUITY_SCORE: 0
ADLS_ACUITY_SCORE: 24.25
ADLS_ACUITY_SCORE: 26.25
ADLS_ACUITY_SCORE: 24.25
ADLS_ACUITY_SCORE: 0
ADLS_ACUITY_SCORE: 24.25
ADLS_ACUITY_SCORE: 25.75
ADLS_ACUITY_SCORE: 26.25
ADLS_ACUITY_SCORE: 26.25
ADLS_ACUITY_SCORE: 25.75
ADLS_ACUITY_SCORE: 26.25
ADLS_ACUITY_SCORE: 28.25

## 2024-10-25 NOTE — PROGRESS NOTES
Care Management Follow Up    Length of Stay (days): 13    Expected Discharge Date: 10/28/2024    Anticipated Discharge Plan:  Transitional Care    Transportation: Anticipate medical transport    PT Recommendations: Transitional Care Facility, Per plan established by the PT  OT Recommendations:  Transitional Care Facility     Barriers to Discharge: placement, on 1:1    Prior Living Situation: At baseline pt lives in a house with his wife, Jovana. Spouse assists with bathing and dressing and all IADLs. Has home O2. Was open with [facility] home [facility] however during his hospital say in September OT recommended 24/7 supervision due to a SLUMS score of 17 and from previous note wife works and unable to provide 24/7 supervision so pt went to [facility]. Then pt had a fall at the TCU and sent back to the hospital.    Discussed  Partnership in Safe Discharge Planning  document with patient/family: No     Handoff Completed: No, handoff not indicated or clinically appropriate    Patient/Spokesperson Updated: No    Additional Information:  Chart review: looks like there is no bed hold at [facility]. Pt's wife would like pt to go to [facility] but pt has been on a 1:1. Writer called pt's wife Jovana to discuss more TCU options and also follow up on the POA paperwork that pt's wife was working on, no answer LVM to call CM back at [phone].    Next Steps: pt need to be off 1:1, more TCU choices.    Gisele Pace RN

## 2024-10-25 NOTE — PROGRESS NOTES
"CLINICAL NUTRITION SERVICES - ASSESSMENT NOTE     Nutrition Prescription    RECOMMENDATIONS FOR MDs/PROVIDERS TO ORDER:    Malnutrition Status:    TBD    Recommendations already ordered by Registered Dietitian (RD):  Ensure 10 am snack    Future/Additional Recommendations:  Monitor intake, weight, labs, supplement tolerance  Obtain NFPE as able     REASON FOR ASSESSMENT  Dominik Rojas is a/an 84 year old male assessed by the dietitian for LOS    HPI: 83 year old male with past medical history of COPD on 2 to 3 L nasal cannula at home, chronic systolic heart failure, hypertension, hypothyroidism, anxiety, recent fall and left hip fracture s/p ORIF who was discharged to TCU then brought to ED for evaluation another fall at TCU with left hip pain. Xray showing postop changes without new acute changes. Patient was found to be tachycardiac, concerning for sinus tachycardia vs intermittent a fib rvr.     NUTRITION HISTORY  Attempted interview with pt, in chair sleeping, called his name and said \"what\" but did not answer any questions. 1:1 in room, states pt has been very tired, said did not eat much for breakfast but did well for lunch.     Noted pt has been confused and agitated     CURRENT NUTRITION ORDERS  Diet: 1800 mL Fluid Restriction and Regular  Intake/Tolerance: variable, % of meals per flowsheets    LABS  Labs reviewed    MEDICATIONS  Medications reviewed    ANTHROPOMETRICS  Height: 170.2 cm (5' 7\")  Most Recent Weight: 87 kg (191 lb 11.2 oz)    IBW: 67.3 kg  BMI: Obesity Grade I BMI 30-34.9  Weight History:   Wt Readings from Last 20 Encounters:   10/23/24 87 kg (191 lb 11.2 oz)   10/05/24 80.9 kg (178 lb 4.8 oz)   10/02/24 81.3 kg (179 lb 4.8 oz)   09/25/24 80.8 kg (178 lb 1.6 oz)   09/18/24 80.4 kg (177 lb 3.2 oz)   09/12/24 82.6 kg (182 lb 1.6 oz)   09/08/24 81.3 kg (179 lb 3.2 oz)   07/29/24 83 kg (183 lb)   07/24/24 83.2 kg (183 lb 6.4 oz)   07/15/24 84.4 kg (186 lb)   07/08/24 83.9 kg (185 lb) "   07/05/24 84.2 kg (185 lb 9.6 oz)   07/03/24 83.6 kg (184 lb 3.2 oz)   06/30/24 84 kg (185 lb 3 oz)   02/08/24 99.8 kg (220 lb)   09/19/23 88 kg (194 lb)   05/09/23 87.1 kg (192 lb)   05/02/23 87.9 kg (193 lb 12.6 oz)   04/12/23 95.3 kg (210 lb)   03/08/23 95.7 kg (211 lb)     Date/Time Weight Weight Method   10/23/24 0645 87 kg (191 lb 11.2 oz) Standing scale   10/22/24 0651 82.4 kg (181 lb 9.6 oz) Standing scale   10/21/24 0602 84.5 kg (186 lb 4.8 oz) Standing scale   10/19/24 0400 85.4 kg (188 lb 4.8 oz) Standing scale   10/18/24 2046 85.2 kg (187 lb 12.8 oz) Standing scale   10/17/24 0549 85.4 kg (188 lb 4.4 oz) Bed scale   10/16/24 1549 84.5 kg (186 lb 4.6 oz) Bed scale   10/15/24 0500 83.3 kg (183 lb 10.3 oz) Bed scale   10/14/24 0529 83 kg (182 lb 15.7 oz) Bed scale   10/12/24 1314 84.4 kg (186 lb) --     +8.2 L since admit    Dosing Weight: 72.2 kg    ASSESSED NUTRITION NEEDS  Estimated Energy Needs: 2618-2800 kcals/day (25 - 30 kcals/kg)  Justification: Maintenance  Estimated Protein Needs: 72-86 grams protein/day (1 - 1.2 grams of pro/kg)  Justification: Increased needs  Estimated Fluid Needs: 1800 mL/day  Justification: On a fluid restriction    PHYSICAL FINDINGS  See malnutrition section below.  Surgical wound    MALNUTRITION:  % Weight Loss:  None noted  % Intake:  Decreased intake does not meet criteria for malnutrition   Subcutaneous Fat Loss:  unable  Muscle Loss:  unable  Fluid Retention:  Moderate feet, mild legs/ankles/arms/wrists    Malnutrition Diagnosis: Unable to determine due to need NFPE    NUTRITION DIAGNOSIS  Inadequate oral intake related to current medical condition as evidenced by po intake < 50% of some meals      INTERVENTIONS  Implementation  Nutrition Education: No education needs assessed at this time   Medical food supplement therapy     Goals  Patient to consume % of nutritionally adequate meals three times per day, or the equivalent with supplements/snacks.      Monitoring/Evaluation  Progress toward goals will be monitored and evaluated per protocol.

## 2024-10-25 NOTE — PROGRESS NOTES
[facility]    Medicine Progress Note - Hospitalist Service    Date of Admission:  10/12/2024    Assessment & Plan                Dominik Rojas is a 84 year old male with history of COPD on 2 to 3 L nasal cannula at home, chronic systolic heart failure, hypertension, hypothyroidism, anxiety, recent fall and left hip fracture s/p ORIF who was jsut discharged to TCU.  Patient was brought to ED for evaluation another fall at TCU with left hip pain. Xray showing postop changes without new acute changes. Patient was found to be tachycardiac, concerning for sinus tachycardia vs intermittent a fib rvr. Here also altered mental status requiring 1:1 sitter. Hospital Day: 14         Mechanical fall  Left hip contusion without new fracture  Recent left hip fracture s/p ORIF  -Patient reported a mechanical fall after going to TCU one day PTA, with worse left hip pain.  Denied chest pain, dizziness, shortness of breath prior to or after fall  -s/p recent left hip ORIF  -Negative for DVT on US  -CT Left femur no evidence of new fracture, small lateral left hip subcutaneous hematoma  -Seen by Ortho, appreciate recs, follow-up outpatient  -Seen by Pain team, appreciate assistance  -on Tylenol scheduled  -has been very drowsy on opioids, opioids on hold for now     Anxiety and depression;  Dementia with behavioral change/agitation   Mood disorder  Acute toxic/metabolic encephalopathy  -Continue home BuSpar, Cymbalta, Zyprexa  -was improving and off 1:1, got agitated overnight and now again on 1:1  -Seen by Psych, trazodone 25 mg to help with sleep at bedtime  -Seroquel as needed for agitation  -Delirium precautions  -no UTI  -Voiding adequately per nursing report  -Last BM on 10/23, continue bowel regimen  -off opioids, see changes above  -suppertime melatonin to help with day/night reversal     Acute on chronic HFpEF  -Hypervolemic on exam, lasix was held on admission due to poor po intake, weight was up  "10 lbs since admission  -recent Echo showing preserved EF  -CXR 10/19: small right pleural effusion, mild pulmonary vascular congestion  -IV Lasix 40mg daily  -On Metoprolol, Losartan  -daily weight, Fluid restriction  -still seems a bit hypervolemic, continue current cares     Leukocytosis - resolved  - suspect stress de-margination, also has chronic leukocytosis (not consistent)  - procalcitonin not elevated  - No symptoms/signs of infection  -Workup negative  -No antibiotics     Tachycardia, now resolved  -Cardiology was consulted, no a fib, no need for anticoagulation  -pt is not a good candidate for anticoagulation due to frequent falls  -HR now in control     Hypomagnesemia  -Protocol     COPD  Chronic hypoxic respiratory failure on 2-3l NC  -PTA meds  -Not in exacerbation     Recent UTI  -PTA amoxicillin 500mg TID; finished course of abx;   -repeated UC 10/13 and again 10/23 mixed tai     History of RLL adenocarcinoma s/p radiation therapy     Normocytic anemia  - Hb baseline 10-11  - contusions and hematoma on the Left thigh  - Monitor Hb     HLD  -Crestor     GERD  -PPI      Hypothyroidism  -recent TSH 6.93  -continue Synthroid   -recommend repeat TSH in 4 weeks with PCP. FT4 not useful for titrating Synthroid generally     TCU when ready          Diet: Combination Diet Regular Diet Adult  Fluid restriction 1800 ML FLUID    DVT Prophylaxis: Moderate risk.   Enoxaparin (Lovenox) SQ  Cabral Catheter: Not present  Lines: None     Cardiac Monitoring: None  Code Status: Full Code      Clinically Significant Risk Factors                   # Hypertension: Noted on problem list    # Dementia: noted on problem list        # Obesity: Estimated body mass index is 30.02 kg/m  as calculated from the following:    Height as of this encounter: 1.702 m (5' 7\").    Weight as of this encounter: 87 kg (191 lb 11.2 oz).        # Financial/Environmental Concerns: none         Disposition Plan     Medically Ready for Discharge: " Anticipated Tomorrow         Discharge barrier(s): AMS, 1:1 sitter  Care discussed with: patient, RN. Tried wife, no answer x multiple attempts      Claudia Isidro MD  Hospitalist Service  [facility]  Securely message with [employer] (more info)  Text page via [employer]works Paging/Directory   ______________________________________________________________________      Physical Exam   Vital Signs: Temp: 98.6  F (37  C) Temp src: Oral BP: 134/89 Pulse: 79   Resp: 16 SpO2: 98 % O2 Device: Nasal cannula Oxygen Delivery: 2 LPM  Weight: 191 lbs 11.2 oz    General: in no apparent distress and non-toxic drowsy male sitting in bedside chair able to respond to simple questions, responses are delayed  HEENT: Head normocephalic atraumatic, oral mucosa moist. Sclerae anicteric  Skin: No rashes or lesions  Extremities: No peripheral edema  Psych: Lethargic, sitting up in the chair in a dark room with his eyes closed, avoids eye contact.  He will respond to questions with brief answers, takes him a while to think of his response.  Sometimes does not respond at all  Neuro: lethargic      Medical Decision Making               Data   Recent Results (from the past 16 hours)   Potassium    Collection Time: 10/25/24  6:41 AM   Result Value Ref Range    Potassium 3.6 3.4 - 5.3 mmol/L   Magnesium    Collection Time: 10/25/24  6:41 AM   Result Value Ref Range    Magnesium 1.7 1.7 - 2.3 mg/dL   Extra Purple Top EDTA (LAB USE ONLY)    Collection Time: 10/25/24  6:41 AM   Result Value Ref Range    Hold Specimen JIC        Interval History     Patient somewhat more alert today and is able to answer some questions.  Still requiring one-to-one sitter    Tried to call wife x4, no answer

## 2024-10-25 NOTE — PLAN OF CARE
"  Problem: Risk for Delirium  Goal: Improved Attention and Thought Clarity  Outcome: Progressing     Problem: Risk for Delirium  Goal: Improved Sleep  Outcome: Progressing     Problem: Risk for Delirium  Goal: Improved Behavioral Control  Intervention: Minimize Safety Risk  Recent Flowsheet Documentation  Taken 10/24/2024 3435 by Lazarus Cortez RN  Enhanced Safety Measures: pain management   Goal Outcome Evaluation:       Pt A&OX1, denies pain, on 2LNC baseline. Pt slept in recliner sitting up. Pt had no behaviors until early morning started trying to get out of his chair. Pt kept referring to 1:1 attendee Abbie and to have things taken apart. Pt is easily re-direct able. Will continue to monitor. /74 (BP Location: Left arm)   Pulse 72   Temp 97.2  F (36.2  C) (Oral)   Resp 14   Ht 1.702 m (5' 7\")   Wt 87 kg (191 lb 11.2 oz)   SpO2 93%   BMI 30.02 kg/m                     "

## 2024-10-25 NOTE — PLAN OF CARE
Problem: Fall Injury Risk  Goal: Absence of Fall and Fall-Related Injury  Outcome: Progressing  Intervention: Identify and Manage Contributors  Recent Flowsheet Documentation  Taken 10/24/2024 2112 by Jakob Ross, RN  Medication Review/Management: medications reviewed  Intervention: Promote Injury-Free Environment  Recent Flowsheet Documentation  Taken 10/24/2024 2112 by Jakob Ross, RN  Safety Promotion/Fall Prevention:   activity supervised   assistive device/personal items within reach   clutter free environment maintained   increased rounding and observation   increase visualization of patient   lighting adjusted   mobility aid in reach   nonskid shoes/slippers when out of bed   patient and family education   room organization consistent   safety round/check completed   supervised activity   treat reversible contributory factors   treat underlying cause     Problem: Comorbidity Management  Goal: Blood Pressure in Desired Range  Outcome: Progressing  Intervention: Maintain Blood Pressure Management  Recent Flowsheet Documentation  Taken 10/24/2024 2112 by Jakob Ross, RN  Medication Review/Management: medications reviewed   Goal Outcome Evaluation:       Oriented to self only. Lethargic, arouses to voice. Flat affect. 1:1 for trying to get out of bed. Vitals stable on room air. Shows no signs of pain. Incontinent to bowel and bladder. Takes pills whole in apple sauce. K and Mg protocol, recheck in the am.

## 2024-10-25 NOTE — PLAN OF CARE
Problem: Adult Inpatient Plan of Care  Goal: Plan of Care Review  Description: The Plan of Care Review/Shift note should be completed every shift.  The Outcome Evaluation is a brief statement about your assessment that the patient is improving, declining, or no change.  This information will be displayed automatically on your shift  note.  Outcome: Progressing     Problem: Adult Inpatient Plan of Care  Goal: Absence of Hospital-Acquired Illness or Injury  Intervention: Prevent Infection  Recent Flowsheet Documentation  Taken 10/25/2024 0921 by Kirby Vigil, RN  Infection Prevention: environmental surveillance performed     Problem: Adult Inpatient Plan of Care  Goal: Readiness for Transition of Care  Outcome: Progressing   Goal Outcome Evaluation:  Patient alert to self, cooperative with cares, 1:1 sitter for safety, magnesium potassium protocol, recheck in the a.m. no verbal or nonverbal expressions of pain.

## 2024-10-25 NOTE — PROGRESS NOTES
[facility] ACUTE INPATIENT PAIN SERVICE    [facility], [facility], [facility], [facility], [place]   PAIN follow-up      Assessment/Plan:  Dominik Rojas is a 84 year old male who was admitted on 10/12/2024.  I was asked to see the patient for postoperative pain. Admitted for recent fall with hip fracture and status post ORIF on 10/6/2024. History of COPD on O2 at baseline, systolic heart failure, HTN, hypothyroidism, anxiety. Also has a history of opioid addiction & distant history of alcohol use.    shows no results.  Patient seen at the bedside sleeping in the chair.  Has been confused and thus opioids were stopped.    PLAN:  Acute pain secondary to ORIF on 10/6, pain should be resolved.  Given encephalopathy would not offer opioids.  Multimodal Medication Therapy:   Adjuvants: Agree with Tylenol.  Agree with lidocaine patch.  Patient is on Cymbalta, BuSpar, trazodone  Opioids: Discontinue Tylenol with codeine  Non-medication interventions- Ice   Constipation Prophylaxis-monitor bowel movements closely now that patient is no longer on opioids and remains on senna twice daily  Follow up /Discharge Recommendations - We recommend prescribing the following at the time of discharge: None          Subjective:    Today I met with Dominik at the bedside.  He is sleeping in the chair and with a one-to-one sitter.  Was confused overnight and trying to go to the bathroom although could not verbalize his care needs.         History   Drug Use No         Tobacco Use      Smoking status: Former        Packs/day: 0.00        Years: 0.5 packs/day for 35.0 years (17.5 ttl pk-yrs)        Types: Cigarettes        Start date: 1955        Quit date: 1990        Years since quittin.8      Smokeless tobacco: Never      Tobacco comments: quit         Objective:  Vital signs in last 24 hours:  B/P: 134/89, T: 97.2, P: 79, R: 16   Blood pressure 134/89, pulse 79, temperature 97.2  F (36.2  C), temperature source Oral,  "resp. rate 16, height 1.702 m (5' 7\"), weight 87 kg (191 lb 11.2 oz), SpO2 98%.        Review of Systems:   As per subjective, all others negative.    Physical Exam    General: Calm sitting up in the chair with O2 on  HEENT: Head normocephalic atraumatic   CV: No chest pain  Resp: No cough  GI: No distention  Skin: Incision is covered  Psych: Very sleepy  Neuro: Has been confused          Imaging:  Personally Reviewed.    Results for orders placed or performed during the hospital encounter of 10/12/24   XR Femur Left 2 Views    Impression    IMPRESSION: Postoperative changes redemonstrated related to ORIF intertrochanteric left proximal femur fracture with dynamic hip screw. Alignment and hardware unchanged. Displaced lesser trochanteric fracture fragments are unchanged. Lateral skin staples   remain along the lateral left hip and proximal thigh. Anatomic alignment left femur. No new left femur fracture or joint malalignment. Mild to moderate left hip osteoarthritis. Moderate to severe degenerative change medial compartment left knee.   Arterial calcification. Small left knee joint effusion. Lateral left hip and proximal thigh soft tissue edema.     Head CT w/o contrast    Impression    IMPRESSION:  HEAD CT:  1.  No finding for intracranial hemorrhage, mass, or acute infarct.    2.  Moderate volume loss and moderate to advanced presumed sequelae of chronic microvascular ischemic change. Stable volume loss and gliosis right frontal lobe.    CERVICAL SPINE CT:  1.  Advanced cervical spondylosis without finding for acute fracture or posttraumatic subluxation.       CT Cervical Spine w/o Contrast    Impression    IMPRESSION:  HEAD CT:  1.  No finding for intracranial hemorrhage, mass, or acute infarct.    2.  Moderate volume loss and moderate to advanced presumed sequelae of chronic microvascular ischemic change. Stable volume loss and gliosis right frontal lobe.    CERVICAL SPINE CT:  1.  Advanced cervical spondylosis " without finding for acute fracture or posttraumatic subluxation.       US Lower Extremity Venous Duplex Left    Impression    IMPRESSION:  No deep venous thrombosis in the left lower extremity.   XR Pelvis 1/2 Views    Impression    IMPRESSION: No significant interval change. Postop ORIF left intertrochanteric femur fracture with dynamic hip screw. Hardware and alignment unchanged. Lesser trochanteric fragments remain medially displaced by approximately 2 cm. No new fracture. Mild   to moderate left and mild right hip osteoarthritis. Vascular stents project over the pelvis and surgical clips project over the right inguinal region. Both obturator rings appear intact. Arterial calcification.   CT Femur Thigh Left w/o Contrast    Impression    IMPRESSION:  1.  Reduced and nailed comminuted intertrochanteric left femoral fracture in unchanged alignment. No CT evidence of new fracture.  2.  Small lateral left hip subcutaneous hematoma.     XR Chest 1 View    Impression    IMPRESSION: Mild bibasilar atelectasis. Increasing small right pleural effusion. No pneumothorax. Stable enlarged cardiac silhouette. Mild pulmonary vascular congestion.        Lab Results:  Personally Reviewed.   Last Comprehensive Metabolic Panel:  Sodium   Date Value Ref Range Status   10/24/2024 138 135 - 145 mmol/L Final     Potassium   Date Value Ref Range Status   10/25/2024 3.6 3.4 - 5.3 mmol/L Final   03/02/2022 4.5 3.5 - 5.0 mmol/L Final     Chloride   Date Value Ref Range Status   10/24/2024 98 98 - 107 mmol/L Final   03/02/2022 105 98 - 107 mmol/L Final     Carbon Dioxide (CO2)   Date Value Ref Range Status   10/24/2024 30 (H) 22 - 29 mmol/L Final   03/02/2022 27 22 - 31 mmol/L Final     Anion Gap   Date Value Ref Range Status   10/24/2024 10 7 - 15 mmol/L Final   03/02/2022 11 5 - 18 mmol/L Final     Glucose   Date Value Ref Range Status   10/24/2024 102 (H) 70 - 99 mg/dL Final   03/02/2022 95 70 - 125 mg/dL Final     GLUCOSE BY METER POCT    Date Value Ref Range Status   10/21/2024 135 (H) 70 - 99 mg/dL Final     Urea Nitrogen   Date Value Ref Range Status   10/24/2024 25.5 (H) 8.0 - 23.0 mg/dL Final   03/02/2022 16 8 - 28 mg/dL Final     Creatinine   Date Value Ref Range Status   10/24/2024 0.79 0.67 - 1.17 mg/dL Final     GFR Estimate   Date Value Ref Range Status   10/24/2024 88 >60 mL/min/1.73m2 Final     Comment:     eGFR calculated using 2021 CKD-EPI equation.   06/22/2021 >60 >60 mL/min/1.73m2 Final     GFR, ESTIMATED POCT   Date Value Ref Range Status   07/17/2023 55 (L) >60 mL/min/1.73m2 Final     Calcium   Date Value Ref Range Status   10/24/2024 8.7 (L) 8.8 - 10.4 mg/dL Final     Comment:     Reference intervals for this test were updated on 7/16/2024 to reflect our healthy population more accurately. There may be differences in the flagging of prior results with similar values performed with this method. Those prior results can be interpreted in the context of the updated reference intervals.        UA:   Amphetamines Urine   Date Value Ref Range Status   08/14/2021 Screen Negative Screen Negative Final     Barbiturates Urine   Date Value Ref Range Status   08/14/2021 Screen Negative Screen Negative Final     Cannabinoids Urine   Date Value Ref Range Status   08/14/2021 Screen Negative Screen Negative Final     Cocaine Urine   Date Value Ref Range Status   08/14/2021 Screen Negative Screen Negative Final     Opiates Urine   Date Value Ref Range Status   08/14/2021 Screen Negative Screen Negative Final     PCP Urine   Date Value Ref Range Status   08/14/2021 Screen Negative Screen Negative Final              Please see A&P for additional details of medical decision making.  MANAGEMENT DISCUSSED with the following over the past 24 hours: Discussed with nursing assistant and patient  NOTE(S)/MEDICAL RECORDS REVIEWED over the past 24 hours: Pain team, medicine, nursing  Tests personally interpreted in the past 24 hours:  - Ultrasound showing  no DVT  Tests ORDERED & REVIEWED in the past 24 hours:  - CrtCl  SUPPLEMENTAL HISTORY, in addition to the patient's history, over the past 24 hours obtained from:   - Aide  Medical complexity over the past 24 hours:  -------------------------- MODERATE RISK FOR MORBIDITY --------------------------------------------------  - Prescription DRUG MANAGEMENT performed           Claribel KULKARNI, CNS, CNP  Acute Care Pain Management  Team  Hours of pain coverage Mon-Fri 8-1600, afterhours please call the house officer   [facility] (JESUS, VINAYs, SD, RH)   Page via [employer] web console -Click for [place]

## 2024-10-26 ENCOUNTER — APPOINTMENT (OUTPATIENT)
Dept: PHYSICAL THERAPY | Facility: HOSPITAL | Age: 84
End: 2024-10-26
Payer: COMMERCIAL

## 2024-10-26 LAB
CREAT SERPL-MCNC: 0.81 MG/DL (ref 0.67–1.17)
EGFRCR SERPLBLD CKD-EPI 2021: 87 ML/MIN/1.73M2
MAGNESIUM SERPL-MCNC: 1.7 MG/DL (ref 1.7–2.3)
POTASSIUM SERPL-SCNC: 3.5 MMOL/L (ref 3.4–5.3)

## 2024-10-26 PROCEDURE — 99232 SBSQ HOSP IP/OBS MODERATE 35: CPT | Performed by: HOSPITALIST

## 2024-10-26 PROCEDURE — 84132 ASSAY OF SERUM POTASSIUM: CPT | Performed by: HOSPITALIST

## 2024-10-26 PROCEDURE — 250N000013 HC RX MED GY IP 250 OP 250 PS 637: Performed by: HOSPITALIST

## 2024-10-26 PROCEDURE — 250N000011 HC RX IP 250 OP 636

## 2024-10-26 PROCEDURE — 250N000011 HC RX IP 250 OP 636: Performed by: INTERNAL MEDICINE

## 2024-10-26 PROCEDURE — 120N000001 HC R&B MED SURG/OB

## 2024-10-26 PROCEDURE — 250N000013 HC RX MED GY IP 250 OP 250 PS 637: Performed by: STUDENT IN AN ORGANIZED HEALTH CARE EDUCATION/TRAINING PROGRAM

## 2024-10-26 PROCEDURE — 36415 COLL VENOUS BLD VENIPUNCTURE: CPT | Performed by: HOSPITALIST

## 2024-10-26 PROCEDURE — 82565 ASSAY OF CREATININE: CPT | Performed by: HOSPITALIST

## 2024-10-26 PROCEDURE — 83735 ASSAY OF MAGNESIUM: CPT | Performed by: HOSPITALIST

## 2024-10-26 PROCEDURE — 250N000011 HC RX IP 250 OP 636: Performed by: STUDENT IN AN ORGANIZED HEALTH CARE EDUCATION/TRAINING PROGRAM

## 2024-10-26 PROCEDURE — 250N000013 HC RX MED GY IP 250 OP 250 PS 637: Performed by: INTERNAL MEDICINE

## 2024-10-26 PROCEDURE — 250N000013 HC RX MED GY IP 250 OP 250 PS 637: Performed by: REGISTERED NURSE

## 2024-10-26 PROCEDURE — 97116 GAIT TRAINING THERAPY: CPT | Mod: GP

## 2024-10-26 PROCEDURE — 97530 THERAPEUTIC ACTIVITIES: CPT | Mod: GP

## 2024-10-26 RX ORDER — KETOROLAC TROMETHAMINE 15 MG/ML
15 INJECTION, SOLUTION INTRAMUSCULAR; INTRAVENOUS ONCE
Status: COMPLETED | OUTPATIENT
Start: 2024-10-26 | End: 2024-10-26

## 2024-10-26 RX ORDER — METOPROLOL SUCCINATE 50 MG/1
50 TABLET, EXTENDED RELEASE ORAL DAILY
Status: DISCONTINUED | OUTPATIENT
Start: 2024-10-27 | End: 2024-11-07

## 2024-10-26 RX ORDER — FUROSEMIDE 20 MG/1
20 TABLET ORAL DAILY
Status: DISCONTINUED | OUTPATIENT
Start: 2024-10-27 | End: 2024-10-29

## 2024-10-26 RX ORDER — POTASSIUM CHLORIDE 1500 MG/1
20 TABLET, EXTENDED RELEASE ORAL ONCE
Status: COMPLETED | OUTPATIENT
Start: 2024-10-26 | End: 2024-10-26

## 2024-10-26 RX ORDER — MAGNESIUM OXIDE 400 MG/1
400 TABLET ORAL EVERY 4 HOURS
Status: DISCONTINUED | OUTPATIENT
Start: 2024-10-26 | End: 2024-10-26

## 2024-10-26 RX ORDER — METHOCARBAMOL 500 MG/1
250 TABLET ORAL 3 TIMES DAILY
Status: DISCONTINUED | OUTPATIENT
Start: 2024-10-26 | End: 2024-10-28

## 2024-10-26 RX ORDER — ACETAMINOPHEN 325 MG/1
650 TABLET ORAL
Status: DISCONTINUED | OUTPATIENT
Start: 2024-10-26 | End: 2024-11-06

## 2024-10-26 RX ORDER — LOSARTAN POTASSIUM 50 MG/1
50 TABLET ORAL EVERY MORNING
Status: DISCONTINUED | OUTPATIENT
Start: 2024-10-27 | End: 2024-11-07

## 2024-10-26 RX ADMIN — MAGNESIUM OXIDE TAB 400 MG (241.3 MG ELEMENTAL MG) 400 MG: 400 (241.3 MG) TAB at 09:46

## 2024-10-26 RX ADMIN — ACETAMINOPHEN 650 MG: 325 TABLET ORAL at 17:37

## 2024-10-26 RX ADMIN — ACETAMINOPHEN 650 MG: 325 TABLET ORAL at 09:45

## 2024-10-26 RX ADMIN — METOPROLOL TARTRATE 25 MG: 25 TABLET, FILM COATED ORAL at 20:48

## 2024-10-26 RX ADMIN — POLYETHYLENE GLYCOL 3350 17 G: 17 POWDER, FOR SOLUTION ORAL at 09:46

## 2024-10-26 RX ADMIN — BUSPIRONE HYDROCHLORIDE 5 MG: 5 TABLET ORAL at 20:48

## 2024-10-26 RX ADMIN — ENOXAPARIN SODIUM 40 MG: 40 INJECTION SUBCUTANEOUS at 17:37

## 2024-10-26 RX ADMIN — PANTOPRAZOLE SODIUM 40 MG: 40 TABLET, DELAYED RELEASE ORAL at 07:32

## 2024-10-26 RX ADMIN — LIDOCAINE 1 PATCH: 4 PATCH TOPICAL at 09:44

## 2024-10-26 RX ADMIN — METHOCARBAMOL 250 MG: 500 TABLET ORAL at 11:03

## 2024-10-26 RX ADMIN — SENNOSIDES AND DOCUSATE SODIUM 1 TABLET: 8.6; 5 TABLET ORAL at 09:45

## 2024-10-26 RX ADMIN — LATANOPROST 1 DROP: 50 SOLUTION/ DROPS OPHTHALMIC at 20:48

## 2024-10-26 RX ADMIN — SENNOSIDES AND DOCUSATE SODIUM 1 TABLET: 8.6; 5 TABLET ORAL at 20:48

## 2024-10-26 RX ADMIN — BUSPIRONE HYDROCHLORIDE 5 MG: 5 TABLET ORAL at 09:46

## 2024-10-26 RX ADMIN — ROSUVASTATIN 10 MG: 10 TABLET, FILM COATED ORAL at 20:48

## 2024-10-26 RX ADMIN — QUETIAPINE FUMARATE 12.5 MG: 25 TABLET ORAL at 18:24

## 2024-10-26 RX ADMIN — METHOCARBAMOL 250 MG: 500 TABLET ORAL at 20:48

## 2024-10-26 RX ADMIN — MICONAZOLE NITRATE: 20 POWDER TOPICAL at 09:46

## 2024-10-26 RX ADMIN — Medication 10 MG: at 17:37

## 2024-10-26 RX ADMIN — POTASSIUM CHLORIDE 20 MEQ: 1500 TABLET, EXTENDED RELEASE ORAL at 09:46

## 2024-10-26 RX ADMIN — LOSARTAN POTASSIUM 25 MG: 25 TABLET, FILM COATED ORAL at 09:46

## 2024-10-26 RX ADMIN — ACETAMINOPHEN 650 MG: 325 TABLET ORAL at 21:17

## 2024-10-26 RX ADMIN — KETOROLAC TROMETHAMINE 15 MG: 15 INJECTION, SOLUTION INTRAMUSCULAR; INTRAVENOUS at 02:40

## 2024-10-26 RX ADMIN — DULOXETINE HYDROCHLORIDE 60 MG: 60 CAPSULE, DELAYED RELEASE PELLETS ORAL at 09:45

## 2024-10-26 RX ADMIN — ACETAMINOPHEN 650 MG: 325 TABLET ORAL at 14:29

## 2024-10-26 RX ADMIN — METHOCARBAMOL 250 MG: 500 TABLET ORAL at 14:29

## 2024-10-26 RX ADMIN — TRAZODONE HYDROCHLORIDE 25 MG: 50 TABLET ORAL at 20:48

## 2024-10-26 RX ADMIN — FUROSEMIDE 40 MG: 10 INJECTION, SOLUTION INTRAVENOUS at 09:44

## 2024-10-26 RX ADMIN — ASPIRIN 81 MG CHEWABLE TABLET 81 MG: 81 TABLET CHEWABLE at 09:45

## 2024-10-26 RX ADMIN — LEVOTHYROXINE SODIUM 88 MCG: 88 TABLET ORAL at 07:32

## 2024-10-26 RX ADMIN — METOPROLOL TARTRATE 25 MG: 25 TABLET, FILM COATED ORAL at 09:46

## 2024-10-26 RX ADMIN — MICONAZOLE NITRATE: 20 POWDER TOPICAL at 20:48

## 2024-10-26 ASSESSMENT — ACTIVITIES OF DAILY LIVING (ADL)
ADLS_ACUITY_SCORE: 25.75
ADLS_ACUITY_SCORE: 0
ADLS_ACUITY_SCORE: 25.75
ADLS_ACUITY_SCORE: 26.75
ADLS_ACUITY_SCORE: 0
ADLS_ACUITY_SCORE: 0
ADLS_ACUITY_SCORE: 25.75
ADLS_ACUITY_SCORE: 0
ADLS_ACUITY_SCORE: 27.25
ADLS_ACUITY_SCORE: 25.75
ADLS_ACUITY_SCORE: 27.25
ADLS_ACUITY_SCORE: 25.75
ADLS_ACUITY_SCORE: 0
ADLS_ACUITY_SCORE: 25.75
ADLS_ACUITY_SCORE: 25.75
ADLS_ACUITY_SCORE: 27.25
ADLS_ACUITY_SCORE: 27.25
ADLS_ACUITY_SCORE: 25.75
ADLS_ACUITY_SCORE: 27.25
ADLS_ACUITY_SCORE: 26.75
ADLS_ACUITY_SCORE: 27.25
ADLS_ACUITY_SCORE: 27.25
ADLS_ACUITY_SCORE: 26.75

## 2024-10-26 NOTE — PROGRESS NOTES
[facility]    Medicine Progress Note - Hospitalist Service    Date of Admission:  10/12/2024    Assessment & Plan                Dominik Rojas is a 84 year old male with history of COPD on 2 to 3 L nasal cannula at home, chronic systolic heart failure, hypertension, hypothyroidism, anxiety, recent fall and left hip fracture s/p ORIF who was jsut discharged to TCU.  Patient was brought to ED for evaluation another fall at TCU with left hip pain. Xray showing postop changes without new acute changes.  Here patient has had significant behavioral issues requiring 1:1 sitter. Hospital Day: 15         Mechanical fall  Left hip contusion without new fracture  Recent left hip fracture s/p ORIF  -Had hip fracture surgery on 10/6. Was at TCU, and had a mechanical fall, with worse left hip pain after.  -CT Left femur no evidence of new fracture, small lateral left hip subcutaneous hematoma  -Negative for DVT on US  -pain attributed to soft tissue contusion in the setting of recent ORIF  -Seen by Ortho, appreciate recs, follow-up outpatient  -Seen by Pain team, appreciate assistance  -on Tylenol scheduled  -has been very drowsy on opioids, opioids on hold for now  -schedule low dose robaxin  -continue topical lidocaine     Anxiety and depression;  Dementia with behavioral change/agitation   Mood disorder  Acute toxic/metabolic encephalopathy  -Continue home BuSpar, Cymbalta, Zyprexa  -was improving and off 1:1, got agitated overnight and now again on 1:1 for the past several days  -Seen by Psych, trazodone 25 mg to help with sleep at bedtime  -Seroquel as needed for agitation  -Delirium precautions  -no UTI  -Voiding adequately per nursing report  -Last BM on 10/25, continue bowel regimen  -off opioids, see changes above  -Increased dose of suppertime melatonin to help with day/night reversal  -10/26 more alert today but very anxious.  Working on pain control and continue supportive cares.  Will call  "and asked family to come visit     Acute on chronic HFpEF  -Hypervolemic on exam, lasix was held on admission due to poor po intake, weight was up 10 lbs since admission  -recent Echo showing preserved EF  -CXR 10/19: small right pleural effusion, mild pulmonary vascular congestion  -Has been on IV Lasix 40mg daily, changed to p.o. Lasix 20 mg daily and increased home losartan  -Change home metoprolol from tartrate to succinate to help reduce pill burden  -daily weight, Fluid restriction    Tachycardia, now resolved  -Cardiology was consulted, no a fib, no need for anticoagulation  -pt is not a good candidate for anticoagulation due to frequent falls  -HR now in control     Hypomagnesemia  -Protocol     COPD  Chronic hypoxic respiratory failure on 2-3l NC  -PTA meds  -Not in exacerbation     Recent UTI  -PTA amoxicillin 500mg TID; finished course of abx;   -repeated UC 10/13 and again 10/23 mixed tai     History of RLL adenocarcinoma s/p radiation therapy     Normocytic anemia  - Hb baseline 10-11  - contusions and hematoma on the Left thigh  - Monitor Hb     HLD  -Crestor     GERD  -PPI      Hypothyroidism  -recent TSH 6.93  -continue Synthroid   -recommend repeat TSH in 4 weeks with PCP. FT4 not useful for titrating Synthroid generally     TCU when ready          Diet: Combination Diet Regular Diet Adult  Fluid restriction 1800 ML FLUID  Snacks/Supplements Adult: Ensure Enlive; Between Meals    DVT Prophylaxis: Moderate risk.   Enoxaparin (Lovenox) SQ  Cabral Catheter: Not present  Lines: None     Cardiac Monitoring: None  Code Status: Full Code      Clinically Significant Risk Factors                   # Hypertension: Noted on problem list    # Dementia: noted on problem list        # Obesity: Estimated body mass index is 30.02 kg/m  as calculated from the following:    Height as of this encounter: 1.702 m (5' 7\").    Weight as of this encounter: 87 kg (191 lb 11.2 oz).        # Financial/Environmental Concerns: " "none         Disposition Plan     Medically Ready for Discharge: Anticipated Tomorrow         Discharge barrier(s): AMS, 1:1 sitter  Care discussed with: patient, RN, dtr Lore. Tried wife x3, no answer.      Claudia Isidro MD  Hospitalist Service  [facility]  Securely message with [employer] (more info)  Text page via [employer] Paging/Directory   ______________________________________________________________________      Physical Exam   Vital Signs: Temp: 98.4  F (36.9  C) Temp src: Oral BP: 124/73 Pulse: 100   Resp: 18 SpO2: 98 % O2 Device: Nasal cannula Oxygen Delivery: 2 LPM  Weight: 191 lbs 11.2 oz    General: in no apparent distress, non-toxic, and alert male sitting in bedside chair not oriented to person, place or time. Keeps loudly shouting for \"Irma\"  HEENT: Head normocephalic atraumatic, oral mucosa moist. Sclerae anicteric  Skin:  dry  Extremities: No peripheral edema  Psych: Normal affect, anxious mood  Neuro: Grossly normal        Medical Decision Making               Data   Recent Results (from the past 16 hours)   Creatinine    Collection Time: 10/26/24  6:06 AM   Result Value Ref Range    Creatinine 0.81 0.67 - 1.17 mg/dL    GFR Estimate 87 >60 mL/min/1.73m2   Potassium    Collection Time: 10/26/24  6:06 AM   Result Value Ref Range    Potassium 3.5 3.4 - 5.3 mmol/L   Magnesium    Collection Time: 10/26/24  6:06 AM   Result Value Ref Range    Magnesium 1.7 1.7 - 2.3 mg/dL       Interval History     2:15 PM    I called and updated patient's daughter Lore.  Told her have had significant difficulty reaching patient's wife Jovana.  Lore states patient's wife still works at [employer].  Lore states patient being restless and anxious is pretty typical for him.  She is not aware of anything that has helped with this in the past.  She notes patient wants wife or daughter to be with him at all times which is not possible.    9:53 AM    Patient more alert today, has been calling out " "\"VARINDER!\" frequently this morning.    Per aide, has been restless, constantly wanting to get up, wants to interact with the people in the hallway. Would not rest in chair or bed for very long before tries to get up.    When I visited him he was very anxious, clinging to my hand saying \"Please,\" accuses aide of \"kicking\" him, she tells me he is referring to when she has to guide him to sit back down etc.    He did complain of pain in his left hip. I increased the frequency of his scheduled tylenol and added low dose of robaxin.    He is already on Buspar, SNRI, trazodone, melatonin for his anxiety. I will increase his melatonin to 10mg. Continue PRN Seroquel, he does take this by mouth.    Med list is getting very long. Change metoprolol tartrate to succinate for one less pill per day.    Transition off IV Lasix. Try to limit lab draws. Will call family today and encourage them to visit.  "

## 2024-10-26 NOTE — PLAN OF CARE
Problem: Risk for Delirium  Goal: Improved Behavioral Control  Outcome: Not Progressing  Goal: Improved Attention and Thought Clarity  Outcome: Not Progressing     Problem: Pain Acute  Goal: Optimal Pain Control and Function  Outcome: Progressing     Problem: Fall Injury Risk  Goal: Absence of Fall and Fall-Related Injury  Outcome: Progressing     Problem: Adult Inpatient Plan of Care  Goal: Absence of Hospital-Acquired Illness or Injury  Intervention: Prevent Skin Injury  Recent Flowsheet Documentation  Taken 10/26/2024 1600 by Navjot Sanders RN  Body Position: position changed independently   Goal Outcome Evaluation:       Patient is awake and agitated. He is oriented to self and situation only. He has been continuously standing up and then sitting down, without being able to articulate why. Since approximately 17:00 he has been growing increasingly more agitated and suspicious. PRN Olanzapine given. Patient appetite fair, having eaten 75% of his macaroni and cheese dinner. Lg bm this afternoon.

## 2024-10-26 NOTE — PLAN OF CARE
Pt disoriented to everything except self. Calm, cooperative except for getting up out of chair/bed.  - 1 to 1: sitter at bedside  - 2L O2 nasal cannula  - mag & potassium recheck in the morning  - incontinence care received   - pt had severe pain overnight, no medications available at the time of pain. Notified MD. One time order for tordol received and given. Pain symptoms decreased.     Problem: Risk for Delirium  Goal: Optimal Coping  Outcome: Not Progressing  Intervention: Optimize Psychosocial Adjustment to Delirium  Recent Flowsheet Documentation  Taken 10/26/2024 0100 by Brittany Leach RN  Supportive Measures:   active listening utilized   verbalization of feelings encouraged  Goal: Improved Behavioral Control  Outcome: Not Progressing  Intervention: Prevent and Manage Agitation  Recent Flowsheet Documentation  Taken 10/26/2024 0100 by Brittany Leach RN  Environment Familiarity/Consistency: daily routine followed  Intervention: Minimize Safety Risk  Recent Flowsheet Documentation  Taken 10/26/2024 0100 by Brittany Leach RN  Enhanced Safety Measures:   pain management   review medications for side effects with activity   assistive devices when indicated    at bedside  Goal: Improved Attention and Thought Clarity  Outcome: Not Progressing  Intervention: Maximize Cognitive Function  Recent Flowsheet Documentation  Taken 10/26/2024 0100 by Brittany Leach RN  Sensory Stimulation Regulation:   lighting decreased   quiet environment promoted  Reorientation Measures:   calendar in view   clock in view   reorientation provided  Goal: Improved Sleep  Outcome: Not Progressing  Intervention: Promote Sleep  Recent Flowsheet Documentation  Taken 10/26/2024 0100 by Brittany Leach RN  Sleep/Rest Enhancement:   awakenings minimized   snack   room darkened   relaxation techniques promoted   regular sleep/rest pattern promoted     Problem: Pain Acute  Goal: Optimal Pain Control and  Function  Outcome: Not Progressing  Intervention: Develop Pain Management Plan  Recent Flowsheet Documentation  Taken 10/26/2024 0240 by Brittany Leahc RN  Pain Management Interventions:   medication (see MAR)   emotional support   pillow support provided   repositioned  Taken 10/26/2024 0100 by Brittany Leach RN  Pain Management Interventions: MD notified (comment)  Intervention: Prevent or Manage Pain  Recent Flowsheet Documentation  Taken 10/26/2024 0100 by Brittany Leach RN  Sensory Stimulation Regulation:   lighting decreased   quiet environment promoted  Sleep/Rest Enhancement:   awakenings minimized   snack   room darkened   relaxation techniques promoted   regular sleep/rest pattern promoted  Bowel Elimination Promotion:   adequate fluid intake promoted   ambulation promoted   commode/bedpan at bedside  Medication Review/Management: medications reviewed  Intervention: Optimize Psychosocial Wellbeing  Recent Flowsheet Documentation  Taken 10/26/2024 0100 by Brittany Leach RN  Supportive Measures:   active listening utilized   verbalization of feelings encouraged     Problem: Comorbidity Management  Goal: Maintenance of Behavioral Health Symptom Control  Outcome: Not Progressing  Intervention: Maintain Behavioral Health Symptom Control  Recent Flowsheet Documentation  Taken 10/26/2024 0100 by Brittany Leach RN  Medication Review/Management: medications reviewed   Goal Outcome Evaluation:           Overall Patient Progress: no changeOverall Patient Progress: no change

## 2024-10-26 NOTE — PLAN OF CARE
"  Problem: Adult Inpatient Plan of Care  Goal: Readiness for Transition of Care  Outcome: Progressing     Problem: Risk for Delirium  Goal: Optimal Coping  Outcome: Progressing  Goal: Improved Behavioral Control  Outcome: Progressing  Goal: Improved Attention and Thought Clarity  Outcome: Progressing  Goal: Improved Sleep  Outcome: Progressing     Problem: Pain Acute  Goal: Optimal Pain Control and Function  Outcome: Progressing     Problem: Fall Injury Risk  Goal: Absence of Fall and Fall-Related Injury  Outcome: Progressing     Problem: Comorbidity Management  Goal: Maintenance of Behavioral Health Symptom Control  Outcome: Progressing  Goal: Maintenance of COPD Symptom Control  Outcome: Progressing  Goal: Maintenance of Heart Failure Symptom Control  Outcome: Progressing  Goal: Blood Pressure in Desired Range  Outcome: Progressing   Goal Outcome Evaluation:       Writer assumed care of pt at approx 1105, as rn and 1/1. Pt a/o to self. Reports some soreness in left hip area, scheduled meds given. Pt up in chair, declined to lie down in bed. Ate 1/2 of lunch. Incont of urine x2, also voided in toilet. Continent x1, voiding in toilet. Restless at times, standing up briefly, then sitting back down. Talked on phone to daughter and wife. Walked in deleon w/ writer and walker and gait belt. Karly cares done x2. Occasionally  pt yelling out \"Irma\" loudly. Pt sitting in chair now, tv on, o2 on per n/c at 1.5 L. Pt remains on 1/1 supervision for impulsiveness. Reminded pt to seek staff for needs.   "

## 2024-10-26 NOTE — PLAN OF CARE
Problem: Risk for Delirium  Goal: Optimal Coping  Outcome: Not Progressing  Goal: Improved Behavioral Control  Outcome: Not Progressing     Problem: Fall Injury Risk  Goal: Absence of Fall and Fall-Related Injury  Outcome: Not Progressing     Problem: Pain Acute  Goal: Optimal Pain Control and Function  Outcome: Progressing     Goal Outcome Evaluation:  Patient is alert to self only. Patient became increasingly agitated and combative with the 1:1 in the room. Attempted to redirect, but patient was not able to be redirected, PRN Seroquel given. Patient remains on 1:1 due to safety. Patient shows nonverbal indication of pain, scheduled Tylenol given along with ice to left hip. Patient also complained of chest pain, gave GI cocktail and that was effective per patient.

## 2024-10-27 PROCEDURE — 250N000011 HC RX IP 250 OP 636: Performed by: INTERNAL MEDICINE

## 2024-10-27 PROCEDURE — 250N000013 HC RX MED GY IP 250 OP 250 PS 637: Performed by: HOSPITALIST

## 2024-10-27 PROCEDURE — 250N000013 HC RX MED GY IP 250 OP 250 PS 637: Performed by: INTERNAL MEDICINE

## 2024-10-27 PROCEDURE — 250N000013 HC RX MED GY IP 250 OP 250 PS 637: Performed by: REGISTERED NURSE

## 2024-10-27 PROCEDURE — 120N000001 HC R&B MED SURG/OB

## 2024-10-27 PROCEDURE — 250N000013 HC RX MED GY IP 250 OP 250 PS 637: Performed by: STUDENT IN AN ORGANIZED HEALTH CARE EDUCATION/TRAINING PROGRAM

## 2024-10-27 PROCEDURE — 99232 SBSQ HOSP IP/OBS MODERATE 35: CPT | Performed by: HOSPITALIST

## 2024-10-27 RX ORDER — HYDROCODONE BITARTRATE AND ACETAMINOPHEN 5; 325 MG/1; MG/1
1 TABLET ORAL EVERY 6 HOURS PRN
Status: DISCONTINUED | OUTPATIENT
Start: 2024-10-27 | End: 2024-10-27 | Stop reason: ALTCHOICE

## 2024-10-27 RX ORDER — HYDROMORPHONE HYDROCHLORIDE 1 MG/ML
0.3 INJECTION, SOLUTION INTRAMUSCULAR; INTRAVENOUS; SUBCUTANEOUS ONCE
Status: DISCONTINUED | OUTPATIENT
Start: 2024-10-27 | End: 2024-10-27

## 2024-10-27 RX ORDER — KETOROLAC TROMETHAMINE 15 MG/ML
15 INJECTION, SOLUTION INTRAMUSCULAR; INTRAVENOUS ONCE
Status: DISCONTINUED | OUTPATIENT
Start: 2024-10-27 | End: 2024-10-27

## 2024-10-27 RX ORDER — ACETAMINOPHEN 325 MG/1
975 TABLET ORAL ONCE
Status: COMPLETED | OUTPATIENT
Start: 2024-10-27 | End: 2024-10-27

## 2024-10-27 RX ADMIN — ACETAMINOPHEN 650 MG: 325 TABLET ORAL at 18:26

## 2024-10-27 RX ADMIN — QUETIAPINE FUMARATE 12.5 MG: 25 TABLET ORAL at 16:34

## 2024-10-27 RX ADMIN — LATANOPROST 1 DROP: 50 SOLUTION/ DROPS OPHTHALMIC at 20:49

## 2024-10-27 RX ADMIN — PANTOPRAZOLE SODIUM 40 MG: 40 TABLET, DELAYED RELEASE ORAL at 05:58

## 2024-10-27 RX ADMIN — METHOCARBAMOL 250 MG: 500 TABLET ORAL at 13:48

## 2024-10-27 RX ADMIN — ACETAMINOPHEN 650 MG: 325 TABLET ORAL at 22:36

## 2024-10-27 RX ADMIN — FUROSEMIDE 20 MG: 20 TABLET ORAL at 10:25

## 2024-10-27 RX ADMIN — ENOXAPARIN SODIUM 40 MG: 40 INJECTION SUBCUTANEOUS at 18:26

## 2024-10-27 RX ADMIN — SENNOSIDES AND DOCUSATE SODIUM 1 TABLET: 8.6; 5 TABLET ORAL at 20:45

## 2024-10-27 RX ADMIN — METHOCARBAMOL 250 MG: 500 TABLET ORAL at 20:45

## 2024-10-27 RX ADMIN — DULOXETINE HYDROCHLORIDE 60 MG: 60 CAPSULE, DELAYED RELEASE PELLETS ORAL at 10:24

## 2024-10-27 RX ADMIN — MICONAZOLE NITRATE: 20 POWDER TOPICAL at 10:27

## 2024-10-27 RX ADMIN — LOSARTAN POTASSIUM 50 MG: 50 TABLET, FILM COATED ORAL at 10:25

## 2024-10-27 RX ADMIN — TRAZODONE HYDROCHLORIDE 25 MG: 50 TABLET ORAL at 20:45

## 2024-10-27 RX ADMIN — ASPIRIN 81 MG CHEWABLE TABLET 81 MG: 81 TABLET CHEWABLE at 10:25

## 2024-10-27 RX ADMIN — ROSUVASTATIN 10 MG: 10 TABLET, FILM COATED ORAL at 20:45

## 2024-10-27 RX ADMIN — ACETAMINOPHEN 650 MG: 325 TABLET ORAL at 13:48

## 2024-10-27 RX ADMIN — SENNOSIDES AND DOCUSATE SODIUM 1 TABLET: 8.6; 5 TABLET ORAL at 10:24

## 2024-10-27 RX ADMIN — ACETAMINOPHEN 975 MG: 325 TABLET ORAL at 01:21

## 2024-10-27 RX ADMIN — Medication 10 MG: at 16:34

## 2024-10-27 RX ADMIN — ACETAMINOPHEN 650 MG: 325 TABLET ORAL at 10:25

## 2024-10-27 RX ADMIN — BUSPIRONE HYDROCHLORIDE 5 MG: 5 TABLET ORAL at 20:45

## 2024-10-27 RX ADMIN — POLYETHYLENE GLYCOL 3350 17 G: 17 POWDER, FOR SOLUTION ORAL at 10:24

## 2024-10-27 RX ADMIN — MICONAZOLE NITRATE: 20 POWDER TOPICAL at 22:49

## 2024-10-27 RX ADMIN — LIDOCAINE 1 PATCH: 4 PATCH TOPICAL at 10:26

## 2024-10-27 RX ADMIN — METHOCARBAMOL 250 MG: 500 TABLET ORAL at 10:23

## 2024-10-27 RX ADMIN — LEVOTHYROXINE SODIUM 88 MCG: 88 TABLET ORAL at 05:58

## 2024-10-27 RX ADMIN — BUSPIRONE HYDROCHLORIDE 5 MG: 5 TABLET ORAL at 10:26

## 2024-10-27 RX ADMIN — QUETIAPINE FUMARATE 12.5 MG: 25 TABLET ORAL at 06:04

## 2024-10-27 ASSESSMENT — ACTIVITIES OF DAILY LIVING (ADL)
ADLS_ACUITY_SCORE: 25.25
ADLS_ACUITY_SCORE: 24.75
ADLS_ACUITY_SCORE: 24.75
ADLS_ACUITY_SCORE: 27.25
ADLS_ACUITY_SCORE: 24.75
ADLS_ACUITY_SCORE: 24.75
ADLS_ACUITY_SCORE: 27.25
ADLS_ACUITY_SCORE: 24.75
ADLS_ACUITY_SCORE: 25.25
ADLS_ACUITY_SCORE: 27.25
ADLS_ACUITY_SCORE: 24.75
ADLS_ACUITY_SCORE: 25.25
ADLS_ACUITY_SCORE: 27.25
ADLS_ACUITY_SCORE: 27.25
ADLS_ACUITY_SCORE: 25.25
ADLS_ACUITY_SCORE: 24.75
ADLS_ACUITY_SCORE: 27.25
ADLS_ACUITY_SCORE: 24.75
ADLS_ACUITY_SCORE: 27.25

## 2024-10-27 NOTE — PLAN OF CARE
Problem: Risk for Delirium  Goal: Improved Behavioral Control  Intervention: Minimize Safety Risk  Recent Flowsheet Documentation  Taken 10/27/2024 0215 by Comfort Esposito, RN  Enhanced Safety Measures: pain management  Taken 10/26/2024 2100 by Comfort Esposito, RN  Enhanced Safety Measures: pain management   Goal Outcome Evaluation:         Pt is A/O to self only. On 2L oxygen. Pain managed with tylenol and ice packs.   Has periods of restlessness, prn seroquel given. 1:1 in place for pt safety.

## 2024-10-27 NOTE — PROGRESS NOTES
[facility]    Medicine Progress Note - Hospitalist Service    Date of Admission:  10/12/2024    Assessment & Plan                Dominik Rojas is a 84 year old male with history of COPD on 2 to 3 L nasal cannula at home, chronic systolic heart failure, hypertension, hypothyroidism, anxiety, recent fall and left hip fracture s/p ORIF who was jsut discharged to TCU.  Patient was brought to ED for evaluation another fall at TCU with left hip pain. Xray showing postop changes without new acute changes.  Here patient has had significant behavioral issues requiring 1:1 sitter. Hospital Day: 16         Mechanical fall  Left hip contusion without new fracture  Recent left hip fracture s/p ORIF  -Had hip fracture surgery on 10/6. Was at TCU, and had a mechanical fall, with worse left hip pain after.  -CT Left femur no evidence of new fracture, small lateral left hip subcutaneous hematoma  -Negative for DVT on US  -pain attributed to soft tissue contusion in the setting of recent ORIF  -Seen by Ortho, appreciate recs, follow-up outpatient  -Seen by Pain team, appreciate assistance  -on Tylenol scheduled  -has been very drowsy on opioids, opioids on hold for now  -scheduled low dose robaxin  -continue topical lidocaine     Anxiety and depression;  Dementia with behavioral change/agitation   Mood disorder  Acute toxic/metabolic encephalopathy  -Continue home BuSpar, Cymbalta, Zyprexa  -was improving and off 1:1, got agitated overnight and now again on 1:1 for the past several days  -Seen by Psych, trazodone 25 mg to help with sleep at bedtime  -Seroquel as needed for agitation  -Delirium precautions  -no UTI  -Voiding adequately per nursing report  -Last BM on 10/25, continue bowel regimen  -off opioids, see changes above  -Increased dose of suppertime melatonin to help with day/night reversal, he seems more drowsy today I am not sure the melatonin is to blame, continue present dose and reassess  tomorrow  -10/27 more drowsy, irritable with staff and still trying to get up a lot.  They are trying to move him closer to the nurses station     Acute on chronic HFpEF  -Hypervolemic on exam, lasix was held on admission due to poor po intake, weight was up 10 lbs since admission  -recent Echo showing preserved EF  -CXR 10/19: small right pleural effusion, mild pulmonary vascular congestion  -Has been on IV Lasix 40mg daily, changed to p.o. Lasix 20 mg daily and increased home losartan  -Changed home metoprolol from tartrate to succinate to help reduce pill burden  -daily weight, Fluid restriction    Tachycardia, now resolved  -Cardiology was consulted, no a fib, no need for anticoagulation  -pt is not a good candidate for anticoagulation due to frequent falls  -HR now in control     Hypomagnesemia  -replaced     COPD  Chronic hypoxic respiratory failure on 2-3l NC  -PTA meds  -Not in exacerbation     Recent UTI  -PTA amoxicillin 500mg TID; finished course of abx;   -repeated UC 10/13 and again 10/23 mixed tai     History of RLL adenocarcinoma s/p radiation therapy     Normocytic anemia  - Hb baseline 10-11  - contusions and hematoma on the Left thigh  - Monitor Hb periodically     HLD  -Crestor     GERD  -PPI      Hypothyroidism  -recent TSH 6.93  -continue Synthroid   -recommend repeat TSH in 4 weeks with PCP. FT4 not useful for titrating Synthroid generally     TCU when ready          Diet: Combination Diet Regular Diet Adult  Fluid restriction 1800 ML FLUID  Snacks/Supplements Adult: Ensure Enlive; Between Meals    DVT Prophylaxis: Moderate risk.   Enoxaparin (Lovenox) SQ  Cabral Catheter: Not present  Lines: None     Cardiac Monitoring: None  Code Status: Full Code      Clinically Significant Risk Factors                   # Hypertension: Noted on problem list    # Dementia: noted on problem list        # Obesity: Estimated body mass index is 30.02 kg/m  as calculated from the following:    Height as of this  "encounter: 1.702 m (5' 7\").    Weight as of this encounter: 87 kg (191 lb 11.2 oz).        # Financial/Environmental Concerns: none         Disposition Plan     Medically Ready for Discharge: Anticipated Tomorrow         Discharge barrier(s): AMS, 1:1 sitter  Care discussed with: patient, RN.      Claudia Isidro MD  Hospitalist Service  [facility]  Securely message with [employer] (more info)  Text page via [employer] Paging/Directory   ______________________________________________________________________      Physical Exam   Vital Signs: Temp: 98  F (36.7  C) Temp src: Oral BP: 117/71 Pulse: 92   Resp: 18 SpO2: 96 % O2 Device: Nasal cannula Oxygen Delivery: 1 LPM  Weight: 191 lbs 11.2 oz    General: in no apparent distress, non-toxic, and drowsy male sitting in bedside chair not oriented to person, place or time. Keeps loudly shouting for \"May\"  HEENT: Head normocephalic atraumatic, oral mucosa moist. Sclerae anicteric  Skin:  dry  Extremities: No peripheral edema  Psych: Normal affect, anxious mood  Neuro: Grossly normal        Medical Decision Making               Data   No results found for this or any previous visit (from the past 16 hours).      Interval History     Patient slept better last night, still with behavioral issues.  Today he is screaming for \"May.   Fairly withdrawn today and would not talk to me.  Nurse had difficulty getting him to take his meds but he did eventually take them.  He was calling her crude names. Remains on one-to-one due to impulsivity.  Might do better in a room closer to the nurses station with chair alarm in place, nurse is going to check into this.  Has staples in place overdue for removal, nurse is going to check with orthopedics if these are okay to remove.  Medically stable for discharge to TCU when we can get him off of the one-to-one sitter.    "

## 2024-10-27 NOTE — PLAN OF CARE
Problem: Pain Acute  Goal: Optimal Pain Control and Function  Outcome: Progressing  Intervention: Develop Pain Management Plan  Recent Flowsheet Documentation  Taken 10/27/2024 1025 by Tasha Bhat RN  Pain Management Interventions: medication (see MAR)   Goal Outcome Evaluation:    Patient does report pain in hip unable to provide details regarding intensity. Pain medications administered as ordered.   Patient continues on 1:1 sitter due to dementia and restlessness with multiple attempts to get up despite intervention. Patient becomes agitated at times and can be verbally redirected.

## 2024-10-28 ENCOUNTER — APPOINTMENT (OUTPATIENT)
Dept: OCCUPATIONAL THERAPY | Facility: HOSPITAL | Age: 84
End: 2024-10-28
Payer: COMMERCIAL

## 2024-10-28 PROCEDURE — 250N000013 HC RX MED GY IP 250 OP 250 PS 637: Performed by: STUDENT IN AN ORGANIZED HEALTH CARE EDUCATION/TRAINING PROGRAM

## 2024-10-28 PROCEDURE — 250N000013 HC RX MED GY IP 250 OP 250 PS 637: Performed by: HOSPITALIST

## 2024-10-28 PROCEDURE — 120N000001 HC R&B MED SURG/OB

## 2024-10-28 PROCEDURE — 250N000011 HC RX IP 250 OP 636: Performed by: INTERNAL MEDICINE

## 2024-10-28 PROCEDURE — 97535 SELF CARE MNGMENT TRAINING: CPT | Mod: GO

## 2024-10-28 PROCEDURE — 250N000013 HC RX MED GY IP 250 OP 250 PS 637: Performed by: REGISTERED NURSE

## 2024-10-28 PROCEDURE — 99232 SBSQ HOSP IP/OBS MODERATE 35: CPT | Performed by: HOSPITALIST

## 2024-10-28 PROCEDURE — 250N000013 HC RX MED GY IP 250 OP 250 PS 637: Performed by: INTERNAL MEDICINE

## 2024-10-28 RX ORDER — METHOCARBAMOL 500 MG/1
250 TABLET ORAL EVERY 8 HOURS PRN
Status: DISCONTINUED | OUTPATIENT
Start: 2024-10-28 | End: 2024-11-12 | Stop reason: HOSPADM

## 2024-10-28 RX ADMIN — ROSUVASTATIN 10 MG: 10 TABLET, FILM COATED ORAL at 22:13

## 2024-10-28 RX ADMIN — ACETAMINOPHEN 650 MG: 325 TABLET ORAL at 17:12

## 2024-10-28 RX ADMIN — LOSARTAN POTASSIUM 50 MG: 50 TABLET, FILM COATED ORAL at 09:04

## 2024-10-28 RX ADMIN — PANTOPRAZOLE SODIUM 40 MG: 40 TABLET, DELAYED RELEASE ORAL at 09:00

## 2024-10-28 RX ADMIN — ACETAMINOPHEN 650 MG: 325 TABLET ORAL at 14:56

## 2024-10-28 RX ADMIN — POLYETHYLENE GLYCOL 3350 17 G: 17 POWDER, FOR SOLUTION ORAL at 09:04

## 2024-10-28 RX ADMIN — METHOCARBAMOL 250 MG: 500 TABLET ORAL at 09:05

## 2024-10-28 RX ADMIN — LATANOPROST 1 DROP: 50 SOLUTION/ DROPS OPHTHALMIC at 22:15

## 2024-10-28 RX ADMIN — METOPROLOL SUCCINATE 50 MG: 50 TABLET, EXTENDED RELEASE ORAL at 09:03

## 2024-10-28 RX ADMIN — LEVOTHYROXINE SODIUM 88 MCG: 88 TABLET ORAL at 08:59

## 2024-10-28 RX ADMIN — TRAZODONE HYDROCHLORIDE 25 MG: 50 TABLET ORAL at 22:14

## 2024-10-28 RX ADMIN — ENOXAPARIN SODIUM 40 MG: 40 INJECTION SUBCUTANEOUS at 17:06

## 2024-10-28 RX ADMIN — ACETAMINOPHEN 650 MG: 325 TABLET ORAL at 22:13

## 2024-10-28 RX ADMIN — BUSPIRONE HYDROCHLORIDE 5 MG: 5 TABLET ORAL at 22:14

## 2024-10-28 RX ADMIN — SENNOSIDES AND DOCUSATE SODIUM 1 TABLET: 8.6; 5 TABLET ORAL at 22:13

## 2024-10-28 RX ADMIN — Medication 3 MG: at 01:09

## 2024-10-28 RX ADMIN — LIDOCAINE 1 PATCH: 4 PATCH TOPICAL at 08:54

## 2024-10-28 RX ADMIN — MICONAZOLE NITRATE: 20 POWDER TOPICAL at 22:36

## 2024-10-28 RX ADMIN — SENNOSIDES AND DOCUSATE SODIUM 1 TABLET: 8.6; 5 TABLET ORAL at 09:04

## 2024-10-28 RX ADMIN — FUROSEMIDE 20 MG: 20 TABLET ORAL at 09:02

## 2024-10-28 RX ADMIN — Medication 10 MG: at 17:13

## 2024-10-28 RX ADMIN — METHOCARBAMOL TABLETS 250 MG: 500 TABLET, COATED ORAL at 23:46

## 2024-10-28 RX ADMIN — MICONAZOLE NITRATE: 20 POWDER TOPICAL at 08:56

## 2024-10-28 RX ADMIN — BUSPIRONE HYDROCHLORIDE 5 MG: 5 TABLET ORAL at 09:04

## 2024-10-28 RX ADMIN — QUETIAPINE FUMARATE 12.5 MG: 25 TABLET ORAL at 14:56

## 2024-10-28 RX ADMIN — DULOXETINE HYDROCHLORIDE 60 MG: 60 CAPSULE, DELAYED RELEASE PELLETS ORAL at 09:05

## 2024-10-28 RX ADMIN — ACETAMINOPHEN 650 MG: 325 TABLET ORAL at 12:04

## 2024-10-28 RX ADMIN — ASPIRIN 81 MG CHEWABLE TABLET 81 MG: 81 TABLET CHEWABLE at 09:02

## 2024-10-28 RX ADMIN — QUETIAPINE FUMARATE 12.5 MG: 25 TABLET ORAL at 01:09

## 2024-10-28 ASSESSMENT — ACTIVITIES OF DAILY LIVING (ADL)
ADLS_ACUITY_SCORE: 0
ADLS_ACUITY_SCORE: 23.75
ADLS_ACUITY_SCORE: 23.75
ADLS_ACUITY_SCORE: 0
ADLS_ACUITY_SCORE: 0
ADLS_ACUITY_SCORE: 23.75
ADLS_ACUITY_SCORE: 23.75
ADLS_ACUITY_SCORE: 26.25
ADLS_ACUITY_SCORE: 23.75
ADLS_ACUITY_SCORE: 26.25
ADLS_ACUITY_SCORE: 23.75
ADLS_ACUITY_SCORE: 0
ADLS_ACUITY_SCORE: 23.75
ADLS_ACUITY_SCORE: 26.25
ADLS_ACUITY_SCORE: 23.75
ADLS_ACUITY_SCORE: 23.75
ADLS_ACUITY_SCORE: 0
ADLS_ACUITY_SCORE: 0
ADLS_ACUITY_SCORE: 23.75
ADLS_ACUITY_SCORE: 0
ADLS_ACUITY_SCORE: 25.75
ADLS_ACUITY_SCORE: 0
ADLS_ACUITY_SCORE: 0

## 2024-10-28 NOTE — PROGRESS NOTES
[facility] ACUTE INPATIENT PAIN SERVICE    [facility], [facility], [facility], [facility], [place]   PAIN Progress Note      Assessment/Plan:  Dominik Rojas is a 84 year old male who was admitted on 10/12/2024.  Acute Pain Management Team was asked to see the patient for postoperative pain. Admitted for recent fall with hip fracture and status post ORIF on 10/6/2024. History of COPD on O2 at baseline, systolic heart failure, HTN, hypothyroidism, anxiety. Also has a history of opioid addiction & distant history of alcohol use.    shows no results.  Patient seen at the bedside sleeping in the chair.  Has been confused and thus opioids were stopped. I asked him about pain, he reports he has some hip pain, but not groaning, he was hard to wake up.  He reports no muscle spasms, could change to prn.    Discussed plan with Dr. Isidro.        PLAN:  Acute pain secondary to ORIF on 10/6, pain should be resolved.  Given encephalopathy would not offer opioids.  Multimodal Medication Therapy:   Adjuvants: Agree with Tylenol.  Agree with lidocaine patch.  Patient is on Cymbalta, BuSpar, trazodone-change methocarbamol to prn  Opioids: none  Non-medication interventions- Ice   Constipation Prophylaxis-monitor bowel movements closely now that patient is no longer on opioids and remains on senna twice daily  Follow up /Discharge Recommendations - We recommend prescribing the following at the time of discharge: Could continue Tylenol and Lidocaine patch                 Jayda Lamb [facility]  Acute Care Pain Management  Team  Hours of pain coverage Mon-Fri 8-1600, afterhours please call the house officer   [facility] (JESUS, Noel, [place], RH)   Page via [employer]

## 2024-10-28 NOTE — PLAN OF CARE
Problem: Adult Inpatient Plan of Care  Goal: Plan of Care Review  Description: The Plan of Care Review/Shift note should be completed every shift.  The Outcome Evaluation is a brief statement about your assessment that the patient is improving, declining, or no change.  This information will be displayed automatically on your shift  note.  Outcome: Progressing   Goal Outcome Evaluation:         Pt alert to self, denies pain, anxious, PRN seroquel given, 1L NC,  1:1 remain for safety, pt fall risk and impulsive to get out of bed and chair, slept between cares

## 2024-10-28 NOTE — PROGRESS NOTES
SPIRITUAL HEALTH SERVICES Progress Note    Saw pt Dominik Rojas and offered Zoroastrian sacrament of anointing for the healing of the sick.     Fr. Wilder Aguilar

## 2024-10-28 NOTE — PROGRESS NOTES
[facility]    Medicine Progress Note - Hospitalist Service    Date of Admission:  10/12/2024    Assessment & Plan                Dominik Rojas is a 84 year old male with history of COPD on 2 to 3 L nasal cannula at home, chronic systolic heart failure, hypertension, hypothyroidism, anxiety, recent fall and left hip fracture s/p ORIF who was jsut discharged to TCU.  Patient was brought to ED for evaluation another fall at TCU with left hip pain. Xray showing postop changes without new acute changes.  Here patient has had significant behavioral issues requiring 1:1 sitter. Hospital Day: 17         Mechanical fall  Left hip contusion without new fracture  Recent left hip fracture s/p ORIF  -Had hip fracture surgery on 10/6. Was at TCU, and had a mechanical fall, with worse left hip pain after.  -CT Left femur no evidence of new fracture, small lateral left hip subcutaneous hematoma  -Negative for DVT on US  -pain attributed to soft tissue contusion in the setting of recent ORIF  -Seen by Ortho, appreciate recs, follow-up outpatient  -Seen by Pain team, appreciate assistance  -on Tylenol scheduled  -has been very drowsy on opioids, opioids on hold for now  -low dose robaxin changed to PRN  -continue topical lidocaine     Anxiety and depression;  Dementia with behavioral change/agitation   Mood disorder  Acute toxic/metabolic encephalopathy  -Continue home BuSpar, Cymbalta, Zyprexa  -was improving and off 1:1, got agitated overnight and now again on 1:1 for the past several days  -Seen by Psych, trazodone 25 mg to help with sleep at bedtime  -Seroquel as needed for agitation  -Delirium precautions  -no UTI  -Voiding adequately per nursing report  -Last BM on 10/25, continue bowel regimen  -off opioids, see changes above  -continue suppertime melatonin to help with day/night reversal, he has been more drowsy since I added this, unsure if related but will try decreasing the dose  -10/27 more  drowsy, irritable with staff and still trying to get up a lot.  They are trying to move him closer to the nurses station     Acute on chronic HFpEF  -Hypervolemic on exam, lasix was held on admission due to poor po intake, weight was up 10 lbs since admission  -recent Echo showing preserved EF  -CXR 10/19: small right pleural effusion, mild pulmonary vascular congestion  -Has been on IV Lasix 40mg daily, changed to p.o. Lasix 20 mg daily and increased home losartan  -Changed home metoprolol from tartrate to succinate to help reduce pill burden  -daily weight, Fluid restriction    Tachycardia, now resolved  -Cardiology was consulted, no a fib, no need for anticoagulation  -pt is not a good candidate for anticoagulation due to frequent falls  -HR now in control     Hypomagnesemia  -replaced     COPD  Chronic hypoxic respiratory failure on 2-3l NC  -PTA meds  -Not in exacerbation     Recent UTI  -PTA amoxicillin 500mg TID; finished course of abx;   -repeated UC 10/13 and again 10/23 mixed tai     History of RLL adenocarcinoma s/p radiation therapy     Normocytic anemia  - Hb baseline 10-11  - contusions and hematoma on the Left thigh  - Monitor Hb periodically     HLD  -Crestor     GERD  -PPI      Hypothyroidism  -recent TSH 6.93  -continue Synthroid   -recommend repeat TSH in 4 weeks with PCP. FT4 not useful for titrating Synthroid generally     TCU when ready          Diet: Combination Diet Regular Diet Adult  Fluid restriction 1800 ML FLUID  Snacks/Supplements Adult: Ensure Enlive; Between Meals    DVT Prophylaxis: Moderate risk.   Enoxaparin (Lovenox) SQ  Cabral Catheter: Not present  Lines: None     Cardiac Monitoring: None  Code Status: Full Code      Clinically Significant Risk Factors                   # Hypertension: Noted on problem list    # Dementia: noted on problem list        # Obesity: Estimated body mass index is 30.02 kg/m  as calculated from the following:    Height as of this encounter: 1.702 m (5'  "7\").    Weight as of this encounter: 87 kg (191 lb 11.2 oz).        # Financial/Environmental Concerns: none         Disposition Plan     Medically Ready for Discharge: Anticipated Tomorrow         Discharge barrier(s): AMS, 1:1 sitter  Care discussed with: patient, sitter      Claudia Isidro MD  Hospitalist Service  [facility]  Securely message with [employer] (more info)  Text page via [employer] Paging/Directory   ______________________________________________________________________      Physical Exam   Vital Signs: Temp: 97.7  F (36.5  C) Temp src: Axillary BP: (!) 134/91 Pulse: 74   Resp: 20 SpO2: 93 % O2 Device: Nasal cannula Oxygen Delivery: 1 LPM  Weight: 191 lbs 11.2 oz    General: in no apparent distress, non-toxic, and drowsy male sitting in bedside chair not oriented to person, place or time.   HEENT: Head normocephalic atraumatic, oral mucosa moist. Sclerae anicteric  Skin:  dry  Extremities: No peripheral edema  Psych: drowsy  Neuro: drowsy        Medical Decision Making               Data   No results found for this or any previous visit (from the past 16 hours).      Interval History     Patient currently sleeping in the chair.  Had a little bit of a restless night needing to be redirected multiple times.  He refused to sleep in the bed.  Continuing efforts to control behaviors sufficiently for discharge.    "

## 2024-10-28 NOTE — PLAN OF CARE
Problem: Fall Injury Risk  Goal: Absence of Fall and Fall-Related Injury  Outcome: Progressing   Goal Outcome Evaluation:       Patient alert and oriented to self only. Patient agitated and restless at times. Patient trialed off the 1:1 unsuccessfully for about 2 hours then was had to be back on due to constant standing. Orthopedics removed staples from incisions.

## 2024-10-28 NOTE — PROGRESS NOTES
[facility]: Orthopedic Progress Note    Staples removed from left hip incision sites. Hip incision well-approximated and well healing. No drainage from incisions, sites C/D/I. Patient tolerated staple removal well without complications.    Crystal Garnica PA-C  [facility]

## 2024-10-29 ENCOUNTER — APPOINTMENT (OUTPATIENT)
Dept: PHYSICAL THERAPY | Facility: HOSPITAL | Age: 84
End: 2024-10-29
Payer: COMMERCIAL

## 2024-10-29 ENCOUNTER — APPOINTMENT (OUTPATIENT)
Dept: OCCUPATIONAL THERAPY | Facility: HOSPITAL | Age: 84
End: 2024-10-29
Payer: COMMERCIAL

## 2024-10-29 LAB
CREAT SERPL-MCNC: 0.88 MG/DL (ref 0.67–1.17)
EGFRCR SERPLBLD CKD-EPI 2021: 85 ML/MIN/1.73M2
HOLD SPECIMEN: NORMAL

## 2024-10-29 PROCEDURE — 99232 SBSQ HOSP IP/OBS MODERATE 35: CPT | Performed by: HOSPITALIST

## 2024-10-29 PROCEDURE — 82565 ASSAY OF CREATININE: CPT | Performed by: HOSPITALIST

## 2024-10-29 PROCEDURE — 97110 THERAPEUTIC EXERCISES: CPT | Mod: GP

## 2024-10-29 PROCEDURE — 250N000013 HC RX MED GY IP 250 OP 250 PS 637: Performed by: HOSPITALIST

## 2024-10-29 PROCEDURE — 97535 SELF CARE MNGMENT TRAINING: CPT | Mod: GO

## 2024-10-29 PROCEDURE — 250N000013 HC RX MED GY IP 250 OP 250 PS 637: Performed by: REGISTERED NURSE

## 2024-10-29 PROCEDURE — 97530 THERAPEUTIC ACTIVITIES: CPT | Mod: GP

## 2024-10-29 PROCEDURE — 250N000011 HC RX IP 250 OP 636: Performed by: INTERNAL MEDICINE

## 2024-10-29 PROCEDURE — 250N000013 HC RX MED GY IP 250 OP 250 PS 637: Performed by: INTERNAL MEDICINE

## 2024-10-29 PROCEDURE — 97116 GAIT TRAINING THERAPY: CPT | Mod: GP

## 2024-10-29 PROCEDURE — 120N000001 HC R&B MED SURG/OB

## 2024-10-29 PROCEDURE — 250N000013 HC RX MED GY IP 250 OP 250 PS 637: Performed by: STUDENT IN AN ORGANIZED HEALTH CARE EDUCATION/TRAINING PROGRAM

## 2024-10-29 PROCEDURE — 36415 COLL VENOUS BLD VENIPUNCTURE: CPT | Performed by: HOSPITALIST

## 2024-10-29 RX ORDER — FUROSEMIDE 20 MG/1
20 TABLET ORAL
Status: DISCONTINUED | OUTPATIENT
Start: 2024-10-29 | End: 2024-11-01

## 2024-10-29 RX ADMIN — SENNOSIDES AND DOCUSATE SODIUM 1 TABLET: 8.6; 5 TABLET ORAL at 21:22

## 2024-10-29 RX ADMIN — ENOXAPARIN SODIUM 40 MG: 40 INJECTION SUBCUTANEOUS at 17:37

## 2024-10-29 RX ADMIN — QUETIAPINE FUMARATE 12.5 MG: 25 TABLET ORAL at 14:17

## 2024-10-29 RX ADMIN — Medication 3 MG: at 01:39

## 2024-10-29 RX ADMIN — TRAZODONE HYDROCHLORIDE 25 MG: 50 TABLET ORAL at 21:22

## 2024-10-29 RX ADMIN — LATANOPROST 1 DROP: 50 SOLUTION/ DROPS OPHTHALMIC at 21:23

## 2024-10-29 RX ADMIN — BUSPIRONE HYDROCHLORIDE 5 MG: 5 TABLET ORAL at 07:49

## 2024-10-29 RX ADMIN — BUSPIRONE HYDROCHLORIDE 5 MG: 5 TABLET ORAL at 21:23

## 2024-10-29 RX ADMIN — METHOCARBAMOL TABLETS 250 MG: 500 TABLET, COATED ORAL at 07:50

## 2024-10-29 RX ADMIN — FUROSEMIDE 20 MG: 20 TABLET ORAL at 07:49

## 2024-10-29 RX ADMIN — Medication 5 MG: at 17:36

## 2024-10-29 RX ADMIN — PANTOPRAZOLE SODIUM 40 MG: 40 TABLET, DELAYED RELEASE ORAL at 06:00

## 2024-10-29 RX ADMIN — ACETAMINOPHEN 650 MG: 325 TABLET ORAL at 21:23

## 2024-10-29 RX ADMIN — FUROSEMIDE 20 MG: 20 TABLET ORAL at 17:36

## 2024-10-29 RX ADMIN — ACETAMINOPHEN 650 MG: 325 TABLET ORAL at 10:17

## 2024-10-29 RX ADMIN — ACETAMINOPHEN 650 MG: 325 TABLET ORAL at 06:00

## 2024-10-29 RX ADMIN — METOPROLOL SUCCINATE 50 MG: 50 TABLET, EXTENDED RELEASE ORAL at 07:49

## 2024-10-29 RX ADMIN — ACETAMINOPHEN 650 MG: 325 TABLET ORAL at 13:41

## 2024-10-29 RX ADMIN — LOSARTAN POTASSIUM 50 MG: 50 TABLET, FILM COATED ORAL at 07:50

## 2024-10-29 RX ADMIN — LEVOTHYROXINE SODIUM 88 MCG: 88 TABLET ORAL at 06:00

## 2024-10-29 RX ADMIN — LIDOCAINE 1 PATCH: 4 PATCH TOPICAL at 07:46

## 2024-10-29 RX ADMIN — MICONAZOLE NITRATE: 20 POWDER TOPICAL at 07:48

## 2024-10-29 RX ADMIN — ASPIRIN 81 MG CHEWABLE TABLET 81 MG: 81 TABLET CHEWABLE at 07:49

## 2024-10-29 RX ADMIN — ROSUVASTATIN 10 MG: 10 TABLET, FILM COATED ORAL at 21:22

## 2024-10-29 RX ADMIN — MICONAZOLE NITRATE: 20 POWDER TOPICAL at 21:30

## 2024-10-29 RX ADMIN — DULOXETINE HYDROCHLORIDE 60 MG: 60 CAPSULE, DELAYED RELEASE PELLETS ORAL at 07:50

## 2024-10-29 RX ADMIN — ACETAMINOPHEN 650 MG: 325 TABLET ORAL at 17:36

## 2024-10-29 RX ADMIN — QUETIAPINE FUMARATE 12.5 MG: 25 TABLET ORAL at 01:39

## 2024-10-29 ASSESSMENT — ACTIVITIES OF DAILY LIVING (ADL)
ADLS_ACUITY_SCORE: 24.25
ADLS_ACUITY_SCORE: 24.25
ADLS_ACUITY_SCORE: 28.25
ADLS_ACUITY_SCORE: 26.25
ADLS_ACUITY_SCORE: 24.25
ADLS_ACUITY_SCORE: 24.25
ADLS_ACUITY_SCORE: 26.25
ADLS_ACUITY_SCORE: 26.25
ADLS_ACUITY_SCORE: 24.25
ADLS_ACUITY_SCORE: 26.25
ADLS_ACUITY_SCORE: 28.25
ADLS_ACUITY_SCORE: 26.25
ADLS_ACUITY_SCORE: 28.25
ADLS_ACUITY_SCORE: 24.25
ADLS_ACUITY_SCORE: 28.25
ADLS_ACUITY_SCORE: 28.25
ADLS_ACUITY_SCORE: 24.25
ADLS_ACUITY_SCORE: 24.25

## 2024-10-29 NOTE — PLAN OF CARE
Goal Outcome Evaluation:  Patient alert and oriented to self only. Continues to be on 1:1 monitoring due to frequent standing and continued confusion. Patient tolerates regular diet. On 1800cc fluid restriction. Up to the bathroom, assist of one with walker. Pain reported in left hip, controlled with scheduled tylenol and lidocaine patch (removed this shift).     Problem: Pain Acute  Goal: Optimal Pain Control and Function  Outcome: Progressing     Problem: Fall Injury Risk  Goal: Absence of Fall and Fall-Related Injury  Outcome: Progressing     Problem: Comorbidity Management  Goal: Blood Pressure in Desired Range  Outcome: Progressing

## 2024-10-29 NOTE — PROGRESS NOTES
[facility]    Medicine Progress Note - Hospitalist Service    Date of Admission:  10/12/2024    Assessment & Plan                Dominik Rojas is a 84 year old male with history of COPD on 2 to 3 L nasal cannula at home, chronic systolic heart failure, hypertension, hypothyroidism, anxiety, recent fall and left hip fracture s/p ORIF who was jsut discharged to TCU.  Patient was brought to ED for evaluation another fall at TCU with left hip pain. Xray showing postop changes without new acute changes.  Here patient has had significant behavioral issues requiring 1:1 sitter. Hospital Day: 18         Mechanical fall  Left hip contusion without new fracture  Recent left hip fracture s/p ORIF  -Had hip fracture surgery on 10/6. Was at TCU, and had a mechanical fall, with worse left hip pain after. Presented for eval.  -CT Left femur no evidence of new fracture, small lateral left hip subcutaneous hematoma  -Negative for DVT on US  -pain attributed to soft tissue contusion in the setting of recent ORIF  -Seen by Ortho, domitila recs, follow-up outpatient  -Seen by Pain team, appreciate assistance  -on Tylenol scheduled  -had been very drowsy on opioids, opioids on hold for now  -low dose robaxin changed to PRN  -continue topical lidocaine  -pain seems controlled currently     Anxiety and depression;  Dementia with behavioral change/agitation   Mood disorder  Acute toxic metabolic encephalopathy  -Continue home BuSpar, Cymbalta, Zyprexa  -was improving and off 1:1, got agitated overnight and now again on 1:1 for the past several days. Unable to get him off this- he constantly tries to get up and walk  -Seen by Psych, trazodone 25 mg to help with sleep at bedtime  -Seroquel as needed for agitation  -Delirium precautions  -no UTI  -Voiding adequately per nursing report  -Last BM on 10/29, continue bowel regimen  -off opioids, see changes above  -continue suppertime melatonin to help with day/night  reversal  -likely needs memory care at this point- family ok with this, they have been struggling to care for him at home     Acute on chronic HFpEF  -Hypervolemic on exam, lasix was held on admission due to poor po intake, weight went up 10 lbs. Then diuresis resumed, today will increase lasix to BID given slowly worsening edema  -recent Echo showing preserved EF  -increased home losartan  -Changed home metoprolol from tartrate to succinate to help reduce pill burden  -daily weight, Fluid restriction  -asked lymphedema therapist to see, not sure if he will tolerate compression    Tachycardia, now resolved  -Cardiology was consulted, no a fib, no need for anticoagulation  -pt is not a good candidate for anticoagulation due to frequent falls  -HR now in control     COPD  Chronic hypoxic respiratory failure on 2-3l NC  -PTA meds  -Not in exacerbation. On home flow O2     Recent UTI  -PTA amoxicillin 500mg TID; finished course of abx;   -repeated UC 10/13 and again 10/23 mixed tai     History of RLL adenocarcinoma s/p radiation therapy, per wife he was to get EBUS and chemo but she did not think he could tolerate those     Normocytic anemia  - Hb baseline 10-11  - contusions and hematoma on the Left thigh  - Monitor Hb periodically     HLD  -Crestor     GERD  -PPI      Hypothyroidism  -recent TSH 6.93  -continue Synthroid   -recommend repeat TSH in 4 weeks with PCP. FT4 not useful for titrating Synthroid generally     Care mgmt to look into Memory Care placement at this point          Diet: Combination Diet Regular Diet Adult  Fluid restriction 1800 ML FLUID  Snacks/Supplements Adult: Ensure Enlive; Between Meals    DVT Prophylaxis: Moderate risk.   Enoxaparin (Lovenox) SQ  Cabral Catheter: Not present  Lines: None     Cardiac Monitoring: None  Code Status: No CPR- Do NOT Intubate. Discussed and confirmed with wife Jovana    Clinically Significant Risk Factors                   # Hypertension: Noted on problem list   "  # Dementia: noted on problem list        # Obesity: Estimated body mass index is 30.02 kg/m  as calculated from the following:    Height as of this encounter: 1.702 m (5' 7\").    Weight as of this encounter: 87 kg (191 lb 11.2 oz).        # Financial/Environmental Concerns: none         Disposition Plan     Medically Ready for Discharge: Ready Now         Discharge barrier(s): placement  Care discussed with: patient, sitter, wife, care mgmt      Claudia Isidro MD  Hospitalist Service  [facility]  Securely message with [employer] (more info)  Text page via [facility] Paging/Directory   ______________________________________________________________________      Physical Exam   Vital Signs: Temp: 98.2  F (36.8  C) Temp src: Oral BP: (!) 147/79 Pulse: 62   Resp: 18 SpO2: 95 % O2 Device: Nasal cannula Oxygen Delivery: 3 LPM  Weight: 191 lbs 11.2 oz    General: in no apparent distress, non-toxic, and drowsy male sitting in bedside chair not oriented to person, place or time.   HEENT: Head normocephalic atraumatic, oral mucosa moist. Sclerae anicteric  Skin:  dry  Extremities: No peripheral edema  Psych: drowsy  Neuro: drowsy        Medical Decision Making               Data   Recent Results (from the past 16 hours)   Creatinine    Collection Time: 10/29/24  5:34 AM   Result Value Ref Range    Creatinine 0.88 0.67 - 1.17 mg/dL    GFR Estimate 85 >60 mL/min/1.73m2   Extra Purple Top EDTA (LAB USE ONLY)    Collection Time: 10/29/24  5:34 AM   Result Value Ref Range    Hold Specimen JIC          Interval History     Patient is more alert today, he can ask and answer questions a little bit.  Still restless and requiring one-to-one sitter due to behaviors, keeps trying to get up and walk around.  Peripheral edema slightly worse, we will increase his Lasix and asked lymphedema therapist to evaluate him.  Patient did not seem opposed to this.  Continue physical therapy.  Maybe at some point patient will be " physically strong enough that he can be less of a fall risk when he does get up.    I called and updated wife Jovana- she is ok with Memory Care   0.11

## 2024-10-29 NOTE — PROGRESS NOTES
Acute Pain Management Team Note:  Pain Team will sign off at this time, discussed with Dr. Isidro.    Jayda Lamb, [facility]  Acute Pain Management Team

## 2024-10-29 NOTE — PLAN OF CARE
Problem: Adult Inpatient Plan of Care  Goal: Plan of Care Review  Description: The Plan of Care Review/Shift note should be completed every shift.  The Outcome Evaluation is a brief statement about your assessment that the patient is improving, declining, or no change.  This information will be displayed automatically on your shift  note.  Outcome: Progressing  Goal: Absence of Hospital-Acquired Illness or Injury  Intervention: Identify and Manage Fall Risk  Recent Flowsheet Documentation  Taken 10/29/2024 0000 by Long Arechiga RN  Safety Promotion/Fall Prevention:   clutter free environment maintained   safety round/check completed   room near nurse's station   room door open  Intervention: Prevent and Manage VTE (Venous Thromboembolism) Risk  Recent Flowsheet Documentation  Taken 10/29/2024 0000 by Long Arechiga RN  VTE Prevention/Management: SCDs off (sequential compression devices)  Goal: Optimal Comfort and Wellbeing  Intervention: Monitor Pain and Promote Comfort  Recent Flowsheet Documentation  Taken 10/28/2024 2346 by Long Arechiga RN  Pain Management Interventions:   medication (see MAR)   emotional support     Problem: Risk for Delirium  Goal: Improved Sleep  Outcome: Not Progressing     Problem: Pain Acute  Goal: Optimal Pain Control and Function  Outcome: Progressing  Intervention: Develop Pain Management Plan  Recent Flowsheet Documentation  Taken 10/28/2024 2346 by Long Arechiga RN  Pain Management Interventions:   medication (see MAR)   emotional support  Intervention: Prevent or Manage Pain  Recent Flowsheet Documentation  Taken 10/29/2024 0000 by Long Arechiga RN  Medication Review/Management: medications reviewed     Problem: Fall Injury Risk  Goal: Absence of Fall and Fall-Related Injury  Outcome: Progressing  Intervention: Identify and Manage Contributors  Recent Flowsheet Documentation  Taken 10/29/2024 0000 by Long Arechiga  RN  Medication Review/Management: medications reviewed  Intervention: Promote Injury-Free Environment  Recent Flowsheet Documentation  Taken 10/29/2024 0000 by Long Arechiga RN  Safety Promotion/Fall Prevention:   clutter free environment maintained   safety round/check completed   room near nurse's station   room door open     Problem: Comorbidity Management  Goal: Maintenance of Behavioral Health Symptom Control  Outcome: Progressing  Intervention: Maintain Behavioral Health Symptom Control  Recent Flowsheet Documentation  Taken 10/29/2024 0000 by Long Arechiga RN  Medication Review/Management: medications reviewed  Goal: Maintenance of COPD Symptom Control  Outcome: Progressing  Intervention: Maintain COPD Symptom Control  Recent Flowsheet Documentation  Taken 10/29/2024 0000 by Long Arechiga RN  Medication Review/Management: medications reviewed  Goal: Maintenance of Heart Failure Symptom Control  Outcome: Progressing  Intervention: Maintain Heart Failure Management  Recent Flowsheet Documentation  Taken 10/29/2024 0000 by Long Arechiga RN  Medication Review/Management: medications reviewed  Goal: Blood Pressure in Desired Range  Outcome: Progressing  Intervention: Maintain Blood Pressure Management  Recent Flowsheet Documentation  Taken 10/29/2024 0000 by Long Arechiga RN  Medication Review/Management: medications reviewed   Goal Outcome Evaluation:       Pt alert & oriented to self. Reorientation provided. Continues to be 1:1. Sitting up in chair. Pt was offered to lay on bed but declined. Prefers to stay in chair. Prn robaxin given for pain and cold packs. Scheduled tylenol given. 1800ml fluid restriction. Oxygen at 2-3LPM via NC. Vitals Signs WNL. Will continue to monitor.      Prn seroquel and melatonin given to help pt sleep.     Up to toilet. Ambulated using walker with gaitbelt. Incontinence cares done.

## 2024-10-29 NOTE — PLAN OF CARE
Problem: Pain Acute  Goal: Optimal Pain Control and Function  Outcome: Progressing  Intervention: Develop Pain Management Plan  Recent Flowsheet Documentation  Taken 10/29/2024 1017 by Anel Zuniga RN  Pain Management Interventions: medication (see MAR)  Intervention: Prevent or Manage Pain  Recent Flowsheet Documentation  Taken 10/29/2024 0736 by Anel Zuniga RN  Bowel Elimination Promotion: adequate fluid intake promoted  Medication Review/Management: medications reviewed  Intervention: Optimize Psychosocial Wellbeing  Recent Flowsheet Documentation  Taken 10/29/2024 0736 by Anel Zuniga RN  Supportive Measures: active listening utilized     Problem: Fall Injury Risk  Goal: Absence of Fall and Fall-Related Injury  Outcome: Progressing  Intervention: Identify and Manage Contributors  Recent Flowsheet Documentation  Taken 10/29/2024 0736 by Anel Zuniga RN  Medication Review/Management: medications reviewed  Intervention: Promote Injury-Free Environment  Recent Flowsheet Documentation  Taken 10/29/2024 0736 by Anel Zuniga RN  Safety Promotion/Fall Prevention:   clutter free environment maintained   safety round/check completed   room near nurse's station   room door open     Problem: Pain Acute  Goal: Optimal Pain Control and Function  Outcome: Progressing  Intervention: Develop Pain Management Plan  Recent Flowsheet Documentation  Taken 10/29/2024 1017 by Anel Zuniga RN  Pain Management Interventions: medication (see MAR)  Intervention: Prevent or Manage Pain  Recent Flowsheet Documentation  Taken 10/29/2024 0736 by Anel Zuniga RN  Bowel Elimination Promotion: adequate fluid intake promoted  Medication Review/Management: medications reviewed  Intervention: Optimize Psychosocial Wellbeing  Recent Flowsheet Documentation  Taken 10/29/2024 0736 by Anel Zuniga RN  Supportive Measures: active listening utilized     Problem: Fall Injury Risk  Goal: Absence of Fall and Fall-Related  Injury  Outcome: Progressing  Intervention: Identify and Manage Contributors  Recent Flowsheet Documentation  Taken 10/29/2024 0736 by Anel Zuniga, RN  Medication Review/Management: medications reviewed  Intervention: Promote Injury-Free Environment  Recent Flowsheet Documentation  Taken 10/29/2024 0736 by Anel Zuniga, RN  Safety Promotion/Fall Prevention:   clutter free environment maintained   safety round/check completed   room near nurse's station   room door open   Goal Outcome Evaluation:  Pt alert and oriented x 2-3 forgetful to situation and time. Not using call light, with limited mobility remained 1:1 sitter for safety. Up with assist of one and a walker. 3+ lower extremity edema, encouraged but refuse to lay in bed or reclining leg. Lymphedema nurse consulted and Lasix increase to 20 mg BID. PT/ OT following.

## 2024-10-29 NOTE — PROGRESS NOTES
Care Management Follow Up    Length of Stay (days): 17    Expected Discharge Date: 11/01/2024    Anticipated Discharge Plan:  Transitional Care    Transportation: TBD    PT Recommendations: Transitional Care Facility  OT Recommendations:  Transitional Care Facility     Barriers to Discharge: medical stability, placement, on 1:1    Prior Living Situation: house with spouse    Discussed  Partnership in Safe Discharge Planning  document with patient/family: No     Handoff Completed: No, handoff not indicated or clinically appropriate    Patient/Spokesperson Updated: No    Additional Information:  Chart reviewed. Patient put back on 1:1. Will need to re-send TCU referrals once off . May need to consider alternate plans    3:37 PM  Spoke to wife Jovana via telephone. She understands that patient likely needs memory care and is not TCU appropriate. She says they have a meeting with an elder law  on 11/19 and could not get an appointment any sooner. Says they do not have the funds to pay for memory care. Requests to talk more tomorrow as she is about to leave for work     Next Steps: continue to follow     Alia Barry RN

## 2024-10-30 ENCOUNTER — APPOINTMENT (OUTPATIENT)
Dept: PHYSICAL THERAPY | Facility: HOSPITAL | Age: 84
End: 2024-10-30
Payer: COMMERCIAL

## 2024-10-30 ENCOUNTER — APPOINTMENT (OUTPATIENT)
Dept: OCCUPATIONAL THERAPY | Facility: HOSPITAL | Age: 84
End: 2024-10-30
Attending: HOSPITALIST
Payer: COMMERCIAL

## 2024-10-30 PROCEDURE — 250N000013 HC RX MED GY IP 250 OP 250 PS 637: Performed by: STUDENT IN AN ORGANIZED HEALTH CARE EDUCATION/TRAINING PROGRAM

## 2024-10-30 PROCEDURE — 250N000013 HC RX MED GY IP 250 OP 250 PS 637: Performed by: HOSPITALIST

## 2024-10-30 PROCEDURE — 97535 SELF CARE MNGMENT TRAINING: CPT | Mod: GO

## 2024-10-30 PROCEDURE — 99232 SBSQ HOSP IP/OBS MODERATE 35: CPT | Performed by: INTERNAL MEDICINE

## 2024-10-30 PROCEDURE — 250N000011 HC RX IP 250 OP 636: Performed by: INTERNAL MEDICINE

## 2024-10-30 PROCEDURE — 250N000013 HC RX MED GY IP 250 OP 250 PS 637: Performed by: INTERNAL MEDICINE

## 2024-10-30 PROCEDURE — 250N000013 HC RX MED GY IP 250 OP 250 PS 637: Performed by: REGISTERED NURSE

## 2024-10-30 PROCEDURE — 97110 THERAPEUTIC EXERCISES: CPT | Mod: GP

## 2024-10-30 PROCEDURE — 120N000001 HC R&B MED SURG/OB

## 2024-10-30 PROCEDURE — 97116 GAIT TRAINING THERAPY: CPT | Mod: GP

## 2024-10-30 RX ADMIN — TRAZODONE HYDROCHLORIDE 25 MG: 50 TABLET ORAL at 20:56

## 2024-10-30 RX ADMIN — MICONAZOLE NITRATE: 20 POWDER TOPICAL at 21:17

## 2024-10-30 RX ADMIN — MICONAZOLE NITRATE: 20 POWDER TOPICAL at 12:13

## 2024-10-30 RX ADMIN — BUSPIRONE HYDROCHLORIDE 5 MG: 5 TABLET ORAL at 10:20

## 2024-10-30 RX ADMIN — SENNOSIDES AND DOCUSATE SODIUM 1 TABLET: 8.6; 5 TABLET ORAL at 20:57

## 2024-10-30 RX ADMIN — LOSARTAN POTASSIUM 50 MG: 50 TABLET, FILM COATED ORAL at 10:10

## 2024-10-30 RX ADMIN — SENNOSIDES AND DOCUSATE SODIUM 1 TABLET: 8.6; 5 TABLET ORAL at 10:18

## 2024-10-30 RX ADMIN — FUROSEMIDE 20 MG: 20 TABLET ORAL at 10:10

## 2024-10-30 RX ADMIN — LATANOPROST 1 DROP: 50 SOLUTION/ DROPS OPHTHALMIC at 21:15

## 2024-10-30 RX ADMIN — BUSPIRONE HYDROCHLORIDE 5 MG: 5 TABLET ORAL at 20:57

## 2024-10-30 RX ADMIN — ACETAMINOPHEN 650 MG: 325 TABLET ORAL at 06:23

## 2024-10-30 RX ADMIN — ACETAMINOPHEN 650 MG: 325 TABLET ORAL at 14:41

## 2024-10-30 RX ADMIN — ACETAMINOPHEN 650 MG: 325 TABLET ORAL at 12:12

## 2024-10-30 RX ADMIN — DULOXETINE HYDROCHLORIDE 60 MG: 60 CAPSULE, DELAYED RELEASE PELLETS ORAL at 10:16

## 2024-10-30 RX ADMIN — LEVOTHYROXINE SODIUM 88 MCG: 88 TABLET ORAL at 06:34

## 2024-10-30 RX ADMIN — ENOXAPARIN SODIUM 40 MG: 40 INJECTION SUBCUTANEOUS at 21:12

## 2024-10-30 RX ADMIN — POLYETHYLENE GLYCOL 3350 17 G: 17 POWDER, FOR SOLUTION ORAL at 10:21

## 2024-10-30 RX ADMIN — PANTOPRAZOLE SODIUM 40 MG: 40 TABLET, DELAYED RELEASE ORAL at 06:34

## 2024-10-30 RX ADMIN — FUROSEMIDE 20 MG: 20 TABLET ORAL at 21:14

## 2024-10-30 RX ADMIN — ASPIRIN 81 MG CHEWABLE TABLET 81 MG: 81 TABLET CHEWABLE at 10:11

## 2024-10-30 RX ADMIN — LIDOCAINE 1 PATCH: 4 PATCH TOPICAL at 12:15

## 2024-10-30 RX ADMIN — ACETAMINOPHEN 650 MG: 325 TABLET ORAL at 20:56

## 2024-10-30 RX ADMIN — METOPROLOL SUCCINATE 50 MG: 50 TABLET, EXTENDED RELEASE ORAL at 10:10

## 2024-10-30 RX ADMIN — ROSUVASTATIN 10 MG: 10 TABLET, FILM COATED ORAL at 20:56

## 2024-10-30 ASSESSMENT — ACTIVITIES OF DAILY LIVING (ADL)
ADLS_ACUITY_SCORE: 27.25
ADLS_ACUITY_SCORE: 24.75
ADLS_ACUITY_SCORE: 27.25
ADLS_ACUITY_SCORE: 24.75
ADLS_ACUITY_SCORE: 27.25
ADLS_ACUITY_SCORE: 24.75
ADLS_ACUITY_SCORE: 27.25
ADLS_ACUITY_SCORE: 24.75
ADLS_ACUITY_SCORE: 26.75
ADLS_ACUITY_SCORE: 24.75
ADLS_ACUITY_SCORE: 27.25
ADLS_ACUITY_SCORE: 24.75
ADLS_ACUITY_SCORE: 24.75
ADLS_ACUITY_SCORE: 27.25
ADLS_ACUITY_SCORE: 24.75
ADLS_ACUITY_SCORE: 27.25
ADLS_ACUITY_SCORE: 24.75
ADLS_ACUITY_SCORE: 27.25
ADLS_ACUITY_SCORE: 24.75
ADLS_ACUITY_SCORE: 26.75
ADLS_ACUITY_SCORE: 26.75

## 2024-10-30 NOTE — PROGRESS NOTES
SPIRITUAL HEALTH SERVICES ([facility]S)  SPIRITUAL ASSESSMENT Progress Note  [facility]. Unit P4    REFERRAL SOURCE: Followup visit      Mr Crytsal continues on a 1:1 and has difficulty staying awake. He was dozing at the time my visit but didn't wake up sufficiently to have a [facility] visit.      PLAN: [facility]S remain available for emotional and spiritual support.      Sharona Womack, Ph.D., [facility]      [facility]S available 24/7 for emergency requests/referrals, either by having the on-call  paged or by entering an ASAP/STAT consult in Epic (this will also page the on-call ).

## 2024-10-30 NOTE — PLAN OF CARE
Problem: Risk for Delirium  Goal: Optimal Coping  Intervention: Optimize Psychosocial Adjustment to Delirium  Recent Flowsheet Documentation  Taken 10/29/2024 2320 by Lisandro Ortiz RN  Supportive Measures: active listening utilized  Goal: Improved Behavioral Control  Intervention: Prevent and Manage Agitation  Recent Flowsheet Documentation  Taken 10/29/2024 2320 by Lisandro Ortiz RN  Environment Familiarity/Consistency: daily routine followed  Intervention: Minimize Safety Risk  Recent Flowsheet Documentation  Taken 10/29/2024 2320 by Lisandro Ortiz RN  Enhanced Safety Measures:  at bedside  Goal: Improved Attention and Thought Clarity  Outcome: Not Progressing  Intervention: Maximize Cognitive Function  Recent Flowsheet Documentation  Taken 10/29/2024 2320 by Lisandro Ortiz RN  Sensory Stimulation Regulation:   lighting decreased   television on  Reorientation Measures: reorientation provided  Goal: Improved Sleep  Outcome: Progressing     Problem: Risk for Delirium  Goal: Improved Attention and Thought Clarity  Outcome: Not Progressing  Intervention: Maximize Cognitive Function  Recent Flowsheet Documentation  Taken 10/29/2024 2320 by Lisandro Ortiz RN  Sensory Stimulation Regulation:   lighting decreased   television on  Reorientation Measures: reorientation provided   Goal Outcome Evaluation:       Patient only oriented to self this shift. Patient slept about 4 hours this shift. Patient refused to sleep in bed and slept in reclining chair. Patient continent and incontinent of urine. When awake patient constantly tried to get up from chair.

## 2024-10-30 NOTE — PROGRESS NOTES
Care Management Follow Up    Length of Stay (days): 18    Expected Discharge Date: 11/01/2024    Anticipated Discharge Plan:  Transitional Care    Transportation: TBD    PT Recommendations: Transitional Care Facility  OT Recommendations:  Transitional Care Facility     Barriers to Discharge: on 1:1    Prior Living Situation: house with spouse    Discussed  Partnership in Safe Discharge Planning  document with patient/family: No     Handoff Completed: No, handoff not indicated or clinically appropriate    Patient/Spokesperson Updated: No    Additional Information:  Case escalated to manager and supervisor. Recommendation was to send additional TCU referrals. Referrals sent via [facility]. Patient remains on 1:1    Next Steps: continue to follow     Alia Barry RN

## 2024-10-30 NOTE — PLAN OF CARE
"Goal Outcome Evaluation:      Plan of Care Reviewed With: patient    Overall Patient Progress: improvingOverall Patient Progress: improving       Problem: Adult Inpatient Plan of Care  Goal: Patient-Specific Goal (Individualized)  Description: You can add care plan individualizations to a care plan. Examples of Individualization might be:  \"Parent requests to be called daily at 9am for status\", \"I have a hard time hearing out of my right ear\", or \"Do not touch me to wake me up as it startles  me\".  Outcome: Progressing   Safety this shift is the goal. He continues to be a 1:1 due to impulsivity. He has been quiet today, directable and cooperative.     Problem: Risk for Delirium  Goal: Improved Attention and Thought Clarity  Outcome: Progressing   He has not answered any orientation questions, when questions are asked, he just shook his head \"no\" or \"yes\" to answer. He did talk to this writer a few times and was able to make his needs known.     Problem: Pain Acute  Goal: Optimal Pain Control and Function  Outcome: Progressing  Intervention: Develop Pain Management Plan  Recent Flowsheet Documentation  Taken 10/30/2024 1221 by Lisa Cagle, RN  Pain Management Interventions: medication (see MAR)   Points to his left thigh when he is asked if he has pain. Tylenol taken without difficulty, lidoderm patch applied per order to the left hip.     Problem: Fall Injury Risk  Goal: Absence of Fall and Fall-Related Injury  Outcome: Progressing   Is stand by assist of 1 using walker and gait belt for transfers. Has been in the recliner all day, did walk with therapy, walked to the restroom. Able to take short naps in between cares.     Problem: Comorbidity Management  Goal: Maintenance of COPD Symptom Control  Outcome: Progressing   Continues to be on oxygen, will monitor SAT's.         "

## 2024-10-30 NOTE — PROGRESS NOTES
[facility]    Medicine Progress Note - Hospitalist Service    Date of Admission:  10/12/2024    Assessment & Plan          Dominik Rojas is a 84 year old male with history of COPD on 2 to 3 L nasal cannula at home, chronic systolic heart failure, hypertension, hypothyroidism, anxiety, recent fall and left hip fracture s/p ORIF who was jsut discharged to TCU.  Patient was brought to ED for evaluation another fall at TCU with left hip pain. Xray showing postop changes without new acute changes.  Here patient has had significant behavioral issues requiring 1:1 sitter. Hospital Day: 18         Mechanical fall  Left hip contusion without new fracture  Recent left hip fracture s/p ORIF  -Had hip fracture surgery on 10/6. Was at TCU, and had a mechanical fall, with worse left hip pain after. Presented for eval.  -CT Left femur no evidence of new fracture, small lateral left hip subcutaneous hematoma  -Negative for DVT on US  -pain attributed to soft tissue contusion in the setting of recent ORIF  -Seen by Ortho, domitila recs, follow-up outpatient  -Seen by Pain team, appreciate assistance  -on Tylenol scheduled  -had been very drowsy on opioids, opioids on hold for now  -low dose robaxin changed to PRN  -continue topical lidocaine  -pain seems controlled currently     Anxiety and depression;  Dementia with behavioral change/agitation   Mood disorder  Acute toxic/metabolic encephalopathy  -Continue home BuSpar, Cymbalta, Zyprexa  -was improving and off 1:1, got agitated overnight and now again on 1:1 for the past several days. Unable to get him off this- he constantly tries to get up and walk  -Seen by Psych, trazodone 25 mg to help with sleep at bedtime  -Seroquel as needed for agitation  -Delirium precautions  -no UTI  -Voiding adequately per nursing report  -Last BM on 10/29, continue bowel regimen  -off opioids, see changes above  -continue suppertime melatonin to help with day/night  reversal  -likely needs memory care at this point- family ok with this, they have been struggling to care for him at home     Acute on chronic HFpEF  -Hypervolemic on exam, lasix was held on admission due to poor po intake, weight went up 10 lbs. Then diuresis resumed, today will increase lasix to BID given slowly worsening edema  -recent Echo showing preserved EF  -increased home losartan  -Changed home metoprolol from tartrate to succinate to help reduce pill burden  -daily weight, Fluid restriction  -asked lymphedema therapist to see, not sure if he will tolerate compression    Tachycardia, now resolved  -Cardiology was consulted, no a fib, no need for anticoagulation  -pt is not a good candidate for anticoagulation due to frequent falls  -HR now in control     COPD  Chronic hypoxic respiratory failure on 2-3l NC  -PTA meds  -Not in exacerbation. On home flow O2     Recent UTI  -PTA amoxicillin 500mg TID; finished course of abx;   -repeated UC 10/13 and again 10/23 mixed tai     History of RLL adenocarcinoma s/p radiation therapy, per wife he was to get EBUS and chemo but she did not think he could tolerate those     Normocytic anemia  - Hb baseline 10-11  - contusions and hematoma on the Left thigh  - Monitor Hb periodically     HLD  -Crestor     GERD  -PPI      Hypothyroidism  -recent TSH 6.93  -continue Synthroid   -recommend repeat TSH in 4 weeks with PCP. FT4 not useful for titrating Synthroid generally     Care mgmt to look into Memory Care placement at this point          Diet: Combination Diet Regular Diet Adult  Fluid restriction 1800 ML FLUID  Snacks/Supplements Adult: Ensure Enlive; Between Meals    DVT Prophylaxis: Enoxaparin (Lovenox) SQ  Cabral Catheter: Not present  Lines: None     Cardiac Monitoring: None  Code Status: No CPR- Do NOT Intubate      Clinically Significant Risk Factors                   # Hypertension: Noted on problem list    # Dementia: noted on problem list        # Obesity:  "Estimated body mass index is 30.02 kg/m  as calculated from the following:    Height as of this encounter: 1.702 m (5' 7\").    Weight as of this encounter: 87 kg (191 lb 11.2 oz).      # Financial/Environmental Concerns: none         Disposition Plan     Medically Ready for Discharge: Anticipated in 2-4 Days             Akanksha Crocker MD  Hospitalist Service  [facility]  Securely message with [employer] (more info)  Text page via [employer] Paging/Directory   ______________________________________________________________________    Interval History   Patient did not sleep well last night. He was agitated this morning so that he needs the bedside sitter.    Physical Exam   Vital Signs: Temp: 98  F (36.7  C) Temp src: Oral BP: 136/76 Pulse: 75   Resp: 16 SpO2: (!) 2 % O2 Device: Nasal cannula Oxygen Delivery: 2 LPM  Weight: 191 lbs 11.2 oz    General appearance: not in acute distress  HEENT: PERRL, EOMI  Lungs: Clear breath sounds in bilateral lung fields  Cardiovascular: Regular rate and rhythm, normal S1-S2  Abdomen: Soft, non tender, no distension  Musculoskeletal: No joint swelling  Skin: No rash and no edema  Neurology: Awake and alert. Confused.  Cranial nerves II - XII normal.  Normal muscle strength in all four extremities.     Medical Decision Making       48 MINUTES SPENT BY ME on the date of service doing chart review, history, exam, documentation & further activities per the note.      Data         Imaging results reviewed over the past 24 hrs:   No results found for this or any previous visit (from the past 24 hours).  "

## 2024-10-30 NOTE — PLAN OF CARE
Goal Outcome Evaluation:  Patient is alert and oriented to self, place and time. On 2L oxygen. Tolerates regular diet. 1800cc fluid restriction. Up to the bathroom, assist of 1 with walker. Pain managed with scheduled tylenol.     Problem: Risk for Delirium  Goal: Improved Behavioral Control  Outcome: Progressing  Intervention: Minimize Safety Risk  Recent Flowsheet Documentation  Taken 10/29/2024 1645 by Ifrah Be RN  Enhanced Safety Measures:    at bedside   pain management  Goal: Improved Attention and Thought Clarity  Outcome: Progressing     Problem: Pain Acute  Goal: Optimal Pain Control and Function  Outcome: Progressing  Intervention: Prevent or Manage Pain  Recent Flowsheet Documentation  Taken 10/29/2024 1645 by Ifrah Be RN  Medication Review/Management: medications reviewed     Problem: Comorbidity Management  Goal: Maintenance of COPD Symptom Control  Outcome: Progressing  Intervention: Maintain COPD Symptom Control  Recent Flowsheet Documentation  Taken 10/29/2024 1645 by Ifrah Be RN  Medication Review/Management: medications reviewed  Goal: Blood Pressure in Desired Range  Outcome: Progressing  Intervention: Maintain Blood Pressure Management  Recent Flowsheet Documentation  Taken 10/29/2024 1645 by Ifrah Be RN  Medication Review/Management: medications reviewed

## 2024-10-30 NOTE — PROGRESS NOTES
"   10/30/24 1130   Appointment Info   Signing Clinician's Name / Credentials (OT) Amira Langley MOT OTR/L CLT   Quick Adds   Quick Adds Edema   Living Environment   People in Home spouse   Current Living Arrangements house   Home Accessibility stairs to enter home   General Information   Onset of Illness/Injury or Date of Surgery 10/12/24   Referring Physician    Additional Occupational Profile Info/Pertinent History of Current Problem Per chart review, \"83 year old male with past medical history of COPD on 2 to 3 L nasal cannula at home, chronic systolic heart failure, hypertension, hypothyroidism, anxiety, recent fall and left hip fracture s/p ORIF who was jsut discharged to TCU.  Patient was brought to ED for evaluation another fall at TCU with left hip pain. Xray showing postop changes without new acute changes. Patient was found to be tachycardiac, concerning for sinus tachycardia vs intermittent a fib rvr. \"   Cognitive Status Examination   Affect/Mental Status (Cognitive) confused;flat/blunted affect   Edema General Information   Onset of Edema   (dependent edema per order)   Affected Body Part(s) Right LE;Left LE   Edema Examination/Assessment   Skin Condition Pitting;Dryness   Skin Integrity Moderate edema B'ly- dry, +2 pitting. States he tried wraps before and didn't like them   Clinical Impression   Edema: Patient Presentation Edema   Edema: Planned Interventions Gradient compression bandaging   Risk & Benefits of therapy have been explained evaluation/treatment results reviewed   OT Goals   Therapy Frequency (OT)   (Lymph 3/week)   OT Predicted Duration/Target Date for Goal Attainment 10/31/24   OT Goals Edema   OT: Edema education to increase ability to manage edema after discharge from the hospital Caregiver;Verbalize   OT: Management of edema bandages Caregiver;Verbalize   Self-Care/Home Management   Self-Care/Home Mgmt/ADL, Compensatory, Meal Prep Minutes (71530) 23   Treatment " Detail/Skilled Intervention Lymph: Moderate pitting edema. States he didn't like wraps in the past. Offered tubigrip to try. Explained wear schedule to patient and 1:1 and ok to remove if can't tolerated. Applied Size F tubigrip to BLEs- with double fold at foot.   OT Discharge Planning   OT Plan tubgrip check   Total Session Time   Timed Code Treatment Minutes 23   Total Session Time (sum of timed and untimed services) 23

## 2024-10-31 ENCOUNTER — APPOINTMENT (OUTPATIENT)
Dept: OCCUPATIONAL THERAPY | Facility: HOSPITAL | Age: 84
End: 2024-10-31
Payer: COMMERCIAL

## 2024-10-31 PROCEDURE — 99232 SBSQ HOSP IP/OBS MODERATE 35: CPT | Performed by: INTERNAL MEDICINE

## 2024-10-31 PROCEDURE — 250N000013 HC RX MED GY IP 250 OP 250 PS 637: Performed by: HOSPITALIST

## 2024-10-31 PROCEDURE — 250N000011 HC RX IP 250 OP 636: Performed by: INTERNAL MEDICINE

## 2024-10-31 PROCEDURE — 120N000001 HC R&B MED SURG/OB

## 2024-10-31 PROCEDURE — 97535 SELF CARE MNGMENT TRAINING: CPT | Mod: GO

## 2024-10-31 PROCEDURE — 250N000013 HC RX MED GY IP 250 OP 250 PS 637: Performed by: REGISTERED NURSE

## 2024-10-31 PROCEDURE — 250N000013 HC RX MED GY IP 250 OP 250 PS 637: Performed by: INTERNAL MEDICINE

## 2024-10-31 RX ADMIN — BUSPIRONE HYDROCHLORIDE 5 MG: 5 TABLET ORAL at 08:32

## 2024-10-31 RX ADMIN — FUROSEMIDE 20 MG: 20 TABLET ORAL at 08:32

## 2024-10-31 RX ADMIN — MICONAZOLE NITRATE: 20 POWDER TOPICAL at 20:35

## 2024-10-31 RX ADMIN — LOSARTAN POTASSIUM 50 MG: 50 TABLET, FILM COATED ORAL at 08:32

## 2024-10-31 RX ADMIN — ACETAMINOPHEN 650 MG: 325 TABLET ORAL at 06:56

## 2024-10-31 RX ADMIN — METOPROLOL SUCCINATE 50 MG: 50 TABLET, EXTENDED RELEASE ORAL at 08:32

## 2024-10-31 RX ADMIN — LEVOTHYROXINE SODIUM 88 MCG: 88 TABLET ORAL at 06:56

## 2024-10-31 RX ADMIN — QUETIAPINE FUMARATE 12.5 MG: 25 TABLET ORAL at 08:31

## 2024-10-31 RX ADMIN — ROSUVASTATIN 10 MG: 10 TABLET, FILM COATED ORAL at 20:38

## 2024-10-31 RX ADMIN — FUROSEMIDE 20 MG: 20 TABLET ORAL at 18:27

## 2024-10-31 RX ADMIN — DULOXETINE HYDROCHLORIDE 60 MG: 60 CAPSULE, DELAYED RELEASE PELLETS ORAL at 08:32

## 2024-10-31 RX ADMIN — TRAZODONE HYDROCHLORIDE 25 MG: 50 TABLET ORAL at 20:35

## 2024-10-31 RX ADMIN — ACETAMINOPHEN 650 MG: 325 TABLET ORAL at 21:16

## 2024-10-31 RX ADMIN — ACETAMINOPHEN 650 MG: 325 TABLET ORAL at 18:30

## 2024-10-31 RX ADMIN — ENOXAPARIN SODIUM 40 MG: 40 INJECTION SUBCUTANEOUS at 18:31

## 2024-10-31 RX ADMIN — SENNOSIDES AND DOCUSATE SODIUM 1 TABLET: 8.6; 5 TABLET ORAL at 08:31

## 2024-10-31 RX ADMIN — MICONAZOLE NITRATE: 20 POWDER TOPICAL at 08:33

## 2024-10-31 RX ADMIN — PANTOPRAZOLE SODIUM 40 MG: 40 TABLET, DELAYED RELEASE ORAL at 06:56

## 2024-10-31 RX ADMIN — Medication 5 MG: at 18:26

## 2024-10-31 RX ADMIN — QUETIAPINE FUMARATE 12.5 MG: 25 TABLET ORAL at 18:27

## 2024-10-31 RX ADMIN — ASPIRIN 81 MG CHEWABLE TABLET 81 MG: 81 TABLET CHEWABLE at 08:31

## 2024-10-31 RX ADMIN — BUSPIRONE HYDROCHLORIDE 5 MG: 5 TABLET ORAL at 20:34

## 2024-10-31 RX ADMIN — SENNOSIDES AND DOCUSATE SODIUM 1 TABLET: 8.6; 5 TABLET ORAL at 20:35

## 2024-10-31 RX ADMIN — LATANOPROST 1 DROP: 50 SOLUTION/ DROPS OPHTHALMIC at 20:38

## 2024-10-31 ASSESSMENT — ACTIVITIES OF DAILY LIVING (ADL)
ADLS_ACUITY_SCORE: 24.25
ADLS_ACUITY_SCORE: 24.25
ADLS_ACUITY_SCORE: 25.25
ADLS_ACUITY_SCORE: 24.25
ADLS_ACUITY_SCORE: 24.75
ADLS_ACUITY_SCORE: 24.25
ADLS_ACUITY_SCORE: 24.75
ADLS_ACUITY_SCORE: 24.75
ADLS_ACUITY_SCORE: 24.25
ADLS_ACUITY_SCORE: 24.75
ADLS_ACUITY_SCORE: 24.25
ADLS_ACUITY_SCORE: 24.75
ADLS_ACUITY_SCORE: 24.25
ADLS_ACUITY_SCORE: 24.75
ADLS_ACUITY_SCORE: 24.25
ADLS_ACUITY_SCORE: 24.75
ADLS_ACUITY_SCORE: 24.75
ADLS_ACUITY_SCORE: 24.25

## 2024-10-31 NOTE — PLAN OF CARE
Problem: Adult Inpatient Plan of Care  Goal: Plan of Care Review  Description: The Plan of Care Review/Shift note should be completed every shift.  The Outcome Evaluation is a brief statement about your assessment that the patient is improving, declining, or no change.  This information will be displayed automatically on your shift  note.  Outcome: Progressing     Problem: Risk for Delirium  Goal: Optimal Coping  Outcome: Progressing     Problem: Risk for Delirium  Goal: Improved Behavioral Control  Outcome: Progressing     Problem: Pain Acute  Goal: Optimal Pain Control and Function  Outcome: Progressing   Goal Outcome Evaluation:  Pt alert to self, confused,  able to ask medicine for pain. Calm but impulsive, still on 1:1 sitter. C/o pain to left hip, given scheduled Tylenol.

## 2024-10-31 NOTE — PLAN OF CARE
Lymphedema Discharge Summary    Reason for therapy discharge:    No further expectations of functional progress.    Progress towards therapy goal(s). See goals on Care Plan in [facility] electronic health record for goal details.  Goals not met.  Barriers to achieving goals:   limited tolerance for therapy.    Therapy recommendation(s):    No further therapy is recommended. Pt frequently declining compression and was fit with elasticized compression, aide and RN to manage going forward if pt agreeable to wear.

## 2024-10-31 NOTE — PLAN OF CARE
Problem: Risk for Delirium  Goal: Improved Behavioral Control  Outcome: Not Progressing  Intervention: Minimize Safety Risk  Recent Flowsheet Documentation  Taken 10/31/2024 0030 by Lisandro Ortiz RN  Enhanced Safety Measures: assistive devices when indicated     Problem: Risk for Delirium  Goal: Improved Sleep  Outcome: Not Progressing   Goal Outcome Evaluation:       Patient continues to be on 1:1. Patient is unpredictable with trying to get out of his chair in the night. Patient does not take redirection well. Refuses to try and sleep in his bed and slept very poorly through the night in the chair. Patient has extreme edema in bilateral lower extremities but refuses to keep his legs elevated.

## 2024-10-31 NOTE — SIGNIFICANT EVENT
Patient was choking on his beef roast at lunch today. Patient able to cough it out was then sitting in his chair the remainder of the afternoon. Vitals stable, and called hospitalist about the choking.

## 2024-10-31 NOTE — PROGRESS NOTES
Care Management Follow Up    Length of Stay (days): 19    Expected Discharge Date: 11/02/2024    Anticipated Discharge Plan:   LTC pending funding/MA application. Spouse Jovana primary contact.    Transportation: TBD    PT Recommendations: Transitional Care Facility  OT Recommendations:  Transitional Care Facility     Barriers to Discharge: medical stability, placement, on 1:1    Prior Living Situation: house with spouse    Discussed  Partnership in Safe Discharge Planning  document with patient/family: No     Handoff Completed: No, handoff not indicated or clinically appropriate    Patient/Spokesperson Updated: No    Additional Information:  From home with spouse Jovana; used a walker or cane (but does not like the walker). Recently at [facility] but may be more appropriate for long term care (LTC) or memory care [facility]. Continues on 1:1.   Jovana is primary contact and requests staff call her on her home phone at [phone].   Reviewed with MD: goal is to stabilize patient without PRN medications and get off 1:1 for discharge planning.   2:59 PM:  Attempted to call Jovana at home but there was no answer.    Next Steps: Discuss discharge plan and LTC funding/MA application with patient's wife Jovana. Secure placement once off 1:1.  [facility], Faustino and Gregoria Brand reviewing.    Alia Kennedy RN

## 2024-10-31 NOTE — PROGRESS NOTES
[facility]    Medicine Progress Note - Hospitalist Service    Date of Admission:  10/12/2024    Assessment & Plan          Dominik Rojas is a 84 year old male with history of COPD on 2 to 3 L nasal cannula at home, chronic systolic heart failure, hypertension, hypothyroidism, anxiety, recent fall and left hip fracture s/p ORIF who was jsut discharged to TCU.  Patient was brought to ED for evaluation another fall at TCU with left hip pain. Xray showing postop changes without new acute changes.  Patient has had significant behavioral issues requiring 1:1 sitter. Memory care facility placement pending.          Mechanical fall  Left hip contusion without new fracture  Recent left hip fracture s/p ORIF  Had hip fracture surgery on 10/6. Was at TCU, and had a mechanical fall, with worse left hip pain after. Presented for eval.  CT Left femur no evidence of new fracture, small lateral left hip subcutaneous hematoma  Negative for DVT on US  Pain team was consulted to optimize medication  - Seen by Ortho: follow-up outpatient  - On Tylenol scheduled  - Had been very drowsy on opioids, opioids on hold for now  - Low dose robaxin changed to PRN  - Continue topical lidocaine     Anxiety and depression;  Dementia with behavioral change/agitation   Mood disorder  Acute toxic/metabolic encephalopathy  Initially improved and off 1:1. It got worse again and on 1:1 for the past several days. Unable to get him off this- he constantly tries to get up and walk.  No UTI  - Seen by Psych, trazodone 25 mg to help with sleep at bedtime  - Seroquel as needed for agitation  - Delirium precautions  - Continue suppertime melatonin to help with day/night reversal  - Plan memory care placement. Family ok with this, they have been struggling to care for him at home     Acute on chronic HFpEF  Hypervolemic.   Lasix was held on admission due to poor po intake, weight went up 10 lbs.   Recent Echo showing preserved EF  -  "Continue Lasix 20 mg bid  - Increased home losartan  - Changed home metoprolol from tartrate to succinate to help reduce pill burden  - Daily weight, Fluid restriction    Tachycardia, now resolved  -Cardiology was consulted, no a fib, no need for anticoagulation  -pt is not a good candidate for anticoagulation due to frequent falls  -HR now in control     COPD, Chronic hypoxic respiratory failure on 2-3l NC  -PTA meds  -Not in exacerbation. On home flow O2     Recent UTI  -PTA amoxicillin 500mg TID; finished course of abx;   -repeated UC 10/13 and again 10/23 mixed tai     History of RLL adenocarcinoma s/p radiation therapy, per wife he was to get EBUS and chemo but she did not think he could tolerate those     Normocytic anemia  - Hb baseline 10-11  - contusions and hematoma on the Left thigh  - Monitor Hb periodically     HLD: Crestor     GERD: PPI      Hypothyroidism  -recent TSH 6.93  -continue Synthroid   -recommend repeat TSH in 4 weeks with PCP. FT4 not useful for titrating Synthroid generally               Diet: Combination Diet Regular Diet Adult  Fluid restriction 1800 ML FLUID  Snacks/Supplements Adult: Ensure Enlive; Between Meals    DVT Prophylaxis: Enoxaparin (Lovenox) SQ  Cabral Catheter: Not present  Lines: None     Cardiac Monitoring: None  Code Status: No CPR- Do NOT Intubate      Clinically Significant Risk Factors                   # Hypertension: Noted on problem list    # Dementia: noted on problem list        # Obesity: Estimated body mass index is 30.02 kg/m  as calculated from the following:    Height as of this encounter: 1.702 m (5' 7\").    Weight as of this encounter: 87 kg (191 lb 11.2 oz).        # Financial/Environmental Concerns: none         Disposition Plan     Medically Ready for Discharge: Anticipated in 2-4 Days             Akanksha Crocker MD  Hospitalist Service  [facility]  Securely message with [employer] (more info)  Text page via [employer] Paging/Directory "   ______________________________________________________________________    Interval History   Patient continues to be agitated in the morning and needs bedside sitter. He did eat his breakfast and lunch. He wants to sleep in the recliner, but not the bed. He did not sleep well at night.     Physical Exam   Vital Signs: Temp: 98.4  F (36.9  C) Temp src: Oral BP: 133/78 Pulse: 86   Resp: 16 SpO2: 98 % O2 Device: Nasal cannula Oxygen Delivery: 4 LPM  Weight: 191 lbs 11.2 oz    General appearance: not in acute distress  HEENT: PERRL, EOMI  Lungs: Clear breath sounds in bilateral lung fields  Cardiovascular: Regular rate and rhythm, normal S1-S2  Abdomen: Soft, non tender, no distension  Musculoskeletal: No joint swelling  Skin: No rash and no edema  Neurology: Lethargy. Confused.  Cranial nerves II - XII normal.  Normal muscle strength in all four extremities.     Medical Decision Making       48 MINUTES SPENT BY ME on the date of service doing chart review, history, exam, documentation & further activities per the note.      Data         Imaging results reviewed over the past 24 hrs:   No results found for this or any previous visit (from the past 24 hours).

## 2024-11-01 ENCOUNTER — APPOINTMENT (OUTPATIENT)
Dept: PHYSICAL THERAPY | Facility: HOSPITAL | Age: 84
End: 2024-11-01
Payer: COMMERCIAL

## 2024-11-01 LAB
CREAT SERPL-MCNC: 0.89 MG/DL (ref 0.67–1.17)
EGFRCR SERPLBLD CKD-EPI 2021: 85 ML/MIN/1.73M2
PLATELET # BLD AUTO: 293 10E3/UL (ref 150–450)

## 2024-11-01 PROCEDURE — 99232 SBSQ HOSP IP/OBS MODERATE 35: CPT | Performed by: INTERNAL MEDICINE

## 2024-11-01 PROCEDURE — 97110 THERAPEUTIC EXERCISES: CPT | Mod: GP

## 2024-11-01 PROCEDURE — 250N000013 HC RX MED GY IP 250 OP 250 PS 637: Performed by: INTERNAL MEDICINE

## 2024-11-01 PROCEDURE — 250N000013 HC RX MED GY IP 250 OP 250 PS 637: Performed by: REGISTERED NURSE

## 2024-11-01 PROCEDURE — 120N000001 HC R&B MED SURG/OB

## 2024-11-01 PROCEDURE — 82565 ASSAY OF CREATININE: CPT | Performed by: HOSPITALIST

## 2024-11-01 PROCEDURE — 36415 COLL VENOUS BLD VENIPUNCTURE: CPT | Performed by: HOSPITALIST

## 2024-11-01 PROCEDURE — 250N000013 HC RX MED GY IP 250 OP 250 PS 637: Performed by: STUDENT IN AN ORGANIZED HEALTH CARE EDUCATION/TRAINING PROGRAM

## 2024-11-01 PROCEDURE — 85049 AUTOMATED PLATELET COUNT: CPT | Performed by: INTERNAL MEDICINE

## 2024-11-01 PROCEDURE — 97116 GAIT TRAINING THERAPY: CPT | Mod: GP

## 2024-11-01 PROCEDURE — 250N000013 HC RX MED GY IP 250 OP 250 PS 637: Performed by: HOSPITALIST

## 2024-11-01 PROCEDURE — 250N000011 HC RX IP 250 OP 636: Performed by: INTERNAL MEDICINE

## 2024-11-01 RX ORDER — FUROSEMIDE 20 MG/1
40 TABLET ORAL
Status: DISCONTINUED | OUTPATIENT
Start: 2024-11-02 | End: 2024-11-03

## 2024-11-01 RX ADMIN — SENNOSIDES AND DOCUSATE SODIUM 1 TABLET: 8.6; 5 TABLET ORAL at 11:45

## 2024-11-01 RX ADMIN — CALCIUM CARBONATE (ANTACID) CHEW TAB 500 MG 1000 MG: 500 CHEW TAB at 19:23

## 2024-11-01 RX ADMIN — ACETAMINOPHEN 650 MG: 325 TABLET ORAL at 20:53

## 2024-11-01 RX ADMIN — FUROSEMIDE 20 MG: 20 TABLET ORAL at 11:06

## 2024-11-01 RX ADMIN — BUSPIRONE HYDROCHLORIDE 5 MG: 5 TABLET ORAL at 11:05

## 2024-11-01 RX ADMIN — MICONAZOLE NITRATE: 20 POWDER TOPICAL at 11:46

## 2024-11-01 RX ADMIN — FUROSEMIDE 20 MG: 20 TABLET ORAL at 17:47

## 2024-11-01 RX ADMIN — METOPROLOL SUCCINATE 50 MG: 50 TABLET, EXTENDED RELEASE ORAL at 11:06

## 2024-11-01 RX ADMIN — ENOXAPARIN SODIUM 40 MG: 40 INJECTION SUBCUTANEOUS at 17:47

## 2024-11-01 RX ADMIN — Medication 5 MG: at 17:47

## 2024-11-01 RX ADMIN — DULOXETINE HYDROCHLORIDE 60 MG: 60 CAPSULE, DELAYED RELEASE PELLETS ORAL at 11:45

## 2024-11-01 RX ADMIN — SENNOSIDES AND DOCUSATE SODIUM 1 TABLET: 8.6; 5 TABLET ORAL at 20:54

## 2024-11-01 RX ADMIN — QUETIAPINE FUMARATE 12.5 MG: 25 TABLET ORAL at 14:43

## 2024-11-01 RX ADMIN — QUETIAPINE FUMARATE 12.5 MG: 25 TABLET ORAL at 18:50

## 2024-11-01 RX ADMIN — ASPIRIN 81 MG CHEWABLE TABLET 81 MG: 81 TABLET CHEWABLE at 11:45

## 2024-11-01 RX ADMIN — ACETAMINOPHEN 650 MG: 325 TABLET ORAL at 17:47

## 2024-11-01 RX ADMIN — ROSUVASTATIN 10 MG: 10 TABLET, FILM COATED ORAL at 20:54

## 2024-11-01 RX ADMIN — TRAZODONE HYDROCHLORIDE 25 MG: 50 TABLET ORAL at 20:53

## 2024-11-01 RX ADMIN — LOSARTAN POTASSIUM 50 MG: 50 TABLET, FILM COATED ORAL at 11:45

## 2024-11-01 RX ADMIN — LIDOCAINE 1 PATCH: 4 PATCH TOPICAL at 11:46

## 2024-11-01 RX ADMIN — BUSPIRONE HYDROCHLORIDE 5 MG: 5 TABLET ORAL at 20:53

## 2024-11-01 RX ADMIN — ACETAMINOPHEN 650 MG: 325 TABLET ORAL at 11:06

## 2024-11-01 RX ADMIN — ACETAMINOPHEN 650 MG: 325 TABLET ORAL at 06:35

## 2024-11-01 RX ADMIN — LEVOTHYROXINE SODIUM 88 MCG: 88 TABLET ORAL at 06:37

## 2024-11-01 RX ADMIN — PANTOPRAZOLE SODIUM 40 MG: 40 TABLET, DELAYED RELEASE ORAL at 06:35

## 2024-11-01 RX ADMIN — ACETAMINOPHEN 650 MG: 325 TABLET ORAL at 14:42

## 2024-11-01 RX ADMIN — POLYETHYLENE GLYCOL 3350 17 G: 17 POWDER, FOR SOLUTION ORAL at 11:46

## 2024-11-01 RX ADMIN — MICONAZOLE NITRATE: 20 POWDER TOPICAL at 20:54

## 2024-11-01 RX ADMIN — LATANOPROST 1 DROP: 50 SOLUTION/ DROPS OPHTHALMIC at 20:53

## 2024-11-01 ASSESSMENT — ACTIVITIES OF DAILY LIVING (ADL)
ADLS_ACUITY_SCORE: 25.75
ADLS_ACUITY_SCORE: 25.75
ADLS_ACUITY_SCORE: 0
ADLS_ACUITY_SCORE: 25.75
ADLS_ACUITY_SCORE: 25.25
ADLS_ACUITY_SCORE: 25.25
ADLS_ACUITY_SCORE: 25.75
ADLS_ACUITY_SCORE: 25.75
ADLS_ACUITY_SCORE: 0
ADLS_ACUITY_SCORE: 25.75
ADLS_ACUITY_SCORE: 25.25
ADLS_ACUITY_SCORE: 25.75
ADLS_ACUITY_SCORE: 25.25
ADLS_ACUITY_SCORE: 0
ADLS_ACUITY_SCORE: 25.75
ADLS_ACUITY_SCORE: 25.25
ADLS_ACUITY_SCORE: 25.75
ADLS_ACUITY_SCORE: 27.75
ADLS_ACUITY_SCORE: 25.75
ADLS_ACUITY_SCORE: 25.25

## 2024-11-01 NOTE — PLAN OF CARE
"  Problem: Risk for Delirium  Goal: Optimal Coping  Outcome: Progressing  Goal: Improved Behavioral Control  Outcome: Progressing  Goal: Improved Attention and Thought Clarity  Outcome: Progressing  Goal: Improved Sleep  Outcome: Progressing     Problem: Pain Acute  Goal: Optimal Pain Control and Function  Outcome: Progressing  Intervention: Prevent or Manage Pain  Recent Flowsheet Documentation  Taken 11/1/2024 0900 by Regina Sapp RN  Medication Review/Management: medications reviewed     Problem: Comorbidity Management  Goal: Blood Pressure in Desired Range  Outcome: Progressing  Intervention: Maintain Blood Pressure Management  Recent Flowsheet Documentation  Taken 11/1/2024 0900 by Regina Sapp RN  Medication Review/Management: medications reviewed   Goal Outcome Evaluation:       Pt sleepy on and of this shift. Hollers out for Zaria with attempts to get up out of chair. Refusing to lie in bed. Black gel cushios placed in chair with alarm. Pt cooperative with PT, ambulated to doorway. Pt alerat to date and location mid shift. Pt stated \"hold my hand\" appearing afraid. Pt  reassured staff was present to assist with needs. Pt fed self with tray set. Skin cares provided. BP meds admin'd with noted improvement. Tylenol effective for pain. Continuing with 1:1 at this time.                 "

## 2024-11-01 NOTE — PROGRESS NOTES
Pt anxious re Zaria needing her pills. Multiple attempts made assisting pt to call Lore. Many names/numbers listed on papers on bedside table for pt to call. Pt states he is worried for her and she needs something. Pt has attempted to stand up from chair frequently this afternoon with consoling and redirection offered pt verbalizes having hip pain, tylenol admin'd. Seroquel admin'd due to noted anxiety re Aung/Alanna whom he is hollering out for.

## 2024-11-01 NOTE — PROGRESS NOTES
Care Management Follow Up    Length of Stay (days): 20    Expected Discharge Date: 11/04/2024    Anticipated Discharge Plan:   LTC pending funding/MA application. Spouse Jovana primary contact.    Transportation: TBD    PT Recommendations: Transitional Care Facility  OT Recommendations:  Transitional Care Facility     Barriers to Discharge: medical stability, placement, on 1:1    Prior Living Situation: house with spouse    Discussed  Partnership in Safe Discharge Planning  document with patient/family: No     Handoff Completed: No, handoff not indicated or clinically appropriate    Patient/Spokesperson Updated: No    Additional Information:  From home with spouse Jovana; used a walker or cane (but does not like the walker). Recently at [facility] but may be more appropriate for long term care (LTC) or memory care [facility]. Continues on 1:1.   Jovana is primary contact and requests staff call her on her home phone at [phone].   Reviewed with MD: goal is to stabilize patient without PRN medications and get off 1:1 for discharge planning.   Attempted again to call Jovana at home but there was no answer. Writer left a brief message to call about where she is at with funding for next level of care. May be she will be available on the weekend?     Next Steps: Discuss discharge plan and LTC funding/MA application with patient's wife Jovana. Secure placement once off 1:1.  [facility], Trell Brand reviewing.    Alia Kennedy RN

## 2024-11-01 NOTE — PLAN OF CARE
Goal Outcome Evaluation:  Pt was drowsy at the start of shift. Arousable with voice. Does not answer questions. VSS. At end of shift, pt able to follow commands of standing to be cleaned and changed. Still on 1:1 sitter. No behavior tonight. Slept well.          Problem: Risk for Delirium  Goal: Improved Behavioral Control  Outcome: Progressing     Problem: Pain Acute  Goal: Optimal Pain Control and Function  Outcome: Progressing  Intervention: Prevent or Manage Pain  Recent Flowsheet Documentation  Taken 11/1/2024 0100 by Yani Quinn, RN  Medication Review/Management: medications reviewed     Problem: Fall Injury Risk  Goal: Absence of Fall and Fall-Related Injury  Outcome: Progressing

## 2024-11-01 NOTE — PLAN OF CARE
Occupational Therapy Discharge Summary    Reason for therapy discharge:    No further expectations of functional progress.  Per chart, family now pursuing LTC options/memory care for patient. Patient with limited progress with ADL independence in OT sessions d/t cognition, still requiring Ax1-2 with all ADLs and needing 1:1 due to cognition.    Progress towards therapy goal(s). See goals on Care Plan in [facility] electronic health record for goal details.  Goals not met.  Barriers to achieving goals:   limited tolerance for therapy.    Therapy recommendation(s):    No further therapy is recommended. Please reorder OT if change in status/further needs arise.    Anna Mueller, OTR/L 11/1/24

## 2024-11-01 NOTE — PLAN OF CARE
Problem: Risk for Delirium  Goal: Optimal Coping  Outcome: Not Progressing  Intervention: Optimize Psychosocial Adjustment to Delirium  Recent Flowsheet Documentation  Taken 10/31/2024 1757 by Fidel Craft RN  Supportive Measures: positive reinforcement provided     Problem: Fall Injury Risk  Goal: Absence of Fall and Fall-Related Injury  Outcome: Not Progressing   Goal Outcome Evaluation:                  Patient on 1 to 1 for safety. Fall risk as he frequently tries to stand up on his own. Ate 100% of dinner, no aspiration issues. Seroquel given one time for restlessness. Proved effective.

## 2024-11-01 NOTE — PLAN OF CARE
Problem: Adult Inpatient Plan of Care  Goal: Plan of Care Review  Description: The Plan of Care Review/Shift note should be completed every shift.  The Outcome Evaluation is a brief statement about your assessment that the patient is improving, declining, or no change.  This information will be displayed automatically on your shift  note.  Outcome: Progressing     Problem: Adult Inpatient Plan of Care  Goal: Absence of Hospital-Acquired Illness or Injury  Intervention: Identify and Manage Fall Risk  Recent Flowsheet Documentation  Taken 11/1/2024 1600 by Kirby Vigil, RN  Safety Promotion/Fall Prevention: activity supervised     Problem: Adult Inpatient Plan of Care  Goal: Absence of Hospital-Acquired Illness or Injury  Intervention: Prevent Skin Injury  Recent Flowsheet Documentation  Taken 11/1/2024 1600 by Kirby Vigil, RN  Body Position:   position changed independently   weight shifting     Problem: Pain Acute  Goal: Optimal Pain Control and Function  Outcome: Progressing   Goal Outcome Evaluation:  Patient alert this shift, standing up from recliner approximately Q 45 min from recliner, cooperative, ate 100%, redirectable, decline laying in bed and preferred to sit in the recliner. Patient received PRN Seroquel 12.5 mg for anxiety. Patient continuing to call out for his wife.

## 2024-11-01 NOTE — PROGRESS NOTES
[facility]    Medicine Progress Note - Hospitalist Service    Date of Admission:  10/12/2024    Assessment & Plan          Dominik Rojas is a 84 year old male with history of COPD on 2 to 3 L nasal cannula at home, chronic systolic heart failure, hypertension, hypothyroidism, anxiety, recent fall and left hip fracture s/p ORIF who was jsut discharged to TCU.  Patient was brought to ED for evaluation another fall at TCU with left hip pain. Xray showing postop changes without new acute changes.  Patient has had significant behavioral issues requiring 1:1 sitter. Memory care facility placement pending.          Mechanical fall  Left hip contusion without new fracture  Recent left hip fracture s/p ORIF  Had hip fracture surgery on 10/6. Was at TCU, and had a mechanical fall, with worse left hip pain after. Presented for eval.  CT Left femur no evidence of new fracture, small lateral left hip subcutaneous hematoma  Negative for DVT on US  Pain team was consulted to optimize medication  - Seen by Ortho: follow-up outpatient  - On Tylenol scheduled  - Had been very drowsy on opioids, opioids on hold for now  - Low dose robaxin changed to PRN  - Continue topical lidocaine     Anxiety and depression;  Dementia with behavioral change/agitation   Mood disorder  Acute toxic/metabolic encephalopathy  Initially improved and off 1:1. It got worse again and on 1:1 for the past several days. Unable to get him off this- he constantly tries to get up and walk.  No UTI  - Seen by Psych, trazodone 25 mg to help with sleep at bedtime  - Seroquel as needed for agitation  - Delirium precautions  - Continue suppertime melatonin to help with day/night reversal  - Plan memory care placement. Family ok with this, they have been struggling to care for him at home     Acute on chronic HFpEF  Hypervolemic.   Lasix was held on admission due to poor po intake, weight went up 10 lbs.   Recent Echo showing preserved EF  -  "Increase Lasix to 40 mg bid (since 11/1)  - Increased home losartan  - Changed home metoprolol from tartrate to succinate to help reduce pill burden  - Daily weight, Fluid restriction    Tachycardia, now resolved  -Cardiology was consulted, no a fib, no need for anticoagulation  -pt is not a good candidate for anticoagulation due to frequent falls  -HR now in control     COPD, Chronic hypoxic respiratory failure on 2-3l NC  -PTA meds  -Not in exacerbation. On home flow O2     Recent UTI  -PTA amoxicillin 500mg TID; finished course of abx;   -repeated UC 10/13 and again 10/23 mixed tai     History of RLL adenocarcinoma s/p radiation therapy, per wife he was to get EBUS and chemo but she did not think he could tolerate those     Normocytic anemia  - Hb baseline 10-11  - contusions and hematoma on the Left thigh  - Monitor Hb periodically     HLD: Crestor     GERD: PPI      Hypothyroidism  -recent TSH 6.93  -continue Synthroid   -recommend repeat TSH in 4 weeks with PCP. FT4 not useful for titrating Synthroid generally               Diet: Combination Diet Regular Diet Adult  Fluid restriction 1800 ML FLUID  Snacks/Supplements Adult: Ensure Enlive; Between Meals    DVT Prophylaxis: Enoxaparin (Lovenox) SQ  Cabral Catheter: Not present  Lines: None     Cardiac Monitoring: None  Code Status: No CPR- Do NOT Intubate      Clinically Significant Risk Factors                   # Hypertension: Noted on problem list    # Dementia: noted on problem list        # Obesity: Estimated body mass index is 30.02 kg/m  as calculated from the following:    Height as of this encounter: 1.702 m (5' 7\").    Weight as of this encounter: 87 kg (191 lb 11.2 oz).        # Financial/Environmental Concerns: none         Disposition Plan     Medically Ready for Discharge: Anticipated in 2-4 Days             Akanksha Crocker MD  Hospitalist Service  [facility]  Securely message with [employer] (more info)  Text page via [employer] " [employer]   ______________________________________________________________________    Interval History   When seen this morning, he was sitting up in the chair. He called for his wife. He tried to get up. He wants the provider to stay with him. He is redirectable and consolable by the bedside sitter.     Physical Exam   Vital Signs: Temp: 98.9  F (37.2  C) Temp src: Oral BP: 124/74 Pulse: 75   Resp: 16 SpO2: 98 % O2 Device: Nasal cannula Oxygen Delivery: 2 LPM  Weight: 191 lbs 11.2 oz    General appearance: not in acute distress  HEENT: PERRL, EOMI  Lungs: Clear breath sounds in bilateral lung fields  Cardiovascular: Regular rate and rhythm, normal S1-S2  Abdomen: Soft, non tender, no distension  Musculoskeletal: No joint swelling  Skin: No rash and no edema  Neurology: Awake and alert. Confused.  Cranial nerves II - XII normal.  Normal muscle strength in all four extremities.     Medical Decision Making       45 MINUTES SPENT BY ME on the date of service doing chart review, history, exam, documentation & further activities per the note.      Data     I have personally reviewed the following data over the past 24 hrs:    N/A  \   N/A   / 293     N/A N/A N/A /  N/A   N/A N/A 0.89 \       Imaging results reviewed over the past 24 hrs:   No results found for this or any previous visit (from the past 24 hours).

## 2024-11-01 NOTE — PROGRESS NOTES
This  RN has been 1:1 observer for pt. Pt sleeping but wakes up intermittently and hollers out for Zaria. Pt stated not hungry when asked if he wanted breakfast. Pt weight shifted in chair during slight attempts to stand but pt sleepy. Continue monitoring, provide cares as able.

## 2024-11-02 PROCEDURE — 250N000013 HC RX MED GY IP 250 OP 250 PS 637: Performed by: INTERNAL MEDICINE

## 2024-11-02 PROCEDURE — 120N000001 HC R&B MED SURG/OB

## 2024-11-02 PROCEDURE — 99232 SBSQ HOSP IP/OBS MODERATE 35: CPT | Performed by: INTERNAL MEDICINE

## 2024-11-02 PROCEDURE — 250N000013 HC RX MED GY IP 250 OP 250 PS 637: Performed by: HOSPITALIST

## 2024-11-02 PROCEDURE — 250N000013 HC RX MED GY IP 250 OP 250 PS 637: Performed by: REGISTERED NURSE

## 2024-11-02 PROCEDURE — 250N000013 HC RX MED GY IP 250 OP 250 PS 637: Performed by: STUDENT IN AN ORGANIZED HEALTH CARE EDUCATION/TRAINING PROGRAM

## 2024-11-02 PROCEDURE — 250N000011 HC RX IP 250 OP 636: Performed by: INTERNAL MEDICINE

## 2024-11-02 RX ORDER — HYDROXYZINE HYDROCHLORIDE 25 MG/1
25 TABLET, FILM COATED ORAL EVERY 6 HOURS PRN
Status: DISCONTINUED | OUTPATIENT
Start: 2024-11-02 | End: 2024-11-05

## 2024-11-02 RX ADMIN — ACETAMINOPHEN 650 MG: 325 TABLET ORAL at 17:05

## 2024-11-02 RX ADMIN — ASPIRIN 81 MG CHEWABLE TABLET 81 MG: 81 TABLET CHEWABLE at 08:01

## 2024-11-02 RX ADMIN — DULOXETINE HYDROCHLORIDE 60 MG: 60 CAPSULE, DELAYED RELEASE PELLETS ORAL at 08:02

## 2024-11-02 RX ADMIN — MICONAZOLE NITRATE: 20 POWDER TOPICAL at 21:03

## 2024-11-02 RX ADMIN — LEVOTHYROXINE SODIUM 88 MCG: 88 TABLET ORAL at 06:01

## 2024-11-02 RX ADMIN — PANTOPRAZOLE SODIUM 40 MG: 40 TABLET, DELAYED RELEASE ORAL at 06:02

## 2024-11-02 RX ADMIN — METHOCARBAMOL TABLETS 250 MG: 500 TABLET, COATED ORAL at 08:17

## 2024-11-02 RX ADMIN — ACETAMINOPHEN 650 MG: 325 TABLET ORAL at 06:01

## 2024-11-02 RX ADMIN — ACETAMINOPHEN 650 MG: 325 TABLET ORAL at 21:05

## 2024-11-02 RX ADMIN — HYDROXYZINE HYDROCHLORIDE 25 MG: 25 TABLET, FILM COATED ORAL at 17:37

## 2024-11-02 RX ADMIN — LOSARTAN POTASSIUM 50 MG: 50 TABLET, FILM COATED ORAL at 08:01

## 2024-11-02 RX ADMIN — QUETIAPINE FUMARATE 12.5 MG: 25 TABLET ORAL at 14:12

## 2024-11-02 RX ADMIN — ACETAMINOPHEN 650 MG: 325 TABLET ORAL at 14:18

## 2024-11-02 RX ADMIN — SENNOSIDES AND DOCUSATE SODIUM 1 TABLET: 8.6; 5 TABLET ORAL at 08:02

## 2024-11-02 RX ADMIN — QUETIAPINE FUMARATE 12.5 MG: 25 TABLET ORAL at 20:15

## 2024-11-02 RX ADMIN — MICONAZOLE NITRATE: 20 POWDER TOPICAL at 08:03

## 2024-11-02 RX ADMIN — METHOCARBAMOL TABLETS 250 MG: 500 TABLET, COATED ORAL at 16:03

## 2024-11-02 RX ADMIN — BUSPIRONE HYDROCHLORIDE 5 MG: 5 TABLET ORAL at 11:28

## 2024-11-02 RX ADMIN — SENNOSIDES AND DOCUSATE SODIUM 1 TABLET: 8.6; 5 TABLET ORAL at 21:05

## 2024-11-02 RX ADMIN — FUROSEMIDE 40 MG: 20 TABLET ORAL at 17:05

## 2024-11-02 RX ADMIN — LIDOCAINE 1 PATCH: 4 PATCH TOPICAL at 11:29

## 2024-11-02 RX ADMIN — FUROSEMIDE 40 MG: 20 TABLET ORAL at 08:01

## 2024-11-02 RX ADMIN — ROSUVASTATIN 10 MG: 10 TABLET, FILM COATED ORAL at 21:05

## 2024-11-02 RX ADMIN — TRAZODONE HYDROCHLORIDE 25 MG: 50 TABLET ORAL at 21:05

## 2024-11-02 RX ADMIN — POLYETHYLENE GLYCOL 3350 17 G: 17 POWDER, FOR SOLUTION ORAL at 08:03

## 2024-11-02 RX ADMIN — BUSPIRONE HYDROCHLORIDE 5 MG: 5 TABLET ORAL at 21:05

## 2024-11-02 RX ADMIN — ENOXAPARIN SODIUM 40 MG: 40 INJECTION SUBCUTANEOUS at 17:05

## 2024-11-02 RX ADMIN — Medication 5 MG: at 16:25

## 2024-11-02 RX ADMIN — ACETAMINOPHEN 650 MG: 325 TABLET ORAL at 11:29

## 2024-11-02 RX ADMIN — LATANOPROST 1 DROP: 50 SOLUTION/ DROPS OPHTHALMIC at 21:03

## 2024-11-02 RX ADMIN — METOPROLOL SUCCINATE 50 MG: 50 TABLET, EXTENDED RELEASE ORAL at 08:01

## 2024-11-02 ASSESSMENT — ACTIVITIES OF DAILY LIVING (ADL)
ADLS_ACUITY_SCORE: 0
ADLS_ACUITY_SCORE: 25.25
ADLS_ACUITY_SCORE: 25.25
ADLS_ACUITY_SCORE: 0
ADLS_ACUITY_SCORE: 0
ADLS_ACUITY_SCORE: 25.25
ADLS_ACUITY_SCORE: 0
ADLS_ACUITY_SCORE: 25.25
ADLS_ACUITY_SCORE: 0
ADLS_ACUITY_SCORE: 0
ADLS_ACUITY_SCORE: 25.25
ADLS_ACUITY_SCORE: 0
ADLS_ACUITY_SCORE: 25.25
ADLS_ACUITY_SCORE: 0
ADLS_ACUITY_SCORE: 25.25
ADLS_ACUITY_SCORE: 0

## 2024-11-02 NOTE — PROGRESS NOTES
Patient continues to want to sit in the recliner, refuses the bed. Tried twice earlier in shift, to raise the recliner legs, but patient pushed the legs back down and stood up. Around 1045, patient was sleeping in the recliner. This writer put the recliner legs up and placed pillow behind patients head. Patient appears to be comfortable. Has not attempted to push the recliner legs down. Tv off to assist in patient sleeping. Will continue with plan of care.

## 2024-11-02 NOTE — PROGRESS NOTES
[facility]    Medicine Progress Note - Hospitalist Service    Date of Admission:  10/12/2024    Assessment & Plan          Dominik Rojas is a 84 year old male with history of COPD on 2 to 3 L nasal cannula at home, chronic systolic heart failure, hypertension, hypothyroidism, anxiety, recent fall and left hip fracture s/p ORIF who was jsut discharged to TCU.  Patient was brought to ED for evaluation another fall at TCU with left hip pain. Xray showing postop changes without new acute changes.  Patient has had significant behavioral issues requiring 1:1 sitter. Memory care facility placement pending.          Mechanical fall  Left hip contusion without new fracture  Recent left hip fracture s/p ORIF  Had hip fracture surgery on 10/6. Was at TCU, and had a mechanical fall, with worse left hip pain after. Presented for eval.  CT Left femur no evidence of new fracture, small lateral left hip subcutaneous hematoma  Negative for DVT on US  Pain team was consulted to optimize medication  - Seen by Ortho: follow-up outpatient  - On Tylenol scheduled  - Had been very drowsy on opioids, opioids on hold for now  - Low dose robaxin changed to PRN  - Continue topical lidocaine     Anxiety and depression;  Dementia with behavioral change/agitation   Mood disorder  Acute toxic/metabolic encephalopathy  Initially improved and off 1:1. It got worse again and on 1:1 for the past several days. Unable to get him off this- he constantly tries to get up and walk.  No UTI  - Seen by Psych, trazodone 25 mg to help with sleep at bedtime  - Seroquel as needed for agitation  - Delirium precautions  - Continue suppertime melatonin to help with day/night reversal  - Plan memory care placement. Family ok with this, they have been struggling to care for him at home     Acute on chronic HFpEF  Hypervolemic.   Lasix was held on admission due to poor po intake, weight went up 10 lbs.   Recent Echo showing preserved EF  -  "Increase Lasix to 40 mg bid (since 11/1)  - Increased home losartan  - Changed home metoprolol from tartrate to succinate to help reduce pill burden  - Daily weight, Fluid restriction    Tachycardia, now resolved  -Cardiology was consulted, no a fib, no need for anticoagulation  -pt is not a good candidate for anticoagulation due to frequent falls  -HR now in control     COPD, Chronic hypoxic respiratory failure on 2-3l NC  -PTA meds  -Not in exacerbation. On home flow O2     Recent UTI  -PTA amoxicillin 500mg TID; finished course of abx;   -repeated UC 10/13 and again 10/23 mixed tai     History of RLL adenocarcinoma s/p radiation therapy, per wife he was to get EBUS and chemo but she did not think he could tolerate those     Normocytic anemia  - Hb baseline 10-11  - contusions and hematoma on the Left thigh  - Monitor Hb periodically     HLD: Crestor     GERD: PPI      Hypothyroidism  -recent TSH 6.93  -continue Synthroid   -recommend repeat TSH in 4 weeks with PCP. FT4 not useful for titrating Synthroid generally               Diet: Combination Diet Regular Diet Adult  Fluid restriction 1800 ML FLUID  Snacks/Supplements Adult: Ensure Enlive; Between Meals    DVT Prophylaxis: Enoxaparin (Lovenox) SQ  Cabral Catheter: Not present  Lines: None     Cardiac Monitoring: None  Code Status: No CPR- Do NOT Intubate      Clinically Significant Risk Factors                   # Hypertension: Noted on problem list    # Dementia: noted on problem list        # Obesity: Estimated body mass index is 30.02 kg/m  as calculated from the following:    Height as of this encounter: 1.702 m (5' 7\").    Weight as of this encounter: 87 kg (191 lb 11.2 oz).        # Financial/Environmental Concerns: none         Disposition Plan     Medically Ready for Discharge: Anticipated in 2-4 Days             Akanksha Crocker MD  Hospitalist Service  [facility]  Securely message with [facility]. (more info)  Text page via [employer] " [employer]   ______________________________________________________________________    Interval History   Patient has been refusing to sleep in bed. He was sleeping in the chair without the foot rest raised. He could only sleep for 2 hours a time, then standup and tried to walk. When seen this morning, he was lethargy and not able to answer much questions.     Physical Exam   Vital Signs: Temp: 98  F (36.7  C) Temp src: Oral BP: 136/67 Pulse: 70   Resp: 16 SpO2: 95 % O2 Device: None (Room air) Oxygen Delivery: 2 LPM  Weight: 191 lbs 11.2 oz    General appearance: not in acute distress  HEENT: PERRL, EOMI  Lungs: Clear breath sounds in bilateral lung fields  Cardiovascular: Regular rate and rhythm, normal S1-S2  Abdomen: Soft, non tender, no distension  Musculoskeletal: No joint swelling  Skin: No rash and no edema  Neurology: Awake and alert. Confused.  Cranial nerves II - XII normal.  Normal muscle strength in all four extremities.     Medical Decision Making       40 MINUTES SPENT BY ME on the date of service doing chart review, history, exam, documentation & further activities per the note.      Data         Imaging results reviewed over the past 24 hrs:   No results found for this or any previous visit (from the past 24 hours).

## 2024-11-02 NOTE — PLAN OF CARE
Problem: Adult Inpatient Plan of Care  Goal: Plan of Care Review  Description: The Plan of Care Review/Shift note should be completed every shift.  The Outcome Evaluation is a brief statement about your assessment that the patient is improving, declining, or no change.  This information will be displayed automatically on your shift  note.  Outcome: Progressing     Problem: Risk for Delirium  Goal: Optimal Coping  Outcome: Progressing     Problem: Pain Acute  Goal: Optimal Pain Control and Function  Outcome: Progressing  Intervention: Prevent or Manage Pain  Recent Flowsheet Documentation  Taken 11/1/2024 2000 by Corry Pineda RN  Sensory Stimulation Regulation:   lighting decreased   music on   quiet environment promoted   Goal Outcome Evaluation:         Patient slept about an hour up in a recliner this shift. Refused to go to bed. Attempted twice to elevate his legs but he only tolerates this a few minutes before he lowers them down. Restless when awake frequently standing up and then sitting back down. Requested food. Provided gram crackers.

## 2024-11-02 NOTE — PLAN OF CARE
Problem: Adult Inpatient Plan of Care  Goal: Plan of Care Review  Outcome: Progressing   Goal Outcome Evaluation:       Patient alert, forgetful. Up in chair majority of the day. Ambulated 75ft with assist of (1) using rolling walker. Gait slow and steady. Left hip incision dry pink in color with no drainage. 2-3+ edema to lower extremities. Patient refuses to elevate LE's. Ate 100% of his dinner. Ambulates to bathroom to urinate. Complains of LLE and back discomfort that is managed with scheduled tylenol and prn Robaxin. TCU at discharge  Gisele Rosario RN

## 2024-11-02 NOTE — PLAN OF CARE
Problem: Adult Inpatient Plan of Care  Goal: Absence of Hospital-Acquired Illness or Injury  Intervention: Identify and Manage Fall Risk  Recent Flowsheet Documentation  Taken 11/2/2024 1145 by Crespo, Diane, RN  Safety Promotion/Fall Prevention:   activity supervised   assistive device/personal items within reach   bedside attendant   clutter free environment maintained   lighting adjusted   nonskid shoes/slippers when out of bed   room near nurse's station     Problem: Adult Inpatient Plan of Care  Goal: Optimal Comfort and Wellbeing  Intervention: Monitor Pain and Promote Comfort  Recent Flowsheet Documentation  Taken 11/2/2024 1225 by Diane Crespo RN  Pain Management Interventions:   care clustered   distraction   emotional support     Problem: Risk for Delirium  Goal: Improved Attention and Thought Clarity  Intervention: Maximize Cognitive Function  Recent Flowsheet Documentation  Taken 11/2/2024 1145 by Diane Crespo RN  Sensory Stimulation Regulation: television on  Reorientation Measures:   calendar in view   clock in view   reorientation provided     Problem: Pain Acute  Goal: Optimal Pain Control and Function  Intervention: Develop Pain Management Plan  Recent Flowsheet Documentation  Taken 11/2/2024 1225 by Diane Crespo RN  Pain Management Interventions:   care clustered   distraction   emotional support  Intervention: Prevent or Manage Pain  Recent Flowsheet Documentation  Taken 11/2/2024 1145 by Diane Crespo RN  Sensory Stimulation Regulation: television on  Medication Review/Management: medications reviewed  Intervention: Optimize Psychosocial Wellbeing  Recent Flowsheet Documentation  Taken 11/2/2024 1145 by Diane Crespo RN  Supportive Measures: active listening utilized     Problem: Comorbidity Management  Goal: Maintenance of COPD Symptom Control  Intervention: Maintain COPD Symptom Control  Recent Flowsheet Documentation  Taken 11/2/2024 1145 by  Diane Crespo RN  Medication Review/Management: medications reviewed     Problem: Comorbidity Management  Goal: Maintenance of Behavioral Health Symptom Control  Intervention: Maintain Behavioral Health Symptom Control  Recent Flowsheet Documentation  Taken 11/2/2024 1145 by Diane Crespo RN  Medication Review/Management: medications reviewed     Problem: Delirium  Goal: Improved Attention and Thought Clarity  Intervention: Maximize Cognitive Function  Recent Flowsheet Documentation  Taken 11/2/2024 1145 by Diane Crespo RN  Sensory Stimulation Regulation: television on  Reorientation Measures:   calendar in view   clock in view   reorientation provided   Goal Outcome Evaluation:  Patient alert and oriented to self, place and time. Moving with assist of 1 using walker and gait belt. Up in chair during the day. Tachycardic. On baseline 2 L of oxygen via nasal cannula. Regular diet with 1800 ml fluid restriction. No IV access. Occasionally incontinent of bladder. Large BM today. Hip incision in open to air. Endorses minimal pain in L hip, scheduled tylenol given and lidocaine patch in place. PRN seroquel given in afternoon for anxiety and restlessness.

## 2024-11-02 NOTE — PLAN OF CARE
Problem: Adult Inpatient Plan of Care  Goal: Plan of Care Review  Description: The Plan of Care Review/Shift note should be completed every shift.  The Outcome Evaluation is a brief statement about your assessment that the patient is improving, declining, or no change.  This information will be displayed automatically on your shift  note.  11/2/2024 0512 by Corry Pineda RN  Outcome: Progressing  11/2/2024 0512 by Corry Pineda RN  Outcome: Progressing  11/1/2024 2301 by Corry Pineda RN  Outcome: Progressing     Problem: Risk for Delirium  Goal: Improved Sleep  11/2/2024 0512 by Corry Pineda RN  Outcome: Progressing  11/1/2024 2301 by Corry Pineda RN  Outcome: Progressing     Problem: Pain Acute  Goal: Optimal Pain Control and Function  11/2/2024 0512 by Corry Pineda RN  Outcome: Progressing  11/1/2024 2301 by Corry Pineda RN  Outcome: Progressing  Intervention: Prevent or Manage Pain  Recent Flowsheet Documentation  Taken 11/2/2024 0000 by Corry Pineda RN  Sensory Stimulation Regulation:   lighting decreased   music on   quiet environment promoted  Taken 11/1/2024 2000 by Corry Pineda RN  Sensory Stimulation Regulation:   lighting decreased   music on   quiet environment promoted   Goal Outcome Evaluation:         Patient slept 2 hours at a time. A little restless and impulsive but cooperative and redirectable when awake, standing up from his recliner frequently to adjust himself. He refused several offers to have him sleep in the bed. Attempted several times to raise his feet in the recliner but every time he would lower the feet on his own. Had snack over night. Disorientated to situation.

## 2024-11-02 NOTE — PLAN OF CARE
Goal Outcome Evaluation:      Patient continues in 1 to 1 care with sitter for patient safety. Patient refuses to lay in bed. Sits up in recliner and does sleep at times. Patient has stood up on his own and redirected to sit down. Cooperative with redirection. Patient did complain of left hip pain as well as low back pain. Was medicated earlier with Tylenol. Robaxin x1 given. See mar. Patient resting at this time. Continue with plan of care.

## 2024-11-03 PROCEDURE — 250N000013 HC RX MED GY IP 250 OP 250 PS 637: Performed by: STUDENT IN AN ORGANIZED HEALTH CARE EDUCATION/TRAINING PROGRAM

## 2024-11-03 PROCEDURE — 250N000013 HC RX MED GY IP 250 OP 250 PS 637: Performed by: REGISTERED NURSE

## 2024-11-03 PROCEDURE — 99233 SBSQ HOSP IP/OBS HIGH 50: CPT | Performed by: INTERNAL MEDICINE

## 2024-11-03 PROCEDURE — 250N000013 HC RX MED GY IP 250 OP 250 PS 637: Performed by: HOSPITALIST

## 2024-11-03 PROCEDURE — 250N000011 HC RX IP 250 OP 636: Performed by: INTERNAL MEDICINE

## 2024-11-03 PROCEDURE — 250N000013 HC RX MED GY IP 250 OP 250 PS 637: Performed by: INTERNAL MEDICINE

## 2024-11-03 PROCEDURE — 120N000001 HC R&B MED SURG/OB

## 2024-11-03 RX ORDER — FUROSEMIDE 10 MG/ML
40 INJECTION INTRAMUSCULAR; INTRAVENOUS
Status: DISCONTINUED | OUTPATIENT
Start: 2024-11-03 | End: 2024-11-09

## 2024-11-03 RX ADMIN — ACETAMINOPHEN 650 MG: 325 TABLET ORAL at 08:05

## 2024-11-03 RX ADMIN — Medication 3 MG: at 23:55

## 2024-11-03 RX ADMIN — MICONAZOLE NITRATE: 20 POWDER TOPICAL at 20:49

## 2024-11-03 RX ADMIN — QUETIAPINE FUMARATE 12.5 MG: 25 TABLET ORAL at 14:06

## 2024-11-03 RX ADMIN — ACETAMINOPHEN 650 MG: 325 TABLET ORAL at 11:43

## 2024-11-03 RX ADMIN — BUSPIRONE HYDROCHLORIDE 5 MG: 5 TABLET ORAL at 08:09

## 2024-11-03 RX ADMIN — ACETAMINOPHEN 650 MG: 325 TABLET ORAL at 17:08

## 2024-11-03 RX ADMIN — BUSPIRONE HYDROCHLORIDE 5 MG: 5 TABLET ORAL at 20:49

## 2024-11-03 RX ADMIN — LATANOPROST 1 DROP: 50 SOLUTION/ DROPS OPHTHALMIC at 20:49

## 2024-11-03 RX ADMIN — LEVOTHYROXINE SODIUM 88 MCG: 88 TABLET ORAL at 08:09

## 2024-11-03 RX ADMIN — ACETAMINOPHEN 650 MG: 325 TABLET ORAL at 21:41

## 2024-11-03 RX ADMIN — HYDROXYZINE HYDROCHLORIDE 25 MG: 25 TABLET, FILM COATED ORAL at 18:51

## 2024-11-03 RX ADMIN — MICONAZOLE NITRATE 1 APPLICATOR: 20 POWDER TOPICAL at 08:11

## 2024-11-03 RX ADMIN — FUROSEMIDE 40 MG: 10 INJECTION, SOLUTION INTRAVENOUS at 17:08

## 2024-11-03 RX ADMIN — ACETAMINOPHEN 650 MG: 325 TABLET ORAL at 14:06

## 2024-11-03 RX ADMIN — PANTOPRAZOLE SODIUM 40 MG: 40 TABLET, DELAYED RELEASE ORAL at 08:07

## 2024-11-03 RX ADMIN — QUETIAPINE FUMARATE 12.5 MG: 25 TABLET ORAL at 20:49

## 2024-11-03 RX ADMIN — ASPIRIN 81 MG CHEWABLE TABLET 81 MG: 81 TABLET CHEWABLE at 08:05

## 2024-11-03 RX ADMIN — TRAZODONE HYDROCHLORIDE 25 MG: 50 TABLET ORAL at 20:49

## 2024-11-03 RX ADMIN — FUROSEMIDE 40 MG: 20 TABLET ORAL at 08:09

## 2024-11-03 RX ADMIN — Medication 5 MG: at 16:28

## 2024-11-03 RX ADMIN — HYDROXYZINE HYDROCHLORIDE 25 MG: 25 TABLET, FILM COATED ORAL at 09:48

## 2024-11-03 RX ADMIN — QUETIAPINE FUMARATE 12.5 MG: 25 TABLET ORAL at 08:46

## 2024-11-03 RX ADMIN — SENNOSIDES AND DOCUSATE SODIUM 1 TABLET: 8.6; 5 TABLET ORAL at 20:50

## 2024-11-03 RX ADMIN — SENNOSIDES AND DOCUSATE SODIUM 1 TABLET: 8.6; 5 TABLET ORAL at 08:12

## 2024-11-03 RX ADMIN — POLYETHYLENE GLYCOL 3350 17 G: 17 POWDER, FOR SOLUTION ORAL at 08:05

## 2024-11-03 RX ADMIN — DULOXETINE HYDROCHLORIDE 60 MG: 60 CAPSULE, DELAYED RELEASE PELLETS ORAL at 08:05

## 2024-11-03 RX ADMIN — LOSARTAN POTASSIUM 50 MG: 50 TABLET, FILM COATED ORAL at 08:07

## 2024-11-03 RX ADMIN — METOPROLOL SUCCINATE 50 MG: 50 TABLET, EXTENDED RELEASE ORAL at 08:09

## 2024-11-03 RX ADMIN — ROSUVASTATIN 10 MG: 10 TABLET, FILM COATED ORAL at 20:49

## 2024-11-03 RX ADMIN — METHOCARBAMOL TABLETS 250 MG: 500 TABLET, COATED ORAL at 23:55

## 2024-11-03 ASSESSMENT — ACTIVITIES OF DAILY LIVING (ADL)
ADLS_ACUITY_SCORE: 0
ADLS_ACUITY_SCORE: 25.25
ADLS_ACUITY_SCORE: 27.25
ADLS_ACUITY_SCORE: 0
ADLS_ACUITY_SCORE: 25.25
ADLS_ACUITY_SCORE: 27.25
ADLS_ACUITY_SCORE: 0
ADLS_ACUITY_SCORE: 27.25
ADLS_ACUITY_SCORE: 0
ADLS_ACUITY_SCORE: 0

## 2024-11-03 NOTE — PROGRESS NOTES
Discontinued 1:1 at 9am this morning. Patient calm up in chair with alarm on. Will monitor closely.  Gisele Rosario RN

## 2024-11-03 NOTE — PLAN OF CARE
Problem: Adult Inpatient Plan of Care  Goal: Plan of Care Review  Description: The Plan of Care Review/Shift note should be completed every shift.  The Outcome Evaluation is a brief statement about your assessment that the patient is improving, declining, or no change.  This information will be displayed automatically on your shift  note.  Outcome: Progressing   Goal Outcome Evaluation:         Patient slept well over night. He started sleeping in the chair but did sleep some in his bed. Remained a 1:1 for safety and impulsiveness. Medicated at  with prn Seroquel. Calm and cooperative between cares. VSS.

## 2024-11-03 NOTE — PLAN OF CARE
"Goal Outcome Evaluation:      Plan of Care Reviewed With: patient    Overall Patient Progress: improvingOverall Patient Progress: improving       Patient alert, intermittent confusion. Has periods of \"loneliness. Please stay with me. I don't want to be alone\"  Reassurance provided. Patient able to watch staff near nursing station when up in his chair. 1:1 discontinued. After tray set-up, consumed 100% of his breakfast. Assist of (1) with mobility. Ambulated in hallway with staff assistance and with the use of a walker. Does inform staff when needing to use the bathroom. 2-3+ edema LE's/right hand. Incision to left hip has healed nicely. Discomfort is managed with scheduled tylenol and lidocaine patch. TCU at discharge.  Gisele Rosario RN    "

## 2024-11-03 NOTE — PROGRESS NOTES
[facility]    Medicine Progress Note - Hospitalist Service    Date of Admission:  10/12/2024    Assessment & Plan          Dominik Rojas is a 84 year old male with history of COPD on 2 to 3 L nasal cannula at home, chronic systolic heart failure, hypertension, hypothyroidism, anxiety, recent fall and left hip fracture s/p ORIF who was jsut discharged to TCU.  Patient was brought to ED for evaluation another fall at TCU with left hip pain. Xray showing postop changes without new acute changes.  Patient has had significant behavioral issues requiring 1:1 sitter. Memory care facility placement pending.          Mechanical fall  Left hip contusion without new fracture  Recent left hip fracture s/p ORIF  Had hip fracture surgery on 10/6. Was at TCU, and had a mechanical fall, with worse left hip pain after. Presented for eval.  CT Left femur no evidence of new fracture, small lateral left hip subcutaneous hematoma  Negative for DVT on US  Pain team was consulted to optimize medication  - Seen by Ortho: follow-up outpatient  - On Tylenol scheduled  - Had been very drowsy on opioids, opioids on hold for now  - Low dose robaxin changed to PRN  - Continue topical lidocaine     Anxiety and depression;  Dementia with behavioral change/agitation   Mood disorder  Acute toxic/metabolic encephalopathy  Initially improved and off 1:1. It got worse again and on 1:1 for the past several days. Unable to get him off this- he constantly tries to get up and walk.  No UTI  - Seen by Psych, trazodone 25 mg to help with sleep at bedtime  - Seroquel as needed for agitation  - Delirium precautions  - Continue suppertime melatonin to help with day/night reversal  - Plan memory care placement. Family ok with this, they have been struggling to care for him at home     Acute on chronic HFpEF  Hypervolemic.   Lasix was held on admission due to poor po intake, weight went up 10 lbs.   Recent Echo showing preserved EF  -  "Had Lasix oral 40 mg bid for 2 days. Remains swollen. Change to IV today (11/3).   - Increased home losartan  - Changed home metoprolol from tartrate to succinate to help reduce pill burden  - Daily weight, Fluid restriction    Tachycardia, now resolved  -Cardiology was consulted, no a fib, no need for anticoagulation  -pt is not a good candidate for anticoagulation due to frequent falls  -HR now in control     COPD, Chronic hypoxic respiratory failure on 2-3l NC  -PTA meds  -Not in exacerbation. On home flow O2     Recent UTI  -PTA amoxicillin 500mg TID; finished course of abx;   -repeated UC 10/13 and again 10/23 mixed tai     History of RLL adenocarcinoma s/p radiation therapy, per wife he was to get EBUS and chemo but she did not think he could tolerate those     Normocytic anemia  - Hb baseline 10-11  - contusions and hematoma on the Left thigh  - Monitor Hb periodically     HLD: Crestor     GERD: PPI      Hypothyroidism  -recent TSH 6.93  -continue Synthroid   -recommend repeat TSH in 4 weeks with PCP. FT4 not useful for titrating Synthroid generally               Diet: Combination Diet Regular Diet Adult  Fluid restriction 1800 ML FLUID  Snacks/Supplements Adult: Ensure Enlive; Between Meals    DVT Prophylaxis: Enoxaparin (Lovenox) SQ  Cabral Catheter: Not present  Lines: None     Cardiac Monitoring: None  Code Status: No CPR- Do NOT Intubate      Clinically Significant Risk Factors                   # Hypertension: Noted on problem list    # Dementia: noted on problem list        # Obesity: Estimated body mass index is 30.02 kg/m  as calculated from the following:    Height as of this encounter: 1.702 m (5' 7\").    Weight as of this encounter: 87 kg (191 lb 11.2 oz).        # Financial/Environmental Concerns: none         Disposition Plan     Medically Ready for Discharge: Anticipated in 2-4 Days        Akanksha Crocker MD  Hospitalist Service  [facility]  Securely message with [employer] " (more info)  Text page via [facility] Paging/Directory   ______________________________________________________________________    Interval History   Patient sitting up in the chair. He does not want to go back to the bed. He does not like being alone.     Physical Exam   Vital Signs: Temp: 97.9  F (36.6  C) Temp src: Oral BP: (!) 144/90 Pulse: 88   Resp: 18 SpO2: 91 % O2 Device: None (Room air) Oxygen Delivery: 2 LPM  Weight: 191 lbs 11.2 oz    General appearance: not in acute distress  HEENT: PERRL, EOMI  Lungs: Clear breath sounds in bilateral lung fields  Cardiovascular: Regular rate and rhythm, normal S1-S2  Abdomen: Soft, non tender, no distension  Musculoskeletal: No joint swelling  Skin: No rash. He has significant edema on bilateral arms and legs.  Neurology: Awake and alert. Confused.  Cranial nerves II - XII normal.  Normal muscle strength in all four extremities.     Medical Decision Making       35 MINUTES SPENT BY ME on the date of service doing chart review, history, exam, documentation & further activities per the note.      Data         Imaging results reviewed over the past 24 hrs:   No results found for this or any previous visit (from the past 24 hours).

## 2024-11-03 NOTE — PROGRESS NOTES
"Patient restless this evening yelling out for \"Rose\" continuously.  Up in down from the chair and will immediately sit back down when alarm goes off. Updated MD and new orders received.   Gisele Rosario RN  "

## 2024-11-04 LAB
CREAT SERPL-MCNC: 0.86 MG/DL (ref 0.67–1.17)
EGFRCR SERPLBLD CKD-EPI 2021: 86 ML/MIN/1.73M2
HOLD SPECIMEN: NORMAL

## 2024-11-04 PROCEDURE — 82565 ASSAY OF CREATININE: CPT | Performed by: HOSPITALIST

## 2024-11-04 PROCEDURE — 250N000013 HC RX MED GY IP 250 OP 250 PS 637: Performed by: INTERNAL MEDICINE

## 2024-11-04 PROCEDURE — 99232 SBSQ HOSP IP/OBS MODERATE 35: CPT | Performed by: INTERNAL MEDICINE

## 2024-11-04 PROCEDURE — 250N000011 HC RX IP 250 OP 636: Performed by: HOSPITALIST

## 2024-11-04 PROCEDURE — 250N000011 HC RX IP 250 OP 636: Performed by: INTERNAL MEDICINE

## 2024-11-04 PROCEDURE — 250N000013 HC RX MED GY IP 250 OP 250 PS 637: Performed by: STUDENT IN AN ORGANIZED HEALTH CARE EDUCATION/TRAINING PROGRAM

## 2024-11-04 PROCEDURE — 120N000001 HC R&B MED SURG/OB

## 2024-11-04 PROCEDURE — 36415 COLL VENOUS BLD VENIPUNCTURE: CPT | Performed by: HOSPITALIST

## 2024-11-04 PROCEDURE — 250N000013 HC RX MED GY IP 250 OP 250 PS 637: Performed by: REGISTERED NURSE

## 2024-11-04 PROCEDURE — 250N000013 HC RX MED GY IP 250 OP 250 PS 637: Performed by: HOSPITALIST

## 2024-11-04 RX ORDER — MORPHINE SULFATE 2 MG/ML
1 INJECTION, SOLUTION INTRAMUSCULAR; INTRAVENOUS
Status: DISCONTINUED | OUTPATIENT
Start: 2024-11-04 | End: 2024-11-05

## 2024-11-04 RX ADMIN — LEVOTHYROXINE SODIUM 88 MCG: 88 TABLET ORAL at 07:53

## 2024-11-04 RX ADMIN — DULOXETINE HYDROCHLORIDE 60 MG: 60 CAPSULE, DELAYED RELEASE PELLETS ORAL at 07:56

## 2024-11-04 RX ADMIN — FUROSEMIDE 40 MG: 10 INJECTION, SOLUTION INTRAVENOUS at 17:05

## 2024-11-04 RX ADMIN — ROSUVASTATIN 10 MG: 10 TABLET, FILM COATED ORAL at 21:07

## 2024-11-04 RX ADMIN — LATANOPROST 1 DROP: 50 SOLUTION/ DROPS OPHTHALMIC at 21:09

## 2024-11-04 RX ADMIN — QUETIAPINE FUMARATE 12.5 MG: 25 TABLET ORAL at 03:55

## 2024-11-04 RX ADMIN — TRAZODONE HYDROCHLORIDE 25 MG: 50 TABLET ORAL at 21:07

## 2024-11-04 RX ADMIN — POLYETHYLENE GLYCOL 3350 17 G: 17 POWDER, FOR SOLUTION ORAL at 08:00

## 2024-11-04 RX ADMIN — LIDOCAINE 1 PATCH: 4 PATCH TOPICAL at 12:34

## 2024-11-04 RX ADMIN — Medication 5 MG: at 17:05

## 2024-11-04 RX ADMIN — PANTOPRAZOLE SODIUM 40 MG: 40 TABLET, DELAYED RELEASE ORAL at 07:53

## 2024-11-04 RX ADMIN — METOPROLOL SUCCINATE 50 MG: 50 TABLET, EXTENDED RELEASE ORAL at 08:04

## 2024-11-04 RX ADMIN — ASPIRIN 81 MG CHEWABLE TABLET 81 MG: 81 TABLET CHEWABLE at 07:54

## 2024-11-04 RX ADMIN — MICONAZOLE NITRATE: 20 POWDER TOPICAL at 21:09

## 2024-11-04 RX ADMIN — BUSPIRONE HYDROCHLORIDE 5 MG: 5 TABLET ORAL at 21:07

## 2024-11-04 RX ADMIN — LOSARTAN POTASSIUM 50 MG: 50 TABLET, FILM COATED ORAL at 08:04

## 2024-11-04 RX ADMIN — MICONAZOLE NITRATE: 20 POWDER TOPICAL at 08:12

## 2024-11-04 RX ADMIN — MORPHINE SULFATE 1 MG: 2 INJECTION, SOLUTION INTRAMUSCULAR; INTRAVENOUS at 00:53

## 2024-11-04 RX ADMIN — ACETAMINOPHEN 650 MG: 325 TABLET ORAL at 17:05

## 2024-11-04 RX ADMIN — SENNOSIDES AND DOCUSATE SODIUM 1 TABLET: 8.6; 5 TABLET ORAL at 21:07

## 2024-11-04 RX ADMIN — HYDROXYZINE HYDROCHLORIDE 25 MG: 25 TABLET, FILM COATED ORAL at 01:27

## 2024-11-04 RX ADMIN — ACETAMINOPHEN 650 MG: 325 TABLET ORAL at 21:07

## 2024-11-04 RX ADMIN — FUROSEMIDE 40 MG: 10 INJECTION, SOLUTION INTRAVENOUS at 08:05

## 2024-11-04 RX ADMIN — BUSPIRONE HYDROCHLORIDE 5 MG: 5 TABLET ORAL at 08:00

## 2024-11-04 RX ADMIN — ACETAMINOPHEN 650 MG: 325 TABLET ORAL at 14:15

## 2024-11-04 RX ADMIN — ACETAMINOPHEN 650 MG: 325 TABLET ORAL at 10:48

## 2024-11-04 RX ADMIN — SENNOSIDES AND DOCUSATE SODIUM 1 TABLET: 8.6; 5 TABLET ORAL at 07:58

## 2024-11-04 RX ADMIN — ACETAMINOPHEN 650 MG: 325 TABLET ORAL at 05:23

## 2024-11-04 ASSESSMENT — ACTIVITIES OF DAILY LIVING (ADL)
ADLS_ACUITY_SCORE: 27.25
ADLS_ACUITY_SCORE: 27.25
ADLS_ACUITY_SCORE: 25.25
ADLS_ACUITY_SCORE: 25.25
ADLS_ACUITY_SCORE: 27.25
ADLS_ACUITY_SCORE: 25.25
ADLS_ACUITY_SCORE: 27.25
ADLS_ACUITY_SCORE: 25.25
ADLS_ACUITY_SCORE: 27.25
ADLS_ACUITY_SCORE: 25.25
ADLS_ACUITY_SCORE: 27.25
ADLS_ACUITY_SCORE: 25.25
ADLS_ACUITY_SCORE: 27.25
ADLS_ACUITY_SCORE: 25.25
ADLS_ACUITY_SCORE: 27.25

## 2024-11-04 NOTE — PROGRESS NOTES
[facility]    Medicine Progress Note - Hospitalist Service    Date of Admission:  10/12/2024    Assessment & Plan          Dominik Rojas is a 84 year old male with history of COPD on 2 to 3 L nasal cannula at home, chronic systolic heart failure, hypertension, hypothyroidism, anxiety, recent fall and left hip fracture s/p ORIF who was jsut discharged to TCU.  Patient was brought to ED for evaluation another fall at TCU with left hip pain. Xray showing postop changes without new acute changes.  Patient has had significant behavioral issues requiring 1:1 sitter intermittently. Memory care facility placement pending. It has been challenging due to financial issues.  Currently, he has some swelling of his feet and on diuresis.          Mechanical fall  Left hip contusion without new fracture  Recent left hip fracture s/p ORIF  Had hip fracture surgery on 10/6. Was at TCU, and had a mechanical fall, with worse left hip pain after. Presented for eval.  CT Left femur no evidence of new fracture, small lateral left hip subcutaneous hematoma  Negative for DVT on US on 10/12.   Pain team was consulted to optimize medication  - Seen by Ortho: follow-up outpatient  - On Tylenol scheduled  - Had been very drowsy on opioids, opioids on hold for now  - Low dose robaxin changed to PRN  - Continue topical lidocaine     Anxiety and depression;  Dementia with behavioral change/agitation   Mood disorder  Acute toxic/metabolic encephalopathy  Has been needing bedside sitter intermittently. Currently weaned off since 11/3. Patient often stands up and tries to walk. He has fall risks. He also does not like being alone.   No UTI.  - Seen by Psych, trazodone 25 mg to help with sleep at bedtime  - Seroquel as needed for agitation  - Delirium precautions  - Continue suppertime melatonin to help with day/night reversal  - Plan memory care placement. Family ok with this, they have been struggling to care for him at  home.     Acute on chronic HFpEF  Hypervolemic.   Lasix was held on admission due to poor po intake, weight went up 10 lbs.   Recent Echo showing preserved EF  - Had Lasix oral 40 mg bid for 2 days. Remains swollen. Change to IV on 11/3. Monitor fluid status.   - Increased home losartan  - Changed home metoprolol from tartrate to succinate to help reduce pill burden  - Daily weight, Fluid restriction    Tachycardia, now resolved  -Cardiology was consulted, no a fib, no need for anticoagulation  -pt is not a good candidate for anticoagulation due to frequent falls  -HR now in control     COPD, Chronic hypoxic respiratory failure on 2-3l NC  -PTA meds  -Not in exacerbation. On home flow O2     Recent UTI  -PTA amoxicillin 500mg TID; finished course of abx. Repeated UC 10/13 and again 10/23 with mixed tai.     History of RLL adenocarcinoma s/p radiation therapy: per wife he was to get EBUS and chemo but she did not think he could tolerate those     Normocytic anemia  - Hb baseline 10-11  - contusions and hematoma on the Left thigh  - Monitor Hb periodically     HLD: Crestor     GERD: PPI      Hypothyroidism  -recent TSH 6.93  -continue Synthroid   -recommend repeat TSH in 4 weeks with PCP. FT4 not useful for titrating Synthroid generally     DVT prophylaxis: He has significant bruises while on Lovenox so that it was discontinued. Patient likes to sit up in the chair all the time.     I called his wife today. Message to james back left.           Diet: Combination Diet Regular Diet Adult  Fluid restriction 1800 ML FLUID  Snacks/Supplements Adult: Ensure Enlive; Between Meals    DVT Prophylaxis: Enoxaparin (Lovenox) SQ  Cabral Catheter: Not present  Lines: None     Cardiac Monitoring: None  Code Status: No CPR- Do NOT Intubate      Clinically Significant Risk Factors                   # Hypertension: Noted on problem list    # Dementia: noted on problem list        # Obesity: Estimated body mass index is 30.02 kg/m  as  "calculated from the following:    Height as of this encounter: 1.702 m (5' 7\").    Weight as of this encounter: 87 kg (191 lb 11.2 oz).        # Financial/Environmental Concerns: none         Disposition Plan     Medically Ready for Discharge: Anticipated in 2-4 Days        Akanksha Crocker MD  Hospitalist Service  [facility]  Securely message with [employer] (more info)  Text page via [employer] Paging/Directory   ______________________________________________________________________    Interval History   Patient sitting up in the chair, like he normally does. He had no complaints. He tends to holding on staff when they are leaving and wants them to stay with him. He has been off bedside sitter intermittently.     Physical Exam   Vital Signs: Temp: 98.4  F (36.9  C) Temp src: Oral BP: 129/79 Pulse: 88   Resp: 18 SpO2: 92 % O2 Device: Nasal cannula Oxygen Delivery: 2 LPM  Weight: 191 lbs 11.2 oz    General appearance: not in acute distress  HEENT: PERRL, EOMI  Lungs: Clear breath sounds in bilateral lung fields  Cardiovascular: Regular rate and rhythm, normal S1-S2  Abdomen: Soft, non tender, no distension  Musculoskeletal: No joint swelling  Skin: No rash. He has significant edema on bilateral arms and legs.  Neurology: Awake and alert. Confused.  Cranial nerves II - XII normal.  Normal muscle strength in all four extremities.     Medical Decision Making       34 MINUTES SPENT BY ME on the date of service doing chart review, history, exam, documentation & further activities per the note.      Data     I have personally reviewed the following data over the past 24 hrs:    N/A  \   N/A   / N/A     N/A N/A N/A /  N/A   N/A N/A 0.86 \       Imaging results reviewed over the past 24 hrs:   No results found for this or any previous visit (from the past 24 hours).  "

## 2024-11-04 NOTE — PROGRESS NOTES
Care Management Follow Up    Length of Stay (days): 23    Expected Discharge Date: 11/08/2024    Anticipated Discharge Plan:  Transitional Care    Transportation: Anticipate medical transport    PT Recommendations: Transitional Care Facility, Per plan established by the PT  OT Recommendations:  Transitional Care Facility     Barriers to Discharge: placement, on 1:1    Prior Living Situation: house with spouse    Discussed  Partnership in Safe Discharge Planning  document with patient/family: Yes: wife Jovana     Handoff Completed: No, handoff not indicated or clinically appropriate    Patient/Spokesperson Updated: Yes. Who? Wife Jovana    Additional Information:  Pt was taken off 1:1 around 1130 this morning. Pt is needed TCU versus LTC/Memory Care placement. Pt case has been escalated to CM leadership for assistance given the multiple barriers with finding placement for discharge.    12:30 PM  SW called Pt's wife Jovana and had an extensive conversation about discharge planning. Jovana and her daughters have set-up a meeting with an Elder Law  on 11/19 to discuss funding and options to pay for memory care placement for Pt. GALLO tried to offer placing a referral for Financial Counseling to reach out to Jovana to discuss the possibility of qualifying for MA as Jovana reported that she would not be able to afford LTC/memory care placement. Jovana declined and stated that she wants to meet with the elder law  first. GALLO explained that CM has to continue to work towards a discharge plan especially since Pt has been off the 1:1 sitter since 1130 today. Jovana reported that she really hopes to find something nearby their home and [facility] would be her first preference. [facility] has already declined Pt's referrals. GALLO explained that it will be difficult to find accepting TCU placement for Pt given his confusion and history of agitation. SW continued to explain that CM will try to find as  close of facility as possible but CM may have to broaden the search in order to find an accepting facility. Jovana reported that she [place] not drive on the freeways but understands why CM would have to continue to expand the search. Jovana expressed how overwhelming things have been with Pt the last few years and she cannot take care of Pt anymore.  GALLO reported that CM will continue to work on placement but will also need her to participate with discharge planning and working to find a payer source for LTC/MC placement. Jovana reported that she will work with the elder law  on this. Jovana reminded CM that she does work part-time still. Jovana oftentimes has Mondays and Wednesdays off. Jovana states her schedule can change but she mostly works afternoon/evening shifts where she is unavailable after 1400 every other day excepts Mondays and Wednesdays. GALLO reported CM will continue to work towards finding placement as long as Pt is able to remain stable off of the 1:1 sitter.    2:24 PM  SW called and spoke to [place] liaison Melanie about the possibility of Pt returning to [facility] TCU with the potential to transition to LTC/MC placement. Melanie reported that [facility] thinks Pt needs memory care now which the facility does not currently have any availability in their memory care. Melanie stated that [facility] also does not have any memory care availability at this time either. GALLO called and spoke to Mauri at [facility] to inquire about their TCU and LTC/MC availability. Mauri reported that they do not have a locked memory care unit at their facility but they have a [facility] in their LTC unit. They do not have LTC availability at this time but could reconsider Pt if he remains stable off of the 1:1 sitter. GALLO reported CM will follow up pending Pt's medical progression.    Next Steps: CM to follow up with the Care Team and family to continue to work on finding  placement for discharge.       DIRK Cruz

## 2024-11-04 NOTE — PLAN OF CARE
"  Problem: Pain Acute  Goal: Optimal Pain Control and Function  Outcome: Not Progressing  Intervention: Develop Pain Management Plan  Recent Flowsheet Documentation  Taken 11/3/2024 0604 by Sisi Ortiz RN  Pain Management Interventions: medication (see MAR)     Problem: Fall Injury Risk  Goal: Absence of Fall and Fall-Related Injury  Outcome: Progressing     Problem: Mobility Impairment  Goal: Optimal Mobility  Outcome: Progressing     Problem: Gas Exchange Impaired  Goal: Optimal Gas Exchange  Outcome: Progressing   Goal Outcome Evaluation:    Continuation of 1:1 for safety. Frequently restless and unable to get comfortable. Will reposition often and stand up. Prefers to sit  in recliner with leg/foot rest down. Multiple attempts were made to get patient into bed but refused \"I can't lay here like this\". Has continuous dull aching pain in left leg. PRN Robaxin, atarax and IV Morphine given along with warm packs and essential oil. Seroquel and Melatonin given to help with sleep and agitation/frustration. Seroquel effective with agitation. Sleep has been poor. Using oxygen 2 L/nc which is baseline for patient. Occasional loose nonproductive cough. Breath sounds clear with mild wheezing in RLL. Edema present in bilateral upper and lower extremities. Ambulated from recliner to bathroom. Fluid restriction 1800 ml; drank 240 ml. Large urinary incontinence in brief pad x 1.              "

## 2024-11-04 NOTE — PLAN OF CARE
"Goal Outcome Evaluation:      Problem: Risk for Delirium  Goal: Improved Behavioral Control  Intervention: Prevent and Manage Agitation  Recent Flowsheet Documentation  Taken 11/4/2024 0810 by Raissa Harley RN  Environment Familiarity/Consistency: daily routine followed     Problem: Pain Acute  Goal: Optimal Pain Control and Function  Outcome: Progressing  Intervention: Develop Pain Management Plan  Recent Flowsheet Documentation  Taken 11/4/2024 1048 by Raissa Harley RN  Pain Management Interventions: medication (see MAR)  Taken 11/4/2024 0803 by Raissa Harley RN  Pain Management Interventions:   distraction   diversional activity provided   emotional support   medication offered but refused   pillow support provided   repositioned   rest  Intervention: Prevent or Manage Pain  Recent Flowsheet Documentation  Taken 11/4/2024 0810 by Raissa Harley RN  Sensory Stimulation Regulation:   care clustered   quiet environment promoted  Bowel Elimination Promotion:   adequate fluid intake promoted   ambulation promoted  Medication Review/Management: medications reviewed  Intervention: Optimize Psychosocial Wellbeing  Recent Flowsheet Documentation  Taken 11/4/2024 0810 by Raissa Harley RN  Supportive Measures:   active listening utilized   positive reinforcement provided       Patient continues to be alert to self and intermittently place.  Tired today as reportedly was awake most of the night.  Has periods of \"loneliness. Please stay with me. I don't want to be alone\" Reassurance provided. Patient able to watch staff near nursing station when up in his chair. 1:1 discontinued at 11:30 am. Ate 100% of his breakfast and 50% lunch. Assist of (1) with mobility with walker and gait belt.  Refused activity today.  Stood to allow incontinence cares and change of brief.  2 large wet briefs today. No other activity and became irritable when activity was suggested. 2-3+ edema LE's/right hand. Incision to left hip has healed " nicely. Discomfort is managed with scheduled tylenol and lidocaine patch. TCU at discharge.

## 2024-11-04 NOTE — PROGRESS NOTES
Off 1:1 sitter.  Chair alarm on, call light within reach, room near nurses station with door open, up in chair.

## 2024-11-05 LAB
ANION GAP SERPL CALCULATED.3IONS-SCNC: 10 MMOL/L (ref 7–15)
BUN SERPL-MCNC: 28 MG/DL (ref 8–23)
CALCIUM SERPL-MCNC: 8.9 MG/DL (ref 8.8–10.4)
CHLORIDE SERPL-SCNC: 97 MMOL/L (ref 98–107)
CREAT SERPL-MCNC: 0.85 MG/DL (ref 0.67–1.17)
EGFRCR SERPLBLD CKD-EPI 2021: 86 ML/MIN/1.73M2
GLUCOSE SERPL-MCNC: 94 MG/DL (ref 70–99)
HCO3 SERPL-SCNC: 32 MMOL/L (ref 22–29)
HOLD SPECIMEN: NORMAL
POTASSIUM SERPL-SCNC: 3.8 MMOL/L (ref 3.4–5.3)
SODIUM SERPL-SCNC: 139 MMOL/L (ref 135–145)

## 2024-11-05 PROCEDURE — 250N000011 HC RX IP 250 OP 636: Performed by: INTERNAL MEDICINE

## 2024-11-05 PROCEDURE — 99233 SBSQ HOSP IP/OBS HIGH 50: CPT | Performed by: STUDENT IN AN ORGANIZED HEALTH CARE EDUCATION/TRAINING PROGRAM

## 2024-11-05 PROCEDURE — 36415 COLL VENOUS BLD VENIPUNCTURE: CPT | Performed by: INTERNAL MEDICINE

## 2024-11-05 PROCEDURE — 250N000009 HC RX 250: Performed by: INTERNAL MEDICINE

## 2024-11-05 PROCEDURE — 80048 BASIC METABOLIC PNL TOTAL CA: CPT | Performed by: INTERNAL MEDICINE

## 2024-11-05 PROCEDURE — 250N000013 HC RX MED GY IP 250 OP 250 PS 637: Performed by: INTERNAL MEDICINE

## 2024-11-05 PROCEDURE — 250N000013 HC RX MED GY IP 250 OP 250 PS 637: Performed by: HOSPITALIST

## 2024-11-05 PROCEDURE — 120N000001 HC R&B MED SURG/OB

## 2024-11-05 PROCEDURE — 82565 ASSAY OF CREATININE: CPT | Performed by: INTERNAL MEDICINE

## 2024-11-05 PROCEDURE — 94640 AIRWAY INHALATION TREATMENT: CPT

## 2024-11-05 PROCEDURE — 250N000013 HC RX MED GY IP 250 OP 250 PS 637: Performed by: REGISTERED NURSE

## 2024-11-05 PROCEDURE — 999N000157 HC STATISTIC RCP TIME EA 10 MIN

## 2024-11-05 PROCEDURE — 250N000013 HC RX MED GY IP 250 OP 250 PS 637: Performed by: STUDENT IN AN ORGANIZED HEALTH CARE EDUCATION/TRAINING PROGRAM

## 2024-11-05 RX ORDER — HYDROMORPHONE HCL IN WATER/PF 6 MG/30 ML
.2-.4 PATIENT CONTROLLED ANALGESIA SYRINGE INTRAVENOUS
Status: DISCONTINUED | OUTPATIENT
Start: 2024-11-05 | End: 2024-11-06

## 2024-11-05 RX ADMIN — FUROSEMIDE 40 MG: 10 INJECTION, SOLUTION INTRAVENOUS at 09:05

## 2024-11-05 RX ADMIN — LIDOCAINE 1 PATCH: 4 PATCH TOPICAL at 11:03

## 2024-11-05 RX ADMIN — HYDROXYZINE HYDROCHLORIDE 25 MG: 25 TABLET, FILM COATED ORAL at 16:39

## 2024-11-05 RX ADMIN — QUETIAPINE FUMARATE 12.5 MG: 25 TABLET ORAL at 09:02

## 2024-11-05 RX ADMIN — DULOXETINE HYDROCHLORIDE 60 MG: 60 CAPSULE, DELAYED RELEASE PELLETS ORAL at 09:02

## 2024-11-05 RX ADMIN — POLYETHYLENE GLYCOL 3350 17 G: 17 POWDER, FOR SOLUTION ORAL at 09:01

## 2024-11-05 RX ADMIN — HYDROMORPHONE HYDROCHLORIDE 0.2 MG: 0.2 INJECTION, SOLUTION INTRAMUSCULAR; INTRAVENOUS; SUBCUTANEOUS at 19:54

## 2024-11-05 RX ADMIN — SENNOSIDES AND DOCUSATE SODIUM 1 TABLET: 8.6; 5 TABLET ORAL at 09:02

## 2024-11-05 RX ADMIN — ACETAMINOPHEN 650 MG: 325 TABLET ORAL at 14:45

## 2024-11-05 RX ADMIN — BUSPIRONE HYDROCHLORIDE 5 MG: 5 TABLET ORAL at 21:01

## 2024-11-05 RX ADMIN — ACETAMINOPHEN 650 MG: 325 TABLET ORAL at 22:03

## 2024-11-05 RX ADMIN — ACETAMINOPHEN 650 MG: 325 TABLET ORAL at 07:01

## 2024-11-05 RX ADMIN — Medication 5 MG: at 17:11

## 2024-11-05 RX ADMIN — LATANOPROST 1 DROP: 50 SOLUTION/ DROPS OPHTHALMIC at 20:59

## 2024-11-05 RX ADMIN — BUSPIRONE HYDROCHLORIDE 5 MG: 5 TABLET ORAL at 09:02

## 2024-11-05 RX ADMIN — ACETAMINOPHEN 650 MG: 325 TABLET ORAL at 11:03

## 2024-11-05 RX ADMIN — QUETIAPINE FUMARATE 12.5 MG: 25 TABLET ORAL at 14:45

## 2024-11-05 RX ADMIN — ROSUVASTATIN 10 MG: 10 TABLET, FILM COATED ORAL at 21:02

## 2024-11-05 RX ADMIN — FUROSEMIDE 40 MG: 10 INJECTION, SOLUTION INTRAVENOUS at 17:46

## 2024-11-05 RX ADMIN — TRAZODONE HYDROCHLORIDE 25 MG: 50 TABLET ORAL at 21:02

## 2024-11-05 RX ADMIN — METOPROLOL SUCCINATE 50 MG: 50 TABLET, EXTENDED RELEASE ORAL at 09:02

## 2024-11-05 RX ADMIN — ACETAMINOPHEN 650 MG: 325 TABLET ORAL at 17:44

## 2024-11-05 RX ADMIN — QUETIAPINE FUMARATE 12.5 MG: 25 TABLET ORAL at 19:35

## 2024-11-05 RX ADMIN — MICONAZOLE NITRATE: 20 POWDER TOPICAL at 09:01

## 2024-11-05 RX ADMIN — PANTOPRAZOLE SODIUM 40 MG: 40 TABLET, DELAYED RELEASE ORAL at 07:01

## 2024-11-05 RX ADMIN — ASPIRIN 81 MG CHEWABLE TABLET 81 MG: 81 TABLET CHEWABLE at 09:02

## 2024-11-05 RX ADMIN — MICONAZOLE NITRATE: 20 POWDER TOPICAL at 22:06

## 2024-11-05 RX ADMIN — SENNOSIDES AND DOCUSATE SODIUM 1 TABLET: 8.6; 5 TABLET ORAL at 19:36

## 2024-11-05 RX ADMIN — LOSARTAN POTASSIUM 50 MG: 50 TABLET, FILM COATED ORAL at 09:03

## 2024-11-05 RX ADMIN — IPRATROPIUM BROMIDE AND ALBUTEROL SULFATE 3 ML: .5; 3 SOLUTION RESPIRATORY (INHALATION) at 01:41

## 2024-11-05 RX ADMIN — LEVOTHYROXINE SODIUM 88 MCG: 88 TABLET ORAL at 07:01

## 2024-11-05 ASSESSMENT — ACTIVITIES OF DAILY LIVING (ADL)
ADLS_ACUITY_SCORE: 27.25
ADLS_ACUITY_SCORE: 27.25
ADLS_ACUITY_SCORE: 0
ADLS_ACUITY_SCORE: 27.25
ADLS_ACUITY_SCORE: 25.25
ADLS_ACUITY_SCORE: 27.25
ADLS_ACUITY_SCORE: 27.25
ADLS_ACUITY_SCORE: 25.25
ADLS_ACUITY_SCORE: 25.25
ADLS_ACUITY_SCORE: 27.25
ADLS_ACUITY_SCORE: 25.25
ADLS_ACUITY_SCORE: 0
ADLS_ACUITY_SCORE: 25.25
ADLS_ACUITY_SCORE: 27.25
ADLS_ACUITY_SCORE: 27.25
ADLS_ACUITY_SCORE: 25.25
ADLS_ACUITY_SCORE: 25.25

## 2024-11-05 NOTE — PLAN OF CARE
Pt is confused, oriented to self only.   - Pt is on soft 1-1 with video monitor due to constantly attempting to get up from 2300 - 0430. Pt makes repeated requests for previous 1-1 sitter. Makes nurse aware that he does not want to be alone, and would like someone with him 24/7.  - vitals stable, bilateral wheezes hear, respiratory therapy paged, gave neb treatment - no changes  - 2L O2 nasal cannula   - meplex applied to right heel.   - refused to go to bed, in recliner all night, bed alarm on.   - incontinent to bowel and bladder.   - did not sleep at all until 0430,  set off chair alarm over a dozen times.     Problem: Risk for Delirium  Goal: Improved Behavioral Control  Outcome: Not Progressing  Intervention: Prevent and Manage Agitation  Recent Flowsheet Documentation  Taken 11/5/2024 0020 by Brittany Leach RN  Environment Familiarity/Consistency: daily routine followed  Intervention: Minimize Safety Risk  Recent Flowsheet Documentation  Taken 11/5/2024 0020 by Brittany Leach RN  Enhanced Safety Measures:   assistive devices when indicated   pain management   review medications for side effects with activity   room near unit station   monitored by video  Trust Relationship/Rapport:   care explained   emotional support provided   thoughts/feelings acknowledged  Goal: Improved Attention and Thought Clarity  Outcome: Not Progressing  Intervention: Maximize Cognitive Function  Recent Flowsheet Documentation  Taken 11/5/2024 0020 by Brittany Leach RN  Sensory Stimulation Regulation:   care clustered   quiet environment promoted  Reorientation Measures: clock in view  Goal: Improved Sleep  Outcome: Not Progressing  Intervention: Promote Sleep  Recent Flowsheet Documentation  Taken 11/5/2024 0020 by Brittany Leach RN  Sleep/Rest Enhancement: regular sleep/rest pattern promoted     Problem: Delirium  Goal: Improved Behavioral Control  Outcome: Not Progressing  Intervention: Prevent and Manage  Agitation  Recent Flowsheet Documentation  Taken 11/5/2024 0020 by Brittany Leach RN  Environment Familiarity/Consistency: daily routine followed  Intervention: Minimize Safety Risk  Recent Flowsheet Documentation  Taken 11/5/2024 0020 by Brittany Leach RN  Enhanced Safety Measures:   assistive devices when indicated   pain management   review medications for side effects with activity   room near unit station   monitored by video  Trust Relationship/Rapport:   care explained   emotional support provided   thoughts/feelings acknowledged  Goal: Improved Attention and Thought Clarity  Outcome: Not Progressing  Intervention: Maximize Cognitive Function  Recent Flowsheet Documentation  Taken 11/5/2024 0020 by Brittany Leach RN  Sensory Stimulation Regulation:   care clustered   quiet environment promoted  Reorientation Measures: clock in view  Goal: Improved Sleep  Outcome: Not Progressing  Intervention: Promote Sleep  Recent Flowsheet Documentation  Taken 11/5/2024 0020 by Brittany Leach RN  Sleep/Rest Enhancement: regular sleep/rest pattern promoted   Goal Outcome Evaluation:           Overall Patient Progress: no changeOverall Patient Progress: no change

## 2024-11-05 NOTE — CARE PLAN
11/05/24 1315   Fall Event   Patient Assessed By nurse;provider   Name of Provider Notified (Hospitalist), House Officer   Family/Designated Caregiver Notified of Fall Yes   Fall Prevention Plan Updated Yes

## 2024-11-05 NOTE — PROGRESS NOTES
[facility]    Medicine Progress Note - Hospitalist Service    Date of Admission:  10/12/2024    Assessment & Plan   Dominik Rojas is a 84 year old male with history of COPD on 2 to 3 L nasal cannula at home, chronic systolic heart failure, hypertension, hypothyroidism, anxiety, recent fall and left hip fracture s/p ORIF who was jsut discharged to TCU.  Patient was brought to ED for evaluation another fall at TCU with left hip pain. Xray showing postop changes without new acute changes.  Patient has had significant behavioral issues requiring 1:1 sitter intermittently. Memory care facility placement pending. It has been challenging due to financial issues.  Currently, he has some swelling of his feet and on diuresis.      Mechanical fall  Left hip contusion without new fracture  Recent left hip fracture s/p ORIF  -- Had hip fracture surgery on 10/6. Was at TCU, and had a mechanical fall, with worse left hip pain after. Presented for eval.  CT Left femur no evidence of new fracture, small lateral left hip subcutaneous hematoma  -- Negative for DVT on US on 10/12.   -- Pain team was consulted to optimize medication  -- Seen by Ortho: follow-up outpatient  -- On Tylenol scheduled  -- Had been very drowsy on opioids, opioids on hold for now  -- Low dose robaxin changed to PRN  -- Continue topical lidocaine     Anxiety and depression;  Dementia with behavioral change/agitation   Mood disorder  Acute toxic/metabolic encephalopathy  -- Has been needing bedside sitter intermittently. Currently weaned off since 11/3. Patient often stands up and tries to walk. He has fall risks. He also does not like being alone.   -- No UTI.  -- Seen by Psych, trazodone 25 mg to help with sleep at bedtime  -- Seroquel as needed for agitation  -- Delirium precautions  -- Continue suppertime melatonin to help with day/night reversal  -- Plan memory care placement. Family ok with this, they have been struggling to care  for him at home.     Acute on chronic HFpEF  -- Hypervolemic.   -- Lasix was held on admission due to poor po intake, weight went up 10 lbs.   Recent Echo showing preserved EF  -- Had Lasix oral 40 mg bid for 2 days. Remains swollen. Changed to IV on 11/3. Monitor fluid status.   -- Increased home losartan  -- Changed home metoprolol from tartrate to succinate to help reduce pill burden  -- Daily weight, Fluid restriction    Tachycardia, now resolved  -Cardiology was consulted, no a fib, no need for anticoagulation  -pt is not a good candidate for anticoagulation due to frequent falls  -HR now in control     COPD, Chronic hypoxic respiratory failure on 2-3l NC  -- PTA meds  -- Not in exacerbation. On home flow O2     Recent UTI  -- PTA amoxicillin 500mg TID; finished course of abx. Repeated UC 10/13 and again 10/23 with mixed tai.     History of RLL adenocarcinoma s/p radiation therapy:   -- Per wife he was to get EBUS and chemo but she did not think he could tolerate those     Normocytic anemia  -- Hb baseline 10-11  -- Contusions and hematoma on the Left thigh  -- Monitor Hb periodically     HLD: Crestor     GERD: PPI      Hypothyroidism  -- Recent TSH 6.93  -- Continue Synthroid   -- Recommend repeat TSH in 4 weeks with PCP. FT4 not useful for titrating Synthroid generally     DVT prophylaxis: He has significant bruises while on Lovenox so that it was discontinued. Patient likes to sit up in the chair all the time.               Diet: Combination Diet Regular Diet Adult  Fluid restriction 1800 ML FLUID  Snacks/Supplements Adult: Ensure Enlive; Between Meals    DVT Prophylaxis: Pneumatic Compression Devices  Cabral Catheter: Not present  Lines: None     Cardiac Monitoring: None  Code Status: No CPR- Do NOT Intubate      Clinically Significant Risk Factors          # Hypochloremia: Lowest Cl = 97 mmol/L in last 2 days, will monitor as appropriate          # Hypertension: Noted on problem list     # Dementia: noted  "on problem list        # Obesity: Estimated body mass index is 30.02 kg/m  as calculated from the following:    Height as of this encounter: 1.702 m (5' 7\").    Weight as of this encounter: 87 kg (191 lb 11.2 oz).      # Financial/Environmental Concerns: none         Disposition Plan     Medically Ready for Discharge: Anticipated in 2-4 Days             Loida Pace MD  Hospitalist Service  [facility]  Securely message with [employer] (more info)  Text page via [employer] Paging/Directory   ______________________________________________________________________    Interval History   Patient is new to me today. Chart reviewed.  Patient is seen and examined at bedside.   Plan of care discussed with patient. All questions answered. Pt verbalized understanding.     Physical Exam   Vital Signs: Temp: 99.4  F (37.4  C) Temp src: Oral BP: 110/69 Pulse: 104   Resp: 20 SpO2: 93 % O2 Device: Nasal cannula Oxygen Delivery: 2 LPM  Weight: 191 lbs 11.2 oz    GEN: Alert. Not in acute distress.  HEENT: Atraumatic, mucous membrane- moist and pink.  Chest: Bilateral air entry.  CVS: S1S2 regular.   Abdomen: Soft. Non-tender, non-distended. No organomegaly. No guarding or rigidity. Bowel sounds active.   Extremities: No pedal edema.  CNS: No involuntary movements.  Skin: no cyanosis or clubbing.     Medical Decision Making       51 MINUTES SPENT BY ME on the date of service doing chart review, history, exam, documentation & further activities per the note.      Data     "

## 2024-11-05 NOTE — PLAN OF CARE
Problem: Adult Inpatient Plan of Care  Goal: Absence of Hospital-Acquired Illness or Injury  Intervention: Identify and Manage Fall Risk  Recent Flowsheet Documentation  Taken 11/5/2024 0902 by Crespo, Diane, RN  Safety Promotion/Fall Prevention:   activity supervised   assistive device/personal items within reach   bedside attendant   clutter free environment maintained   mobility aid in reach   nonskid shoes/slippers when out of bed   patient and family education   room organization consistent   safety round/check completed   supervised activity     Problem: Adult Inpatient Plan of Care  Goal: Absence of Hospital-Acquired Illness or Injury  Intervention: Prevent Skin Injury  Recent Flowsheet Documentation  Taken 11/5/2024 0902 by Crespo, Diane, RN  Body Position:   position changed independently   weight shifting  Skin Protection:   adhesive use limited   incontinence pads utilized     Problem: Adult Inpatient Plan of Care  Goal: Optimal Comfort and Wellbeing  Intervention: Monitor Pain and Promote Comfort  Recent Flowsheet Documentation  Taken 11/5/2024 1103 by Diane Crespo RN  Pain Management Interventions: medication (see MAR)  Taken 11/5/2024 1000 by Diane Crespo RN  Pain Management Interventions:   care clustered   distraction   emotional support   pillow support provided   quiet environment facilitated   rest  Taken 11/5/2024 0902 by Diane Crespo RN  Pain Management Interventions: medication (see MAR)     Problem: Risk for Delirium  Goal: Optimal Coping  Intervention: Optimize Psychosocial Adjustment to Delirium  Recent Flowsheet Documentation  Taken 11/5/2024 0902 by Diane Crespo RN  Supportive Measures:   active listening utilized   positive reinforcement provided     Problem: Risk for Delirium  Goal: Improved Behavioral Control  Intervention: Prevent and Manage Agitation  Recent Flowsheet Documentation  Taken 11/5/2024 0902 by Diane Crespo  RN  Environment Familiarity/Consistency: daily routine followed     Problem: Risk for Delirium  Goal: Improved Attention and Thought Clarity  Intervention: Maximize Cognitive Function  Recent Flowsheet Documentation  Taken 11/5/2024 0902 by Diane Crsepo RN  Sensory Stimulation Regulation:   care clustered   quiet environment promoted  Reorientation Measures: clock in view   Goal Outcome Evaluation:  Patient alert and oriented to self and place. Moving with assist of 1 using walker and gait belt. Up in chair during the day. On baseline 2 L of oxygen via nasal cannula. Regular diet with 1800 ml fluid restriction. Saline locked. Incontinent of bladder. Hip incision in open to air. Endorses minimal pain in L hip, scheduled tylenol given and lidocaine patch in place. PRN seroquel given in AM and afternoon for anxiety and restlessness. Fall at 1315, MD and family aware.

## 2024-11-05 NOTE — PLAN OF CARE
"  Problem: Adult Inpatient Plan of Care  Goal: Plan of Care Review  Description: The Plan of Care Review/Shift note should be completed every shift.  The Outcome Evaluation is a brief statement about your assessment that the patient is improving, declining, or no change.  This information will be displayed automatically on your shift  note.  Outcome: Progressing  Goal: Patient-Specific Goal (Individualized)  Description: You can add care plan individualizations to a care plan. Examples of Individualization might be:  \"Parent requests to be called daily at 9am for status\", \"I have a hard time hearing out of my right ear\", or \"Do not touch me to wake me up as it startles  me\".  Outcome: Progressing  Goal: Readiness for Transition of Care  Outcome: Progressing     Problem: Risk for Delirium  Goal: Optimal Coping  Outcome: Progressing  Intervention: Optimize Psychosocial Adjustment to Delirium  Recent Flowsheet Documentation  Taken 11/4/2024 2006 by Rosy Babcock RN  Supportive Measures:   active listening utilized   positive reinforcement provided  Goal: Improved Behavioral Control  Outcome: Progressing  Intervention: Prevent and Manage Agitation  Recent Flowsheet Documentation  Taken 11/4/2024 2006 by Rosy Babcock RN  Environment Familiarity/Consistency: daily routine followed  Intervention: Minimize Safety Risk  Recent Flowsheet Documentation  Taken 11/4/2024 2006 by Rosy Babcock RN  Enhanced Safety Measures:   assistive devices when indicated   pain management   review medications for side effects with activity   room near unit station  Goal: Improved Attention and Thought Clarity  Outcome: Progressing  Intervention: Maximize Cognitive Function  Recent Flowsheet Documentation  Taken 11/4/2024 2006 by Rosy Babcock RN  Sensory Stimulation Regulation:   care clustered   quiet environment promoted  Reorientation Measures: clock in view  Goal: Improved Sleep  Outcome: Progressing     Problem: " Pain Acute  Goal: Optimal Pain Control and Function  Outcome: Progressing  Intervention: Prevent or Manage Pain  Recent Flowsheet Documentation  Taken 11/4/2024 2006 by Rosy Babcock RN  Sensory Stimulation Regulation:   care clustered   quiet environment promoted  Bowel Elimination Promotion:   adequate fluid intake promoted   ambulation promoted  Medication Review/Management: medications reviewed  Intervention: Optimize Psychosocial Wellbeing  Recent Flowsheet Documentation  Taken 11/4/2024 2006 by Rosy Babcock RN  Supportive Measures:   active listening utilized   positive reinforcement provided     Problem: Dysrhythmia  Goal: Normalized Cardiac Rhythm  Outcome: Progressing  Intervention: Monitor and Manage Cardiac Rhythm Effect  Recent Flowsheet Documentation  Taken 11/4/2024 2006 by Rosy Babcock RN  VTE Prevention/Management: patient refused intervention     Problem: Fall Injury Risk  Goal: Absence of Fall and Fall-Related Injury  Outcome: Progressing  Intervention: Identify and Manage Contributors  Recent Flowsheet Documentation  Taken 11/4/2024 2006 by Rosy Babcock RN  Medication Review/Management: medications reviewed  Intervention: Promote Injury-Free Environment  Recent Flowsheet Documentation  Taken 11/4/2024 2006 by Rosy Babcock RN  Safety Promotion/Fall Prevention:   activity supervised   assistive device/personal items within reach   bedside attendant   clutter free environment maintained   mobility aid in reach   nonskid shoes/slippers when out of bed   patient and family education   room organization consistent   safety round/check completed   supervised activity     Problem: Comorbidity Management  Goal: Maintenance of Behavioral Health Symptom Control  Outcome: Progressing  Intervention: Maintain Behavioral Health Symptom Control  Recent Flowsheet Documentation  Taken 11/4/2024 2006 by Rosy Babcock RN  Medication Review/Management: medications  reviewed  Goal: Maintenance of COPD Symptom Control  Outcome: Progressing  Intervention: Maintain COPD Symptom Control  Recent Flowsheet Documentation  Taken 11/4/2024 2006 by Rosy Babcock RN  Medication Review/Management: medications reviewed  Goal: Maintenance of Heart Failure Symptom Control  Outcome: Progressing  Intervention: Maintain Heart Failure Management  Recent Flowsheet Documentation  Taken 11/4/2024 2006 by Rosy Babcock RN  Medication Review/Management: medications reviewed  Goal: Blood Pressure in Desired Range  Outcome: Progressing  Intervention: Maintain Blood Pressure Management  Recent Flowsheet Documentation  Taken 11/4/2024 2006 by Rosy Babcock RN  Medication Review/Management: medications reviewed     Problem: Delirium  Goal: Optimal Coping  Outcome: Progressing  Intervention: Optimize Psychosocial Adjustment to Delirium  Recent Flowsheet Documentation  Taken 11/4/2024 2006 by Rosy Babcock RN  Supportive Measures:   active listening utilized   positive reinforcement provided  Goal: Improved Behavioral Control  Outcome: Progressing  Intervention: Prevent and Manage Agitation  Recent Flowsheet Documentation  Taken 11/4/2024 2006 by Rosy Babcock RN  Environment Familiarity/Consistency: daily routine followed  Intervention: Minimize Safety Risk  Recent Flowsheet Documentation  Taken 11/4/2024 2006 by Rosy Babcock RN  Enhanced Safety Measures:   assistive devices when indicated   pain management   review medications for side effects with activity   room near unit station  Goal: Improved Attention and Thought Clarity  Outcome: Progressing  Intervention: Maximize Cognitive Function  Recent Flowsheet Documentation  Taken 11/4/2024 2006 by Rosy Babcock RN  Sensory Stimulation Regulation:   care clustered   quiet environment promoted  Reorientation Measures: clock in view  Goal: Improved Sleep  Outcome: Progressing     Problem: Mobility Impairment  Goal:  Optimal Mobility  Outcome: Progressing     Problem: Gas Exchange Impaired  Goal: Optimal Gas Exchange  Outcome: Progressing   Goal Outcome Evaluation:         Pt Is alert and calm, confused but redirectable, was up in the chair during the shift, scheduled acetaminophen and sleeping aid was given and it was effective.

## 2024-11-05 NOTE — SIGNIFICANT EVENT
Patient found on knees on floor in front of chair. Attempting to reposition self. Vitals stable on 2 L oxygen. House officer, , notified. Dr. Pace notified, no new orders and will continue to monitor. Wife, Jovana, called and notified as well.

## 2024-11-05 NOTE — PROGRESS NOTES
CLINICAL NUTRITION SERVICES - REASSESSMENT NOTE    EVALUATION OF THE PROGRESS TOWARD GOALS   Diet: Regular, 1800 ml fluid restriction   Nutrition Supplement: Ensure enlive daily   Intake/Tolerance:     Pt consuming % of meals  Pt meeting >75% nutrition needs     NEW FINDINGS   Met with pt at bedside this afternoon. Pt eating during visit. Pt appears to have good appetite, improved oral intakes with encouragement. Pt notes liking Vanilla ice cream.     ANTHROPOMETRICS   Most Recent Weight: 87 kg (191 lb 11.2 oz)    Weight History: Last weight up, likely r/t fluid status    Wt Readings from Last 10 Encounters:   10/23/24 87 kg (191 lb 11.2 oz)   10/05/24 80.9 kg (178 lb 4.8 oz)   10/02/24 81.3 kg (179 lb 4.8 oz)   09/25/24 80.8 kg (178 lb 1.6 oz)   09/18/24 80.4 kg (177 lb 3.2 oz)   09/12/24 82.6 kg (182 lb 1.6 oz)   09/08/24 81.3 kg (179 lb 3.2 oz)   07/29/24 83 kg (183 lb)   07/24/24 83.2 kg (183 lb 6.4 oz)   07/15/24 84.4 kg (186 lb)     Dosing Weight: 72.2 kg     ASSESSED NUTRITION NEEDS  Estimated Energy Needs: 1584-0242 kcals/day (25 - 30 kcals/kg)  Justification: Maintenance  Estimated Protein Needs: 72-86 grams protein/day (1 - 1.2 grams of pro/kg)  Justification: Increased needs  Estimated Fluid Needs: 1800 mL/day  Justification: On a fluid restriction    PHYSICAL FINDINGS  See malnutrition section below.  Per chart review   Mild/Moderate/Severe edema   Surgical wound- hip incision    GI CONCERNS  Per chart review  Rounded abd   Last BM x1 11/2    LABS  Labs reviewed  BUN 28(H)     MEDICATIONS  Medications reviewed  Scheduled buspar, cymbalta, lasix, synthroid, cozaar, protonix, miralax, senna, desyrel     MALNUTRITION:  Malnutrition Diagnosis: Moderate in acute illness -Improving     CURRENT NUTRITION DIAGNOSIS  Inadequate oral intake related to current medical condition as evidenced by po intake < 50% of some meal   Evaluation: Improved     INTERVENTIONS  Implementation  Medical food supplement  therapy- Ensure enlive daily     Goals  Patient to consume % of nutritionally adequate meals three times per day, or the equivalent with supplements/snacks - Met     Monitoring/Evaluation  Progress toward goals will be monitored and evaluated per protocol.

## 2024-11-06 ENCOUNTER — APPOINTMENT (OUTPATIENT)
Dept: RADIOLOGY | Facility: HOSPITAL | Age: 84
End: 2024-11-06
Attending: STUDENT IN AN ORGANIZED HEALTH CARE EDUCATION/TRAINING PROGRAM
Payer: COMMERCIAL

## 2024-11-06 ENCOUNTER — APPOINTMENT (OUTPATIENT)
Dept: RADIOLOGY | Facility: HOSPITAL | Age: 84
End: 2024-11-06
Payer: COMMERCIAL

## 2024-11-06 ENCOUNTER — APPOINTMENT (OUTPATIENT)
Dept: NEUROLOGY | Facility: HOSPITAL | Age: 84
End: 2024-11-06
Attending: STUDENT IN AN ORGANIZED HEALTH CARE EDUCATION/TRAINING PROGRAM
Payer: COMMERCIAL

## 2024-11-06 ENCOUNTER — APPOINTMENT (OUTPATIENT)
Dept: CT IMAGING | Facility: HOSPITAL | Age: 84
End: 2024-11-06
Attending: STUDENT IN AN ORGANIZED HEALTH CARE EDUCATION/TRAINING PROGRAM
Payer: COMMERCIAL

## 2024-11-06 LAB
ALBUMIN UR-MCNC: 50 MG/DL
ANION GAP SERPL CALCULATED.3IONS-SCNC: 16 MMOL/L (ref 7–15)
ANION GAP SERPL CALCULATED.3IONS-SCNC: 16 MMOL/L (ref 7–15)
APPEARANCE UR: ABNORMAL
BACTERIA #/AREA URNS HPF: ABNORMAL /HPF
BASE EXCESS BLDV CALC-SCNC: 3.8 MMOL/L (ref -3–3)
BASOPHILS # BLD AUTO: 0.1 10E3/UL (ref 0–0.2)
BASOPHILS NFR BLD AUTO: 0 %
BILIRUB UR QL STRIP: NEGATIVE
BUN SERPL-MCNC: 26.5 MG/DL (ref 8–23)
BUN SERPL-MCNC: 28.8 MG/DL (ref 8–23)
CALCIUM SERPL-MCNC: 8.7 MG/DL (ref 8.8–10.4)
CALCIUM SERPL-MCNC: 8.8 MG/DL (ref 8.8–10.4)
CHLORIDE SERPL-SCNC: 97 MMOL/L (ref 98–107)
CHLORIDE SERPL-SCNC: 99 MMOL/L (ref 98–107)
COLOR UR AUTO: YELLOW
CREAT SERPL-MCNC: 0.9 MG/DL (ref 0.67–1.17)
CREAT SERPL-MCNC: 0.91 MG/DL (ref 0.67–1.17)
D DIMER PPP FEU-MCNC: 8.74 UG/ML FEU (ref 0–0.5)
EGFRCR SERPLBLD CKD-EPI 2021: 84 ML/MIN/1.73M2
EGFRCR SERPLBLD CKD-EPI 2021: 85 ML/MIN/1.73M2
EOSINOPHIL # BLD AUTO: 0 10E3/UL (ref 0–0.7)
EOSINOPHIL NFR BLD AUTO: 0 %
ERYTHROCYTE [DISTWIDTH] IN BLOOD BY AUTOMATED COUNT: 18.4 % (ref 10–15)
GLUCOSE BLDC GLUCOMTR-MCNC: 110 MG/DL (ref 70–99)
GLUCOSE BLDC GLUCOMTR-MCNC: 135 MG/DL (ref 70–99)
GLUCOSE SERPL-MCNC: 119 MG/DL (ref 70–99)
GLUCOSE SERPL-MCNC: 92 MG/DL (ref 70–99)
GLUCOSE UR STRIP-MCNC: NEGATIVE MG/DL
HCO3 BLDV-SCNC: 30 MMOL/L (ref 21–28)
HCO3 SERPL-SCNC: 26 MMOL/L (ref 22–29)
HCO3 SERPL-SCNC: 27 MMOL/L (ref 22–29)
HCT VFR BLD AUTO: 37 % (ref 40–53)
HGB BLD-MCNC: 11.7 G/DL (ref 13.3–17.7)
HGB UR QL STRIP: ABNORMAL
HOLD SPECIMEN: NORMAL
IMM GRANULOCYTES # BLD: 0.2 10E3/UL
IMM GRANULOCYTES NFR BLD: 1 %
KETONES UR STRIP-MCNC: NEGATIVE MG/DL
LACTATE SERPL-SCNC: 3.2 MMOL/L (ref 0.7–2)
LACTATE SERPL-SCNC: 3.4 MMOL/L (ref 0.7–2)
LACTATE SERPL-SCNC: 5.2 MMOL/L (ref 0.7–2)
LACTATE SERPL-SCNC: 6.7 MMOL/L (ref 0.7–2)
LEUKOCYTE ESTERASE UR QL STRIP: ABNORMAL
LYMPHOCYTES # BLD AUTO: 0.8 10E3/UL (ref 0.8–5.3)
LYMPHOCYTES NFR BLD AUTO: 5 %
MAGNESIUM SERPL-MCNC: 1.9 MG/DL (ref 1.7–2.3)
MAGNESIUM SERPL-MCNC: 2 MG/DL (ref 1.7–2.3)
MCH RBC QN AUTO: 29.8 PG (ref 26.5–33)
MCHC RBC AUTO-ENTMCNC: 31.6 G/DL (ref 31.5–36.5)
MCV RBC AUTO: 94 FL (ref 78–100)
MONOCYTES # BLD AUTO: 1.5 10E3/UL (ref 0–1.3)
MONOCYTES NFR BLD AUTO: 9 %
MUCOUS THREADS #/AREA URNS LPF: PRESENT /LPF
NEUTROPHILS # BLD AUTO: 13.5 10E3/UL (ref 1.6–8.3)
NEUTROPHILS NFR BLD AUTO: 84 %
NITRATE UR QL: NEGATIVE
NRBC # BLD AUTO: 0 10E3/UL
NRBC BLD AUTO-RTO: 0 /100
NT-PROBNP SERPL-MCNC: 352 PG/ML (ref 0–1800)
O2/TOTAL GAS SETTING VFR VENT: 40 %
OXYHGB MFR BLDV: 78 % (ref 70–75)
PCO2 BLDV: 49 MM HG (ref 40–50)
PH BLDV: 7.39 [PH] (ref 7.32–7.43)
PH UR STRIP: 5.5 [PH] (ref 5–7)
PHOSPHATE SERPL-MCNC: 3.6 MG/DL (ref 2.5–4.5)
PLATELET # BLD AUTO: 343 10E3/UL (ref 150–450)
PO2 BLDV: 47 MM HG (ref 25–47)
POTASSIUM SERPL-SCNC: 3.7 MMOL/L (ref 3.4–5.3)
POTASSIUM SERPL-SCNC: 3.8 MMOL/L (ref 3.4–5.3)
PROCALCITONIN SERPL IA-MCNC: 0.06 NG/ML
RBC # BLD AUTO: 3.92 10E6/UL (ref 4.4–5.9)
RBC URINE: 116 /HPF
SAO2 % BLDV: 78.8 % (ref 70–75)
SODIUM SERPL-SCNC: 139 MMOL/L (ref 135–145)
SODIUM SERPL-SCNC: 142 MMOL/L (ref 135–145)
SP GR UR STRIP: 1.03 (ref 1–1.03)
TROPONIN T SERPL HS-MCNC: 61 NG/L
TROPONIN T SERPL HS-MCNC: 63 NG/L
UROBILINOGEN UR STRIP-MCNC: <2 MG/DL
WBC # BLD AUTO: 16.1 10E3/UL (ref 4–11)
WBC URINE: >182 /HPF

## 2024-11-06 PROCEDURE — 250N000013 HC RX MED GY IP 250 OP 250 PS 637: Performed by: REGISTERED NURSE

## 2024-11-06 PROCEDURE — 250N000013 HC RX MED GY IP 250 OP 250 PS 637: Performed by: HOSPITALIST

## 2024-11-06 PROCEDURE — 999N000157 HC STATISTIC RCP TIME EA 10 MIN

## 2024-11-06 PROCEDURE — 84484 ASSAY OF TROPONIN QUANT: CPT | Performed by: STUDENT IN AN ORGANIZED HEALTH CARE EDUCATION/TRAINING PROGRAM

## 2024-11-06 PROCEDURE — 87186 SC STD MICRODIL/AGAR DIL: CPT | Performed by: STUDENT IN AN ORGANIZED HEALTH CARE EDUCATION/TRAINING PROGRAM

## 2024-11-06 PROCEDURE — 250N000013 HC RX MED GY IP 250 OP 250 PS 637: Performed by: INTERNAL MEDICINE

## 2024-11-06 PROCEDURE — 80048 BASIC METABOLIC PNL TOTAL CA: CPT

## 2024-11-06 PROCEDURE — 250N000011 HC RX IP 250 OP 636

## 2024-11-06 PROCEDURE — 99233 SBSQ HOSP IP/OBS HIGH 50: CPT | Performed by: STUDENT IN AN ORGANIZED HEALTH CARE EDUCATION/TRAINING PROGRAM

## 2024-11-06 PROCEDURE — 250N000011 HC RX IP 250 OP 636: Mod: JW

## 2024-11-06 PROCEDURE — 99223 1ST HOSP IP/OBS HIGH 75: CPT | Performed by: PSYCHIATRY & NEUROLOGY

## 2024-11-06 PROCEDURE — 83735 ASSAY OF MAGNESIUM: CPT | Performed by: STUDENT IN AN ORGANIZED HEALTH CARE EDUCATION/TRAINING PROGRAM

## 2024-11-06 PROCEDURE — 250N000011 HC RX IP 250 OP 636: Performed by: STUDENT IN AN ORGANIZED HEALTH CARE EDUCATION/TRAINING PROGRAM

## 2024-11-06 PROCEDURE — 83735 ASSAY OF MAGNESIUM: CPT

## 2024-11-06 PROCEDURE — 82805 BLOOD GASES W/O2 SATURATION: CPT

## 2024-11-06 PROCEDURE — 84100 ASSAY OF PHOSPHORUS: CPT

## 2024-11-06 PROCEDURE — 83880 ASSAY OF NATRIURETIC PEPTIDE: CPT | Performed by: STUDENT IN AN ORGANIZED HEALTH CARE EDUCATION/TRAINING PROGRAM

## 2024-11-06 PROCEDURE — 250N000009 HC RX 250: Performed by: INTERNAL MEDICINE

## 2024-11-06 PROCEDURE — 93005 ELECTROCARDIOGRAM TRACING: CPT

## 2024-11-06 PROCEDURE — 94640 AIRWAY INHALATION TREATMENT: CPT | Mod: 76

## 2024-11-06 PROCEDURE — 93010 ELECTROCARDIOGRAM REPORT: CPT | Performed by: STUDENT IN AN ORGANIZED HEALTH CARE EDUCATION/TRAINING PROGRAM

## 2024-11-06 PROCEDURE — 36415 COLL VENOUS BLD VENIPUNCTURE: CPT

## 2024-11-06 PROCEDURE — 120N000001 HC R&B MED SURG/OB

## 2024-11-06 PROCEDURE — 83605 ASSAY OF LACTIC ACID: CPT | Performed by: STUDENT IN AN ORGANIZED HEALTH CARE EDUCATION/TRAINING PROGRAM

## 2024-11-06 PROCEDURE — 93005 ELECTROCARDIOGRAM TRACING: CPT | Performed by: STUDENT IN AN ORGANIZED HEALTH CARE EDUCATION/TRAINING PROGRAM

## 2024-11-06 PROCEDURE — 36415 COLL VENOUS BLD VENIPUNCTURE: CPT | Performed by: STUDENT IN AN ORGANIZED HEALTH CARE EDUCATION/TRAINING PROGRAM

## 2024-11-06 PROCEDURE — 999N000248 HC STATISTIC IV INSERT WITH US BY RN

## 2024-11-06 PROCEDURE — 250N000011 HC RX IP 250 OP 636: Performed by: INTERNAL MEDICINE

## 2024-11-06 PROCEDURE — 80048 BASIC METABOLIC PNL TOTAL CA: CPT | Performed by: STUDENT IN AN ORGANIZED HEALTH CARE EDUCATION/TRAINING PROGRAM

## 2024-11-06 PROCEDURE — 71045 X-RAY EXAM CHEST 1 VIEW: CPT | Mod: 77

## 2024-11-06 PROCEDURE — 250N000013 HC RX MED GY IP 250 OP 250 PS 637: Performed by: STUDENT IN AN ORGANIZED HEALTH CARE EDUCATION/TRAINING PROGRAM

## 2024-11-06 PROCEDURE — 71045 X-RAY EXAM CHEST 1 VIEW: CPT

## 2024-11-06 PROCEDURE — 84145 PROCALCITONIN (PCT): CPT | Performed by: STUDENT IN AN ORGANIZED HEALTH CARE EDUCATION/TRAINING PROGRAM

## 2024-11-06 PROCEDURE — 82565 ASSAY OF CREATININE: CPT | Performed by: STUDENT IN AN ORGANIZED HEALTH CARE EDUCATION/TRAINING PROGRAM

## 2024-11-06 PROCEDURE — 85379 FIBRIN DEGRADATION QUANT: CPT | Performed by: STUDENT IN AN ORGANIZED HEALTH CARE EDUCATION/TRAINING PROGRAM

## 2024-11-06 PROCEDURE — 95819 EEG AWAKE AND ASLEEP: CPT

## 2024-11-06 PROCEDURE — 84295 ASSAY OF SERUM SODIUM: CPT

## 2024-11-06 PROCEDURE — 83605 ASSAY OF LACTIC ACID: CPT

## 2024-11-06 PROCEDURE — 71275 CT ANGIOGRAPHY CHEST: CPT

## 2024-11-06 PROCEDURE — 81001 URINALYSIS AUTO W/SCOPE: CPT | Performed by: STUDENT IN AN ORGANIZED HEALTH CARE EDUCATION/TRAINING PROGRAM

## 2024-11-06 PROCEDURE — 94640 AIRWAY INHALATION TREATMENT: CPT

## 2024-11-06 PROCEDURE — 93010 ELECTROCARDIOGRAM REPORT: CPT | Mod: 76 | Performed by: STUDENT IN AN ORGANIZED HEALTH CARE EDUCATION/TRAINING PROGRAM

## 2024-11-06 PROCEDURE — 85025 COMPLETE CBC W/AUTO DIFF WBC: CPT | Performed by: STUDENT IN AN ORGANIZED HEALTH CARE EDUCATION/TRAINING PROGRAM

## 2024-11-06 PROCEDURE — 87106 FUNGI IDENTIFICATION YEAST: CPT | Performed by: STUDENT IN AN ORGANIZED HEALTH CARE EDUCATION/TRAINING PROGRAM

## 2024-11-06 PROCEDURE — 95816 EEG AWAKE AND DROWSY: CPT | Mod: 26 | Performed by: PSYCHIATRY & NEUROLOGY

## 2024-11-06 PROCEDURE — 250N000009 HC RX 250

## 2024-11-06 PROCEDURE — 87040 BLOOD CULTURE FOR BACTERIA: CPT | Performed by: STUDENT IN AN ORGANIZED HEALTH CARE EDUCATION/TRAINING PROGRAM

## 2024-11-06 RX ORDER — IOPAMIDOL 755 MG/ML
90 INJECTION, SOLUTION INTRAVASCULAR ONCE
Status: COMPLETED | OUTPATIENT
Start: 2024-11-06 | End: 2024-11-06

## 2024-11-06 RX ORDER — METOPROLOL TARTRATE 1 MG/ML
5 INJECTION, SOLUTION INTRAVENOUS EVERY 5 MIN PRN
Status: DISCONTINUED | OUTPATIENT
Start: 2024-11-06 | End: 2024-11-12 | Stop reason: HOSPADM

## 2024-11-06 RX ORDER — ZINC OXIDE 200 MG/G
PASTE TOPICAL
Status: DISCONTINUED | OUTPATIENT
Start: 2024-11-06 | End: 2024-11-12 | Stop reason: HOSPADM

## 2024-11-06 RX ORDER — PIPERACILLIN SODIUM, TAZOBACTAM SODIUM 3; .375 G/15ML; G/15ML
3.38 INJECTION, POWDER, LYOPHILIZED, FOR SOLUTION INTRAVENOUS ONCE
Status: COMPLETED | OUTPATIENT
Start: 2024-11-06 | End: 2024-11-06

## 2024-11-06 RX ORDER — HYDROMORPHONE HCL IN WATER/PF 6 MG/30 ML
0.1 PATIENT CONTROLLED ANALGESIA SYRINGE INTRAVENOUS
Status: COMPLETED | OUTPATIENT
Start: 2024-11-06 | End: 2024-11-06

## 2024-11-06 RX ORDER — ENOXAPARIN SODIUM 100 MG/ML
1 INJECTION SUBCUTANEOUS EVERY 12 HOURS
Status: DISCONTINUED | OUTPATIENT
Start: 2024-11-06 | End: 2024-11-06

## 2024-11-06 RX ORDER — OLANZAPINE 10 MG/2ML
2.5 INJECTION, POWDER, FOR SOLUTION INTRAMUSCULAR
Status: DISCONTINUED | OUTPATIENT
Start: 2024-11-06 | End: 2024-11-06

## 2024-11-06 RX ORDER — ACETAMINOPHEN 650 MG/1
650 SUPPOSITORY RECTAL ONCE
Status: COMPLETED | OUTPATIENT
Start: 2024-11-06 | End: 2024-11-06

## 2024-11-06 RX ORDER — OLANZAPINE 10 MG/2ML
5 INJECTION, POWDER, FOR SOLUTION INTRAMUSCULAR DAILY PRN
Status: DISCONTINUED | OUTPATIENT
Start: 2024-11-06 | End: 2024-11-06

## 2024-11-06 RX ORDER — ACETAMINOPHEN 325 MG/1
650 TABLET ORAL EVERY 4 HOURS PRN
Status: DISCONTINUED | OUTPATIENT
Start: 2024-11-06 | End: 2024-11-12 | Stop reason: HOSPADM

## 2024-11-06 RX ORDER — LEVALBUTEROL INHALATION SOLUTION 1.25 MG/3ML
1.25 SOLUTION RESPIRATORY (INHALATION) EVERY 4 HOURS PRN
Status: DISCONTINUED | OUTPATIENT
Start: 2024-11-06 | End: 2024-11-12 | Stop reason: HOSPADM

## 2024-11-06 RX ORDER — HYDRALAZINE HYDROCHLORIDE 20 MG/ML
5 INJECTION INTRAMUSCULAR; INTRAVENOUS EVERY 4 HOURS PRN
Status: DISCONTINUED | OUTPATIENT
Start: 2024-11-06 | End: 2024-11-11

## 2024-11-06 RX ORDER — ACETAMINOPHEN 650 MG/1
650 SUPPOSITORY RECTAL EVERY 4 HOURS PRN
Status: DISCONTINUED | OUTPATIENT
Start: 2024-11-06 | End: 2024-11-12 | Stop reason: HOSPADM

## 2024-11-06 RX ORDER — FUROSEMIDE 10 MG/ML
20 INJECTION INTRAMUSCULAR; INTRAVENOUS ONCE
Status: COMPLETED | OUTPATIENT
Start: 2024-11-06 | End: 2024-11-06

## 2024-11-06 RX ORDER — PIPERACILLIN SODIUM, TAZOBACTAM SODIUM 3; .375 G/15ML; G/15ML
3.38 INJECTION, POWDER, LYOPHILIZED, FOR SOLUTION INTRAVENOUS EVERY 8 HOURS
Status: DISCONTINUED | OUTPATIENT
Start: 2024-11-07 | End: 2024-11-11

## 2024-11-06 RX ORDER — OLANZAPINE 10 MG/2ML
INJECTION, POWDER, FOR SOLUTION INTRAMUSCULAR
Status: COMPLETED
Start: 2024-11-06 | End: 2024-11-06

## 2024-11-06 RX ORDER — LEVALBUTEROL INHALATION SOLUTION 0.31 MG/3ML
0.31 SOLUTION RESPIRATORY (INHALATION) EVERY 6 HOURS PRN
Status: DISCONTINUED | OUTPATIENT
Start: 2024-11-06 | End: 2024-11-06

## 2024-11-06 RX ORDER — CEFTRIAXONE 1 G/1
1 INJECTION, POWDER, FOR SOLUTION INTRAMUSCULAR; INTRAVENOUS EVERY 24 HOURS
Status: DISCONTINUED | OUTPATIENT
Start: 2024-11-06 | End: 2024-11-06

## 2024-11-06 RX ADMIN — LEVALBUTEROL HYDROCHLORIDE 1.25 MG: 1.25 SOLUTION RESPIRATORY (INHALATION) at 22:21

## 2024-11-06 RX ADMIN — TRAZODONE HYDROCHLORIDE 25 MG: 50 TABLET ORAL at 20:02

## 2024-11-06 RX ADMIN — ACETAMINOPHEN 650 MG: 650 SUPPOSITORY RECTAL at 12:22

## 2024-11-06 RX ADMIN — ACETAMINOPHEN 650 MG: 325 TABLET ORAL at 22:48

## 2024-11-06 RX ADMIN — LIDOCAINE 1 PATCH: 4 PATCH TOPICAL at 12:31

## 2024-11-06 RX ADMIN — MICONAZOLE NITRATE: 20 POWDER TOPICAL at 09:55

## 2024-11-06 RX ADMIN — NALOXONE HYDROCHLORIDE 0.4 MG: 0.4 INJECTION, SOLUTION INTRAMUSCULAR; INTRAVENOUS; SUBCUTANEOUS at 05:28

## 2024-11-06 RX ADMIN — HYDROMORPHONE HYDROCHLORIDE 0.1 MG: 0.2 INJECTION, SOLUTION INTRAMUSCULAR; INTRAVENOUS; SUBCUTANEOUS at 19:14

## 2024-11-06 RX ADMIN — QUETIAPINE FUMARATE 12.5 MG: 25 TABLET ORAL at 04:48

## 2024-11-06 RX ADMIN — QUETIAPINE FUMARATE 12.5 MG: 25 TABLET ORAL at 14:49

## 2024-11-06 RX ADMIN — Medication 3 MG: at 00:19

## 2024-11-06 RX ADMIN — QUETIAPINE FUMARATE 12.5 MG: 25 TABLET ORAL at 22:53

## 2024-11-06 RX ADMIN — IOPAMIDOL 90 ML: 755 INJECTION, SOLUTION INTRAVENOUS at 19:53

## 2024-11-06 RX ADMIN — OLANZAPINE 5 MG: 10 INJECTION, POWDER, FOR SOLUTION INTRAMUSCULAR at 05:47

## 2024-11-06 RX ADMIN — LATANOPROST 1 DROP: 50 SOLUTION/ DROPS OPHTHALMIC at 20:08

## 2024-11-06 RX ADMIN — HYDROMORPHONE HYDROCHLORIDE 0.2 MG: 0.2 INJECTION, SOLUTION INTRAMUSCULAR; INTRAVENOUS; SUBCUTANEOUS at 04:17

## 2024-11-06 RX ADMIN — NALOXONE HYDROCHLORIDE 0.4 MG: 0.4 INJECTION, SOLUTION INTRAMUSCULAR; INTRAVENOUS; SUBCUTANEOUS at 05:23

## 2024-11-06 RX ADMIN — BUSPIRONE HYDROCHLORIDE 5 MG: 5 TABLET ORAL at 20:01

## 2024-11-06 RX ADMIN — ACETAMINOPHEN 325 MG: 325 SUPPOSITORY RECTAL at 05:53

## 2024-11-06 RX ADMIN — SENNOSIDES AND DOCUSATE SODIUM 1 TABLET: 8.6; 5 TABLET ORAL at 20:01

## 2024-11-06 RX ADMIN — Medication 5 MG: at 16:56

## 2024-11-06 RX ADMIN — ROSUVASTATIN 10 MG: 10 TABLET, FILM COATED ORAL at 20:01

## 2024-11-06 RX ADMIN — METHOCARBAMOL TABLETS 250 MG: 500 TABLET, COATED ORAL at 19:15

## 2024-11-06 RX ADMIN — CEFTRIAXONE SODIUM 1 G: 1 INJECTION, POWDER, FOR SOLUTION INTRAMUSCULAR; INTRAVENOUS at 17:12

## 2024-11-06 RX ADMIN — FUROSEMIDE 40 MG: 10 INJECTION, SOLUTION INTRAVENOUS at 10:43

## 2024-11-06 RX ADMIN — METHOCARBAMOL TABLETS 250 MG: 500 TABLET, COATED ORAL at 00:19

## 2024-11-06 RX ADMIN — METOPROLOL TARTRATE 5 MG: 5 INJECTION INTRAVENOUS at 19:13

## 2024-11-06 RX ADMIN — HYDROMORPHONE HYDROCHLORIDE 0.2 MG: 0.2 INJECTION, SOLUTION INTRAMUSCULAR; INTRAVENOUS; SUBCUTANEOUS at 01:59

## 2024-11-06 RX ADMIN — PIPERACILLIN AND TAZOBACTAM 3.38 G: 3; .375 INJECTION, POWDER, FOR SOLUTION INTRAVENOUS at 20:43

## 2024-11-06 RX ADMIN — LEVALBUTEROL HYDROCHLORIDE 1.25 MG: 1.25 SOLUTION RESPIRATORY (INHALATION) at 06:01

## 2024-11-06 RX ADMIN — FUROSEMIDE 20 MG: 10 INJECTION, SOLUTION INTRAMUSCULAR; INTRAVENOUS at 20:02

## 2024-11-06 RX ADMIN — MICONAZOLE NITRATE: 20 POWDER TOPICAL at 20:03

## 2024-11-06 RX ADMIN — Medication 3 MG: at 22:48

## 2024-11-06 RX ADMIN — ACETAMINOPHEN 650 MG: 650 SUPPOSITORY RECTAL at 05:48

## 2024-11-06 ASSESSMENT — ACTIVITIES OF DAILY LIVING (ADL)
ADLS_ACUITY_SCORE: 25.25
ADLS_ACUITY_SCORE: 25.25
ADLS_ACUITY_SCORE: 29.75
ADLS_ACUITY_SCORE: 0
ADLS_ACUITY_SCORE: 25.25
ADLS_ACUITY_SCORE: 25.25
ADLS_ACUITY_SCORE: 0
ADLS_ACUITY_SCORE: 25.25
ADLS_ACUITY_SCORE: 27.25
ADLS_ACUITY_SCORE: 29.75
ADLS_ACUITY_SCORE: 25.25
ADLS_ACUITY_SCORE: 0
ADLS_ACUITY_SCORE: 25.25
ADLS_ACUITY_SCORE: 25.25
ADLS_ACUITY_SCORE: 29.75
ADLS_ACUITY_SCORE: 29.75
ADLS_ACUITY_SCORE: 27.25
ADLS_ACUITY_SCORE: 25.25
ADLS_ACUITY_SCORE: 29.75
ADLS_ACUITY_SCORE: 25.25
ADLS_ACUITY_SCORE: 25.25

## 2024-11-06 NOTE — PROGRESS NOTES
[facility]    Medicine Progress Note - Hospitalist Service    Date of Admission:  10/12/2024    Assessment & Plan   Dominik Rojas is a 84 year old male with history of COPD on 2 to 3 L nasal cannula at home, chronic systolic heart failure, hypertension, hypothyroidism, anxiety, recent fall and left hip fracture s/p ORIF who was jsut discharged to TCU.  Patient was brought to ED for evaluation another fall at TCU with left hip pain. Xray showing postop changes without new acute changes.  Patient has had significant behavioral issues requiring 1:1 sitter intermittently. Memory care facility placement pending. It has been challenging due to financial issues.  Currently, he has some swelling of his feet and on diuresis.      Mechanical fall  Left hip contusion without new fracture  Recent left hip fracture s/p ORIF  -- Had hip fracture surgery on 10/6. Was at TCU, and had a mechanical fall, with worse left hip pain after. Presented for eval.  CT Left femur no evidence of new fracture, small lateral left hip subcutaneous hematoma  -- Negative for DVT on US on 10/12.   -- Pain team was consulted to optimize medication  -- Seen by Ortho: follow-up outpatient  -- On Tylenol scheduled  -- Had been very drowsy on opioids, opioids on hold for now  -- Low dose robaxin changed to PRN  -- Continue topical lidocaine     Anxiety and depression;  Dementia with behavioral change/agitation   Mood disorder  Acute toxic/metabolic encephalopathy  -- Has been needing bedside sitter intermittently. Currently weaned off since 11/3. Patient often stands up and tries to walk. He has fall risks. He also does not like being alone.   -- No UTI.  -- Seen by Psych, trazodone 25 mg to help with sleep at bedtime  -- Seroquel as needed for agitation  -- Delirium precautions  -- Continue suppertime melatonin to help with day/night reversal  -- Plan memory care placement. Family ok with this, they have been struggling to care  for him at home.  -- 11/06: Agitated. Concern for seizure like activity per night RN. EEG ordered and neurology consulted.     Acute on chronic HFpEF  -- Hypervolemic.   -- Lasix was held on admission due to poor po intake, weight went up 10 lbs.   Recent Echo showing preserved EF  -- Had Lasix oral 40 mg bid for 2 days. Remains swollen. Changed to IV on 11/3. Monitor fluid status.   -- Increased home losartan  -- Changed home metoprolol from tartrate to succinate to help reduce pill burden  -- Daily weight, Fluid restriction    Tachycardia  -- Cardiology was consulted, no a fib, no need for anticoagulation  -- pt is not a good candidate for anticoagulation due to frequent falls  -- 11/06: Tachycardic. EKG ordered.     Tachypnea on 11/06  -- Ddx: pneumonia, CHF exacerbation, PE, agitation   -- On 11/06. CXR: Mild pleural fluid and/or thickening inferior right hemithorax and consolidation and/or volume loss basilar right lower lobe stable. Bnp wnl  -- In light of tachycardia will send for d-dimer stat    Leukocytosis, lactic acidosis  -- wbc: 16k, lactic acid: 5.2>>3.4  -- Ddx: Reactive, infection  -- CXR: as above. U/A: abnormal. Ucx pending. Procal pending Afebrile.   -- Will start on Ceftriaxone for now.     COPD, Chronic hypoxic respiratory failure on 2-3l NC  -- PTA meds  -- Not in exacerbation. On home flow O2     Recent UTI  -- PTA amoxicillin 500mg TID; finished course of abx. Repeated UC 10/13 and again 10/23 with mixed tai.  -- 11/06: UA abnormal. Ucx pending     History of RLL adenocarcinoma s/p radiation therapy:   -- Per wife he was to get EBUS and chemo but she did not think he could tolerate those     Normocytic anemia  -- Hb baseline 10-11  -- Contusions and hematoma on the Left thigh  -- Monitor Hb periodically     HLD: Crestor     GERD: PPI      Hypothyroidism  -- Recent TSH 6.93  -- Continue Synthroid   -- Recommend repeat TSH in 4 weeks with PCP. FT4 not useful for titrating Synthroid  "generally     DVT prophylaxis: He has significant bruises while on Lovenox so that it was discontinued. Patient likes to sit up in the chair all the time.               Diet: Combination Diet Regular Diet Adult  Fluid restriction 1800 ML FLUID  Snacks/Supplements Adult: Ensure Enlive; Between Meals    DVT Prophylaxis: Pneumatic Compression Devices  Cabral Catheter: Not present  Lines: None     Cardiac Monitoring: None  Code Status: No CPR- Do NOT Intubate      Clinically Significant Risk Factors          # Hypochloremia: Lowest Cl = 97 mmol/L in last 2 days, will monitor as appropriate          # Hypertension: Noted on problem list     # Dementia: noted on problem list        # Obesity: Estimated body mass index is 30.02 kg/m  as calculated from the following:    Height as of this encounter: 1.702 m (5' 7\").    Weight as of this encounter: 87 kg (191 lb 11.2 oz).        # Financial/Environmental Concerns: none         Disposition Plan     Medically Ready for Discharge: Anticipated in 2-4 Days             Loida Pace MD  Hospitalist Service  [facility]  Securely message with [email or web address] (more info)  Text page via [employer] Paging/Directory   ______________________________________________________________________    Interval History   Patient is seen and examined at bedside.   Significant event reviewed. Pt was agitated during my exam. Spouse at the bedside. 1:1 and soft wrist restraints.  Plan of care discussed with patient. All questions answered. Pt verbalized understanding.     Physical Exam   Vital Signs: Temp: 98.5  F (36.9  C) Temp src: Oral BP: 124/52 Pulse: (!) 136   Resp: 28 SpO2: 93 % O2 Device: Nasal cannula Oxygen Delivery: 2 LPM  Weight: 191 lbs 11.2 oz    GEN: Agitated. Not in acute distress.  HEENT: Atraumatic, mucous membrane- moist and pink.  Chest: Bilateral air entry.  CVS: S1S2 regular.   Abdomen: Soft. Non-tender, non-distended. No organomegaly. No guarding or rigidity. Bowel " sounds active.   Extremities: No pedal edema.  CNS: No involuntary movements.  Skin: no cyanosis or clubbing.     Medical Decision Making       53 MINUTES SPENT BY ME on the date of service doing chart review, history, exam, documentation & further activities per the note.      Data

## 2024-11-06 NOTE — PLAN OF CARE
Problem: Risk for Delirium  Goal: Improved Behavioral Control  Intervention: Minimize Safety Risk  Recent Flowsheet Documentation  Taken 11/6/2024 0006 by Mami Yeboah RN  Enhanced Safety Measures:   assistive devices when indicated   pain management   review medications for side effects with activity   room near unit station  Trust Relationship/Rapport:   care explained   emotional support provided   thoughts/feelings acknowledged  Goal: Improved Attention and Thought Clarity  Intervention: Maximize Cognitive Function  Recent Flowsheet Documentation  Taken 11/6/2024 0006 by Mami Yeboah RN  Reorientation Measures:   calendar in view   clock in view     Problem: Pain Acute  Goal: Optimal Pain Control and Function  Outcome: Progressing  Intervention: Develop Pain Management Plan  Recent Flowsheet Documentation  Taken 11/6/2024 0159 by Mami Yeboah RN  Pain Management Interventions: medication (see MAR)  Intervention: Prevent or Manage Pain  Recent Flowsheet Documentation  Taken 11/6/2024 0006 by Mami Yeboah RN  Medication Review/Management: medications reviewed     Problem: Fall Injury Risk  Goal: Absence of Fall and Fall-Related Injury  Outcome: Progressing  Intervention: Identify and Manage Contributors  Recent Flowsheet Documentation  Taken 11/6/2024 0006 by Mami Yeboah RN  Medication Review/Management: medications reviewed  Intervention: Promote Injury-Free Environment  Recent Flowsheet Documentation  Taken 11/6/2024 0006 by Mami Yeboah RN  Safety Promotion/Fall Prevention:   bedside attendant   activity supervised   Goal Outcome Evaluation:       Pt is alert and oriented to self , disoriented to time and situation. Lung sounds diminished, fine crackles in the bases, O2 increase to 3 LPM per nasal cannula, SpO2 93 to 94%. He is using O2 at home, 2-3 LPM per nasal cannula. PRN Hydromorphone 0.2 mg IV given with some relief, PRN Seroquel,  Melatonin , Methocarbamol given to help pt relax and able to sleep.   At 0417, IV Dilaudid given for pain. Pt still was restless and called for help. Seroquel given at 0448. Pt still keeps standing up and moaning for pain and calling for help.

## 2024-11-06 NOTE — PLAN OF CARE
Goal Outcome Evaluation:      Writer 1:1 w/ pt from 1500 to 2300. Pt confused disoriented x2 to time and situation. Disoriented to self at times. Most of the time not able to rate pain on pain scale. When he did he rated 10/10 L hip and leg pain. Provider updated. PRN 0.2mg IV Dilaudid given to start. Pt reports pain improving to an 8/10 after given. Scheduled Tylenol given, PRN Atarax around 1640, and PRN Seroquel later on in the evening. Pt sitting in the recliner the whole shift, lethargic, sleeping between cares. Pt refusing to go into the bed. Incontinent of urine, walked to the BR at the beginning of the shift and had a large formed BM. Small red streaks noted when wiping pt. Ate about 75% of meal for dinner.  2L NC in place.      Problem: Adult Inpatient Plan of Care  Goal: Plan of Care Review  Description: The Plan of Care Review/Shift note should be completed every shift.  The Outcome Evaluation is a brief statement about your assessment that the patient is improving, declining, or no change.  This information will be displayed automatically on your shift  note.  Outcome:  Progressing  Flowsheets (Taken 11/5/2024 2224)  Plan of Care Reviewed With: patient  Overall Patient Progress: no change       Problem: Pain Acute  Goal: Optimal Pain Control and Function  Outcome: Not Progressing     Problem: Pain Acute  Goal: Optimal Pain Control and Function  Intervention: Develop Pain Management Plan  Recent Flowsheet Documentation  Taken 11/5/2024 2006 by Amira Cordon, RN  Pain Management Interventions: emotional support  Taken 11/5/2024 1826 by Amira Cordon, RN  Pain Management Interventions:   MD notified (comment)   emotional support     Problem: Pain Acute  Goal: Optimal Pain Control and Function  Intervention: Prevent or Manage Pain  Recent Flowsheet Documentation  Taken 11/5/2024 1536 by Amira Cordon, RN  Bowel Elimination Promotion:   adequate fluid intake promoted   ambulation  promoted  Medication Review/Management: medications reviewed     Problem: Dysrhythmia  Goal: Normalized Cardiac Rhythm  Intervention: Monitor and Manage Cardiac Rhythm Effect  Recent Flowsheet Documentation  Taken 11/5/2024 1536 by Amira Cordon RN  VTE Prevention/Management: patient refused intervention     Problem: Fall Injury Risk  Goal: Absence of Fall and Fall-Related Injury  Intervention: Promote Injury-Free Environment  Recent Flowsheet Documentation  Taken 11/5/2024 1536 by Amira Cordon RN  Safety Promotion/Fall Prevention:   bedside attendant   activity supervised     Problem: Delirium  Goal: Optimal Coping  Outcome: Not Progressing     Problem: Mobility Impairment  Goal: Optimal Mobility  Intervention: Optimize Mobility  Recent Flowsheet Documentation  Taken 11/5/2024 2120 by Amira Cordon RN  Positioning/Transfer Devices:   pillows   in use  Taken 11/5/2024 1920 by Amira Cordon RN  Positioning/Transfer Devices:   pillows   in use  Taken 11/5/2024 1720 by Amira Cordon RN  Positioning/Transfer Devices:   pillows   in use  Taken 11/5/2024 1618 by Amira Cordon RN  Assistive Device Utilized:   gait belt   walker  Activity Management:   ambulated to bathroom   back to bed  Taken 11/5/2024 1520 by Amira Crodon RN  Positioning/Transfer Devices:   pillows   in use     Problem: Gas Exchange Impaired  Goal: Optimal Gas Exchange  Intervention: Optimize Oxygenation and Ventilation  Recent Flowsheet Documentation  Taken 11/5/2024 2120 by Amira Cordon RN  Head of Bed (HOB) Positioning: (in recliner) --  Taken 11/5/2024 1920 by Amira Cordon RN  Head of Bed (HOB) Positioning: (in recliner) --  Taken 11/5/2024 1720 by Amira Cordon RN  Head of Bed (HOB) Positioning: (in recliner) other (see comments)  Taken 11/5/2024 1520 by Amira Cordon RN  Head of Bed (HOB) Positioning: (in recliner) --       Plan of Care Reviewed With: patient    Overall Patient Progress: no  changeOverall Patient Progress: no change

## 2024-11-06 NOTE — PROGRESS NOTES
"Care Management Follow Up    Length of Stay (days): 25    Expected Discharge Date: 11/09/2024    Anticipated Discharge Plan:  Transitional Care    Transportation: Anticipate medical transport    PT Recommendations: Transitional Care Facility, Per plan established by the PT  OT Recommendations:  Transitional Care Facility     Barriers to Discharge: medical stability, placement, on 1:1    Prior Living Situation: house with spouse    Discussed  Partnership in Safe Discharge Planning  document with patient/family: No     Handoff Completed: No, handoff not indicated or clinically appropriate    Patient/Spokesperson Updated: No    Additional Information:  Pt is on a 1:1 with wrist restraints at this time. Pt had an RRT early this morning for what the nursing note states looked \"like a seizure.\" Neurology consulted. Pt is needed TCU versus LTC/Memory Care placement. Pt case has been escalated to CM leadership for assistance given the multiple barriers with finding placement for discharge.     2:16 PM  SW spoke to Mauri at [facility] who called for an update regarding Pt's 1:1 and medical progression. SW updated Mauri that Pt is back on the 1:1 sitter and is not medically stable at this time. Mauri is continuing to consider for placement if able to come off the 1:1 sitter. SW reported that CM will continue to keep her updated as Pt progresses and hopefully is able to come off the 1:1 sitter.     Next Steps: CM to follow up with the Care Team and family to continue to work on finding placement for discharge.       DIRK Cruz        "

## 2024-11-06 NOTE — SIGNIFICANT EVENT
At 0515 Pt started having like a seizure, his face was turned towards the left, eyes were rolled upward, arms were pulling outward with clenched fist and stiff, body was shaking and unresponsive.Mouth was drooling with saliva, lips were cyanotic.Oral suctioning was done, then lifted him back to bed. Rapid response was called.2 doses of Narcan, 0.4 mg were given Pt was very agitated when he woke but not following commands. Zyprexa 5 mg was given at 0547. Pt still very agitated. BP was 178/98, 's to 160's, 97 to 100 % at 6 LPM per Oxymask. Decreased O2 to 4 LPM , SpO2 94 to 95%. At 0649, non violent  4 point soft restraints started. Dr. Pace informed. Last BP was done at 0614, BP was 181/93, , SpO2 94% at 4 LPM . Katelynn Grissom came to see the pt at 0730. Spouse came at 0600.

## 2024-11-06 NOTE — PROGRESS NOTES
Bedside EEG was performed. Wake, drowsy, and sleep were obtained.   Activations completed were: (eyes closed, Photic)   Activations omitted & reasoning:Hyperventilations due to patient sate  Patient's mental status was: (alert/none verbal/ restless,uncooperative)  Was the neurologist consulted regarding significant waveforms or interventions needed? N/a  Skin intact? yes     EEG #   EEG system used: [id number]    Neurologist dictation to follow.

## 2024-11-06 NOTE — SIGNIFICANT EVENT
Significant Event Note    Time of event: 5:30AM    Description of event:    Baseline patient has Hx dementia with behavior change recent left hip Fx s/p ORIF, then fell and had left hip contusion but without new fracture  Patient became very somnolent after IV dilaudid and Seroquel    Exam:  Somnolent, lethargic  Agonal respirations, pinpoint pupils   Lungs sound tight, wheezing    Narcan given and patient became more alert, pupils became more responsive   Then started to get very agitated, crawling out of bed, needing multiple staff to prevent him from standing up  Zyprexa IM ordered  Rectal tylenol given  Dr Geronimo called to bedside        Plan:  Continue to monitor help him calm down  Use available PRNs to treat wheezing      Discussed with: bedside nurse    Marcial Abreu MD

## 2024-11-06 NOTE — PLAN OF CARE
"  Problem: Adult Inpatient Plan of Care  Goal: Plan of Care Review  Description: The Plan of Care Review/Shift note should be completed every shift.  The Outcome Evaluation is a brief statement about your assessment that the patient is improving, declining, or no change.  This information will be displayed automatically on your shift  note.  11/6/2024 1541 by Regina Sapp RN  Outcome: Not Progressing  11/6/2024 1539 by Regina Sapp RN  Outcome: Progressing     Problem: Adult Inpatient Plan of Care  Goal: Patient-Specific Goal (Individualized)  Description: You can add care plan individualizations to a care plan. Examples of Individualization might be:  \"Parent requests to be called daily at 9am for status\", \"I have a hard time hearing out of my right ear\", or \"Do not touch me to wake me up as it startles  me\".  11/6/2024 1541 by Regina Sapp RN  Outcome: Not Progressing  11/6/2024 1539 by Regina Sapp RN  Outcome: Progressing     Problem: Adult Inpatient Plan of Care  Goal: Readiness for Transition of Care  11/6/2024 1541 by Regina Sapp RN  Outcome: Not Progressing  11/6/2024 1539 by Regina Sapp RN  Outcome: Progressing     Problem: Risk for Delirium  Goal: Optimal Coping  11/6/2024 1541 by Regina Sapp RN  Outcome: Not Progressing  11/6/2024 1539 by Regina Sapp RN  Outcome: Progressing  Goal: Improved Behavioral Control  11/6/2024 1541 by Regina Sapp RN  Outcome: Not Progressing  11/6/2024 1539 by Regina Sapp RN  Outcome: Progressing  Intervention: Minimize Safety Risk  Recent Flowsheet Documentation  Taken 11/6/2024 0820 by Regina Sapp RN  Trust Relationship/Rapport:   care explained   emotional support provided  Goal: Improved Attention and Thought Clarity  11/6/2024 1541 by Regina Sapp RN  Outcome: Not Progressing  11/6/2024 1539 by Regina Sapp RN  Outcome: Progressing  Intervention: Maximize Cognitive " "Function  Recent Flowsheet Documentation  Taken 11/6/2024 0820 by Regina Sapp RN  Reorientation Measures:   clock in view   calendar in view  Goal: Improved Sleep  11/6/2024 1541 by Regina Sapp RN  Outcome: Not Progressing  11/6/2024 1539 by Regina Sapp RN  Outcome: Progressing     Problem: Pain Acute  Goal: Optimal Pain Control and Function  11/6/2024 1541 by Regina Sapp RN  Outcome: Not Progressing  11/6/2024 1539 by Regina Sapp RN  Outcome: Progressing  Intervention: Prevent or Manage Pain  Recent Flowsheet Documentation  Taken 11/6/2024 0820 by Regina Sapp RN  Medication Review/Management: medications reviewed     Problem: Dysrhythmia  Goal: Normalized Cardiac Rhythm  11/6/2024 1541 by Regina Sapp RN  Outcome: Not Progressing  11/6/2024 1539 by Regina Sapp RN  Outcome: Progressing     Problem: Fall Injury Risk  Goal: Absence of Fall and Fall-Related Injury  11/6/2024 1541 by Regina Sapp RN  Outcome: Not Progressing  11/6/2024 1539 by Regina Sapp RN  Outcome: Progressing  Intervention: Identify and Manage Contributors  Recent Flowsheet Documentation  Taken 11/6/2024 0820 by Regina Sapp RN  Medication Review/Management: medications reviewed  Intervention: Promote Injury-Free Environment  Recent Flowsheet Documentation  Taken 11/6/2024 0820 by Regina Sapp RN  Safety Promotion/Fall Prevention:   bedside attendant   patient and family education   Goal Outcome Evaluation:       Pt remains 1:1. Removed LE restraints, UE restraints remain. Pt restless then sleeping intermittently all shift. Hollers out \"help me help me. \" Tylenol suppos admin'd for restlessness, possible pain. Remains on O2 3-4L NC. Sepsis protocol triggered end of shift, MD updated. Woc consult ordered. Educated/reoriented as able.                  "

## 2024-11-07 LAB
ANION GAP SERPL CALCULATED.3IONS-SCNC: 13 MMOL/L (ref 7–15)
ATRIAL RATE - MUSE: 144 BPM
ATRIAL RATE - MUSE: 150 BPM
BASOPHILS # BLD AUTO: 0.1 10E3/UL (ref 0–0.2)
BASOPHILS NFR BLD AUTO: 0 %
BUN SERPL-MCNC: 22.9 MG/DL (ref 8–23)
CALCIUM SERPL-MCNC: 8.4 MG/DL (ref 8.8–10.4)
CHLORIDE SERPL-SCNC: 99 MMOL/L (ref 98–107)
CREAT SERPL-MCNC: 0.83 MG/DL (ref 0.67–1.17)
DIASTOLIC BLOOD PRESSURE - MUSE: NORMAL MMHG
DIASTOLIC BLOOD PRESSURE - MUSE: NORMAL MMHG
EGFRCR SERPLBLD CKD-EPI 2021: 87 ML/MIN/1.73M2
EOSINOPHIL # BLD AUTO: 0.1 10E3/UL (ref 0–0.7)
EOSINOPHIL NFR BLD AUTO: 1 %
ERYTHROCYTE [DISTWIDTH] IN BLOOD BY AUTOMATED COUNT: 18.3 % (ref 10–15)
GLUCOSE SERPL-MCNC: 95 MG/DL (ref 70–99)
HCO3 SERPL-SCNC: 29 MMOL/L (ref 22–29)
HCT VFR BLD AUTO: 35.2 % (ref 40–53)
HGB BLD-MCNC: 11.3 G/DL (ref 13.3–17.7)
IMM GRANULOCYTES # BLD: 0.1 10E3/UL
IMM GRANULOCYTES NFR BLD: 1 %
INTERPRETATION ECG - MUSE: NORMAL
INTERPRETATION ECG - MUSE: NORMAL
LYMPHOCYTES # BLD AUTO: 1.7 10E3/UL (ref 0.8–5.3)
LYMPHOCYTES NFR BLD AUTO: 13 %
MCH RBC QN AUTO: 30.4 PG (ref 26.5–33)
MCHC RBC AUTO-ENTMCNC: 32.1 G/DL (ref 31.5–36.5)
MCV RBC AUTO: 95 FL (ref 78–100)
MONOCYTES # BLD AUTO: 1.2 10E3/UL (ref 0–1.3)
MONOCYTES NFR BLD AUTO: 9 %
NEUTROPHILS # BLD AUTO: 9.7 10E3/UL (ref 1.6–8.3)
NEUTROPHILS NFR BLD AUTO: 76 %
NRBC # BLD AUTO: 0 10E3/UL
NRBC BLD AUTO-RTO: 0 /100
P AXIS - MUSE: 52 DEGREES
P AXIS - MUSE: NORMAL DEGREES
PLATELET # BLD AUTO: 272 10E3/UL (ref 150–450)
POTASSIUM SERPL-SCNC: 3.4 MMOL/L (ref 3.4–5.3)
PR INTERVAL - MUSE: 184 MS
PR INTERVAL - MUSE: NORMAL MS
QRS DURATION - MUSE: 82 MS
QRS DURATION - MUSE: 82 MS
QT - MUSE: 342 MS
QT - MUSE: 362 MS
QTC - MUSE: 532 MS
QTC - MUSE: 556 MS
R AXIS - MUSE: 74 DEGREES
R AXIS - MUSE: 78 DEGREES
RBC # BLD AUTO: 3.72 10E6/UL (ref 4.4–5.9)
SODIUM SERPL-SCNC: 141 MMOL/L (ref 135–145)
SYSTOLIC BLOOD PRESSURE - MUSE: NORMAL MMHG
SYSTOLIC BLOOD PRESSURE - MUSE: NORMAL MMHG
T AXIS - MUSE: 50 DEGREES
T AXIS - MUSE: 57 DEGREES
VENTRICULAR RATE- MUSE: 142 BPM
VENTRICULAR RATE- MUSE: 146 BPM
WBC # BLD AUTO: 12.8 10E3/UL (ref 4–11)

## 2024-11-07 PROCEDURE — G0463 HOSPITAL OUTPT CLINIC VISIT: HCPCS

## 2024-11-07 PROCEDURE — 120N000001 HC R&B MED SURG/OB

## 2024-11-07 PROCEDURE — 250N000013 HC RX MED GY IP 250 OP 250 PS 637: Performed by: STUDENT IN AN ORGANIZED HEALTH CARE EDUCATION/TRAINING PROGRAM

## 2024-11-07 PROCEDURE — 250N000011 HC RX IP 250 OP 636

## 2024-11-07 PROCEDURE — 85004 AUTOMATED DIFF WBC COUNT: CPT | Performed by: STUDENT IN AN ORGANIZED HEALTH CARE EDUCATION/TRAINING PROGRAM

## 2024-11-07 PROCEDURE — 99222 1ST HOSP IP/OBS MODERATE 55: CPT | Mod: GC | Performed by: INTERNAL MEDICINE

## 2024-11-07 PROCEDURE — 250N000013 HC RX MED GY IP 250 OP 250 PS 637: Performed by: REGISTERED NURSE

## 2024-11-07 PROCEDURE — 250N000013 HC RX MED GY IP 250 OP 250 PS 637: Performed by: INTERNAL MEDICINE

## 2024-11-07 PROCEDURE — 250N000011 HC RX IP 250 OP 636: Performed by: STUDENT IN AN ORGANIZED HEALTH CARE EDUCATION/TRAINING PROGRAM

## 2024-11-07 PROCEDURE — 99233 SBSQ HOSP IP/OBS HIGH 50: CPT | Performed by: STUDENT IN AN ORGANIZED HEALTH CARE EDUCATION/TRAINING PROGRAM

## 2024-11-07 PROCEDURE — 250N000013 HC RX MED GY IP 250 OP 250 PS 637: Performed by: HOSPITALIST

## 2024-11-07 PROCEDURE — 80048 BASIC METABOLIC PNL TOTAL CA: CPT | Performed by: STUDENT IN AN ORGANIZED HEALTH CARE EDUCATION/TRAINING PROGRAM

## 2024-11-07 PROCEDURE — 99222 1ST HOSP IP/OBS MODERATE 55: CPT | Performed by: NURSE PRACTITIONER

## 2024-11-07 PROCEDURE — 36415 COLL VENOUS BLD VENIPUNCTURE: CPT | Performed by: STUDENT IN AN ORGANIZED HEALTH CARE EDUCATION/TRAINING PROGRAM

## 2024-11-07 PROCEDURE — 99222 1ST HOSP IP/OBS MODERATE 55: CPT | Performed by: INTERNAL MEDICINE

## 2024-11-07 PROCEDURE — 258N000003 HC RX IP 258 OP 636: Performed by: STUDENT IN AN ORGANIZED HEALTH CARE EDUCATION/TRAINING PROGRAM

## 2024-11-07 PROCEDURE — 250N000013 HC RX MED GY IP 250 OP 250 PS 637

## 2024-11-07 RX ORDER — METOPROLOL TARTRATE 25 MG/1
50 TABLET, FILM COATED ORAL EVERY 8 HOURS SCHEDULED
Status: DISCONTINUED | OUTPATIENT
Start: 2024-11-07 | End: 2024-11-08

## 2024-11-07 RX ADMIN — METOPROLOL TARTRATE 50 MG: 25 TABLET, FILM COATED ORAL at 21:24

## 2024-11-07 RX ADMIN — QUETIAPINE FUMARATE 12.5 MG: 25 TABLET ORAL at 05:36

## 2024-11-07 RX ADMIN — BUSPIRONE HYDROCHLORIDE 5 MG: 5 TABLET ORAL at 21:26

## 2024-11-07 RX ADMIN — PANTOPRAZOLE SODIUM 40 MG: 40 TABLET, DELAYED RELEASE ORAL at 06:07

## 2024-11-07 RX ADMIN — Medication 5 MG: at 17:08

## 2024-11-07 RX ADMIN — ACETAMINOPHEN 650 MG: 325 TABLET ORAL at 21:24

## 2024-11-07 RX ADMIN — METHOCARBAMOL TABLETS 250 MG: 500 TABLET, COATED ORAL at 04:17

## 2024-11-07 RX ADMIN — METOPROLOL TARTRATE 50 MG: 25 TABLET, FILM COATED ORAL at 12:41

## 2024-11-07 RX ADMIN — DULOXETINE HYDROCHLORIDE 60 MG: 60 CAPSULE, DELAYED RELEASE PELLETS ORAL at 08:10

## 2024-11-07 RX ADMIN — PIPERACILLIN AND TAZOBACTAM 3.38 G: 3; .375 INJECTION, POWDER, FOR SOLUTION INTRAVENOUS at 02:57

## 2024-11-07 RX ADMIN — LIDOCAINE 1 PATCH: 4 PATCH TOPICAL at 11:07

## 2024-11-07 RX ADMIN — METOPROLOL SUCCINATE 50 MG: 50 TABLET, EXTENDED RELEASE ORAL at 08:09

## 2024-11-07 RX ADMIN — SENNOSIDES AND DOCUSATE SODIUM 1 TABLET: 8.6; 5 TABLET ORAL at 08:10

## 2024-11-07 RX ADMIN — ASPIRIN 81 MG CHEWABLE TABLET 81 MG: 81 TABLET CHEWABLE at 08:10

## 2024-11-07 RX ADMIN — PIPERACILLIN AND TAZOBACTAM 3.38 G: 3; .375 INJECTION, POWDER, FOR SOLUTION INTRAVENOUS at 13:46

## 2024-11-07 RX ADMIN — SENNOSIDES AND DOCUSATE SODIUM 1 TABLET: 8.6; 5 TABLET ORAL at 21:24

## 2024-11-07 RX ADMIN — LEVOTHYROXINE SODIUM 88 MCG: 88 TABLET ORAL at 06:07

## 2024-11-07 RX ADMIN — LATANOPROST 1 DROP: 50 SOLUTION/ DROPS OPHTHALMIC at 21:34

## 2024-11-07 RX ADMIN — QUETIAPINE FUMARATE 12.5 MG: 25 TABLET ORAL at 11:09

## 2024-11-07 RX ADMIN — PIPERACILLIN AND TAZOBACTAM 3.38 G: 3; .375 INJECTION, POWDER, FOR SOLUTION INTRAVENOUS at 22:09

## 2024-11-07 RX ADMIN — Medication 3 MG: at 21:25

## 2024-11-07 RX ADMIN — BUSPIRONE HYDROCHLORIDE 5 MG: 5 TABLET ORAL at 08:10

## 2024-11-07 RX ADMIN — MICONAZOLE NITRATE: 20 POWDER TOPICAL at 08:10

## 2024-11-07 RX ADMIN — ROSUVASTATIN 10 MG: 10 TABLET, FILM COATED ORAL at 21:25

## 2024-11-07 RX ADMIN — ACETAMINOPHEN 650 MG: 325 TABLET ORAL at 10:47

## 2024-11-07 RX ADMIN — SODIUM CHLORIDE 1750 MG: 9 INJECTION, SOLUTION INTRAVENOUS at 19:09

## 2024-11-07 RX ADMIN — TRAZODONE HYDROCHLORIDE 25 MG: 50 TABLET ORAL at 21:25

## 2024-11-07 ASSESSMENT — ACTIVITIES OF DAILY LIVING (ADL)
ADLS_ACUITY_SCORE: 0
ADLS_ACUITY_SCORE: 25.75
ADLS_ACUITY_SCORE: 0
ADLS_ACUITY_SCORE: 29.25
ADLS_ACUITY_SCORE: 25.75
ADLS_ACUITY_SCORE: 29.25
ADLS_ACUITY_SCORE: 29.75
ADLS_ACUITY_SCORE: 25.75
ADLS_ACUITY_SCORE: 25.75
ADLS_ACUITY_SCORE: 0
ADLS_ACUITY_SCORE: 25.75
ADLS_ACUITY_SCORE: 0
ADLS_ACUITY_SCORE: 0
ADLS_ACUITY_SCORE: 29.25
ADLS_ACUITY_SCORE: 29.25
ADLS_ACUITY_SCORE: 0
ADLS_ACUITY_SCORE: 29.75
ADLS_ACUITY_SCORE: 29.25
ADLS_ACUITY_SCORE: 31.25
ADLS_ACUITY_SCORE: 29.25
ADLS_ACUITY_SCORE: 31.25

## 2024-11-07 NOTE — PROGRESS NOTES
Care Management Follow Up    Length of Stay (days): 26    Expected Discharge Date: 11/12/2024    Anticipated Discharge Plan:  TBD - pending Goals of Care     Transportation: Anticipate medical transport     PT Recommendations: Transitional Care Facility, Per plan established by the PT  OT Recommendations:  Transitional Care Facility     Barriers to Discharge: medical stability, on 1:1, goals of care     Prior Living Situation: house with spouse     Discussed  Partnership in Safe Discharge Planning  document with patient/family: No      Handoff Completed: No, handoff not indicated or clinically appropriate     Patient/Spokesperson Updated: No    Additional Information:  Pt remains on 1:1 for safety. Pt had another rapid called last night. Yesterday's EKG showed A-fib with RVR. Mass found in R lung. Palliative Care consult is currently pending for Goals of Care discussion.     Next Steps: CM to follow for note from Palliative Consult for direction with goals of care and discharge planning.       DIRK Cruz

## 2024-11-07 NOTE — CONSULTS
[facility] Nurse Inpatient Assessment     Consulted for: Inner buttocks    Summary:     Patient History (according to provider note(s):      Dominik Rojas is a 83 year old male with a pertinent history of HTN, COPD, AAA, A Fib who presents to this ED  for evaluation after a fall.       Patient was discharged from the hospital yesterday status post left hip surgery 1 week ago.  Patient had a fall yesterday after getting back to the care facility.  This morning the patient appeared more confused.  However he was alert and oriented x 4.  The patient reports he remembers the fall, but states that was a mechanical fall.  He states his only pain is in his left hip.  Wanted to seen he had thought he is on blood thinners but in review his medications, he is on aspirin 81 mg but no other blood thinners.     Left hip pain, fell last week at his home, presented to the hospital with acute femur fracture on the left, surgery on 10/6/24. Had a mechanical fall yesterday while reaching for a bag while sitting on the bed, with fall on his left hip. Now with left hip pain, numbness and squeezing down to the left knee. He was a little confused afterwards, but not at baseline. He did hit the back of his head.        Assessment:      Skin Injury Location: buttocks    11/7    Last photo: 11/7  Skin injury due to:  MASD  Skin history and plan of care:   likely moisture related, small area of scar tissue healing with dry slaking skin in middle.   Affected area:      Skin assessment: Denudement and Erosion of epidermis     Measurements (length x width x depth, in cm) 1.3  x 0.2  x  0 cm      Color: pink     Temperature  normal      Drainage: none .      Color: none      Odor: none  Pain: denies , none  Pain interventions prior to dressing change: slow and gentle cares   Treatment goal: Protection  STATUS: initial assessment  Supplies ordered: supplies stored on unit      No active wound, almost fully mature  scar tissue to area     Treatment Plan:     Buttocks wound: Every 3 days   Cleanse the area with NS and pat dry.  Apply No sting film barrier to periwound skin and over wound.  Cover wound with Mepilex 2 x 2 (#[id number])   Change dressing Q 3 days.  Turn and reposition Q 2hrs side to side only.  Ensure pt has Zak-cushion while sitting up in the chair.  FYI- If pt has constant incontinent loose stools needing dressing changes Q shift please discontinue the Mepilex dressing and apply criticaid barrier paste BID and PRN.      Orders: Written    RECOMMEND PRIMARY TEAM ORDER: None, at this time  Education provided: plan of care  Discussed plan of care with: Patient  WO nurse follow-up plan: weekly  Notify WOC if wound(s) deteriorate.  Nursing to notify the Provider(s) and re-consult the WOC Nurse if new skin concern.    DATA:     Current support surface: Standard  Standard gel mattress (Isoflex)  Containment of urine/stool: Incontinence Protocol  BMI: Body mass index is 30.02 kg/m .   Active diet order: Orders Placed This Encounter      Combination Diet Regular Diet Adult     Output: I/O last 3 completed shifts:  In: 750 [P.O.:750]  Out: 550 [Urine:550]     Labs:   Recent Labs   Lab 11/07/24  0641   HGB 11.3*   WBC 12.8*     Pressure injury risk assessment:   Sensory Perception: 2-->very limited  Moisture: 3-->occasionally moist  Activity: 1-->bedfast  Mobility: 3-->slightly limited  Nutrition: 2-->probably inadequate  Friction and Shear: 2-->potential problem  Quinton Score: 13    ONELIA Disla RN CWOCN  Pager no longer in use, please contact through [place] group: [facility]

## 2024-11-07 NOTE — CONSULTS
Pulmonary Service Consult Note  Date of Service: 11/07/2024    Reason for Consultation: Lung mass    Assessment:     Dominik Rojas is a 84 year old male with memory issues, currently pending memory care facility placement, atrial fibrillation, hypertension, COPD on home oxygen at 2 to 3 L/min, AAA, chronic systolic and diastolic congestive heart failure, hypothyroidism, anxiety, left ORIF recently after a fall was admitted with fall, A-fib with RVR.  Patient is not a good candidate for anticoagulation.  During his workup, patient was also found to have a right lung mass with significant narrowing of the right mainstem, middle lobe, and bronchus intermedius with associated subcarinal and right hilar adenopathy.  Pulmonary was consulted for further recommendations.    Plan:   Agree with IV antibiotics for postobstructive pneumonia, currently on Zosyn/vancomycin  Follow-up sputum for Gram stain culture  Check procalcitonin  Titrate FiO2, currently minimal O2 needs  Beta-blockers for atrial fibrillation, not a good candidate for anticoagulation    Patient was seen by palliative care today.  Will discuss with primary team and family regoals of care as pt would need to have bronchoscopy with EBUS for diagnosis.         I appreciate the opportunity to participate in the care of Dominik Rojas.  Please feel free to contact me at any time.    Lamar Tovar MD  Pulmonary and Critical Care Medicine    History:     HPI: Patient is a 84-year-old male with past medical history significant memory issues (currently pending memory care facility placement), atrial fibrillation, hypertension, COPD on home oxygen at 2 to 3 L/min, AAA, chronic systolic and diastolic congestive heart failure, hypothyroidism, anxiety, recent left hip surgery due to acute femur fracture on the left, surgery done on October 6, 2024, who was admitted with fall, A-fib with RVR and volume overload.  He reports since his evaluation here, patient underwent a  chest x-ray that showed consolidation on the right side with volume loss.  He then had a CTA that was negative for PE but showed large infiltrative mass in the subcarinal and right hilar region and narrowing of the right mainstem, middle lobe, and bronchus intermedius.  Pulmonary was consulted for further recommendations.    PMHx/PSHx:  Past Medical History:   Diagnosis Date    AAA (abdominal aortic aneurysm) (H)     s/p stenting    AAA (abdominal aortic aneurysm) (H) 11/15/2012    AAA (abdominal aortic aneurysm) CT 11-12  6.4 cm. Saw Dr Muller 1-13;  S/P endovascular repair 3-13;  s/p stenting      Alcohol dependence in remission (H)     Alcohol use disorder, severe, in sustained remission, dependence (H) 08/16/2021    Anxiety     Atrial fibrillation with normal ventricular rate (H)     Benign prostatic hyperplasia with lower urinary tract symptoms     Bronchiectasis without complication (H)     2/27/2020    COPD (chronic obstructive pulmonary disease) (H)     CVA (cerebral vascular accident) (H) 2019    DDD (degenerative disc disease), lumbar     Depression     Depressive disorder     Diabetes (H)     Diastolic dysfunction 01/22/2021    DJD (degenerative joint disease) of knee     left    Essential hypertension     Glaucoma     Glaucoma     History of stroke without residual deficits     Hyperlipidemia     Hypertension     Hypothyroidism     Hypothyroidism     Kidney stones     Major depression     Memory problem     Nocturia     Obesity     Opioid use disorder, severe, dependence (H) 08/16/2021    Overactive bladder 02/25/2021    Primary osteoarthritis of left knee     Psoriasis     Psoriasis     Seborrheic keratoses     Somnolence, daytime     Stenosis of right carotid artery     history of TIA's     Past Surgical History:   Procedure Laterality Date    ABDOMEN SURGERY      ABDOMINAL AORTIC ANEURYSM REPAIR  2013    stent    CAROTID ENDARTERECTOMY Right 9/9/2019    Procedure: RIGHT CAROTID ENDARTERECTOMY;   Surgeon: Miguel Muller MD;  Location: [facility];  Service: General    CIRCUMCISION      CYSTOSCOPY      CYSTOSCOPY PROSTATE W/ LASER N/A 2018    Procedure:  TRANSURETHRAL RESECTION OF THE PROSTATE WITH QUANTA LASER;  Surgeon: Eze Levi MD;  Location: [facility];  Service:     CYSTOSCOPY PROSTATE W/ LASER N/A 2020    Procedure: CYSTOSCOPY WITH TRANSURETHRAL INCISION OF THE PROSTATE WITH QUANTA LASER;  Surgeon: Eze Levi MD;  Location: [facility];  Service: Urology    CYSTOSCOPY W/ LITHOLAPAXY / EHL N/A 2018    Procedure: CYSTOSCOPY AND LITHOLAPAXY;  Surgeon: Eze Levi MD;  Location: [facility];  Service:     IR ABDOMINAL AORTAGRAM  2020    IR ABDOMINAL AORTIC ANEURYSM ENDOGRAFT  2020    IR ABDOMINAL AORTOGRAM  2020    IR ABDOMINAL ENDOVASCULAR STENT GRAFT  2020    LITHOTRIPSY      OPEN REDUCTION INTERNAL FIXATION HIP NAILING Left 10/6/2024    Procedure: INTERNAL FIXATION, FRACTURE, TROCHANTERIC, LEFT HIP, USING RODS;  Surgeon: Tad Oliver DO;  Location: [facility]    PERCUTANEOUS NEPHROSTOLITHOTOMY Right 03/10/2020    ZZC HUANG W/O FACETEC FORAMOT/DSKC  VRT SEG, LUMBAR Bilateral 3/3/2021    Procedure: Bilateral lumbar 2 - Lumbar 4 decompressive lumbar laminectomy with medial facetectomies;  Surgeon: Renée King MD;  Location: [facility];  Service: Spine     Social History     Socioeconomic History    Marital status:      Spouse name: Not on file    Number of children: Not on file    Years of education: Not on file    Highest education level: Not on file   Occupational History    Not on file   Tobacco Use    Smoking status: Former     Current packs/day: 0.00     Average packs/day: 0.5 packs/day for 35.0 years (17.5 ttl pk-yrs)     Types: Cigarettes     Start date: 1955     Quit date: 1990     Years since quittin.8    Smokeless tobacco: Never    Tobacco comments:     quit  1987   Substance and Sexual Activity    Alcohol use: No     Comment: Alcoholic Drinks/day: quit 1974    Drug use: No    Sexual activity: Yes     Partners: Female     Birth control/protection: Post-menopausal   Other Topics Concern    Parent/sibling w/ CABG, MI or angioplasty before 65F 55M? No   Social History Narrative    Not on file     Social Drivers of Health     Financial Resource Strain: Low Risk  (10/14/2024)    Financial Resource Strain     Within the past 12 months, have you or your family members you live with been unable to get utilities (heat, electricity) when it was really needed?: No   Food Insecurity: Low Risk  (10/14/2024)    Food Insecurity     Within the past 12 months, did you worry that your food would run out before you got money to buy more?: No     Within the past 12 months, did the food you bought just not last and you didn t have money to get more?: No   Transportation Needs: Low Risk  (10/14/2024)    Transportation Needs     Within the past 12 months, has lack of transportation kept you from medical appointments, getting your medicines, non-medical meetings or appointments, work, or from getting things that you need?: No   Physical Activity: Not on file   Stress: Not on file   Social Connections: Unknown (1/3/2024)    Received [facility], [facility] & [facility]    Social Connections     Frequency of Communication with Friends and Family: Not on file   Interpersonal Safety: Low Risk  (10/14/2024)    Interpersonal Safety     Do you feel physically and emotionally safe where you currently live?: Yes     Within the past 12 months, have you been hit, slapped, kicked or otherwise physically hurt by someone?: No     Within the past 12 months, have you been humiliated or emotionally abused in other ways by your partner or ex-partner?: No   Housing Stability: Low Risk  (10/14/2024)    Housing Stability     Do you have housing? : Yes      "Are you worried about losing your housing?: No       Review of Systems - 10 point review of system negative except for what is mentioned in the HPI.    Exam/Data:   /80 (BP Location: Left arm)   Pulse 95   Temp 97.8  F (36.6  C) (Oral)   Resp 20   Ht 1.702 m (5' 7\")   Wt 87 kg (191 lb 11.2 oz)   SpO2 96%   BMI 30.02 kg/m      GEN: comfortable, NAD  HEENT: NCAT, EMOI, mmm  CVS: S1S2, RRR  Lung: dec BS on the right  Abd: Soft, NT, ND,  + BS.   Ext: no c/c/e  Neuro: nonfocal,pleasantly confused  Skin: no visible rash  Musculoskeletal: FROM all extremities      DATA    Labs: Reviewed in EMR and outside records where pertinent.         IMAGING: Personally reviewed images. Formal radiology interpretation noted below.    CT Chest Pulmonary Embolism w Contrast    Result Date: 11/6/2024  EXAM: CT CHEST PULMONARY EMBOLISM W CONTRAST LOCATION: [facility] DATE: 11/6/2024 INDICATION: Tachycardia, tachypnea, elevated D-dimer. COMPARISON: None. TECHNIQUE: CT chest pulmonary angiogram during arterial phase injection of IV contrast. Multiplanar reformats and MIP reconstructions were performed. Dose reduction techniques were used. CONTRAST: 90 mL Isovue 370. FINDINGS: ANGIOGRAM CHEST: Pulmonary arteries are normal caliber and negative for pulmonary emboli. Thoracic aorta is negative for dissection. No CT evidence of right heart strain. LUNGS AND PLEURA: Emphysema. Large circumferential mass contiguous in the subcarinal and right hilar region and involving the right mainstem, right middle lobe and right lower lobe bronchi with resultant narrowing of the bronchi. There is postobstructive  pneumonia in the right lung base with a small right pleural effusion. Poor opacification of the pulmonary arterial system in the right lower lobe which cannot be evaluated. MEDIASTINUM/AXILLAE: Paratracheal AP window lymphadenopathy. Cardiac enlargement. CORONARY ARTERY CALCIFICATION: Moderate. UPPER ABDOMEN: " Calcified gallstones. Incomplete visualization of aortic stent graft. 9 mm stone left renal pelvis. MUSCULOSKELETAL: Severe compression of T12.     IMPRESSION: 1.  No pulmonary embolism although there is poor opacification of the segmental branches of the pulmonary arterial system in the right lower lobe which cannot be evaluated. 2.  Large infiltrating mass in the subcarinal and right hilar region with resultant significant narrowing of the right mainstem, middle lobe and bronchus intermedius. Postobstructive pneumonia in the right lung base with small right pleural effusion. Underlying emphysematous change. Associated mediastinal lymphadenopathy. 3.  Mass severely narrows or occludes the inferior right pulmonary vein with narrowing of the superior right pulmonary vein as it enters the left atrium. 4.  Severe compression of T12. 5.  Cholelithiasis. 6.  9 mm stone in the left renal pelvis with minimal caliectasis. Additional small stones right kidney. 7.  Incomplete visualization of an aortic stent graft. NOTE: ABNORMAL REPORT THE DICTATION ABOVE DESCRIBES AN ABNORMALITY FOR WHICH FOLLOW-UP IS NEEDED.     XR Chest Port 1 View    Result Date: 11/6/2024  EXAM: XR CHEST PORT 1 VIEW LOCATION: [facility] DATE: 11/6/2024 INDICATION: new Afib with shortness of breath and hypoxia COMPARISON: Chest radiograph 11/6/2024     IMPRESSION: Interstitial opacities and septal thickening, favored to represent mild pulmonary edema. Likely small right pleural effusion with lower lobe atelectasis. No pneumothorax. Stable heart size and mediastinal contours.    EEG Awake & Sleep Portable    Result Date: 11/6/2024  Table formatting from the original result was not included. Images from the original result were not included. ELECTROENCEPHALOGRAM (EEG) REPORT [facility] NEUROLOGY [place] [address] [place] [address] Tel: [phone]  Fax: [phone] [email or web address] Dominik ROSA  Crystal,  1940, MRN [id number] PCP: Misael Moe Date: 2024 Principal Diagnosis: Seizure-like activity History : Patient is being evaluated for seizure-like activity. Medication listed includes no anticonvulsant Description of the Recording:  This is a multichannel digital EEG recording done using the international 10-20 placement system. Background of the recording consists of an irregular 5-6 hz activity with an amplitude of 20-30  V.  In addition, occasionally there is  1-2 Hz activity with similar amplitude.  This diffusely slow background attenuates with alerting procedures.  Photic stimulation performed with eyes open, according to the standard protocol, results in minimal change.  Hyperventilation was not performed.  Sleep was not obtained. During this recording, no sharp discharges, spikes or electrographic seizure activity was recorded. Impression: This is an abnormal EEG due to diffusely slow background in theta and delta range that suggests a nonspecific generalized cerebral dysfunction.  Such slowing can be seen in metabolic or toxic abnormalities, clinical correlation is recommended.  No sharp discharges or spikes were recorded during this recording to suggest a seizure disorder. Classification: Dysrhythmia grade II generalized                          Delta grade I generalized Chris Echols MD [facility] This note was dictated using voice recognition software.  Any grammatical or context distortions are unintentional and inherent to the software.     XR Chest Port 1 View    Result Date: 2024  EXAM: XR CHEST PORT 1 VIEW LOCATION: [facility] DATE: 2024 INDICATION: Hypoxia, leukocytosis. COMPARISON: 10/19/2024 CXR, 2024 PA chest and left ribs, 2024 CTA AP.     IMPRESSION: Mild pleural fluid and/or thickening inferior right hemithorax and consolidation and/or volume loss basilar right lower lobe stable. A few strands  of fibrosis or linear atelectasis in the remainder of the lower lungs unchanged. Mid and upper lungs are clear. No left-sided pleural effusion. Heart size and pulmonary vascularity upper normal. Mildly displaced fracture anterior tip left 9th rib unchanged since 9/8/2024.    XR Chest 1 View    Result Date: 10/19/2024  EXAM: XR CHEST 1 VIEW LOCATION: [facility] DATE: 10/19/2024 INDICATION: COPD, heart failure, tachycardia COMPARISON: 10/6/2024     IMPRESSION: Mild bibasilar atelectasis. Increasing small right pleural effusion. No pneumothorax. Stable enlarged cardiac silhouette. Mild pulmonary vascular congestion.    CT Femur Thigh Left w/o Contrast    Result Date: 10/19/2024  EXAM: CT FEMUR THIGH LEFT W/O CONTRAST LOCATION: [facility] DATE: 10/19/2024 INDICATION: Assess for fracture s p hip nail and new fall COMPARISON: Radiograph 10/12/2024. CT 07/17/2024. TECHNIQUE: Noncontrast. Axial, sagittal and coronal thin-section reconstruction. Dose reduction techniques were used. FINDINGS: BONES: Reduced and nailed comminuted intertrochanteric left femoral fracture with dynamic femoral head/neck screw and single distal interlocking screw. No instrumentation failure. Similar alignment compared to radiograph 10/12/2024. Distracted comminuted fragments of the lesser trochanter are unchanged unchanged. There is mildly displaced comminution of the greater trochanter. There is soft tissue edema about the fracture. There is a small subcutaneous hematoma along the surgical path (series 4 image 8).  There are staples along the lateral left thigh. Moderate left hip osteoarthritis. Degenerative changes pubic symphysis. Mild left sacroiliac joint osteoarthritis. Moderate medial compartment left knee osteoarthritis. SOFT TISSUES: Edema about the fracture as above. Small hematoma in the overlying subcutaneous tissue. Diffuse subcutaneous edema of the left thigh. Stranding in the left  groin related to prior vascular access. Partially evaluated intra-abdominal vascular stents. Vascular calcifications without aneurysm.     IMPRESSION: 1.  Reduced and nailed comminuted intertrochanteric left femoral fracture in unchanged alignment. No CT evidence of new fracture. 2.  Small lateral left hip subcutaneous hematoma.     US Lower Extremity Venous Duplex Left    Result Date: 10/12/2024  EXAM: US LOWER EXTREMITY VENOUS DUPLEX LEFT LOCATION: [facility] DATE: 10/12/2024 INDICATION: ongoing LLE pain after left hip surgery on 10 6 24 COMPARISON: None. TECHNIQUE: Venous Duplex ultrasound of the left lower extremity with and without compression, augmentation and duplex. Color flow and spectral Doppler with waveform analysis performed. FINDINGS: Exam includes the common femoral, femoral, popliteal, and contralateral common femoral veins as well as segmentally visualized deep calf veins and greater saphenous vein. LEFT: No deep vein thrombosis. No superficial thrombophlebitis. No popliteal cyst.     IMPRESSION: No deep venous thrombosis in the left lower extremity.    XR Femur Left 2 Views    Result Date: 10/12/2024  EXAM: XR FEMUR LEFT 2 VIEWS LOCATION: [facility] DATE: 10/12/2024 INDICATION: Left hip pain, status post fall after hip surgery. COMPARISON: 10/06/2024.     IMPRESSION: Postoperative changes redemonstrated related to ORIF intertrochanteric left proximal femur fracture with dynamic hip screw. Alignment and hardware unchanged. Displaced lesser trochanteric fracture fragments are unchanged. Lateral skin staples  remain along the lateral left hip and proximal thigh. Anatomic alignment left femur. No new left femur fracture or joint malalignment. Mild to moderate left hip osteoarthritis. Moderate to severe degenerative change medial compartment left knee. Arterial calcification. Small left knee joint effusion. Lateral left hip and proximal thigh soft  tissue edema.     XR Pelvis 1/2 Views    Result Date: 10/12/2024  EXAM: XR PELVIS 1/2 VIEWS LOCATION: [facility] DATE: 10/12/2024 INDICATION: Left hip pain status post fall since left hip surgery. COMPARISON: 10/6/2024     IMPRESSION: No significant interval change. Postop ORIF left intertrochanteric femur fracture with dynamic hip screw. Hardware and alignment unchanged. Lesser trochanteric fragments remain medially displaced by approximately 2 cm. No new fracture. Mild to moderate left and mild right hip osteoarthritis. Vascular stents project over the pelvis and surgical clips project over the right inguinal region. Both obturator rings appear intact. Arterial calcification.    Head CT w/o contrast    Result Date: 10/12/2024  EXAM: CT HEAD WITHOUT CONTRAST, CT CERVICAL SPINE WITHOUT CONTRAST LOCATION: [facility] DATE: 10/12/2024 INDICATION: Fall, head injury. COMPARISON: CT brain and CT cervical spine 10/05/2024. MRI brain 05/05/2024. TECHNIQUE: 1) Routine CT Head without IV contrast. Multiplanar reformats. Dose reduction techniques were used. 2) Routine CT Cervical Spine without IV contrast. Multiplanar reformats. Dose reduction techniques were used. FINDINGS: HEAD CT: INTRACRANIAL CONTENTS: No finding for intracranial hemorrhage or mass or convincing finding for acute infarct. Chronic volume loss and encephalomalacic change is seen within the right frontal lobe compatible with sequelae of prior ischemia and stable compared to prior. Moderate patchy low-attenuation change is again seen within the cerebral white matter, nonspecific but compatible with sequelae of chronic microvascular ischemic change given the patient's age and similar to prior. Moderate stable prominence of the lateral ventricles and sulci and mild prominence of the third ventricle. Cerebellar tonsils are normally positioned. Sella is unremarkable for technique. Generalized thinning of the body  of the corpus callosum which otherwise appears normally formed. VISUALIZED ORBITS/SINUSES/MASTOIDS: Both globes are borderline proptotic which is favored to relate to body habitus as there is no abnormal enlargement of the extraocular muscles to strongly suggest thyroid-related orbitopathy. BONES/SOFT TISSUES: Calvarium is intact, without suspicious lytic or blastic foci. CERVICAL SPINE CT: VERTEBRA: Slight smooth convex left cervical curvature. Normal cervical lordosis. Craniocervical junction is intact. Mild to moderate degenerative change anterior C1-C2 articulation. Allowing for chronic endplate degenerative changes seen throughout the visualized spine, vertebral body heights are grossly preserved and there is no finding for acute fracture or posttraumatic subluxation. Endplate irregularity and advanced interspace narrowing C2-C3 through T1-T2. Mild posterior interbody spurring is seen throughout the cervical spine. Mild to moderate right-sided facet arthropathy C3-C4 through C5-C6. Mild left-sided facet arthropathy C2-C3. CANAL/FORAMINA: Mild spinal canal narrowing C5-C6 and C6-C7. Moderate to severe bilateral foraminal stenosis C3-C4 and C4-C5 and on the left at C5-C6 and C6-C7. PARASPINAL: Dorsal paraspinal soft tissues are unremarkable. No prevertebral soft tissue swelling. Multiple surgical clips are seen within the right neck likely relating to prior carotid endarterectomy. Lung apices are clear.     IMPRESSION: HEAD CT: 1.  No finding for intracranial hemorrhage, mass, or acute infarct. 2.  Moderate volume loss and moderate to advanced presumed sequelae of chronic microvascular ischemic change. Stable volume loss and gliosis right frontal lobe. CERVICAL SPINE CT: 1.  Advanced cervical spondylosis without finding for acute fracture or posttraumatic subluxation.     CT Cervical Spine w/o Contrast    Result Date: 10/12/2024  EXAM: CT HEAD WITHOUT CONTRAST, CT CERVICAL SPINE WITHOUT CONTRAST LOCATION: [facility] DATE: 10/12/2024 INDICATION: Fall, head injury. COMPARISON: CT brain and CT cervical spine 10/05/2024. MRI brain 05/05/2024. TECHNIQUE: 1) Routine CT Head without IV contrast. Multiplanar reformats. Dose reduction techniques were used. 2) Routine CT Cervical Spine without IV contrast. Multiplanar reformats. Dose reduction techniques were used. FINDINGS: HEAD CT: INTRACRANIAL CONTENTS: No finding for intracranial hemorrhage or mass or convincing finding for acute infarct. Chronic volume loss and encephalomalacic change is seen within the right frontal lobe compatible with sequelae of prior ischemia and stable compared to prior. Moderate patchy low-attenuation change is again seen within the cerebral white matter, nonspecific but compatible with sequelae of chronic microvascular ischemic change given the patient's age and similar to prior. Moderate stable prominence of the lateral ventricles and sulci and mild prominence of the third ventricle. Cerebellar tonsils are normally positioned. Sella is unremarkable for technique. Generalized thinning of the body of the corpus callosum which otherwise appears normally formed. VISUALIZED ORBITS/SINUSES/MASTOIDS: Both globes are borderline proptotic which is favored to relate to body habitus as there is no abnormal enlargement of the extraocular muscles to strongly suggest thyroid-related orbitopathy. BONES/SOFT TISSUES: Calvarium is intact, without suspicious lytic or blastic foci. CERVICAL SPINE CT: VERTEBRA: Slight smooth convex left cervical curvature. Normal cervical lordosis. Craniocervical junction is intact. Mild to moderate degenerative change anterior C1-C2 articulation. Allowing for chronic endplate degenerative changes seen throughout the visualized spine, vertebral body heights are grossly preserved and there is no finding for acute fracture or posttraumatic subluxation. Endplate irregularity and advanced interspace narrowing  C2-C3 through T1-T2. Mild posterior interbody spurring is seen throughout the cervical spine. Mild to moderate right-sided facet arthropathy C3-C4 through C5-C6. Mild left-sided facet arthropathy C2-C3. CANAL/FORAMINA: Mild spinal canal narrowing C5-C6 and C6-C7. Moderate to severe bilateral foraminal stenosis C3-C4 and C4-C5 and on the left at C5-C6 and C6-C7. PARASPINAL: Dorsal paraspinal soft tissues are unremarkable. No prevertebral soft tissue swelling. Multiple surgical clips are seen within the right neck likely relating to prior carotid endarterectomy. Lung apices are clear.     IMPRESSION: HEAD CT: 1.  No finding for intracranial hemorrhage, mass, or acute infarct. 2.  Moderate volume loss and moderate to advanced presumed sequelae of chronic microvascular ischemic change. Stable volume loss and gliosis right frontal lobe. CERVICAL SPINE CT: 1.  Advanced cervical spondylosis without finding for acute fracture or posttraumatic subluxation.     US Lower Extremity Venous Duplex Bilateral    Result Date: 10/9/2024  EXAM: US LOWER EXTREMITY VENOUS DUPLEX BILATERAL LOCATION: [facility] DATE: 10/9/2024 INDICATION: Bilateral leg swelling. Bilateral lower extremity edema. COMPARISON: None. TECHNIQUE: Venous Duplex ultrasound of bilateral lower extremities with and without compression, augmentation and duplex. Color flow and spectral Doppler with waveform analysis performed. FINDINGS: Exam includes the common femoral, femoral, popliteal veins as well as segmentally visualized deep calf veins and greater saphenous vein. RIGHT: No deep vein thrombosis. No superficial thrombophlebitis. No popliteal cyst. LEFT: No deep vein thrombosis. No superficial thrombophlebitis. No popliteal cyst.     IMPRESSION: 1.  No deep venous thrombosis in the bilateral lower extremities.          Family History   Problem Relation Age of Onset    Emphysema Mother     No Known Problems Father     Cirrhosis Father      No Known Problems Sister     No Known Problems Brother     No Known Problems Maternal Aunt     No Known Problems Maternal Uncle     No Known Problems Paternal Aunt     No Known Problems Paternal Uncle     No Known Problems Maternal Grandmother     No Known Problems Maternal Grandfather     No Known Problems Paternal Grandmother     No Known Problems Paternal Grandfather     No Known Problems Other      Allergies   Allergen Reactions    Diclofenac      Other reaction(s): GI Upset    Loratadine      Other reaction(s): Urinary Retention    Oxycodone      Agitation     Sertraline Unknown     Other reaction(s): ineffective       Medications:     No current outpatient medications on file.       Much or all of the text in this note was generated through the use of the Dragon Dictate voice-to-text software. Errors in spelling or words which seem out of context are unintentional. Sound alike errors, in particular, may have escaped editing.

## 2024-11-07 NOTE — PLAN OF CARE
Goal Outcome Evaluation:      Plan of Care Reviewed With: patientalert and disoriented to place, time,situation. Plan for long term care on discharge  Problem: Adult Inpatient Plan of Care  Goal: Absence of Hospital-Acquired Illness or Injury  Intervention: Prevent Skin Injury  Recent Flowsheet Documentation  Taken 11/7/2024 1142 by Fay Chang RN  Body Position:   right   left   turned   position changed independently   foot of bed elevated  Taken 11/7/2024 1030 by Fay Chang RN  Body Position:   left   right   turned  Taken 11/7/2024 0800 by Fay Chang RN  Body Position:   position changed independently   turned-repositioned every 2 or as needed  Problem: Pain Acute  Goal: Optimal Pain Control and Function  Outcome: Progressing  Intervention: Prevent or Manage Pain  Recent Flowsheet Documentation  Taken 11/7/2024 0800 by Fay Chang RN  Medication Review/Management: medications reviewed-medicate as per mar  Problem: Dysrhythmia  Goal: Normalized Cardiac Rhythm  Outcome: Progressing-monitoring bp and pulse and medicate as per mar  Problem: Comorbidity Management  Goal: Maintenance of Behavioral Health Symptom Control  Intervention: Maintain Behavioral Health Symptom Control  Recent Flowsheet Documentation  Taken 11/7/2024 0800 by Fay Chang RN  Medication Review/Management: medications reviewed on 1:1 for safety  Problem: Gas Exchange Impaired  Goal: Optimal Gas Exchange  Outcome: Progressing  Intervention: Optimize Oxygenation and Ventilation  Recent Flowsheet Documentation  Taken 11/7/2024 0800 by Fay Chang RN  Head of Bed (HOB) Positioning: HOB at 20-30 degrees-head raised 20-30 degrees, oxygen 5 liters  Problem: Seclusion/Restraint, Behavioral  Goal: Seclusion/Behavioral Restraint Goal: Absence of Harm or Injury  Intervention: Protect Skin and Joint Integrity  Recent Flowsheet Documentation  Taken 11/7/2024 1142 by Fay Chang RN  Body Position:   right   left    turned   position changed independently   foot of bed elevated  Taken 11/7/2024 1030 by Fay Chang, RN  Body Position:   left   right   turned  Taken 11/7/2024 0800 by Fay Chang, RN  Body Position:   position changed independently   turned-soft restraints on right/left wrist. Removed as able for repositioning. No wounds noted   Patient to go to long term care when available.  Soft restraints on wrists and removed for repositioning and cares.  Patient sleeps and wakes with restlessness.  Medications as needed for pain.  Feeder. Incontinence.

## 2024-11-07 NOTE — SIGNIFICANT EVENT
"Significant Event Note    Time of event: 7:13 PM November 6, 2024    Description of event:  Initial time of call 6:30pm  Paged regarding new Afib with RVR  Patient was with a history of underlying behavioral and cognitive issues, admitted for fall and a recent hip fracture requiring ORIF.  Additional history of HFpEF.    Patient regarding new A-fib with RVR  This EKG was ordered by the hospitalist earlier today.  A-fib with RVR showing a pulse into the 140s  Upon my arrival to bedside, did appear increasingly agitated and moving around, rolling over onto recently repaired hip  Does appear that it has been several hours since he has last had pain medication or muscle relaxant  Did note last night significant event where patient received 0.4 mg of IV Dilaudid for pain but became very somnolent  Blood pressure at bedside on my arrival 180/110, however patient tensing muscles very tightly and I suspect that this is a falsely elevated blood pressure  Repeat blood pressure 130s/80s tachycardic to 155  O2 dipping down into high 80s, however appears to be mouth breathing and improved after patient was placed on oxymask and change to forehead probe at same O2 rate    /52 (BP Location: Right arm)   Pulse (!) 136   Temp 98.5  F (36.9  C) (Oral)   Resp 28   Ht 1.702 m (5' 7\")   Wt 87 kg (191 lb 11.2 oz)   SpO2 93%   BMI 30.02 kg/m    Gen: awake, increasingly agitated. Will not rest. Complaining of leg pain.  Resp: crackles bilaterally, comfortable effort  CV: tachycardic rate, no murmurs  Abd: soft, NTND    Bladder scan at bedside for 81ml  Unable to obtain EKG appropriately given continued agitation, though monitor appears to be sinus tachycardia to 130's  250mg scheduled Robaxin given  5mg IV metoprolol pushed with improvement of HR, BP stable on several checks  CXR at bedside with bilateral opacities, questionable effusion, appears similar to previous  O2 at baseline 2L, 98% on RA    Plan:  Suspect behaviors and " "pain contributing to tachycardia. Will do full workup for ischemia and possible Afib, but will also treat pain. Noted last night's significant event, will start with 0.1mg IV dilaudid and monitor closely for increased somnolence  - BMP  - Mg  - Phos  - robaxin  - 0.1mg IV dilaudid      Addendum, 7:20 PM  Sat down to write this note and discuss with Dr. Partida via phone at 7:11pm  Rapid response then called at 7:20pm  Patient's BP now down to 80's/60's  Did complain of \"I can't breathe\"  Walked back over to room, patient's BP back up to 120's/80's without intervention  Breathing ok, sats in high 90's  Discussed with Dr. Partida  Patient will be sent down for scheduled CTPE as ordered earlier by day team  Will add on troponin, 20mg IV lasix per Dr. Partida  Will continue to monitor closely, await results    Addendum, 8:14 PM  Checked in on patient.  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  Also noted postobstructive pneumonia in R lung base  Mucus present in remaining airways  Checked in on patient  Appears to be resting more comfortably, but still complaining of trouble breathing  Discussed with Dr. Partida, Dr. Geronimo  Will give Xopenex for now to help loosen up mucus, avoid BiPAP to avoid mucus plugging  With the severity of his mass, likely that he has cancer at this time  Will defer to AM team for goals of care discussion  - xopenex neb q6hrs PRN  - avoid BiPAP  - AM team to have GOC " discussion      Discussed with: bedside nurse, Dr. Partida (hospitalist), Dr. Geronimo (pulmonologist)    Lore Ward MD

## 2024-11-07 NOTE — PLAN OF CARE
Problem: Risk for Delirium  Goal: Improved Behavioral Control  Outcome: Not Progressing  Intervention: Minimize Safety Risk  Recent Flowsheet Documentation  Taken 11/6/2024 1600 by Germán Porter RN  Enhanced Safety Measures: assistive devices when indicated  Trust Relationship/Rapport: care explained     Problem: Risk for Delirium  Goal: Improved Attention and Thought Clarity  Outcome: Not Progressing  Intervention: Maximize Cognitive Function  Recent Flowsheet Documentation  Taken 11/6/2024 1600 by Germán Porter RN  Sensory Stimulation Regulation: care clustered  Reorientation Measures:   calendar in view   clock in view     Problem: Pain Acute  Goal: Optimal Pain Control and Function  Outcome: Not Progressing  Intervention: Prevent or Manage Pain  Recent Flowsheet Documentation  Taken 11/6/2024 1600 by Germán Porter RN  Sensory Stimulation Regulation: care clustered  Bowel Elimination Promotion: adequate fluid intake promoted  Medication Review/Management: medications reviewed  Intervention: Optimize Psychosocial Wellbeing  Recent Flowsheet Documentation  Taken 11/6/2024 1600 by Germán Porter RN  Supportive Measures: active listening utilized     Problem: Dysrhythmia  Goal: Normalized Cardiac Rhythm  Outcome: Not Progressing    At the beginning of writer's shift, patient very aggitated and tachycardic with pulse in the 130-140's. Attending ordered EKG, after two attempts was able to get a clear reading. Results showed Afib with RVR, which appeared to be a new finding from previous EKG. Notified house officer, came up to assess patient. Administered PRN robaxin, with minimal effect. Provider ordered 5 mg of metoprolol push, to decrease heart rate and BP. After medication, pulse decreased into the 110's. Placed patient of oxymask at 15 Lpm because his sats dropped into the low to mid 80's.  Also ordered one dose of 0.1 mg of IVP hydromorphone, which appeared to be effective and patient  calmed down and vital signs normalized. Also ordered CT PE run, which was negative for PE, it did show a lung mass. See providers note. At 2000, patient much calmer and restraints were loosened. 1:1 remains in place for safety.     Germán Porter RN

## 2024-11-07 NOTE — CONSULTS
"  Palliative Care Consultation Note  [facility]      Patient: Dominik Rojas  Date of Admission:  10/12/2024    Requesting Clinician / Team: Loida Pace MD / Hospitalist  Reason for consult: Symptom management  Goals of care       Recommendations & Counseling     GOALS OF CARE:   Restorative with limits   Per chart, unable to connect with Spouse Jovana today as she is reported to be at work.  Left a message and will attempt again tomorrow.    ADVANCE CARE PLANNING:  Patient has an advance directive dated 10/17/2024.  Primary Health Care Agent Spouse Jovana.  Alternate(s) 1st daughter Lore, second daughter Chana.   There is no POLST form on file, recommend to complete prior to DC.  Code status: No CPR- Do NOT Intubate    MEDICAL MANAGEMENT:   #Pain,acute per charting noted in L hip.  Today c/o pain in \"right side\"  On exam tender to palpation R lateral chest wall possibly correlates with noted PNA and small R pleural effusion.  Opioids: IV dilaudid stopped yesterday, presumably with concern for delirium.  Depending on goals of care would consider restarting opioid in setting of possible cancer related pain.  Acetaminophen (Tylenol), Scheduled  Muscle Relaxers:Methocarbamol (Robaxin)- unclear if this is still helpful this far post-op, may be contributing to delirium.  Anti-depressants:  Duloxetine (Cymbalta)  Topical medicines:  Lidocaine Patch 4%   positioning     #Delirium  #Agitation  Trazodone at bedtime  Seroquel PRN  Avoid benzodiazepines, antihistamines, anticholinergics if able.  Lights on and blinds open during the day.  Reorient frequently.  Lights & TV off during the night.  Promote normal circadian rhythm.  Limit sensory deprivation - utilize hearing aids, glasses, etc.  Frequently assess basic needs such as temperature, elimination, thirst/hunger, pain  Consider bedside attendant (if able) for additional safety       PSYCHOSOCIAL/SPIRITUAL SUPPORT:  Family Spouse " Jovana and 2 daughters  [facility]: [facility]   Chaplaincy has been following and Dominik has had  visit for anointing.    Palliative Care will continue to follow. Thank you for the consult and allowing us to aid in the care of Dominik Rojas.      Cristin Merrill, APRN CNP  [facility], Palliative Care  Securely message with the [employer] Web Console (learn more here) or  Text page via [facility] Paging/Directory         Assessment      Dominik Rojas is a 84 year old male with a past medical history of RLL adenocarcinoma s/p radiation therapy in 3/2024, of note, he was to get EBUS and chemo but it was decided he could tolerate those, COPD on 2 to 3 L nasal cannula at home, chronic systolic heart failure, hypertension, hypothyroidism, anxiety, recent fall and left hip fracture s/p ORIF who was just discharged to TCU prior to presenting on 10/12 after another fall and left hip pain.  Hospitalization has been prolonged with treatment for acute on chronic HFpEF and tachycardia, and complicated by subsequent fall and encephalopathy in the setting of baseline dementia.    On 11/6 Dominik developed Afib with RVR.  A cheest CT was done with concern for PE.  There was no evidence of PE, but a aarge infiltrating mass in the subcarinal and right hilar region with resultant significant narrowing of the right mainstem, middle lobe and bronchus intermedius. Postobstructive pneumonia in the right lung base with small right pleural effusion and associated lymphadenopathy. In addition the mass severely narrows or occludes the inferior right pulmonary vein with narrowing of the superior right pulmonary vein as it enters the left atrium.     Today, the patient was seen for:  Goals of Care  Progressing RLL adenocarcinoma  Post-obstructive PNA  Tachycardia  Debility  Encephalopathy    History of Present Illness   Met with Dominik in his room, with attendant at  side.  Dominik was able to answer some simple questions but in general unable  to participate and kept his eyes closed.    Introduced the role of palliative care as an interdisciplinary team that cares for patients with serious illness to help support symptom management, communication, coping for patients and their families as well as support with medical decision making.    Prognosis, Goals, & Planning:   Functional Status just prior to this current hospitalization:  ECOG3 (Capable of only limited self-care; needs help with ADLs; in bed/chair >50% of waking hours) from TCU  This is Dominik's 4th admission to in the past 4 months.    Prognosis, Goals, and/or Advance Care Planning:  Unable to discuss    Code Status was addressed today:   No      Patient's decision making preferences: unable to assess        Patient has decision-making capacity today for complex decisions: Unreliable          Coping, Meaning, & Spirituality:   Mood, coping, and/or meaning in the context of serious illness were addressed today: No    Social:   Living situation:came from TCU  Important relationships/caregivers:Previously at home with spouse as main caregiver.    Medications:  Reviewed this patient's medication profile and medications from this hospitalization. Notable medications:PRN Hydromorphone IV, ast 0.1mg yesterday.   [place] Board of Pharmacy Data Base Reviewed: Yes:   reviewed - no record of controlled substances prescribed.    ROS:  Comprehensive ROS is reviewed and is negative except as here & per HPI:     Physical Exam   Vital Signs with Ranges  Temp:  [98.3  F (36.8  C)-98.6  F (37  C)] 98.3  F (36.8  C)  Pulse:  [] 96  Resp:  [18-28] 22  BP: ()/(52-89) 131/81  SpO2:  [92 %-99 %] 99 %  Wt Readings from Last 10 Encounters:   10/23/24 87 kg (191 lb 11.2 oz)   10/05/24 80.9 kg (178 lb 4.8 oz)   10/02/24 81.3 kg (179 lb 4.8 oz)   09/25/24 80.8 kg (178 lb 1.6 oz)   09/18/24 80.4 kg (177 lb 3.2 oz)   09/12/24 82.6 kg (182 lb 1.6 oz)   09/08/24 81.3 kg (179 lb 3.2 oz)   07/29/24 83 kg (183 lb)    07/24/24 83.2 kg (183 lb 6.4 oz)   07/15/24 84.4 kg (186 lb)     191 lbs 11.2 oz    PHYSICAL EXAM:  GEN:  Somnolent, wakes to answer some questions, , appears comfortable, grimmace with palpation R lateral chest wall, NAD.  HEENT:  Normocephalic/atraumatic, no scleral icterus, no nasal discharge, mouth moist.  LUNGS:  Non labored breathing, O2 per NC.  Symmetric chest rise on inhalation noted.  ABD:  Active bowel sounds, soft, non-tender/non-distended.    EXT:  No edema. Large areas of ecchymosis on B LE.   SKIN:  Dry to touch, no exanthems noted in the visualized areas.     Data reviewed:  Results for orders placed or performed during the hospital encounter of 10/12/24 (from the past 24 hours)   Lactic Acid Whole Blood w/ 1x repeat in 2 hrs when >2   Result Value Ref Range    Lactic Acid, Initial 3.4 (H) 0.7 - 2.0 mmol/L   D dimer quantitative   Result Value Ref Range    D-Dimer Quantitative 8.74 (H) 0.00 - 0.50 ug/mL FEU    Narrative    This D-dimer assay is intended for use in conjunction with a clinical pretest probability assessment model to exclude pulmonary embolism (PE) and deep venous thrombosis (DVT) in outpatients suspected of PE or DVT. The cut-off value is 0.50 ug/mL FEU.    For patients 50 years of age or older, the application of age-adjusted cut-off values for D-Dimer may increase the specificity without significant effect on sensitivity. The literature suggested calculation age adjusted cut-off in ug/L = age in years x 10 ug/L. The results in this laboratory are reported as ug/mL rather than ug/L. The calculation for age adjusted cut off in ug/mL= age in years x 0.01 ug/mL. For example, the cut off for a 76 year old male is 76 x 0.01 ug/mL = 0.76 ug/mL (760 ug/L).    M Erin et al. Age adjusted D-dimer cut-off levels to rule out pulmonary embolism: The ADJUST-PE Study. PRIYA 2014;311:8905-5661.; HJ Shaina et al. Diagnostic accuracy of conventional or age adjusted D-dimer cutoff values in older  patients with suspected venous thromboembolism. Systemic review and meta-analysis. BMJ 2013:346:f2492.   ECG 12-LEAD WITH MUSE (LHE)   Result Value Ref Range    Systolic Blood Pressure  mmHg    Diastolic Blood Pressure  mmHg    Ventricular Rate 146 BPM    Atrial Rate 150 BPM    VT Interval  ms    QRS Duration 82 ms     ms    QTc 532 ms    P Axis  degrees    R AXIS 74 degrees    T Axis 50 degrees    Interpretation ECG       Atrial fibrillation with rapid ventricular response  Cannot rule out Inferior infarct , age undetermined  Possible Anterolateral infarct Abnormal ECG  When compared with ECG of 15-Oct-2024 09:29,  Significant changes have occurred  Confirmed by DILSHAD OSBORNE MD LOC:[id number]) on 11/7/2024 8:16:37 AM     ECG 12-LEAD WITH MUSE (LHE)   Result Value Ref Range    Systolic Blood Pressure  mmHg    Diastolic Blood Pressure  mmHg    Ventricular Rate 142 BPM    Atrial Rate 144 BPM    VT Interval 184 ms    QRS Duration 82 ms     ms    QTc 556 ms    P Axis 52 degrees    R AXIS 78 degrees    T Axis 57 degrees    Interpretation ECG       Atrial fibrillation  Cannot rule out Inferior infarct (cited on or before 06-Nov-2024)  Possible Anterolateral infarct (cited on or before 15-Oct-2024)  Abnormal ECG  When compared with ECG of 06-Nov-2024 18:05,  There have been no significant changes    Confirmed by DILSHAD OSBORNE MD LOC:[id number]) on 11/7/2024 8:17:25 AM     XR Chest Port 1 View    Narrative    EXAM: XR CHEST PORT 1 VIEW  LOCATION: [facility]  DATE: 11/6/2024    INDICATION: new Afib with shortness of breath and hypoxia  COMPARISON: Chest radiograph 11/6/2024      Impression    IMPRESSION: Interstitial opacities and septal thickening, favored to represent mild pulmonary edema. Likely small right pleural effusion with lower lobe atelectasis. No pneumothorax. Stable heart size and mediastinal contours.   Glucose by meter   Result Value Ref Range    GLUCOSE BY METER POCT 110  (H) 70 - 99 mg/dL   Extra Tube (Start Draw) *Canceled*    Narrative    The following orders were created for panel order Extra Tube (Start Draw).  Procedure                               Abnormality         Status                     ---------                               -----------         ------                     Extra Blue Top Tube[[id number]]                                                         Extra Red Top Tube[[id number]]                                                          Extra Purple Top Tube[[id number]]                                                         Please view results for these tests on the individual orders.   Lactic acid whole blood   Result Value Ref Range    Lactic Acid 6.7 (HH) 0.7 - 2.0 mmol/L   Phosphorus   Result Value Ref Range    Phosphorus 3.6 2.5 - 4.5 mg/dL   Basic metabolic panel   Result Value Ref Range    Sodium 142 135 - 145 mmol/L    Potassium 3.8 3.4 - 5.3 mmol/L    Chloride 99 98 - 107 mmol/L    Carbon Dioxide (CO2) 27 22 - 29 mmol/L    Anion Gap 16 (H) 7 - 15 mmol/L    Urea Nitrogen 26.5 (H) 8.0 - 23.0 mg/dL    Creatinine 0.91 0.67 - 1.17 mg/dL    GFR Estimate 84 >60 mL/min/1.73m2    Calcium 8.7 (L) 8.8 - 10.4 mg/dL    Glucose 92 70 - 99 mg/dL   Magnesium   Result Value Ref Range    Magnesium 1.9 1.7 - 2.3 mg/dL   Troponin T, High Sensitivity   Result Value Ref Range    Troponin T, High Sensitivity 63 (H) <=22 ng/L   Extra Tube    Narrative    The following orders were created for panel order Extra Tube.  Procedure                               Abnormality         Status                     ---------                               -----------         ------                     Extra Blue Top Tube[[id number]]                              Final result               Extra Purple Top Tube[[id number]]                            Final result                 Please view results for these tests on the individual orders.   Extra Blue Top Tube   Result Value Ref Range    Hold  Specimen JIC    Extra Purple Top Tube   Result Value Ref Range    Hold Specimen JIC    Extra Tube    Narrative    The following orders were created for panel order Extra Tube.  Procedure                               Abnormality         Status                     ---------                               -----------         ------                     Extra Red Top Tube[[id number]]                               Final result                 Please view results for these tests on the individual orders.   Extra Red Top Tube   Result Value Ref Range    Hold Specimen JIC    CT Chest Pulmonary Embolism w Contrast    Narrative    EXAM: CT CHEST PULMONARY EMBOLISM W CONTRAST  LOCATION: [facility] [place]  DATE: 11/6/2024    INDICATION: Tachycardia, tachypnea, elevated D-dimer.  COMPARISON: None.  TECHNIQUE: CT chest pulmonary angiogram during arterial phase injection of IV contrast. Multiplanar reformats and MIP reconstructions were performed. Dose reduction techniques were used.   CONTRAST: 90 mL Isovue 370.    FINDINGS:  ANGIOGRAM CHEST: Pulmonary arteries are normal caliber and negative for pulmonary emboli. Thoracic aorta is negative for dissection. No CT evidence of right heart strain.    LUNGS AND PLEURA: Emphysema. Large circumferential mass contiguous in the subcarinal and right hilar region and involving the right mainstem, right middle lobe and right lower lobe bronchi with resultant narrowing of the bronchi. There is postobstructive   pneumonia in the right lung base with a small right pleural effusion. Poor opacification of the pulmonary arterial system in the right lower lobe which cannot be evaluated.    MEDIASTINUM/AXILLAE: Paratracheal AP window lymphadenopathy. Cardiac enlargement.    CORONARY ARTERY CALCIFICATION: Moderate.    UPPER ABDOMEN: Calcified gallstones. Incomplete visualization of aortic stent graft. 9 mm stone left renal pelvis.    MUSCULOSKELETAL: Severe compression of T12.       Impression    IMPRESSION:  1.  No pulmonary embolism although there is poor opacification of the segmental branches of the pulmonary arterial system in the right lower lobe which cannot be evaluated.    2.  Large infiltrating mass in the subcarinal and right hilar region with resultant significant narrowing of the right mainstem, middle lobe and bronchus intermedius. Postobstructive pneumonia in the right lung base with small right pleural effusion.   Underlying emphysematous change. Associated mediastinal lymphadenopathy.    3.  Mass severely narrows or occludes the inferior right pulmonary vein with narrowing of the superior right pulmonary vein as it enters the left atrium.    4.  Severe compression of T12.    5.  Cholelithiasis.    6.  9 mm stone in the left renal pelvis with minimal caliectasis. Additional small stones right kidney.    7.  Incomplete visualization of an aortic stent graft.    NOTE: ABNORMAL REPORT    THE DICTATION ABOVE DESCRIBES AN ABNORMALITY FOR WHICH FOLLOW-UP IS NEEDED.    Troponin T, High Sensitivity   Result Value Ref Range    Troponin T, High Sensitivity 61 (H) <=22 ng/L   Lactic acid whole blood   Result Value Ref Range    Lactic Acid 3.2 (H) 0.7 - 2.0 mmol/L   Blood Culture Arm, Left    Specimen: Arm, Left; Blood   Result Value Ref Range    Culture No growth after 12 hours    CBC with Platelets & Differential    Narrative    The following orders were created for panel order CBC with Platelets & Differential.  Procedure                               Abnormality         Status                     ---------                               -----------         ------                     CBC with platelets and d...[[id number]]  Abnormal            Final result                 Please view results for these tests on the individual orders.   Basic metabolic panel   Result Value Ref Range    Sodium 141 135 - 145 mmol/L    Potassium 3.4 3.4 - 5.3 mmol/L    Chloride 99 98 - 107 mmol/L    Carbon  Dioxide (CO2) 29 22 - 29 mmol/L    Anion Gap 13 7 - 15 mmol/L    Urea Nitrogen 22.9 8.0 - 23.0 mg/dL    Creatinine 0.83 0.67 - 1.17 mg/dL    GFR Estimate 87 >60 mL/min/1.73m2    Calcium 8.4 (L) 8.8 - 10.4 mg/dL    Glucose 95 70 - 99 mg/dL   CBC with platelets and differential   Result Value Ref Range    WBC Count 12.8 (H) 4.0 - 11.0 10e3/uL    RBC Count 3.72 (L) 4.40 - 5.90 10e6/uL    Hemoglobin 11.3 (L) 13.3 - 17.7 g/dL    Hematocrit 35.2 (L) 40.0 - 53.0 %    MCV 95 78 - 100 fL    MCH 30.4 26.5 - 33.0 pg    MCHC 32.1 31.5 - 36.5 g/dL    RDW 18.3 (H) 10.0 - 15.0 %    Platelet Count 272 150 - 450 10e3/uL    % Neutrophils 76 %    % Lymphocytes 13 %    % Monocytes 9 %    % Eosinophils 1 %    % Basophils 0 %    % Immature Granulocytes 1 %    NRBCs per 100 WBC 0 <1 /100    Absolute Neutrophils 9.7 (H) 1.6 - 8.3 10e3/uL    Absolute Lymphocytes 1.7 0.8 - 5.3 10e3/uL    Absolute Monocytes 1.2 0.0 - 1.3 10e3/uL    Absolute Eosinophils 0.1 0.0 - 0.7 10e3/uL    Absolute Basophils 0.1 0.0 - 0.2 10e3/uL    Absolute Immature Granulocytes 0.1 <=0.4 10e3/uL    Absolute NRBCs 0.0 10e3/uL     EXAM: XR FEMUR LEFT 2 VIEWS  LOCATION: [facility]  DATE: 10/12/2024     INDICATION: Left hip pain, status post fall after hip surgery.  COMPARISON: 10/06/2024.                                                                      IMPRESSION: Postoperative changes redemonstrated related to ORIF intertrochanteric left proximal femur fracture with dynamic hip screw. Alignment and hardware unchanged. Displaced lesser trochanteric fracture fragments are unchanged. Lateral skin staples   remain along the lateral left hip and proximal thigh. Anatomic alignment left femur. No new left femur fracture or joint malalignment. Mild to moderate left hip osteoarthritis. Moderate to severe degenerative change medial compartment left knee.   Arterial calcification. Small left knee joint effusion. Lateral left hip and proximal thigh soft  tissue edema.    Medical Decision Making       Please see A&P for additional details of medical decision making.  MANAGEMENT DISCUSSED with the following over the past 24 hours: Nurse and hospitalist, GALLO   NOTE(S)/MEDICAL RECORDS REVIEWED over the past 24 hours: SW, Nursing, hospitalist, cross coer, outpt radiation onc, cross cover, WOC, cardiology, neurology  Tests REVIEWED in the past 24 hours:  - See lab/imaging results included in the data section of the note

## 2024-11-07 NOTE — PHARMACY-VANCOMYCIN DOSING SERVICE
"Pharmacy Vancomycin Initial Note  Date of Service 2024  Patient's  1940  84 year old, male    Indication: Healthcare-Associated Pneumonia and Urinary Tract Infection    Current estimated CrCl = Estimated Creatinine Clearance: 71.1 mL/min (based on SCr of 0.83 mg/dL).    Creatinine for last 3 days  2024:  5:16 AM Creatinine 0.85 mg/dL  2024:  5:30 AM Creatinine 0.90 mg/dL;  7:29 PM Creatinine 0.91 mg/dL  2024:  6:41 AM Creatinine 0.83 mg/dL    Recent Vancomycin Level(s) for last 3 days  No results found for requested labs within last 3 days.      Vancomycin IV Administrations (past 72 hours)        No vancomycin orders with administrations in past 72 hours.                    Nephrotoxins and other renal medications (From now, onward)      Start     Dose/Rate Route Frequency Ordered Stop    24 1200  vancomycin (VANCOCIN) 1,250 mg in 0.9% NaCl 262.5 mL intermittent infusion         1,250 mg  over 90 Minutes Intravenous EVERY 18 HOURS 24 1730      24 1800  vancomycin (VANCOCIN) 1,750 mg in 0.9% NaCl 517.5 mL intermittent infusion         1,750 mg  over 2 Hours Intravenous ONCE 24 1723      24 0300  piperacillin-tazobactam (ZOSYN) 3.375 g vial to attach to  mL bag        Note to Pharmacy: For [facility], [facility] and [facility]: For Zosyn-naive patients, use the \"Zosyn initial dose + extended infusion\" order panel.    3.375 g  over 240 Minutes Intravenous EVERY 8 HOURS 24 1800  [Held by provider]  furosemide (LASIX) injection 40 mg        (On hold since yesterday at 1754 until manually unheld; held by Loida Pace MDHold Reason: Other)    40 mg  over 1-3 Minutes Intravenous 2 TIMES DAILY (Diuretics and Nitrates) 24 1643              Contrast Orders - past 72 hours (72h ago, onward)      Start     Dose/Rate Route Frequency Stop    24 2000  iopamidol (ISOVUE-370) solution 90 mL         90 mL Intravenous ONCE 24 1953      "       [employer] Prediction of Planned Initial Vancomycin Regimen  Loading dose: 1750 mg at 18:00 11/07/2024.  Regimen: 1250 mg IV every 18 hours.  Start time: 12:00 on 11/08/2024  Exposure target: AUC24 (range)400-600 mg/L.hr   AUC24,ss: 482 mg/L.hr  Probability of AUC24 > 400: 71 %  Ctrough,ss: 14.5 mg/L  Probability of Ctrough,ss > 20: 23 %  Probability of nephrotoxicity (Lodise NICOLE 2009): 10 %          Plan:  Start vancomycin  1750 mg loading dose, then vancomycin 1250mg IV q18h.   Vancomycin monitoring method: AUC  Vancomycin therapeutic monitoring goal: 400-600 mg*h/L  Pharmacy will check vancomycin levels as appropriate in 1-3 Days.    Serum creatinine levels will be ordered  daily  x 4 days, then reassess .      Laquita Mehta RPH

## 2024-11-07 NOTE — CONSULTS
HEART CARE CONSULTATON NOTE        Assessment/Recommendations   Assessment/Plan  Dominik Rojas is a 83 year old male admitted for recurrent falls and resulting hip fracture now s/p ORIF. Cardiology service consulted for ongoing tachycardia, now felt to be new diagnosis of A fib with RVR. Patient also has known prior diagnoses of HFpEF and hypertension. Noted to have new lung mass on CTPE obtained overnight 11/6/24; concern for malignancy (history of RLL adenocarcinoma) and primary team planning to pursue goals of care conversation.     A fib with RVR   Newly identified on 11/6 EKG. Patient noted to have tachycardia on most recent admission, but felt to be sinus tachycardia at that time and not started on anticoagulation given this and high fall risk. Patient currently on metoprolol succinate 50 mg daily with rates up to 150. However, tachycardia could be exacerbated by ongoing agitation/possible infectious picture (elevated lactic acid overnight)/frequent nebulizer treatments for COPD. With a CHADSVASC score of at least 4 but a HASBLED score of at least 2 and evidence of recent falls, patient certainly is at high risk for stroke event but also at high risk for bleeding. Additionally, with pending goals of conversation with primary team given new concerning lung mass, would opt to prioritize rate management at this time. If curative measures are pursued, could consider starting DOAC for anticoagulation vs being seen in outpatient cardiology clinic to discuss Watchman device.  - Stop metoprolol succinate   - Start metoprolol tartrate 50 mg q8h (hold for HR below 60 and SBP below 100)  - If patient pursuing curative course, consider Eliquis 5 mg BID vs outpatient cards follow up to discuss Watchman   - Continue telemetry  - Infectious work up, COPD management, goals of care conversation per primary team     HFpEF   Most recent echo on 10/6/24 showing EF of 60-65%. Appears near euvolemic today, but reportedly  "hypervolemic earlier in admission and home diuretic regimen changed to IV. BNP not elevated. Diuretic now on hold presumably due to transient hypotension overnight.   - Continue to hold IV lasix for hypotension, resume home dosing thereafter     Hypertension   Losartan dosing increased to 50 mg daily per primary team on admission, now on hold due to transient hypotension overnight. Agree with discontinuing losartan at this time so beta blocker can be safely titrated.   - Discontinue losartan for hypotension     Clinically Significant Risk Factors          # Hypochloremia: Lowest Cl = 97 mmol/L in last 2 days, will monitor as appropriate              # Hypertension: Noted on problem list         # Dementia: noted on problem list              # Obesity: Estimated body mass index is 30.02 kg/m  as calculated from the following:    Height as of this encounter: 1.702 m (5' 7\").    Weight as of this encounter: 87 kg (191 lb 11.2 oz).          # Financial/Environmental Concerns: none           History of Present Illness/Subjective    HPI: Dominik Rojas is a 84 year old male with a PMH notable for COPD with home oxygen requirements, HFpEF (with most recent echo on 10/6/24 showing EF of 60-65%), hypertension, hypothyroidism, dementia. Recently admitted x 2 (10/5-10/11 and then again on 10/12 -current) for recurrent falls leading to left hip fracture requiring ORIF. Has had behavioral concerns this admission, difficult to redirect.     Consulted for new concern of A fib with RVR. Per chart review, A fib has not been noted historically. Cardiology service was consulted on most recent admission due to tachycardia of unknown etiology: felt to have sinus tachycardia at that time with frequent PACs related to COPD exacerbation. Did not feel anticoagulation at that time was warranted due to both absence of clear A fib and high fall risk.     Consulted again for ongoing tachycardia on this admission. A fib called out on EKG " "obtained 11/6/24. Telemetry also suggesting irregular rhythm. Patient previously on metoprolol succinate 12.5 mg daily prior to this admission, now on metoprolol succinate 50 mg daily for attempt at rate control.     Patient appears euvolemic on exam. Does have oxygen requirement, but at baseline given COPD.     CHADSVASC score of at least 4 puts patient at high risk for clotting event, but with frequent falls and HASBLED score of at least 2, agree with prior evaluation that anticoagulation would not be an ideal choice.     CT PE obtained overnight 11/6/24 for transient hypotension and dyspnea. No PE, but was noted to have large infiltrating mass concerning for malignancy. Per chart review, primary team to pursue goal of care conversation which of course would affect how aggressive rate control would need to be pursued.     With diagnosis of dementia, patient unable to provide history to me today.        Physical Examination  Review of Systems   VITALS: /86 (BP Location: Left arm)   Pulse 117   Temp 98.3  F (36.8  C) (Oral)   Resp 22   Ht 1.702 m (5' 7\")   Wt 87 kg (191 lb 11.2 oz)   SpO2 95%   BMI 30.02 kg/m    BMI: Body mass index is 30.02 kg/m .  Wt Readings from Last 3 Encounters:   10/23/24 87 kg (191 lb 11.2 oz)   10/05/24 80.9 kg (178 lb 4.8 oz)   10/02/24 81.3 kg (179 lb 4.8 oz)       Intake/Output Summary (Last 24 hours) at 11/7/2024 0856  Last data filed at 11/7/2024 0603  Gross per 24 hour   Intake 750 ml   Output 550 ml   Net 200 ml     General Appearance:   Lying in bed. Not oriented but does not appear uncomfortable. Generally deconditioned.    ENT/Mouth: membranes moist       Neck: no carotid bruits or thyromegaly   Chest/Lungs:   On supplemental oxygen. Poor air movement throughout.    Cardiovascular:   Irregular rate. Tachycardia. No  murmur appreciated.        Extremities: no cyanosis or clubbing. No LE edema.    Skin: Diffuse ecchymosis.    Neurologic: Unable to cooperate in neuro " exam.      Psychiatric: Calm at this time, distant affect. No oriented.     Review Of Systems  ROS negative aside from those concerns noted in the HPI and A+P above.             Lab Results    Chemistry/lipid CBC Cardiac Enzymes/BNP/TSH/INR   Recent Labs   Lab Test 01/15/24  1332   CHOL 141   HDL 51   LDL 68   TRIG 112     Recent Labs   Lab Test 01/15/24  1332 01/23/23  1148 10/26/21  0957   LDL 68 71 62     Recent Labs   Lab Test 11/07/24  0641      POTASSIUM 3.4   CHLORIDE 99   CO2 29   GLC 95   BUN 22.9   CR 0.83   GFRESTIMATED 87   CHELI 8.4*     Recent Labs   Lab Test 11/07/24  0641 11/06/24  1929 11/06/24  0530   CR 0.83 0.91 0.90     Recent Labs   Lab Test 09/12/24  1237 01/23/23  1148 08/18/21  0427   A1C 5.8* 5.7* 5.8*          Recent Labs   Lab Test 11/07/24  0641   WBC 12.8*   HGB 11.3*   HCT 35.2*   MCV 95        Recent Labs   Lab Test 11/07/24  0641 11/06/24  0819 10/24/24  0517   HGB 11.3* 11.7* 11.0*    Recent Labs   Lab Test 08/22/21  0927 08/17/21  0206 08/16/21  2237   TROPONINI 0.02 0.02 0.02     Recent Labs   Lab Test 11/06/24  0530 10/20/24  0958 10/05/24  1859 09/12/24  1237 04/12/23  2342 08/14/21  0216 08/09/21 2008 03/07/21  0038   BNP  --   --   --   --   --  61 51 50   NTBNPI 352  --  245 159   < >  --   --   --    NTBNP  --  221  --   --   --   --   --   --     < > = values in this interval not displayed.     Recent Labs   Lab Test 10/06/24  0558   TSH 6.93*     Recent Labs   Lab Test 04/24/23  2140 03/08/23  0943 02/27/21  1040   INR 1.16* 1.16* 1.15*        Medical History  Surgical History Family History Social History   Past Medical History:   Diagnosis Date    AAA (abdominal aortic aneurysm) (H)     s/p stenting    AAA (abdominal aortic aneurysm) (H) 11/15/2012    AAA (abdominal aortic aneurysm) CT 11-12  6.4 cm. Saw Dr Muller 1-13;  S/P endovascular repair 3-13;  s/p stenting      Alcohol dependence in remission (H)     Alcohol use disorder, severe, in sustained  remission, dependence (H) 08/16/2021    Anxiety     Atrial fibrillation with normal ventricular rate (H)     Benign prostatic hyperplasia with lower urinary tract symptoms     Bronchiectasis without complication (H)     2/27/2020    COPD (chronic obstructive pulmonary disease) (H)     CVA (cerebral vascular accident) (H) 2019    DDD (degenerative disc disease), lumbar     Depression     Depressive disorder     Diabetes (H)     Diastolic dysfunction 01/22/2021    DJD (degenerative joint disease) of knee     left    Essential hypertension     Glaucoma     Glaucoma     History of stroke without residual deficits     Hyperlipidemia     Hypertension     Hypothyroidism     Hypothyroidism     Kidney stones     Major depression     Memory problem     Nocturia     Obesity     Opioid use disorder, severe, dependence (H) 08/16/2021    Overactive bladder 02/25/2021    Primary osteoarthritis of left knee     Psoriasis     Psoriasis     Seborrheic keratoses     Somnolence, daytime     Stenosis of right carotid artery     history of TIA's     Past Surgical History:   Procedure Laterality Date    ABDOMEN SURGERY      ABDOMINAL AORTIC ANEURYSM REPAIR  2013    stent    CAROTID ENDARTERECTOMY Right 9/9/2019    Procedure: RIGHT CAROTID ENDARTERECTOMY;  Surgeon: Miguel Muller MD;  Location: [facility];  Service: General    CIRCUMCISION      CYSTOSCOPY      CYSTOSCOPY PROSTATE W/ LASER N/A 6/12/2018    Procedure:  TRANSURETHRAL RESECTION OF THE PROSTATE WITH QUANTA LASER;  Surgeon: Eze Levi MD;  Location: [facility] [place];  Service:     CYSTOSCOPY PROSTATE W/ LASER N/A 2/18/2020    Procedure: CYSTOSCOPY WITH TRANSURETHRAL INCISION OF THE PROSTATE WITH QUANTA LASER;  Surgeon: Eze Levi MD;  Location: [facility] [place];  Service: Urology    CYSTOSCOPY W/ LITHOLAPAXY / EHL N/A 6/12/2018    Procedure: CYSTOSCOPY AND LITHOLAPAXY;  Surgeon: Eze Levi MD;  Location: [facility] [place];  Service:      IR ABDOMINAL AORTAGRAM  2020    IR ABDOMINAL AORTIC ANEURYSM ENDOGRAFT  2020    IR ABDOMINAL AORTOGRAM  2020    IR ABDOMINAL ENDOVASCULAR STENT GRAFT  2020    LITHOTRIPSY      OPEN REDUCTION INTERNAL FIXATION HIP NAILING Left 10/6/2024    Procedure: INTERNAL FIXATION, FRACTURE, TROCHANTERIC, LEFT HIP, USING RODS;  Surgeon: Tad Oliver DO;  Location: [facility] [place]    PERCUTANEOUS NEPHROSTOLITHOTOMY Right 03/10/2020    ZZC HUANG W/O FACETEC FORAMOT/DSKC  VRT SEG, LUMBAR Bilateral 3/3/2021    Procedure: Bilateral lumbar 2 - Lumbar 4 decompressive lumbar laminectomy with medial facetectomies;  Surgeon: Renée King MD;  Location: [facility] [place];  Service: Spine     Family History   Problem Relation Age of Onset    Emphysema Mother     No Known Problems Father     Cirrhosis Father     No Known Problems Sister     No Known Problems Brother     No Known Problems Maternal Aunt     No Known Problems Maternal Uncle     No Known Problems Paternal Aunt     No Known Problems Paternal Uncle     No Known Problems Maternal Grandmother     No Known Problems Maternal Grandfather     No Known Problems Paternal Grandmother     No Known Problems Paternal Grandfather     No Known Problems Other         Social History     Socioeconomic History    Marital status:      Spouse name: Not on file    Number of children: Not on file    Years of education: Not on file    Highest education level: Not on file   Occupational History    Not on file   Tobacco Use    Smoking status: Former     Current packs/day: 0.00     Average packs/day: 0.5 packs/day for 35.0 years (17.5 ttl pk-yrs)     Types: Cigarettes     Start date: 1955     Quit date: 1990     Years since quittin.8    Smokeless tobacco: Never    Tobacco comments:     quit    Substance and Sexual Activity    Alcohol use: No     Comment: Alcoholic Drinks/day: quit     Drug use: No    Sexual activity: Yes     Partners: Female      Birth control/protection: Post-menopausal   Other Topics Concern    Parent/sibling w/ CABG, MI or angioplasty before 65F 55M? No   Social History Narrative    Not on file     Social Drivers of Health     Financial Resource Strain: Low Risk  (10/14/2024)    Financial Resource Strain     Within the past 12 months, have you or your family members you live with been unable to get utilities (heat, electricity) when it was really needed?: No   Food Insecurity: Low Risk  (10/14/2024)    Food Insecurity     Within the past 12 months, did you worry that your food would run out before you got money to buy more?: No     Within the past 12 months, did the food you bought just not last and you didn t have money to get more?: No   Transportation Needs: Low Risk  (10/14/2024)    Transportation Needs     Within the past 12 months, has lack of transportation kept you from medical appointments, getting your medicines, non-medical meetings or appointments, work, or from getting things that you need?: No   Physical Activity: Not on file   Stress: Not on file   Social Connections: Unknown (1/3/2024)    Received from [facility], [facility]    Social Connections     Frequency of Communication with Friends and Family: Not on file   Interpersonal Safety: Low Risk  (10/14/2024)    Interpersonal Safety     Do you feel physically and emotionally safe where you currently live?: Yes     Within the past 12 months, have you been hit, slapped, kicked or otherwise physically hurt by someone?: No     Within the past 12 months, have you been humiliated or emotionally abused in other ways by your partner or ex-partner?: No   Housing Stability: Low Risk  (10/14/2024)    Housing Stability     Do you have housing? : Yes     Are you worried about losing your housing?: No         Medications  Allergies   No current outpatient medications on file.        Allergies   Allergen Reactions     Diclofenac      Other reaction(s): GI Upset    Loratadine      Other reaction(s): Urinary Retention    Oxycodone      Agitation     Sertraline Unknown     Other reaction(s): ineffective         Alia Mello MD PGY-3  [facility]      Discussed with cardiology attending Dr. Khan

## 2024-11-07 NOTE — PROGRESS NOTES
[facility]    Medicine Progress Note - Hospitalist Service    Date of Admission:  10/12/2024    Assessment & Plan   Dominik Rojas is a 84 year old male with history of COPD on 2 to 3 L nasal cannula at home, chronic systolic heart failure, hypertension, hypothyroidism, anxiety, recent fall and left hip fracture s/p ORIF who was jsut discharged to TCU.  Patient was brought to ED for evaluation another fall at TCU with left hip pain. Xray showing postop changes without new acute changes.  Patient has had significant behavioral issues requiring 1:1 sitter intermittently. Memory care facility placement pending. It has been challenging due to financial issues.  Currently, he has some swelling of his feet and on diuresis.      Mechanical fall  Left hip contusion without new fracture  Recent left hip fracture s/p ORIF  -- Had hip fracture surgery on 10/6. Was at TCU, and had a mechanical fall, with worse left hip pain after. Presented for eval.  CT Left femur no evidence of new fracture, small lateral left hip subcutaneous hematoma  -- Negative for DVT on US on 10/12.   -- Pain team was consulted to optimize medication  -- Seen by Ortho: follow-up outpatient  -- On Tylenol scheduled  -- Had been very drowsy on opioids, opioids on hold for now  -- Low dose robaxin changed to PRN  -- Continue topical lidocaine     Anxiety and depression;  Dementia with behavioral change/agitation   Mood disorder  Acute toxic/metabolic encephalopathy  -- Has been needing bedside sitter intermittently. Currently weaned off since 11/3. Patient often stands up and tries to walk. He has fall risks. He also does not like being alone.   -- No UTI.  -- Seen by Psych, trazodone 25 mg to help with sleep at bedtime  -- Seroquel as needed for agitation  -- Delirium precautions  -- Continue suppertime melatonin to help with day/night reversal  -- Plan memory care placement. Family ok with this, they have been struggling to care  for him at home.  -- 11/06: Agitated. Concern for seizure like activity per night RN. EEG showed no e/o seizure. Neurology consult appreciated     Acute on chronic HFpEF  -- Hypervolemic.   -- Lasix was held on admission due to poor po intake, weight went up 10 lbs.   Recent Echo showing preserved EF  -- Had Lasix oral 40 mg bid for 2 days. Remains swollen. Changed to IV on 11/3. Held on 11/07 due to low BP. Monitor fluid status.   -- Increased home losartan  -- Daily weight, Fluid restriction    Afib with rvr, new  -- on 11/06  -- Metoprolol 50 mg q8h per Cards    Leukocytosis, lactic acidosis, tachypnea on 11/06  Acute on chronic hypoxic respiratory failure  Postobstructive RLL pneumonia  -- wbc: 16k>>12.8, lactic acid: 5.2>>3.4>>6.7>>3.2  -- S/p ceftriaxon. Currently on Zosyn  -- On 11/06. CXR: Mild pleural fluid and/or thickening inferior right hemithorax and consolidation and/or volume loss basilar right lower lobe stable. Bnp wnl  -- CTA- PE no PE.  Large infiltrating mass in the subcarinal and right hilar region with resultant significant narrowing of the right mainstem, middle lobe and bronchus intermedius. Postobstructive pneumonia in the right lung base with small right pleural effusion. Underlying emphysematous change. Associated mediastinal lymphadenopathy. Mass severely narrows or occludes the inferior right pulmonary vein with narrowing of the superior right pulmonary vein as it enters the left atrium.    COPD, Chronic hypoxic respiratory failure on 2-3l NC  -- PTA meds  -- Not in exacerbation. On home flow O2     Recent UTI  -- PTA amoxicillin 500mg TID; finished course of abx. Repeated UC 10/13 and again 10/23 with mixed tai.  -- 11/06: UA abnormal. Ucx Enterococcus faecalis. Sensitivity pending. On vancomycin     History of RLL adenocarcinoma s/p radiation therapy:   -- Per wife he was to get EBUS and chemo but she did not think he could tolerate those  -- CTA as shown above.     Normocytic  "anemia  -- Hb baseline 10-11  -- Contusions and hematoma on the Left thigh  -- Monitor Hb periodically     HLD: Crestor     GERD: PPI      Hypothyroidism  -- Recent TSH 6.93  -- Continue Synthroid   -- Recommend repeat TSH in 4 weeks with PCP. FT4 not useful for titrating Synthroid generally     DVT prophylaxis: He has significant bruises while on Lovenox so that it was discontinued. Patient likes to sit up in the chair all the time.     GOC discussion: Spouse ok with palliative care consult.              Diet: Combination Diet Regular Diet Adult  Fluid restriction 1800 ML FLUID  Snacks/Supplements Adult: Ensure Enlive; Between Meals    DVT Prophylaxis: Pneumatic Compression Devices  Cabral Catheter: Not present  Lines: None     Cardiac Monitoring: ACTIVE order. Indication: tachycardia  Code Status: No CPR- Do NOT Intubate      Clinically Significant Risk Factors          # Hypochloremia: Lowest Cl = 97 mmol/L in last 2 days, will monitor as appropriate          # Hypertension: Noted on problem list     # Dementia: noted on problem list        # Obesity: Estimated body mass index is 30.02 kg/m  as calculated from the following:    Height as of this encounter: 1.702 m (5' 7\").    Weight as of this encounter: 87 kg (191 lb 11.2 oz).        # Financial/Environmental Concerns: none         Disposition Plan     Medically Ready for Discharge: Anticipated in 2-4 Days             Loida Pace MD  Hospitalist Service  [facility]  Securely message with [employer] (more info)  Text page via [employer] Paging/Directory   ______________________________________________________________________    Interval History   Patient is seen and examined at bedside.   1:1 and soft wrist restraints. Pt is agitated.  Plan of care discussed with patient. All questions answered. Pt verbalized understanding.     Physical Exam   Vital Signs: Temp: 97.8  F (36.6  C) Temp src: Oral BP: 126/80 Pulse: 95   Resp: 20 SpO2: 96 % O2 " Device: Nasal cannula Oxygen Delivery: 5 LPM  Weight: 191 lbs 11.2 oz    GEN: Agitated. Not in acute distress.  HEENT: Atraumatic, mucous membrane- moist and pink.  Chest: Bilateral air entry.  CVS: S1S2 regular.   Abdomen: Soft. Non-tender, non-distended. No organomegaly. No guarding or rigidity. Bowel sounds active.   Extremities: No pedal edema.  CNS: No involuntary movements.  Skin: no cyanosis or clubbing.     Medical Decision Making       51 MINUTES SPENT BY ME on the date of service doing chart review, history, exam, documentation & further activities per the note.      Data

## 2024-11-07 NOTE — PLAN OF CARE
Problem: Adult Inpatient Plan of Care  Goal: Optimal Comfort and Wellbeing  Intervention: Monitor Pain and Promote Comfort  Recent Flowsheet Documentation  Taken 11/7/2024 0100 by Lisandro Ortiz RN  Pain Management Interventions: emotional support  Intervention: Provide Person-Centered Care  Recent Flowsheet Documentation  Taken 11/7/2024 0100 by Lisandro Ortiz RN  Trust Relationship/Rapport: care explained     Problem: Risk for Delirium  Goal: Optimal Coping  Outcome: Not Progressing  Intervention: Optimize Psychosocial Adjustment to Delirium  Recent Flowsheet Documentation  Taken 11/7/2024 0100 by Lisandro Ortiz RN  Supportive Measures: active listening utilized  Goal: Improved Behavioral Control  Outcome: Not Progressing  Intervention: Minimize Safety Risk  Recent Flowsheet Documentation  Taken 11/7/2024 0100 by Lisandro Ortiz RN  Trust Relationship/Rapport: care explained  Goal: Improved Attention and Thought Clarity  Outcome: Not Progressing  Intervention: Maximize Cognitive Function  Recent Flowsheet Documentation  Taken 11/7/2024 0100 by Lisandro Ortiz RN  Reorientation Measures: clock in view  Goal: Improved Sleep  Outcome: Not Progressing  Intervention: Promote Sleep  Recent Flowsheet Documentation  Taken 11/7/2024 0100 by Lisandro Ortiz RN  Sleep/Rest Enhancement: regular sleep/rest pattern promoted     Problem: Delirium  Goal: Optimal Coping  Outcome: Not Progressing  Intervention: Optimize Psychosocial Adjustment to Delirium  Recent Flowsheet Documentation  Taken 11/7/2024 0100 by Lisandro Ortiz RN  Supportive Measures: active listening utilized  Goal: Improved Behavioral Control  Outcome: Not Progressing  Intervention: Minimize Safety Risk  Recent Flowsheet Documentation  Taken 11/7/2024 0100 by Lisandro Ortiz, RN  Trust Relationship/Rapport: care explained  Goal: Improved Attention and Thought Clarity  Outcome: Not Progressing  Intervention: Maximize Cognitive  Function  Recent Flowsheet Documentation  Taken 11/7/2024 0100 by Lisandro Ortiz RN  Reorientation Measures: clock in view  Goal: Improved Sleep  Outcome: Not Progressing  Intervention: Promote Sleep  Recent Flowsheet Documentation  Taken 11/7/2024 0100 by Lisandro Ortiz, RN  Sleep/Rest Enhancement: regular sleep/rest pattern promoted     Problem: Delirium  Goal: Improved Behavioral Control  Outcome: Not Progressing  Intervention: Minimize Safety Risk  Recent Flowsheet Documentation  Taken 11/7/2024 0100 by Lisandro Ortiz RN  Trust Relationship/Rapport: care explained     Problem: Delirium  Goal: Improved Attention and Thought Clarity  Outcome: Not Progressing  Intervention: Maximize Cognitive Function  Recent Flowsheet Documentation  Taken 11/7/2024 0100 by Lisandro Ortiz RN  Reorientation Measures: clock in view   Goal Outcome Evaluation:       Patient continues to require 1:1 sitter. Patient extremely restless and tries to pull off oxygen tubing and pull at IV line. Patient has received PRN Robaxin. Patient appears to be uncomfortable and complains of pain in bilateral hips. HO notified about increased pain and restlessness. HO stated no new meds to be given. HO put in order for soft upper extremity restraints.

## 2024-11-08 ENCOUNTER — APPOINTMENT (OUTPATIENT)
Dept: PHYSICAL THERAPY | Facility: HOSPITAL | Age: 84
End: 2024-11-08
Payer: COMMERCIAL

## 2024-11-08 LAB
ANION GAP SERPL CALCULATED.3IONS-SCNC: 15 MMOL/L (ref 7–15)
BASOPHILS # BLD AUTO: 0.1 10E3/UL (ref 0–0.2)
BASOPHILS NFR BLD AUTO: 1 %
BUN SERPL-MCNC: 31.2 MG/DL (ref 8–23)
CALCIUM SERPL-MCNC: 8.4 MG/DL (ref 8.8–10.4)
CHLORIDE SERPL-SCNC: 101 MMOL/L (ref 98–107)
CREAT SERPL-MCNC: 0.91 MG/DL (ref 0.67–1.17)
EGFRCR SERPLBLD CKD-EPI 2021: 84 ML/MIN/1.73M2
EOSINOPHIL # BLD AUTO: 0.4 10E3/UL (ref 0–0.7)
EOSINOPHIL NFR BLD AUTO: 3 %
ERYTHROCYTE [DISTWIDTH] IN BLOOD BY AUTOMATED COUNT: 18.1 % (ref 10–15)
GLUCOSE SERPL-MCNC: 82 MG/DL (ref 70–99)
HCO3 SERPL-SCNC: 27 MMOL/L (ref 22–29)
HCT VFR BLD AUTO: 40.5 % (ref 40–53)
HGB BLD-MCNC: 12.2 G/DL (ref 13.3–17.7)
IMM GRANULOCYTES # BLD: 0.1 10E3/UL
IMM GRANULOCYTES NFR BLD: 1 %
LYMPHOCYTES # BLD AUTO: 2.3 10E3/UL (ref 0.8–5.3)
LYMPHOCYTES NFR BLD AUTO: 15 %
MCH RBC QN AUTO: 29.6 PG (ref 26.5–33)
MCHC RBC AUTO-ENTMCNC: 30.1 G/DL (ref 31.5–36.5)
MCV RBC AUTO: 98 FL (ref 78–100)
MONOCYTES # BLD AUTO: 1.3 10E3/UL (ref 0–1.3)
MONOCYTES NFR BLD AUTO: 8 %
MRSA DNA SPEC QL NAA+PROBE: POSITIVE
NEUTROPHILS # BLD AUTO: 11.1 10E3/UL (ref 1.6–8.3)
NEUTROPHILS NFR BLD AUTO: 73 %
NRBC # BLD AUTO: 0 10E3/UL
NRBC BLD AUTO-RTO: 0 /100
PLATELET # BLD AUTO: 322 10E3/UL (ref 150–450)
POTASSIUM SERPL-SCNC: 4 MMOL/L (ref 3.4–5.3)
RBC # BLD AUTO: 4.12 10E6/UL (ref 4.4–5.9)
SA TARGET DNA: POSITIVE
SODIUM SERPL-SCNC: 143 MMOL/L (ref 135–145)
WBC # BLD AUTO: 15.3 10E3/UL (ref 4–11)

## 2024-11-08 PROCEDURE — 258N000003 HC RX IP 258 OP 636: Performed by: STUDENT IN AN ORGANIZED HEALTH CARE EDUCATION/TRAINING PROGRAM

## 2024-11-08 PROCEDURE — 99232 SBSQ HOSP IP/OBS MODERATE 35: CPT | Performed by: INTERNAL MEDICINE

## 2024-11-08 PROCEDURE — 99233 SBSQ HOSP IP/OBS HIGH 50: CPT | Mod: 24 | Performed by: NURSE PRACTITIONER

## 2024-11-08 PROCEDURE — 250N000013 HC RX MED GY IP 250 OP 250 PS 637

## 2024-11-08 PROCEDURE — 87640 STAPH A DNA AMP PROBE: CPT | Performed by: INTERNAL MEDICINE

## 2024-11-08 PROCEDURE — 99233 SBSQ HOSP IP/OBS HIGH 50: CPT | Mod: 24 | Performed by: INTERNAL MEDICINE

## 2024-11-08 PROCEDURE — 99233 SBSQ HOSP IP/OBS HIGH 50: CPT | Performed by: STUDENT IN AN ORGANIZED HEALTH CARE EDUCATION/TRAINING PROGRAM

## 2024-11-08 PROCEDURE — 250N000011 HC RX IP 250 OP 636

## 2024-11-08 PROCEDURE — 36415 COLL VENOUS BLD VENIPUNCTURE: CPT | Performed by: STUDENT IN AN ORGANIZED HEALTH CARE EDUCATION/TRAINING PROGRAM

## 2024-11-08 PROCEDURE — 87641 MR-STAPH DNA AMP PROBE: CPT | Performed by: INTERNAL MEDICINE

## 2024-11-08 PROCEDURE — 250N000013 HC RX MED GY IP 250 OP 250 PS 637: Performed by: STUDENT IN AN ORGANIZED HEALTH CARE EDUCATION/TRAINING PROGRAM

## 2024-11-08 PROCEDURE — 250N000013 HC RX MED GY IP 250 OP 250 PS 637: Performed by: HOSPITALIST

## 2024-11-08 PROCEDURE — 85025 COMPLETE CBC W/AUTO DIFF WBC: CPT | Performed by: STUDENT IN AN ORGANIZED HEALTH CARE EDUCATION/TRAINING PROGRAM

## 2024-11-08 PROCEDURE — 87186 SC STD MICRODIL/AGAR DIL: CPT | Performed by: INTERNAL MEDICINE

## 2024-11-08 PROCEDURE — 80048 BASIC METABOLIC PNL TOTAL CA: CPT | Performed by: STUDENT IN AN ORGANIZED HEALTH CARE EDUCATION/TRAINING PROGRAM

## 2024-11-08 PROCEDURE — 97530 THERAPEUTIC ACTIVITIES: CPT | Mod: GP

## 2024-11-08 PROCEDURE — 99418 PROLNG IP/OBS E/M EA 15 MIN: CPT | Performed by: NURSE PRACTITIONER

## 2024-11-08 PROCEDURE — 82565 ASSAY OF CREATININE: CPT | Performed by: STUDENT IN AN ORGANIZED HEALTH CARE EDUCATION/TRAINING PROGRAM

## 2024-11-08 PROCEDURE — 250N000011 HC RX IP 250 OP 636: Performed by: STUDENT IN AN ORGANIZED HEALTH CARE EDUCATION/TRAINING PROGRAM

## 2024-11-08 PROCEDURE — 120N000001 HC R&B MED SURG/OB

## 2024-11-08 PROCEDURE — 250N000013 HC RX MED GY IP 250 OP 250 PS 637: Performed by: REGISTERED NURSE

## 2024-11-08 PROCEDURE — 250N000013 HC RX MED GY IP 250 OP 250 PS 637: Performed by: INTERNAL MEDICINE

## 2024-11-08 PROCEDURE — 99418 PROLNG IP/OBS E/M EA 15 MIN: CPT | Performed by: STUDENT IN AN ORGANIZED HEALTH CARE EDUCATION/TRAINING PROGRAM

## 2024-11-08 PROCEDURE — 87077 CULTURE AEROBIC IDENTIFY: CPT | Performed by: INTERNAL MEDICINE

## 2024-11-08 PROCEDURE — 97110 THERAPEUTIC EXERCISES: CPT | Mod: GP

## 2024-11-08 RX ORDER — METOPROLOL TARTRATE 25 MG/1
50 TABLET, FILM COATED ORAL 2 TIMES DAILY
Status: DISCONTINUED | OUTPATIENT
Start: 2024-11-08 | End: 2024-11-12 | Stop reason: HOSPADM

## 2024-11-08 RX ADMIN — SODIUM CHLORIDE 1250 MG: 9 INJECTION, SOLUTION INTRAVENOUS at 11:54

## 2024-11-08 RX ADMIN — TRAZODONE HYDROCHLORIDE 25 MG: 50 TABLET ORAL at 21:29

## 2024-11-08 RX ADMIN — PIPERACILLIN AND TAZOBACTAM 3.38 G: 3; .375 INJECTION, POWDER, FOR SOLUTION INTRAVENOUS at 21:30

## 2024-11-08 RX ADMIN — LATANOPROST 1 DROP: 50 SOLUTION/ DROPS OPHTHALMIC at 21:29

## 2024-11-08 RX ADMIN — SENNOSIDES AND DOCUSATE SODIUM 1 TABLET: 8.6; 5 TABLET ORAL at 21:29

## 2024-11-08 RX ADMIN — POLYETHYLENE GLYCOL 3350 17 G: 17 POWDER, FOR SOLUTION ORAL at 08:48

## 2024-11-08 RX ADMIN — ASPIRIN 81 MG CHEWABLE TABLET 81 MG: 81 TABLET CHEWABLE at 08:48

## 2024-11-08 RX ADMIN — Medication 5 MG: at 16:40

## 2024-11-08 RX ADMIN — LEVOTHYROXINE SODIUM 88 MCG: 88 TABLET ORAL at 06:03

## 2024-11-08 RX ADMIN — BUSPIRONE HYDROCHLORIDE 5 MG: 5 TABLET ORAL at 08:48

## 2024-11-08 RX ADMIN — BUSPIRONE HYDROCHLORIDE 5 MG: 5 TABLET ORAL at 21:29

## 2024-11-08 RX ADMIN — LIDOCAINE 1 PATCH: 4 PATCH TOPICAL at 11:54

## 2024-11-08 RX ADMIN — PANTOPRAZOLE SODIUM 40 MG: 40 TABLET, DELAYED RELEASE ORAL at 06:03

## 2024-11-08 RX ADMIN — ACETAMINOPHEN 650 MG: 325 TABLET ORAL at 21:28

## 2024-11-08 RX ADMIN — SENNOSIDES AND DOCUSATE SODIUM 1 TABLET: 8.6; 5 TABLET ORAL at 08:48

## 2024-11-08 RX ADMIN — DULOXETINE HYDROCHLORIDE 60 MG: 60 CAPSULE, DELAYED RELEASE PELLETS ORAL at 08:48

## 2024-11-08 RX ADMIN — PIPERACILLIN AND TAZOBACTAM 3.38 G: 3; .375 INJECTION, POWDER, FOR SOLUTION INTRAVENOUS at 14:34

## 2024-11-08 RX ADMIN — ROSUVASTATIN 10 MG: 10 TABLET, FILM COATED ORAL at 21:29

## 2024-11-08 RX ADMIN — METOPROLOL TARTRATE 50 MG: 25 TABLET, FILM COATED ORAL at 21:28

## 2024-11-08 RX ADMIN — PIPERACILLIN AND TAZOBACTAM 3.38 G: 3; .375 INJECTION, POWDER, FOR SOLUTION INTRAVENOUS at 06:04

## 2024-11-08 RX ADMIN — MICONAZOLE NITRATE: 20 POWDER TOPICAL at 08:49

## 2024-11-08 RX ADMIN — MICONAZOLE NITRATE: 20 POWDER TOPICAL at 21:30

## 2024-11-08 RX ADMIN — METOPROLOL TARTRATE 50 MG: 25 TABLET, FILM COATED ORAL at 06:02

## 2024-11-08 ASSESSMENT — ACTIVITIES OF DAILY LIVING (ADL)
ADLS_ACUITY_SCORE: 24.75
ADLS_ACUITY_SCORE: 25.75
ADLS_ACUITY_SCORE: 24.75
ADLS_ACUITY_SCORE: 25.75
ADLS_ACUITY_SCORE: 24.75
ADLS_ACUITY_SCORE: 25.75
ADLS_ACUITY_SCORE: 24.75
ADLS_ACUITY_SCORE: 25.75
ADLS_ACUITY_SCORE: 24.75

## 2024-11-08 NOTE — PLAN OF CARE
Problem: Risk for Delirium  Goal: Optimal Coping  Outcome: Progressing  Intervention: Optimize Psychosocial Adjustment to Delirium  Recent Flowsheet Documentation  Taken 11/7/2024 2327 by Lisandro Ortiz RN  Supportive Measures: active listening utilized  Goal: Improved Behavioral Control  Outcome: Progressing  Intervention: Prevent and Manage Agitation  Recent Flowsheet Documentation  Taken 11/7/2024 2327 by Lisandro Ortiz RN  Environment Familiarity/Consistency: daily routine followed  Intervention: Minimize Safety Risk  Recent Flowsheet Documentation  Taken 11/7/2024 2327 by Lisandro Ortiz RN  Trust Relationship/Rapport: care explained  Goal: Improved Attention and Thought Clarity  Outcome: Progressing  Goal: Improved Sleep  Outcome: Progressing  Intervention: Promote Sleep  Recent Flowsheet Documentation  Taken 11/7/2024 2327 by Lisandro Ortiz RN  Sleep/Rest Enhancement:   awakenings minimized   comfort measures   room darkened     Problem: Risk for Delirium  Goal: Improved Attention and Thought Clarity  Outcome: Progressing     Problem: Risk for Delirium  Goal: Improved Sleep  Outcome: Progressing  Intervention: Promote Sleep  Recent Flowsheet Documentation  Taken 11/7/2024 2327 by Lisandro Ortiz RN  Sleep/Rest Enhancement:   awakenings minimized   comfort measures   room darkened     Problem: Pain Acute  Goal: Optimal Pain Control and Function  Outcome: Progressing  Intervention: Develop Pain Management Plan  Recent Flowsheet Documentation  Taken 11/7/2024 2327 by Lisandro Ortiz RN  Pain Management Interventions: emotional support  Intervention: Prevent or Manage Pain  Recent Flowsheet Documentation  Taken 11/7/2024 2327 by Lisandro Ortiz RN  Sleep/Rest Enhancement:   awakenings minimized   comfort measures   room darkened  Intervention: Optimize Psychosocial Wellbeing  Recent Flowsheet Documentation  Taken 11/7/2024 2327 by Lisandro Ortiz RN  Supportive Measures:  active listening utilized   Goal Outcome Evaluation:       Patient cooperative with all cares this shift. Patient slept most of the shift. Patient continent of urine. Assist of 1 with walker to the bathroom. Patient took redirection well.

## 2024-11-08 NOTE — PROGRESS NOTES
[facility]    Medicine Progress Note - Hospitalist Service    Date of Admission:  10/12/2024    Assessment & Plan   Dominik Rojas is a 84 year old male with history of COPD on 2 to 3 L nasal cannula at home, chronic systolic heart failure, hypertension, hypothyroidism, anxiety, recent fall and left hip fracture s/p ORIF who was jsut discharged to TCU.  Patient was brought to ED for evaluation another fall at TCU with left hip pain. Xray showing postop changes without new acute changes.  Patient has had significant behavioral issues requiring 1:1 sitter intermittently. Memory care facility placement pending. It has been challenging due to financial issues.  Currently, he has some swelling of his feet and on diuresis.      Mechanical fall  Left hip contusion without new fracture  Recent left hip fracture s/p ORIF  -- Had hip fracture surgery on 10/6. Was at TCU, and had a mechanical fall, with worse left hip pain after. Presented for eval.  CT Left femur no evidence of new fracture, small lateral left hip subcutaneous hematoma  -- Negative for DVT on US on 10/12.   -- Pain team was consulted to optimize medication  -- Seen by Ortho: follow-up outpatient  -- Had been very drowsy on opioids, opioids on hold for now  -- Low dose robaxin changed to PRN  -- Continue topical lidocaine     Anxiety and depression;  Dementia with behavioral change/agitation   Mood disorder  Acute toxic/metabolic encephalopathy  -- Has been needing bedside sitter intermittently. Currently weaned off since 11/3. Patient often stands up and tries to walk. He has fall risks. He also does not like being alone.   -- No UTI.  -- Seen by Psych, trazodone 25 mg to help with sleep at bedtime  -- Seroquel as needed for agitation  -- Delirium precautions  -- Continue suppertime melatonin to help with day/night reversal  -- Plan memory care placement. Family ok with this, they have been struggling to care for him at home.  --  11/06: Agitated. Concern for seizure like activity per night RN. EEG showed no e/o seizure. Neurology consult appreciated     Acute on chronic HFpEF  -- Hypervolemic.   -- Lasix was held on admission due to poor po intake, weight went up 10 lbs.   Recent Echo showing preserved EF  -- Had Lasix oral 40 mg bid for 2 days. Remains swollen. Changed to IV on 11/3. Held on 11/07 due to low BP. Monitor fluid status.   -- Increased home losartan  -- Daily weight, Fluid restriction    Afib with rvr, new  -- On 11/06  -- Metoprolol 50 mg q8h per Cards  -- Not a candidate for AC due to risk of falls    Leukocytosis, lactic acidosis, tachypnea on 11/06  Acute on chronic hypoxic respiratory failure  Postobstructive RLL pneumonia  -- wbc: 16k>>12.8, lactic acid: 5.2>>3.4>>6.7>>3.2  -- S/p ceftriaxon. Currently on Zosyn and vancomycin. MRSA screen +  -- On 11/06. CXR: Mild pleural fluid and/or thickening inferior right hemithorax and consolidation and/or volume loss basilar right lower lobe stable. Bnp wnl  -- CTA- PE no PE.  Large infiltrating mass in the subcarinal and right hilar region with resultant significant narrowing of the right mainstem, middle lobe and bronchus intermedius. Postobstructive pneumonia in the right lung base with small right pleural effusion. Underlying emphysematous change. Associated mediastinal lymphadenopathy. Mass severely narrows or occludes the inferior right pulmonary vein with narrowing of the superior right pulmonary vein as it enters the left atrium.    COPD, Chronic hypoxic respiratory failure on 2-3l NC  -- PTA meds  -- Not in exacerbation. On home flow O2     Recent UTI  -- PTA amoxicillin 500mg TID; finished course of abx. Repeated UC 10/13 and again 10/23 with mixed tai.  -- 11/06: UA abnormal. Ucx Enterococcus faecalis. Sensitivity pending. On vancomycin     History of RLL adenocarcinoma s/p radiation therapy:   -- Per wife he was to get EBUS and chemo but she did not think he could  "tolerate those  -- CTA as shown above.     Normocytic anemia  -- Hb baseline 10-11  -- Contusions and hematoma on the Left thigh  -- Monitor Hb periodically     HLD: Crestor     GERD: PPI      Hypothyroidism  -- Recent TSH 6.93  -- Continue Synthroid   -- Recommend repeat TSH in 4 weeks with PCP. FT4 not useful for titrating Synthroid generally     DVT prophylaxis: He has significant bruises while on Lovenox so that it was discontinued. Patient likes to sit up in the chair all the time.     GOC discussion on 11/08: Had a care conference over the phone. Palliative care, Hospitalist, spouse, and daughter participated. Discussed the overall care and prognosis. Family will think about next steps and will let us know regarding EBUS.               Diet: Fluid restriction 1800 ML FLUID  Snacks/Supplements Adult: Ensure Enlive; Between Meals    DVT Prophylaxis: Pneumatic Compression Devices  Cabral Catheter: Not present  Lines: None     Cardiac Monitoring: ACTIVE order. Indication: tachycardia  Code Status: No CPR- Do NOT Intubate      Clinically Significant Risk Factors                   # Hypertension: Noted on problem list     # Dementia: noted on problem list        # Obesity: Estimated body mass index is 30.02 kg/m  as calculated from the following:    Height as of this encounter: 1.702 m (5' 7\").    Weight as of this encounter: 87 kg (191 lb 11.2 oz).        # Financial/Environmental Concerns: none         Disposition Plan     Medically Ready for Discharge: Anticipated in 2-4 Days             Loida Pace MD  Hospitalist Service  [facility]  Securely message with [employer] (more info)  Text page via [employer] Paging/Directory   ______________________________________________________________________    Interval History   Patient is seen and examined at bedside.   Patient is alert and oriented today. 1:1 sitter present  Plan of care discussed with patient. All questions answered. Pt verbalized " understanding.     Physical Exam   Vital Signs: Temp: 97.4  F (36.3  C) Temp src: Oral BP: 126/71 Pulse: 68   Resp: 18 SpO2: 99 % O2 Device: Nasal cannula Oxygen Delivery: 5 LPM  Weight: 191 lbs 11.2 oz    GEN: Alert and calm Not in acute distress.  HEENT: Atraumatic, mucous membrane- moist and pink.  Chest: Bilateral air entry.  CVS: S1S2 regular.   Abdomen: Soft. Non-tender, non-distended. No organomegaly. No guarding or rigidity. Bowel sounds active.   Extremities: No pedal edema.  CNS: No involuntary movements.  Skin: no cyanosis or clubbing.     Medical Decision Making       70 MINUTES SPENT BY ME on the date of service doing chart review, history, exam, documentation & further activities per the note.      Data

## 2024-11-08 NOTE — PROGRESS NOTES
"  HEART CARE CONSULTATON NOTE        Assessment/Recommendations   Assessment:  1.  Atrial fibrillation with rapid ventricular response: Noted on presentation currently back in normal sinus rhythm.  Continue on metoprolol 50 mg twice daily  2.  Anticoagulation: Cognitive dysfunction, issues with recurrent falls. [facility] by primary team not a candidate for anticoagulation due to falls / bleeding risk  3.  Dementia  4.  New lung mass likely malignancy being seen by palliative care team  5.  Heart failure preserved ejection fraction appears well compensated at this time      Plan:  1.  Metoprolol dosing changed to twice daily.  No further changes at this time please call if any further questions or concerns  Clinically Significant Risk Factors                     # Hypertension: Noted on problem list         # Dementia: noted on problem list          # Obesity: Estimated body mass index is 30.02 kg/m  as calculated from the following:    Height as of this encounter: 1.702 m (5' 7\").    Weight as of this encounter: 87 kg (191 lb 11.2 oz).        # Financial/Environmental Concerns: none               History of Present Illness/Subjective    Telemetry: Currently back in normal sinus rhythm.  Denies worsening shortness of breath, no complaints of chest pain or palpitation     Physical Examination  Review of Systems   VITALS: /68 (BP Location: Left arm)   Pulse 59   Temp 97.6  F (36.4  C) (Oral)   Resp 20   Ht 1.702 m (5' 7\")   Wt 87 kg (191 lb 11.2 oz)   SpO2 100%   BMI 30.02 kg/m    BMI: Body mass index is 30.02 kg/m .  Wt Readings from Last 3 Encounters:   10/23/24 87 kg (191 lb 11.2 oz)   10/05/24 80.9 kg (178 lb 4.8 oz)   10/02/24 81.3 kg (179 lb 4.8 oz)       Intake/Output Summary (Last 24 hours) at 11/8/2024 1134  Last data filed at 11/8/2024 0600  Gross per 24 hour   Intake 1510 ml   Output 250 ml   Net 1260 ml     General Appearance:   no distress, normal body habitus   ENT/Mouth: membranes moist, no oral " lesions or bleeding gums.      EYES:  no scleral icterus, normal conjunctivae   Neck: no carotid bruits or thyromegaly   Chest/Lungs:   lungs are clear to auscultation   Cardiovascular:   Regular. Normal first and second heart sounds with no murmur no edema bilaterally    Abdomen:  bowel sounds are present   Extremities: no cyanosis or clubbing   Skin: no xanthelasma, warm.    Neurologic: normal  bilateral, no tremors     Psychiatric: alert and oriented x3, calm     Review Of Systems  Skin: negative  Eyes: negative  Ears/Nose/Throat: negative  Respiratory: No shortness of breath, dyspnea on exertion, cough, or hemoptysis  Cardiovascular: negative  Gastrointestinal: negative  Genitourinary: negative  Musculoskeletal: negative  Neurologic: negative  Psychiatric: negative  Hematologic/Lymphatic/Immunologic: negative  Endocrine: negative          Lab Results    Chemistry/lipid CBC Cardiac Enzymes/BNP/TSH/INR   Recent Labs   Lab Test 01/15/24  1332   CHOL 141   HDL 51   LDL 68   TRIG 112     Recent Labs   Lab Test 01/15/24  1332 01/23/23  1148 10/26/21  0957   LDL 68 71 62     Recent Labs   Lab Test 11/08/24  0638      POTASSIUM 4.0   CHLORIDE 101   CO2 27   GLC 82   BUN 31.2*   CR 0.91   GFRESTIMATED 84   CHELI 8.4*     Recent Labs   Lab Test 11/08/24  0638 11/07/24  0641 11/06/24  1929   CR 0.91 0.83 0.91     Recent Labs   Lab Test 09/12/24  1237 01/23/23  1148 08/18/21  0427   A1C 5.8* 5.7* 5.8*          Recent Labs   Lab Test 11/08/24  0808   WBC 15.3*   HGB 12.2*   HCT 40.5   MCV 98        Recent Labs   Lab Test 11/08/24  0808 11/07/24  0641 11/06/24  0819   HGB 12.2* 11.3* 11.7*    Recent Labs   Lab Test 08/22/21  0927 08/17/21  0206 08/16/21  2237   TROPONINI 0.02 0.02 0.02     Recent Labs   Lab Test 11/06/24  0530 10/20/24  0958 10/05/24  1859 09/12/24  1237 04/12/23  2342 08/14/21  0216 08/09/21 2008 03/07/21  0038   BNP  --   --   --   --   --  61 51 50   NTBNPI 352  --  896 706   < >  --   --    --    NTBNP  --  221  --   --   --   --   --   --     < > = values in this interval not displayed.     Recent Labs   Lab Test 10/06/24  0558   TSH 6.93*     Recent Labs   Lab Test 04/24/23  2140 03/08/23  0943 02/27/21  1040   INR 1.16* 1.16* 1.15*        Medical History  Surgical History Family History Social History   Past Medical History:   Diagnosis Date    AAA (abdominal aortic aneurysm) (H)     s/p stenting    AAA (abdominal aortic aneurysm) (H) 11/15/2012    AAA (abdominal aortic aneurysm) CT 11-12  6.4 cm. Saw Dr Muller 1-13;  S/P endovascular repair 3-13;  s/p stenting      Alcohol dependence in remission (H)     Alcohol use disorder, severe, in sustained remission, dependence (H) 08/16/2021    Anxiety     Atrial fibrillation with normal ventricular rate (H)     Benign prostatic hyperplasia with lower urinary tract symptoms     Bronchiectasis without complication (H)     2/27/2020    COPD (chronic obstructive pulmonary disease) (H)     CVA (cerebral vascular accident) (H) 2019    DDD (degenerative disc disease), lumbar     Depression     Depressive disorder     Diabetes (H)     Diastolic dysfunction 01/22/2021    DJD (degenerative joint disease) of knee     left    Essential hypertension     Glaucoma     Glaucoma     History of stroke without residual deficits     Hyperlipidemia     Hypertension     Hypothyroidism     Hypothyroidism     Kidney stones     Major depression     Memory problem     Nocturia     Obesity     Opioid use disorder, severe, dependence (H) 08/16/2021    Overactive bladder 02/25/2021    Primary osteoarthritis of left knee     Psoriasis     Psoriasis     Seborrheic keratoses     Somnolence, daytime     Stenosis of right carotid artery     history of TIA's     Past Surgical History:   Procedure Laterality Date    ABDOMEN SURGERY      ABDOMINAL AORTIC ANEURYSM REPAIR  2013    stent    CAROTID ENDARTERECTOMY Right 9/9/2019    Procedure: RIGHT CAROTID ENDARTERECTOMY;  Surgeon: Miguel Muller  MD ANALILIA;  Location: [facility];  Service: General    CIRCUMCISION      CYSTOSCOPY      CYSTOSCOPY PROSTATE W/ LASER N/A 6/12/2018    Procedure:  TRANSURETHRAL RESECTION OF THE PROSTATE WITH QUANTA LASER;  Surgeon: Eze Levi MD;  Location: [facility];  Service:     CYSTOSCOPY PROSTATE W/ LASER N/A 2/18/2020    Procedure: CYSTOSCOPY WITH TRANSURETHRAL INCISION OF THE PROSTATE WITH QUANTA LASER;  Surgeon: Eze Levi MD;  Location: [facility];  Service: Urology    CYSTOSCOPY W/ LITHOLAPAXY / EHL N/A 6/12/2018    Procedure: CYSTOSCOPY AND LITHOLAPAXY;  Surgeon: Eze Levi MD;  Location: [facility];  Service:     IR ABDOMINAL AORTAGRAM  5/19/2020    IR ABDOMINAL AORTIC ANEURYSM ENDOGRAFT  5/19/2020    IR ABDOMINAL AORTOGRAM  5/19/2020    IR ABDOMINAL ENDOVASCULAR STENT GRAFT  5/19/2020    LITHOTRIPSY      OPEN REDUCTION INTERNAL FIXATION HIP NAILING Left 10/6/2024    Procedure: INTERNAL FIXATION, FRACTURE, TROCHANTERIC, LEFT HIP, USING RODS;  Surgeon: Tad Oliver DO;  Location: [facility]    PERCUTANEOUS NEPHROSTOLITHOTOMY Right 03/10/2020    ZZC HUANG W/O FACETEC FORAMOT/DSKC 1/2 VRT SEG, LUMBAR Bilateral 3/3/2021    Procedure: Bilateral lumbar 2 - Lumbar 4 decompressive lumbar laminectomy with medial facetectomies;  Surgeon: Renée King MD;  Location: [facility];  Service: Spine     Family History   Problem Relation Age of Onset    Emphysema Mother     No Known Problems Father     Cirrhosis Father     No Known Problems Sister     No Known Problems Brother     No Known Problems Maternal Aunt     No Known Problems Maternal Uncle     No Known Problems Paternal Aunt     No Known Problems Paternal Uncle     No Known Problems Maternal Grandmother     No Known Problems Maternal Grandfather     No Known Problems Paternal Grandmother     No Known Problems Paternal Grandfather     No Known Problems Other         Social History     Socioeconomic  History    Marital status:      Spouse name: Not on file    Number of children: Not on file    Years of education: Not on file    Highest education level: Not on file   Occupational History    Not on file   Tobacco Use    Smoking status: Former     Current packs/day: 0.00     Average packs/day: 0.5 packs/day for 35.0 years (17.5 ttl pk-yrs)     Types: Cigarettes     Start date: 1955     Quit date: 1990     Years since quittin.8    Smokeless tobacco: Never    Tobacco comments:     quit    Substance and Sexual Activity    Alcohol use: No     Comment: Alcoholic Drinks/day: quit     Drug use: No    Sexual activity: Yes     Partners: Female     Birth control/protection: Post-menopausal   Other Topics Concern    Parent/sibling w/ CABG, MI or angioplasty before 65F 55M? No   Social History Narrative    Not on file     Social Drivers of Health     Financial Resource Strain: Low Risk  (10/14/2024)    Financial Resource Strain     Within the past 12 months, have you or your family members you live with been unable to get utilities (heat, electricity) when it was really needed?: No   Food Insecurity: Low Risk  (10/14/2024)    Food Insecurity     Within the past 12 months, did you worry that your food would run out before you got money to buy more?: No     Within the past 12 months, did the food you bought just not last and you didn t have money to get more?: No   Transportation Needs: Low Risk  (10/14/2024)    Transportation Needs     Within the past 12 months, has lack of transportation kept you from medical appointments, getting your medicines, non-medical meetings or appointments, work, or from getting things that you need?: No   Physical Activity: Not on file   Stress: Not on file   Social Connections: Unknown (1/3/2024)    [facility]    Social Connections     Frequency of Communication with Friends and  Family: Not on file   Interpersonal Safety: Low Risk  (10/14/2024)    Interpersonal Safety     Do you feel physically and emotionally safe where you currently live?: Yes     Within the past 12 months, have you been hit, slapped, kicked or otherwise physically hurt by someone?: No     Within the past 12 months, have you been humiliated or emotionally abused in other ways by your partner or ex-partner?: No   Housing Stability: Low Risk  (10/14/2024)    Housing Stability     Do you have housing? : Yes     Are you worried about losing your housing?: No         Medications  Allergies   No current outpatient medications on file.        Allergies   Allergen Reactions    Diclofenac      Other reaction(s): GI Upset    Loratadine      Other reaction(s): Urinary Retention    Oxycodone      Agitation     Sertraline Unknown     Other reaction(s): ineffective         Alexa Khan MD

## 2024-11-08 NOTE — PROGRESS NOTES
PULMONARY SERVICE PROGRESS NOTE    Assessment:     Dominik Rojas is a 84 year old male with memory issues, currently pending memory care facility placement, recurrent falls, atrial fibrillation, hypertension, COPD on home oxygen at 2 to 3 L/min, AAA, chronic diastolic congestive heart failure, hypothyroidism, anxiety, left ORIF recently after a fall was admitted with fall and found to have A-fib with RVR.  Patient is not a good candidate for anticoagulation due to falls and is on ASA.  During his workup, patient was also found to have a right lung mass with significant narrowing of the right mainstem, middle lobe, and bronchus intermedius with associated subcarinal and right hilar adenopathy.  Pulmonary was consulted for further recommendations.     Postobstructive pneumonia  Mediastinal and hilar adenopathy  Large infiltrative mass in the subcarinal and right hilar region  Right mainstem, middle lobe, and bronchus intermedius significant narrowing  Significant narrowing or occlusion of the inferior right pulmonary vein with narrowing of the superior right pulmonary vein  A-fib with RVR, not a candidate for anticoagulation due to falls  Hypothyroidism  COPD on home oxygen at 2 to 3 L/min  Metabolic encephalopathy  Aggressive behavior  Chronic respiratory failure with hypoxia  Hip fracture status post ORIF  Hypertension  Lactic acidosis, improved  AAA  Chronic diastolic congestive heart failure  Dementia      Plan:     Titrate FiO2.  Patient is currently on 5 L oxygen via nasal cannula.  Baseline O2 needs 2 to 3 L/min at home.  Cont abx for post obstructive PNA.  Patient is currently on Zosyn and vancomycin.  Check MRSA.  DC vancomycin if MRSA screen is negative.  Follow-up procalcitonin is negative  Beta-blockers, aspirin for atrial fibrillation  Synthroid replacement  GI prophylaxis:IV PPI     Primary team and palliative care discussing with pt goals of care. Pt would be high risk procedure and may need  "intubation and NIPPV after bronchoscopy/EBUS is pursued and curative course.   Final plan to be determined pending pt's goals.    Lamar Tovar MD  Pulmonary and Critical Care Medicine  Electronically Signed on 11/08/2024    History:     HPI: Patient is a 84-year-old male with past medical history significant memory issues (currently pending memory care facility placement), atrial fibrillation, hypertension, COPD on home oxygen at 2 to 3 L/min, AAA, chronic systolic and diastolic congestive heart failure, hypothyroidism, anxiety, recent left hip surgery due to acute femur fracture on the left, surgery done on October 6, 2024, who was admitted with fall, A-fib with RVR and volume overload.  He reports since his evaluation here, patient underwent a chest x-ray that showed consolidation on the right side with volume loss.  He then had a CTA that was negative for PE but showed large infiltrative mass in the subcarinal and right hilar region and narrowing of the right mainstem, middle lobe, and bronchus intermedius.  Pulmonary was consulted for further recommendations     Exam/Data:     Physical Exam:  /80 (BP Location: Left arm, Patient Position: Supine, Cuff Size: Adult Regular)   Pulse 75   Temp 98.1  F (36.7  C) (Oral)   Resp 20   Ht 1.702 m (5' 7\")   Wt 87 kg (191 lb 11.2 oz)   SpO2 99%   BMI 30.02 kg/m      GEN: comfortable, NAD  HEENT: NCAT, EMOI, mmm  CVS: S1S2, RRR  Lung: dec BS on the right  Abd: Soft, NT, ND,  + BS.   Ext: no c/c/e  Neuro: nonfocal, pleasantly confused  Skin: no visible rash  Musculoskeletal: FROM all extremities    Data:   Labs and Imaging personally reviewed.      IMAGING: personally reviewed images. Formal radiology interpretation noted below.    CT Chest Pulmonary Embolism w Contrast    Result Date: 11/6/2024  EXAM: CT CHEST PULMONARY EMBOLISM W CONTRAST LOCATION: [facility] DATE: 11/6/2024 INDICATION: Tachycardia, tachypnea, elevated D-dimer. " COMPARISON: None. TECHNIQUE: CT chest pulmonary angiogram during arterial phase injection of IV contrast. Multiplanar reformats and MIP reconstructions were performed. Dose reduction techniques were used. CONTRAST: 90 mL Isovue 370. FINDINGS: ANGIOGRAM CHEST: Pulmonary arteries are normal caliber and negative for pulmonary emboli. Thoracic aorta is negative for dissection. No CT evidence of right heart strain. LUNGS AND PLEURA: Emphysema. Large circumferential mass contiguous in the subcarinal and right hilar region and involving the right mainstem, right middle lobe and right lower lobe bronchi with resultant narrowing of the bronchi. There is postobstructive  pneumonia in the right lung base with a small right pleural effusion. Poor opacification of the pulmonary arterial system in the right lower lobe which cannot be evaluated. MEDIASTINUM/AXILLAE: Paratracheal AP window lymphadenopathy. Cardiac enlargement. CORONARY ARTERY CALCIFICATION: Moderate. UPPER ABDOMEN: Calcified gallstones. Incomplete visualization of aortic stent graft. 9 mm stone left renal pelvis. MUSCULOSKELETAL: Severe compression of T12.     IMPRESSION: 1.  No pulmonary embolism although there is poor opacification of the segmental branches of the pulmonary arterial system in the right lower lobe which cannot be evaluated. 2.  Large infiltrating mass in the subcarinal and right hilar region with resultant significant narrowing of the right mainstem, middle lobe and bronchus intermedius. Postobstructive pneumonia in the right lung base with small right pleural effusion. Underlying emphysematous change. Associated mediastinal lymphadenopathy. 3.  Mass severely narrows or occludes the inferior right pulmonary vein with narrowing of the superior right pulmonary vein as it enters the left atrium. 4.  Severe compression of T12. 5.  Cholelithiasis. 6.  9 mm stone in the left renal pelvis with minimal caliectasis. Additional small stones right kidney. 7.   Incomplete visualization of an aortic stent graft. NOTE: ABNORMAL REPORT THE DICTATION ABOVE DESCRIBES AN ABNORMALITY FOR WHICH FOLLOW-UP IS NEEDED.     XR Chest Port 1 View    Result Date: 2024  EXAM: XR CHEST PORT 1 VIEW LOCATION: [facility] DATE: 2024 INDICATION: new Afib with shortness of breath and hypoxia COMPARISON: Chest radiograph 2024     IMPRESSION: Interstitial opacities and septal thickening, favored to represent mild pulmonary edema. Likely small right pleural effusion with lower lobe atelectasis. No pneumothorax. Stable heart size and mediastinal contours.    EEG Awake & Sleep Portable    Result Date: 2024  Table formatting from the original result was not included. Images from the original result were not included. ELECTROENCEPHALOGRAM (EEG) REPORT [facility] NEUROLOGY [place] [address] [place] [address] Tel: [phone]  Fax: [phone] [email or web address] Dominik Rojas, CANDELARIO 1940, MRN [id number] PCP: Misael Moe Date: 2024 Principal Diagnosis: Seizure-like activity History : Patient is being evaluated for seizure-like activity. Medication listed includes no anticonvulsant Description of the Recording:  This is a multichannel digital EEG recording done using the international 10-20 placement system. Background of the recording consists of an irregular 5-6 hz activity with an amplitude of 20-30  V.  In addition, occasionally there is  1-2 Hz activity with similar amplitude.  This diffusely slow background attenuates with alerting procedures.  Photic stimulation performed with eyes open, according to the standard protocol, results in minimal change.  Hyperventilation was not performed.  Sleep was not obtained. During this recording, no sharp discharges, spikes or electrographic seizure activity was recorded. Impression: This is an abnormal EEG due to diffusely slow background in theta and delta range that  suggests a nonspecific generalized cerebral dysfunction.  Such slowing can be seen in metabolic or toxic abnormalities, clinical correlation is recommended.  No sharp discharges or spikes were recorded during this recording to suggest a seizure disorder. Classification: Dysrhythmia grade II generalized                          Delta grade I generalized Chris Echols MD [facility] NEUROLOGY, [place] This note was dictated using voice recognition software.  Any grammatical or context distortions are unintentional and inherent to the software.     XR Chest Port 1 View    Result Date: 11/6/2024  EXAM: XR CHEST PORT 1 VIEW LOCATION: [facility] DATE: 11/6/2024 INDICATION: Hypoxia, leukocytosis. COMPARISON: 10/19/2024 CXR, 9/8/2024 PA chest and left ribs, 7/17/2024 CTA AP.     IMPRESSION: Mild pleural fluid and/or thickening inferior right hemithorax and consolidation and/or volume loss basilar right lower lobe stable. A few strands of fibrosis or linear atelectasis in the remainder of the lower lungs unchanged. Mid and upper lungs are clear. No left-sided pleural effusion. Heart size and pulmonary vascularity upper normal. Mildly displaced fracture anterior tip left 9th rib unchanged since 9/8/2024.    XR Chest 1 View    Result Date: 10/19/2024  EXAM: XR CHEST 1 VIEW LOCATION: [facility] DATE: 10/19/2024 INDICATION: COPD, heart failure, tachycardia COMPARISON: 10/6/2024     IMPRESSION: Mild bibasilar atelectasis. Increasing small right pleural effusion. No pneumothorax. Stable enlarged cardiac silhouette. Mild pulmonary vascular congestion.    CT Femur Thigh Left w/o Contrast    Result Date: 10/19/2024  EXAM: CT FEMUR THIGH LEFT W/O CONTRAST LOCATION: [facility] DATE: 10/19/2024 INDICATION: Assess for fracture s p hip nail and new fall COMPARISON: Radiograph 10/12/2024. CT 07/17/2024. TECHNIQUE: Noncontrast. Axial, sagittal and coronal  thin-section reconstruction. Dose reduction techniques were used. FINDINGS: BONES: Reduced and nailed comminuted intertrochanteric left femoral fracture with dynamic femoral head/neck screw and single distal interlocking screw. No instrumentation failure. Similar alignment compared to radiograph 10/12/2024. Distracted comminuted fragments of the lesser trochanter are unchanged unchanged. There is mildly displaced comminution of the greater trochanter. There is soft tissue edema about the fracture. There is a small subcutaneous hematoma along the surgical path (series 4 image 8).  There are staples along the lateral left thigh. Moderate left hip osteoarthritis. Degenerative changes pubic symphysis. Mild left sacroiliac joint osteoarthritis. Moderate medial compartment left knee osteoarthritis. SOFT TISSUES: Edema about the fracture as above. Small hematoma in the overlying subcutaneous tissue. Diffuse subcutaneous edema of the left thigh. Stranding in the left groin related to prior vascular access. Partially evaluated intra-abdominal vascular stents. Vascular calcifications without aneurysm.     IMPRESSION: 1.  Reduced and nailed comminuted intertrochanteric left femoral fracture in unchanged alignment. No CT evidence of new fracture. 2.  Small lateral left hip subcutaneous hematoma.     US Lower Extremity Venous Duplex Left    Result Date: 10/12/2024  EXAM: US LOWER EXTREMITY VENOUS DUPLEX LEFT LOCATION: [facility] DATE: 10/12/2024 INDICATION: ongoing LLE pain after left hip surgery on 10 6 24 COMPARISON: None. TECHNIQUE: Venous Duplex ultrasound of the left lower extremity with and without compression, augmentation and duplex. Color flow and spectral Doppler with waveform analysis performed. FINDINGS: Exam includes the common femoral, femoral, popliteal, and contralateral common femoral veins as well as segmentally visualized deep calf veins and greater saphenous vein. LEFT: No deep vein  thrombosis. No superficial thrombophlebitis. No popliteal cyst.     IMPRESSION: No deep venous thrombosis in the left lower extremity.    XR Femur Left 2 Views    Result Date: 10/12/2024  EXAM: XR FEMUR LEFT 2 VIEWS LOCATION: [facility] DATE: 10/12/2024 INDICATION: Left hip pain, status post fall after hip surgery. COMPARISON: 10/06/2024.     IMPRESSION: Postoperative changes redemonstrated related to ORIF intertrochanteric left proximal femur fracture with dynamic hip screw. Alignment and hardware unchanged. Displaced lesser trochanteric fracture fragments are unchanged. Lateral skin staples  remain along the lateral left hip and proximal thigh. Anatomic alignment left femur. No new left femur fracture or joint malalignment. Mild to moderate left hip osteoarthritis. Moderate to severe degenerative change medial compartment left knee. Arterial calcification. Small left knee joint effusion. Lateral left hip and proximal thigh soft tissue edema.     XR Pelvis 1/2 Views    Result Date: 10/12/2024  EXAM: XR PELVIS 1/2 VIEWS LOCATION: [facility] DATE: 10/12/2024 INDICATION: Left hip pain status post fall since left hip surgery. COMPARISON: 10/6/2024     IMPRESSION: No significant interval change. Postop ORIF left intertrochanteric femur fracture with dynamic hip screw. Hardware and alignment unchanged. Lesser trochanteric fragments remain medially displaced by approximately 2 cm. No new fracture. Mild to moderate left and mild right hip osteoarthritis. Vascular stents project over the pelvis and surgical clips project over the right inguinal region. Both obturator rings appear intact. Arterial calcification.    Head CT w/o contrast    Result Date: 10/12/2024  EXAM: CT HEAD WITHOUT CONTRAST, CT CERVICAL SPINE WITHOUT CONTRAST LOCATION: [facility] DATE: 10/12/2024 INDICATION: Fall, head injury. COMPARISON: CT brain and CT cervical spine  10/05/2024. MRI brain 05/05/2024. TECHNIQUE: 1) Routine CT Head without IV contrast. Multiplanar reformats. Dose reduction techniques were used. 2) Routine CT Cervical Spine without IV contrast. Multiplanar reformats. Dose reduction techniques were used. FINDINGS: HEAD CT: INTRACRANIAL CONTENTS: No finding for intracranial hemorrhage or mass or convincing finding for acute infarct. Chronic volume loss and encephalomalacic change is seen within the right frontal lobe compatible with sequelae of prior ischemia and stable compared to prior. Moderate patchy low-attenuation change is again seen within the cerebral white matter, nonspecific but compatible with sequelae of chronic microvascular ischemic change given the patient's age and similar to prior. Moderate stable prominence of the lateral ventricles and sulci and mild prominence of the third ventricle. Cerebellar tonsils are normally positioned. Sella is unremarkable for technique. Generalized thinning of the body of the corpus callosum which otherwise appears normally formed. VISUALIZED ORBITS/SINUSES/MASTOIDS: Both globes are borderline proptotic which is favored to relate to body habitus as there is no abnormal enlargement of the extraocular muscles to strongly suggest thyroid-related orbitopathy. BONES/SOFT TISSUES: Calvarium is intact, without suspicious lytic or blastic foci. CERVICAL SPINE CT: VERTEBRA: Slight smooth convex left cervical curvature. Normal cervical lordosis. Craniocervical junction is intact. Mild to moderate degenerative change anterior C1-C2 articulation. Allowing for chronic endplate degenerative changes seen throughout the visualized spine, vertebral body heights are grossly preserved and there is no finding for acute fracture or posttraumatic subluxation. Endplate irregularity and advanced interspace narrowing C2-C3 through T1-T2. Mild posterior interbody spurring is seen throughout the cervical spine. Mild to moderate right-sided facet  arthropathy C3-C4 through C5-C6. Mild left-sided facet arthropathy C2-C3. CANAL/FORAMINA: Mild spinal canal narrowing C5-C6 and C6-C7. Moderate to severe bilateral foraminal stenosis C3-C4 and C4-C5 and on the left at C5-C6 and C6-C7. PARASPINAL: Dorsal paraspinal soft tissues are unremarkable. No prevertebral soft tissue swelling. Multiple surgical clips are seen within the right neck likely relating to prior carotid endarterectomy. Lung apices are clear.     IMPRESSION: HEAD CT: 1.  No finding for intracranial hemorrhage, mass, or acute infarct. 2.  Moderate volume loss and moderate to advanced presumed sequelae of chronic microvascular ischemic change. Stable volume loss and gliosis right frontal lobe. CERVICAL SPINE CT: 1.  Advanced cervical spondylosis without finding for acute fracture or posttraumatic subluxation.     CT Cervical Spine w/o Contrast    Result Date: 10/12/2024  EXAM: CT HEAD WITHOUT CONTRAST, CT CERVICAL SPINE WITHOUT CONTRAST LOCATION: [facility] DATE: 10/12/2024 INDICATION: Fall, head injury. COMPARISON: CT brain and CT cervical spine 10/05/2024. MRI brain 05/05/2024. TECHNIQUE: 1) Routine CT Head without IV contrast. Multiplanar reformats. Dose reduction techniques were used. 2) Routine CT Cervical Spine without IV contrast. Multiplanar reformats. Dose reduction techniques were used. FINDINGS: HEAD CT: INTRACRANIAL CONTENTS: No finding for intracranial hemorrhage or mass or convincing finding for acute infarct. Chronic volume loss and encephalomalacic change is seen within the right frontal lobe compatible with sequelae of prior ischemia and stable compared to prior. Moderate patchy low-attenuation change is again seen within the cerebral white matter, nonspecific but compatible with sequelae of chronic microvascular ischemic change given the patient's age and similar to prior. Moderate stable prominence of the lateral ventricles and sulci and mild prominence of the  third ventricle. Cerebellar tonsils are normally positioned. Sella is unremarkable for technique. Generalized thinning of the body of the corpus callosum which otherwise appears normally formed. VISUALIZED ORBITS/SINUSES/MASTOIDS: Both globes are borderline proptotic which is favored to relate to body habitus as there is no abnormal enlargement of the extraocular muscles to strongly suggest thyroid-related orbitopathy. BONES/SOFT TISSUES: Calvarium is intact, without suspicious lytic or blastic foci. CERVICAL SPINE CT: VERTEBRA: Slight smooth convex left cervical curvature. Normal cervical lordosis. Craniocervical junction is intact. Mild to moderate degenerative change anterior C1-C2 articulation. Allowing for chronic endplate degenerative changes seen throughout the visualized spine, vertebral body heights are grossly preserved and there is no finding for acute fracture or posttraumatic subluxation. Endplate irregularity and advanced interspace narrowing C2-C3 through T1-T2. Mild posterior interbody spurring is seen throughout the cervical spine. Mild to moderate right-sided facet arthropathy C3-C4 through C5-C6. Mild left-sided facet arthropathy C2-C3. CANAL/FORAMINA: Mild spinal canal narrowing C5-C6 and C6-C7. Moderate to severe bilateral foraminal stenosis C3-C4 and C4-C5 and on the left at C5-C6 and C6-C7. PARASPINAL: Dorsal paraspinal soft tissues are unremarkable. No prevertebral soft tissue swelling. Multiple surgical clips are seen within the right neck likely relating to prior carotid endarterectomy. Lung apices are clear.     IMPRESSION: HEAD CT: 1.  No finding for intracranial hemorrhage, mass, or acute infarct. 2.  Moderate volume loss and moderate to advanced presumed sequelae of chronic microvascular ischemic change. Stable volume loss and gliosis right frontal lobe. CERVICAL SPINE CT: 1.  Advanced cervical spondylosis without finding for acute fracture or posttraumatic subluxation.     US Lower  Extremity Venous Duplex Bilateral    Result Date: 10/9/2024  EXAM: US LOWER EXTREMITY VENOUS DUPLEX BILATERAL LOCATION: [facility] DATE: 10/9/2024 INDICATION: Bilateral leg swelling. Bilateral lower extremity edema. COMPARISON: None. TECHNIQUE: Venous Duplex ultrasound of bilateral lower extremities with and without compression, augmentation and duplex. Color flow and spectral Doppler with waveform analysis performed. FINDINGS: Exam includes the common femoral, femoral, popliteal veins as well as segmentally visualized deep calf veins and greater saphenous vein. RIGHT: No deep vein thrombosis. No superficial thrombophlebitis. No popliteal cyst. LEFT: No deep vein thrombosis. No superficial thrombophlebitis. No popliteal cyst.     IMPRESSION: 1.  No deep venous thrombosis in the bilateral lower extremities.      No current outpatient medications on file.     Allergies   Allergen Reactions    Diclofenac      Other reaction(s): GI Upset    Loratadine      Other reaction(s): Urinary Retention    Oxycodone      Agitation     Sertraline Unknown     Other reaction(s): ineffective

## 2024-11-08 NOTE — PLAN OF CARE
Problem: Adult Inpatient Plan of Care  Goal: Plan of Care Review  Description: The Plan of Care Review/Shift note should be completed every shift.  The Outcome Evaluation is a brief statement about your assessment that the patient is improving, declining, or no change.  This information will be displayed automatically on your shift  note.  Outcome: Progressing     Problem: Risk for Delirium  Goal: Optimal Coping  Intervention: Optimize Psychosocial Adjustment to Delirium  Recent Flowsheet Documentation  Taken 11/7/2024 1600 by Germán Porter RN  Supportive Measures:   active listening utilized   self-care encouraged     Problem: Risk for Delirium  Goal: Improved Attention and Thought Clarity  Outcome: Progressing  Intervention: Maximize Cognitive Function  Recent Flowsheet Documentation  Taken 11/7/2024 1600 by Germán Porter RN  Sensory Stimulation Regulation:   lighting decreased   care clustered   quiet environment promoted  Reorientation Measures: reorientation provided     Problem: Comorbidity Management  Goal: Blood Pressure in Desired Range  Outcome: Progressing  Intervention: Maintain Blood Pressure Management  Recent Flowsheet Documentation  Taken 11/7/2024 1600 by Germán Porter RN  Medication Review/Management: medications reviewed     Problem: Seclusion/Restraint, Behavioral  Goal: Seclusion/Behavioral Restraint Goal: Absence of Harm or Injury  Outcome: Met  Intervention: Protect Skin and Joint Integrity  Recent Flowsheet Documentation  Taken 11/7/2024 2120 by Germán Porter RN  Body Position: refuses positioning     Problem: Restraint, Nonviolent  Goal: Absence of Harm or Injury  Outcome: Met  Intervention: Protect Skin and Joint Integrity  Recent Flowsheet Documentation  Taken 11/7/2024 2120 by Germán Porter RN  Body Position: refuses positioning     Patient is calm and co-operative throughout shift. Disorientated to time, place and situation. Up with the assist of one  to the bathroom. Large BM. Ate 100% of dinner. Denies pain throughout shift. Up in bed side recliner with legs elevated, offered to help him back into bed, but declined at this time. Order for restraints discontinued. 1:1 remains at bed side for safety.     Germán Porter RN

## 2024-11-08 NOTE — PROGRESS NOTES
"  PALLIATIVE CARE PROGRESS NOTE  [facility]     Patient Name: Dominik Rojas  Date of Admission: 10/12/2024        Recommendations & Counseling     GOALS OF CARE:   Restorative with limits (DNR/DNI)  Spouse and daughter heard the news today of large infiltrating mass causing narrowing of right mainstem, middle lobe and bronchus intermedius as well as post-obstructive PNA.  Dr. Pace and I reviewed the recommendation for EBUS for diagnosis and treatment plan.  Jovana and Dtr Lore are taking time to think about how to proceed.  They express concern about risk involved with EBUS and concern that if cancer diagnosis, Dominik would be unlikely to tolerate chemo/surgery anyway.  We did discuss a comfort care approach as an option, either starting comfort here or completing course of abx and enrolling in hospice upon discharge.      ADVANCE CARE PLANNING:  Patient has an advance directive dated 10/17/2024.  Primary Health Care Agent Spouse Jovana.  Alternate(s) 1st daughter Lore, second daughter Chana.   There is no POLST form on file, recommend to complete prior to DC.  Code status: No CPR- Do NOT Intubate     MEDICAL MANAGEMENT:   #Pain,acute per charting noted in L hip.  Today c/o intermittent pain in \"right side\"  On exam tender to palpation R lateral chest wall possibly correlates with noted PNA and small R pleural effusion.  Opioids: IV dilaudid stopped, presumably with concern for delirium.  Depending on goals of care would consider restarting opioid in setting of possible cancer related pain.  Acetaminophen (Tylenol), Scheduled  Muscle Relaxers:Methocarbamol (Robaxin)- unclear if this is still helpful this far post-op, may be contributing to delirium.  Anti-depressants:  Duloxetine (Cymbalta)  Topical medicines:  Lidocaine Patch 4%   positioning     #Delirium  #Agitation  Trazodone at bedtime  Seroquel PRN  Avoid benzodiazepines, antihistamines, anticholinergics if able.  Lights on " and blinds open during the day.  Reorient frequently.  Lights & TV off during the night.  Promote normal circadian rhythm.  Limit sensory deprivation - utilize hearing aids, glasses, etc.  Frequently assess basic needs such as temperature, elimination, thirst/hunger, pain  Consider bedside attendant (if able) for additional safety        PSYCHOSOCIAL/SPIRITUAL SUPPORT:  Family Spouse Jovana and 2 daughters  [place] community: [employer]   [employer] has been following and Dominik has had  visit for anointing.    Palliative Care will continue to follow. Thank you for the consult and allowing us to aid in the care of Dominik Rojas.      CAYLA Cabello CNP  [facility], Palliative Care  Securely message with the [employer] Web Console (learn more here) or  Text page via [facility] Paging/Directory        Assessment       Dominik Rojas is a 84 year old male with a past medical history of RLL adenocarcinoma s/p radiation therapy in 3/2024, of note, he was to get EBUS and chemo but it was decided he could tolerate those, COPD on 2 to 3 L nasal cannula at home, chronic systolic heart failure, hypertension, hypothyroidism, anxiety, recent fall and left hip fracture s/p ORIF who was just discharged to TCU prior to presenting on 10/12 after another fall and left hip pain.  Hospitalization has been prolonged with treatment for acute on chronic HFpEF and tachycardia, and complicated by subsequent fall and encephalopathy in the setting of baseline dementia.    On 11/6 Dominik developed Afib with RVR.  A cheest CT was done with concern for PE.  There was no evidence of PE, but a large infiltrating mass in the subcarinal and right hilar region with resultant significant narrowing of the right mainstem, middle lobe and bronchus intermedius. Postobstructive pneumonia in the right lung base with small right pleural effusion and associated lymphadenopathy. In addition the mass severely narrows or occludes the inferior right pulmonary  vein with narrowing of the superior right pulmonary vein as it enters the left atrium.      Today, the patient was seen for:  Goals of Care  Progressing RLL adenocarcinoma  Post-obstructive PNA  Tachycardia  Debility  Encephalopathy      Interval History:     Multidisciplinary collaboration:  Chart reviewed.  Discussed with Dr. Pace and care conference as below.    Notable medications:  No Seroquel or Zyprexa in past 24 hours.     Patient/family narrative  Met with spouse Jovana and Dtr Lore who are both present via phone   And Dr. Pace who first reviewed patient's course of illness and current findings of infiltrating mass causing narrowing of the right mainstem middle lobe and bronchus intermedius.  We discussed the recommendation for EBUS for diagnosis in order to make a treatment plan with concern that the most likely diagnosis would be recurrent malignancy.  Family expresses concern that patient would not tolerate EBUS and they have decided against it in the past.  We agreed that at best the procedure would exacerbate delirium that has been a complication this entire hospitalization and at worst would require intubation after the procedure which has already been Terman Dominik would not want.  Jazzmine and Lore discussed previous decisions they have made about treating Vicente cancer with radiation only understanding he would not want or tolerate chemotherapy or surgery.  We discussed options for care more focused on comfort if they decided against further testing including completing course of antibiotics and entering into hospice at home versus starting comfort care in the hospital.  With their permission we discussed the prognosis with concern of high risk for recurrent infection may be in the order of weeks.  This prolonged hospitalization has been draining on all and they request some time to consider the options presented.    Review of Systems:     Besides above, ROS was reviewed and is unremarkable         Physical Exam:   Temp:  [97.6  F (36.4  C)-98.2  F (36.8  C)] 97.6  F (36.4  C)  Pulse:  [59-96] 59  Resp:  [20-22] 20  BP: (108-131)/(68-81) 124/68  SpO2:  [94 %-100 %] 100 %  191 lbs 11.2 oz    Physical Exam  GEN:  Alert, oriented x 2, appears comfortable, NAD sitting up in chair, .  HEENT:  Normocephalic/atraumatic, no scleral icterus, no nasal discharge, mouth moist.  LUNGS: Nonlabored breathing.  Symmetric chest rise on inhalation noted.  EXT:  No edema or cyanosis.    SKIN:  Dry to touch, no exanthems noted in the visualized areas.           Data Reviewed:     Results for orders placed or performed during the hospital encounter of 10/12/24 (from the past 24 hours)   Basic metabolic panel   Result Value Ref Range    Sodium 143 135 - 145 mmol/L    Potassium 4.0 3.4 - 5.3 mmol/L    Chloride 101 98 - 107 mmol/L    Carbon Dioxide (CO2) 27 22 - 29 mmol/L    Anion Gap 15 7 - 15 mmol/L    Urea Nitrogen 31.2 (H) 8.0 - 23.0 mg/dL    Creatinine 0.91 0.67 - 1.17 mg/dL    GFR Estimate 84 >60 mL/min/1.73m2    Calcium 8.4 (L) 8.8 - 10.4 mg/dL    Glucose 82 70 - 99 mg/dL   CBC with Platelets & Differential    Narrative    The following orders were created for panel order CBC with Platelets & Differential.  Procedure                               Abnormality         Status                     ---------                               -----------         ------                     CBC with platelets and d...[[id number]]  Abnormal            Final result                 Please view results for these tests on the individual orders.   CBC with platelets and differential   Result Value Ref Range    WBC Count 15.3 (H) 4.0 - 11.0 10e3/uL    RBC Count 4.12 (L) 4.40 - 5.90 10e6/uL    Hemoglobin 12.2 (L) 13.3 - 17.7 g/dL    Hematocrit 40.5 40.0 - 53.0 %    MCV 98 78 - 100 fL    MCH 29.6 26.5 - 33.0 pg    MCHC 30.1 (L) 31.5 - 36.5 g/dL    RDW 18.1 (H) 10.0 - 15.0 %    Platelet Count 322 150 - 450 10e3/uL    % Neutrophils 73 %    %  Lymphocytes 15 %    % Monocytes 8 %    % Eosinophils 3 %    % Basophils 1 %    % Immature Granulocytes 1 %    NRBCs per 100 WBC 0 <1 /100    Absolute Neutrophils 11.1 (H) 1.6 - 8.3 10e3/uL    Absolute Lymphocytes 2.3 0.8 - 5.3 10e3/uL    Absolute Monocytes 1.3 0.0 - 1.3 10e3/uL    Absolute Eosinophils 0.4 0.0 - 0.7 10e3/uL    Absolute Basophils 0.1 0.0 - 0.2 10e3/uL    Absolute Immature Granulocytes 0.1 <=0.4 10e3/uL    Absolute NRBCs 0.0 10e3/uL   MRSA MSSA PCR, Nasal Swab    Specimen: Nare, Right; Swab   Result Value Ref Range    MRSA Target DNA Positive (A) Negative    SA Target DNA Positive     Narrative    The [employer]  Xpert SA Nasal Complete assay performed in the [employer]  Dx System is a qualitative in vitro diagnostic test designed for rapid detection of Staphylococcus aureus (SA) and methicillin-resistant Staphylococcus aureus (MRSA) from nasal swabs in patients at risk for nasal colonization. The test utilizes automated real-time polymerase chain reaction (PCR) to detect MRSA/SA DNA. The Xpert SA Nasal Complete assay is intended to aid in the prevention and control of MRSA/SA infections in healthcare settings. The assay is not intended to diagnose, guide or monitor treatment for MRSA/SA infections, or provide results of susceptibility to methicillin. A negative result does not preclude MRSA/SA nasal colonization.          Medical Decision Making       Please see A&P for additional details of medical decision making.  MANAGEMENT DISCUSSED with the following over the past 24 hours: Hospitalist nursing    NOTE(S)/MEDICAL RECORDS REVIEWED over the past 24 hours: Medicine cardiology nursing pulmonology  Tests REVIEWED in the past 24 hours:  - See lab/imaging results included in the data section of the note  SUPPLEMENTAL HISTORY, in addition to the patient's history, over the past 24 hours obtained from:   - Spouse or significant other  - Adult daughter  Medical complexity over the past 24  hours:  - Discussion regarding de-escalation of care based on prognosis  85 MINUTES SPENT BY ME on the date of service doing chart review, history, exam, documentation & further activities per the note.

## 2024-11-08 NOTE — PLAN OF CARE
Problem: Adult Inpatient Plan of Care  Goal: Plan of Care Review  Description: The Plan of Care Review/Shift note should be completed every shift.  The Outcome Evaluation is a brief statement about your assessment that the patient is improving, declining, or no change.  This information will be displayed automatically on your shift  note.  Outcome: Progressing   Goal Outcome Evaluation:       Pt has been a little less restless today, 1:1 sitter remains. Pt has taken meds w/o issue. Pt has been up with assist of 1. Pts family to have phone meeting with hospital team today. Pt has been up in chair most of day

## 2024-11-09 ENCOUNTER — APPOINTMENT (OUTPATIENT)
Dept: PHYSICAL THERAPY | Facility: HOSPITAL | Age: 84
End: 2024-11-09
Payer: COMMERCIAL

## 2024-11-09 LAB
ANION GAP SERPL CALCULATED.3IONS-SCNC: 11 MMOL/L (ref 7–15)
BACTERIA UR CULT: ABNORMAL
BASOPHILS # BLD AUTO: 0.1 10E3/UL (ref 0–0.2)
BASOPHILS NFR BLD AUTO: 0 %
BUN SERPL-MCNC: 27.2 MG/DL (ref 8–23)
CALCIUM SERPL-MCNC: 8.3 MG/DL (ref 8.8–10.4)
CHLORIDE SERPL-SCNC: 102 MMOL/L (ref 98–107)
CREAT SERPL-MCNC: 0.75 MG/DL (ref 0.67–1.17)
EGFRCR SERPLBLD CKD-EPI 2021: 90 ML/MIN/1.73M2
EOSINOPHIL # BLD AUTO: 0.4 10E3/UL (ref 0–0.7)
EOSINOPHIL NFR BLD AUTO: 3 %
ERYTHROCYTE [DISTWIDTH] IN BLOOD BY AUTOMATED COUNT: 17.8 % (ref 10–15)
GLUCOSE SERPL-MCNC: 75 MG/DL (ref 70–99)
HCO3 SERPL-SCNC: 26 MMOL/L (ref 22–29)
HCT VFR BLD AUTO: 38.8 % (ref 40–53)
HGB BLD-MCNC: 11.7 G/DL (ref 13.3–17.7)
IMM GRANULOCYTES # BLD: 0.1 10E3/UL
IMM GRANULOCYTES NFR BLD: 1 %
LYMPHOCYTES # BLD AUTO: 1.8 10E3/UL (ref 0.8–5.3)
LYMPHOCYTES NFR BLD AUTO: 14 %
MCH RBC QN AUTO: 29.5 PG (ref 26.5–33)
MCHC RBC AUTO-ENTMCNC: 30.2 G/DL (ref 31.5–36.5)
MCV RBC AUTO: 98 FL (ref 78–100)
MONOCYTES # BLD AUTO: 0.9 10E3/UL (ref 0–1.3)
MONOCYTES NFR BLD AUTO: 8 %
NEUTROPHILS # BLD AUTO: 9 10E3/UL (ref 1.6–8.3)
NEUTROPHILS NFR BLD AUTO: 74 %
NRBC # BLD AUTO: 0 10E3/UL
NRBC BLD AUTO-RTO: 0 /100
PLATELET # BLD AUTO: 247 10E3/UL (ref 150–450)
POTASSIUM SERPL-SCNC: 4 MMOL/L (ref 3.4–5.3)
RBC # BLD AUTO: 3.96 10E6/UL (ref 4.4–5.9)
SODIUM SERPL-SCNC: 139 MMOL/L (ref 135–145)
WBC # BLD AUTO: 12.2 10E3/UL (ref 4–11)

## 2024-11-09 PROCEDURE — 250N000013 HC RX MED GY IP 250 OP 250 PS 637: Performed by: REGISTERED NURSE

## 2024-11-09 PROCEDURE — 250N000013 HC RX MED GY IP 250 OP 250 PS 637: Performed by: INTERNAL MEDICINE

## 2024-11-09 PROCEDURE — 99233 SBSQ HOSP IP/OBS HIGH 50: CPT | Performed by: STUDENT IN AN ORGANIZED HEALTH CARE EDUCATION/TRAINING PROGRAM

## 2024-11-09 PROCEDURE — 97530 THERAPEUTIC ACTIVITIES: CPT | Mod: GP

## 2024-11-09 PROCEDURE — 85014 HEMATOCRIT: CPT | Performed by: STUDENT IN AN ORGANIZED HEALTH CARE EDUCATION/TRAINING PROGRAM

## 2024-11-09 PROCEDURE — 85004 AUTOMATED DIFF WBC COUNT: CPT | Performed by: STUDENT IN AN ORGANIZED HEALTH CARE EDUCATION/TRAINING PROGRAM

## 2024-11-09 PROCEDURE — 82374 ASSAY BLOOD CARBON DIOXIDE: CPT | Performed by: STUDENT IN AN ORGANIZED HEALTH CARE EDUCATION/TRAINING PROGRAM

## 2024-11-09 PROCEDURE — 97110 THERAPEUTIC EXERCISES: CPT | Mod: GP

## 2024-11-09 PROCEDURE — 85041 AUTOMATED RBC COUNT: CPT | Performed by: STUDENT IN AN ORGANIZED HEALTH CARE EDUCATION/TRAINING PROGRAM

## 2024-11-09 PROCEDURE — 36415 COLL VENOUS BLD VENIPUNCTURE: CPT | Performed by: STUDENT IN AN ORGANIZED HEALTH CARE EDUCATION/TRAINING PROGRAM

## 2024-11-09 PROCEDURE — 97116 GAIT TRAINING THERAPY: CPT | Mod: GP

## 2024-11-09 PROCEDURE — 120N000001 HC R&B MED SURG/OB

## 2024-11-09 PROCEDURE — 250N000011 HC RX IP 250 OP 636

## 2024-11-09 PROCEDURE — 258N000003 HC RX IP 258 OP 636: Performed by: STUDENT IN AN ORGANIZED HEALTH CARE EDUCATION/TRAINING PROGRAM

## 2024-11-09 PROCEDURE — 250N000013 HC RX MED GY IP 250 OP 250 PS 637: Performed by: STUDENT IN AN ORGANIZED HEALTH CARE EDUCATION/TRAINING PROGRAM

## 2024-11-09 PROCEDURE — 250N000011 HC RX IP 250 OP 636: Performed by: STUDENT IN AN ORGANIZED HEALTH CARE EDUCATION/TRAINING PROGRAM

## 2024-11-09 PROCEDURE — 80048 BASIC METABOLIC PNL TOTAL CA: CPT | Performed by: STUDENT IN AN ORGANIZED HEALTH CARE EDUCATION/TRAINING PROGRAM

## 2024-11-09 PROCEDURE — 250N000013 HC RX MED GY IP 250 OP 250 PS 637: Performed by: HOSPITALIST

## 2024-11-09 RX ORDER — FUROSEMIDE 20 MG/1
20 TABLET ORAL DAILY
Status: DISCONTINUED | OUTPATIENT
Start: 2024-11-09 | End: 2024-11-12 | Stop reason: HOSPADM

## 2024-11-09 RX ADMIN — PANTOPRAZOLE SODIUM 40 MG: 40 TABLET, DELAYED RELEASE ORAL at 06:41

## 2024-11-09 RX ADMIN — METHOCARBAMOL TABLETS 250 MG: 500 TABLET, COATED ORAL at 02:28

## 2024-11-09 RX ADMIN — POLYETHYLENE GLYCOL 3350 17 G: 17 POWDER, FOR SOLUTION ORAL at 08:55

## 2024-11-09 RX ADMIN — FUROSEMIDE 20 MG: 20 TABLET ORAL at 13:12

## 2024-11-09 RX ADMIN — BUSPIRONE HYDROCHLORIDE 5 MG: 5 TABLET ORAL at 08:54

## 2024-11-09 RX ADMIN — METOPROLOL TARTRATE 50 MG: 25 TABLET, FILM COATED ORAL at 20:07

## 2024-11-09 RX ADMIN — TRAZODONE HYDROCHLORIDE 25 MG: 50 TABLET ORAL at 20:01

## 2024-11-09 RX ADMIN — QUETIAPINE FUMARATE 12.5 MG: 25 TABLET ORAL at 19:56

## 2024-11-09 RX ADMIN — SENNOSIDES AND DOCUSATE SODIUM 1 TABLET: 8.6; 5 TABLET ORAL at 08:55

## 2024-11-09 RX ADMIN — METOPROLOL TARTRATE 50 MG: 25 TABLET, FILM COATED ORAL at 08:53

## 2024-11-09 RX ADMIN — MICONAZOLE NITRATE: 20 POWDER TOPICAL at 20:07

## 2024-11-09 RX ADMIN — PIPERACILLIN AND TAZOBACTAM 3.38 G: 3; .375 INJECTION, POWDER, FOR SOLUTION INTRAVENOUS at 06:41

## 2024-11-09 RX ADMIN — ASPIRIN 81 MG CHEWABLE TABLET 81 MG: 81 TABLET CHEWABLE at 08:53

## 2024-11-09 RX ADMIN — ROSUVASTATIN 10 MG: 10 TABLET, FILM COATED ORAL at 20:02

## 2024-11-09 RX ADMIN — MICONAZOLE NITRATE: 20 POWDER TOPICAL at 08:55

## 2024-11-09 RX ADMIN — SODIUM CHLORIDE 1250 MG: 9 INJECTION, SOLUTION INTRAVENOUS at 06:41

## 2024-11-09 RX ADMIN — LEVOTHYROXINE SODIUM 88 MCG: 88 TABLET ORAL at 06:41

## 2024-11-09 RX ADMIN — DULOXETINE HYDROCHLORIDE 60 MG: 60 CAPSULE, DELAYED RELEASE PELLETS ORAL at 08:56

## 2024-11-09 RX ADMIN — ACETAMINOPHEN 650 MG: 325 TABLET ORAL at 02:28

## 2024-11-09 RX ADMIN — BUSPIRONE HYDROCHLORIDE 5 MG: 5 TABLET ORAL at 20:02

## 2024-11-09 RX ADMIN — PIPERACILLIN AND TAZOBACTAM 3.38 G: 3; .375 INJECTION, POWDER, FOR SOLUTION INTRAVENOUS at 21:07

## 2024-11-09 RX ADMIN — ACETAMINOPHEN 650 MG: 325 TABLET ORAL at 21:05

## 2024-11-09 RX ADMIN — Medication 5 MG: at 16:55

## 2024-11-09 RX ADMIN — LATANOPROST 1 DROP: 50 SOLUTION/ DROPS OPHTHALMIC at 20:04

## 2024-11-09 RX ADMIN — LIDOCAINE 1 PATCH: 4 PATCH TOPICAL at 13:12

## 2024-11-09 RX ADMIN — PIPERACILLIN AND TAZOBACTAM 3.38 G: 3; .375 INJECTION, POWDER, FOR SOLUTION INTRAVENOUS at 13:12

## 2024-11-09 ASSESSMENT — ACTIVITIES OF DAILY LIVING (ADL)
ADLS_ACUITY_SCORE: 23.75
ADLS_ACUITY_SCORE: 23.75
ADLS_ACUITY_SCORE: 24.75
ADLS_ACUITY_SCORE: 25.75
ADLS_ACUITY_SCORE: 23.75
ADLS_ACUITY_SCORE: 24.75
ADLS_ACUITY_SCORE: 23.75
ADLS_ACUITY_SCORE: 23.75
ADLS_ACUITY_SCORE: 24.75
ADLS_ACUITY_SCORE: 23.75
ADLS_ACUITY_SCORE: 23.75
ADLS_ACUITY_SCORE: 24.75
ADLS_ACUITY_SCORE: 23.75
ADLS_ACUITY_SCORE: 24.75
ADLS_ACUITY_SCORE: 23.75
ADLS_ACUITY_SCORE: 25.75

## 2024-11-09 NOTE — PLAN OF CARE
Problem: Adult Inpatient Plan of Care  Goal: Plan of Care Review  Description: The Plan of Care Review/Shift note should be completed every shift.  The Outcome Evaluation is a brief statement about your assessment that the patient is improving, declining, or no change.  This information will be displayed automatically on your shift  note.  Outcome: Progressing  Goal: Absence of Hospital-Acquired Illness or Injury  Intervention: Identify and Manage Fall Risk  Recent Flowsheet Documentation  Taken 11/9/2024 0100 by Long Arechiga RN  Safety Promotion/Fall Prevention: bedside attendant  Taken 11/8/2024 2000 by Long Arechiga RN  Safety Promotion/Fall Prevention: bedside attendant  Intervention: Prevent Skin Injury  Recent Flowsheet Documentation  Taken 11/9/2024 0100 by Long Arechiga RN  Body Position: position changed independently  Taken 11/8/2024 2000 by Long Arechiga RN  Body Position: position changed independently  Goal: Optimal Comfort and Wellbeing  Intervention: Provide Person-Centered Care  Recent Flowsheet Documentation  Taken 11/9/2024 0100 by Long Arechiga RN  Trust Relationship/Rapport: care explained  Taken 11/8/2024 2000 by Long Arechiga RN  Trust Relationship/Rapport: care explained     Problem: Pain Acute  Goal: Optimal Pain Control and Function  Outcome: Progressing  Intervention: Prevent or Manage Pain  Recent Flowsheet Documentation  Taken 11/9/2024 0100 by Long Arechiga RN  Sleep/Rest Enhancement:   awakenings minimized   comfort measures   room darkened  Taken 11/8/2024 2000 by Long Arechiga RN  Sleep/Rest Enhancement:   awakenings minimized   comfort measures   room darkened  Intervention: Optimize Psychosocial Wellbeing  Recent Flowsheet Documentation  Taken 11/9/2024 0100 by Long Arechiga RN  Supportive Measures: active listening utilized  Taken 11/8/2024 2000 by Long Arechiga  RN  Supportive Measures: active listening utilized     Problem: Comorbidity Management  Goal: Maintenance of COPD Symptom Control  Outcome: Progressing  Goal: Maintenance of Heart Failure Symptom Control  Outcome: Progressing  Goal: Blood Pressure in Desired Range  Outcome: Progressing     Problem: Gas Exchange Impaired  Goal: Optimal Gas Exchange  Outcome: Progressing   Goal Outcome Evaluation:       Pt is calm. Cooperative with cares. 1:1 sitter. Pain managed by prn tylenol and robaxin. IV abx as scheduled. Telemetry showing NSR. Oxygen at 5LPM via NC. Vital Signs WNL. Will continue to monitor.

## 2024-11-09 NOTE — PROGRESS NOTES
[facility]    Medicine Progress Note - Hospitalist Service    Date of Admission:  10/12/2024    Assessment & Plan   Dominik Rojas is a 84 year old male with history of COPD on 2 to 3 L nasal cannula at home, chronic systolic heart failure, hypertension, hypothyroidism, anxiety, recent fall and left hip fracture s/p ORIF who was jsut discharged to TCU.  Patient was brought to ED for evaluation another fall at TCU with left hip pain. Xray showing postop changes without new acute changes.    -- Patient has had significant behavioral issues and increased risk of fall due to impulsivity requiring 1:1 sitter.   -- Memory care facility placement pending. It has been challenging due to financial issues.  -- Hospital course is complicated by   >> postobstructive pneumonia and recurrent UTI. On Zosyn and Vancomycin day 3.   >> New afib with RVR: rate controlled.   -- Has right large infiltrating mass: Needs EBUS but pt is high risk.  -- GOC discussion is ongoing. Had care conference on 11/08. Patient mental status is improving and is also informed of his medical condition. Awaiting patient's and family's decision. Palliative care team involved.     Mechanical fall  Left hip contusion without new fracture  Recent left hip fracture s/p ORIF  -- Had hip fracture surgery on 10/6. Was at TCU, and had a mechanical fall, with worse left hip pain after. Presented for eval.  CT Left femur no evidence of new fracture, small lateral left hip subcutaneous hematoma  -- Negative for DVT on US on 10/12.   -- Pain team s/o 10/29  -- Seen by Ortho: follow-up outpatient  -- Had been very drowsy on opioids, opioids on hold for now  -- Low dose robaxin changed to PRN  -- Continue topical lidocaine     Anxiety and depression;  Dementia with behavioral change/agitation   Mood disorder  Acute toxic/metabolic encephalopathy- improving  -- Patient often stands up and tries to walk. He has fall risks. He also does not like  being alone.   -- Seen by Psych, trazodone 25 mg to help with sleep at bedtime  -- Seroquel as needed for agitation  -- Delirium precautions  -- Continue suppertime melatonin to help with day/night reversal  -- Plan memory care placement. Family ok with this, they have been struggling to care for him at home.  -- 11/06: Agitated. Concern for seizure like activity per night RN. EEG showed no e/o seizure. Neurology consult appreciated  -- 11/09: Pt is calm. On 1:1 mainly for falls now.     Acute on chronic HFpEF  -- Still Hypervolemic.   -- Lasix was held on admission due to poor po intake, weight went up 10 lbs.   Recent Echo showing preserved EF  -- Had Lasix oral 40 mg bid for 2 days. Remains swollen. Changed to IV on 11/3. Held on 11/07 due to low BP. 11/07 Started on PTA 20 mg. Monitor and titrate as able.  -- Daily weight, Fluid restriction    Afib with rvr, new  -- On 11/06  -- Metoprolol 50 mg q8h per Cards  -- Not a candidate for AC due to risk of falls    Acute on chronic hypoxic respiratory failure  Postobstructive RLL pneumonia  Right lung infiltrating mass on CTA-chest. H/o RLL Adenocarcinoma s/p radiation  -- Leukocytosis, lactic acidosis, tachypnea on 11/06  -- wbc: 16k>>12.2, lactic acid: 5.2>>3.4>>6.7>>3.2  -- S/p ceftriaxon. Currently on Zosyn and vancomycin. MRSA screen +  -- Oxygen as needed. Wean as able. Baseline: 2-3LPM  -- On 11/06. CXR: Mild pleural fluid and/or thickening inferior right hemithorax and consolidation and/or volume loss basilar right lower lobe stable. Bnp wnl  -- CTA- PE no PE.  Large infiltrating mass in the subcarinal and right hilar region with resultant significant narrowing of the right mainstem, middle lobe and bronchus intermedius. Postobstructive pneumonia in the right lung base with small right pleural effusion. Underlying emphysematous change. Associated mediastinal lymphadenopathy. Mass severely narrows or occludes the inferior right pulmonary vein with narrowing of  "the superior right pulmonary vein as it enters the left atrium.    Recurrent UTI  -- PTA amoxicillin 500mg TID; finished course of abx. Repeated UC 10/13 and again 10/23 with mixed tai.  -- 11/06: UA abnormal. Ucx Enterococcus faecalis. On vancomycin as above    COPD  -- PTA meds  -- Not in exacerbation. On home flow O2     Normocytic anemia  -- Hb baseline 10-11  -- Contusions and hematoma on the Left thigh  -- Monitor Hb periodically     HLD: Crestor     GERD: PPI      Hypothyroidism  -- Recent TSH 6.93  -- Continue Synthroid   -- Recommend repeat TSH in 4 weeks with PCP. FT4 not useful for titrating Synthroid generally     DVT prophylaxis: He has significant bruises while on Lovenox so that it was discontinued. Patient likes to sit up in the chair all the time.     GOC discussion on 11/08: Had a care conference over the phone. Palliative care, Hospitalist, spouse, and daughter participated. Discussed the overall care and prognosis. Family will think about next steps and will let us know regarding EBUS.  --11/09  Patient is more alert and oriented. He is informed regarding his medical condition and wants to 'think it over'.              Diet: Fluid restriction 1800 ML FLUID  Snacks/Supplements Adult: Ensure Enlive; Between Meals    DVT Prophylaxis: Pneumatic Compression Devices  Cabral Catheter: Not present  Lines: None     Cardiac Monitoring: ACTIVE order. Indication: tachycardia  Code Status: No CPR- Do NOT Intubate      Clinically Significant Risk Factors                   # Hypertension: Noted on problem list     # Dementia: noted on problem list        # Obesity: Estimated body mass index is 30.02 kg/m  as calculated from the following:    Height as of this encounter: 1.702 m (5' 7\").    Weight as of this encounter: 87 kg (191 lb 11.2 oz).        # Financial/Environmental Concerns: none         Disposition Plan     Medically Ready for Discharge: Anticipated in 2-4 Days             Loida Pace" MD  Hospitalist Service  [facility]  Securely message with [employer] (more info)  Text page via [email or web address] Paging/Directory   ______________________________________________________________________    Interval History   Patient is seen and examined at bedside.   Patient is alert and oriented today. 1:1 sitter present. Discussed with patient regarding his medical condition in general and right lung mass in particular. Pt will like to 'think it over'.  Plan of care discussed with patient. All questions answered. Pt verbalized understanding.     Physical Exam   Vital Signs: Temp: 98.2  F (36.8  C) Temp src: Oral BP: (!) 146/82 Pulse: 61   Resp: 18 SpO2: 100 % O2 Device: Nasal cannula Oxygen Delivery: 4 LPM  Weight: 191 lbs 11.2 oz    GEN: Alert and calm Not in acute distress.  HEENT: Atraumatic, mucous membrane- moist and pink.  Chest: Bilateral air entry.  CVS: S1S2 regular.   Abdomen: Soft. Non-tender, non-distended. No organomegaly. No guarding or rigidity. Bowel sounds active.   Extremities: No pedal edema.  CNS: No involuntary movements.  Skin: no cyanosis or clubbing.     Medical Decision Making       51 MINUTES SPENT BY ME on the date of service doing chart review, history, exam, documentation & further activities per the note.      Data

## 2024-11-09 NOTE — PROGRESS NOTES
Brief pulmonary note    Awaiting family decision regarding pursuing EBUS and goals of care discussions.    Lamar Tovar MD

## 2024-11-09 NOTE — PLAN OF CARE
Problem: Adult Inpatient Plan of Care  Goal: Absence of Hospital-Acquired Illness or Injury  Intervention: Identify and Manage Fall Risk  Recent Flowsheet Documentation  Taken 11/9/2024 1137 by Eri Choudhary RN  Safety Promotion/Fall Prevention:   activity supervised   bedside attendant  Taken 11/9/2024 1100 by Eri Choudhary RN  Safety Promotion/Fall Prevention: (ambulated in halls with pt)   nonskid shoes/slippers when out of bed   room near nurse's station   toileting scheduled  Taken 11/9/2024 0740 by Eri Choudhary RN  Safety Promotion/Fall Prevention:   activity supervised   bedside attendant     Problem: Adult Inpatient Plan of Care  Goal: Absence of Hospital-Acquired Illness or Injury  Intervention: Prevent Skin Injury  Recent Flowsheet Documentation  Taken 11/9/2024 1247 by Eri Choduhary RN  Body Position: position changed independently  Taken 11/9/2024 1137 by Eri Choudhary RN  Body Position: position changed independently  Taken 11/9/2024 0933 by Eri Choudhary RN  Body Position: position changed independently  Taken 11/9/2024 0740 by Eri Choudhary RN  Body Position: position changed independently     Problem: Adult Inpatient Plan of Care  Goal: Optimal Comfort and Wellbeing  Intervention: Provide Person-Centered Care  Recent Flowsheet Documentation  Taken 11/9/2024 1137 by Eri Choudhary RN  Trust Relationship/Rapport: care explained  Taken 11/9/2024 0740 by Eri Choudhary RN  Trust Relationship/Rapport: care explained     Problem: Risk for Delirium  Goal: Improved Behavioral Control  Intervention: Prevent and Manage Agitation  Recent Flowsheet Documentation  Taken 11/9/2024 1137 by Eri Choudhary RN  Environment Familiarity/Consistency: daily routine followed  Taken 11/9/2024 0740 by Eri Choudhary RN  Environment Familiarity/Consistency: daily routine followed     Problem: Risk for Delirium  Goal: Improved Behavioral Control  Intervention: Minimize Safety  Risk  Recent Flowsheet Documentation  Taken 11/9/2024 1137 by Eri Choudhary, RN  Trust Relationship/Rapport: care explained  Taken 11/9/2024 0740 by Eri Choudhary, RN  Trust Relationship/Rapport: care explained   Goal Outcome Evaluation:         Pt is progressing with all of these goals.  Pt is cooperative, pleasant, denies pain.  Pt is sitting upright in chair currently, but tolerated ambulating with PT in the deleon and staff to bathroom.  Pt is calm, med compliant, no behaviors observed.  Pt is tolerating 3l nc at 100%.  Weaned from 5 L nc.  Will continue to monitor needs.  VSS.

## 2024-11-10 ENCOUNTER — APPOINTMENT (OUTPATIENT)
Dept: PHYSICAL THERAPY | Facility: HOSPITAL | Age: 84
End: 2024-11-10
Payer: COMMERCIAL

## 2024-11-10 LAB
ANION GAP SERPL CALCULATED.3IONS-SCNC: 11 MMOL/L (ref 7–15)
BASOPHILS # BLD AUTO: 0.1 10E3/UL (ref 0–0.2)
BASOPHILS NFR BLD AUTO: 1 %
BUN SERPL-MCNC: 20.1 MG/DL (ref 8–23)
CALCIUM SERPL-MCNC: 8.3 MG/DL (ref 8.8–10.4)
CHLORIDE SERPL-SCNC: 103 MMOL/L (ref 98–107)
CREAT SERPL-MCNC: 0.74 MG/DL (ref 0.67–1.17)
EGFRCR SERPLBLD CKD-EPI 2021: 90 ML/MIN/1.73M2
EOSINOPHIL # BLD AUTO: 0.3 10E3/UL (ref 0–0.7)
EOSINOPHIL NFR BLD AUTO: 3 %
ERYTHROCYTE [DISTWIDTH] IN BLOOD BY AUTOMATED COUNT: 18 % (ref 10–15)
GLUCOSE SERPL-MCNC: 86 MG/DL (ref 70–99)
HCO3 SERPL-SCNC: 29 MMOL/L (ref 22–29)
HCT VFR BLD AUTO: 37.9 % (ref 40–53)
HGB BLD-MCNC: 11.8 G/DL (ref 13.3–17.7)
IMM GRANULOCYTES # BLD: 0.1 10E3/UL
IMM GRANULOCYTES NFR BLD: 1 %
LYMPHOCYTES # BLD AUTO: 1.8 10E3/UL (ref 0.8–5.3)
LYMPHOCYTES NFR BLD AUTO: 14 %
MCH RBC QN AUTO: 29.9 PG (ref 26.5–33)
MCHC RBC AUTO-ENTMCNC: 31.1 G/DL (ref 31.5–36.5)
MCV RBC AUTO: 96 FL (ref 78–100)
MONOCYTES # BLD AUTO: 1.2 10E3/UL (ref 0–1.3)
MONOCYTES NFR BLD AUTO: 9 %
NEUTROPHILS # BLD AUTO: 9.4 10E3/UL (ref 1.6–8.3)
NEUTROPHILS NFR BLD AUTO: 73 %
NRBC # BLD AUTO: 0 10E3/UL
NRBC BLD AUTO-RTO: 0 /100
PLATELET # BLD AUTO: 210 10E3/UL (ref 150–450)
POTASSIUM SERPL-SCNC: 3.5 MMOL/L (ref 3.4–5.3)
RBC # BLD AUTO: 3.94 10E6/UL (ref 4.4–5.9)
SODIUM SERPL-SCNC: 143 MMOL/L (ref 135–145)
VANCOMYCIN SERPL-MCNC: 13.2 UG/ML
WBC # BLD AUTO: 12.8 10E3/UL (ref 4–11)

## 2024-11-10 PROCEDURE — 250N000013 HC RX MED GY IP 250 OP 250 PS 637: Performed by: HOSPITALIST

## 2024-11-10 PROCEDURE — 80202 ASSAY OF VANCOMYCIN: CPT | Performed by: STUDENT IN AN ORGANIZED HEALTH CARE EDUCATION/TRAINING PROGRAM

## 2024-11-10 PROCEDURE — 97530 THERAPEUTIC ACTIVITIES: CPT | Mod: GP

## 2024-11-10 PROCEDURE — 250N000013 HC RX MED GY IP 250 OP 250 PS 637: Performed by: REGISTERED NURSE

## 2024-11-10 PROCEDURE — 97116 GAIT TRAINING THERAPY: CPT | Mod: GP

## 2024-11-10 PROCEDURE — 250N000013 HC RX MED GY IP 250 OP 250 PS 637: Performed by: STUDENT IN AN ORGANIZED HEALTH CARE EDUCATION/TRAINING PROGRAM

## 2024-11-10 PROCEDURE — 250N000013 HC RX MED GY IP 250 OP 250 PS 637: Performed by: INTERNAL MEDICINE

## 2024-11-10 PROCEDURE — 250N000011 HC RX IP 250 OP 636

## 2024-11-10 PROCEDURE — 80048 BASIC METABOLIC PNL TOTAL CA: CPT | Performed by: STUDENT IN AN ORGANIZED HEALTH CARE EDUCATION/TRAINING PROGRAM

## 2024-11-10 PROCEDURE — 250N000011 HC RX IP 250 OP 636: Performed by: STUDENT IN AN ORGANIZED HEALTH CARE EDUCATION/TRAINING PROGRAM

## 2024-11-10 PROCEDURE — 99232 SBSQ HOSP IP/OBS MODERATE 35: CPT | Performed by: INTERNAL MEDICINE

## 2024-11-10 PROCEDURE — 258N000003 HC RX IP 258 OP 636: Performed by: STUDENT IN AN ORGANIZED HEALTH CARE EDUCATION/TRAINING PROGRAM

## 2024-11-10 PROCEDURE — 85025 COMPLETE CBC W/AUTO DIFF WBC: CPT | Performed by: STUDENT IN AN ORGANIZED HEALTH CARE EDUCATION/TRAINING PROGRAM

## 2024-11-10 PROCEDURE — 250N000011 HC RX IP 250 OP 636: Performed by: INTERNAL MEDICINE

## 2024-11-10 PROCEDURE — 258N000003 HC RX IP 258 OP 636: Performed by: INTERNAL MEDICINE

## 2024-11-10 PROCEDURE — 36415 COLL VENOUS BLD VENIPUNCTURE: CPT | Performed by: STUDENT IN AN ORGANIZED HEALTH CARE EDUCATION/TRAINING PROGRAM

## 2024-11-10 PROCEDURE — 120N000001 HC R&B MED SURG/OB

## 2024-11-10 RX ADMIN — TRAZODONE HYDROCHLORIDE 25 MG: 50 TABLET ORAL at 21:03

## 2024-11-10 RX ADMIN — LIDOCAINE 1 PATCH: 4 PATCH TOPICAL at 13:38

## 2024-11-10 RX ADMIN — QUETIAPINE FUMARATE 12.5 MG: 25 TABLET ORAL at 18:44

## 2024-11-10 RX ADMIN — METOPROLOL TARTRATE 50 MG: 25 TABLET, FILM COATED ORAL at 08:48

## 2024-11-10 RX ADMIN — LATANOPROST 1 DROP: 50 SOLUTION/ DROPS OPHTHALMIC at 21:00

## 2024-11-10 RX ADMIN — Medication 5 MG: at 17:43

## 2024-11-10 RX ADMIN — BUSPIRONE HYDROCHLORIDE 5 MG: 5 TABLET ORAL at 08:48

## 2024-11-10 RX ADMIN — PANTOPRAZOLE SODIUM 40 MG: 40 TABLET, DELAYED RELEASE ORAL at 06:16

## 2024-11-10 RX ADMIN — SODIUM CHLORIDE 1500 MG: 9 INJECTION, SOLUTION INTRAVENOUS at 17:43

## 2024-11-10 RX ADMIN — PIPERACILLIN AND TAZOBACTAM 3.38 G: 3; .375 INJECTION, POWDER, FOR SOLUTION INTRAVENOUS at 06:17

## 2024-11-10 RX ADMIN — MICONAZOLE NITRATE: 20 POWDER TOPICAL at 21:03

## 2024-11-10 RX ADMIN — DULOXETINE HYDROCHLORIDE 60 MG: 60 CAPSULE, DELAYED RELEASE PELLETS ORAL at 08:48

## 2024-11-10 RX ADMIN — BUSPIRONE HYDROCHLORIDE 5 MG: 5 TABLET ORAL at 21:03

## 2024-11-10 RX ADMIN — ROSUVASTATIN 10 MG: 10 TABLET, FILM COATED ORAL at 21:00

## 2024-11-10 RX ADMIN — SODIUM CHLORIDE 1250 MG: 9 INJECTION, SOLUTION INTRAVENOUS at 00:22

## 2024-11-10 RX ADMIN — FUROSEMIDE 20 MG: 20 TABLET ORAL at 08:48

## 2024-11-10 RX ADMIN — MICONAZOLE NITRATE: 20 POWDER TOPICAL at 08:59

## 2024-11-10 RX ADMIN — PIPERACILLIN AND TAZOBACTAM 3.38 G: 3; .375 INJECTION, POWDER, FOR SOLUTION INTRAVENOUS at 13:38

## 2024-11-10 RX ADMIN — ACETAMINOPHEN 650 MG: 325 TABLET ORAL at 06:16

## 2024-11-10 RX ADMIN — PIPERACILLIN AND TAZOBACTAM 3.38 G: 3; .375 INJECTION, POWDER, FOR SOLUTION INTRAVENOUS at 21:04

## 2024-11-10 RX ADMIN — LEVOTHYROXINE SODIUM 88 MCG: 88 TABLET ORAL at 06:16

## 2024-11-10 RX ADMIN — ACETAMINOPHEN 650 MG: 325 TABLET ORAL at 22:23

## 2024-11-10 RX ADMIN — ASPIRIN 81 MG CHEWABLE TABLET 81 MG: 81 TABLET CHEWABLE at 08:48

## 2024-11-10 ASSESSMENT — ACTIVITIES OF DAILY LIVING (ADL)
ADLS_ACUITY_SCORE: 26.75
ADLS_ACUITY_SCORE: 0
ADLS_ACUITY_SCORE: 0
ADLS_ACUITY_SCORE: 26.75
ADLS_ACUITY_SCORE: 0
ADLS_ACUITY_SCORE: 26.75
ADLS_ACUITY_SCORE: 0
ADLS_ACUITY_SCORE: 26.75
ADLS_ACUITY_SCORE: 26.75
ADLS_ACUITY_SCORE: 0
ADLS_ACUITY_SCORE: 26.75
ADLS_ACUITY_SCORE: 26.75
ADLS_ACUITY_SCORE: 0
ADLS_ACUITY_SCORE: 26.75
ADLS_ACUITY_SCORE: 26.75
ADLS_ACUITY_SCORE: 0
ADLS_ACUITY_SCORE: 0

## 2024-11-10 NOTE — PLAN OF CARE
Problem: Adult Inpatient Plan of Care  Goal: Absence of Hospital-Acquired Illness or Injury  Intervention: Identify and Manage Fall Risk  Recent Flowsheet Documentation  Taken 11/10/2024 1600 by Tasha Peoples RN  Safety Promotion/Fall Prevention:   activity supervised   bedside attendant  Intervention: Prevent Skin Injury  Recent Flowsheet Documentation  Taken 11/10/2024 1600 by Tasha Peoples RN  Skin Protection: silicone foam dressing in place  Intervention: Prevent and Manage VTE (Venous Thromboembolism) Risk  Recent Flowsheet Documentation  Taken 11/10/2024 1600 by Tasha Peoples RN  VTE Prevention/Management: SCDs off (sequential compression devices)  Intervention: Prevent Infection  Recent Flowsheet Documentation  Taken 11/10/2024 1600 by Tasha Peoples RN  Infection Prevention:   equipment surfaces disinfected   hand hygiene promoted  Goal: Optimal Comfort and Wellbeing  Intervention: Provide Person-Centered Care  Recent Flowsheet Documentation  Taken 11/10/2024 1600 by Tasha Peoples RN  Trust Relationship/Rapport: care explained     Problem: Risk for Delirium  Goal: Optimal Coping  Intervention: Optimize Psychosocial Adjustment to Delirium  Recent Flowsheet Documentation  Taken 11/10/2024 1600 by Tasha Peoples RN  Supportive Measures: active listening utilized  Goal: Improved Behavioral Control  Intervention: Prevent and Manage Agitation  Recent Flowsheet Documentation  Taken 11/10/2024 1600 by Tasha Peoples RN  Environment Familiarity/Consistency: daily routine followed  Intervention: Minimize Safety Risk  Recent Flowsheet Documentation  Taken 11/10/2024 1600 by Tasha Peoples RN  Enhanced Safety Measures:   pain management   room near unit station  Trust Relationship/Rapport: care explained  Goal: Improved Attention and Thought Clarity  Intervention: Maximize Cognitive Function  Recent Flowsheet Documentation  Taken 11/10/2024 1600 by Tasha Peoples RN  Sensory Stimulation Regulation:   lighting decreased    care clustered   quiet environment promoted  Reorientation Measures: reorientation provided  Goal: Improved Sleep  Intervention: Promote Sleep  Recent Flowsheet Documentation  Taken 11/10/2024 1600 by Tasha Peoples RN  Sleep/Rest Enhancement: awakenings minimized     Problem: Pain Acute  Goal: Optimal Pain Control and Function  Intervention: Prevent or Manage Pain  Recent Flowsheet Documentation  Taken 11/10/2024 1600 by Tasha Peoples RN  Sensory Stimulation Regulation:   lighting decreased   care clustered   quiet environment promoted  Sleep/Rest Enhancement: awakenings minimized  Bowel Elimination Promotion: adequate fluid intake promoted  Medication Review/Management: medications reviewed  Intervention: Optimize Psychosocial Wellbeing  Recent Flowsheet Documentation  Taken 11/10/2024 1600 by Tasha Peoples RN  Supportive Measures: active listening utilized  Diversional Activities: television     Problem: Dysrhythmia  Goal: Normalized Cardiac Rhythm  Intervention: Monitor and Manage Cardiac Rhythm Effect  Recent Flowsheet Documentation  Taken 11/10/2024 1600 by Tasha Peoples RN  VTE Prevention/Management: SCDs off (sequential compression devices)     Problem: Fall Injury Risk  Goal: Absence of Fall and Fall-Related Injury  Intervention: Identify and Manage Contributors  Recent Flowsheet Documentation  Taken 11/10/2024 1600 by Tasha Peoples RN  Medication Review/Management: medications reviewed  Intervention: Promote Injury-Free Environment  Recent Flowsheet Documentation  Taken 11/10/2024 1600 by Tasha Peoples RN  Safety Promotion/Fall Prevention:   activity supervised   bedside attendant     Problem: Comorbidity Management  Goal: Maintenance of Behavioral Health Symptom Control  Intervention: Maintain Behavioral Health Symptom Control  Recent Flowsheet Documentation  Taken 11/10/2024 1600 by Tasha Peoples RN  Medication Review/Management: medications reviewed  Goal: Maintenance of COPD Symptom  Control  Intervention: Maintain COPD Symptom Control  Recent Flowsheet Documentation  Taken 11/10/2024 1600 by Tasha Peoples RN  Medication Review/Management: medications reviewed  Goal: Maintenance of Heart Failure Symptom Control  Intervention: Maintain Heart Failure Management  Recent Flowsheet Documentation  Taken 11/10/2024 1600 by Tasha Peoples RN  Medication Review/Management: medications reviewed  Goal: Blood Pressure in Desired Range  Intervention: Maintain Blood Pressure Management  Recent Flowsheet Documentation  Taken 11/10/2024 1600 by Tasha Peoples RN  Medication Review/Management: medications reviewed     Problem: Delirium  Goal: Optimal Coping  Intervention: Optimize Psychosocial Adjustment to Delirium  Recent Flowsheet Documentation  Taken 11/10/2024 1600 by Tasha Peoples RN  Supportive Measures: active listening utilized  Goal: Improved Behavioral Control  Intervention: Prevent and Manage Agitation  Recent Flowsheet Documentation  Taken 11/10/2024 1600 by Tasha Peoples RN  Environment Familiarity/Consistency: daily routine followed  Intervention: Minimize Safety Risk  Recent Flowsheet Documentation  Taken 11/10/2024 1600 by Tasha Peoples RN  Enhanced Safety Measures:   pain management   room near unit station  Trust Relationship/Rapport: care explained  Goal: Improved Attention and Thought Clarity  Intervention: Maximize Cognitive Function  Recent Flowsheet Documentation  Taken 11/10/2024 1600 by Tasha Peoples RN  Sensory Stimulation Regulation:   lighting decreased   care clustered   quiet environment promoted  Reorientation Measures: reorientation provided  Goal: Improved Sleep  Intervention: Promote Sleep  Recent Flowsheet Documentation  Taken 11/10/2024 1600 by Tasha Peoples RN  Sleep/Rest Enhancement: awakenings minimized      Goal Outcome Evaluation:  Pt was calm at times but at times he became agitated and called out loudly for the aide.  PRN Seroquel given with some calming effect although  pt does not want to go to bed yet.  Ongoing edema LE and UE Right more than Left.  Up to bathroom with SBA.  Tylenol at HS for hip pain.

## 2024-11-10 NOTE — PROGRESS NOTES
[facility]    Medicine Progress Note - Hospitalist Service    Date of Admission:  10/12/2024    Assessment & Plan   Dominik Rojas is a 84 year old male with history of COPD on 2 to 3 L nasal cannula at home, chronic systolic heart failure, hypertension, hypothyroidism, anxiety, recent fall and left hip fracture s/p ORIF who was jsut discharged to TCU.  Patient was brought to ED for evaluation another fall at TCU with left hip pain. Xray showing postop changes without new acute changes.    -- Patient has had significant behavioral issues and increased risk of fall due to impulsivity requiring 1:1 sitter.   -- Memory care facility placement pending. It has been challenging due to financial issues.  -- Hospital course is complicated by   >> postobstructive pneumonia and recurrent UTI. On Zosyn and Vancomycin.   >> New afib with RVR: rate controlled.   -- Has right large infiltrating mass: Needs EBUS but pt is high risk.  -- GOC discussion is ongoing. Had care conference on 11/08. Patient mental status is improving and is also informed of his medical condition. Awaiting patient's and family's decision. Palliative care team involved.     Mechanical fall  Left hip contusion without new fracture  Recent left hip fracture s/p ORIF  -- Had hip fracture surgery on 10/6. Was at TCU, and had a mechanical fall, with worse left hip pain after. Presented for eval.  CT Left femur no evidence of new fracture, small lateral left hip subcutaneous hematoma  -- Negative for DVT on US on 10/12.   -- Pain team s/o 10/29  -- Seen by Ortho: follow-up outpatient  -- Had been very drowsy on opioids, opioids on hold for now  -- Low dose robaxin changed to PRN  -- Continue topical lidocaine     Anxiety and depression;  Dementia with behavioral change/agitation   Mood disorder  Acute toxic/metabolic encephalopathy- improving  -- Patient often stands up and tries to walk. He has fall risks. He also does not like being  alone.   -- Seen by Psych, trazodone 25 mg to help with sleep at bedtime  -- Seroquel as needed for agitation  -- Delirium precautions  -- Continue suppertime melatonin to help with day/night reversal  -- Plan memory care placement. Family ok with this, they have been struggling to care for him at home.  -- 11/06: Agitated. Concern for seizure like activity per night RN. EEG showed no e/o seizure. Neurology consult appreciated  -- 11/09: Pt is calm. On 1:1 mainly for falls now.     Acute on chronic HFpEF  -- Still Hypervolemic.   -- Lasix was held on admission due to poor po intake, weight went up 10 lbs.   Recent Echo showing preserved EF  -- Had Lasix oral 40 mg bid for 2 days. Remains swollen. Changed to IV on 11/3. Held on 11/07 due to low BP. 11/07 Started on PTA 20 mg. Monitor and titrate as able.  -- Daily weight, Fluid restriction    Afib with rvr, new  -- On 11/06  -- Metoprolol 50 mg q8h per Cards  -- Not a candidate for AC due to risk of falls    Acute on chronic hypoxic respiratory failure  Postobstructive RLL pneumonia  Right lung infiltrating mass on CTA-chest. H/o RLL Adenocarcinoma s/p radiation  -- Leukocytosis, lactic acidosis, tachypnea on 11/06  -- wbc: 16k>>12.2, lactic acid: 5.2>>3.4>>6.7>>3.2  -- S/p ceftriaxon. Currently on Zosyn and vancomycin. MRSA screen +  -- Oxygen as needed. Wean as able. Baseline: 2-3LPM  -- On 11/06. CXR: Mild pleural fluid and/or thickening inferior right hemithorax and consolidation and/or volume loss basilar right lower lobe stable. Bnp wnl  -- CTA- PE no PE.  Large infiltrating mass in the subcarinal and right hilar region with resultant significant narrowing of the right mainstem, middle lobe and bronchus intermedius. Postobstructive pneumonia in the right lung base with small right pleural effusion. Underlying emphysematous change. Associated mediastinal lymphadenopathy. Mass severely narrows or occludes the inferior right pulmonary vein with narrowing of the  "superior right pulmonary vein as it enters the left atrium.    Recurrent UTI  -- PTA amoxicillin 500mg TID; finished course of abx. Repeated UC 10/13 and again 10/23 with mixed tai.  -- 11/06: UA abnormal. Ucx Enterococcus faecalis. On vancomycin as above    COPD  -- PTA meds  -- Not in exacerbation. On home flow O2     Normocytic anemia  -- Hb baseline 10-11  -- Contusions and hematoma on the Left thigh  -- Monitor Hb periodically     HLD: Crestor     GERD: PPI      Hypothyroidism  -- Recent TSH 6.93  -- Continue Synthroid   -- Recommend repeat TSH in 4 weeks with PCP. FT4 not useful for titrating Synthroid generally     DVT prophylaxis: He has significant bruises while on Lovenox so that it was discontinued. Patient likes to sit up in the chair all the time.     GOC discussion on 11/08: Had a care conference over the phone. Palliative care, Hospitalist, spouse, and daughter participated. Discussed the overall care and prognosis. Family will think about next steps and will let us know regarding EBUS.  --11/09  Patient is more alert and oriented. He is informed regarding his medical condition and wants to 'think it over'.              Diet: Fluid restriction 1800 ML FLUID  Snacks/Supplements Adult: Ensure Enlive; Between Meals    DVT Prophylaxis: Pneumatic Compression Devices  Cabral Catheter: Not present  Lines: None     Cardiac Monitoring: ACTIVE order. Indication: tachycardia  Code Status: No CPR- Do NOT Intubate      Clinically Significant Risk Factors                   # Hypertension: Noted on problem list     # Dementia: noted on problem list        # Obesity: Estimated body mass index is 30.02 kg/m  as calculated from the following:    Height as of this encounter: 1.702 m (5' 7\").    Weight as of this encounter: 87 kg (191 lb 11.2 oz).      # Financial/Environmental Concerns: none         Disposition Plan     Medically Ready for Discharge: Anticipated in 2-4 Days             Lu Skaggs MD  Hospitalist " [facility]  Securely message with [employer] (more info)  Text page via [facility] Paging/Directory   ______________________________________________________________________    Interval History   Poor historian, sleepy, dementia, no complaints    Physical Exam   Vital Signs: Temp: 98.5  F (36.9  C) Temp src: Oral BP: (!) 120/93 Pulse: 67   Resp: 18 SpO2: 98 % O2 Device: Nasal cannula Oxygen Delivery: 3 LPM  Weight: 191 lbs 11.2 oz    General.  sleepy not in acute distress. Obese  HEENT.  Pupils equal round react to light, anicteric, EOM intact.  Neck supple no JVD.  CVS regular rhythm no murmur gallops.  Lungs.  Coarse to auscultation bilateral no wheezing or rales.  Abdomen.  Soft nontender bowel sounds present.  Extremities.  + edema no calf tenderness.  Neurological.   No focal deficit.  Skin ecchymosis. No pallor.  Psych. flat mood.      Medical Decision Making       46 MINUTES SPENT BY ME on the date of service doing chart review, history, exam, documentation & further activities per the note.      Data     I have personally reviewed the following data over the past 24 hrs:    12.8 (H)  \   11.8 (L)   / 210     143 103 20.1 /  86   3.5 29 0.74 \       Imaging results reviewed over the past 24 hrs:   No results found for this or any previous visit (from the past 24 hours).

## 2024-11-10 NOTE — PHARMACY-VANCOMYCIN DOSING SERVICE
Pharmacy Vancomycin Note  Date of Service November 10, 2024  Patient's  1940   84 year old, male    Indication: Healthcare-Associated Pneumonia and Urinary Tract Infection  Day of Therapy: 4  Current vancomycin regimen:  1250 mg IV q18h  Current vancomycin monitoring method: AUC  Current vancomycin therapeutic monitoring goal: 400-600 mg*h/L    InsightRX Prediction of Current Vancomycin Regimen  Loading dose: N/A  Regimen: 1250 mg IV every 18 hours.  Start time: 18:22 on 11/10/2024  Exposure target: AUC24 (range)400-600 mg/L.hr   AUC24,ss: 387 mg/L.hr  Probability of AUC24 > 400: 41 %  Ctrough,ss: 10.5 mg/L  Probability of Ctrough,ss > 20: 0 %  Probability of nephrotoxicity (Lodise NICOLE ): 6 %      Current estimated CrCl = Estimated Creatinine Clearance: 79.7 mL/min (based on SCr of 0.74 mg/dL).    Creatinine for last 3 days  2024:  6:38 AM Creatinine 0.91 mg/dL  2024:  6:45 AM Creatinine 0.75 mg/dL  11/10/2024:  7:12 AM Creatinine 0.74 mg/dL    Recent Vancomycin Levels (past 3 days)  11/10/2024: 11:51 AM Vancomycin 13.2 ug/mL    Vancomycin IV Administrations (past 72 hours)                     vancomycin (VANCOCIN) 1,250 mg in 0.9% NaCl 262.5 mL intermittent infusion (mg) 1,250 mg New Bag 11/10/24 0022     1,250 mg New Bag 24 0641     1,250 mg New Bag 24 1154    vancomycin (VANCOCIN) 1,750 mg in 0.9% NaCl 517.5 mL intermittent infusion (mg) 1,750 mg New Bag 24 1909                    Nephrotoxins and other renal medications (From now, onward)      Start     Dose/Rate Route Frequency Ordered Stop    11/10/24 1800  vancomycin (VANCOCIN) 1,500 mg in 0.9% NaCl 265 mL intermittent infusion         1,500 mg  over 90 Minutes Intravenous EVERY 18 HOURS 11/10/24 1253      24 1300  furosemide (LASIX) tablet 20 mg         20 mg Oral DAILY 24 1248      24 0300  piperacillin-tazobactam (ZOSYN) 3.375 g vial to attach to  mL bag        Note to Pharmacy: For [facility]  "and [facility]: For Zosyn-naive patients, use the \"Zosyn initial dose + extended infusion\" order panel.    3.375 g  over 240 Minutes Intravenous EVERY 8 HOURS 11/06/24 2026                 Contrast Orders - past 72 hours (72h ago, onward)      None            Interpretation of levels and current regimen:  Vancomycin level is reflective of AUC less than 400    Has serum creatinine changed greater than 50% in last 72 hours: No    Urine output:  unable to determine    Renal Function: Stable    InsightRX Prediction of Planned New Vancomycin Regimen  Loading dose: N/A  Regimen: 1500 mg IV every 18 hours.  Start time: 18:22 on 11/10/2024  Exposure target: AUC24 (range)400-600 mg/L.hr   AUC24,ss: 462 mg/L.hr  Probability of AUC24 > 400: 85 %  Ctrough,ss: 12.5 mg/L  Probability of Ctrough,ss > 20: 1 %  Probability of nephrotoxicity (Lodise NICOLE 2009): 8 %      Plan:  Increase Dose to 1500mg IV Q18H  Vancomycin monitoring method: AUC  Vancomycin therapeutic monitoring goal: 400-600 mg*h/L  Pharmacy will check vancomycin levels as appropriate in 3-5 Days.  Serum creatinine levels will be ordered daily for the first week of therapy and at least twice weekly for subsequent weeks.    Renetta Mathews [facility]    "

## 2024-11-10 NOTE — PLAN OF CARE
Problem: Adult Inpatient Plan of Care  Goal: Plan of Care Review  Description: The Plan of Care Review/Shift note should be completed every shift.  The Outcome Evaluation is a brief statement about your assessment that the patient is improving, declining, or no change.  This information will be displayed automatically on your shift  note.  Outcome: Progressing  Goal: Absence of Hospital-Acquired Illness or Injury  Intervention: Identify and Manage Fall Risk  Recent Flowsheet Documentation  Taken 11/10/2024 0030 by Long Arechiga RN  Safety Promotion/Fall Prevention:   activity supervised   bedside attendant  Intervention: Prevent Skin Injury  Recent Flowsheet Documentation  Taken 11/10/2024 0030 by Long Arechiga RN  Body Position: position changed independently  Intervention: Prevent Infection  Recent Flowsheet Documentation  Taken 11/10/2024 0030 by Long Arechiga RN  Infection Prevention:   equipment surfaces disinfected   hand hygiene promoted     Problem: Risk for Delirium  Goal: Improved Sleep  Outcome: Progressing     Problem: Pain Acute  Goal: Optimal Pain Control and Function  Outcome: Progressing     Problem: Comorbidity Management  Goal: Maintenance of Behavioral Health Symptom Control  Outcome: Progressing  Goal: Maintenance of COPD Symptom Control  Outcome: Progressing  Goal: Maintenance of Heart Failure Symptom Control  Outcome: Progressing  Goal: Blood Pressure in Desired Range  Outcome: Progressing     Problem: Delirium  Goal: Improved Sleep  Outcome: Progressing     Problem: Gas Exchange Impaired  Goal: Optimal Gas Exchange  Outcome: Progressing  Intervention: Optimize Oxygenation and Ventilation  Recent Flowsheet Documentation  Taken 11/10/2024 0030 by Long Arechiga RN  Head of Bed (HOB) Positioning: HOB at 20-30 degrees   Goal Outcome Evaluation:       Pt continues 1:1.  Cooperative with cares. No verbal/nonverbal signs of pain. IV abx given as scheduled.  Oxygen at 3LPM. Telemetry showing NSR. Vital Signs WNL. Will continue to monitor.

## 2024-11-10 NOTE — PLAN OF CARE
Problem: Adult Inpatient Plan of Care  Goal: Absence of Hospital-Acquired Illness or Injury  Intervention: Identify and Manage Fall Risk  Recent Flowsheet Documentation  Taken 11/9/2024 1615 by Tasha Peoples RN  Safety Promotion/Fall Prevention:   activity supervised   bedside attendant  Intervention: Prevent and Manage VTE (Venous Thromboembolism) Risk  Recent Flowsheet Documentation  Taken 11/9/2024 1615 by Tasha Peoples RN  VTE Prevention/Management: SCDs off (sequential compression devices)  Goal: Optimal Comfort and Wellbeing  Intervention: Provide Person-Centered Care  Recent Flowsheet Documentation  Taken 11/9/2024 1615 by Tasha Peoples RN  Trust Relationship/Rapport: care explained     Problem: Risk for Delirium  Goal: Optimal Coping  Intervention: Optimize Psychosocial Adjustment to Delirium  Recent Flowsheet Documentation  Taken 11/9/2024 1615 by Tasha Peoples RN  Supportive Measures: active listening utilized  Goal: Improved Behavioral Control  Intervention: Prevent and Manage Agitation  Recent Flowsheet Documentation  Taken 11/9/2024 1615 by Tasha Peoples RN  Environment Familiarity/Consistency: daily routine followed  Intervention: Minimize Safety Risk  Recent Flowsheet Documentation  Taken 11/9/2024 1615 by Tasha Peoples RN  Enhanced Safety Measures:   pain management   room near unit station  Trust Relationship/Rapport: care explained  Goal: Improved Attention and Thought Clarity  Intervention: Maximize Cognitive Function  Recent Flowsheet Documentation  Taken 11/9/2024 1615 by Tasha Peoples RN  Sensory Stimulation Regulation:   lighting decreased   care clustered   quiet environment promoted  Reorientation Measures: reorientation provided  Goal: Improved Sleep  Intervention: Promote Sleep  Recent Flowsheet Documentation  Taken 11/9/2024 1615 by Tasha Peoples RN  Sleep/Rest Enhancement: awakenings minimized     Problem: Pain Acute  Goal: Optimal Pain Control and Function  Intervention: Prevent or  Manage Pain  Recent Flowsheet Documentation  Taken 11/9/2024 1615 by Tasha Peoples RN  Sensory Stimulation Regulation:   lighting decreased   care clustered   quiet environment promoted  Sleep/Rest Enhancement: awakenings minimized  Bowel Elimination Promotion: adequate fluid intake promoted  Medication Review/Management: medications reviewed  Intervention: Optimize Psychosocial Wellbeing  Recent Flowsheet Documentation  Taken 11/9/2024 1615 by Tasha Peoples RN  Supportive Measures: active listening utilized  Diversional Activities: television     Problem: Dysrhythmia  Goal: Normalized Cardiac Rhythm  Intervention: Monitor and Manage Cardiac Rhythm Effect  Recent Flowsheet Documentation  Taken 11/9/2024 1615 by Tasha Peoples RN  VTE Prevention/Management: SCDs off (sequential compression devices)     Problem: Fall Injury Risk  Goal: Absence of Fall and Fall-Related Injury  Intervention: Identify and Manage Contributors  Recent Flowsheet Documentation  Taken 11/9/2024 1615 by Tasha Peoples RN  Medication Review/Management: medications reviewed  Intervention: Promote Injury-Free Environment  Recent Flowsheet Documentation  Taken 11/9/2024 1615 by Tasha Peoples RN  Safety Promotion/Fall Prevention:   activity supervised   bedside attendant     Problem: Comorbidity Management  Goal: Maintenance of Behavioral Health Symptom Control  Intervention: Maintain Behavioral Health Symptom Control  Recent Flowsheet Documentation  Taken 11/9/2024 1615 by Tasha Peoples RN  Medication Review/Management: medications reviewed  Goal: Maintenance of COPD Symptom Control  Intervention: Maintain COPD Symptom Control  Recent Flowsheet Documentation  Taken 11/9/2024 1615 by Tasha Peoples RN  Medication Review/Management: medications reviewed  Goal: Maintenance of Heart Failure Symptom Control  Intervention: Maintain Heart Failure Management  Recent Flowsheet Documentation  Taken 11/9/2024 1615 by Tasha Peoples RN  Medication  Review/Management: medications reviewed  Goal: Blood Pressure in Desired Range  Intervention: Maintain Blood Pressure Management  Recent Flowsheet Documentation  Taken 11/9/2024 1615 by Tasha Peoples RN  Medication Review/Management: medications reviewed     Problem: Delirium  Goal: Optimal Coping  Intervention: Optimize Psychosocial Adjustment to Delirium  Recent Flowsheet Documentation  Taken 11/9/2024 1615 by Tasha Peoples RN  Supportive Measures: active listening utilized  Goal: Improved Behavioral Control  Intervention: Prevent and Manage Agitation  Recent Flowsheet Documentation  Taken 11/9/2024 1615 by Tasha Peoples RN  Environment Familiarity/Consistency: daily routine followed  Intervention: Minimize Safety Risk  Recent Flowsheet Documentation  Taken 11/9/2024 1615 by Tasha Peoples RN  Enhanced Safety Measures:   pain management   room near unit station  Trust Relationship/Rapport: care explained  Goal: Improved Attention and Thought Clarity  Intervention: Maximize Cognitive Function  Recent Flowsheet Documentation  Taken 11/9/2024 1615 by Tasha Peoples RN  Sensory Stimulation Regulation:   lighting decreased   care clustered   quiet environment promoted  Reorientation Measures: reorientation provided  Goal: Improved Sleep  Intervention: Promote Sleep  Recent Flowsheet Documentation  Taken 11/9/2024 1615 by Tasha Peoples RN  Sleep/Rest Enhancement: awakenings minimized     Problem: Mobility Impairment  Goal: Optimal Mobility  Intervention: Optimize Mobility  Recent Flowsheet Documentation  Taken 11/9/2024 1920 by Tasha Peoples RN  Positioning/Transfer Devices:   pillows   in use  Taken 11/9/2024 1520 by Tasha Peoples RN  Positioning/Transfer Devices:   pillows   in use     Goal Outcome Evaluation:  Pt reported pain in back - partial relief from PRN Tylenol.  Pt has been calm and cooperative this shift with 1:1 staff present.  Multiple dark bruises on LE.  LE and UE edema, right more than left sides.  IV  Zosyn running.  Up to BR with assist.  Sacral Mepilex intact.  Pt has preferred sitting up in recliner.  O2 on 3 LPM per NC.

## 2024-11-11 LAB
BACTERIA SPEC CULT: ABNORMAL
CREAT SERPL-MCNC: 0.85 MG/DL (ref 0.67–1.17)
EGFRCR SERPLBLD CKD-EPI 2021: 86 ML/MIN/1.73M2

## 2024-11-11 PROCEDURE — 250N000013 HC RX MED GY IP 250 OP 250 PS 637: Performed by: NURSE PRACTITIONER

## 2024-11-11 PROCEDURE — 250N000013 HC RX MED GY IP 250 OP 250 PS 637: Performed by: INTERNAL MEDICINE

## 2024-11-11 PROCEDURE — 99233 SBSQ HOSP IP/OBS HIGH 50: CPT | Performed by: NURSE PRACTITIONER

## 2024-11-11 PROCEDURE — 250N000013 HC RX MED GY IP 250 OP 250 PS 637: Performed by: HOSPITALIST

## 2024-11-11 PROCEDURE — 120N000001 HC R&B MED SURG/OB

## 2024-11-11 PROCEDURE — 82565 ASSAY OF CREATININE: CPT | Performed by: STUDENT IN AN ORGANIZED HEALTH CARE EDUCATION/TRAINING PROGRAM

## 2024-11-11 PROCEDURE — 36415 COLL VENOUS BLD VENIPUNCTURE: CPT | Performed by: STUDENT IN AN ORGANIZED HEALTH CARE EDUCATION/TRAINING PROGRAM

## 2024-11-11 PROCEDURE — 99232 SBSQ HOSP IP/OBS MODERATE 35: CPT | Performed by: INTERNAL MEDICINE

## 2024-11-11 PROCEDURE — 250N000013 HC RX MED GY IP 250 OP 250 PS 637: Performed by: REGISTERED NURSE

## 2024-11-11 PROCEDURE — 250N000011 HC RX IP 250 OP 636

## 2024-11-11 PROCEDURE — 250N000013 HC RX MED GY IP 250 OP 250 PS 637: Performed by: STUDENT IN AN ORGANIZED HEALTH CARE EDUCATION/TRAINING PROGRAM

## 2024-11-11 PROCEDURE — 250N000012 HC RX MED GY IP 250 OP 636 PS 637: Performed by: INTERNAL MEDICINE

## 2024-11-11 RX ORDER — NALOXONE HYDROCHLORIDE 0.4 MG/ML
0.2 INJECTION, SOLUTION INTRAMUSCULAR; INTRAVENOUS; SUBCUTANEOUS
Status: DISCONTINUED | OUTPATIENT
Start: 2024-11-11 | End: 2024-11-12 | Stop reason: HOSPADM

## 2024-11-11 RX ORDER — HYDROMORPHONE HYDROCHLORIDE 2 MG/1
2 TABLET ORAL
Status: DISCONTINUED | OUTPATIENT
Start: 2024-11-11 | End: 2024-11-12 | Stop reason: HOSPADM

## 2024-11-11 RX ORDER — SENNOSIDES 8.6 MG
1 TABLET ORAL 2 TIMES DAILY PRN
Status: DISCONTINUED | OUTPATIENT
Start: 2024-11-11 | End: 2024-11-11

## 2024-11-11 RX ORDER — PREDNISONE 5 MG/1
10 TABLET ORAL DAILY
Status: DISCONTINUED | OUTPATIENT
Start: 2024-11-20 | End: 2024-11-12 | Stop reason: HOSPADM

## 2024-11-11 RX ORDER — HYDROMORPHONE HYDROCHLORIDE 1 MG/ML
1 SOLUTION ORAL
Status: DISCONTINUED | OUTPATIENT
Start: 2024-11-11 | End: 2024-11-11

## 2024-11-11 RX ORDER — GLYCOPYRROLATE 0.2 MG/ML
0.2 INJECTION, SOLUTION INTRAMUSCULAR; INTRAVENOUS EVERY 4 HOURS PRN
Status: DISCONTINUED | OUTPATIENT
Start: 2024-11-11 | End: 2024-11-12 | Stop reason: HOSPADM

## 2024-11-11 RX ORDER — BISACODYL 10 MG
10 SUPPOSITORY, RECTAL RECTAL
Status: DISCONTINUED | OUTPATIENT
Start: 2024-11-14 | End: 2024-11-12 | Stop reason: HOSPADM

## 2024-11-11 RX ORDER — ATROPINE SULFATE 10 MG/ML
2 SOLUTION/ DROPS OPHTHALMIC EVERY 4 HOURS PRN
Status: DISCONTINUED | OUTPATIENT
Start: 2024-11-11 | End: 2024-11-12 | Stop reason: HOSPADM

## 2024-11-11 RX ORDER — HYDROMORPHONE HYDROCHLORIDE 1 MG/ML
2 SOLUTION ORAL
Status: DISCONTINUED | OUTPATIENT
Start: 2024-11-11 | End: 2024-11-12 | Stop reason: HOSPADM

## 2024-11-11 RX ORDER — NALOXONE HYDROCHLORIDE 0.4 MG/ML
0.1 INJECTION, SOLUTION INTRAMUSCULAR; INTRAVENOUS; SUBCUTANEOUS
Status: DISCONTINUED | OUTPATIENT
Start: 2024-11-11 | End: 2024-11-12 | Stop reason: HOSPADM

## 2024-11-11 RX ORDER — PREDNISONE 20 MG/1
20 TABLET ORAL DAILY
Status: DISCONTINUED | OUTPATIENT
Start: 2024-11-17 | End: 2024-11-12 | Stop reason: HOSPADM

## 2024-11-11 RX ORDER — CARBOXYMETHYLCELLULOSE SODIUM 5 MG/ML
1-2 SOLUTION/ DROPS OPHTHALMIC
Status: DISCONTINUED | OUTPATIENT
Start: 2024-11-11 | End: 2024-11-12 | Stop reason: HOSPADM

## 2024-11-11 RX ORDER — PREDNISONE 20 MG/1
40 TABLET ORAL DAILY
Status: DISCONTINUED | OUTPATIENT
Start: 2024-11-11 | End: 2024-11-12 | Stop reason: HOSPADM

## 2024-11-11 RX ORDER — HYDROMORPHONE HYDROCHLORIDE 1 MG/ML
1 SOLUTION ORAL EVERY 4 HOURS PRN
Status: DISCONTINUED | OUTPATIENT
Start: 2024-11-11 | End: 2024-11-12 | Stop reason: HOSPADM

## 2024-11-11 RX ORDER — MINERAL OIL/HYDROPHIL PETROLAT
OINTMENT (GRAM) TOPICAL
Status: DISCONTINUED | OUTPATIENT
Start: 2024-11-11 | End: 2024-11-12 | Stop reason: HOSPADM

## 2024-11-11 RX ORDER — DOXYCYCLINE 100 MG/1
100 CAPSULE ORAL EVERY 12 HOURS SCHEDULED
Status: DISCONTINUED | OUTPATIENT
Start: 2024-11-11 | End: 2024-11-12 | Stop reason: HOSPADM

## 2024-11-11 RX ADMIN — QUETIAPINE FUMARATE 12.5 MG: 25 TABLET ORAL at 12:25

## 2024-11-11 RX ADMIN — SENNOSIDES AND DOCUSATE SODIUM 1 TABLET: 8.6; 5 TABLET ORAL at 08:52

## 2024-11-11 RX ADMIN — DULOXETINE HYDROCHLORIDE 60 MG: 60 CAPSULE, DELAYED RELEASE PELLETS ORAL at 08:52

## 2024-11-11 RX ADMIN — ASPIRIN 81 MG CHEWABLE TABLET 81 MG: 81 TABLET CHEWABLE at 08:52

## 2024-11-11 RX ADMIN — DOXYCYCLINE 100 MG: 100 CAPSULE ORAL at 21:23

## 2024-11-11 RX ADMIN — HYDROMORPHONE HYDROCHLORIDE 2 MG: 1 SOLUTION ORAL at 17:32

## 2024-11-11 RX ADMIN — METOPROLOL TARTRATE 50 MG: 25 TABLET, FILM COATED ORAL at 08:52

## 2024-11-11 RX ADMIN — BUSPIRONE HYDROCHLORIDE 5 MG: 5 TABLET ORAL at 08:52

## 2024-11-11 RX ADMIN — Medication 5 MG: at 17:29

## 2024-11-11 RX ADMIN — MICONAZOLE NITRATE: 20 POWDER TOPICAL at 10:00

## 2024-11-11 RX ADMIN — PANTOPRAZOLE SODIUM 40 MG: 40 TABLET, DELAYED RELEASE ORAL at 06:04

## 2024-11-11 RX ADMIN — TRAZODONE HYDROCHLORIDE 25 MG: 50 TABLET ORAL at 21:23

## 2024-11-11 RX ADMIN — DOXYCYCLINE 100 MG: 100 CAPSULE ORAL at 10:01

## 2024-11-11 RX ADMIN — PREDNISONE 40 MG: 20 TABLET ORAL at 10:01

## 2024-11-11 RX ADMIN — BUSPIRONE HYDROCHLORIDE 5 MG: 5 TABLET ORAL at 21:23

## 2024-11-11 RX ADMIN — FUROSEMIDE 20 MG: 20 TABLET ORAL at 08:52

## 2024-11-11 RX ADMIN — MICONAZOLE NITRATE: 20 POWDER TOPICAL at 21:24

## 2024-11-11 RX ADMIN — LEVOTHYROXINE SODIUM 88 MCG: 88 TABLET ORAL at 06:04

## 2024-11-11 RX ADMIN — AMOXICILLIN AND CLAVULANATE POTASSIUM 1 TABLET: 875; 125 TABLET, FILM COATED ORAL at 21:23

## 2024-11-11 RX ADMIN — PIPERACILLIN AND TAZOBACTAM 3.38 G: 3; .375 INJECTION, POWDER, FOR SOLUTION INTRAVENOUS at 06:03

## 2024-11-11 RX ADMIN — LIDOCAINE 1 PATCH: 4 PATCH TOPICAL at 12:25

## 2024-11-11 RX ADMIN — LATANOPROST 1 DROP: 50 SOLUTION/ DROPS OPHTHALMIC at 21:24

## 2024-11-11 ASSESSMENT — ACTIVITIES OF DAILY LIVING (ADL)
ADLS_ACUITY_SCORE: 0
ADLS_ACUITY_SCORE: 0
ADLS_ACUITY_SCORE: 26.75
ADLS_ACUITY_SCORE: 0
ADLS_ACUITY_SCORE: 0
ADLS_ACUITY_SCORE: 26.75
ADLS_ACUITY_SCORE: 0
ADLS_ACUITY_SCORE: 26.75
ADLS_ACUITY_SCORE: 0
ADLS_ACUITY_SCORE: 26.75
ADLS_ACUITY_SCORE: 0
ADLS_ACUITY_SCORE: 26.75
ADLS_ACUITY_SCORE: 26.75
ADLS_ACUITY_SCORE: 0
ADLS_ACUITY_SCORE: 26.75
ADLS_ACUITY_SCORE: 0

## 2024-11-11 NOTE — PROGRESS NOTES
PALLIATIVE CARE PROGRESS NOTE  [facility]     Patient Name: Dominik Rojas  Date of Admission: 10/12/2024   Today the patient was seen for: Pt and family support.      Recommendations & Counseling       GOALS OF CARE:   Transition to comfort care while hospitalized with the goal to discharge to a facility with hospice support.   Orders now reflective of comfort care.     AADVANCE CARE PLANNING:  Patient has an advance directive dated 10/17/2024.  Primary Health Care Agent Spouse Jovana.  Alternate(s) 1st daughter Lore, second daughter Chana.   There is no POLST form on file, recommend to complete prior to DC.  Code status: No CPR- Do NOT Intubate     MEDICAL MANAGEMENT:   #Pain,acute (left hip, concern for cancer related pain)   Opioids: hydromorphone 1-2 mg IV/PO q 4 hours PRN.   Acetaminophen (Tylenol), Scheduled  Muscle Relaxers:Methocarbamol (Robaxin) 250 mg q 8 hours PRN - Last dose 11/9; unclear if this is still helpful this far post-op, may be contributing to delirium.  Anti-depressants:  Duloxetine (Cymbalta)  Topical medicines:  Lidocaine Patch 4%   Repositioning     #Delirium  #Agitation  Trazodone and melatonin at bedtime  Seroquel PRN  Avoid benzodiazepines, antihistamines, anticholinergics if able.  Lights on and blinds open during the day.  Reorient frequently.  Lights & TV off during the night.  Promote normal circadian rhythm.  Limit sensory deprivation - utilize hearing aids, glasses, etc.  Frequently assess basic needs such as temperature, elimination, thirst/hunger, pain  Consider bedside attendant (if able) for additional safety    Comfort Care   Below are common symptoms routinely seen in patients with comfort focused goals. This patient may not currently exhibit all of these symptoms; however, if these were to occur our recommendations are as follows:    #Air hunger/Dyspnea   1st choice: hydromorphone PO 1-2 mg q 1 hour as needed.   2nd choice:hydromorphone IV  0.3-0.5 mg q 15 minutes as needed     #Secretion burden   Robinul 0.1 mg (PO/IV) q 4 hours as needed. If ineffective, increase up to 0.3 mg   Consider atropine if Robinul ineffective after dose increase      #Nausea   Zofran 4 mg q 6 hours as needed. Can increase to 8 mg   Zyprexa 5 mg q 6 hours as needed        PSYCHOSOCIAL/SPIRITUAL SUPPORT:  Family Spouse Jovana and 2 daughters  [place] community: [employer]   Chaplaincy has been following and Dominik has had  visit for anointing.  Palliative Care will continue to follow. Thank you for the consult and allowing us to aid in the care of Dominik Rojas.      These recommendations have been discussed with primary and bedside teams.    Ivan Hogan NP  Securely message with Vocera (more info)  Text page via [employer] Paging/Directory       Chart documentation was completed, in part, with [employer] voice-recognition software. Even though reviewed, some grammatical, spelling, and word errors may remain.         Interval History:     Course reviewed.  Remains encephalopathic.  Resting comfortably this morning.   Speaking with his wife Jovana, family has met with additional friends/support and has discussed details care further.  They would like Dominik to discharge with hospice support.  Unfortunately, Dominik cannot return home given his care needs.  We discussed hospice in a facility versus residential hospice home and shared that care management will help with specifics.   -Education provided on transition to comfort-focused goals of care would be including interventions that do not directly promote comfort.  Education given that current treatments that promote comfort and quality of life are continued. Anticipatory guidance was given regarding feeding, hunger, fluids at end of life. We discussed utilization of medications to ease air hunger, agitation and restlessness. Discussed that this process is very purposeful in terms of ensuring patient is as comfortable as  possible and that family wishes are honored.  -Education provided regarding hospice philosophy, prognostic, and eligibility criteria. Discussed what services are provided and those that are not. Discussed common misconceptions. We explored the various disposition options where they can receive hospice care (home, residential hospice homes, LTC with hospice) including subsequent financial and familial implications. Discussed typical anticipated timing of discharge, but we discussed that care-management will assist in specifics.  After our discussion, Jovana would like Dominik to discharge with hospice support and while he awaits disposition to transition to comfort care.         Assessment          Dominik Rojas is a 84 year old male with a past medical history of RLL adenocarcinoma s/p radiation therapy in 3/2024, of note, he was to get EBUS and chemo but it was decided he could tolerate those, COPD on 2 to 3 L nasal cannula at home, chronic systolic heart failure, hypertension, hypothyroidism, anxiety, recent fall and left hip fracture s/p ORIF who was just discharged to TCU prior to presenting on 10/12 after another fall and left hip pain.  Hospitalization has been prolonged with treatment for acute on chronic HFpEF and tachycardia, and complicated by subsequent fall and encephalopathy in the setting of baseline dementia.    On 11/6 Dominik developed Afib with RVR.  A cheest CT was done with concern for PE.  There was no evidence of PE, but a large infiltrating mass in the subcarinal and right hilar region with resultant significant narrowing of the right mainstem, middle lobe and bronchus intermedius. Postobstructive pneumonia in the right lung base with small right pleural effusion and associated lymphadenopathy. In addition the mass severely narrows or occludes the inferior right pulmonary vein with narrowing of the superior right pulmonary vein as it enters the left atrium.                   Review of Systems:      EVERARDO              Physical Exam:   Temp:  [97.5  F (36.4  C)-98.7  F (37.1  C)] 98.3  F (36.8  C)  Pulse:  [52-61] 56  Resp:  [16-18] 16  BP: (135-136)/(74-88) 135/83  SpO2:  [92 %-97 %] 95 %  191 lbs 11.2 oz    Physical Exam  Vitals and nursing note reviewed.   Constitutional:       General: He is sleeping. He is not in acute distress.  HENT:      Head: Normocephalic.      Mouth/Throat:      Pharynx: Oropharynx is clear.   Cardiovascular:      Pulses: Normal pulses.   Pulmonary:      Effort: Pulmonary effort is normal. No respiratory distress.      Breath sounds: No wheezing.   Abdominal:      General: Abdomen is flat.   Skin:     General: Skin is warm and dry.      Capillary Refill: Capillary refill takes less than 2 seconds.   Neurological:      General: No focal deficit present.                        Current Problem List:   Principal Problem:    Fall, initial encounter  Active Problems:    HTN (hypertension)    Hypothyroidism    COPD (chronic obstructive pulmonary disease) (H)    Metabolic encephalopathy    Aggressive behavior    Chronic respiratory failure with hypoxia (H)    Hip fracture, left, closed, with routine healing, subsequent encounter      Allergies   Allergen Reactions    Diclofenac      Other reaction(s): GI Upset    Loratadine      Other reaction(s): Urinary Retention    Oxycodone      Agitation     Sertraline Unknown     Other reaction(s): ineffective            Data Reviewed:     Results for orders placed or performed during the hospital encounter of 10/12/24 (from the past 24 hours)   Creatinine   Result Value Ref Range    Creatinine 0.85 0.67 - 1.17 mg/dL    GFR Estimate 86 >60 mL/min/1.73m2         Medical Decision Making       57 MINUTES SPENT BY ME on the date of service doing chart review, history, exam, documentation & further activities per the note.

## 2024-11-11 NOTE — PROGRESS NOTES
SPIRITUAL HEALTH SERVICES Progress Note    Saw pt Dominik Rojas and offered Sikh sacrament of anointing for the healing of the sick.     Fr. Wilder Aguilar

## 2024-11-11 NOTE — PROGRESS NOTES
Dominik was sleeping soundly in the recliner. He was alone in the room.  He did not stir when I spoke out loud. I offered words of encouragement and prayer.     Fr. Hdz anointed Dominik this morning.    Spiritual Care is available as needed or requested.    VIRIDIANA Land.  Palliative Care Team

## 2024-11-11 NOTE — PROGRESS NOTES
SPIRITUAL HEALTH SERVICES Progress Note    Saw pt Dominik Rojas and offered Anabaptist sacrament of anointing for the healing of the sick.     Fr. Wilder Aguilar

## 2024-11-11 NOTE — PROGRESS NOTES
Pulmonary Progress Note  11/11/2024      Admit Date: 10/12/2024  CODE: No CPR- Do NOT Intubate    Reason for Consult: acute on chronic respiratory failure with hypoxemia. Lung mass. Suspected lung cancer.     Assessment/Plan:   84M with cognitive decline, COPD on home O2 2.5Lpm, HFpEF, other issues, admitted with fall, afib/RVR and acute on chronic respiratory failure with hypoxemia. Chest imaging showed large right lung mass with RMSB narrowing and post-obstructive pneumonia. Clinically stable though requiring a bit more O2 than his baseline. MRSA swab + otherwise no positive cultures.    Bedside right lung/pleural POCUS today showed small right pleural effusion; not enough to tap.    Discussed the situation with her daughter Lore - they do not want any invasive procedures and would like him to go to inpatient hospice. Palliative care following. Home hospice not feasible as his wife would be unable to care for him.     Recommendations:  - titrate FiO2 for goal SpO2 88-92%, avoid hyperoxia  - encourage OOB, PT/OT, push IS  - change abx to PO amox/clav + doxy for 5 more days. The amox/clav should cover post-obsructive pneumonia as well as her urine organisms, and the doxy should cover MRSA if he has that.  - no plans for bronchoscopy or any invasive procedures per [facility] discussions and family's wishes.   - prednisone taper ordered  - continue prn nebs   - palliative care following. Recommend hospice evaluation.    No further recommendations; we'll sign off. Please call with additional questions.     Richard (Farooq) MD Ariel  [facility] ([place])  Send me a secure message using [employer]  Clinic [phone]  Fax [phone]     Subjective/Interim Events:   Feels OK.  Denies pain. No SOB at rest  On 5Lpm with SpO2 low 90.s  No hemoptysis.       Medications:     Current Facility-Administered Medications   Medication Dose Route Frequency Provider Last Rate Last Admin     Current  Facility-Administered Medications   Medication Dose Route Frequency Provider Last Rate Last Admin    aspirin (ASA) chewable tablet 81 mg  81 mg Oral Daily Lu Skaggs MD   81 mg at 11/11/24 0852    busPIRone (BUSPAR) tablet 5 mg  5 mg Oral BID Lu Skaggs MD   5 mg at 11/11/24 0852    DULoxetine (CYMBALTA) DR capsule 60 mg  60 mg Oral QAM Lu Skaggs MD   60 mg at 11/11/24 0852    furosemide (LASIX) tablet 20 mg  20 mg Oral Daily Loida Pace MD   20 mg at 11/11/24 0852    latanoprost (XALATAN) 0.005 % ophthalmic solution 1 drop  1 drop Both Eyes At Bedtime Lu Skaggs MD   1 drop at 11/10/24 2100    levothyroxine (SYNTHROID/LEVOTHROID) tablet 88 mcg  88 mcg Oral QAM  Lu Skaggs MD   88 mcg at 11/11/24 0604    Lidocaine (LIDOCARE) 4 % Patch 1 patch  1 patch Transdermal Q24H Rosi Childress MD   1 patch at 11/10/24 1338    melatonin tablet 5 mg  5 mg Oral Daily with supper Claudia Isidro MD   5 mg at 11/10/24 1743    metoprolol tartrate (LOPRESSOR) tablet 50 mg  50 mg Oral BID Alexa Khan MD   50 mg at 11/11/24 0852    miconazole (MICATIN) 2 % powder   Topical BID Lu Skaggs MD   Given at 11/10/24 2103    pantoprazole (PROTONIX) EC tablet 40 mg  40 mg Oral QAM  Lu Skaggs MD   40 mg at 11/11/24 0604    piperacillin-tazobactam (ZOSYN) 3.375 g vial to attach to  mL bag  3.375 g Intravenous Q8H Lore Ward MD   3.375 g at 11/11/24 0603    polyethylene glycol (MIRALAX) Packet 17 g  17 g Oral Daily Claudia Isidro MD   17 g at 11/09/24 0855    rosuvastatin (CRESTOR) tablet 10 mg  10 mg Oral At Bedtime Lu Skaggs MD   10 mg at 11/10/24 2100    senna-docusate (SENOKOT-S/PERICOLACE) 8.6-50 MG per tablet 1 tablet  1 tablet Oral BID Lu Skaggs MD   1 tablet at 11/11/24 08    traZODone (DESYREL) half-tab 25 mg  25 mg Oral At Bedtime Elvia Dhaliwal NP   25 mg at 11/10/24 7913    vancomycin (VANCOCIN) 1,500 mg in 0.9% NaCl 265 mL intermittent infusion   "1,500 mg Intravenous Q18H Lu Skaggs MD   1,500 mg at 11/10/24 1743         Exam/Data:   Vitals  /83 (BP Location: Left arm)   Pulse 56   Temp 98.3  F (36.8  C) (Oral)   Resp 16   Ht 1.702 m (5' 7\")   Wt 87 kg (191 lb 11.2 oz)   SpO2 95%   BMI 30.02 kg/m       I/O last 3 completed shifts:  In: 2290 [P.O.:2190; I.V.:100]  Out: -   Weight change:   [unfilled]  Resp: 16    EXAM:  Physical Exam  Gen: awake, alert, oriented, no distress  HEENT: NT, no JODI  CV: RRR, no m/g/r  Resp: right basilar crackles.   Abd: soft, nontender, BS+  Skin: no rashes or lesions  Ext: no edema  Neuro: PERRL, nonfocal exam    ROS:  A 10-system review was obtained and is negative with the exception of the symptoms noted above.    DATA:    PFT DATA  From May 2021    Testing met ATS standards.     FEV1/FVC is 68 and is reduced.  FEV1 is 60% predicted and is reduced.  FVC is 66% predicted and reduced.  There was no improvement in spirometry after a single inhaled does of bronchodilator.  DLCO is 54% predicted and is reduced when it   is corrected for hemoglobin.  Flow volume loop normal: Scooping of expiratory limb suggestive of obstruction     Impression:  Pulmonary Function Test is abnormal and demonstrates moderate obstruction     Spirometry is normal and is not consistent with reversibility.     Diffusion capacity is moderately reduced    IMAGING:   US Lower Extremity Venous Duplex Left    Result Date: 10/12/2024  EXAM: US LOWER EXTREMITY VENOUS DUPLEX LEFT LOCATION: [facility] DATE: 10/12/2024 INDICATION: ongoing LLE pain after left hip surgery on 10 6 24 COMPARISON: None. TECHNIQUE: Venous Duplex ultrasound of the left lower extremity with and without compression, augmentation and duplex. Color flow and spectral Doppler with waveform analysis performed. FINDINGS: Exam includes the common femoral, femoral, popliteal, and contralateral common femoral veins as well as segmentally visualized deep " calf veins and greater saphenous vein. LEFT: No deep vein thrombosis. No superficial thrombophlebitis. No popliteal cyst.     IMPRESSION: No deep venous thrombosis in the left lower extremity.    XR Femur Left 2 Views    Result Date: 10/12/2024  EXAM: XR FEMUR LEFT 2 VIEWS LOCATION: [facility] DATE: 10/12/2024 INDICATION: Left hip pain, status post fall after hip surgery. COMPARISON: 10/06/2024.     IMPRESSION: Postoperative changes redemonstrated related to ORIF intertrochanteric left proximal femur fracture with dynamic hip screw. Alignment and hardware unchanged. Displaced lesser trochanteric fracture fragments are unchanged. Lateral skin staples  remain along the lateral left hip and proximal thigh. Anatomic alignment left femur. No new left femur fracture or joint malalignment. Mild to moderate left hip osteoarthritis. Moderate to severe degenerative change medial compartment left knee. Arterial calcification. Small left knee joint effusion. Lateral left hip and proximal thigh soft tissue edema.     XR Pelvis 1/2 Views    Result Date: 10/12/2024  EXAM: XR PELVIS 1/2 VIEWS LOCATION: [facility] DATE: 10/12/2024 INDICATION: Left hip pain status post fall since left hip surgery. COMPARISON: 10/6/2024     IMPRESSION: No significant interval change. Postop ORIF left intertrochanteric femur fracture with dynamic hip screw. Hardware and alignment unchanged. Lesser trochanteric fragments remain medially displaced by approximately 2 cm. No new fracture. Mild to moderate left and mild right hip osteoarthritis. Vascular stents project over the pelvis and surgical clips project over the right inguinal region. Both obturator rings appear intact. Arterial calcification.    Head CT w/o contrast    Result Date: 10/12/2024  EXAM: CT HEAD WITHOUT CONTRAST, CT CERVICAL SPINE WITHOUT CONTRAST LOCATION: [facility] DATE: 10/12/2024 INDICATION: Fall, head  injury. COMPARISON: CT brain and CT cervical spine 10/05/2024. MRI brain 05/05/2024. TECHNIQUE: 1) Routine CT Head without IV contrast. Multiplanar reformats. Dose reduction techniques were used. 2) Routine CT Cervical Spine without IV contrast. Multiplanar reformats. Dose reduction techniques were used. FINDINGS: HEAD CT: INTRACRANIAL CONTENTS: No finding for intracranial hemorrhage or mass or convincing finding for acute infarct. Chronic volume loss and encephalomalacic change is seen within the right frontal lobe compatible with sequelae of prior ischemia and stable compared to prior. Moderate patchy low-attenuation change is again seen within the cerebral white matter, nonspecific but compatible with sequelae of chronic microvascular ischemic change given the patient's age and similar to prior. Moderate stable prominence of the lateral ventricles and sulci and mild prominence of the third ventricle. Cerebellar tonsils are normally positioned. Sella is unremarkable for technique. Generalized thinning of the body of the corpus callosum which otherwise appears normally formed. VISUALIZED ORBITS/SINUSES/MASTOIDS: Both globes are borderline proptotic which is favored to relate to body habitus as there is no abnormal enlargement of the extraocular muscles to strongly suggest thyroid-related orbitopathy. BONES/SOFT TISSUES: Calvarium is intact, without suspicious lytic or blastic foci. CERVICAL SPINE CT: VERTEBRA: Slight smooth convex left cervical curvature. Normal cervical lordosis. Craniocervical junction is intact. Mild to moderate degenerative change anterior C1-C2 articulation. Allowing for chronic endplate degenerative changes seen throughout the visualized spine, vertebral body heights are grossly preserved and there is no finding for acute fracture or posttraumatic subluxation. Endplate irregularity and advanced interspace narrowing C2-C3 through T1-T2. Mild posterior interbody spurring is seen throughout the  cervical spine. Mild to moderate right-sided facet arthropathy C3-C4 through C5-C6. Mild left-sided facet arthropathy C2-C3. CANAL/FORAMINA: Mild spinal canal narrowing C5-C6 and C6-C7. Moderate to severe bilateral foraminal stenosis C3-C4 and C4-C5 and on the left at C5-C6 and C6-C7. PARASPINAL: Dorsal paraspinal soft tissues are unremarkable. No prevertebral soft tissue swelling. Multiple surgical clips are seen within the right neck likely relating to prior carotid endarterectomy. Lung apices are clear.     IMPRESSION: HEAD CT: 1.  No finding for intracranial hemorrhage, mass, or acute infarct. 2.  Moderate volume loss and moderate to advanced presumed sequelae of chronic microvascular ischemic change. Stable volume loss and gliosis right frontal lobe. CERVICAL SPINE CT: 1.  Advanced cervical spondylosis without finding for acute fracture or posttraumatic subluxation.     CT Cervical Spine w/o Contrast    Result Date: 10/12/2024  EXAM: CT HEAD WITHOUT CONTRAST, CT CERVICAL SPINE WITHOUT CONTRAST LOCATION: [facility] DATE: 10/12/2024 INDICATION: Fall, head injury. COMPARISON: CT brain and CT cervical spine 10/05/2024. MRI brain 05/05/2024. TECHNIQUE: 1) Routine CT Head without IV contrast. Multiplanar reformats. Dose reduction techniques were used. 2) Routine CT Cervical Spine without IV contrast. Multiplanar reformats. Dose reduction techniques were used. FINDINGS: HEAD CT: INTRACRANIAL CONTENTS: No finding for intracranial hemorrhage or mass or convincing finding for acute infarct. Chronic volume loss and encephalomalacic change is seen within the right frontal lobe compatible with sequelae of prior ischemia and stable compared to prior. Moderate patchy low-attenuation change is again seen within the cerebral white matter, nonspecific but compatible with sequelae of chronic microvascular ischemic change given the patient's age and similar to prior. Moderate stable prominence of the  lateral ventricles and sulci and mild prominence of the third ventricle. Cerebellar tonsils are normally positioned. Sella is unremarkable for technique. Generalized thinning of the body of the corpus callosum which otherwise appears normally formed. VISUALIZED ORBITS/SINUSES/MASTOIDS: Both globes are borderline proptotic which is favored to relate to body habitus as there is no abnormal enlargement of the extraocular muscles to strongly suggest thyroid-related orbitopathy. BONES/SOFT TISSUES: Calvarium is intact, without suspicious lytic or blastic foci. CERVICAL SPINE CT: VERTEBRA: Slight smooth convex left cervical curvature. Normal cervical lordosis. Craniocervical junction is intact. Mild to moderate degenerative change anterior C1-C2 articulation. Allowing for chronic endplate degenerative changes seen throughout the visualized spine, vertebral body heights are grossly preserved and there is no finding for acute fracture or posttraumatic subluxation. Endplate irregularity and advanced interspace narrowing C2-C3 through T1-T2. Mild posterior interbody spurring is seen throughout the cervical spine. Mild to moderate right-sided facet arthropathy C3-C4 through C5-C6. Mild left-sided facet arthropathy C2-C3. CANAL/FORAMINA: Mild spinal canal narrowing C5-C6 and C6-C7. Moderate to severe bilateral foraminal stenosis C3-C4 and C4-C5 and on the left at C5-C6 and C6-C7. PARASPINAL: Dorsal paraspinal soft tissues are unremarkable. No prevertebral soft tissue swelling. Multiple surgical clips are seen within the right neck likely relating to prior carotid endarterectomy. Lung apices are clear.     IMPRESSION: HEAD CT: 1.  No finding for intracranial hemorrhage, mass, or acute infarct. 2.  Moderate volume loss and moderate to advanced presumed sequelae of chronic microvascular ischemic change. Stable volume loss and gliosis right frontal lobe. CERVICAL SPINE CT: 1.  Advanced cervical spondylosis without finding for acute  fracture or posttraumatic subluxation.

## 2024-11-11 NOTE — PLAN OF CARE
Problem: Adult Inpatient Plan of Care  Goal: Plan of Care Review  Description: The Plan of Care Review/Shift note should be completed every shift.  The Outcome Evaluation is a brief statement about your assessment that the patient is improving, declining, or no change.  This information will be displayed automatically on your shift  note.  Outcome: Progressing  Goal: Absence of Hospital-Acquired Illness or Injury  Intervention: Identify and Manage Fall Risk  Recent Flowsheet Documentation  Taken 11/11/2024 0030 by Long Arechiga RN  Safety Promotion/Fall Prevention:   activity supervised   assistive device/personal items within reach   room near nurse's station   supervised activity  Intervention: Prevent Skin Injury  Recent Flowsheet Documentation  Taken 11/11/2024 0030 by Long Arechiga RN  Body Position: position changed independently  Skin Protection: adhesive use limited  Goal: Optimal Comfort and Wellbeing  Intervention: Provide Person-Centered Care  Recent Flowsheet Documentation  Taken 11/11/2024 0030 by Long Arechiga RN  Trust Relationship/Rapport: reassurance provided     Problem: Risk for Delirium  Goal: Improved Sleep  Outcome: Progressing     Problem: Pain Acute  Goal: Optimal Pain Control and Function  Outcome: Progressing  Intervention: Prevent or Manage Pain  Recent Flowsheet Documentation  Taken 11/11/2024 0030 by Long Arechiga RN  Sensory Stimulation Regulation: television on  Bowel Elimination Promotion:   adequate fluid intake promoted   ambulation promoted  Medication Review/Management: medications reviewed  Intervention: Optimize Psychosocial Wellbeing  Recent Flowsheet Documentation  Taken 11/11/2024 0030 by Long Arechiga RN  Supportive Measures:   active listening utilized   self-care encouraged     Problem: Comorbidity Management  Goal: Maintenance of COPD Symptom Control  Outcome: Progressing  Intervention: Maintain COPD Symptom  Control  Recent Flowsheet Documentation  Taken 11/11/2024 0030 by Long Arechiga RN  Medication Review/Management: medications reviewed     Problem: Comorbidity Management  Goal: Maintenance of Heart Failure Symptom Control  Outcome: Progressing  Intervention: Maintain Heart Failure Management  Recent Flowsheet Documentation  Taken 11/11/2024 0030 by Long Arechiga RN  Medication Review/Management: medications reviewed     Problem: Comorbidity Management  Goal: Blood Pressure in Desired Range  Outcome: Progressing  Intervention: Maintain Blood Pressure Management  Recent Flowsheet Documentation  Taken 11/11/2024 0030 by Long Arechiga RN  Medication Review/Management: medications reviewed     Problem: Delirium  Goal: Improved Sleep  Outcome: Progressing     Problem: Gas Exchange Impaired  Goal: Optimal Gas Exchange  Outcome: Progressing   Goal Outcome Evaluation:       Pt still on 1:1. Cooperative with cares. IV abx given as scheduled. Oxygen at 4LPM. Telemetry showing A. Fib. Vital Signs WNL. Will continue to monitor.

## 2024-11-11 NOTE — PLAN OF CARE
Problem: Adult Inpatient Plan of Care  Goal: Plan of Care Review  Description: The Plan of Care Review/Shift note should be completed every shift.  The Outcome Evaluation is a brief statement about your assessment that the patient is improving, declining, or no change.  This information will be displayed automatically on your shift  note.  Outcome: Progressing     Problem: Palliative Care  Goal: Enhanced Quality of Life  Intervention: Optimize Psychosocial Wellbeing  Recent Flowsheet Documentation  Taken 11/11/2024 0900 by Germán Porter RN  Supportive Measures:   active listening utilized   self-care encouraged     Problem: Palliative Care  Goal: Enhanced Quality of Life  Intervention: Optimize Function  Recent Flowsheet Documentation  Taken 11/11/2024 0900 by Germán Porter RN  Sensory Stimulation Regulation: television on    Patient is alert, but confused. Calm this AM, in recliner sleeping. Up to bathroom with the assist of 1, walker and gait belt. Appetite good. 1:1 discontinued at 0900. Restless this afternoon, administered PRN Seroquel. Transitioned to comfort cares. Anticipated discharge to facility on hospice.     Germán Porter RN

## 2024-11-11 NOTE — PROGRESS NOTES
Care Management Follow Up    Length of Stay (days): 30    Expected Discharge Date: 11/12/2024     Concerns to be Addressed: discharge planning     Patient plan of care discussed at interdisciplinary rounds: Yes    Anticipated Discharge Disposition: Transitional Care     Anticipated Discharge Services: None  Anticipated Discharge DME: None    Patient/family educated on Medicare website which has current facility and service quality ratings: yes  Education Provided on the Discharge Plan: Yes  Patient/Family in Agreement with the Plan:  yes    Referrals Placed by CM/SW: Post Acute Facilities  Private pay costs discussed: private room/amenity fees    Discussed  Partnership in Safe Discharge Planning  document with patient/family: Yes: spouse     Handoff Completed: No, handoff not indicated or clinically appropriate    Additional Information:  Pt has transitioned to comfort cares and goal is for pt to discharge on hospice to a facility. Pt's 1:1 was discontinued at 0900 this morning and will need to be off said 1:1 for 24 hours prior to facility review for appropriateness. SW to discuss with care team and follow up with spouse for facility choices and hospice agencies.  11:57 AM    SW spoke with spouse via phone to discuss cost for LTC; spouse stated that she has an appointment with an elderly law  to discuss next steps with finances. SW went over list of additional options and a referrals have been sent. Wife aware it would be private pay for LTC; once facility is secured, list of hospice agencies will be discussed.  1:00 PM    Next Steps: placement, hospice agency    Brenna Kjellberg, BSW

## 2024-11-11 NOTE — PROGRESS NOTES
1:1 sitter discontinued at 9am this morning. Patient has been calm up in his recliner with alarm on. Monitoring closely.  Gisele Rosario RN

## 2024-11-11 NOTE — PROGRESS NOTES
[facility]    Medicine Progress Note - Hospitalist Service    Date of Admission:  10/12/2024    Assessment & Plan   Dominik Rojas is a 84 year old male with history of COPD on 2 to 3 L nasal cannula at home, chronic systolic heart failure, hypertension, hypothyroidism, anxiety, recent fall and left hip fracture s/p ORIF who was jsut discharged to TCU.  Patient was brought to ED for evaluation another fall at TCU with left hip pain. Xray showing postop changes without new acute changes.    -- Patient has had significant behavioral issues and increased risk of fall due to impulsivity requiring 1:1 sitter.   -- Memory care facility placement pending. It has been challenging due to financial issues.  -- Hospital course is complicated by   >> postobstructive pneumonia and recurrent UTI. On Zosyn and Vancomycin.   >> New afib with RVR: rate controlled.   -- Has right large infiltrating mass: Needs EBUS but pt is high risk.  -- GOC discussion is ongoing. Had care conference on 11/08. Patient mental status is improving and is also informed of his medical condition. Awaiting patient's and family's decision. Palliative care team involved.  -11/11: Intensivist Dr George discussed with daughter Lore. Family don't want any invasive procedure and would like pt to go to inpatient hospice. Will consult sw, and palliative care team to help. Wondering if hospice in a facility (rather than inpatient) be more proper since pt is not dying soon.     Comfort care   - change to comfort care in hospital  -wean off 1:1  -plan to discharge to a facility on hospice    Mechanical fall  Left hip contusion without new fracture  Recent left hip fracture s/p ORIF  -- Had hip fracture surgery on 10/6. Was at TCU, and had a mechanical fall, with worse left hip pain after. Presented for eval.  CT Left femur no evidence of new fracture, small lateral left hip subcutaneous hematoma  -- Negative for DVT on US on 10/12.   --  Pain team s/o 10/29  -- Seen by Ortho: follow-up outpatient  -- Had been very drowsy on opioids, opioids on hold for now  -- Low dose robaxin changed to PRN  -- Continue topical lidocaine     Anxiety and depression;  Dementia with behavioral change/agitation   Mood disorder  Acute toxic/metabolic encephalopathy- improving  -- Patient often stands up and tries to walk. He has fall risks. He also does not like being alone.   -- Seen by Psych, trazodone 25 mg to help with sleep at bedtime  -- Seroquel as needed for agitation  -- Delirium precautions  -- Continue suppertime melatonin to help with day/night reversal  -- Plan memory care placement. Family ok with this, they have been struggling to care for him at home.  -- 11/06: Agitated. Concern for seizure like activity per night RN. EEG showed no e/o seizure. Neurology consult appreciated  -- 11/09: Pt is calm. On 1:1 mainly for falls now.     Acute on chronic HFpEF  -- Still Hypervolemic.   -- Lasix was held on admission due to poor po intake, weight went up 10 lbs.   Recent Echo showing preserved EF  -- Had Lasix oral 40 mg bid for 2 days. Remains swollen. Changed to IV on 11/3. Held on 11/07 due to low BP. 11/07 Started on PTA 20 mg. Monitor and titrate as able.  -- Daily weight, Fluid restriction    Afib with rvr, new  -- On 11/06  -- Metoprolol 50 mg q8h per Cards  -- Not a candidate for AC due to risk of falls    Acute on chronic hypoxic respiratory failure  Postobstructive RLL pneumonia  Right lung infiltrating mass on CTA-chest. H/o RLL Adenocarcinoma s/p radiation  -- Leukocytosis, lactic acidosis, tachypnea on 11/06  -- wbc: 16k>>12.2, lactic acid: 5.2>>3.4>>6.7>>3.2  -- S/p ceftriaxon. Was on Zosyn and vancomycin. MRSA screen +. Changed to Augmentin and Doxycycline on 11/11.   -- Oxygen as needed. Wean as able. Baseline: 2-3LPM  -- On 11/06. CXR: Mild pleural fluid and/or thickening inferior right hemithorax and consolidation and/or volume loss basilar  right lower lobe stable. Bnp wnl  -- CTA- PE no PE.  Large infiltrating mass in the subcarinal and right hilar region with resultant significant narrowing of the right mainstem, middle lobe and bronchus intermedius. Postobstructive pneumonia in the right lung base with small right pleural effusion. Underlying emphysematous change. Associated mediastinal lymphadenopathy. Mass severely narrows or occludes the inferior right pulmonary vein with narrowing of the superior right pulmonary vein as it enters the left atrium.  -family don't want any procedure and would like pt to go to inpatient hospice  -sw and palliative care team to help     Recurrent UTI  -- PTA amoxicillin 500mg TID; finished course of abx. Repeated UC 10/13 and again 10/23 with mixed tai.  -- 11/06: UA abnormal. Ucx Enterococcus faecalis.  -currently on Augmentin and Doxycycline.     COPD  -- PTA meds  -- Not in exacerbation. On home flow O2  -on prednisone tapering     Normocytic anemia  -- Hb baseline 10-11  -- Contusions and hematoma on the Left thigh  -- Monitor Hb periodically     HLD: Crestor     GERD: PPI      Hypothyroidism  -- Recent TSH 6.93  -- Continue Synthroid   -- Recommend repeat TSH in 4 weeks with PCP. FT4 not useful for titrating Synthroid generally     DVT prophylaxis: He has significant bruises while on Lovenox so that it was discontinued. Patient likes to sit up in the chair all the time.     [facility] discussion on 11/08: Had a care conference over the phone. Palliative care, Hospitalist, spouse, and daughter participated. Discussed the overall care and prognosis. Family will think about next steps and will let us know regarding EBUS.  --11/09  Patient is more alert and oriented. He is informed regarding his medical condition and wants to 'think it over'.  -11/11: Intensivist Dr George discussed with daughter Lore. Family don't want any invasive procedure and would like pt to go to inpatient hospice. Will consult sw, and palliative  "care team to help. Wondering if hospice in a facility (rather than inpatient) be more proper since pt is not dying soon.               Diet: Fluid restriction 1800 ML FLUID  Snacks/Supplements Adult: Ensure Enlive; Between Meals    DVT Prophylaxis: Pneumatic Compression Devices  Cabral Catheter: Not present  Lines: None     Cardiac Monitoring: ACTIVE order. Indication: tachycardia  Code Status: No CPR- Do NOT Intubate      Clinically Significant Risk Factors                   # Hypertension: Noted on problem list     # Dementia: noted on problem list        # Obesity: Estimated body mass index is 30.02 kg/m  as calculated from the following:    Height as of this encounter: 1.702 m (5' 7\").    Weight as of this encounter: 87 kg (191 lb 11.2 oz).      # Financial/Environmental Concerns: none         Disposition Plan     Medically Ready for Discharge: Anticipated in 2-4 Days    Await hospice arrangement         Lu Skaggs MD  Hospitalist Service  [facility]  Securely message with gumi (more info)  Text page via [employer] Paging/Directory   ______________________________________________________________________    Interval History   Poor historian, no new complaints    Physical Exam   Vital Signs: Temp: 98.3  F (36.8  C) Temp src: Oral BP: 135/83 Pulse: 56   Resp: 16 SpO2: 95 % O2 Device: Nasal cannula Oxygen Delivery: 4 LPM  Weight: 191 lbs 11.2 oz    General.  Awake not in acute distress.  HEENT.   anicteric, EOM intact.  Neck  no JVD.  CVS regular rhythm no murmur gallops.  Lungs.  Clear to auscultation bilateral no wheezing or rales.  Abdomen.  Soft nontender bowel sounds present.  Extremities.  + edema   Neurological.  General weakness. No focal deficit.  Skin +ecchymosis. No pallor.  Psych. Impaired memory and cognition    Medical Decision Making       46 MINUTES SPENT BY ME on the date of service doing chart review, history, exam, documentation & further activities per the note.      Data "     I have personally reviewed the following data over the past 24 hrs:    N/A  \   N/A   / N/A     N/A N/A N/A /  N/A   N/A N/A 0.85 \       Imaging results reviewed over the past 24 hrs:   No results found for this or any previous visit (from the past 24 hours).

## 2024-11-12 VITALS
SYSTOLIC BLOOD PRESSURE: 138 MMHG | OXYGEN SATURATION: 97 % | WEIGHT: 191.7 LBS | BODY MASS INDEX: 30.09 KG/M2 | HEART RATE: 75 BPM | HEIGHT: 67 IN | DIASTOLIC BLOOD PRESSURE: 87 MMHG | TEMPERATURE: 97.4 F | RESPIRATION RATE: 16 BRPM

## 2024-11-12 LAB — BACTERIA BLD CULT: NO GROWTH

## 2024-11-12 PROCEDURE — 250N000013 HC RX MED GY IP 250 OP 250 PS 637: Performed by: STUDENT IN AN ORGANIZED HEALTH CARE EDUCATION/TRAINING PROGRAM

## 2024-11-12 PROCEDURE — 99232 SBSQ HOSP IP/OBS MODERATE 35: CPT | Performed by: INTERNAL MEDICINE

## 2024-11-12 PROCEDURE — 250N000013 HC RX MED GY IP 250 OP 250 PS 637: Performed by: INTERNAL MEDICINE

## 2024-11-12 PROCEDURE — 250N000013 HC RX MED GY IP 250 OP 250 PS 637: Performed by: HOSPITALIST

## 2024-11-12 PROCEDURE — 250N000013 HC RX MED GY IP 250 OP 250 PS 637: Performed by: NURSE PRACTITIONER

## 2024-11-12 PROCEDURE — 250N000012 HC RX MED GY IP 250 OP 636 PS 637: Performed by: INTERNAL MEDICINE

## 2024-11-12 RX ORDER — CARBOXYMETHYLCELLULOSE SODIUM 5 MG/ML
1-2 SOLUTION/ DROPS OPHTHALMIC
DISCHARGE
Start: 2024-11-12

## 2024-11-12 RX ORDER — HYDROMORPHONE HYDROCHLORIDE 1 MG/ML
1 SOLUTION ORAL EVERY 4 HOURS PRN
Status: SHIPPED | DISCHARGE
Start: 2024-11-12 | End: 2024-11-12

## 2024-11-12 RX ORDER — METOPROLOL TARTRATE 50 MG
50 TABLET ORAL 2 TIMES DAILY
DISCHARGE
Start: 2024-11-12

## 2024-11-12 RX ORDER — METHOCARBAMOL 500 MG/1
250 TABLET, FILM COATED ORAL EVERY 8 HOURS PRN
DISCHARGE
Start: 2024-11-12

## 2024-11-12 RX ORDER — ATROPINE SULFATE 10 MG/ML
2 SOLUTION/ DROPS OPHTHALMIC EVERY 4 HOURS PRN
DISCHARGE
Start: 2024-11-12

## 2024-11-12 RX ORDER — LEVALBUTEROL INHALATION SOLUTION 1.25 MG/3ML
1.25 SOLUTION RESPIRATORY (INHALATION) EVERY 4 HOURS PRN
DISCHARGE
Start: 2024-11-12

## 2024-11-12 RX ORDER — MINERAL OIL/HYDROPHIL PETROLAT
OINTMENT (GRAM) TOPICAL
DISCHARGE
Start: 2024-11-12

## 2024-11-12 RX ORDER — TRAZODONE HYDROCHLORIDE 50 MG/1
25 TABLET, FILM COATED ORAL AT BEDTIME
DISCHARGE
Start: 2024-11-12

## 2024-11-12 RX ORDER — BISACODYL 10 MG
10 SUPPOSITORY, RECTAL RECTAL
DISCHARGE
Start: 2024-11-14

## 2024-11-12 RX ORDER — QUETIAPINE FUMARATE 25 MG/1
12.5 TABLET, FILM COATED ORAL EVERY 6 HOURS PRN
DISCHARGE
Start: 2024-11-12

## 2024-11-12 RX ORDER — HYDROMORPHONE HYDROCHLORIDE 1 MG/ML
1 SOLUTION ORAL EVERY 4 HOURS PRN
Qty: 100 ML | Refills: 0 | Status: SHIPPED | OUTPATIENT
Start: 2024-11-12

## 2024-11-12 RX ORDER — CALCIUM CARBONATE 500 MG/1
1 TABLET, CHEWABLE ORAL 4 TIMES DAILY PRN
DISCHARGE
Start: 2024-11-12

## 2024-11-12 RX ORDER — ZINC OXIDE 200 MG/G
71 PASTE TOPICAL
DISCHARGE
Start: 2024-11-12

## 2024-11-12 RX ORDER — DOXYCYCLINE 100 MG/1
100 CAPSULE ORAL EVERY 12 HOURS
DISCHARGE
Start: 2024-11-12 | End: 2024-11-16

## 2024-11-12 RX ADMIN — PANTOPRAZOLE SODIUM 40 MG: 40 TABLET, DELAYED RELEASE ORAL at 08:59

## 2024-11-12 RX ADMIN — PREDNISONE 40 MG: 20 TABLET ORAL at 08:59

## 2024-11-12 RX ADMIN — DULOXETINE HYDROCHLORIDE 60 MG: 60 CAPSULE, DELAYED RELEASE PELLETS ORAL at 08:59

## 2024-11-12 RX ADMIN — HYDROMORPHONE HYDROCHLORIDE 1 MG: 2 TABLET ORAL at 09:02

## 2024-11-12 RX ADMIN — Medication 5 MG: at 16:33

## 2024-11-12 RX ADMIN — BUSPIRONE HYDROCHLORIDE 5 MG: 5 TABLET ORAL at 08:58

## 2024-11-12 RX ADMIN — METOPROLOL TARTRATE 50 MG: 25 TABLET, FILM COATED ORAL at 08:59

## 2024-11-12 RX ADMIN — HYDROMORPHONE HYDROCHLORIDE 1 MG: 2 TABLET ORAL at 15:42

## 2024-11-12 RX ADMIN — QUETIAPINE FUMARATE 12.5 MG: 25 TABLET ORAL at 16:33

## 2024-11-12 RX ADMIN — LIDOCAINE 1 PATCH: 4 PATCH TOPICAL at 11:45

## 2024-11-12 RX ADMIN — HYDROMORPHONE HYDROCHLORIDE 2 MG: 1 SOLUTION ORAL at 16:34

## 2024-11-12 RX ADMIN — FUROSEMIDE 20 MG: 20 TABLET ORAL at 08:59

## 2024-11-12 RX ADMIN — AMOXICILLIN AND CLAVULANATE POTASSIUM 1 TABLET: 875; 125 TABLET, FILM COATED ORAL at 08:59

## 2024-11-12 RX ADMIN — DOXYCYCLINE 100 MG: 100 CAPSULE ORAL at 08:59

## 2024-11-12 ASSESSMENT — ACTIVITIES OF DAILY LIVING (ADL)
ADLS_ACUITY_SCORE: 0

## 2024-11-12 NOTE — PROGRESS NOTES
CLINICAL NUTRITION SERVICES      Patient transitioned to comfort cares 11/11/24. RD will sign off at this time.

## 2024-11-12 NOTE — PROGRESS NOTES
Care Management Discharge Note    Discharge Date: 11/12/2024       Discharge Disposition: [facility] on Hospice    Discharge Services: [facility]    Discharge DME: None    Discharge Transportation: family or friend will provide    Private pay costs discussed: private room/amenity fees and transportation costs    PAS Confirmation Code:    Patient/family educated on Medicare website which has current facility and service quality ratings: yes    Education Provided on the Discharge Plan: Yes  Persons Notified of Discharge Plans: yes  Patient/Family in Agreement with the Plan: yes      Handoff Referral Completed: No, handoff not indicated or clinically appropriate    Additional Information:  Pt accepted to [facility] and will be discharge on hospice. Wife is aware and agreeable that LTC is private pay and that 30 days is required for down payment once pt admits to facility. Spouse stating that she is in need of transportation. [employer] [facility] STR ride set for 1500 with a  window between [phone] (agreeable to private pay). All parties aware and agreeable to discharge plan. PAS and PCS done.  10:49 AM    [facility] contracts through [facility]ospice; family elected [facility]. Referral sent and pending.  11:05 AM    [facility] accepted and meeting with pt's spouse in room at this time. RN to meet pt at facility to sign on at 1800.   2:03 PM    Brenna Kjellberg, BSW LSW  11/12/2024

## 2024-11-12 NOTE — PROGRESS NOTES
[facility]    Medicine Progress Note - Hospitalist Service    Date of Admission:  10/12/2024    Assessment & Plan   Dominik Rojas is a 84 year old male with history of COPD on 2 to 3 L nasal cannula at home, chronic systolic heart failure, hypertension, hypothyroidism, anxiety, recent fall and left hip fracture s/p ORIF who was jsut discharged to TCU.  Patient was brought to ED for evaluation another fall at TCU with left hip pain. Xray showing postop changes without new acute changes.      Comfort care   - changed to comfort care in hospital  -plan to discharge to a facility on hospice    Mechanical fall  Left hip contusion without new fracture  Recent left hip fracture s/p ORIF  -- Had hip fracture surgery on 10/6. Was at TCU, and had a mechanical fall, with worse left hip pain after. Presented for eval.  CT Left femur no evidence of new fracture, small lateral left hip subcutaneous hematoma  -- Negative for DVT on US on 10/12.   -- Pain team s/o 10/29  -- Seen by Ortho: follow-up outpatient  -- Had been very drowsy on opioids, opioids on hold for now  -- Low dose robaxin changed to PRN  -- Continue topical lidocaine     Anxiety and depression;  Dementia with behavioral change/agitation   Mood disorder  Acute toxic/metabolic encephalopathy- improving  -- Patient often stands up and tries to walk. He has fall risks. He also does not like being alone.   -- Seen by Psych, trazodone 25 mg to help with sleep at bedtime  -- Seroquel as needed for agitation  -- Delirium precautions  -- Continue suppertime melatonin to help with day/night reversal  -- Plan memory care placement. Family ok with this, they have been struggling to care for him at home.  -- 11/06: Agitated. Concern for seizure like activity per night RN. EEG showed no e/o seizure. Neurology consult appreciated  -- 11/09: Pt is calm. Weaned off 1:1     Acute on chronic HFpEF  -- Still Hypervolemic.   -- Lasix was held on admission  due to poor po intake, weight went up 10 lbs.   Recent Echo showing preserved EF  -- Had Lasix oral 40 mg bid for 2 days. Remains swollen. Changed to IV on 11/3. Held on 11/07 due to low BP. 11/07 Started on PTA 20 mg. Monitor and titrate as able.  -- now on comfort care    Afib with rvr, new  -- On 11/06  -- Metoprolol 50 mg q8h per Cards  -- Not a candidate for AC due to risk of falls    Acute on chronic hypoxic respiratory failure  Postobstructive RLL pneumonia  Right lung infiltrating mass on CTA-chest. H/o RLL Adenocarcinoma s/p radiation  -- Leukocytosis, lactic acidosis, tachypnea on 11/06  -- wbc: 16k>>12.2, lactic acid: 5.2>>3.4>>6.7>>3.2  -- S/p ceftriaxon. Was on Zosyn and vancomycin. MRSA screen +. Changed to Augmentin and Doxycycline on 11/11.   -- Oxygen as needed. Wean as able. Baseline: 2-3LPM  -- On 11/06. CXR: Mild pleural fluid and/or thickening inferior right hemithorax and consolidation and/or volume loss basilar right lower lobe stable. Bnp wnl  -- CTA- PE no PE.  Large infiltrating mass in the subcarinal and right hilar region with resultant significant narrowing of the right mainstem, middle lobe and bronchus intermedius. Postobstructive pneumonia in the right lung base with small right pleural effusion. Underlying emphysematous change. Associated mediastinal lymphadenopathy. Mass severely narrows or occludes the inferior right pulmonary vein with narrowing of the superior right pulmonary vein as it enters the left atrium.  -family don't want any procedure and would like pt to go to inpatient hospice  - and palliative care team to help to place pt to a facility with hospice    Recurrent UTI  -- PTA amoxicillin 500mg TID; finished course of abx. Repeated UC 10/13 and again 10/23 with mixed tai.  -- 11/06: UA abnormal. Ucx Enterococcus faecalis.  -currently on Augmentin and Doxycycline.     COPD  -- PTA meds  -- Not in exacerbation. On home flow O2  -on prednisone tapering     Normocytic  anemia  -- Hb baseline 10-11  -- Contusions and hematoma on the Left thigh  -- Monitor Hb periodically     HLD: Crestor     GERD: PPI      Hypothyroidism  -- Recent TSH 6.93  -- Continue Synthroid   -- Recommend repeat TSH in 4 weeks with PCP. FT4 not useful for titrating Synthroid generally     DVT prophylaxis: He has significant bruises while on Lovenox so that it was discontinued. Patient likes to sit up in the chair all the time.     GOC discussion on 11/08: Had a care conference over the phone. Palliative care, Hospitalist, spouse, and daughter participated. Discussed the overall care and prognosis. Family will think about next steps and will let us know regarding EBUS.  --11/09  Patient is more alert and oriented. He is informed regarding his medical condition and wants to 'think it over'.  -11/11: Intensivist Dr George discussed with daughter Lore. Family don't want any invasive procedure and would like pt to go to inpatient hospice. Will consult sw, and palliative care team to help. Wondering if hospice in a facility (rather than inpatient) be more proper since pt is not dying soon.               Diet: Snacks/Supplements Adult: Ensure Enlive; Between Meals  Regular Diet Adult    DVT Prophylaxis: none  Cabral Catheter: Not present  Lines: None     Cardiac Monitoring: None  Code Status: No CPR- Do NOT Intubate      Clinically Significant Risk Factors                   # Hypertension: Noted on problem list     # Dementia: noted on problem list            # Financial/Environmental Concerns: none         Disposition Plan     Medically Ready for Discharge: Ready Now    Await placement with hospice      Lu Skaggs MD  Hospitalist Service  [facility]  Securely message with [employer] (more info)  Text page via [employer] Paging/Directory   ______________________________________________________________________    Interval History   Sleepy not in distress  Off 1:1    Physical Exam   Vital Signs:  Temp: 97.4  F (36.3  C) Temp src: Axillary BP: 138/87 Pulse: 75   Resp: 16 SpO2: 97 % O2 Device: Nasal cannula Oxygen Delivery: 5 LPM  Weight: 191 lbs 11.2 oz    General.  sleepy not in acute distress.  Neck supple   Abdomen.  Soft  bowel sounds present.  Mode flat  Medical Decision Making       35 MINUTES SPENT BY ME on the date of service doing chart review, history, exam, documentation & further activities per the note.      Data         Imaging results reviewed over the past 24 hrs:   No results found for this or any previous visit (from the past 24 hours).

## 2024-11-12 NOTE — PLAN OF CARE
Problem: Pain Acute  Goal: Optimal Pain Control and Function  Intervention: Prevent or Manage Pain  Recent Flowsheet Documentation  Taken 11/12/2024 0900 by Germán Porter RN  Sensory Stimulation Regulation: television on  Bowel Elimination Promotion:   adequate fluid intake promoted   ambulation promoted  Medication Review/Management: medications reviewed  Intervention: Optimize Psychosocial Wellbeing  Recent Flowsheet Documentation  Taken 11/12/2024 0900 by Germán Porter RN  Supportive Measures:   active listening utilized   self-care encouraged  Diversional Activities: television     Problem: Adult Inpatient Plan of Care  Goal: Plan of Care Review  Description: The Plan of Care Review/Shift note should be completed every shift.  The Outcome Evaluation is a brief statement about your assessment that the patient is improving, declining, or no change.  This information will be displayed automatically on your shift  note.  Outcome: Adequate for Care Transition     Problem: Palliative Care  Goal: Enhanced Quality of Life  Outcome: Progressing  Intervention: Optimize Function  Recent Flowsheet Documentation  Taken 11/12/2024 0900 by Germán Porter RN  Sensory Stimulation Regulation: television on  Intervention: Optimize Psychosocial Wellbeing  Recent Flowsheet Documentation  Taken 11/12/2024 0900 by Germán Porter RN  Supportive Measures:   active listening utilized   self-care encouraged    Patient lethargic, but able to wake him up throughout day. Reported moderate pain this AM. Administered PRN 1 mg hydromorphone with desired effect. Turned and repositioned throughout day. Incontinent of urine. [facility] on hosp[facility].     Germán Porter RN

## 2024-11-12 NOTE — PLAN OF CARE
"  Problem: Adult Inpatient Plan of Care  Goal: Plan of Care Review  Description: The Plan of Care Review/Shift note should be completed every shift.  The Outcome Evaluation is a brief statement about your assessment that the patient is improving, declining, or no change.  This information will be displayed automatically on your shift  note.  Outcome: Progressing  Goal: Patient-Specific Goal (Individualized)  Description: You can add care plan individualizations to a care plan. Examples of Individualization might be:  \"Parent requests to be called daily at 9am for status\", \"I have a hard time hearing out of my right ear\", or \"Do not touch me to wake me up as it startles  me\".  Outcome: Progressing  Goal: Readiness for Transition of Care  Outcome: Progressing     Problem: Risk for Delirium  Goal: Optimal Coping  Outcome: Progressing  Intervention: Optimize Psychosocial Adjustment to Delirium  Recent Flowsheet Documentation  Taken 11/12/2024 0130 by Eri Crawley RN  Supportive Measures: positive reinforcement provided  Goal: Improved Behavioral Control  Outcome: Progressing  Intervention: Prevent and Manage Agitation  Recent Flowsheet Documentation  Taken 11/12/2024 0130 by Eri Crawley RN  Environment Familiarity/Consistency: daily routine followed  Intervention: Minimize Safety Risk  Recent Flowsheet Documentation  Taken 11/12/2024 0130 by Eri Crawley RN  Enhanced Safety Measures: patient/family teach back on injury risk  Trust Relationship/Rapport:   care explained   reassurance provided  Goal: Improved Attention and Thought Clarity  Outcome: Progressing  Intervention: Maximize Cognitive Function  Recent Flowsheet Documentation  Taken 11/12/2024 0130 by Eri Crawley RN  Reorientation Measures:   calendar in view   clock in view  Goal: Improved Sleep  Outcome: Progressing     Problem: Pain Acute  Goal: Optimal Pain Control and Function  Outcome: Progressing  Intervention: Prevent or Manage Pain  Recent " Flowsheet Documentation  Taken 11/12/2024 0130 by Eri Crawley RN  Bowel Elimination Promotion: adequate fluid intake promoted  Medication Review/Management: medications reviewed  Intervention: Optimize Psychosocial Wellbeing  Recent Flowsheet Documentation  Taken 11/12/2024 0130 by Eri Crawley RN  Supportive Measures: positive reinforcement provided     Problem: Dysrhythmia  Goal: Normalized Cardiac Rhythm  Outcome: Progressing  Intervention: Monitor and Manage Cardiac Rhythm Effect  Recent Flowsheet Documentation  Taken 11/12/2024 0130 by Eri Crawley RN  VTE Prevention/Management: SCDs off (sequential compression devices)     Problem: Fall Injury Risk  Goal: Absence of Fall and Fall-Related Injury  Outcome: Progressing  Intervention: Identify and Manage Contributors  Recent Flowsheet Documentation  Taken 11/12/2024 0130 by Eri Crawley RN  Medication Review/Management: medications reviewed  Intervention: Promote Injury-Free Environment  Recent Flowsheet Documentation  Taken 11/12/2024 0130 by Eri Crawley RN  Safety Promotion/Fall Prevention:   activity supervised   assistive device/personal items within reach   room door open   room near nurse's station   supervised activity     Problem: Delirium  Goal: Optimal Coping  Outcome: Progressing  Intervention: Optimize Psychosocial Adjustment to Delirium  Recent Flowsheet Documentation  Taken 11/12/2024 0130 by Eri Crawley RN  Supportive Measures: positive reinforcement provided  Goal: Improved Behavioral Control  Outcome: Progressing  Intervention: Prevent and Manage Agitation  Recent Flowsheet Documentation  Taken 11/12/2024 0130 by Eri Crawley RN  Environment Familiarity/Consistency: daily routine followed  Intervention: Minimize Safety Risk  Recent Flowsheet Documentation  Taken 11/12/2024 0130 by Eri Crawley RN  Enhanced Safety Measures: patient/family teach back on injury risk  Trust Relationship/Rapport:   care explained   reassurance  provided  Goal: Improved Attention and Thought Clarity  Outcome: Progressing  Intervention: Maximize Cognitive Function  Recent Flowsheet Documentation  Taken 11/12/2024 0130 by Eri Crawley RN  Reorientation Measures:   calendar in view   clock in view  Goal: Improved Sleep  Outcome: Progressing     Problem: Mobility Impairment  Goal: Optimal Mobility  Outcome: Progressing  Intervention: Optimize Mobility  Recent Flowsheet Documentation  Taken 11/12/2024 0500 by Eir Crawley RN  Activity Management: up in chair     Problem: Gas Exchange Impaired  Goal: Optimal Gas Exchange  Outcome: Progressing  Intervention: Optimize Oxygenation and Ventilation  Recent Flowsheet Documentation  Taken 11/12/2024 0130 by Eri Crawley RN  Head of Bed (HOB) Positioning: HOB at 30 degrees     Problem: Palliative Care  Goal: Enhanced Quality of Life  Outcome: Progressing  Intervention: Optimize Psychosocial Wellbeing  Recent Flowsheet Documentation  Taken 11/12/2024 0130 by Eri Crawley RN  Supportive Measures: positive reinforcement provided   Goal Outcome Evaluation:  NURSING PROGRESS NOTE  Shift Summary      Date: November 12, 2024     Neuro/Musculoskeletal:  Alert to self  Cardiac:  SR.  VSS.     Respiratory:  Sating in the 90s on 5L.  GI/:  Adequate urine output.  LBM: 11/11  Diet/Appetite:  Tolerating Reg diet.  Activity:  Assist of 2   Pain:  Denies.   Skin:  No new deficits noted.   LDAs + Drips/IVF:  PIV  Protocols/Labs:      Pertinent Shift Updates:  pt Is on comfort cares      Plan:  Looking for placement      Eri Crawley RN  ....................................................

## 2024-11-12 NOTE — PLAN OF CARE
Problem: Palliative Care  Goal: Enhanced Quality of Life  Intervention: Optimize Psychosocial Wellbeing  Recent Flowsheet Documentation  Taken 11/11/2024 2573 by Fidel Craft RN  Supportive Measures: positive reinforcement provided     Problem: Mobility Impairment  Goal: Optimal Mobility  Outcome: Not Progressing     Problem: Fall Injury Risk  Goal: Absence of Fall and Fall-Related Injury  Outcome: Not Progressing   Goal Outcome Evaluation:                  Patient A  and O only to self. On 2 liters O2. Transitioned to comfort cares on previous shift. Patient showed some restlessness early in shift. Patient ate 75% of dinner, after which hydromorphone given for pain related restlessness. Proved effective. Patient lightly slept rest of shift. Incontinent of bowel and bladder, senna held for loose stool, metoprolol held for lower BP. Patient in bed, bed alarm on.

## 2024-11-12 NOTE — PLAN OF CARE
Physical Therapy Discharge Summary    Reason for therapy discharge:    Patient transitioning to comfort cares.  Anticipated discharge to long term care facility with hospice services.    Progress towards therapy goal(s). See goals on Care Plan in Epic electronic health record for goal details.  Goals not met.  Barriers to achieving goals:   pt transitioning to comfort cares.    Therapy recommendation(s):    No further therapy is recommended.    Mouna Baez, PT  11/12/2024

## 2024-11-13 ENCOUNTER — NURSING HOME VISIT (OUTPATIENT)
Dept: GERIATRICS | Facility: CLINIC | Age: 84
End: 2024-11-13
Payer: COMMERCIAL

## 2024-11-13 ENCOUNTER — DOCUMENTATION ONLY (OUTPATIENT)
Dept: GERIATRICS | Facility: CLINIC | Age: 84
End: 2024-11-13
Payer: COMMERCIAL

## 2024-11-13 VITALS
HEART RATE: 92 BPM | HEIGHT: 67 IN | DIASTOLIC BLOOD PRESSURE: 74 MMHG | SYSTOLIC BLOOD PRESSURE: 142 MMHG | RESPIRATION RATE: 20 BRPM | WEIGHT: 181.8 LBS | TEMPERATURE: 97.7 F | BODY MASS INDEX: 28.53 KG/M2 | OXYGEN SATURATION: 95 %

## 2024-11-13 DIAGNOSIS — F03.B0 MODERATE DEMENTIA WITHOUT BEHAVIORAL DISTURBANCE, PSYCHOTIC DISTURBANCE, MOOD DISTURBANCE, OR ANXIETY, UNSPECIFIED DEMENTIA TYPE (H): ICD-10-CM

## 2024-11-13 DIAGNOSIS — Z51.5 HOSPICE CARE PATIENT: ICD-10-CM

## 2024-11-13 DIAGNOSIS — J44.9 CHRONIC OBSTRUCTIVE PULMONARY DISEASE, UNSPECIFIED COPD TYPE (H): Primary | ICD-10-CM

## 2024-11-13 DIAGNOSIS — I50.32 CHRONIC HEART FAILURE WITH PRESERVED EJECTION FRACTION (H): ICD-10-CM

## 2024-11-13 DIAGNOSIS — S72.002D HIP FRACTURE, LEFT, CLOSED, WITH ROUTINE HEALING, SUBSEQUENT ENCOUNTER: ICD-10-CM

## 2024-11-13 DIAGNOSIS — J96.12 CHRONIC RESPIRATORY FAILURE WITH HYPOXIA AND HYPERCAPNIA (H): ICD-10-CM

## 2024-11-13 DIAGNOSIS — J96.11 CHRONIC RESPIRATORY FAILURE WITH HYPOXIA AND HYPERCAPNIA (H): ICD-10-CM

## 2024-11-13 PROCEDURE — 99309 SBSQ NF CARE MODERATE MDM 30: CPT | Mod: GV | Performed by: NURSE PRACTITIONER

## 2024-11-13 NOTE — PROGRESS NOTES
[facility] GERIATRICS    PRIMARY CARE PROVIDER AND CLINIC:  Chao Almonte, CNP, [address] [facility] [address]  Chief Complaint   Patient presents with    [facility] Medical Record Number:  [id number]  Place of Service where encounter took place:  [facility] [place] () [[id number]]    Dominik Rojas  is a 84 year old  (1940), admitted to the above facility from  [facility]. Hospital stay 10/12/24 through 11/12/24..   HPI: Dominik has a history of COPD on 2 to 3 L of nasal cannula, chronic systolic heart failure, hypertension, hypothyroid, anxiety who had a fall and fracture status post ORIF.  He was discharged to TCU for rehabilitation.  He then returned to the ED after another fall that resulted in postop changes to the left hip.   He was then changed to comfort care in the hospital.  He had increasing anxiety and depression and acute metabolic encephalopathy which was improving by discharge.  He was seen by psych and started on trazodone for sleep and Seroquel as needed for agitation.  He is a high fall risk and attempts to stand up and walk, and does not like being alone.  Family consulted and decided on long-term care placement with hospice.  He did have an acute HFpEF exacerbation however diuresis resulted in hypotension.  He is now on comfort care with PTA 20 mg Lasix.  He also was found to have a right lung infiltrate and mass.  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  In the hospital he developed leukocytosis, lactic acidosis and tachypnea.  He was discharged to the facility on Augmentin and doxycycline.  He is on supplemental oxygen.  Had a prednisone taper in the hospital.    Today: Dominik is seen for new admission to long-term care.  He has not yet been evaluated by hospice in the facility as he just arrived last evening.  He will be on [facility].  He is sitting up in a recliner in the middle  of the dining room with an aide within arms reach of him.  He has been impulsive and attempting to stand up however responds well with someone nearby.  He appears anxious and when I approach him he asks me to help him get out of this place.  He perseverates on that and does not answer many questions regarding his history.  He does appear to have some dyspnea, lung sounds are coarse.  He does have as needed morphine for dyspnea and pain.  Nursing reports he has been anxious and yelling out frequently.    CODE STATUS/ADVANCE DIRECTIVES DISCUSSION:  No CPR- Do NOT Intubate    ALLERGIES:   Allergies   Allergen Reactions    Diclofenac      Other reaction(s): GI Upset    Loratadine      Other reaction(s): Urinary Retention    Oxycodone      Agitation     Sertraline Unknown     Other reaction(s): ineffective      PAST MEDICAL HISTORY:   Past Medical History:   Diagnosis Date    AAA (abdominal aortic aneurysm) (H)     s/p stenting    AAA (abdominal aortic aneurysm) (H) 11/15/2012    AAA (abdominal aortic aneurysm) CT 11-12  6.4 cm. Saw Dr Muller 1-13;  S/P endovascular repair 3-13;  s/p stenting      Alcohol dependence in remission (H)     Alcohol use disorder, severe, in sustained remission, dependence (H) 08/16/2021    Anxiety     Atrial fibrillation with normal ventricular rate (H)     Benign prostatic hyperplasia with lower urinary tract symptoms     Bronchiectasis without complication (H)     2/27/2020    COPD (chronic obstructive pulmonary disease) (H)     CVA (cerebral vascular accident) (H) 2019    DDD (degenerative disc disease), lumbar     Depression     Depressive disorder     Diabetes (H)     Diastolic dysfunction 01/22/2021    DJD (degenerative joint disease) of knee     left    Essential hypertension     Glaucoma     Glaucoma     History of stroke without residual deficits     Hyperlipidemia     Hypertension     Hypothyroidism     Hypothyroidism     Kidney stones     Major depression     Memory problem      Nocturia     Obesity     Opioid use disorder, severe, dependence (H) 08/16/2021    Overactive bladder 02/25/2021    Primary osteoarthritis of left knee     Psoriasis     Psoriasis     Seborrheic keratoses     Somnolence, daytime     Stenosis of right carotid artery     history of TIA's      PAST SURGICAL HISTORY:   has a past surgical history that includes Abdomen surgery; lithotripsy; IR Abdominal Endovascular Stent Graft (5/19/2020); IR Abdominal Aortogram (5/19/2020); Abdominal Aortic Aneurysm Repair (2013); Cystoscopy; Cystoscopy W/ Litholapaxy / Ehl (N/A, 6/12/2018); Cystoscopy Prostate W/ Laser (N/A, 6/12/2018); Circumcision; carotid endarterectomy (Right, 9/9/2019); Cystoscopy Prostate W/ Laser (N/A, 2/18/2020); Ir Abdominal Aortic Aneurysm Endograft (5/19/2020); Ir Abdominal Aortagram (5/19/2020); Percutaneous Nephrostolithotomy (Right, 03/10/2020); HUANG W/O FACETEC FORAMOT/DSKC 1/2 VRT SEG, LUMBAR (Bilateral, 3/3/2021); and Open reduction internal fixation hip nailing (Left, 10/6/2024).  FAMILY HISTORY: family history includes Cirrhosis in his father; Emphysema in his mother; No Known Problems in his brother, father, maternal aunt, maternal grandfather, maternal grandmother, maternal uncle, paternal aunt, paternal grandfather, paternal grandmother, paternal uncle, sister, and another family member.  SOCIAL HISTORY:   reports that he quit smoking about 34 years ago. His smoking use included cigarettes. He started smoking about 69 years ago. He has a 17.5 pack-year smoking history. He has never used smokeless tobacco. He reports that he does not drink alcohol and does not use drugs.  Patient's living condition: lives in a nursing home    Post Discharge Medication Reconciliation Status:   MED REC REQUIRED  Post Medication Reconciliation Status:  Discharge medications reconciled and changed, see notes/orders       Current Outpatient Medications   Medication Sig Dispense Refill    acetaminophen (TYLENOL) 325 MG  tablet Take 3 tablets (975 mg) by mouth 3 times daily. 100 tablet 0    amoxicillin-clavulanate (AUGMENTIN) 875-125 MG tablet Take 1 tablet by mouth every 12 hours for 4 days.      atropine 1 % ophthalmic solution Place 2 drops under the tongue every 4 hours as needed for secretions.      bisacodyl (DULCOLAX) 10 MG suppository Place 1 suppository (10 mg) rectally once as needed for other (for no bowel movement for 72 hours).      busPIRone (BUSPAR) 5 MG tablet Take 5 mg by mouth 2 times daily      calcium carbonate (TUMS) 500 MG chewable tablet Take 1 tablet (500 mg) by mouth 4 times daily as needed for heartburn.      carboxymethylcellulose PF (REFRESH PLUS) 0.5 % ophthalmic solution Place 1-2 drops into both eyes every hour as needed for dry eyes.      doxycycline monohydrate (MONODOX) 100 MG capsule Take 1 capsule (100 mg) by mouth every 12 hours for 4 days.      DULoxetine (CYMBALTA) 60 MG capsule Take 60 mg by mouth every morning.      furosemide (LASIX) 20 MG tablet Take 20 mg by mouth every morning.      HYDROmorphone, STANDARD CONC, (DILAUDID) 1 MG/ML oral solution Take 1 mL (1 mg) by mouth every 4 hours as needed for moderate pain or shortness of breath (or prevention of moderate pain/dyspnea). 100 mL 0    latanoprost (XALATAN) 0.005 % ophthalmic solution Place 1 drop into both eyes At Bedtime      levalbuterol (XOPENEX) 1.25 MG/3ML neb solution Take 3 mLs (1.25 mg) by nebulization every 4 hours as needed for wheezing or shortness of breath.      lidocaine (LIDODERM) 5 % patch Place 1 patch over 12 hours onto the skin every 24 hours. To prevent lidocaine toxicity, patient should be patch free for 12 hrs daily. 9 patch 0    Melatonin 10 MG TABS tablet Take 10 mg by mouth at bedtime.      menthol-zinc oxide (CALMOSEPTINE) 0.44-20.6 % OINT ointment Apply topically 3 times daily. Apply to saccral, coccyx, and groin area(s) topically three times daily for prevention.      methocarbamol (ROBAXIN) 500 MG tablet  "Take 0.5 tablets (250 mg) by mouth every 8 hours as needed for muscle spasms.      metoprolol tartrate (LOPRESSOR) 50 MG tablet Take 1 tablet (50 mg) by mouth 2 times daily.      miconazole (MICATIN) 2 % external powder Apply topically 2 times daily.      mineral oil-hydrophilic petrolatum (AQUAPHOR) external ointment Apply topically every hour as needed for dry skin.      pantoprazole (PROTONIX) 40 MG EC tablet Take 1 tablet (40 mg) by mouth every morning (before breakfast) 30 tablet 0    QUEtiapine (SEROQUEL) 25 MG tablet Take 0.5 tablets (12.5 mg) by mouth every 6 hours as needed.      senna-docusate (SENOKOT-S/PERICOLACE) 8.6-50 MG tablet Take 1 tablet by mouth 2 times daily as needed for constipation.      traZODone (DESYREL) 50 MG tablet Take 0.5 tablets (25 mg) by mouth at bedtime.      zinc oxide - white petrolatum (CRITIC-AID THICK MOIST BARRIER) 20-51% PSTE topical paste Apply 71 g topically every hour as needed for rash.       No current facility-administered medications for this visit.       ROS:  4 point ROS including Respiratory, CV, GI and , other than that noted in the HPI,  is negative    Vitals:  BP (!) 142/74   Pulse 92   Temp 97.7  F (36.5  C)   Resp 20   Ht 1.702 m (5' 7\")   Wt 82.5 kg (181 lb 12.8 oz)   SpO2 95%   BMI 28.47 kg/m    Exam:  General appearance: alert, anxious.   Lungs: respirations unlabored, course breathsounds  Cardiovascular: Regular rate and rhythm.   ABDOMEN: Globular and soft, non tender.    Extremities: extremities normal, atraumatic; bilateral edema.   Skin: No rashes or lesions  Neurologic: oriented. No focal deficits.   Psych: anxious, agitated,     Lab/Diagnostic data:  Recent labs in [facility] reviewed by me today.     ASSESSMENT/PLAN:    1. Chronic obstructive pulmonary disease, unspecified COPD type (H)    2. Chronic heart failure with preserved ejection fraction (H)    3. Chronic respiratory failure with hypoxia and hypercapnia (H)    4. Moderate dementia " without behavioral disturbance, psychotic disturbance, mood disturbance, or anxiety, unspecified dementia type (H)    5. Hospice care patient    6. Hip fracture, left, closed, with routine healing, subsequent encounter      COPD: Finishing up Augmentin and doxycyline in facility; has xopenex available. Encouraged nursing to utilize morphine for dyspnea.   CHF: continues on lasix. Comfort care. No labs or weights. Treat symptomatically.   Chronic respiratory failure: on 2 L via NC.   Moderate dementia: was on 1:1 in hospital. Nursing has him set up in a recliner in the main dining room with TV and nursing staff close by. Utilize prn seroquel if increased agitation, otherwise would like to avoid this. Hospice to eval sometime this week.   Hospice care patient: Family opted for comfort care and to avoid any major interventions. Will be signing onto [facility] hospice.   Left hip fracture: he denies pain today. No longer on oxycodone; does have prn robaxin which I'd like to discontinue if no longer using. Has prn morphine for comfort and dyspnea.     Electronically signed by:  Chao Almonte, CNP

## 2024-11-13 NOTE — LETTER
11/13/2024      Dominik Rojas  [address]  [place] [address]        [facility] GERIATRICS    PRIMARY CARE PROVIDER AND CLINIC:  Chao Almonte, CNP, [address] [facility] [place] [address]  Chief Complaint   Patient presents with     [facility] Medical Record Number:  [id number]  Place of Service where encounter took place:  [facility] () [[id number]]    Dominik Rojas  is a 84 year old  (1940), admitted to the above facility from  [facility]. Hospital stay 10/12/24 through 11/12/24..   HPI: Dominik has a history of COPD on 2 to 3 L of nasal cannula, chronic systolic heart failure, hypertension, hypothyroid, anxiety who had a fall and fracture status post ORIF.  He was discharged to TCU for rehabilitation.  He then returned to the ED after another fall that resulted in postop changes to the left hip.   He was then changed to comfort care in the hospital.  He had increasing anxiety and depression and acute metabolic encephalopathy which was improving by discharge.  He was seen by psych and started on trazodone for sleep and Seroquel as needed for agitation.  He is a high fall risk and attempts to stand up and walk, and does not like being alone.  Family consulted and decided on long-term care placement with hospice.  He did have an acute HFpEF exacerbation however diuresis resulted in hypotension.  He is now on comfort care with PTA 20 mg Lasix.  He also was found to have a right lung infiltrate and mass.  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  In the hospital he developed leukocytosis, lactic acidosis and tachypnea.  He was discharged to the facility on Augmentin and doxycycline.  He is on supplemental oxygen.  Had a prednisone taper in the hospital.    Today: Dominik is seen for new admission to long-term care.  He has not yet been evaluated by hospice in the facility as he just arrived last  evening.  [facility] will be on [facility].  He is sitting up in a recliner in the middle of the dining room with an aide within arms reach of him.  He has been impulsive and attempting to stand up however responds well with someone nearby.  He appears anxious and when I approach him he asks me to help him get out of this place.  He perseverates on that and does not answer many questions regarding his history.  He does appear to have some dyspnea, lung sounds are coarse.  He does have as needed morphine for dyspnea and pain.  Nursing reports he has been anxious and yelling out frequently.    CODE STATUS/ADVANCE DIRECTIVES DISCUSSION:  No CPR- Do NOT Intubate    ALLERGIES:   Allergies   Allergen Reactions     Diclofenac      Other reaction(s): GI Upset     Loratadine      Other reaction(s): Urinary Retention     Oxycodone      Agitation      Sertraline Unknown     Other reaction(s): ineffective      PAST MEDICAL HISTORY:   Past Medical History:   Diagnosis Date     AAA (abdominal aortic aneurysm) (H)     s/p stenting     AAA (abdominal aortic aneurysm) (H) 11/15/2012    AAA (abdominal aortic aneurysm) CT 11-12  6.4 cm. Saw Dr Muller 1-13;  S/P endovascular repair 3-13;  s/p stenting       Alcohol dependence in remission (H)      Alcohol use disorder, severe, in sustained remission, dependence (H) 08/16/2021     Anxiety      Atrial fibrillation with normal ventricular rate (H)      Benign prostatic hyperplasia with lower urinary tract symptoms      Bronchiectasis without complication (H)     2/27/2020     COPD (chronic obstructive pulmonary disease) (H)      CVA (cerebral vascular accident) (H) 2019     DDD (degenerative disc disease), lumbar      Depression      Depressive disorder      Diabetes (H)      Diastolic dysfunction 01/22/2021     DJD (degenerative joint disease) of knee     left     Essential hypertension      Glaucoma      Glaucoma      History of stroke without residual deficits      Hyperlipidemia       Hypertension      Hypothyroidism      Hypothyroidism      Kidney stones      Major depression      Memory problem      Nocturia      Obesity      Opioid use disorder, severe, dependence (H) 08/16/2021     Overactive bladder 02/25/2021     Primary osteoarthritis of left knee      Psoriasis      Psoriasis      Seborrheic keratoses      Somnolence, daytime      Stenosis of right carotid artery     history of TIA's      PAST SURGICAL HISTORY:   has a past surgical history that includes Abdomen surgery; lithotripsy; IR Abdominal Endovascular Stent Graft (5/19/2020); IR Abdominal Aortogram (5/19/2020); Abdominal Aortic Aneurysm Repair (2013); Cystoscopy; Cystoscopy W/ Litholapaxy / Ehl (N/A, 6/12/2018); Cystoscopy Prostate W/ Laser (N/A, 6/12/2018); Circumcision; carotid endarterectomy (Right, 9/9/2019); Cystoscopy Prostate W/ Laser (N/A, 2/18/2020); Ir Abdominal Aortic Aneurysm Endograft (5/19/2020); Ir Abdominal Aortagram (5/19/2020); Percutaneous Nephrostolithotomy (Right, 03/10/2020); HUANG W/O FACETEC FORAMOT/DSKC 1/2 VRT SEG, LUMBAR (Bilateral, 3/3/2021); and Open reduction internal fixation hip nailing (Left, 10/6/2024).  FAMILY HISTORY: family history includes Cirrhosis in his father; Emphysema in his mother; No Known Problems in his brother, father, maternal aunt, maternal grandfather, maternal grandmother, maternal uncle, paternal aunt, paternal grandfather, paternal grandmother, paternal uncle, sister, and another family member.  SOCIAL HISTORY:   reports that he quit smoking about 34 years ago. His smoking use included cigarettes. He started smoking about 69 years ago. He has a 17.5 pack-year smoking history. He has never used smokeless tobacco. He reports that he does not drink alcohol and does not use drugs.  Patient's living condition: lives in a nursing home    Post Discharge Medication Reconciliation Status:   MED REC REQUIRED  Post Medication Reconciliation Status:  Discharge medications reconciled and  changed, see notes/orders       Current Outpatient Medications   Medication Sig Dispense Refill     acetaminophen (TYLENOL) 325 MG tablet Take 3 tablets (975 mg) by mouth 3 times daily. 100 tablet 0     amoxicillin-clavulanate (AUGMENTIN) 875-125 MG tablet Take 1 tablet by mouth every 12 hours for 4 days.       atropine 1 % ophthalmic solution Place 2 drops under the tongue every 4 hours as needed for secretions.       bisacodyl (DULCOLAX) 10 MG suppository Place 1 suppository (10 mg) rectally once as needed for other (for no bowel movement for 72 hours).       busPIRone (BUSPAR) 5 MG tablet Take 5 mg by mouth 2 times daily       calcium carbonate (TUMS) 500 MG chewable tablet Take 1 tablet (500 mg) by mouth 4 times daily as needed for heartburn.       carboxymethylcellulose PF (REFRESH PLUS) 0.5 % ophthalmic solution Place 1-2 drops into both eyes every hour as needed for dry eyes.       doxycycline monohydrate (MONODOX) 100 MG capsule Take 1 capsule (100 mg) by mouth every 12 hours for 4 days.       DULoxetine (CYMBALTA) 60 MG capsule Take 60 mg by mouth every morning.       furosemide (LASIX) 20 MG tablet Take 20 mg by mouth every morning.       HYDROmorphone, STANDARD CONC, (DILAUDID) 1 MG/ML oral solution Take 1 mL (1 mg) by mouth every 4 hours as needed for moderate pain or shortness of breath (or prevention of moderate pain/dyspnea). 100 mL 0     latanoprost (XALATAN) 0.005 % ophthalmic solution Place 1 drop into both eyes At Bedtime       levalbuterol (XOPENEX) 1.25 MG/3ML neb solution Take 3 mLs (1.25 mg) by nebulization every 4 hours as needed for wheezing or shortness of breath.       lidocaine (LIDODERM) 5 % patch Place 1 patch over 12 hours onto the skin every 24 hours. To prevent lidocaine toxicity, patient should be patch free for 12 hrs daily. 9 patch 0     Melatonin 10 MG TABS tablet Take 10 mg by mouth at bedtime.       menthol-zinc oxide (CALMOSEPTINE) 0.44-20.6 % OINT ointment Apply topically 3  "times daily. Apply to saccral, coccyx, and groin area(s) topically three times daily for prevention.       methocarbamol (ROBAXIN) 500 MG tablet Take 0.5 tablets (250 mg) by mouth every 8 hours as needed for muscle spasms.       metoprolol tartrate (LOPRESSOR) 50 MG tablet Take 1 tablet (50 mg) by mouth 2 times daily.       miconazole (MICATIN) 2 % external powder Apply topically 2 times daily.       mineral oil-hydrophilic petrolatum (AQUAPHOR) external ointment Apply topically every hour as needed for dry skin.       pantoprazole (PROTONIX) 40 MG EC tablet Take 1 tablet (40 mg) by mouth every morning (before breakfast) 30 tablet 0     QUEtiapine (SEROQUEL) 25 MG tablet Take 0.5 tablets (12.5 mg) by mouth every 6 hours as needed.       senna-docusate (SENOKOT-S/PERICOLACE) 8.6-50 MG tablet Take 1 tablet by mouth 2 times daily as needed for constipation.       traZODone (DESYREL) 50 MG tablet Take 0.5 tablets (25 mg) by mouth at bedtime.       zinc oxide - white petrolatum (CRITIC-AID THICK MOIST BARRIER) 20-51% PSTE topical paste Apply 71 g topically every hour as needed for rash.       No current facility-administered medications for this visit.       ROS:  4 point ROS including Respiratory, CV, GI and , other than that noted in the HPI,  is negative    Vitals:  BP (!) 142/74   Pulse 92   Temp 97.7  F (36.5  C)   Resp 20   Ht 1.702 m (5' 7\")   Wt 82.5 kg (181 lb 12.8 oz)   SpO2 95%   BMI 28.47 kg/m    Exam:  General appearance: alert, anxious.   Lungs: respirations unlabored, course breathsounds  Cardiovascular: Regular rate and rhythm.   ABDOMEN: Globular and soft, non tender.    Extremities: extremities normal, atraumatic; bilateral edema.   Skin: No rashes or lesions  Neurologic: oriented. No focal deficits.   Psych: anxious, agitated,     Lab/Diagnostic data:  Recent labs in [facility] reviewed by me today.     ASSESSMENT/PLAN:    1. Chronic obstructive pulmonary disease, unspecified COPD type (H)    2. " Chronic heart failure with preserved ejection fraction (H)    3. Chronic respiratory failure with hypoxia and hypercapnia (H)    4. Moderate dementia without behavioral disturbance, psychotic disturbance, mood disturbance, or anxiety, unspecified dementia type (H)    5. Hospice care patient    6. Hip fracture, left, closed, with routine healing, subsequent encounter      COPD: Finishing up Augmentin and doxycyline in facility; has xopenex available. Encouraged nursing to utilize morphine for dyspnea.   CHF: continues on lasix. Comfort care. No labs or weights. Treat symptomatically.   Chronic respiratory failure: on 2 L via NC.   Moderate dementia: was on 1:1 in hospital. Nursing has him set up in a recliner in the main dining room with TV and nursing staff close by. Utilize prn seroquel if increased agitation, otherwise would like to avoid this. Hospice to eval sometime this week.   Hospice care patient: Family opted for comfort care and to avoid any major interventions. Will be signing onto [facility] hospice.   Left hip fracture: he denies pain today. No longer on oxycodone; does have prn robaxin which I'd like to discontinue if no longer using. Has prn morphine for comfort and dyspnea.     Electronically signed by:  Chao Amlonte CNP                   Sincerely,        Chao Almonte CNP

## 2024-11-13 NOTE — PROGRESS NOTES
Patient given PRN Seroquel and PRN hydromorphone for agitation and pain related restlessness. Proved effective.   Patient discharged via stretcher and EMS.

## 2024-11-14 ENCOUNTER — DOCUMENTATION ONLY (OUTPATIENT)
Dept: OTHER | Facility: CLINIC | Age: 84
End: 2024-11-14

## 2024-11-14 NOTE — DISCHARGE SUMMARY
[facility]    Discharge Summary  Hospitalist    Date of Admission:  10/12/2024  Date of Discharge:  11/12/2024  6:26 PM  Discharging Provider: Lu Skaggs MD  Date of Service (when I saw the patient): 11/12/2024    Discharge Diagnoses   Lung mass  Hospice  Postobstructive pneumonia  Falls    History of Present Illness   Dominik Rojas is an 84 year old male who presented with fall.    Hospital Course   Dominik Rojas is a 84 year old male with history of COPD on 2 to 3 L nasal cannula at home, chronic systolic heart failure, hypertension, hypothyroidism, anxiety, recent fall and left hip fracture s/p ORIF who was jsut discharged to TCU.  Patient was brought to ED for evaluation another fall at TCU with left hip pain. Xray showing postop changes without new acute changes.      Prolonged hospital course was complicated with right lung mass with postobstructive pneumonia, acute respiratory failure, encephalopathy with dementia, anxiety, CHF, new a fib rvr, UTI, COPD. Patient and family declined lung mass (most likely lung cancer) workup, and chose to transition to comfort care, then discharge to hospice facility.        Lu Skaggs MD    Significant Results and Procedures   See below    Pending Results     Unresulted Labs Ordered in the Past 30 Days of this Admission       No orders found from 9/12/2024 to 10/13/2024.            Code Status   DNR / DNI       Primary Care Physician   Chao Almonte        Discharge Disposition   Discharged to [facility] [place] [place] LTC with hospice  Condition at discharge: Terminal    Consultations This Hospital Stay   CARE MANAGEMENT / SOCIAL WORK IP CONSULT  CARDIOLOGY IP CONSULT  PHARMACY IP CONSULT  PSYCHIATRY IP CONSULT  PHYSICAL THERAPY ADULT IP CONSULT  OCCUPATIONAL THERAPY ADULT IP CONSULT  PAIN MANAGEMENT ADULT IP CONSULT  SPIRITUAL HEALTH SERVICES IP CONSULT  ORTHOPEDIC SURGERY IP CONSULT  PHARMACY IP CONSULT  LYMPHEDEMA THERAPY IP CONSULT  NEUROLOGY IP  CONSULT  WOUND OSTOMY CONTINENCE NURSE  IP CONSULT  CARDIOLOGY IP CONSULT  PALLIATIVE CARE ADULT IP CONSULT  PULMONARY IP CONSULT  PHARMACY TO DOSE [facility]  INPATIENT HOSPICE ADULT CONSULT  CARE MANAGEMENT / SOCIAL WORK IP CONSULT    Time Spent on this Encounter   I, Lu Skaggs MD, personally saw the patient today and spent less than or equal to 30 minutes discharging this patient.    Discharge Orders      Primary Care - Care Coordination Referral      General info for SNF    Length of Stay Estimate: Long Term Care  Condition at Discharge: Terminal  Level of care:skilled   Rehabilitation Potential: Poor  Admission H&P remains valid and up-to-date: Yes  Recent Chemotherapy: N/A  Use Nursing Home Standing Orders: Yes     Mantoux instructions    Give two-step Mantoux (PPD) Per Facility Policy No (if no explain) terminal, hospice     Follow Up and recommended labs and tests    Follow up with Nursing home physician.  No follow up labs or test are needed.     Reason for your hospital stay    Falls  Lung mass  Obstructive pneumonia  CHF  COPD  Dementia  A fib     Activity - Up with nursing assistance     Additional Discharge Instructions    Transition to hospice care as planned     No CPR- Do NOT Intubate     Contact Isolation     Oxygen (SNF/TCU) Discharge     Fall precautions     Diet    Follow this diet upon discharge: Current Diet:Orders Placed This Encounter      Snacks/Supplements Adult: Ensure Enlive; Between Meals      Regular Diet Adult     Discharge Medications   Discharge Medication List as of 11/12/2024  6:14 PM        START taking these medications    Details   amoxicillin-clavulanate (AUGMENTIN) 875-125 MG tablet Take 1 tablet by mouth every 12 hours for 4 days., Transitional      atropine 1 % ophthalmic solution Place 2 drops under the tongue every 4 hours as needed for secretions., Transitional      bisacodyl (DULCOLAX) 10 MG suppository Place 1 suppository (10 mg) rectally once as needed for other (for  no bowel movement for 72 hours)., Transitional      calcium carbonate (TUMS) 500 MG chewable tablet Take 1 tablet (500 mg) by mouth 4 times daily as needed for heartburn., Transitional      carboxymethylcellulose PF (REFRESH PLUS) 0.5 % ophthalmic solution Place 1-2 drops into both eyes every hour as needed for dry eyes., Transitional      doxycycline monohydrate (MONODOX) 100 MG capsule Take 1 capsule (100 mg) by mouth every 12 hours for 4 days., Transitional      levalbuterol (XOPENEX) 1.25 MG/3ML neb solution Take 3 mLs (1.25 mg) by nebulization every 4 hours as needed for wheezing or shortness of breath., Transitional      methocarbamol (ROBAXIN) 500 MG tablet Take 0.5 tablets (250 mg) by mouth every 8 hours as needed for muscle spasms., Transitional      metoprolol tartrate (LOPRESSOR) 50 MG tablet Take 1 tablet (50 mg) by mouth 2 times daily., Transitional      mineral oil-hydrophilic petrolatum (AQUAPHOR) external ointment Apply topically every hour as needed for dry skin.Transitional      QUEtiapine (SEROQUEL) 25 MG tablet Take 0.5 tablets (12.5 mg) by mouth every 6 hours as needed., Transitional      traZODone (DESYREL) 50 MG tablet Take 0.5 tablets (25 mg) by mouth at bedtime., Transitional      zinc oxide - white petrolatum (CRITIC-AID THICK MOIST BARRIER) 20-51% PSTE topical paste Apply 71 g topically every hour as needed for rash., Transitional           CONTINUE these medications which have CHANGED    Details   HYDROmorphone, STANDARD CONC, (DILAUDID) 1 MG/ML oral solution Take 1 mL (1 mg) by mouth every 4 hours as needed for moderate pain or shortness of breath (or prevention of moderate pain/dyspnea)., Disp-100 mL, R-0, Local Print           CONTINUE these medications which have NOT CHANGED    Details   acetaminophen (TYLENOL) 325 MG tablet Take 3 tablets (975 mg) by mouth 3 times daily., Disp-100 tablet, R-0, E-Prescribe      busPIRone (BUSPAR) 5 MG tablet Take 5 mg by mouth 2 times daily,  Historical      DULoxetine (CYMBALTA) 60 MG capsule Take 60 mg by mouth every morning., Historical      furosemide (LASIX) 20 MG tablet Take 20 mg by mouth every morning., Historical      latanoprost (XALATAN) 0.005 % ophthalmic solution Place 1 drop into both eyes At Bedtime, Historical      lidocaine (LIDODERM) 5 % patch Place 1 patch over 12 hours onto the skin every 24 hours. To prevent lidocaine toxicity, patient should be patch free for 12 hrs daily.Disp-9 patch, R-0Local Print      Melatonin 10 MG TABS tablet Take 10 mg by mouth at bedtime., Historical      menthol-zinc oxide (CALMOSEPTINE) 0.44-20.6 % OINT ointment Apply topically 3 times daily. Apply to saccral, coccyx, and groin area(s) topically three times daily for prevention.Historical      miconazole (MICATIN) 2 % external powder Apply topically 2 times daily.Transitional      pantoprazole (PROTONIX) 40 MG EC tablet Take 1 tablet (40 mg) by mouth every morning (before breakfast), Disp-30 tablet, R-0, E-Prescribe      senna-docusate (SENOKOT-S/PERICOLACE) 8.6-50 MG tablet Take 1 tablet by mouth 2 times daily as needed for constipation., OTC           STOP taking these medications       amoxicillin (AMOXIL) 500 MG capsule Comments:   Reason for Stopping:         aspirin (ASA) 81 MG chewable tablet Comments:   Reason for Stopping:         Cholecalciferol (VITAMIN D3) 50 MCG (2000 UT) CAPS Comments:   Reason for Stopping:         ipratropium - albuterol 0.5 mg/2.5 mg/3 mL (DUONEB) 0.5-2.5 (3) MG/3ML neb solution Comments:   Reason for Stopping:         levothyroxine (SYNTHROID/LEVOTHROID) 88 MCG tablet Comments:   Reason for Stopping:         losartan (COZAAR) 25 MG tablet Comments:   Reason for Stopping:         metoprolol succinate ER (TOPROL-XL) 12.5 mg TABS 24 hr half-tab Comments:   Reason for Stopping:         multivitamin w/minerals (THERA-VIT-M) tablet Comments:   Reason for Stopping:         OLANZapine (ZYPREXA) 7.5 MG tablet Comments:   Reason  for Stopping:         rosuvastatin (CRESTOR) 10 MG tablet Comments:   Reason for Stopping:         traMADol (ULTRAM) 50 MG tablet Comments:   Reason for Stopping:             Allergies   Allergies   Allergen Reactions    Diclofenac      Other reaction(s): GI Upset    Loratadine      Other reaction(s): Urinary Retention    Oxycodone      Agitation     Sertraline Unknown     Other reaction(s): ineffective     Data   Most Recent 3 CBC's:  Recent Labs   Lab Test 11/10/24  0712 11/09/24  0645 11/08/24  0808   WBC 12.8* 12.2* 15.3*   HGB 11.8* 11.7* 12.2*   MCV 96 98 98    247 322      Most Recent 3 BMP's:  Recent Labs   Lab Test 11/11/24  0609 11/10/24  0712 11/09/24  0645 11/08/24  0638   NA  --  143 139 143   POTASSIUM  --  3.5 4.0 4.0   CHLORIDE  --  103 102 101   CO2  --  29 26 27   BUN  --  20.1 27.2* 31.2*   CR 0.85 0.74 0.75 0.91   ANIONGAP  --  11 11 15   CHELI  --  8.3* 8.3* 8.4*   GLC  --  86 75 82     Most Recent 2 LFT's:  Recent Labs   Lab Test 10/08/24  0536 10/07/24  0538   AST 68* 45   ALT 22 24   ALKPHOS 213* 212*   BILITOTAL 1.0 0.5     Most Recent INR's and Anticoagulation Dosing History:  Anticoagulation Dose History          Latest Ref Rng & Units 8/5/2019 8/27/2019 1/14/2021 2/27/2021 3/8/2023 4/24/2023   Recent Dosing and Labs   INR 0.85 - 1.15 1.11  1.14  1.13  1.15  1.16  1.16       Details                 Most Recent 3 Troponin's:No lab results found.  Most Recent Cholesterol Panel:  Recent Labs   Lab Test 01/15/24  1332   CHOL 141   LDL 68   HDL 51   TRIG 112     Most Recent 6 Bacteria Isolates From Any Culture (See EPIC Reports for Culture Details):No lab results found.  Most Recent TSH, T4 and A1c Labs:  Recent Labs   Lab Test 10/06/24  0558 09/12/24  1237   TSH 6.93*  --    T4 0.83*  --    A1C  --  5.8*     Results for orders placed or performed during the hospital encounter of 10/12/24   XR Femur Left 2 Views    Narrative    EXAM: XR FEMUR LEFT 2 VIEWS  LOCATION: [facility]  DATE: 10/12/2024    INDICATION: Left hip pain, status post fall after hip surgery.  COMPARISON: 10/06/2024.      Impression    IMPRESSION: Postoperative changes redemonstrated related to ORIF intertrochanteric left proximal femur fracture with dynamic hip screw. Alignment and hardware unchanged. Displaced lesser trochanteric fracture fragments are unchanged. Lateral skin staples   remain along the lateral left hip and proximal thigh. Anatomic alignment left femur. No new left femur fracture or joint malalignment. Mild to moderate left hip osteoarthritis. Moderate to severe degenerative change medial compartment left knee.   Arterial calcification. Small left knee joint effusion. Lateral left hip and proximal thigh soft tissue edema.     Head CT w/o contrast    Narrative    EXAM: CT HEAD WITHOUT CONTRAST, CT CERVICAL SPINE WITHOUT CONTRAST  LOCATION: [facility]  DATE: 10/12/2024    INDICATION: Fall, head injury.  COMPARISON: CT brain and CT cervical spine 10/05/2024. MRI brain 05/05/2024.  TECHNIQUE:   1) Routine CT Head without IV contrast. Multiplanar reformats. Dose reduction techniques were used.  2) Routine CT Cervical Spine without IV contrast. Multiplanar reformats. Dose reduction techniques were used.    FINDINGS:   HEAD CT:   INTRACRANIAL CONTENTS: No finding for intracranial hemorrhage or mass or convincing finding for acute infarct. Chronic volume loss and encephalomalacic change is seen within the right frontal lobe compatible with sequelae of prior ischemia and stable   compared to prior. Moderate patchy low-attenuation change is again seen within the cerebral white matter, nonspecific but compatible with sequelae of chronic microvascular ischemic change given the patient's age and similar to prior. Moderate stable   prominence of the lateral ventricles and sulci and mild prominence of the third ventricle.    Cerebellar tonsils are normally positioned. Sella is  unremarkable for technique. Generalized thinning of the body of the corpus callosum which otherwise appears normally formed.    VISUALIZED ORBITS/SINUSES/MASTOIDS: Both globes are borderline proptotic which is favored to relate to body habitus as there is no abnormal enlargement of the extraocular muscles to strongly suggest thyroid-related orbitopathy.    BONES/SOFT TISSUES: Calvarium is intact, without suspicious lytic or blastic foci.    CERVICAL SPINE CT:   VERTEBRA: Slight smooth convex left cervical curvature. Normal cervical lordosis. Craniocervical junction is intact. Mild to moderate degenerative change anterior C1-C2 articulation.    Allowing for chronic endplate degenerative changes seen throughout the visualized spine, vertebral body heights are grossly preserved and there is no finding for acute fracture or posttraumatic subluxation. Endplate irregularity and advanced interspace   narrowing C2-C3 through T1-T2. Mild posterior interbody spurring is seen throughout the cervical spine. Mild to moderate right-sided facet arthropathy C3-C4 through C5-C6. Mild left-sided facet arthropathy C2-C3.    CANAL/FORAMINA: Mild spinal canal narrowing C5-C6 and C6-C7. Moderate to severe bilateral foraminal stenosis C3-C4 and C4-C5 and on the left at C5-C6 and C6-C7.    PARASPINAL: Dorsal paraspinal soft tissues are unremarkable. No prevertebral soft tissue swelling. Multiple surgical clips are seen within the right neck likely relating to prior carotid endarterectomy. Lung apices are clear.      Impression    IMPRESSION:  HEAD CT:  1.  No finding for intracranial hemorrhage, mass, or acute infarct.    2.  Moderate volume loss and moderate to advanced presumed sequelae of chronic microvascular ischemic change. Stable volume loss and gliosis right frontal lobe.    CERVICAL SPINE CT:  1.  Advanced cervical spondylosis without finding for acute fracture or posttraumatic subluxation.       CT Cervical Spine w/o Contrast     Narrative    EXAM: CT HEAD WITHOUT CONTRAST, CT CERVICAL SPINE WITHOUT CONTRAST  LOCATION: [facility]  DATE: 10/12/2024    INDICATION: Fall, head injury.  COMPARISON: CT brain and CT cervical spine 10/05/2024. MRI brain 05/05/2024.  TECHNIQUE:   1) Routine CT Head without IV contrast. Multiplanar reformats. Dose reduction techniques were used.  2) Routine CT Cervical Spine without IV contrast. Multiplanar reformats. Dose reduction techniques were used.    FINDINGS:   HEAD CT:   INTRACRANIAL CONTENTS: No finding for intracranial hemorrhage or mass or convincing finding for acute infarct. Chronic volume loss and encephalomalacic change is seen within the right frontal lobe compatible with sequelae of prior ischemia and stable   compared to prior. Moderate patchy low-attenuation change is again seen within the cerebral white matter, nonspecific but compatible with sequelae of chronic microvascular ischemic change given the patient's age and similar to prior. Moderate stable   prominence of the lateral ventricles and sulci and mild prominence of the third ventricle.    Cerebellar tonsils are normally positioned. Sella is unremarkable for technique. Generalized thinning of the body of the corpus callosum which otherwise appears normally formed.    VISUALIZED ORBITS/SINUSES/MASTOIDS: Both globes are borderline proptotic which is favored to relate to body habitus as there is no abnormal enlargement of the extraocular muscles to strongly suggest thyroid-related orbitopathy.    BONES/SOFT TISSUES: Calvarium is intact, without suspicious lytic or blastic foci.    CERVICAL SPINE CT:   VERTEBRA: Slight smooth convex left cervical curvature. Normal cervical lordosis. Craniocervical junction is intact. Mild to moderate degenerative change anterior C1-C2 articulation.    Allowing for chronic endplate degenerative changes seen throughout the visualized spine, vertebral body heights are grossly preserved  and there is no finding for acute fracture or posttraumatic subluxation. Endplate irregularity and advanced interspace   narrowing C2-C3 through T1-T2. Mild posterior interbody spurring is seen throughout the cervical spine. Mild to moderate right-sided facet arthropathy C3-C4 through C5-C6. Mild left-sided facet arthropathy C2-C3.    CANAL/FORAMINA: Mild spinal canal narrowing C5-C6 and C6-C7. Moderate to severe bilateral foraminal stenosis C3-C4 and C4-C5 and on the left at C5-C6 and C6-C7.    PARASPINAL: Dorsal paraspinal soft tissues are unremarkable. No prevertebral soft tissue swelling. Multiple surgical clips are seen within the right neck likely relating to prior carotid endarterectomy. Lung apices are clear.      Impression    IMPRESSION:  HEAD CT:  1.  No finding for intracranial hemorrhage, mass, or acute infarct.    2.  Moderate volume loss and moderate to advanced presumed sequelae of chronic microvascular ischemic change. Stable volume loss and gliosis right frontal lobe.    CERVICAL SPINE CT:  1.  Advanced cervical spondylosis without finding for acute fracture or posttraumatic subluxation.       US Lower Extremity Venous Duplex Left    Narrative    EXAM: US LOWER EXTREMITY VENOUS DUPLEX LEFT  LOCATION: [facility]  DATE: 10/12/2024    INDICATION: ongoing LLE pain after left hip surgery on 10 6 24  COMPARISON: None.  TECHNIQUE: Venous Duplex ultrasound of the left lower extremity with and without compression, augmentation and duplex. Color flow and spectral Doppler with waveform analysis performed.    FINDINGS: Exam includes the common femoral, femoral, popliteal, and contralateral common femoral veins as well as segmentally visualized deep calf veins and greater saphenous vein.     LEFT: No deep vein thrombosis. No superficial thrombophlebitis. No popliteal cyst.      Impression    IMPRESSION:  No deep venous thrombosis in the left lower extremity.   XR Pelvis 1/2 Views     Narrative    EXAM: XR PELVIS 1/2 VIEWS  LOCATION: [facility]  DATE: 10/12/2024    INDICATION: Left hip pain status post fall since left hip surgery.  COMPARISON: 10/6/2024      Impression    IMPRESSION: No significant interval change. Postop ORIF left intertrochanteric femur fracture with dynamic hip screw. Hardware and alignment unchanged. Lesser trochanteric fragments remain medially displaced by approximately 2 cm. No new fracture. Mild   to moderate left and mild right hip osteoarthritis. Vascular stents project over the pelvis and surgical clips project over the right inguinal region. Both obturator rings appear intact. Arterial calcification.   CT Femur Thigh Left w/o Contrast    Narrative    EXAM: CT FEMUR THIGH LEFT W/O CONTRAST  LOCATION: [facility]  DATE: 10/19/2024    INDICATION: Assess for fracture s p hip nail and new fall  COMPARISON: Radiograph 10/12/2024. CT 07/17/2024.  TECHNIQUE: Noncontrast. Axial, sagittal and coronal thin-section reconstruction. Dose reduction techniques were used.     FINDINGS:     BONES:  Reduced and nailed comminuted intertrochanteric left femoral fracture with dynamic femoral head/neck screw and single distal interlocking screw. No instrumentation failure. Similar alignment compared to radiograph 10/12/2024. Distracted comminuted   fragments of the lesser trochanter are unchanged unchanged. There is mildly displaced comminution of the greater trochanter. There is soft tissue edema about the fracture. There is a small subcutaneous hematoma along the surgical path (series 4 image 8).   There are staples along the lateral left thigh.    Moderate left hip osteoarthritis. Degenerative changes pubic symphysis. Mild left sacroiliac joint osteoarthritis. Moderate medial compartment left knee osteoarthritis.    SOFT TISSUES:  Edema about the fracture as above. Small hematoma in the overlying subcutaneous tissue.    Diffuse  subcutaneous edema of the left thigh. Stranding in the left groin related to prior vascular access. Partially evaluated intra-abdominal vascular stents. Vascular calcifications without aneurysm.      Impression    IMPRESSION:  1.  Reduced and nailed comminuted intertrochanteric left femoral fracture in unchanged alignment. No CT evidence of new fracture.  2.  Small lateral left hip subcutaneous hematoma.     XR Chest 1 View    Narrative    EXAM: XR CHEST 1 VIEW  LOCATION: [facility]  DATE: 10/19/2024    INDICATION: COPD, heart failure, tachycardia  COMPARISON: 10/6/2024      Impression    IMPRESSION: Mild bibasilar atelectasis. Increasing small right pleural effusion. No pneumothorax. Stable enlarged cardiac silhouette. Mild pulmonary vascular congestion.   XR Chest Port 1 View    Narrative    EXAM: XR CHEST PORT 1 VIEW  LOCATION: [facility]  DATE: 11/6/2024    INDICATION: Hypoxia, leukocytosis.    COMPARISON: 10/19/2024 CXR, 9/8/2024 PA chest and left ribs, 7/17/2024 CTA AP.      Impression    IMPRESSION: Mild pleural fluid and/or thickening inferior right hemithorax and consolidation and/or volume loss basilar right lower lobe stable. A few strands of fibrosis or linear atelectasis in the remainder of the lower lungs unchanged. Mid and upper   lungs are clear. No left-sided pleural effusion. Heart size and pulmonary vascularity upper normal. Mildly displaced fracture anterior tip left 9th rib unchanged since 9/8/2024.   XR Chest Port 1 View    Narrative    EXAM: XR CHEST PORT 1 VIEW  LOCATION: [facility]  DATE: 11/6/2024    INDICATION: new Afib with shortness of breath and hypoxia  COMPARISON: Chest radiograph 11/6/2024      Impression    IMPRESSION: Interstitial opacities and septal thickening, favored to represent mild pulmonary edema. Likely small right pleural effusion with lower lobe atelectasis. No pneumothorax. Stable heart size and  mediastinal contours.   CT Chest Pulmonary Embolism w Contrast    Narrative    EXAM: CT CHEST PULMONARY EMBOLISM W CONTRAST  LOCATION: [facility]  DATE: 11/6/2024    INDICATION: Tachycardia, tachypnea, elevated D-dimer.  COMPARISON: None.  TECHNIQUE: CT chest pulmonary angiogram during arterial phase injection of IV contrast. Multiplanar reformats and MIP reconstructions were performed. Dose reduction techniques were used.   CONTRAST: 90 mL Isovue 370.    FINDINGS:  ANGIOGRAM CHEST: Pulmonary arteries are normal caliber and negative for pulmonary emboli. Thoracic aorta is negative for dissection. No CT evidence of right heart strain.    LUNGS AND PLEURA: Emphysema. Large circumferential mass contiguous in the subcarinal and right hilar region and involving the right mainstem, right middle lobe and right lower lobe bronchi with resultant narrowing of the bronchi. There is postobstructive   pneumonia in the right lung base with a small right pleural effusion. Poor opacification of the pulmonary arterial system in the right lower lobe which cannot be evaluated.    MEDIASTINUM/AXILLAE: Paratracheal AP window lymphadenopathy. Cardiac enlargement.    CORONARY ARTERY CALCIFICATION: Moderate.    UPPER ABDOMEN: Calcified gallstones. Incomplete visualization of aortic stent graft. 9 mm stone left renal pelvis.    MUSCULOSKELETAL: Severe compression of T12.      Impression    IMPRESSION:  1.  No pulmonary embolism although there is poor opacification of the segmental branches of the pulmonary arterial system in the right lower lobe which cannot be evaluated.    2.  Large infiltrating mass in the subcarinal and right hilar region with resultant significant narrowing of the right mainstem, middle lobe and bronchus intermedius. Postobstructive pneumonia in the right lung base with small right pleural effusion.   Underlying emphysematous change. Associated mediastinal lymphadenopathy.    3.  Mass severely  narrows or occludes the inferior right pulmonary vein with narrowing of the superior right pulmonary vein as it enters the left atrium.    4.  Severe compression of T12.    5.  Cholelithiasis.    6.  9 mm stone in the left renal pelvis with minimal caliectasis. Additional small stones right kidney.    7.  Incomplete visualization of an aortic stent graft.    NOTE: ABNORMAL REPORT    THE DICTATION ABOVE DESCRIBES AN ABNORMALITY FOR WHICH FOLLOW-UP IS NEEDED.

## 2024-11-25 ENCOUNTER — DOCUMENTATION ONLY (OUTPATIENT)
Dept: OTHER | Facility: CLINIC | Age: 84
End: 2024-11-25
Payer: COMMERCIAL

## (undated) DEVICE — MAT FLOOR SURGICAL 40X38 0702140238

## (undated) DEVICE — GLOVE BIOGEL PI INDICATOR 8.0 LF 41680

## (undated) DEVICE — STPL SKIN 35W 6.9MM  PXW35

## (undated) DEVICE — WIRE GUIDE 3.2X400MM  357.399

## (undated) DEVICE — ESU GROUND PAD ADULT REM W/15' CORD E7507DB

## (undated) DEVICE — DRAPE IOBAN ISOLATION VERTICAL 320X21CM 6617

## (undated) DEVICE — DRSG MEPILEX BORDER ADHS 10CMX20CM STRL 496405

## (undated) DEVICE — DRILL BIT QUICK COUPLING 3 FLUTE 4.2MMX330/100MM CALIBRATE

## (undated) DEVICE — DRILL BIT CANNULATED 10MM TAPERED

## (undated) DEVICE — GLOVE BIOGEL PI SZ 8.5 40885

## (undated) DEVICE — DRAPE C-ARM 60X42" 1013

## (undated) DEVICE — SUTURE VICRYL+ 1 27IN CT-1 UND VCP261H

## (undated) DEVICE — GLOVE BIOGEL PI SZ 8.0 40880

## (undated) DEVICE — SUTURE VICRYL+ 0 27IN CT-1 UND VCP260H

## (undated) DEVICE — PACK MINOR SINGLE BASIN SSK3001

## (undated) DEVICE — GLOVE UNDER INDICATOR PI SZ 8.5 LF 41685

## (undated) DEVICE — CUSTOM PACK GEN MAJOR SBA5BGMHEA

## (undated) DEVICE — PREP CHLORAPREP 26ML TINTED HI-LITE ORANGE 930815

## (undated) DEVICE — GOWN IMPERVIOUS BREATHABLE SMART XLG 89045

## (undated) DEVICE — PADDING CAST 4IN WEBRIL STRL 2502

## (undated) DEVICE — SUCTION TIP FLEXI CLEAR TIP DISP K62

## (undated) DEVICE — SUTURE VICRYL+ 2-0 27IN CT-1 UND VCP259H

## (undated) RX ORDER — LIDOCAINE HYDROCHLORIDE 10 MG/ML
INJECTION, SOLUTION EPIDURAL; INFILTRATION; INTRACAUDAL; PERINEURAL
Status: DISPENSED
Start: 2024-02-08

## (undated) RX ORDER — PROPOFOL 10 MG/ML
INJECTION, EMULSION INTRAVENOUS
Status: DISPENSED
Start: 2024-10-06

## (undated) RX ORDER — ONDANSETRON 2 MG/ML
INJECTION INTRAMUSCULAR; INTRAVENOUS
Status: DISPENSED
Start: 2024-10-06

## (undated) RX ORDER — DEXAMETHASONE SODIUM PHOSPHATE 10 MG/ML
INJECTION, SOLUTION INTRAMUSCULAR; INTRAVENOUS
Status: DISPENSED
Start: 2024-10-06

## (undated) RX ORDER — DEXAMETHASONE SODIUM PHOSPHATE 10 MG/ML
INJECTION, SOLUTION INTRAMUSCULAR; INTRAVENOUS
Status: DISPENSED
Start: 2024-02-08

## (undated) RX ORDER — CEFAZOLIN SODIUM 1 G/3ML
INJECTION, POWDER, FOR SOLUTION INTRAMUSCULAR; INTRAVENOUS
Status: DISPENSED
Start: 2024-10-06

## (undated) RX ORDER — ONDANSETRON 2 MG/ML
INJECTION INTRAMUSCULAR; INTRAVENOUS
Status: DISPENSED
Start: 2024-02-08